# Patient Record
Sex: FEMALE | Race: BLACK OR AFRICAN AMERICAN | NOT HISPANIC OR LATINO | Employment: OTHER | URBAN - METROPOLITAN AREA
[De-identification: names, ages, dates, MRNs, and addresses within clinical notes are randomized per-mention and may not be internally consistent; named-entity substitution may affect disease eponyms.]

---

## 2019-12-25 PROBLEM — I10 HYPERTENSION: Status: ACTIVE | Noted: 2019-12-25

## 2019-12-25 PROBLEM — E78.2 MIXED HYPERLIPIDEMIA: Status: ACTIVE | Noted: 2019-12-25

## 2019-12-25 PROBLEM — E11.9 DIABETES MELLITUS: Status: ACTIVE | Noted: 2019-12-25

## 2019-12-25 PROBLEM — J81.0 ACUTE PULMONARY EDEMA: Status: ACTIVE | Noted: 2019-12-25

## 2019-12-26 PROBLEM — I16.1 HYPERTENSIVE EMERGENCY: Status: ACTIVE | Noted: 2019-12-25

## 2019-12-26 PROBLEM — I25.10 CORONARY ARTERY DISEASE INVOLVING NATIVE HEART: Status: ACTIVE | Noted: 2019-12-26

## 2019-12-26 PROBLEM — I50.41 ACUTE COMBINED SYSTOLIC AND DIASTOLIC CHF, NYHA CLASS 3: Status: ACTIVE | Noted: 2019-12-26

## 2019-12-26 PROBLEM — J96.01 ACUTE RESPIRATORY FAILURE WITH HYPOXIA: Status: ACTIVE | Noted: 2019-12-26

## 2019-12-27 PROBLEM — J81.0 ACUTE PULMONARY EDEMA: Status: RESOLVED | Noted: 2019-12-25 | Resolved: 2019-12-27

## 2019-12-27 PROBLEM — I42.9 CARDIOMYOPATHY: Status: ACTIVE | Noted: 2019-12-27

## 2019-12-27 PROBLEM — I42.0 DILATED CARDIOMYOPATHY: Status: ACTIVE | Noted: 2019-12-27

## 2019-12-28 PROBLEM — I50.41 ACUTE COMBINED SYSTOLIC AND DIASTOLIC CHF, NYHA CLASS 3: Status: RESOLVED | Noted: 2019-12-26 | Resolved: 2019-12-28

## 2019-12-28 PROBLEM — J96.01 ACUTE RESPIRATORY FAILURE WITH HYPOXIA: Status: RESOLVED | Noted: 2019-12-26 | Resolved: 2019-12-28

## 2019-12-30 ENCOUNTER — NURSE TRIAGE (OUTPATIENT)
Dept: ADMINISTRATIVE | Facility: CLINIC | Age: 59
End: 2019-12-30

## 2019-12-30 ENCOUNTER — PATIENT OUTREACH (OUTPATIENT)
Dept: ADMINISTRATIVE | Facility: CLINIC | Age: 59
End: 2019-12-30

## 2019-12-30 RX ORDER — FUROSEMIDE 40 MG/1
40 TABLET ORAL 2 TIMES DAILY
Status: ON HOLD | COMMUNITY
End: 2020-01-13 | Stop reason: SDUPTHER

## 2019-12-30 NOTE — PATIENT INSTRUCTIONS
After Catheter Ablation  After your catheter ablation procedure, youll be taken to a recovery room. You may need to lie flat for 2 to 6 hours while the insertion sites close up. During this time youll be monitored by a nurse. You may go home later that day. Or, you may stay in the hospital overnight.    Going Home  When its time to go home, have an adult family member or friend drive you. Most people can walk, climb stairs, and perform light activity soon after catheter ablation. You can most likely return to your full routine within a few days. But you may be told to avoid running, heavy lifting, and other strenuous activities for a short time.  Follow-Up  Youll have a follow-up visit to go over the results of your catheter ablation. Your healthcare provider will tell you if you can stop taking heart rhythm medications. In many cases, one ablation is enough to treat an arrhythmia. But sometimes the problem returns or another is found. If this happens, you may need a second catheter ablation. Tell your healthcare provider if you have any new or returning symptoms.  Common Symptoms After Catheter Ablation  In the first few weeks after catheter ablation, you may feel mild chest fullness or aching. You may also feel as if your heart is skipping beats. Or, your heartbeat may feel faster than normal. You may think that your heart rhythm problem is about to return. These sensations are normal and usually go away with time. Talk to your healthcare provider if youre concerned.  When to Call Your Health Care Provider   After your procedure, call your health care provider if you have:  · Increased bleeding, bruising, or pain at the insertion site  · Shortness of breath or chest pain  · Coldness, swelling, or numbness of the arm or leg near the insertion site  · A bruise or lump at the insertion site that is larger than a walnut  · A fever over 100°F (37.8°C)  · Symptoms of your arrhythmia   Date Last Reviewed:  2/20/2014  © 2557-2396 [employer]. [address] [place] [address], [place] [address]. All rights reserved. This information is not intended as a substitute for professional medical care. Always follow your healthcare professional's instructions.

## 2019-12-30 NOTE — TELEPHONE ENCOUNTER
"  Reason for Disposition   Palpitations    Protocols used: ST HEART RATE AND HEARTBEAT [id number]    Patient is recent discharge from [facility] [place] - Had LHC in groin / no blockage per patient. C/O soreness in groin area, but denies pain, drainage, swelling or bleeding. Also denies numbness or tingling in leg.  Pt stated she had 2 episodes of irregular "fast" HR with some SOB since being home. Denies persistent dizziness, fatigue, weakness, SOB or syncope.  Cautioned pt on taking in too much caffeine, not getting rest. She stated she hasn't slept well since discharge and is a coffee drinker. Instructed her to call back if symptoms worsened or go to the ER. Pt verbalized understanding.  "

## 2020-01-12 PROBLEM — I50.9 CONGESTIVE HEART FAILURE: Status: ACTIVE | Noted: 2020-01-12

## 2020-01-13 PROBLEM — I50.41 ACUTE COMBINED SYSTOLIC AND DIASTOLIC CHF, NYHA CLASS 3: Status: RESOLVED | Noted: 2019-12-26 | Resolved: 2020-01-13

## 2020-04-02 ENCOUNTER — HOSPITAL ENCOUNTER (EMERGENCY)
Facility: HOSPITAL | Age: 60
Discharge: HOME | End: 2020-04-02
Attending: EMERGENCY MEDICINE
Payer: COMMERCIAL

## 2020-04-02 VITALS
DIASTOLIC BLOOD PRESSURE: 75 MMHG | HEIGHT: 67 IN | TEMPERATURE: 98 F | SYSTOLIC BLOOD PRESSURE: 125 MMHG | HEART RATE: 103 BPM | RESPIRATION RATE: 17 BRPM | OXYGEN SATURATION: 98 % | WEIGHT: 185 LBS | BODY MASS INDEX: 29.03 KG/M2

## 2020-04-02 DIAGNOSIS — Z20.822 SUSPECTED COVID-19 VIRUS INFECTION: Primary | ICD-10-CM

## 2020-04-02 DIAGNOSIS — R05.9 COUGH: ICD-10-CM

## 2020-04-02 PROCEDURE — 99284 PR EMERGENCY DEPT VISIT,LEVEL IV: ICD-10-PCS | Mod: ,,, | Performed by: EMERGENCY MEDICINE

## 2020-04-02 PROCEDURE — 25000003 PHARM REV CODE 250: Performed by: EMERGENCY MEDICINE

## 2020-04-02 PROCEDURE — U0002 COVID-19 LAB TEST NON-CDC: HCPCS

## 2020-04-02 PROCEDURE — 99283 EMERGENCY DEPT VISIT LOW MDM: CPT | Mod: 25

## 2020-04-02 PROCEDURE — 99284 EMERGENCY DEPT VISIT MOD MDM: CPT | Mod: ,,, | Performed by: EMERGENCY MEDICINE

## 2020-04-02 RX ORDER — ACETAMINOPHEN 500 MG
1000 TABLET ORAL
Status: COMPLETED | OUTPATIENT
Start: 2020-04-02 | End: 2020-04-02

## 2020-04-02 RX ORDER — ACETAMINOPHEN 500 MG
1000 TABLET ORAL
Status: DISCONTINUED | OUTPATIENT
Start: 2020-04-02 | End: 2020-04-02 | Stop reason: SDUPTHER

## 2020-04-02 RX ADMIN — ACETAMINOPHEN 1000 MG: 500 TABLET ORAL at 04:04

## 2020-04-02 NOTE — ED PROVIDER NOTES
Encounter Date: 4/2/2020    SCRIBE #1 NOTE: I, Alondra Irby, am scribing for, and in the presence of,  Dr. Garcia. I have scribed the following portions of the note - Other sections scribed: HPI, ROS, PE.       History     Chief Complaint   Patient presents with    Cough     white sputum cough, SOB on exertion,headaches.  dx COVID +. No fever, chills, body aches. [phone]     Time patient was seen by the provider: 3:18 PM      The patient is a 59 y.o. female with medical history of hypertension, hyperlipidemia, anxiety, coronary angiogram, pulmonary edema, diabetes, CHF who presents to the ED with a complaint of cough. Patient reports SOB and cough. She notes she was on captopril which made her cough. She denies fever, diarrhea, nausea, vomiting. Contact with  who is positive for COVID-19.     The history is provided by the patient and medical records.     Review of patient's allergies indicates:   Allergen Reactions    Captopril Other (See Comments)     COUGH     Past Medical History:   Diagnosis Date    Anxiety     CHF (congestive heart failure)     Depression     Diabetes mellitus     Dyspnea     H/O coronary angiogram     H/O: hysterectomy     Hyperlipemia     Hypertension     Pulmonary edema     Schizophrenia      Past Surgical History:   Procedure Laterality Date    BLADDER SUSPENSION      CARPAL TUNNEL RELEASE Right     HEEL SPUR SURGERY Left     LEFT HEART CATHETERIZATION Bilateral 12/27/2019    Procedure: Left heart cath;  Surgeon: Steve Chambers MD;  Location: [facility] CATH LAB;  Service: Cardiology;  Laterality: Bilateral;    TUBAL LIGATION       Family History   Family history unknown: Yes     Social History     Tobacco Use    Smoking status: Current Every Day Smoker   Substance Use Topics    Alcohol use: No     Alcohol/week: 0.0 standard drinks    Drug use: No     Review of Systems   Constitutional: Negative for activity change, diaphoresis and fever.   HENT:  Negative for ear pain, rhinorrhea, sore throat and trouble swallowing.    Eyes: Negative for pain and visual disturbance.   Respiratory: Positive for cough and shortness of breath. Negative for stridor.    Cardiovascular: Negative for chest pain.   Gastrointestinal: Positive for constipation. Negative for abdominal pain, blood in stool, diarrhea, nausea and vomiting.   Genitourinary: Negative for dysuria, hematuria, vaginal bleeding and vaginal discharge.   Musculoskeletal: Negative for gait problem.   Skin: Negative for rash and wound.   Neurological: Negative for seizures and headaches.   Psychiatric/Behavioral: Negative for hallucinations and suicidal ideas.       Physical Exam     Initial Vitals   BP Pulse Resp Temp SpO2   04/02/20 1431 04/02/20 1412 04/02/20 1412 04/02/20 1412 04/02/20 1412   (!) 164/79 (!) 118 20 98.2 °F (36.8 °C) 100 %      MAP       --                Physical Exam    Nursing note and vitals reviewed.  Constitutional: She appears well-developed. She is not diaphoretic. No distress.   HENT:   Head: Normocephalic and atraumatic.   Nose: Nose normal.   Eyes: EOM are normal. No scleral icterus.   Neck: Normal range of motion. Neck supple.   Cardiovascular: Regular rhythm, normal heart sounds and intact distal pulses. Tachycardia present.  Exam reveals no gallop and no friction rub.    No murmur heard.  Pulmonary/Chest: Breath sounds normal. No stridor. No respiratory distress. She has no wheezes. She has no rhonchi. She has no rales.   Abdominal: Soft. Bowel sounds are normal. There is no tenderness.   Musculoskeletal: Normal range of motion.   No pitting edema    Neurological: She is alert and oriented to person, place, and time. No cranial nerve deficit.   Skin: Skin is warm and dry. Capillary refill takes less than 2 seconds. No rash noted.   Psychiatric: She has a normal mood and affect.         ED Course   Procedures  Labs Reviewed   SARS-COV-2 (COVID-19) QUALITATIVE PCR          Imaging  Results          X-Ray Chest AP Portable (Final result)  Result time 04/02/20 15:12:08    Final result by Kai Nieves III, MD (04/02/20 15:12:08)                 Impression:      No acute process seen.      Electronically signed by: Kai Nieves MD  Date:    04/02/2020  Time:    15:12             Narrative:    EXAMINATION:  XR CHEST AP PORTABLE    CLINICAL HISTORY:  Cough    FINDINGS:  One view: Heart size is normal.  Lungs are clear.  The bones show nothing unusual.                                 Medical Decision Making:   History:   Old Medical Records: I decided to obtain old medical records.  Clinical Tests:   Lab Tests: Ordered and Reviewed  Radiological Study: Ordered and Reviewed  ED Management:  Patient was seen in the Emergency Department with symptoms consistent with a viral respiratory illness. Pt does not appear fluid overloaded. Do not suspect CHF exacerbation or ACS. Chest xray neg. Based on the most recent recommendations from our hospital administration/infectious disease department at this time, the patient does meet criteria for COVID 19 which was done. This was explained to the patient. Vital signs do not indicate sepsis, hypoxia nor respiratory distress, and in my professional opinion the patient is well enough for discharge home. The patient was provided with discharge instructions on self-care and how to quarantine at home. I reinforced this advice and the dangers to family and public with failure to comply. We will proceed with symptomatic treatment. The patient was also given a return to work note, if applicable. Return precautions discussed with the patient. The patient expressed understanding to my instructions.               Scribe Attestation:   Scribe #1: I performed the above scribed service and the documentation accurately describes the services I performed. I attest to the accuracy of the note.            ED Course as of Apr 02 1646   Thu Apr 02, 2020   1526 Impression      No  acute process seen.      Electronically signed by: Kai Nieves MD  Date: 04/02/2020  Time: 15:12        [BD]      ED Course User Index  [BD] Brian Garcia MD                Clinical Impression:       ICD-10-CM ICD-9-CM   1. Suspected Covid-19 Virus Infection R68.89    2. Cough R05 786.2             ED Disposition Condition    Discharge Stable        ED Prescriptions     None        Follow-up Information     Follow up With Specialties Details Why Contact Info    Fernando Manzo MD Endocrinology Go in 1 day  [address] [address]  [phone]      [facility] Emergency Medicine Go to  As needed, If symptoms worsen [address] [facility] [address] [phone]                                     Brian Garcia MD  04/02/20 8004

## 2020-04-03 ENCOUNTER — PES CALL (OUTPATIENT)
Dept: ADMINISTRATIVE | Facility: CLINIC | Age: 60
End: 2020-04-03

## 2020-04-03 LAB — SARS-COV-2 RNA RESP QL NAA+PROBE: NOT DETECTED

## 2020-04-21 ENCOUNTER — TELEPHONE (OUTPATIENT)
Dept: ADMINISTRATIVE | Facility: CLINIC | Age: 60
End: 2020-04-21

## 2020-07-13 PROBLEM — F17.200 SMOKING: Status: ACTIVE | Noted: 2020-07-13

## 2020-07-13 PROBLEM — Z72.0 TOBACCO ABUSE: Status: ACTIVE | Noted: 2020-07-13

## 2020-07-13 PROBLEM — R06.02 SHORTNESS OF BREATH: Status: ACTIVE | Noted: 2020-07-13

## 2020-08-14 ENCOUNTER — HOSPITAL ENCOUNTER (INPATIENT)
Facility: HOSPITAL | Age: 60
LOS: 2 days | Discharge: HOME | DRG: 061 | End: 2020-08-16
Attending: EMERGENCY MEDICINE | Admitting: PSYCHIATRY & NEUROLOGY
Payer: COMMERCIAL

## 2020-08-14 DIAGNOSIS — I63.411 CEREBROVASCULAR ACCIDENT (CVA) DUE TO EMBOLISM OF RIGHT MIDDLE CEREBRAL ARTERY: ICD-10-CM

## 2020-08-14 DIAGNOSIS — I42.0 DILATED CARDIOMYOPATHY: ICD-10-CM

## 2020-08-14 DIAGNOSIS — I50.43 ACUTE ON CHRONIC COMBINED SYSTOLIC AND DIASTOLIC CONGESTIVE HEART FAILURE: ICD-10-CM

## 2020-08-14 DIAGNOSIS — I63.9 STROKE: ICD-10-CM

## 2020-08-14 DIAGNOSIS — I63.9 ISCHEMIC STROKE: ICD-10-CM

## 2020-08-14 DIAGNOSIS — I63.511 CEREBROVASCULAR ACCIDENT (CVA) DUE TO OCCLUSION OF RIGHT MIDDLE CEREBRAL ARTERY: Primary | ICD-10-CM

## 2020-08-14 DIAGNOSIS — I63.411 EMBOLIC STROKE INVOLVING RIGHT MIDDLE CEREBRAL ARTERY: ICD-10-CM

## 2020-08-14 PROBLEM — G93.6 CYTOTOXIC CEREBRAL EDEMA: Status: ACTIVE | Noted: 2020-08-14

## 2020-08-14 PROBLEM — Z72.0 TOBACCO ABUSE: Status: RESOLVED | Noted: 2020-07-13 | Resolved: 2020-08-14

## 2020-08-14 LAB
ABO + RH BLD: NORMAL
ALBUMIN SERPL BCP-MCNC: 3.3 G/DL (ref 3.5–5.2)
ALBUMIN SERPL BCP-MCNC: 3.5 G/DL (ref 3.5–5.2)
ALP SERPL-CCNC: 113 U/L (ref 55–135)
ALP SERPL-CCNC: 120 U/L (ref 55–135)
ALT SERPL W/O P-5'-P-CCNC: 28 U/L (ref 10–44)
ALT SERPL W/O P-5'-P-CCNC: 35 U/L (ref 10–44)
ANION GAP SERPL CALC-SCNC: 10 MMOL/L (ref 8–16)
ANION GAP SERPL CALC-SCNC: 10 MMOL/L (ref 8–16)
AST SERPL-CCNC: 21 U/L (ref 10–40)
AST SERPL-CCNC: 43 U/L (ref 10–40)
AV INDEX (PROSTH): 0.56
AV MEAN GRADIENT: 2 MMHG
AV PEAK GRADIENT: 3 MMHG
AV VALVE AREA: 2.13 CM2
AV VELOCITY RATIO: 0.67
BACTERIA #/AREA URNS AUTO: ABNORMAL /HPF
BASOPHILS # BLD AUTO: 0.06 K/UL (ref 0–0.2)
BASOPHILS NFR BLD: 0.8 % (ref 0–1.9)
BILIRUB SERPL-MCNC: 0.5 MG/DL (ref 0.1–1)
BILIRUB SERPL-MCNC: 0.6 MG/DL (ref 0.1–1)
BILIRUB UR QL STRIP: NEGATIVE
BLD GP AB SCN CELLS X3 SERPL QL: NORMAL
BNP SERPL-MCNC: 1028 PG/ML (ref 0–99)
BSA FOR ECHO PROCEDURE: 2 M2
BUN SERPL-MCNC: 13 MG/DL (ref 6–20)
BUN SERPL-MCNC: 16 MG/DL (ref 6–20)
CALCIUM SERPL-MCNC: 8.5 MG/DL (ref 8.7–10.5)
CALCIUM SERPL-MCNC: 8.7 MG/DL (ref 8.7–10.5)
CHLORIDE SERPL-SCNC: 107 MMOL/L (ref 95–110)
CHLORIDE SERPL-SCNC: 108 MMOL/L (ref 95–110)
CHOLEST SERPL-MCNC: 186 MG/DL (ref 120–199)
CHOLEST SERPL-MCNC: 194 MG/DL (ref 120–199)
CHOLEST/HDLC SERPL: 4.8 {RATIO} (ref 2–5)
CHOLEST/HDLC SERPL: 5.2 {RATIO} (ref 2–5)
CK MB SERPL-MCNC: 1.1 NG/ML (ref 0.1–6.5)
CK MB SERPL-RTO: 1.1 % (ref 0–5)
CK SERPL-CCNC: 97 U/L (ref 20–180)
CLARITY UR REFRACT.AUTO: ABNORMAL
CO2 SERPL-SCNC: 25 MMOL/L (ref 23–29)
CO2 SERPL-SCNC: 28 MMOL/L (ref 23–29)
COLOR UR AUTO: YELLOW
CREAT SERPL-MCNC: 0.7 MG/DL (ref 0.5–1.4)
CREAT SERPL-MCNC: 0.8 MG/DL (ref 0.5–1.4)
CREAT SERPL-MCNC: 0.8 MG/DL (ref 0.5–1.4)
CV ECHO LV RWT: 0.35 CM
DIFFERENTIAL METHOD: ABNORMAL
DOP CALC AO PEAK VEL: 0.9 M/S
DOP CALC AO VTI: 15.16 CM
DOP CALC LVOT AREA: 3.8 CM2
DOP CALC LVOT DIAMETER: 2.21 CM
DOP CALC LVOT PEAK VEL: 0.6 M/S
DOP CALC LVOT STROKE VOLUME: 32.28 CM3
DOP CALCLVOT PEAK VEL VTI: 8.42 CM
E WAVE DECELERATION TIME: 134.33 MSEC
E/A RATIO: 0.9
E/E' RATIO: 19.71 M/S
ECHO LV POSTERIOR WALL: 1.23 CM (ref 0.6–1.1)
EOSINOPHIL # BLD AUTO: 0.3 K/UL (ref 0–0.5)
EOSINOPHIL NFR BLD: 4.2 % (ref 0–8)
ERYTHROCYTE [DISTWIDTH] IN BLOOD BY AUTOMATED COUNT: 15.1 % (ref 11.5–14.5)
EST. GFR  (AFRICAN AMERICAN): >60 ML/MIN/1.73 M^2
EST. GFR  (AFRICAN AMERICAN): >60 ML/MIN/1.73 M^2
EST. GFR  (NON AFRICAN AMERICAN): >60 ML/MIN/1.73 M^2
EST. GFR  (NON AFRICAN AMERICAN): >60 ML/MIN/1.73 M^2
ESTIMATED AVG GLUCOSE: 151 MG/DL (ref 68–131)
FRACTIONAL SHORTENING: 6 % (ref 28–44)
GLUCOSE SERPL-MCNC: 137 MG/DL (ref 70–110)
GLUCOSE SERPL-MCNC: 154 MG/DL (ref 70–110)
GLUCOSE UR QL STRIP: NEGATIVE
HBA1C MFR BLD HPLC: 6.9 % (ref 4–5.6)
HCT VFR BLD AUTO: 39.5 % (ref 37–48.5)
HDLC SERPL-MCNC: 37 MG/DL (ref 40–75)
HDLC SERPL-MCNC: 39 MG/DL (ref 40–75)
HDLC SERPL: 19.1 % (ref 20–50)
HDLC SERPL: 21 % (ref 20–50)
HGB BLD-MCNC: 12.4 G/DL (ref 12–16)
HGB UR QL STRIP: ABNORMAL
HYALINE CASTS UR QL AUTO: 0 /LPF
IMM GRANULOCYTES # BLD AUTO: 0.01 K/UL (ref 0–0.04)
IMM GRANULOCYTES NFR BLD AUTO: 0.1 % (ref 0–0.5)
INR PPP: 0.9 (ref 0.8–1.2)
INTERVENTRICULAR SEPTUM: 0.8 CM (ref 0.6–1.1)
KETONES UR QL STRIP: NEGATIVE
LA MAJOR: 5.01 CM
LA MINOR: 4.96 CM
LA WIDTH: 4.67 CM
LDLC SERPL CALC-MCNC: 125 MG/DL (ref 63–159)
LDLC SERPL CALC-MCNC: 135.2 MG/DL (ref 63–159)
LEFT INTERNAL DIMENSION IN SYSTOLE: 6.6 CM (ref 2.1–4)
LEFT VENTRICLE DIASTOLIC VOLUME INDEX: 96.99 ML/M2
LEFT VENTRICLE DIASTOLIC VOLUME: 189.82 ML
LEFT VENTRICLE MASS INDEX: 168 G/M2
LEFT VENTRICLE SYSTOLIC VOLUME INDEX: 82.6 ML/M2
LEFT VENTRICLE SYSTOLIC VOLUME: 161.65 ML
LEFT VENTRICULAR INTERNAL DIMENSION IN DIASTOLE: 7 CM (ref 3.5–6)
LEFT VENTRICULAR MASS: 327.84 G
LEUKOCYTE ESTERASE UR QL STRIP: ABNORMAL
LV LATERAL E/E' RATIO: 17.25 M/S
LV SEPTAL E/E' RATIO: 23 M/S
LYMPHOCYTES # BLD AUTO: 3 K/UL (ref 1–4.8)
LYMPHOCYTES NFR BLD: 41.9 % (ref 18–48)
MAGNESIUM SERPL-MCNC: 2 MG/DL (ref 1.6–2.6)
MCH RBC QN AUTO: 28.1 PG (ref 27–31)
MCHC RBC AUTO-ENTMCNC: 31.4 G/DL (ref 32–36)
MCV RBC AUTO: 89 FL (ref 82–98)
MICROSCOPIC COMMENT: ABNORMAL
MONOCYTES # BLD AUTO: 0.4 K/UL (ref 0.3–1)
MONOCYTES NFR BLD: 6.2 % (ref 4–15)
MV PEAK A VEL: 0.77 M/S
MV PEAK E VEL: 0.69 M/S
MV STENOSIS PRESSURE HALF TIME: 38.96 MS
MV VALVE AREA P 1/2 METHOD: 5.65 CM2
NEUTROPHILS # BLD AUTO: 3.3 K/UL (ref 1.8–7.7)
NEUTROPHILS NFR BLD: 46.8 % (ref 38–73)
NITRITE UR QL STRIP: NEGATIVE
NONHDLC SERPL-MCNC: 147 MG/DL
NONHDLC SERPL-MCNC: 157 MG/DL
NRBC BLD-RTO: 0 /100 WBC
PH UR STRIP: 7 [PH] (ref 5–8)
PHOSPHATE SERPL-MCNC: 4.4 MG/DL (ref 2.7–4.5)
PLATELET # BLD AUTO: 344 K/UL (ref 150–350)
PMV BLD AUTO: 10.4 FL (ref 9.2–12.9)
POC PTINR: 1.1 (ref 0.9–1.2)
POC PTWBT: 12.8 SEC (ref 9.7–14.3)
POCT GLUCOSE: 171 MG/DL (ref 70–110)
POTASSIUM SERPL-SCNC: 3.3 MMOL/L (ref 3.5–5.1)
POTASSIUM SERPL-SCNC: 4.3 MMOL/L (ref 3.5–5.1)
PROT SERPL-MCNC: 6.3 G/DL (ref 6–8.4)
PROT SERPL-MCNC: 7.2 G/DL (ref 6–8.4)
PROT UR QL STRIP: ABNORMAL
PROTHROMBIN TIME: 10.3 SEC (ref 9–12.5)
RA MAJOR: 4.59 CM
RA PRESSURE: 3 MMHG
RA WIDTH: 4.31 CM
RBC # BLD AUTO: 4.42 M/UL (ref 4–5.4)
RBC #/AREA URNS AUTO: 3 /HPF (ref 0–4)
RIGHT VENTRICULAR END-DIASTOLIC DIMENSION: 5.01 CM
RV TISSUE DOPPLER FREE WALL SYSTOLIC VELOCITY 1 (APICAL 4 CHAMBER VIEW): 11.7 CM/S
SAMPLE: NORMAL
SAMPLE: NORMAL
SARS-COV-2 RDRP RESP QL NAA+PROBE: NEGATIVE
SINUS: 3.22 CM
SODIUM SERPL-SCNC: 143 MMOL/L (ref 136–145)
SODIUM SERPL-SCNC: 145 MMOL/L (ref 136–145)
SP GR UR STRIP: 1.01 (ref 1–1.03)
SQUAMOUS #/AREA URNS AUTO: 4 /HPF
STJ: 2.86 CM
TDI LATERAL: 0.04 M/S
TDI SEPTAL: 0.03 M/S
TDI: 0.04 M/S
TRICUSPID ANNULAR PLANE SYSTOLIC EXCURSION: 2.01 CM
TRIGL SERPL-MCNC: 109 MG/DL (ref 30–150)
TRIGL SERPL-MCNC: 110 MG/DL (ref 30–150)
TROPONIN I SERPL DL<=0.01 NG/ML-MCNC: 0.02 NG/ML (ref 0–0.03)
TROPONIN I SERPL DL<=0.01 NG/ML-MCNC: 0.03 NG/ML (ref 0–0.03)
TROPONIN I SERPL DL<=0.01 NG/ML-MCNC: 0.09 NG/ML (ref 0–0.03)
TSH SERPL DL<=0.005 MIU/L-ACNC: 1.93 UIU/ML (ref 0.4–4)
TSH SERPL DL<=0.005 MIU/L-ACNC: 3.12 UIU/ML (ref 0.4–4)
URN SPEC COLLECT METH UR: ABNORMAL
WBC # BLD AUTO: 7.08 K/UL (ref 3.9–12.7)
WBC #/AREA URNS AUTO: 7 /HPF (ref 0–5)

## 2020-08-14 PROCEDURE — 93010 ELECTROCARDIOGRAM REPORT: CPT | Mod: ,,, | Performed by: INTERNAL MEDICINE

## 2020-08-14 PROCEDURE — 93010 EKG 12-LEAD: ICD-10-PCS | Mod: ,,, | Performed by: INTERNAL MEDICINE

## 2020-08-14 PROCEDURE — 63600175 PHARM REV CODE 636 W HCPCS: Performed by: STUDENT IN AN ORGANIZED HEALTH CARE EDUCATION/TRAINING PROGRAM

## 2020-08-14 PROCEDURE — 82962 GLUCOSE BLOOD TEST: CPT

## 2020-08-14 PROCEDURE — 63600175 PHARM REV CODE 636 W HCPCS: Mod: JG | Performed by: STUDENT IN AN ORGANIZED HEALTH CARE EDUCATION/TRAINING PROGRAM

## 2020-08-14 PROCEDURE — 99291 PR CRITICAL CARE, E/M 30-74 MINUTES: ICD-10-PCS | Mod: ,,, | Performed by: PSYCHIATRY & NEUROLOGY

## 2020-08-14 PROCEDURE — 84443 ASSAY THYROID STIM HORMONE: CPT | Mod: 91

## 2020-08-14 PROCEDURE — 93005 ELECTROCARDIOGRAM TRACING: CPT

## 2020-08-14 PROCEDURE — 84484 ASSAY OF TROPONIN QUANT: CPT | Mod: 91

## 2020-08-14 PROCEDURE — 96365 THER/PROPH/DIAG IV INF INIT: CPT

## 2020-08-14 PROCEDURE — 82565 ASSAY OF CREATININE: CPT

## 2020-08-14 PROCEDURE — 25000003 PHARM REV CODE 250: Performed by: STUDENT IN AN ORGANIZED HEALTH CARE EDUCATION/TRAINING PROGRAM

## 2020-08-14 PROCEDURE — 81001 URINALYSIS AUTO W/SCOPE: CPT

## 2020-08-14 PROCEDURE — 99291 CRITICAL CARE FIRST HOUR: CPT | Mod: ,,, | Performed by: PSYCHIATRY & NEUROLOGY

## 2020-08-14 PROCEDURE — 99223 PR INITIAL HOSPITAL CARE,LEVL III: ICD-10-PCS | Mod: ,,, | Performed by: PSYCHIATRY & NEUROLOGY

## 2020-08-14 PROCEDURE — 83880 ASSAY OF NATRIURETIC PEPTIDE: CPT

## 2020-08-14 PROCEDURE — 83735 ASSAY OF MAGNESIUM: CPT

## 2020-08-14 PROCEDURE — 36415 COLL VENOUS BLD VENIPUNCTURE: CPT

## 2020-08-14 PROCEDURE — U0002 COVID-19 LAB TEST NON-CDC: HCPCS

## 2020-08-14 PROCEDURE — 97802 MEDICAL NUTRITION INDIV IN: CPT

## 2020-08-14 PROCEDURE — 80053 COMPREHEN METABOLIC PANEL: CPT | Mod: 91

## 2020-08-14 PROCEDURE — 85025 COMPLETE CBC W/AUTO DIFF WBC: CPT

## 2020-08-14 PROCEDURE — 92610 EVALUATE SWALLOWING FUNCTION: CPT

## 2020-08-14 PROCEDURE — 25500020 PHARM REV CODE 255: Performed by: PSYCHIATRY & NEUROLOGY

## 2020-08-14 PROCEDURE — 96375 TX/PRO/DX INJ NEW DRUG ADDON: CPT

## 2020-08-14 PROCEDURE — 82553 CREATINE MB FRACTION: CPT

## 2020-08-14 PROCEDURE — 99291 CRITICAL CARE FIRST HOUR: CPT | Mod: 25

## 2020-08-14 PROCEDURE — 82550 ASSAY OF CK (CPK): CPT

## 2020-08-14 PROCEDURE — 85610 PROTHROMBIN TIME: CPT

## 2020-08-14 PROCEDURE — 80053 COMPREHEN METABOLIC PANEL: CPT

## 2020-08-14 PROCEDURE — 20000000 HC ICU ROOM

## 2020-08-14 PROCEDURE — 84100 ASSAY OF PHOSPHORUS: CPT

## 2020-08-14 PROCEDURE — 99291 PR CRITICAL CARE, E/M 30-74 MINUTES: ICD-10-PCS | Mod: ,,, | Performed by: EMERGENCY MEDICINE

## 2020-08-14 PROCEDURE — 80061 LIPID PANEL: CPT | Mod: 91

## 2020-08-14 PROCEDURE — 86901 BLOOD TYPING SEROLOGIC RH(D): CPT

## 2020-08-14 PROCEDURE — 80061 LIPID PANEL: CPT

## 2020-08-14 PROCEDURE — 84443 ASSAY THYROID STIM HORMONE: CPT

## 2020-08-14 PROCEDURE — 92523 SPEECH SOUND LANG COMPREHEN: CPT

## 2020-08-14 PROCEDURE — 99900035 HC TECH TIME PER 15 MIN (STAT)

## 2020-08-14 PROCEDURE — 99291 CRITICAL CARE FIRST HOUR: CPT | Mod: ,,, | Performed by: EMERGENCY MEDICINE

## 2020-08-14 PROCEDURE — 84484 ASSAY OF TROPONIN QUANT: CPT

## 2020-08-14 PROCEDURE — 99223 1ST HOSP IP/OBS HIGH 75: CPT | Mod: ,,, | Performed by: PSYCHIATRY & NEUROLOGY

## 2020-08-14 PROCEDURE — 83036 HEMOGLOBIN GLYCOSYLATED A1C: CPT

## 2020-08-14 PROCEDURE — 63600175 PHARM REV CODE 636 W HCPCS: Performed by: NURSE PRACTITIONER

## 2020-08-14 RX ORDER — INSULIN ASPART 100 [IU]/ML
1-10 INJECTION, SOLUTION INTRAVENOUS; SUBCUTANEOUS EVERY 6 HOURS PRN
Status: DISCONTINUED | OUTPATIENT
Start: 2020-08-14 | End: 2020-08-16 | Stop reason: HOSPADM

## 2020-08-14 RX ORDER — MAGNESIUM SULFATE HEPTAHYDRATE 40 MG/ML
2 INJECTION, SOLUTION INTRAVENOUS
Status: DISCONTINUED | OUTPATIENT
Start: 2020-08-14 | End: 2020-08-15

## 2020-08-14 RX ORDER — GLUCAGON 1 MG
1 KIT INJECTION
Status: DISCONTINUED | OUTPATIENT
Start: 2020-08-14 | End: 2020-08-16 | Stop reason: HOSPADM

## 2020-08-14 RX ORDER — SODIUM CHLORIDE 9 MG/ML
50 INJECTION, SOLUTION INTRAVENOUS ONCE
Status: COMPLETED | OUTPATIENT
Start: 2020-08-14 | End: 2020-08-14

## 2020-08-14 RX ORDER — MAGNESIUM SULFATE HEPTAHYDRATE 40 MG/ML
4 INJECTION, SOLUTION INTRAVENOUS
Status: DISCONTINUED | OUTPATIENT
Start: 2020-08-14 | End: 2020-08-15

## 2020-08-14 RX ORDER — HYDROCHLOROTHIAZIDE 12.5 MG/1
12.5 TABLET ORAL DAILY
Status: ON HOLD | COMMUNITY
End: 2020-08-28 | Stop reason: HOSPADM

## 2020-08-14 RX ORDER — METOPROLOL SUCCINATE 25 MG/1
100 TABLET, EXTENDED RELEASE ORAL DAILY
Status: DISCONTINUED | OUTPATIENT
Start: 2020-08-14 | End: 2020-08-14

## 2020-08-14 RX ORDER — LABETALOL HCL 20 MG/4 ML
10 SYRINGE (ML) INTRAVENOUS
Status: DISCONTINUED | OUTPATIENT
Start: 2020-08-14 | End: 2020-08-15

## 2020-08-14 RX ORDER — POTASSIUM CHLORIDE 7.45 MG/ML
80 INJECTION INTRAVENOUS
Status: DISCONTINUED | OUTPATIENT
Start: 2020-08-14 | End: 2020-08-15

## 2020-08-14 RX ORDER — ATORVASTATIN CALCIUM 20 MG/1
80 TABLET, FILM COATED ORAL DAILY
Status: DISCONTINUED | OUTPATIENT
Start: 2020-08-15 | End: 2020-08-16 | Stop reason: HOSPADM

## 2020-08-14 RX ORDER — ASPIRIN 81 MG/1
81 TABLET ORAL DAILY
Status: DISCONTINUED | OUTPATIENT
Start: 2020-08-15 | End: 2020-08-14

## 2020-08-14 RX ORDER — POTASSIUM CHLORIDE 7.45 MG/ML
40 INJECTION INTRAVENOUS
Status: DISCONTINUED | OUTPATIENT
Start: 2020-08-14 | End: 2020-08-15

## 2020-08-14 RX ORDER — ONDANSETRON 2 MG/ML
4 INJECTION INTRAMUSCULAR; INTRAVENOUS ONCE
Status: COMPLETED | OUTPATIENT
Start: 2020-08-14 | End: 2020-08-14

## 2020-08-14 RX ORDER — SODIUM CHLORIDE 0.9 % (FLUSH) 0.9 %
10 SYRINGE (ML) INJECTION
Status: DISCONTINUED | OUTPATIENT
Start: 2020-08-14 | End: 2020-08-16 | Stop reason: HOSPADM

## 2020-08-14 RX ORDER — DULOXETIN HYDROCHLORIDE 30 MG/1
60 CAPSULE, DELAYED RELEASE ORAL DAILY
Status: DISCONTINUED | OUTPATIENT
Start: 2020-08-14 | End: 2020-08-16 | Stop reason: HOSPADM

## 2020-08-14 RX ORDER — ATORVASTATIN CALCIUM 20 MG/1
40 TABLET, FILM COATED ORAL DAILY
Status: DISCONTINUED | OUTPATIENT
Start: 2020-08-14 | End: 2020-08-14

## 2020-08-14 RX ORDER — POTASSIUM CHLORIDE 7.45 MG/ML
60 INJECTION INTRAVENOUS
Status: DISCONTINUED | OUTPATIENT
Start: 2020-08-14 | End: 2020-08-15

## 2020-08-14 RX ORDER — FUROSEMIDE 10 MG/ML
40 INJECTION INTRAMUSCULAR; INTRAVENOUS
Status: COMPLETED | OUTPATIENT
Start: 2020-08-14 | End: 2020-08-14

## 2020-08-14 RX ORDER — NICARDIPINE HYDROCHLORIDE 0.2 MG/ML
5 INJECTION INTRAVENOUS CONTINUOUS
Status: DISCONTINUED | OUTPATIENT
Start: 2020-08-14 | End: 2020-08-15

## 2020-08-14 RX ORDER — METOPROLOL TARTRATE 1 MG/ML
5 INJECTION, SOLUTION INTRAVENOUS ONCE AS NEEDED
Status: DISCONTINUED | OUTPATIENT
Start: 2020-08-14 | End: 2020-08-16 | Stop reason: HOSPADM

## 2020-08-14 RX ADMIN — ATORVASTATIN CALCIUM 40 MG: 10 TABLET, FILM COATED ORAL at 11:08

## 2020-08-14 RX ADMIN — FUROSEMIDE 40 MG: 10 INJECTION, SOLUTION INTRAMUSCULAR; INTRAVENOUS at 11:08

## 2020-08-14 RX ADMIN — SODIUM CHLORIDE 50 ML: 9 INJECTION, SOLUTION INTRAVENOUS at 09:08

## 2020-08-14 RX ADMIN — ONDANSETRON 4 MG: 2 INJECTION INTRAMUSCULAR; INTRAVENOUS at 12:08

## 2020-08-14 RX ADMIN — ALTEPLASE 8 MG: KIT at 08:08

## 2020-08-14 RX ADMIN — ALTEPLASE 77.4 MG: KIT at 08:08

## 2020-08-14 RX ADMIN — IOHEXOL 100 ML: 350 INJECTION, SOLUTION INTRAVENOUS at 01:08

## 2020-08-14 RX ADMIN — ALTEPLASE 70 MG: KIT at 08:08

## 2020-08-14 NOTE — PLAN OF CARE
Bedside swallow assessment completed. Recommend regular texture diet with thin liquids. ST to f/u to ensure tolerance and to target mild cognitive-linguistic deficits. Brittany Proctor CCC-SLP 8/14/2020 2:12 PM

## 2020-08-14 NOTE — ED NOTES
LOC: The patient is awake, alert and aware of environment with an appropriate affect, the patient is oriented x 3   APPEARANCE: Patient resting comfortably and in no acute distress, patient is clean and well groomed, patient's clothing is properly fastened.  SKIN: The skin is warm and dry, color consistent with ethnicity, patient has normal skin turgor and moist mucus membranes, skin intact, no breakdown or bruising noted.  MUSCULOSKELETAL: Patient moving all extremities spontaneously, no obvious swelling or deformities noted.  ABDOMEN: Soft and non tender to palpation, no distention noted  NEUROLOGIC:  See neuro flow sheet      Patient states she was walking outside of her house to get something from the car when she noticed her speech got slurred, patient also delevoped a left sided facial droop at that time, patient states this has happened a few times, no acute distress noted, patient states she is slightly sob, patient placed on cardiac monitoring, will continue to monitor

## 2020-08-14 NOTE — ASSESSMENT & PLAN NOTE
Ms. Mohr is a 59 yr old female with medical history of DM, HTN, HLD, CHF, Smoking presenting to the ER with acute onset left lower facial droop and dysarthria. NIHSS on exam: 2. MRI ischemic intervention protocol: right insular and inferior frontal infarct + right M2 superior division occlusion. t-PA: Administered post MRI; risks, benefits, inclusion and exclusion criteria reviewed prior to administration. IR intervention: None - imaging clinical match; no occlusion in divisions of motor cortex      Stroke etiology: ESUS  Plans for NCC admission and monitoring.     Antithrombotics for secondary stroke prevention: Antiplatelets: None: Hold all Antithrombotics x 24 hours after IV t-PA administration    Statins for secondary stroke prevention and hyperlipidemia, if present:   Statins: Atorvastatin- 40 mg daily    Aggressive risk factor modification: HTN, DM, HLD, CAD     Rehab efforts: The patient has been evaluated by a stroke team provider and the therapy needs have been fully considered based off the presenting complaints and exam findings. The following therapy evaluations are needed: PT evaluate and treat, OT evaluate and treat, SLP evaluate and treat, PM&R evaluate for appropriate placement    Diagnostics ordered/pending: CTA Neck/Arch to assess vasculature, HgbA1C to assess blood glucose levels, Lipid Profile to assess cholesterol levels, TTE to assess cardiac function/status , TSH to assess thyroid function    VTE prophylaxis: Mechanical prophylaxis: Place SCDs  None: Reason for No Pharmacological VTE Prophylaxis: Holding x 24 hours s/p treatment with alteplase (tPA)    BP parameters: Infarct: Post tPA, SBP <180

## 2020-08-14 NOTE — ED NOTES
Patient on ct table with nurse and patient complains of nausea and some vomiting after receiving iv contrast, stroke team called awaiting new orders.

## 2020-08-14 NOTE — PLAN OF CARE
Admit date:  8/14/2020  8:07 AM   Admit diagnosis:  Stroke [I63.9]  Acute on chronic combined systolic and diastolic congestive heart failure [I50.43]  Cerebrovascular accident (CVA) due to occlusion of right middle cerebral artery [I63.511]    CM met with patient in room for Dish[place] Planning Assessment.  Patient is able to answer questions.  Per patient, she lives with her  in a single story house with no step(s) to enter.   Per patient, she was independent with ADLS and used no dme for ambulation.  Patient will have assistance from her  upon discharge.   Discharge Planning Booklet given to patient/family and discussed.  All questions addressed.  CM will follow for needs.    Physical Address:  [facility] [place] [address]       08/14/20 5626   Discharge Assessment   Assessment Type Discharge Planning Assessment   Confirmed/corrected address and phone number on facesheet? Yes   Assessment information obtained from? Patient   Expected Length of Stay (days) 4   Communicated expected length of stay with patient/caregiver yes   Prior to hospitilization cognitive status: Alert/Oriented   Prior to hospitalization functional status: Independent   Current cognitive status: Alert/Oriented   Current Functional Status: Needs Assistance   Lives With spouse   Able to Return to Prior Arrangements yes   Is patient able to care for self after discharge? Unable to determine at this time (comments)   Who are your caregiver(s) and their phone number(s)? Maged Mohr () [phone]   Patient's perception of discharge disposition home or selfcare;rehab facility;home health   Readmission Within the Last 30 Days no previous admission in last 30 days   Patient currently being followed by outpatient case management? No   Patient currently receives any other outside agency services? No   Equipment Currently Used at Home glucometer;nebulizer   Do you have any problems affording any of your prescribed medications? No    Is the patient taking medications as prescribed? yes   Does the patient have transportation home? Yes   Transportation Anticipated family or friend will provide   Does the patient receive services at the [facility]mad[facility] Clinic? No   Discharge Plan A Home with family;Home Health   Discharge Plan B Rehab   DME Needed Upon Discharge  other (see comments)  (tbd)   Patient/Family in Agreement with Plan yes                PCP:  Fernando Manzo MD        Pharmacy:    [facility] [place] - [address]  [address]  [place] [address]  Phone: [phone] Fax: [phone]        Emergency Contacts:  Extended Emergency Contact Information  Primary Emergency Contact: Yael Mohr  Home Phone: [phone]  Relation: Daughter  Secondary Emergency Contact: Maged Mohr  Address: [address] [place] [address] [facility]  Home Phone: [phone]  Relation: Spouse      Insurance:    Payor: BLUE CROSS BLUE SHIELD / Plan: BCBS ALL OUT OF STATE / Product Type: PPO /     Ana Oleary RN, CCRN-K, [facility]  Neuro-Critical Care   X [phone]      08/14/2020  3:49 PM

## 2020-08-14 NOTE — HOSPITAL COURSE
08/14/2020 received tPA and admitted to [facility]. Due to rales on exam and hazy xcr, receive one dose lasix at ED.  08/15/2020 step down to VN.

## 2020-08-14 NOTE — NURSING
1506 Pt complained of numbness to R side of face. Numbness lasted approximately 1 minute. Otherwise, pt neuro exam remains unchanged. MD Sherrie with [facility] notified. Will continue to monitor.

## 2020-08-14 NOTE — ED NOTES
Pt's  at bedside reported + increased slurred speech, Stroke team made aware of increased slurred speech per charge nurse, Sampson PHELAN RN. Stroke team states they will be down to ER shortly to re-evaluate pt prior to admit to floor.

## 2020-08-14 NOTE — ASSESSMENT & PLAN NOTE
On hydralazine 25 mg BID at home   Will hold for now to avoid hypoperfusion post ischemic stroke  SBP goal<160, cardene gtt on

## 2020-08-14 NOTE — ASSESSMENT & PLAN NOTE
59 year-old [place] female with CHF, HTN, DM presented with RMCA s/p tPA.  CTA with major branch vessel occlusion of the proximal anterior superior M2 branch of right MCA.    Plan:  - admit to NCC  - neuro checks/ vital sign q1hr  - SBP<180  - TTE with shunt study  - hold AP for 24 hrs post-tPA , End time 09:56 am on 8/15  - atorvastatin 40 mg daily  - hold hsq for now  - vascular neurology following  - PT/OT/SLP

## 2020-08-14 NOTE — ED PROVIDER NOTES
Encounter Date: 8/14/2020       History     Chief Complaint   Patient presents with    Facial Droop     c/o slurred speech and left side facial droop onset 0730am     Pt is a 59 year old F with a PMHx of HTN, DM and CHF presenting to ED for left-sided facial droop. Pt reports she woke up at around 7, and went outside to get something from her car when she noticed her speech started slurring and her left face was drooping. Did not wake up with symptoms, onset of symptoms at 7:30 AM. Able to walk and denies any weakness in extremities. Per patient's , she was without her facial droop yesterday and does confirm that her speech is different from prior. States she has never had a stroke before, but has had slurring speech on 3 separate occasions.     Also endorsing shortness of breath, but no chest pain.         Review of patient's allergies indicates:   Allergen Reactions    Captopril Other (See Comments)     COUGH     Past Medical History:   Diagnosis Date    Anxiety     CHF (congestive heart failure)     Depression     Diabetes mellitus     Dyspnea     H/O coronary angiogram     H/O: hysterectomy     Hyperlipemia     Hypertension     Pulmonary edema     Schizophrenia      Past Surgical History:   Procedure Laterality Date    BLADDER SUSPENSION      CARPAL TUNNEL RELEASE Right     HEEL SPUR SURGERY Left     LEFT HEART CATHETERIZATION Bilateral 12/27/2019    Procedure: Left heart cath;  Surgeon: Steve Chambers MD;  Location: [facility] CATH LAB;  Service: Cardiology;  Laterality: Bilateral;    TUBAL LIGATION       Family History   Family history unknown: Yes     Social History     Tobacco Use    Smoking status: Current Every Day Smoker     Types: Cigarettes   Substance Use Topics    Alcohol use: No     Alcohol/week: 0.0 standard drinks    Drug use: No     Review of Systems   Constitutional: Negative for activity change, chills and fever.   HENT: Negative for congestion, ear pain and sore  throat.    Eyes: Negative for discharge and itching.   Respiratory: Positive for shortness of breath. Negative for stridor.    Cardiovascular: Negative for chest pain and palpitations.   Gastrointestinal: Negative for abdominal pain, nausea and vomiting.   Genitourinary: Negative for difficulty urinating and dysuria.   Musculoskeletal: Negative for arthralgias and back pain.   Skin: Negative for color change.   Neurological: Positive for facial asymmetry and speech difficulty. Negative for dizziness, syncope and headaches.       Physical Exam     Initial Vitals [08/14/20 0806]   BP Pulse Resp Temp SpO2   (!) 154/85 (!) 117 16 98.3 °F (36.8 °C) 100 %      MAP       --         Physical Exam    Nursing note and vitals reviewed.  Constitutional: Vital signs are normal. She appears well-developed and well-nourished. No distress.   HENT:   Head: Normocephalic and atraumatic.   Neck: Trachea normal. No thyroid mass present.   Cardiovascular: Normal rate, regular rhythm and normal heart sounds. Exam reveals no gallop and no friction rub.    No murmur heard.  Pulmonary/Chest: Tachypnea noted. No respiratory distress. She has rales in the right lower field and the left lower field.   Abdominal: Soft. Normal appearance and bowel sounds are normal. There is no abdominal tenderness.   Neurological: She is alert and oriented to person, place, and time. She has normal strength. A cranial nerve deficit is present. No sensory deficit.   Speech slurring. Left sided facial droop   Skin: Skin is warm and dry.         ED Course   Procedures  Labs Reviewed   CBC W/ AUTO DIFFERENTIAL - Abnormal; Notable for the following components:       Result Value    Mean Corpuscular Hemoglobin Conc 31.4 (*)     RDW 15.1 (*)     All other components within normal limits   COMPREHENSIVE METABOLIC PANEL - Abnormal; Notable for the following components:    Glucose 154 (*)     AST 43 (*)     All other components within normal limits   LIPID PANEL -  Abnormal; Notable for the following components:    HDL 37 (*)     Hdl/Cholesterol Ratio 19.1 (*)     Total Cholesterol/HDL Ratio 5.2 (*)     All other components within normal limits   TROPONIN I - Abnormal; Notable for the following components:    Troponin I 0.093 (*)     All other components within normal limits    Narrative:     ASDD ON TROPONIN KIM GRULLON MD  08/14/2020  09:08    POCT GLUCOSE - Abnormal; Notable for the following components:    POCT Glucose 171 (*)     All other components within normal limits   PROTIME-INR   TSH    Narrative:     ASDD ON TROPONIN KIM GRULLON MD  08/14/2020  09:08    SARS-COV-2 RNA AMPLIFICATION, QUAL   B-TYPE NATRIURETIC PEPTIDE   TROPONIN I   LIPID PANEL   HEMOGLOBIN A1C   TSH   URINALYSIS, REFLEX TO URINE CULTURE   POCT GLUCOSE, HAND-HELD DEVICE   TYPE & SCREEN   ISTAT PROCEDURE   ISTAT CREATININE   POCT GLUCOSE MONITORING CONTINUOUS   POCT GLUCOSE MONITORING CONTINUOUS     EKG Readings: (Independently Interpreted)   Rhythm: Sinus Tachycardia.   Left ventricular hypertrophy       Imaging Results          X-Ray Chest AP Portable (Final result)  Result time 08/14/20 09:16:47    Final result by Kai Nieves III, MD (08/14/20 09:16:47)                 Impression:      Possible mild early CHF.      Electronically signed by: Kai Nieves MD  Date:    08/14/2020  Time:    09:16             Narrative:    EXAMINATION:  XR CHEST AP PORTABLE    CLINICAL HISTORY:  shortness of breath;    FINDINGS:  One view: There is cardiomegaly mild edema.  The bones showed DJD.                                MRI Brain Ischemic Inter Pro Incl MRA W/O Con (Final result)  Result time 08/14/20 09:28:31    Final result by Petr Correa MD (08/14/20 09:28:31)                 Impression:      Moderate-sized right frontal lobe acute infarct centered within the insular region without associated hemorrhagic component.  Minimal edema and regional mass effect without significant midline  shift or herniation.    Remote microhemorrhage of the right cerebellar hemisphere.    MRA demonstrates major branch vessel occlusion of the proximal anterior superior M2 branch of right MCA    This report was flagged in Epic as abnormal.    COMMUNICATION  This critical result was discovered/received at 09:05.  The critical information above was relayed directly by Dr. Echeverria by telephone to Dr. Wall of stroke team on 08/14/2020 at 09:13.    Electronically signed by resident: Mahamed Echeverria  Date:    08/14/2020  Time:    09:04    Electronically signed by: Petr Correa MD  Date:    08/14/2020  Time:    09:28             Narrative:    EXAMINATION:  MRI BRAIN ISCHEMIC INTERVENTIONAL PROTOCOL INCL MRA W/O CONTRAST    CLINICAL HISTORY:  Stroke, follow up;    TECHNIQUE:  Multiplanar multisequence MR imaging of the brain was performed without contrast. Noncontrast 3D time-of-flight intracranial MR angiography was performed through the [place] [facility] [place] with MIP reformatting.    COMPARISON:  CT head 08/14/2020    FINDINGS:  Intracranial Compartment:    Ventricles and sulci are normal in size for age without evidence of hydrocephalus.    No extra-axial blood or fluid collections.    Moderate-sized right frontal lobe acute infarct centered within the insular region without evidence of associated hemorrhagic component.  Minimal edema and regional mass effect.  No significant midline shift or herniation.  No additional site of acute infarct.    Remote microhemorrhage of the right cerebellar hemisphere.  No acute parenchymal hemorrhage.    Intracranial Arteries: Major branch vessel occlusion of the proximally anterior superior M2 branch of the right MCA.    Skull/Extracranial Contents (limited evaluation): Bone marrow signal intensity is normal.                               CT Head Without Contrast (Final result)  Result time 08/14/20 08:41:05    Final result by Petr Correa MD (08/14/20 08:41:05)                  Impression:      No acute abnormality.      Electronically signed by: Petr Correa MD  Date:    08/14/2020  Time:    08:41             Narrative:    EXAMINATION:  CT HEAD WITHOUT CONTRAST    CLINICAL HISTORY:  Neuro deficit, acute, stroke suspected;    TECHNIQUE:  Low dose axial CT images obtained throughout the head without intravenous contrast. Sagittal and coronal reconstructions were performed.    COMPARISON:  None.    FINDINGS:  Intracranial compartment:    Ventricles and sulci are normal in size for age without evidence of hydrocephalus. No extra-axial blood or fluid collections.    The brain parenchyma appears normal. No parenchymal mass, hemorrhage, edema or major vascular distribution infarct.  Mild age-related degenerative changes are seen.    Skull/extracranial contents (limited evaluation): No fracture. Mastoid air cells and paranasal sinuses are essentially clear.                              X-Rays:   Independently Interpreted Readings:   Chest X-Ray: Cardiomegaly present.  Increased vascular markings consistent with CHF are present.     Medical Decision Making:   Initial Assessment:   Pt is a 59 year old F with a PMHx of HTN, DM and CHF presenting with left sided facial droop. No weakness. Onset of symptoms 7:30 AM.   Differential Diagnosis:   1. CVA vs TIA  2. Bell's palsy  3. CHF exacerbation  ED Management:  Upon arrival, pt was answering questions appropriately, but had a noticeable left sided facial droop with speech slurring. There was no appreciable weakness or other focal neuro deficits. On CT imaging, there was no obvious bleed. However on MRI, there was an appreciable occlusion of the right MCA M2 branch. Pt was within the window for tPA, which she received. On further exam, she had faint crackles at bilateral bases. Given Lasix 40 IV here in ED. She will be admitted to neuro ICU.                   ED Course as of Aug 14 1047   Fri Aug 14, 2020   1032 Pt reporting worsening of slurring of  speech. No new weakness or other focal neuro deficits.    [AS]   1033 Vascular neuro at bedside after being made aware of worsening of speech slurring.     [AS]      ED Course User Index  [AS] Jarrett Thapa MD                Clinical Impression:       ICD-10-CM ICD-9-CM   1. Cerebrovascular accident (CVA) due to occlusion of right middle cerebral artery  I63.511 434.91   2. Stroke  I63.9 434.91   3. Acute on chronic combined systolic and diastolic congestive heart failure  I50.43 428.43     428.0             ED Disposition Condition    Admit                           Jarrett Thapa MD  Resident  08/14/20 8185       Jarrett Thapa MD  Resident  08/14/20 1043

## 2020-08-14 NOTE — ASSESSMENT & PLAN NOTE
Atorvastatin 40 mg daily at home    Will increase to high intensity statin as atorvastatin 80 mg daily

## 2020-08-14 NOTE — CONSULTS
Inpatient consult to Physical Medicine Rehab  Consult performed by: Carolann Vo NP  Consult ordered by: Marisol Balderas MD  Reason for consult: Assess rehab needs      Reviewed patient history and current admission.  Rehab team following.  Full consult to follow.    MARYBETH Keating, FNP-C  Physical Medicine & Rehabilitation   08/14/2020  Spectralink: [id number]

## 2020-08-14 NOTE — ASSESSMENT & PLAN NOTE
On furosemide 80 mg am, 40 mg pm, Aldactone 25 mg daily, isosorbide (?dose), Toprol-xl 100 mg daily  S/p one dose IV lasix 40 mg at ER today, equals 80 mg po    Plan:  - Will re-assess fluid status and resume medications accordingly  - Toprol-xl 100 mg daily  - TTE  - strict intake and output

## 2020-08-14 NOTE — ED NOTES
"Pt's  states "I feel like her speech has gotten a little worse"---- Dr Mckeon with Stroke notified states he will come evaluate/ No new orders at this time  "

## 2020-08-14 NOTE — NURSING
1748 Pt had 10 beat run of VT. Pt had palpable pulse and was alert upon assessment. SABRA Oates MD with [facility] notified. 12 lead EKG obtained per order. CMP, Mag, Phos, Troponin and CK-MB labs obtained and sent per order. Will continue to monitor.

## 2020-08-14 NOTE — CONSULTS
"  [facility]  Adult Nutrition  Consult Note    SUMMARY     Recommendations    1.) Continue consistent CHO diet.   2.) If PO intakes <50% of meals, add Boost Glucose Control.   3.) Add MVI.  4.) Daily weight    Goals:   1.) Pt to consume/tolerate >75% EEN and EPN by follow up.   Nutrition Goal Status: new  Communication of RD Recs: other (comment)(POC)    Reason for Assessment    Reason For Assessment: consult  Diagnosis: stroke/CVA  Relevant Medical History: HTN, HLD, depression, anxiety, schizophrenia, DM2, CHF  Interdisciplinary Rounds: did not attend  General Information Comments: S/p tPA, pt sitting up in bed reports feeling ok. Diet advanced this afternoon per SLP recommendations. No GI distress. Wt hx: UBW 86.6kg (2020); admit wt 86.2kg reflecting wt maintanence. NFPE completed with no s/s of wasting. Pt does not meet malnutrition criteria.   Nutrition Discharge Planning: Adequate intake to meet nutritional needs.     Nutrition Risk Screen    Nutrition Risk Screen: dysphagia or difficulty swallowing    Nutrition/Diet History    Spiritual, Cultural Beliefs, Hinduism Practices, Values that Affect Care: no  Food Allergies: NKFA  Factors Affecting Nutritional Intake: None identified at this time    Anthropometrics    Temp: 98 °F (36.7 °C)  Height Method: Estimated  Height: 5' 6" (167.6 cm)  Height (inches): 66 in  Weight Method: Estimated  Weight: 86.2 kg (190 lb)  Weight (lb): 190 lb  Ideal Body Weight (IBW), Female: 130 lb  % Ideal Body Weight, Female (lb): 146.15 %  BMI (Calculated): 30.7  BMI Grade: 30 - 34.9- obesity - grade I  Usual Body Weight (UBW), k.6 kg(2020)  % Usual Body Weight: 99.73     Lab/Procedures/Meds    Pertinent Labs Reviewed: reviewed  Pertinent Labs Comments: HbA1C 6.9, Glu 154, AST 43  Pertinent Medications Reviewed: reviewed  Pertinent Medications Comments: aspirin, statin    Estimated/Assessed Needs    Weight Used For Calorie Calculations: 86.2 kg (190 lb " 0.6 oz)  Energy Calorie Requirements (kcal): 1459  Energy Need Method: [place]-St Jeor(no AF)  Protein Requirements: 69-86(g/day)  Weight Used For Protein Calculations: 86.2 kg (190 lb 0.6 oz)(0.8-1.0 g/kg)     Estimated Fluid Requirement Method: RDA Method(or per MD)  RDA Method (mL): 1459  CHO Requirement: 182gm/day    Nutrition Prescription Ordered    Current Diet Order: consistent CHO    Evaluation of Received Nutrient/Fluid Intake    I/O: -0.2L since admit  Comments: LBM 8/13  % Intake of Estimated Energy Needs: 0 - 25 %  % Meal Intake: 0 - 25 %-diet advanced this afternoon and has yet to consume meal    Nutrition Risk    Level of Risk/Frequency of Follow-up: moderate(x1/week)     Assessment and Plan  Nutrition Problem  Inadequate oral intake    Related to (etiology):   Decreased ability to consume sufficient energy    Signs and Symptoms (as evidenced by):   Diet advanced this afternoon, has yet to consume meal    Interventions(treatment strategy):  Collaboration of care with other providers  Referral of care  Modified diet-consistent CHO    Nutrition Diagnosis Status:   New    Monitor and Evaluation    Food and Nutrient Intake: energy intake, food and beverage intake  Food and Nutrient Adminstration: diet order  Knowledge/Beliefs/Attitudes: food and nutrition knowledge/skill  Physical Activity and Function: nutrition-related ADLs and IADLs  Anthropometric Measurements: weight, weight change, body mass index  Biochemical Data, Medical Tests and Procedures: electrolyte and renal panel, gastrointestinal profile, glucose/endocrine profile, inflammatory profile, lipid profile  Nutrition-Focused Physical Findings: overall appearance     Malnutrition Assessment     Orbital Region (Subcutaneous Fat Loss): well nourished  Upper Arm Region (Subcutaneous Fat Loss): well nourished   [place] Region (Muscle Loss): well nourished  Clavicle Bone Region (Muscle Loss): well nourished  Clavicle and Acromion Bone Region (Muscle  Loss): well nourished  Dorsal Hand (Muscle Loss): well nourished  Patellar Region (Muscle Loss): well nourished  Anterior Thigh Region (Muscle Loss): well nourished  Posterior Calf Region (Muscle Loss): well nourished   Edema (Fluid Accumulation): 0-->no edema present   Subcutaneous Fat Loss (Final Summary): well nourished  Muscle Loss Evaluation (Final Summary): well nourished         Nutrition Follow-Up    RD Follow-up?: Yes

## 2020-08-14 NOTE — SUBJECTIVE & OBJECTIVE
Past Medical History:   Diagnosis Date    Anxiety     CHF (congestive heart failure)     Depression     Diabetes mellitus     Dyspnea     H/O coronary angiogram     H/O: hysterectomy     Hyperlipemia     Hypertension     Pulmonary edema     Schizophrenia      Past Surgical History:   Procedure Laterality Date    BLADDER SUSPENSION      CARPAL TUNNEL RELEASE Right     HEEL SPUR SURGERY Left     LEFT HEART CATHETERIZATION Bilateral 12/27/2019    Procedure: Left heart cath;  Surgeon: Steve Chambers MD;  Location: [facility] CATH LAB;  Service: Cardiology;  Laterality: Bilateral;    TUBAL LIGATION        No current facility-administered medications on file prior to encounter.      Current Outpatient Medications on File Prior to Encounter   Medication Sig Dispense Refill    aspirin 81 MG Chew Take 1 tablet (81 mg total) by mouth once daily. 90 tablet 0    atorvastatin (LIPITOR) 40 MG tablet Take 1 tablet (40 mg total) by mouth once daily. 90 tablet 0    baclofen (LIORESAL) 20 MG tablet Take 20 mg by mouth 3 (three) times daily.      benzonatate (TESSALON) 100 MG capsule Take 1 capsule (100 mg total) by mouth 3 (three) times daily as needed for Cough. 20 capsule 0    diclofenac (CATAFLAM) 50 MG tablet Take 50 mg by mouth 2 (two) times daily.      duloxetine (CYMBALTA) 60 MG capsule Take 60 mg by mouth once daily.      furosemide (LASIX) 40 MG tablet Take 2 tablets (80 mg total) by mouth once daily AND 1 tablet (40 mg total) every evening. TAKE EXTRA 40 MG IF GAINING 2 OR MORE POUNDS IN 2 DAYS.. 90 tablet 2    gabapentin (NEURONTIN) 100 MG capsule Begin with 1 by mouth at bedtime daily for 7 days.  Then increase to one by mouth twice a day for 7 days and then one by mouth 3 times a day thereafter. 90 capsule 11    glimepiride (AMARYL) 1 MG tablet Take 1 tablet by mouth once daily.      hydrALAZINE (APRESOLINE) 25 MG tablet Take 1 tablet (25 mg total) by mouth every 12 (twelve) hours. 60 tablet 2     ISOSORBIDE ORAL Take by mouth.      metformin (GLUCOPHAGE) 500 MG tablet Take 1 tablet by mouth 2 (two) times daily.      metoprolol succinate (TOPROL-XL) 100 MG 24 hr tablet Take 1 tablet by mouth once daily.      naproxen (NAPROSYN) 500 MG tablet Take 1 tablet (500 mg total) by mouth 2 (two) times daily as needed (take as needed for pain). 20 tablet 0    spironolactone (ALDACTONE) 25 MG tablet Take 1 tablet (25 mg total) by mouth once daily. 30 tablet 2      Allergies: Captopril    Family History   Family history unknown: Yes     Social History     Tobacco Use    Smoking status: Current Every Day Smoker     Types: Cigarettes   Substance Use Topics    Alcohol use: No     Alcohol/week: 0.0 standard drinks    Drug use: No     Review of Systems   Constitutional: Negative for fever.   HENT: Negative for trouble swallowing and voice change.    Eyes: Negative for visual disturbance.   Respiratory: Positive for cough, chest tightness and shortness of breath.    Cardiovascular: Positive for palpitations.   Gastrointestinal: Negative for abdominal pain.   Musculoskeletal: Negative for neck pain and neck stiffness.   Neurological: Positive for facial asymmetry (left side droop), speech difficulty and headaches. Negative for seizures, weakness, light-headedness and numbness.   Psychiatric/Behavioral: Negative for agitation, confusion and hallucinations.     Objective:     Vitals:    Temp: 98 °F (36.7 °C)  Pulse: (!) 113  BP: (!) 144/83  MAP (mmHg): 106  Resp: (!) 27  SpO2: 99 %  O2 Device (Oxygen Therapy): room air    Temp  Min: 98 °F (36.7 °C)  Max: 98.3 °F (36.8 °C)  Pulse  Min: 91  Max: 117  BP  Min: 120/69  Max: 168/76  MAP (mmHg)  Min: 95  Max: 113  Resp  Min: 16  Max: 27  SpO2  Min: 99 %  Max: 100 %    No intake/output data recorded.           Physical Exam  Constitutional:       General: She is not in acute distress.     Appearance: She is not ill-appearing.   HENT:      Head: Normocephalic and atraumatic.       Mouth/Throat:      Mouth: Mucous membranes are dry.   Eyes:      General: No visual field deficit.     Extraocular Movements: Extraocular movements intact.      Pupils: Pupils are equal, round, and reactive to light.   Neck:      Musculoskeletal: Normal range of motion.   Cardiovascular:      Rate and Rhythm: Tachycardia present.   Pulmonary:      Effort: Pulmonary effort is normal. No respiratory distress.      Breath sounds: Rales present. No wheezing.   Abdominal:      General: Abdomen is flat.   Musculoskeletal: Normal range of motion.   Skin:     General: Skin is warm.      Capillary Refill: Capillary refill takes less than 2 seconds.   Neurological:      Mental Status: She is alert.      Cranial Nerves: Dysarthria and facial asymmetry (left side facial droop) present.      Sensory: Sensation is intact.      Motor: Motor function is intact.

## 2020-08-14 NOTE — PT/OT/SLP EVAL
Speech Language Pathology Evaluation  Cognitive/Bedside Swallow    Patient Name:  Diomedes Mohr   MRN:  [id number]  Admitting Diagnosis: Cerebrovascular accident (CVA) due to embolism of right middle cerebral artery    Recommendations:                  General Recommendations:  Dysphagia therapy and Cognitive-linguistic therapy  Diet recommendations:  Regular, Thin   Aspiration Precautions: 1 bite/sip at a time, Frequent oral care, HOB to 90 degrees, Remain upright 30 minutes post meal, Small bites/sips and Standard aspiration precautions   General Precautions: Standard, aspiration, fall  Communication strategies:  none    History:     Past Medical History:   Diagnosis Date    Anxiety     CHF (congestive heart failure)     Depression     Diabetes mellitus     Dyspnea     H/O coronary angiogram     H/O: hysterectomy     Hyperlipemia     Hypertension     Pulmonary edema     Schizophrenia        Past Surgical History:   Procedure Laterality Date    BLADDER SUSPENSION      CARPAL TUNNEL RELEASE Right     HEEL SPUR SURGERY Left     LEFT HEART CATHETERIZATION Bilateral 12/27/2019    Procedure: Left heart cath;  Surgeon: Steve Chambers MD;  [facility]  Service: Cardiology;  Laterality: Bilateral;    TUBAL LIGATION         Chest X-Rays: 8/14- Possible mild early CHF.    Prior diet: regular/thin    Subjective     Spoke with RN prior to entering pt's room . Pt seen bedside for session with daughter present. Alert and agreeable to ST.       Pain/Comfort:  · Pain Rating 1: 0/10  · Pain Rating Post-Intervention 1: 0/10    Objective:     Cognitive Status:    Arousal/Alertness Appropriate response to stimuli  Orientation Oriented x4  Memory Delayed recalled 1/3 words IND after 3 minutes, increasing to 2/3 given min cues and long term recall min deficits  Problem Solving Sequencing intact, Solutions intact and Compare/contrast intact  Safety awareness fair-good      Receptive Language:    Comprehension:      WFL    Pragmatics:    inconsistent eye contact noted    Expressive Language:  Verbal:    Naming Confrontation 100% , Divergent responsive named 8 concrete category members within one minute and Single word responsive naming 100%    Motor Speech:  Dysarthria mild; c/b imprecise articulation and decreased vocal intensity    Voice:   WFL    Visual-Spatial:  tba    Reading:   tba     Written Expression:   tba    Oral Musculature Evaluation  · Dentition: present and adequate  · Secretion Management: adequate  · Mucosal Quality: good  · Oral Labial Strength and Mobility: functional seal, other (see comments)(mild left sided weakness)  · Lingual Strength and Mobility: other (see comments)(mild deviation to left)  · Volitional Cough: elicited  · Volitional Swallow: elicited  · Voice Prior to PO Intake: clear    Bedside Swallow Eval:   Consistencies Assessed:  · Thin liquids via single and consecutive cup and straw sips  · Puree applesauce  · Solids cracker portions     Oral Phase:   · WFL    Pharyngeal Phase:   · no overt clinical signs/symptoms of aspiration  · no overt clinical signs/symptoms of pharyngeal dysphagia    Compensatory Strategies  · None    Treatment: Provided education to pt and daughter re: role of ST, POC, swallow precautions, recommendations for regular consistency diet with thin liquids, and plan to f/u to ensure tolerance and to address mild cognitive-linguistic deficits and dysarthria. They verbalized understanding. White board updated. RN and MD notified of results and recs.      Assessment:     Diomedes Mohr is a 59 y.o. female with an SLP diagnosis of Dysarthria and Cognitive-Linguistic Impairment.  She presents with mild deficits.    Goals:   Multidisciplinary Problems     SLP Goals        Problem: SLP Goal    Goal Priority Disciplines Outcome   SLP Goal     SLP                    Plan:     · Patient to be seen:  3 x/week   · Plan of Care expires:  09/12/20  · Plan of Care  reviewed with:  patient, daughter   · SLP Follow-Up:  Yes       Discharge recommendations:      Barriers to Discharge:  Level of Skilled Assistance Needed .    Time Tracking:     SLP Treatment Date:   08/14/20  Speech Start Time:  1316  Speech Stop Time:  1335     Speech Total Time (min):  19 min    Billable Minutes: Eval 10 minutes  and Eval Swallow and Oral Function 9 minutes    Brittany Proctor CCC-SLP  08/14/2020

## 2020-08-14 NOTE — ASSESSMENT & PLAN NOTE
On furosemide 80 mg am, 40 mg pm, Aldactone 25 mg daily, isosorbide (?dose), Toprol-xl 100 mg daily      Plan:  - furosemide 40 mg po BID  - Aldactone 25 mg daily  - strict intake and output  - metoprolol 5 mg IV prn for HR>120, if sustained will resume toprol-xl  - will hold isosorbide for now

## 2020-08-14 NOTE — PLAN OF CARE
08/14/20 1333   Post-Acute Status   Post-Acute Authorization Placement   Post-Acute Placement Status Awaiting Internal Medical Clearance     Anticipated therapy eval over weekend.    Claudine Moise LCSW  Neurocritical Care   [facility]  [id number]

## 2020-08-14 NOTE — CONSULTS
[facility]  Vascular Neurology  Comprehensive Stroke Center  Consult Note    Inpatient consult to Vascular (Stroke) Neurology  Consult performed by: Mike Wall MD  Consult ordered by: Jarrett Thapa MD        Assessment/Plan:     Patient is a 59 y.o. year old female with:    * Cerebrovascular accident (CVA) due to embolism of right middle cerebral artery  Ms. Mohr is a 59 yr old female with medical history of DM, HTN, HLD, CHF, Smoking presenting to the ER with acute onset left lower facial droop and dysarthria. Review of presentation: Though reported LK morning of arrival - 7:30 AM, on review of symptoms no objective evidence of noticing facial droop prior to that, including self notification. Hence likely wake up stroke presentation.     NIHSS on exam: 2. MRI ischemic intervention protocol: right insular and inferior frontal infarct, with FLAIR mismatch + right M2 superior division occlusion. t-PA: Administered post MRI; risks, benefits, inclusion and exclusion criteria reviewed prior to administration. IR intervention: None - imaging clinical match; no occlusion in divisions of motor cortex      Stroke etiology: ESUS  Plans for NCC admission and monitoring.     Antithrombotics for secondary stroke prevention: Antiplatelets: None: Hold all Antithrombotics x 24 hours after IV t-PA administration    Statins for secondary stroke prevention and hyperlipidemia, if present:   Statins: Atorvastatin- 40 mg daily    Aggressive risk factor modification: HTN, DM, HLD, CAD     Rehab efforts: The patient has been evaluated by a stroke team provider and the therapy needs have been fully considered based off the presenting complaints and exam findings. The following therapy evaluations are needed: PT evaluate and treat, OT evaluate and treat, SLP evaluate and treat, PM&R evaluate for appropriate placement    Diagnostics ordered/pending: CTA Neck/Arch to assess vasculature, HgbA1C to assess  blood glucose levels, Lipid Profile to assess cholesterol levels, TTE to assess cardiac function/status , TSH to assess thyroid function    VTE prophylaxis: Mechanical prophylaxis: Place SCDs  None: Reason for No Pharmacological VTE Prophylaxis: Holding x 24 hours s/p treatment with alteplase (tPA)    BP parameters: Infarct: Post tPA, SBP <180        Cytotoxic cerebral edema  - on imaging     Tobacco abuse  - counseled on smoking cessation     Dilated cardiomyopathy  - F/u echo    Coronary artery disease involving native heart  - F/u echo     Mixed hyperlipidemia  - f/U lipid levels   - atorva 40     Essential hypertension  - post t-PA < 180 SBP     Type 2 diabetes mellitus without complication, without long-term current use of insulin  - stroke risk factor  - euglycemia goals         STROKE DOCUMENTATION     Acute Stroke Times   Last Known Normal Date: 08/13/20  Last Known Normal Time: 2230  Symptom Onset Date: 08/14/20  Symptom Onset Time:   Stroke Team Called Date: 08/14/20  Stroke Team Called Time: 0808  Stroke Team Arrival Date: 08/14/20  Stroke Team Arrival Time: 0815  CT Interpretation Time: 0825  Decision to Treat Time for Alteplase: 0845(Decision post MRI, Wake up stroke)  Decision to Treat Time for IR: (Clinical imaging match; negative concerns for pneumbra)    NIH Scale:  Interval: initial 15 minute assessment from arrival  1a. Level of Consciousness: 0-->Alert, keenly responsive  1b. LOC Questions: 0-->Answers both questions correctly  1c. LOC Commands: 0-->Performs both tasks correctly  2. Best Gaze: 0-->Normal  3. Visual: 0-->No visual loss  4. Facial Palsy: 1-->Minor paralysis (flattened nasolabial fold, asymmetry on smiling)  5a. Motor Arm, Left: 0-->No drift, limb holds 90 (or 45) degrees for full 10 secs  5b. Motor Arm, Right: 0-->No drift, limb holds 90 (or 45) degrees for full 10 secs  6a. Motor Leg, Left: 0-->No drift, leg holds 30 degree position for full 5 secs  6b. Motor Leg, Right: 0-->No  drift, leg holds 30 degree position for full 5 secs  7. Limb Ataxia: 0-->Absent  8. Sensory: 0-->Normal, no sensory loss  9. Best Language: 0-->No aphasia, normal  10. Dysarthria: 1-->Mild-to-moderate dysarthria, patient slurs at least some words and, at worst, can be understood with some difficulty  11. Extinction and Inattention (formerly Neglect): 0-->No abnormality  Total (NIH Stroke Scale): 2    Modified Rusk Score: 0  Coleman Coma Scale:15   ABCD2 Score:    [id number] Score:   HAS -BLED Score:   ICH Score:   Hunt & Vora Classification:       Thrombolysis Candidate? Yes. The risks and benefits of tPA were discussed with the patient and/or family. The patient and/or family verbalized understanding of the risks arid benefits and has given verbal consent for tPA, If patient was not competent or no family was available, treatment will be administered as an emergency procedure and in what we believe to be the patients best interest    Delays to Thrombolysis?  Yes, Further dx evaluation to confirm stroke  Non-disabling symptoms on presentation, hence decision made post MRI intervention protocol '    Interventional Revascularization Candidate?   Is the patient eligible for mechanical endovascular reperfusion (RENE)?  No; No ischemic penumbra      Hemorrhagic change of an Ischemic Stroke: Does this patient have an ischemic stroke with hemorrhagic changes? No     Subjective:     History of Present Illness:  Ms. Mohr is a 59 yr old female with medical history of DM, HTN, HLD, CHF, Smoking presenting to the ER with acute onset left lower facial droop and dysarthria.      LKN: Reported morning of arrival - 7:30 AM, However on review of symptoms no objective evidence of noticing facial droop prior to that, including self notification. Hence likely wake up stroke presentation.   CT head: Negative for acute infarct   NIHSS on exam: 2   MRI ischemic intervention protocol: right insular and inferior frontal infarct + right  M2 superior division occlusion  t-PA: Administered post MRI; risks, benefits, inclusion and exclusion criteria reviewed prior to administration.   IR intervention: None - imaging clinical match; no occlusion in divisions of motor cortex      Stroke etiology: ESUS  Plans for [facility] admission and monitoring.         Past Medical History:   Diagnosis Date    Anxiety     CHF (congestive heart failure)     Depression     Diabetes mellitus     Dyspnea     H/O coronary angiogram     H/O: hysterectomy     Hyperlipemia     Hypertension     Pulmonary edema     Schizophrenia      Past Surgical History:   Procedure Laterality Date    BLADDER SUSPENSION      CARPAL TUNNEL RELEASE Right     HEEL SPUR SURGERY Left     LEFT HEART CATHETERIZATION Bilateral 12/27/2019    Procedure: Left heart cath;  Surgeon: Steve Chambers MD;  Location: [facility] CATH LAB;  Service: Cardiology;  Laterality: Bilateral;    TUBAL LIGATION       Family History   Family history unknown: Yes     Social History     Tobacco Use    Smoking status: Current Every Day Smoker     Types: Cigarettes   Substance Use Topics    Alcohol use: No     Alcohol/week: 0.0 standard drinks    Drug use: No     Review of patient's allergies indicates:   Allergen Reactions    Captopril Other (See Comments)     COUGH       Medications: I have reviewed the current medication administration record.    (Not in a hospital admission)      Review of Systems   Unable to perform ROS: Acuity of condition   Constitutional: Negative for fever.   Respiratory: Negative for shortness of breath.    Cardiovascular: Negative for chest pain.   Neurological: Positive for facial asymmetry and speech difficulty. Negative for dizziness, tremors, seizures, syncope, weakness, light-headedness, numbness and headaches.   Hematological: Negative for adenopathy.   Psychiatric/Behavioral: Negative for agitation and behavioral problems.     Objective:     Vital Signs (Most Recent):  Temp:  98 °F (36.7 °C) (08/14/20 1010)  Pulse: 95 (08/14/20 1040)  Resp: 16 (08/14/20 1040)  BP: 134/86 (08/14/20 1040)  SpO2: 100 % (08/14/20 1040)    Vital Signs Range (Last 24H):  Temp:  [98 °F (36.7 °C)-98.3 °F (36.8 °C)]   Pulse:  []   Resp:  [16-19]   BP: (129-168)/(76-96)   SpO2:  [99 %-100 %]     Physical Exam  HENT:      Head: Normocephalic and atraumatic.      Mouth/Throat:      Mouth: Mucous membranes are moist.   Eyes:      Extraocular Movements: Extraocular movements intact.      Pupils: Pupils are equal, round, and reactive to light.   Cardiovascular:      Rate and Rhythm: Normal rate.   Pulmonary:      Effort: No respiratory distress.   Abdominal:      Palpations: Abdomen is soft.         Neurological Exam:   LOC: alert  Attention Span: Good   Language: No aphasia  Articulation: Dysarthria  Orientation: Person, Place, Time   Visual Fields: Full  EOM (CN III, IV, VI): Full/intact  Pupils (CN II, III): PERRL  Facial Sensation (CN V): Normal  Facial Movement (CN VII): Lower facial weakness on the Left  Gag Reflex: present  Reflexes: 2+ throughout  Motor: Arm left  Normal 5/5  Leg left  Normal 5/5  Arm right  Normal 5/5  Leg right Normal 5/5  Cebellar: No evidence of appendicular or axial ataxia  Sensation: Intact to light touch, temperature and vibration  Tone: Normal tone throughout      Laboratory:  CMP:   Recent Labs   Lab 08/14/20 0818   CALCIUM 8.7   ALBUMIN 3.5   PROT 7.2      K 4.3   CO2 25      BUN 16   CREATININE 0.8   ALKPHOS 120   ALT 35   AST 43*   BILITOT 0.5     CBC:   Recent Labs   Lab 08/14/20 0818   WBC 7.08   RBC 4.42   HGB 12.4   HCT 39.5      MCV 89   MCH 28.1   MCHC 31.4*     Lipid Panel:   Recent Labs   Lab 08/14/20  0818   CHOL 194   LDLCALC 135.2   HDL 37*   TRIG 109     Coagulation:   Recent Labs   Lab 08/14/20 0818   INR 0.9     Hgb A1C: No results for input(s): HGBA1C in the last 168 hours.  TSH:   Recent Labs   Lab 08/14/20  0818   TSH 3.125        Diagnostic Results:      Brain imaging: / Vessel Imaging:  MRI brain  Moderate-sized right frontal lobe acute infarct centered within the insular region without associated hemorrhagic component.  Minimal edema and regional mass effect without significant midline shift or herniation.     Remote microhemorrhage of the right cerebellar hemisphere.     MRA demonstrates major branch vessel occlusion of the proximal anterior superior M2 branch of right MCA     Cardiac Evaluation:   Pending       Mike Wall MD  [facility] Stroke Center  Department of Vascular Neurology   [facility]

## 2020-08-14 NOTE — SUBJECTIVE & OBJECTIVE
Past Medical History:   Diagnosis Date    Anxiety     CHF (congestive heart failure)     Depression     Diabetes mellitus     Dyspnea     H/O coronary angiogram     H/O: hysterectomy     Hyperlipemia     Hypertension     Pulmonary edema     Schizophrenia      Past Surgical History:   Procedure Laterality Date    BLADDER SUSPENSION      CARPAL TUNNEL RELEASE Right     HEEL SPUR SURGERY Left     LEFT HEART CATHETERIZATION Bilateral 12/27/2019    Procedure: Left heart cath;  Surgeon: Steve Chambers MD;  Location: [facility] CATH LAB;  Service: Cardiology;  Laterality: Bilateral;    TUBAL LIGATION       Family History   Family history unknown: Yes     Social History     Tobacco Use    Smoking status: Current Every Day Smoker     Types: Cigarettes   Substance Use Topics    Alcohol use: No     Alcohol/week: 0.0 standard drinks    Drug use: No     Review of patient's allergies indicates:   Allergen Reactions    Captopril Other (See Comments)     COUGH       Medications: I have reviewed the current medication administration record.    (Not in a hospital admission)      Review of Systems   Unable to perform ROS: Acuity of condition   Constitutional: Negative for fever.   Respiratory: Negative for shortness of breath.    Cardiovascular: Negative for chest pain.   Neurological: Positive for facial asymmetry and speech difficulty. Negative for dizziness, tremors, seizures, syncope, weakness, light-headedness, numbness and headaches.   Hematological: Negative for adenopathy.   Psychiatric/Behavioral: Negative for agitation and behavioral problems.     Objective:     Vital Signs (Most Recent):  Temp: 98 °F (36.7 °C) (08/14/20 1010)  Pulse: 95 (08/14/20 1040)  Resp: 16 (08/14/20 1040)  BP: 134/86 (08/14/20 1040)  SpO2: 100 % (08/14/20 1040)    Vital Signs Range (Last 24H):  Temp:  [98 °F (36.7 °C)-98.3 °F (36.8 °C)]   Pulse:  []   Resp:  [16-19]   BP: (129-168)/(76-96)   SpO2:  [99 %-100 %]      Physical Exam  HENT:      Head: Normocephalic and atraumatic.      Mouth/Throat:      Mouth: Mucous membranes are moist.   Eyes:      Extraocular Movements: Extraocular movements intact.      Pupils: Pupils are equal, round, and reactive to light.   Cardiovascular:      Rate and Rhythm: Normal rate.   Pulmonary:      Effort: No respiratory distress.   Abdominal:      Palpations: Abdomen is soft.         Neurological Exam:   LOC: alert  Attention Span: Good   Language: No aphasia  Articulation: Dysarthria  Orientation: Person, Place, Time   Visual Fields: Full  EOM (CN III, IV, VI): Full/intact  Pupils (CN II, III): PERRL  Facial Sensation (CN V): Normal  Facial Movement (CN VII): Lower facial weakness on the Left  Gag Reflex: present  Reflexes: 2+ throughout  Motor: Arm left  Normal 5/5  Leg left  Normal 5/5  Arm right  Normal 5/5  Leg right Normal 5/5  Cebellar: No evidence of appendicular or axial ataxia  Sensation: Intact to light touch, temperature and vibration  Tone: Normal tone throughout      Laboratory:  CMP:   Recent Labs   Lab 08/14/20  0818   CALCIUM 8.7   ALBUMIN 3.5   PROT 7.2      K 4.3   CO2 25      BUN 16   CREATININE 0.8   ALKPHOS 120   ALT 35   AST 43*   BILITOT 0.5     CBC:   Recent Labs   Lab 08/14/20  0818   WBC 7.08   RBC 4.42   HGB 12.4   HCT 39.5      MCV 89   MCH 28.1   MCHC 31.4*     Lipid Panel:   Recent Labs   Lab 08/14/20  0818   CHOL 194   LDLCALC 135.2   HDL 37*   TRIG 109     Coagulation:   Recent Labs   Lab 08/14/20  0818   INR 0.9     Hgb A1C: No results for input(s): HGBA1C in the last 168 hours.  TSH:   Recent Labs   Lab 08/14/20  0818   TSH 3.125       Diagnostic Results:      Brain imaging: / Vessel Imaging:  MRI brain  Moderate-sized right frontal lobe acute infarct centered within the insular region without associated hemorrhagic component.  Minimal edema and regional mass effect without significant midline shift or herniation.     Remote  microhemorrhage of the right cerebellar hemisphere.     MRA demonstrates major branch vessel occlusion of the proximal anterior superior M2 branch of right MCA     Cardiac Evaluation:   Pending

## 2020-08-14 NOTE — HPI
Ms. Mohr is a 59 yr old female with medical history of DM, HTN, HLD, CHF, Smoking presenting to the ER with acute onset left lower facial droop and dysarthria.      LKN: Morning of arrival - 7:30 AM   CT head: Negative for acute infarct   NIHSS on exam: 2   MRI ischemic intervention protocol: right insular and inferior frontal infarct + right M2 superior division occlusion  t-PA: Administered post MRI; risks, benefits, inclusion and exclusion criteria reviewed prior to administration.   IR intervention: None - imaging clinical match; no occlusion in divisions of motor cortex      Stroke etiology: ESUS  Admitted to [facility] post tPA monitoring. No complications. DAPT x 30 days then monotherapy + statin. Follow up in stroke clinic in 4-6 weeks. Discharge with outpatient therapy.     Hypokalemic on admission. On home lasix daily. Replaced while inpatient. Educated patient to f/u with PCP/cardiologist.

## 2020-08-14 NOTE — PLAN OF CARE
Problem: Eating/Swallowing Impairment (Stroke, Ischemic/Transient Ischemic Attack)  Goal: Oral Intake without Aspiration  Outcome: Ongoing, Progressing  Intervention: Optimize Eating and Swallowing  Flowsheets (Taken 8/14/2020 1542)  Nutrition Support Management:   other (see comments)   weight trending reviewed     Problem: Oral Intake Inadequate  Goal: Improved Oral Intake  Outcome: Ongoing, Progressing  Intervention: Promote and Optimize Oral Intake  Flowsheets (Taken 8/14/2020 1542)  Oral Nutrition Promotion:   calorie dense foods provided   other (see comments)     Recommendations     1.) Continue consistent CHO diet.   2.) If PO intakes <50% of meals, add Boost Glucose Control.   3.) Add MVI.  4.) Daily weight     Goals:   1.) Pt to consume/tolerate >75% EEN and EPN by follow up.   Nutrition Goal Status: new  Communication of RD Recs: other (comment)(POC)

## 2020-08-14 NOTE — PLAN OF CARE
POC reviewed with pt and her  at 1400. Pt verbalized understanding. Questions and concerns addressed. Pt remains in TPA window. Pt failed RUDY and is NPO. Pt had 10 beat run VT. See previous note for details. Will continue to monitor. See flowsheets for full assessment and VS info.

## 2020-08-14 NOTE — HPI
Diomedes Mohr is a 60 yo AA female with PMHx of CHF, DM, and HTN presented to ED for acute onset dysarthria and left-sided facial droop started at 7:30 am on the day of arrival. NIHSS 2 ar arrival. Patient underwent CTH and MRI wich revealed right MCA territory ischemic stroke. She received tPA. No thrombectomy due to completion of stroke.   Patient admitted to [facility] for post-tPA monitoring.

## 2020-08-15 PROBLEM — I63.511 CEREBROVASCULAR ACCIDENT (CVA) DUE TO OCCLUSION OF RIGHT MIDDLE CEREBRAL ARTERY: Status: ACTIVE | Noted: 2020-08-15

## 2020-08-15 LAB
ALBUMIN SERPL BCP-MCNC: 3 G/DL (ref 3.5–5.2)
ALP SERPL-CCNC: 108 U/L (ref 55–135)
ALT SERPL W/O P-5'-P-CCNC: 23 U/L (ref 10–44)
ANION GAP SERPL CALC-SCNC: 8 MMOL/L (ref 8–16)
AST SERPL-CCNC: 18 U/L (ref 10–40)
BASOPHILS # BLD AUTO: 0.04 K/UL (ref 0–0.2)
BASOPHILS NFR BLD: 0.6 % (ref 0–1.9)
BILIRUB SERPL-MCNC: 0.7 MG/DL (ref 0.1–1)
BUN SERPL-MCNC: 12 MG/DL (ref 6–20)
CALCIUM SERPL-MCNC: 8.1 MG/DL (ref 8.7–10.5)
CHLORIDE SERPL-SCNC: 107 MMOL/L (ref 95–110)
CO2 SERPL-SCNC: 29 MMOL/L (ref 23–29)
CREAT SERPL-MCNC: 0.7 MG/DL (ref 0.5–1.4)
DIFFERENTIAL METHOD: ABNORMAL
EOSINOPHIL # BLD AUTO: 0.4 K/UL (ref 0–0.5)
EOSINOPHIL NFR BLD: 5.8 % (ref 0–8)
ERYTHROCYTE [DISTWIDTH] IN BLOOD BY AUTOMATED COUNT: 15.1 % (ref 11.5–14.5)
EST. GFR  (AFRICAN AMERICAN): >60 ML/MIN/1.73 M^2
EST. GFR  (NON AFRICAN AMERICAN): >60 ML/MIN/1.73 M^2
GLUCOSE SERPL-MCNC: 124 MG/DL (ref 70–110)
HCT VFR BLD AUTO: 33.8 % (ref 37–48.5)
HGB BLD-MCNC: 10.7 G/DL (ref 12–16)
IMM GRANULOCYTES # BLD AUTO: 0.01 K/UL (ref 0–0.04)
IMM GRANULOCYTES NFR BLD AUTO: 0.1 % (ref 0–0.5)
LYMPHOCYTES # BLD AUTO: 2.7 K/UL (ref 1–4.8)
LYMPHOCYTES NFR BLD: 39.4 % (ref 18–48)
MAGNESIUM SERPL-MCNC: 2.1 MG/DL (ref 1.6–2.6)
MCH RBC QN AUTO: 28.2 PG (ref 27–31)
MCHC RBC AUTO-ENTMCNC: 31.7 G/DL (ref 32–36)
MCV RBC AUTO: 89 FL (ref 82–98)
MONOCYTES # BLD AUTO: 0.4 K/UL (ref 0.3–1)
MONOCYTES NFR BLD: 6.4 % (ref 4–15)
NEUTROPHILS # BLD AUTO: 3.2 K/UL (ref 1.8–7.7)
NEUTROPHILS NFR BLD: 47.7 % (ref 38–73)
NRBC BLD-RTO: 0 /100 WBC
PHOSPHATE SERPL-MCNC: 4.3 MG/DL (ref 2.7–4.5)
PLATELET # BLD AUTO: 305 K/UL (ref 150–350)
PMV BLD AUTO: 10.1 FL (ref 9.2–12.9)
POCT GLUCOSE: 115 MG/DL (ref 70–110)
POCT GLUCOSE: 161 MG/DL (ref 70–110)
POCT GLUCOSE: 251 MG/DL (ref 70–110)
POTASSIUM SERPL-SCNC: 3 MMOL/L (ref 3.5–5.1)
PROT SERPL-MCNC: 5.8 G/DL (ref 6–8.4)
RBC # BLD AUTO: 3.8 M/UL (ref 4–5.4)
SODIUM SERPL-SCNC: 144 MMOL/L (ref 136–145)
WBC # BLD AUTO: 6.75 K/UL (ref 3.9–12.7)

## 2020-08-15 PROCEDURE — 11000001 HC ACUTE MED/SURG PRIVATE ROOM

## 2020-08-15 PROCEDURE — 63600175 PHARM REV CODE 636 W HCPCS: Performed by: STUDENT IN AN ORGANIZED HEALTH CARE EDUCATION/TRAINING PROGRAM

## 2020-08-15 PROCEDURE — 99233 SBSQ HOSP IP/OBS HIGH 50: CPT | Mod: ,,, | Performed by: PSYCHIATRY & NEUROLOGY

## 2020-08-15 PROCEDURE — 25000003 PHARM REV CODE 250: Performed by: STUDENT IN AN ORGANIZED HEALTH CARE EDUCATION/TRAINING PROGRAM

## 2020-08-15 PROCEDURE — 97116 GAIT TRAINING THERAPY: CPT

## 2020-08-15 PROCEDURE — 84100 ASSAY OF PHOSPHORUS: CPT

## 2020-08-15 PROCEDURE — 99232 PR SUBSEQUENT HOSPITAL CARE,LEVL II: ICD-10-PCS | Mod: ,,, | Performed by: PSYCHIATRY & NEUROLOGY

## 2020-08-15 PROCEDURE — 99232 SBSQ HOSP IP/OBS MODERATE 35: CPT | Mod: ,,, | Performed by: PSYCHIATRY & NEUROLOGY

## 2020-08-15 PROCEDURE — 99233 PR SUBSEQUENT HOSPITAL CARE,LEVL III: ICD-10-PCS | Mod: ,,, | Performed by: PSYCHIATRY & NEUROLOGY

## 2020-08-15 PROCEDURE — 97161 PT EVAL LOW COMPLEX 20 MIN: CPT

## 2020-08-15 PROCEDURE — 83735 ASSAY OF MAGNESIUM: CPT

## 2020-08-15 PROCEDURE — 85025 COMPLETE CBC W/AUTO DIFF WBC: CPT

## 2020-08-15 PROCEDURE — 97530 THERAPEUTIC ACTIVITIES: CPT

## 2020-08-15 PROCEDURE — 97165 OT EVAL LOW COMPLEX 30 MIN: CPT

## 2020-08-15 PROCEDURE — 80053 COMPREHEN METABOLIC PANEL: CPT

## 2020-08-15 RX ORDER — CLOPIDOGREL BISULFATE 75 MG/1
75 TABLET ORAL DAILY
Status: DISCONTINUED | OUTPATIENT
Start: 2020-08-16 | End: 2020-08-16 | Stop reason: HOSPADM

## 2020-08-15 RX ORDER — FUROSEMIDE 40 MG/1
40 TABLET ORAL 2 TIMES DAILY
Status: DISCONTINUED | OUTPATIENT
Start: 2020-08-15 | End: 2020-08-16 | Stop reason: HOSPADM

## 2020-08-15 RX ORDER — NAPROXEN SODIUM 220 MG/1
81 TABLET, FILM COATED ORAL DAILY
Status: DISCONTINUED | OUTPATIENT
Start: 2020-08-15 | End: 2020-08-16 | Stop reason: HOSPADM

## 2020-08-15 RX ORDER — HEPARIN SODIUM 5000 [USP'U]/ML
5000 INJECTION, SOLUTION INTRAVENOUS; SUBCUTANEOUS EVERY 8 HOURS
Status: DISCONTINUED | OUTPATIENT
Start: 2020-08-15 | End: 2020-08-16 | Stop reason: HOSPADM

## 2020-08-15 RX ORDER — SPIRONOLACTONE 25 MG/1
25 TABLET ORAL DAILY
Status: DISCONTINUED | OUTPATIENT
Start: 2020-08-15 | End: 2020-08-16 | Stop reason: HOSPADM

## 2020-08-15 RX ORDER — CLOPIDOGREL BISULFATE 75 MG/1
300 TABLET ORAL ONCE
Status: COMPLETED | OUTPATIENT
Start: 2020-08-15 | End: 2020-08-15

## 2020-08-15 RX ORDER — FUROSEMIDE 40 MG/1
40 TABLET ORAL 2 TIMES DAILY
Status: DISCONTINUED | OUTPATIENT
Start: 2020-08-15 | End: 2020-08-15

## 2020-08-15 RX ADMIN — ATORVASTATIN CALCIUM 80 MG: 20 TABLET, FILM COATED ORAL at 08:08

## 2020-08-15 RX ADMIN — INSULIN ASPART 6 UNITS: 100 INJECTION, SOLUTION INTRAVENOUS; SUBCUTANEOUS at 05:08

## 2020-08-15 RX ADMIN — DULOXETINE HYDROCHLORIDE 60 MG: 30 CAPSULE, DELAYED RELEASE ORAL at 08:08

## 2020-08-15 RX ADMIN — ASPIRIN 81 MG: 81 TABLET, CHEWABLE ORAL at 05:08

## 2020-08-15 RX ADMIN — SPIRONOLACTONE 25 MG: 25 TABLET ORAL at 10:08

## 2020-08-15 RX ADMIN — FUROSEMIDE 40 MG: 40 TABLET ORAL at 10:08

## 2020-08-15 RX ADMIN — HEPARIN SODIUM 5000 UNITS: 5000 INJECTION, SOLUTION INTRAVENOUS; SUBCUTANEOUS at 09:08

## 2020-08-15 RX ADMIN — FUROSEMIDE 40 MG: 40 TABLET ORAL at 05:08

## 2020-08-15 RX ADMIN — HEPARIN SODIUM 5000 UNITS: 5000 INJECTION, SOLUTION INTRAVENOUS; SUBCUTANEOUS at 05:08

## 2020-08-15 RX ADMIN — CLOPIDOGREL 300 MG: 75 TABLET, FILM COATED ORAL at 05:08

## 2020-08-15 RX ADMIN — POTASSIUM CHLORIDE 60 MEQ: 7.46 INJECTION, SOLUTION INTRAVENOUS at 04:08

## 2020-08-15 NOTE — NURSING TRANSFER
Nursing Transfer Note      8/15/2020     Transfer To room 954 from 9092    Transfer via Wheelchair    Transfer with Tele box. On room air    Transported by RN VZACH    Medicines sent:NA    Chart send with patient: YEs    Notified: Charge FABRICE Westfall     Patient reassessed at: at the bedside. Report given over the pone to Yamilet AVINA    Upon arrival to floor: Pt instructed to call nurse before getting up. Call light at the bedside. Nurse notified of pts arrival.

## 2020-08-15 NOTE — PLAN OF CARE
OT evaluation completed.  CHIKI Green  8/15/2020    Problem: Occupational Therapy Goal  Goal: Occupational Therapy Goal  Description: Goals set 8/15 to be addressed for 7 days with expiration date, 8/22:  Patient will increase functional independence with ADLs by performing:  Patient will demonstrate stand pivot transfers with modified independence.   Not met  Patient will demonstrate grooming while standing with modified independence.   Not met  Patient will demonstrate upper body dressing with modified independence while seated EOB.   Not met  Patient will demonstrate lower body dressing with modified independence while seated EOB.   Not met  Patient will demonstrate toileting with modified independence.   Not met  Patient will demonstrate bathing while seated EOB with modified independence.   Not met  Patient's family / caregiver will demonstrate independence and safety with assisting patient with self-care skills and functional mobility.     Not met  Patient and/or patient's family will verbalize understanding of stroke prevention guidelines, personal risk factors and stroke warning signs via teachback method.  Not met             Outcome: Ongoing, Progressing

## 2020-08-15 NOTE — PROGRESS NOTES
[facility]  Neurocritical Care  Progress Note    Admit Date: 8/14/2020  Service Date: 08/15/2020  Length of Stay: 1    Subjective:     Chief Complaint: Cerebrovascular accident (CVA) due to embolism of right middle cerebral artery    History of Present Illness: Diomedes Mohr is a 58 yo AA female with PMHx of CHF, DM, and HTN presented to ED for acute onset dysarthria and left-sided facial droop started at 7:30 am on the day of arrival. NIHSS 2 ar arrival. Patient underwent CTH and MRI wich revealed right MCA territory ischemic stroke. She received tPA. No thrombectomy due to completion of stroke.   Patient admitted to [facility] for post-tPA monitoring.      Hospital Course: 08/14/2020 received tPA and admitted to [facility]. Due to rales on exam and hazy xcr, receive one dose lasix at ED.  08/15/2020 step down to VN.    Past Medical History:   Diagnosis Date    Anxiety     CHF (congestive heart failure)     Depression     Diabetes mellitus     Dyspnea     H/O coronary angiogram     H/O: hysterectomy     Hyperlipemia     Hypertension     Pulmonary edema     Schizophrenia      Past Surgical History:   Procedure Laterality Date    BLADDER SUSPENSION      CARPAL TUNNEL RELEASE Right     HEEL SPUR SURGERY Left     LEFT HEART CATHETERIZATION Bilateral 12/27/2019    Procedure: Left heart cath;  Surgeon: Steve Chambers MD;  Location: [facility] CATH LAB;  Service: Cardiology;  Laterality: Bilateral;    TUBAL LIGATION        No current facility-administered medications on file prior to encounter.      Current Outpatient Medications on File Prior to Encounter   Medication Sig Dispense Refill    furosemide (LASIX) 40 MG tablet Take 2 tablets (80 mg total) by mouth once daily AND 1 tablet (40 mg total) every evening. TAKE EXTRA 40 MG IF GAINING 2 OR MORE POUNDS IN 2 DAYS.. 90 tablet 2    glimepiride (AMARYL) 1 MG tablet Take 1 tablet by mouth once daily.      hydroCHLOROthiazide (HYDRODIURIL) 12.5  MG Tab Take 12.5 mg by mouth once daily.      metformin (GLUCOPHAGE) 500 MG tablet Take 1 tablet by mouth 2 (two) times daily.      aspirin 81 MG Chew Take 1 tablet (81 mg total) by mouth once daily. 90 tablet 0    atorvastatin (LIPITOR) 40 MG tablet Take 1 tablet (40 mg total) by mouth once daily. 90 tablet 0    baclofen (LIORESAL) 20 MG tablet Take 20 mg by mouth 3 (three) times daily.      benzonatate (TESSALON) 100 MG capsule Take 1 capsule (100 mg total) by mouth 3 (three) times daily as needed for Cough. 20 capsule 0    diclofenac (CATAFLAM) 50 MG tablet Take 50 mg by mouth 2 (two) times daily.      duloxetine (CYMBALTA) 60 MG capsule Take 60 mg by mouth once daily.      gabapentin (NEURONTIN) 100 MG capsule Begin with 1 by mouth at bedtime daily for 7 days.  Then increase to one by mouth twice a day for 7 days and then one by mouth 3 times a day thereafter. 90 capsule 11    hydrALAZINE (APRESOLINE) 25 MG tablet Take 1 tablet (25 mg total) by mouth every 12 (twelve) hours. 60 tablet 2    ISOSORBIDE ORAL Take by mouth.      metoprolol succinate (TOPROL-XL) 100 MG 24 hr tablet Take 1 tablet by mouth once daily.      naproxen (NAPROSYN) 500 MG tablet Take 1 tablet (500 mg total) by mouth 2 (two) times daily as needed (take as needed for pain). 20 tablet 0    spironolactone (ALDACTONE) 25 MG tablet Take 1 tablet (25 mg total) by mouth once daily. 30 tablet 2      Allergies: Captopril    Family History   Family history unknown: Yes     Social History     Tobacco Use    Smoking status: Current Every Day Smoker     Types: Cigarettes   Substance Use Topics    Alcohol use: No     Alcohol/week: 0.0 standard drinks    Drug use: No     Review of Systems   Constitutional: Negative for fever.   HENT: Negative for trouble swallowing and voice change.    Eyes: Negative for visual disturbance.   Respiratory: Positive for cough, chest tightness and shortness of breath.    Cardiovascular: Positive for  palpitations.   Gastrointestinal: Negative for abdominal pain.   Musculoskeletal: Negative for neck pain and neck stiffness.   Neurological: Positive for facial asymmetry (left side droop), speech difficulty and headaches. Negative for seizures, weakness, light-headedness and numbness.   Psychiatric/Behavioral: Negative for agitation, confusion and hallucinations.     Objective:     Vitals:    Temp: 98.8 °F (37.1 °C)  Pulse: 86  Rhythm: normal sinus rhythm  BP: 122/76  MAP (mmHg): 93  Resp: 18  SpO2: (!) 94 %  O2 Device (Oxygen Therapy): room air    Temp  Min: 98.6 °F (37 °C)  Max: 99.4 °F (37.4 °C)  Pulse  Min: 76  Max: 109  BP  Min: 110/68  Max: 131/71  MAP (mmHg)  Min: 81  Max: 101  Resp  Min: 0  Max: 32  SpO2  Min: 94 %  Max: 100 %    08/14 0701 - 08/15 0700  In: 150 [P.O.:150]  Out: 350 [Urine:350]   Unmeasured Output  Urine Occurrence: 1  Stool Occurrence: 0       Physical Exam  Constitutional:       General: She is not in acute distress.     Appearance: She is not ill-appearing.   HENT:      Head: Normocephalic and atraumatic.      Mouth/Throat:      Mouth: Mucous membranes are dry.   Eyes:      General: No visual field deficit.     Extraocular Movements: Extraocular movements intact.      Pupils: Pupils are equal, round, and reactive to light.   Neck:      Musculoskeletal: Normal range of motion.   Cardiovascular:      Rate and Rhythm: Tachycardia present.   Pulmonary:      Effort: Pulmonary effort is normal. No respiratory distress.      Breath sounds: Rales present. No wheezing.   Abdominal:      General: Abdomen is flat.   Musculoskeletal: Normal range of motion.   Skin:     General: Skin is warm.      Capillary Refill: Capillary refill takes less than 2 seconds.   Neurological:      Mental Status: She is alert.      Cranial Nerves: Dysarthria and facial asymmetry (left side facial droop) present.      Sensory: Sensation is intact.      Motor: Motor function is intact.               Assessment/Plan:      Neuro  * Cerebrovascular accident (CVA) due to embolism of right middle cerebral artery  59 year-old AA female with CHF, HTN, DM presented with RMCA s/p tPA.  CTA with major branch vessel occlusion of the proximal anterior superior M2 branch of right MCA.    Plan:  - step down to VN  - neuro checks/ vital sign q4hrs  - SBP<180  - DAPT today  - atorvastatin 40 mg daily  - start hsq   - PT/OT/SLP        Cardiac/Vascular  Mixed hyperlipidemia  Atorvastatin 40 mg daily at home    Will increase to high intensity statin as atorvastatin 80 mg daily    Essential hypertension  On hydralazine 25 mg BID at home   Will hold for now to avoid hypoperfusion post ischemic stroke  SBP goal<160, cardene gtt on    Dilated cardiomyopathy  On furosemide 80 mg am, 40 mg pm, Aldactone 25 mg daily, isosorbide (?dose), Toprol-xl 100 mg daily      Plan:  - furosemide 40 mg po BID  - Aldactone 25 mg daily  - strict intake and output  - metoprolol 5 mg IV prn for HR>120, if sustained will resume toprol-xl  - will hold isosorbide for now    Orthopedic  Neck pain  Gabapentin and lyrica at home  Will hold off on gabapentin, will resume Lyrica and re-assess          The patient is being Prophylaxed for:  Venous Thromboembolism with: Mechanical or Chemical  Stress Ulcer with: None  Ventilator Pneumonia with: not applicable    Activity Orders          Diet Dysphagia Mechanical Soft (IDDSI Level 5): Dysphagia 2 (Mechanical Soft Ground) starting at 08/15 1119        Full Code    Marisol Balderas MD  Neurocritical Care  [facility]

## 2020-08-15 NOTE — PLAN OF CARE
POC reviewed with pt at 0500. Pt verbalized understanding. Questions and concerns addressed.  Replacing potassium this shift. No bowel movement this shift. Pt bladder scanned, 249ml of urine noted, pt remains NPO. Pt remains in 24 hour tPA window. Neuro unchanged.No acute events overnight. Pt progressing toward goals. Will continue to monitor. See flowsheets for full assessment and VS info

## 2020-08-15 NOTE — NURSING TRANSFER
Nursing Transfer Note      8/15/2020     Transfer from NeuroICU to room 954    Transfer via wheelchair    Transfer with telemetry  Transported by Nurse Okeefe    Medicines sent:NA    Chart send with patient yes    Notified:  at the bedside    Patient reassessed at: upon arrival. No changes from report received.    Upon arrival to floor: VS taken, telemetry continue, will continue to monitor.

## 2020-08-15 NOTE — ASSESSMENT & PLAN NOTE
Ms. Mohr is a 59 yr old female with medical history of DM, HTN, HLD, CHF, Smoking presenting to the ER with acute onset left lower facial droop and dysarthria. NIHSS on exam: 2. MRI ischemic intervention protocol: right insular and inferior frontal infarct + right M2 superior division occlusion. t-PA: Administered post MRI; risks, benefits, inclusion and exclusion criteria reviewed prior to administration. IR intervention: None - imaging clinical match; no occlusion in divisions of motor cortex      Stroke etiology: ESUS    Antithrombotics for secondary stroke prevention: Antiplatelets: Asa 81, Plavix 300 mg load, followed by 75 mg x 30 days post t-PA window     Statins for secondary stroke prevention and hyperlipidemia, if present:   Statins: Atorvastatin- 40 mg daily    Aggressive risk factor modification: HTN, DM, HLD, CAD     Rehab efforts: The patient has been evaluated by a stroke team provider and the therapy needs have been fully considered based off the presenting complaints and exam findings. The following therapy evaluations are needed: PT evaluate and treat, OT evaluate and treat, SLP evaluate and treat, PM&R evaluate for appropriate placement - Recs OP - OT     Diagnostics ordered/pendin day event monitor prior to D/c     VTE prophylaxis: Mechanical prophylaxis: Place SCDs. S.c heparin post t-PA window     BP parameters: Infarct: SBP <180

## 2020-08-15 NOTE — PROGRESS NOTES
[facility]  Vascular Neurology  Comprehensive Stroke Center  Progress Note    Assessment/Plan:     * Cerebrovascular accident (CVA) due to embolism of right middle cerebral artery  Ms. Mohr is a 59 yr old female with medical history of DM, HTN, HLD, CHF, Smoking presenting to the ER with acute onset left lower facial droop and dysarthria. NIHSS on exam: 2. MRI ischemic intervention protocol: right insular and inferior frontal infarct + right M2 superior division occlusion. t-PA: Administered post MRI; risks, benefits, inclusion and exclusion criteria reviewed prior to administration. IR intervention: None - imaging clinical match; no occlusion in divisions of motor cortex      Stroke etiology: ESUS    Antithrombotics for secondary stroke prevention: Antiplatelets: Asa 81, Plavix 300 mg load, followed by 75 mg x 30 days post t-PA window     Statins for secondary stroke prevention and hyperlipidemia, if present:   Statins: Atorvastatin- 40 mg daily    Aggressive risk factor modification: HTN, DM, HLD, CAD     Rehab efforts: The patient has been evaluated by a stroke team provider and the therapy needs have been fully considered based off the presenting complaints and exam findings. The following therapy evaluations are needed: PT evaluate and treat, OT evaluate and treat, SLP evaluate and treat, PM&R evaluate for appropriate placement - Recs OP - OT     Diagnostics ordered/pendin day event monitor prior to D/c     VTE prophylaxis: Mechanical prophylaxis: Place SCDs. S.c heparin post t-PA window     BP parameters: Infarct: SBP <180        Cytotoxic cerebral edema  - on imaging     Dilated cardiomyopathy  - F/u EF - 15%  - Restart home meds - lasix, aldactone     Coronary artery disease involving native heart  - Restart home meds   - EF 15%     Mixed hyperlipidemia  - atorva 40     Essential hypertension  - Long term <130/80     Type 2 diabetes mellitus without complication, without long-term  current use of insulin  - stroke risk factor  - euglycemia goals          No notes on file    STROKE DOCUMENTATION   Acute Stroke Times   Last Known Normal Date: 08/13/20  Last Known Normal Time: 2230  Symptom Onset Date: 08/14/20  Symptom Onset Time: (Wake up )  Stroke Team Called Date: 08/14/20  Stroke Team Called Time: 0808  Stroke Team Arrival Date: 08/14/20  Stroke Team Arrival Time: 0815  CT Interpretation Time: 0825  Decision to Treat Time for Alteplase: 0845(Post MRI, Wake up stroke )  Decision to Treat Time for IR: (Clinical imaging match )    NIH Scale:  1a. Level of Consciousness: 0-->Alert, keenly responsive  1b. LOC Questions: 0-->Answers both questions correctly  1c. LOC Commands: 0-->Performs both tasks correctly  2. Best Gaze: 0-->Normal  3. Visual: 0-->No visual loss  4. Facial Palsy: 1-->Minor paralysis (flattened nasolabial fold, asymmetry on smiling)  5a. Motor Arm, Left: 0-->No drift, limb holds 90 (or 45) degrees for full 10 secs  5b. Motor Arm, Right: 0-->No drift, limb holds 90 (or 45) degrees for full 10 secs  6a. Motor Leg, Left: 0-->No drift, leg holds 30 degree position for full 5 secs  6b. Motor Leg, Right: 0-->No drift, leg holds 30 degree position for full 5 secs  7. Limb Ataxia: 0-->Absent  8. Sensory: 0-->Normal, no sensory loss  9. Best Language: 0-->No aphasia, normal  10. Dysarthria: 1-->Mild-to-moderate dysarthria, patient slurs at least some words and, at worst, can be understood with some difficulty  11. Extinction and Inattention (formerly Neglect): 0-->No abnormality  Total (NIH Stroke Scale): 2       Modified Diamond Score: 0  [place] Coma Scale:15   ABCD2 Score:    [id number] Score:   HAS -BLED Score:   ICH Score:   Hunt & Vora Classification:      Hemorrhagic change of an Ischemic Stroke: Does this patient have an ischemic stroke with hemorrhagic changes? No     Neurologic Chief Complaint: Facial droop, dysarthria     Subjective:     Interval History: Patient is seen for  follow-up neurological assessment and treatment recommendations:     JELLY. No concerns for worsening NIHSS.     HPI, Past Medical, Family, and Social History remains the same as documented in the initial encounter.     Review of Systems   Unable to perform ROS: Acuity of condition   Constitutional: Negative for fever.   Respiratory: Positive for wheezing. Negative for shortness of breath.    Cardiovascular: Negative for chest pain.   Neurological: Positive for facial asymmetry and speech difficulty. Negative for dizziness, tremors, seizures, syncope, weakness, light-headedness, numbness and headaches.   Hematological: Negative for adenopathy.   Psychiatric/Behavioral: Negative for agitation and behavioral problems.     Scheduled Meds:   atorvastatin  80 mg Oral Daily    DULoxetine  60 mg Oral Daily    furosemide  40 mg Oral BID    spironolactone  25 mg Oral Daily     Continuous Infusions:   niCARdipine       PRN Meds:calcium gluconate IVPB, calcium gluconate IVPB, calcium gluconate IVPB, dextrose 50%, glucagon (human recombinant), insulin aspart U-100, labetalol, magnesium sulfate IVPB, magnesium sulfate IVPB, metoprolol, potassium chloride in water **AND** potassium chloride in water **AND** potassium chloride in water, sodium chloride 0.9%, sodium phosphate IVPB, sodium phosphate IVPB, sodium phosphate IVPB    Objective:     Vital Signs (Most Recent):  Temp: 99.4 °F (37.4 °C) (08/15/20 0705)  Pulse: 94 (08/15/20 0905)  Resp: (!) 26 (08/15/20 0905)  BP: 131/71 (08/15/20 0905)  SpO2: 100 % (08/15/20 0905)  BP Location: Right arm    Vital Signs Range (Last 24H):  Temp:  [98 °F (36.7 °C)-99.4 °F (37.4 °C)]   Pulse:  []   Resp:  [0-32]   BP: (110-146)/(65-96)   SpO2:  [96 %-100 %]   BP Location: Right arm    Physical Exam  HENT:      Head: Normocephalic and atraumatic.      Mouth/Throat:      Mouth: Mucous membranes are moist.   Eyes:      Extraocular Movements: Extraocular movements intact.      Pupils:  Pupils are equal, round, and reactive to light.   Cardiovascular:      Rate and Rhythm: Normal rate.   Pulmonary:      Effort: No respiratory distress.   Abdominal:      Palpations: Abdomen is soft.         Neurological Exam:   LOC: alert  Attention Span: Good   Language: No aphasia  Articulation: Dysarthria  Orientation: Person, Place, Time   Visual Fields: Full  EOM (CN III, IV, VI): Full/intact  Pupils (CN II, III): PERRL  Facial Sensation (CN V): Normal  Facial Movement (CN VII): Lower facial weakness on the Left  Gag Reflex: present  Reflexes: 2+ throughout  Motor: Arm left  Normal 5/5  Leg left  Normal 5/5  Arm right  Normal 5/5  Leg right Normal 5/5  Cebellar: No evidence of appendicular or axial ataxia  Sensation: Intact to light touch, temperature and vibration  Tone: Normal tone throughout    Laboratory:  CMP:   Recent Labs   Lab 08/15/20  0221   CALCIUM 8.1*   ALBUMIN 3.0*   PROT 5.8*      K 3.0*   CO2 29      BUN 12   CREATININE 0.7   ALKPHOS 108   ALT 23   AST 18   BILITOT 0.7     BMP:   Recent Labs   Lab 08/15/20  0221      K 3.0*      CO2 29   BUN 12   CREATININE 0.7   CALCIUM 8.1*     CBC:   Recent Labs   Lab 08/15/20  0221   WBC 6.75   RBC 3.80*   HGB 10.7*   HCT 33.8*      MCV 89   MCH 28.2   MCHC 31.7*     Lipid Panel:   Recent Labs   Lab 08/14/20  1035   CHOL 186   LDLCALC 125.0   HDL 39*   TRIG 110     Coagulation:   Recent Labs   Lab 08/14/20  0818   INR 0.9     Platelet Aggregation Study: No results for input(s): PLTAGG, PLTAGINTERP, PLTAGREGLACO, ADPPLTAGGREG in the last 168 hours.  Hgb A1C:   Recent Labs   Lab 08/14/20  1035   HGBA1C 6.9*     TSH:   Recent Labs   Lab 08/14/20  1035   TSH 1.934           Diagnostic Results     Brain Imaging   Moderate-sized right frontal lobe acute infarct centered within the insular region without associated hemorrhagic component.  Minimal edema and regional mass effect without significant midline shift or herniation.    Vessel  Imaging   CTA   Right frontal lobe moderate-sized acute infarct centered within the insular region, without associated hemorrhagic component, not significantly changed from imaging studies earlier same day.     CTA demonstrates major branch vessel occlusion of the proximal anterior superior M2 branch of right MCA.    Cardiac Imaging   · Severe left ventricular enlargement.  · Severely decreased left ventricular systolic function. The estimated ejection fraction is 15%.  · Moderate right ventricular enlargement.  · Mildly to moderately reduced right ventricular systolic function.  · Grade I (mild) left ventricular diastolic dysfunction consistent with impaired relaxation.  · Trivial pericardial effusion.  · Normal central venous pressure (3 mmHg).      Mike Wall MD  Comprehensive Stroke Center  Department of Vascular Neurology   [facility]

## 2020-08-15 NOTE — ASSESSMENT & PLAN NOTE
59 year-old AA female with CHF, HTN, DM presented with RMCA s/p tPA.  CTA with major branch vessel occlusion of the proximal anterior superior M2 branch of right MCA.    Plan:  - step down to VN  - neuro checks/ vital sign q4hrs  - SBP<180  - DAPT today  - atorvastatin 40 mg daily  - start hsq   - PT/OT/SLP

## 2020-08-15 NOTE — PLAN OF CARE
Problem: Adult Inpatient Plan of Care  Goal: Plan of Care Review  Outcome: Ongoing, Progressing     Problem: Pain (Stroke, Ischemic/Transient Ischemic Attack)  Goal: Acceptable Pain Control  Outcome: Ongoing, Progressing     Problem: Cerebral Tissue Perfusion Risk (Stroke, Ischemic/Transient Ischemic Attack)  Goal: Optimal Cerebral Tissue Perfusion  Outcome: Ongoing, Progressing     Problem: Fall Injury Risk  Goal: Absence of Fall and Fall-Related Injury  Outcome: Ongoing, Progressing   Patient remains free from injury or fall. No neuro changes noted or reported from initial assessment. Will continue to monitor.

## 2020-08-15 NOTE — PT/OT/SLP EVAL
"Occupational Therapy   Evaluation    Name: Diomedes Mohr  MRN: [id number]  Admitting Diagnosis:  Cerebrovascular accident (CVA) due to embolism of right middle cerebral artery      Recommendations:     Discharge Recommendations: outpatient OT  Discharge Equipment Recommendations:  shower chair  Barriers to discharge:  None    Assessment:     Diomedes Mohr is a 59 y.o. female with a medical diagnosis of Cerebrovascular accident (CVA) due to embolism of right middle cerebral artery.  She presents with performance deficits affecting function: weakness, impaired endurance, decreased coordination, impaired self care skills, impaired functional mobilty, gait instability, decreased lower extremity function.      Rehab Prognosis: Good; patient would benefit from acute skilled OT services to address these deficits and reach maximum level of function.       Plan:     Patient to be seen 3 x/week to address the above listed problems via self-care/home management, therapeutic exercises, therapeutic activities, neuromuscular re-education, cognitive retraining, sensory integration  · Plan of Care Expires: 09/12/20  · Plan of Care Reviewed with: patient    Subjective     Patient:  "I had a stroke.  I was getting a baby from the car and my speech slurred."  "I have been having right groin pain since my angiogram in December."    Occupational Profile:  Per patient:  Patient resides in [place] with  in one story home with no steps to enter.  PTA patient independent with ADLs including driving.  Currently owns no DME.  Patient is right handed.  Hobbies:  Bingo, TV.    Pain/Comfort:  · Pain Rating 1: 10/10  · Location - Orientation 1: (Right groin)  · Pain Addressed 1: Reposition  · Pain Rating Post-Intervention 1: 10/10    Patients cultural, spiritual, Anabaptism conflicts given the current situation: no    Objective:     Communicated with: Nurse prior to session.  Patient found supine with pulse ox (continuous), telemetry, SCD, " peripheral IV, blood pressure cuff, bed alarm, PureWick upon OT entry to room.  Family not present.  General Precautions: Standard, aspiration, fall   Orthopedic Precautions:N/A   Braces: N/A     Occupational Performance:    Bed Mobility:    · Patient completed Rolling/Turning to Left with  modified independence  · Patient completed Rolling/Turning to Right with modified independence  · Patient completed Scooting/Bridging with modified independence  · Patient completed Supine to Sit with modified independence  · Patient completed Sit to Supine with modified independence    Functional Mobility/Transfers:  · Patient completed Sit <> Stand Transfer with supervision  with  no assistive device   · Patient completed Bed <> Chair Transfer using Stand Pivot technique with supervision with no assistive device    Activities of Daily Living:  · Feeding:  NPO    · Grooming: supervision while standing  · Upper Body Dressing: supervision    · Lower Body Dressing: supervision    · Toileting: supervision      Cognitive/Visual Perceptual:  Cognitive/Psychosocial Skills:     -       Oriented to: Person, Place, Time and Situation   -       Follows Commands/attention:Follows one-step commands  -       Communication: dysarthria  -       Mood/Affect/Coping skills/emotional control: Appropriate to situation and Cooperative    Physical Exam:  Postural examination/scapula alignment:    -       Rounded shoulders  Skin integrity: Visible skin intact  Edema:  None noted  Sensation:    -       Intact  Upper Extremity Range of Motion:     -       Right Upper Extremity: WNL  -       Left Upper Extremity: WNL  Upper Extremity Strength:    -       Right Upper Extremity: WNL  -       Left Upper Extremity: 4/5    AMPAC 6 Click ADL:  AMPAC Total Score: 16    Treatment & Education:  Patient education provided on role of OT and need for outpt OT upon discharge.  Patient education provided on fall prevention during ADLs.  Continued education, patient/  family training recommended.  Patient alert and oriented x 3; able to follow 4/4 one step commands.  Patient attentive and interactive throughout the session.  Patient able to identify 5/5 body parts.  Able to name 5/5 objects.  Able to sequence 7/7 days of the week and 12/12 months of the year.    OT asked if there were any other questions; patient had no further questions.   Education:    Patient left supine with all lines intact, call button in reach and bed alarm on    GOALS:   Multidisciplinary Problems     Occupational Therapy Goals        Problem: Occupational Therapy Goal    Goal Priority Disciplines Outcome Interventions   Occupational Therapy Goal     OT, PT/OT Ongoing, Progressing    Description: Goals set 8/15 to be addressed for 7 days with expiration date, 8/22:  Patient will increase functional independence with ADLs by performing:  Patient will demonstrate stand pivot transfers with modified independence.   Not met  Patient will demonstrate grooming while standing with modified independence.   Not met  Patient will demonstrate upper body dressing with modified independence while seated EOB.   Not met  Patient will demonstrate lower body dressing with modified independence while seated EOB.   Not met  Patient will demonstrate toileting with modified independence.   Not met  Patient will demonstrate bathing while seated EOB with modified independence.   Not met  Patient's family / caregiver will demonstrate independence and safety with assisting patient with self-care skills and functional mobility.     Not met  Patient and/or patient's family will verbalize understanding of stroke prevention guidelines, personal risk factors and stroke warning signs via teachback method.  Not met                              History:     Past Medical History:   Diagnosis Date    Anxiety     CHF (congestive heart failure)     Depression     Diabetes mellitus     Dyspnea     H/O coronary angiogram     H/O:  hysterectomy     Hyperlipemia     Hypertension     Pulmonary edema     Schizophrenia        Past Surgical History:   Procedure Laterality Date    BLADDER SUSPENSION      CARPAL TUNNEL RELEASE Right     HEEL SPUR SURGERY Left     LEFT HEART CATHETERIZATION Bilateral 12/27/2019    Procedure: Left heart cath;  Surgeon: Steve Chambers MD;  Location: [facility] CATH LAB;  Service: Cardiology;  Laterality: Bilateral;    TUBAL LIGATION         Time Tracking:     OT Date of Treatment: 08/15/20  OT Start Time: 0428  OT Stop Time: 0450  OT Total Time (min): 22 min    Billable Minutes:Evaluation 12  Therapeutic Activity 10    CHIKI Green  8/15/2020

## 2020-08-15 NOTE — PT/OT/SLP EVAL
"Physical Therapy Evaluation and Discharge Note    Patient Name:  Diomedes Mohr   MRN:  [id number]    Recommendations:     Discharge Recommendations:  home health PT   Discharge Equipment Recommendations: rollator   Barriers to discharge: None    Assessment:     Diomedes Mohr is a 59 y.o. female admitted with a medical diagnosis of Cerebrovascular accident (CVA) due to embolism of right middle cerebral artery.  Pt presents  with PMHx of CHF, DM, and HTN presented to ED for acute onset dysarthria and left-sided facial droop started at 7:30 am on the day of arrival. NIHSS 2 ar arrival.     Upon evaluation, pt is modified independent with amb in the hallway.  Pt will require use of RW for support and to address balance deficits following CVA.  At this time, patient is functioning at their prior level of function and does not require further acute PT services.     Recent Surgery: * No surgery found *      Plan:     During this hospitalization, patient does not require further acute PT services.  Please re-consult if situation changes.      Subjective     Chief Complaint: Hunger  Patient/Family Comments/goals: "I want to walk more."  Pain/Comfort:  · Pain Rating 1: 0/10    Patients cultural, spiritual, Judaism conflicts given the current situation: no    Living Environment:  Pt lives with spouse, SS, 0 DONIS.   Prior to admission, patients level of function was I with all functional mobility and ADLs.  Equipment used at home: none.  DME owned (not currently used): none.  Upon discharge, patient will have assistance from spouse.    Objective:     Communicated with nursing prior to session.  Patient found supine with PureWick, peripheral IV, telemetry upon PT entry to room.    General Precautions: Standard, aspiration, fall(dysphagia mechanical soft diet)   Orthopedic Precautions:Full weight bearing   Braces: N/A     Exams:  · Cognitive Exam:  Patient is oriented to Person, Place, Time and Situation  · Fine Motor " Coordination:    · -       Intact  Left hand thumb/finger opposition skills and Right hand thumb/finger opposition skills  · Gross Motor Coordination:  WFL  · Postural Exam:  Patient presented with the following abnormalities:    · -       No postural abnormalities identified  · Sensation:    · -       Intact  · Skin Integrity/Edema:      · -       Edema: None noted B LEs  · RUE ROM: WFL  · RUE Strength: WFL  · LUE ROM: WFL   · LUE Strength: WFL  · RLE ROM: WFL  · RLE Strength: WFL  · LLE ROM: WFL  · LLE Strength: WFL    Functional Mobility:  · Bed Mobility:     · Scooting: independence  · Supine to Sit: independence  · Sit to Supine: independence  · Transfers:     · Sit to Stand:  independence with no AD  · Bed to Chair: modified independence with  rolling walker  using  Stand Pivot  · Gait: Pt amb 50', CGA, without AD, with 3 episodes of LOB sec to mod sway.  Pt amb >300', Mod I, without AD, no episodes of LOB, no RUBI noted.  Verbal cuing to increase step width, pt able to follow.  Cuing on keeping gaze forward to reduce LOB.   · Balance: Mod I: dynamic standing balance with AD    [facility] 6 CLICK MOBILITY  Total Score:23       Therapeutic Activities and Exercises:   Whiteboard updated    [facility] 6 CLICK MOBILITY  Total Score:23     Patient left supine with all lines intact, call button in reach, nursing notified and spouse present.    GOALS:   Multidisciplinary Problems     Physical Therapy Goals     Not on file                History:     Past Medical History:   Diagnosis Date    Anxiety     CHF (congestive heart failure)     Depression     Diabetes mellitus     Dyspnea     H/O coronary angiogram     H/O: hysterectomy     Hyperlipemia     Hypertension     Pulmonary edema     Schizophrenia        Past Surgical History:   Procedure Laterality Date    BLADDER SUSPENSION      CARPAL TUNNEL RELEASE Right     HEEL SPUR SURGERY Left     LEFT HEART CATHETERIZATION Bilateral 12/27/2019    Procedure: Left  heart cath;  Surgeon: Steve Chambers MD;  [facility]: [facility] C[facility];  Service: Cardiology;  Laterality: Bilateral;    TUBAL LIGATION         Time Tracking:     PT Received On: 08/15/20  PT Start Time: 1238     PT Stop Time: 1258  PT Total Time (min): 20 min     Billable Minutes: Evaluation 7 and Gait Training 13      Nohemi Gaines, PT  08/15/2020

## 2020-08-15 NOTE — SUBJECTIVE & OBJECTIVE
Past Medical History:   Diagnosis Date    Anxiety     CHF (congestive heart failure)     Depression     Diabetes mellitus     Dyspnea     H/O coronary angiogram     H/O: hysterectomy     Hyperlipemia     Hypertension     Pulmonary edema     Schizophrenia      Past Surgical History:   Procedure Laterality Date    BLADDER SUSPENSION      CARPAL TUNNEL RELEASE Right     HEEL SPUR SURGERY Left     LEFT HEART CATHETERIZATION Bilateral 12/27/2019    Procedure: Left heart cath;  Surgeon: Steve Chambers MD;  Location: [facility] CATH LAB;  Service: Cardiology;  Laterality: Bilateral;    TUBAL LIGATION        No current facility-administered medications on file prior to encounter.      Current Outpatient Medications on File Prior to Encounter   Medication Sig Dispense Refill    furosemide (LASIX) 40 MG tablet Take 2 tablets (80 mg total) by mouth once daily AND 1 tablet (40 mg total) every evening. TAKE EXTRA 40 MG IF GAINING 2 OR MORE POUNDS IN 2 DAYS.. 90 tablet 2    glimepiride (AMARYL) 1 MG tablet Take 1 tablet by mouth once daily.      hydroCHLOROthiazide (HYDRODIURIL) 12.5 MG Tab Take 12.5 mg by mouth once daily.      metformin (GLUCOPHAGE) 500 MG tablet Take 1 tablet by mouth 2 (two) times daily.      aspirin 81 MG Chew Take 1 tablet (81 mg total) by mouth once daily. 90 tablet 0    atorvastatin (LIPITOR) 40 MG tablet Take 1 tablet (40 mg total) by mouth once daily. 90 tablet 0    baclofen (LIORESAL) 20 MG tablet Take 20 mg by mouth 3 (three) times daily.      benzonatate (TESSALON) 100 MG capsule Take 1 capsule (100 mg total) by mouth 3 (three) times daily as needed for Cough. 20 capsule 0    diclofenac (CATAFLAM) 50 MG tablet Take 50 mg by mouth 2 (two) times daily.      duloxetine (CYMBALTA) 60 MG capsule Take 60 mg by mouth once daily.      gabapentin (NEURONTIN) 100 MG capsule Begin with 1 by mouth at bedtime daily for 7 days.  Then increase to one by mouth twice a day for 7 days and  then one by mouth 3 times a day thereafter. 90 capsule 11    hydrALAZINE (APRESOLINE) 25 MG tablet Take 1 tablet (25 mg total) by mouth every 12 (twelve) hours. 60 tablet 2    ISOSORBIDE ORAL Take by mouth.      metoprolol succinate (TOPROL-XL) 100 MG 24 hr tablet Take 1 tablet by mouth once daily.      naproxen (NAPROSYN) 500 MG tablet Take 1 tablet (500 mg total) by mouth 2 (two) times daily as needed (take as needed for pain). 20 tablet 0    spironolactone (ALDACTONE) 25 MG tablet Take 1 tablet (25 mg total) by mouth once daily. 30 tablet 2      Allergies: Captopril    Family History   Family history unknown: Yes     Social History     Tobacco Use    Smoking status: Current Every Day Smoker     Types: Cigarettes   Substance Use Topics    Alcohol use: No     Alcohol/week: 0.0 standard drinks    Drug use: No     Review of Systems   Constitutional: Negative for fever.   HENT: Negative for trouble swallowing and voice change.    Eyes: Negative for visual disturbance.   Respiratory: Positive for cough, chest tightness and shortness of breath.    Cardiovascular: Positive for palpitations.   Gastrointestinal: Negative for abdominal pain.   Musculoskeletal: Negative for neck pain and neck stiffness.   Neurological: Positive for facial asymmetry (left side droop), speech difficulty and headaches. Negative for seizures, weakness, light-headedness and numbness.   Psychiatric/Behavioral: Negative for agitation, confusion and hallucinations.     Objective:     Vitals:    Temp: 98.8 °F (37.1 °C)  Pulse: 86  Rhythm: normal sinus rhythm  BP: 122/76  MAP (mmHg): 93  Resp: 18  SpO2: (!) 94 %  O2 Device (Oxygen Therapy): room air    Temp  Min: 98.6 °F (37 °C)  Max: 99.4 °F (37.4 °C)  Pulse  Min: 76  Max: 109  BP  Min: 110/68  Max: 131/71  MAP (mmHg)  Min: 81  Max: 101  Resp  Min: 0  Max: 32  SpO2  Min: 94 %  Max: 100 %    08/14 0701 - 08/15 0700  In: 150 [P.O.:150]  Out: 350 [Urine:350]   Unmeasured Output  Urine  Occurrence: 1  Stool Occurrence: 0       Physical Exam  Constitutional:       General: She is not in acute distress.     Appearance: She is not ill-appearing.   HENT:      Head: Normocephalic and atraumatic.      Mouth/Throat:      Mouth: Mucous membranes are dry.   Eyes:      General: No visual field deficit.     Extraocular Movements: Extraocular movements intact.      Pupils: Pupils are equal, round, and reactive to light.   Neck:      Musculoskeletal: Normal range of motion.   Cardiovascular:      Rate and Rhythm: Tachycardia present.   Pulmonary:      Effort: Pulmonary effort is normal. No respiratory distress.      Breath sounds: Rales present. No wheezing.   Abdominal:      General: Abdomen is flat.   Musculoskeletal: Normal range of motion.   Skin:     General: Skin is warm.      Capillary Refill: Capillary refill takes less than 2 seconds.   Neurological:      Mental Status: She is alert.      Cranial Nerves: Dysarthria and facial asymmetry (left side facial droop) present.      Sensory: Sensation is intact.      Motor: Motor function is intact.

## 2020-08-16 VITALS
BODY MASS INDEX: 29.9 KG/M2 | HEART RATE: 99 BPM | WEIGHT: 186.06 LBS | OXYGEN SATURATION: 96 % | HEIGHT: 66 IN | DIASTOLIC BLOOD PRESSURE: 76 MMHG | SYSTOLIC BLOOD PRESSURE: 128 MMHG | RESPIRATION RATE: 18 BRPM | TEMPERATURE: 99 F

## 2020-08-16 PROBLEM — E87.6 HYPOKALEMIA: Status: ACTIVE | Noted: 2020-08-16

## 2020-08-16 LAB
ALBUMIN SERPL BCP-MCNC: 3.1 G/DL (ref 3.5–5.2)
ALP SERPL-CCNC: 109 U/L (ref 55–135)
ALT SERPL W/O P-5'-P-CCNC: 23 U/L (ref 10–44)
ANION GAP SERPL CALC-SCNC: 10 MMOL/L (ref 8–16)
AST SERPL-CCNC: 18 U/L (ref 10–40)
BASOPHILS # BLD AUTO: 0.05 K/UL (ref 0–0.2)
BASOPHILS NFR BLD: 0.7 % (ref 0–1.9)
BILIRUB SERPL-MCNC: 0.6 MG/DL (ref 0.1–1)
BUN SERPL-MCNC: 15 MG/DL (ref 6–20)
CALCIUM SERPL-MCNC: 8.2 MG/DL (ref 8.7–10.5)
CHLORIDE SERPL-SCNC: 106 MMOL/L (ref 95–110)
CO2 SERPL-SCNC: 26 MMOL/L (ref 23–29)
CREAT SERPL-MCNC: 0.7 MG/DL (ref 0.5–1.4)
DIFFERENTIAL METHOD: ABNORMAL
EOSINOPHIL # BLD AUTO: 0.3 K/UL (ref 0–0.5)
EOSINOPHIL NFR BLD: 4.6 % (ref 0–8)
ERYTHROCYTE [DISTWIDTH] IN BLOOD BY AUTOMATED COUNT: 15.1 % (ref 11.5–14.5)
EST. GFR  (AFRICAN AMERICAN): >60 ML/MIN/1.73 M^2
EST. GFR  (NON AFRICAN AMERICAN): >60 ML/MIN/1.73 M^2
GLUCOSE SERPL-MCNC: 130 MG/DL (ref 70–110)
HCT VFR BLD AUTO: 35.4 % (ref 37–48.5)
HGB BLD-MCNC: 10.9 G/DL (ref 12–16)
IMM GRANULOCYTES # BLD AUTO: 0.01 K/UL (ref 0–0.04)
IMM GRANULOCYTES NFR BLD AUTO: 0.1 % (ref 0–0.5)
LYMPHOCYTES # BLD AUTO: 3.4 K/UL (ref 1–4.8)
LYMPHOCYTES NFR BLD: 48.4 % (ref 18–48)
MAGNESIUM SERPL-MCNC: 2.1 MG/DL (ref 1.6–2.6)
MCH RBC QN AUTO: 27.7 PG (ref 27–31)
MCHC RBC AUTO-ENTMCNC: 30.8 G/DL (ref 32–36)
MCV RBC AUTO: 90 FL (ref 82–98)
MONOCYTES # BLD AUTO: 0.4 K/UL (ref 0.3–1)
MONOCYTES NFR BLD: 5.9 % (ref 4–15)
NEUTROPHILS # BLD AUTO: 2.8 K/UL (ref 1.8–7.7)
NEUTROPHILS NFR BLD: 40.3 % (ref 38–73)
NRBC BLD-RTO: 0 /100 WBC
PHOSPHATE SERPL-MCNC: 4.1 MG/DL (ref 2.7–4.5)
PLATELET # BLD AUTO: 321 K/UL (ref 150–350)
PMV BLD AUTO: 10.6 FL (ref 9.2–12.9)
POCT GLUCOSE: 144 MG/DL (ref 70–110)
POCT GLUCOSE: 150 MG/DL (ref 70–110)
POTASSIUM SERPL-SCNC: 3.1 MMOL/L (ref 3.5–5.1)
PROT SERPL-MCNC: 6.1 G/DL (ref 6–8.4)
RBC # BLD AUTO: 3.94 M/UL (ref 4–5.4)
SODIUM SERPL-SCNC: 142 MMOL/L (ref 136–145)
WBC # BLD AUTO: 7 K/UL (ref 3.9–12.7)

## 2020-08-16 PROCEDURE — 85025 COMPLETE CBC W/AUTO DIFF WBC: CPT

## 2020-08-16 PROCEDURE — 99233 PR SUBSEQUENT HOSPITAL CARE,LEVL III: ICD-10-PCS | Mod: ,,, | Performed by: PSYCHIATRY & NEUROLOGY

## 2020-08-16 PROCEDURE — 97535 SELF CARE MNGMENT TRAINING: CPT

## 2020-08-16 PROCEDURE — 99233 SBSQ HOSP IP/OBS HIGH 50: CPT | Mod: ,,, | Performed by: PSYCHIATRY & NEUROLOGY

## 2020-08-16 PROCEDURE — 80053 COMPREHEN METABOLIC PANEL: CPT

## 2020-08-16 PROCEDURE — 83735 ASSAY OF MAGNESIUM: CPT

## 2020-08-16 PROCEDURE — 25000003 PHARM REV CODE 250: Performed by: STUDENT IN AN ORGANIZED HEALTH CARE EDUCATION/TRAINING PROGRAM

## 2020-08-16 PROCEDURE — 84100 ASSAY OF PHOSPHORUS: CPT

## 2020-08-16 PROCEDURE — 36415 COLL VENOUS BLD VENIPUNCTURE: CPT

## 2020-08-16 PROCEDURE — 63600175 PHARM REV CODE 636 W HCPCS: Performed by: STUDENT IN AN ORGANIZED HEALTH CARE EDUCATION/TRAINING PROGRAM

## 2020-08-16 PROCEDURE — 94761 N-INVAS EAR/PLS OXIMETRY MLT: CPT

## 2020-08-16 RX ORDER — POTASSIUM CHLORIDE 20 MEQ/1
20 TABLET, EXTENDED RELEASE ORAL DAILY
Qty: 3 TABLET | Refills: 0 | Status: SHIPPED | OUTPATIENT
Start: 2020-08-16 | End: 2020-08-19

## 2020-08-16 RX ORDER — POTASSIUM CHLORIDE 20 MEQ/1
20 TABLET, EXTENDED RELEASE ORAL ONCE
Status: DISCONTINUED | OUTPATIENT
Start: 2020-08-16 | End: 2020-08-16 | Stop reason: HOSPADM

## 2020-08-16 RX ORDER — CLOPIDOGREL BISULFATE 75 MG/1
75 TABLET ORAL DAILY
Qty: 30 TABLET | Refills: 0 | Status: ON HOLD | OUTPATIENT
Start: 2020-08-17 | End: 2020-08-26 | Stop reason: SDUPTHER

## 2020-08-16 RX ORDER — ATORVASTATIN CALCIUM 80 MG/1
80 TABLET, FILM COATED ORAL DAILY
Qty: 90 TABLET | Refills: 3 | Status: SHIPPED | OUTPATIENT
Start: 2020-08-17 | End: 2020-10-27 | Stop reason: SDUPTHER

## 2020-08-16 RX ADMIN — ATORVASTATIN CALCIUM 80 MG: 20 TABLET, FILM COATED ORAL at 08:08

## 2020-08-16 RX ADMIN — FUROSEMIDE 40 MG: 40 TABLET ORAL at 08:08

## 2020-08-16 RX ADMIN — ASPIRIN 81 MG: 81 TABLET, CHEWABLE ORAL at 08:08

## 2020-08-16 RX ADMIN — SPIRONOLACTONE 25 MG: 25 TABLET ORAL at 08:08

## 2020-08-16 RX ADMIN — HEPARIN SODIUM 5000 UNITS: 5000 INJECTION, SOLUTION INTRAVENOUS; SUBCUTANEOUS at 05:08

## 2020-08-16 RX ADMIN — CLOPIDOGREL 75 MG: 75 TABLET, FILM COATED ORAL at 08:08

## 2020-08-16 RX ADMIN — DULOXETINE HYDROCHLORIDE 60 MG: 30 CAPSULE, DELAYED RELEASE ORAL at 08:08

## 2020-08-16 NOTE — DISCHARGE SUMMARY
[facility]  Vascular Neurology  Comprehensive Stroke Center  Discharge Summary     Summary:     Admit Date: 8/14/2020  8:07 AM    Discharge Date and Time: 8/16/2020    Attending Physician: Michael Frederick MD    Discharge Provider: Musa Chance NP    History of Present Illness: Ms. Mohr is a 59 yr old female with medical history of DM, HTN, HLD, CHF, Smoking presenting to the ER with acute onset left lower facial droop and dysarthria.      LKN: Morning of arrival - 7:30 AM   CT head: Negative for acute infarct   NIHSS on exam: 2   MRI ischemic intervention protocol: right insular and inferior frontal infarct + right M2 superior division occlusion  t-PA: Administered post MRI; risks, benefits, inclusion and exclusion criteria reviewed prior to administration.   IR intervention: None - imaging clinical match; no occlusion in divisions of motor cortex      Stroke etiology: ESUS  Admitted to [facility] post tPA monitoring. No complications. DAPT x 30 days then monotherapy + statin. Follow up in stroke clinic in 4-6 weeks. Discharge with outpatient therapy.     Hypokalemic on admission. On home lasix daily. Replaced while inpatient. Educated patient to f/u with PCP/cardiologist.     Hospital Course (synopsis of major diagnoses, care, treatment, and services provided during the course of the hospital stay): No notes on file    Stroke Etiology: Undetermined Cause. Cryptogenic Embolism / ESUS (Embolic Stroke of Undetermined Source)    STROKE DOCUMENTATION   Acute Stroke Times   Last Known Normal Date: 08/13/20  Last Known Normal Time: 2230  Symptom Onset Date: 08/14/20  Symptom Onset Time: (Wake up )  Stroke Team Called Date: 08/14/20  Stroke Team Called Time: 0808  Stroke Team Arrival Date: 08/14/20  Stroke Team Arrival Time: 0815  CT Interpretation Time: 0825  Decision to Treat Time for Alteplase: 0845(Post MRI, Wake up stroke )  Decision to Treat Time for IR: (Clinical imaging match )     NIH  Scale:  1a. Level of Consciousness: 0-->Alert, keenly responsive  1b. LOC Questions: 0-->Answers both questions correctly  1c. LOC Commands: 0-->Performs both tasks correctly  2. Best Gaze: 0-->Normal  3. Visual: 0-->No visual loss  4. Facial Palsy: 1-->Minor paralysis (flattened nasolabial fold, asymmetry on smiling)  5a. Motor Arm, Left: 0-->No drift, limb holds 90 (or 45) degrees for full 10 secs  5b. Motor Arm, Right: 0-->No drift, limb holds 90 (or 45) degrees for full 10 secs  6a. Motor Leg, Left: 0-->No drift, leg holds 30 degree position for full 5 secs  6b. Motor Leg, Right: 0-->No drift, leg holds 30 degree position for full 5 secs  7. Limb Ataxia: 0-->Absent  8. Sensory: 0-->Normal, no sensory loss  9. Best Language: 0-->No aphasia, normal  10. Dysarthria: 1-->Mild-to-moderate dysarthria, patient slurs at least some words and, at worst, can be understood with some difficulty  11. Extinction and Inattention (formerly Neglect): 0-->No abnormality  Total (NIH Stroke Scale): 2        Modified Becky Score: 1  Coleman Coma Scale:    ABCD2 Score:    [id number] Score:   HAS -BLED Score:   ICH Score:   Hunt & Vora Classification:       Assessment/Plan:     Diagnostic Results:    Brain Imaging   Moderate-sized right frontal lobe acute infarct centered within the insular region without associated hemorrhagic component.  Minimal edema and regional mass effect without significant midline shift or herniation.     Vessel Imaging   CTA   Right frontal lobe moderate-sized acute infarct centered within the insular region, without associated hemorrhagic component, not significantly changed from imaging studies earlier same day.     CTA demonstrates major branch vessel occlusion of the proximal anterior superior M2 branch of right MCA.     Cardiac Imaging   · Severe left ventricular enlargement.  · Severely decreased left ventricular systolic function. The estimated ejection fraction is 15%.  · Moderate right ventricular  enlargement.  · Mildly to moderately reduced right ventricular systolic function.  · Grade I (mild) left ventricular diastolic dysfunction consistent with impaired relaxation.  · Trivial pericardial effusion.  · Normal central venous pressure (3 mmHg).         Interventions: IV t-PA    Complications: None    Disposition: Home or Self Care    Final Active Diagnoses:    Diagnosis Date Noted POA    PRINCIPAL PROBLEM:  Cerebrovascular accident (CVA) due to embolism of right middle cerebral artery [I63.411] 08/14/2020 Yes    Hypokalemia [E87.6] 08/16/2020 Yes    Cerebrovascular accident (CVA) due to occlusion of right middle cerebral artery [I63.511] 08/15/2020 Yes    Cytotoxic cerebral edema [G93.6] 08/14/2020 Yes    Dilated cardiomyopathy [I42.0] 12/27/2019 Yes    Coronary artery disease involving native heart [I25.10] 12/26/2019 Yes    Type 2 diabetes mellitus without complication, without long-term current use of insulin [E11.9] 12/25/2019 Yes    Essential hypertension [I10] 12/25/2019 Yes    Mixed hyperlipidemia [E78.2] 12/25/2019 Yes    Neck pain [M54.2] 07/31/2015 Yes      Problems Resolved During this Admission:    Diagnosis Date Noted Date Resolved POA    Tobacco abuse [Z72.0] 07/13/2020 08/14/2020 Yes     * Cerebrovascular accident (CVA) due to embolism of right middle cerebral artery  Ms. Mohr is a 59 yr old female with medical history of DM, HTN, HLD, CHF, Smoking presenting to the ER with acute onset left lower facial droop and dysarthria. NIHSS on exam: 2. MRI ischemic intervention protocol: right insular and inferior frontal infarct + right M2 superior division occlusion. t-PA: Administered post MRI; risks, benefits, inclusion and exclusion criteria reviewed prior to administration. IR intervention: None - imaging clinical match; no occlusion in divisions of motor cortex      Stroke etiology: ESUS. 30 day cardiac event monitor ordered at discharge    Antithrombotics for secondary stroke  prevention: Antiplatelets: Asa 81, Plavix 300 mg load, followed by 75 mg x 30 days post t-PA window     Statins for secondary stroke prevention and hyperlipidemia, if present:   Statins: Atorvastatin- 40 mg daily    Aggressive risk factor modification: HTN, DM, HLD, CAD     Rehab efforts: The patient has been evaluated by a stroke team provider and the therapy needs have been fully considered based off the presenting complaints and exam findings. The following therapy evaluations are needed: PT evaluate and treat, OT evaluate and treat, SLP evaluate and treat, PM&R evaluate for appropriate placement - Recs OP - OT     Diagnostics ordered/pendin day event monitor prior to D/c     VTE prophylaxis: Mechanical prophylaxis: Place SCDs. S.c heparin post t-PA window     BP parameters: Infarct: SBP <180            Recommendations:     Post-discharge complication risks: Falls    Stroke Education given to: patient    Follow-up in Stroke Clinic in 4-6 weeks.     Discharge Plan:  Antithrombotics: Aspirin 81mg, Clopidogrel 75mg  Statin: Atorvastatin 40mg  Aggresive risk factor modification:  Hypertension  Diabetes  High Cholesterol  Diet  Exercise  Carotid Artery disease    Follow Up:  Follow-up Information     Fernando Manzo MD In 7 days.    Specialty: Endocrinology  Why: Schedule follow up with PCP in 7-10 days of hospital discharge.   Contact information:  [address] [id number] [phone]             [facility] VASCULAR NEUROLOGY In 4 weeks.    Specialty: Vascular Neurology  Why: Someone will call to schedule stroke follow up in 4-6 weeks. If you do not hear from someone in 1 week please call number listed.   Contact information:  [address] [facility] [id number] [phone]           Cardiology In 3 days.    Why: Follow up with established cardiologist regarding the following concerns: 1. low potassium due to taking lasix 2. recent echocardiogram/metoprolol rx            "      Patient Instructions:      WALKER FOR HOME USE     Order Specific Question Answer Comments   Type of Walker: Adult (5'4"-6'6")    With wheels? Yes    Height: 5' 6" (1.676 m)    Weight: 84.4 kg (186 lb 1.1 oz)    Length of need (1-99 months): 99    Does patient have medical equipment at home? none    Please check all that apply: Patient's condition impairs ambulation.      Ambulatory referral/consult to Vascular Neurology   Standing Status: Future   Referral Priority: Routine Referral Type: Consultation   Referral Reason: Specialty Services Required   Requested Specialty: Vascular Neurology   Number of Visits Requested: 1     Activity as tolerated       Medications:  Reconciled Home Medications:      Medication List      START taking these medications    clopidogreL 75 mg tablet  Commonly known as: PLAVIX  Take 1 tablet (75 mg total) by mouth once daily.  Start taking on: August 17, 2020     potassium chloride SA 20 MEQ tablet  Commonly known as: K-DUR,KLOR-CON  Take 1 tablet (20 mEq total) by mouth once daily. for 3 doses        CHANGE how you take these medications    atorvastatin 80 MG tablet  Commonly known as: LIPITOR  Take 1 tablet (80 mg total) by mouth once daily.  Start taking on: August 17, 2020  What changed:   · medication strength  · how much to take        CONTINUE taking these medications    aspirin 81 MG Chew  Take 1 tablet (81 mg total) by mouth once daily.     benzonatate 100 MG capsule  Commonly known as: TESSALON  Take 1 capsule (100 mg total) by mouth 3 (three) times daily as needed for Cough.     DULoxetine 60 MG capsule  Commonly known as: CYMBALTA  Take 60 mg by mouth once daily.     furosemide 40 MG tablet  Commonly known as: LASIX  Take 2 tablets (80 mg total) by mouth once daily AND 1 tablet (40 mg total) every evening. TAKE EXTRA 40 MG IF GAINING 2 OR MORE POUNDS IN 2 DAYS..     gabapentin 100 MG capsule  Commonly known as: NEURONTIN  Begin with 1 by mouth at bedtime daily for 7 " days.  Then increase to one by mouth twice a day for 7 days and then one by mouth 3 times a day thereafter.     glimepiride 1 MG tablet  Commonly known as: AMARYL  Take 1 tablet by mouth once daily.     hydrALAZINE 25 MG tablet  Commonly known as: APRESOLINE  Take 1 tablet (25 mg total) by mouth every 12 (twelve) hours.     hydroCHLOROthiazide 12.5 MG Tab  Commonly known as: HYDRODIURIL  Take 12.5 mg by mouth once daily.     ISOSORBIDE ORAL  Take by mouth.     metFORMIN 500 MG tablet  Commonly known as: GLUCOPHAGE  Take 1 tablet by mouth 2 (two) times daily.     metoprolol succinate 100 MG 24 hr tablet  Commonly known as: TOPROL-XL  Take 1 tablet by mouth once daily.     spironolactone 25 MG tablet  Commonly known as: ALDACTONE  Take 1 tablet (25 mg total) by mouth once daily.        STOP taking these medications    baclofen 20 MG tablet  Commonly known as: LIORESAL     diclofenac 50 MG tablet  Commonly known as: CATAFLAM     naproxen 500 MG tablet  Commonly known as: NAPROSYN            Musa Chance NP  [facility]  Department of Vascular Neurology   [facility]

## 2020-08-16 NOTE — ASSESSMENT & PLAN NOTE
Lasix 80 mg daily due to HF. No home potassium.   Replace while inpatient. Advise patient to follow up with PCP/cardiologist regarding hypokalemia.

## 2020-08-16 NOTE — ASSESSMENT & PLAN NOTE
Ms. Mohr is a 59 yr old female with medical history of DM, HTN, HLD, CHF, Smoking presenting to the ER with acute onset left lower facial droop and dysarthria. NIHSS on exam: 2. MRI ischemic intervention protocol: right insular and inferior frontal infarct + right M2 superior division occlusion. t-PA: Administered post MRI; risks, benefits, inclusion and exclusion criteria reviewed prior to administration. IR intervention: None - imaging clinical match; no occlusion in divisions of motor cortex      Stroke etiology: ESUS. 30 day cardiac event monitor ordered at discharge    Antithrombotics for secondary stroke prevention: Antiplatelets: Asa 81, Plavix 300 mg load, followed by 75 mg x 30 days post t-PA window     Statins for secondary stroke prevention and hyperlipidemia, if present:   Statins: Atorvastatin- 40 mg daily    Aggressive risk factor modification: HTN, DM, HLD, CAD     Rehab efforts: The patient has been evaluated by a stroke team provider and the therapy needs have been fully considered based off the presenting complaints and exam findings. The following therapy evaluations are needed: PT evaluate and treat, OT evaluate and treat, SLP evaluate and treat, PM&R evaluate for appropriate placement - Recs OP - OT     Diagnostics ordered/pendin day event monitor prior to D/c     VTE prophylaxis: Mechanical prophylaxis: Place SCDs. S.c heparin post t-PA window     BP parameters: Infarct: SBP <180

## 2020-08-16 NOTE — SUBJECTIVE & OBJECTIVE
Neurologic Chief Complaint: Facial droop, dysarthria     Subjective:     Interval History: Patient is seen for follow-up neurological assessment and treatment recommendations:     JELLY. Dysarthria improving. Discharge home w/ outpatient therapy.    HPI, Past Medical, Family, and Social History remains the same as documented in the initial encounter.     Review of Systems   Constitutional: Negative for fever.   Respiratory: Positive for shortness of breath (on exertion) and wheezing.    Cardiovascular: Negative for chest pain.   Gastrointestinal: Negative for diarrhea and vomiting.   Neurological: Positive for facial asymmetry and speech difficulty. Negative for dizziness, tremors, seizures, syncope, weakness, light-headedness, numbness and headaches.   Hematological: Negative for adenopathy.   Psychiatric/Behavioral: Negative for agitation and behavioral problems.     Scheduled Meds:   aspirin  81 mg Oral Daily    atorvastatin  80 mg Oral Daily    clopidogreL  75 mg Oral Daily    DULoxetine  60 mg Oral Daily    furosemide  40 mg Oral BID    heparin (porcine)  5,000 Units Subcutaneous Q8H    potassium chloride  20 mEq Oral Once    spironolactone  25 mg Oral Daily     Continuous Infusions:    PRN Meds:dextrose 50%, glucagon (human recombinant), insulin aspart U-100, metoprolol, sodium chloride 0.9%    Objective:     Vital Signs (Most Recent):  Temp: 98.6 °F (37 °C) (08/16/20 1131)  Pulse: 99 (08/16/20 1141)  Resp: 18 (08/16/20 1131)  BP: 128/76 (08/16/20 1131)  SpO2: 96 % (08/16/20 1131)  BP Location: Right arm    Vital Signs Range (Last 24H):  Temp:  [98.1 °F (36.7 °C)-99.2 °F (37.3 °C)]   Pulse:  []   Resp:  [16-18]   BP: (104-128)/(67-79)   SpO2:  [92 %-98 %]   BP Location: Right arm    Physical Exam  HENT:      Head: Normocephalic and atraumatic.      Mouth/Throat:      Mouth: Mucous membranes are moist.   Eyes:      Extraocular Movements: Extraocular movements intact.      Pupils: Pupils are equal,  round, and reactive to light.   Cardiovascular:      Rate and Rhythm: Normal rate.   Pulmonary:      Effort: No respiratory distress.   Abdominal:      Palpations: Abdomen is soft.   Skin:     General: Skin is warm and dry.   Neurological:      Mental Status: She is alert and oriented to person, place, and time.         Neurological Exam:   LOC: alert  Attention Span: Good   Language: No aphasia  Articulation: Dysarthria - improving  Orientation: Person, Place, Time   Visual Fields: Full  EOM (CN III, IV, VI): Full/intact  Pupils (CN II, III): PERRL  Facial Sensation (CN V): Normal  Facial Movement (CN VII): Lower facial weakness on the Left  Gag Reflex: present  Reflexes: 2+ throughout  Motor: Arm left  Normal 5/5  Leg left  Normal 5/5  Arm right  Normal 5/5  Leg right Normal 5/5  Cebellar: No evidence of appendicular or axial ataxia  Sensation: Intact to light touch, temperature and vibration  Tone: Normal tone throughout    Laboratory:  CMP:   Recent Labs   Lab 08/16/20  0436   CALCIUM 8.2*   ALBUMIN 3.1*   PROT 6.1      K 3.1*   CO2 26      BUN 15   CREATININE 0.7   ALKPHOS 109   ALT 23   AST 18   BILITOT 0.6     BMP:   Recent Labs   Lab 08/16/20  0436      K 3.1*      CO2 26   BUN 15   CREATININE 0.7   CALCIUM 8.2*     CBC:   Recent Labs   Lab 08/16/20  0436   WBC 7.00   RBC 3.94*   HGB 10.9*   HCT 35.4*      MCV 90   MCH 27.7   MCHC 30.8*     Lipid Panel:   Recent Labs   Lab 08/14/20  1035   CHOL 186   LDLCALC 125.0   HDL 39*   TRIG 110     Coagulation:   Recent Labs   Lab 08/14/20  0818   INR 0.9     Platelet Aggregation Study: No results for input(s): PLTAGG, PLTAGINTERP, PLTAGREGLACO, ADPPLTAGGREG in the last 168 hours.  Hgb A1C:   Recent Labs   Lab 08/14/20  1035   HGBA1C 6.9*     TSH:   Recent Labs   Lab 08/14/20  1035   TSH 1.934           Diagnostic Results     Brain Imaging   Moderate-sized right frontal lobe acute infarct centered within the insular region without  associated hemorrhagic component.  Minimal edema and regional mass effect without significant midline shift or herniation.    Vessel Imaging   CTA   Right frontal lobe moderate-sized acute infarct centered within the insular region, without associated hemorrhagic component, not significantly changed from imaging studies earlier same day.     CTA demonstrates major branch vessel occlusion of the proximal anterior superior M2 branch of right MCA.    Cardiac Imaging   · Severe left ventricular enlargement.  · Severely decreased left ventricular systolic function. The estimated ejection fraction is 15%.  · Moderate right ventricular enlargement.  · Mildly to moderately reduced right ventricular systolic function.  · Grade I (mild) left ventricular diastolic dysfunction consistent with impaired relaxation.  · Trivial pericardial effusion.  · Normal central venous pressure (3 mmHg).

## 2020-08-16 NOTE — PLAN OF CARE
Problem: Fall Injury Risk  Goal: Absence of Fall and Fall-Related Injury  Outcome: Ongoing, Progressing     Problem: Hemodynamic Instability (Stroke, Ischemic/Transient Ischemic Attack)  Goal: Vital Signs Remain in Desired Range  Outcome: Ongoing, Progressing     Problem: Pain (Stroke, Ischemic/Transient Ischemic Attack)  Goal: Acceptable Pain Control  Outcome: Ongoing, Progressing     Problem: Adult Inpatient Plan of Care  Goal: Plan of Care Review  Outcome: Ongoing, Progressing     Patient is AAO x4. POC reviewed with patient. Patient verbalized understanding. Patient's breathing is unlabored with equal chest expansion. Patient has left sided deficits with a facial droop. Patient denies any numbness and tingling. Patient ambulates to the bedside commode with assistance. Patient remained free from falls. Patient rested well through shift. Bed in lowest position,bed alarm on, side rails up x3, no complaints or signs of distress. WCTM.  See flowsheets for full assessment and VS info.

## 2020-08-16 NOTE — PLAN OF CARE
Pt will d/c to home. CM received call that pt needs a RW - CM obtained RW from [facility] and delivered to bedside.        08/16/20 1303   Final Note   Assessment Type Final Discharge Note   Anticipated Discharge Disposition Home   Right Care Referral Info   Post Acute Recommendation No Care   Post-Acute Status   Post-Acute Authorization HME   E Status Set-up Complete   Discharge Delays None known at this time

## 2020-08-16 NOTE — PROGRESS NOTES
[facility]  Vascular Neurology  Comprehensive Stroke Center  Progress Note    Assessment/Plan:     * Cerebrovascular accident (CVA) due to embolism of right middle cerebral artery  Ms. Mohr is a 59 yr old female with medical history of DM, HTN, HLD, CHF, Smoking presenting to the ER with acute onset left lower facial droop and dysarthria. NIHSS on exam: 2. MRI ischemic intervention protocol: right insular and inferior frontal infarct + right M2 superior division occlusion. t-PA: Administered post MRI; risks, benefits, inclusion and exclusion criteria reviewed prior to administration. IR intervention: None - imaging clinical match; no occlusion in divisions of motor cortex      Stroke etiology: ESUS. 30 day cardiac event monitor ordered at discharge    Antithrombotics for secondary stroke prevention: Antiplatelets: Asa 81, Plavix 300 mg load, followed by 75 mg x 30 days post t-PA window     Statins for secondary stroke prevention and hyperlipidemia, if present:   Statins: Atorvastatin- 40 mg daily    Aggressive risk factor modification: HTN, DM, HLD, CAD     Rehab efforts: The patient has been evaluated by a stroke team provider and the therapy needs have been fully considered based off the presenting complaints and exam findings. The following therapy evaluations are needed: PT evaluate and treat, OT evaluate and treat, SLP evaluate and treat, PM&R evaluate for appropriate placement - Recs OP - OT     Diagnostics ordered/pendin day event monitor prior to D/c     VTE prophylaxis: Mechanical prophylaxis: Place SCDs. S.c heparin post t-PA window     BP parameters: Infarct: SBP <180        Hypokalemia  Lasix 80 mg daily due to HF. No home potassium.   Replace while inpatient. Advise patient to follow up with PCP/cardiologist regarding hypokalemia.    Cytotoxic cerebral edema  Area of cytotoxic cerebral edema identified when reviewing brain imaging in the territory of the right middle cerebral  artery. There no mass effect associated with it. We will continue to monitor the patients clinical exam for any worsening of symptoms which may indicate expansion of the stroke or the area of the edema resulting in the clinical change. The pattern is suggestive of ESUS etiology.        Dilated cardiomyopathy  - F/u EF - 15%  - Restart home meds - lasix, aldactone     Coronary artery disease involving native heart  - Stroke risk factor   - Restart home meds   - EF 15%     Mixed hyperlipidemia  - Stroke risk factor   -   - atorva 40     Essential hypertension  - Stroke risk factor   - Long term <130/80     Type 2 diabetes mellitus without complication, without long-term current use of insulin  - stroke risk factor  - A1C 6.9  - euglycemia goals        No notes on file    STROKE DOCUMENTATION   Acute Stroke Times   Last Known Normal Date: 08/13/20  Last Known Normal Time: 2230  Symptom Onset Date: 08/14/20  Symptom Onset Time: (Wake up )  Stroke Team Called Date: 08/14/20  Stroke Team Called Time: 0808  Stroke Team Arrival Date: 08/14/20  Stroke Team Arrival Time: 0815  CT Interpretation Time: 0825  Decision to Treat Time for Alteplase: 0845(Post MRI, Wake up stroke )  Decision to Treat Time for IR: (Clinical imaging match )    NIH Scale:  1a. Level of Consciousness: 0-->Alert, keenly responsive  1b. LOC Questions: 0-->Answers both questions correctly  1c. LOC Commands: 0-->Performs both tasks correctly  2. Best Gaze: 0-->Normal  3. Visual: 0-->No visual loss  4. Facial Palsy: 1-->Minor paralysis (flattened nasolabial fold, asymmetry on smiling)  5a. Motor Arm, Left: 0-->No drift, limb holds 90 (or 45) degrees for full 10 secs  5b. Motor Arm, Right: 0-->No drift, limb holds 90 (or 45) degrees for full 10 secs  6a. Motor Leg, Left: 0-->No drift, leg holds 30 degree position for full 5 secs  6b. Motor Leg, Right: 0-->No drift, leg holds 30 degree position for full 5 secs  7. Limb Ataxia: 0-->Absent  8. Sensory:  0-->Normal, no sensory loss  9. Best Language: 0-->No aphasia, normal  10. Dysarthria: 1-->Mild-to-moderate dysarthria, patient slurs at least some words and, at worst, can be understood with some difficulty(mild; improved)  11. Extinction and Inattention (formerly Neglect): 0-->No abnormality  Total (NIH Stroke Scale): 2       Modified Yadkin Score: 0  [place] Coma Scale:    ABCD2 Score:    [id number] Score:   HAS -BLED Score:   ICH Score:   Hunt & Vora Classification:      Hemorrhagic change of an Ischemic Stroke: Does this patient have an ischemic stroke with hemorrhagic changes? No     Neurologic Chief Complaint: Facial droop, dysarthria     Subjective:     Interval History: Patient is seen for follow-up neurological assessment and treatment recommendations:     JELLY. Dysarthria improving. Discharge home w/ outpatient therapy.    HPI, Past Medical, Family, and Social History remains the same as documented in the initial encounter.     Review of Systems   Constitutional: Negative for fever.   Respiratory: Positive for shortness of breath (on exertion) and wheezing.    Cardiovascular: Negative for chest pain.   Gastrointestinal: Negative for diarrhea and vomiting.   Neurological: Positive for facial asymmetry and speech difficulty. Negative for dizziness, tremors, seizures, syncope, weakness, light-headedness, numbness and headaches.   Hematological: Negative for adenopathy.   Psychiatric/Behavioral: Negative for agitation and behavioral problems.     Scheduled Meds:   aspirin  81 mg Oral Daily    atorvastatin  80 mg Oral Daily    clopidogreL  75 mg Oral Daily    DULoxetine  60 mg Oral Daily    furosemide  40 mg Oral BID    heparin (porcine)  5,000 Units Subcutaneous Q8H    potassium chloride  20 mEq Oral Once    spironolactone  25 mg Oral Daily     Continuous Infusions:    PRN Meds:dextrose 50%, glucagon (human recombinant), insulin aspart U-100, metoprolol, sodium chloride 0.9%    Objective:     Vital  Signs (Most Recent):  Temp: 98.6 °F (37 °C) (08/16/20 1131)  Pulse: 99 (08/16/20 1141)  Resp: 18 (08/16/20 1131)  BP: 128/76 (08/16/20 1131)  SpO2: 96 % (08/16/20 1131)  BP Location: Right arm    Vital Signs Range (Last 24H):  Temp:  [98.1 °F (36.7 °C)-99.2 °F (37.3 °C)]   Pulse:  []   Resp:  [16-18]   BP: (104-128)/(67-79)   SpO2:  [92 %-98 %]   BP Location: Right arm    Physical Exam  HENT:      Head: Normocephalic and atraumatic.      Mouth/Throat:      Mouth: Mucous membranes are moist.   Eyes:      Extraocular Movements: Extraocular movements intact.      Pupils: Pupils are equal, round, and reactive to light.   Cardiovascular:      Rate and Rhythm: Normal rate.   Pulmonary:      Effort: No respiratory distress.   Abdominal:      Palpations: Abdomen is soft.   Skin:     General: Skin is warm and dry.   Neurological:      Mental Status: She is alert and oriented to person, place, and time.         Neurological Exam:   LOC: alert  Attention Span: Good   Language: No aphasia  Articulation: Dysarthria - improving  Orientation: Person, Place, Time   Visual Fields: Full  EOM (CN III, IV, VI): Full/intact  Pupils (CN II, III): PERRL  Facial Sensation (CN V): Normal  Facial Movement (CN VII): Lower facial weakness on the Left  Gag Reflex: present  Reflexes: 2+ throughout  Motor: Arm left  Normal 5/5  Leg left  Normal 5/5  Arm right  Normal 5/5  Leg right Normal 5/5  Cebellar: No evidence of appendicular or axial ataxia  Sensation: Intact to light touch, temperature and vibration  Tone: Normal tone throughout    Laboratory:  CMP:   Recent Labs   Lab 08/16/20  0436   CALCIUM 8.2*   ALBUMIN 3.1*   PROT 6.1      K 3.1*   CO2 26      BUN 15   CREATININE 0.7   ALKPHOS 109   ALT 23   AST 18   BILITOT 0.6     BMP:   Recent Labs   Lab 08/16/20  0436      K 3.1*      CO2 26   BUN 15   CREATININE 0.7   CALCIUM 8.2*     CBC:   Recent Labs   Lab 08/16/20  0436   WBC 7.00   RBC 3.94*   HGB 10.9*   HCT  35.4*      MCV 90   MCH 27.7   MCHC 30.8*     Lipid Panel:   Recent Labs   Lab 08/14/20  1035   CHOL 186   LDLCALC 125.0   HDL 39*   TRIG 110     Coagulation:   Recent Labs   Lab 08/14/20  0818   INR 0.9     Platelet Aggregation Study: No results for input(s): PLTAGG, PLTAGINTERP, PLTAGREGLACO, ADPPLTAGGREG in the last 168 hours.  Hgb A1C:   Recent Labs   Lab 08/14/20  1035   HGBA1C 6.9*     TSH:   Recent Labs   Lab 08/14/20  1035   TSH 1.934           Diagnostic Results     Brain Imaging   Moderate-sized right frontal lobe acute infarct centered within the insular region without associated hemorrhagic component.  Minimal edema and regional mass effect without significant midline shift or herniation.    Vessel Imaging   CTA   Right frontal lobe moderate-sized acute infarct centered within the insular region, without associated hemorrhagic component, not significantly changed from imaging studies earlier same day.     CTA demonstrates major branch vessel occlusion of the proximal anterior superior M2 branch of right MCA.    Cardiac Imaging   · Severe left ventricular enlargement.  · Severely decreased left ventricular systolic function. The estimated ejection fraction is 15%.  · Moderate right ventricular enlargement.  · Mildly to moderately reduced right ventricular systolic function.  · Grade I (mild) left ventricular diastolic dysfunction consistent with impaired relaxation.  · Trivial pericardial effusion.  · Normal central venous pressure (3 mmHg).      Musa Chance NP  [facility]  Department of Vascular Neurology   [facility]

## 2020-08-16 NOTE — PLAN OF CARE
Problem: Fall Injury Risk  Goal: Absence of Fall and Fall-Related Injury  Outcome: Met     Problem: Adjustment to Illness (Stroke, Ischemic/Transient Ischemic Attack)  Goal: Optimal Coping  Outcome: Met     Problem: Bowel Elimination Impaired (Stroke, Ischemic/Transient Ischemic Attack)  Goal: Effective Bowel Elimination  Outcome: Met     Problem: Cerebral Tissue Perfusion Risk (Stroke, Ischemic/Transient Ischemic Attack)  Goal: Optimal Cerebral Tissue Perfusion  Outcome: Met     Problem: Communication Impairment (Stroke, Ischemic/Transient Ischemic Attack)  Goal: Improved Communication Skills  Outcome: Met     Problem: Eating/Swallowing Impairment (Stroke, Ischemic/Transient Ischemic Attack)  Goal: Oral Intake without Aspiration  Outcome: Met     Problem: Functional Ability Impaired (Stroke, Ischemic/Transient Ischemic Attack)  Goal: Optimal Functional Ability  Outcome: Met     Problem: Hemodynamic Instability (Stroke, Ischemic/Transient Ischemic Attack)  Goal: Vital Signs Remain in Desired Range  Outcome: Met     Problem: Pain (Stroke, Ischemic/Transient Ischemic Attack)  Goal: Acceptable Pain Control  Outcome: Met     Problem: Sensorimotor Impairment (Stroke, Ischemic/Transient Ischemic Attack)  Goal: Improved Sensorimotor Function  Outcome: Met     Problem: Urinary Elimination Impaired (Stroke, Ischemic/Transient Ischemic Attack)  Goal: Effective Urinary Elimination  Outcome: Met     Problem: Adult Inpatient Plan of Care  Goal: Plan of Care Review  Outcome: Met  Goal: Patient-Specific Goal (Individualization)  Outcome: Met  Goal: Absence of Hospital-Acquired Illness or Injury  Outcome: Met  Goal: Optimal Comfort and Wellbeing  Outcome: Met  Goal: Readiness for Transition of Care  Outcome: Met  Goal: Rounds/Family Conference  Outcome: Met     Problem: Diabetes Comorbidity  Goal: Blood Glucose Level Within Desired Range  Outcome: Met     Problem: Skin Injury Risk Increased  Goal: Skin Health and  Integrity  Outcome: Met     Problem: Oral Intake Inadequate  Goal: Improved Oral Intake  Outcome: Met   Patient and  received DC home instructions and verbalized understanding. Walker was delivered to the room./ cardiac monitor and IV access removed. Pressure and dressing applied. No s/s of IV site complications noted. No neuro changes from initial assessment.Patient denied any discomfort and is waiting for transporter.

## 2020-08-16 NOTE — PT/OT/SLP PROGRESS
"Occupational Therapy   Treatment    Name: Diomedes Mohr  MRN: [id number]  Admitting Diagnosis:  Cerebrovascular accident (CVA) due to embolism of right middle cerebral artery       Recommendations:     Discharge Recommendations: outpatient OT  Discharge Equipment Recommendations:  shower chair  Barriers to discharge:  None    Assessment:     Diomedes Mohr is a 59 y.o. female with a medical diagnosis of Cerebrovascular accident (CVA) due to embolism of right middle cerebral artery.  She presents with performance deficits affecting function are weakness, impaired cognition, impaired sensation, decreased coordination, decreased upper extremity function, impaired self care skills, impaired functional mobilty, gait instability, impaired balance, decreased safety awareness.     Rehab Prognosis:  Good; patient would benefit from acute skilled OT services to address these deficits and reach maximum level of function.       Plan:     Patient to be seen 4 x/week to address the above listed problems via self-care/home management, therapeutic activities, therapeutic exercises, neuromuscular re-education, cognitive retraining, sensory integration  · Plan of Care Expires: 09/12/20  · Plan of Care Reviewed with: patient    Subjective   Patient:  "They said yesterday that I get to go home today."  "The stroke was on my right side."  Pain/Comfort:  · Pain Rating 1: 0/10  · Pain Rating Post-Intervention 1: 0/10    Objective:     Communicated with: Nurse prior to session.  Patient found supine with telemetry, peripheral IV upon OT entry to room.    General Precautions: Standard, aspiration, fall   Orthopedic Precautions:N/A   Braces: N/A     Occupational Performance:     Bed Mobility:    · Patient completed Rolling/Turning to Left with  modified independence  · Patient completed Rolling/Turning to Right with modified independence  · Patient completed Supine to Sit with modified independence  · Patient completed Sit to Supine with " modified independence     Functional Mobility/Transfers:  · Patient completed Sit <> Stand Transfer with stand by assistance  with  no assistive device   · Patient completed Bed <> Chair Transfer using Stand Pivot technique with stand by assistance with no assistive device    Activities of Daily Living:  · Grooming: stand by assistance    · Upper Body Dressing: stand by assistance    · Lower Body Dressing: stand by assistance    · Toileting: stand by assistance      AMPA 6 Click ADL: 18    Treatment & Education:  Patient alert and oriented x 3; able to follow 4/4 one step commands.  Patient attentive and interactive throughout the session.      Patient left supine with all lines intactEducation:      GOALS:   Multidisciplinary Problems     Occupational Therapy Goals        Problem: Occupational Therapy Goal    Goal Priority Disciplines Outcome Interventions   Occupational Therapy Goal     OT, PT/OT Ongoing, Progressing    Description: Goals set 8/15 to be addressed for 7 days with expiration date, 8/22:  Patient will increase functional independence with ADLs by performing:  Patient will demonstrate stand pivot transfers with modified independence.   Not met  Patient will demonstrate grooming while standing with modified independence.   Not met  Patient will demonstrate upper body dressing with modified independence while seated EOB.   Not met  Patient will demonstrate lower body dressing with modified independence while seated EOB.   Not met  Patient will demonstrate toileting with modified independence.   Not met  Patient will demonstrate bathing while seated EOB with modified independence.   Not met  Patient's family / caregiver will demonstrate independence and safety with assisting patient with self-care skills and functional mobility.     Not met  Patient and/or patient's family will verbalize understanding of stroke prevention guidelines, personal risk factors and stroke warning signs via teachback method.   Not met                              Time Tracking:     OT Date of Treatment: 08/16/20  OT Start Time: 1000  OT Stop Time: 1023  OT Total Time (min): 23 min    Billable Minutes:Self Care/Home Management 23    CHIKI Green  8/16/2020

## 2020-08-16 NOTE — PLAN OF CARE
08/16/2020      [address]omedes [address]  [place] [address]          Vascular Neurology Dept.  [facility] [address] [place] [address]  [phone] after hours  [phone] fax Diagnosis:  Cerebrovascular accident (CVA) due to embolism of right middle cerebral artery                         Evaluate and treat for the following therapy: physical therapy, occupational therapy, speech therapy          __________________________  Musa Chance NP  08/16/2020

## 2020-08-17 ENCOUNTER — PATIENT OUTREACH (OUTPATIENT)
Dept: ADMINISTRATIVE | Facility: CLINIC | Age: 60
End: 2020-08-17

## 2020-08-17 DIAGNOSIS — I63.411 CEREBROVASCULAR ACCIDENT (CVA) DUE TO EMBOLISM OF RIGHT MIDDLE CEREBRAL ARTERY: Primary | ICD-10-CM

## 2020-08-17 NOTE — PATIENT INSTRUCTIONS
Discharge Instructions for Stroke  You have been diagnosed with a stroke, or with a TIA (transient ischemic attack). Or you have been identified as having a high risk for stroke. During a stroke, blood stops flowing to part of your brain. This can damage areas in the brain that control other parts of the body. Symptoms after a stroke depend on which part of the brain has been affected.  Stroke risk factors  Once youve had a stroke, youre at greater risk for another one. Listed below are some other factors that can increase your risk for a stroke:  · High blood pressure  · High cholesterol  · Cigarette or cigar smoking  · Diabetes  · Carotid or other artery disease  · Atrial fibrillation, atrial flutter, or other heart disease  · Not being physically active  · Obesity  · Certain blood disorders (such as sickle cell anemia)  · Excessive alcohol use  · Abuse of street drugs  · Race  · Gender  · Family history of stroke  · Diet high in salty, fried, or greasy foods  Changes in daily living  Doing your regular tasks may be difficult after youve had a stroke, but you can learn new ways to manage your daily activities. In fact, doing daily activities may help you to regain muscle strength and bring back function to affected limbs. Be patient, give yourself time to adjust, and appreciate the progress you make.  Daily activities  You may be at risk of falling. Make changes to your home to help you walk more easily. A therapist will decide if you need an assistive device to walk safely.  You may need to see an occupational therapist or physical therapist to learn new ways of doing things. For example, you may need to make adjustments when bathing or dressing:  Tips for showering or bathing  · Test the water temperature with a hand or foot that was not affected by the stroke.  · Use grab bars, a shower seat, a hand-held showerhead, and a long-handled brush.  Tips for getting dressed  · Dress while sitting, starting with  the affected side or limb.  · Wear shirts that pull easily over your head. Wear pants or skirts with elastic waistbands.  · Use zippers with loops attached to the pull tabs.  Lifestyle changes  · Take your medicines exactly as directed. Dont skip doses.  · Begin an exercise program. Ask your provider how to get started. Also ask how much activity you should try to get on a daily or weekly basis. You can benefit from simple activities such as walking or gardening.  · Limit alcohol intake. Men should have no more than 2 alcoholic drinks a day. Women should limit themselves to 1 alcoholic drink per day.  · Know your cholesterol level. Follow your providers recommendations about how to keep cholesterol under control.  · If you are a smoker, quit now. Join a stop-smoking program to improve your chances of success. Ask your provider about medicines or other methods to help you quit.  · Learn stress management techniques to help you deal with stress in your home and work life.  Diet  Your healthcare provider will give you information on dietary changes that you may need to make, based on your situation. Your provider may recommend that you see a registered dietitian for help with diet changes. Changes may include:  · Reducing fat and cholesterol intake  · Reducing salt (sodium) intake, especially if you have high blood pressure  · Eating more fresh vegetables and fruits  · Eating more lean proteins, such as fish, poultry, and beans and peas (legumes)  · Eating less red meat and processed meats  · Using low-fat dairy products  · Limiting vegetable oils and nut oils  · Limiting sweets and processed foods such as chips, cookies, and baked goods  Follow-up care  · Keep your medical appointments. Close follow-up is important to stroke rehabilitation and recovery.  · Some medicines require blood tests to check for progress or problems. Keep follow-up appointments for any blood tests ordered by your providers.  When to call  911  Call 911 right away if you have any of the following symptoms of stroke:  · Weakness, tingling, or loss of feeling on one side of your face or body  · Sudden double vision or trouble seeing in one or both eyes  · Sudden trouble talking or slurred speech  · Trouble understanding others  · Sudden, severe headache  · Dizziness, loss of balance, or a sense of falling  · Blackouts or seizures      F.A.S.T. is an easy way to remember the signs of stroke. When you see these signs, you know that you need to call 911 fast.  F.A.S.T. stands for:  · F is for face drooping. One side of the face is drooping or numb. When the person smiles, the smile is uneven.  · A is for arm weakness. One arm is weak or numb. When the person lifts both arms at the same time, one arm may drift downward.  · S is for speech difficulty. You may notice slurred speech or trouble speaking. The person can't repeat a simple sentence correctly when asked.  · T is for time to call 911. If someone shows any of these symptoms, even if they go away, call 911 immediately. Make note of the time the symptoms first appeared.  Date Last Reviewed: 8/26/2015 © 2000-2017 [employer]. [address] [place] [address], [place] [address]. All rights reserved. This information is not intended as a substitute for professional medical care. Always follow your healthcare professional's instructions.

## 2020-08-20 ENCOUNTER — NURSE TRIAGE (OUTPATIENT)
Dept: ADMINISTRATIVE | Facility: CLINIC | Age: 60
End: 2020-08-20

## 2020-08-20 NOTE — TELEPHONE ENCOUNTER
Feeling constpiated, wants to know if she can take laxitive    LBM 8/13, hospital admission 8/14 for CVA, denies continued deficits. Denies abd pain or pain to rectum, pt does admit pressure to rectum with urge but pt does not want to sit on the toilet and strain. Denies n/v, pt states appetite has been lower but continues to eat lots of fruits/vegetables and drinking 8 gl of water per day. Normal urine output.     Has home visit tomorrow for hospital f/u with Dr. Reema Brown. Advised per protocol, informed pt we recommend pt being seen in office today for BM>4 days, VU. Informed pt I will send urgent message to PCP. Informed pt of home treatments for constipation, pt VU. Will call back if pain presents or has other s/s     Reason for Disposition   Last bowel movement (BM) > 4 days ago    Additional Information   Negative: Abdominal pain is the main symptom and adult male   Negative: Abdominal pain is the main symptom and adult female   Negative: Rectal bleeding or blood in stool is the main symptom   Negative: Patient sounds very sick or weak to the triager   Negative: Constant abdominal pain lasting > 2 hours   Negative: Vomiting bile (green color)   Negative: Vomiting and abdomen looks much more swollen than usual   Negative: SEVERE rectal pain not relieved by Sitz bath or glycerine suppository   Negative: Abdomen is more swollen than usual   Negative: Leaking stool    Protocols used: [facility] CONSTIPATION-A-[place]

## 2020-08-21 DIAGNOSIS — I63.411 CEREBROVASCULAR ACCIDENT (CVA) DUE TO EMBOLISM OF RIGHT MIDDLE CEREBRAL ARTERY: Primary | ICD-10-CM

## 2020-08-24 ENCOUNTER — CLINICAL SUPPORT (OUTPATIENT)
Dept: CARDIOLOGY | Facility: HOSPITAL | Age: 60
End: 2020-08-24
Attending: FAMILY MEDICINE
Payer: COMMERCIAL

## 2020-08-24 DIAGNOSIS — I63.411 CEREBROVASCULAR ACCIDENT (CVA) DUE TO EMBOLISM OF RIGHT MIDDLE CEREBRAL ARTERY: ICD-10-CM

## 2020-08-24 DIAGNOSIS — I63.511 CEREBROVASCULAR ACCIDENT (CVA) DUE TO OCCLUSION OF RIGHT MIDDLE CEREBRAL ARTERY: ICD-10-CM

## 2020-08-24 PROCEDURE — 93272 ECG/REVIEW INTERPRET ONLY: CPT | Mod: NTX,,, | Performed by: INTERNAL MEDICINE

## 2020-08-24 PROCEDURE — 93271 ECG/MONITORING AND ANALYSIS: CPT | Mod: TXP

## 2020-08-24 PROCEDURE — 93272 CARDIAC EVENT MONITOR (CUPID ONLY): ICD-10-PCS | Mod: NTX,,, | Performed by: INTERNAL MEDICINE

## 2020-08-25 ENCOUNTER — HOSPITAL ENCOUNTER (INPATIENT)
Facility: HOSPITAL | Age: 60
LOS: 1 days | Discharge: HOME HEALTH | DRG: 282 | End: 2020-08-28
Attending: EMERGENCY MEDICINE | Admitting: INTERNAL MEDICINE
Payer: COMMERCIAL

## 2020-08-25 ENCOUNTER — CARE AT HOME (OUTPATIENT)
Dept: HOME HEALTH SERVICES | Facility: CLINIC | Age: 60
End: 2020-08-25
Payer: COMMERCIAL

## 2020-08-25 DIAGNOSIS — R07.9 CHEST PAIN: ICD-10-CM

## 2020-08-25 DIAGNOSIS — I42.9 CARDIOMYOPATHY, UNSPECIFIED TYPE: ICD-10-CM

## 2020-08-25 DIAGNOSIS — I50.30 (HFPEF) HEART FAILURE WITH PRESERVED EJECTION FRACTION: ICD-10-CM

## 2020-08-25 DIAGNOSIS — Z72.0 NICOTINE ABUSE: ICD-10-CM

## 2020-08-25 DIAGNOSIS — I42.0 DILATED CARDIOMYOPATHY: ICD-10-CM

## 2020-08-25 DIAGNOSIS — I50.9 ACUTE DECOMPENSATED HEART FAILURE: ICD-10-CM

## 2020-08-25 DIAGNOSIS — I50.23 ACUTE ON CHRONIC SYSTOLIC CONGESTIVE HEART FAILURE: ICD-10-CM

## 2020-08-25 DIAGNOSIS — I63.411 CEREBROVASCULAR ACCIDENT (CVA) DUE TO EMBOLISM OF RIGHT MIDDLE CEREBRAL ARTERY: ICD-10-CM

## 2020-08-25 DIAGNOSIS — R79.89 ELEVATED TROPONIN: Primary | ICD-10-CM

## 2020-08-25 DIAGNOSIS — I69.391 DYSPHAGIA AS LATE EFFECT OF CEREBROVASCULAR ACCIDENT (CVA): ICD-10-CM

## 2020-08-25 DIAGNOSIS — I50.42 CHRONIC COMBINED SYSTOLIC AND DIASTOLIC CONGESTIVE HEART FAILURE: Primary | ICD-10-CM

## 2020-08-25 DIAGNOSIS — I42.9 CARDIOMYOPATHY: ICD-10-CM

## 2020-08-25 DIAGNOSIS — R06.02 SOB (SHORTNESS OF BREATH): ICD-10-CM

## 2020-08-25 LAB
ALBUMIN SERPL BCP-MCNC: 3.7 G/DL (ref 3.5–5.2)
ALP SERPL-CCNC: 143 U/L (ref 55–135)
ALT SERPL W/O P-5'-P-CCNC: 50 U/L (ref 10–44)
ANION GAP SERPL CALC-SCNC: 11 MMOL/L (ref 8–16)
AST SERPL-CCNC: 50 U/L (ref 10–40)
BASOPHILS # BLD AUTO: 0.05 K/UL (ref 0–0.2)
BASOPHILS NFR BLD: 0.6 % (ref 0–1.9)
BILIRUB SERPL-MCNC: 0.5 MG/DL (ref 0.1–1)
BNP SERPL-MCNC: 1206 PG/ML (ref 0–99)
BUN SERPL-MCNC: 16 MG/DL (ref 6–20)
CALCIUM SERPL-MCNC: 9.3 MG/DL (ref 8.7–10.5)
CHLORIDE SERPL-SCNC: 103 MMOL/L (ref 95–110)
CO2 SERPL-SCNC: 27 MMOL/L (ref 23–29)
CREAT SERPL-MCNC: 0.8 MG/DL (ref 0.5–1.4)
D DIMER PPP IA.FEU-MCNC: 2.78 MG/L FEU
DIFFERENTIAL METHOD: ABNORMAL
EOSINOPHIL # BLD AUTO: 0.3 K/UL (ref 0–0.5)
EOSINOPHIL NFR BLD: 3.2 % (ref 0–8)
ERYTHROCYTE [DISTWIDTH] IN BLOOD BY AUTOMATED COUNT: 15.1 % (ref 11.5–14.5)
EST. GFR  (AFRICAN AMERICAN): >60 ML/MIN/1.73 M^2
EST. GFR  (NON AFRICAN AMERICAN): >60 ML/MIN/1.73 M^2
GLUCOSE SERPL-MCNC: 121 MG/DL (ref 70–110)
HCT VFR BLD AUTO: 37.7 % (ref 37–48.5)
HGB BLD-MCNC: 12 G/DL (ref 12–16)
IMM GRANULOCYTES # BLD AUTO: 0.02 K/UL (ref 0–0.04)
IMM GRANULOCYTES NFR BLD AUTO: 0.2 % (ref 0–0.5)
LYMPHOCYTES # BLD AUTO: 3.9 K/UL (ref 1–4.8)
LYMPHOCYTES NFR BLD: 47.4 % (ref 18–48)
MCH RBC QN AUTO: 28 PG (ref 27–31)
MCHC RBC AUTO-ENTMCNC: 31.8 G/DL (ref 32–36)
MCV RBC AUTO: 88 FL (ref 82–98)
MONOCYTES # BLD AUTO: 0.4 K/UL (ref 0.3–1)
MONOCYTES NFR BLD: 5 % (ref 4–15)
NEUTROPHILS # BLD AUTO: 3.6 K/UL (ref 1.8–7.7)
NEUTROPHILS NFR BLD: 43.6 % (ref 38–73)
NRBC BLD-RTO: 0 /100 WBC
PLATELET # BLD AUTO: 404 K/UL (ref 150–350)
PMV BLD AUTO: 10.6 FL (ref 9.2–12.9)
POTASSIUM SERPL-SCNC: 3.2 MMOL/L (ref 3.5–5.1)
PROT SERPL-MCNC: 7.3 G/DL (ref 6–8.4)
RBC # BLD AUTO: 4.28 M/UL (ref 4–5.4)
SODIUM SERPL-SCNC: 141 MMOL/L (ref 136–145)
TROPONIN I SERPL DL<=0.01 NG/ML-MCNC: 8.06 NG/ML (ref 0–0.03)
WBC # BLD AUTO: 8.22 K/UL (ref 3.9–12.7)

## 2020-08-25 PROCEDURE — 25000003 PHARM REV CODE 250: Performed by: EMERGENCY MEDICINE

## 2020-08-25 PROCEDURE — U0002 COVID-19 LAB TEST NON-CDC: HCPCS

## 2020-08-25 PROCEDURE — 99284 PR EMERGENCY DEPT VISIT,LEVEL IV: ICD-10-PCS | Mod: ,,, | Performed by: EMERGENCY MEDICINE

## 2020-08-25 PROCEDURE — 80053 COMPREHEN METABOLIC PANEL: CPT

## 2020-08-25 PROCEDURE — 84484 ASSAY OF TROPONIN QUANT: CPT

## 2020-08-25 PROCEDURE — 63600175 PHARM REV CODE 636 W HCPCS: Performed by: EMERGENCY MEDICINE

## 2020-08-25 PROCEDURE — 99406 BEHAV CHNG SMOKING 3-10 MIN: CPT | Mod: S$GLB,,, | Performed by: NURSE PRACTITIONER

## 2020-08-25 PROCEDURE — 93005 ELECTROCARDIOGRAM TRACING: CPT

## 2020-08-25 PROCEDURE — 93010 EKG 12-LEAD: ICD-10-PCS | Mod: ,,, | Performed by: INTERNAL MEDICINE

## 2020-08-25 PROCEDURE — 99406 PR TOBACCO USE CESSATION INTERMEDIATE 3-10 MINUTES: ICD-10-PCS | Mod: S$GLB,,, | Performed by: NURSE PRACTITIONER

## 2020-08-25 PROCEDURE — 85025 COMPLETE CBC W/AUTO DIFF WBC: CPT

## 2020-08-25 PROCEDURE — 99284 EMERGENCY DEPT VISIT MOD MDM: CPT | Mod: ,,, | Performed by: EMERGENCY MEDICINE

## 2020-08-25 PROCEDURE — 99495 TCM SERVICES (MODERATE COMPLEXITY): ICD-10-PCS | Mod: 25,S$GLB,, | Performed by: NURSE PRACTITIONER

## 2020-08-25 PROCEDURE — 93010 ELECTROCARDIOGRAM REPORT: CPT | Mod: ,,, | Performed by: INTERNAL MEDICINE

## 2020-08-25 PROCEDURE — 99495 TRANSJ CARE MGMT MOD F2F 14D: CPT | Mod: 25,S$GLB,, | Performed by: NURSE PRACTITIONER

## 2020-08-25 PROCEDURE — 96375 TX/PRO/DX INJ NEW DRUG ADDON: CPT

## 2020-08-25 PROCEDURE — 99285 EMERGENCY DEPT VISIT HI MDM: CPT | Mod: 25

## 2020-08-25 PROCEDURE — 85379 FIBRIN DEGRADATION QUANT: CPT

## 2020-08-25 PROCEDURE — 83880 ASSAY OF NATRIURETIC PEPTIDE: CPT

## 2020-08-25 RX ORDER — FUROSEMIDE 10 MG/ML
40 INJECTION INTRAMUSCULAR; INTRAVENOUS
Status: COMPLETED | OUTPATIENT
Start: 2020-08-25 | End: 2020-08-25

## 2020-08-25 RX ORDER — ASPIRIN 325 MG
325 TABLET ORAL
Status: COMPLETED | OUTPATIENT
Start: 2020-08-25 | End: 2020-08-25

## 2020-08-25 RX ADMIN — ASPIRIN 325 MG ORAL TABLET 325 MG: 325 PILL ORAL at 11:08

## 2020-08-25 RX ADMIN — FUROSEMIDE 40 MG: 10 INJECTION, SOLUTION INTRAMUSCULAR; INTRAVENOUS at 11:08

## 2020-08-26 PROBLEM — R79.89 ELEVATED TROPONIN: Status: ACTIVE | Noted: 2020-08-26

## 2020-08-26 PROBLEM — I63.411 EMBOLIC STROKE INVOLVING RIGHT MIDDLE CEREBRAL ARTERY: Status: ACTIVE | Noted: 2020-08-26

## 2020-08-26 PROBLEM — I50.23 ACUTE ON CHRONIC SYSTOLIC HEART FAILURE: Status: ACTIVE | Noted: 2020-08-26

## 2020-08-26 LAB
ALBUMIN SERPL BCP-MCNC: 3.5 G/DL (ref 3.5–5.2)
ALP SERPL-CCNC: 135 U/L (ref 55–135)
ALT SERPL W/O P-5'-P-CCNC: 44 U/L (ref 10–44)
ANION GAP SERPL CALC-SCNC: 10 MMOL/L (ref 8–16)
ANION GAP SERPL CALC-SCNC: 13 MMOL/L (ref 8–16)
APTT BLDCRRT: 27.3 SEC (ref 21–32)
APTT BLDCRRT: 37.3 SEC (ref 21–32)
APTT BLDCRRT: 46.4 SEC (ref 21–32)
ASCENDING AORTA: 2.95 CM
AST SERPL-CCNC: 40 U/L (ref 10–40)
AV INDEX (PROSTH): 0.53
AV MEAN GRADIENT: 2 MMHG
AV PEAK GRADIENT: 4 MMHG
AV VALVE AREA: 2.04 CM2
AV VELOCITY RATIO: 0.59
BASOPHILS # BLD AUTO: 0.05 K/UL (ref 0–0.2)
BASOPHILS NFR BLD: 0.7 % (ref 0–1.9)
BILIRUB SERPL-MCNC: 0.7 MG/DL (ref 0.1–1)
BSA FOR ECHO PROCEDURE: 1.93 M2
BUN SERPL-MCNC: 13 MG/DL (ref 6–20)
BUN SERPL-MCNC: 16 MG/DL (ref 6–20)
CALCIUM SERPL-MCNC: 8.9 MG/DL (ref 8.7–10.5)
CALCIUM SERPL-MCNC: 9.3 MG/DL (ref 8.7–10.5)
CHLORIDE SERPL-SCNC: 100 MMOL/L (ref 95–110)
CHLORIDE SERPL-SCNC: 100 MMOL/L (ref 95–110)
CO2 SERPL-SCNC: 27 MMOL/L (ref 23–29)
CO2 SERPL-SCNC: 30 MMOL/L (ref 23–29)
CREAT SERPL-MCNC: 0.8 MG/DL (ref 0.5–1.4)
CREAT SERPL-MCNC: 1 MG/DL (ref 0.5–1.4)
CV ECHO LV RWT: 0.17 CM
DIFFERENTIAL METHOD: ABNORMAL
DOP CALC AO PEAK VEL: 1.01 M/S
DOP CALC AO VTI: 16.61 CM
DOP CALC LVOT AREA: 3.8 CM2
DOP CALC LVOT DIAMETER: 2.21 CM
DOP CALC LVOT PEAK VEL: 0.6 M/S
DOP CALC LVOT STROKE VOLUME: 33.82 CM3
DOP CALCLVOT PEAK VEL VTI: 8.82 CM
E WAVE DECELERATION TIME: 135.52 MSEC
E/A RATIO: 1.11
E/E' RATIO: 19.75 M/S
ECHO LV POSTERIOR WALL: 0.61 CM (ref 0.6–1.1)
EOSINOPHIL # BLD AUTO: 0.2 K/UL (ref 0–0.5)
EOSINOPHIL NFR BLD: 3.3 % (ref 0–8)
ERYTHROCYTE [DISTWIDTH] IN BLOOD BY AUTOMATED COUNT: 15 % (ref 11.5–14.5)
EST. GFR  (AFRICAN AMERICAN): >60 ML/MIN/1.73 M^2
EST. GFR  (AFRICAN AMERICAN): >60 ML/MIN/1.73 M^2
EST. GFR  (NON AFRICAN AMERICAN): >60 ML/MIN/1.73 M^2
EST. GFR  (NON AFRICAN AMERICAN): >60 ML/MIN/1.73 M^2
FRACTIONAL SHORTENING: 4 % (ref 28–44)
GLUCOSE SERPL-MCNC: 175 MG/DL (ref 70–110)
GLUCOSE SERPL-MCNC: 275 MG/DL (ref 70–110)
HCT VFR BLD AUTO: 36.2 % (ref 37–48.5)
HGB BLD-MCNC: 11.6 G/DL (ref 12–16)
IMM GRANULOCYTES # BLD AUTO: 0.01 K/UL (ref 0–0.04)
IMM GRANULOCYTES NFR BLD AUTO: 0.1 % (ref 0–0.5)
INR PPP: 1 (ref 0.8–1.2)
INTERVENTRICULAR SEPTUM: 0.6 CM (ref 0.6–1.1)
IVRT: 97.05 MSEC
LA MAJOR: 5.08 CM
LA MINOR: 5.11 CM
LA WIDTH: 3.92 CM
LEFT ATRIUM SIZE: 3.8 CM
LEFT ATRIUM VOLUME INDEX MOD: 38 ML/M2
LEFT ATRIUM VOLUME INDEX: 34.1 ML/M2
LEFT ATRIUM VOLUME MOD: 72 CM3
LEFT ATRIUM VOLUME: 64.51 CM3
LEFT INTERNAL DIMENSION IN SYSTOLE: 6.75 CM (ref 2.1–4)
LEFT VENTRICLE DIASTOLIC VOLUME INDEX: 134.79 ML/M2
LEFT VENTRICLE DIASTOLIC VOLUME: 255.34 ML
LEFT VENTRICLE MASS INDEX: 93 G/M2
LEFT VENTRICLE SYSTOLIC VOLUME INDEX: 124.2 ML/M2
LEFT VENTRICLE SYSTOLIC VOLUME: 235.24 ML
LEFT VENTRICULAR INTERNAL DIMENSION IN DIASTOLE: 7 CM (ref 3.5–6)
LEFT VENTRICULAR MASS: 175.64 G
LV LATERAL E/E' RATIO: 19.75 M/S
LV SEPTAL E/E' RATIO: 19.75 M/S
LYMPHOCYTES # BLD AUTO: 3 K/UL (ref 1–4.8)
LYMPHOCYTES NFR BLD: 42.2 % (ref 18–48)
MAGNESIUM SERPL-MCNC: 2.2 MG/DL (ref 1.6–2.6)
MCH RBC QN AUTO: 28 PG (ref 27–31)
MCHC RBC AUTO-ENTMCNC: 32 G/DL (ref 32–36)
MCV RBC AUTO: 87 FL (ref 82–98)
MONOCYTES # BLD AUTO: 0.4 K/UL (ref 0.3–1)
MONOCYTES NFR BLD: 5.1 % (ref 4–15)
MV PEAK A VEL: 0.71 M/S
MV PEAK E VEL: 0.79 M/S
MV STENOSIS PRESSURE HALF TIME: 39.3 MS
MV VALVE AREA P 1/2 METHOD: 5.6 CM2
NEUTROPHILS # BLD AUTO: 3.4 K/UL (ref 1.8–7.7)
NEUTROPHILS NFR BLD: 48.6 % (ref 38–73)
NRBC BLD-RTO: 0 /100 WBC
PISA TR MAX VEL: 3.19 M/S
PLATELET # BLD AUTO: 429 K/UL (ref 150–350)
PMV BLD AUTO: 11.1 FL (ref 9.2–12.9)
POCT GLUCOSE: 155 MG/DL (ref 70–110)
POTASSIUM SERPL-SCNC: 2.9 MMOL/L (ref 3.5–5.1)
POTASSIUM SERPL-SCNC: 4 MMOL/L (ref 3.5–5.1)
PROT SERPL-MCNC: 6.9 G/DL (ref 6–8.4)
PROTHROMBIN TIME: 10.9 SEC (ref 9–12.5)
PULM VEIN S/D RATIO: 0.71
PV PEAK D VEL: 0.34 M/S
PV PEAK S VEL: 0.24 M/S
RA MAJOR: 4.11 CM
RA PRESSURE: 3 MMHG
RA WIDTH: 4.02 CM
RBC # BLD AUTO: 4.15 M/UL (ref 4–5.4)
RIGHT VENTRICULAR END-DIASTOLIC DIMENSION: 4.15 CM
RV TISSUE DOPPLER FREE WALL SYSTOLIC VELOCITY 1 (APICAL 4 CHAMBER VIEW): 5.96 CM/S
SARS-COV-2 RDRP RESP QL NAA+PROBE: NEGATIVE
SINUS: 3.05 CM
SODIUM SERPL-SCNC: 140 MMOL/L (ref 136–145)
SODIUM SERPL-SCNC: 140 MMOL/L (ref 136–145)
STJ: 2.45 CM
TDI LATERAL: 0.04 M/S
TDI SEPTAL: 0.04 M/S
TDI: 0.04 M/S
TR MAX PG: 41 MMHG
TRICUSPID ANNULAR PLANE SYSTOLIC EXCURSION: 1.42 CM
TROPONIN I SERPL DL<=0.01 NG/ML-MCNC: 6.09 NG/ML (ref 0–0.03)
TROPONIN I SERPL DL<=0.01 NG/ML-MCNC: 6.83 NG/ML (ref 0–0.03)
TROPONIN I SERPL DL<=0.01 NG/ML-MCNC: 8.38 NG/ML (ref 0–0.03)
TV REST PULMONARY ARTERY PRESSURE: 44 MMHG
WBC # BLD AUTO: 7.02 K/UL (ref 3.9–12.7)

## 2020-08-26 PROCEDURE — 99220 PR INITIAL OBSERVATION CARE,LEVL III: ICD-10-PCS | Mod: ,,, | Performed by: HOSPITALIST

## 2020-08-26 PROCEDURE — 97530 THERAPEUTIC ACTIVITIES: CPT

## 2020-08-26 PROCEDURE — 80048 BASIC METABOLIC PNL TOTAL CA: CPT

## 2020-08-26 PROCEDURE — 25500020 PHARM REV CODE 255: Performed by: EMERGENCY MEDICINE

## 2020-08-26 PROCEDURE — 84484 ASSAY OF TROPONIN QUANT: CPT

## 2020-08-26 PROCEDURE — 97161 PT EVAL LOW COMPLEX 20 MIN: CPT

## 2020-08-26 PROCEDURE — 96366 THER/PROPH/DIAG IV INF ADDON: CPT

## 2020-08-26 PROCEDURE — 63600175 PHARM REV CODE 636 W HCPCS: Performed by: HOSPITALIST

## 2020-08-26 PROCEDURE — 25000003 PHARM REV CODE 250: Performed by: STUDENT IN AN ORGANIZED HEALTH CARE EDUCATION/TRAINING PROGRAM

## 2020-08-26 PROCEDURE — 36415 COLL VENOUS BLD VENIPUNCTURE: CPT

## 2020-08-26 PROCEDURE — 96365 THER/PROPH/DIAG IV INF INIT: CPT

## 2020-08-26 PROCEDURE — 97165 OT EVAL LOW COMPLEX 30 MIN: CPT

## 2020-08-26 PROCEDURE — 85730 THROMBOPLASTIN TIME PARTIAL: CPT

## 2020-08-26 PROCEDURE — 63600175 PHARM REV CODE 636 W HCPCS: Performed by: STUDENT IN AN ORGANIZED HEALTH CARE EDUCATION/TRAINING PROGRAM

## 2020-08-26 PROCEDURE — G0378 HOSPITAL OBSERVATION PER HR: HCPCS

## 2020-08-26 PROCEDURE — 97110 THERAPEUTIC EXERCISES: CPT

## 2020-08-26 PROCEDURE — 85610 PROTHROMBIN TIME: CPT

## 2020-08-26 PROCEDURE — 96376 TX/PRO/DX INJ SAME DRUG ADON: CPT

## 2020-08-26 PROCEDURE — 99220 PR INITIAL OBSERVATION CARE,LEVL III: ICD-10-PCS | Mod: ,,, | Performed by: PSYCHIATRY & NEUROLOGY

## 2020-08-26 PROCEDURE — 93010 EKG 12-LEAD: ICD-10-PCS | Mod: ,,, | Performed by: INTERNAL MEDICINE

## 2020-08-26 PROCEDURE — 93010 ELECTROCARDIOGRAM REPORT: CPT | Mod: ,,, | Performed by: INTERNAL MEDICINE

## 2020-08-26 PROCEDURE — 84484 ASSAY OF TROPONIN QUANT: CPT | Mod: 91

## 2020-08-26 PROCEDURE — 80053 COMPREHEN METABOLIC PANEL: CPT

## 2020-08-26 PROCEDURE — 99220 PR INITIAL OBSERVATION CARE,LEVL III: CPT | Mod: ,,, | Performed by: HOSPITALIST

## 2020-08-26 PROCEDURE — 63600175 PHARM REV CODE 636 W HCPCS: Performed by: EMERGENCY MEDICINE

## 2020-08-26 PROCEDURE — 93005 ELECTROCARDIOGRAM TRACING: CPT

## 2020-08-26 PROCEDURE — 83735 ASSAY OF MAGNESIUM: CPT

## 2020-08-26 PROCEDURE — 85025 COMPLETE CBC W/AUTO DIFF WBC: CPT

## 2020-08-26 PROCEDURE — 99220 PR INITIAL OBSERVATION CARE,LEVL III: CPT | Mod: ,,, | Performed by: PSYCHIATRY & NEUROLOGY

## 2020-08-26 PROCEDURE — 85730 THROMBOPLASTIN TIME PARTIAL: CPT | Mod: 91

## 2020-08-26 RX ORDER — FUROSEMIDE 10 MG/ML
40 INJECTION INTRAMUSCULAR; INTRAVENOUS
Status: COMPLETED | OUTPATIENT
Start: 2020-08-26 | End: 2020-08-26

## 2020-08-26 RX ORDER — IBUPROFEN 200 MG
16 TABLET ORAL
Status: DISCONTINUED | OUTPATIENT
Start: 2020-08-26 | End: 2020-08-28 | Stop reason: HOSPADM

## 2020-08-26 RX ORDER — ACETAMINOPHEN 325 MG/1
650 TABLET ORAL EVERY 8 HOURS PRN
Status: DISCONTINUED | OUTPATIENT
Start: 2020-08-26 | End: 2020-08-28 | Stop reason: HOSPADM

## 2020-08-26 RX ORDER — GLUCAGON 1 MG
1 KIT INJECTION
Status: DISCONTINUED | OUTPATIENT
Start: 2020-08-26 | End: 2020-08-28 | Stop reason: HOSPADM

## 2020-08-26 RX ORDER — TALC
6 POWDER (GRAM) TOPICAL NIGHTLY PRN
Status: DISCONTINUED | OUTPATIENT
Start: 2020-08-26 | End: 2020-08-28 | Stop reason: HOSPADM

## 2020-08-26 RX ORDER — POTASSIUM CHLORIDE 20 MEQ/1
40 TABLET, EXTENDED RELEASE ORAL ONCE
Status: COMPLETED | OUTPATIENT
Start: 2020-08-26 | End: 2020-08-26

## 2020-08-26 RX ORDER — NAPROXEN SODIUM 220 MG/1
81 TABLET, FILM COATED ORAL DAILY
Status: DISCONTINUED | OUTPATIENT
Start: 2020-08-26 | End: 2020-08-28 | Stop reason: HOSPADM

## 2020-08-26 RX ORDER — IBUPROFEN 200 MG
24 TABLET ORAL
Status: DISCONTINUED | OUTPATIENT
Start: 2020-08-26 | End: 2020-08-28 | Stop reason: HOSPADM

## 2020-08-26 RX ORDER — SODIUM CHLORIDE 0.9 % (FLUSH) 0.9 %
10 SYRINGE (ML) INJECTION
Status: DISCONTINUED | OUTPATIENT
Start: 2020-08-26 | End: 2020-08-26

## 2020-08-26 RX ORDER — HYDRALAZINE HYDROCHLORIDE 25 MG/1
25 TABLET, FILM COATED ORAL EVERY 12 HOURS
Status: DISCONTINUED | OUTPATIENT
Start: 2020-08-26 | End: 2020-08-26

## 2020-08-26 RX ORDER — SODIUM CHLORIDE 0.9 % (FLUSH) 0.9 %
10 SYRINGE (ML) INJECTION
Status: CANCELLED | OUTPATIENT
Start: 2020-08-26

## 2020-08-26 RX ORDER — CLOPIDOGREL BISULFATE 75 MG/1
75 TABLET ORAL DAILY
Qty: 30 TABLET | Refills: 0 | Status: SHIPPED | OUTPATIENT
Start: 2020-08-26 | End: 2020-10-16 | Stop reason: ALTCHOICE

## 2020-08-26 RX ORDER — POTASSIUM CHLORIDE 1.5 G/1.58G
40 POWDER, FOR SOLUTION ORAL ONCE
Status: COMPLETED | OUTPATIENT
Start: 2020-08-26 | End: 2020-08-26

## 2020-08-26 RX ORDER — CLOPIDOGREL BISULFATE 75 MG/1
75 TABLET ORAL DAILY
Status: DISCONTINUED | OUTPATIENT
Start: 2020-08-26 | End: 2020-08-28 | Stop reason: HOSPADM

## 2020-08-26 RX ORDER — FUROSEMIDE 10 MG/ML
80 INJECTION INTRAMUSCULAR; INTRAVENOUS
Status: DISCONTINUED | OUTPATIENT
Start: 2020-08-26 | End: 2020-08-27

## 2020-08-26 RX ORDER — HEPARIN SODIUM,PORCINE/D5W 25000/250
12 INTRAVENOUS SOLUTION INTRAVENOUS CONTINUOUS
Status: DISCONTINUED | OUTPATIENT
Start: 2020-08-26 | End: 2020-08-27

## 2020-08-26 RX ORDER — GLIMEPIRIDE 1 MG/1
1 TABLET ORAL DAILY
Status: DISCONTINUED | OUTPATIENT
Start: 2020-08-26 | End: 2020-08-26

## 2020-08-26 RX ORDER — LOSARTAN POTASSIUM 50 MG/1
50 TABLET ORAL DAILY
Status: DISCONTINUED | OUTPATIENT
Start: 2020-08-26 | End: 2020-08-28 | Stop reason: HOSPADM

## 2020-08-26 RX ORDER — PREGABALIN 100 MG/1
100 CAPSULE ORAL 2 TIMES DAILY PRN
COMMUNITY
End: 2022-01-10

## 2020-08-26 RX ORDER — POTASSIUM CHLORIDE 1.5 G/1.58G
40 POWDER, FOR SOLUTION ORAL ONCE
Status: DISCONTINUED | OUTPATIENT
Start: 2020-08-26 | End: 2020-08-26

## 2020-08-26 RX ORDER — METOPROLOL SUCCINATE 100 MG/1
100 TABLET, EXTENDED RELEASE ORAL DAILY
Status: DISCONTINUED | OUTPATIENT
Start: 2020-08-26 | End: 2020-08-28 | Stop reason: HOSPADM

## 2020-08-26 RX ORDER — ATORVASTATIN CALCIUM 20 MG/1
80 TABLET, FILM COATED ORAL DAILY
Status: DISCONTINUED | OUTPATIENT
Start: 2020-08-26 | End: 2020-08-28 | Stop reason: HOSPADM

## 2020-08-26 RX ORDER — HYDROCHLOROTHIAZIDE 12.5 MG/1
12.5 TABLET ORAL DAILY
Status: DISCONTINUED | OUTPATIENT
Start: 2020-08-26 | End: 2020-08-26

## 2020-08-26 RX ORDER — METFORMIN HYDROCHLORIDE 500 MG/1
500 TABLET ORAL 2 TIMES DAILY
Status: DISCONTINUED | OUTPATIENT
Start: 2020-08-26 | End: 2020-08-26

## 2020-08-26 RX ADMIN — HUMAN ALBUMIN MICROSPHERES AND PERFLUTREN 0.66 MG: 10; .22 INJECTION, SOLUTION INTRAVENOUS at 08:08

## 2020-08-26 RX ADMIN — ATORVASTATIN CALCIUM 80 MG: 20 TABLET, FILM COATED ORAL at 09:08

## 2020-08-26 RX ADMIN — POTASSIUM CHLORIDE 40 MEQ: 1500 TABLET, EXTENDED RELEASE ORAL at 02:08

## 2020-08-26 RX ADMIN — CLOPIDOGREL 75 MG: 75 TABLET, FILM COATED ORAL at 09:08

## 2020-08-26 RX ADMIN — IOHEXOL 75 ML: 350 INJECTION, SOLUTION INTRAVENOUS at 12:08

## 2020-08-26 RX ADMIN — FUROSEMIDE 40 MG: 10 INJECTION, SOLUTION INTRAMUSCULAR; INTRAVENOUS at 01:08

## 2020-08-26 RX ADMIN — HYDROCHLOROTHIAZIDE 12.5 MG: 12.5 TABLET ORAL at 09:08

## 2020-08-26 RX ADMIN — POTASSIUM CHLORIDE 40 MEQ: 1.5 POWDER, FOR SOLUTION ORAL at 12:08

## 2020-08-26 RX ADMIN — FUROSEMIDE 80 MG: 10 INJECTION, SOLUTION INTRAMUSCULAR; INTRAVENOUS at 05:08

## 2020-08-26 RX ADMIN — FUROSEMIDE 80 MG: 10 INJECTION, SOLUTION INTRAMUSCULAR; INTRAVENOUS at 06:08

## 2020-08-26 RX ADMIN — POTASSIUM CHLORIDE 40 MEQ: 1500 TABLET, EXTENDED RELEASE ORAL at 01:08

## 2020-08-26 RX ADMIN — HEPARIN SODIUM AND DEXTROSE 14 UNITS/KG/HR: 10000; 5 INJECTION INTRAVENOUS at 06:08

## 2020-08-26 RX ADMIN — HYDRALAZINE HYDROCHLORIDE 25 MG: 25 TABLET, FILM COATED ORAL at 09:08

## 2020-08-26 RX ADMIN — METOPROLOL SUCCINATE 100 MG: 100 TABLET, EXTENDED RELEASE ORAL at 09:08

## 2020-08-26 RX ADMIN — HEPARIN SODIUM AND DEXTROSE 12 UNITS/KG/HR: 10000; 5 INJECTION INTRAVENOUS at 05:08

## 2020-08-26 RX ADMIN — ASPIRIN 81 MG CHEWABLE TABLET 81 MG: 81 TABLET CHEWABLE at 09:08

## 2020-08-26 RX ADMIN — HEPARIN SODIUM AND DEXTROSE 14 UNITS/KG/HR: 10000; 5 INJECTION INTRAVENOUS at 10:08

## 2020-08-26 RX ADMIN — LOSARTAN POTASSIUM 50 MG: 50 TABLET, FILM COATED ORAL at 02:08

## 2020-08-26 NOTE — CONSULTS
[facility]  Vascular Neurology  Comprehensive Stroke Center  Consult Note    Inpatient consult to Vascular (Stroke) Neurology  Consult performed by: Faustino Vides MD  Consult ordered by: Marisol Balderas MD    Inpatient consult to Vascular (Stroke) Neurology  Consult performed by: Faustino Vides MD  Consult ordered by: Steve Hamilton MD        Assessment/Plan:     Patient is a 59 y.o. year old female with:    * SOB (shortness of breath)  Patient is a 59yoF with CHF (EF 15%), anxiety, HTN, HLD, schizophrenia and a recent R MCA stroke (s/p tPA on 8/14, w/o residual sx)  [place] [facility] [place] onset of SOB with transient chest pain and intermittent bifrontal 8/10 HA. Given her recent stroke s/p tPA and current DAPT regimen, vascular neurology was consulted for recommendations regarding possible AC    See Embolic stroke R MCA for full recommendations        Embolic stroke involving right middle cerebral artery  Patient is a 59yoF with CHF (EF 15%), anxiety, HTN, HLD, schizophrenia and a recent R MCA stroke (s/p tPA on 8/14, w/o residual sx) who [place] [facility] [place]B, transient (resolved) chest pain, and intermittent frontal HA. Upon examination by neurology, all sx had resolved. She was noted to have fluid overload with a troponemia (8) and elevated BNP in ED. Vascular neurology consulted for recommendations of possible heparin initiation with concurrent DAPT given patient's current clinical picture.    Per cardiology, patient would benefit from AC currently. From a vascular neurology perspective, the patient is 12 days removed from a R MCA embolic stroke and has since been on DAPT without any clinical changes or residual symptoms. She may warrant a CTH in the future to ensure no change in any intracranial process or should she develop any new mental status change or focal deficit.     Antithrombotics for secondary stroke prevention: Continue DAPT (home regimen); Appropriate to  initiate heparin drip per Cardiology recommendations as cardiologic benefits outweigh the risks. Patient is already 12 days removed from embolic R MCA infarct, on DAPT, and without any significant change in neuro exam to this point. Can consider CTH in future.    Statins for secondary stroke prevention and hyperlipidemia, if present:   Statins: Atorvastatin- 40 mg daily    Aggressive risk factor modification: HTN, DM, HLD, Diet, Exercise, CAD     Rehab efforts: The patient has been evaluated by a stroke team provider and the therapy needs have been fully considered based off the presenting complaints and exam findings. The following therapy evaluations are needed: None    Diagnostics ordered/pending: Other: Could consider CTH in future    VTE prophylaxis: None -- given possible initiation of heparin    BP parameters: per cardiology recs        Elevated troponin  See SOB    Dilated cardiomyopathy  See SOB    Coronary artery disease involving native heart  See SOB    Mixed hyperlipidemia  Stroke RF  Continue home statin  Management per primary team    Essential hypertension  Stroke RF  Normotensive goals  Management per primary team    Type 2 diabetes mellitus without complication, without long-term current use of insulin  Stroke RF  Management per primary team        STROKE DOCUMENTATION     Acute Stroke Times   Symptom Onset Date: 08/25/20  Symptom Onset Time: 1400  Stroke Team Called Date: 08/26/20  Stroke Team Called Time: 0200  Stroke Team Arrival Date: 08/26/20  Stroke Team Arrival Time: 0225    NIH Scale:  1a. Level of Consciousness: 0-->Alert, keenly responsive  1b. LOC Questions: 0-->Answers both questions correctly  1c. LOC Commands: 0-->Performs both tasks correctly  2. Best Gaze: 0-->Normal  3. Visual: 0-->No visual loss  4. Facial Palsy: 0-->Normal symmetrical movements  5a. Motor Arm, Left: 0-->No drift, limb holds 90 (or 45) degrees for full 10 secs  5b. Motor Arm, Right: 0-->No drift, limb holds 90 (or  45) degrees for full 10 secs  6a. Motor Leg, Left: 0-->No drift, leg holds 30 degree position for full 5 secs  6b. Motor Leg, Right: 0-->No drift, leg holds 30 degree position for full 5 secs  7. Limb Ataxia: 0-->Absent  8. Sensory: 0-->Normal, no sensory loss  9. Best Language: 0-->No aphasia, normal  10. Dysarthria: 0-->Normal  11. Extinction and Inattention (formerly Neglect): 0-->No abnormality  Total (NIH Stroke Scale): 0    Modified [place] Score: 0  [place] Coma Scale:    ABCD2 Score:    [id number] Score:   HAS -BLED Score:   ICH Score:   Hunt & Vora Classification:       Thrombolysis Candidate? No    Delays to Thrombolysis?  No    Interventional Revascularization Candidate?   Is the patient eligible for mechanical endovascular reperfusion (RENE)?  No; No large vessel occlusion      Hemorrhagic change of an Ischemic Stroke: Does this patient have an ischemic stroke with hemorrhagic changes? No     Subjective:     History of Present Illness:  Ms Mohr is a 59yoF with CHF (EF 15%), anxiety, HTN, HLD, schizophrenia and a recent R MCA stroke (s/p tPA on 8/14, w/o residual sx) who [place] to [facility] [place] on [place]26 following SOB with transient chest pain and intermittent 8/10 bifrontal HA starting at 2PM earlier that day. She does, however, note some progressive nature of her SOB over the past few days with orthopnea as well. She denies any fever, chills, cough, n/v, abdominal pain, palpitations, LE edema, visual changes, weakness, numbness, aphasia, dysarthria, AMS or other focal finding. She also reports compliance to her medications. Upon arrival to [facility], the patient revealed no imaging findings consistent with pulmonary embolus but was noted to have a troponemia (8) with an elevated BNP which prompted a cardiologic evaluation. Upon examination by vascular neurology, patient's HA, chest pain and SOB had resolved and she was at baseline but appeared fluid overloaded. Vascular Neurology consulted for recommendations  regarding possible heparin use for cardiologic purposes                 Past Medical History:   Diagnosis Date    Anxiety     CHF (congestive heart failure)     Depression     Diabetes mellitus     Dyspnea     H/O coronary angiogram     H/O: hysterectomy     Hyperlipemia     Hypertension     Pulmonary edema     Schizophrenia      Past Surgical History:   Procedure Laterality Date    BLADDER SUSPENSION      CARPAL TUNNEL RELEASE Right     HEEL SPUR SURGERY Left     LEFT HEART CATHETERIZATION Bilateral 12/27/2019    Procedure: Left heart cath;  Surgeon: Steve Chambers MD;  Location: [facility] CATH LAB;  Service: Cardiology;  Laterality: Bilateral;    TUBAL LIGATION       Family History   Family history unknown: Yes     Social History     Tobacco Use    Smoking status: Current Every Day Smoker     Types: Cigarettes    Smokeless tobacco: Never Used   Substance Use Topics    Alcohol use: No     Alcohol/week: 0.0 standard drinks    Drug use: No     Review of patient's allergies indicates:   Allergen Reactions    Captopril Other (See Comments)     COUGH       Medications: I have reviewed the current medication administration record.    (Not in a hospital admission)      Review of Systems   Constitutional: Negative for chills, diaphoresis and fever.   HENT: Negative for rhinorrhea, sneezing, sore throat and trouble swallowing.    Eyes: Negative for pain, redness and visual disturbance.   Respiratory: Positive for shortness of breath. Negative for choking.    Cardiovascular: Positive for chest pain and leg swelling. Negative for palpitations.   Gastrointestinal: Negative for abdominal distention, abdominal pain, nausea and vomiting.   Genitourinary: Negative for dysuria and flank pain.   Musculoskeletal: Negative for neck pain and neck stiffness.   Skin: Negative for pallor and rash.   Neurological: Positive for headaches (bifrontal, 8/10, intermittent). Negative for tremors, seizures, syncope, facial  asymmetry, speech difficulty, weakness, light-headedness and numbness.   Psychiatric/Behavioral: Negative for agitation, confusion and decreased concentration.     Objective:     Vital Signs (Most Recent):  Temp: 97.9 °F (36.6 °C) (08/25/20 2010)  Pulse: 86 (08/26/20 0250)  Resp: 20 (08/26/20 0002)  BP: 120/79 (08/26/20 0251)  SpO2: 100 % (08/26/20 0250)    Vital Signs Range (Last 24H):  Temp:  [97.9 °F (36.6 °C)]   Pulse:  []   Resp:  [16-20]   BP: (120-142)/(75-86)   SpO2:  [98 %-100 %]     Physical Exam  Constitutional:       General: She is not in acute distress.     Appearance: Normal appearance.   HENT:      Head: Normocephalic and atraumatic.      Mouth/Throat:      Mouth: Mucous membranes are moist.   Eyes:      General: No scleral icterus.     Extraocular Movements: Extraocular movements intact.      Conjunctiva/sclera: Conjunctivae normal.      Pupils: Pupils are equal, round, and reactive to light.   Neck:      Musculoskeletal: Normal range of motion. No neck rigidity.   Cardiovascular:      Rate and Rhythm: Normal rate.      Pulses: Normal pulses.      Heart sounds: No murmur. No gallop.    Pulmonary:      Effort: Pulmonary effort is normal. No respiratory distress.      Breath sounds: No wheezing or rales.   Abdominal:      General: Abdomen is flat. There is no distension.      Tenderness: There is no abdominal tenderness. There is no guarding.   Musculoskeletal: Normal range of motion.         General: No swelling or tenderness.   Skin:     General: Skin is warm.      Coloration: Skin is not jaundiced.      Findings: No rash.   Neurological:      General: No focal deficit present.      Mental Status: She is alert and oriented to person, place, and time. Mental status is at baseline.      Cranial Nerves: No cranial nerve deficit.      Sensory: No sensory deficit.      Motor: No weakness.      Coordination: Coordination normal.      Gait: Gait normal.      Deep Tendon Reflexes: Reflexes normal.    Psychiatric:         Mood and Affect: Mood normal.         Behavior: Behavior normal.         Thought Content: Thought content normal.         Judgment: Judgment normal.         Neurological Exam:   LOC: alert  Attention Span: Good   Language: No aphasia  Articulation: No dysarthria  Orientation: Person, Place, Time   Visual Fields: Full  EOM (CN III, IV, VI): Full/intact  Pupils (CN II, III): PERRL  Facial Sensation (CN V): Normal  Facial Movement (CN VII): Symmetric facial expression    Motor: Arm left  Normal 5/5  Leg left  Normal 5/5  Arm right  Normal 5/5  Leg right Normal 5/5  Cebellar: No evidence of appendicular or axial ataxia  Sensation: Intact to light touch, temperature and vibration      Laboratory:  CMP:   Recent Labs   Lab 08/25/20 2127   CALCIUM 9.3   ALBUMIN 3.7   PROT 7.3      K 3.2*   CO2 27      BUN 16   CREATININE 0.8   ALKPHOS 143*   ALT 50*   AST 50*   BILITOT 0.5     CBC:   Recent Labs   Lab 08/25/20 2127   WBC 8.22   RBC 4.28   HGB 12.0   HCT 37.7   *   MCV 88   MCH 28.0   MCHC 31.8*     Lipid Panel: No results for input(s): CHOL, LDLCALC, HDL, TRIG in the last 168 hours.  Hgb A1C: No results for input(s): HGBA1C in the last 168 hours.  TSH: No results for input(s): TSH in the last 168 hours.    Diagnostic Results:      Brain imaging:  MRI/MRA 8/14: Moderate-sized right frontal lobe acute infarct centered within the insular region without associated hemorrhagic component.  Minimal edema and regional mass effect without significant midline shift or herniation. Remote microhemorrhage of the right cerebellar hemisphere. MRA demonstrates major branch vessel occlusion of the proximal anterior superior M2 branch of right MCA            Cardiac Evaluation:   8/14 TTE:   · Severe left ventricular enlargement.  · Severely decreased left ventricular systolic function. The estimated ejection fraction is 15%.  · Moderate right ventricular enlargement.  · Mildly to moderately  reduced right ventricular systolic function.  · Grade I (mild) left ventricular diastolic dysfunction consistent with impaired relaxation.  · Trivial pericardial effusion.  · Normal central venous pressure (3 mmHg).      Faustino Vides MD  Comprehensive Stroke Center  Department of Vascular Neurology   [facility]

## 2020-08-26 NOTE — ED NOTES
"C/C: SOB on exertion x 2 weeks worsening since 12 hours ago, Headache "all day"   APPEARANCE: awake and alert in NAD.  SKIN: warm, dry and intact. No breakdown or bruising.  MUSCULOSKELETAL: Patient moving all extremities spontaneously, no obvious swelling or deformities noted. Ambulates independently.  RESPIRATORY: .Respirations unlabored 100% RA . Denies fevers  CARDIAC: Denies CP, 2+ distal pulses; no peripheral edema  ABDOMEN: S/ND/NT, Denies nausea  : voids spontaneously, denies difficulty  Neurologic: AAO x 4; follows commands equal strength in all extremities; denies numbness/tingling. Denies dizziness Denies weakness, denies blurred vision  Past Medical History:   Diagnosis Date    Anxiety     CHF (congestive heart failure)     Depression     Diabetes mellitus     Dyspnea     H/O coronary angiogram     H/O: hysterectomy     Hyperlipemia     Hypertension     Pulmonary edema     Schizophrenia        Past Surgical History:   Procedure Laterality Date    BLADDER SUSPENSION      CARPAL TUNNEL RELEASE Right     HEEL SPUR SURGERY Left     LEFT HEART CATHETERIZATION Bilateral 12/27/2019    Procedure: Left heart cath;  Surgeon: Steve Chambers MD;  Location: [facility] CATH LAB;  Service: Cardiology;  Laterality: Bilateral;    TUBAL LIGATION         Family History   Family history unknown: Yes       Social History     Socioeconomic History    Marital status:      Spouse name: Not on file    Number of children: Not on file    Years of education: Not on file    Highest education level: Not on file   Occupational History    Not on file   Social Needs    Financial resource strain: Not on file    Food insecurity     Worry: Not on file     Inability: Not on file    Transportation needs     Medical: Not on file     Non-medical: Not on file   Tobacco Use    Smoking status: Current Every Day Smoker     Types: Cigarettes    Smokeless tobacco: Never Used   Substance and Sexual Activity    " Alcohol use: No     Alcohol/week: 0.0 standard drinks    Drug use: No    Sexual activity: Not on file   Lifestyle    Physical activity     Days per week: Not on file     Minutes per session: Not on file    Stress: Not on file   Relationships    Social connections     Talks on phone: Not on file     Gets together: Not on file     Attends Restorationist service: Not on file     Active member of club or organization: Not on file     Attends meetings of clubs or organizations: Not on file     Relationship status: Not on file   Other Topics Concern    Not on file   Social History Narrative    Not on file       No current facility-administered medications for this encounter.      Current Outpatient Medications   Medication Sig Dispense Refill    atorvastatin (LIPITOR) 80 MG tablet Take 1 tablet (80 mg total) by mouth once daily. 90 tablet 3    furosemide (LASIX) 40 MG tablet Take 2 tablets (80 mg total) by mouth once daily AND 1 tablet (40 mg total) every evening. TAKE EXTRA 40 MG IF GAINING 2 OR MORE POUNDS IN 2 DAYS.. 90 tablet 2    aspirin 81 MG Chew Take 1 tablet (81 mg total) by mouth once daily. 90 tablet 0    benzonatate (TESSALON) 100 MG capsule Take 1 capsule (100 mg total) by mouth 3 (three) times daily as needed for Cough. (Patient not taking: Reported on 8/17/2020) 20 capsule 0    clopidogreL (PLAVIX) 75 mg tablet Take 1 tablet (75 mg total) by mouth once daily. 30 tablet 0    duloxetine (CYMBALTA) 60 MG capsule Take 60 mg by mouth once daily.      gabapentin (NEURONTIN) 100 MG capsule Begin with 1 by mouth at bedtime daily for 7 days.  Then increase to one by mouth twice a day for 7 days and then one by mouth 3 times a day thereafter. (Patient not taking: Reported on 8/17/2020) 90 capsule 11    glimepiride (AMARYL) 1 MG tablet Take 1 tablet by mouth once daily.      hydrALAZINE (APRESOLINE) 25 MG tablet Take 1 tablet (25 mg total) by mouth every 12 (twelve) hours. 60 tablet 2     hydroCHLOROthiazide (HYDRODIURIL) 12.5 MG Tab Take 12.5 mg by mouth once daily.      ISOSORBIDE ORAL Take by mouth.      metformin (GLUCOPHAGE) 500 MG tablet Take 1 tablet by mouth 2 (two) times daily.      metoprolol succinate (TOPROL-XL) 100 MG 24 hr tablet Take 1 tablet by mouth once daily.      spironolactone (ALDACTONE) 25 MG tablet Take 1 tablet (25 mg total) by mouth once daily. 30 tablet 2       Review of patient's allergies indicates:   Allergen Reactions    Captopril Other (See Comments)     COUGH

## 2020-08-26 NOTE — PLAN OF CARE
08/26/20 1046   Discharge Assessment   Assessment Type Discharge Planning Assessment   Confirmed/corrected address and phone number on facesheet? Yes   Assessment information obtained from? Patient   Expected Length of Stay (days) 3   Communicated expected length of stay with patient/caregiver yes   Prior to hospitilization cognitive status: Alert/Oriented   Prior to hospitalization functional status: Independent   Current cognitive status: Alert/Oriented   Current Functional Status: Independent   Facility Arrived From: home   Lives With spouse   Able to Return to Prior Arrangements yes   Is patient able to care for self after discharge? Yes   Who are your caregiver(s) and their phone number(s)? Maged Mohr [phone]   Patient's perception of discharge disposition home health   Patient currently receives any other outside agency services? Yes   Name and contact number of agency or person providing outside services [facility], just saw once   Is it the patient/care giver preference to resume care with the current outside agency? Yes   Equipment Currently Used at Home glucometer;nebulizer;walker, rolling   Is the patient taking medications as prescribed? yes   Does the patient have transportation home? Yes   Transportation Anticipated family or friend will provide   Discharge Plan A Home with family;Home Health   Discharge Plan B Home with family   DME Needed Upon Discharge  none   Patient/Family in Agreement with Plan yes     CM to bedside for d/c planning.  at bedside. Pt admitted for progressively worsening SOB with transient chest pain and headache.  Anticipate home with HH or no needs.       PCP is Fernando Manzo MD  [phone] (p)  [phone] (f)    Extended Emergency Contact Information  Primary Emergency Contact: Yael Mohr  [place] Phone: [phone]  Relation: Daughter  Secondary Emergency Contact: Maged Mohr  Address: [address] [place] [address] [id number] [place] [address]  [facility] Phone: [phone]  Relation: Spouse      [id number] - [place], [place] - [address]  [address]  [place] [address]  Phone: [phone] Fax: [phone]    Payor: [facility] BLUE [facility] / Plan: BCBS ALL OUT OF STATE / Product Type: PPO /     Julie Haase RN  Case Management [phone]

## 2020-08-26 NOTE — HPI
Ms Mohr is a 59yoF with CHF (EF 15%), anxiety, HTN, HLD, schizophrenia and a recent R MCA stroke (s/p tPA on 8/14, w/o residual sx) who [place] [facility] [place] following SOB with transient chest pain and intermittent 8/10 bifrontal HA starting at 2PM earlier that day. She does, however, note some progressive nature of her SOB over the past few days with orthopnea as well. She denies any fever, chills, cough, n/v, abdominal pain, palpitations, LE edema, visual changes, weakness, numbness, aphasia, dysarthria, AMS or other focal finding. She also reports compliance to her medications. Upon arrival [place] [facility], the patient revealed no imaging findings consistent with pulmonary embolus but was noted to have a troponemia (8) with an elevated BNP which prompted a cardiologic evaluation. Upon examination by vascular neurology, patient's HA, chest pain and SOB had resolved and she was at baseline but appeared fluid overloaded. Vascular Neurology consulted for recommendations regarding possible heparin use for cardiologic purposes

## 2020-08-26 NOTE — SUBJECTIVE & OBJECTIVE
Past Medical History:   Diagnosis Date    Anxiety     CHF (congestive heart failure)     Depression     Diabetes mellitus     Dyspnea     H/O coronary angiogram     H/O: hysterectomy     Hyperlipemia     Hypertension     Pulmonary edema     Schizophrenia      Past Surgical History:   Procedure Laterality Date    BLADDER SUSPENSION      CARPAL TUNNEL RELEASE Right     HEEL SPUR SURGERY Left     LEFT HEART CATHETERIZATION Bilateral 12/27/2019    Procedure: Left heart cath;  Surgeon: Steve Chambers MD;  Location: [facility] CATH LAB;  Service: Cardiology;  Laterality: Bilateral;    TUBAL LIGATION       Family History   Family history unknown: Yes     Social History     Tobacco Use    Smoking status: Current Every Day Smoker     Types: Cigarettes    Smokeless tobacco: Never Used   Substance Use Topics    Alcohol use: No     Alcohol/week: 0.0 standard drinks    Drug use: No     Review of patient's allergies indicates:   Allergen Reactions    Captopril Other (See Comments)     COUGH       Medications: I have reviewed the current medication administration record.    (Not in a hospital admission)      Review of Systems   Constitutional: Negative for chills, diaphoresis and fever.   HENT: Negative for rhinorrhea, sneezing, sore throat and trouble swallowing.    Eyes: Negative for pain, redness and visual disturbance.   Respiratory: Positive for shortness of breath. Negative for choking.    Cardiovascular: Positive for chest pain and leg swelling. Negative for palpitations.   Gastrointestinal: Negative for abdominal distention, abdominal pain, nausea and vomiting.   Genitourinary: Negative for dysuria and flank pain.   Musculoskeletal: Negative for neck pain and neck stiffness.   Skin: Negative for pallor and rash.   Neurological: Positive for headaches (bifrontal, 8/10, intermittent). Negative for tremors, seizures, syncope, facial asymmetry, speech difficulty, weakness, light-headedness and  numbness.   Psychiatric/Behavioral: Negative for agitation, confusion and decreased concentration.     Objective:     Vital Signs (Most Recent):  Temp: 97.9 °F (36.6 °C) (08/25/20 2010)  Pulse: 86 (08/26/20 0250)  Resp: 20 (08/26/20 0002)  BP: 120/79 (08/26/20 0251)  SpO2: 100 % (08/26/20 0250)    Vital Signs Range (Last 24H):  Temp:  [97.9 °F (36.6 °C)]   Pulse:  []   Resp:  [16-20]   BP: (120-142)/(75-86)   SpO2:  [98 %-100 %]     Physical Exam  Constitutional:       General: She is not in acute distress.     Appearance: Normal appearance.   HENT:      Head: Normocephalic and atraumatic.      Mouth/Throat:      Mouth: Mucous membranes are moist.   Eyes:      General: No scleral icterus.     Extraocular Movements: Extraocular movements intact.      Conjunctiva/sclera: Conjunctivae normal.      Pupils: Pupils are equal, round, and reactive to light.   Neck:      Musculoskeletal: Normal range of motion. No neck rigidity.   Cardiovascular:      Rate and Rhythm: Normal rate.      Pulses: Normal pulses.      Heart sounds: No murmur. No gallop.    Pulmonary:      Effort: Pulmonary effort is normal. No respiratory distress.      Breath sounds: No wheezing or rales.   Abdominal:      General: Abdomen is flat. There is no distension.      Tenderness: There is no abdominal tenderness. There is no guarding.   Musculoskeletal: Normal range of motion.         General: No swelling or tenderness.   Skin:     General: Skin is warm.      Coloration: Skin is not jaundiced.      Findings: No rash.   Neurological:      General: No focal deficit present.      Mental Status: She is alert and oriented to person, place, and time. Mental status is at baseline.      Cranial Nerves: No cranial nerve deficit.      Sensory: No sensory deficit.      Motor: No weakness.      Coordination: Coordination normal.      Gait: Gait normal.      Deep Tendon Reflexes: Reflexes normal.   Psychiatric:         Mood and Affect: Mood normal.          Behavior: Behavior normal.         Thought Content: Thought content normal.         Judgment: Judgment normal.         Neurological Exam:   LOC: alert  Attention Span: Good   Language: No aphasia  Articulation: No dysarthria  Orientation: Person, Place, Time   Visual Fields: Full  EOM (CN III, IV, VI): Full/intact  Pupils (CN II, III): PERRL  Facial Sensation (CN V): Normal  Facial Movement (CN VII): Symmetric facial expression    Motor: Arm left  Normal 5/5  Leg left  Normal 5/5  Arm right  Normal 5/5  Leg right Normal 5/5  Cebellar: No evidence of appendicular or axial ataxia  Sensation: Intact to light touch, temperature and vibration      Laboratory:  CMP:   Recent Labs   Lab 08/25/20 2127   CALCIUM 9.3   ALBUMIN 3.7   PROT 7.3      K 3.2*   CO2 27      BUN 16   CREATININE 0.8   ALKPHOS 143*   ALT 50*   AST 50*   BILITOT 0.5     CBC:   Recent Labs   Lab 08/25/20 2127   WBC 8.22   RBC 4.28   HGB 12.0   HCT 37.7   *   MCV 88   MCH 28.0   MCHC 31.8*     Lipid Panel: No results for input(s): CHOL, LDLCALC, HDL, TRIG in the last 168 hours.  Hgb A1C: No results for input(s): HGBA1C in the last 168 hours.  TSH: No results for input(s): TSH in the last 168 hours.    Diagnostic Results:      Brain imaging:  MRI/MRA 8/14: Moderate-sized right frontal lobe acute infarct centered within the insular region without associated hemorrhagic component.  Minimal edema and regional mass effect without significant midline shift or herniation. Remote microhemorrhage of the right cerebellar hemisphere. MRA demonstrates major branch vessel occlusion of the proximal anterior superior M2 branch of right MCA            Cardiac Evaluation:   8/14 TTE:   · Severe left ventricular enlargement.  · Severely decreased left ventricular systolic function. The estimated ejection fraction is 15%.  · Moderate right ventricular enlargement.  · Mildly to moderately reduced right ventricular systolic function.  · Grade I (mild)  left ventricular diastolic dysfunction consistent with impaired relaxation.  · Trivial pericardial effusion.  · Normal central venous pressure (3 mmHg).

## 2020-08-26 NOTE — PROGRESS NOTES
[facility]  Transition of Care Home Visit    Visit Date: 8/26/2020  Encounter Provider: Reema Koroma NP  PCP:  Fernando Manzo MD    PRESENTING HISTORY      Patient ID: Diomedes Mohr is a 59 y.o. female.    Consult Requested By:  Reema Koroma  Reason for Consult:  Hospital Follow Up    Diomedes is being seen at home due to physical limitations    Chief Complaint: Transitional Care, Diabetes, and Extremity Weakness      History of Present Illness: Ms. Diomedes Mohr is a 59 y.o. female who was recently admitted to the hospital.    Admit: 8/16/2002   Discharge: 8/18/2020  History of Present Illness: Ms. Mohr is a 59 yr old female with medical history of DM, HTN, HLD, CHF, Smoking presenting to the ER with acute onset left lower facial droop and dysarthria.      LKN: Morning of arrival - 7:30 AM   CT head: Negative for acute infarct   NIHSS on exam: 2   MRI ischemic intervention protocol: right insular and inferior frontal infarct + right M2 superior division occlusion  t-PA: Administered post MRI; risks, benefits, inclusion and exclusion criteria reviewed prior to administration.   IR intervention: None - imaging clinical match; no occlusion in divisions of motor cortex      Stroke etiology: ESUS  Admitted to [facility] post tPA monitoring. No complications. DAPT x 30 days then monotherapy + statin. Follow up in stroke clinic in 4-6 weeks. Discharge with outpatient therapy.      Hypokalemic on admission. On home lasix daily. Replaced while inpatient. Educated patient to f/u with PCP/cardiologist.      Hospital Course (synopsis of major diagnoses, care, treatment, and services provided during the course of the hospital stay): No notes on file     Stroke Etiology: Undetermined Cause. 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 Level of Consciousness: 0-->Alert, keenly responsive  1b. LOC Questions: 0-->Answers both questions correctly  1c. LOC Commands: 0-->Performs both tasks correctly  2. Best Gaze: 0-->Normal  3. Visual: 0-->No visual loss  4. Facial Palsy: 1-->Minor paralysis (flattened nasolabial fold, asymmetry on smiling)  5a. Motor Arm, Left: 0-->No drift, limb holds 90 (or 45) degrees for full 10 secs  5b. Motor Arm, Right: 0-->No drift, limb holds 90 (or 45) degrees for full 10 secs  6a. Motor Leg, Left: 0-->No drift, leg holds 30 degree position for full 5 secs  6b. Motor Leg, Right: 0-->No drift, leg holds 30 degree position for full 5 secs  7. Limb Ataxia: 0-->Absent  8. Sensory: 0-->Normal, no sensory loss  9. Best Language: 0-->No aphasia, normal  10. Dysarthria: 1-->Mild-to-moderate dysarthria, patient slurs at least some words and, at worst, can be understood with some difficulty  11. Extinction and Inattention (formerly Neglect): 0-->No abnormality  Total (NIH Stroke Scale): 2           Modified Becky Score: 1  Beaver Coma Scale:    ABCD2 Score:    [id number] Score:   HAS -BLED Score:   ICH Score:   Hunt & Vora Classification:         Assessment/Plan:      Diagnostic Results:     Brain Imaging   Moderate-sized right frontal lobe acute infarct centered within the insular region without associated hemorrhagic component.  Minimal edema and regional mass effect without significant midline shift or herniation.     Vessel Imaging   CTA   Right frontal lobe moderate-sized acute infarct centered within the insular region, without associated hemorrhagic component, not significantly changed from  imaging studies earlier same day.     CTA demonstrates major branch vessel occlusion of the proximal anterior superior M2 branch of right MCA.     Cardiac Imaging   · Severe left ventricular enlargement.  · Severely decreased left ventricular systolic function. The estimated ejection fraction is 15%.  · Moderate right ventricular enlargement.  · Mildly to moderately reduced right ventricular systolic function.  · Grade I (mild) left ventricular diastolic dysfunction consistent with impaired relaxation.  · Trivial pericardial effusion.  · Normal central venous pressure (3 mmHg).    ___________________________________________________________________    Today:    HPI:  Today, pt is found in her home with her  present for this hospital follow up. See hospital course.    Pt reports she is feeling today. Most of her symptoms related to her stroke have resolved. Speech and swallow improved, some weakness to left extremities remains, she has not begun therapy yet.    She is taking all her medications as prescribed, denies any pain, SOB, edema, falls    Review of Systems   Constitutional: Negative for activity change, fatigue and fever.   HENT: Negative.    Eyes: Negative.    Respiratory: Negative for chest tightness.    Cardiovascular: Negative.  Negative for leg swelling.   Gastrointestinal: Negative.    Endocrine: Negative.    Genitourinary: Negative.    Musculoskeletal: Negative.    Skin: Negative.    Allergic/Immunologic: Negative.    Neurological: Negative.    Hematological: Negative.    Psychiatric/Behavioral: Negative.  Negative for agitation.   All other systems reviewed and are negative.      Assessments:  · Environmental: clean, adequate temp  · Functional Status: independent  · Safety: fall risk  · Nutritional: adequate  · Home Health/DME/Supplies: none/walker    PAST HISTORY:     Past Medical History:   Diagnosis Date    Anxiety     CHF (congestive heart failure)     Depression     Diabetes mellitus      Dyspnea     H/O coronary angiogram     H/O: hysterectomy     Hyperlipemia     Hypertension     Pulmonary edema     Schizophrenia        Past Surgical History:   Procedure Laterality Date    BLADDER SUSPENSION      CARPAL TUNNEL RELEASE Right     HEEL SPUR SURGERY Left     LEFT HEART CATHETERIZATION Bilateral 12/27/2019    Procedure: Left heart cath;  Surgeon: Steve Chambers MD;  Location: [facility] CATH LAB;  Service: Cardiology;  Laterality: Bilateral;    TUBAL LIGATION         Family History   Family history unknown: Yes       Social History     Socioeconomic History    Marital status:      Spouse name: Not on file    Number of children: Not on file    Years of education: Not on file    Highest education level: Not on file   Occupational History    Not on file   Social Needs    Financial resource strain: Not on file    Food insecurity     Worry: Not on file     Inability: Not on file    Transportation needs     Medical: Not on file     Non-medical: Not on file   Tobacco Use    Smoking status: Current Every Day Smoker     Types: Cigarettes    Smokeless tobacco: Never Used   Substance and Sexual Activity    Alcohol use: No     Alcohol/week: 0.0 standard drinks    Drug use: No    Sexual activity: Not on file   Lifestyle    Physical activity     Days per week: Not on file     Minutes per session: Not on file    Stress: Not on file   Relationships    Social connections     Talks on phone: Not on file     Gets together: Not on file     Attends Rastafari service: Not on file     Active member of club or organization: Not on file     Attends meetings of clubs or organizations: Not on file     Relationship status: Not on file   Other Topics Concern    Not on file   Social History Narrative    Not on file       MEDICATIONS & ALLERGIES:     No current facility-administered medications on file prior to visit.      Current Outpatient Medications on File Prior to Visit   Medication Sig  Dispense Refill    aspirin 81 MG Chew Take 1 tablet (81 mg total) by mouth once daily. 90 tablet 0    atorvastatin (LIPITOR) 80 MG tablet Take 1 tablet (80 mg total) by mouth once daily. 90 tablet 3    furosemide (LASIX) 40 MG tablet Take 2 tablets (80 mg total) by mouth once daily AND 1 tablet (40 mg total) every evening. TAKE EXTRA 40 MG IF GAINING 2 OR MORE POUNDS IN 2 DAYS.. (Patient taking differently: Take 1 tablet (40 mg total) by mouth once daily AND 1 tablet (40 mg total) every evening. TAKE EXTRA 40 MG IF GAINING 2 OR MORE POUNDS IN 2 DAYS..) 90 tablet 2    glimepiride (AMARYL) 1 MG tablet Take 1 tablet by mouth once daily.      hydroCHLOROthiazide (HYDRODIURIL) 12.5 MG Tab Take 12.5 mg by mouth once daily.      metFORMIN (GLUCOPHAGE) 1000 MG tablet Take 1,000 mg by mouth 2 (two) times daily.       [DISCONTINUED] benzonatate (TESSALON) 100 MG capsule Take 1 capsule (100 mg total) by mouth 3 (three) times daily as needed for Cough. (Patient not taking: Reported on 8/17/2020) 20 capsule 0    [DISCONTINUED] clopidogreL (PLAVIX) 75 mg tablet Take 1 tablet (75 mg total) by mouth once daily. 30 tablet 0    [DISCONTINUED] duloxetine (CYMBALTA) 60 MG capsule Take 60 mg by mouth once daily.      [DISCONTINUED] gabapentin (NEURONTIN) 100 MG capsule Begin with 1 by mouth at bedtime daily for 7 days.  Then increase to one by mouth twice a day for 7 days and then one by mouth 3 times a day thereafter. (Patient not taking: Reported on 8/17/2020) 90 capsule 11    [DISCONTINUED] hydrALAZINE (APRESOLINE) 25 MG tablet Take 1 tablet (25 mg total) by mouth every 12 (twelve) hours. 60 tablet 2    [DISCONTINUED] ISOSORBIDE ORAL Take by mouth.      [DISCONTINUED] metoprolol succinate (TOPROL-XL) 100 MG 24 hr tablet Take 1 tablet by mouth once daily.      [DISCONTINUED] spironolactone (ALDACTONE) 25 MG tablet Take 1 tablet (25 mg total) by mouth once daily. 30 tablet 2        Review of patient's allergies indicates:    Allergen Reactions    Captopril Other (See Comments)     COUGH       OBJECTIVE:     Vital Signs:  There were no vitals filed for this visit.  There is no height or weight on file to calculate BMI.     Physical Exam:  Physical Exam  Vitals signs reviewed.   Constitutional:       General: She is not in acute distress.     Appearance: She is well-developed.   HENT:      Head: Normocephalic and atraumatic.   Eyes:      Pupils: Pupils are equal, round, and reactive to light.   Neck:      Musculoskeletal: Normal range of motion and neck supple.   Cardiovascular:      Rate and Rhythm: Normal rate and regular rhythm.      Heart sounds: Normal heart sounds.   Pulmonary:      Effort: Pulmonary effort is normal.      Breath sounds: Normal breath sounds.   Abdominal:      General: Bowel sounds are normal.      Palpations: Abdomen is soft.   Musculoskeletal: Normal range of motion.   Skin:     General: Skin is warm and dry.   Neurological:      Mental Status: She is alert and oriented to person, place, and time.      Cranial Nerves: No cranial nerve deficit.   Psychiatric:         Behavior: Behavior normal.         Thought Content: Thought content normal.         Judgment: Judgment normal.         Laboratory  Lab Results   Component Value Date    WBC 7.02 08/26/2020    HGB 11.6 (L) 08/26/2020    HCT 36.2 (L) 08/26/2020    MCV 87 08/26/2020     (H) 08/26/2020     Lab Results   Component Value Date    INR 1.0 08/26/2020    INR 0.9 08/14/2020    INR 1.1 07/13/2020     Lab Results   Component Value Date    HGBA1C 6.9 (H) 08/14/2020     No results for input(s): POCTGLUCOSE in the last 72 hours.    Diagnostic Results: CT/MRI  See above  Results for orders placed during the hospital encounter of 08/14/20   Echo Color Flow Doppler? Yes; Bubble Contrast? Yes    Narrative · Severe left ventricular enlargement.  · Severely decreased left ventricular systolic function. The estimated   ejection fraction is 15%.  · Moderate right  ventricular enlargement.  · Mildly to moderately reduced right ventricular systolic function.  · Grade I (mild) left ventricular diastolic dysfunction consistent with   impaired relaxation.  · Trivial pericardial effusion.  · Normal central venous pressure (3 mmHg).              TRANSITION OF CARE:     [facility] Contact Note: 8/17/2020    Family and/or Caretaker present at visit?  Yes.  Diagnostic tests reviewed/disposition: No diagnosic tests pending after this hospitalization.  Disease/illness education: CVA/DM  Home health/community services discussion/referrals: Patient does not have home health established from hospital visit.  They do need home health.  If needed, we will set up home health for the patient.   Establishment or re-establishment of referral orders for community resources: No other necessary community resources.   Discussion with other health care providers: No discussion with other health care providers necessary.     Transition of Care Visit:     I have reviewed and updated the history and problem list.  I have reconciled the medication list.  I have discussed the hospitalization and current medical issues, prognosis and plans with the patient/family.  I  spent more than 50% of time discussing the care with the patient/family.  Total Face-to-Face Encounter: 60 minutes.    Medications Reconciliation:   I have reconciled the patient's home medications and discharge medications with the patient/family. I have updated all changes.  Refer to After-Visit Medication List.    ASSESSMENT & PLAN:     HIGH RISK CONDITION(S):      Diomedes was seen today for transitional care, diabetes and extremity weakness.    Diagnoses and all orders for this visit:    Chronic combined systolic and diastolic congestive heart failure    Cerebrovascular accident (CVA) due to embolism of right middle cerebral artery  -     Ambulatory referral/consult to [facility]  -     Ambulatory referral/consult to  Home Health; Future  -     Ambulatory referral/consult to Neurology; Future    Dysphagia as late effect of cerebrovascular accident (CVA)    Nicotine abuse    Other orders  -     clopidogreL (PLAVIX) 75 mg tablet; Take 1 tablet (75 mg total) by mouth once daily.    Pt is improving. Weakness to left arm and leg compared to right  Speech is clear today  Denies any problems with her swallow today but does report she was to have ST at discharge  Diuretics in place for CHF, instructed to weigh daily  Reviewed importance of Plavix in CVA prevention, instructed to continue, refill sent to pharmacy of choice.  Pt is scheduling PCP and cardiology appt herself as providers are outside the [facility] network  Reinforced the importance of following up with those providers  [facility] neurology is to contact her for follow up.      Special Patient Instructions: The following was discussed with the patient/family. Instructions were given to patient/family.    Smoking Cessation  I discussed with the patient regarding the hazardous effects of smoking on increasing risk of heart attack and stroke, worsening lung functions, and increasing cancer risk.   Patient was urged to stop smoking now.  I also offered nicotine taper (such as nicotine patch and gum) to help ease the craving to smoke. Pt will consider cessation, smoking cessation trust with pt. Will revisit at next visit.   10 minutes spent in discussion of smoking cessation    Were controlled substances prescribed?  No    Instructions for the patient:  Take all medications as prescribed  ADA low salt, low fat diet  [facility] will contact for admit for SN, PT, OT, ST.  Stop smoking    Special Patient Instructions: The following was discussed with the patient/family. Instructions were given to patient/family.    CHF Instructions    Weigh daily and record.  Regular activity within patient's limitations.  Low salt, low fat and low choleterol diet and restrict fluid < 2L per day.  Contact for SOB,  chest pain, weight gain > 2-3 lbs per day and/or 5-6 lbs per week.   No smoking. Annual influenza vaccine required. Take all medications as prescribed.  Reinforced importance of medication and diet compliance.    Scheduled Follow-up :  No future appointments.    After Visit Medication List :     Medication List          Accurate as of August 25, 2020  9:01 PM. If you have any questions, ask your nurse or doctor.            CONTINUE taking these medications    aspirin 81 MG Chew  Take 1 tablet (81 mg total) by mouth once daily.     atorvastatin 80 MG tablet  Commonly known as: LIPITOR  Take 1 tablet (80 mg total) by mouth once daily.     clopidogreL 75 mg tablet  Commonly known as: PLAVIX  Take 1 tablet (75 mg total) by mouth once daily.     furosemide 40 MG tablet  Commonly known as: LASIX  Take 2 tablets (80 mg total) by mouth once daily AND 1 tablet (40 mg total) every evening. TAKE EXTRA 40 MG IF GAINING 2 OR MORE POUNDS IN 2 DAYS..     glimepiride 1 MG tablet  Commonly known as: AMARYL     hydroCHLOROthiazide 12.5 MG Tab  Commonly known as: HYDRODIURIL     metFORMIN 1000 MG tablet  Commonly known as: GLUCOPHAGE     pregabalin 100 MG capsule  Commonly known as: LYRICA     TRULICITY SUBQ           Where to Get Your Medications      These medications were sent to [facility] [id number] - [address]  [address], [facility] [address]    Phone: [phone]   · clopidogreL 75 mg tablet         Signature:  Reema Koroma NP  Attestation: Screening criteria to assess the level of the patient's risk for infection with COVID-19 as recommended by the CDC at the time of the above documented home visit concluded appropriateness to proceed. Universal precautions were maintained at all times, including provider use of >60% alcohol gel hand  immediately prior to entry and upon departing the patient's home as well as cleaning of equipment used in home visit with antibacterial/germicidal disposable  wipes.    Patient consent obtained prior to treatment

## 2020-08-26 NOTE — NURSING TRANSFER
Nursing Transfer Note      8/26/2020     Transfer From: ED to     Transfer via stretcher    Transfer with cardiac monitoring    Transported by Transport    Medicines sent: None    Chart send with patient: Yes    Notified: spouse    Patient reassessed at: 8/26/2020 ) 9381    Upon arrival to floor: cardiac monitor applied, patient oriented to room, call bell in reach and bed in lowest position

## 2020-08-26 NOTE — PLAN OF CARE
Problem: Physical Therapy Goal  Goal: Physical Therapy Goal  Outcome: Met     PT evaluation complete and no goals established. Pt is functioning at high level and does not required acute care physical therapy services. Education provided to ambulate in hallway 3x/day with supervision and RW in order to maintain strength and endurance. Pt verbalized understanding. Please re-consult if functional mobility changes. Anticipate d/c to home; no needs.     Alondra Galindo PT, DPT  8/26/2020  Pager: [phone]

## 2020-08-26 NOTE — ASSESSMENT & PLAN NOTE
Patient is a 59yoF with CHF (EF 15%), anxiety, HTN, HLD, schizophrenia and a recent R MCA stroke (s/p tPA on 8/14, w/o residual sx)  presented to [facility] ED following onset of SOB with transient chest pain and intermittent bifrontal 8/10 HA. Given her recent stroke s/p tPA and current DAPT regimen, vascular neurology was consulted for recommendations regarding possible AC    See Embolic stroke R MCA for full recommendations

## 2020-08-26 NOTE — HPI
Patient is a 59 year old lady with a history of schizophrenia, HTN, DM, CHF (EF 15%) and a recent R MCA stroke 2 weeks ago. She presented to the ED today with progressively worsening SOB with transient chest pain and headache. At presentation she reported she usually takes lasix 40 mg BID and she is compliant with it. She also confesed diet noncompliance and that a couple of days ago she went to a gathering were she ate things she doesnt normally eat and that yesterday she ate a bag of potatoe chips. she usually doesnt complain of SOB, she walks and gets groceries by herself but since yeserday she hasent been able to lay flat due to shortness of breath and would feel difficulty breathing even at rest. she denied palpitation, diaphoresis, weakness/numbness and other symptoms.   In the ED physical exam was remarkable for positive JVD and bilateral inspiratory rales on lung exam. Work up was remarkable for an elevated troponin of 8, D Dimer 2.78 and BNP 1206. EKG was normal, Chest CT showed no signs of PE and Chest Xray showed congestive changes. Cardiology and Neurology were consulted and recommended agressive diuresis and heparin drip for possible acute on chronic HF exacerbation/ ACS. last troponin was 8.3. Patient was admited to IM and orders per recommendations were placed.

## 2020-08-26 NOTE — ASSESSMENT & PLAN NOTE
59-year-old lady with past medical history of nonischemic cardiomyopathy, low ejection fraction, dilated ventricle and angiogram at the end of last year showed nonobstructive coronary artery disease and recent right MCA embolic stroke presented with progressively worsening shortness of breath.    Patient is fluid overloaded based on history and physical exam.  She has history of nonischemic cardiomyopathy with low ejection fraction and nonobstructive coronary artery disease.  Currently has remarkable elevation of troponin.  Currently is chest pain-free and hemodynamically stable.  Elevated troponin mostly represent demand ischemia, type 1 MI cannot be ruled out completely at this point.    Recommended admitting the patient to Medicine.  Trend troponin.  2D echo in a.m..  Continue home aspirin and Plavix, may initiate heparin drip for ACS if cleared by vascular neurology.  Recommend diuresing the patient while inpatient, recommend additional 40 of IV Lasix now in addition to the 40 IV Lasix that she received.  Recommend placing the patient on IV Lasix 80 b.i.d..  Cardiology will follow up on the patient regarding timing for ischemic evaluation.  Please call Cardiology for any question.

## 2020-08-26 NOTE — PLAN OF CARE
POC reviewed with patient. VSS. A/O x4. Up independently. Troponins elevated, but trending down. Next one due in am. Heparin gtt continues a 12/units/kg/hr. Adjust per nomogram protocol. K+ 2.9 replaced with 40meq x3 doses. Fall precautions in place. Will continue to monitor

## 2020-08-26 NOTE — ED TRIAGE NOTES
Patient presents to the ED with a headache states she is 2 weeks post stroke also complaints of SOB x 12 hours, states she feels like she cant catch her breath even when she is sitting down. She says it could be the mask but she says she feels like she just cant breathe.

## 2020-08-26 NOTE — SUBJECTIVE & OBJECTIVE
Past Medical History:   Diagnosis Date    Anxiety     CHF (congestive heart failure)     Depression     Diabetes mellitus     Dyspnea     H/O coronary angiogram     H/O: hysterectomy     Hyperlipemia     Hypertension     Pulmonary edema     Schizophrenia        Past Surgical History:   Procedure Laterality Date    BLADDER SUSPENSION      CARPAL TUNNEL RELEASE Right     HEEL SPUR SURGERY Left     LEFT HEART CATHETERIZATION Bilateral 12/27/2019    Procedure: Left heart cath;  Surgeon: Steve Chambers MD;  Location: [facility] C[facility] LAB;  Service: Cardiology;  Laterality: Bilateral;    TUBAL LIGATION         Review of patient's allergies indicates:   Allergen Reactions    Captopril Other (See Comments)     COUGH       No current facility-administered medications on file prior to encounter.      Current Outpatient Medications on File Prior to Encounter   Medication Sig    atorvastatin (LIPITOR) 80 MG tablet Take 1 tablet (80 mg total) by mouth once daily.    furosemide (LASIX) 40 MG tablet Take 2 tablets (80 mg total) by mouth once daily AND 1 tablet (40 mg total) every evening. TAKE EXTRA 40 MG IF GAINING 2 OR MORE POUNDS IN 2 DAYS..    aspirin 81 MG Chew Take 1 tablet (81 mg total) by mouth once daily.    benzonatate (TESSALON) 100 MG capsule Take 1 capsule (100 mg total) by mouth 3 (three) times daily as needed for Cough. (Patient not taking: Reported on 8/17/2020)    clopidogreL (PLAVIX) 75 mg tablet Take 1 tablet (75 mg total) by mouth once daily.    duloxetine (CYMBALTA) 60 MG capsule Take 60 mg by mouth once daily.    gabapentin (NEURONTIN) 100 MG capsule Begin with 1 by mouth at bedtime daily for 7 days.  Then increase to one by mouth twice a day for 7 days and then one by mouth 3 times a day thereafter. (Patient not taking: Reported on 8/17/2020)    glimepiride (AMARYL) 1 MG tablet Take 1 tablet by mouth once daily.    hydrALAZINE (APRESOLINE) 25 MG tablet Take 1 tablet (25 mg total) by  mouth every 12 (twelve) hours.    hydroCHLOROthiazide (HYDRODIURIL) 12.5 MG Tab Take 12.5 mg by mouth once daily.    ISOSORBIDE ORAL Take by mouth.    metformin (GLUCOPHAGE) 500 MG tablet Take 1 tablet by mouth 2 (two) times daily.    metoprolol succinate (TOPROL-XL) 100 MG 24 hr tablet Take 1 tablet by mouth once daily.    spironolactone (ALDACTONE) 25 MG tablet Take 1 tablet (25 mg total) by mouth once daily.     Family History     Family history is unknown by patient.        Tobacco Use    Smoking status: Current Every Day Smoker     Types: Cigarettes    Smokeless tobacco: Never Used   Substance and Sexual Activity    Alcohol use: No     Alcohol/week: 0.0 standard drinks    Drug use: No    Sexual activity: Not on file     Review of Systems   Constitutional: Negative for chills, diaphoresis and fever.   HENT: Negative for rhinorrhea, sneezing, sore throat and trouble swallowing.    Eyes: Negative for pain, redness and visual disturbance.   Respiratory: Positive for shortness of breath. Negative for choking.    Cardiovascular: Positive for chest pain and leg swelling. Negative for palpitations.   Gastrointestinal: Negative for abdominal distention, abdominal pain, nausea and vomiting.   Genitourinary: Negative for dysuria and flank pain.   Musculoskeletal: Negative for neck pain and neck stiffness.   Skin: Negative for pallor and rash.   Neurological: Positive for headaches (bifrontal, 8/10, intermittent). Negative for tremors, seizures, syncope, facial asymmetry, speech difficulty, weakness, light-headedness and numbness.   Psychiatric/Behavioral: Negative for agitation, confusion and decreased concentration.     Objective:     Vital Signs (Most Recent):  Temp: 98 °F (36.7 °C) (08/26/20 0341)  Pulse: 89 (08/26/20 0600)  Resp: 13 (08/26/20 0600)  BP: 121/77 (08/26/20 0341)  SpO2: 98 % (08/26/20 0600) Vital Signs (24h Range):  Temp:  [97.9 °F (36.6 °C)-98 °F (36.7 °C)] 98 °F (36.7 °C)  Pulse:  []  89  Resp:  [13-20] 13  SpO2:  [98 %-100 %] 98 %  BP: (120-142)/(75-86) 121/77     Weight: 80 kg (176 lb 5.9 oz)  Body mass index is 28.47 kg/m².    Physical Exam  Constitutional:       General: She is not in acute distress.     Appearance: Normal appearance.   HENT:      Head: Normocephalic and atraumatic.      Mouth/Throat:      Mouth: Mucous membranes are moist.   Eyes:      General: No scleral icterus.     Extraocular Movements: Extraocular movements intact.      Conjunctiva/sclera: Conjunctivae normal.      Pupils: Pupils are equal, round, and reactive to light.   Neck:      Musculoskeletal: Normal range of motion. No neck rigidity.   Cardiovascular:      Rate and Rhythm: Normal rate.      Pulses: Normal pulses.      Heart sounds: No murmur. No gallop.    Pulmonary:      Effort: Pulmonary effort is normal. No respiratory distress.      Breath sounds: No wheezing or rales.   Abdominal:      General: Abdomen is flat. There is no distension.      Tenderness: There is no abdominal tenderness. There is no guarding.   Musculoskeletal: Normal range of motion.         General: No swelling or tenderness.   Skin:     General: Skin is warm.      Coloration: Skin is not jaundiced.      Findings: No rash.   Neurological:      General: No focal deficit present.      Mental Status: She is alert and oriented to person, place, and time. Mental status is at baseline.      Cranial Nerves: No cranial nerve deficit.      Sensory: No sensory deficit.      Motor: No weakness.      Coordination: Coordination normal.      Gait: Gait normal.      Deep Tendon Reflexes: Reflexes normal.   Psychiatric:         Mood and Affect: Mood normal.         Behavior: Behavior normal.         Thought Content: Thought content normal.         Judgment: Judgment normal.           CRANIAL NERVES     CN III, IV, VI   Pupils are equal, round, and reactive to light.       Significant Labs: All pertinent labs within the past 24 hours have been  reviewed.    Significant Imaging: I have reviewed and interpreted all pertinent imaging results/findings within the past 24 hours.

## 2020-08-26 NOTE — ASSESSMENT & PLAN NOTE
NICM NYHA Class III HF with reduced EF - Stage C: Structural heart disease with prior or current symptoms of HF  - Most recent TTE demonstrates: LVef 15%,   - EKG : Sinus Tach with LVH and no ST elevations  - Troponin  8.063-->8.380-->6.25  Pro-BNP 1206      At this time suspect that NICM is secondary to long standing HTN. She has newly increased LVIDD of 7 cm    PLAN:  - Fluid restriction at 1500 cc with strict I/Os and daily STANDING weights  - Maintain on telemetry and daily EKGs   - Check Electrolytes, keep Mag >2 & K+ >4  - Ambulate as tolerated    - Optimal therapy at this time to include :    - Anti-platelet agent with DAPT     - Would start patient on Losartan 50 mg QD. Would hold on starting an AA such as spironolactone as ARB is just started and want to monitor BP and Potassium. Will consider adding down the line    - BB with Toprol  mg QD     - Statin with Atorvastatin 80 mg QD    - Diuretic dosing of IV lasix 80 mg BID - Likely can transition to P.O. tomorrow        - HTS Consulted to assist with optimizing HF in the setting of Dilated LVIDD as patient may be a candidate for mechanical support.    - Follow up transitional care visit for heart failure at discharge

## 2020-08-26 NOTE — PT/OT/SLP EVAL
Occupational Therapy   Evaluation and Discharge Note    Name: Diomedes Mohr  MRN: [id number]  Admitting Diagnosis:  SOB (shortness of breath)      Recommendations:     Discharge Recommendations: home health OT, home health PT  Discharge Equipment Recommendations:  none  Barriers to discharge:  None    Assessment:     Diomedes Mohr is a 59 y.o. female with a medical diagnosis of SOB (shortness of breath). Pt presented with WFL strength and ROM to complete occupations of choice. Pt tolerated evaluation well displaying no deficits affecting ADL performance. OT recommending home with home health post acute to further maximize pt's functional independence in the home setting. At this time, patient is functioning at their prior level of function and does not require further acute OT services.     Plan:     During this hospitalization, patient does not require further acute OT services.  Please re-consult if situation changes.    · Plan of Care Reviewed with: patient    Subjective     Chief Complaint: Pt expressed readiness to return home   Patient/Family Comments/goals: Pt agreeable to OT/PT evaluation and OT POC.     Occupational Profile:  Living Environment: Pt lives with spouse in a [facility] with 0 DONIS. Bathroom set up: Tub shower combo   Previous level of function: I in all ADLs; mod I with mobility due to occasional use of RW; no hx of falls  Driving: Yes   Roles and Routines: Homemaker, spouse, enjoys playing [employer]  Equipment Used at home:  glucometer, nebulizer, walker, rolling  Assistance upon Discharge: Pt will have assistance from spouse upon discharge.     Pain/Comfort:  · Pain Rating 1: 0/10  · Pain Rating Post-Intervention 1: 0/10    Patients cultural, spiritual, Islam conflicts given the current situation: no    Objective:     Communicated with: RN prior to session.  Patient found HOB elevated with telemetry, peripheral IV, pulse ox (continuous) upon OT entry to room.    General Precautions: Standard,      Orthopedic Precautions:N/A   Braces: N/A     Occupational Performance:    Bed Mobility:  HOB elevated   · Patient completed Rolling/Turning to Left with  modified independence  · Patient completed Scooting/Bridging with modified independence  · Patient completed Supine to Sit with modified independence    Functional Mobility/Transfers:  · Patient completed Sit <> Stand Transfer from bed with modified independence with rolling walker   · Functional Mobility: Pt ambulated household distance with mod I and RW to prepare for household mobility to complete occupations of choice.  · No LOB noted  · Standing breaks required due to pt's preference to observe surroundings   · VSS throughout the session       Activities of Daily Living:  · Feeding:  Pt stood with Mod I with use of walker to reach for food on tray and bring to mouth; no standing balance deviations noted     Cognitive/Visual Perceptual:  Cognitive/Psychosocial Skills:     -       Oriented to: Person, Place, Time and Situation   -       Follows Commands/attention:Follows multistep  commands  -       Communication: clear/fluent  -       Memory: No Deficits noted  -       Safety awareness/insight to disability: intact   -       Mood/Affect/Coping skills/emotional control: Appropriate to situation  Visual/Perceptual:      -Intact no defs noted    Physical Exam:  Balance:    -       Intact  Postural examination/scapula alignment:    -       No postural abnormalities identified  Skin integrity: Visible skin intact  Edema:  None noted  Sensation:    -       Intact  Upper Extremity Range of Motion:     -       Right Upper Extremity: WFL  -       Left Upper Extremity: WFL  Upper Extremity Strength: -       Right Upper Extremity: WFL  -       Left Upper Extremity: WFL   Strength:    -       Right Upper Extremity: WFL  -       Left Upper Extremity: WFL  Fine Motor Coordination:    -       Intact; observed functionally    AMPAC 6 Click ADL:  AMPAC Total Score:  24    Treatment & Education:  - Role of OT/ OT POC  - Self care safety/ independence  - Functional transfer/ mobility safety  - Bed mobility safety  - Pursed lip breathing  - Importance of sitting UIC throughout the day as tolerated  - Energy conservation techniques such as pacing and taking rest breaks as needed  - Therapeutic Activity: Education provided listed above. Pt completed functional mobility, monitoring endurance throughout to ensure safety with mobility to complete occupations of choice upon returning home.  - Discontinuation of OT services  Education:    Patient left sitting EOB with all lines intact, call button in reach and RN notified    GOALS:   Multidisciplinary Problems     Occupational Therapy Goals        Problem: Occupational Therapy Goal    Goal Priority Disciplines Outcome Interventions   Occupational Therapy Goal     OT, PT/OT Ongoing, Progressing                    History:     Past Medical History:   Diagnosis Date    Anxiety     CHF (congestive heart failure)     Depression     Diabetes mellitus     Dyspnea     H/O coronary angiogram     H/O: hysterectomy     Hyperlipemia     Hypertension     Pulmonary edema     Schizophrenia        Past Surgical History:   Procedure Laterality Date    BLADDER SUSPENSION      CARPAL TUNNEL RELEASE Right     HEEL SPUR SURGERY Left     LEFT HEART CATHETERIZATION Bilateral 12/27/2019    Procedure: Left heart cath;  Surgeon: Steve Chambers MD;  Location: [facility] CATH LAB;  Service: Cardiology;  Laterality: Bilateral;    TUBAL LIGATION         Time Tracking:     OT Date of Treatment: 08/26/20  OT Start Time: 1340  OT Stop Time: 1400  OT Total Time (min): 20 min co eval with PT     Billable Minutes:Evaluation 10  Therapeutic Activity 10    Thuy Cordova OT  8/26/2020

## 2020-08-26 NOTE — PLAN OF CARE
Problem: Occupational Therapy Goal  Goal: Occupational Therapy Goal  Outcome: Ongoing, Progressing     No goals established this date 2/2 pt with no functional impairments noted that would require skilled OT in this setting. OT recommending home with home health post acute to further maximize pt's functional independence in the home setting.       Thuy Cordova OTR/L  8/26/20

## 2020-08-26 NOTE — PLAN OF CARE
59F w/niCMP ([facility] 12/27/19 non-obstructive CAD) EF 10-15%, Grade II diastolic dysfunction, LVIDD 7 cm, HTN, CVA 8/14/20 s/p tPA currently on DAPT, non insulin dependent DM2 admitted with for SOB. Patient's primary Cardiologist is Dr. Steve Chambers. HTS asked to evaluate patient for advanced options. On evaluation, vitals stable, patient working with PT/OT. Will plan for outpatient follow up with heart transplant service to discuss advanced options. We will schedule appointment. Thank you for allowing us to take part in the care of Mrs. Mohr. Case discussed with Dr. Sarah Davidson MD

## 2020-08-26 NOTE — HPI
Mr. Mohr is a pleasant 59 year old lady with significant past medical history of nonischemic cardiomyopathy with dilated left ventricle and angiogram in December 2019 at an outside hospital showing nonobstructive coronary artery disease, recently admitted also to an outside hospital with a right MCA stroke status post tPA on August 14, 2020 presented to the emergency department with progressively worsening shortness of breath.  Cardiology consulted for further evaluation.    Patient reported history of diet noncompliance, she reported she supposed to be on Lasix 40 oral twice daily, she reported progressively worsening shortness of breath over the past few days with no chest pain, she also reported she is unable to lay flat due to shortness of breath.  She has past medical history of congestive heart failure, diabetes, hypertension, low ejection fraction with echo showing EF at 15% and dilated ventricle and recent right MCA stroke on August 14, 2020 status post tPA with resolution of symptoms.  Patient physical exam was remarkable for positive JVD and bilateral inspiratory rales on lung exam, her labs were remarkable for elevated troponin.  At time of my interview she was chest pain-free and hemodynamically stable.  Patient reported she take aspirin Plavix at home.

## 2020-08-26 NOTE — ED PROVIDER NOTES
Encounter Date: 8/25/2020       History     Chief Complaint   Patient presents with    Shortness of Breath     began around 1500, can't catch her breath, did not use breathing tx at home    Headache     frontal and back of her head, CVA on 8/14, no noted deficits, +Nausea    Back Pain     middle of her back     59-year-old woman with comorbidities of congestive heart failure, anxiety, hypertension, hyperlipidemia, schizophrenia, as well as recent MCA stroke presents to the emergency department for evaluation of shortness of breath noted earlier today.  The patient describes a sensation of being unable to catch her breath noted earlier today without history of trauma or heralding event.  She denies associated cough or hemoptysis as well as any chest discomfort associated with this issue.  She denies any previous history of embolic disease as well as any leg swelling.  She also endorses medication compliance.        Review of patient's allergies indicates:   Allergen Reactions    Captopril Other (See Comments)     COUGH     Past Medical History:   Diagnosis Date    Anxiety     CHF (congestive heart failure)     Depression     Diabetes mellitus     Dyspnea     H/O coronary angiogram     H/O: hysterectomy     Hyperlipemia     Hypertension     Pulmonary edema     Schizophrenia      Past Surgical History:   Procedure Laterality Date    BLADDER SUSPENSION      CARPAL TUNNEL RELEASE Right     HEEL SPUR SURGERY Left     LEFT HEART CATHETERIZATION Bilateral 12/27/2019    Procedure: Left heart cath;  Surgeon: Steve Chambers MD;  Location: [facility] CATH LAB;  Service: Cardiology;  Laterality: Bilateral;    TUBAL LIGATION       Family History   Family history unknown: Yes     Social History     Tobacco Use    Smoking status: Current Every Day Smoker     Types: Cigarettes    Smokeless tobacco: Never Used   Substance Use Topics    Alcohol use: No     Alcohol/week: 0.0 standard drinks    Drug use: No      Review of Systems   Constitutional: Negative for chills, fatigue and fever.   HENT: Negative for postnasal drip and trouble swallowing.    Respiratory: Positive for shortness of breath. Negative for cough, choking and chest tightness.    Cardiovascular: Negative for chest pain, palpitations and leg swelling.   Gastrointestinal: Negative for abdominal pain, nausea and vomiting.   Genitourinary: Negative for dysuria and hematuria.   Musculoskeletal: Negative for back pain and neck pain.   Neurological: Negative for seizures and numbness.   Hematological: Does not bruise/bleed easily.   Psychiatric/Behavioral: Negative for confusion and decreased concentration.       Physical Exam     Initial Vitals [08/25/20 2010]   BP Pulse Resp Temp SpO2   (!) 142/85 (!) 116 16 97.9 °F (36.6 °C) 98 %      MAP       --         Physical Exam    Vitals reviewed.  Constitutional:   59-year-old  woman, no acute distress noted   HENT:   Head: Normocephalic and atraumatic.   Mouth/Throat: Oropharynx is clear and moist.   Eyes: EOM are normal. Pupils are equal, round, and reactive to light.   Neck: No tracheal deviation present.   Cardiovascular: Intact distal pulses.   Mild tachycardia is noted   Pulmonary/Chest: Breath sounds normal. No stridor. No respiratory distress.   Abdominal: Soft. There is no abdominal tenderness. There is no rebound.   Musculoskeletal: Normal range of motion. No edema.   Neurological: She is alert and oriented to person, place, and time. GCS score is 15. GCS eye subscore is 4. GCS verbal subscore is 5. GCS motor subscore is 6.   Skin: Skin is warm and dry.   Psychiatric: Her behavior is normal. Thought content normal.         ED Course   Procedures  Labs Reviewed   CBC W/ AUTO DIFFERENTIAL - Abnormal; Notable for the following components:       Result Value    Mean Corpuscular Hemoglobin Conc 31.8 (*)     RDW 15.1 (*)     Platelets 404 (*)     All other components within normal limits    COMPREHENSIVE METABOLIC PANEL - Abnormal; Notable for the following components:    Potassium 3.2 (*)     Glucose 121 (*)     Alkaline Phosphatase 143 (*)     AST 50 (*)     ALT 50 (*)     All other components within normal limits   TROPONIN I - Abnormal; Notable for the following components:    Troponin I 8.063 (*)     All other components within normal limits   B-TYPE NATRIURETIC PEPTIDE - Abnormal; Notable for the following components:    BNP 1,206 (*)     All other components within normal limits   D DIMER, QUANTITATIVE - Abnormal; Notable for the following components:    D-Dimer 2.78 (*)     All other components within normal limits   SARS-COV-2 RNA AMPLIFICATION, QUAL   TROPONIN I     EKG Readings: (Independently Interpreted)   Initial Reading: No STEMI. Rhythm: Sinus Tachycardia. Heart Rate: 117. T Waves Flipped: V5, V6 and AVF.   Nonspecific conduction delay noted to lead I       Imaging Results           CTA Chest Non-Coronary (PE Study) (Final result)  Result time 08/26/20 01:03:08    Final result by Christopher Wagner MD (08/26/20 01:03:08)                 Impression:      1. No evidence of acute pulmonary embolism.  2. Left ventricular enlargement and interlobular septal thickening suggests pulmonary edema.  3. Two sub 8 mm nodules in the right lung appear stable dating back to 12/25/2019.  A follow-up chest CT to document 18-24 months of stability would be recommended.  This report was flagged in Epic as abnormal.      Electronically signed by: Christopher Wagner  Date:    08/26/2020  Time:    01:03             Narrative:    EXAMINATION:  CTA CHEST NON CORONARY    CLINICAL HISTORY:  PE suspected, intermediate prob, positive D-dimer;    TECHNIQUE:  Low dose axial images, sagittal and coronal reformations were obtained from the thoracic inlet to the lung bases following the IV administration of 75 mL of Omnipaque 350.  Contrast timing was optimized to evaluate the pulmonary arteries.  MIP images were  performed.    COMPARISON:  Chest radiograph, 08/25/2020.  Chest CT without contrast performed 01/31/2020.    FINDINGS:  Exam quality: Satisfactory.    Pulmonary embolism: No acute or chronic pulmonary embolism.    Central pulmonary arteries: Normal caliber.    Aorta: Normal caliber.    Heart: No evidence of right heart strain.  Left ventricular enlargement is noted.    Coronary arteries: Coronary artery calcifications are noted.    Pericardium: Small amount of pericardial fluid may be physiologic.    Support tubes and lines: None.    Base of neck/thyroid: Normal.    Lymph nodes: Enlarged AP window lymph node and right paratracheal lymph node are stable dating back to 12/25/2019 CT.  No definite new or enlarging intrathoracic lymph nodes appreciated.    Esophagus: Normal.    Pleura: No effusion, thickening, or calcification.    Upper abdomen: Simple appearing exophytic cyst from the posterior left kidney is noted.    Body wall: Unremarkable    Airways: Normal.    Lungs: Smooth interlobular septal thickening is again noted.  A 6-7 mm nodule in the right middle lobe (series 301, image 3 of 6) and 3-4 mm nodule in the right upper lobe (series 301, image 265) appear stable dating back to 12/25/2019.    Bones: No focal lesion.                               X-Ray Chest PA And Lateral (Final result)  Result time 08/25/20 21:56:48    Final result by Cosme Silverio MD (08/25/20 21:56:48)                 Impression:      Mild cardiomegaly with mild central congestive change and mild perihilar edema, improved from prior.      Electronically signed by: Cosme Silverio MD  Date:    08/25/2020  Time:    21:56             Narrative:    EXAMINATION:  XR CHEST PA AND LATERAL    CLINICAL HISTORY:  shortness of breath;    TECHNIQUE:  PA and lateral views of the chest were performed.    COMPARISON:  08/14/2020    FINDINGS:  There is no consolidation, effusion, or pneumothorax.    Mild cardiomegaly with mild central congestive  changes and perihilar edema.    Regional osseous structures are unremarkable.                                 Medical Decision Making:   History:   Old Medical Records: I decided to obtain old medical records.  Old Records Summarized: records from clinic visits and records from previous admission(s).  Differential Diagnosis:   Pulmonary edema, lethal arrhythmia, pulmonary embolus, anxiety  Clinical Tests:   Lab Tests: Ordered and Reviewed  Radiological Study: Ordered and Reviewed  Medical Tests: Ordered and Reviewed              Attending Attestation:             Attending ED Notes:   Emergency department evaluation does not reveal evidence of significant hematologic derangement.  Metabolic profile reveals mild hypokalemia and mild hyperglycemia with mild transaminitis without evidence of renal dysfunction.  A screening D-dimer was notably elevated prompting a CT angiogram of the chest which does not identify evidence of acute pulmonary embolus.  Additionally, the serum troponin is noted to be grossly elevated at 8.0 as well as an elevated BNP of approximately 1200.  The patient has been administered a total of Lasix 80 mg IV P with subsequent diuresis.  Chest x-ray does reveal evidence of mild perihilar edema without consolidation or pleural effusion.  Findings and concerns have been  discussed with cardiology on-call who has evaluated the patient in the ED and provided recommendations in the attached consult note.  Of note, the Cardiology consultant reports that a consultation/clearance by vascular Neurology must be completed prior to this patient instituting ACS therapy due to her recent stroke.  A formal consult for this request has been submitted by me but has not been executed at the time of disposition.  Findings and concerns have been discussed with internal medicine on call, and the patient will be placed in observation under the care in fair condition for further therapy and management.                         Clinical Impression:       ICD-10-CM ICD-9-CM   1. Elevated troponin  R79.89 790.6   2. SOB (shortness of breath)  R06.02 786.05   3. Acute on chronic systolic congestive heart failure  I50.23 428.23     428.0         Disposition:   Disposition: Placed in Observation  Condition: Fair                          Steve Hamilton MD  08/26/20 [id number]

## 2020-08-26 NOTE — SUBJECTIVE & OBJECTIVE
Past Medical History:   Diagnosis Date    Anxiety     CHF (congestive heart failure)     Depression     Diabetes mellitus     Dyspnea     H/O coronary angiogram     H/O: hysterectomy     Hyperlipemia     Hypertension     Pulmonary edema     Schizophrenia        Past Surgical History:   Procedure Laterality Date    BLADDER SUSPENSION      CARPAL TUNNEL RELEASE Right     HEEL SPUR SURGERY Left     LEFT HEART CATHETERIZATION Bilateral 12/27/2019    Procedure: Left heart cath;  Surgeon: Steve Chambers MD;  Location: [facility] CATH LAB;  Service: Cardiology;  Laterality: Bilateral;    TUBAL LIGATION         Review of patient's allergies indicates:   Allergen Reactions    Captopril Other (See Comments)     COUGH       (Not in a hospital admission)    Family History     Family history is unknown by patient.        Tobacco Use    Smoking status: Current Every Day Smoker     Types: Cigarettes    Smokeless tobacco: Never Used   Substance and Sexual Activity    Alcohol use: No     Alcohol/week: 0.0 standard drinks    Drug use: No    Sexual activity: Not on file     Review of Systems   Respiratory: Positive for shortness of breath.    All other systems reviewed and are negative.    Objective:     Vital Signs (Most Recent):  Temp: 97.9 °F (36.6 °C) (08/25/20 2010)  Pulse: 97 (08/26/20 0130)  Resp: 20 (08/26/20 0002)  BP: 128/83 (08/26/20 0130)  SpO2: 100 % (08/26/20 0130) Vital Signs (24h Range):  Temp:  [97.9 °F (36.6 °C)] 97.9 °F (36.6 °C)  Pulse:  [] 97  Resp:  [16-20] 20  SpO2:  [98 %-100 %] 100 %  BP: (128-142)/(76-85) 128/83     Weight: 84.4 kg (186 lb)  Body mass index is 30.02 kg/m².    SpO2: 100 %  O2 Device (Oxygen Therapy): room air    No intake or output data in the 24 hours ending 08/26/20 0135    Lines/Drains/Airways     Peripheral Intravenous Line                 Peripheral IV - Single Lumen 08/25/20 2128 20 G Right Antecubital less than 1 day                Physical Exam    Constitutional: She is oriented to person, place, and time. She appears well-developed.   Eyes: Pupils are equal, round, and reactive to light.   Neck: Normal range of motion. JVD present.   Pulmonary/Chest: She has rales.   Abdominal: Soft.   Neurological: She is alert and oriented to person, place, and time.   Skin: Skin is warm.       Significant Labs:   BMP:   Recent Labs   Lab 08/25/20 2127   *      K 3.2*      CO2 27   BUN 16   CREATININE 0.8   CALCIUM 9.3   , CMP   Recent Labs   Lab 08/25/20 2127      K 3.2*      CO2 27   *   BUN 16   CREATININE 0.8   CALCIUM 9.3   PROT 7.3   ALBUMIN 3.7   BILITOT 0.5   ALKPHOS 143*   AST 50*   ALT 50*   ANIONGAP 11   ESTGFRAFRICA >60.0   EGFRNONAA >60.0    and CBC   Recent Labs   Lab 08/25/20 2127   WBC 8.22   HGB 12.0   HCT 37.7   *

## 2020-08-26 NOTE — ASSESSMENT & PLAN NOTE
This is more likely an acute exacerbation due to diet non compliance but since 1st troponin high:  - Trend troponin (8 then 8.3)  - Trend EKG   - heparin drip started

## 2020-08-26 NOTE — H&P
[facility] Hospital Medicine  History & Physical    Patient Name: Diomedes Mohr  MRN: [id number]  Admission Date: 8/25/2020  Attending Physician: Lauren Mariano MD   Primary Care Provider: Fernando Manzo MD    [facility] Medicine Team: [facility] MED 3 Reynaldo Cross MD     Patient information was obtained from ER records.     Subjective:     Principal Problem:SOB (shortness of breath)    Chief Complaint:   Chief Complaint   Patient presents with    Shortness of Breath     began around 1500, can't catch her breath, did not use breathing tx at home    Headache     frontal and back of her head, CVA on 8/14, no noted deficits, +Nausea    Back Pain     middle of her back        HPI: Patient is a 59 year old lady with a history of schizophrenia, HTN, DM, CHF (EF 15%) and a recent R MCA stroke 2 weeks ago. She presented to the ED today with progressively worsening SOB with transient chest pain and headache. At presentation vitals were normal. she reported she usually takes lasix 40 mg BID and she is compliant with it. She also confesed diet noncompliance and that a couple of days ago she went to a gathering were she ate things she doesnt normally eat and that yesterday she ate a bag of potatoe chips. she usually doesnt complain of SOB, she walks and gets groceries by herself but since yeserday she hasent been able to lay flat due to shortness of breath and would feel difficulty breathing even at rest. she denied palpitation, diaphoresis, weakness/numbness and other symptoms. Physical exam was remarkable for positive JVD and bilateral inspiratory rales on lung exam. Work up was remarkable for an elevated troponin of 8, D Dimer 2.78 and BNP 1206. EKG was normal, Chest CT showed no signs of PE and Chest Xray showed congestive changes. Cardiology and Neurology were consulted and recommended agressive diuresis and heparin drip for possible acute on chronic HF exacerbation/ ACS. last troponin was 8.3.  Patient was admited to IM and orders per recommendations were placed.       Past Medical History:   Diagnosis Date    Anxiety     CHF (congestive heart failure)     Depression     Diabetes mellitus     Dyspnea     H/O coronary angiogram     H/O: hysterectomy     Hyperlipemia     Hypertension     Pulmonary edema     Schizophrenia        Past Surgical History:   Procedure Laterality Date    BLADDER SUSPENSION      CARPAL TUNNEL RELEASE Right     HEEL SPUR SURGERY Left     LEFT HEART CATHETERIZATION Bilateral 12/27/2019    Procedure: Left heart cath;  Surgeon: Steve Chambers MD;  Location: [facility] CATH LAB;  Service: Cardiology;  Laterality: Bilateral;    TUBAL LIGATION         Review of patient's allergies indicates:   Allergen Reactions    Captopril Other (See Comments)     COUGH       No current facility-administered medications on file prior to encounter.      Current Outpatient Medications on File Prior to Encounter   Medication Sig    atorvastatin (LIPITOR) 80 MG tablet Take 1 tablet (80 mg total) by mouth once daily.    furosemide (LASIX) 40 MG tablet Take 2 tablets (80 mg total) by mouth once daily AND 1 tablet (40 mg total) every evening. TAKE EXTRA 40 MG IF GAINING 2 OR MORE POUNDS IN 2 DAYS..    aspirin 81 MG Chew Take 1 tablet (81 mg total) by mouth once daily.    benzonatate (TESSALON) 100 MG capsule Take 1 capsule (100 mg total) by mouth 3 (three) times daily as needed for Cough. (Patient not taking: Reported on 8/17/2020)    clopidogreL (PLAVIX) 75 mg tablet Take 1 tablet (75 mg total) by mouth once daily.    duloxetine (CYMBALTA) 60 MG capsule Take 60 mg by mouth once daily.    gabapentin (NEURONTIN) 100 MG capsule Begin with 1 by mouth at bedtime daily for 7 days.  Then increase to one by mouth twice a day for 7 days and then one by mouth 3 times a day thereafter. (Patient not taking: Reported on 8/17/2020)    glimepiride (AMARYL) 1 MG tablet Take 1 tablet by mouth once  daily.    hydrALAZINE (APRESOLINE) 25 MG tablet Take 1 tablet (25 mg total) by mouth every 12 (twelve) hours.    hydroCHLOROthiazide (HYDRODIURIL) 12.5 MG Tab Take 12.5 mg by mouth once daily.    ISOSORBIDE ORAL Take by mouth.    metformin (GLUCOPHAGE) 500 MG tablet Take 1 tablet by mouth 2 (two) times daily.    metoprolol succinate (TOPROL-XL) 100 MG 24 hr tablet Take 1 tablet by mouth once daily.    spironolactone (ALDACTONE) 25 MG tablet Take 1 tablet (25 mg total) by mouth once daily.     Family History     Family history is unknown by patient.        Tobacco Use    Smoking status: Current Every Day Smoker     Types: Cigarettes    Smokeless tobacco: Never Used   Substance and Sexual Activity    Alcohol use: No     Alcohol/week: 0.0 standard drinks    Drug use: No    Sexual activity: Not on file     Review of Systems   Constitutional: Negative for chills, diaphoresis and fever.   HENT: Negative for rhinorrhea, sneezing, sore throat and trouble swallowing.    Eyes: Negative for pain, redness and visual disturbance.   Respiratory: Positive for shortness of breath. Negative for choking.    Cardiovascular: Positive for chest pain and leg swelling. Negative for palpitations.   Gastrointestinal: Negative for abdominal distention, abdominal pain, nausea and vomiting.   Genitourinary: Negative for dysuria and flank pain.   Musculoskeletal: Negative for neck pain and neck stiffness.   Skin: Negative for pallor and rash.   Neurological: Positive for headaches (bifrontal, 8/10, intermittent). Negative for tremors, seizures, syncope, facial asymmetry, speech difficulty, weakness, light-headedness and numbness.   Psychiatric/Behavioral: Negative for agitation, confusion and decreased concentration.     Objective:     Vital Signs (Most Recent):  Temp: 98 °F (36.7 °C) (08/26/20 0341)  Pulse: 89 (08/26/20 0600)  Resp: 13 (08/26/20 0600)  BP: 121/77 (08/26/20 0341)  SpO2: 98 % (08/26/20 0600) Vital Signs (24h  Range):  Temp:  [97.9 °F (36.6 °C)-98 °F (36.7 °C)] 98 °F (36.7 °C)  Pulse:  [] 89  Resp:  [13-20] 13  SpO2:  [98 %-100 %] 98 %  BP: (120-142)/(75-86) 121/77     Weight: 80 kg (176 lb 5.9 oz)  Body mass index is 28.47 kg/m².    Physical Exam  Constitutional:       General: She is not in acute distress.     Appearance: Normal appearance.   HENT:      Head: Normocephalic and atraumatic.      Mouth/Throat:      Mouth: Mucous membranes are moist.   Eyes:      General: No scleral icterus.     Extraocular Movements: Extraocular movements intact.      Conjunctiva/sclera: Conjunctivae normal.      Pupils: Pupils are equal, round, and reactive to light.   Neck:      Musculoskeletal: Normal range of motion. No neck rigidity.   Cardiovascular:      Rate and Rhythm: Normal rate.      Pulses: Normal pulses.      Heart sounds: No murmur. No gallop.    Pulmonary:      Effort: Pulmonary effort is normal. No respiratory distress.      Breath sounds: No wheezing or rales.   Abdominal:      General: Abdomen is flat. There is no distension.      Tenderness: There is no abdominal tenderness. There is no guarding.   Musculoskeletal: Normal range of motion.         General: No swelling or tenderness.   Skin:     General: Skin is warm.      Coloration: Skin is not jaundiced.      Findings: No rash.   Neurological:      General: No focal deficit present.      Mental Status: She is alert and oriented to person, place, and time. Mental status is at baseline.      Cranial Nerves: No cranial nerve deficit.      Sensory: No sensory deficit.      Motor: No weakness.      Coordination: Coordination normal.      Gait: Gait normal.      Deep Tendon Reflexes: Reflexes normal.   Psychiatric:         Mood and Affect: Mood normal.         Behavior: Behavior normal.         Thought Content: Thought content normal.         Judgment: Judgment normal.           CRANIAL NERVES     CN III, IV, VI   Pupils are equal, round, and reactive to light.        Significant Labs: All pertinent labs within the past 24 hours have been reviewed.    Significant Imaging: I have reviewed and interpreted all pertinent imaging results/findings within the past 24 hours.    Assessment/Plan:     * SOB (shortness of breath)  - lasix now 80 mg IV BID for acute on chronic HF for now  - eco cardiogram       Elevated troponin  This is more likely an acute exacerbation due to diet non compliance but since 1st troponin high:  - Trend troponin (8 then 8.3)  - Trend EKG   - heparin drip started      Coronary artery disease involving native heart  This is more likely an acute exacerbation due to diet non compliance but since 1st troponin high:  - Trend troponin (8 then 8.3)  - Trend EKG      Essential hypertension  - Continue home meds.  - lasix now 80 mg IV BID for acute on chronic HF for now      Type 2 diabetes mellitus without complication, without long-term current use of insulin  - Home dm meds on hold for now        VTE Risk Mitigation (From admission, onward)         Ordered     heparin 25,000 units in dextrose 5% 250 mL (100 units/mL) infusion LOW INTENSITY nomogram - [facility]  Continuous     Question:  Heparin Infusion Adjustment (DO NOT MODIFY ANSWER)  Answer:  \[email or web address]\epic\Images\Pharmacy\HeparinInfusions\heparin LOW INTENSITY nomogram for [facility] [id number]    08/26/20 0507     heparin 25,000 units in dextrose 5% (100 units/ml) IV bolus from bag - ADDITIONAL PRN BOLUS - 60 units/kg (max bolus 4000 units)  As needed (PRN)     Question:  Heparin Infusion Adjustment (DO NOT MODIFY ANSWER)  Answer:  \[email or web address]\epic\Images\Pharmacy\HeparinInfusions\heparin LOW INTENSITY nomogram for [facility] [id number].pdf    08/26/20 0507     heparin 25,000 units in dextrose 5% (100 units/ml) IV bolus from bag - ADDITIONAL PRN BOLUS - 30 units/kg (max bolus 4000 units)  As needed (PRN)     Question:  Heparin Infusion Adjustment (DO NOT MODIFY ANSWER)  Answer:   \\[facility]\epic\Images\Pharmacy\HeparinInfusions\heparin LOW INTENSITY nomogram for [facility] [id number].pdf    08/26/20 0507     Reason for no Mechanical VTE Prophylaxis  Once     Question:  Reasons:  Answer:  Physician Provided (leave comment)  Comment:  on hep drip    08/26/20 0500     Place sequential compression device  Until discontinued      08/26/20 0500                   Reynaldo Cross MD  Department of Hospital Medicine   [facility]                      08/26/2020                             STAFF PHYSICIAN NOTE                                   Attending Attestation for Rounds with Resident  I have reviewed and concur with the resident's history, physical, assessment, and plan.  I have personally interviewed and examined the patient at bedside and agree with the resident's findings.              59 year old lady with a history of schizophrenia, HTN, DM, CHF (EF 15%) and a recent R MCA stroke 2 weeks ago. She presented to the ED today with progressively worsening SOB with transient chest pain and headache.                            Lauren Mariano MD  Senior Hospitalist  [id number], [phone]

## 2020-08-26 NOTE — HOSPITAL COURSE
Mrs. Mohr 59 year old with nonischemic cardiomyopathy with dilated left ventricle and angiogram in December 2019 (mid LAD 40%, PROX RCA 50%) at an outside hospital showing nonobstructive coronary artery disease, recently admitted also to an outside hospital with a right MCA stroke status post tPA on August 14, 2020 presented to the emergency department with progressively worsening shortness of breath. Patient reported history of diet noncompliance, she was found to have an elevated Troponin >8 with no active CP and warm and wet on exam. Troponins have been slow to trend down in a patient without kidney disease. HTS evaluated the patient for advanced options and plans on following up outpatient. At this time, ACS is a possibility and would optimize her as best as possible. Patient is to undergo a PET stress this afternoon.

## 2020-08-26 NOTE — ASSESSMENT & PLAN NOTE
59-year-old lady with past medical history of nonischemic cardiomyopathy, low ejection fraction, dilated ventricle and angiogram at the end of last year showed nonobstructive coronary artery disease and recent right MCA embolic stroke presented with progressively worsening shortness of breath.     Patient is fluid overloaded based on history and physical exam.  She has history of nonischemic cardiomyopathy with low ejection fraction and nonobstructive coronary artery disease.  Currently is chest pain-free and hemodynamically stable.  Elevated troponin mostly represent demand ischemia vs recent tPA, however, type 1 MI cannot be ruled out completely at this point.     PLAN:  -- Continue home Aspirin and Plavix, okay to start Heparin gtt per Vascular Neurology.  -- Continue IV Lasix 80 BID for now due to still appears volume overloaded on exam. Likely can transition to P.O. tomorrow  -- At this time unsure if tropenemia is due to ruptured plaque vs recent tPA use. However patient has long standing EF of 15% and it is questionable at this point how efficacious acute revascularization would be. Will continue to monitor and consider possible PET stress inpatient vs OP. Would like hospitals to see patient first before decision is made  -- Please call Cardiology for any question.

## 2020-08-26 NOTE — PT/OT/SLP EVAL
"Physical Therapy Evaluation and Discharge Note    Patient Name:  Diomedes Mohr   MRN:  [id number]  Admitting Diagnosis:  SOB (shortness of breath)   Recent Surgery: * No surgery found *    Admit Date: 8/25/2020  Length of Stay: 0 days    Recommendations:     Discharge Recommendations:  home health PT, home health OT   Discharge Equipment Recommendations: none   Barriers to discharge: None    Assessment:     Diomedes Mohr is a 59 y.o. female admitted with a medical diagnosis of SOB (shortness of breath). Of note pt is s/p [facility]t [facility] 8/14/2020. Pt is currently functioning at a high level with ADLs and mobility. Pt denies any dizziness, SOB, or recent falls. PT provided education to pt regarding importance of maintaining frequent activity and ambulating while admitted to maintain current level of mobility. Pt does not require further acute skilled therapy intervention. At this time, patient is functioning at their prior level of function and does not require further acute PT services. Please re-consult if pt experiences a change in status.      Plan:     During this hospitalization, patient does not require further acute PT services.  Please re-consult if situation changes.      Subjective     RN notified prior to session. No family/friends present upon PT entrance into room.    Chief Complaint: "Do you get to decide to send me home?"  Patient/Family Comments/goals: go home  Pain/Comfort:  · Pain Rating 1: 0/10  · Pain Rating Post-Intervention 1: 0/10    Living Environment:  Patient lives with  in a single family home with 0 DONIS.   Prior Level of Function: Patient reports being mod I with mobility utilizing a RW (began after new stroke) &  [place]at[place] with ADLs. Patient uses DME as follows: glucometer, nebulizer, walker, rolling. DME owned (not currently used): none.  Roles/Repsonsibilities: Hand Dominance: right Work: no. Drive: yes. Managing Medicines/Managing Home: yes. Hobbies: plkaying bingo.    Patient reports " they will have assistance from spouse upon discharge.    Objective:     Additional staff present: Ot for evaluation    Patient found HOB elevated with telemetry, peripheral IV, pulse ox (continuous) upon PT entry to room.    General Precautions: Standard, fall   Orthopedic Precautions:N/A   Braces: N/A   Body mass index is 28.41 kg/m².  Respiratory Status: Room Air    Exam:  · Mental Status: Patient is oriented to AxOx4 and follows all single step commands. Pt is Alert, Cooperative and Tangential during session.  · Skin Integrity: Visible skin intact  · Edema: None noted   · Sensation: Intact  · Hearing: Intact  · Vision:  Intact  · Postural Assessment: no deviations noted  · Range of Motion:  · RUE: WFL  · LUE: WFL  · RLE: WFL  · LLE: WFL  · Strength Exam:  · Bilateral Upper Extremity Strength: grossly WFL  · Bilateral Lower Extremity Strength: grossly WFL    Outcome Measures:  AM-PAC 6 CLICK MOBILITY  Turning over in bed (including adjusting bedclothes, sheets and blankets)?: 4  Sitting down on and standing up from a chair with arms (e.g., wheelchair, bedside commode, etc.): 4  Moving from lying on back to sitting on the side of the bed?: 4  Moving to and from a bed to a chair (including a wheelchair)?: 3  Need to walk in hospital room?: 3  Climbing 3-5 steps with a railing?: 3  Basic Mobility Total Score: 21     Functional Mobility:  Bed Mobility:   · Rolling/Turning to Left: modified independence and with side rail  · Supine to Sit: modified independence and with HOB elevated; from Lt side of bed  · Scooting anteriorly to EOB to have both feet planted on floor: modified independence   ·   Sitting Balance at Edge of Bed:   Assistance Level Required: [place]    Transfers:   · Sit <> Stand Transfer: modified independence with rolling walker   · Stand <> Sit Transfer: modified independence with rolling walker   · [id number] from EOB    Standing Balance:   Assistance Level Required: Supervision   Patient used:  rolling walker    Comments: Pt performing trunk translations outside LUIS to reach for food on tray table as well as to observe surroundings around corner. Req'd safety cueing to decrease speed of mobements    Gait:   · Patient ambulated: 60 ft; 140 ft = 200 ft TOTAL (Standing rest break between)  · Patient required: modified independent  · Patient used:  rolling walker   · Gait Pattern observed: reciprocal gait  · Gait Deviation(s): decreased kinza  · all lines remained intact throughout ambulation trial  · Mask donned for out of room ambulation  · Comments: Pt was able to perform vertical/horizontal head turns, 180 degree turns while ambulating, and avoid hallway obstacles without LOB. SPO2 > 97% during ambulation.    Education:   Time provided for education, counseling and discussion of health disposition in regards to patient's current status   All questions answered within PT scope of practice and to patient's satisfaction   PT role in POC to address current functional deficits   Pt educated on proper body mechanics, safety techniques, and energy conservation with PT facilitation and cueing throughout session   Whiteboard updated with pt's current mobility status documented above   Safe to perform step transfer to/from chair/bedside commode mod I and RW prn w/ nursing/PCT present   Pt to ambulate 3x/day RW and mod I with nsg/family in order to maintain functional mobility   Importance of OOB tolerance 8-10 hrs/day to improve lung ventilation and expansion as well as strengthen postural musculature    RW ordered to room    Patient left seated EOB with all lines intact, call button in reach and RN present.    GOALS:   Multidisciplinary Problems     Physical Therapy Goals     Not on file          Multidisciplinary Problems (Resolved)        Problem: Physical Therapy Goal    Goal Priority Disciplines Outcome Goal Variances Interventions   Physical Therapy Goal   (Resolved)     PT, PT/OT Met                      History:     Past Medical History:   Diagnosis Date    Anxiety     CHF (congestive heart failure)     Depression     Diabetes mellitus     Dyspnea     H/O coronary angiogram     H/O: hysterectomy     Hyperlipemia     Hypertension     Pulmonary edema     Schizophrenia        Past Surgical History:   Procedure Laterality Date    BLADDER SUSPENSION      CARPAL TUNNEL RELEASE Right     HEEL SPUR SURGERY Left     LEFT HEART CATHETERIZATION Bilateral 12/27/2019    Procedure: Left heart cath;  Surgeon: Steve Chambers MD;  [facility];  Service: Cardiology;  Laterality: Bilateral;    TUBAL LIGATION         Time Tracking:     PT Received On: 08/26/20  PT Start Time: 1340     PT Stop Time: 1402  PT Total Time (min): 22 min     Billable Minutes: Evaluation 12 and Therapeutic Exercise 10    Alondra Galindo PT, DPT  8/26/2020  Pager: [phone]

## 2020-08-26 NOTE — ASSESSMENT & PLAN NOTE
S/P tPA 8/14 - on DAPT     PLAN:  -- Management per primary team  -- Okay per Vascular Surgery to continue on DAPT and to be on Heparin gtt

## 2020-08-26 NOTE — SUBJECTIVE & OBJECTIVE
Interval History: NAEON. Patient appears less hypervolemic today bus still has a distended abdomen and JVD on exam. Lungs have inspiratory Crackles B/L on auscultation. Accuate I/Os not assessed until this AM. Repeat TTE to be done this AM.    Review of Systems   Constitution: Positive for weight gain. Negative for chills and fever.   HENT: Negative for sore throat and tinnitus.    Eyes: Negative for double vision and pain.   Cardiovascular: Positive for dyspnea on exertion. Negative for chest pain, palpitations and paroxysmal nocturnal dyspnea.   Respiratory: Positive for shortness of breath. Negative for cough and wheezing.    Gastrointestinal: Positive for bloating. Negative for abdominal pain, nausea and vomiting.   Genitourinary: Negative for bladder incontinence, dysuria and frequency.   Neurological: Negative for focal weakness, headaches, light-headedness and weakness.   Psychiatric/Behavioral: Negative for altered mental status. The patient is not nervous/anxious.      Objective:     Vital Signs (Most Recent):  Temp: 98.7 °F (37.1 °C) (08/26/20 0749)  Pulse: 98 (08/26/20 0749)  Resp: 18 (08/26/20 0749)  BP: 118/71 (08/26/20 0749)  SpO2: 98 % (08/26/20 0749) Vital Signs (24h Range):  Temp:  [97.9 °F (36.6 °C)-98.7 °F (37.1 °C)] 98.7 °F (37.1 °C)  Pulse:  [] 98  Resp:  [13-20] 18  SpO2:  [98 %-100 %] 98 %  BP: (118-142)/(71-86) 118/71     Weight: 80 kg (176 lb 5.9 oz)  Body mass index is 28.47 kg/m².     SpO2: 98 %  O2 Device (Oxygen Therapy): room air      Intake/Output Summary (Last 24 hours) at 8/26/2020 0842  Last data filed at 8/26/2020 0750  Gross per 24 hour   Intake --   Output 600 ml   Net -600 ml       Lines/Drains/Airways     Peripheral Intravenous Line                 Peripheral IV - Single Lumen 08/25/20 2128 20 G Right Antecubital less than 1 day                Physical Exam   Constitutional: She is oriented to person, place, and time. She appears well-developed and well-nourished. No  distress.   HENT:   Head: Normocephalic and atraumatic.   Eyes: EOM are normal. No scleral icterus.   Neck: Normal range of motion. Neck supple. JVD present.   Cardiovascular: Normal rate, regular rhythm and intact distal pulses.   Pulmonary/Chest: Effort normal. She has rales.   Abdominal: Soft.   Musculoskeletal: Normal range of motion.         General: Edema (trace on B/L LE) present. No tenderness.   Neurological: She is alert and oriented to person, place, and time.   Skin: Skin is warm. She is not diaphoretic.   Psychiatric: She has a normal mood and affect. Her behavior is normal. Judgment and thought content normal.       Significant Labs:    CMP   Recent Labs   Lab 08/25/20 2127      K 3.2*      CO2 27   *   BUN 16   CREATININE 0.8   CALCIUM 9.3   PROT 7.3   ALBUMIN 3.7   BILITOT 0.5   ALKPHOS 143*   AST 50*   ALT 50*   ANIONGAP 11   ESTGFRAFRICA >60.0   EGFRNONAA >60.0   , CBC   Recent Labs   Lab 08/25/20 2127   WBC 8.22   HGB 12.0   HCT 37.7   *   , INR   Recent Labs   Lab 08/26/20  0512   INR 1.0    Troponin   Recent Labs   Lab 08/25/20 2127 08/26/20  0109 08/26/20  0629   TROPONINI 8.063* 8.380* 6.834*    and All pertinent lab results from the last 24 hours have been reviewed.    Significant Imaging: All pertinent imaging results from the last 24 hours have been reviewed.

## 2020-08-26 NOTE — ASSESSMENT & PLAN NOTE
This is more likely an acute exacerbation due to diet non compliance but since 1st troponin high:  - Trend troponin (8 then 8.3)  - Trend EKG

## 2020-08-26 NOTE — PROGRESS NOTES
[facility]  Cardiology  Progress Note    Patient Name: Diomedes Mohr  MRN: [id number]  Admission Date: 8/25/2020  Hospital Length of Stay: 0 days  Code Status: Prior   Attending Physician: Lauren Mariano MD   Primary Care Physician: Fernando Manzo MD  Expected Discharge Date:   Principal Problem:SOB (shortness of breath)    Subjective:     Hospital Course:   Mrs. Mohr 59 year old with nonischemic cardiomyopathy with dilated left ventricle and angiogram in December 2019 (mid LAD 40%, PROX RCA 50%) at an outside hospital showing nonobstructive coronary artery disease, recently admitted also to an outside hospital with a right MCA stroke status post tPA on August 14, 2020 presented to the emergency department with progressively worsening shortness of breath. Patient reported history of diet noncompliance, she was found to have an elevated Troponin >8 with no active CP and warm and wet on exam.    Interval History: NAEON. Patient appears less hypervolemic today bus still has a distended abdomen and JVD on exam. Lungs have inspiratory Crackles B/L on auscultation. Accuate I/Os not assessed until this AM. Repeat TTE to be done this AM.    Review of Systems   Constitution: Positive for weight gain. Negative for chills and fever.   HENT: Negative for sore throat and tinnitus.    Eyes: Negative for double vision and pain.   Cardiovascular: Positive for dyspnea on exertion. Negative for chest pain, palpitations and paroxysmal nocturnal dyspnea.   Respiratory: Positive for shortness of breath. Negative for cough and wheezing.    Gastrointestinal: Positive for bloating. Negative for abdominal pain, nausea and vomiting.   Genitourinary: Negative for bladder incontinence, dysuria and frequency.   Neurological: Negative for focal weakness, headaches, light-headedness and weakness.   Psychiatric/Behavioral: Negative for altered mental status. The patient is not nervous/anxious.      Objective:     Vital  Signs (Most Recent):  Temp: 98.7 °F (37.1 °C) (08/26/20 0749)  Pulse: 98 (08/26/20 0749)  Resp: 18 (08/26/20 0749)  BP: 118/71 (08/26/20 0749)  SpO2: 98 % (08/26/20 0749) Vital Signs (24h Range):  Temp:  [97.9 °F (36.6 °C)-98.7 °F (37.1 °C)] 98.7 °F (37.1 °C)  Pulse:  [] 98  Resp:  [13-20] 18  SpO2:  [98 %-100 %] 98 %  BP: (118-142)/(71-86) 118/71     Weight: 80 kg (176 lb 5.9 oz)  Body mass index is 28.47 kg/m².     SpO2: 98 %  O2 Device (Oxygen Therapy): room air      Intake/Output Summary (Last 24 hours) at 8/26/2020 0842  Last data filed at 8/26/2020 0750  Gross per 24 hour   Intake --   Output 600 ml   Net -600 ml       Lines/Drains/Airways     Peripheral Intravenous Line                 Peripheral IV - Single Lumen 08/25/20 2128 20 G Right Antecubital less than 1 day                Physical Exam   Constitutional: She is oriented to person, place, and time. She appears well-developed and well-nourished. No distress.   HENT:   Head: Normocephalic and atraumatic.   Eyes: EOM are normal. No scleral icterus.   Neck: Normal range of motion. Neck supple. JVD present.   Cardiovascular: Normal rate, regular rhythm and intact distal pulses.   Pulmonary/Chest: Effort normal. She has rales.   Abdominal: Soft.   Musculoskeletal: Normal range of motion.         General: Edema (trace on B/L LE) present. No tenderness.   Neurological: She is alert and oriented to person, place, and time.   Skin: Skin is warm. She is not diaphoretic.   Psychiatric: She has a normal mood and affect. Her behavior is normal. Judgment and thought content normal.       Significant Labs:    CMP   Recent Labs   Lab 08/25/20 2127      K 3.2*      CO2 27   *   BUN 16   CREATININE 0.8   CALCIUM 9.3   PROT 7.3   ALBUMIN 3.7   BILITOT 0.5   ALKPHOS 143*   AST 50*   ALT 50*   ANIONGAP 11   ESTGFRAFRICA >60.0   EGFRNONAA >60.0   , CBC   Recent Labs   Lab 08/25/20  2127   WBC 8.22   HGB 12.0   HCT 37.7   *   , INR   Recent  Labs   Lab 08/26/20  0512   INR 1.0    Troponin   Recent Labs   Lab 08/25/20  2127 08/26/20  0109 08/26/20  0629   TROPONINI 8.063* 8.380* 6.834*    and All pertinent lab results from the last 24 hours have been reviewed.    Significant Imaging: All pertinent imaging results from the last 24 hours have been reviewed.    Assessment and Plan:     Brief HPI:60 y/o F with NICM with an EF 15% and acutely decompensated (warm and wet) on exam with a new Tropenmia    Acute on chronic systolic heart failure  NICM NYHA Class III HF with reduced EF - Stage C: Structural heart disease with prior or current symptoms of HF  - Most recent TTE demonstrates: LVef 15%,   - EKG : Sinus Tach with LVH and no ST elevations  - Troponin  8.063-->8.380-->6.25  Pro-BNP 1206      At this time suspect that NICM is secondary to long standing HTN. She has newly increased LVIDD of 7 cm    PLAN:  - Fluid restriction at 1500 cc with strict I/Os and daily STANDING weights  - Maintain on telemetry and daily EKGs   - Check Electrolytes, keep Mag >2 & K+ >4  - Ambulate as tolerated    - Optimal therapy at this time to include :    - Anti-platelet agent with DAPT     - Would start patient on Losartan 50 mg QD. Would hold on starting an AA such as spironolactone as ARB is just started and want to monitor BP and Potassium. Will consider adding down the line    - BB with Toprol  mg QD     - Statin with Atorvastatin 80 mg QD    - Diuretic dosing of IV lasix 80 mg BID - Likely can transition to P.O. tomorrow        - HTS Consulted to assist with optimizing HF in the setting of Dilated LVIDD as patient may be a candidate for mechanical support.    - Follow up transitional care visit for heart failure at discharge    Embolic stroke involving right middle cerebral artery  S/P tPA 8/14 - on DAPT     PLAN:  -- Management per primary team  -- Okay per Vascular Surgery to continue on DAPT and to be on Heparin gtt    Elevated troponin  59-year-old lady with  past medical history of nonischemic cardiomyopathy, low ejection fraction, dilated ventricle and angiogram at the end of last year showed nonobstructive coronary artery disease and recent right MCA embolic stroke presented with progressively worsening shortness of breath.     Patient is fluid overloaded based on history and physical exam.  She has history of nonischemic cardiomyopathy with low ejection fraction and nonobstructive coronary artery disease.  Currently is chest pain-free and hemodynamically stable.  Elevated troponin mostly represent demand ischemia vs recent tPA, however, type 1 MI cannot be ruled out completely at this point.     PLAN:  -- Continue home Aspirin and Plavix, okay to start Heparin gtt per Vascular Neurology.  -- Continue IV Lasix 80 BID for now due to still appears volume overloaded on exam. Likely can transition to P.O. tomorrow  -- At this time unsure if tropenemia is due to ruptured plaque vs recent tPA use. However patient has long standing EF of 15% and it is questionable at this point how efficacious acute revascularization would be. Will continue to monitor and consider possible PET stress inpatient vs OP. Would like HTS to see patient first before decision is made  -- Please call Cardiology for any question.    Dilated cardiomyopathy  See Acute Decompensated HF      VTE Risk Mitigation (From admission, onward)         Ordered     heparin 25,000 units in dextrose 5% 250 mL (100 units/mL) infusion LOW INTENSITY nomogram - [facility]  Continuous     Question:  Heparin Infusion Adjustment (DO NOT MODIFY ANSWER)  Answer:  [email or web address]\Images\Pharmacy\HeparinInfusions\heparin LOW INTENSITY nomogram for [facility] [id number]    08/26/20 0507     heparin 25,000 units in dextrose 5% (100 units/ml) IV bolus from bag - ADDITIONAL PRN BOLUS - 60 units/kg (max bolus 4000 units)  As needed (PRN)     Question:  Heparin Infusion Adjustment (DO NOT MODIFY ANSWER)  Answer:   [email or web address]\Images\Pharmacy\HeparinInfusions\heparin LOW INTENSITY nomogram for [facility] [id number].pdf    08/26/20 0507     heparin 25,000 units in dextrose 5% (100 units/ml) IV bolus from bag - ADDITIONAL PRN BOLUS - 30 units/kg (max bolus 4000 units)  As needed (PRN)     Question:  Heparin Infusion Adjustment (DO NOT MODIFY ANSWER)  Answer:  [email or web address]\Images\Pharmacy\HeparinInfusions\heparin LOW INTENSITY nomogram for [facility] [id number].pdf    08/26/20 0507     Reason for no Mechanical VTE Prophylaxis  Once     Question:  Reasons:  Answer:  Physician Provided (leave comment)  Comment:  on hep drip    08/26/20 0500     Place sequential compression device  Until discontinued      08/26/20 0500                Michelle Young DO  Cardiology  [facility]

## 2020-08-26 NOTE — ASSESSMENT & PLAN NOTE
Patient is a 59yoF with CHF (EF 15%), anxiety, HTN, HLD, schizophrenia and a recent R MCA stroke (s/p tPA on 8/14, w/o residual sx) who [place] [facility] [place], transient (resolved) chest pain, and intermittent frontal HA. Upon examination by neurology, all sx had resolved. She was noted to have fluid overload with a troponemia (8) and elevated BNP in ED. Vascular neurology consulted for recommendations of possible heparin initiation with concurrent DAPT given patient's current clinical picture.    Per cardiology, patient would benefit from AC currently. From a vascular neurology perspective, the patient is 12 days removed from a R MCA embolic stroke and has since been on DAPT without any clinical changes or residual symptoms. She may warrant a CTH in the future to ensure no change in any intracranial process or should she develop any new mental status change or focal deficit.     Antithrombotics for secondary stroke prevention: Continue DAPT (home regimen); Appropriate to initiate heparin drip per Cardiology recommendations as cardiologic benefits outweigh the risks. Patient is already 12 days removed from embolic R MCA infarct, on DAPT, and without any significant change in neuro exam to this point. Can consider CTH in future.    Statins for secondary stroke prevention and hyperlipidemia, if present:   Statins: Atorvastatin- 40 mg daily    Aggressive risk factor modification: HTN, DM, HLD, Diet, Exercise, CAD     Rehab efforts: The patient has been evaluated by a stroke team provider and the therapy needs have been fully considered based off the presenting complaints and exam findings. The following therapy evaluations are needed: None    Diagnostics ordered/pending: Other: Could consider CTH in future    VTE prophylaxis: None -- given possible initiation of heparin    BP parameters: per cardiology recs

## 2020-08-27 ENCOUNTER — TELEPHONE (OUTPATIENT)
Dept: NEUROLOGY | Facility: CLINIC | Age: 60
End: 2020-08-27

## 2020-08-27 LAB
ANION GAP SERPL CALC-SCNC: 10 MMOL/L (ref 8–16)
ANION GAP SERPL CALC-SCNC: 8 MMOL/L (ref 8–16)
ANISOCYTOSIS BLD QL SMEAR: SLIGHT
APTT BLDCRRT: 50.6 SEC (ref 21–32)
APTT BLDCRRT: 58.2 SEC (ref 21–32)
BASOPHILS # BLD AUTO: 0.06 K/UL (ref 0–0.2)
BASOPHILS NFR BLD: 0.8 % (ref 0–1.9)
BUN SERPL-MCNC: 16 MG/DL (ref 6–20)
BUN SERPL-MCNC: 22 MG/DL (ref 6–20)
CALCIUM SERPL-MCNC: 8.9 MG/DL (ref 8.7–10.5)
CALCIUM SERPL-MCNC: 9.4 MG/DL (ref 8.7–10.5)
CFR FLOW - ANTERIOR: 1.74
CFR FLOW - INFERIOR: 1.64
CFR FLOW - LATERAL: 1.77
CFR FLOW - MAX: 2.51
CFR FLOW - MIN: 1.16
CFR FLOW - SEPTAL: 1.61
CFR FLOW - WHOLE HEART: 1.69
CHLORIDE SERPL-SCNC: 101 MMOL/L (ref 95–110)
CHLORIDE SERPL-SCNC: 97 MMOL/L (ref 95–110)
CO2 SERPL-SCNC: 30 MMOL/L (ref 23–29)
CO2 SERPL-SCNC: 31 MMOL/L (ref 23–29)
CREAT SERPL-MCNC: 0.8 MG/DL (ref 0.5–1.4)
CREAT SERPL-MCNC: 1 MG/DL (ref 0.5–1.4)
CV STRESS BASE HR: 78 BPM
DIASTOLIC BLOOD PRESSURE: 61 MMHG
DIFFERENTIAL METHOD: ABNORMAL
END DIASTOLIC INDEX-HIGH: 170 ML/M2
END SYSTOLIC INDEX-HIGH: 70 ML/M2
EOSINOPHIL # BLD AUTO: 0.3 K/UL (ref 0–0.5)
EOSINOPHIL NFR BLD: 3.8 % (ref 0–8)
ERYTHROCYTE [DISTWIDTH] IN BLOOD BY AUTOMATED COUNT: 14.8 % (ref 11.5–14.5)
EST. GFR  (AFRICAN AMERICAN): >60 ML/MIN/1.73 M^2
EST. GFR  (AFRICAN AMERICAN): >60 ML/MIN/1.73 M^2
EST. GFR  (NON AFRICAN AMERICAN): >60 ML/MIN/1.73 M^2
EST. GFR  (NON AFRICAN AMERICAN): >60 ML/MIN/1.73 M^2
GLUCOSE SERPL-MCNC: 152 MG/DL (ref 70–110)
GLUCOSE SERPL-MCNC: 153 MG/DL (ref 70–110)
HCT VFR BLD AUTO: 37.9 % (ref 37–48.5)
HGB BLD-MCNC: 12 G/DL (ref 12–16)
HYPOCHROMIA BLD QL SMEAR: ABNORMAL
IMM GRANULOCYTES # BLD AUTO: 0.01 K/UL (ref 0–0.04)
IMM GRANULOCYTES NFR BLD AUTO: 0.1 % (ref 0–0.5)
LYMPHOCYTES # BLD AUTO: 4.1 K/UL (ref 1–4.8)
LYMPHOCYTES NFR BLD: 54.1 % (ref 18–48)
MAGNESIUM SERPL-MCNC: 2.1 MG/DL (ref 1.6–2.6)
MCH RBC QN AUTO: 28.1 PG (ref 27–31)
MCHC RBC AUTO-ENTMCNC: 31.7 G/DL (ref 32–36)
MCV RBC AUTO: 89 FL (ref 82–98)
MONOCYTES # BLD AUTO: 0.5 K/UL (ref 0.3–1)
MONOCYTES NFR BLD: 6.2 % (ref 4–15)
NEUTROPHILS # BLD AUTO: 2.7 K/UL (ref 1.8–7.7)
NEUTROPHILS NFR BLD: 35 % (ref 38–73)
NRBC BLD-RTO: 0 /100 WBC
NUC REST DIASTOLIC VOLUME INDEX: 323
NUC REST EJECTION FRACTION: 15
NUC REST SYSTOLIC VOLUME INDEX: 273
NUC STRESS DIASTOLIC VOLUME INDEX: 293
NUC STRESS EJECTION FRACTION: 26 %
NUC STRESS SYSTOLIC VOLUME INDEX: 218
OHS CV CPX 85 PERCENT MAX PREDICTED HEART RATE MALE: 132
OHS CV CPX MAX PREDICTED HEART RATE: 155
OHS CV CPX PATIENT IS FEMALE: 1
OHS CV CPX PATIENT IS MALE: 0
OHS CV CPX PEAK DIASTOLIC BLOOD PRESSURE: 56 MMHG
OHS CV CPX PEAK HEAR RATE: 141 BPM
OHS CV CPX PEAK RATE PRESSURE PRODUCT: NORMAL
OHS CV CPX PEAK SYSTOLIC BLOOD PRESSURE: 115 MMHG
OHS CV CPX PERCENT MAX PREDICTED HEART RATE ACHIEVED: 91
OHS CV CPX RATE PRESSURE PRODUCT PRESENTING: 7254
OVALOCYTES BLD QL SMEAR: ABNORMAL
PLATELET # BLD AUTO: 385 K/UL (ref 150–350)
PLATELET BLD QL SMEAR: ABNORMAL
PMV BLD AUTO: 10.6 FL (ref 9.2–12.9)
POIKILOCYTOSIS BLD QL SMEAR: SLIGHT
POLYCHROMASIA BLD QL SMEAR: ABNORMAL
POTASSIUM SERPL-SCNC: 3.4 MMOL/L (ref 3.5–5.1)
POTASSIUM SERPL-SCNC: 3.4 MMOL/L (ref 3.5–5.1)
RBC # BLD AUTO: 4.27 M/UL (ref 4–5.4)
REST FLOW - ANTERIOR: 0.81 CC/MIN/G
REST FLOW - INFERIOR: 0.58 CC/MIN/G
REST FLOW - LATERAL: 0.85 CC/MIN/G
REST FLOW - MAX: 1.17 CC/MIN/G
REST FLOW - MIN: 0.41 CC/MIN/G
REST FLOW - SEPTAL: 0.58 CC/MIN/G
REST FLOW - WHOLE HEART: 0.71 CC/MIN/G
RETIRED EF AND QEF - SEE NOTES: 51 %
SODIUM SERPL-SCNC: 136 MMOL/L (ref 136–145)
SODIUM SERPL-SCNC: 141 MMOL/L (ref 136–145)
STRESS FLOW - ANTERIOR: 1.4 CC/MIN/G
STRESS FLOW - INFERIOR: 0.94 CC/MIN/G
STRESS FLOW - LATERAL: 1.5 CC/MIN/G
STRESS FLOW - MAX: 2 CC/MIN/G
STRESS FLOW - MIN: 0.74 CC/MIN/G
STRESS FLOW - SEPTAL: 0.93 CC/MIN/G
STRESS FLOW - WHOLE HEART: 1.19 CC/MIN/G
STRESS ST DEPRESSION: 0.5 MM
SYSTOLIC BLOOD PRESSURE: 93 MMHG
TROPONIN I SERPL DL<=0.01 NG/ML-MCNC: 6.14 NG/ML (ref 0–0.03)
WBC # BLD AUTO: 7.58 K/UL (ref 3.9–12.7)

## 2020-08-27 PROCEDURE — 85730 THROMBOPLASTIN TIME PARTIAL: CPT

## 2020-08-27 PROCEDURE — 99233 PR SUBSEQUENT HOSPITAL CARE,LEVL III: ICD-10-PCS | Mod: ,,, | Performed by: HOSPITALIST

## 2020-08-27 PROCEDURE — 99233 SBSQ HOSP IP/OBS HIGH 50: CPT | Mod: ,,, | Performed by: HOSPITALIST

## 2020-08-27 PROCEDURE — 84484 ASSAY OF TROPONIN QUANT: CPT

## 2020-08-27 PROCEDURE — 25000003 PHARM REV CODE 250: Performed by: STUDENT IN AN ORGANIZED HEALTH CARE EDUCATION/TRAINING PROGRAM

## 2020-08-27 PROCEDURE — 85025 COMPLETE CBC W/AUTO DIFF WBC: CPT

## 2020-08-27 PROCEDURE — 20600001 HC STEP DOWN PRIVATE ROOM

## 2020-08-27 PROCEDURE — 99233 SBSQ HOSP IP/OBS HIGH 50: CPT | Mod: ,,, | Performed by: PSYCHIATRY & NEUROLOGY

## 2020-08-27 PROCEDURE — 63600175 PHARM REV CODE 636 W HCPCS: Performed by: HOSPITALIST

## 2020-08-27 PROCEDURE — 80048 BASIC METABOLIC PNL TOTAL CA: CPT

## 2020-08-27 PROCEDURE — 83735 ASSAY OF MAGNESIUM: CPT

## 2020-08-27 PROCEDURE — 99233 PR SUBSEQUENT HOSPITAL CARE,LEVL III: ICD-10-PCS | Mod: ,,, | Performed by: PSYCHIATRY & NEUROLOGY

## 2020-08-27 PROCEDURE — 80048 BASIC METABOLIC PNL TOTAL CA: CPT | Mod: 91

## 2020-08-27 PROCEDURE — 36415 COLL VENOUS BLD VENIPUNCTURE: CPT

## 2020-08-27 PROCEDURE — 96376 TX/PRO/DX INJ SAME DRUG ADON: CPT

## 2020-08-27 PROCEDURE — 63600175 PHARM REV CODE 636 W HCPCS: Performed by: STUDENT IN AN ORGANIZED HEALTH CARE EDUCATION/TRAINING PROGRAM

## 2020-08-27 RX ORDER — POTASSIUM CHLORIDE 20 MEQ/1
40 TABLET, EXTENDED RELEASE ORAL ONCE
Status: COMPLETED | OUTPATIENT
Start: 2020-08-27 | End: 2020-08-27

## 2020-08-27 RX ORDER — FUROSEMIDE 40 MG/1
40 TABLET ORAL 2 TIMES DAILY
Status: DISCONTINUED | OUTPATIENT
Start: 2020-08-27 | End: 2020-08-28 | Stop reason: HOSPADM

## 2020-08-27 RX ORDER — POTASSIUM CHLORIDE 20 MEQ/1
40 TABLET, EXTENDED RELEASE ORAL
Status: COMPLETED | OUTPATIENT
Start: 2020-08-27 | End: 2020-08-28

## 2020-08-27 RX ORDER — POTASSIUM CHLORIDE 20 MEQ/1
40 TABLET, EXTENDED RELEASE ORAL
Status: DISCONTINUED | OUTPATIENT
Start: 2020-08-28 | End: 2020-08-27

## 2020-08-27 RX ORDER — ATROPINE SULFATE 1 MG/ML
0.5 INJECTION, SOLUTION INTRAMUSCULAR; INTRAVENOUS; SUBCUTANEOUS ONCE
Status: COMPLETED | OUTPATIENT
Start: 2020-08-27 | End: 2020-08-27

## 2020-08-27 RX ORDER — ENOXAPARIN SODIUM 100 MG/ML
40 INJECTION SUBCUTANEOUS EVERY 24 HOURS
Status: DISCONTINUED | OUTPATIENT
Start: 2020-08-27 | End: 2020-08-28 | Stop reason: HOSPADM

## 2020-08-27 RX ORDER — DOBUTAMINE HYDROCHLORIDE 200 MG/100ML
10 INJECTION INTRAVENOUS CONTINUOUS
Status: DISCONTINUED | OUTPATIENT
Start: 2020-08-27 | End: 2020-08-27

## 2020-08-27 RX ADMIN — LOSARTAN POTASSIUM 50 MG: 50 TABLET, FILM COATED ORAL at 08:08

## 2020-08-27 RX ADMIN — ENOXAPARIN SODIUM 40 MG: 100 INJECTION SUBCUTANEOUS at 05:08

## 2020-08-27 RX ADMIN — METOPROLOL SUCCINATE 100 MG: 100 TABLET, EXTENDED RELEASE ORAL at 08:08

## 2020-08-27 RX ADMIN — ASPIRIN 81 MG CHEWABLE TABLET 81 MG: 81 TABLET CHEWABLE at 08:08

## 2020-08-27 RX ADMIN — FUROSEMIDE 80 MG: 10 INJECTION, SOLUTION INTRAMUSCULAR; INTRAVENOUS at 05:08

## 2020-08-27 RX ADMIN — ATORVASTATIN CALCIUM 80 MG: 20 TABLET, FILM COATED ORAL at 09:08

## 2020-08-27 RX ADMIN — POTASSIUM CHLORIDE 40 MEQ: 1500 TABLET, EXTENDED RELEASE ORAL at 11:08

## 2020-08-27 RX ADMIN — DOBUTAMINE HYDROCHLORIDE 10 MCG/KG/MIN: 200 INJECTION INTRAVENOUS at 01:08

## 2020-08-27 RX ADMIN — POTASSIUM CHLORIDE 40 MEQ: 1500 TABLET, EXTENDED RELEASE ORAL at 08:08

## 2020-08-27 RX ADMIN — CLOPIDOGREL 75 MG: 75 TABLET, FILM COATED ORAL at 08:08

## 2020-08-27 RX ADMIN — FUROSEMIDE 40 MG: 40 TABLET ORAL at 05:08

## 2020-08-27 RX ADMIN — ATROPINE SULFATE 0.5 MG: 1 INJECTION, SOLUTION INTRAMUSCULAR; INTRAVENOUS; SUBCUTANEOUS at 01:08

## 2020-08-27 NOTE — HOSPITAL COURSE
8/26: Continue DAPT. Appropriate to initiate heparin drip per Cardiology recommendations as cardiologic benefits outweigh the risks.   8/27: No change in neuro exam., NIH 0.

## 2020-08-27 NOTE — SUBJECTIVE & OBJECTIVE
Neurologic Chief Complaint: none.     Subjective:     Interval History: Patient is seen for follow-up neurological assessment and treatment recommendations: No change in neuro exam., NIH 0.    HPI, Past Medical, Family, and Social History remains the same as documented in the initial encounter.     Review of Systems   Constitutional: Negative for chills and fever.   Respiratory: Negative for shortness of breath.    Cardiovascular: Negative for chest pain.   Gastrointestinal: Negative for diarrhea and vomiting.   Neurological: Negative for facial asymmetry, speech difficulty, weakness and numbness.     Scheduled Meds:   aspirin  81 mg Oral Daily    atorvastatin  80 mg Oral Daily    clopidogreL  75 mg Oral Daily    enoxaparin  40 mg Subcutaneous Q24H    furosemide  40 mg Oral BID    losartan  50 mg Oral Daily    metoprolol succinate  100 mg Oral Daily     Continuous Infusions:  PRN Meds:acetaminophen, dextrose 50%, dextrose 50%, glucagon (human recombinant), glucose, glucose, melatonin    Objective:     Vital Signs (Most Recent):  Temp: 98.2 °F (36.8 °C) (08/27/20 1109)  Pulse: 93 (08/27/20 1509)  Resp: 18 (08/27/20 1200)  BP: 95/67 (08/27/20 1200)  SpO2: 99 % (08/27/20 1200)  BP Location: Left arm    Vital Signs Range (Last 24H):  Temp:  [98.1 °F (36.7 °C)-98.8 °F (37.1 °C)]   Pulse:  []   Resp:  [13-24]   BP: ()/(56-91)   SpO2:  [98 %-99 %]   BP Location: Left arm    Physical Exam  HENT:      Head: Normocephalic.   Eyes:      Extraocular Movements: Extraocular movements intact.      Pupils: Pupils are equal, round, and reactive to light.   Cardiovascular:      Rate and Rhythm: Normal rate.   Pulmonary:      Effort: Pulmonary effort is normal.   Skin:     General: Skin is warm and dry.   Neurological:      Mental Status: She is alert and oriented to person, place, and time. Mental status is at baseline.   Psychiatric:         Speech: Speech normal.         Neurological Exam:   LOC: alert  Attention  Span: Good   Language: No aphasia  Articulation: No dysarthria  Orientation: Person, Place, Time   EOM (CN III, IV, VI): Full/intact  Facial Movement (CN VII): Symmetric facial expression    Motor: Arm left  Normal 5/5  Leg left  Normal 5/5  Arm right  Normal 5/5  Leg right Normal 5/5  Sensation: Intact to light touch, temperature and vibration  Tone: Normal tone throughout    Laboratory:  CMP:   Recent Labs   Lab 08/27/20  0440   CALCIUM 8.9      K 3.4*   CO2 31*   CL 97   BUN 16   CREATININE 0.8     CBC:   Recent Labs   Lab 08/27/20  0440   WBC 7.58   RBC 4.27   HGB 12.0   HCT 37.9   *   MCV 89   MCH 28.1   MCHC 31.7*     Lipid Panel: No results for input(s): CHOL, LDLCALC, HDL, TRIG in the last 168 hours.  Hgb A1C: No results for input(s): HGBA1C in the last 168 hours.  TSH: No results for input(s): TSH in the last 168 hours.    Diagnostic Results   Brain imaging:  MRI/MRA 8/14: Moderate-sized right frontal lobe acute infarct centered within the insular region without associated hemorrhagic component.  Minimal edema and regional mass effect without significant midline shift or herniation. Remote microhemorrhage of the right cerebellar hemisphere. MRA demonstrates major branch vessel occlusion of the proximal anterior superior M2 branch of right MCA               Cardiac Evaluation:   8/14 TTE:   · Severe left ventricular enlargement.  · Severely decreased left ventricular systolic function. The estimated ejection fraction is 15%.  · Moderate right ventricular enlargement.  · Mildly to moderately reduced right ventricular systolic function.  · Grade I (mild) left ventricular diastolic dysfunction consistent with impaired relaxation.  · Trivial pericardial effusion.  · Normal central venous pressure (3 mmHg).

## 2020-08-27 NOTE — PLAN OF CARE
POC reviewed with patient. VSS. Patient free of injuries and falls. Patient has no c/o pain or discomfort. Telemetry monitor showing NSR. Patient on heparing gtt; infusing per MAR and nomogram. Patient diuresing with 80 mg lasix IVP BID; diuresing well. Trending troponins. HTS consulted for possible further options. All questions were addressed. [id number].

## 2020-08-27 NOTE — PLAN OF CARE
POC reviewed with patient. VSS. A/Ox4. Up independently. Heparin gtt discontinued and started on lovenox. Po lasix started. Stress test completed. Patient denies pain or SOB. Will continue to monitor

## 2020-08-27 NOTE — ASSESSMENT & PLAN NOTE
Patient is a 59yoF with CHF (EF 15%), anxiety, HTN, HLD, schizophrenia and a recent R MCA stroke (s/p tPA on 8/14, w/o residual sx)  presented to [facility] ED following onset of SOB with transient chest pain and intermittent bifrontal 8/10 HA. Given her recent stroke s/p tPA and current DAPT regimen, vascular neurology was consulted for recommendations regarding possible AC    See Embolic stroke R MCA for full recommendations

## 2020-08-27 NOTE — PLAN OF CARE
08/27/20 1257   Discharge Reassessment   Assessment Type Discharge Planning Reassessment   Provided patient/caregiver education on the expected discharge date and the discharge plan No  (BABS)   Do you have any problems affording any of your prescribed medications? No   Discharge Plan A Home Health   Discharge Plan B Home with family   DME Needed Upon Discharge  none   Anticipated Discharge Disposition Home-Health     Julie Haase RN  Case Management [phone]

## 2020-08-27 NOTE — NURSING TRANSFER
Nursing Transfer Note      8/27/2020     Transfer To: [facility]    Transfer via wheelchair    Transfer with cardiac monitoring    Transported by transport    Medicines sent: na    Chart send with patient: No    Notified: scooter

## 2020-08-27 NOTE — TELEPHONE ENCOUNTER
Called to schedule appt for pt with stroke center. Spoke with . He state pt is currently in [facility]. Appt cancelled.

## 2020-08-27 NOTE — ASSESSMENT & PLAN NOTE
Patient is a 59yoF with CHF (EF 15%), anxiety, HTN, HLD, schizophrenia and a recent R MCA stroke (s/p tPA on 8/14, w/o residual sx) who presented to [facility] with SOB, transient (resolved) chest pain, and intermittent frontal HA. Upon examination by neurology, all sx had resolved. She was noted to have fluid overload with a troponemia (8) and elevated BNP in ED. Vascular neurology consulted for recommendations of possible heparin initiation with concurrent DAPT given patient's current clinical picture.    Per cardiology, patient would benefit from AC currently. From a vascular neurology perspective, the patient is 12 days removed from a R MCA embolic stroke and has since been on DAPT without any clinical changes or residual symptoms. CT head if she develops any new mental status change or focal deficit.     Antithrombotics for secondary stroke prevention: Continue DAPT (home regimen); Appropriate to initiate heparin drip per Cardiology recommendations as cardiologic benefits outweigh the risks. Patient is already 12 days removed from embolic R MCA infarct, on DAPT, and without any significant change in neuro exam to this point. CTH if neuro change.    Statins for secondary stroke prevention and hyperlipidemia, if present:   Statins: Atorvastatin- 40 mg daily    Aggressive risk factor modification: HTN, DM, HLD, Diet, Exercise, CAD     Rehab efforts: The patient has been evaluated by a stroke team provider and the therapy needs have been fully considered based off the presenting complaints and exam findings. The following therapy evaluations are needed: None    Diagnostics ordered/pending: None     VTE prophylaxis: None -- given possible initiation of heparin    BP parameters: per cardiology recs     Will sign off at this time. Please contact Vascular Neurology for any questions or concerns.

## 2020-08-27 NOTE — HOSPITAL COURSE
Patient admitted to hospital medicine for SOB. She had elevated troponins on admission with peak of 8.0. There was concern for ACS- NSTEMI, she was treated with Heparin ggt. Cardiology was consulted to evaluate, patient ended up getting a PET scan on 8/27 which was revealing of nonobstructive disease. She was also treated with IV diuretics which was transitioned to PO on discharge. Patient's 2D ECHO was consistent with previous ones with an EF of 10-15%, grade II diastolic dysfunction and severe left atrial enlargement. Patient was started on GDMT with Losartan & Metoprolol. She was evaluated by [facility] with plans for further work up outpatient.

## 2020-08-27 NOTE — ASSESSMENT & PLAN NOTE
NICM NYHA Class III HF with reduced EF - Stage C: Structural heart disease with prior or current symptoms of HF  - Most recent TTE demonstrates: LVef 15%,   - EKG : Sinus Tach with LVH and no ST elevations  - Troponin  8.063-->8.380-->6.25-->6.125  Pro-BNP 1206      At this time suspect that NICM is secondary to long standing HTN. She has newly increased LVIDD of 7 cm    PLAN:  - Fluid restriction at 1500 cc with strict I/Os and daily STANDING weights  - Maintain on telemetry and daily EKGs   - Check Electrolytes, keep Mag >2 & K+ >4  - Ambulate as tolerated    - Optimal therapy at this time to include :    - Anti-platelet agent with DAPT     - Continue Losartan 50 mg QD. Would hold on starting an AA such as spironolactone or changing to entresto till patient is evaluated by HTS in clinic    - BB with Toprol  mg QD     - Statin with Atorvastatin 80 mg QD    - Diuretic dosing of lasix - Change to P.O. - Can do 40 mg BID but can titrate up to 60 BID if UOP drops off or she Appears more hyerpvolemic        - HTS will see patient OP to optimize HF in the setting of Dilated LVIDD as patient may be a candidate for mechanical support.    - Follow up transitional care visit for heart failure at discharge    Cardiology will sign off at this time. Please reconsult for Further questions or concerns

## 2020-08-27 NOTE — ASSESSMENT & PLAN NOTE
59-year-old lady with past medical history of nonischemic cardiomyopathy, low ejection fraction, dilated ventricle and angiogram at the end of last year showed nonobstructive coronary artery disease and recent right MCA embolic stroke presented with progressively worsening shortness of breath.     Patient is fluid overloaded based on history and physical exam.  She has history of nonischemic cardiomyopathy with low ejection fraction and nonobstructive coronary artery disease.  Currently is chest pain-free and hemodynamically stable.  Elevated troponin mostly represent demand ischemia vs recent tPA, however, type 1 MI cannot be ruled out completely at this point.     PLAN:  -- Continue home Aspirin and Plavix, okay to start Heparin gtt per Vascular Neurology.  -- Change IV lasix to P.O. would recommend either 40 mg BID or 60 mg BID if patient appear to be more hypervolemic.  -- At this time unsure if tropenemia is due to ruptured plaque vs recent tPA use. However Troponins have been slow to clear in a person without CKD so will assess for ischemia with a PET Stress Test today at 12:30 PM - keep NPO till after the exam  -- Patient seen by [facility] for advanced options and will F/U with them OP  -- Please call Cardiology for any question.

## 2020-08-27 NOTE — PHARMACY MED REC
"Admission Medication Reconciliation - Pharmacy Consult Note    The home medication history was taken by Lotus Ferrari, pharmacy technician.  Based on information gathered and subsequent review by the clinical pharmacist, the items below may need attention.     You may go to "Admission" then "Reconcile Home Medications" tabs to review and/or act upon these items.     Potentially problematic discrepancies with current MAR  o Patient IS taking the following which was not ordered upon admit  o Lyrica 100 mg BID PRN   o Patient IS NOT taking the following which was ordered upon admit  o Metoprolol succinate 100 mg QD (pt reports not taking)    Please address this information as you see fit.  Feel free to contact us if you have any questions or require assistance.  Aurelia Lomas  EXT [phone]     .    .            "

## 2020-08-27 NOTE — SUBJECTIVE & OBJECTIVE
Interval History: NAEON. Troponins have been slow to trend down in a patient without kidney disease. [facility] evaluated the patient for advanced options and plans on following up outpatient. At this time, ACS is a possibility and would optimize her as best as possible. Patient is to undergo a PET stress this afternoon.      Review of Systems   Constitution: Positive for weight gain. Negative for chills and fever.   HENT: Negative for sore throat and tinnitus.    Eyes: Negative for double vision and pain.   Cardiovascular: Positive for dyspnea on exertion. Negative for chest pain, palpitations and paroxysmal nocturnal dyspnea.   Respiratory: Positive for shortness of breath. Negative for cough and wheezing.    Gastrointestinal: Positive for bloating. Negative for abdominal pain, nausea and vomiting.   Genitourinary: Negative for bladder incontinence, dysuria and frequency.   Neurological: Negative for focal weakness, headaches, light-headedness and weakness.   Psychiatric/Behavioral: Negative for altered mental status. The patient is not nervous/anxious.      Objective:     Vital Signs (Most Recent):  Temp: 98.1 °F (36.7 °C) (08/27/20 0727)  Pulse: 82 (08/27/20 0753)  Resp: 18 (08/27/20 0727)  BP: 108/64 (08/27/20 0727)  SpO2: 99 % (08/27/20 0727) Vital Signs (24h Range):  Temp:  [96.6 °F (35.9 °C)-98.8 °F (37.1 °C)] 98.1 °F (36.7 °C)  Pulse:  [] 82  Resp:  [13-24] 18  SpO2:  [98 %-99 %] 99 %  BP: ()/(64-91) 108/64     Weight: 79.8 kg (176 lb)  Body mass index is 28.41 kg/m².     SpO2: 99 %  O2 Device (Oxygen Therapy): room air      Intake/Output Summary (Last 24 hours) at 8/27/2020 1104  Last data filed at 8/27/2020 0900  Gross per 24 hour   Intake 1422 ml   Output 2500 ml   Net -1078 ml       Lines/Drains/Airways     Peripheral Intravenous Line                 Peripheral IV - Single Lumen 08/25/20 2128 20 G Right Antecubital 1 day                Physical Exam   Constitutional: She is oriented to person,  place, and time. She appears well-developed and well-nourished. No distress.   HENT:   Head: Normocephalic and atraumatic.   Eyes: EOM are normal. No scleral icterus.   Neck: Normal range of motion. Neck supple. No JVD present.   Cardiovascular: Normal rate, regular rhythm and intact distal pulses.   Pulmonary/Chest: Effort normal. She has rales (improving).   Abdominal: Soft.   Musculoskeletal: Normal range of motion.         General: No tenderness or edema.   Neurological: She is alert and oriented to person, place, and time.   Skin: Skin is warm. She is not diaphoretic.   Psychiatric: She has a normal mood and affect. Her behavior is normal. Judgment and thought content normal.       Significant Labs:   CMP   Recent Labs   Lab 08/25/20 2127 08/26/20 0629 08/26/20  1603 08/27/20  0440    140 140 136   K 3.2* 2.9* 4.0 3.4*    100 100 97   CO2 27 27 30* 31*   * 175* 275* 153*   BUN 16 13 16 16   CREATININE 0.8 0.8 1.0 0.8   CALCIUM 9.3 8.9 9.3 8.9   PROT 7.3 6.9  --   --    ALBUMIN 3.7 3.5  --   --    BILITOT 0.5 0.7  --   --    ALKPHOS 143* 135  --   --    AST 50* 40  --   --    ALT 50* 44  --   --    ANIONGAP 11 13 10 8   ESTGFRAFRICA >60.0 >60.0 >60.0 >60.0   EGFRNONAA >60.0 >60.0 >60.0 >60.0   , CBC   Recent Labs   Lab 08/25/20 2127 08/26/20  0629 08/27/20  0440   WBC 8.22 7.02 7.58   HGB 12.0 11.6* 12.0   HCT 37.7 36.2* 37.9   * 429* 385*   , INR   Recent Labs   Lab 08/26/20  0512   INR 1.0      Troponin   Recent Labs   Lab 08/26/20  0629 08/26/20  1033 08/27/20  0440   TROPONINI 6.834* 6.089* 6.139*    and All pertinent lab results from the last 24 hours have been reviewed.    Significant Imaging: All pertinent imaging results from the last 24 hours have been reviewed.

## 2020-08-27 NOTE — PROGRESS NOTES
[facility]  Cardiology  Progress Note    Patient Name: Diomedes Mohr  MRN: [id number]  Admission Date: 8/25/2020  Hospital Length of Stay: 0 days  Code Status: Prior   Attending Physician: Bessie Dugan MD   Primary Care Physician: Fernando Manzo MD  Expected Discharge Date: 8/28/2020  Principal Problem:SOB (shortness of breath)    Subjective:     Hospital Course:   Mrs. Mohr 59 year old with nonischemic cardiomyopathy with dilated left ventricle and angiogram in December 2019 (mid LAD 40%, PROX RCA 50%) at an outside hospital showing nonobstructive coronary artery disease, recently admitted also to an outside hospital with a right MCA stroke status post tPA on August 14, 2020 presented to the emergency department with progressively worsening shortness of breath. Patient reported history of diet noncompliance, she was found to have an elevated Troponin >8 with no active CP and warm and wet on exam. Troponins have been slow to trend down in a patient without kidney disease. hospitals evaluated the patient for advanced options and plans on following up outpatient. At this time, ACS is a possibility and would optimize her as best as possible. Patient is to undergo a PET stress this afternoon.    Interval History: NAEON. Troponins have been slow to trend down in a patient without kidney disease. hospitals evaluated the patient for advanced options and plans on following up outpatient. At this time, ACS is a possibility and would optimize her as best as possible. Patient is to undergo a PET stress this afternoon.      Review of Systems   Constitution: Positive for weight gain. Negative for chills and fever.   HENT: Negative for sore throat and tinnitus.    Eyes: Negative for double vision and pain.   Cardiovascular: Positive for dyspnea on exertion. Negative for chest pain, palpitations and paroxysmal nocturnal dyspnea.   Respiratory: Positive for shortness of breath. Negative for cough and wheezing.     Gastrointestinal: Positive for bloating. Negative for abdominal pain, nausea and vomiting.   Genitourinary: Negative for bladder incontinence, dysuria and frequency.   Neurological: Negative for focal weakness, headaches, light-headedness and weakness.   Psychiatric/Behavioral: Negative for altered mental status. The patient is not nervous/anxious.      Objective:     Vital Signs (Most Recent):  Temp: 98.1 °F (36.7 °C) (08/27/20 0727)  Pulse: 82 (08/27/20 0753)  Resp: 18 (08/27/20 0727)  BP: 108/64 (08/27/20 0727)  SpO2: 99 % (08/27/20 0727) Vital Signs (24h Range):  Temp:  [96.6 °F (35.9 °C)-98.8 °F (37.1 °C)] 98.1 °F (36.7 °C)  Pulse:  [] 82  Resp:  [13-24] 18  SpO2:  [98 %-99 %] 99 %  BP: ()/(64-91) 108/64     Weight: 79.8 kg (176 lb)  Body mass index is 28.41 kg/m².     SpO2: 99 %  O2 Device (Oxygen Therapy): room air      Intake/Output Summary (Last 24 hours) at 8/27/2020 1104  Last data filed at 8/27/2020 0900  Gross per 24 hour   Intake 1422 ml   Output 2500 ml   Net -1078 ml       Lines/Drains/Airways     Peripheral Intravenous Line                 Peripheral IV - Single Lumen 08/25/20 2128 20 G Right Antecubital 1 day                Physical Exam   Constitutional: She is oriented to person, place, and time. She appears well-developed and well-nourished. No distress.   HENT:   Head: Normocephalic and atraumatic.   Eyes: EOM are normal. No scleral icterus.   Neck: Normal range of motion. Neck supple. No JVD present.   Cardiovascular: Normal rate, regular rhythm and intact distal pulses.   Pulmonary/Chest: Effort normal. She has rales (improving).   Abdominal: Soft.   Musculoskeletal: Normal range of motion.         General: No tenderness or edema.   Neurological: She is alert and oriented to person, place, and time.   Skin: Skin is warm. She is not diaphoretic.   Psychiatric: She has a normal mood and affect. Her behavior is normal. Judgment and thought content normal.       Significant Labs:    CMP   Recent Labs   Lab 08/25/20 2127 08/26/20  0629 08/26/20  1603 08/27/20  0440    140 140 136   K 3.2* 2.9* 4.0 3.4*    100 100 97   CO2 27 27 30* 31*   * 175* 275* 153*   BUN 16 13 16 16   CREATININE 0.8 0.8 1.0 0.8   CALCIUM 9.3 8.9 9.3 8.9   PROT 7.3 6.9  --   --    ALBUMIN 3.7 3.5  --   --    BILITOT 0.5 0.7  --   --    ALKPHOS 143* 135  --   --    AST 50* 40  --   --    ALT 50* 44  --   --    ANIONGAP 11 13 10 8   ESTGFRAFRICA >60.0 >60.0 >60.0 >60.0   EGFRNONAA >60.0 >60.0 >60.0 >60.0   , CBC   Recent Labs   Lab 08/25/20 2127 08/26/20  0629 08/27/20  0440   WBC 8.22 7.02 7.58   HGB 12.0 11.6* 12.0   HCT 37.7 36.2* 37.9   * 429* 385*   , INR   Recent Labs   Lab 08/26/20  0512   INR 1.0      Troponin   Recent Labs   Lab 08/26/20  0629 08/26/20  1033 08/27/20  0440   TROPONINI 6.834* 6.089* 6.139*    and All pertinent lab results from the last 24 hours have been reviewed.    Significant Imaging: All pertinent imaging results from the last 24 hours have been reviewed.    Assessment and Plan:     Brief HPI: 58 y/o F with NICM with an EF 15% and acutely decompensated (warm and wet) on exam in the setting of new elevated troponin    Acute on chronic systolic heart failure  NICM NYHA Class III HF with reduced EF - Stage C: Structural heart disease with prior or current symptoms of HF  - Most recent TTE demonstrates: LVef 15%,   - EKG : Sinus Tach with LVH and no ST elevations  - Troponin  8.063-->8.380-->6.25-->6.125  Pro-BNP 1206      At this time suspect that NICM is secondary to long standing HTN. She has newly increased LVIDD of 7 cm    PLAN:  - Fluid restriction at 1500 cc with strict I/Os and daily STANDING weights  - Maintain on telemetry and daily EKGs   - Check Electrolytes, keep Mag >2 & K+ >4  - Ambulate as tolerated    - Optimal therapy at this time to include :    - Anti-platelet agent with DAPT     - Continue Losartan 50 mg QD. Would hold on starting an AA such as  spironolactone or changing to entresto till patient is evaluated by HTS in clinic    - BB with Toprol  mg QD     - Statin with Atorvastatin 80 mg QD    - Diuretic dosing of lasix - Change to P.O. - Can do 40 mg BID but can titrate up to 60 BID if UOP drops off or she Appears more hyerpvolemic        - HTS will see patient OP to optimize HF in the setting of Dilated LVIDD as patient may be a candidate for mechanical support.    - Follow up transitional care visit for heart failure at discharge    Cardiology will sign off at this time. Please reconsult for Further questions or concerns    Embolic stroke involving right middle cerebral artery  S/P tPA 8/14 - on DAPT     PLAN:  -- Management per primary team  -- Okay per Vascular Surgery to continue on DAPT and to be on Heparin gtt    Elevated troponin  59-year-old lady with past medical history of nonischemic cardiomyopathy, low ejection fraction, dilated ventricle and angiogram at the end of last year showed nonobstructive coronary artery disease and recent right MCA embolic stroke presented with progressively worsening shortness of breath.     Patient is fluid overloaded based on history and physical exam.  She has history of nonischemic cardiomyopathy with low ejection fraction and nonobstructive coronary artery disease.  Currently is chest pain-free and hemodynamically stable.  Elevated troponin mostly represent demand ischemia vs recent tPA, however, type 1 MI cannot be ruled out completely at this point.     PLAN:  -- Continue home Aspirin and Plavix, okay to start Heparin gtt per Vascular Neurology.  -- Change IV lasix to P.O. would recommend either 40 mg BID or 60 mg BID if patient appear to be more hypervolemic.  -- At this time unsure if tropenemia is due to ruptured plaque vs recent tPA use. However Troponins have been slow to clear in a person without CKD so will assess for ischemia with a PET Stress Test today at 12:30 PM - keep NPO till after the  exam  -- Patient seen by HTS for advanced options and will F/U with them OP  -- Please call Cardiology for any question.    Dilated cardiomyopathy  See Acute Decompensated HF        VTE Risk Mitigation (From admission, onward)         Ordered     enoxaparin injection 40 mg  Every 24 hours      08/27/20 0934     Reason for no Mechanical VTE Prophylaxis  Once     Question:  Reasons:  Answer:  Physician Provided (leave comment)  Comment:  on hep drip    08/26/20 0500     Place sequential compression device  Until discontinued      08/26/20 0500                Michelle Young DO  Cardiology  [facility]

## 2020-08-27 NOTE — H&P
STAFF PHYSICIAN NOTE                                   Attending Attestation for Rounds with Resident  I have reviewed and concur with the resident's history, physical, assessment, and plan.  I have personally interviewed and examined the patient at bedside.  See below for my evaluation and additional findings.    Ms. Mohr is a 59 year old female with PMH of schizophrenia, HTN, DM2, NICM, HFrEF 15%, recent admission for R MCA embolic CVA (left facial droop >> resolved) treated with tPA (8/14) is admitted with ADHF likely due to dietary noncompliance. She reports improved dyspnea with lasix IVP given in ED. Currently appears comfortable at rest on room air. Also found to have elevated troponin ~8 (repeat is flat) likely due to demand ischemia a/w ADHF vs recent tPA use. Less likely ACS given no chest pain and EKG w/o acute ischemic pattern. Remained hemodynamically stable. Cardiology consult noted and appreciated. Okay to start on heparin infusion by vascular neurology. Continue DAPT, heparin infusion x 48 hours and aggressive diuresis with lasix 80 mg IV bid. Continue GDMT for ADHF and recent CVA. Cardiology will follow patient during her stay.                                    ________________________________________

## 2020-08-27 NOTE — PROGRESS NOTES
[facility]  Vascular Neurology  Comprehensive Stroke Center  Progress Note    Assessment/Plan:     * SOB (shortness of breath)  Patient is a 59yoF with CHF (EF 15%), anxiety, HTN, HLD, schizophrenia and a recent R MCA stroke (s/p tPA on 8/14, w/o residual sx)  presented to [facility] [place] following onset of SOB with transient chest pain and intermittent bifrontal 8/10 HA. Given her recent stroke s/p tPA and current DAPT regimen, vascular neurology was consulted for recommendations regarding possible AC    See Embolic stroke R MCA for full recommendations        Embolic stroke involving right middle cerebral artery  Patient is a 59yoF with CHF (EF 15%), anxiety, HTN, HLD, schizophrenia and a recent R MCA stroke (s/p tPA on 8/14, w/o residual sx) who presented to [facility] with SOB, transient (resolved) chest pain, and intermittent frontal HA. Upon examination by neurology, all sx had resolved. She was noted to have fluid overload with a troponemia (8) and elevated BNP in ED. Vascular neurology consulted for recommendations of possible heparin initiation with concurrent DAPT given patient's current clinical picture.    Per cardiology, patient would benefit from AC currently. From a vascular neurology perspective, the patient is 12 days removed from a R MCA embolic stroke and has since been on DAPT without any clinical changes or residual symptoms. CT head if she develops any new mental status change or focal deficit.     Antithrombotics for secondary stroke prevention: Continue DAPT (home regimen); Appropriate to initiate heparin drip per Cardiology recommendations as cardiologic benefits outweigh the risks. Patient is already 12 days removed from embolic R MCA infarct, on DAPT, and without any significant change in neuro exam to this point. CTH if neuro change.    Statins for secondary stroke prevention and hyperlipidemia, if present:   Statins: Atorvastatin- 40 mg daily    Aggressive risk factor modification:  HTN, DM, HLD, Diet, Exercise, CAD     Rehab efforts: The patient has been evaluated by a stroke team provider and the therapy needs have been fully considered based off the presenting complaints and exam findings. The following therapy evaluations are needed: None    Diagnostics ordered/pending: None     VTE prophylaxis: None -- given possible initiation of heparin    BP parameters: per cardiology recs     Will sign off at this time. Please contact Vascular Neurology for any questions or concerns.         Elevated troponin  See SOB    Dilated cardiomyopathy  See SOB    Coronary artery disease involving native heart  See SOB    Mixed hyperlipidemia  Stroke RF    Continue home statin  Management per primary team    Essential hypertension  Stroke RF  Normotensive goals  Management per primary team    Type 2 diabetes mellitus without complication, without long-term current use of insulin  Stroke RF  A1C 6.9  Management per primary team         8/26: Continue DAPT. Appropriate to initiate heparin drip per Cardiology recommendations as cardiologic benefits outweigh the risks.   8/27: No change in neuro exam., NIH 0.    STROKE DOCUMENTATION   Acute Stroke Times   Symptom Onset Date: 08/25/20  Symptom Onset Time: 1400  Stroke Team Called Date: 08/26/20  Stroke Team Called Time: 0200  Stroke Team Arrival Date: 08/26/20  Stroke Team Arrival Time: 0225    NIH Scale:  1a. Level of Consciousness: 0-->Alert, keenly responsive  1b. LOC Questions: 0-->Answers both questions correctly  1c. LOC Commands: 0-->Performs both tasks correctly  2. Best Gaze: 0-->Normal  3. Visual: 0-->No visual loss  4. Facial Palsy: 0-->Normal symmetrical movements  5a. Motor Arm, Left: 0-->No drift, limb holds 90 (or 45) degrees for full 10 secs  5b. Motor Arm, Right: 0-->No drift, limb holds 90 (or 45) degrees for full 10 secs  6a. Motor Leg, Left: 0-->No drift, leg holds 30 degree position for full 5 secs  6b. Motor Leg, Right: 0-->No drift, leg  holds 30 degree position for full 5 secs  7. Limb Ataxia: 0-->Absent  8. Sensory: 0-->Normal, no sensory loss  9. Best Language: 0-->No aphasia, normal  10. Dysarthria: 0-->Normal  11. Extinction and Inattention (formerly Neglect): 0-->No abnormality  Total (NIH Stroke Scale): 0       Modified [place] Score: 0  Coleman Coma Scale:    ABCD2 Score:    [id number] Score:   HAS -BLED Score:   ICH Score:   Hunt & Vora Classification:      Hemorrhagic change of an Ischemic Stroke: Does this patient have an ischemic stroke with hemorrhagic changes? No     Neurologic Chief Complaint: none.     Subjective:     Interval History: Patient is seen for follow-up neurological assessment and treatment recommendations: No change in neuro exam., NIH 0.    HPI, Past Medical, Family, and Social History remains the same as documented in the initial encounter.     Review of Systems   Constitutional: Negative for chills and fever.   Respiratory: Negative for shortness of breath.    Cardiovascular: Negative for chest pain.   Gastrointestinal: Negative for diarrhea and vomiting.   Neurological: Negative for facial asymmetry, speech difficulty, weakness and numbness.     Scheduled Meds:   aspirin  81 mg Oral Daily    atorvastatin  80 mg Oral Daily    clopidogreL  75 mg Oral Daily    enoxaparin  40 mg Subcutaneous Q24H    furosemide  40 mg Oral BID    losartan  50 mg Oral Daily    metoprolol succinate  100 mg Oral Daily     Continuous Infusions:  PRN Meds:acetaminophen, dextrose 50%, dextrose 50%, glucagon (human recombinant), glucose, glucose, melatonin    Objective:     Vital Signs (Most Recent):  Temp: 98.2 °F (36.8 °C) (08/27/20 1109)  Pulse: 93 (08/27/20 1509)  Resp: 18 (08/27/20 1200)  BP: 95/67 (08/27/20 1200)  SpO2: 99 % (08/27/20 1200)  BP Location: Left arm    Vital Signs Range (Last 24H):  Temp:  [98.1 °F (36.7 °C)-98.8 °F (37.1 °C)]   Pulse:  []   Resp:  [13-24]   BP: ()/(56-91)   SpO2:  [98 %-99 %]   BP  Location: Left arm    Physical Exam  HENT:      Head: Normocephalic.   Eyes:      Extraocular Movements: Extraocular movements intact.      Pupils: Pupils are equal, round, and reactive to light.   Cardiovascular:      Rate and Rhythm: Normal rate.   Pulmonary:      Effort: Pulmonary effort is normal.   Skin:     General: Skin is warm and dry.   Neurological:      Mental Status: She is alert and oriented to person, place, and time. Mental status is at baseline.   Psychiatric:         Speech: Speech normal.         Neurological Exam:   LOC: alert  Attention Span: Good   Language: No aphasia  Articulation: No dysarthria  Orientation: Person, Place, Time   EOM (CN III, IV, VI): Full/intact  Facial Movement (CN VII): Symmetric facial expression    Motor: Arm left  Normal 5/5  Leg left  Normal 5/5  Arm right  Normal 5/5  Leg right Normal 5/5  Sensation: Intact to light touch, temperature and vibration  Tone: Normal tone throughout    Laboratory:  CMP:   Recent Labs   Lab 08/27/20  0440   CALCIUM 8.9      K 3.4*   CO2 31*   CL 97   BUN 16   CREATININE 0.8     CBC:   Recent Labs   Lab 08/27/20  0440   WBC 7.58   RBC 4.27   HGB 12.0   HCT 37.9   *   MCV 89   MCH 28.1   MCHC 31.7*     Lipid Panel: No results for input(s): CHOL, LDLCALC, HDL, TRIG in the last 168 hours.  Hgb A1C: No results for input(s): HGBA1C in the last 168 hours.  TSH: No results for input(s): TSH in the last 168 hours.    Diagnostic Results   Brain imaging:  MRI/MRA 8/14: Moderate-sized right frontal lobe acute infarct centered within the insular region without associated hemorrhagic component.  Minimal edema and regional mass effect without significant midline shift or herniation. Remote microhemorrhage of the right cerebellar hemisphere. MRA demonstrates major branch vessel occlusion of the proximal anterior superior M2 branch of right MCA               Cardiac Evaluation:   8/14 TTE:   · Severe left ventricular enlargement.  · Severely  decreased left ventricular systolic function. The estimated ejection fraction is 15%.  · Moderate right ventricular enlargement.  · Mildly to moderately reduced right ventricular systolic function.  · Grade I (mild) left ventricular diastolic dysfunction consistent with impaired relaxation.  · Trivial pericardial effusion.  · Normal central venous pressure (3 mmHg).         Musa Chance NP  [facility]  Department of Vascular Neurology   [facility]

## 2020-08-28 VITALS
DIASTOLIC BLOOD PRESSURE: 69 MMHG | RESPIRATION RATE: 20 BRPM | TEMPERATURE: 97 F | BODY MASS INDEX: 28.28 KG/M2 | WEIGHT: 176 LBS | HEIGHT: 66 IN | SYSTOLIC BLOOD PRESSURE: 99 MMHG | HEART RATE: 67 BPM | OXYGEN SATURATION: 96 %

## 2020-08-28 LAB
ANION GAP SERPL CALC-SCNC: 10 MMOL/L (ref 8–16)
ANISOCYTOSIS BLD QL SMEAR: SLIGHT
BASOPHILS # BLD AUTO: 0.06 K/UL (ref 0–0.2)
BASOPHILS NFR BLD: 0.7 % (ref 0–1.9)
BUN SERPL-MCNC: 23 MG/DL (ref 6–20)
CALCIUM SERPL-MCNC: 9 MG/DL (ref 8.7–10.5)
CHLORIDE SERPL-SCNC: 101 MMOL/L (ref 95–110)
CO2 SERPL-SCNC: 26 MMOL/L (ref 23–29)
CREAT SERPL-MCNC: 0.9 MG/DL (ref 0.5–1.4)
DIFFERENTIAL METHOD: ABNORMAL
EOSINOPHIL # BLD AUTO: 0.3 K/UL (ref 0–0.5)
EOSINOPHIL NFR BLD: 3.3 % (ref 0–8)
ERYTHROCYTE [DISTWIDTH] IN BLOOD BY AUTOMATED COUNT: 14.6 % (ref 11.5–14.5)
EST. GFR  (AFRICAN AMERICAN): >60 ML/MIN/1.73 M^2
EST. GFR  (NON AFRICAN AMERICAN): >60 ML/MIN/1.73 M^2
GLUCOSE SERPL-MCNC: 171 MG/DL (ref 70–110)
HCT VFR BLD AUTO: 38 % (ref 37–48.5)
HGB BLD-MCNC: 12 G/DL (ref 12–16)
IMM GRANULOCYTES # BLD AUTO: 0.02 K/UL (ref 0–0.04)
IMM GRANULOCYTES NFR BLD AUTO: 0.2 % (ref 0–0.5)
LYMPHOCYTES # BLD AUTO: 3.7 K/UL (ref 1–4.8)
LYMPHOCYTES NFR BLD: 45.9 % (ref 18–48)
MAGNESIUM SERPL-MCNC: 1.9 MG/DL (ref 1.6–2.6)
MCH RBC QN AUTO: 28 PG (ref 27–31)
MCHC RBC AUTO-ENTMCNC: 31.6 G/DL (ref 32–36)
MCV RBC AUTO: 89 FL (ref 82–98)
MONOCYTES # BLD AUTO: 0.4 K/UL (ref 0.3–1)
MONOCYTES NFR BLD: 4.9 % (ref 4–15)
NEUTROPHILS # BLD AUTO: 3.6 K/UL (ref 1.8–7.7)
NEUTROPHILS NFR BLD: 45 % (ref 38–73)
NRBC BLD-RTO: 0 /100 WBC
PLATELET # BLD AUTO: 397 K/UL (ref 150–350)
PLATELET BLD QL SMEAR: ABNORMAL
PMV BLD AUTO: 10.6 FL (ref 9.2–12.9)
POTASSIUM SERPL-SCNC: 3.6 MMOL/L (ref 3.5–5.1)
RBC # BLD AUTO: 4.28 M/UL (ref 4–5.4)
SODIUM SERPL-SCNC: 137 MMOL/L (ref 136–145)
WBC # BLD AUTO: 8.11 K/UL (ref 3.9–12.7)

## 2020-08-28 PROCEDURE — 83735 ASSAY OF MAGNESIUM: CPT

## 2020-08-28 PROCEDURE — 25000003 PHARM REV CODE 250: Performed by: STUDENT IN AN ORGANIZED HEALTH CARE EDUCATION/TRAINING PROGRAM

## 2020-08-28 PROCEDURE — 99238 HOSP IP/OBS DSCHRG MGMT 30/<: CPT | Mod: ,,, | Performed by: HOSPITALIST

## 2020-08-28 PROCEDURE — 80048 BASIC METABOLIC PNL TOTAL CA: CPT

## 2020-08-28 PROCEDURE — 99238 PR HOSPITAL DISCHARGE DAY,<30 MIN: ICD-10-PCS | Mod: ,,, | Performed by: HOSPITALIST

## 2020-08-28 PROCEDURE — 85025 COMPLETE CBC W/AUTO DIFF WBC: CPT

## 2020-08-28 PROCEDURE — 36415 COLL VENOUS BLD VENIPUNCTURE: CPT

## 2020-08-28 RX ORDER — METOPROLOL SUCCINATE 100 MG/1
100 TABLET, EXTENDED RELEASE ORAL DAILY
Qty: 30 TABLET | Refills: 6 | Status: SHIPPED | OUTPATIENT
Start: 2020-08-28 | End: 2020-10-27 | Stop reason: SDUPTHER

## 2020-08-28 RX ORDER — FUROSEMIDE 40 MG/1
TABLET ORAL
Qty: 90 TABLET | Refills: 2 | Status: ON HOLD | OUTPATIENT
Start: 2020-08-28 | End: 2020-09-08 | Stop reason: HOSPADM

## 2020-08-28 RX ORDER — LOSARTAN POTASSIUM 50 MG/1
50 TABLET ORAL DAILY
Qty: 90 TABLET | Refills: 3 | Status: ON HOLD | OUTPATIENT
Start: 2020-08-29 | End: 2020-09-08 | Stop reason: HOSPADM

## 2020-08-28 RX ADMIN — ASPIRIN 81 MG CHEWABLE TABLET 81 MG: 81 TABLET CHEWABLE at 08:08

## 2020-08-28 RX ADMIN — ATORVASTATIN CALCIUM 80 MG: 20 TABLET, FILM COATED ORAL at 08:08

## 2020-08-28 RX ADMIN — FUROSEMIDE 40 MG: 40 TABLET ORAL at 08:08

## 2020-08-28 RX ADMIN — CLOPIDOGREL 75 MG: 75 TABLET, FILM COATED ORAL at 08:08

## 2020-08-28 RX ADMIN — METOPROLOL SUCCINATE 100 MG: 100 TABLET, EXTENDED RELEASE ORAL at 08:08

## 2020-08-28 RX ADMIN — POTASSIUM CHLORIDE 40 MEQ: 1500 TABLET, EXTENDED RELEASE ORAL at 02:08

## 2020-08-28 RX ADMIN — LOSARTAN POTASSIUM 50 MG: 50 TABLET, FILM COATED ORAL at 08:08

## 2020-08-28 NOTE — PLAN OF CARE
08/28/20 1425   Final Note   Assessment Type Final Discharge Note   Anticipated Discharge Disposition Home-Health   Hospital Follow Up  Appt(s) scheduled? Yes   Discharge plans and expectations educations in teach back method with documentation complete? Yes   Pt d/c home with [facility].    Inbox message sent to [facility] to schedule hospital follow up with patient.    Julie Haase RN  Case Management [phone]

## 2020-08-28 NOTE — PROGRESS NOTES
[facility] Hospital Medicine  Progress Note    Patient Name: Diomedes Mohr  MRN: [id number]  Patient Class: IP- Inpatient   Admission Date: 8/25/2020  Length of Stay: 0 days  Attending Physician: Bessie Dugan MD  Primary Care Provider: Fernando Manzo MD    [facility] [place] Medicine Team: [facility]OSP MED 3 Adeline Flores MD    Subjective:     Principal Problem:SOB (shortness of breath)        HPI:  Patient is a 59 year old lady with a history of schizophrenia, HTN, DM, CHF (EF 15%) and a recent R MCA stroke 2 weeks ago. She presented to the ED today with progressively worsening SOB with transient chest pain and headache. At presentation she reported she usually takes lasix 40 mg BID and she is compliant with it. She also confesed diet noncompliance and that a couple of days ago she went to a gathering were she ate things she doesnt normally eat and that yesterday she ate a bag of potatoe chips. she usually doesnt complain of SOB, she walks and gets groceries by herself but since yeserday she hasent been able to lay flat due to shortness of breath and would feel difficulty breathing even at rest. she denied palpitation, diaphoresis, weakness/numbness and other symptoms.   In the ED physical exam was remarkable for positive JVD and bilateral inspiratory rales on lung exam. Work up was remarkable for an elevated troponin of 8, D Dimer 2.78 and BNP 1206. EKG was normal, Chest CT showed no signs of PE and Chest Xray showed congestive changes. Cardiology and Neurology were consulted and recommended agressive diuresis and heparin drip for possible acute on chronic HF exacerbation/ ACS. last troponin was 8.3. Patient was admited to IM and orders per recommendations were placed.       Overview/Hospital Course:  Patient continues to improve with diuresis. Transplant surgery evaluated patient and will follow up outpatient. PET stress test done 8/27.     Interval History: Patient was in resting bed this AM, no  acute events overnight. She was informed of the recent scan ordered by cardiology and agrees. She was able to tolerate her diet afterward and able to ambulate. Denies worsening weakness, fever, chills, worsening sob or edema.     Review of Systems   Constitutional: Negative for chills, diaphoresis and fever.   HENT: Negative for rhinorrhea, sneezing, sore throat and trouble swallowing.    Eyes: Negative for pain, redness and visual disturbance.   Respiratory: Positive for shortness of breath. Negative for choking.    Cardiovascular: Positive for leg swelling. Negative for chest pain and palpitations.   Gastrointestinal: Negative for abdominal distention, abdominal pain, nausea and vomiting.   Genitourinary: Negative for dysuria and flank pain.   Musculoskeletal: Negative for neck pain and neck stiffness.   Skin: Negative for pallor and rash.   Neurological: Positive for headaches (bifrontal, 8/10, intermittent). Negative for tremors, seizures, syncope, facial asymmetry, speech difficulty, weakness, light-headedness and numbness.   Psychiatric/Behavioral: Negative for agitation, confusion and decreased concentration.     Objective:     Vital Signs (Most Recent):  Temp: 97 °F (36.1 °C) (08/27/20 1617)  Pulse: 81 (08/27/20 1617)  Resp: 20 (08/27/20 1617)  BP: (!) 101/58 (08/27/20 1617)  SpO2: 98 % (08/27/20 1617) Vital Signs (24h Range):  Temp:  [97 °F (36.1 °C)-98.6 °F (37 °C)] 97 °F (36.1 °C)  Pulse:  [] 81  Resp:  [13-22] 20  SpO2:  [98 %-99 %] 98 %  BP: ()/(56-79) 101/58     Weight: 79.8 kg (176 lb)  Body mass index is 28.41 kg/m².    Intake/Output Summary (Last 24 hours) at 8/27/2020 1918  Last data filed at 8/27/2020 1700  Gross per 24 hour   Intake 1146 ml   Output 2500 ml   Net -1354 ml      Physical Exam  Constitutional:       General: She is not in acute distress.     Appearance: Normal appearance.   HENT:      Head: Normocephalic and atraumatic.      Mouth/Throat:      Mouth: Mucous membranes are  moist.   Eyes:      General: No scleral icterus.     Extraocular Movements: Extraocular movements intact.      Pupils: Pupils are equal, round, and reactive to light.   Neck:      Musculoskeletal: Normal range of motion. No neck rigidity.   Cardiovascular:      Rate and Rhythm: Normal rate.      Pulses: Normal pulses.      Heart sounds: Murmur present.   Pulmonary:      Effort: No respiratory distress.      Breath sounds: Rales present.   Abdominal:      General: Abdomen is flat. There is no distension.      Tenderness: There is no abdominal tenderness. There is no guarding.   Musculoskeletal: Normal range of motion.         General: No tenderness.   Skin:     General: Skin is warm.      Coloration: Skin is not jaundiced.      Findings: No rash.   Neurological:      Mental Status: She is alert and oriented to person, place, and time. Mental status is at baseline.      Cranial Nerves: No cranial nerve deficit.      Sensory: No sensory deficit.         Significant Labs:   Blood Culture: No results for input(s): LABBLOO in the last 48 hours.  CBC:   Recent Labs   Lab 08/25/20 2127 08/26/20  0629 08/27/20  0440   WBC 8.22 7.02 7.58   HGB 12.0 11.6* 12.0   HCT 37.7 36.2* 37.9   * 429* 385*     CMP:   Recent Labs   Lab 08/25/20 2127 08/26/20  0629 08/26/20  1603 08/27/20  0440    140 140 136   K 3.2* 2.9* 4.0 3.4*    100 100 97   CO2 27 27 30* 31*   * 175* 275* 153*   BUN 16 13 16 16   CREATININE 0.8 0.8 1.0 0.8   CALCIUM 9.3 8.9 9.3 8.9   PROT 7.3 6.9  --   --    ALBUMIN 3.7 3.5  --   --    BILITOT 0.5 0.7  --   --    ALKPHOS 143* 135  --   --    AST 50* 40  --   --    ALT 50* 44  --   --    ANIONGAP 11 13 10 8   EGFRNONAA >60.0 >60.0 >60.0 >60.0     Cardiac Markers:   Recent Labs   Lab 08/25/20 2127   BNP 1,206*     Coagulation:   Recent Labs   Lab 08/26/20  0512  08/27/20  0440   INR 1.0  --   --    APTT 27.3   < > 50.6*    < > = values in this interval not displayed.     Lipase: No results  for input(s): LIPASE in the last 48 hours.  Lipid Panel: No results for input(s): CHOL, HDL, LDLCALC, TRIG, CHOLHDL in the last 48 hours.  Magnesium:   Recent Labs   Lab 08/26/20  1603 08/27/20  0440   MG 2.2 2.1     TSH:   Recent Labs   Lab 08/14/20  1035   TSH 1.934     Urine Culture: No results for input(s): LABURIN in the last 48 hours.  Urine Studies: No results for input(s): COLORU, APPEARANCEUA, PHUR, SPECGRAV, PROTEINUA, GLUCUA, KETONESU, BILIRUBINUA, OCCULTUA, NITRITE, UROBILINOGEN, LEUKOCYTESUR, RBCUA, WBCUA, BACTERIA, SQUAMEPITHEL, HYALINECASTS in the last 48 hours.    Invalid input(s): WRIGHTSUR    Significant Imaging: I have reviewed all pertinent imaging results/findings within the past 24 hours.      Assessment/Plan:      * SOB (shortness of breath)  Please see Acute on chronic CHF       Acute on chronic systolic heart failure  59 years old F with PMHs of NICM, HFrEF, MCA, HTN, DM, schizophrenia, presenting with progressively worsening SOB. Found to have elevated trop in ED.   On presentation patient was fluid overloaded. She has history of reduced ejection fraction nonischemic cardiomyopathy.   NYHA III HFr EF, most recent TTE shows  EF of 15%, EKG in ED negative for ST elevations. Troponin were elevated and trended  8.06->8.3->6.2->6.1. BNP of 1206.  Cardiology suspected that NICM is due to her hypertension history. Most recent repeat echo showed an increased increased LVIDD of 7 cm.   Patient was started initially on IV lasix 80 Bid with noticeable improvement    - Cardiology consulted and appreciate recs;    - Continue Lasix 40 mg BID PO (recent transition form Iv)   - Fluid restriction at 1500 ml  - Strict I/Os   - Daily weights (standing)   - Continue telemetry and daily EKG  - PT/OT and ambulate as tolerated   - Continue losartan 50 mg, and Metoprolol 100 mh  - HTS evaluated patient and will follow up outpatient to optimize HF treatment for possible mechanical support.    Embolic stroke  involving right middle cerebral artery  Continue ASA and plavix       Elevated troponin   Patient trop were trended: 8.06->8.3->6.2->6.1. patient s/p diuresis with IV lasix 80 mg BID and 48 hours heparin drip. BNP of 1206  EKG negative for ST elevation.     - Cardiology consulted and apprecite recs; considering ruptured plaque vs recent tPA   - Transitioned to PO Lasix 40 mg BID   - Stress PET ordered due to patient lingering trop  - HTS will follow up outpatient       Hypokalemia  Replacing as needed       Coronary artery disease involving native heart  This is more likely an acute exacerbation due to diet non compliance. Trended troponin 8 --> 8.3 --> 6  S/p heparin drip for 48 hours     - Cardiology following and appreciate recs.  - Continue ASA and plavix     Mixed hyperlipidemia  Continue home atorvastatin 80mg     Essential hypertension  - Cardiology following and appreciate recs  - Continue Losartan 50 mg  -  Cont.  Metoprolol  mg        Type 2 diabetes mellitus without complication, without long-term current use of insulin  On home metformin and glimepiride     - Home meds on hold for now  - SSI   - inpatient goal -180      VTE Risk Mitigation (From admission, onward)         Ordered     enoxaparin injection 40 mg  Every 24 hours      08/27/20 0934     Reason for no Mechanical VTE Prophylaxis  Once     Question:  Reasons:  Answer:  Physician Provided (leave comment)  Comment:  on hep drip    08/26/20 0500     Place sequential compression device  Until discontinued      08/26/20 0500                Discharge Planning   KARLIE: 8/28/2020     Code Status: Prior   Is the patient medically ready for discharge?:     Reason for patient still in hospital (select all that apply): Patient trending condition  Discharge Plan A: Home Health                  Adeline Flores MD  Department of Hospital Medicine   [facility]

## 2020-08-28 NOTE — DISCHARGE SUMMARY
[facility] Hospital Medicine  Discharge Summary      Patient Name: Diomedes Mohr  MRN: [id number]  Admission Date: 8/25/2020  Hospital Length of Stay: 1 days  Discharge Date and Time:  08/28/2020 12:13 PM  Attending Physician: Bessie Dugan MD   Discharging Provider: Emilie Baldwin DO  Primary Care Provider: Fernando Manzo MD  [facility] Medicine Team: [facility] 3 Emilie Baldwin DO    HPI:   Patient is a 59 year old lady with a history of schizophrenia, HTN, DM, CHF (EF 15%) and a recent R MCA stroke 2 weeks ago. She presented to the ED today with progressively worsening SOB with transient chest pain and headache. At presentation she reported she usually takes lasix 40 mg BID and she is compliant with it. She also confesed diet noncompliance and that a couple of days ago she went to a gathering were she ate things she doesnt normally eat and that yesterday she ate a bag of potatoe chips. she usually doesnt complain of SOB, she walks and gets groceries by herself but since yeserday she hasent been able to lay flat due to shortness of breath and would feel difficulty breathing even at rest. she denied palpitation, diaphoresis, weakness/numbness and other symptoms.   In the ED physical exam was remarkable for positive JVD and bilateral inspiratory rales on lung exam. Work up was remarkable for an elevated troponin of 8, D Dimer 2.78 and BNP 1206. EKG was normal, Chest CT showed no signs of PE and Chest Xray showed congestive changes. Cardiology and Neurology were consulted and recommended agressive diuresis and heparin drip for possible acute on chronic HF exacerbation/ ACS. last troponin was 8.3. Patient was admited to IM and orders per recommendations were placed.       * No surgery found *      Hospital Course:   Patient admitted to hospital medicine for SOB. She had elevated troponins on admission with peak of 8.0. There was concern for ACS- NSTEMI, she was treated with Heparin ggt. Cardiology  was consulted to evaluate, patient ended up getting a PET scan on 8/27 which was revealing of nonobstructive disease. She was also treated with IV diuretics which was transitioned to PO on discharge. Patient's 2D ECHO was consistent with previous ones with an EF of 10-15%, grade II diastolic dysfunction and severe left atrial enlargement. Patient was started on GDMT with Losartan & Metoprolol. She was evaluated by [facility] with plans for further work up outpatient.      Vitals:    08/28/20 0810 08/28/20 1010 08/28/20 1113 08/28/20 1125   BP:   95/66    BP Location:       Patient Position:       Pulse: 75 78 76 78   Resp:   (!) 22    Temp:       TempSrc:       SpO2:   95%    Weight:       Height:         Physical Exam  Constitutional:       General: She is not in acute distress.     Appearance: Normal appearance.   HENT:      Head: Normocephalic and atraumatic.      Mouth/Throat:      Mouth: Mucous membranes are moist.   Eyes:      General: No scleral icterus.     Extraocular Movements: Extraocular movements intact.      Pupils: Pupils are equal, round, and reactive to light.   Neck:      Musculoskeletal: Normal range of motion. No neck rigidity.   Cardiovascular:      Rate and Rhythm: Normal rate.      Pulses: Normal pulses.      Heart sounds: Murmur present.   Pulmonary:      Effort: No respiratory distress.      Breath sounds: Rales present.   Abdominal:      General: Abdomen is flat. There is no distension.      Tenderness: There is no abdominal tenderness. There is no guarding.   Musculoskeletal: Normal range of motion.         General: No tenderness.   Skin:     General: Skin is warm.      Coloration: Skin is not jaundiced.      Findings: No rash.   Neurological:      Mental Status: She is alert and oriented to person, place, and time. Mental status is at baseline.      Cranial Nerves: No cranial nerve deficit.      Sensory: No sensory deficit.     Consults:   Consults (From admission, onward)        Status Ordering  Provider     Inpatient consult to Vascular (Stroke) Neurology  Once     Provider:  (Not yet assigned)    Completed JADIEL GIRALDO          No new Assessment & Plan notes have been filed under this hospital service since the last note was generated.  Service: Hospital Medicine    Final Active Diagnoses:    Diagnosis Date Noted POA    PRINCIPAL PROBLEM:  SOB (shortness of breath) [R06.02] 07/13/2020 Unknown    Elevated troponin [R79.89] 08/26/2020 Yes    Embolic stroke involving right middle cerebral artery [I63.411] 08/26/2020 Unknown    Acute on chronic systolic heart failure [I50.23] 08/26/2020 Yes    Hypokalemia [E87.6] 08/16/2020 Yes    Dilated cardiomyopathy [I42.0] 12/27/2019 Yes    Coronary artery disease involving native heart [I25.10] 12/26/2019 Yes    Type 2 diabetes mellitus without complication, without long-term current use of insulin [E11.9] 12/25/2019 Yes    Essential hypertension [I10] 12/25/2019 Yes    Mixed hyperlipidemia [E78.2] 12/25/2019 Yes      Problems Resolved During this Admission:       Discharged Condition: good    Disposition:     Follow Up:    Patient Instructions:      Ambulatory referral/consult to Transplant, Heart   Standing Status: Future   Referral Priority: Routine Referral Type: Transplants   Number of Visits Requested: 1       Significant Diagnostic Studies: Labs:   CMP   Recent Labs   Lab 08/27/20 0440 08/27/20 2132 08/28/20 0313    141 137   K 3.4* 3.4* 3.6   CL 97 101 101   CO2 31* 30* 26   * 152* 171*   BUN 16 22* 23*   CREATININE 0.8 1.0 0.9   CALCIUM 8.9 9.4 9.0   ANIONGAP 8 10 10   ESTGFRAFRICA >60.0 >60.0 >60.0   EGFRNONAA >60.0 >60.0 >60.0   , CBC   Recent Labs   Lab 08/27/20 0440 08/28/20 0313   WBC 7.58 8.11   HGB 12.0 12.0   HCT 37.9 38.0   * 397*    and Troponin   Recent Labs   Lab 08/27/20 0440   TROPONINI 6.139*     Cardiac Graphics: Echocardiogram:   Transthoracic echo (TTE) complete (Cupid Only):   Results for orders  placed or performed during the hospital encounter of 08/25/20   Echo Color Flow Doppler? Yes   Result Value Ref Range    Ascending aorta 2.95 cm    STJ 2.45 cm    AV mean gradient 2 mmHg    Ao peak juan jose 1.01 m/s    Ao VTI 16.61 cm    IVRT 97.05 msec    IVS 0.60 0.6 - 1.1 cm    LA size 3.80 cm    Left Atrium Major Axis 5.08 cm    Left Atrium Minor Axis 5.11 cm    LVIDD 7.00 (A) 3.5 - 6.0 cm    LVIDS 6.75 (A) 2.1 - 4.0 cm    LVOT diameter 2.21 cm    LVOT peak VTI 8.82 cm    PW 0.61 0.6 - 1.1 cm    MV Peak A Juan Jose 0.71 m/s    E wave decelartion time 135.52 msec    MV Peak E Juan Jose 0.79 m/s    PV Peak D Juan Jose 0.34 m/s    PV Peak S Juan Jose 0.24 m/s    RA Major Axis 4.11 cm    RA Width 4.02 cm    RVDD 4.15 cm    Sinus 3.05 cm    TAPSE 1.42 cm    TR Max Juan Jose 3.19 m/s    TDI LATERAL 0.04 m/s    TDI SEPTAL 0.04 m/s    LA WIDTH 3.92 cm    MV stenosis pressure 1/2 time 39.30 ms    LV Diastolic Volume 255.34 mL    LV Systolic Volume 235.24 mL    RV S' 5.96 cm/s    LVOT peak juan jose 0.60 m/s    LV LATERAL E/E' RATIO 19.75 m/s    LV SEPTAL E/E' RATIO 19.75 m/s    FS 4 %    LA volume 64.51 cm3    LV mass 175.64 g    Left Ventricle Relative Wall Thickness 0.17 cm    AV valve area 2.04 cm2    AV Velocity Ratio 0.59     AV index (prosthetic) 0.53     MV valve area p 1/2 method 5.60 cm2    E/A ratio 1.11     Mean e' 0.04 m/s    Pulm vein S/D ratio 0.71     LVOT area 3.8 cm2    LVOT stroke volume 33.82 cm3    AV peak gradient 4 mmHg    E/E' ratio 19.75 m/s    LV Systolic Volume Index 124.2 mL/m2    LV Diastolic Volume Index 134.79 mL/m2    LA Volume Index 34.1 mL/m2    LV Mass Index 93 g/m2    Triscuspid Valve Regurgitation Peak Gradient 41 mmHg    BSA 1.93 m2    Right Atrial Pressure (from IVC) 3 mmHg    TV rest pulmonary artery pressure 44 mmHg    LA Volume Index (Mod) 38.0 mL/m2    LA volume (mod) 72.00 cm3    Narrative    · Severely decreased left ventricular systolic function. The estimated   ejection fraction is 10-15%.  · Grade II (moderate)  left ventricular diastolic dysfunction consistent   with pseudonormalization.  · Severe left ventricular enlargement.  · Mildly reduced right ventricular systolic function.  · The estimated PA systolic pressure is 44 mmHg.  · Normal central venous pressure (3 mmHg).  · Pulmonary hypertension present.  · Mild left atrial enlargement.       and Stress Test:   There is a moderate (15%) amount of mild resting heterogeneity in the basal to apical inferior and septal wall(s) that improves with stress, indicative of non-obstructive disease.    Whole heart absolute myocardial perfusion (cc/min/g) averaged 0.71 cc/min/g at rest, which is normal, 1.19 cc/min/g at stress, which is mildly reduced, and 1.69  CFR, which is mildly reduced.    Stress flow is reduced diffusely throughout the heart consistent with the presence of CAD, but above ischemic thresholds.    Gated perfusion images showed an ejection fraction of 15% at rest and 26% during stress. Normal ejection fraction is greater than 51%.    There is severe global hypokinesis at rest and stress.    LV cavity size is severely enlarged at rest and stress.    The EKG portion of this study is abnormal but not diagnostic.    There were no arrhythmias during stress.    The patient reported no chest pain during the stress test.    There are no prior studies for comparison.       Pending Diagnostic Studies:     None         Medications:  Reconciled Home Medications:      Medication List      START taking these medications    losartan 50 MG tablet  Commonly known as: COZAAR  Take 1 tablet (50 mg total) by mouth once daily.  Start taking on: August 29, 2020        CONTINUE taking these medications    aspirin 81 MG Chew  Take 1 tablet (81 mg total) by mouth once daily.     atorvastatin 80 MG tablet  Commonly known as: LIPITOR  Take 1 tablet (80 mg total) by mouth once daily.     clopidogreL 75 mg tablet  Commonly known as: PLAVIX  Take 1 tablet (75 mg total) by mouth once  daily.     furosemide 40 MG tablet  Commonly known as: LASIX  Take 1 tablet (40 mg total) by mouth once daily AND 1 tablet (40 mg total) every evening. TAKE EXTRA 40 MG IF GAINING 2 OR MORE POUNDS IN 2 DAYS..     glimepiride 1 MG tablet  Commonly known as: AMARYL  Take 1 tablet by mouth once daily.     metFORMIN 1000 MG tablet  Commonly known as: GLUCOPHAGE  Take 1,000 mg by mouth 2 (two) times daily.     metoprolol succinate 100 MG 24 hr tablet  Commonly known as: TOPROL-XL  Take 1 tablet (100 mg total) by mouth once daily.     pregabalin 100 MG capsule  Commonly known as: LYRICA  Take 100 mg by mouth 2 (two) times daily as needed (pain).     TRULICITY 1.5 mg/0.5 mL pen injector  Generic drug: dulaglutide  Inject into the skin once a week.        STOP taking these medications    hydroCHLOROthiazide 12.5 MG Tab  Commonly known as: HYDRODIURIL            Indwelling Lines/Drains at time of discharge:   Lines/Drains/Airways     None                 Time spent on the discharge of patient: 45 minutes  Patient was seen and examined on the date of discharge and determined to be suitable for discharge.         Emilie Baldwin DO  Department of Hospital Medicine  [facility]

## 2020-08-28 NOTE — ASSESSMENT & PLAN NOTE
This is more likely an acute exacerbation due to diet non compliance. Trended troponin 8 --> 8.3 --> 6  S/p heparin drip for 48 hours     - Cardiology following and appreciate recs.  - Continue ASA and plavix

## 2020-08-28 NOTE — SUBJECTIVE & OBJECTIVE
Interval History: Patient was in resting bed this AM, no acute events overnight. She was informed of the recent scan ordered by cardiology and agrees. She was able to tolerate her diet afterward and able to ambulate. Denies worsening weakness, fever, chills, worsening sob or edema.     Review of Systems   Constitutional: Negative for chills, diaphoresis and fever.   HENT: Negative for rhinorrhea, sneezing, sore throat and trouble swallowing.    Eyes: Negative for pain, redness and visual disturbance.   Respiratory: Positive for shortness of breath. Negative for choking.    Cardiovascular: Positive for leg swelling. Negative for chest pain and palpitations.   Gastrointestinal: Negative for abdominal distention, abdominal pain, nausea and vomiting.   Genitourinary: Negative for dysuria and flank pain.   Musculoskeletal: Negative for neck pain and neck stiffness.   Skin: Negative for pallor and rash.   Neurological: Positive for headaches (bifrontal, 8/10, intermittent). Negative for tremors, seizures, syncope, facial asymmetry, speech difficulty, weakness, light-headedness and numbness.   Psychiatric/Behavioral: Negative for agitation, confusion and decreased concentration.     Objective:     Vital Signs (Most Recent):  Temp: 97 °F (36.1 °C) (08/27/20 1617)  Pulse: 81 (08/27/20 1617)  Resp: 20 (08/27/20 1617)  BP: (!) 101/58 (08/27/20 1617)  SpO2: 98 % (08/27/20 1617) Vital Signs (24h Range):  Temp:  [97 °F (36.1 °C)-98.6 °F (37 °C)] 97 °F (36.1 °C)  Pulse:  [] 81  Resp:  [13-22] 20  SpO2:  [98 %-99 %] 98 %  BP: ()/(56-79) 101/58     Weight: 79.8 kg (176 lb)  Body mass index is 28.41 kg/m².    Intake/Output Summary (Last 24 hours) at 8/27/2020 1918  Last data filed at 8/27/2020 1700  Gross per 24 hour   Intake 1146 ml   Output 2500 ml   Net -1354 ml      Physical Exam  Constitutional:       General: She is not in acute distress.     Appearance: Normal appearance.   HENT:      Head: Normocephalic and  atraumatic.      Mouth/Throat:      Mouth: Mucous membranes are moist.   Eyes:      General: No scleral icterus.     Extraocular Movements: Extraocular movements intact.      Pupils: Pupils are equal, round, and reactive to light.   Neck:      Musculoskeletal: Normal range of motion. No neck rigidity.   Cardiovascular:      Rate and Rhythm: Normal rate.      Pulses: Normal pulses.      Heart sounds: Murmur present.   Pulmonary:      Effort: No respiratory distress.      Breath sounds: Rales present.   Abdominal:      General: Abdomen is flat. There is no distension.      Tenderness: There is no abdominal tenderness. There is no guarding.   Musculoskeletal: Normal range of motion.         General: No tenderness.   Skin:     General: Skin is warm.      Coloration: Skin is not jaundiced.      Findings: No rash.   Neurological:      Mental Status: She is alert and oriented to person, place, and time. Mental status is at baseline.      Cranial Nerves: No cranial nerve deficit.      Sensory: No sensory deficit.         Significant Labs:   Blood Culture: No results for input(s): LABBLOO in the last 48 hours.  CBC:   Recent Labs   Lab 08/25/20 2127 08/26/20  0629 08/27/20  0440   WBC 8.22 7.02 7.58   HGB 12.0 11.6* 12.0   HCT 37.7 36.2* 37.9   * 429* 385*     CMP:   Recent Labs   Lab 08/25/20 2127 08/26/20  0629 08/26/20  1603 08/27/20  0440    140 140 136   K 3.2* 2.9* 4.0 3.4*    100 100 97   CO2 27 27 30* 31*   * 175* 275* 153*   BUN 16 13 16 16   CREATININE 0.8 0.8 1.0 0.8   CALCIUM 9.3 8.9 9.3 8.9   PROT 7.3 6.9  --   --    ALBUMIN 3.7 3.5  --   --    BILITOT 0.5 0.7  --   --    ALKPHOS 143* 135  --   --    AST 50* 40  --   --    ALT 50* 44  --   --    ANIONGAP 11 13 10 8   EGFRNONAA >60.0 >60.0 >60.0 >60.0     Cardiac Markers:   Recent Labs   Lab 08/25/20 2127   BNP 1,206*     Coagulation:   Recent Labs   Lab 08/26/20  0512  08/27/20  0440   INR 1.0  --   --    APTT 27.3   < > 50.6*    < >  = values in this interval not displayed.     Lipase: No results for input(s): LIPASE in the last 48 hours.  Lipid Panel: No results for input(s): CHOL, HDL, LDLCALC, TRIG, CHOLHDL in the last 48 hours.  Magnesium:   Recent Labs   Lab 08/26/20  1603 08/27/20  0440   MG 2.2 2.1     TSH:   Recent Labs   Lab 08/14/20  1035   TSH 1.934     Urine Culture: No results for input(s): LABURIN in the last 48 hours.  Urine Studies: No results for input(s): COLORU, APPEARANCEUA, PHUR, SPECGRAV, PROTEINUA, GLUCUA, KETONESU, BILIRUBINUA, OCCULTUA, NITRITE, UROBILINOGEN, LEUKOCYTESUR, RBCUA, WBCUA, BACTERIA, SQUAMEPITHEL, HYALINECASTS in the last 48 hours.    Invalid input(s): WRIGHTSUR    Significant Imaging: I have reviewed all pertinent imaging results/findings within the past 24 hours.

## 2020-08-28 NOTE — ASSESSMENT & PLAN NOTE
Patient trop were trended: 8.06->8.3->6.2->6.1. patient s/p diuresis with IV lasix 80 mg BID and 48 hours heparin drip. BNP of 1206  EKG negative for ST elevation.     - Cardiology consulted and apprecite recs; considering ruptured plaque vs recent tPA   - Transitioned to PO Lasix 40 mg BID   - Stress PET ordered due to patient lingering trop  - HTS will follow up outpatient

## 2020-08-28 NOTE — PLAN OF CARE
08/28/20 1246   Post-Acute Status   Post-Acute Authorization Home Health   Home Health Status Set-up Complete     Pt accepted by [facility].    Alma Suarez LMSW  [facility]  [id number]

## 2020-08-28 NOTE — PLAN OF CARE
[facility]    HOME HEALTH ORDERS  FACE TO FACE ENCOUNTER    Patient Name: Diomedes Mohr  YOB: 1960    PCP: Fernando Manzo MD   PCP Address: [address]  [place] / [place] [address]  PCP Phone Number: [phone]  PCP Fax: [phone]    Encounter Date: 08/28/2020    Admit to Home Health    Diagnoses:  Active Hospital Problems    Diagnosis  POA    *SOB (shortness of breath) [R06.02]  Unknown    Elevated troponin [R79.89]  Yes    Embolic stroke involving right middle cerebral artery [I63.411]  Unknown    Acute on chronic systolic heart failure [I50.23]  Yes    Hypokalemia [E87.6]  Yes    Dilated cardiomyopathy [I42.0]  Yes    Coronary artery disease involving native heart [I25.10]  Yes    Type 2 diabetes mellitus without complication, without long-term current use of insulin [E11.9]  Yes    Essential hypertension [I10]  Yes    Mixed hyperlipidemia [E78.2]  Yes      Resolved Hospital Problems   No resolved problems to display.       No future appointments.        I have seen and examined this patient face to face today. My clinical findings that support the need for the home health skilled services and home bound status are the following:  Weakness/numbness causing balance and gait disturbance due to Stroke and Heart Failure making it taxing to leave home.    Allergies:  Review of patient's allergies indicates:   Allergen Reactions    Captopril Other (See Comments)     COUGH       Diet: cardiac diet    Activities: activity as tolerated    Nursing:   SN to complete comprehensive assessment including routine vital signs. Instruct on disease process and s/s of complications to report to MD. Review/verify medication list sent home with the patient at time of discharge  and instruct patient/caregiver as needed. Frequency may be adjusted depending on start of care date.    Notify MD if SBP > 160 or < 90; DBP > 90 or < 50; HR > 120 or < 50; Temp > 101; Other:          CONSULTS:    Physical Therapy to evaluate and treat. Evaluate for home safety and equipment needs; Establish/upgrade home exercise program. Perform / instruct on therapeutic exercises, gait training, transfer training, and Range of Motion.  Occupational Therapy to evaluate and treat. Evaluate home environment for safety and equipment needs. Perform/Instruct on transfers, ADL training, ROM, and therapeutic exercises.    MISCELLANEOUS CARE:  N/A    WOUND CARE ORDERS  n/a      Medications: Review discharge medications with patient and family and provide education.      Current Discharge Medication List      START taking these medications    Details   losartan (COZAAR) 50 MG tablet Take 1 tablet (50 mg total) by mouth once daily.  Qty: 90 tablet, Refills: 3    Comments: .         CONTINUE these medications which have CHANGED    Details   furosemide (LASIX) 40 MG tablet Take 1 tablet (40 mg total) by mouth once daily AND 1 tablet (40 mg total) every evening. TAKE EXTRA 40 MG IF GAINING 2 OR MORE POUNDS IN 2 DAYS..  Qty: 90 tablet, Refills: 2      metoprolol succinate (TOPROL-XL) 100 MG 24 hr tablet Take 1 tablet (100 mg total) by mouth once daily.  Qty: 30 tablet, Refills: 6    Comments: .         CONTINUE these medications which have NOT CHANGED    Details   aspirin 81 MG Chew Take 1 tablet (81 mg total) by mouth once daily.  Qty: 90 tablet, Refills: 0      atorvastatin (LIPITOR) 80 MG tablet Take 1 tablet (80 mg total) by mouth once daily.  Qty: 90 tablet, Refills: 3      dulaglutide (TRULICITY) 1.5 mg/0.5 mL pen injector Inject into the skin once a week.       glimepiride (AMARYL) 1 MG tablet Take 1 tablet by mouth once daily.      metFORMIN (GLUCOPHAGE) 1000 MG tablet Take 1,000 mg by mouth 2 (two) times daily.       pregabalin (LYRICA) 100 MG capsule Take 100 mg by mouth 2 (two) times daily as needed (pain).      clopidogreL (PLAVIX) 75 mg tablet Take 1 tablet (75 mg total) by mouth once daily.  Qty: 30  tablet, Refills: 0         STOP taking these medications       hydroCHLOROthiazide (HYDRODIURIL) 12.5 MG Tab Comments:   Reason for Stopping:               I certify that this patient is confined to her home and needs physical therapy and occupational therapy.

## 2020-08-28 NOTE — ASSESSMENT & PLAN NOTE
- Cardiology following and appreciate recs  - Continue Losartan 50 mg  -  Cont.  Metoprolol  mg

## 2020-08-28 NOTE — PLAN OF CARE
POC reviewed with patient. VSS. Patient free of injuries and falls. Patient has no c/o pain or discomfort. Patient transitioned from heparin gtt today to lovenox. Patient diuresing with 40 mg lasix PO BID; diuresing well. Trending troponins. HTS consulted for possible further options. All questions were addressed. LONNY.

## 2020-08-28 NOTE — ASSESSMENT & PLAN NOTE
59 years old F with PMHs of NICM, HFrEF, MCA, HTN, DM, schizophrenia, presenting with progressively worsening SOB. Found to have elevated trop in ED.   On presentation patient was fluid overloaded. She has history of reduced ejection fraction nonischemic cardiomyopathy.   NYHA III HFr EF, most recent TTE shows  EF of 15%, EKG in ED negative for ST elevations. Troponin were elevated and trended  8.06->8.3->6.2->6.1. BNP of 1206.  Cardiology suspected that NICM is due to her hypertension history. Most recent repeat echo showed an increased increased LVIDD of 7 cm.   Patient was started initially on IV lasix 80 Bid with noticeable improvement    - Cardiology consulted and appreciate recs;    - Continue Lasix 40 mg BID PO (recent transition form Iv)   - Fluid restriction at 1500 ml  - Strict I/Os   - Daily weights (standing)   - Continue telemetry and daily EKG  - PT/OT and ambulate as tolerated   - Continue losartan 50 mg, and Metoprolol 100 mh  - HTS evaluated patient and will follow up outpatient to optimize HF treatment for possible mechanical support.

## 2020-08-29 PROCEDURE — G0180 MD CERTIFICATION HHA PATIENT: HCPCS | Mod: NTX,,, | Performed by: HOSPITALIST

## 2020-08-29 PROCEDURE — G0180 PR HOME HEALTH MD CERTIFICATION: ICD-10-PCS | Mod: NTX,,, | Performed by: HOSPITALIST

## 2020-08-31 ENCOUNTER — TELEPHONE (OUTPATIENT)
Dept: TRANSPLANT | Facility: CLINIC | Age: 60
End: 2020-08-31

## 2020-08-31 ENCOUNTER — PATIENT OUTREACH (OUTPATIENT)
Dept: ADMINISTRATIVE | Facility: CLINIC | Age: 60
End: 2020-08-31

## 2020-08-31 DIAGNOSIS — R06.02 SOB (SHORTNESS OF BREATH): Primary | ICD-10-CM

## 2020-08-31 DIAGNOSIS — I50.9 CONGESTIVE HEART FAILURE, UNSPECIFIED HF CHRONICITY, UNSPECIFIED HEART FAILURE TYPE: Primary | ICD-10-CM

## 2020-08-31 NOTE — PATIENT INSTRUCTIONS
"Dyspnea (Shortness Of Breath)  Shortness of Breath (also known as "Dyspnea") is the sense that you can't catch your breath or can't get enough air.  Dyspnea can be caused by many different conditions such as:   Acute asthma attack   Worsening of emphysema (also called "COPD") -- a lung disease that is caused by smoking   A mucus plug blocks a large air passage in the lung -- this can occur with emphysema or chronic bronchitis   Congestive Heart Failure ("CHF") -- when a weak heart muscle allows excess fluid to collect in the lungs   Panic attacks, anxiety -- fear can cause rapid breathing ("hyperventilation")   Pneumonia -- infection in the lung tissue   Exposure to toxic fumes or smoke   Pulmonary embolus (blood clot to the lung)  Based on your visit today, the exact cause of your shortness of breath is not certain. Your tests do not show any of the serious causes of dyspnea. Sometimes, further testing is needed to find out if a serious problem exists. Therefore, it is important for you to watch for any new symptoms or worsening of your condition and follow up with your doctor as directed.  Home Care:   When your symptoms are better, resume your usual activities.   If you smoke, you need to stop. Join a stop-smoking program or ask your doctor for help.  Follow Up  with your doctor or as advised by our staff.  Get Prompt Medical Attention  if any of the following occur:   Increasing shortness of breath or wheezing   Redness, pain or swelling in one leg   Swelling in both legs or ankles   Unexpected weight gain   Chest, arm, shoulder, neck or upper back pain   Dizziness, weakness or fainting   Palpitations (the sense that your heart is fluttering, beating fast or hard)   Fever of 100.4ºF (38ºC) or higher, or as directed by your healthcare provider   Cough with dark colored or bloody sputum (mucus)  © 6787-8588 [employer], [address], [place] [address]. All rights reserved. This " information is not intended as a substitute for professional medical care. Always follow your healthcare professional's instructions.

## 2020-09-04 ENCOUNTER — HOSPITAL ENCOUNTER (OUTPATIENT)
Facility: HOSPITAL | Age: 60
Discharge: HOME | End: 2020-09-08
Attending: EMERGENCY MEDICINE | Admitting: INTERNAL MEDICINE
Payer: COMMERCIAL

## 2020-09-04 ENCOUNTER — TELEPHONE (OUTPATIENT)
Dept: NEUROLOGY | Facility: CLINIC | Age: 60
End: 2020-09-04

## 2020-09-04 ENCOUNTER — CARE AT HOME (OUTPATIENT)
Dept: HOME HEALTH SERVICES | Facility: CLINIC | Age: 60
End: 2020-09-04
Payer: COMMERCIAL

## 2020-09-04 VITALS
WEIGHT: 174 LBS | RESPIRATION RATE: 18 BRPM | HEART RATE: 88 BPM | BODY MASS INDEX: 28.08 KG/M2 | SYSTOLIC BLOOD PRESSURE: 120 MMHG | DIASTOLIC BLOOD PRESSURE: 68 MMHG | OXYGEN SATURATION: 95 %

## 2020-09-04 DIAGNOSIS — I42.0 DILATED CARDIOMYOPATHY: ICD-10-CM

## 2020-09-04 DIAGNOSIS — I50.41 ACUTE COMBINED SYSTOLIC AND DIASTOLIC CHF, NYHA CLASS 3: Primary | ICD-10-CM

## 2020-09-04 DIAGNOSIS — I50.23 ACUTE ON CHRONIC SYSTOLIC HEART FAILURE: ICD-10-CM

## 2020-09-04 DIAGNOSIS — R06.02 SHORTNESS OF BREATH: ICD-10-CM

## 2020-09-04 DIAGNOSIS — I63.511 CEREBROVASCULAR ACCIDENT (CVA) DUE TO OCCLUSION OF RIGHT MIDDLE CEREBRAL ARTERY: ICD-10-CM

## 2020-09-04 DIAGNOSIS — I50.9 CONGESTIVE HEART FAILURE: ICD-10-CM

## 2020-09-04 DIAGNOSIS — E11.9 TYPE 2 DIABETES MELLITUS WITHOUT COMPLICATION, WITHOUT LONG-TERM CURRENT USE OF INSULIN: ICD-10-CM

## 2020-09-04 DIAGNOSIS — E78.2 MIXED HYPERLIPIDEMIA: ICD-10-CM

## 2020-09-04 DIAGNOSIS — I63.411 CEREBROVASCULAR ACCIDENT (CVA) DUE TO EMBOLISM OF RIGHT MIDDLE CEREBRAL ARTERY: ICD-10-CM

## 2020-09-04 DIAGNOSIS — I10 ESSENTIAL HYPERTENSION: ICD-10-CM

## 2020-09-04 DIAGNOSIS — I42.0 DILATED CARDIOMYOPATHY: Primary | ICD-10-CM

## 2020-09-04 DIAGNOSIS — I25.10 CORONARY ARTERY DISEASE INVOLVING NATIVE CORONARY ARTERY OF NATIVE HEART WITHOUT ANGINA PECTORIS: ICD-10-CM

## 2020-09-04 LAB
ALBUMIN SERPL BCP-MCNC: 3.6 G/DL (ref 3.5–5.2)
ALP SERPL-CCNC: 156 U/L (ref 55–135)
ALT SERPL W/O P-5'-P-CCNC: 62 U/L (ref 10–44)
ANION GAP SERPL CALC-SCNC: 9 MMOL/L (ref 8–16)
AST SERPL-CCNC: 31 U/L (ref 10–40)
BASOPHILS # BLD AUTO: 0.03 K/UL (ref 0–0.2)
BASOPHILS NFR BLD: 0.4 % (ref 0–1.9)
BILIRUB SERPL-MCNC: 0.7 MG/DL (ref 0.1–1)
BNP SERPL-MCNC: 1661 PG/ML (ref 0–99)
BUN SERPL-MCNC: 13 MG/DL (ref 6–20)
CALCIUM SERPL-MCNC: 8.8 MG/DL (ref 8.7–10.5)
CHLORIDE SERPL-SCNC: 107 MMOL/L (ref 95–110)
CO2 SERPL-SCNC: 26 MMOL/L (ref 23–29)
CREAT SERPL-MCNC: 0.8 MG/DL (ref 0.5–1.4)
DIFFERENTIAL METHOD: ABNORMAL
EOSINOPHIL # BLD AUTO: 0.2 K/UL (ref 0–0.5)
EOSINOPHIL NFR BLD: 2.7 % (ref 0–8)
ERYTHROCYTE [DISTWIDTH] IN BLOOD BY AUTOMATED COUNT: 15 % (ref 11.5–14.5)
EST. GFR  (AFRICAN AMERICAN): >60 ML/MIN/1.73 M^2
EST. GFR  (NON AFRICAN AMERICAN): >60 ML/MIN/1.73 M^2
GLUCOSE SERPL-MCNC: 140 MG/DL (ref 70–110)
HCT VFR BLD AUTO: 37.3 % (ref 37–48.5)
HGB BLD-MCNC: 11.8 G/DL (ref 12–16)
IMM GRANULOCYTES # BLD AUTO: 0.02 K/UL (ref 0–0.04)
IMM GRANULOCYTES NFR BLD AUTO: 0.3 % (ref 0–0.5)
INR PPP: 1 (ref 0.8–1.2)
LYMPHOCYTES # BLD AUTO: 3.4 K/UL (ref 1–4.8)
LYMPHOCYTES NFR BLD: 46 % (ref 18–48)
MCH RBC QN AUTO: 28.5 PG (ref 27–31)
MCHC RBC AUTO-ENTMCNC: 31.6 G/DL (ref 32–36)
MCV RBC AUTO: 90 FL (ref 82–98)
MONOCYTES # BLD AUTO: 0.4 K/UL (ref 0.3–1)
MONOCYTES NFR BLD: 5.2 % (ref 4–15)
NEUTROPHILS # BLD AUTO: 3.3 K/UL (ref 1.8–7.7)
NEUTROPHILS NFR BLD: 45.4 % (ref 38–73)
NRBC BLD-RTO: 0 /100 WBC
PLATELET # BLD AUTO: 319 K/UL (ref 150–350)
PMV BLD AUTO: 11.6 FL (ref 9.2–12.9)
POTASSIUM SERPL-SCNC: 3.4 MMOL/L (ref 3.5–5.1)
PROT SERPL-MCNC: 6.8 G/DL (ref 6–8.4)
PROTHROMBIN TIME: 10.6 SEC (ref 9–12.5)
RBC # BLD AUTO: 4.14 M/UL (ref 4–5.4)
SARS-COV-2 RDRP RESP QL NAA+PROBE: NEGATIVE
SODIUM SERPL-SCNC: 142 MMOL/L (ref 136–145)
TROPONIN I SERPL DL<=0.01 NG/ML-MCNC: 0.08 NG/ML (ref 0–0.03)
WBC # BLD AUTO: 7.31 K/UL (ref 3.9–12.7)

## 2020-09-04 PROCEDURE — 99285 EMERGENCY DEPT VISIT HI MDM: CPT | Mod: 25,NTX

## 2020-09-04 PROCEDURE — 99350 PR HOME VISIT,ESTAB PATIENT,LEVEL IV: ICD-10-PCS | Mod: NTX,S$GLB,, | Performed by: NURSE PRACTITIONER

## 2020-09-04 PROCEDURE — G0378 HOSPITAL OBSERVATION PER HR: HCPCS | Mod: NTX

## 2020-09-04 PROCEDURE — 99220 PR INITIAL OBSERVATION CARE,LEVL III: CPT | Mod: NTX,,, | Performed by: NURSE PRACTITIONER

## 2020-09-04 PROCEDURE — 99284 PR EMERGENCY DEPT VISIT,LEVEL IV: ICD-10-PCS | Mod: ,,, | Performed by: EMERGENCY MEDICINE

## 2020-09-04 PROCEDURE — 96374 THER/PROPH/DIAG INJ IV PUSH: CPT | Mod: NTX

## 2020-09-04 PROCEDURE — 85610 PROTHROMBIN TIME: CPT | Mod: NTX

## 2020-09-04 PROCEDURE — 84484 ASSAY OF TROPONIN QUANT: CPT | Mod: NTX

## 2020-09-04 PROCEDURE — 93010 EKG 12-LEAD: ICD-10-PCS | Mod: NTX,,, | Performed by: INTERNAL MEDICINE

## 2020-09-04 PROCEDURE — 63600175 PHARM REV CODE 636 W HCPCS: Mod: NTX | Performed by: EMERGENCY MEDICINE

## 2020-09-04 PROCEDURE — 99284 EMERGENCY DEPT VISIT MOD MDM: CPT | Mod: ,,, | Performed by: EMERGENCY MEDICINE

## 2020-09-04 PROCEDURE — U0002 COVID-19 LAB TEST NON-CDC: HCPCS | Mod: NTX

## 2020-09-04 PROCEDURE — 83880 ASSAY OF NATRIURETIC PEPTIDE: CPT | Mod: NTX

## 2020-09-04 PROCEDURE — 93005 ELECTROCARDIOGRAM TRACING: CPT | Mod: NTX

## 2020-09-04 PROCEDURE — 93010 ELECTROCARDIOGRAM REPORT: CPT | Mod: NTX,,, | Performed by: INTERNAL MEDICINE

## 2020-09-04 PROCEDURE — 80053 COMPREHEN METABOLIC PANEL: CPT | Mod: NTX

## 2020-09-04 PROCEDURE — 99350 HOME/RES VST EST HIGH MDM 60: CPT | Mod: NTX,S$GLB,, | Performed by: NURSE PRACTITIONER

## 2020-09-04 PROCEDURE — 99220 PR INITIAL OBSERVATION CARE,LEVL III: ICD-10-PCS | Mod: NTX,,, | Performed by: NURSE PRACTITIONER

## 2020-09-04 PROCEDURE — 25000003 PHARM REV CODE 250: Mod: NTX | Performed by: EMERGENCY MEDICINE

## 2020-09-04 PROCEDURE — 85025 COMPLETE CBC W/AUTO DIFF WBC: CPT | Mod: NTX

## 2020-09-04 RX ORDER — ATORVASTATIN CALCIUM 20 MG/1
80 TABLET, FILM COATED ORAL DAILY
Status: DISCONTINUED | OUTPATIENT
Start: 2020-09-05 | End: 2020-09-08 | Stop reason: HOSPADM

## 2020-09-04 RX ORDER — FUROSEMIDE 10 MG/ML
60 INJECTION INTRAMUSCULAR; INTRAVENOUS
Status: DISCONTINUED | OUTPATIENT
Start: 2020-09-05 | End: 2020-09-05

## 2020-09-04 RX ORDER — IBUPROFEN 200 MG
16 TABLET ORAL
Status: DISCONTINUED | OUTPATIENT
Start: 2020-09-05 | End: 2020-09-08 | Stop reason: HOSPADM

## 2020-09-04 RX ORDER — FUROSEMIDE 10 MG/ML
60 INJECTION INTRAMUSCULAR; INTRAVENOUS
Status: COMPLETED | OUTPATIENT
Start: 2020-09-04 | End: 2020-09-04

## 2020-09-04 RX ORDER — PREGABALIN 100 MG/1
100 CAPSULE ORAL 2 TIMES DAILY PRN
Status: DISCONTINUED | OUTPATIENT
Start: 2020-09-05 | End: 2020-09-08 | Stop reason: HOSPADM

## 2020-09-04 RX ORDER — ACETAMINOPHEN 325 MG/1
650 TABLET ORAL EVERY 4 HOURS PRN
Status: DISCONTINUED | OUTPATIENT
Start: 2020-09-05 | End: 2020-09-08 | Stop reason: HOSPADM

## 2020-09-04 RX ORDER — NAPROXEN SODIUM 220 MG/1
81 TABLET, FILM COATED ORAL DAILY
Status: DISCONTINUED | OUTPATIENT
Start: 2020-09-05 | End: 2020-09-08 | Stop reason: HOSPADM

## 2020-09-04 RX ORDER — METOPROLOL TARTRATE 50 MG/1
50 TABLET ORAL 2 TIMES DAILY
Status: DISCONTINUED | OUTPATIENT
Start: 2020-09-05 | End: 2020-09-08 | Stop reason: HOSPADM

## 2020-09-04 RX ORDER — HYDROCODONE BITARTRATE AND ACETAMINOPHEN 5; 325 MG/1; MG/1
1 TABLET ORAL EVERY 4 HOURS PRN
Status: DISCONTINUED | OUTPATIENT
Start: 2020-09-05 | End: 2020-09-08 | Stop reason: HOSPADM

## 2020-09-04 RX ORDER — GLUCAGON 1 MG
1 KIT INJECTION
Status: DISCONTINUED | OUTPATIENT
Start: 2020-09-05 | End: 2020-09-08 | Stop reason: HOSPADM

## 2020-09-04 RX ORDER — INSULIN ASPART 100 [IU]/ML
1-10 INJECTION, SOLUTION INTRAVENOUS; SUBCUTANEOUS
Status: DISCONTINUED | OUTPATIENT
Start: 2020-09-05 | End: 2020-09-08 | Stop reason: HOSPADM

## 2020-09-04 RX ORDER — POLYETHYLENE GLYCOL 3350 17 G/17G
17 POWDER, FOR SOLUTION ORAL 2 TIMES DAILY PRN
Status: DISCONTINUED | OUTPATIENT
Start: 2020-09-05 | End: 2020-09-08 | Stop reason: HOSPADM

## 2020-09-04 RX ORDER — ONDANSETRON 2 MG/ML
4 INJECTION INTRAMUSCULAR; INTRAVENOUS EVERY 8 HOURS PRN
Status: DISCONTINUED | OUTPATIENT
Start: 2020-09-05 | End: 2020-09-08 | Stop reason: HOSPADM

## 2020-09-04 RX ORDER — IBUPROFEN 200 MG
24 TABLET ORAL
Status: DISCONTINUED | OUTPATIENT
Start: 2020-09-05 | End: 2020-09-08 | Stop reason: HOSPADM

## 2020-09-04 RX ORDER — SODIUM CHLORIDE 0.9 % (FLUSH) 0.9 %
10 SYRINGE (ML) INJECTION
Status: DISCONTINUED | OUTPATIENT
Start: 2020-09-05 | End: 2020-09-08 | Stop reason: HOSPADM

## 2020-09-04 RX ORDER — ONDANSETRON 8 MG/1
8 TABLET, ORALLY DISINTEGRATING ORAL EVERY 8 HOURS PRN
Status: DISCONTINUED | OUTPATIENT
Start: 2020-09-05 | End: 2020-09-08 | Stop reason: HOSPADM

## 2020-09-04 RX ORDER — METOPROLOL SUCCINATE 50 MG/1
100 TABLET, EXTENDED RELEASE ORAL DAILY
Status: DISCONTINUED | OUTPATIENT
Start: 2020-09-05 | End: 2020-09-04

## 2020-09-04 RX ORDER — ENOXAPARIN SODIUM 100 MG/ML
40 INJECTION SUBCUTANEOUS EVERY 24 HOURS
Status: DISCONTINUED | OUTPATIENT
Start: 2020-09-05 | End: 2020-09-08 | Stop reason: HOSPADM

## 2020-09-04 RX ORDER — TALC
6 POWDER (GRAM) TOPICAL NIGHTLY PRN
Status: DISCONTINUED | OUTPATIENT
Start: 2020-09-05 | End: 2020-09-08 | Stop reason: HOSPADM

## 2020-09-04 RX ORDER — BISACODYL 10 MG
10 SUPPOSITORY, RECTAL RECTAL DAILY PRN
Status: DISCONTINUED | OUTPATIENT
Start: 2020-09-05 | End: 2020-09-08 | Stop reason: HOSPADM

## 2020-09-04 RX ORDER — CLOPIDOGREL BISULFATE 75 MG/1
75 TABLET ORAL DAILY
Status: DISCONTINUED | OUTPATIENT
Start: 2020-09-05 | End: 2020-09-08 | Stop reason: HOSPADM

## 2020-09-04 RX ORDER — AMOXICILLIN 250 MG
1 CAPSULE ORAL 2 TIMES DAILY
Status: DISCONTINUED | OUTPATIENT
Start: 2020-09-05 | End: 2020-09-08 | Stop reason: HOSPADM

## 2020-09-04 RX ORDER — LOSARTAN POTASSIUM 50 MG/1
50 TABLET ORAL DAILY
Status: DISCONTINUED | OUTPATIENT
Start: 2020-09-05 | End: 2020-09-05

## 2020-09-04 RX ADMIN — FUROSEMIDE 60 MG: 10 INJECTION, SOLUTION INTRAMUSCULAR; INTRAVENOUS at 09:09

## 2020-09-04 RX ADMIN — POTASSIUM BICARBONATE 50 MEQ: 978 TABLET, EFFERVESCENT ORAL at 10:09

## 2020-09-04 NOTE — ED NOTES
Patient identifiers verified and correct for Ms Mohr  C/C: SOB,  constant SEE NN  APPEARANCE: awake and alert in NAD.  SKIN: warm, dry and intact. No breakdown or bruising.  MUSCULOSKELETAL: Patient moving all extremities spontaneously, no obvious swelling or deformities noted. Ambulates independently.  RESPIRATORY: Positive  shortness of breath.Respirations unlabored. Positive cough, NPC, denies fevers  CARDIAC: Positive right CP, 2+ distal pulses; min peripheral edema  ABDOMEN: S/ND/NT, Denies nausea  : voids spontaneously, denies difficulty  Neurologic: AAO x 4; follows commands equal strength in all extremities; denies numbness/tingling. Denies dizziness Denies weakness

## 2020-09-04 NOTE — ED PROVIDER NOTES
Encounter Date: 9/4/2020    SCRIBE #1 NOTE: I, Petr Kamara, am scribing for, and in the presence of,  Sam Fernandez MD. I have scribed the following portions of the note - Other sections scribed: HPI ROS.       History     Chief Complaint   Patient presents with    Shortness of Breath     CHf patient with sob     HPI   Ms. Mohr is a 59 y.o. female with a past medical history of CHF, CAD, DMII, HLD, HTN, pulmonary edema, and CVA, who presents to the ED with shortness of breath that began yesterday but has worsened today. It is worse on exertion. She denies sore throat, rhinorrhea, abdominal pain, leg swelling and orthopnea. She states that she had a few seconds of right sided chest pain but does not have chest pain on exertion. She has a cough. She states that it is related to her CHF and that it had stopped but is now starting to come back. She was discharged last week from the hospital for CHF. She states that she has not missed a dose of her Lasix and denies any extra salt in her diet.     The history is provided by the patient and medical records.    Review of patient's allergies indicates:   Allergen Reactions    Captopril Other (See Comments)     COUGH     Past Medical History:   Diagnosis Date    Anxiety     CHF (congestive heart failure)     Depression     Diabetes mellitus     Dyspnea     H/O coronary angiogram     H/O: hysterectomy     Hyperlipemia     Hypertension     Pulmonary edema     Schizophrenia     Stroke      Past Surgical History:   Procedure Laterality Date    BLADDER SUSPENSION      CARPAL TUNNEL RELEASE Right     HEEL SPUR SURGERY Left     LEFT HEART CATHETERIZATION Bilateral 12/27/2019    Procedure: Left heart cath;  Surgeon: Steve Chambers MD;  Location: [facility] CATH LAB;  Service: Cardiology;  Laterality: Bilateral;    TUBAL LIGATION       Family History   Family history unknown: Yes     Social History     Tobacco Use    Smoking status: Current Every Day Smoker      Types: Cigarettes    Smokeless tobacco: Never Used    Tobacco comment: nonex 2 weeks   Substance Use Topics    Alcohol use: No     Alcohol/week: 0.0 standard drinks    Drug use: No     Review of Systems   Constitutional:  No Fever, No Chills,   Eyes: No Vision Changes  ENT/Mouth: No sore throat, No rhinorrhea  Cardiovascular:  No Chest Pain on exertion, No Palpitations, No leg swelling  Respiratory:  Positive for Cough (chronic), Positive for SOB. Positive for dyspnea on exertion. Negative for orthopnea.   Gastrointestinal:  No Nausea, No Vomiting, No Diarrhea, No abdo pain.  Genitourinary:  No  pain, No dysuria   Musculoskeletal:  No Arthralgias, No Back Pain, No Neck Pain, No recent trauma.  Skin:  No skin Lesions  Neuro:  No Weakness, No Numbness, No Paresthesias, No Dizziness, No Headache    Physical Exam     Initial Vitals [09/04/20 1800]   BP Pulse Resp Temp SpO2   136/78 110 18 98.3 °F (36.8 °C) 97 %      MAP       --         Physical Exam    Vitals reviewed.      Physical Exam:  CONSTITUTIONAL: Well developed, well nourished, in no acute distress.  HENT: Normocephalic, atraumatic    EYES: Sclerae anicteric   NECK: Supple, no thyroid enlargement  CARDIOVASCULAR: Regular rate and rhythm without any murmurs, gallops, rubs.  No significant lower extremity swelling or tenderness to palpation.  RESPIRATORY: Speaking in full sentences. Breathing comfortably. Auscultation of the lungs revealed normal breath sounds b/l, no wheezing, no rales, no rhonchi.  ABDOMEN: Soft and nontender, no masses, no rebound or guarding   NEUROLOGIC: Alert, interacting normally. No facial droop.   MSK: Moving all four extremities.  Skin: Warm and dry. No visible rash on exposed areas of skin.    Psych: Mood and affect normal.       ED Course   Procedures  Labs Reviewed   CBC W/ AUTO DIFFERENTIAL - Abnormal; Notable for the following components:       Result Value    Hemoglobin 11.8 (*)     Mean Corpuscular Hemoglobin Conc 31.6  (*)     RDW 15.0 (*)     All other components within normal limits   COMPREHENSIVE METABOLIC PANEL - Abnormal; Notable for the following components:    Potassium 3.4 (*)     Glucose 140 (*)     Alkaline Phosphatase 156 (*)     ALT 62 (*)     All other components within normal limits   TROPONIN I - Abnormal; Notable for the following components:    Troponin I 0.080 (*)     All other components within normal limits   B-TYPE NATRIURETIC PEPTIDE - Abnormal; Notable for the following components:    BNP 1,661 (*)     All other components within normal limits   PROTIME-INR   SARS-COV-2 RNA AMPLIFICATION, QUAL     EKG Readings: (Independently Interpreted)   Initial Reading: No STEMI.   Normal sinus rhythm at a rate of 97.  LVH and repolarization abnormality.    Internals are unremarkable with QRS being borderline.        Imaging Results          X-Ray Chest AP Portable (Final result)  Result time 09/04/20 20:38:17    Final result by Dimas Marina MD (09/04/20 20:38:17)                 Impression:      Cardiomegaly with new right-sided pleural effusion and pulmonary vascular congestion, suggestive of worsening fluid overload state secondary to CHF.      Electronically signed by: Dimas Marina MD  Date:    09/04/2020  Time:    20:38             Narrative:    EXAMINATION:  XR CHEST AP PORTABLE    CLINICAL HISTORY:  CHF;    TECHNIQUE:  Single frontal view of the chest was performed.    COMPARISON:  08/25/2020.    FINDINGS:  The trachea is unremarkable.  There is stable enlargement of the cardiomediastinal silhouette.  The hemidiaphragms are unremarkable.  There is no evidence of free air beneath the hemidiaphragms.  There is a new small right-sided pleural effusion along the right horizontal fissure.  There is no evidence of a pneumothorax.  There is no evidence of pneumomediastinum.  There is pulmonary vascular congestion.  The osseous structures are unremarkable.                                 Medical Decision Making:    History:   Old Medical Records: I decided to obtain old medical records.  Independently Interpreted Test(s):   I have ordered and independently interpreted EKG Reading(s) - see prior notes  Clinical Tests:   Lab Tests: Ordered and Reviewed  Radiological Study: Ordered and Reviewed  Medical Tests: Ordered and Reviewed    51-year-old female with past medical history as noted coming in with 2 days of shortness of breath worse with exertion.  Of note she was recently seen discharged on the 28th of August with CHF/NSTEMI.  Troponin peaked at 8.  On exam no acute distress, no lower extremity swelling.  Rest of exam is unremarkable.    Will repeat workup with full set of labs including troponins, BNP, EKG, chest x-ray to risk stratify for ACS, heart failure.  She has a chronic cough which is unchanged from baseline.  No fevers.  This not consistent with infectious etiology at this time.    Given patient's high risk and her current shortness of breath anticipate admission to the hospital for continued monitoring, possible diuresis, serial troponins.    21:45 CXR shows pleural effusion and vascular congestion. BNP is elevated (above previous recent values). Troponin is mildly elevated, no chest pain, EKG as noted, this is not consistent with ACS.  Likely demand from heart failure. Patient received 60 of IV Lasix. Will need admission for CHF exacerbation.   Plan explained to patient she agrees with plan.    MDM Complexity Points:   Problem Points:  1.New problem, with no additional ED work-up planned (maximum of 1) -    Data Points:  Review or order clinical lab tests, Review or order radiology test, Review or order medicine test (PFTs, EKG, cardiac echo or catheterization), Independent review of image, tracing, or specimen, Decision to obtain old records (in the EHR), Review and summarization of old records and Discuss test with performing physician/consulting physician - discussed with hospital medicine.    Risk:  High  Risk            Scribe Attestation:   Scribe #1: I performed the above scribed service and the documentation accurately describes the services I performed. I attest to the accuracy of the note.    Attending Attestation:           Physician Attestation for Scribe:  Physician Attestation Statement for Scribe #1: I, reviewed documentation, as scribed by Petr Kamara in my presence, and it is both accurate and complete.                               Clinical Impression:       ICD-10-CM ICD-9-CM   1. Shortness of breath  R06.02 786.05         Disposition:   Disposition: Placed in Observation     ED Disposition Condition    Observation                           Sam Fernandez MD  09/05/20 0104

## 2020-09-04 NOTE — PROGRESS NOTES
[facility]  Transition of Care Home Visit    Visit Date: 9/4/2020  Encounter Provider: Reema Koroma NP  PCP:  Fernando Manzo MD    PRESENTING HISTORY      Patient ID: Diomedes Mohr is a 59 y.o. female.    Consult Requested By:  Dr. Fernando Manzo  Reason for Consult:  Hospital Follow Up    Diomedes is being seen at home due to pt preference      Chief Complaint: Transitional Care and Shortness of Breath      History of Present Illness: Ms. Diomedes Mohr is a 59 y.o. female who was recently admitted to the hospital.    Admit: 8/25/2020 through 8/29/2020     Hospital Course:   Patient admitted to hospital medicine for SOB. She had elevated troponins on admission with peak of 8.0. There was concern for ACS- NSTEMI, she was treated with Heparin ggt. Cardiology was consulted to evaluate, patient ended up getting a PET scan on 8/27 which was revealing of nonobstructive disease. She was also treated with IV diuretics which was transitioned to PO on discharge. Patient's 2D ECHO was consistent with previous ones with an EF of 10-15%, grade II diastolic dysfunction and severe left atrial enlargement. Patient was started on GDMT with Losartan & Metoprolol. She was evaluated by [facility] with plans for further work up outpatient  ___________________________________________________________________    Today:    HPI:  Pt is being seen today for hospital follow up. See hospital course.  Pt has 2 recent admits.    Today, she reports she is SOB today. Sat is 98% today.  She reports she has gained 1 lb since yesterday    Review of Systems   Constitutional: Negative for activity change, fatigue and fever.   HENT: Negative.    Eyes: Negative.    Respiratory: Positive for shortness of breath. Negative for chest tightness.    Cardiovascular: Negative.  Negative for leg swelling.   Gastrointestinal: Negative.    Endocrine: Negative.    Genitourinary: Negative.    Musculoskeletal: Negative.    Skin: Negative.     Allergic/Immunologic: Negative.    Neurological: Negative.    Hematological: Negative.    Psychiatric/Behavioral: Negative.  Negative for agitation.   All other systems reviewed and are negative.      Assessments:  · Environmental: single family home, clean  · Functional Status: independent  · Safety:   · Nutritional: adequate food in home  · Home Health/DME/Supplies:     PAST HISTORY:     Past Medical History:   Diagnosis Date    Anxiety     CHF (congestive heart failure)     Depression     Diabetes mellitus     Dyspnea     H/O coronary angiogram     H/O: hysterectomy     Hyperlipemia     Hypertension     Pulmonary edema     Schizophrenia        Past Surgical History:   Procedure Laterality Date    BLADDER SUSPENSION      CARPAL TUNNEL RELEASE Right     HEEL SPUR SURGERY Left     LEFT HEART CATHETERIZATION Bilateral 12/27/2019    Procedure: Left heart cath;  Surgeon: Steve Chambers MD;  Location: [facility] CATH LAB;  Service: Cardiology;  Laterality: Bilateral;    TUBAL LIGATION         Family History   Family history unknown: Yes       Social History     Socioeconomic History    Marital status:      Spouse name: Not on file    Number of children: Not on file    Years of education: Not on file    Highest education level: Not on file   Occupational History    Not on file   Social Needs    Financial resource strain: Not on file    Food insecurity     Worry: Not on file     Inability: Not on file    Transportation needs     Medical: Not on file     Non-medical: Not on file   Tobacco Use    Smoking status: Current Every Day Smoker     Types: Cigarettes    Smokeless tobacco: Never Used   Substance and Sexual Activity    Alcohol use: No     Alcohol/week: 0.0 standard drinks    Drug use: No    Sexual activity: Not on file   Lifestyle    Physical activity     Days per week: Not on file     Minutes per session: Not on file    Stress: Not on file   Relationships    Social  connections     Talks on phone: Not on file     Gets together: Not on file     Attends [employer] service: Not on file     Active member of club or organization: Not on file     Attends meetings of clubs or organizations: Not on file     Relationship status: Not on file   Other Topics Concern    Not on file   Social History Narrative    Not on file       MEDICATIONS & ALLERGIES:     Current Outpatient Medications on File Prior to Visit   Medication Sig Dispense Refill    aspirin 81 MG Chew Take 1 tablet (81 mg total) by mouth once daily. 90 tablet 0    atorvastatin (LIPITOR) 80 MG tablet Take 1 tablet (80 mg total) by mouth once daily. 90 tablet 3    clopidogreL (PLAVIX) 75 mg tablet Take 1 tablet (75 mg total) by mouth once daily. 30 tablet 0    dulaglutide (TRULICITY) 1.5 mg/0.5 mL pen injector Inject into the skin once a week.       furosemide (LASIX) 40 MG tablet Take 1 tablet (40 mg total) by mouth once daily AND 1 tablet (40 mg total) every evening. TAKE EXTRA 40 MG IF GAINING 2 OR MORE POUNDS IN 2 DAYS.. 90 tablet 2    glimepiride (AMARYL) 1 MG tablet Take 1 tablet by mouth once daily.      losartan (COZAAR) 50 MG tablet Take 1 tablet (50 mg total) by mouth once daily. 90 tablet 3    metFORMIN (GLUCOPHAGE) 1000 MG tablet Take 1,000 mg by mouth 2 (two) times daily.       metoprolol succinate (TOPROL-XL) 100 MG 24 hr tablet Take 1 tablet (100 mg total) by mouth once daily. 30 tablet 6    pregabalin (LYRICA) 100 MG capsule Take 100 mg by mouth 2 (two) times daily as needed (pain).       No current facility-administered medications on file prior to visit.         Review of patient's allergies indicates:   Allergen Reactions    Captopril Other (See Comments)     COUGH       OBJECTIVE:     Vital Signs:  Vitals:    09/04/20 1011   BP: 120/68   Pulse: 88   Resp: 18     Body mass index is 28.08 kg/m².     Physical Exam:  Physical Exam  Vitals signs reviewed.   Constitutional:       General: She is not in  acute distress.     Appearance: She is well-developed.   HENT:      Head: Normocephalic and atraumatic.   Eyes:      Pupils: Pupils are equal, round, and reactive to light.   Neck:      Musculoskeletal: Normal range of motion and neck supple.   Cardiovascular:      Rate and Rhythm: Normal rate and regular rhythm.      Heart sounds: Normal heart sounds.   Pulmonary:      Effort: Pulmonary effort is normal.   Abdominal:      General: Bowel sounds are normal.      Palpations: Abdomen is soft.   Musculoskeletal: Normal range of motion.   Skin:     General: Skin is warm and dry.   Neurological:      Mental Status: She is alert and oriented to person, place, and time.      Cranial Nerves: No cranial nerve deficit.   Psychiatric:         Behavior: Behavior normal.         Thought Content: Thought content normal.         Judgment: Judgment normal.         Laboratory  Lab Results   Component Value Date    WBC 8.11 08/28/2020    HGB 12.0 08/28/2020    HCT 38.0 08/28/2020    MCV 89 08/28/2020     (H) 08/28/2020     Lab Results   Component Value Date    INR 1.0 08/26/2020    INR 0.9 08/14/2020    INR 1.1 07/13/2020     Lab Results   Component Value Date    HGBA1C 6.9 (H) 08/14/2020     No results for input(s): POCTGLUCOSE in the last 72 hours.    Diagnostic Results:  Results for orders placed during the hospital encounter of 08/25/20   Echo Color Flow Doppler? Yes    Narrative · Severely decreased left ventricular systolic function. The estimated   ejection fraction is 10-15%.  · Grade II (moderate) left ventricular diastolic dysfunction consistent   with pseudonormalization.  · Severe left ventricular enlargement.  · Mildly reduced right ventricular systolic function.  · The estimated PA systolic pressure is 44 mmHg.  · Normal central venous pressure (3 mmHg).  · Pulmonary hypertension present.  · Mild left atrial enlargement.            TRANSITION OF CARE:     [facility] Contact Note: 08/31/2020    Family and/or  Caretaker present at visit?  No.  Diagnostic tests reviewed/disposition: No diagnosic tests pending after this hospitalization.  Disease/illness education: CHF  Home health/community services discussion/referrals: Patient has home health established at [facility].   Establishment or re-establishment of referral orders for community resources: No other necessary community resources.   Discussion with other health care providers: Referral to neurology for follow up.     Transition of Care Visit:     I have reviewed and updated the history and problem list.  I have reconciled the medication list.  I have discussed the hospitalization and current medical issues, prognosis and plans with the patient/family.  I  spent more than 50% of time discussing the care with the patient/family.  Total Face-to-Face Encounter: 60 minutes.    Medications Reconciliation:   I have reconciled the patient's home medications and discharge medications with the patient/family. I have updated all changes.  Refer to After-Visit Medication List.    ASSESSMENT & PLAN:     HIGH RISK CONDITION(S):  CHF    Diomedes was seen today for transitional care and shortness of breath.    Diagnoses and all orders for this visit:    Dilated cardiomyopathy  Coronary artery disease involving native coronary artery of native heart without angina pectoris  EF poor at 10-15%, Weight increased by 1 lb since yesterday.  Pt has taken her Lasix this am.  Reviewed Lasix instructions to take extra dose if weight increases by 2 lbs  Pt will take tomorrow if weight increases again.  Extensive time spent with pt reviewing low sodium diet.  Explained to pt that her cardiac function is very poor and she must be very strict with her diet and medications.  Limit fluid intake  Pt states understanding.  Pt has follow up with heart transplant clinic, reinforced importance of keeping this appt    Special Patient Instructions: The following was discussed with the patient/family.  Instructions were given to patient/family.    CHF Instructions    Weigh daily and record.  Regular activity within patient's limitations.  Low salt, low fat and low choleterol diet and restrict fluid < 2L per day.  Contact for SOB, chest pain, weight gain > 2-3 lbs per day and/or 5-6 lbs per week.   No smoking. Annual influenza vaccine required. Take all medications as prescribed.  Reinforced importance of medication and diet compliance.    Cerebrovascular accident (CVA) due to embolism of right middle cerebral artery  -     Ambulatory referral/consult to Neurology; Future    Pt had a stroke last month, she was not able to follow up as of yet due to hospitalization for CHF  She reports weakness has improved  PT/OT in place via [facility]  Referral placed to assist with scheduling appt with neurology    Were controlled substances prescribed?  No    Instructions for the patient:    Scheduled Follow-up :  Future Appointments   Date Time Provider Department Center   9/11/2020  2:00 PM LAB, APPOINTMENT [facility] LAB [facility]NP [facility]   9/11/2020  3:00 PM SIX, MINUTE WALK [facility] PUL [facility]   9/11/2020  3:30 PM Justina Smith MD [facility] HEARTTX [facility]   9/11/2020  4:00 PM [facility]ART[facility]PL[facility]       After Visit Medication List :     Medication List          Accurate as of September 4, 2020  3:27 PM. If you have any questions, ask your nurse or doctor.            CONTINUE taking these medications    aspirin 81 MG Chew  Take 1 tablet (81 mg total) by mouth once daily.     atorvastatin 80 MG tablet  Commonly known as: LIPITOR  Take 1 tablet (80 mg total) by mouth once daily.     clopidogreL 75 mg tablet  Commonly known as: PLAVIX  Take 1 tablet (75 mg total) by mouth once daily.     furosemide 40 MG tablet  Commonly known as: LASIX  Take 1 tablet (40 mg total) by mouth once daily AND 1 tablet (40 mg total) every evening. TAKE EXTRA 40 MG IF GAINING 2 OR MORE POUNDS IN 2 DAYS..     glimepiride 1  MG tablet  Commonly known as: AMARYL     losartan 50 MG tablet  Commonly known as: COZAAR  Take 1 tablet (50 mg total) by mouth once daily.     metFORMIN 1000 MG tablet  Commonly known as: GLUCOPHAGE     metoprolol succinate 100 MG 24 hr tablet  Commonly known as: TOPROL-XL  Take 1 tablet (100 mg total) by mouth once daily.     pregabalin 100 MG capsule  Commonly known as: LYRICA     TRULICITY 1.5 mg/0.5 mL pen injector  Generic drug: dulaglutide          Attestation: Screening criteria to assess the level of the patient's risk for infection with COVID-19 as recommended by the CDC at the time of the above documented home visit concluded appropriateness to proceed. Universal precautions were maintained at all times, including provider use of >60% alcohol gel hand  immediately prior to entry and upon departing the patient's home as well as cleaning of equipment used in home visit with antibacterial/germicidal disposable wipes.    Signature:  Reema Koroma NP    Patient consent obtained prior to treatment

## 2020-09-04 NOTE — ED TRIAGE NOTES
Patient states SOB onset yesterday, worse when she walks around. Denies weight gain, Earlier today with right CP.

## 2020-09-05 LAB
ALBUMIN SERPL BCP-MCNC: 3.6 G/DL (ref 3.5–5.2)
ALP SERPL-CCNC: 156 U/L (ref 55–135)
ALT SERPL W/O P-5'-P-CCNC: 63 U/L (ref 10–44)
ANION GAP SERPL CALC-SCNC: 10 MMOL/L (ref 8–16)
ANION GAP SERPL CALC-SCNC: 8 MMOL/L (ref 8–16)
AST SERPL-CCNC: 32 U/L (ref 10–40)
BASOPHILS # BLD AUTO: 0.04 K/UL (ref 0–0.2)
BASOPHILS NFR BLD: 0.5 % (ref 0–1.9)
BILIRUB SERPL-MCNC: 0.7 MG/DL (ref 0.1–1)
BUN SERPL-MCNC: 10 MG/DL (ref 6–20)
BUN SERPL-MCNC: 12 MG/DL (ref 6–20)
CALCIUM SERPL-MCNC: 9.1 MG/DL (ref 8.7–10.5)
CALCIUM SERPL-MCNC: 9.5 MG/DL (ref 8.7–10.5)
CHLORIDE SERPL-SCNC: 102 MMOL/L (ref 95–110)
CHLORIDE SERPL-SCNC: 103 MMOL/L (ref 95–110)
CO2 SERPL-SCNC: 29 MMOL/L (ref 23–29)
CO2 SERPL-SCNC: 30 MMOL/L (ref 23–29)
CREAT SERPL-MCNC: 0.9 MG/DL (ref 0.5–1.4)
CREAT SERPL-MCNC: 0.9 MG/DL (ref 0.5–1.4)
DIFFERENTIAL METHOD: ABNORMAL
EOSINOPHIL # BLD AUTO: 0.2 K/UL (ref 0–0.5)
EOSINOPHIL NFR BLD: 2.8 % (ref 0–8)
ERYTHROCYTE [DISTWIDTH] IN BLOOD BY AUTOMATED COUNT: 15 % (ref 11.5–14.5)
EST. GFR  (AFRICAN AMERICAN): >60 ML/MIN/1.73 M^2
EST. GFR  (AFRICAN AMERICAN): >60 ML/MIN/1.73 M^2
EST. GFR  (NON AFRICAN AMERICAN): >60 ML/MIN/1.73 M^2
EST. GFR  (NON AFRICAN AMERICAN): >60 ML/MIN/1.73 M^2
GLUCOSE SERPL-MCNC: 185 MG/DL (ref 70–110)
GLUCOSE SERPL-MCNC: 199 MG/DL (ref 70–110)
HCT VFR BLD AUTO: 38.2 % (ref 37–48.5)
HGB BLD-MCNC: 11.9 G/DL (ref 12–16)
IMM GRANULOCYTES # BLD AUTO: 0.01 K/UL (ref 0–0.04)
IMM GRANULOCYTES NFR BLD AUTO: 0.1 % (ref 0–0.5)
LACTATE SERPL-SCNC: 1.1 MMOL/L (ref 0.5–2.2)
LYMPHOCYTES # BLD AUTO: 3.2 K/UL (ref 1–4.8)
LYMPHOCYTES NFR BLD: 39.6 % (ref 18–48)
MAGNESIUM SERPL-MCNC: 1.8 MG/DL (ref 1.6–2.6)
MCH RBC QN AUTO: 27.9 PG (ref 27–31)
MCHC RBC AUTO-ENTMCNC: 31.2 G/DL (ref 32–36)
MCV RBC AUTO: 90 FL (ref 82–98)
MONOCYTES # BLD AUTO: 0.5 K/UL (ref 0.3–1)
MONOCYTES NFR BLD: 6.8 % (ref 4–15)
NEUTROPHILS # BLD AUTO: 4 K/UL (ref 1.8–7.7)
NEUTROPHILS NFR BLD: 50.2 % (ref 38–73)
NRBC BLD-RTO: 0 /100 WBC
PLATELET # BLD AUTO: 302 K/UL (ref 150–350)
PMV BLD AUTO: 11.7 FL (ref 9.2–12.9)
POTASSIUM SERPL-SCNC: 3.3 MMOL/L (ref 3.5–5.1)
POTASSIUM SERPL-SCNC: 3.8 MMOL/L (ref 3.5–5.1)
PROT SERPL-MCNC: 6.7 G/DL (ref 6–8.4)
RBC # BLD AUTO: 4.26 M/UL (ref 4–5.4)
SODIUM SERPL-SCNC: 140 MMOL/L (ref 136–145)
SODIUM SERPL-SCNC: 142 MMOL/L (ref 136–145)
WBC # BLD AUTO: 7.96 K/UL (ref 3.9–12.7)

## 2020-09-05 PROCEDURE — 85025 COMPLETE CBC W/AUTO DIFF WBC: CPT | Mod: NTX

## 2020-09-05 PROCEDURE — 99226 PR SUBSEQUENT OBSERVATION CARE,LEVEL III: ICD-10-PCS | Mod: NTX,,, | Performed by: NURSE PRACTITIONER

## 2020-09-05 PROCEDURE — 25000003 PHARM REV CODE 250: Mod: NTX | Performed by: NURSE PRACTITIONER

## 2020-09-05 PROCEDURE — G0378 HOSPITAL OBSERVATION PER HR: HCPCS | Mod: NTX

## 2020-09-05 PROCEDURE — 80053 COMPREHEN METABOLIC PANEL: CPT | Mod: NTX

## 2020-09-05 PROCEDURE — 83735 ASSAY OF MAGNESIUM: CPT | Mod: NTX

## 2020-09-05 PROCEDURE — 80048 BASIC METABOLIC PNL TOTAL CA: CPT | Mod: NTX

## 2020-09-05 PROCEDURE — 96376 TX/PRO/DX INJ SAME DRUG ADON: CPT | Mod: NTX

## 2020-09-05 PROCEDURE — 63600175 PHARM REV CODE 636 W HCPCS: Mod: NTX | Performed by: NURSE PRACTITIONER

## 2020-09-05 PROCEDURE — 96372 THER/PROPH/DIAG INJ SC/IM: CPT | Mod: 59

## 2020-09-05 PROCEDURE — 36415 COLL VENOUS BLD VENIPUNCTURE: CPT | Mod: NTX

## 2020-09-05 PROCEDURE — 99226 PR SUBSEQUENT OBSERVATION CARE,LEVEL III: CPT | Mod: NTX,,, | Performed by: NURSE PRACTITIONER

## 2020-09-05 PROCEDURE — 83605 ASSAY OF LACTIC ACID: CPT | Mod: NTX

## 2020-09-05 PROCEDURE — 96375 TX/PRO/DX INJ NEW DRUG ADDON: CPT | Mod: NTX

## 2020-09-05 RX ORDER — FUROSEMIDE 10 MG/ML
80 INJECTION INTRAMUSCULAR; INTRAVENOUS 2 TIMES DAILY
Status: DISCONTINUED | OUTPATIENT
Start: 2020-09-05 | End: 2020-09-07

## 2020-09-05 RX ORDER — MAGNESIUM SULFATE HEPTAHYDRATE 40 MG/ML
2 INJECTION, SOLUTION INTRAVENOUS ONCE
Status: COMPLETED | OUTPATIENT
Start: 2020-09-05 | End: 2020-09-05

## 2020-09-05 RX ORDER — POTASSIUM CHLORIDE 750 MG/1
60 CAPSULE, EXTENDED RELEASE ORAL ONCE
Status: COMPLETED | OUTPATIENT
Start: 2020-09-05 | End: 2020-09-05

## 2020-09-05 RX ORDER — POTASSIUM CHLORIDE 750 MG/1
40 CAPSULE, EXTENDED RELEASE ORAL ONCE
Status: COMPLETED | OUTPATIENT
Start: 2020-09-05 | End: 2020-09-05

## 2020-09-05 RX ADMIN — MAGNESIUM SULFATE IN WATER 2 G: 40 INJECTION, SOLUTION INTRAVENOUS at 10:09

## 2020-09-05 RX ADMIN — SACUBITRIL AND VALSARTAN 1 TABLET: 24; 26 TABLET, FILM COATED ORAL at 09:09

## 2020-09-05 RX ADMIN — FUROSEMIDE 60 MG: 10 INJECTION, SOLUTION INTRAMUSCULAR; INTRAVENOUS at 03:09

## 2020-09-05 RX ADMIN — LOSARTAN POTASSIUM 50 MG: 50 TABLET ORAL at 08:09

## 2020-09-05 RX ADMIN — ASPIRIN 81 MG CHEWABLE TABLET 81 MG: 81 TABLET CHEWABLE at 08:09

## 2020-09-05 RX ADMIN — FUROSEMIDE 80 MG: 10 INJECTION, SOLUTION INTRAMUSCULAR; INTRAVENOUS at 06:09

## 2020-09-05 RX ADMIN — CLOPIDOGREL 75 MG: 75 TABLET, FILM COATED ORAL at 08:09

## 2020-09-05 RX ADMIN — ENOXAPARIN SODIUM 40 MG: 40 INJECTION SUBCUTANEOUS at 04:09

## 2020-09-05 RX ADMIN — POTASSIUM CHLORIDE 60 MEQ: 750 CAPSULE, EXTENDED RELEASE ORAL at 09:09

## 2020-09-05 RX ADMIN — DOCUSATE SODIUM 50MG AND SENNOSIDES 8.6MG 1 TABLET: 8.6; 5 TABLET, FILM COATED ORAL at 08:09

## 2020-09-05 RX ADMIN — POTASSIUM CHLORIDE 40 MEQ: 750 CAPSULE, EXTENDED RELEASE ORAL at 06:09

## 2020-09-05 RX ADMIN — METOPROLOL TARTRATE 50 MG: 50 TABLET, FILM COATED ORAL at 08:09

## 2020-09-05 RX ADMIN — ACETAMINOPHEN 650 MG: 325 TABLET ORAL at 09:09

## 2020-09-05 RX ADMIN — ATORVASTATIN CALCIUM 80 MG: 20 TABLET, FILM COATED ORAL at 08:09

## 2020-09-05 RX ADMIN — SACUBITRIL AND VALSARTAN 1 TABLET: 24; 26 TABLET, FILM COATED ORAL at 08:09

## 2020-09-05 NOTE — ASSESSMENT & PLAN NOTE
-nonobstructive CAD per PET stress  -troponin at admit 0.080 down from peak 8.4 last admission  -continue home ASA, plavix and statin  -continue tele monitoring  -EKG PRN chest pain

## 2020-09-05 NOTE — SUBJECTIVE & OBJECTIVE
Past Medical History:   Diagnosis Date    Anxiety     CHF (congestive heart failure)     Depression     Diabetes mellitus     Dyspnea     H/O coronary angiogram     H/O: hysterectomy     Hyperlipemia     Hypertension     Pulmonary edema     Schizophrenia     Stroke        Past Surgical History:   Procedure Laterality Date    BLADDER SUSPENSION      CARPAL TUNNEL RELEASE Right     HEEL SPUR SURGERY Left     LEFT HEART CATHETERIZATION Bilateral 12/27/2019    Procedure: Left heart cath;  Surgeon: Steve Chambers MD;  Location: [facility] CATH LAB;  Service: Cardiology;  Laterality: Bilateral;    TUBAL LIGATION         Review of patient's allergies indicates:   Allergen Reactions    Captopril Other (See Comments)     COUGH       No current facility-administered medications on file prior to encounter.      Current Outpatient Medications on File Prior to Encounter   Medication Sig    aspirin 81 MG Chew Take 1 tablet (81 mg total) by mouth once daily.    atorvastatin (LIPITOR) 80 MG tablet Take 1 tablet (80 mg total) by mouth once daily.    clopidogreL (PLAVIX) 75 mg tablet Take 1 tablet (75 mg total) by mouth once daily.    dulaglutide (TRULICITY) 1.5 mg/0.5 mL pen injector Inject into the skin once a week.     furosemide (LASIX) 40 MG tablet Take 1 tablet (40 mg total) by mouth once daily AND 1 tablet (40 mg total) every evening. TAKE EXTRA 40 MG IF GAINING 2 OR MORE POUNDS IN 2 DAYS..    glimepiride (AMARYL) 1 MG tablet Take 1 tablet by mouth once daily.    losartan (COZAAR) 50 MG tablet Take 1 tablet (50 mg total) by mouth once daily.    metFORMIN (GLUCOPHAGE) 1000 MG tablet Take 1,000 mg by mouth 2 (two) times daily.     metoprolol succinate (TOPROL-XL) 100 MG 24 hr tablet Take 1 tablet (100 mg total) by mouth once daily.    pregabalin (LYRICA) 100 MG capsule Take 100 mg by mouth 2 (two) times daily as needed (pain).     Family History     Family history is unknown by patient.         Tobacco Use    Smoking status: Current Every Day Smoker     Types: Cigarettes    Smokeless tobacco: Never Used    Tobacco comment: nonex 2 weeks   Substance and Sexual Activity    Alcohol use: No     Alcohol/week: 0.0 standard drinks    Drug use: No    Sexual activity: Not on file     Review of Systems   Constitutional: Positive for appetite change (early satiety). Negative for activity change, chills, diaphoresis, fatigue, fever and unexpected weight change.   HENT: Negative.    Eyes: Negative.    Respiratory: Positive for shortness of breath. Negative for cough, chest tightness and wheezing.         +RUBI   Cardiovascular: Negative for chest pain, palpitations and leg swelling.        +orthopnea    Gastrointestinal: Positive for abdominal distention. Negative for abdominal pain, constipation, diarrhea, nausea and vomiting.   Endocrine: Negative.    Genitourinary: Negative.    Musculoskeletal: Negative.    Skin: Negative.    Allergic/Immunologic: Negative.    Neurological: Negative.      Objective:     Vital Signs (Most Recent):  Temp: 97.9 °F (36.6 °C) (09/05/20 0126)  Pulse: 88 (09/05/20 0126)  Resp: 18 (09/05/20 0126)  BP: 117/78 (09/05/20 0126)  SpO2: 99 % (09/05/20 0126) Vital Signs (24h Range):  Temp:  [97.9 °F (36.6 °C)-98.3 °F (36.8 °C)] 97.9 °F (36.6 °C)  Pulse:  [] 88  Resp:  [18] 18  SpO2:  [95 %-100 %] 99 %  BP: (117-136)/(68-78) 117/78     Weight: 78.9 kg (174 lb)  Body mass index is 28.08 kg/m².    Physical Exam  Vitals signs and nursing note reviewed.   Constitutional:       General: She is not in acute distress.     Appearance: Normal appearance. She is well-developed. She is obese. She is not ill-appearing, toxic-appearing or diaphoretic.   HENT:      Head: Normocephalic and atraumatic.      Mouth/Throat:      Dentition: Normal dentition.   Eyes:      General: Lids are normal.      Extraocular Movements: Extraocular movements intact.      Conjunctiva/sclera: Conjunctivae normal.    Neck:      Musculoskeletal: Normal range of motion.   Cardiovascular:      Rate and Rhythm: Regular rhythm. Tachycardia present.      Heart sounds: Normal heart sounds. No murmur.   Pulmonary:      Effort: Pulmonary effort is normal.      Comments: Crackles bilateral bases  Chest:      Chest wall: No tenderness.   Abdominal:      General: Bowel sounds are normal. There is no distension.      Palpations: Abdomen is soft.      Tenderness: There is no abdominal tenderness.   Musculoskeletal: Normal range of motion.         General: No deformity.      Right lower leg: Edema (scant pretibial ) present.      Left lower leg: Edema (scant pretibial ) present.   Skin:     General: Skin is warm and dry.      Findings: No erythema or rash.   Neurological:      Mental Status: She is alert and oriented to person, place, and time.      Cranial Nerves: No cranial nerve deficit.   Psychiatric:         Thought Content: Thought content normal.         Judgment: Judgment normal.             Significant Labs:   CBC:   Recent Labs   Lab 09/04/20 1915   WBC 7.31   HGB 11.8*   HCT 37.3        CMP:   Recent Labs   Lab 09/04/20 1915      K 3.4*      CO2 26   *   BUN 13   CREATININE 0.8   CALCIUM 8.8   PROT 6.8   ALBUMIN 3.6   BILITOT 0.7   ALKPHOS 156*   AST 31   ALT 62*   ANIONGAP 9   EGFRNONAA >60.0     Cardiac Markers:   Recent Labs   Lab 09/04/20 1915   BNP 1,661*     All pertinent labs within the past 24 hours have been reviewed.    Significant Imaging: I have reviewed all pertinent imaging results/findings within the past 24 hours.

## 2020-09-05 NOTE — SUBJECTIVE & OBJECTIVE
Interval History: Patient educated for HF.  NSVT on monitor.  Cool to touch.     Review of Systems   Constitutional: Positive for activity change and appetite change (early satiety). Negative for chills, diaphoresis, fatigue, fever and unexpected weight change.   HENT: Negative.  Negative for congestion and trouble swallowing.    Eyes: Negative.  Negative for visual disturbance.   Respiratory: Positive for shortness of breath. Negative for cough, chest tightness and wheezing.         +RUBI, + PND    Cardiovascular: Negative for chest pain, palpitations and leg swelling.        +orthopnea    Gastrointestinal: Positive for abdominal distention. Negative for abdominal pain, constipation, diarrhea, nausea and vomiting.   Endocrine: Negative.    Genitourinary: Negative.  Negative for difficulty urinating.   Musculoskeletal: Negative.    Skin: Negative.         Feels cold     Allergic/Immunologic: Negative.    Neurological: Negative.  Negative for dizziness, weakness and light-headedness.   Psychiatric/Behavioral: Positive for sleep disturbance (hasn't slept in days). Negative for agitation and confusion. The patient is not nervous/anxious.      Objective:     Vital Signs (Most Recent):  Temp: 98 °F (36.7 °C) (09/05/20 1533)  Pulse: 70 (09/05/20 1608)  Resp: 18 (09/05/20 1533)  BP: 122/77 (09/05/20 1533)  SpO2: 99 % (09/05/20 1533) Vital Signs (24h Range):  Temp:  [96.7 °F (35.9 °C)-98.3 °F (36.8 °C)] 98 °F (36.7 °C)  Pulse:  [] 70  Resp:  [18] 18  SpO2:  [96 %-100 %] 99 %  BP: (100-136)/(58-78) 122/77     Weight: 79.2 kg (174 lb 9.7 oz)  Body mass index is 28.18 kg/m².    Intake/Output Summary (Last 24 hours) at 9/5/2020 1742  Last data filed at 9/5/2020 1627  Gross per 24 hour   Intake 480 ml   Output 1300 ml   Net -820 ml      Physical Exam  Vitals signs and nursing note reviewed.   Constitutional:       General: She is not in acute distress.     Appearance: Normal appearance. She is well-developed. She is obese.  She is not ill-appearing, toxic-appearing or diaphoretic.   HENT:      Head: Normocephalic and atraumatic.      Right Ear: External ear normal.      Left Ear: External ear normal.      Nose: Nose normal.      Mouth/Throat:      Mouth: Mucous membranes are moist.      Dentition: Normal dentition.      Pharynx: Oropharynx is clear.   Eyes:      General: Lids are normal.      Extraocular Movements: Extraocular movements intact.      Conjunctiva/sclera: Conjunctivae normal.   Neck:      Musculoskeletal: Normal range of motion.   Cardiovascular:      Rate and Rhythm: Regular rhythm. Tachycardia present.      Pulses: Normal pulses.      Heart sounds: Normal heart sounds. No murmur.   Pulmonary:      Effort: Pulmonary effort is normal.      Comments: Crackles bilateral bases  Chest:      Chest wall: No tenderness.   Abdominal:      General: Bowel sounds are normal. There is no distension.      Palpations: Abdomen is soft.      Tenderness: There is no abdominal tenderness.   Musculoskeletal: Normal range of motion.         General: No deformity.      Right lower leg: Edema (scant pretibial ) present.      Left lower leg: Edema (scant pretibial ) present.   Skin:     General: Skin is warm and dry.      Findings: No erythema or rash.   Neurological:      Mental Status: She is alert and oriented to person, place, and time.      Cranial Nerves: No cranial nerve deficit.   Psychiatric:         Mood and Affect: Mood normal.         Behavior: Behavior normal.         Thought Content: Thought content normal.         Judgment: Judgment normal.         Significant Labs:   CBC:   Recent Labs   Lab 09/04/20 1915 09/05/20  0414   WBC 7.31 7.96   HGB 11.8* 11.9*   HCT 37.3 38.2    302     CMP:   Recent Labs   Lab 09/04/20 1915 09/05/20  0414 09/05/20  1356    140 142   K 3.4* 3.3* 3.8    102 103   CO2 26 30* 29   * 199* 185*   BUN 13 12 10   CREATININE 0.8 0.9 0.9   CALCIUM 8.8 9.1 9.5   PROT 6.8 6.7  --     ALBUMIN 3.6 3.6  --    BILITOT 0.7 0.7  --    ALKPHOS 156* 156*  --    AST 31 32  --    ALT 62* 63*  --    ANIONGAP 9 8 10   EGFRNONAA >60.0 >60.0 >60.0     Troponin:   Recent Labs   Lab 09/04/20 1915   TROPONINI 0.080*       Significant Imaging: I have reviewed all pertinent imaging results/findings within the past 24 hours.

## 2020-09-05 NOTE — HOSPITAL COURSE
"Placed in observation for readmit < 30 days for Acute on chronic Systolic heart failure EF 10-15%, was previously denied a life vest per insurance, however is exhibiting NSVT on tele; ordered LifeVest.  Patient is noncompliant with salt/ salty foods (found to be eating [employer] in the hospital).  She doesn't have a scale to weigh and states she was never told to limit her fluid intake so she's been drinking "a lot" of fluid. Reviewed HF management and patient receptive to treatment and following regimens however continues to not follow even in a controlled setting.  She may or may not be a cardiomiems candidate d/t insurance but her frequent flyer status may be ideal for her.     She was not diuresing well with the lasix 60mg iv and felt cool touch upon exam under the blankets.  LA 1.1, increased lasix to 80 mg IV bid, no need for inotropes; no evidence of end organ decreased perfusion. Changed losartan to Entresto 24/26. Admit weight 176 down to 170 lbs, down 6 lbs since admit.  Transitioned to oral torsemide 20 mg d/t issues with gut edema.  Appears to be close to euvolemia, transitioning to oral and waiting on Lifevest approval.     Dispo: f/u with Dr. Smith 9/11/20, LIfe vest to be issued at home   "

## 2020-09-05 NOTE — H&P
[facility] Hospital Medicine  History & Physical    Patient Name: Diomedes Mohr  MRN: [id number]  Admission Date: 9/4/2020  Attending Physician: Owen Tristan MD   Primary Care Provider: Fernando Manzo MD    [facility] Hospital Medicine Team: [facility] MED F Christina Nova NP     Patient information was obtained from patient, past medical records and ER records.     Subjective:     Principal Problem:Acute combined systolic and diastolic CHF, NYHA class 3    Chief Complaint:   Chief Complaint   Patient presents with    Shortness of Breath     CHf patient with sob        HPI: Ms. Mohr is a 59 YOF with PMHx of combined systolic and diastolic HF (EF 10-15%), pulmHTN, dilated cardiomyopathy, nonobstructive CAD, HTN, DM on oral therapies, and recent embolic R MCA CVA (s/p tPA on 8/14, w/o residual sx). She presents to ED for worsening shortness of breath in setting of recent missed lasix dose and dietary noncomplaince. She endorses RUBI while working with in home physical therapy, early satiety, slight abdominal bloating, and orthopena. She denies PND, chest pain, palpitations, LE edema, N/V/D. All past medical, social, and family history reviewed.     Of note she has had multiple recent admission. Beginning with 07/13-14/2020 in [facility] for CHF exacerbation requiring IV diuresis, 08/14-16/2020 at [facility] for embolic R MCA CVA s/p TPA, and again 08/25-28/2020 at [facility] for NSTEMI with troponin peak of 8.4 in setting of CHF exacerbation that required IV diuresis at which time TTE was performed 08/26 that was similar to previous exams and underwent PET stress 08/27 which revealed non obstructive CAD.     In the ED her BP was stable, 's, and afebrile. CBC unremarkable, chem with K 3.4 (repleted) and alk phos 156, BNP 1661 (higher than previous results), troponin 0.080, COVID negative, CXR reveals R pleural effusion and pulmonary vascular congestion, and EKG with NSR and no acute changes. The  patient was placed in observation to the Hospital Medicine Service for further evaluation and treatment.     Past Medical History:   Diagnosis Date    Anxiety     CHF (congestive heart failure)     Depression     Diabetes mellitus     Dyspnea     H/O coronary angiogram     H/O: hysterectomy     Hyperlipemia     Hypertension     Pulmonary edema     Schizophrenia     Stroke        Past Surgical History:   Procedure Laterality Date    BLADDER SUSPENSION      CARPAL TUNNEL RELEASE Right     HEEL SPUR SURGERY Left     LEFT HEART CATHETERIZATION Bilateral 12/27/2019    Procedure: Left heart cath;  Surgeon: Steve Chambers MD;  Location: [facility] CATH LAB;  Service: Cardiology;  Laterality: Bilateral;    TUBAL LIGATION         Review of patient's allergies indicates:   Allergen Reactions    Captopril Other (See Comments)     COUGH       No current facility-administered medications on file prior to encounter.      Current Outpatient Medications on File Prior to Encounter   Medication Sig    aspirin 81 MG Chew Take 1 tablet (81 mg total) by mouth once daily.    atorvastatin (LIPITOR) 80 MG tablet Take 1 tablet (80 mg total) by mouth once daily.    clopidogreL (PLAVIX) 75 mg tablet Take 1 tablet (75 mg total) by mouth once daily.    dulaglutide (TRULICITY) 1.5 mg/0.5 mL pen injector Inject into the skin once a week.     furosemide (LASIX) 40 MG tablet Take 1 tablet (40 mg total) by mouth once daily AND 1 tablet (40 mg total) every evening. TAKE EXTRA 40 MG IF GAINING 2 OR MORE POUNDS IN 2 DAYS..    glimepiride (AMARYL) 1 MG tablet Take 1 tablet by mouth once daily.    losartan (COZAAR) 50 MG tablet Take 1 tablet (50 mg total) by mouth once daily.    metFORMIN (GLUCOPHAGE) 1000 MG tablet Take 1,000 mg by mouth 2 (two) times daily.     metoprolol succinate (TOPROL-XL) 100 MG 24 hr tablet Take 1 tablet (100 mg total) by mouth once daily.    pregabalin (LYRICA) 100 MG capsule Take 100 mg by mouth 2  (two) times daily as needed (pain).     Family History     Family history is unknown by patient.        Tobacco Use    Smoking status: Current Every Day Smoker     Types: Cigarettes    Smokeless tobacco: Never Used    Tobacco comment: nonex 2 weeks   Substance and Sexual Activity    Alcohol use: No     Alcohol/week: 0.0 standard drinks    Drug use: No    Sexual activity: Not on file     Review of Systems   Constitutional: Positive for appetite change (early satiety). Negative for activity change, chills, diaphoresis, fatigue, fever and unexpected weight change.   HENT: Negative.    Eyes: Negative.    Respiratory: Positive for shortness of breath. Negative for cough, chest tightness and wheezing.         +RUBI   Cardiovascular: Negative for chest pain, palpitations and leg swelling.        +orthopnea    Gastrointestinal: Positive for abdominal distention. Negative for abdominal pain, constipation, diarrhea, nausea and vomiting.   Endocrine: Negative.    Genitourinary: Negative.    Musculoskeletal: Negative.    Skin: Negative.    Allergic/Immunologic: Negative.    Neurological: Negative.      Objective:     Vital Signs (Most Recent):  Temp: 97.9 °F (36.6 °C) (09/05/20 0126)  Pulse: 88 (09/05/20 0126)  Resp: 18 (09/05/20 0126)  BP: 117/78 (09/05/20 0126)  SpO2: 99 % (09/05/20 0126) Vital Signs (24h Range):  Temp:  [97.9 °F (36.6 °C)-98.3 °F (36.8 °C)] 97.9 °F (36.6 °C)  Pulse:  [] 88  Resp:  [18] 18  SpO2:  [95 %-100 %] 99 %  BP: (117-136)/(68-78) 117/78     Weight: 78.9 kg (174 lb)  Body mass index is 28.08 kg/m².    Physical Exam  Vitals signs and nursing note reviewed.   Constitutional:       General: She is not in acute distress.     Appearance: Normal appearance. She is well-developed. She is obese. She is not ill-appearing, toxic-appearing or diaphoretic.   HENT:      Head: Normocephalic and atraumatic.      Mouth/Throat:      Dentition: Normal dentition.   Eyes:      General: Lids are normal.       Extraocular Movements: Extraocular movements intact.      Conjunctiva/sclera: Conjunctivae normal.   Neck:      Musculoskeletal: Normal range of motion.   Cardiovascular:      Rate and Rhythm: Regular rhythm. Tachycardia present.      Heart sounds: Normal heart sounds. No murmur.   Pulmonary:      Effort: Pulmonary effort is normal.      Comments: Crackles bilateral bases  Chest:      Chest wall: No tenderness.   Abdominal:      General: Bowel sounds are normal. There is no distension.      Palpations: Abdomen is soft.      Tenderness: There is no abdominal tenderness.   Musculoskeletal: Normal range of motion.         General: No deformity.      Right lower leg: Edema (scant pretibial ) present.      Left lower leg: Edema (scant pretibial ) present.   Skin:     General: Skin is warm and dry.      Findings: No erythema or rash.   Neurological:      Mental Status: She is alert and oriented to person, place, and time.      Cranial Nerves: No cranial nerve deficit.   Psychiatric:         Thought Content: Thought content normal.         Judgment: Judgment normal.             Significant Labs:   CBC:   Recent Labs   Lab 09/04/20 1915   WBC 7.31   HGB 11.8*   HCT 37.3        CMP:   Recent Labs   Lab 09/04/20 1915      K 3.4*      CO2 26   *   BUN 13   CREATININE 0.8   CALCIUM 8.8   PROT 6.8   ALBUMIN 3.6   BILITOT 0.7   ALKPHOS 156*   AST 31   ALT 62*   ANIONGAP 9   EGFRNONAA >60.0     Cardiac Markers:   Recent Labs   Lab 09/04/20 1915   BNP 1,661*     All pertinent labs within the past 24 hours have been reviewed.    Significant Imaging: I have reviewed all pertinent imaging results/findings within the past 24 hours.    Assessment/Plan:     * Acute combined systolic and diastolic CHF, NYHA class 3  -CHF exacerbation in setting of missed lasix dose and dietary noncompliance >>> may need increased home lasix dose at DC  -at admission BP was stable, 's, and afebrile, CBC unremarkable, chem  with K 3.4 (repleted) and alk phos 156, BNP 1661 (higher than previous results), troponin 0.080, COVID negative, CXR reveals R pleural effusion and pulmonary vascular congestion, and EKG with NSR and no acute changes  -continue IV lasix for diuresis  -continue GDMT with losartan and metoprolol  -continue tele monitoring  -cardiac diet with fluid restriction  -strict I&Os and daily weights  -outpt follow up with HTS at [place]     Dilated cardiomyopathy  -see above and HPI     Coronary artery disease involving native heart  -nonobstructive CAD per PET stress  -troponin at admit 0.080 down from peak 8.4 last admission  -continue home ASA, plavix and statin  -continue tele monitoring  -EKG PRN chest pain    Cerebrovascular accident (CVA) due to embolism of right middle cerebral artery  -see HPI  -continue ASA, statin, and plavix   -monitor     Essential hypertension  -chronic  -continue losartan and metoprolol  -dose/medication adjustment as appropriate   -monitor     Type 2 diabetes mellitus without complication, without long-term current use of insulin  -chronic  -hold home PO therapies  -SSI while in patient  -monitor accuchecks AC/HS and PRN hypoglycemic protocol   Results for LANDY STAFFORD (MRN [id number]) as of 9/5/2020 02:22   Ref. Range 8/14/2020 10:35   Hemoglobin A1C External Latest Ref Range: 4.0 - 5.6 % 6.9 (H)       Mixed hyperlipidemia  -chronic  -continue statin therapy  -further monitoring and management per PCP/Cardiologist     VTE Risk Mitigation (From admission, onward)         Ordered     enoxaparin injection 40 mg  Every 24 hours      09/04/20 2332     IP VTE HIGH RISK PATIENT  Once      09/04/20 2332     Place sequential compression device  Until discontinued      09/04/20 2332     Place sequential compression device  Until discontinued      09/04/20 2303                Christina Nova, DNP, AG-ACNP, BC  Department of Hospital Medicine  [facility]

## 2020-09-05 NOTE — ASSESSMENT & PLAN NOTE
-chronic  -continue losartan and metoprolol  -dose/medication adjustment as appropriate   -monitor

## 2020-09-05 NOTE — PROGRESS NOTES
[facility] Hospital Medicine  Progress Note    Patient Name: Diomedes Mohr  MRN: [id number]  Patient Class: OP- Observation   Admission Date: 9/4/2020  Length of Stay: 0 days  Attending Physician: Owen Tristan MD  Primary Care Provider: Fernando Manzo MD    Hospital Medicine Team: [facility] F Brigitte Melo NP    Subjective:     Principal Problem:Acute combined systolic and diastolic CHF, NYHA class 3        HPI:  Ms. Mohr is a 59 YOF with PMHx of combined systolic and diastolic HF (EF 10-15%), pulmHTN, dilated cardiomyopathy, nonobstructive CAD, HTN, DM on oral therapies, and recent embolic R MCA CVA (s/p tPA on 8/14, w/o residual sx). She presents to ED for worsening shortness of breath in setting of recent missed lasix dose and dietary noncomplaince. She endorses RUBI while working with in home physical therapy, early satiety, slight abdominal bloating, and orthopena. She denies PND, chest pain, palpitations, LE edema, N/V/D. All past medical, social, and family history reviewed.     Of note she has had multiple recent admission. Beginning with 07/13-14/2020 in [facility] for CHF exacerbation requiring IV diuresis, 08/14-16/2020 at [facility] [place] embolic R MCA CVA s/p TPA, and again 08/25-28/2020 at [facility] for NSTEMI with troponin peak of 8.4 in setting of CHF exacerbation that required IV diuresis at which time TTE was performed 08/26 that was similar to previous exams and underwent PET stress 08/27 which revealed non obstructive CAD.     In the ED her BP was stable, 's, and afebrile. CBC unremarkable, chem with K 3.4 (repleted) and alk phos 156, BNP 1661 (higher than previous results), troponin 0.080, COVID negative, CXR reveals R pleural effusion and pulmonary vascular congestion, and EKG with NSR and no acute changes. The patient was placed in observation to the Hospital Medicine Service for further evaluation and treatment.     Overview/Hospital Course:  Placed in  "observation for readmit < 30 days for Acute on chronic Systolic heart failure EF 10-15%, was previously denied a life vest per insurance, however is exhibiting NSVT on tele which hopefully qualify her for one.  She knew about the No added salt/ salty foods, doesn't have a scale to weigh and states she was never told to limit her fluid intake so she's been drinking "a lot" of fluid. Reviewed HF management and patient receptive to treatment and following regimens.  She may or may not be a cardiomiems candidate d/t insurance but her frequent flyer status may be ideal for her.  She is not diuresing well with the lasix 60mg iv and felt cool touch upon exam under the blankets.  Checking a LA and may need to start inotropes, in interim increased lasix to IV 80mg bid and changed losartan to Entresto 24/26.     Dispo: f/u with Dr. Smith 9/11/20    Interval History: Patient educated for HF.  NSVT on monitor.  Cool to touch.     Review of Systems   Constitutional: Positive for activity change and appetite change (early satiety). Negative for chills, diaphoresis, fatigue, fever and unexpected weight change.   HENT: Negative.  Negative for congestion and trouble swallowing.    Eyes: Negative.  Negative for visual disturbance.   Respiratory: Positive for shortness of breath. Negative for cough, chest tightness and wheezing.         +RUBI, + PND    Cardiovascular: Negative for chest pain, palpitations and leg swelling.        +orthopnea    Gastrointestinal: Positive for abdominal distention. Negative for abdominal pain, constipation, diarrhea, nausea and vomiting.   Endocrine: Negative.    Genitourinary: Negative.  Negative for difficulty urinating.   Musculoskeletal: Negative.    Skin: Negative.         Feels cold     Allergic/Immunologic: Negative.    Neurological: Negative.  Negative for dizziness, weakness and light-headedness.   Psychiatric/Behavioral: Positive for sleep disturbance (hasn't slept in days). Negative for " agitation and confusion. The patient is not nervous/anxious.      Objective:     Vital Signs (Most Recent):  Temp: 98 °F (36.7 °C) (09/05/20 1533)  Pulse: 70 (09/05/20 1608)  Resp: 18 (09/05/20 1533)  BP: 122/77 (09/05/20 1533)  SpO2: 99 % (09/05/20 1533) Vital Signs (24h Range):  Temp:  [96.7 °F (35.9 °C)-98.3 °F (36.8 °C)] 98 °F (36.7 °C)  Pulse:  [] 70  Resp:  [18] 18  SpO2:  [96 %-100 %] 99 %  BP: (100-136)/(58-78) 122/77     Weight: 79.2 kg (174 lb 9.7 oz)  Body mass index is 28.18 kg/m².    Intake/Output Summary (Last 24 hours) at 9/5/2020 1742  Last data filed at 9/5/2020 1627  Gross per 24 hour   Intake 480 ml   Output 1300 ml   Net -820 ml      Physical Exam  Vitals signs and nursing note reviewed.   Constitutional:       General: She is not in acute distress.     Appearance: Normal appearance. She is well-developed. She is obese. She is not ill-appearing, toxic-appearing or diaphoretic.   HENT:      Head: Normocephalic and atraumatic.      Right Ear: External ear normal.      Left Ear: External ear normal.      Nose: Nose normal.      Mouth/Throat:      Mouth: Mucous membranes are moist.      Dentition: Normal dentition.      Pharynx: Oropharynx is clear.   Eyes:      General: Lids are normal.      Extraocular Movements: Extraocular movements intact.      Conjunctiva/sclera: Conjunctivae normal.   Neck:      Musculoskeletal: Normal range of motion.   Cardiovascular:      Rate and Rhythm: Regular rhythm. Tachycardia present.      Pulses: Normal pulses.      Heart sounds: Normal heart sounds. No murmur.   Pulmonary:      Effort: Pulmonary effort is normal.      Comments: Crackles bilateral bases  Chest:      Chest wall: No tenderness.   Abdominal:      General: Bowel sounds are normal. There is no distension.      Palpations: Abdomen is soft.      Tenderness: There is no abdominal tenderness.   Musculoskeletal: Normal range of motion.         General: No deformity.      Right lower leg: Edema (scant  pretibial ) present.      Left lower leg: Edema (scant pretibial ) present.   Skin:     General: Skin is warm and dry.      Findings: No erythema or rash.   Neurological:      Mental Status: She is alert and oriented to person, place, and time.      Cranial Nerves: No cranial nerve deficit.   Psychiatric:         Mood and Affect: Mood normal.         Behavior: Behavior normal.         Thought Content: Thought content normal.         Judgment: Judgment normal.         Significant Labs:   CBC:   Recent Labs   Lab 09/04/20 1915 09/05/20 0414   WBC 7.31 7.96   HGB 11.8* 11.9*   HCT 37.3 38.2    302     CMP:   Recent Labs   Lab 09/04/20 1915 09/05/20 0414 09/05/20  1356    140 142   K 3.4* 3.3* 3.8    102 103   CO2 26 30* 29   * 199* 185*   BUN 13 12 10   CREATININE 0.8 0.9 0.9   CALCIUM 8.8 9.1 9.5   PROT 6.8 6.7  --    ALBUMIN 3.6 3.6  --    BILITOT 0.7 0.7  --    ALKPHOS 156* 156*  --    AST 31 32  --    ALT 62* 63*  --    ANIONGAP 9 8 10   EGFRNONAA >60.0 >60.0 >60.0     Troponin:   Recent Labs   Lab 09/04/20 1915   TROPONINI 0.080*       Significant Imaging: I have reviewed all pertinent imaging results/findings within the past 24 hours.      Assessment/Plan:      * Acute combined systolic and diastolic CHF, NYHA class 3  - CHF exacerbation in setting of missed lasix dose and dietary noncompliance >>> may need increased home lasix dose at DC  - at admission BP was stable, 's, and afebrile, CBC unremarkable, chem with K 3.4 (repleted) and alk phos 156, BNP 1661 (higher than previous results), troponin 0.080, COVID negative, CXR reveals R pleural effusion and pulmonary vascular congestion, and EKG with NSR and no acute changes  - continue IV lasix for diuresis, did not respond to lasix 60 increased to 80mg bid    - consider adding one dose of metolazone 5mg if above ineffective  - continue GDMT with losartan and metoprolol, transitioned to Entresto as she has had multiple  admissions for HF as of recent and she's less than 30 days from last admit  - continue tele monitoring  - cardiac diet with fluid restriction  - strict I&Os and daily weights  - outpt follow up with [facility] [place] 9/11/20 with Dr. Smith  - NSVT on monitor- ordered life vest  - +/- cardiomiems candidate, not sure if [employer] willing to cover will discuss in HTS clinic  -  needs to buy scale to weigh daily    Cerebrovascular accident (CVA) due to embolism of right middle cerebral artery  -see HPI  -continue ASA, statin, and plavix   -monitor     Dilated cardiomyopathy  -see above and HPI     Coronary artery disease involving native heart  - nonobstructive CAD per PET stress  - troponin at admit 0.080 down from peak 8.4 last admission  - continue home ASA, plavix and statin  - continue tele monitoring  - EKG PRN chest pain    Mixed hyperlipidemia  - chronic  - continue statin therapy  - further monitoring and management per PCP/Cardiologist     Essential hypertension  - chronic  -changed losartan to entresto  - continue metoprolol  -dose/medication adjustment as appropriate   -monitor     Type 2 diabetes mellitus without complication, without long-term current use of insulin  - chronic  - hold home PO therapies  - SSI while in patient  - monitor accuchecks AC/HS and PRN hypoglycemic protocol   Results for LANDY STAFFORD (MRN [id number]) as of 9/5/2020 02:22   Ref. Range 8/14/2020 10:35   Hemoglobin A1C External Latest Ref Range: 4.0 - 5.6 % 6.9 (H)         VTE Risk Mitigation (From admission, onward)         Ordered     enoxaparin injection 40 mg  Every 24 hours      09/04/20 2332     IP VTE HIGH RISK PATIENT  Once      09/04/20 2332     Place sequential compression device  Until discontinued      09/04/20 2303                Discharge Planning   KARLIE: 9/7/2020     Code Status: Full Code   Is the patient medically ready for discharge?: No    Reason for patient still in hospital (select all that apply): Treatment                      Brigitte Melo NP  Department of Hospital Medicine   [facility]

## 2020-09-05 NOTE — ASSESSMENT & PLAN NOTE
-chronic  -hold home PO therapies  -SSI while in patient  -monitor accuchecks AC/HS and PRN hypoglycemic protocol   Results for LANDY STAFFORD (MRN [id number]) as of 9/5/2020 02:22   Ref. Range 8/14/2020 10:35   Hemoglobin A1C External Latest Ref Range: 4.0 - 5.6 % 6.9 (H)

## 2020-09-05 NOTE — PLAN OF CARE
Lasix increased to 80mg IV push. K 3.3 and replaced with 100mEq PO throughout shift. Mag 1.8 and replaced with 2mg. Entresto initiated. Strict intake and output measured. 1500 FR enforced. NSR on monitor with HR 60-80s. VSS, AOX4, no complaints of pain or SOB. POC reviewed with pt and pt verbalizes understanding. Pt in bed at lowest position with wheels locked, 2x upper side rails raised, call light within reach. Will continue monitor.

## 2020-09-05 NOTE — ASSESSMENT & PLAN NOTE
- chronic  -changed losartan to entresto  - continue metoprolol  -dose/medication adjustment as appropriate   -monitor

## 2020-09-05 NOTE — HPI
Ms. Mohr is a 59 YOF with PMHx of combined systolic and diastolic HF (EF 10-15%), pulmHTN, dilated cardiomyopathy, nonobstructive CAD, HTN, DM on oral therapies, and recent embolic R MCA CVA (s/p tPA on 8/14, w/o residual sx). She presents to ED for worsening shortness of breath in setting of recent missed lasix dose and dietary noncomplaince. She endorses RUBI while working with in home physical therapy, early satiety, slight abdominal bloating, and orthopena. She denies PND, chest pain, palpitations, LE edema, N/V/D. All past medical, social, and family history reviewed.     Of note she has had multiple recent admission. Beginning with 07/13-14/2020 in [facility] for CHF exacerbation requiring IV diuresis, 08/14-16/2020 at [facility] for embolic R MCA CVA s/p TPA, and again 08/25-28/2020 at [facility] for NSTEMI with troponin peak of 8.4 in setting of CHF exacerbation that required IV diuresis at which time TTE was performed 08/26 that was similar to previous exams and underwent PET stress 08/27 which revealed non obstructive CAD.     In the ED her BP was stable, 's, and afebrile. CBC unremarkable, chem with K 3.4 (repleted) and alk phos 156, BNP 1661 (higher than previous results), troponin 0.080, COVID negative, CXR reveals R pleural effusion and pulmonary vascular congestion, and EKG with NSR and no acute changes. The patient was placed in observation to the Hospital Medicine Service for further evaluation and treatment.

## 2020-09-05 NOTE — PLAN OF CARE
Pt free of falls/trauma/injuries.  Denies c/o SOB, CP, or discomfort.  Generalized skin remains CDI. Pt being diuresed with lasix 60 mg IVP; diuresing well. NSR on the monitor. Pt tolerating plan of care.

## 2020-09-05 NOTE — ASSESSMENT & PLAN NOTE
-CHF exacerbation in setting of missed lasix dose and dietary noncompliance >>> may need increased home lasix dose at [place]  -at admission BP was stable, 's, and afebrile, CBC unremarkable, chem with K 3.4 (repleted) and alk phos 156, BNP 1661 (higher than previous results), troponin 0.080, COVID negative, CXR reveals R pleural effusion and pulmonary vascular congestion, and EKG with NSR and no acute changes  -continue IV lasix for diuresis  -continue GDMT with losartan and metoprolol  -continue tele monitoring  -cardiac diet with fluid restriction  -strict I&Os and daily weights  -outpt follow up with [facility] at [place]

## 2020-09-05 NOTE — NURSING TRANSFER
Nursing Transfer Note      9/5/2020     Transfer From: ED    Transfer via bed    Transfer with cardiac monitoring    Transported by Transport    Chart send with patient: Yes    Notified: spouse    Patient reassessed at: 9/5/2020, 0126     Upon arrival to floor: cardiac monitor applied, patient oriented to room, call bell in reach and bed in lowest position

## 2020-09-05 NOTE — ASSESSMENT & PLAN NOTE
- nonobstructive CAD per PET stress  - troponin at admit 0.080 down from peak 8.4 last admission  - continue home ASA, plavix and statin  - continue tele monitoring  - EKG PRN chest pain

## 2020-09-05 NOTE — ASSESSMENT & PLAN NOTE
- chronic  - hold home PO therapies  - SSI while in patient  - monitor accuchecks AC/HS and PRN hypoglycemic protocol   Results for LANDY STAFFORD (MRN [id number]) as of 9/5/2020 02:22   Ref. Range 8/14/2020 10:35   Hemoglobin A1C External Latest Ref Range: 4.0 - 5.6 % 6.9 (H)

## 2020-09-05 NOTE — ASSESSMENT & PLAN NOTE
- CHF exacerbation in setting of missed lasix dose and dietary noncompliance >>> may need increased home lasix dose at [place]  - at admission BP was stable, 's, and afebrile, CBC unremarkable, chem with K 3.4 (repleted) and alk phos 156, BNP 1661 (higher than previous results), troponin 0.080, COVID negative, CXR reveals R pleural effusion and pulmonary vascular congestion, and EKG with NSR and no acute changes  - continue IV lasix for diuresis, did not respond to lasix 60 increased to 80mg bid    - consider adding one dose of metolazone 5mg if above ineffective  - continue GDMT with losartan and metoprolol, transitioned to Entresto as she has had multiple admissions for HF as of recent and she's less than 30 days from last admit  - continue tele monitoring  - cardiac diet with fluid restriction  - strict I&Os and daily weights  - outpt follow up with [facility] at [place] 9/11/20 with Dr. Smith  - NSVT on monitor- ordered life vest  - +/- cardiomiems candidate, not sure if [employer] willing to cover will discuss in HTS clinic  -  needs to buy scale to weigh daily

## 2020-09-06 LAB
ALBUMIN SERPL BCP-MCNC: 3.3 G/DL (ref 3.5–5.2)
ALP SERPL-CCNC: 156 U/L (ref 55–135)
ALT SERPL W/O P-5'-P-CCNC: 51 U/L (ref 10–44)
ANION GAP SERPL CALC-SCNC: 9 MMOL/L (ref 8–16)
AST SERPL-CCNC: 24 U/L (ref 10–40)
BILIRUB SERPL-MCNC: 0.6 MG/DL (ref 0.1–1)
BUN SERPL-MCNC: 16 MG/DL (ref 6–20)
CALCIUM SERPL-MCNC: 9.1 MG/DL (ref 8.7–10.5)
CHLORIDE SERPL-SCNC: 106 MMOL/L (ref 95–110)
CO2 SERPL-SCNC: 26 MMOL/L (ref 23–29)
CREAT SERPL-MCNC: 0.9 MG/DL (ref 0.5–1.4)
EST. GFR  (AFRICAN AMERICAN): >60 ML/MIN/1.73 M^2
EST. GFR  (NON AFRICAN AMERICAN): >60 ML/MIN/1.73 M^2
GLUCOSE SERPL-MCNC: 178 MG/DL (ref 70–110)
MAGNESIUM SERPL-MCNC: 2.2 MG/DL (ref 1.6–2.6)
POCT GLUCOSE: 135 MG/DL (ref 70–110)
POTASSIUM SERPL-SCNC: 3.9 MMOL/L (ref 3.5–5.1)
PROT SERPL-MCNC: 6.2 G/DL (ref 6–8.4)
SODIUM SERPL-SCNC: 141 MMOL/L (ref 136–145)

## 2020-09-06 PROCEDURE — G0378 HOSPITAL OBSERVATION PER HR: HCPCS | Mod: NTX

## 2020-09-06 PROCEDURE — 99226 PR SUBSEQUENT OBSERVATION CARE,LEVEL III: ICD-10-PCS | Mod: NTX,,, | Performed by: NURSE PRACTITIONER

## 2020-09-06 PROCEDURE — 96372 THER/PROPH/DIAG INJ SC/IM: CPT | Mod: 59

## 2020-09-06 PROCEDURE — 96376 TX/PRO/DX INJ SAME DRUG ADON: CPT

## 2020-09-06 PROCEDURE — 83735 ASSAY OF MAGNESIUM: CPT | Mod: NTX

## 2020-09-06 PROCEDURE — 63600175 PHARM REV CODE 636 W HCPCS: Mod: NTX | Performed by: NURSE PRACTITIONER

## 2020-09-06 PROCEDURE — 25000003 PHARM REV CODE 250: Mod: NTX | Performed by: NURSE PRACTITIONER

## 2020-09-06 PROCEDURE — 80053 COMPREHEN METABOLIC PANEL: CPT | Mod: NTX

## 2020-09-06 PROCEDURE — 99226 PR SUBSEQUENT OBSERVATION CARE,LEVEL III: CPT | Mod: NTX,,, | Performed by: NURSE PRACTITIONER

## 2020-09-06 PROCEDURE — 94761 N-INVAS EAR/PLS OXIMETRY MLT: CPT | Mod: NTX

## 2020-09-06 PROCEDURE — 36415 COLL VENOUS BLD VENIPUNCTURE: CPT | Mod: NTX

## 2020-09-06 RX ORDER — METOLAZONE 2.5 MG/1
5 TABLET ORAL ONCE
Status: COMPLETED | OUTPATIENT
Start: 2020-09-06 | End: 2020-09-06

## 2020-09-06 RX ORDER — POTASSIUM CHLORIDE 750 MG/1
40 CAPSULE, EXTENDED RELEASE ORAL ONCE
Status: COMPLETED | OUTPATIENT
Start: 2020-09-06 | End: 2020-09-06

## 2020-09-06 RX ADMIN — METOPROLOL TARTRATE 50 MG: 50 TABLET, FILM COATED ORAL at 09:09

## 2020-09-06 RX ADMIN — ENOXAPARIN SODIUM 40 MG: 40 INJECTION SUBCUTANEOUS at 04:09

## 2020-09-06 RX ADMIN — ASPIRIN 81 MG CHEWABLE TABLET 81 MG: 81 TABLET CHEWABLE at 08:09

## 2020-09-06 RX ADMIN — SACUBITRIL AND VALSARTAN 1 TABLET: 24; 26 TABLET, FILM COATED ORAL at 09:09

## 2020-09-06 RX ADMIN — POTASSIUM CHLORIDE 40 MEQ: 750 CAPSULE, EXTENDED RELEASE ORAL at 09:09

## 2020-09-06 RX ADMIN — CLOPIDOGREL 75 MG: 75 TABLET, FILM COATED ORAL at 08:09

## 2020-09-06 RX ADMIN — INSULIN ASPART 4 UNITS: 100 INJECTION, SOLUTION INTRAVENOUS; SUBCUTANEOUS at 04:09

## 2020-09-06 RX ADMIN — METOPROLOL TARTRATE 50 MG: 50 TABLET, FILM COATED ORAL at 08:09

## 2020-09-06 RX ADMIN — INSULIN ASPART 2 UNITS: 100 INJECTION, SOLUTION INTRAVENOUS; SUBCUTANEOUS at 12:09

## 2020-09-06 RX ADMIN — FUROSEMIDE 80 MG: 10 INJECTION, SOLUTION INTRAMUSCULAR; INTRAVENOUS at 08:09

## 2020-09-06 RX ADMIN — ONDANSETRON 8 MG: 8 TABLET, ORALLY DISINTEGRATING ORAL at 11:09

## 2020-09-06 RX ADMIN — METOLAZONE 5 MG: 2.5 TABLET ORAL at 04:09

## 2020-09-06 RX ADMIN — ATORVASTATIN CALCIUM 80 MG: 20 TABLET, FILM COATED ORAL at 08:09

## 2020-09-06 RX ADMIN — SACUBITRIL AND VALSARTAN 1 TABLET: 24; 26 TABLET, FILM COATED ORAL at 08:09

## 2020-09-06 RX ADMIN — FUROSEMIDE 80 MG: 10 INJECTION, SOLUTION INTRAMUSCULAR; INTRAVENOUS at 06:09

## 2020-09-06 NOTE — ASSESSMENT & PLAN NOTE
- chronic  - hold home PO therapies  - SSI while in patient  - monitor accuchecks AC/HS and PRN hypoglycemic protocol   Results for LANDY STAFFORD (MRN [id number]) as of 9/5/2020 02:22   Ref. Range 8/14/2020 10:35   Hemoglobin A1C External Latest Ref Range: 4.0 - 5.6 % 6.9 (H)

## 2020-09-06 NOTE — NURSING
according to patient's freestyle rebecca device. 2 units of insulin administered. Patient stated her family member was bringing her metformin and Glipizide from home for her to take in hospital. Patient instructed that she is not authorized to do this. Patient verbalized understanding.

## 2020-09-06 NOTE — PLAN OF CARE
Pt free of falls/trauma/injuries.  Denies c/o SOB, CP, or discomfort.  Generalized skin remains CDI. Pt being diuresed with lasix 80 mg IVP; diuresing well. Pt tolerating plan of care.

## 2020-09-06 NOTE — SUBJECTIVE & OBJECTIVE
Interval History: Feels a bit better today.  Asking about being sent home on 40 mg of lasix, re-educated on HF and that we dont' know dose till she's euvolemic and we know what dose she will respond too. Continues to have NSVT on monitor.  Cool to touch. LA 1.1, CR 0.9    Review of Systems   Constitutional: Positive for activity change and appetite change (early satiety). Negative for chills, diaphoresis, fatigue, fever and unexpected weight change.   HENT: Negative.  Negative for congestion and trouble swallowing.    Eyes: Negative.  Negative for visual disturbance.   Respiratory: Positive for shortness of breath. Negative for cough, chest tightness and wheezing.         +RUBI, + PND    Cardiovascular: Negative for chest pain, palpitations and leg swelling.        +orthopnea    Gastrointestinal: Positive for abdominal distention. Negative for abdominal pain, constipation, diarrhea, nausea and vomiting.   Endocrine: Negative.    Genitourinary: Negative.  Negative for difficulty urinating.   Musculoskeletal: Negative.    Skin: Negative.         Feels cold     Allergic/Immunologic: Negative.    Neurological: Negative.  Negative for dizziness, weakness and light-headedness.   Psychiatric/Behavioral: Positive for sleep disturbance. Negative for agitation and confusion. The patient is not nervous/anxious.      Objective:     Vital Signs (Most Recent):  Temp: 96.9 °F (36.1 °C) (09/06/20 1213)  Pulse: 66 (09/06/20 1400)  Resp: 18 (09/06/20 1213)  BP: 99/61 (09/06/20 1213)  SpO2: 98 % (09/06/20 1213) Vital Signs (24h Range):  Temp:  [96.6 °F (35.9 °C)-98 °F (36.7 °C)] 96.9 °F (36.1 °C)  Pulse:  [56-85] 66  Resp:  [16-19] 18  SpO2:  [91 %-99 %] 98 %  BP: ()/(51-68) 99/61     Weight: 78.4 kg (172 lb 13.5 oz)  Body mass index is 27.9 kg/m².    Intake/Output Summary (Last 24 hours) at 9/6/2020 1621  Last data filed at 9/6/2020 1159  Gross per 24 hour   Intake 960 ml   Output 3500 ml   Net -2540 ml      Physical  Exam  Vitals signs and nursing note reviewed.   Constitutional:       General: She is not in acute distress.     Appearance: Normal appearance. She is well-developed. She is obese. She is not ill-appearing, toxic-appearing or diaphoretic.   HENT:      Head: Normocephalic and atraumatic.      Right Ear: External ear normal.      Left Ear: External ear normal.      Nose: Nose normal.      Mouth/Throat:      Mouth: Mucous membranes are moist.      Dentition: Normal dentition.      Pharynx: Oropharynx is clear.   Eyes:      General: Lids are normal.      Extraocular Movements: Extraocular movements intact.      Conjunctiva/sclera: Conjunctivae normal.   Neck:      Musculoskeletal: Normal range of motion.      Vascular: Hepatojugular reflux and JVD present.   Cardiovascular:      Rate and Rhythm: Normal rate and regular rhythm.      Pulses: Normal pulses.      Heart sounds: Normal heart sounds. No murmur.   Pulmonary:      Effort: Pulmonary effort is normal.      Breath sounds: Rales (faint bibasilar) present.   Chest:      Chest wall: No tenderness.   Abdominal:      General: Bowel sounds are normal. There is no distension.      Palpations: Abdomen is soft.      Tenderness: There is no abdominal tenderness.   Musculoskeletal: Normal range of motion.         General: No deformity.      Right lower leg: Edema (scant pretibial ) present.      Left lower leg: Edema (scant pretibial ) present.   Skin:     General: Skin is warm and dry.      Findings: No erythema or rash.   Neurological:      Mental Status: She is alert and oriented to person, place, and time.      Cranial Nerves: No cranial nerve deficit.   Psychiatric:         Mood and Affect: Mood normal.         Behavior: Behavior normal.         Thought Content: Thought content normal.         Judgment: Judgment normal.         Significant Labs:   CBC:   Recent Labs   Lab 09/04/20  1915 09/05/20  0414   WBC 7.31 7.96   HGB 11.8* 11.9*   HCT 37.3 38.2    302      CMP:   Recent Labs   Lab 09/04/20  1915 09/05/20  0414 09/05/20  1356 09/06/20  0500    140 142 141   K 3.4* 3.3* 3.8 3.9    102 103 106   CO2 26 30* 29 26   * 199* 185* 178*   BUN 13 12 10 16   CREATININE 0.8 0.9 0.9 0.9   CALCIUM 8.8 9.1 9.5 9.1   PROT 6.8 6.7  --  6.2   ALBUMIN 3.6 3.6  --  3.3*   BILITOT 0.7 0.7  --  0.6   ALKPHOS 156* 156*  --  156*   AST 31 32  --  24   ALT 62* 63*  --  51*   ANIONGAP 9 8 10 9   EGFRNONAA >60.0 >60.0 >60.0 >60.0     Troponin:   Recent Labs   Lab 09/04/20 1915   TROPONINI 0.080*       Significant Imaging: I have reviewed all pertinent imaging results/findings within the past 24 hours.

## 2020-09-06 NOTE — PLAN OF CARE
HERMELINDA provided information with Josseline Mchugh with the Life Vest team. Josseline Mchugh will process request for delivery tomorrow.     Susie Campos LMSW  [facility]

## 2020-09-06 NOTE — PROGRESS NOTES
[facility] Hospital Medicine  Progress Note    Patient Name: Diomedes Mohr  MRN: [id number]  Patient Class: OP- Observation   Admission Date: 9/4/2020  Length of Stay: 0 days  Attending Physician: Owen Tristan MD  Primary Care Provider: Fernando Manzo MD    Hospital Medicine Team: [facility] HOSP MED F Brigitte Melo NP    Subjective:     Principal Problem:Acute combined systolic and diastolic CHF, NYHA class 3        HPI:  Ms. Mohr is a 59 YOF with PMHx of combined systolic and diastolic HF (EF 10-15%), pulmHTN, dilated cardiomyopathy, nonobstructive CAD, HTN, DM on oral therapies, and recent embolic R MCA CVA (s/p tPA on 8/14, w/o residual sx). She presents to ED for worsening shortness of breath in setting of recent missed lasix dose and dietary noncomplaince. She endorses RUBI while working with in home physical therapy, early satiety, slight abdominal bloating, and orthopena. She denies PND, chest pain, palpitations, LE edema, N/V/D. All past medical, social, and family history reviewed.     Of note she has had multiple recent admission. Beginning with 07/13-14/2020 in [facility] for CHF exacerbation requiring IV diuresis, 08/14-16/2020 at [facility] for embolic R MCA CVA s/p TPA, and again 08/25-28/2020 at [facility] for NSTEMI with troponin peak of 8.4 in setting of CHF exacerbation that required IV diuresis at which time TTE was performed 08/26 that was similar to previous exams and underwent PET stress 08/27 which revealed non obstructive CAD.     In the ED her BP was stable, 's, and afebrile. CBC unremarkable, chem with K 3.4 (repleted) and alk phos 156, BNP 1661 (higher than previous results), troponin 0.080, COVID negative, CXR reveals R pleural effusion and pulmonary vascular congestion, and EKG with NSR and no acute changes. The patient was placed in observation to the Hospital Medicine Service for further evaluation and treatment.     Overview/Hospital Course:  Placed in  "observation for readmit < 30 days for Acute on chronic Systolic heart failure EF 10-15%, was previously denied a life vest per insurance, however is exhibiting NSVT on tele; ordered LifeVest.  She knew about the No added salt/ salty foods, doesn't have a scale to weigh and states she was never told to limit her fluid intake so she's been drinking "a lot" of fluid. Reviewed HF management and patient receptive to treatment and following regimens.  She may or may not be a cardiomiems candidate d/t insurance but her frequent flyer status may be ideal for her.     She was not diuresing well with the lasix 60mg iv and felt cool touch upon exam under the blankets.  LA 1.1, increased lasix to 80 mg IV bid, hold off on starting inotropes, no evidence of end organ decreased perfusion. Changed losartan to Entresto 24/26. Admit weight 176 down to 172 lbs, net negative 2.4 L, down 4 lbs since admit.  Having     Dispo: f/u with Dr. Smith 9/11/20    Interval History: Feels a bit better today.  Asking about being sent home on 40 mg of lasix, re-educated on HF and that we dont' know dose till she's euvolemic and we know what dose she will respond too. Continues to have NSVT on monitor.  Cool to touch. LA 1.1, CR 0.9    Review of Systems   Constitutional: Positive for activity change and appetite change (early satiety). Negative for chills, diaphoresis, fatigue, fever and unexpected weight change.   HENT: Negative.  Negative for congestion and trouble swallowing.    Eyes: Negative.  Negative for visual disturbance.   Respiratory: Positive for shortness of breath. Negative for cough, chest tightness and wheezing.         +RUBI, + PND    Cardiovascular: Negative for chest pain, palpitations and leg swelling.        +orthopnea    Gastrointestinal: Positive for abdominal distention. Negative for abdominal pain, constipation, diarrhea, nausea and vomiting.   Endocrine: Negative.    Genitourinary: Negative.  Negative for difficulty " urinating.   Musculoskeletal: Negative.    Skin: Negative.         Feels cold     Allergic/Immunologic: Negative.    Neurological: Negative.  Negative for dizziness, weakness and light-headedness.   Psychiatric/Behavioral: Positive for sleep disturbance. Negative for agitation and confusion. The patient is not nervous/anxious.      Objective:     Vital Signs (Most Recent):  Temp: 96.9 °F (36.1 °C) (09/06/20 1213)  Pulse: 66 (09/06/20 1400)  Resp: 18 (09/06/20 1213)  BP: 99/61 (09/06/20 1213)  SpO2: 98 % (09/06/20 1213) Vital Signs (24h Range):  Temp:  [96.6 °F (35.9 °C)-98 °F (36.7 °C)] 96.9 °F (36.1 °C)  Pulse:  [56-85] 66  Resp:  [16-19] 18  SpO2:  [91 %-99 %] 98 %  BP: ()/(51-68) 99/61     Weight: 78.4 kg (172 lb 13.5 oz)  Body mass index is 27.9 kg/m².    Intake/Output Summary (Last 24 hours) at 9/6/2020 1621  Last data filed at 9/6/2020 1159  Gross per 24 hour   Intake 960 ml   Output 3500 ml   Net -2540 ml      Physical Exam  Vitals signs and nursing note reviewed.   Constitutional:       General: She is not in acute distress.     Appearance: Normal appearance. She is well-developed. She is obese. She is not ill-appearing, toxic-appearing or diaphoretic.   HENT:      Head: Normocephalic and atraumatic.      Right Ear: External ear normal.      Left Ear: External ear normal.      Nose: Nose normal.      Mouth/Throat:      Mouth: Mucous membranes are moist.      Dentition: Normal dentition.      Pharynx: Oropharynx is clear.   Eyes:      General: Lids are normal.      Extraocular Movements: Extraocular movements intact.      Conjunctiva/sclera: Conjunctivae normal.   Neck:      Musculoskeletal: Normal range of motion.      Vascular: Hepatojugular reflux and JVD present.   Cardiovascular:      Rate and Rhythm: Normal rate and regular rhythm.      Pulses: Normal pulses.      Heart sounds: Normal heart sounds. No murmur.   Pulmonary:      Effort: Pulmonary effort is normal.      Breath sounds: Rales (faint  bibasilar) present.   Chest:      Chest wall: No tenderness.   Abdominal:      General: Bowel sounds are normal. There is no distension.      Palpations: Abdomen is soft.      Tenderness: There is no abdominal tenderness.   Musculoskeletal: Normal range of motion.         General: No deformity.      Right lower leg: Edema (scant pretibial ) present.      Left lower leg: Edema (scant pretibial ) present.   Skin:     General: Skin is warm and dry.      Findings: No erythema or rash.   Neurological:      Mental Status: She is alert and oriented to person, place, and time.      Cranial Nerves: No cranial nerve deficit.   Psychiatric:         Mood and Affect: Mood normal.         Behavior: Behavior normal.         Thought Content: Thought content normal.         Judgment: Judgment normal.         Significant Labs:   CBC:   Recent Labs   Lab 09/04/20 1915 09/05/20 0414   WBC 7.31 7.96   HGB 11.8* 11.9*   HCT 37.3 38.2    302     CMP:   Recent Labs   Lab 09/04/20 1915 09/05/20  0414 09/05/20  1356 09/06/20  0500    140 142 141   K 3.4* 3.3* 3.8 3.9    102 103 106   CO2 26 30* 29 26   * 199* 185* 178*   BUN 13 12 10 16   CREATININE 0.8 0.9 0.9 0.9   CALCIUM 8.8 9.1 9.5 9.1   PROT 6.8 6.7  --  6.2   ALBUMIN 3.6 3.6  --  3.3*   BILITOT 0.7 0.7  --  0.6   ALKPHOS 156* 156*  --  156*   AST 31 32  --  24   ALT 62* 63*  --  51*   ANIONGAP 9 8 10 9   EGFRNONAA >60.0 >60.0 >60.0 >60.0     Troponin:   Recent Labs   Lab 09/04/20 1915   TROPONINI 0.080*       Significant Imaging: I have reviewed all pertinent imaging results/findings within the past 24 hours.      Assessment/Plan:      * Acute combined systolic and diastolic CHF, NYHA class 3  - CHF exacerbation in setting of missed lasix dose and dietary noncompliance >>> may need increased home lasix dose at [place]  - at admission BP was stable, 's, and afebrile, CBC unremarkable, chem with K 3.4 (repleted) and alk phos 156, BNP 1661 (higher than  previous results), troponin 0.080, COVID negative, CXR reveals R pleural effusion and pulmonary vascular congestion, and EKG with NSR and no acute changes  - continue IV lasix for diuresis, did not respond to lasix 60 increased to 80mg bid    -  adding one dose of metolazone 5mg as above appears ineffective as well  - continue GDMT metoprolol, transitioned Losartan to Entresto as she has had multiple admissions for HF as of recent and she's less than 30 days from last admit  - continue tele monitoring  - cardiac diet with fluid restriction  - strict I&Os and daily weights  - outpt follow up with [facility] at [place] 9/11/20 with Dr. Smith  - NSVT on monitor- ordered life vest  - +/- cardiomiems candidate, not sure if [employer] willing to cover will discuss in HTS clinic  -  needs to buy scale to weigh daily    Cerebrovascular accident (CVA) due to embolism of right middle cerebral artery  -see HPI  -continue ASA, statin, and plavix   -monitor     Dilated cardiomyopathy  -see above and HPI     Coronary artery disease involving native heart  - nonobstructive CAD per PET stress  - troponin at admit 0.080 down from peak 8.4 last admission  - continue home ASA, plavix and statin  - continue tele monitoring  - EKG PRN chest pain    Mixed hyperlipidemia  - chronic  - continue statin therapy  - further monitoring and management per PCP/Cardiologist     Essential hypertension  - chronic  -changed losartan to entresto  - continue metoprolol  -dose/medication adjustment as appropriate   -monitor     Type 2 diabetes mellitus without complication, without long-term current use of insulin  - chronic  - hold home PO therapies  - SSI while in patient  - monitor accuchecks AC/HS and PRN hypoglycemic protocol   Results for LANDY STAFFORD (MRN [id number]) as of 9/5/2020 02:22   Ref. Range 8/14/2020 10:35   Hemoglobin A1C External Latest Ref Range: 4.0 - 5.6 % 6.9 (H)         VTE Risk Mitigation (From admission, onward)         Ordered     enoxaparin  injection 40 mg  Every 24 hours      09/04/20 2332     IP VTE HIGH RISK PATIENT  Once      09/04/20 2332     Place sequential compression device  Until discontinued      09/04/20 2303                Discharge Planning   KARLIE: 9/8/2020     Code Status: Full Code   Is the patient medically ready for discharge?: No    Reason for patient still in hospital (select all that apply): Patient trending condition and Treatment                     Brigitte Melo NP  Department of Hospital Medicine   [facility]

## 2020-09-06 NOTE — ASSESSMENT & PLAN NOTE
- CHF exacerbation in setting of missed lasix dose and dietary noncompliance >>> may need increased home lasix dose at [place]  - at admission BP was stable, 's, and afebrile, CBC unremarkable, chem with K 3.4 (repleted) and alk phos 156, BNP 1661 (higher than previous results), troponin 0.080, COVID negative, CXR reveals R pleural effusion and pulmonary vascular congestion, and EKG with NSR and no acute changes  - continue IV lasix for diuresis, did not respond to lasix 60 increased to 80mg bid    -  adding one dose of metolazone 5mg as above appears ineffective as well  - continue GDMT metoprolol, transitioned Losartan to Entresto as she has had multiple admissions for HF as of recent and she's less than 30 days from last admit  - continue tele monitoring  - cardiac diet with fluid restriction  - strict I&Os and daily weights  - outpt follow up with [facility] at [place] 9/11/20 with Dr. Smith  - NSVT on monitor- ordered life vest  - +/- cardiomiems candidate, not sure if [employer] willing to cover will discuss in HTS clinic  -  needs to buy scale to weigh daily

## 2020-09-07 LAB
ALBUMIN SERPL BCP-MCNC: 3.3 G/DL (ref 3.5–5.2)
ALP SERPL-CCNC: 152 U/L (ref 55–135)
ALT SERPL W/O P-5'-P-CCNC: 44 U/L (ref 10–44)
ANION GAP SERPL CALC-SCNC: 12 MMOL/L (ref 8–16)
AST SERPL-CCNC: 19 U/L (ref 10–40)
BILIRUB SERPL-MCNC: 0.4 MG/DL (ref 0.1–1)
BUN SERPL-MCNC: 18 MG/DL (ref 6–20)
CALCIUM SERPL-MCNC: 9 MG/DL (ref 8.7–10.5)
CHLORIDE SERPL-SCNC: 98 MMOL/L (ref 95–110)
CO2 SERPL-SCNC: 26 MMOL/L (ref 23–29)
CREAT SERPL-MCNC: 1.1 MG/DL (ref 0.5–1.4)
EST. GFR  (AFRICAN AMERICAN): >60 ML/MIN/1.73 M^2
EST. GFR  (NON AFRICAN AMERICAN): 55.5 ML/MIN/1.73 M^2
GLUCOSE SERPL-MCNC: 213 MG/DL (ref 70–110)
MAGNESIUM SERPL-MCNC: 1.9 MG/DL (ref 1.6–2.6)
POCT GLUCOSE: 124 MG/DL (ref 70–110)
POCT GLUCOSE: 185 MG/DL (ref 70–110)
POCT GLUCOSE: 189 MG/DL (ref 70–110)
POCT GLUCOSE: 205 MG/DL (ref 70–110)
POTASSIUM SERPL-SCNC: 3.3 MMOL/L (ref 3.5–5.1)
PROT SERPL-MCNC: 6.7 G/DL (ref 6–8.4)
SODIUM SERPL-SCNC: 136 MMOL/L (ref 136–145)

## 2020-09-07 PROCEDURE — 99226 PR SUBSEQUENT OBSERVATION CARE,LEVEL III: CPT | Mod: NTX,,, | Performed by: NURSE PRACTITIONER

## 2020-09-07 PROCEDURE — 63600175 PHARM REV CODE 636 W HCPCS: Mod: NTX | Performed by: NURSE PRACTITIONER

## 2020-09-07 PROCEDURE — 83735 ASSAY OF MAGNESIUM: CPT | Mod: NTX

## 2020-09-07 PROCEDURE — G0378 HOSPITAL OBSERVATION PER HR: HCPCS | Mod: NTX

## 2020-09-07 PROCEDURE — 96372 THER/PROPH/DIAG INJ SC/IM: CPT | Mod: 59,NTX

## 2020-09-07 PROCEDURE — 80053 COMPREHEN METABOLIC PANEL: CPT | Mod: NTX

## 2020-09-07 PROCEDURE — 94761 N-INVAS EAR/PLS OXIMETRY MLT: CPT | Mod: NTX

## 2020-09-07 PROCEDURE — 96376 TX/PRO/DX INJ SAME DRUG ADON: CPT | Mod: NTX

## 2020-09-07 PROCEDURE — 25000003 PHARM REV CODE 250: Mod: NTX | Performed by: NURSE PRACTITIONER

## 2020-09-07 PROCEDURE — 99226 PR SUBSEQUENT OBSERVATION CARE,LEVEL III: ICD-10-PCS | Mod: NTX,,, | Performed by: NURSE PRACTITIONER

## 2020-09-07 PROCEDURE — 36415 COLL VENOUS BLD VENIPUNCTURE: CPT | Mod: NTX

## 2020-09-07 RX ORDER — MAGNESIUM SULFATE HEPTAHYDRATE 40 MG/ML
2 INJECTION, SOLUTION INTRAVENOUS ONCE
Status: COMPLETED | OUTPATIENT
Start: 2020-09-07 | End: 2020-09-07

## 2020-09-07 RX ORDER — POTASSIUM CHLORIDE 750 MG/1
60 CAPSULE, EXTENDED RELEASE ORAL ONCE
Status: COMPLETED | OUTPATIENT
Start: 2020-09-07 | End: 2020-09-07

## 2020-09-07 RX ORDER — TORSEMIDE 20 MG/1
20 TABLET ORAL DAILY
Status: DISCONTINUED | OUTPATIENT
Start: 2020-09-07 | End: 2020-09-08 | Stop reason: HOSPADM

## 2020-09-07 RX ADMIN — SACUBITRIL AND VALSARTAN 1 TABLET: 24; 26 TABLET, FILM COATED ORAL at 10:09

## 2020-09-07 RX ADMIN — MAGNESIUM SULFATE IN WATER 2 G: 40 INJECTION, SOLUTION INTRAVENOUS at 09:09

## 2020-09-07 RX ADMIN — ENOXAPARIN SODIUM 40 MG: 40 INJECTION SUBCUTANEOUS at 04:09

## 2020-09-07 RX ADMIN — SACUBITRIL AND VALSARTAN 1 TABLET: 24; 26 TABLET, FILM COATED ORAL at 09:09

## 2020-09-07 RX ADMIN — POTASSIUM CHLORIDE 60 MEQ: 750 CAPSULE, EXTENDED RELEASE ORAL at 09:09

## 2020-09-07 RX ADMIN — ASPIRIN 81 MG CHEWABLE TABLET 81 MG: 81 TABLET CHEWABLE at 09:09

## 2020-09-07 RX ADMIN — INSULIN ASPART 2 UNITS: 100 INJECTION, SOLUTION INTRAVENOUS; SUBCUTANEOUS at 09:09

## 2020-09-07 RX ADMIN — TORSEMIDE 20 MG: 20 TABLET ORAL at 09:09

## 2020-09-07 RX ADMIN — METOPROLOL TARTRATE 50 MG: 50 TABLET, FILM COATED ORAL at 10:09

## 2020-09-07 RX ADMIN — INSULIN ASPART 4 UNITS: 100 INJECTION, SOLUTION INTRAVENOUS; SUBCUTANEOUS at 04:09

## 2020-09-07 RX ADMIN — CLOPIDOGREL 75 MG: 75 TABLET, FILM COATED ORAL at 09:09

## 2020-09-07 RX ADMIN — ATORVASTATIN CALCIUM 80 MG: 20 TABLET, FILM COATED ORAL at 09:09

## 2020-09-07 RX ADMIN — HYDROCODONE BITARTRATE AND ACETAMINOPHEN 1 TABLET: 5; 325 TABLET ORAL at 05:09

## 2020-09-07 RX ADMIN — INSULIN ASPART 2 UNITS: 100 INJECTION, SOLUTION INTRAVENOUS; SUBCUTANEOUS at 11:09

## 2020-09-07 NOTE — PLAN OF CARE
Pt remained free from falls/trauma/injuries. No complaints of CP/SOB/discomfort. VSS. SR on the monitor. Fall bundle in place. POC explained, no questions at this time. Pt tolerating care.

## 2020-09-07 NOTE — ASSESSMENT & PLAN NOTE
- CHF exacerbation in setting of missed lasix dose and dietary noncompliance >>> may need increased home lasix dose at [place]  - at admission BP was stable, 's, and afebrile, CBC unremarkable, chem with K 3.4 (repleted) and alk phos 156, BNP 1661 (higher than previous results), troponin 0.080, COVID negative, CXR reveals R pleural effusion and pulmonary vascular congestion, and EKG with NSR and no acute changes  - continue IV lasix for diuresis, did not respond to lasix 60 increased to 80mg bid    -  added one dose of metolazone 5mg as above appeared ineffective as well    - diuresed well but weight barely changed.   - dietary noncompliant in house, eating [employer] after extensive discussion about salt/ salty foods/ fast foods and eating out at restaurants.  - continue GDMT metoprolol, transitioned Losartan to Entresto as she has had multiple admissions for HF as of recent and she's less than 30 days from last admit  - continue tele monitoring  - cardiac diet with fluid restriction  - strict I&Os and daily weights  - outpt follow up with [facility] at [place] 9/11/20 with Dr. Smith  - NSVT on monitor- ordered life vest  - +/- cardiomiems candidate, not sure if [employer] willing to cover will discuss in [facility] clinic  -  needs to buy scale to weigh daily

## 2020-09-07 NOTE — PLAN OF CARE
Pt diuresing well on torsemide. BG monitoring cont; SSI given during shift. Electrolytes replaced. Pt remains free from injury/falls for shift. Educated Pt on meds no questions at this time. VSS.  No complaints of CP, SOB, pain/discomfort  Bed locked in lowest position, call bell within reach. All questions addressed.  Will continue to monitor.

## 2020-09-07 NOTE — SUBJECTIVE & OBJECTIVE
Interval History: NSVT on monitor, lifevest pending. Warm to touch today. Found to have [employer] bag in her overnight bag, family brought in a Fish [place] which she proceeded to eat in addition to her in house meals.     Review of Systems   Constitutional: Positive for activity change and appetite change (early satiety). Negative for chills, diaphoresis, fatigue, fever and unexpected weight change.   HENT: Negative.  Negative for congestion and trouble swallowing.    Eyes: Negative.  Negative for visual disturbance.   Respiratory: Negative for cough, chest tightness and shortness of breath (improved).         +RUBI, + PND    Cardiovascular: Negative for chest pain, palpitations and leg swelling.        +orthopnea    Gastrointestinal: Negative for abdominal distention (resolving), abdominal pain, constipation, diarrhea, nausea and vomiting.   Endocrine: Negative.    Genitourinary: Negative.  Negative for difficulty urinating.   Musculoskeletal: Negative.    Skin: Negative.         Feels cold     Allergic/Immunologic: Negative.    Neurological: Negative.  Negative for dizziness, weakness and light-headedness.   Psychiatric/Behavioral: Negative for agitation, confusion and sleep disturbance. The patient is not nervous/anxious.      Objective:     Vital Signs (Most Recent):  Temp: 98.7 °F (37.1 °C) (09/07/20 1718)  Pulse: 77 (09/07/20 1718)  Resp: 20 (09/07/20 1718)  BP: (!) 89/62 (09/07/20 1718)  SpO2: 98 % (09/07/20 1718) Vital Signs (24h Range):  Temp:  [96.9 °F (36.1 °C)-98.7 °F (37.1 °C)] 98.7 °F (37.1 °C)  Pulse:  [] 77  Resp:  [18-20] 20  SpO2:  [97 %-98 %] 98 %  BP: ()/(51-63) 89/62     Weight: 77.8 kg (171 lb 8.3 oz)  Body mass index is 27.68 kg/m².    Intake/Output Summary (Last 24 hours) at 9/7/2020 1730  Last data filed at 9/7/2020 1640  Gross per 24 hour   Intake 360 ml   Output 3100 ml   Net -2740 ml      Physical Exam  Vitals signs and nursing note reviewed.   Constitutional:       General:  She is not in acute distress.     Appearance: Normal appearance. She is well-developed. She is obese. She is not ill-appearing, toxic-appearing or diaphoretic.   HENT:      Head: Normocephalic and atraumatic.      Right Ear: External ear normal.      Left Ear: External ear normal.      Nose: Nose normal.      Mouth/Throat:      Mouth: Mucous membranes are moist.      Dentition: Normal dentition.      Pharynx: Oropharynx is clear.   Eyes:      General: Lids are normal.      Extraocular Movements: Extraocular movements intact.      Conjunctiva/sclera: Conjunctivae normal.   Neck:      Musculoskeletal: Normal range of motion.      Vascular: Hepatojugular reflux and JVD present.   Cardiovascular:      Rate and Rhythm: Normal rate and regular rhythm.      Pulses: Normal pulses.      Heart sounds: Normal heart sounds. No murmur.   Pulmonary:      Effort: Pulmonary effort is normal.      Breath sounds: No rales (faint bibasilar).   Chest:      Chest wall: No tenderness.   Abdominal:      General: Bowel sounds are normal. There is no distension.      Palpations: Abdomen is soft.      Tenderness: There is no abdominal tenderness.   Musculoskeletal: Normal range of motion.         General: No deformity.      Right lower leg: No edema (resolved).      Left lower leg: No edema (resolved).   Skin:     General: Skin is warm and dry.      Findings: No erythema or rash.   Neurological:      General: No focal deficit present.      Mental Status: She is alert and oriented to person, place, and time.      Cranial Nerves: No cranial nerve deficit.   Psychiatric:         Mood and Affect: Mood normal.         Behavior: Behavior normal.         Thought Content: Thought content normal.         Judgment: Judgment normal.         Significant Labs:   CMP:   Recent Labs   Lab 09/06/20  0500 09/07/20  0522    136   K 3.9 3.3*    98   CO2 26 26   * 213*   BUN 16 18   CREATININE 0.9 1.1   CALCIUM 9.1 9.0   PROT 6.2 6.7    ALBUMIN 3.3* 3.3*   BILITOT 0.6 0.4   ALKPHOS 156* 152*   AST 24 19   ALT 51* 44   ANIONGAP 9 12   EGFRNONAA >60.0 55.5*       Significant Imaging: I have reviewed all pertinent imaging results/findings within the past 24 hours.

## 2020-09-07 NOTE — PROGRESS NOTES
09/07/20 0517 09/07/20 0544   Vital Signs   BP (!) 85/51 (!) 83/53   MAP (mmHg) 63 63   BP Location Right arm  --    Patient Position Lying  --    Notified Dr. Marie of BP findings. No new orders. Will continue to monitor if BP continues to decrease.

## 2020-09-07 NOTE — PLAN OF CARE
Lasix increased to 80mg IV push. K 3.9 and replaced with 40mEq PO throughout shift. Metolazone initiated. Strict intake and output measured. 1500 FR enforced. NSR on monitor with HR 60-70s. VSS, AOX4, no complaints of pain or SOB. POC reviewed with pt and pt verbalizes understanding. Pt in bed at lowest position with wheels locked, 2x upper side rails raised, call light within reach. Will continue monitor.

## 2020-09-07 NOTE — PROGRESS NOTES
[facility] Hospital Medicine  Progress Note    Patient Name: Diomedes Mohr  MRN: [id number]  Patient Class: OP- Observation   Admission Date: 9/4/2020  Length of Stay: 0 days  Attending Physician: Owen Tristan MD  Primary Care Provider: Fernando Manzo MD    Hospital Medicine Team: [facility] MED F Brigitte Melo NP    Subjective:     Principal Problem:Acute combined systolic and diastolic CHF, NYHA class 3        HPI:  Ms. Mohr is a 59 YOF with PMHx of combined systolic and diastolic HF (EF 10-15%), pulmHTN, dilated cardiomyopathy, nonobstructive CAD, HTN, DM on oral therapies, and recent embolic R MCA CVA (s/p tPA on 8/14, w/o residual sx). She presents to ED for worsening shortness of breath in setting of recent missed lasix dose and dietary noncomplaince. She endorses RUBI while working with in home physical therapy, early satiety, slight abdominal bloating, and orthopena. She denies PND, chest pain, palpitations, LE edema, N/V/D. All past medical, social, and family history reviewed.     Of note she has had multiple recent admission. Beginning with 07/13-14/2020 in [facility] for CHF exacerbation requiring IV diuresis, 08/14-16/2020 at [facility] for embolic R MCA CVA s/p TPA, and again 08/25-28/2020 at [facility] for NSTEMI with troponin peak of 8.4 in setting of CHF exacerbation that required IV diuresis at which time TTE was performed 08/26 that was similar to previous exams and underwent PET stress 08/27 which revealed non obstructive CAD.     In the ED her BP was stable, 's, and afebrile. CBC unremarkable, chem with K 3.4 (repleted) and alk phos 156, BNP 1661 (higher than previous results), troponin 0.080, COVID negative, CXR reveals R pleural effusion and pulmonary vascular congestion, and EKG with NSR and no acute changes. The patient was placed in observation to the Hospital Medicine Service for further evaluation and treatment.     Overview/Hospital Course:  Placed in  "observation for readmit < 30 days for Acute on chronic Systolic heart failure EF 10-15%, was previously denied a life vest per insurance, however is exhibiting NSVT on tele; ordered LifeVest.  Patient is noncompliant with salt/ salty foods (found to be eating [employer] in the hospital).  She doesn't have a scale to weigh and states she was never told to limit her fluid intake so she's been drinking "a lot" of fluid. Reviewed HF management and patient receptive to treatment and following regimens however continues to not follow even in a controlled setting.  She may or may not be a cardiomiems candidate d/t insurance but her frequent flyer status may be ideal for her.     She was not diuresing well with the lasix 60mg iv and felt cool touch upon exam under the blankets.  LA 1.1, increased lasix to 80 mg IV bid, no need for inotropes; no evidence of end organ decreased perfusion. Changed losartan to Entresto 24/26. Admit weight 176 down to 171 lbs, net negative 4.8 L, down 5 lbs since admit.  Transitioned to oral torsemide 20 mg d/t issues with gut edema.  Appears to be close to euvolemia, transitioning to oral and waiting on Lifevest approval.     Dispo: f/u with Dr. Smith 9/11/20    Interval History: NSVT on monitor, lifevest pending. Warm to touch today. Found to have [employer] bag in her overnight bag, family brought in a Fish [employer] which she proceeded to eat in addition to her in house meals.     Review of Systems   Constitutional: Positive for activity change and appetite change (early satiety). Negative for chills, diaphoresis, fatigue, fever and unexpected weight change.   HENT: Negative.  Negative for congestion and trouble swallowing.    Eyes: Negative.  Negative for visual disturbance.   Respiratory: Negative for cough, chest tightness and shortness of breath (improved).         +RUBI, + PND    Cardiovascular: Negative for chest pain, palpitations and leg swelling.        +orthopnea    Gastrointestinal: " Negative for abdominal distention (resolving), abdominal pain, constipation, diarrhea, nausea and vomiting.   Endocrine: Negative.    Genitourinary: Negative.  Negative for difficulty urinating.   Musculoskeletal: Negative.    Skin: Negative.         Feels cold     Allergic/Immunologic: Negative.    Neurological: Negative.  Negative for dizziness, weakness and light-headedness.   Psychiatric/Behavioral: Negative for agitation, confusion and sleep disturbance. The patient is not nervous/anxious.      Objective:     Vital Signs (Most Recent):  Temp: 98.7 °F (37.1 °C) (09/07/20 1718)  Pulse: 77 (09/07/20 1718)  Resp: 20 (09/07/20 1718)  BP: (!) 89/62 (09/07/20 1718)  SpO2: 98 % (09/07/20 1718) Vital Signs (24h Range):  Temp:  [96.9 °F (36.1 °C)-98.7 °F (37.1 °C)] 98.7 °F (37.1 °C)  Pulse:  [] 77  Resp:  [18-20] 20  SpO2:  [97 %-98 %] 98 %  BP: ()/(51-63) 89/62     Weight: 77.8 kg (171 lb 8.3 oz)  Body mass index is 27.68 kg/m².    Intake/Output Summary (Last 24 hours) at 9/7/2020 1730  Last data filed at 9/7/2020 1640  Gross per 24 hour   Intake 360 ml   Output 3100 ml   Net -2740 ml      Physical Exam  Vitals signs and nursing note reviewed.   Constitutional:       General: She is not in acute distress.     Appearance: Normal appearance. She is well-developed. She is obese. She is not ill-appearing, toxic-appearing or diaphoretic.   HENT:      Head: Normocephalic and atraumatic.      Right Ear: External ear normal.      Left Ear: External ear normal.      Nose: Nose normal.      Mouth/Throat:      Mouth: Mucous membranes are moist.      Dentition: Normal dentition.      Pharynx: Oropharynx is clear.   Eyes:      General: Lids are normal.      Extraocular Movements: Extraocular movements intact.      Conjunctiva/sclera: Conjunctivae normal.   Neck:      Musculoskeletal: Normal range of motion.      Vascular: Hepatojugular reflux and JVD present.   Cardiovascular:      Rate and Rhythm: Normal rate and  regular rhythm.      Pulses: Normal pulses.      Heart sounds: Normal heart sounds. No murmur.   Pulmonary:      Effort: Pulmonary effort is normal.      Breath sounds: No rales (faint bibasilar).   Chest:      Chest wall: No tenderness.   Abdominal:      General: Bowel sounds are normal. There is no distension.      Palpations: Abdomen is soft.      Tenderness: There is no abdominal tenderness.   Musculoskeletal: Normal range of motion.         General: No deformity.      Right lower leg: No edema (resolved).      Left lower leg: No edema (resolved).   Skin:     General: Skin is warm and dry.      Findings: No erythema or rash.   Neurological:      General: No focal deficit present.      Mental Status: She is alert and oriented to person, place, and time.      Cranial Nerves: No cranial nerve deficit.   Psychiatric:         Mood and Affect: Mood normal.         Behavior: Behavior normal.         Thought Content: Thought content normal.         Judgment: Judgment normal.         Significant Labs:   CMP:   Recent Labs   Lab 09/06/20  0500 09/07/20  0522    136   K 3.9 3.3*    98   CO2 26 26   * 213*   BUN 16 18   CREATININE 0.9 1.1   CALCIUM 9.1 9.0   PROT 6.2 6.7   ALBUMIN 3.3* 3.3*   BILITOT 0.6 0.4   ALKPHOS 156* 152*   AST 24 19   ALT 51* 44   ANIONGAP 9 12   EGFRNONAA >60.0 55.5*       Significant Imaging: I have reviewed all pertinent imaging results/findings within the past 24 hours.      Assessment/Plan:      * Acute combined systolic and diastolic CHF, NYHA class 3  - CHF exacerbation in setting of missed lasix dose and dietary noncompliance >>> may need increased home lasix dose at DC  - at admission BP was stable, 's, and afebrile, CBC unremarkable, chem with K 3.4 (repleted) and alk phos 156, BNP 1661 (higher than previous results), troponin 0.080, COVID negative, CXR reveals R pleural effusion and pulmonary vascular congestion, and EKG with NSR and no acute changes  - continue IV  lasix for diuresis, did not respond to lasix 60 increased to 80mg bid    -  added one dose of metolazone 5mg as above appeared ineffective as well    - diuresed well but weight barely changed.   - dietary noncompliant in house, eating [employer] after extensive discussion about salt/ salty foods/ fast foods and eating out at restaurants.  - continue GDMT metoprolol, transitioned Losartan to Entresto as she has had multiple admissions for HF as of recent and she's less than 30 days from last admit  - continue tele monitoring  - cardiac diet with fluid restriction  - strict I&Os and daily weights  - outpt follow up with [facility] at [place] 9/11/20 with Dr. Smith  - NSVT on monitor- ordered life vest  - +/- cardiomiems candidate, not sure if [employer] willing to cover will discuss in [facility] clinic  -  needs to buy scale to weigh daily    Cerebrovascular accident (CVA) due to embolism of right middle cerebral artery  -see HPI  -continue ASA, statin, and plavix   -monitor     Dilated cardiomyopathy  -see above and HPI     Coronary artery disease involving native heart  - nonobstructive CAD per PET stress  - troponin at admit 0.080 down from peak 8.4 last admission  - continue home ASA, plavix and statin  - continue tele monitoring  - EKG PRN chest pain    Mixed hyperlipidemia  - chronic  - continue statin therapy  - further monitoring and management per PCP/Cardiologist     Essential hypertension  - chronic  -changed losartan to entresto  - continue metoprolol  -dose/medication adjustment as appropriate   -monitor     Type 2 diabetes mellitus without complication, without long-term current use of insulin  - chronic  - hold home PO therapies  - SSI while in patient  - monitor accuchecks AC/HS and PRN hypoglycemic protocol   Results for LANDY STAFFORD (MRN [id number]) as of 9/5/2020 02:22   Ref. Range 8/14/2020 10:35   Hemoglobin A1C External Latest Ref Range: 4.0 - 5.6 % 6.9 (H)         VTE Risk Mitigation (From admission, onward)          Ordered     enoxaparin injection 40 mg  Every 24 hours      09/04/20 2332     IP VTE HIGH RISK PATIENT  Once      09/04/20 2332     Place sequential compression device  Until discontinued      09/04/20 2303                Discharge Planning   KARLIE: 9/8/2020     Code Status: Full Code   Is the patient medically ready for discharge?: No    Reason for patient still in hospital (select all that apply): Patient trending condition and Treatment             Brigitte Melo NP  Department of Hospital Medicine   [facility]

## 2020-09-08 VITALS
WEIGHT: 170.19 LBS | RESPIRATION RATE: 14 BRPM | TEMPERATURE: 98 F | BODY MASS INDEX: 27.35 KG/M2 | OXYGEN SATURATION: 98 % | HEIGHT: 66 IN | DIASTOLIC BLOOD PRESSURE: 75 MMHG | SYSTOLIC BLOOD PRESSURE: 127 MMHG | HEART RATE: 80 BPM

## 2020-09-08 LAB
ALBUMIN SERPL BCP-MCNC: 3.6 G/DL (ref 3.5–5.2)
ALP SERPL-CCNC: 152 U/L (ref 55–135)
ALT SERPL W/O P-5'-P-CCNC: 37 U/L (ref 10–44)
ANION GAP SERPL CALC-SCNC: 11 MMOL/L (ref 8–16)
AST SERPL-CCNC: 19 U/L (ref 10–40)
BILIRUB SERPL-MCNC: 0.6 MG/DL (ref 0.1–1)
BUN SERPL-MCNC: 24 MG/DL (ref 6–20)
CALCIUM SERPL-MCNC: 9.3 MG/DL (ref 8.7–10.5)
CHLORIDE SERPL-SCNC: 94 MMOL/L (ref 95–110)
CO2 SERPL-SCNC: 32 MMOL/L (ref 23–29)
CREAT SERPL-MCNC: 1.3 MG/DL (ref 0.5–1.4)
EST. GFR  (AFRICAN AMERICAN): 52.3 ML/MIN/1.73 M^2
EST. GFR  (NON AFRICAN AMERICAN): 45.3 ML/MIN/1.73 M^2
GLUCOSE SERPL-MCNC: 239 MG/DL (ref 70–110)
MAGNESIUM SERPL-MCNC: 2.1 MG/DL (ref 1.6–2.6)
POCT GLUCOSE: 166 MG/DL (ref 70–110)
POTASSIUM SERPL-SCNC: 3.5 MMOL/L (ref 3.5–5.1)
PROT SERPL-MCNC: 6.9 G/DL (ref 6–8.4)
SODIUM SERPL-SCNC: 137 MMOL/L (ref 136–145)

## 2020-09-08 PROCEDURE — 36415 COLL VENOUS BLD VENIPUNCTURE: CPT | Mod: NTX

## 2020-09-08 PROCEDURE — 96372 THER/PROPH/DIAG INJ SC/IM: CPT | Mod: NTX

## 2020-09-08 PROCEDURE — 83735 ASSAY OF MAGNESIUM: CPT | Mod: NTX

## 2020-09-08 PROCEDURE — 99217 PR OBSERVATION CARE DISCHARGE: ICD-10-PCS | Mod: NTX,,, | Performed by: NURSE PRACTITIONER

## 2020-09-08 PROCEDURE — 25000003 PHARM REV CODE 250: Mod: NTX | Performed by: NURSE PRACTITIONER

## 2020-09-08 PROCEDURE — G0378 HOSPITAL OBSERVATION PER HR: HCPCS | Mod: NTX

## 2020-09-08 PROCEDURE — 99217 PR OBSERVATION CARE DISCHARGE: CPT | Mod: NTX,,, | Performed by: NURSE PRACTITIONER

## 2020-09-08 PROCEDURE — 80053 COMPREHEN METABOLIC PANEL: CPT | Mod: NTX

## 2020-09-08 RX ORDER — POTASSIUM CHLORIDE 750 MG/1
40 CAPSULE, EXTENDED RELEASE ORAL ONCE
Status: DISCONTINUED | OUTPATIENT
Start: 2020-09-08 | End: 2020-09-08

## 2020-09-08 RX ORDER — POTASSIUM CHLORIDE 750 MG/1
60 CAPSULE, EXTENDED RELEASE ORAL ONCE
Status: DISCONTINUED | OUTPATIENT
Start: 2020-09-08 | End: 2020-09-08 | Stop reason: HOSPADM

## 2020-09-08 RX ORDER — TORSEMIDE 20 MG/1
20 TABLET ORAL DAILY
Qty: 90 TABLET | Refills: 0 | Status: SHIPPED | OUTPATIENT
Start: 2020-09-09 | End: 2020-10-27 | Stop reason: SDUPTHER

## 2020-09-08 RX ORDER — POTASSIUM CHLORIDE 750 MG/1
20 CAPSULE, EXTENDED RELEASE ORAL DAILY
Qty: 180 CAPSULE | Refills: 0 | Status: SHIPPED | OUTPATIENT
Start: 2020-09-08 | End: 2020-10-27

## 2020-09-08 RX ADMIN — ATORVASTATIN CALCIUM 80 MG: 20 TABLET, FILM COATED ORAL at 08:09

## 2020-09-08 RX ADMIN — TORSEMIDE 20 MG: 20 TABLET ORAL at 08:09

## 2020-09-08 RX ADMIN — CLOPIDOGREL 75 MG: 75 TABLET, FILM COATED ORAL at 08:09

## 2020-09-08 RX ADMIN — INSULIN ASPART 2 UNITS: 100 INJECTION, SOLUTION INTRAVENOUS; SUBCUTANEOUS at 08:09

## 2020-09-08 RX ADMIN — HYDROCODONE BITARTRATE AND ACETAMINOPHEN 1 TABLET: 5; 325 TABLET ORAL at 12:09

## 2020-09-08 RX ADMIN — ASPIRIN 81 MG CHEWABLE TABLET 81 MG: 81 TABLET CHEWABLE at 08:09

## 2020-09-08 RX ADMIN — SACUBITRIL AND VALSARTAN 1 TABLET: 24; 26 TABLET, FILM COATED ORAL at 08:09

## 2020-09-08 RX ADMIN — HYDROCODONE BITARTRATE AND ACETAMINOPHEN 1 TABLET: 5; 325 TABLET ORAL at 08:09

## 2020-09-08 NOTE — PLAN OF CARE
09/08/20 1427   Final Note   Assessment Type Final Discharge Note   Anticipated Discharge Disposition Home   Hospital Follow Up  Appt(s) scheduled? Yes

## 2020-09-08 NOTE — PLAN OF CARE
09/08/20 0915   Discharge Assessment   Assessment Type Discharge Planning Assessment   Assessment information obtained from? Medical Record   Expected Length of Stay (days) 4   Prior to hospitilization cognitive status: Alert/Oriented   Prior to hospitalization functional status: Independent   Current cognitive status: Alert/Oriented   Current Functional Status: Independent   Lives With spouse   Able to Return to Prior Arrangements yes   Is patient able to care for self after discharge? Yes   Who are your caregiver(s) and their phone number(s)? Maged Mohr  [phone]   Patient's perception of discharge disposition home or selfcare;home health   Readmission Within the Last 30 Days no previous admission in last 30 days  (Pt in Obs)   Patient currently being followed by outpatient case management? No   Patient currently receives any other outside agency services? Yes   Name and contact number of agency or person providing outside services [facility]   Is it the patient/care giver preference to resume care with the current outside agency? Yes   Equipment Currently Used at Home glucometer;nebulizer;walker, rolling   Do you have any problems affording any of your prescribed medications? TBD   Is the patient taking medications as prescribed? yes   Does the patient have transportation home? Yes   Transportation Anticipated family or friend will provide   Does the patient receive services at the Coumadin Clinic? No   Discharge Plan A Home with family;Home Health   DME Needed Upon Discharge  none   Placed in Obs for CHF. Information obtained from EMR. Lives with spouse and is independent in her ADLs. Plan is to DC home. Current with Porfirio ANDRADE. Confirmed with Michelle Gilliland.

## 2020-09-08 NOTE — PLAN OF CARE
Patient free from falls and injuries throughout shift.  AAO and VSS.  Patient denies CP and SOB.  Blood glucose managed through meals and insulin; .   K+ 3.3; replaced and Mg 2.2.  BUN/Cre 16/0.9.  PRN medication given for pain relief.  No acute events overnight.  Patient resting well at this time.  Plan of care discussed with patient.  Patient verbalizes understanding.  Will continue to monitor.

## 2020-09-08 NOTE — PLAN OF CARE
Pt discharged; instructions reviewed with Pt and spouse, no questions at this time. IV and tele removed and transport called for Pt.

## 2020-09-11 ENCOUNTER — EDUCATION (OUTPATIENT)
Dept: TRANSPLANT | Facility: CLINIC | Age: 60
End: 2020-09-11

## 2020-09-11 ENCOUNTER — HOSPITAL ENCOUNTER (OUTPATIENT)
Dept: PULMONOLOGY | Facility: CLINIC | Age: 60
Discharge: HOME | End: 2020-09-11
Payer: COMMERCIAL

## 2020-09-11 ENCOUNTER — INITIAL CONSULT (OUTPATIENT)
Dept: TRANSPLANT | Facility: CLINIC | Age: 60
End: 2020-09-11
Payer: COMMERCIAL

## 2020-09-11 VITALS
BODY MASS INDEX: 27.16 KG/M2 | SYSTOLIC BLOOD PRESSURE: 80 MMHG | HEIGHT: 66 IN | HEART RATE: 89 BPM | DIASTOLIC BLOOD PRESSURE: 51 MMHG | BODY MASS INDEX: 27.39 KG/M2 | WEIGHT: 169 LBS | WEIGHT: 170.44 LBS | HEIGHT: 66 IN

## 2020-09-11 DIAGNOSIS — I25.10 CORONARY ARTERY DISEASE INVOLVING NATIVE CORONARY ARTERY OF NATIVE HEART WITHOUT ANGINA PECTORIS: ICD-10-CM

## 2020-09-11 DIAGNOSIS — Z11.59 SPECIAL SCREENING EXAMINATION FOR UNSPECIFIED VIRAL DISEASE: ICD-10-CM

## 2020-09-11 DIAGNOSIS — I50.9 CONGESTIVE HEART FAILURE, UNSPECIFIED HF CHRONICITY, UNSPECIFIED HEART FAILURE TYPE: ICD-10-CM

## 2020-09-11 DIAGNOSIS — I50.42 CHRONIC COMBINED SYSTOLIC AND DIASTOLIC CONGESTIVE HEART FAILURE: ICD-10-CM

## 2020-09-11 DIAGNOSIS — I10 ESSENTIAL HYPERTENSION: ICD-10-CM

## 2020-09-11 DIAGNOSIS — I42.0 DILATED CARDIOMYOPATHY: Primary | ICD-10-CM

## 2020-09-11 PROCEDURE — 3008F PR BODY MASS INDEX (BMI) DOCUMENTED: ICD-10-PCS | Mod: CPTII,S$GLB,TXP, | Performed by: INTERNAL MEDICINE

## 2020-09-11 PROCEDURE — 99999 PR PBB SHADOW E&M-EST. PATIENT-LVL IV: ICD-10-PCS | Mod: PBBFAC,TXP,, | Performed by: INTERNAL MEDICINE

## 2020-09-11 PROCEDURE — 99204 OFFICE O/P NEW MOD 45 MIN: CPT | Mod: S$GLB,TXP,, | Performed by: INTERNAL MEDICINE

## 2020-09-11 PROCEDURE — 3074F PR MOST RECENT SYSTOLIC BLOOD PRESSURE < 130 MM HG: ICD-10-PCS | Mod: CPTII,S$GLB,TXP, | Performed by: INTERNAL MEDICINE

## 2020-09-11 PROCEDURE — 3078F DIAST BP <80 MM HG: CPT | Mod: CPTII,S$GLB,TXP, | Performed by: INTERNAL MEDICINE

## 2020-09-11 PROCEDURE — 94618 PULMONARY STRESS TESTING: CPT | Mod: NTX,S$GLB,, | Performed by: INTERNAL MEDICINE

## 2020-09-11 PROCEDURE — 99204 PR OFFICE/OUTPT VISIT, NEW, LEVL IV, 45-59 MIN: ICD-10-PCS | Mod: S$GLB,TXP,, | Performed by: INTERNAL MEDICINE

## 2020-09-11 PROCEDURE — 99999 PR PBB SHADOW E&M-EST. PATIENT-LVL IV: CPT | Mod: PBBFAC,TXP,, | Performed by: INTERNAL MEDICINE

## 2020-09-11 PROCEDURE — 3008F BODY MASS INDEX DOCD: CPT | Mod: CPTII,S$GLB,TXP, | Performed by: INTERNAL MEDICINE

## 2020-09-11 PROCEDURE — 94618 PULMONARY STRESS TESTING: ICD-10-PCS | Mod: NTX,S$GLB,, | Performed by: INTERNAL MEDICINE

## 2020-09-11 PROCEDURE — 3074F SYST BP LT 130 MM HG: CPT | Mod: CPTII,S$GLB,TXP, | Performed by: INTERNAL MEDICINE

## 2020-09-11 PROCEDURE — 3078F PR MOST RECENT DIASTOLIC BLOOD PRESSURE < 80 MM HG: ICD-10-PCS | Mod: CPTII,S$GLB,TXP, | Performed by: INTERNAL MEDICINE

## 2020-09-11 RX ORDER — DIPHENHYDRAMINE HCL 25 MG
25 CAPSULE ORAL EVERY 6 HOURS PRN
COMMUNITY
End: 2020-10-27

## 2020-09-11 NOTE — PROGRESS NOTES
PRE-EDUCATION BOOKLET NOTE:    Met with Diomedes Mohr and . Had a brief discussion regarding the advanced heart failure evaluation process including options for heart transplantation and/or left ventricular assist device (LVAD) implantation.     Heart Transplant Educational Booklet given to patient, which included the following handouts:  · Treatment options for Advanced Heart Failure: A Referral Guide for patients  · Pre Heart Transplant Coordinator Team Contact Information   · Recipient Informed Consent   · Wellness Contract  · Multiple Listing Protocol and [facility] toll free numbers   · VAD Education Packet   · Advanced Directives  · 8 Step Plan for Heart Failure Patients     Significant History:  · Prior sternotomies: No  · ICD: No  · Blood transfusions : No  · Angiography: [facility] [place] [place] 2019  · Colonoscopy: Yes - don't recall when or where.  · Pap smear: [place] 2016 or 2017  · [facility]  · Tobacco history: Yes. 1/2 pk day x's 30 - 40 yrs. Quit 12/2019  · Stroke history:Yes. 8/ 2020  · Thromboembolism history: Yes. Head 8/2020    Question and answer session was conducted. Patient was advised to bring the educational packet to future appointments and/or admissions. Patient was encouraged to contact the coordinator team with any questions or concerns. Understanding verbalized.

## 2020-09-11 NOTE — LETTER
September 22, 2020        Michelle Young  [address] [facility] [address]  Phone: [phone]  Fax: [phone]             [id number]  [address]  [place] [address]  Phone: [phone]   Patient: Diomedes Mohr   MR Number: [id number]   YOB: 1960   Date of Visit: 9/11/2020       Dear Dr. Michelle Young    Thank you for referring Diomedes Mohr to me for evaluation. Attached you will find relevant portions of my assessment and plan of care.    If you have questions, please do not hesitate to call me. I look forward to following Diomedes Mohr along with you.    Sincerely,    Justina Smith MD    Enclosure    If you would like to receive this communication electronically, please contact [email or web address] or [phone] to request [employer] Link access.    [employer] is a tool which provides read-only access to select patient information with whom you have a relationship. Its easy to use and provides real time access to review your patients record including encounter summaries, notes, results, and demographic information.    If you feel you have received this communication in error or would no longer like to receive these types of communications, please e-mail [email or web address]

## 2020-09-11 NOTE — PROGRESS NOTES
Patient provided education on LVAD by Dr. Smith at her initial appointment. Not a canidate at this time due to PMH of stroke in the last month however will follow for potential VAD at a later time. Will set up a follow appointment when patient comes back.

## 2020-09-14 NOTE — PROCEDURES
Diomedes Mohr is a 59 y.o.  female patient, who presents for a 6 minute walk test ordered by MD Sarah.  The diagnosis is Cardiomyopathy; Congestive Heart Failure.  The patient's BMI is 27.3 kg/m2.  Predicted distance (lower limit of normal) is 369.51 meters.      Test Results:    The test was completed without stopping.  The total time walked was 360 seconds.  During walking, the patient reported:  Dyspnea; Leg/hip pain.  The patient used no assistive devices during testing.     09/11/2020---------Distance: 304.8 meters (1000 feet)     O2 Sat % Supplemental Oxygen Heart Rate Blood Pressure Yazmin Scale   Pre-exercise  (Resting) 99 % Room Air 81 bpm 94/57 mmHg 0.5   During Exercise 98 % Room Air 91 bpm 97/55 mmHg 1   Post-exercise  (Recovery) 100 % Room Air  84 bpm       Recovery Time: 148 seconds    Performing nurse/tech: Frank RIBERA      PREVIOUS STUDY:   The patient has not had a previous study.      CLINICAL INTERPRETATION:  Six minute walk distance is 304.8 meters (1000 feet) with very light dyspnea.  During exercise, there was no significant desaturation while breathing room air.  Both blood pressure and heart rate remained stable with walking.  The patient reported non-pulmonary symptoms during exercise.  No previous study performed.  Based upon age and body mass index, exercise capacity is less than predicted.

## 2020-09-15 NOTE — DISCHARGE SUMMARY
[facility] Hospital Medicine  Discharge Summary      Patient Name: Diomedes Mohr  MRN: [id number]  Admission Date: 9/4/2020  Hospital Length of Stay: 0 days  Discharge Date and Time: 9/8/2020 12:56 PM  Attending Physician: No att. providers found   Discharging Provider: Brigitte Melo NP  Primary Care Provider: Fernando Manzo MD  [facility] Medicine Team: [facility] HOSP MED F Brigitte Melo NP    HPI:   Ms. Mohr is a 59 YOF with PMHx of combined systolic and diastolic HF (EF 10-15%), pulmHTN, dilated cardiomyopathy, nonobstructive CAD, HTN, DM on oral therapies, and recent embolic R MCA CVA (s/p tPA on 8/14, w/o residual sx). She presents to ED for worsening shortness of breath in setting of recent missed lasix dose and dietary noncomplaince. She endorses RUBI while working with in home physical therapy, early satiety, slight abdominal bloating, and orthopena. She denies PND, chest pain, palpitations, LE edema, N/V/D. All past medical, social, and family history reviewed.     Of note she has had multiple recent admission. Beginning with 07/13-14/2020 in [facility] for CHF exacerbation requiring IV diuresis, 08/14-16/2020 at [facility] for embolic R MCA CVA s/p TPA, and again 08/25-28/2020 at [facility] for NSTEMI with troponin peak of 8.4 in setting of CHF exacerbation that required IV diuresis at which time TTE was performed 08/26 that was similar to previous exams and underwent PET stress 08/27 which revealed non obstructive CAD.     In the ED her BP was stable, 's, and afebrile. CBC unremarkable, chem with K 3.4 (repleted) and alk phos 156, BNP 1661 (higher than previous results), troponin 0.080, COVID negative, CXR reveals R pleural effusion and pulmonary vascular congestion, and EKG with NSR and no acute changes. The patient was placed in observation to the Hospital Medicine Service for further evaluation and treatment.     * No surgery found *      Hospital Course:   Placed in  "observation for readmit < 30 days for Acute on chronic Systolic heart failure EF 10-15%, was previously denied a life vest per insurance, however is exhibiting NSVT on tele; ordered LifeVest.  Patient is noncompliant with salt/ salty foods (found to be eating [employer] in the hospital).  She doesn't have a scale to weigh and states she was never told to limit her fluid intake so she's been drinking "a lot" of fluid. Reviewed HF management and patient receptive to treatment and following regimens however continues to not follow even in a controlled setting.  She may or may not be a cardiomiems candidate d/t insurance but her frequent flyer status may be ideal for her.     She was not diuresing well with the lasix 60mg iv and felt cool touch upon exam under the blankets.  LA 1.1, increased lasix to 80 mg IV bid, no need for inotropes; no evidence of end organ decreased perfusion. Changed losartan to Entresto 24/26. Admit weight 176 down to 170 lbs, down 6 lbs since admit.  Transitioned to oral torsemide 20 mg d/t issues with gut edema.  Appears to be close to euvolemia, transitioning to oral and waiting on Lifevest approval.     Dispo: f/u with Dr. Smith 9/11/20, LIfe vest to be issued at home      Consults:   Consults (From admission, onward)        Status Ordering Provider     Inpatient consult to Social Work/Case Management  Once     Provider:  (Not yet assigned)    DAVID Patel          * Acute combined systolic and diastolic CHF, NYHA class 3  - CHF exacerbation in setting of missed lasix dose and dietary noncompliance >>> may need increased home lasix dose at [place]  - at admission BP was stable, 's, and afebrile, CBC unremarkable, chem with K 3.4 (repleted) and alk phos 156, BNP 1661 (higher than previous results), troponin 0.080, COVID negative, CXR reveals R pleural effusion and pulmonary vascular congestion, and EKG with NSR and no acute changes  - continue IV lasix for diuresis, did not " respond to lasix 60 increased to 80mg bid    -  added one dose of metolazone 5mg as above appeared ineffective     - diuresed well but weight barely changed.   - dietary noncompliant in house, eating [employer] after extensive discussion about salt/ salty foods/ fast foods and eating out at restaurants.  - continue GDMT metoprolol, transitioned Losartan to Entresto as she has had multiple admissions for HF as of recent and she's less than 30 days from last admit  - continue tele monitoring  - cardiac diet with fluid restriction  - strict I&Os and daily weights  - outpt follow up with [facility] at [place] 9/11/20 with Dr. Smith  - NSVT on monitor- ordered life vest, will be delivered and initiated at home  - +/- cardiomiems candidate, not sure if [employer] willing to cover will discuss in [facility] clinic  -  needs to buy scale to weigh daily    Cerebrovascular accident (CVA) due to embolism of right middle cerebral artery  -see HPI  -continue ASA, statin, and plavix   -monitor     Dilated cardiomyopathy  -see above and HPI     Coronary artery disease involving native heart  - nonobstructive CAD per PET stress  - troponin at admit 0.080 down from peak 8.4 last admission  - continue home ASA, plavix and statin  - continue tele monitoring  - EKG PRN chest pain    Mixed hyperlipidemia  - chronic  - continue statin therapy  - further monitoring and management per PCP/Cardiologist     Essential hypertension  - chronic  -changed losartan to entresto  - continue metoprolol  -dose/medication adjustment as appropriate   -monitor     Type 2 diabetes mellitus without complication, without long-term current use of insulin  - chronic  - hold home PO therapies  - SSI while in patient  - monitor accuchecks AC/HS and PRN hypoglycemic protocol   Results for LANDY STAFFORD (MRN [id number]) as of 9/5/2020 02:22   Ref. Range 8/14/2020 10:35   Hemoglobin A1C External Latest Ref Range: 4.0 - 5.6 % 6.9 (H)         Final Active Diagnoses:    Diagnosis Date Noted  POA    PRINCIPAL PROBLEM:  Acute combined systolic and diastolic CHF, NYHA class 3 [I50.41] 12/26/2019 Yes    Cerebrovascular accident (CVA) due to embolism of right middle cerebral artery [I63.411] 08/14/2020 Yes    Dilated cardiomyopathy [I42.0] 12/27/2019 Yes    Coronary artery disease involving native heart [I25.10] 12/26/2019 Yes    Type 2 diabetes mellitus without complication, without long-term current use of insulin [E11.9] 12/25/2019 Yes    Essential hypertension [I10] 12/25/2019 Yes    Mixed hyperlipidemia [E78.2] 12/25/2019 Yes      Problems Resolved During this Admission:       Discharged Condition: good    Disposition: Home or Self Care    Follow Up:  Follow-up Information     Please follow up.    Contact information:  Josseline Biggs with [employer] will contact your regarding lifevest once approved by insurance.                Patient Instructions:      Diet diabetic     Diet Cardiac     Notify your health care provider if you experience any of the following:  temperature >100.4     Notify your health care provider if you experience any of the following:  persistent nausea and vomiting or diarrhea     Notify your health care provider if you experience any of the following:  redness, tenderness, or signs of infection (pain, swelling, redness, odor or green/yellow discharge around incision site)     Notify your health care provider if you experience any of the following:  severe uncontrolled pain     Notify your health care provider if you experience any of the following:  difficulty breathing or increased cough     Notify your health care provider if you experience any of the following:  severe persistent headache     Notify your health care provider if you experience any of the following:  worsening rash     Notify your health care provider if you experience any of the following:  persistent dizziness, light-headedness, or visual disturbances     Activity as tolerated       Significant Diagnostic  Studies: Labs: All labs within the past 24 hours have been reviewed    Pending Diagnostic Studies:     None         Medications:  Reconciled Home Medications:      Medication List      START taking these medications    potassium chloride 10 MEQ Cpsr  Commonly known as: MICRO-K  Take 2 capsules (20 mEq total) by mouth once daily. for 90 doses     sacubitriL-valsartan 24-26 mg per tablet  Commonly known as: ENTRESTO  Take 1 tablet by mouth 2 (two) times daily.     torsemide 20 MG Tab  Commonly known as: DEMADEX  Take 1 tablet (20 mg total) by mouth once daily.        CONTINUE taking these medications    aspirin 81 MG Chew  Take 1 tablet (81 mg total) by mouth once daily.     atorvastatin 80 MG tablet  Commonly known as: LIPITOR  Take 1 tablet (80 mg total) by mouth once daily.     clopidogreL 75 mg tablet  Commonly known as: PLAVIX  Take 1 tablet (75 mg total) by mouth once daily.     glimepiride 1 MG tablet  Commonly known as: AMARYL  Take 1 tablet by mouth once daily.     metFORMIN 1000 MG tablet  Commonly known as: GLUCOPHAGE  Take 1,000 mg by mouth 2 (two) times daily.     metoprolol succinate 100 MG 24 hr tablet  Commonly known as: TOPROL-XL  Take 1 tablet (100 mg total) by mouth once daily.     pregabalin 100 MG capsule  Commonly known as: LYRICA  Take 100 mg by mouth 2 (two) times daily as needed (pain).     TRULICITY 1.5 mg/0.5 mL pen injector  Generic drug: dulaglutide  Inject into the skin once a week.        STOP taking these medications    furosemide 40 MG tablet  Commonly known as: LASIX     losartan 50 MG tablet  Commonly known as: COZAAR            Indwelling Lines/Drains at time of discharge:   Lines/Drains/Airways     None                 Time spent on the discharge of patient: 34 minutes  Patient was seen and examined on the date of discharge and determined to be suitable for discharge.         Brigitte Melo NP  Department of Hospital Medicine  [facility]

## 2020-09-15 NOTE — ASSESSMENT & PLAN NOTE
- chronic  - hold home PO therapies  - SSI while in patient  - monitor accuchecks AC/HS and PRN hypoglycemic protocol   Results for LANDY STAFFORD (MRN [id number]) as of 9/5/2020 02:22   Ref. Range 8/14/2020 10:35   Hemoglobin A1C External Latest Ref Range: 4.0 - 5.6 % 6.9 (H)

## 2020-09-15 NOTE — ASSESSMENT & PLAN NOTE
- CHF exacerbation in setting of missed lasix dose and dietary noncompliance >>> may need increased home lasix dose at [place]  - at admission BP was stable, 's, and afebrile, CBC unremarkable, chem with K 3.4 (repleted) and alk phos 156, BNP 1661 (higher than previous results), troponin 0.080, COVID negative, CXR reveals R pleural effusion and pulmonary vascular congestion, and EKG with NSR and no acute changes  - continue IV lasix for diuresis, did not respond to lasix 60 increased to 80mg bid    -  added one dose of metolazone 5mg as above appeared ineffective     - diuresed well but weight barely changed.   - dietary noncompliant in house, eating [employer] after extensive discussion about salt/ salty foods/ fast foods and eating out at restaurants.  - continue GDMT metoprolol, transitioned Losartan to Entresto as she has had multiple admissions for HF as of recent and she's less than 30 days from last admit  - continue tele monitoring  - cardiac diet with fluid restriction  - strict I&Os and daily weights  - outpt follow up with [facility] at [place] 9/11/20 with Dr. Smith  - NSVT on monitor- ordered life vest, will be delivered and initiated at home  - +/- cardiomiems candidate, not sure if [employer] willing to cover will discuss in [facility] clinic  -  needs to buy scale to weigh daily

## 2020-09-23 ENCOUNTER — TELEPHONE (OUTPATIENT)
Dept: TRANSPLANT | Facility: CLINIC | Age: 60
End: 2020-09-23

## 2020-09-23 DIAGNOSIS — I42.0 DILATED CARDIOMYOPATHY: Primary | ICD-10-CM

## 2020-09-23 NOTE — TELEPHONE ENCOUNTER
Spoke to patient. Informed of lab results and NT-proBNP of 3110.    Informe patient as per Dr Latif -  take Torsemide 20 mg PO in the morning and 10 mg PO in the evening for 3 days starting today, then Saturday 9/26/20 go back to Torsemide 20 mg PO daily. Patient repeated instruction.   Call PRN.

## 2020-09-23 NOTE — TELEPHONE ENCOUNTER
Spoke to Francesca with [facility]. States patient with 6 lb weight gain since last Wednesday. From 171 lbs to 177 lbs. Cari reports no edema and no shortness of breath.    Called patient to check on. Spoke to patient's . States patient just left and asked that I leave a message.    Called wife on mobile. Stated taking all meds as directed and monitoring PO intake. States no fluid notice and confirmed 6 lb weight gain.     CMP and BNP to be drawn today.

## 2020-09-29 NOTE — ASSESSMENT & PLAN NOTE
- Restart home meds   - EF 15%    Please advise if GI referral can be sent.  I see abdominal pain was addressed at his physical on 8/26/2020.

## 2020-09-30 ENCOUNTER — EXTERNAL HOME HEALTH (OUTPATIENT)
Dept: HOME HEALTH SERVICES | Facility: HOSPITAL | Age: 60
End: 2020-09-30
Payer: COMMERCIAL

## 2020-10-12 NOTE — PROGRESS NOTES
Subjective:   Initial evaluation of heart transplant candidacy.    HPI:  Ms. Mohr is a 60 y.o. year old Black or  female who has presents to be considered for advanced surgical options (LVAD/OHT).    Ms. Mohr is a 59 YOF with PMHx of combined systolic and diastolic HF (EF 10-15%), pulmHTN, dilated cardiomyopathy, nonobstructive CAD, HTN, DM on oral therapies, and recent embolic R MCA CVA (s/p tPA on 8/14, w/o residual sx). She presents to ED for worsening shortness of breath in setting of recent missed lasix dose and dietary noncomplaince. Beginning with 07/13-14/2020 in [facility] for CHF exacerbation requiring IV diuresis, 08/14-16/2020 at [facility] for embolic R MCA CVA s/p TPA, and again 08/25-28/2020 at [facility] for NSTEMI with troponin peak of 8.4 in setting of CHF exacerbation that required IV diuresis at which time TTE was performed 08/26 that was similar to previous exams and underwent PET stress 08/27 which revealed non obstructive CAD.     We were consulted on her in the hospital, however when she told us Dr. Chambers is her primary cardiologist, gave him a call who was comfortable with her followuing up with us. Today her  is with her however her daughter is not. Patient states she feels she is recovering well, getting better from the stroke regularly, and is looking forward to starting outpatient rehab asap. Patient denies N/V/F/C, lightheadedness, dizziness, PND, orthopnea, LE edema, abdominal pain, abdominal pressure, chest pain, chest pressure, syncope, pre-syncope. Patient feels her breathing is stable, and fluid levels stable as well.     Past Medical History:   Diagnosis Date    Anxiety     CHF (congestive heart failure)     Depression     Diabetes mellitus     Dyspnea     H/O coronary angiogram     H/O: hysterectomy     Hyperlipemia     Hypertension     Pulmonary edema     Schizophrenia     Stroke      Past Surgical History:   Procedure Laterality Date    BLADDER  "SUSPENSION      CARPAL TUNNEL RELEASE Right     HEEL SPUR SURGERY Left     LEFT HEART CATHETERIZATION Bilateral 12/27/2019    Procedure: Left heart cath;  Surgeon: Steve Chambers MD;  Location: [facility] CATH LAB;  Service: Cardiology;  Laterality: Bilateral;    TUBAL LIGATION         Family History   Family history unknown: Yes       Review of Systems   Constitution: Negative for chills, decreased appetite, diaphoresis, fever, malaise/fatigue, weight gain and weight loss.   HENT: Negative for congestion.    Eyes: Negative for blurred vision and visual disturbance.   Cardiovascular: Positive for dyspnea on exertion. Negative for chest pain, irregular heartbeat, leg swelling, near-syncope, orthopnea (2 pillows), palpitations, paroxysmal nocturnal dyspnea and syncope.   Respiratory: Negative for cough, shortness of breath, sleep disturbances due to breathing, snoring and wheezing.    Hematologic/Lymphatic: Negative for bleeding problem. Does not bruise/bleed easily.   Skin: Negative for poor wound healing and rash.   Musculoskeletal: Negative for arthritis, joint pain and muscle weakness.        Walking without support, improved greatly from when we saw her in the hospital   Gastrointestinal: Negative for bloating, abdominal pain, anorexia, constipation, diarrhea, hematemesis, hematochezia, melena, nausea and vomiting.   Genitourinary: Negative for frequency and urgency.   Neurological: Negative for difficulty with concentration, excessive daytime sleepiness, dizziness, headaches, light-headedness and weakness.   Psychiatric/Behavioral: Negative for depression. The patient does not have insomnia and is not nervous/anxious.        Objective:   Blood pressure (!) 80/51, pulse 89, height 5' 6" (1.676 m), weight 77.3 kg (170 lb 6.7 oz).body mass index is 27.51 kg/m².    Physical Exam  Vitals signs and nursing note reviewed.   Constitutional:       General: She is not in acute distress.     Appearance: She is " well-developed. She is not diaphoretic.   HENT:      Head: Normocephalic and atraumatic.   Eyes:      General:         Right eye: No discharge.         Left eye: No discharge.      Conjunctiva/sclera: Conjunctivae normal.   Neck:      Musculoskeletal: Normal range of motion and neck supple.      Vascular: No JVD.   Cardiovascular:      Rate and Rhythm: Normal rate and regular rhythm.      Heart sounds: No murmur. No friction rub. No gallop.    Pulmonary:      Effort: Pulmonary effort is normal.      Breath sounds: Normal breath sounds. No wheezing or rales.   Chest:      Chest wall: No tenderness.   Abdominal:      General: Bowel sounds are normal. There is no distension.      Palpations: Abdomen is soft. There is no mass.      Tenderness: There is no abdominal tenderness. There is no guarding or rebound.   Musculoskeletal: Normal range of motion.         General: No tenderness.   Skin:     General: Skin is warm and dry.      Findings: No erythema or rash.   Neurological:      Mental Status: She is alert and oriented to person, place, and time.   Psychiatric:         Behavior: Behavior normal.         Thought Content: Thought content normal.         Judgment: Judgment normal.         Labs:      Chemistry        Component Value Date/Time     09/23/2020 1155    K 3.5 09/23/2020 1155     09/23/2020 1155    CO2 34 (H) 09/23/2020 1155    BUN 15 09/23/2020 1155    CREATININE 0.70 09/23/2020 1155     (H) 09/23/2020 1155        Component Value Date/Time    CALCIUM 9.1 09/23/2020 1155    ALKPHOS 128 (H) 09/23/2020 1155    AST 35 09/23/2020 1155    ALT 35 09/23/2020 1155    BILITOT 0.6 09/23/2020 1155    ESTGFRAFRICA >60.0 09/23/2020 1155    EGFRNONAA >60.0 09/23/2020 1155          Magnesium   Date Value Ref Range Status   09/08/2020 2.1 1.6 - 2.6 mg/dL Final     Lab Results   Component Value Date    WBC 7.61 09/11/2020    HGB 14.0 09/11/2020    HCT 43.4 09/11/2020    MCV 87 09/11/2020     09/11/2020      BNP   Date Value Ref Range Status   09/11/2020 804 (H) 0 - 99 pg/mL Final     Comment:     Values of less than 100 pg/ml are consistent with non-CHF populations.   09/04/2020 1,661 (H) 0 - 99 pg/mL Final     Comment:     Values of less than 100 pg/ml are consistent with non-CHF populations.   08/25/2020 1,206 (H) 0 - 99 pg/mL Final     Comment:     Values of less than 100 pg/ml are consistent with non-CHF populations.     No results found for this or any previous visit.    Labs were reviewed with the patient.    Assessment:      1. Dilated cardiomyopathy    2. Essential hypertension    3. Congestive heart failure    4. Coronary artery disease involving native coronary artery of native heart without angina pectoris        Plan:   Patient is doing well, improved from physical rehab standpoint, feels she can walk on a treadmill if needed even. Minimal residual effects from stroke except slightly slower speech.   Will keep meds as they are, seems euvolemic and adequate afterload reduction.  Patient with multiple admissions for ADHF however compliance was a question, she seems very compliant today, with blood pressure lists and weights documented. States she is interested in hearing more about advanced options.  Due to recent stroke is not currently a candidate for advanced options however 3 months poststroke can be reassessed. Would in the meantime like to understand her functional status therefore will get CPX.   Patient is now NYHA III ACC stage D  Recommend 2 gram sodium restriction and 1500cc fluid restriction.  Encourage physical activity with graded exercise program.  Requested patient to weigh themselves daily, and to notify us if their weight increases by more than 3 lbs in 1 day or 5 lbs in 1 week.     Transplant Candidacy: Patient is a 60 y.o. year old female with heart failure is being seen for possible OHT/LVAD. In my opinion, she is  a high-risk LVAD/OHT candidate. Patient did meet with MCS and/or  pre-transplant coordinator at the end of this visit for workup. she is scheduled for risk stratification testing with CPX/RHC. The patient will follow up with pre-transplant.    Discussed with the patient and family [facility] program outcomes as reported in INTERMACS (Interagency Registry for Mechanically Assisted Circulatory Support):    1 year survival = 92.7%  2 year survival = 86.7%  3 year survival = 86.7%    Patient and family acknowledged receipt of this information and all questions were answered.        UNOS Patient Status  Functional Status: 50% - Requires considerable assistance and frequent medical care  Physical Capacity: No Limitations  Working for Income: Unknown    Justina Smith MD

## 2020-10-15 ENCOUNTER — OFFICE VISIT (OUTPATIENT)
Dept: NEUROLOGY | Facility: CLINIC | Age: 60
End: 2020-10-15
Payer: COMMERCIAL

## 2020-10-15 VITALS
DIASTOLIC BLOOD PRESSURE: 62 MMHG | SYSTOLIC BLOOD PRESSURE: 102 MMHG | HEIGHT: 66 IN | WEIGHT: 168 LBS | BODY MASS INDEX: 27 KG/M2 | HEART RATE: 67 BPM

## 2020-10-15 DIAGNOSIS — R43.2 DYSGEUSIA: ICD-10-CM

## 2020-10-15 DIAGNOSIS — I63.411 CEREBROVASCULAR ACCIDENT (CVA) DUE TO EMBOLISM OF RIGHT MIDDLE CEREBRAL ARTERY: ICD-10-CM

## 2020-10-15 DIAGNOSIS — I63.411 EMBOLIC STROKE INVOLVING RIGHT MIDDLE CEREBRAL ARTERY: ICD-10-CM

## 2020-10-15 DIAGNOSIS — I50.41 ACUTE COMBINED SYSTOLIC AND DIASTOLIC CHF, NYHA CLASS 3: ICD-10-CM

## 2020-10-15 DIAGNOSIS — E78.2 MIXED HYPERLIPIDEMIA: ICD-10-CM

## 2020-10-15 DIAGNOSIS — I10 ESSENTIAL HYPERTENSION: ICD-10-CM

## 2020-10-15 DIAGNOSIS — E11.9 TYPE 2 DIABETES MELLITUS WITHOUT COMPLICATION, WITHOUT LONG-TERM CURRENT USE OF INSULIN: ICD-10-CM

## 2020-10-15 PROCEDURE — 3074F PR MOST RECENT SYSTOLIC BLOOD PRESSURE < 130 MM HG: ICD-10-PCS | Mod: CPTII,NTX,S$GLB, | Performed by: STUDENT IN AN ORGANIZED HEALTH CARE EDUCATION/TRAINING PROGRAM

## 2020-10-15 PROCEDURE — 3008F BODY MASS INDEX DOCD: CPT | Mod: CPTII,NTX,S$GLB, | Performed by: STUDENT IN AN ORGANIZED HEALTH CARE EDUCATION/TRAINING PROGRAM

## 2020-10-15 PROCEDURE — 99999 PR PBB SHADOW E&M-EST. PATIENT-LVL III: ICD-10-PCS | Mod: PBBFAC,TXP,, | Performed by: STUDENT IN AN ORGANIZED HEALTH CARE EDUCATION/TRAINING PROGRAM

## 2020-10-15 PROCEDURE — 3078F DIAST BP <80 MM HG: CPT | Mod: CPTII,NTX,S$GLB, | Performed by: STUDENT IN AN ORGANIZED HEALTH CARE EDUCATION/TRAINING PROGRAM

## 2020-10-15 PROCEDURE — 99214 PR OFFICE/OUTPT VISIT, EST, LEVL IV, 30-39 MIN: ICD-10-PCS | Mod: NTX,S$GLB,, | Performed by: STUDENT IN AN ORGANIZED HEALTH CARE EDUCATION/TRAINING PROGRAM

## 2020-10-15 PROCEDURE — 3074F SYST BP LT 130 MM HG: CPT | Mod: CPTII,NTX,S$GLB, | Performed by: STUDENT IN AN ORGANIZED HEALTH CARE EDUCATION/TRAINING PROGRAM

## 2020-10-15 PROCEDURE — 99214 OFFICE O/P EST MOD 30 MIN: CPT | Mod: NTX,S$GLB,, | Performed by: STUDENT IN AN ORGANIZED HEALTH CARE EDUCATION/TRAINING PROGRAM

## 2020-10-15 PROCEDURE — 99999 PR PBB SHADOW E&M-EST. PATIENT-LVL III: CPT | Mod: PBBFAC,TXP,, | Performed by: STUDENT IN AN ORGANIZED HEALTH CARE EDUCATION/TRAINING PROGRAM

## 2020-10-15 PROCEDURE — 3044F PR MOST RECENT HEMOGLOBIN A1C LEVEL <7.0%: ICD-10-PCS | Mod: CPTII,NTX,S$GLB, | Performed by: STUDENT IN AN ORGANIZED HEALTH CARE EDUCATION/TRAINING PROGRAM

## 2020-10-15 PROCEDURE — 3078F PR MOST RECENT DIASTOLIC BLOOD PRESSURE < 80 MM HG: ICD-10-PCS | Mod: CPTII,NTX,S$GLB, | Performed by: STUDENT IN AN ORGANIZED HEALTH CARE EDUCATION/TRAINING PROGRAM

## 2020-10-15 PROCEDURE — 3044F HG A1C LEVEL LT 7.0%: CPT | Mod: CPTII,NTX,S$GLB, | Performed by: STUDENT IN AN ORGANIZED HEALTH CARE EDUCATION/TRAINING PROGRAM

## 2020-10-15 PROCEDURE — 3008F PR BODY MASS INDEX (BMI) DOCUMENTED: ICD-10-PCS | Mod: CPTII,NTX,S$GLB, | Performed by: STUDENT IN AN ORGANIZED HEALTH CARE EDUCATION/TRAINING PROGRAM

## 2020-10-15 NOTE — ASSESSMENT & PLAN NOTE
Reports of altered taste sensation; funny taste that started 2-3 weeks post stroke.   Unclear on correlation to stroke; possible medication related plavix vs diuretics     D/c plavix

## 2020-10-15 NOTE — Clinical Note
Shy Smith,     For this patient on review of her Echo findings - I feel the likely etiology of her stroke is cardio embolic. Considering initiation of DOAC - apixiban for secondary stroke prevention, however no strong literature evidence for just low EF without Afib.    Let me know what your thoughts. If you are fine, we can go ahead with apixiban.     Thanks,  Mike Wall   Vascular Neurology Fellow

## 2020-10-15 NOTE — ASSESSMENT & PLAN NOTE
Stroke Date: 8/14/20   Presentation: Dysarthria; left lower facial droop   Interventions: t-PA - Wake Up protocol, IR - None, Distal M2 frontal branch - Clinical imaging match   MRI brain: Right inferior frontal, Insular  Echo: Mild LA, EF 10-15%, . Normal wall thickness observed. Grade II (moderate) left ventricular diastolic dysfunction. Global hypokinesis (Baseline; Chronicity); No LV thrombus notes   Labs: A1C - 6.9; TSH - 1.327;      Etiology:Cardio embolic    Plans  Review of Echo findings - likely etiology cardio embolic. Recommend initiation of DOAC - apixiban for secondary stroke prevention, however no strong evidence in this sub-set of population. Shall reach out to pts primary cardiology for agreement for the same.   Continue 30 day event monitor evaluation   D/c plavix  Continue ASA 81, Atorvastatin 80 mg  Stroke counseling provided; F/u PRN

## 2020-10-15 NOTE — PROGRESS NOTES
Patient Name: Diomedes Mohr  MRN: [id number]    CC: Recent right MCA stroke     HPI: Diomedes Mohr is a 60 y.o. female with medical history of HTN, DM, HLD, CHF and recent stroke - right MCA stroke presenting for a routine out-patient stroke follow-up.     Stroke Date: 8/14/20   Presentation: Dysarthria; left lower facial droop   Interventions: t-PA - Wake Up protocol, IR - None, Distal M2 frontal branch - Clinical imaging match   MRI brain: Right inferior frontal, Insular  Echo: Mild LA, EF 10-15%, . Normal wall thickness observed. Grade II (moderate) left ventricular diastolic dysfunction. Global hypokinesis (Baseline; Chronicity); No LV thrombus notes   Labs: A1C - 6.9; TSH - 1.327;    Medications: ASA 81, Plavix 75, Atorvastatin 80 mg    Etiology: Cardio embolic    Interval history post discharge  - No interval concerns for worsening NIHSS.   - Reports of altered taste sensation; funny taste that started 2-3 weeks post stroke. Unclear on correlation to stroke; possible medication related plavix vs diuretics     ROS:   Review of Systems   Constitutional: Negative for malaise/fatigue. Negative for weight loss.   Eyes: Negative for blurred vision and double vision.   Respiratory: Negative for shortness of breath and stridor.   Cardiovascular: Negative for chest pain and palpitations.   Gastrointestinal: Negative for nausea, vomiting and constipation.   Genitourinary: Negative for frequency. Negative for urgency.   Musculoskeletal: Negative for joint pain. Negative for myalgias and falls.   Neurological: Negative for dizziness and tremors. Negative for focal weakness and seizures.   Endo/Heme/Allergies: Does not bruise/bleed easily.   Psychiatric/Behavioral: Negative for depression and hallucinations. The patient is not nervous/anxious.    Past Medical History  Past Medical History:   Diagnosis Date    Anxiety     CHF (congestive heart failure)     Depression     Diabetes mellitus     Dyspnea     H/O  coronary angiogram     H/O: hysterectomy     Hyperlipemia     Hypertension     Pulmonary edema     Schizophrenia     Stroke        Medications    Current Outpatient Medications:     aspirin 81 MG Chew, Take 1 tablet (81 mg total) by mouth once daily., Disp: 90 tablet, Rfl: 0    atorvastatin (LIPITOR) 80 MG tablet, Take 1 tablet (80 mg total) by mouth once daily., Disp: 90 tablet, Rfl: 3    clopidogreL (PLAVIX) 75 mg tablet, Take 1 tablet (75 mg total) by mouth once daily., Disp: 30 tablet, Rfl: 0    diphenhydrAMINE (BENADRYL) 25 mg capsule, Take 25 mg by mouth every 6 (six) hours as needed for Itching., Disp: , Rfl:     dulaglutide (TRULICITY) 1.5 mg/0.5 mL pen injector, Inject into the skin once a week. , Disp: , Rfl:     glimepiride (AMARYL) 1 MG tablet, Take 1 tablet by mouth once daily., Disp: , Rfl:     metFORMIN (GLUCOPHAGE) 1000 MG tablet, Take 1,000 mg by mouth 2 (two) times daily. , Disp: , Rfl:     metoprolol succinate (TOPROL-XL) 100 MG 24 hr tablet, Take 1 tablet (100 mg total) by mouth once daily., Disp: 30 tablet, Rfl: 6    potassium chloride (MICRO-K) 10 MEQ CpSR, Take 2 capsules (20 mEq total) by mouth once daily. for 90 doses, Disp: 180 capsule, Rfl: 0    pregabalin (LYRICA) 100 MG capsule, Take 100 mg by mouth 2 (two) times daily as needed (pain)., Disp: , Rfl:     sacubitriL-valsartan (ENTRESTO) 24-26 mg per tablet, Take 1 tablet by mouth 2 (two) times daily., Disp: 180 tablet, Rfl: 0    torsemide (DEMADEX) 20 MG Tab, Take 1 tablet (20 mg total) by mouth once daily., Disp: 90 tablet, Rfl: 0    Allergies  Review of patient's allergies indicates:   Allergen Reactions    Captopril Other (See Comments)     COUGH       Social History  Social History     Socioeconomic History    Marital status:      Spouse name: Not on file    Number of children: Not on file    Years of education: Not on file    Highest education level: Not on file   Occupational History    Not on file  "  Social Needs    Financial resource strain: Not on file    Food insecurity     Worry: Not on file     Inability: Not on file    Transportation needs     Medical: Not on file     Non-medical: Not on file   Tobacco Use    Smoking status: Current Every Day Smoker     Types: Cigarettes    Smokeless tobacco: Never Used    Tobacco comment: nonex 2 weeks   Substance and Sexual Activity    Alcohol use: No     Alcohol/week: 0.0 standard drinks    Drug use: No    Sexual activity: Not on file   Lifestyle    Physical activity     Days per week: Not on file     Minutes per session: Not on file    Stress: Not on file   Relationships    Social connections     Talks on phone: Not on file     Gets together: Not on file     Attends Mandaeism service: Not on file     Active member of club or organization: Not on file     Attends meetings of clubs or organizations: Not on file     Relationship status: Not on file   Other Topics Concern    Not on file   Social History Narrative    Not on file       Family History  Family History   Family history unknown: Yes       Physical Exam  /62   Pulse 67   Ht 5' 6" (1.676 m)   Wt 76.2 kg (168 lb)   LMP  (LMP Unknown)   BMI 27.12 kg/m²     General appearance: Well-developed, well-groomed.     Neurologic Exam: The patient is awake, alert and oriented. Language is fluent. Recent and remote memory are normal. Attention span and concentration are normal. Fund of knowledge is appropriate.     Cranial nerves: pupils are round and reactive to light and accommodation. Visual fields are full to confrontation. Ocular motility is full in all cardinal positions of gaze. Facial sensation is normal to pinprick and light touch. Corneal reflexes are present bilaterally. Facial activation is symmetric. Hearing is normal bilaterally. Shoulder elevation is symmetric and full strength bilaterally. Tongue is midline and neck rotation strength is normal bilaterally. Neck range of motion is " normal.     Motor examination of all extremities demonstrates normal bulk and tone in all four limbs. There are no atrophy or fasciculations. Strength is 4+/5 in the upper and lower extremities bilaterally without pronator drift.     Sensory examination is normal to tocuh in the upper and lower extremities bilaterally. Romberg is negative.    Deep tendon reflexes Toes are mute bilaterally.     Gait: Normal casual gait.    Coordination: Finger to nose is normal in both upper extremities.     NIHSS ([employer] Stroke Scale)   1a  Level of consciousness: 0=alert; keenly responsive   1b. LOC questions:  0 = Answers both questions correctly   1c. LOC commands: 0=Answers both tasks correctly   2.  Best Gaze: 0=normal   3. Visual: 0=No visual loss   4. Facial Palsy: 0=Normal symmetric movement   5a. Motor left arm: 0=No drift, limb holds 90 (or 45) degrees for full 10 seconds   5b.  Motor right arm: 0=No drift, limb holds 90 (or 45) degrees for full 10 seconds   6a. motor left le=No drift, limb holds 90 (or 45) degrees for full 10 seconds   6b  Motor right le=No drift, limb holds 90 (or 45) degrees for full 10 seconds   7. Limb Ataxia: 0=Absent   8.  Sensory: 0=Normal; no sensory loss   9. Best Language:  0=No aphasia, normal   10. Dysarthria: 0=Normal   11. Extinction and Inattention: 0=No abnormality         Total:   0     Modified Hocking Scale: 1    Lab and Test Results    WBC   Date Value Ref Range Status   2020 7.61 3.90 - 12.70 K/uL Final   2020 7.96 3.90 - 12.70 K/uL Final   2020 7.31 3.90 - 12.70 K/uL Final     Hemoglobin   Date Value Ref Range Status   2020 14.0 12.0 - 16.0 g/dL Final   2020 11.9 (L) 12.0 - 16.0 g/dL Final   2020 11.8 (L) 12.0 - 16.0 g/dL Final     Hematocrit   Date Value Ref Range Status   2020 43.4 37.0 - 48.5 % Final   2020 38.2 37.0 - 48.5 % Final   2020 37.3 37.0 - 48.5 % Final     Platelets   Date Value Ref  Range Status   09/11/2020 341 150 - 350 K/uL Final   09/05/2020 302 150 - 350 K/uL Final   09/04/2020 319 150 - 350 K/uL Final     Glucose   Date Value Ref Range Status   09/23/2020 148 (H) 70 - 110 mg/dL Final   09/11/2020 117 (H) 70 - 110 mg/dL Final   09/08/2020 239 (H) 70 - 110 mg/dL Final     Sodium   Date Value Ref Range Status   09/23/2020 144 136 - 145 mmol/L Final   09/11/2020 138 136 - 145 mmol/L Final   09/08/2020 137 136 - 145 mmol/L Final     Potassium   Date Value Ref Range Status   09/23/2020 3.5 3.5 - 5.1 mmol/L Final   09/11/2020 3.1 (L) 3.5 - 5.1 mmol/L Final   09/08/2020 3.5 3.5 - 5.1 mmol/L Final     Chloride   Date Value Ref Range Status   09/23/2020 104 95 - 110 mmol/L Final   09/11/2020 91 (L) 95 - 110 mmol/L Final   09/08/2020 94 (L) 95 - 110 mmol/L Final     CO2   Date Value Ref Range Status   09/23/2020 34 (H) 23 - 29 mmol/L Final   09/11/2020 38 (H) 23 - 29 mmol/L Final   09/08/2020 32 (H) 23 - 29 mmol/L Final     BUN, Bld   Date Value Ref Range Status   09/23/2020 15 7 - 17 mg/dL Final   09/11/2020 16 6 - 20 mg/dL Final   09/08/2020 24 (H) 6 - 20 mg/dL Final     Creatinine   Date Value Ref Range Status   09/23/2020 0.70 0.50 - 1.40 mg/dL Final   09/11/2020 1.1 0.5 - 1.4 mg/dL Final   09/08/2020 1.3 0.5 - 1.4 mg/dL Final     Calcium   Date Value Ref Range Status   09/23/2020 9.1 8.7 - 10.5 mg/dL Final   09/11/2020 9.6 8.7 - 10.5 mg/dL Final   09/08/2020 9.3 8.7 - 10.5 mg/dL Final     Magnesium   Date Value Ref Range Status   09/08/2020 2.1 1.6 - 2.6 mg/dL Final   09/07/2020 1.9 1.6 - 2.6 mg/dL Final   09/06/2020 2.2 1.6 - 2.6 mg/dL Final     Phosphorus   Date Value Ref Range Status   08/16/2020 4.1 2.7 - 4.5 mg/dL Final   08/15/2020 4.3 2.7 - 4.5 mg/dL Final   08/14/2020 4.4 2.7 - 4.5 mg/dL Final     Alkaline Phosphatase   Date Value Ref Range Status   09/23/2020 128 (H) 38 - 126 U/L Final   09/11/2020 141 (H) 55 - 135 U/L Final   09/08/2020 152 (H) 55 - 135 U/L Final     ALT   Date Value  Ref Range Status   09/23/2020 35 10 - 44 U/L Final   09/11/2020 34 10 - 44 U/L Final   09/08/2020 37 10 - 44 U/L Final     AST   Date Value Ref Range Status   09/23/2020 35 15 - 46 U/L Final   09/11/2020 27 10 - 40 U/L Final   09/08/2020 19 10 - 40 U/L Final     Images: Independently reviewed   Other Tests: Independently reviewed     Assessment and Plan    60 y.o. female with medical history of HTN, DM, HLD, CHF and recent stroke - right MCA stroke presenting for a routine out-patient stroke follow-up.     History of embolic stroke involving right middle cerebral artery  Stroke Date: 8/14/20   Presentation: Dysarthria; left lower facial droop   Interventions: t-PA - Wake Up protocol, IR - None, Distal M2 frontal branch - Clinical imaging match   MRI brain: Right inferior frontal, Insular  Echo: Mild LA, EF 10-15%, . Normal wall thickness observed. Grade II (moderate) left ventricular diastolic dysfunction. Global hypokinesis (Baseline; Chronicity); No LV thrombus notes   Labs: A1C - 6.9; TSH - 1.327;      Etiology:Cardio embolic    Plans  Review of Echo findings - likely etiology cardio embolic. Recommend initiation of DOAC - apixiban for secondary stroke prevention, however no strong evidence in this sub-set of population. Shall reach out to pts primary cardiology for agreement for the same.   Continue 30 day event monitor evaluation   D/c plavix  Continue ASA 81, Atorvastatin 80 mg  Stroke counseling provided; F/u PRN    Mixed hyperlipidemia  - target LDL < 70   - Continue atorvastatin  - F/u PCP for risk factor modification monitoring  - Counseled on DASH diet; exercise and lifestyle modifications    Essential hypertension  - target < 130/80 mmHg  - Continue home medications  - F/u PCP for risk factor modification monitoring  - Counseled on DASH diet; exercise and lifestyle modifications    Type 2 diabetes mellitus without complication, without long-term current use of insulin  - euglycemic goals   - F/u  PCP for risk factor modification monitoring  - Counseled on diabetic diet; exercise and lifestyle modification    Acute combined systolic and diastolic CHF, NYHA class 3  Echo: Mild LA, EF 10-15%, . Normal wall thickness observed. Grade II (moderate) left ventricular diastolic dysfunction. Global hypokinesis (Baseline; Chronicity); No LV thrombus notes       F/u cards   Considerations for AC       Dysgeusia  Reports of altered taste sensation; funny taste that started 2-3 weeks post stroke.   Unclear on correlation to stroke; possible medication related plavix vs diuretics     D/c plavix           Mike Wall MD     Vascular Neurology Fellow   [facility]  [facility]

## 2020-10-15 NOTE — ASSESSMENT & PLAN NOTE
- euglycemic goals   - F/u PCP for risk factor modification monitoring  - Counseled on diabetic diet; exercise and lifestyle modifications

## 2020-10-15 NOTE — ASSESSMENT & PLAN NOTE
- target LDL < 70   - Continue atorvastatin  - F/u PCP for risk factor modification monitoring  - Counseled on DASH diet; exercise and lifestyle modifications

## 2020-10-15 NOTE — ASSESSMENT & PLAN NOTE
Echo: Mild LA, EF 10-15%, . Normal wall thickness observed. Grade II (moderate) left ventricular diastolic dysfunction. Global hypokinesis (Baseline; Chronicity); No LV thrombus notes       F/u cards   Considerations for AC

## 2020-10-15 NOTE — ASSESSMENT & PLAN NOTE
- target < 130/80 mmHg  - Continue home medications  - F/u PCP for risk factor modification monitoring  - Counseled on DASH diet; exercise and lifestyle modifications

## 2020-10-22 ENCOUNTER — HOSPITAL ENCOUNTER (OUTPATIENT)
Dept: CARDIOLOGY | Facility: HOSPITAL | Age: 60
Discharge: HOME | End: 2020-10-22
Attending: INTERNAL MEDICINE
Payer: COMMERCIAL

## 2020-10-22 ENCOUNTER — CLINICAL SUPPORT (OUTPATIENT)
Dept: TRANSPLANT | Facility: CLINIC | Age: 60
End: 2020-10-22
Payer: COMMERCIAL

## 2020-10-22 ENCOUNTER — TELEPHONE (OUTPATIENT)
Dept: TRANSPLANT | Facility: CLINIC | Age: 60
End: 2020-10-22

## 2020-10-22 ENCOUNTER — LAB VISIT (OUTPATIENT)
Dept: LAB | Facility: HOSPITAL | Age: 60
End: 2020-10-22
Attending: INTERNAL MEDICINE
Payer: COMMERCIAL

## 2020-10-22 VITALS
HEART RATE: 70 BPM | HEIGHT: 66 IN | WEIGHT: 173.06 LBS | BODY MASS INDEX: 27.81 KG/M2 | DIASTOLIC BLOOD PRESSURE: 72 MMHG | SYSTOLIC BLOOD PRESSURE: 96 MMHG

## 2020-10-22 DIAGNOSIS — Z13.9 SCREENING FOR UNSPECIFIED CONDITION: Primary | ICD-10-CM

## 2020-10-22 DIAGNOSIS — I42.0 DILATED CARDIOMYOPATHY: ICD-10-CM

## 2020-10-22 LAB
ALBUMIN SERPL BCP-MCNC: 3.7 G/DL (ref 3.5–5.2)
ALP SERPL-CCNC: 149 U/L (ref 55–135)
ALT SERPL W/O P-5'-P-CCNC: 16 U/L (ref 10–44)
ANION GAP SERPL CALC-SCNC: 11 MMOL/L (ref 8–16)
AST SERPL-CCNC: 19 U/L (ref 10–40)
BILIRUB SERPL-MCNC: 0.5 MG/DL (ref 0.1–1)
BNP SERPL-MCNC: 445 PG/ML (ref 0–99)
BUN SERPL-MCNC: 20 MG/DL (ref 6–20)
CALCIUM SERPL-MCNC: 9.6 MG/DL (ref 8.7–10.5)
CHLORIDE SERPL-SCNC: 92 MMOL/L (ref 95–110)
CO2 SERPL-SCNC: 36 MMOL/L (ref 23–29)
CREAT SERPL-MCNC: 1 MG/DL (ref 0.5–1.4)
CV STRESS BASE HR: 70 BPM
DIASTOLIC BLOOD PRESSURE: 72 MMHG
EST. GFR  (AFRICAN AMERICAN): >60 ML/MIN/1.73 M^2
EST. GFR  (NON AFRICAN AMERICAN): >60 ML/MIN/1.73 M^2
GLUCOSE SERPL-MCNC: 138 MG/DL (ref 70–110)
OHS CV CPX 1 MINUTE RECOVERY HEART RATE: 104 BPM
OHS CV CPX 85 PERCENT MAX PREDICTED HEART RATE MALE: 131
OHS CV CPX ANAEROBIC THRESHOLD DIASTOLIC BLOOD PRESSURE: 76 MMHG
OHS CV CPX ANAEROBIC THRESHOLD HEART RATE: 120
OHS CV CPX ANAEROBIC THRESHOLD RATE PRESSURE PRODUCT: NORMAL
OHS CV CPX ANAEROBIC THRESHOLD SYSTOLIC BLOOD PRESSURE: 119
OHS CV CPX DATA GRADE - AT: 2.3
OHS CV CPX DATA GRADE - PEAK: 4.5
OHS CV CPX DATA O2 SAT - PEAK: 98
OHS CV CPX DATA O2 SAT - REST: 96
OHS CV CPX DATA SPEED - AT: 2.3
OHS CV CPX DATA SPEED - PEAK: 2.9
OHS CV CPX DATA TIME - AT: 3.85
OHS CV CPX DATA TIME - PEAK: 6.97
OHS CV CPX DATA VE/VCO2 - AT: 28
OHS CV CPX DATA VE/VCO2 - PEAK: 25
OHS CV CPX DATA VE/VO2 - AT: 25
OHS CV CPX DATA VE/VO2 - PEAK: 25
OHS CV CPX DATA VO2 - AT: 9
OHS CV CPX DATA VO2 - PEAK: 10.9
OHS CV CPX DATA VO2 - REST: 2.6
OHS CV CPX FEV1/FVC: 1
OHS CV CPX FORCED EXPIRATORY VOLUME: 2.36
OHS CV CPX FORCED VITAL CAPACITY (FVC): 2.36
OHS CV CPX HIGHEST VO: 20.5
OHS CV CPX MAX PREDICTED HEART RATE: 154
OHS CV CPX MAXIMAL VOLUNTARY VENTILATION (MVV) PREDICTED: 94.4
OHS CV CPX MAXIMAL VOLUNTARY VENTILATION (MVV): 65
OHS CV CPX MAXIUMUM EXERCISE VENTILATION (VE MAX): 21.5
OHS CV CPX PATIENT AGE: 59
OHS CV CPX PATIENT HEIGHT IN: 66
OHS CV CPX PATIENT IS FEMALE AGE 11-19: 0
OHS CV CPX PATIENT IS FEMALE AGE GREATER THAN 19: 1
OHS CV CPX PATIENT IS FEMALE AGE LESS THAN 11: 0
OHS CV CPX PATIENT IS FEMALE: 1
OHS CV CPX PATIENT IS MALE AGE 11-25: 0
OHS CV CPX PATIENT IS MALE AGE GREATER THAN 25: 0
OHS CV CPX PATIENT IS MALE AGE LESS THAN 11: 0
OHS CV CPX PATIENT IS MALE GREATER THAN 18: 0
OHS CV CPX PATIENT IS MALE LESS THAN OR EQUAL TO 18: 0
OHS CV CPX PATIENT IS MALE: 0
OHS CV CPX PATIENT WEIGHT RETURNED IN OZ: 2768.98
OHS CV CPX PEAK DIASTOLIC BLOOD PRESSURE: 73 MMHG
OHS CV CPX PEAK HEAR RATE: 137 BPM
OHS CV CPX PEAK RATE PRESSURE PRODUCT: NORMAL
OHS CV CPX PEAK SYSTOLIC BLOOD PRESSURE: 130 MMHG
OHS CV CPX PERCENT BODY FAT: 19.1
OHS CV CPX PERCENT MAX PREDICTED HEART RATE ACHIEVED: 89
OHS CV CPX PREDICTED VO2: 20.5 ML/KG/MIN
OHS CV CPX RATE PRESSURE PRODUCT PRESENTING: 6720
OHS CV CPX REST PET CO2: 39
OHS CV CPX VE/VCO2 SLOPE: 25.3
POTASSIUM SERPL-SCNC: 3 MMOL/L (ref 3.5–5.1)
PROT SERPL-MCNC: 7.5 G/DL (ref 6–8.4)
SARS-COV-2 RDRP RESP QL NAA+PROBE: NEGATIVE
SODIUM SERPL-SCNC: 139 MMOL/L (ref 136–145)
STRESS ECHO POST EXERCISE DUR MIN: 6 MINUTES
STRESS ECHO POST EXERCISE DUR SEC: 58 SECONDS
SYSTOLIC BLOOD PRESSURE: 96 MMHG

## 2020-10-22 PROCEDURE — U0002 COVID-19 LAB TEST NON-CDC: HCPCS | Mod: TXP

## 2020-10-22 PROCEDURE — 80053 COMPREHEN METABOLIC PANEL: CPT | Mod: NTX

## 2020-10-22 PROCEDURE — 94621 CARDIOPULM EXERCISE TESTING: CPT | Mod: 26,NTX,, | Performed by: INTERNAL MEDICINE

## 2020-10-22 PROCEDURE — 94621 CARDIOPULMONARY EXERCISE TESTING (CUPID ONLY): ICD-10-PCS | Mod: 26,NTX,, | Performed by: INTERNAL MEDICINE

## 2020-10-22 PROCEDURE — 94621 CARDIOPULM EXERCISE TESTING: CPT | Mod: TXP

## 2020-10-22 PROCEDURE — 83880 ASSAY OF NATRIURETIC PEPTIDE: CPT | Mod: TXP

## 2020-10-22 PROCEDURE — 36415 COLL VENOUS BLD VENIPUNCTURE: CPT | Mod: NTX

## 2020-10-22 NOTE — PROGRESS NOTES
Labs reviewed.  K+ 3.0  As per Dr Smith patient to take a total Potassium Chloride 60 meq (today only) for low potassium. Tomorrow take potassium as previously ordered. Patient verbalized understanding. Call PRN.  Confirmed lab and appointment with Dr Smith on 10/27/1/20. Call PRN.

## 2020-10-22 NOTE — PROGRESS NOTES
Spoke with Dr. Smith in regards if needed to be seen for VAD education. Will hold on education at this point until after CPX and Dr. Smith sees in clinic on 10/27.

## 2020-10-22 NOTE — PROGRESS NOTES
Patient denies symptoms including cough, fevers, SOB, diarrhea, loss of taste or smell.   Informed patient of process for Covid test.  Patient denies prior nasal surgery. Pt verbalized permission to proceed with test.    Nasopharyngeal  specimen collected.  Patient tolerated procedure well.    Test results negative.  Pt notified.

## 2020-10-27 ENCOUNTER — OFFICE VISIT (OUTPATIENT)
Dept: TRANSPLANT | Facility: CLINIC | Age: 60
End: 2020-10-27
Payer: COMMERCIAL

## 2020-10-27 ENCOUNTER — CLINICAL SUPPORT (OUTPATIENT)
Dept: TRANSPLANT | Facility: CLINIC | Age: 60
End: 2020-10-27
Payer: COMMERCIAL

## 2020-10-27 ENCOUNTER — TELEPHONE (OUTPATIENT)
Dept: NEUROLOGY | Facility: HOSPITAL | Age: 60
End: 2020-10-27

## 2020-10-27 VITALS
DIASTOLIC BLOOD PRESSURE: 63 MMHG | WEIGHT: 170.44 LBS | SYSTOLIC BLOOD PRESSURE: 102 MMHG | HEIGHT: 66 IN | BODY MASS INDEX: 27.39 KG/M2 | HEART RATE: 68 BPM

## 2020-10-27 DIAGNOSIS — I10 ESSENTIAL HYPERTENSION: ICD-10-CM

## 2020-10-27 DIAGNOSIS — I42.0 DILATED CARDIOMYOPATHY: Primary | ICD-10-CM

## 2020-10-27 DIAGNOSIS — I50.42 CHRONIC COMBINED SYSTOLIC AND DIASTOLIC CONGESTIVE HEART FAILURE: ICD-10-CM

## 2020-10-27 DIAGNOSIS — I63.411 CEREBROVASCULAR ACCIDENT (CVA) DUE TO EMBOLISM OF RIGHT MIDDLE CEREBRAL ARTERY: ICD-10-CM

## 2020-10-27 DIAGNOSIS — I25.10 CORONARY ARTERY DISEASE INVOLVING NATIVE CORONARY ARTERY OF NATIVE HEART WITHOUT ANGINA PECTORIS: ICD-10-CM

## 2020-10-27 PROCEDURE — 3078F PR MOST RECENT DIASTOLIC BLOOD PRESSURE < 80 MM HG: ICD-10-PCS | Mod: CPTII,NTX,S$GLB, | Performed by: INTERNAL MEDICINE

## 2020-10-27 PROCEDURE — 99214 OFFICE O/P EST MOD 30 MIN: CPT | Mod: NTX,S$GLB,, | Performed by: INTERNAL MEDICINE

## 2020-10-27 PROCEDURE — 99999 PR PBB SHADOW E&M-EST. PATIENT-LVL III: ICD-10-PCS | Mod: PBBFAC,TXP,, | Performed by: INTERNAL MEDICINE

## 2020-10-27 PROCEDURE — 3008F PR BODY MASS INDEX (BMI) DOCUMENTED: ICD-10-PCS | Mod: CPTII,NTX,S$GLB, | Performed by: INTERNAL MEDICINE

## 2020-10-27 PROCEDURE — 3074F PR MOST RECENT SYSTOLIC BLOOD PRESSURE < 130 MM HG: ICD-10-PCS | Mod: CPTII,NTX,S$GLB, | Performed by: INTERNAL MEDICINE

## 2020-10-27 PROCEDURE — 99214 PR OFFICE/OUTPT VISIT, EST, LEVL IV, 30-39 MIN: ICD-10-PCS | Mod: NTX,S$GLB,, | Performed by: INTERNAL MEDICINE

## 2020-10-27 PROCEDURE — 3008F BODY MASS INDEX DOCD: CPT | Mod: CPTII,NTX,S$GLB, | Performed by: INTERNAL MEDICINE

## 2020-10-27 PROCEDURE — 3074F SYST BP LT 130 MM HG: CPT | Mod: CPTII,NTX,S$GLB, | Performed by: INTERNAL MEDICINE

## 2020-10-27 PROCEDURE — 99999 PR PBB SHADOW E&M-EST. PATIENT-LVL III: CPT | Mod: PBBFAC,TXP,, | Performed by: INTERNAL MEDICINE

## 2020-10-27 PROCEDURE — 3078F DIAST BP <80 MM HG: CPT | Mod: CPTII,NTX,S$GLB, | Performed by: INTERNAL MEDICINE

## 2020-10-27 RX ORDER — ATORVASTATIN CALCIUM 80 MG/1
80 TABLET, FILM COATED ORAL DAILY
Qty: 90 TABLET | Refills: 3 | Status: SHIPPED | OUTPATIENT
Start: 2020-10-27 | End: 2021-10-22 | Stop reason: SDUPTHER

## 2020-10-27 RX ORDER — POTASSIUM CHLORIDE 750 MG/1
20 CAPSULE, EXTENDED RELEASE ORAL DAILY
Qty: 180 CAPSULE | Refills: 0 | Status: SHIPPED | OUTPATIENT
Start: 2020-10-27 | End: 2020-11-04

## 2020-10-27 RX ORDER — TORSEMIDE 20 MG/1
20 TABLET ORAL DAILY
Qty: 90 TABLET | Refills: 0 | Status: SHIPPED | OUTPATIENT
Start: 2020-10-27 | End: 2020-12-02 | Stop reason: SDUPTHER

## 2020-10-27 RX ORDER — METOPROLOL SUCCINATE 100 MG/1
100 TABLET, EXTENDED RELEASE ORAL DAILY
Qty: 30 TABLET | Refills: 6 | Status: SHIPPED | OUTPATIENT
Start: 2020-10-27 | End: 2021-10-22 | Stop reason: SDUPTHER

## 2020-10-27 RX ORDER — PREGABALIN 100 MG/1
100 CAPSULE ORAL 2 TIMES DAILY PRN
Qty: 60 CAPSULE | Refills: 0 | Status: CANCELLED | OUTPATIENT
Start: 2020-10-27

## 2020-10-27 RX ORDER — SPIRONOLACTONE 25 MG/1
25 TABLET ORAL DAILY
Qty: 30 TABLET | Refills: 11 | Status: SHIPPED | OUTPATIENT
Start: 2020-10-27 | End: 2021-10-22 | Stop reason: SDUPTHER

## 2020-10-27 NOTE — PATIENT INSTRUCTIONS
Start aldactone 25mg one tablet daily.    Decrease potassium to 20meq daily.     Repeat labs in 1 week at [facility].

## 2020-10-27 NOTE — LETTER
November 9, 2020        Michelle Young  [address] [facility] [address]  Phone: [phone]  Fax: [phone]             [id number]  [address]  [place] [address]  Phone: [phone]   Patient: Diomedes Mohr   MR Number: [id number]   YOB: 1960   Date of Visit: 10/27/2020       Dear Dr. Michelle Young    Thank you for referring Diomedes Mohr to me for evaluation. Attached you will find relevant portions of my assessment and plan of care.    If you have questions, please do not hesitate to call me. I look forward to following Diomedes Mohr along with you.    Sincerely,    Justina Smith MD    Enclosure    If you would like to receive this communication electronically, please contact [email or web address] or [phone] to request [employer] access.    [employer] is a tool which provides read-only access to select patient information with whom you have a relationship. Its easy to use and provides real time access to review your patients record including encounter summaries, notes, results, and demographic information.    If you feel you have received this communication in error or would no longer like to receive these types of communications, please e-mail [email or web address]

## 2020-10-27 NOTE — PROGRESS NOTES
Call from Mery in lab with K+ result of 2.7  Spoke to patient. Patient have not yet taken K+ today. As per Dr Smith take K+ 60 meq by mouth now and K+ 40 meq by mouth this evening. Tomorrow resume to as regularly scheduled. Lab collect tomorrow at local lab. Patient and  verbalized instruction by repeating back to me. Call PRN.

## 2020-10-27 NOTE — TELEPHONE ENCOUNTER
Reviewed the anti-coagulation plans for secondary stroke prevention. Emphasized that benefits outweigh risk given her lower EF.  With patient's understanding  - Shall proceed with apixiban 5 mg BID. Reviewed the risks with medications    - Recommend cardiology opinion on the same and continuation of asa in addition to DOAC.

## 2020-10-27 NOTE — PROGRESS NOTES
Subjective:   Initial evaluation of heart transplant candidacy.    HPI:  Ms. Mohr is a 60 y.o. year old Black or  female who has presents to be considered for advanced surgical options (LVAD/OHT).    Ms. Mohr is a 59 YOF with PMHx of combined systolic and diastolic HF (EF 10-15%), pulmHTN, dilated cardiomyopathy, nonobstructive CAD, HTN, DM on oral therapies, and recent embolic R MCA CVA (s/p tPA on 8/14, w/o residual sx). She presents to ED for worsening shortness of breath in setting of recent missed lasix dose and dietary noncomplaince. Beginning with 07/13-14/2020 in [facility] for CHF exacerbation requiring IV diuresis, 08/14-16/2020 at [facility] for embolic R MCA CVA s/p TPA, and again 08/25-28/2020 at [facility] for NSTEMI with troponin peak of 8.4 in setting of CHF exacerbation that required IV diuresis at which time TTE was performed 08/26 that was similar to previous exams and underwent PET stress 08/27 which revealed non obstructive CAD.     Patient today seems much fiestier, had cpx recently with pkOV2 not being ideal however RER was not appropriate enough. She seems to be unhappy with her medications and really is not following instructions on potassium intake or dietary restrictions, our first visit with her was checking her BP regularly and being careful to log weights, BP etc, but not anymore for some reason. Her  seems resigned to her not following instructions today for some reason. Different feel to visit today than last time.     CPX 10/22/20:  Resting spirometry reveals an FVC = 2.36L which is 72.5% of predicted, an FEV1 of 2.36L, which is 90.94% of predicted and an FEV1/FVC ratio of 100%. The MVV = 94.4 L/min, which is 98.17% of predicted.   The respiratory exchange ratio (RER) was 1.02, suggesting poor effort.   The breathing reserve is calculated at 77.22%, which is normal. Oxygen saturation with exercise remained normal.   The peak VO2 was 10.9 ml/kg/min which is 53.17%  of predicted equating to a functional capacity of 3.11 METS indicating severe functional impairment.    by 0% from rest to AT.   The VE/VCO2 [place] was 25.3. The Resting PetCO2 was 39.0.   Severe functional impairment associated with a normal breathing reserve, normal oxygen stauration, poor effort, and a borderline reduced AT. These findings are indicative of functional impairment secondary to circulatory insufficiency, poor effort.    Past Medical History:   Diagnosis Date    Anxiety     CHF (congestive heart failure)     Depression     Diabetes mellitus     Dyspnea     H/O coronary angiogram     H/O: hysterectomy     Hyperlipemia     Hypertension     Pulmonary edema     Schizophrenia     Stroke      Past Surgical History:   Procedure Laterality Date    BLADDER SUSPENSION      CARPAL TUNNEL RELEASE Right     HEEL SPUR SURGERY Left     LEFT HEART CATHETERIZATION Bilateral 12/27/2019    Procedure: Left heart cath;  Surgeon: Steve Chambers MD;  Location: [facility] CATH LAB;  Service: Cardiology;  Laterality: Bilateral;    TUBAL LIGATION         Family History   Family history unknown: Yes       Review of Systems   Constitution: Negative for chills, decreased appetite, diaphoresis, fever, malaise/fatigue, weight gain and weight loss.   HENT: Negative for congestion.    Eyes: Negative for blurred vision and visual disturbance.   Cardiovascular: Positive for dyspnea on exertion. Negative for chest pain, irregular heartbeat, leg swelling, near-syncope, orthopnea (2 pillows), palpitations, paroxysmal nocturnal dyspnea and syncope.   Respiratory: Negative for cough, shortness of breath, sleep disturbances due to breathing, snoring and wheezing.    Hematologic/Lymphatic: Negative for bleeding problem. Does not bruise/bleed easily.   Skin: Negative for poor wound healing and rash.   Musculoskeletal: Negative for arthritis, joint pain and muscle weakness.        Walking without support, improved greatly from  "when we saw her in the hospital   Gastrointestinal: Negative for bloating, abdominal pain, anorexia, constipation, diarrhea, hematemesis, hematochezia, melena, nausea and vomiting.   Genitourinary: Negative for frequency and urgency.   Neurological: Negative for difficulty with concentration, excessive daytime sleepiness, dizziness, headaches, light-headedness and weakness.   Psychiatric/Behavioral: Negative for depression. The patient does not have insomnia and is not nervous/anxious.        Objective:   Blood pressure 102/63, pulse 68, height 5' 6" (1.676 m), weight 77.3 kg (170 lb 6.7 oz).body mass index is 27.51 kg/m².    Physical Exam  Vitals signs and nursing note reviewed.   Constitutional:       General: She is not in acute distress.     Appearance: She is well-developed. She is not diaphoretic.   HENT:      Head: Normocephalic and atraumatic.   Eyes:      General:         Right eye: No discharge.         Left eye: No discharge.      Conjunctiva/sclera: Conjunctivae normal.   Neck:      Musculoskeletal: Normal range of motion and neck supple.      Vascular: No JVD.   Cardiovascular:      Rate and Rhythm: Normal rate and regular rhythm.      Heart sounds: No murmur. No friction rub. No gallop.    Pulmonary:      Effort: Pulmonary effort is normal.      Breath sounds: Normal breath sounds. No wheezing or rales.   Chest:      Chest wall: No tenderness.   Abdominal:      General: Bowel sounds are normal. There is no distension.      Palpations: Abdomen is soft. There is no mass.      Tenderness: There is no abdominal tenderness. There is no guarding or rebound.   Musculoskeletal: Normal range of motion.         General: No tenderness.   Skin:     General: Skin is warm and dry.      Findings: No erythema or rash.   Neurological:      Mental Status: She is alert and oriented to person, place, and time.   Psychiatric:         Behavior: Behavior normal.         Thought Content: Thought content normal.         " Judgment: Judgment normal.         Labs:      Chemistry        Component Value Date/Time     10/22/2020 0722    K 3.0 (L) 10/22/2020 0722    CL 92 (L) 10/22/2020 0722    CO2 36 (H) 10/22/2020 0722    BUN 20 10/22/2020 0722    CREATININE 1.0 10/22/2020 0722     (H) 10/22/2020 0722        Component Value Date/Time    CALCIUM 9.6 10/22/2020 0722    ALKPHOS 149 (H) 10/22/2020 0722    AST 19 10/22/2020 0722    ALT 16 10/22/2020 0722    BILITOT 0.5 10/22/2020 0722    ESTGFRAFRICA >60.0 10/22/2020 0722    EGFRNONAA >60.0 10/22/2020 0722          Magnesium   Date Value Ref Range Status   09/08/2020 2.1 1.6 - 2.6 mg/dL Final     Lab Results   Component Value Date    WBC 7.61 09/11/2020    HGB 14.0 09/11/2020    HCT 43.4 09/11/2020    MCV 87 09/11/2020     09/11/2020     BNP   Date Value Ref Range Status   10/22/2020 445 (H) 0 - 99 pg/mL Final     Comment:     Values of less than 100 pg/ml are consistent with non-CHF populations.   09/11/2020 804 (H) 0 - 99 pg/mL Final     Comment:     Values of less than 100 pg/ml are consistent with non-CHF populations.   09/04/2020 1,661 (H) 0 - 99 pg/mL Final     Comment:     Values of less than 100 pg/ml are consistent with non-CHF populations.       Labs were reviewed with the patient.    Assessment:      1. Dilated cardiomyopathy    2. Coronary artery disease involving native coronary artery of native heart without angina pectoris    3. Chronic combined systolic and diastolic congestive heart failure    4. Essential hypertension    5. Cerebrovascular accident (CVA) due to embolism of right middle cerebral artery        Plan:    patient seems to despise taking potassium supplement.  Will start aldactone 25mg po daily, and decrease potassium. Will check again in a few days and replete as indicated. Repeat 1 week at [facility]. Will hold off on further evaluation for now, see how she can comply with instructions and continue to improve functionally.   Will consider  repeat cpx vs 6MWT again.   Patient is now NYHA III ACC stage D  Recommend 2 gram sodium restriction and 1500cc fluid restriction.  Encourage physical activity with graded exercise program.  Requested patient to weigh themselves daily, and to notify us if their weight increases by more than 3 lbs in 1 day or 5 lbs in 1 week.     Transplant Candidacy: Patient is a 60 y.o. year old female with heart failure is being seen for possible OHT/LVAD. In my opinion, she is  a high-risk LVAD/OHT candidate. Patient did meet with MCS and/or pre-transplant coordinator at the end of this visit for workup. she is scheduled for risk stratification testing with CPX/RHC. The patient will follow up with pre-transplant.    Discussed with the patient and family [facility] program outcomes as reported in INTERMACS (Interagency Registry for Mechanically Assisted Circulatory Support):    1 year survival = 92.7%  2 year survival = 86.7%  3 year survival = 86.7%    Patient and family acknowledged receipt of this information and all questions were answered.        UNOS Patient Status  Functional Status: 50% - Requires considerable assistance and frequent medical care  Physical Capacity: No Limitations  Working for Income: Unknown    Justina Smith MD

## 2020-10-28 ENCOUNTER — TELEPHONE (OUTPATIENT)
Dept: TRANSPLANT | Facility: CLINIC | Age: 60
End: 2020-10-28

## 2020-10-28 NOTE — TELEPHONE ENCOUNTER
K+ today 3.4  Called patient to check on, notify of lab and remind to take K+ as regularly prescribed at 20 meq by mouth daily. No answer. Left message on voicemail along with contact information. Call PRN.

## 2020-11-03 ENCOUNTER — TELEPHONE (OUTPATIENT)
Dept: TRANSPLANT | Facility: CLINIC | Age: 60
End: 2020-11-03

## 2020-11-03 DIAGNOSIS — I42.0 DILATED CARDIOMYOPATHY: Primary | ICD-10-CM

## 2020-11-03 NOTE — TELEPHONE ENCOUNTER
Spoke to patient. Informed of K+ today 2.8    Patient states have not taken K+ yet today, missed K+ on Sunday so took K+ 60 meq last evening. Patient reports have not started aldactone as didn't know it was at pharmacy. Plan to  and start today.    As per Dr Smith take K+ 60 meq now and 40 meq this evening (for a total of K+ 100 meq today). Tomorrow resume regularly scheduled dose of K+ 20 meq daily. Repeat labs tomorrow. Scheduled at [facility].    Blood pressure as reported by patient:    128/75 [place]  109/68 Monday  States have not taken b/p yet today. Continue to keep record of blood pressure. Patient verbalized understanding of above via repeat of instructions. Call PRN.

## 2020-11-03 NOTE — TELEPHONE ENCOUNTER
Called patient regarding K+ 2.8 and patient recorded blood pressure readings. No answer. Left message along with contact information to please call back

## 2020-11-04 ENCOUNTER — TELEPHONE (OUTPATIENT)
Dept: TRANSPLANT | Facility: CLINIC | Age: 60
End: 2020-11-04

## 2020-11-04 DIAGNOSIS — I50.42 CHRONIC COMBINED SYSTOLIC AND DIASTOLIC CONGESTIVE HEART FAILURE: Primary | ICD-10-CM

## 2020-11-04 NOTE — TELEPHONE ENCOUNTER
Spoke to patient. K+ today 3.1     As per Dr Smith, patient to take K+ 60 meq by mouth today then starting tomorrow, K+ 40 meq by mouth daily.     Lab tomorrow at [facility] location scheduled.    Patient verbalized understanding. Call PRN.

## 2020-11-05 RX ORDER — POTASSIUM CHLORIDE 750 MG/1
40 CAPSULE, EXTENDED RELEASE ORAL DAILY
Qty: 360 CAPSULE | Refills: 0 | Status: SHIPPED | OUTPATIENT
Start: 2020-11-05 | End: 2021-02-03

## 2020-11-05 NOTE — TELEPHONE ENCOUNTER
Spoke to patient. K+ today 3.5  Patient to take K+ 40 meq by mouth daily. Understanding verbalized via repeat back. Call PRN.

## 2020-11-06 ENCOUNTER — TELEPHONE (OUTPATIENT)
Dept: TRANSPLANT | Facility: CLINIC | Age: 60
End: 2020-11-06

## 2020-11-06 DIAGNOSIS — I50.42 CHRONIC COMBINED SYSTOLIC AND DIASTOLIC CONGESTIVE HEART FAILURE: Primary | ICD-10-CM

## 2020-11-06 NOTE — TELEPHONE ENCOUNTER
Called patient to schedule follow up appointment. Appointment scheduled and appointment letter placed in mail.  Patient states experiencing some shortness of breath. States no fluid build up at this time but believes it may be coming.   As per Dr Smith take Torsemide 20 mg by mouth twice daily through  weekend and on Monday 11/9/20 back to Torsemide 20 mg by mouth daily. Patient verbalized understanding.   If increase in shortness of breath or any respiratory distress please go to ED. Patient verbalized understanding. Call PRN.

## 2020-12-02 DIAGNOSIS — I42.0 DILATED CARDIOMYOPATHY: ICD-10-CM

## 2020-12-02 RX ORDER — TORSEMIDE 20 MG/1
20 TABLET ORAL DAILY
Qty: 90 TABLET | Refills: 0 | Status: SHIPPED | OUTPATIENT
Start: 2020-12-02 | End: 2021-01-05 | Stop reason: SDUPTHER

## 2020-12-02 NOTE — TELEPHONE ENCOUNTER
----- Message from Angelita Bills sent at 12/2/2020  3:16 PM CST -----  Regarding: refill  Requesting an RX refill or new RX.  Is this a refill or new RX: refill  RX name and strength:torsemide (DEMADEX) 20 MG Tab  Is this a 30 day or 90 day RX:   Pharmacy name and phone #   [id number] - [place], [place] - [address] [address] [phone] (Phone)  [phone] (Fax)      Comments: Pt can be reached at [phone].    Thank you

## 2020-12-04 ENCOUNTER — OFFICE VISIT (OUTPATIENT)
Dept: TRANSPLANT | Facility: CLINIC | Age: 60
End: 2020-12-04
Payer: COMMERCIAL

## 2020-12-04 ENCOUNTER — LAB VISIT (OUTPATIENT)
Dept: LAB | Facility: HOSPITAL | Age: 60
End: 2020-12-04
Attending: INTERNAL MEDICINE
Payer: COMMERCIAL

## 2020-12-04 VITALS
DIASTOLIC BLOOD PRESSURE: 72 MMHG | BODY MASS INDEX: 28.21 KG/M2 | HEART RATE: 67 BPM | HEIGHT: 66 IN | SYSTOLIC BLOOD PRESSURE: 120 MMHG | WEIGHT: 175.5 LBS

## 2020-12-04 DIAGNOSIS — I63.411 CEREBROVASCULAR ACCIDENT (CVA) DUE TO EMBOLISM OF RIGHT MIDDLE CEREBRAL ARTERY: ICD-10-CM

## 2020-12-04 DIAGNOSIS — E11.9 TYPE 2 DIABETES MELLITUS WITHOUT COMPLICATION, WITHOUT LONG-TERM CURRENT USE OF INSULIN: ICD-10-CM

## 2020-12-04 DIAGNOSIS — E78.2 MIXED HYPERLIPIDEMIA: ICD-10-CM

## 2020-12-04 DIAGNOSIS — I10 ESSENTIAL HYPERTENSION: ICD-10-CM

## 2020-12-04 DIAGNOSIS — I50.42 CHRONIC COMBINED SYSTOLIC AND DIASTOLIC CONGESTIVE HEART FAILURE: ICD-10-CM

## 2020-12-04 DIAGNOSIS — I50.42 CHRONIC COMBINED SYSTOLIC AND DIASTOLIC CONGESTIVE HEART FAILURE: Primary | ICD-10-CM

## 2020-12-04 DIAGNOSIS — I63.511 CEREBROVASCULAR ACCIDENT (CVA) DUE TO OCCLUSION OF RIGHT MIDDLE CEREBRAL ARTERY: ICD-10-CM

## 2020-12-04 DIAGNOSIS — I42.0 DILATED CARDIOMYOPATHY: ICD-10-CM

## 2020-12-04 DIAGNOSIS — I25.10 CORONARY ARTERY DISEASE INVOLVING NATIVE CORONARY ARTERY OF NATIVE HEART WITHOUT ANGINA PECTORIS: ICD-10-CM

## 2020-12-04 LAB
ALBUMIN SERPL BCP-MCNC: 3.4 G/DL (ref 3.5–5.2)
ALP SERPL-CCNC: 143 U/L (ref 55–135)
ALT SERPL W/O P-5'-P-CCNC: 31 U/L (ref 10–44)
ANION GAP SERPL CALC-SCNC: 5 MMOL/L (ref 8–16)
AST SERPL-CCNC: 22 U/L (ref 10–40)
BILIRUB SERPL-MCNC: 0.4 MG/DL (ref 0.1–1)
BUN SERPL-MCNC: 12 MG/DL (ref 6–20)
CALCIUM SERPL-MCNC: 9.1 MG/DL (ref 8.7–10.5)
CHLORIDE SERPL-SCNC: 104 MMOL/L (ref 95–110)
CO2 SERPL-SCNC: 32 MMOL/L (ref 23–29)
CREAT SERPL-MCNC: 0.7 MG/DL (ref 0.5–1.4)
EST. GFR  (AFRICAN AMERICAN): >60 ML/MIN/1.73 M^2
EST. GFR  (NON AFRICAN AMERICAN): >60 ML/MIN/1.73 M^2
GLUCOSE SERPL-MCNC: 136 MG/DL (ref 70–110)
POTASSIUM SERPL-SCNC: 4 MMOL/L (ref 3.5–5.1)
PROT SERPL-MCNC: 6.9 G/DL (ref 6–8.4)
SODIUM SERPL-SCNC: 141 MMOL/L (ref 136–145)

## 2020-12-04 PROCEDURE — 3008F BODY MASS INDEX DOCD: CPT | Mod: CPTII,NTX,S$GLB, | Performed by: INTERNAL MEDICINE

## 2020-12-04 PROCEDURE — 3074F PR MOST RECENT SYSTOLIC BLOOD PRESSURE < 130 MM HG: ICD-10-PCS | Mod: CPTII,NTX,S$GLB, | Performed by: INTERNAL MEDICINE

## 2020-12-04 PROCEDURE — 3078F DIAST BP <80 MM HG: CPT | Mod: CPTII,NTX,S$GLB, | Performed by: INTERNAL MEDICINE

## 2020-12-04 PROCEDURE — 3078F PR MOST RECENT DIASTOLIC BLOOD PRESSURE < 80 MM HG: ICD-10-PCS | Mod: CPTII,NTX,S$GLB, | Performed by: INTERNAL MEDICINE

## 2020-12-04 PROCEDURE — 99999 PR PBB SHADOW E&M-EST. PATIENT-LVL III: CPT | Mod: PBBFAC,TXP,, | Performed by: INTERNAL MEDICINE

## 2020-12-04 PROCEDURE — 1126F AMNT PAIN NOTED NONE PRSNT: CPT | Mod: NTX,S$GLB,, | Performed by: INTERNAL MEDICINE

## 2020-12-04 PROCEDURE — 3008F PR BODY MASS INDEX (BMI) DOCUMENTED: ICD-10-PCS | Mod: CPTII,NTX,S$GLB, | Performed by: INTERNAL MEDICINE

## 2020-12-04 PROCEDURE — 99999 PR PBB SHADOW E&M-EST. PATIENT-LVL III: ICD-10-PCS | Mod: PBBFAC,TXP,, | Performed by: INTERNAL MEDICINE

## 2020-12-04 PROCEDURE — 3044F PR MOST RECENT HEMOGLOBIN A1C LEVEL <7.0%: ICD-10-PCS | Mod: CPTII,NTX,S$GLB, | Performed by: INTERNAL MEDICINE

## 2020-12-04 PROCEDURE — 99214 PR OFFICE/OUTPT VISIT, EST, LEVL IV, 30-39 MIN: ICD-10-PCS | Mod: NTX,S$GLB,, | Performed by: INTERNAL MEDICINE

## 2020-12-04 PROCEDURE — 80053 COMPREHEN METABOLIC PANEL: CPT | Mod: TXP

## 2020-12-04 PROCEDURE — 99214 OFFICE O/P EST MOD 30 MIN: CPT | Mod: NTX,S$GLB,, | Performed by: INTERNAL MEDICINE

## 2020-12-04 PROCEDURE — 1126F PR PAIN SEVERITY QUANTIFIED, NO PAIN PRESENT: ICD-10-PCS | Mod: NTX,S$GLB,, | Performed by: INTERNAL MEDICINE

## 2020-12-04 PROCEDURE — 36415 COLL VENOUS BLD VENIPUNCTURE: CPT | Mod: TXP

## 2020-12-04 PROCEDURE — 3074F SYST BP LT 130 MM HG: CPT | Mod: CPTII,NTX,S$GLB, | Performed by: INTERNAL MEDICINE

## 2020-12-04 PROCEDURE — 3044F HG A1C LEVEL LT 7.0%: CPT | Mod: CPTII,NTX,S$GLB, | Performed by: INTERNAL MEDICINE

## 2020-12-04 RX ORDER — SACUBITRIL AND VALSARTAN 49; 51 MG/1; MG/1
1 TABLET, FILM COATED ORAL 2 TIMES DAILY
Qty: 60 TABLET | Refills: 3 | Status: SHIPPED | OUTPATIENT
Start: 2020-12-04 | End: 2021-01-05

## 2020-12-04 RX ORDER — CLOPIDOGREL BISULFATE 75 MG/1
75 TABLET ORAL DAILY
COMMUNITY
End: 2020-12-04

## 2020-12-04 NOTE — PROGRESS NOTES
Advanced Heart Failure and Transplantation Clinic Follow up.        Attending Physician: Petr Naranjo MD.  The patient's last visit in HF clinic was 11/09/2020   Reason for the visit: regular follow up.      HPI.  Ms. Mohr is a 60 y.o. year old Black or  female who has presents to be considered for advanced surgical options (LVAD/OHT).    Ms. Mohr is a 59 YOF with PMHx of combined systolic and diastolic HF (EF 10-15%), pulmHTN, dilated cardiomyopathy, nonobstructive CAD, HTN, DM on oral therapies, and recent embolic R MCA CVA (s/p tPA on 8/14, w/o residual sx). She presents to ED for worsening shortness of breath in setting of recent missed lasix dose and dietary noncomplaince. Beginning with 07/13-14/2020 in [facility] for CHF exacerbation requiring IV diuresis, 08/14-16/2020 at [facility] for embolic R MCA CVA s/p TPA, and again 08/25-28/2020 at [facility] for NSTEMI with troponin peak of 8.4 in setting of CHF exacerbation that required IV diuresis at which time TTE was performed 08/26 that was similar to previous exams and underwent PET stress 08/27 which revealed non obstructive CAD.     Today patient said she feels well in general. She denies weight gain, edema, orthopnea, PND. She does have dyspnea on exertion with walking longer distances. She feels comfortable at rest and with less intense activities (NYHA class II). She denies chest pain, syncope.  She denies residual weakness from her CVA. She does admits some memory problems and word finding difficulty.    She denies any side effect from her medications.  She does not have a defibrillator.       Review of Systems   Constitutional: Negative for appetite change, chills, diaphoresis, fatigue and fever.   HENT: Negative for nasal congestion, rhinorrhea and sore throat.    Eyes: Negative for visual disturbance.   Respiratory: Positive for shortness of  breath. Negative for cough, chest tightness and wheezing.    Cardiovascular: Negative for chest pain, palpitations and leg swelling.   Gastrointestinal: Negative for abdominal pain, diarrhea, nausea and vomiting.   Genitourinary: Negative for difficulty urinating, dysuria and hematuria.   Integumentary:  Negative for rash.   Neurological: Negative for seizures, syncope and light-headedness.   Psychiatric/Behavioral: Negative for agitation and hallucinations.        Past Medical History:   Diagnosis Date    Anxiety     CHF (congestive heart failure)     Depression     Diabetes mellitus     Dyspnea     H/O coronary angiogram     H/O: hysterectomy     Hyperlipemia     Hypertension     Pulmonary edema     Schizophrenia     Stroke         Past Surgical History:   Procedure Laterality Date    BLADDER SUSPENSION      CARPAL TUNNEL RELEASE Right     HEEL SPUR SURGERY Left     LEFT HEART CATHETERIZATION Bilateral 12/27/2019    Procedure: Left heart cath;  Surgeon: Steve Chambers MD;  [facility] C[facility] LAB;  Service: Cardiology;  Laterality: Bilateral;    TUBAL LIGATION          Family History   Family history unknown: Yes        Review of patient's allergies indicates:   Allergen Reactions    Captopril Other (See Comments)     COUGH        Current Outpatient Medications   Medication Instructions    apixaban (ELIQUIS) 5 mg, Oral, 2 times daily    aspirin 81 mg, Oral, Daily    atorvastatin (LIPITOR) 80 mg, Oral, Daily    dulaglutide (TRULICITY) 1.5 mg/0.5 mL pen injector Subcutaneous, Weekly    glimepiride (AMARYL) 1 MG tablet 1 tablet, Oral, Daily    metFORMIN (GLUCOPHAGE) 1,000 mg, Oral, 2 times daily    metoprolol succinate (TOPROL-XL) 100 mg, Oral, Daily    potassium chloride (MICRO-K) 10 MEQ CpSR 40 mEq, Oral, Daily    pregabalin (LYRICA) 100 mg, Oral, 2 times daily PRN    sacubitriL-valsartan (ENTRESTO) 24-26 mg per tablet 1 tablet, Oral, 2 times daily    spironolactone (ALDACTONE)  "25 mg, Oral, Daily    torsemide (DEMADEX) 20 mg, Oral, Daily        There were no vitals filed for this visit.     Wt Readings from Last 3 Encounters:   12/04/20 79.6 kg (175 lb 7.8 oz)   10/27/20 77.3 kg (170 lb 6.7 oz)   10/22/20 78.5 kg (173 lb 1 oz)     Temp Readings from Last 3 Encounters:   09/08/20 97.5 °F (36.4 °C) (Oral)   08/28/20 96.6 °F (35.9 °C) (Temporal)   08/16/20 98.6 °F (37 °C) (Oral)     BP Readings from Last 3 Encounters:   10/27/20 102/63   10/22/20 96/72   10/15/20 102/62     Pulse Readings from Last 3 Encounters:   10/27/20 68   10/22/20 70   10/15/20 67        Body mass index is 28.32 kg/m². Estimated body surface area is 1.93 meters squared as calculated from the following:    Height as of this encounter: 5' 6" (1.676 m).    Weight as of this encounter: 79.6 kg (175 lb 7.8 oz).     Physical Exam   Constitutional: She is oriented to person, place, and time. She appears well-developed and well-nourished.   HENT:   Head: Normocephalic and atraumatic.   Right Ear: External ear normal.   Left Ear: External ear normal.   Eyes: Pupils are equal, round, and reactive to light. Conjunctivae and EOM are normal.   Neck: Normal range of motion. Neck supple. No JVD present.   Cardiovascular: Normal rate, regular rhythm, S1 normal, S2 normal and intact distal pulses. Exam reveals no gallop and no friction rub.   No murmur heard.  Pulmonary/Chest: Effort normal and breath sounds normal.   Abdominal: Soft. Bowel sounds are normal. She exhibits no distension. There is no abdominal tenderness. There is no rebound and no guarding.   Musculoskeletal:         General: No edema.   Neurological: She is alert and oriented to person, place, and time. She has normal strength.        Lab Results   Component Value Date     (H) 10/22/2020     11/05/2020    K 3.5 11/05/2020    MG 2.1 09/08/2020    CL 99 11/05/2020    CO2 37 (H) 11/05/2020    BUN 16 11/05/2020    CREATININE 0.72 11/05/2020     (H) " 11/05/2020    HGBA1C 6.9 (H) 08/14/2020    AST 27 11/05/2020    ALT 16 11/05/2020    ALBUMIN 4.2 11/05/2020    PROT 7.3 11/05/2020    BILITOT 0.5 11/05/2020    WBC 7.61 09/11/2020    HGB 14.0 09/11/2020    HCT 43.4 09/11/2020     09/11/2020    INR 1.0 09/04/2020    TSH 1.327 09/11/2020    CHOL 186 08/14/2020    HDL 39 (L) 08/14/2020    LDLCALC 125.0 08/14/2020    TRIG 110 08/14/2020       Results for orders placed during the hospital encounter of 08/25/20   Echo Color Flow Doppler? Yes    Narrative · Severely decreased left ventricular systolic function. The estimated   ejection fraction is 10-15%.  · Grade II (moderate) left ventricular diastolic dysfunction consistent   with pseudonormalization.  · Severe left ventricular enlargement.  · Mildly reduced right ventricular systolic function.  · The estimated PA systolic pressure is 44 mmHg.  · Normal central venous pressure (3 mmHg).  · Pulmonary hypertension present.  · Mild left atrial enlargement.            Results for orders placed during the hospital encounter of 12/25/19   Cardiac catheterization    Narrative · Non-obstructive CAD.  · There is severe left ventricular systolic dysfunction.  · The ejection fraction is calculated to be 15%.  · Diastolic dysfunction.  · LVEDP (Pre): 28     I certify that I was present for catheter insertion, catheter   manipulation, angiography, and angiographic interpretation of this   patient.    Procedure Log documented by No documenter listed and verified by   Steve Chambers MD.    Date: 12/27/2019  Time: 6:17 PM            Assessment and Plan:     Patient Active Problem List   Diagnosis    Disturbance of skin sensation    Pain of left upper extremity    Neck pain    Type 2 diabetes mellitus without complication, without long-term current use of insulin    Essential hypertension    Mixed hyperlipidemia    Acute combined systolic and diastolic CHF, NYHA class 3    Coronary artery disease involving native  heart    Dilated cardiomyopathy    Congestive heart failure    SOB (shortness of breath)    Cerebrovascular accident (CVA) due to embolism of right middle cerebral artery    Cytotoxic cerebral edema    Cerebrovascular accident (CVA) due to occlusion of right middle cerebral artery    Hypokalemia    Elevated troponin    History of embolic stroke involving right middle cerebral artery    Acute on chronic systolic heart failure    Shortness of breath    Dysgeusia         1. Chronic systolic HF, NYHA class II.  NICM, diagnosed last December 2019.  Patient is warm and euvolemic.  Doing well on medications.  Tolerating B-Blockers, AA, entresto.  Last LVEF was 15%.    She does not have an ICD. We are in the process of completing uptitration of her meds before reassessing for improvement of LVEF.  Plan:  -continue metoprolol succinate 100 mg daily  -continue spironolactone 25 mg daily.  -increase entresto to 49-51 mg BID  -follow up BMP in 5 days  -follow up in 1 month with BMP  -she was encouraged to continue to adhere to low sodium diet, fluid restriction and to be active.    2. CVA and HFrEF.  On apixaban. No documented history of a. Fib but reasonable to continue anticoagulation for secondary prevention of stroke in this setting.  Holter monitor turned in some days ago, no officially reported but no mention of a. Fib on preliminary report.

## 2020-12-04 NOTE — LETTER
December 4, 2020        Michelle Young  [address] [address]  Phone: [phone]  Fax: [phone]             [id number]  [address]  [facility] [address]  Phone: [phone]   Patient: Diomedes Mohr   MR Number: [id number]   YOB: 1960   Date of Visit: 12/4/2020       Dear Dr. Michelle Young    Thank you for referring Diomedes Mohr to me for evaluation. Attached you will find relevant portions of my assessment and plan of care.    If you have questions, please do not hesitate to call me. I look forward to following Diomedes Mohr along with you.    Sincerely,    Petr Naranjo MD    Enclosure    If you would like to receive this communication electronically, please contact [email or web address] or [phone] to request [employer] Link access.    [employer] Link is a tool which provides read-only access to select patient information with whom you have a relationship. Its easy to use and provides real time access to review your patients record including encounter summaries, notes, results, and demographic information.    If you feel you have received this communication in error or would no longer like to receive these types of communications, please e-mail [email or web address]

## 2020-12-04 NOTE — PATIENT INSTRUCTIONS
1. Please increase your entresto to 2 tablets twice a day.  2. Once you run out of your current dosage form, pick new prescription in pharmacy and take it as indicated in the instructions.  3. Continue low sodium diet, < 2 grams /day  4. Continue fluid restriction, NMT 2 liters/day  5. Be active, do exercise with moderation.  6. Blood test next week (Wednesday)  7. Follow up in 1 month

## 2020-12-05 ENCOUNTER — CLINICAL SUPPORT (OUTPATIENT)
Dept: URGENT CARE | Facility: CLINIC | Age: 60
End: 2020-12-05
Payer: COMMERCIAL

## 2020-12-05 VITALS — TEMPERATURE: 99 F

## 2020-12-05 DIAGNOSIS — Z20.822 EXPOSURE TO COVID-19 VIRUS: Primary | ICD-10-CM

## 2020-12-05 LAB
CTP QC/QA: YES
SARS-COV-2 RDRP RESP QL NAA+PROBE: NEGATIVE

## 2020-12-05 PROCEDURE — U0002: ICD-10-PCS | Mod: QW,S$GLB,TXP, | Performed by: NURSE PRACTITIONER

## 2020-12-05 PROCEDURE — U0002 COVID-19 LAB TEST NON-CDC: HCPCS | Mod: QW,S$GLB,TXP, | Performed by: NURSE PRACTITIONER

## 2020-12-05 NOTE — PATIENT INSTRUCTIONS
"NEGATIVE COVID TEST:  You have tested negative for COVID-19 today.  If you did not have a close exposure (as defined below) you can return to your normal daily activities to include social distancing, wearing a mask and frequent handwashing.  A "close exposure" is defined as anyone who has had an exposure (masked or unmasked) to a known COVID -19 positive person within 6 ft for longer than 15 minutes. If your exposure meets this definition, you are required by CDC guidelines to quarantine for at least 7-10 days from time of exposure.  The CDC states that a test can be performed for an asymptomatic patient (someone who does not have any symptoms) after a close exposure, and that a test should be done if you develop symptoms after a close exposure as described above.  Specifically, you can test at day 5 or later if asymptomatic in order to get released from quarantine on day 7 or later.  If you develop symptoms sooner, you should test when your symptoms start.  If you developed symptoms since the exposure, and your test was negative today and less than 5 days from your exposure, you still have to quarantine for 7-10 days from the date of the exposure.  The 7-10 day quarantine begins from the day you were exposed, not the day of your test.  For example, if your exposure was on a Monday, and you waited until Friday of the same week to get tested and it was negative, your 7-10 day quarantine begins from that Monday, not the Friday you tested negative.  Please note, if you decide to test as an asymptomatic during your quarantine and you are positive, you will be restarting your quarantine and moving from a possible 10 day quarantine (if you do not test), to a 11 day or greater quarantine.    "

## 2020-12-05 NOTE — PROGRESS NOTES
CDC Testing and Quarantine Guidelines for patients with exposure to a known-positive COVID-19 person:  A close exposure is defined as anyone who has had an exposure (masked or unmasked) to a known COVID -19 positive person within 6 ft for longer than 15 minutes. If your exposure meets this definition you are required by CDC guidelines to quarantine for at least 7-10 days from time of exposure. The CDC states that a test can be performed for an asymptomatic patient (someone who does not have any symptoms) after a close exposure, and that a test should be done if you develop symptoms after a close exposure as described above.  Specifically, you can test at day 5 or later if asymptomatic, in order to get released from quarantine on day 7 or later.  If you develop symptoms sooner, you should test when your symptoms start.  If you meet the definition of a close exposure, it will not matter whether you are experiencing symptoms- a quarantine for at least 7-10 days after a close exposure is required by CDC guidelines.  Please note, if you decide to test as an asymptomatic during your quarantine and you are positive, you will be restarting your quarantine and moving from a possible 10 day quarantine (if you do not test), to a 11 day or greater quarantine.  The CDC also suggests people still monitor for symptoms for a full 14 days and remember that the shorter quarantine options do not replace initial CDC guidance.  The CDC continues to recommend quarantining for 14 days as the best way to reduce risk for spreading COVID-19 - however, this is only a recommendation.  If your exposure does not meet the above definition, you can return to your normal daily activities to include social distancing, wearing a mask and frequent handwashing.

## 2021-01-05 ENCOUNTER — LAB VISIT (OUTPATIENT)
Dept: LAB | Facility: HOSPITAL | Age: 61
End: 2021-01-05
Attending: INTERNAL MEDICINE
Payer: COMMERCIAL

## 2021-01-05 ENCOUNTER — DOCUMENTATION ONLY (OUTPATIENT)
Dept: TRANSPLANT | Facility: CLINIC | Age: 61
End: 2021-01-05

## 2021-01-05 ENCOUNTER — OFFICE VISIT (OUTPATIENT)
Dept: TRANSPLANT | Facility: CLINIC | Age: 61
End: 2021-01-05
Payer: COMMERCIAL

## 2021-01-05 VITALS
HEIGHT: 66 IN | SYSTOLIC BLOOD PRESSURE: 110 MMHG | BODY MASS INDEX: 27.63 KG/M2 | DIASTOLIC BLOOD PRESSURE: 58 MMHG | WEIGHT: 171.94 LBS | HEART RATE: 77 BPM

## 2021-01-05 DIAGNOSIS — R06.02 SHORTNESS OF BREATH: ICD-10-CM

## 2021-01-05 DIAGNOSIS — I42.0 DILATED CARDIOMYOPATHY: Primary | ICD-10-CM

## 2021-01-05 DIAGNOSIS — I10 ESSENTIAL HYPERTENSION: ICD-10-CM

## 2021-01-05 DIAGNOSIS — I42.0 DILATED CARDIOMYOPATHY: ICD-10-CM

## 2021-01-05 DIAGNOSIS — E11.9 TYPE 2 DIABETES MELLITUS WITHOUT COMPLICATION, WITHOUT LONG-TERM CURRENT USE OF INSULIN: ICD-10-CM

## 2021-01-05 PROBLEM — I50.42 CHRONIC COMBINED SYSTOLIC AND DIASTOLIC CHF, NYHA CLASS 3: Status: ACTIVE | Noted: 2019-12-26

## 2021-01-05 LAB
ANION GAP SERPL CALC-SCNC: 11 MMOL/L (ref 8–16)
BUN SERPL-MCNC: 19 MG/DL (ref 6–20)
CALCIUM SERPL-MCNC: 10 MG/DL (ref 8.7–10.5)
CHLORIDE SERPL-SCNC: 91 MMOL/L (ref 95–110)
CO2 SERPL-SCNC: 35 MMOL/L (ref 23–29)
CREAT SERPL-MCNC: 0.9 MG/DL (ref 0.5–1.4)
EST. GFR  (AFRICAN AMERICAN): >60 ML/MIN/1.73 M^2
EST. GFR  (NON AFRICAN AMERICAN): >60 ML/MIN/1.73 M^2
GLUCOSE SERPL-MCNC: 159 MG/DL (ref 70–110)
POTASSIUM SERPL-SCNC: 3.2 MMOL/L (ref 3.5–5.1)
SODIUM SERPL-SCNC: 137 MMOL/L (ref 136–145)

## 2021-01-05 PROCEDURE — 3074F PR MOST RECENT SYSTOLIC BLOOD PRESSURE < 130 MM HG: ICD-10-PCS | Mod: CPTII,NTX,S$GLB, | Performed by: INTERNAL MEDICINE

## 2021-01-05 PROCEDURE — 3008F PR BODY MASS INDEX (BMI) DOCUMENTED: ICD-10-PCS | Mod: CPTII,NTX,S$GLB, | Performed by: INTERNAL MEDICINE

## 2021-01-05 PROCEDURE — 3044F PR MOST RECENT HEMOGLOBIN A1C LEVEL <7.0%: ICD-10-PCS | Mod: CPTII,NTX,S$GLB, | Performed by: INTERNAL MEDICINE

## 2021-01-05 PROCEDURE — 36415 COLL VENOUS BLD VENIPUNCTURE: CPT | Mod: NTX

## 2021-01-05 PROCEDURE — 99214 PR OFFICE/OUTPT VISIT, EST, LEVL IV, 30-39 MIN: ICD-10-PCS | Mod: NTX,S$GLB,, | Performed by: INTERNAL MEDICINE

## 2021-01-05 PROCEDURE — 80048 BASIC METABOLIC PNL TOTAL CA: CPT | Mod: TXP

## 2021-01-05 PROCEDURE — 99999 PR PBB SHADOW E&M-EST. PATIENT-LVL III: ICD-10-PCS | Mod: PBBFAC,TXP,, | Performed by: INTERNAL MEDICINE

## 2021-01-05 PROCEDURE — 99214 OFFICE O/P EST MOD 30 MIN: CPT | Mod: NTX,S$GLB,, | Performed by: INTERNAL MEDICINE

## 2021-01-05 PROCEDURE — 3008F BODY MASS INDEX DOCD: CPT | Mod: CPTII,NTX,S$GLB, | Performed by: INTERNAL MEDICINE

## 2021-01-05 PROCEDURE — 3078F PR MOST RECENT DIASTOLIC BLOOD PRESSURE < 80 MM HG: ICD-10-PCS | Mod: CPTII,NTX,S$GLB, | Performed by: INTERNAL MEDICINE

## 2021-01-05 PROCEDURE — 3074F SYST BP LT 130 MM HG: CPT | Mod: CPTII,NTX,S$GLB, | Performed by: INTERNAL MEDICINE

## 2021-01-05 PROCEDURE — 3044F HG A1C LEVEL LT 7.0%: CPT | Mod: CPTII,NTX,S$GLB, | Performed by: INTERNAL MEDICINE

## 2021-01-05 PROCEDURE — 1126F AMNT PAIN NOTED NONE PRSNT: CPT | Mod: NTX,S$GLB,, | Performed by: INTERNAL MEDICINE

## 2021-01-05 PROCEDURE — 1126F PR PAIN SEVERITY QUANTIFIED, NO PAIN PRESENT: ICD-10-PCS | Mod: NTX,S$GLB,, | Performed by: INTERNAL MEDICINE

## 2021-01-05 PROCEDURE — 99999 PR PBB SHADOW E&M-EST. PATIENT-LVL III: CPT | Mod: PBBFAC,TXP,, | Performed by: INTERNAL MEDICINE

## 2021-01-05 PROCEDURE — 3078F DIAST BP <80 MM HG: CPT | Mod: CPTII,NTX,S$GLB, | Performed by: INTERNAL MEDICINE

## 2021-01-05 RX ORDER — TORSEMIDE 10 MG/1
10 TABLET ORAL DAILY
Qty: 90 TABLET | Refills: 0 | Status: SHIPPED | OUTPATIENT
Start: 2021-01-05 | End: 2021-03-01 | Stop reason: SDUPTHER

## 2021-01-08 ENCOUNTER — PATIENT MESSAGE (OUTPATIENT)
Dept: TRANSPLANT | Facility: CLINIC | Age: 61
End: 2021-01-08

## 2021-02-09 ENCOUNTER — OFFICE VISIT (OUTPATIENT)
Dept: URGENT CARE | Facility: CLINIC | Age: 61
End: 2021-02-09
Payer: COMMERCIAL

## 2021-02-09 VITALS
SYSTOLIC BLOOD PRESSURE: 122 MMHG | DIASTOLIC BLOOD PRESSURE: 70 MMHG | WEIGHT: 180 LBS | OXYGEN SATURATION: 100 % | RESPIRATION RATE: 16 BRPM | HEIGHT: 66 IN | TEMPERATURE: 99 F | BODY MASS INDEX: 28.93 KG/M2 | HEART RATE: 81 BPM

## 2021-02-09 DIAGNOSIS — Z71.6 TOBACCO ABUSE COUNSELING: ICD-10-CM

## 2021-02-09 DIAGNOSIS — H65.93 MIDDLE EAR EFFUSION, BILATERAL: ICD-10-CM

## 2021-02-09 DIAGNOSIS — Z11.52 ENCOUNTER FOR SCREENING FOR COVID-19: ICD-10-CM

## 2021-02-09 DIAGNOSIS — H81.13 BPPV (BENIGN PAROXYSMAL POSITIONAL VERTIGO), BILATERAL: Primary | ICD-10-CM

## 2021-02-09 LAB
CTP QC/QA: YES
SARS-COV-2 RDRP RESP QL NAA+PROBE: NEGATIVE

## 2021-02-09 PROCEDURE — U0002 COVID-19 LAB TEST NON-CDC: HCPCS | Mod: QW,S$GLB,TXP, | Performed by: PHYSICIAN ASSISTANT

## 2021-02-09 PROCEDURE — 3008F PR BODY MASS INDEX (BMI) DOCUMENTED: ICD-10-PCS | Mod: CPTII,S$GLB,TXP, | Performed by: PHYSICIAN ASSISTANT

## 2021-02-09 PROCEDURE — 99203 PR OFFICE/OUTPT VISIT, NEW, LEVL III, 30-44 MIN: ICD-10-PCS | Mod: S$GLB,TXP,, | Performed by: PHYSICIAN ASSISTANT

## 2021-02-09 PROCEDURE — 3008F BODY MASS INDEX DOCD: CPT | Mod: CPTII,S$GLB,TXP, | Performed by: PHYSICIAN ASSISTANT

## 2021-02-09 PROCEDURE — 99203 OFFICE O/P NEW LOW 30 MIN: CPT | Mod: S$GLB,TXP,, | Performed by: PHYSICIAN ASSISTANT

## 2021-02-09 PROCEDURE — U0002: ICD-10-PCS | Mod: QW,S$GLB,TXP, | Performed by: PHYSICIAN ASSISTANT

## 2021-02-09 RX ORDER — POTASSIUM CHLORIDE 750 MG/1
CAPSULE, EXTENDED RELEASE ORAL
Status: ON HOLD | COMMUNITY
Start: 2021-02-03 | End: 2022-10-16

## 2021-02-11 ENCOUNTER — TELEPHONE (OUTPATIENT)
Dept: OTOLARYNGOLOGY | Facility: CLINIC | Age: 61
End: 2021-02-11

## 2021-02-26 ENCOUNTER — TELEPHONE (OUTPATIENT)
Dept: OTOLARYNGOLOGY | Facility: CLINIC | Age: 61
End: 2021-02-26

## 2021-03-01 ENCOUNTER — LAB VISIT (OUTPATIENT)
Dept: LAB | Facility: HOSPITAL | Age: 61
End: 2021-03-01
Attending: INTERNAL MEDICINE
Payer: COMMERCIAL

## 2021-03-01 ENCOUNTER — OFFICE VISIT (OUTPATIENT)
Dept: TRANSPLANT | Facility: CLINIC | Age: 61
End: 2021-03-01
Payer: COMMERCIAL

## 2021-03-01 VITALS
HEIGHT: 66 IN | BODY MASS INDEX: 29.83 KG/M2 | SYSTOLIC BLOOD PRESSURE: 101 MMHG | DIASTOLIC BLOOD PRESSURE: 64 MMHG | HEART RATE: 81 BPM | WEIGHT: 185.63 LBS

## 2021-03-01 DIAGNOSIS — I42.0 DILATED CARDIOMYOPATHY: ICD-10-CM

## 2021-03-01 LAB
ALBUMIN SERPL BCP-MCNC: 3.5 G/DL (ref 3.5–5.2)
ALP SERPL-CCNC: 145 U/L (ref 55–135)
ALT SERPL W/O P-5'-P-CCNC: 24 U/L (ref 10–44)
ANION GAP SERPL CALC-SCNC: 8 MMOL/L (ref 8–16)
AST SERPL-CCNC: 21 U/L (ref 10–40)
BILIRUB SERPL-MCNC: 0.3 MG/DL (ref 0.1–1)
BUN SERPL-MCNC: 11 MG/DL (ref 6–20)
CALCIUM SERPL-MCNC: 8.9 MG/DL (ref 8.7–10.5)
CHLORIDE SERPL-SCNC: 104 MMOL/L (ref 95–110)
CO2 SERPL-SCNC: 29 MMOL/L (ref 23–29)
CREAT SERPL-MCNC: 0.9 MG/DL (ref 0.5–1.4)
EST. GFR  (AFRICAN AMERICAN): >60 ML/MIN/1.73 M^2
EST. GFR  (NON AFRICAN AMERICAN): >60 ML/MIN/1.73 M^2
GLUCOSE SERPL-MCNC: 190 MG/DL (ref 70–110)
POTASSIUM SERPL-SCNC: 3.9 MMOL/L (ref 3.5–5.1)
PROT SERPL-MCNC: 7.3 G/DL (ref 6–8.4)
SODIUM SERPL-SCNC: 141 MMOL/L (ref 136–145)

## 2021-03-01 PROCEDURE — 1126F PR PAIN SEVERITY QUANTIFIED, NO PAIN PRESENT: ICD-10-PCS | Mod: NTX,S$GLB,, | Performed by: INTERNAL MEDICINE

## 2021-03-01 PROCEDURE — 36415 COLL VENOUS BLD VENIPUNCTURE: CPT | Mod: NTX

## 2021-03-01 PROCEDURE — 99214 PR OFFICE/OUTPT VISIT, EST, LEVL IV, 30-39 MIN: ICD-10-PCS | Mod: NTX,S$GLB,, | Performed by: INTERNAL MEDICINE

## 2021-03-01 PROCEDURE — 99999 PR PBB SHADOW E&M-EST. PATIENT-LVL III: ICD-10-PCS | Mod: PBBFAC,TXP,, | Performed by: INTERNAL MEDICINE

## 2021-03-01 PROCEDURE — 99214 OFFICE O/P EST MOD 30 MIN: CPT | Mod: NTX,S$GLB,, | Performed by: INTERNAL MEDICINE

## 2021-03-01 PROCEDURE — 3074F SYST BP LT 130 MM HG: CPT | Mod: CPTII,NTX,S$GLB, | Performed by: INTERNAL MEDICINE

## 2021-03-01 PROCEDURE — 80053 COMPREHEN METABOLIC PANEL: CPT | Mod: TXP

## 2021-03-01 PROCEDURE — 1126F AMNT PAIN NOTED NONE PRSNT: CPT | Mod: NTX,S$GLB,, | Performed by: INTERNAL MEDICINE

## 2021-03-01 PROCEDURE — 3008F PR BODY MASS INDEX (BMI) DOCUMENTED: ICD-10-PCS | Mod: CPTII,NTX,S$GLB, | Performed by: INTERNAL MEDICINE

## 2021-03-01 PROCEDURE — 99999 PR PBB SHADOW E&M-EST. PATIENT-LVL III: CPT | Mod: PBBFAC,TXP,, | Performed by: INTERNAL MEDICINE

## 2021-03-01 PROCEDURE — 3074F PR MOST RECENT SYSTOLIC BLOOD PRESSURE < 130 MM HG: ICD-10-PCS | Mod: CPTII,NTX,S$GLB, | Performed by: INTERNAL MEDICINE

## 2021-03-01 PROCEDURE — 3078F PR MOST RECENT DIASTOLIC BLOOD PRESSURE < 80 MM HG: ICD-10-PCS | Mod: CPTII,NTX,S$GLB, | Performed by: INTERNAL MEDICINE

## 2021-03-01 PROCEDURE — 3078F DIAST BP <80 MM HG: CPT | Mod: CPTII,NTX,S$GLB, | Performed by: INTERNAL MEDICINE

## 2021-03-01 PROCEDURE — 3008F BODY MASS INDEX DOCD: CPT | Mod: CPTII,NTX,S$GLB, | Performed by: INTERNAL MEDICINE

## 2021-03-01 RX ORDER — TORSEMIDE 10 MG/1
20 TABLET ORAL DAILY
Qty: 60 TABLET | Refills: 11 | Status: SHIPPED | OUTPATIENT
Start: 2021-03-01 | End: 2021-10-22 | Stop reason: SDUPTHER

## 2021-03-01 RX ORDER — TORSEMIDE 10 MG/1
10 TABLET ORAL DAILY
Qty: 90 TABLET | Refills: 3 | Status: SHIPPED | OUTPATIENT
Start: 2021-03-01 | End: 2021-03-01 | Stop reason: SDUPTHER

## 2021-03-05 NOTE — PHYSICIAN QUERY
PT Name: Diomedes Mohr  MR #: [id number]     Diagnosis Clarification      CDS/: Rohit Kong Jr, RN              Contact information:[email or web address]    This form is a permanent document in the medical record.     Query Date: September 1, 2020    Dear Provider,  By submitting this query, we are merely seeking further clarification of documentation.  Please utilize your independent clinical judgment when addressing the question(s) below.     The medical record contains the following:    Supporting Clinical Information Location in Medical Record   Cardiovascular: Positive for chest pain and leg swelling    Elevated troponin  This is more likely an acute exacerbation due to diet non compliance but since 1st troponin high:    Troponin=8.063-->6.834-->6.139    At this time unsure if tropenemia is due to ruptured plaque vs recent tPA use. However Troponins have been slow to clear in a person without CKD so will assess for ischemia with a PET Stress Test today at 12:30 PM - keep NPO till after the exam     s/p tPA 2 weeks ago was admitted with ADHF and NSTEMI with   Trop 8.  Pt has completed Heparin gtt x 48h for medical management of NSTEMI; continue DAPT.       She had elevated troponins on admission with peak of 8.0. There was concern for ACS- NSTEMI, she was treated with Heparin ggt. Cardiology was consulted to evaluate, patient ended up getting a PET scan on 8/27 which was revealing of nonobstructive disease. 8/26 H&P    8/26 H&P        8/25-8/27 Labs    8/27 Cardiology Progress Note            8/27 Hospital Medicine Progress Note        8/28 Discharge Summary             Please clarify if the                      NSTEMI          diagnosis has been:    [ X ] Ruled In     [  ] Ruled Out     [  ] Other/Clarification of findings (please specify)_______________      [  ] Clinically undetermined           Please document in your progress notes daily for the duration of treatment, until resolved, and  include in your discharge summary.                                                                                 <--- Click to Launch ICDx for PreOp, PostOp and Procedure

## 2021-03-11 ENCOUNTER — TELEPHONE (OUTPATIENT)
Dept: TRANSPLANT | Facility: CLINIC | Age: 61
End: 2021-03-11

## 2021-03-12 DIAGNOSIS — I50.42 CHRONIC COMBINED SYSTOLIC AND DIASTOLIC CHF, NYHA CLASS 3: Primary | ICD-10-CM

## 2021-03-16 ENCOUNTER — TELEPHONE (OUTPATIENT)
Dept: OTOLARYNGOLOGY | Facility: CLINIC | Age: 61
End: 2021-03-16

## 2021-04-21 ENCOUNTER — OFFICE VISIT (OUTPATIENT)
Dept: OTOLARYNGOLOGY | Facility: CLINIC | Age: 61
End: 2021-04-21
Payer: COMMERCIAL

## 2021-04-21 ENCOUNTER — CLINICAL SUPPORT (OUTPATIENT)
Dept: OTOLARYNGOLOGY | Facility: CLINIC | Age: 61
End: 2021-04-21
Payer: COMMERCIAL

## 2021-04-21 VITALS
WEIGHT: 196.56 LBS | SYSTOLIC BLOOD PRESSURE: 122 MMHG | BODY MASS INDEX: 31.72 KG/M2 | HEART RATE: 85 BPM | DIASTOLIC BLOOD PRESSURE: 75 MMHG

## 2021-04-21 DIAGNOSIS — R42 VERTIGO: Primary | ICD-10-CM

## 2021-04-21 DIAGNOSIS — R42 DIZZINESS: Primary | ICD-10-CM

## 2021-04-21 PROCEDURE — 92567 PR TYMPA2METRY: ICD-10-PCS | Mod: S$GLB,TXP,, | Performed by: AUDIOLOGIST-HEARING AID FITTER

## 2021-04-21 PROCEDURE — 1126F PR PAIN SEVERITY QUANTIFIED, NO PAIN PRESENT: ICD-10-PCS | Mod: S$GLB,TXP,, | Performed by: OTOLARYNGOLOGY

## 2021-04-21 PROCEDURE — 1126F AMNT PAIN NOTED NONE PRSNT: CPT | Mod: S$GLB,TXP,, | Performed by: OTOLARYNGOLOGY

## 2021-04-21 PROCEDURE — 99999 PR PBB SHADOW E&M-EST. PATIENT-LVL III: ICD-10-PCS | Mod: PBBFAC,TXP,, | Performed by: OTOLARYNGOLOGY

## 2021-04-21 PROCEDURE — 99999 PR PBB SHADOW E&M-EST. PATIENT-LVL III: CPT | Mod: PBBFAC,TXP,, | Performed by: OTOLARYNGOLOGY

## 2021-04-21 PROCEDURE — 3008F PR BODY MASS INDEX (BMI) DOCUMENTED: ICD-10-PCS | Mod: CPTII,S$GLB,TXP, | Performed by: OTOLARYNGOLOGY

## 2021-04-21 PROCEDURE — 92557 COMPREHENSIVE HEARING TEST: CPT | Mod: S$GLB,TXP,, | Performed by: AUDIOLOGIST-HEARING AID FITTER

## 2021-04-21 PROCEDURE — 99204 PR OFFICE/OUTPT VISIT, NEW, LEVL IV, 45-59 MIN: ICD-10-PCS | Mod: S$GLB,TXP,, | Performed by: OTOLARYNGOLOGY

## 2021-04-21 PROCEDURE — 99204 OFFICE O/P NEW MOD 45 MIN: CPT | Mod: S$GLB,TXP,, | Performed by: OTOLARYNGOLOGY

## 2021-04-21 PROCEDURE — 92557 PR COMPREHENSIVE HEARING TEST: ICD-10-PCS | Mod: S$GLB,TXP,, | Performed by: AUDIOLOGIST-HEARING AID FITTER

## 2021-04-21 PROCEDURE — 92567 TYMPANOMETRY: CPT | Mod: S$GLB,TXP,, | Performed by: AUDIOLOGIST-HEARING AID FITTER

## 2021-04-21 PROCEDURE — 3008F BODY MASS INDEX DOCD: CPT | Mod: CPTII,S$GLB,TXP, | Performed by: OTOLARYNGOLOGY

## 2021-04-21 RX ORDER — MECLIZINE HYDROCHLORIDE 25 MG/1
25 TABLET ORAL 3 TIMES DAILY PRN
Qty: 20 TABLET | Refills: 0 | Status: ON HOLD | OUTPATIENT
Start: 2021-04-21 | End: 2022-10-16

## 2021-04-27 ENCOUNTER — CLINICAL SUPPORT (OUTPATIENT)
Dept: AUDIOLOGY | Facility: CLINIC | Age: 61
End: 2021-04-27
Payer: COMMERCIAL

## 2021-04-27 DIAGNOSIS — H81.4 CENTRAL VESTIBULAR VERTIGO: Primary | ICD-10-CM

## 2021-04-27 PROCEDURE — 92537 PR CALORIC VSTBLR TEST W/REC BITHERMAL: ICD-10-PCS | Mod: S$GLB,,, | Performed by: AUDIOLOGIST

## 2021-04-27 PROCEDURE — 92540 PR VESTIBULAR EVAL NYSTAG FOVL&PERPH STIM OSCIL TRACKING: ICD-10-PCS | Mod: S$GLB,,, | Performed by: AUDIOLOGIST

## 2021-04-27 PROCEDURE — 92540 BASIC VESTIBULAR EVALUATION: CPT | Mod: S$GLB,,, | Performed by: AUDIOLOGIST

## 2021-04-27 PROCEDURE — 92537 CALORIC VSTBLR TEST W/REC: CPT | Mod: S$GLB,,, | Performed by: AUDIOLOGIST

## 2021-05-03 LAB
ALBUMIN SERPL BCP-MCNC: 3.7 G/DL (ref 3.5–5.2)
ALP SERPL-CCNC: 159 U/L (ref 55–135)
ALT SERPL W/O P-5'-P-CCNC: 53 U/L (ref 10–44)
ANION GAP SERPL CALC-SCNC: 7 MMOL/L (ref 8–16)
AST SERPL-CCNC: 31 U/L (ref 10–40)
BILIRUB SERPL-MCNC: 0.4 MG/DL (ref 0.1–1)
BNP SERPL-MCNC: 524 PG/ML (ref 0–99)
BUN SERPL-MCNC: 13 MG/DL (ref 6–20)
CALCIUM SERPL-MCNC: 9.3 MG/DL (ref 8.7–10.5)
CHLORIDE SERPL-SCNC: 104 MMOL/L (ref 95–110)
CO2 SERPL-SCNC: 27 MMOL/L (ref 23–29)
CREAT SERPL-MCNC: 0.9 MG/DL (ref 0.5–1.4)
EST. GFR  (AFRICAN AMERICAN): >60 ML/MIN/1.73 M^2
EST. GFR  (NON AFRICAN AMERICAN): >60 ML/MIN/1.73 M^2
GLUCOSE SERPL-MCNC: 202 MG/DL (ref 70–110)
MAGNESIUM SERPL-MCNC: 2.1 MG/DL (ref 1.6–2.6)
POTASSIUM SERPL-SCNC: 3.5 MMOL/L (ref 3.5–5.1)
PROT SERPL-MCNC: 7.6 G/DL (ref 6–8.4)
SODIUM SERPL-SCNC: 138 MMOL/L (ref 136–145)

## 2021-05-03 PROCEDURE — 85025 COMPLETE CBC W/AUTO DIFF WBC: CPT | Mod: NTX | Performed by: EMERGENCY MEDICINE

## 2021-05-03 PROCEDURE — 86803 HEPATITIS C AB TEST: CPT | Mod: NTX | Performed by: EMERGENCY MEDICINE

## 2021-05-03 PROCEDURE — 83880 ASSAY OF NATRIURETIC PEPTIDE: CPT | Mod: NTX | Performed by: EMERGENCY MEDICINE

## 2021-05-03 PROCEDURE — 93005 ELECTROCARDIOGRAM TRACING: CPT | Mod: NTX

## 2021-05-03 PROCEDURE — 86703 HIV-1/HIV-2 1 RESULT ANTBDY: CPT | Mod: NTX | Performed by: EMERGENCY MEDICINE

## 2021-05-03 PROCEDURE — 83735 ASSAY OF MAGNESIUM: CPT | Mod: NTX | Performed by: EMERGENCY MEDICINE

## 2021-05-03 PROCEDURE — 93010 EKG 12-LEAD: ICD-10-PCS | Mod: NTX,,, | Performed by: INTERNAL MEDICINE

## 2021-05-03 PROCEDURE — 99285 PR EMERGENCY DEPT VISIT,LEVEL V: ICD-10-PCS | Mod: NTX,,, | Performed by: EMERGENCY MEDICINE

## 2021-05-03 PROCEDURE — 80053 COMPREHEN METABOLIC PANEL: CPT | Mod: NTX | Performed by: EMERGENCY MEDICINE

## 2021-05-03 PROCEDURE — 99285 EMERGENCY DEPT VISIT HI MDM: CPT | Mod: NTX,,, | Performed by: EMERGENCY MEDICINE

## 2021-05-03 PROCEDURE — 96374 THER/PROPH/DIAG INJ IV PUSH: CPT | Mod: NTX

## 2021-05-03 PROCEDURE — 99285 EMERGENCY DEPT VISIT HI MDM: CPT | Mod: 25,NTX

## 2021-05-03 PROCEDURE — 84439 ASSAY OF FREE THYROXINE: CPT | Mod: NTX | Performed by: EMERGENCY MEDICINE

## 2021-05-03 PROCEDURE — 93010 ELECTROCARDIOGRAM REPORT: CPT | Mod: NTX,,, | Performed by: INTERNAL MEDICINE

## 2021-05-03 PROCEDURE — 84443 ASSAY THYROID STIM HORMONE: CPT | Mod: NTX | Performed by: EMERGENCY MEDICINE

## 2021-05-03 PROCEDURE — 84484 ASSAY OF TROPONIN QUANT: CPT | Mod: NTX | Performed by: EMERGENCY MEDICINE

## 2021-05-04 ENCOUNTER — HOSPITAL ENCOUNTER (EMERGENCY)
Facility: HOSPITAL | Age: 61
Discharge: HOME | End: 2021-05-04
Attending: EMERGENCY MEDICINE
Payer: COMMERCIAL

## 2021-05-04 VITALS
TEMPERATURE: 98 F | DIASTOLIC BLOOD PRESSURE: 68 MMHG | WEIGHT: 195 LBS | SYSTOLIC BLOOD PRESSURE: 128 MMHG | BODY MASS INDEX: 31.34 KG/M2 | RESPIRATION RATE: 18 BRPM | OXYGEN SATURATION: 98 % | HEIGHT: 66 IN | HEART RATE: 86 BPM

## 2021-05-04 DIAGNOSIS — R51.9 ACUTE NONINTRACTABLE HEADACHE, UNSPECIFIED HEADACHE TYPE: ICD-10-CM

## 2021-05-04 DIAGNOSIS — R07.9 CHEST PAIN: ICD-10-CM

## 2021-05-04 DIAGNOSIS — R42 DIZZINESS: Primary | ICD-10-CM

## 2021-05-04 LAB
BACTERIA #/AREA URNS AUTO: ABNORMAL /HPF
BASOPHILS # BLD AUTO: 0.03 K/UL (ref 0–0.2)
BASOPHILS NFR BLD: 0.3 % (ref 0–1.9)
BILIRUB UR QL STRIP: NEGATIVE
CLARITY UR REFRACT.AUTO: ABNORMAL
COLOR UR AUTO: YELLOW
DIFFERENTIAL METHOD: ABNORMAL
EOSINOPHIL # BLD AUTO: 0.2 K/UL (ref 0–0.5)
EOSINOPHIL NFR BLD: 1.6 % (ref 0–8)
ERYTHROCYTE [DISTWIDTH] IN BLOOD BY AUTOMATED COUNT: 13.2 % (ref 11.5–14.5)
GLUCOSE UR QL STRIP: NEGATIVE
HCT VFR BLD AUTO: 37.7 % (ref 37–48.5)
HCV AB SERPL QL IA: NEGATIVE
HGB BLD-MCNC: 12 G/DL (ref 12–16)
HGB UR QL STRIP: ABNORMAL
HIV 1+2 AB+HIV1 P24 AG SERPL QL IA: NEGATIVE
IMM GRANULOCYTES # BLD AUTO: 0.03 K/UL (ref 0–0.04)
IMM GRANULOCYTES NFR BLD AUTO: 0.3 % (ref 0–0.5)
KETONES UR QL STRIP: NEGATIVE
LEUKOCYTE ESTERASE UR QL STRIP: ABNORMAL
LYMPHOCYTES # BLD AUTO: 4.3 K/UL (ref 1–4.8)
LYMPHOCYTES NFR BLD: 44.7 % (ref 18–48)
MCH RBC QN AUTO: 29.3 PG (ref 27–31)
MCHC RBC AUTO-ENTMCNC: 31.8 G/DL (ref 32–36)
MCV RBC AUTO: 92 FL (ref 82–98)
MICROSCOPIC COMMENT: ABNORMAL
MONOCYTES # BLD AUTO: 0.5 K/UL (ref 0.3–1)
MONOCYTES NFR BLD: 5.4 % (ref 4–15)
NEUTROPHILS # BLD AUTO: 4.5 K/UL (ref 1.8–7.7)
NEUTROPHILS NFR BLD: 47.7 % (ref 38–73)
NITRITE UR QL STRIP: NEGATIVE
NRBC BLD-RTO: 0 /100 WBC
PH UR STRIP: 5 [PH] (ref 5–8)
PLATELET # BLD AUTO: 343 K/UL (ref 150–450)
PMV BLD AUTO: 9.9 FL (ref 9.2–12.9)
PROT UR QL STRIP: NEGATIVE
RBC # BLD AUTO: 4.09 M/UL (ref 4–5.4)
RBC #/AREA URNS AUTO: 4 /HPF (ref 0–4)
SP GR UR STRIP: 1.01 (ref 1–1.03)
SQUAMOUS #/AREA URNS AUTO: 5 /HPF
T4 FREE SERPL-MCNC: 0.88 NG/DL (ref 0.71–1.51)
TROPONIN I SERPL DL<=0.01 NG/ML-MCNC: 0.01 NG/ML (ref 0–0.03)
TSH SERPL DL<=0.005 MIU/L-ACNC: 5.56 UIU/ML (ref 0.4–4)
URN SPEC COLLECT METH UR: ABNORMAL
WBC # BLD AUTO: 9.52 K/UL (ref 3.9–12.7)
WBC #/AREA URNS AUTO: 33 /HPF (ref 0–5)
WBC CLUMPS UR QL AUTO: ABNORMAL

## 2021-05-04 PROCEDURE — 25000003 PHARM REV CODE 250: Mod: NTX | Performed by: STUDENT IN AN ORGANIZED HEALTH CARE EDUCATION/TRAINING PROGRAM

## 2021-05-04 PROCEDURE — 63600175 PHARM REV CODE 636 W HCPCS: Mod: NTX | Performed by: STUDENT IN AN ORGANIZED HEALTH CARE EDUCATION/TRAINING PROGRAM

## 2021-05-04 PROCEDURE — 87086 URINE CULTURE/COLONY COUNT: CPT | Mod: NTX | Performed by: EMERGENCY MEDICINE

## 2021-05-04 PROCEDURE — 81001 URINALYSIS AUTO W/SCOPE: CPT | Mod: NTX | Performed by: EMERGENCY MEDICINE

## 2021-05-04 RX ORDER — ONDANSETRON 4 MG/1
4 TABLET, ORALLY DISINTEGRATING ORAL
Status: COMPLETED | OUTPATIENT
Start: 2021-05-04 | End: 2021-05-04

## 2021-05-04 RX ORDER — ACETAMINOPHEN 325 MG/1
650 TABLET ORAL
Status: COMPLETED | OUTPATIENT
Start: 2021-05-04 | End: 2021-05-04

## 2021-05-04 RX ORDER — KETOROLAC TROMETHAMINE 30 MG/ML
10 INJECTION, SOLUTION INTRAMUSCULAR; INTRAVENOUS
Status: COMPLETED | OUTPATIENT
Start: 2021-05-04 | End: 2021-05-04

## 2021-05-04 RX ADMIN — ONDANSETRON 4 MG: 4 TABLET, ORALLY DISINTEGRATING ORAL at 03:05

## 2021-05-04 RX ADMIN — KETOROLAC TROMETHAMINE 10 MG: 30 INJECTION, SOLUTION INTRAMUSCULAR at 02:05

## 2021-05-04 RX ADMIN — ACETAMINOPHEN 650 MG: 325 TABLET ORAL at 02:05

## 2021-05-05 ENCOUNTER — TELEPHONE (OUTPATIENT)
Dept: OTOLARYNGOLOGY | Facility: CLINIC | Age: 61
End: 2021-05-05

## 2021-05-05 DIAGNOSIS — R42 VERTIGO: Primary | ICD-10-CM

## 2021-05-05 LAB — BACTERIA UR CULT: NO GROWTH

## 2021-05-10 ENCOUNTER — TELEPHONE (OUTPATIENT)
Dept: OTOLARYNGOLOGY | Facility: CLINIC | Age: 61
End: 2021-05-10

## 2021-05-11 ENCOUNTER — TELEPHONE (OUTPATIENT)
Dept: NEUROLOGY | Facility: CLINIC | Age: 61
End: 2021-05-11

## 2021-05-15 ENCOUNTER — HOSPITAL ENCOUNTER (EMERGENCY)
Facility: HOSPITAL | Age: 61
Discharge: HOME | End: 2021-05-15
Attending: EMERGENCY MEDICINE
Payer: COMMERCIAL

## 2021-05-15 VITALS
WEIGHT: 190 LBS | BODY MASS INDEX: 30.53 KG/M2 | RESPIRATION RATE: 18 BRPM | DIASTOLIC BLOOD PRESSURE: 75 MMHG | TEMPERATURE: 99 F | OXYGEN SATURATION: 100 % | SYSTOLIC BLOOD PRESSURE: 121 MMHG | HEART RATE: 80 BPM | HEIGHT: 66 IN

## 2021-05-15 DIAGNOSIS — R42 DIZZINESS: ICD-10-CM

## 2021-05-15 DIAGNOSIS — R53.1 WEAKNESS: Primary | ICD-10-CM

## 2021-05-15 LAB
ALBUMIN SERPL BCP-MCNC: 3.6 G/DL (ref 3.5–5.2)
ALP SERPL-CCNC: 160 U/L (ref 55–135)
ALT SERPL W/O P-5'-P-CCNC: 30 U/L (ref 10–44)
ANION GAP SERPL CALC-SCNC: 9 MMOL/L (ref 8–16)
AST SERPL-CCNC: 24 U/L (ref 10–40)
BASOPHILS # BLD AUTO: 0.03 K/UL (ref 0–0.2)
BASOPHILS NFR BLD: 0.4 % (ref 0–1.9)
BILIRUB SERPL-MCNC: 0.4 MG/DL (ref 0.1–1)
BILIRUB UR QL STRIP: NEGATIVE
BNP SERPL-MCNC: 317 PG/ML (ref 0–99)
BUN SERPL-MCNC: 15 MG/DL (ref 6–20)
CALCIUM SERPL-MCNC: 9.8 MG/DL (ref 8.7–10.5)
CHLORIDE SERPL-SCNC: 105 MMOL/L (ref 95–110)
CLARITY UR REFRACT.AUTO: CLEAR
CO2 SERPL-SCNC: 24 MMOL/L (ref 23–29)
COLOR UR AUTO: YELLOW
CREAT SERPL-MCNC: 1 MG/DL (ref 0.5–1.4)
DIFFERENTIAL METHOD: ABNORMAL
EOSINOPHIL # BLD AUTO: 0.2 K/UL (ref 0–0.5)
EOSINOPHIL NFR BLD: 1.8 % (ref 0–8)
ERYTHROCYTE [DISTWIDTH] IN BLOOD BY AUTOMATED COUNT: 13.2 % (ref 11.5–14.5)
EST. GFR  (AFRICAN AMERICAN): >60 ML/MIN/1.73 M^2
EST. GFR  (NON AFRICAN AMERICAN): >60 ML/MIN/1.73 M^2
GLUCOSE SERPL-MCNC: 193 MG/DL (ref 70–110)
GLUCOSE UR QL STRIP: NEGATIVE
HCT VFR BLD AUTO: 36.6 % (ref 37–48.5)
HGB BLD-MCNC: 11.8 G/DL (ref 12–16)
HGB UR QL STRIP: ABNORMAL
IMM GRANULOCYTES # BLD AUTO: 0.02 K/UL (ref 0–0.04)
IMM GRANULOCYTES NFR BLD AUTO: 0.2 % (ref 0–0.5)
KETONES UR QL STRIP: NEGATIVE
LEUKOCYTE ESTERASE UR QL STRIP: ABNORMAL
LYMPHOCYTES # BLD AUTO: 3.7 K/UL (ref 1–4.8)
LYMPHOCYTES NFR BLD: 44.3 % (ref 18–48)
MAGNESIUM SERPL-MCNC: 2 MG/DL (ref 1.6–2.6)
MCH RBC QN AUTO: 29.2 PG (ref 27–31)
MCHC RBC AUTO-ENTMCNC: 32.2 G/DL (ref 32–36)
MCV RBC AUTO: 91 FL (ref 82–98)
MICROSCOPIC COMMENT: NORMAL
MONOCYTES # BLD AUTO: 0.4 K/UL (ref 0.3–1)
MONOCYTES NFR BLD: 5.1 % (ref 4–15)
NEUTROPHILS # BLD AUTO: 4.1 K/UL (ref 1.8–7.7)
NEUTROPHILS NFR BLD: 48.2 % (ref 38–73)
NITRITE UR QL STRIP: NEGATIVE
NRBC BLD-RTO: 0 /100 WBC
PH UR STRIP: 6 [PH] (ref 5–8)
PHOSPHATE SERPL-MCNC: 3.7 MG/DL (ref 2.7–4.5)
PLATELET # BLD AUTO: 345 K/UL (ref 150–450)
PMV BLD AUTO: 10.2 FL (ref 9.2–12.9)
POTASSIUM SERPL-SCNC: 4.3 MMOL/L (ref 3.5–5.1)
PROT SERPL-MCNC: 7.4 G/DL (ref 6–8.4)
PROT UR QL STRIP: NEGATIVE
RBC # BLD AUTO: 4.04 M/UL (ref 4–5.4)
RBC #/AREA URNS AUTO: 3 /HPF (ref 0–4)
SODIUM SERPL-SCNC: 138 MMOL/L (ref 136–145)
SP GR UR STRIP: 1.01 (ref 1–1.03)
SQUAMOUS #/AREA URNS AUTO: 1 /HPF
TROPONIN I SERPL DL<=0.01 NG/ML-MCNC: 0.01 NG/ML (ref 0–0.03)
URN SPEC COLLECT METH UR: ABNORMAL
WBC # BLD AUTO: 8.4 K/UL (ref 3.9–12.7)
WBC #/AREA URNS AUTO: 2 /HPF (ref 0–5)

## 2021-05-15 PROCEDURE — 93005 ELECTROCARDIOGRAM TRACING: CPT | Mod: NTX

## 2021-05-15 PROCEDURE — 99285 EMERGENCY DEPT VISIT HI MDM: CPT | Mod: 25,NTX

## 2021-05-15 PROCEDURE — 99284 PR EMERGENCY DEPT VISIT,LEVEL IV: ICD-10-PCS | Mod: NTX,,, | Performed by: EMERGENCY MEDICINE

## 2021-05-15 PROCEDURE — 85025 COMPLETE CBC W/AUTO DIFF WBC: CPT | Mod: NTX | Performed by: EMERGENCY MEDICINE

## 2021-05-15 PROCEDURE — 83880 ASSAY OF NATRIURETIC PEPTIDE: CPT | Mod: NTX | Performed by: EMERGENCY MEDICINE

## 2021-05-15 PROCEDURE — 99284 EMERGENCY DEPT VISIT MOD MDM: CPT | Mod: NTX,,, | Performed by: EMERGENCY MEDICINE

## 2021-05-15 PROCEDURE — 84484 ASSAY OF TROPONIN QUANT: CPT | Mod: NTX | Performed by: EMERGENCY MEDICINE

## 2021-05-15 PROCEDURE — 80053 COMPREHEN METABOLIC PANEL: CPT | Mod: NTX | Performed by: EMERGENCY MEDICINE

## 2021-05-15 PROCEDURE — 93010 ELECTROCARDIOGRAM REPORT: CPT | Mod: NTX,,, | Performed by: INTERNAL MEDICINE

## 2021-05-15 PROCEDURE — 84100 ASSAY OF PHOSPHORUS: CPT | Mod: NTX | Performed by: EMERGENCY MEDICINE

## 2021-05-15 PROCEDURE — 93010 EKG 12-LEAD: ICD-10-PCS | Mod: NTX,,, | Performed by: INTERNAL MEDICINE

## 2021-05-15 PROCEDURE — 83735 ASSAY OF MAGNESIUM: CPT | Mod: NTX | Performed by: EMERGENCY MEDICINE

## 2021-05-15 PROCEDURE — 81001 URINALYSIS AUTO W/SCOPE: CPT | Mod: NTX | Performed by: EMERGENCY MEDICINE

## 2021-05-26 ENCOUNTER — TELEPHONE (OUTPATIENT)
Dept: ELECTROPHYSIOLOGY | Facility: CLINIC | Age: 61
End: 2021-05-26

## 2021-05-26 DIAGNOSIS — I42.8 NON-ISCHEMIC CARDIOMYOPATHY: Primary | ICD-10-CM

## 2021-05-27 ENCOUNTER — OFFICE VISIT (OUTPATIENT)
Dept: NEUROLOGY | Facility: CLINIC | Age: 61
End: 2021-05-27
Payer: COMMERCIAL

## 2021-05-27 VITALS
WEIGHT: 205 LBS | HEIGHT: 66 IN | HEART RATE: 94 BPM | DIASTOLIC BLOOD PRESSURE: 78 MMHG | BODY MASS INDEX: 32.95 KG/M2 | SYSTOLIC BLOOD PRESSURE: 121 MMHG

## 2021-05-27 DIAGNOSIS — E78.2 MIXED HYPERLIPIDEMIA: ICD-10-CM

## 2021-05-27 DIAGNOSIS — I10 ESSENTIAL HYPERTENSION: ICD-10-CM

## 2021-05-27 DIAGNOSIS — I63.411 CEREBROVASCULAR ACCIDENT (CVA) DUE TO EMBOLISM OF RIGHT MIDDLE CEREBRAL ARTERY: Primary | ICD-10-CM

## 2021-05-27 DIAGNOSIS — I50.42 CHRONIC COMBINED SYSTOLIC AND DIASTOLIC CHF, NYHA CLASS 3: ICD-10-CM

## 2021-05-27 DIAGNOSIS — I63.411 EMBOLIC STROKE INVOLVING RIGHT MIDDLE CEREBRAL ARTERY: ICD-10-CM

## 2021-05-27 PROCEDURE — 99214 OFFICE O/P EST MOD 30 MIN: CPT | Mod: NTX,S$GLB,, | Performed by: PSYCHIATRY & NEUROLOGY

## 2021-05-27 PROCEDURE — 99214 PR OFFICE/OUTPT VISIT, EST, LEVL IV, 30-39 MIN: ICD-10-PCS | Mod: NTX,S$GLB,, | Performed by: PSYCHIATRY & NEUROLOGY

## 2021-05-27 PROCEDURE — 99999 PR PBB SHADOW E&M-EST. PATIENT-LVL III: CPT | Mod: PBBFAC,TXP,, | Performed by: STUDENT IN AN ORGANIZED HEALTH CARE EDUCATION/TRAINING PROGRAM

## 2021-05-27 PROCEDURE — 99999 PR PBB SHADOW E&M-EST. PATIENT-LVL III: ICD-10-PCS | Mod: PBBFAC,TXP,, | Performed by: STUDENT IN AN ORGANIZED HEALTH CARE EDUCATION/TRAINING PROGRAM

## 2021-06-09 ENCOUNTER — TELEPHONE (OUTPATIENT)
Dept: ELECTROPHYSIOLOGY | Facility: CLINIC | Age: 61
End: 2021-06-09

## 2021-06-29 ENCOUNTER — OFFICE VISIT (OUTPATIENT)
Dept: TRANSPLANT | Facility: CLINIC | Age: 61
End: 2021-06-29
Payer: COMMERCIAL

## 2021-06-29 ENCOUNTER — HOSPITAL ENCOUNTER (OUTPATIENT)
Dept: CARDIOLOGY | Facility: HOSPITAL | Age: 61
Discharge: HOME | End: 2021-06-29
Attending: INTERNAL MEDICINE
Payer: COMMERCIAL

## 2021-06-29 ENCOUNTER — HOSPITAL ENCOUNTER (OUTPATIENT)
Dept: PULMONOLOGY | Facility: CLINIC | Age: 61
Discharge: HOME | End: 2021-06-29
Payer: COMMERCIAL

## 2021-06-29 VITALS
SYSTOLIC BLOOD PRESSURE: 110 MMHG | BODY MASS INDEX: 32.95 KG/M2 | WEIGHT: 205 LBS | HEART RATE: 82 BPM | HEIGHT: 66 IN | DIASTOLIC BLOOD PRESSURE: 62 MMHG

## 2021-06-29 VITALS — BODY MASS INDEX: 32.14 KG/M2 | HEIGHT: 66 IN | WEIGHT: 200 LBS

## 2021-06-29 VITALS
WEIGHT: 203.69 LBS | BODY MASS INDEX: 32.73 KG/M2 | HEART RATE: 80 BPM | HEIGHT: 66 IN | DIASTOLIC BLOOD PRESSURE: 62 MMHG | SYSTOLIC BLOOD PRESSURE: 117 MMHG

## 2021-06-29 DIAGNOSIS — E11.9 TYPE 2 DIABETES MELLITUS WITHOUT COMPLICATION, WITHOUT LONG-TERM CURRENT USE OF INSULIN: ICD-10-CM

## 2021-06-29 DIAGNOSIS — I42.0 DILATED CARDIOMYOPATHY: ICD-10-CM

## 2021-06-29 DIAGNOSIS — Z01.89 NEEDS SLEEP APNEA ASSESSMENT: ICD-10-CM

## 2021-06-29 DIAGNOSIS — I63.411 EMBOLIC STROKE INVOLVING RIGHT MIDDLE CEREBRAL ARTERY: ICD-10-CM

## 2021-06-29 DIAGNOSIS — I50.42 CHRONIC COMBINED SYSTOLIC AND DIASTOLIC CHF, NYHA CLASS 3: Primary | ICD-10-CM

## 2021-06-29 DIAGNOSIS — I50.42 CHRONIC COMBINED SYSTOLIC AND DIASTOLIC CHF, NYHA CLASS 3: ICD-10-CM

## 2021-06-29 DIAGNOSIS — I10 ESSENTIAL HYPERTENSION: ICD-10-CM

## 2021-06-29 LAB
ASCENDING AORTA: 2.85 CM
AV INDEX (PROSTH): 0.46
AV MEAN GRADIENT: 2 MMHG
AV PEAK GRADIENT: 4 MMHG
AV VALVE AREA: 2 CM2
AV VELOCITY RATIO: 0.49
BSA FOR ECHO PROCEDURE: 2.08 M2
CV ECHO LV RWT: 0.26 CM
DOP CALC AO PEAK VEL: 0.94 M/S
DOP CALC AO VTI: 18.4 CM
DOP CALC LVOT AREA: 4.4 CM2
DOP CALC LVOT DIAMETER: 2.36 CM
DOP CALC LVOT PEAK VEL: 0.46 M/S
DOP CALC LVOT STROKE VOLUME: 36.81 CM3
DOP CALCLVOT PEAK VEL VTI: 8.42 CM
E WAVE DECELERATION TIME: 177.21 MSEC
E/A RATIO: 0.85
E/E' RATIO: 23.14 M/S
ECHO LV POSTERIOR WALL: 0.97 CM (ref 0.6–1.1)
EJECTION FRACTION: 20 %
FRACTIONAL SHORTENING: 9 % (ref 28–44)
INTERVENTRICULAR SEPTUM: 0.89 CM (ref 0.6–1.1)
LA MAJOR: 4.29 CM
LA MINOR: 3.96 CM
LA WIDTH: 3.77 CM
LEFT ATRIUM SIZE: 3.83 CM
LEFT ATRIUM VOLUME INDEX MOD: 40.6 ML/M2
LEFT ATRIUM VOLUME INDEX: 25 ML/M2
LEFT ATRIUM VOLUME MOD: 82 CM3
LEFT ATRIUM VOLUME: 50.55 CM3
LEFT INTERNAL DIMENSION IN SYSTOLE: 6.79 CM (ref 2.1–4)
LEFT VENTRICLE DIASTOLIC VOLUME INDEX: 126.62 ML/M2
LEFT VENTRICLE DIASTOLIC VOLUME: 255.78 ML
LEFT VENTRICLE MASS INDEX: 164 G/M2
LEFT VENTRICLE SYSTOLIC VOLUME INDEX: 118.2 ML/M2
LEFT VENTRICLE SYSTOLIC VOLUME: 238.7 ML
LEFT VENTRICULAR INTERNAL DIMENSION IN DIASTOLE: 7.5 CM (ref 3.5–6)
LEFT VENTRICULAR MASS: 331.86 G
LV LATERAL E/E' RATIO: 20.25 M/S
LV SEPTAL E/E' RATIO: 27 M/S
MV PEAK A VEL: 0.95 M/S
MV PEAK E VEL: 0.81 M/S
MV STENOSIS PRESSURE HALF TIME: 51.39 MS
MV VALVE AREA P 1/2 METHOD: 4.28 CM2
PISA TR MAX VEL: 3 M/S
RA MAJOR: 3.17 CM
RA PRESSURE: 3 MMHG
RA WIDTH: 4.23 CM
RIGHT VENTRICULAR END-DIASTOLIC DIMENSION: 3.69 CM
RV TISSUE DOPPLER FREE WALL SYSTOLIC VELOCITY 1 (APICAL 4 CHAMBER VIEW): 6.7 CM/S
SINUS: 2.97 CM
STJ: 2.66 CM
TDI LATERAL: 0.04 M/S
TDI SEPTAL: 0.03 M/S
TDI: 0.04 M/S
TR MAX PG: 36 MMHG
TRICUSPID ANNULAR PLANE SYSTOLIC EXCURSION: 1.4 CM
TV REST PULMONARY ARTERY PRESSURE: 39 MMHG

## 2021-06-29 PROCEDURE — 93306 TTE W/DOPPLER COMPLETE: CPT | Mod: 26,NTX,, | Performed by: INTERNAL MEDICINE

## 2021-06-29 PROCEDURE — 99999 PR PBB SHADOW E&M-EST. PATIENT-LVL IV: CPT | Mod: PBBFAC,TXP,, | Performed by: INTERNAL MEDICINE

## 2021-06-29 PROCEDURE — 3008F BODY MASS INDEX DOCD: CPT | Mod: CPTII,NTX,S$GLB, | Performed by: INTERNAL MEDICINE

## 2021-06-29 PROCEDURE — 3078F DIAST BP <80 MM HG: CPT | Mod: CPTII,NTX,S$GLB, | Performed by: INTERNAL MEDICINE

## 2021-06-29 PROCEDURE — 94618 PULMONARY STRESS TESTING: CPT | Mod: NTX,S$GLB,, | Performed by: INTERNAL MEDICINE

## 2021-06-29 PROCEDURE — 3078F PR MOST RECENT DIASTOLIC BLOOD PRESSURE < 80 MM HG: ICD-10-PCS | Mod: CPTII,NTX,S$GLB, | Performed by: INTERNAL MEDICINE

## 2021-06-29 PROCEDURE — 3074F SYST BP LT 130 MM HG: CPT | Mod: CPTII,NTX,S$GLB, | Performed by: INTERNAL MEDICINE

## 2021-06-29 PROCEDURE — C8929 TTE W OR WO FOL WCON,DOPPLER: HCPCS | Mod: NTX

## 2021-06-29 PROCEDURE — 99214 OFFICE O/P EST MOD 30 MIN: CPT | Mod: NTX,S$GLB,, | Performed by: INTERNAL MEDICINE

## 2021-06-29 PROCEDURE — 3008F PR BODY MASS INDEX (BMI) DOCUMENTED: ICD-10-PCS | Mod: CPTII,NTX,S$GLB, | Performed by: INTERNAL MEDICINE

## 2021-06-29 PROCEDURE — 99999 PR PBB SHADOW E&M-EST. PATIENT-LVL IV: ICD-10-PCS | Mod: PBBFAC,TXP,, | Performed by: INTERNAL MEDICINE

## 2021-06-29 PROCEDURE — 99214 PR OFFICE/OUTPT VISIT, EST, LEVL IV, 30-39 MIN: ICD-10-PCS | Mod: NTX,S$GLB,, | Performed by: INTERNAL MEDICINE

## 2021-06-29 PROCEDURE — 93306 ECHO (CUPID ONLY): ICD-10-PCS | Mod: 26,NTX,, | Performed by: INTERNAL MEDICINE

## 2021-06-29 PROCEDURE — 25500020 PHARM REV CODE 255: Mod: TXP | Performed by: INTERNAL MEDICINE

## 2021-06-29 PROCEDURE — 94618 PULMONARY STRESS TESTING: ICD-10-PCS | Mod: NTX,S$GLB,, | Performed by: INTERNAL MEDICINE

## 2021-06-29 PROCEDURE — 3074F PR MOST RECENT SYSTOLIC BLOOD PRESSURE < 130 MM HG: ICD-10-PCS | Mod: CPTII,NTX,S$GLB, | Performed by: INTERNAL MEDICINE

## 2021-06-29 PROCEDURE — 1126F PR PAIN SEVERITY QUANTIFIED, NO PAIN PRESENT: ICD-10-PCS | Mod: CPTII,NTX,S$GLB, | Performed by: INTERNAL MEDICINE

## 2021-06-29 PROCEDURE — 1126F AMNT PAIN NOTED NONE PRSNT: CPT | Mod: CPTII,NTX,S$GLB, | Performed by: INTERNAL MEDICINE

## 2021-06-29 RX ADMIN — HUMAN ALBUMIN MICROSPHERES AND PERFLUTREN 0.66 MG: 10; .22 INJECTION, SOLUTION INTRAVENOUS at 11:06

## 2021-07-14 NOTE — ASSESSMENT & PLAN NOTE
Problem: Pain - Standard  Goal: Alleviation of pain or a reduction in pain to the patient’s comfort goal  Outcome: Progressing     Problem: Communication  Goal: The ability to communicate needs accurately and effectively will improve  Outcome: Progressing     The patient is Stable - Low risk of patient condition declining or worsening    Shift Goals  Clinical Goals: (P) reduce pain in upper legs  Patient Goals: (P) discharge  Family Goals: (P) n/a    Progress made toward(s) clinical / shift goals: Progressin       - chronic  - hold home PO therapies  - SSI while in patient  - monitor accuchecks AC/HS and PRN hypoglycemic protocol   Results for LANDY STAFFORD (MRN [id number]) as of 9/5/2020 02:22   Ref. Range 8/14/2020 10:35   Hemoglobin A1C External Latest Ref Range: 4.0 - 5.6 % 6.9 (H)

## 2021-07-21 ENCOUNTER — TELEPHONE (OUTPATIENT)
Dept: TRANSPLANT | Facility: CLINIC | Age: 61
End: 2021-07-21

## 2021-07-26 ENCOUNTER — HOSPITAL ENCOUNTER (OUTPATIENT)
Facility: HOSPITAL | Age: 61
Discharge: HOME | End: 2021-07-26
Attending: INTERNAL MEDICINE | Admitting: INTERNAL MEDICINE
Payer: COMMERCIAL

## 2021-07-26 VITALS
TEMPERATURE: 98 F | HEART RATE: 73 BPM | DIASTOLIC BLOOD PRESSURE: 61 MMHG | SYSTOLIC BLOOD PRESSURE: 116 MMHG | OXYGEN SATURATION: 98 % | RESPIRATION RATE: 20 BRPM | HEIGHT: 66 IN | BODY MASS INDEX: 32.87 KG/M2 | WEIGHT: 204.5 LBS

## 2021-07-26 DIAGNOSIS — I50.42 CHRONIC COMBINED SYSTOLIC AND DIASTOLIC CHF, NYHA CLASS 3: ICD-10-CM

## 2021-07-26 PROCEDURE — C1894 INTRO/SHEATH, NON-LASER: HCPCS | Mod: NTX | Performed by: INTERNAL MEDICINE

## 2021-07-26 PROCEDURE — G0378 HOSPITAL OBSERVATION PER HR: HCPCS | Mod: NTX

## 2021-07-26 PROCEDURE — 93451 PR RIGHT HEART CATH O2 SATURATION & CARDIAC OUTPUT: ICD-10-PCS | Mod: 26,NTX,, | Performed by: INTERNAL MEDICINE

## 2021-07-26 PROCEDURE — 93451 RIGHT HEART CATH: CPT | Mod: NTX | Performed by: INTERNAL MEDICINE

## 2021-07-26 PROCEDURE — 25000003 PHARM REV CODE 250: Mod: NTX | Performed by: INTERNAL MEDICINE

## 2021-07-26 PROCEDURE — 93451 RIGHT HEART CATH: CPT | Mod: 26,NTX,, | Performed by: INTERNAL MEDICINE

## 2021-07-26 PROCEDURE — C1751 CATH, INF, PER/CENT/MIDLINE: HCPCS | Mod: NTX | Performed by: INTERNAL MEDICINE

## 2021-07-26 RX ORDER — SODIUM CHLORIDE 0.9 G/100ML
IRRIGANT IRRIGATION
Status: DISCONTINUED | OUTPATIENT
Start: 2021-07-26 | End: 2021-07-26 | Stop reason: HOSPADM

## 2021-07-26 RX ORDER — LIDOCAINE HYDROCHLORIDE 20 MG/ML
INJECTION, SOLUTION INFILTRATION; PERINEURAL
Status: DISCONTINUED | OUTPATIENT
Start: 2021-07-26 | End: 2021-07-26 | Stop reason: HOSPADM

## 2021-08-03 PROBLEM — I50.23 ACUTE ON CHRONIC SYSTOLIC HEART FAILURE: Status: RESOLVED | Noted: 2020-08-26 | Resolved: 2021-08-03

## 2021-09-09 ENCOUNTER — TELEPHONE (OUTPATIENT)
Dept: TRANSPLANT | Facility: CLINIC | Age: 61
End: 2021-09-09

## 2021-10-10 ENCOUNTER — HOSPITAL ENCOUNTER (INPATIENT)
Facility: HOSPITAL | Age: 61
LOS: 1 days | Discharge: HOME | DRG: 291 | End: 2021-10-13
Attending: EMERGENCY MEDICINE | Admitting: INTERNAL MEDICINE
Payer: COMMERCIAL

## 2021-10-10 DIAGNOSIS — R07.9 CHEST PAIN: ICD-10-CM

## 2021-10-10 DIAGNOSIS — I50.9 ACUTE ON CHRONIC CONGESTIVE HEART FAILURE, UNSPECIFIED HEART FAILURE TYPE: Primary | ICD-10-CM

## 2021-10-10 DIAGNOSIS — I50.43 ACUTE ON CHRONIC COMBINED SYSTOLIC AND DIASTOLIC CONGESTIVE HEART FAILURE: ICD-10-CM

## 2021-10-10 DIAGNOSIS — I42.0 DILATED CARDIOMYOPATHY: ICD-10-CM

## 2021-10-10 PROBLEM — E11.9 TYPE 2 DIABETES MELLITUS WITHOUT COMPLICATION, WITHOUT LONG-TERM CURRENT USE OF INSULIN: Chronic | Status: ACTIVE | Noted: 2019-12-25

## 2021-10-10 PROBLEM — E78.2 MIXED HYPERLIPIDEMIA: Chronic | Status: ACTIVE | Noted: 2019-12-25

## 2021-10-10 PROBLEM — I50.42 CHRONIC COMBINED SYSTOLIC AND DIASTOLIC CHF, NYHA CLASS 3: Chronic | Status: ACTIVE | Noted: 2019-12-26

## 2021-10-10 PROBLEM — I63.411 EMBOLIC STROKE INVOLVING RIGHT MIDDLE CEREBRAL ARTERY: Chronic | Status: ACTIVE | Noted: 2020-08-26

## 2021-10-10 PROBLEM — I25.10 CORONARY ARTERY DISEASE INVOLVING NATIVE HEART: Chronic | Status: ACTIVE | Noted: 2019-12-26

## 2021-10-10 PROBLEM — I10 ESSENTIAL HYPERTENSION: Chronic | Status: ACTIVE | Noted: 2019-12-25

## 2021-10-10 LAB
ALBUMIN SERPL BCP-MCNC: 3.5 G/DL (ref 3.5–5.2)
ALP SERPL-CCNC: 130 U/L (ref 55–135)
ALT SERPL W/O P-5'-P-CCNC: 12 U/L (ref 10–44)
ANION GAP SERPL CALC-SCNC: 13 MMOL/L (ref 8–16)
AST SERPL-CCNC: 13 U/L (ref 10–40)
BASOPHILS # BLD AUTO: 0.04 K/UL (ref 0–0.2)
BASOPHILS NFR BLD: 0.4 % (ref 0–1.9)
BILIRUB SERPL-MCNC: 0.2 MG/DL (ref 0.1–1)
BNP SERPL-MCNC: 1110 PG/ML (ref 0–99)
BUN SERPL-MCNC: 13 MG/DL (ref 6–20)
CALCIUM SERPL-MCNC: 9.7 MG/DL (ref 8.7–10.5)
CHLORIDE SERPL-SCNC: 106 MMOL/L (ref 95–110)
CO2 SERPL-SCNC: 22 MMOL/L (ref 23–29)
CREAT SERPL-MCNC: 0.8 MG/DL (ref 0.5–1.4)
CTP QC/QA: YES
DIFFERENTIAL METHOD: ABNORMAL
EOSINOPHIL # BLD AUTO: 0.3 K/UL (ref 0–0.5)
EOSINOPHIL NFR BLD: 3.2 % (ref 0–8)
ERYTHROCYTE [DISTWIDTH] IN BLOOD BY AUTOMATED COUNT: 13.9 % (ref 11.5–14.5)
EST. GFR  (AFRICAN AMERICAN): >60 ML/MIN/1.73 M^2
EST. GFR  (NON AFRICAN AMERICAN): >60 ML/MIN/1.73 M^2
GLUCOSE SERPL-MCNC: 140 MG/DL (ref 70–110)
HCT VFR BLD AUTO: 35.6 % (ref 37–48.5)
HGB BLD-MCNC: 11.9 G/DL (ref 12–16)
IMM GRANULOCYTES # BLD AUTO: 0.01 K/UL (ref 0–0.04)
IMM GRANULOCYTES NFR BLD AUTO: 0.1 % (ref 0–0.5)
LYMPHOCYTES # BLD AUTO: 4.2 K/UL (ref 1–4.8)
LYMPHOCYTES NFR BLD: 44.6 % (ref 18–48)
MCH RBC QN AUTO: 29.2 PG (ref 27–31)
MCHC RBC AUTO-ENTMCNC: 33.4 G/DL (ref 32–36)
MCV RBC AUTO: 88 FL (ref 82–98)
MONOCYTES # BLD AUTO: 0.5 K/UL (ref 0.3–1)
MONOCYTES NFR BLD: 5.8 % (ref 4–15)
NEUTROPHILS # BLD AUTO: 4.3 K/UL (ref 1.8–7.7)
NEUTROPHILS NFR BLD: 45.9 % (ref 38–73)
NRBC BLD-RTO: 0 /100 WBC
PLATELET # BLD AUTO: 374 K/UL (ref 150–450)
PMV BLD AUTO: 10.4 FL (ref 9.2–12.9)
POTASSIUM SERPL-SCNC: 4 MMOL/L (ref 3.5–5.1)
PROT SERPL-MCNC: 7.1 G/DL (ref 6–8.4)
RBC # BLD AUTO: 4.07 M/UL (ref 4–5.4)
SARS-COV-2 RDRP RESP QL NAA+PROBE: NEGATIVE
SODIUM SERPL-SCNC: 141 MMOL/L (ref 136–145)
TROPONIN I SERPL DL<=0.01 NG/ML-MCNC: 0.02 NG/ML (ref 0–0.03)
WBC # BLD AUTO: 9.35 K/UL (ref 3.9–12.7)

## 2021-10-10 PROCEDURE — 85025 COMPLETE CBC W/AUTO DIFF WBC: CPT | Mod: NTX | Performed by: EMERGENCY MEDICINE

## 2021-10-10 PROCEDURE — 99220 PR INITIAL OBSERVATION CARE,LEVL III: CPT | Mod: NTX,,, | Performed by: PHYSICIAN ASSISTANT

## 2021-10-10 PROCEDURE — 96372 THER/PROPH/DIAG INJ SC/IM: CPT | Mod: 59,NTX

## 2021-10-10 PROCEDURE — 99285 EMERGENCY DEPT VISIT HI MDM: CPT | Mod: 25,NTX

## 2021-10-10 PROCEDURE — 25000242 PHARM REV CODE 250 ALT 637 W/ HCPCS: Mod: NTX | Performed by: EMERGENCY MEDICINE

## 2021-10-10 PROCEDURE — 80053 COMPREHEN METABOLIC PANEL: CPT | Mod: NTX | Performed by: EMERGENCY MEDICINE

## 2021-10-10 PROCEDURE — U0002 COVID-19 LAB TEST NON-CDC: HCPCS | Mod: NTX | Performed by: EMERGENCY MEDICINE

## 2021-10-10 PROCEDURE — 93005 ELECTROCARDIOGRAM TRACING: CPT | Mod: NTX

## 2021-10-10 PROCEDURE — G0378 HOSPITAL OBSERVATION PER HR: HCPCS | Mod: NTX

## 2021-10-10 PROCEDURE — 82962 GLUCOSE BLOOD TEST: CPT | Mod: NTX

## 2021-10-10 PROCEDURE — 83880 ASSAY OF NATRIURETIC PEPTIDE: CPT | Mod: NTX | Performed by: EMERGENCY MEDICINE

## 2021-10-10 PROCEDURE — 99285 PR EMERGENCY DEPT VISIT,LEVEL V: ICD-10-PCS | Mod: NTX,CS,, | Performed by: EMERGENCY MEDICINE

## 2021-10-10 PROCEDURE — 99220 PR INITIAL OBSERVATION CARE,LEVL III: ICD-10-PCS | Mod: NTX,,, | Performed by: PHYSICIAN ASSISTANT

## 2021-10-10 PROCEDURE — 25000003 PHARM REV CODE 250: Mod: NTX | Performed by: EMERGENCY MEDICINE

## 2021-10-10 PROCEDURE — 84484 ASSAY OF TROPONIN QUANT: CPT | Mod: NTX | Performed by: EMERGENCY MEDICINE

## 2021-10-10 PROCEDURE — 99285 EMERGENCY DEPT VISIT HI MDM: CPT | Mod: NTX,CS,, | Performed by: EMERGENCY MEDICINE

## 2021-10-10 PROCEDURE — 93010 EKG 12-LEAD: ICD-10-PCS | Mod: NTX,,, | Performed by: INTERNAL MEDICINE

## 2021-10-10 PROCEDURE — 93010 ELECTROCARDIOGRAM REPORT: CPT | Mod: NTX,,, | Performed by: INTERNAL MEDICINE

## 2021-10-10 RX ORDER — ASPIRIN 325 MG
325 TABLET ORAL
Status: COMPLETED | OUTPATIENT
Start: 2021-10-10 | End: 2021-10-10

## 2021-10-10 RX ORDER — NITROGLYCERIN 0.4 MG/1
0.4 TABLET SUBLINGUAL
Status: COMPLETED | OUTPATIENT
Start: 2021-10-10 | End: 2021-10-10

## 2021-10-10 RX ADMIN — NITROGLYCERIN 0.4 MG: 0.4 TABLET, ORALLY DISINTEGRATING SUBLINGUAL at 10:10

## 2021-10-10 RX ADMIN — ASPIRIN 325 MG ORAL TABLET 325 MG: 325 PILL ORAL at 10:10

## 2021-10-11 PROBLEM — I25.10 CORONARY ARTERY DISEASE INVOLVING NATIVE HEART: Status: ACTIVE | Noted: 2021-10-11

## 2021-10-11 PROBLEM — E66.01 MORBID OBESITY: Chronic | Status: ACTIVE | Noted: 2021-10-11

## 2021-10-11 PROBLEM — I25.10 CORONARY ARTERY DISEASE INVOLVING NATIVE HEART: Chronic | Status: RESOLVED | Noted: 2019-12-26 | Resolved: 2021-10-11

## 2021-10-11 LAB
ANION GAP SERPL CALC-SCNC: 13 MMOL/L (ref 8–16)
ASCENDING AORTA: 2.93 CM
AV INDEX (PROSTH): 0.35
AV MEAN GRADIENT: 3 MMHG
AV PEAK GRADIENT: 5 MMHG
AV VALVE AREA: 1.48 CM2
AV VELOCITY RATIO: 0.41
BASOPHILS # BLD AUTO: 0.03 K/UL (ref 0–0.2)
BASOPHILS NFR BLD: 0.4 % (ref 0–1.9)
BSA FOR ECHO PROCEDURE: 2.09 M2
BUN SERPL-MCNC: 14 MG/DL (ref 6–20)
CALCIUM SERPL-MCNC: 9.3 MG/DL (ref 8.7–10.5)
CHLORIDE SERPL-SCNC: 105 MMOL/L (ref 95–110)
CHOLEST SERPL-MCNC: 153 MG/DL (ref 120–199)
CHOLEST/HDLC SERPL: 4 {RATIO} (ref 2–5)
CO2 SERPL-SCNC: 24 MMOL/L (ref 23–29)
CREAT SERPL-MCNC: 0.8 MG/DL (ref 0.5–1.4)
CV ECHO LV RWT: 0.28 CM
DIFFERENTIAL METHOD: ABNORMAL
DOP CALC AO PEAK VEL: 1.13 M/S
DOP CALC AO VTI: 22.2 CM
DOP CALC LVOT AREA: 4.2 CM2
DOP CALC LVOT DIAMETER: 2.32 CM
DOP CALC LVOT PEAK VEL: 0.46 M/S
DOP CALC LVOT STROKE VOLUME: 32.75 CM3
DOP CALCLVOT PEAK VEL VTI: 7.75 CM
E WAVE DECELERATION TIME: 111.3 MSEC
E/A RATIO: 1.57
E/E' RATIO: 23.78 M/S
ECHO LV POSTERIOR WALL: 1.05 CM (ref 0.6–1.1)
EJECTION FRACTION: 15 %
EOSINOPHIL # BLD AUTO: 0.2 K/UL (ref 0–0.5)
EOSINOPHIL NFR BLD: 3.1 % (ref 0–8)
ERYTHROCYTE [DISTWIDTH] IN BLOOD BY AUTOMATED COUNT: 14.1 % (ref 11.5–14.5)
EST. GFR  (AFRICAN AMERICAN): >60 ML/MIN/1.73 M^2
EST. GFR  (NON AFRICAN AMERICAN): >60 ML/MIN/1.73 M^2
ESTIMATED AVG GLUCOSE: 186 MG/DL (ref 68–131)
FRACTIONAL SHORTENING: 4 % (ref 28–44)
GLUCOSE SERPL-MCNC: 150 MG/DL (ref 70–110)
HBA1C MFR BLD: 8.1 % (ref 4–5.6)
HCT VFR BLD AUTO: 35.3 % (ref 37–48.5)
HDLC SERPL-MCNC: 38 MG/DL (ref 40–75)
HDLC SERPL: 24.8 % (ref 20–50)
HGB BLD-MCNC: 11.3 G/DL (ref 12–16)
IMM GRANULOCYTES # BLD AUTO: 0.01 K/UL (ref 0–0.04)
IMM GRANULOCYTES NFR BLD AUTO: 0.1 % (ref 0–0.5)
INTERVENTRICULAR SEPTUM: 0.94 CM (ref 0.6–1.1)
IVRT: 77.07 MSEC
LA MAJOR: 4.61 CM
LA MINOR: 4.61 CM
LA WIDTH: 5.24 CM
LDLC SERPL CALC-MCNC: 90.6 MG/DL (ref 63–159)
LEFT ATRIUM SIZE: 4.34 CM
LEFT ATRIUM VOLUME INDEX MOD: 28.4 ML/M2
LEFT ATRIUM VOLUME INDEX: 43.9 ML/M2
LEFT ATRIUM VOLUME MOD: 57.74 CM3
LEFT ATRIUM VOLUME: 89.11 CM3
LEFT INTERNAL DIMENSION IN SYSTOLE: 7.06 CM (ref 2.1–4)
LEFT VENTRICLE DIASTOLIC VOLUME INDEX: 141.44 ML/M2
LEFT VENTRICLE DIASTOLIC VOLUME: 287.13 ML
LEFT VENTRICLE MASS INDEX: 172 G/M2
LEFT VENTRICLE SYSTOLIC VOLUME INDEX: 128.2 ML/M2
LEFT VENTRICLE SYSTOLIC VOLUME: 260.17 ML
LEFT VENTRICULAR INTERNAL DIMENSION IN DIASTOLE: 7.37 CM (ref 3.5–6)
LEFT VENTRICULAR MASS: 349.8 G
LV LATERAL E/E' RATIO: 26.75 M/S
LV SEPTAL E/E' RATIO: 21.4 M/S
LYMPHOCYTES # BLD AUTO: 2.9 K/UL (ref 1–4.8)
LYMPHOCYTES NFR BLD: 40.4 % (ref 18–48)
MAGNESIUM SERPL-MCNC: 2.1 MG/DL (ref 1.6–2.6)
MCH RBC QN AUTO: 28.9 PG (ref 27–31)
MCHC RBC AUTO-ENTMCNC: 32 G/DL (ref 32–36)
MCV RBC AUTO: 90 FL (ref 82–98)
MONOCYTES # BLD AUTO: 0.5 K/UL (ref 0.3–1)
MONOCYTES NFR BLD: 7 % (ref 4–15)
MV A" WAVE DURATION": 11.99 MSEC
MV PEAK A VEL: 0.68 M/S
MV PEAK E VEL: 1.07 M/S
MV STENOSIS PRESSURE HALF TIME: 32.28 MS
MV VALVE AREA P 1/2 METHOD: 6.82 CM2
NEUTROPHILS # BLD AUTO: 3.5 K/UL (ref 1.8–7.7)
NEUTROPHILS NFR BLD: 49 % (ref 38–73)
NONHDLC SERPL-MCNC: 115 MG/DL
NRBC BLD-RTO: 0 /100 WBC
PHOSPHATE SERPL-MCNC: 4 MG/DL (ref 2.7–4.5)
PISA TR MAX VEL: 3.72 M/S
PLATELET # BLD AUTO: 339 K/UL (ref 150–450)
PMV BLD AUTO: 11 FL (ref 9.2–12.9)
POCT GLUCOSE: 116 MG/DL (ref 70–110)
POCT GLUCOSE: 126 MG/DL (ref 70–110)
POCT GLUCOSE: 132 MG/DL (ref 70–110)
POCT GLUCOSE: 158 MG/DL (ref 70–110)
POCT GLUCOSE: 182 MG/DL (ref 70–110)
POTASSIUM SERPL-SCNC: 3.9 MMOL/L (ref 3.5–5.1)
PULM VEIN S/D RATIO: 0.65
PV PEAK D VEL: 0.54 M/S
PV PEAK S VEL: 0.35 M/S
RA MAJOR: 3.62 CM
RA PRESSURE: 3 MMHG
RA WIDTH: 3.85 CM
RBC # BLD AUTO: 3.91 M/UL (ref 4–5.4)
RIGHT VENTRICULAR END-DIASTOLIC DIMENSION: 3.6 CM
RV TISSUE DOPPLER FREE WALL SYSTOLIC VELOCITY 1 (APICAL 4 CHAMBER VIEW): 7.27 CM/S
SINUS: 2.68 CM
SODIUM SERPL-SCNC: 142 MMOL/L (ref 136–145)
STJ: 2.81 CM
TDI LATERAL: 0.04 M/S
TDI SEPTAL: 0.05 M/S
TDI: 0.05 M/S
TR MAX PG: 55 MMHG
TRICUSPID ANNULAR PLANE SYSTOLIC EXCURSION: 1.89 CM
TRIGL SERPL-MCNC: 122 MG/DL (ref 30–150)
TROPONIN I SERPL DL<=0.01 NG/ML-MCNC: 0.01 NG/ML (ref 0–0.03)
TROPONIN I SERPL DL<=0.01 NG/ML-MCNC: 0.02 NG/ML (ref 0–0.03)
TSH SERPL DL<=0.005 MIU/L-ACNC: 2.9 UIU/ML (ref 0.4–4)
TV REST PULMONARY ARTERY PRESSURE: 58 MMHG
WBC # BLD AUTO: 7.17 K/UL (ref 3.9–12.7)

## 2021-10-11 PROCEDURE — 96375 TX/PRO/DX INJ NEW DRUG ADDON: CPT

## 2021-10-11 PROCEDURE — 63600175 PHARM REV CODE 636 W HCPCS: Mod: NTX | Performed by: PHYSICIAN ASSISTANT

## 2021-10-11 PROCEDURE — 25000003 PHARM REV CODE 250: Mod: NTX | Performed by: PHYSICIAN ASSISTANT

## 2021-10-11 PROCEDURE — 63600175 PHARM REV CODE 636 W HCPCS: Mod: NTX | Performed by: INTERNAL MEDICINE

## 2021-10-11 PROCEDURE — 36415 COLL VENOUS BLD VENIPUNCTURE: CPT | Mod: NTX | Performed by: PHYSICIAN ASSISTANT

## 2021-10-11 PROCEDURE — 99226 PR SUBSEQUENT OBSERVATION CARE,LEVEL III: CPT | Mod: NTX,,, | Performed by: PHYSICIAN ASSISTANT

## 2021-10-11 PROCEDURE — 99214 OFFICE O/P EST MOD 30 MIN: CPT | Mod: NTX,,, | Performed by: INTERNAL MEDICINE

## 2021-10-11 PROCEDURE — G0378 HOSPITAL OBSERVATION PER HR: HCPCS | Mod: NTX

## 2021-10-11 PROCEDURE — 85025 COMPLETE CBC W/AUTO DIFF WBC: CPT | Mod: NTX | Performed by: PHYSICIAN ASSISTANT

## 2021-10-11 PROCEDURE — 84484 ASSAY OF TROPONIN QUANT: CPT | Mod: NTX | Performed by: PHYSICIAN ASSISTANT

## 2021-10-11 PROCEDURE — 80061 LIPID PANEL: CPT | Mod: NTX | Performed by: PHYSICIAN ASSISTANT

## 2021-10-11 PROCEDURE — 96372 THER/PROPH/DIAG INJ SC/IM: CPT | Mod: 59,NTX

## 2021-10-11 PROCEDURE — 99226 PR SUBSEQUENT OBSERVATION CARE,LEVEL III: ICD-10-PCS | Mod: NTX,,, | Performed by: PHYSICIAN ASSISTANT

## 2021-10-11 PROCEDURE — 84100 ASSAY OF PHOSPHORUS: CPT | Mod: NTX | Performed by: PHYSICIAN ASSISTANT

## 2021-10-11 PROCEDURE — 82962 GLUCOSE BLOOD TEST: CPT | Mod: NTX

## 2021-10-11 PROCEDURE — 96372 THER/PROPH/DIAG INJ SC/IM: CPT | Mod: 59

## 2021-10-11 PROCEDURE — 25500020 PHARM REV CODE 255: Mod: NTX | Performed by: INTERNAL MEDICINE

## 2021-10-11 PROCEDURE — 96374 THER/PROPH/DIAG INJ IV PUSH: CPT | Mod: NTX

## 2021-10-11 PROCEDURE — 96376 TX/PRO/DX INJ SAME DRUG ADON: CPT

## 2021-10-11 PROCEDURE — C9399 UNCLASSIFIED DRUGS OR BIOLOG: HCPCS | Mod: NTX | Performed by: PHYSICIAN ASSISTANT

## 2021-10-11 PROCEDURE — 84443 ASSAY THYROID STIM HORMONE: CPT | Mod: NTX | Performed by: PHYSICIAN ASSISTANT

## 2021-10-11 PROCEDURE — 99214 PR OFFICE/OUTPT VISIT, EST, LEVL IV, 30-39 MIN: ICD-10-PCS | Mod: NTX,,, | Performed by: INTERNAL MEDICINE

## 2021-10-11 PROCEDURE — 83036 HEMOGLOBIN GLYCOSYLATED A1C: CPT | Mod: NTX | Performed by: PHYSICIAN ASSISTANT

## 2021-10-11 PROCEDURE — 83735 ASSAY OF MAGNESIUM: CPT | Mod: NTX | Performed by: PHYSICIAN ASSISTANT

## 2021-10-11 PROCEDURE — 25000003 PHARM REV CODE 250: Mod: NTX | Performed by: INTERNAL MEDICINE

## 2021-10-11 PROCEDURE — 80048 BASIC METABOLIC PNL TOTAL CA: CPT | Mod: NTX | Performed by: PHYSICIAN ASSISTANT

## 2021-10-11 RX ORDER — POLYETHYLENE GLYCOL 3350 17 G/17G
17 POWDER, FOR SOLUTION ORAL DAILY PRN
Status: DISCONTINUED | OUTPATIENT
Start: 2021-10-11 | End: 2021-10-13 | Stop reason: HOSPADM

## 2021-10-11 RX ORDER — ENOXAPARIN SODIUM 100 MG/ML
40 INJECTION SUBCUTANEOUS EVERY 24 HOURS
Status: DISCONTINUED | OUTPATIENT
Start: 2021-10-11 | End: 2021-10-13 | Stop reason: HOSPADM

## 2021-10-11 RX ORDER — NITROGLYCERIN 0.4 MG/1
0.4 TABLET SUBLINGUAL EVERY 5 MIN PRN
Status: DISCONTINUED | OUTPATIENT
Start: 2021-10-11 | End: 2021-10-13 | Stop reason: HOSPADM

## 2021-10-11 RX ORDER — INSULIN ASPART 100 [IU]/ML
4 INJECTION, SOLUTION INTRAVENOUS; SUBCUTANEOUS
Status: DISCONTINUED | OUTPATIENT
Start: 2021-10-11 | End: 2021-10-13 | Stop reason: HOSPADM

## 2021-10-11 RX ORDER — ATORVASTATIN CALCIUM 40 MG/1
80 TABLET, FILM COATED ORAL DAILY
Status: DISCONTINUED | OUTPATIENT
Start: 2021-10-11 | End: 2021-10-13 | Stop reason: HOSPADM

## 2021-10-11 RX ORDER — KETOROLAC TROMETHAMINE 30 MG/ML
15 INJECTION, SOLUTION INTRAMUSCULAR; INTRAVENOUS ONCE
Status: COMPLETED | OUTPATIENT
Start: 2021-10-11 | End: 2021-10-11

## 2021-10-11 RX ORDER — FUROSEMIDE 10 MG/ML
40 INJECTION INTRAMUSCULAR; INTRAVENOUS
Status: DISCONTINUED | OUTPATIENT
Start: 2021-10-11 | End: 2021-10-11

## 2021-10-11 RX ORDER — PROMETHAZINE HYDROCHLORIDE 25 MG/1
25 TABLET ORAL EVERY 6 HOURS PRN
Status: DISCONTINUED | OUTPATIENT
Start: 2021-10-11 | End: 2021-10-13 | Stop reason: HOSPADM

## 2021-10-11 RX ORDER — DIPHENHYDRAMINE HCL 25 MG
25 CAPSULE ORAL EVERY 6 HOURS PRN
Status: DISCONTINUED | OUTPATIENT
Start: 2021-10-11 | End: 2021-10-13 | Stop reason: HOSPADM

## 2021-10-11 RX ORDER — IBUPROFEN 200 MG
16 TABLET ORAL
Status: DISCONTINUED | OUTPATIENT
Start: 2021-10-11 | End: 2021-10-13 | Stop reason: HOSPADM

## 2021-10-11 RX ORDER — METOPROLOL SUCCINATE 50 MG/1
100 TABLET, EXTENDED RELEASE ORAL DAILY
Status: DISCONTINUED | OUTPATIENT
Start: 2021-10-11 | End: 2021-10-13 | Stop reason: HOSPADM

## 2021-10-11 RX ORDER — ONDANSETRON 8 MG/1
8 TABLET, ORALLY DISINTEGRATING ORAL EVERY 8 HOURS PRN
Status: DISCONTINUED | OUTPATIENT
Start: 2021-10-11 | End: 2021-10-13 | Stop reason: HOSPADM

## 2021-10-11 RX ORDER — TALC
6 POWDER (GRAM) TOPICAL NIGHTLY PRN
Status: DISCONTINUED | OUTPATIENT
Start: 2021-10-11 | End: 2021-10-13 | Stop reason: HOSPADM

## 2021-10-11 RX ORDER — POTASSIUM CHLORIDE 750 MG/1
10 CAPSULE, EXTENDED RELEASE ORAL DAILY
Status: DISCONTINUED | OUTPATIENT
Start: 2021-10-11 | End: 2021-10-12

## 2021-10-11 RX ORDER — IPRATROPIUM BROMIDE AND ALBUTEROL SULFATE 2.5; .5 MG/3ML; MG/3ML
3 SOLUTION RESPIRATORY (INHALATION) EVERY 4 HOURS PRN
Status: DISCONTINUED | OUTPATIENT
Start: 2021-10-11 | End: 2021-10-13 | Stop reason: HOSPADM

## 2021-10-11 RX ORDER — INSULIN ASPART 100 [IU]/ML
0-5 INJECTION, SOLUTION INTRAVENOUS; SUBCUTANEOUS
Status: DISCONTINUED | OUTPATIENT
Start: 2021-10-11 | End: 2021-10-13 | Stop reason: HOSPADM

## 2021-10-11 RX ORDER — HYDRALAZINE HYDROCHLORIDE 10 MG/1
10 TABLET, FILM COATED ORAL EVERY 8 HOURS
Status: DISCONTINUED | OUTPATIENT
Start: 2021-10-11 | End: 2021-10-12

## 2021-10-11 RX ORDER — NAPROXEN SODIUM 220 MG/1
81 TABLET, FILM COATED ORAL DAILY
Status: DISCONTINUED | OUTPATIENT
Start: 2021-10-11 | End: 2021-10-13 | Stop reason: HOSPADM

## 2021-10-11 RX ORDER — SPIRONOLACTONE 25 MG/1
25 TABLET ORAL DAILY
Status: DISCONTINUED | OUTPATIENT
Start: 2021-10-11 | End: 2021-10-13 | Stop reason: HOSPADM

## 2021-10-11 RX ORDER — FUROSEMIDE 10 MG/ML
40 INJECTION INTRAMUSCULAR; INTRAVENOUS ONCE
Status: COMPLETED | OUTPATIENT
Start: 2021-10-11 | End: 2021-10-11

## 2021-10-11 RX ORDER — GLUCAGON 1 MG
1 KIT INJECTION
Status: DISCONTINUED | OUTPATIENT
Start: 2021-10-11 | End: 2021-10-13 | Stop reason: HOSPADM

## 2021-10-11 RX ORDER — MECLIZINE HCL 12.5 MG 12.5 MG/1
25 TABLET ORAL 3 TIMES DAILY PRN
Status: DISCONTINUED | OUTPATIENT
Start: 2021-10-11 | End: 2021-10-13 | Stop reason: HOSPADM

## 2021-10-11 RX ORDER — IBUPROFEN 200 MG
24 TABLET ORAL
Status: DISCONTINUED | OUTPATIENT
Start: 2021-10-11 | End: 2021-10-13 | Stop reason: HOSPADM

## 2021-10-11 RX ORDER — ACETAMINOPHEN 325 MG/1
650 TABLET ORAL EVERY 4 HOURS PRN
Status: DISCONTINUED | OUTPATIENT
Start: 2021-10-11 | End: 2021-10-13 | Stop reason: HOSPADM

## 2021-10-11 RX ORDER — BISACODYL 10 MG
10 SUPPOSITORY, RECTAL RECTAL DAILY PRN
Status: DISCONTINUED | OUTPATIENT
Start: 2021-10-11 | End: 2021-10-13 | Stop reason: HOSPADM

## 2021-10-11 RX ORDER — ISOSORBIDE DINITRATE 10 MG/1
10 TABLET ORAL EVERY 8 HOURS
Status: DISCONTINUED | OUTPATIENT
Start: 2021-10-11 | End: 2021-10-12

## 2021-10-11 RX ORDER — FUROSEMIDE 10 MG/ML
80 INJECTION INTRAMUSCULAR; INTRAVENOUS 2 TIMES DAILY
Status: DISCONTINUED | OUTPATIENT
Start: 2021-10-11 | End: 2021-10-13 | Stop reason: HOSPADM

## 2021-10-11 RX ADMIN — HYDRALAZINE HYDROCHLORIDE 10 MG: 10 TABLET, FILM COATED ORAL at 03:10

## 2021-10-11 RX ADMIN — SACUBITRIL AND VALSARTAN 1 TABLET: 97; 103 TABLET, FILM COATED ORAL at 08:10

## 2021-10-11 RX ADMIN — FUROSEMIDE 40 MG: 10 INJECTION, SOLUTION INTRAMUSCULAR; INTRAVENOUS at 02:10

## 2021-10-11 RX ADMIN — ACETAMINOPHEN 650 MG: 325 TABLET ORAL at 01:10

## 2021-10-11 RX ADMIN — ATORVASTATIN CALCIUM 80 MG: 40 TABLET, FILM COATED ORAL at 09:10

## 2021-10-11 RX ADMIN — ENOXAPARIN SODIUM 40 MG: 40 INJECTION, SOLUTION INTRAVENOUS; SUBCUTANEOUS at 06:10

## 2021-10-11 RX ADMIN — ACETAMINOPHEN 650 MG: 325 TABLET ORAL at 06:10

## 2021-10-11 RX ADMIN — KETOROLAC TROMETHAMINE 15 MG: 30 INJECTION, SOLUTION INTRAMUSCULAR at 01:10

## 2021-10-11 RX ADMIN — ISOSORBIDE DINITRATE 10 MG: 10 TABLET ORAL at 03:10

## 2021-10-11 RX ADMIN — ASPIRIN 81 MG CHEWABLE TABLET 81 MG: 81 TABLET CHEWABLE at 10:10

## 2021-10-11 RX ADMIN — POTASSIUM CHLORIDE 10 MEQ: 10 CAPSULE, COATED, EXTENDED RELEASE ORAL at 02:10

## 2021-10-11 RX ADMIN — INSULIN ASPART 4 UNITS: 100 INJECTION, SOLUTION INTRAVENOUS; SUBCUTANEOUS at 10:10

## 2021-10-11 RX ADMIN — INSULIN DETEMIR 13 UNITS: 100 INJECTION, SOLUTION SUBCUTANEOUS at 01:10

## 2021-10-11 RX ADMIN — ISOSORBIDE DINITRATE 10 MG: 10 TABLET ORAL at 08:10

## 2021-10-11 RX ADMIN — INSULIN DETEMIR 13 UNITS: 100 INJECTION, SOLUTION SUBCUTANEOUS at 08:10

## 2021-10-11 RX ADMIN — INSULIN ASPART 4 UNITS: 100 INJECTION, SOLUTION INTRAVENOUS; SUBCUTANEOUS at 06:10

## 2021-10-11 RX ADMIN — FUROSEMIDE 80 MG: 10 INJECTION INTRAMUSCULAR; INTRAVENOUS at 08:10

## 2021-10-11 RX ADMIN — METOPROLOL SUCCINATE 100 MG: 50 TABLET, EXTENDED RELEASE ORAL at 10:10

## 2021-10-11 RX ADMIN — SACUBITRIL AND VALSARTAN 1 TABLET: 97; 103 TABLET, FILM COATED ORAL at 10:10

## 2021-10-11 RX ADMIN — FUROSEMIDE 80 MG: 10 INJECTION INTRAMUSCULAR; INTRAVENOUS at 10:10

## 2021-10-11 RX ADMIN — HUMAN ALBUMIN MICROSPHERES AND PERFLUTREN 0.66 MG: 10; .22 INJECTION, SOLUTION INTRAVENOUS at 09:10

## 2021-10-11 RX ADMIN — SPIRONOLACTONE 25 MG: 25 TABLET, FILM COATED ORAL at 10:10

## 2021-10-11 RX ADMIN — HYDRALAZINE HYDROCHLORIDE 10 MG: 10 TABLET, FILM COATED ORAL at 08:10

## 2021-10-12 LAB
ANION GAP SERPL CALC-SCNC: 12 MMOL/L (ref 8–16)
BASOPHILS # BLD AUTO: 0.03 K/UL (ref 0–0.2)
BASOPHILS NFR BLD: 0.4 % (ref 0–1.9)
BUN SERPL-MCNC: 16 MG/DL (ref 6–20)
CALCIUM SERPL-MCNC: 9.5 MG/DL (ref 8.7–10.5)
CHLORIDE SERPL-SCNC: 103 MMOL/L (ref 95–110)
CO2 SERPL-SCNC: 26 MMOL/L (ref 23–29)
CREAT SERPL-MCNC: 0.8 MG/DL (ref 0.5–1.4)
DIFFERENTIAL METHOD: ABNORMAL
EOSINOPHIL # BLD AUTO: 0.3 K/UL (ref 0–0.5)
EOSINOPHIL NFR BLD: 4 % (ref 0–8)
ERYTHROCYTE [DISTWIDTH] IN BLOOD BY AUTOMATED COUNT: 14.3 % (ref 11.5–14.5)
EST. GFR  (AFRICAN AMERICAN): >60 ML/MIN/1.73 M^2
EST. GFR  (NON AFRICAN AMERICAN): >60 ML/MIN/1.73 M^2
GLUCOSE SERPL-MCNC: 85 MG/DL (ref 70–110)
HCT VFR BLD AUTO: 36.6 % (ref 37–48.5)
HGB BLD-MCNC: 11.8 G/DL (ref 12–16)
IMM GRANULOCYTES # BLD AUTO: 0.02 K/UL (ref 0–0.04)
IMM GRANULOCYTES NFR BLD AUTO: 0.3 % (ref 0–0.5)
LYMPHOCYTES # BLD AUTO: 4 K/UL (ref 1–4.8)
LYMPHOCYTES NFR BLD: 53.5 % (ref 18–48)
MAGNESIUM SERPL-MCNC: 2.2 MG/DL (ref 1.6–2.6)
MCH RBC QN AUTO: 29.1 PG (ref 27–31)
MCHC RBC AUTO-ENTMCNC: 32.2 G/DL (ref 32–36)
MCV RBC AUTO: 90 FL (ref 82–98)
MONOCYTES # BLD AUTO: 0.4 K/UL (ref 0.3–1)
MONOCYTES NFR BLD: 5.7 % (ref 4–15)
NEUTROPHILS # BLD AUTO: 2.7 K/UL (ref 1.8–7.7)
NEUTROPHILS NFR BLD: 36.1 % (ref 38–73)
NRBC BLD-RTO: 0 /100 WBC
PHOSPHATE SERPL-MCNC: 4.8 MG/DL (ref 2.7–4.5)
PLATELET # BLD AUTO: 324 K/UL (ref 150–450)
PMV BLD AUTO: 10.8 FL (ref 9.2–12.9)
POCT GLUCOSE: 115 MG/DL (ref 70–110)
POCT GLUCOSE: 133 MG/DL (ref 70–110)
POCT GLUCOSE: 144 MG/DL (ref 70–110)
POCT GLUCOSE: 197 MG/DL (ref 70–110)
POTASSIUM SERPL-SCNC: 3.8 MMOL/L (ref 3.5–5.1)
RBC # BLD AUTO: 4.06 M/UL (ref 4–5.4)
SODIUM SERPL-SCNC: 141 MMOL/L (ref 136–145)
WBC # BLD AUTO: 7.5 K/UL (ref 3.9–12.7)

## 2021-10-12 PROCEDURE — 12000002 HC ACUTE/MED SURGE SEMI-PRIVATE ROOM: Mod: NTX

## 2021-10-12 PROCEDURE — 99233 SBSQ HOSP IP/OBS HIGH 50: CPT | Mod: NTX,,, | Performed by: PHYSICIAN ASSISTANT

## 2021-10-12 PROCEDURE — 63600175 PHARM REV CODE 636 W HCPCS: Mod: NTX | Performed by: PHYSICIAN ASSISTANT

## 2021-10-12 PROCEDURE — 25000003 PHARM REV CODE 250: Mod: NTX | Performed by: PHYSICIAN ASSISTANT

## 2021-10-12 PROCEDURE — 84100 ASSAY OF PHOSPHORUS: CPT | Mod: NTX | Performed by: PHYSICIAN ASSISTANT

## 2021-10-12 PROCEDURE — G0378 HOSPITAL OBSERVATION PER HR: HCPCS | Mod: NTX

## 2021-10-12 PROCEDURE — 36415 COLL VENOUS BLD VENIPUNCTURE: CPT | Mod: NTX | Performed by: PHYSICIAN ASSISTANT

## 2021-10-12 PROCEDURE — 85025 COMPLETE CBC W/AUTO DIFF WBC: CPT | Mod: NTX | Performed by: PHYSICIAN ASSISTANT

## 2021-10-12 PROCEDURE — 25000003 PHARM REV CODE 250: Mod: NTX | Performed by: INTERNAL MEDICINE

## 2021-10-12 PROCEDURE — 80048 BASIC METABOLIC PNL TOTAL CA: CPT | Mod: NTX | Performed by: PHYSICIAN ASSISTANT

## 2021-10-12 PROCEDURE — 63600175 PHARM REV CODE 636 W HCPCS: Mod: NTX | Performed by: INTERNAL MEDICINE

## 2021-10-12 PROCEDURE — 99233 PR SUBSEQUENT HOSPITAL CARE,LEVL III: ICD-10-PCS | Mod: NTX,,, | Performed by: PHYSICIAN ASSISTANT

## 2021-10-12 PROCEDURE — 83735 ASSAY OF MAGNESIUM: CPT | Mod: NTX | Performed by: PHYSICIAN ASSISTANT

## 2021-10-12 RX ORDER — POTASSIUM CHLORIDE 20 MEQ/1
40 TABLET, EXTENDED RELEASE ORAL EVERY 4 HOURS
Status: DISPENSED | OUTPATIENT
Start: 2021-10-12 | End: 2021-10-12

## 2021-10-12 RX ORDER — KETOROLAC TROMETHAMINE 15 MG/ML
30 INJECTION, SOLUTION INTRAMUSCULAR; INTRAVENOUS ONCE
Status: COMPLETED | OUTPATIENT
Start: 2021-10-12 | End: 2021-10-12

## 2021-10-12 RX ORDER — MAGNESIUM SULFATE HEPTAHYDRATE 40 MG/ML
2 INJECTION, SOLUTION INTRAVENOUS ONCE
Status: COMPLETED | OUTPATIENT
Start: 2021-10-12 | End: 2021-10-12

## 2021-10-12 RX ORDER — IBUPROFEN 400 MG/1
400 TABLET ORAL ONCE
Status: COMPLETED | OUTPATIENT
Start: 2021-10-12 | End: 2021-10-12

## 2021-10-12 RX ADMIN — ATORVASTATIN CALCIUM 80 MG: 40 TABLET, FILM COATED ORAL at 08:10

## 2021-10-12 RX ADMIN — ASPIRIN 81 MG CHEWABLE TABLET 81 MG: 81 TABLET CHEWABLE at 08:10

## 2021-10-12 RX ADMIN — POTASSIUM CHLORIDE 10 MEQ: 10 CAPSULE, COATED, EXTENDED RELEASE ORAL at 08:10

## 2021-10-12 RX ADMIN — INSULIN ASPART 4 UNITS: 100 INJECTION, SOLUTION INTRAVENOUS; SUBCUTANEOUS at 05:10

## 2021-10-12 RX ADMIN — INSULIN DETEMIR 13 UNITS: 100 INJECTION, SOLUTION SUBCUTANEOUS at 09:10

## 2021-10-12 RX ADMIN — IBUPROFEN 400 MG: 400 TABLET, FILM COATED ORAL at 08:10

## 2021-10-12 RX ADMIN — INSULIN ASPART 4 UNITS: 100 INJECTION, SOLUTION INTRAVENOUS; SUBCUTANEOUS at 01:10

## 2021-10-12 RX ADMIN — DIPHENHYDRAMINE HYDROCHLORIDE 25 MG: 25 CAPSULE ORAL at 05:10

## 2021-10-12 RX ADMIN — MAGNESIUM SULFATE 2 G: 2 INJECTION INTRAVENOUS at 02:10

## 2021-10-12 RX ADMIN — ACETAMINOPHEN 650 MG: 325 TABLET ORAL at 10:10

## 2021-10-12 RX ADMIN — ENOXAPARIN SODIUM 40 MG: 40 INJECTION, SOLUTION INTRAVENOUS; SUBCUTANEOUS at 05:10

## 2021-10-12 RX ADMIN — FUROSEMIDE 80 MG: 10 INJECTION INTRAMUSCULAR; INTRAVENOUS at 09:10

## 2021-10-12 RX ADMIN — SACUBITRIL AND VALSARTAN 1 TABLET: 97; 103 TABLET, FILM COATED ORAL at 09:10

## 2021-10-12 RX ADMIN — ISOSORBIDE DINITRATE 10 MG: 10 TABLET ORAL at 05:10

## 2021-10-12 RX ADMIN — ACETAMINOPHEN 650 MG: 325 TABLET ORAL at 06:10

## 2021-10-12 RX ADMIN — HYDRALAZINE HYDROCHLORIDE 10 MG: 10 TABLET, FILM COATED ORAL at 05:10

## 2021-10-12 RX ADMIN — KETOROLAC TROMETHAMINE 30 MG: 15 INJECTION, SOLUTION INTRAMUSCULAR; INTRAVENOUS at 02:10

## 2021-10-13 VITALS
HEART RATE: 80 BPM | SYSTOLIC BLOOD PRESSURE: 105 MMHG | HEIGHT: 66 IN | BODY MASS INDEX: 32.13 KG/M2 | DIASTOLIC BLOOD PRESSURE: 74 MMHG | RESPIRATION RATE: 28 BRPM | OXYGEN SATURATION: 100 % | WEIGHT: 199.94 LBS | TEMPERATURE: 97 F

## 2021-10-13 LAB
ANION GAP SERPL CALC-SCNC: 15 MMOL/L (ref 8–16)
BASOPHILS # BLD AUTO: 0.04 K/UL (ref 0–0.2)
BASOPHILS NFR BLD: 0.5 % (ref 0–1.9)
BUN SERPL-MCNC: 20 MG/DL (ref 6–20)
CALCIUM SERPL-MCNC: 9.5 MG/DL (ref 8.7–10.5)
CHLORIDE SERPL-SCNC: 102 MMOL/L (ref 95–110)
CO2 SERPL-SCNC: 23 MMOL/L (ref 23–29)
CREAT SERPL-MCNC: 0.9 MG/DL (ref 0.5–1.4)
DIFFERENTIAL METHOD: ABNORMAL
EOSINOPHIL # BLD AUTO: 0.3 K/UL (ref 0–0.5)
EOSINOPHIL NFR BLD: 3.1 % (ref 0–8)
ERYTHROCYTE [DISTWIDTH] IN BLOOD BY AUTOMATED COUNT: 13.8 % (ref 11.5–14.5)
EST. GFR  (AFRICAN AMERICAN): >60 ML/MIN/1.73 M^2
EST. GFR  (NON AFRICAN AMERICAN): >60 ML/MIN/1.73 M^2
GLUCOSE SERPL-MCNC: 87 MG/DL (ref 70–110)
HCT VFR BLD AUTO: 37.6 % (ref 37–48.5)
HGB BLD-MCNC: 12 G/DL (ref 12–16)
IMM GRANULOCYTES # BLD AUTO: 0.02 K/UL (ref 0–0.04)
IMM GRANULOCYTES NFR BLD AUTO: 0.2 % (ref 0–0.5)
LYMPHOCYTES # BLD AUTO: 4.4 K/UL (ref 1–4.8)
LYMPHOCYTES NFR BLD: 51.2 % (ref 18–48)
MAGNESIUM SERPL-MCNC: 2.6 MG/DL (ref 1.6–2.6)
MCH RBC QN AUTO: 28.8 PG (ref 27–31)
MCHC RBC AUTO-ENTMCNC: 31.9 G/DL (ref 32–36)
MCV RBC AUTO: 90 FL (ref 82–98)
MONOCYTES # BLD AUTO: 0.5 K/UL (ref 0.3–1)
MONOCYTES NFR BLD: 6.2 % (ref 4–15)
NEUTROPHILS # BLD AUTO: 3.3 K/UL (ref 1.8–7.7)
NEUTROPHILS NFR BLD: 38.8 % (ref 38–73)
NRBC BLD-RTO: 0 /100 WBC
PHOSPHATE SERPL-MCNC: 4.4 MG/DL (ref 2.7–4.5)
PLATELET # BLD AUTO: 381 K/UL (ref 150–450)
PMV BLD AUTO: 11.2 FL (ref 9.2–12.9)
POCT GLUCOSE: 121 MG/DL (ref 70–110)
POCT GLUCOSE: 146 MG/DL (ref 70–110)
POTASSIUM SERPL-SCNC: 3.7 MMOL/L (ref 3.5–5.1)
RBC # BLD AUTO: 4.16 M/UL (ref 4–5.4)
SODIUM SERPL-SCNC: 140 MMOL/L (ref 136–145)
WBC # BLD AUTO: 8.6 K/UL (ref 3.9–12.7)

## 2021-10-13 PROCEDURE — 36415 COLL VENOUS BLD VENIPUNCTURE: CPT | Mod: NTX | Performed by: PHYSICIAN ASSISTANT

## 2021-10-13 PROCEDURE — 25000003 PHARM REV CODE 250: Mod: NTX | Performed by: PHYSICIAN ASSISTANT

## 2021-10-13 PROCEDURE — 63600175 PHARM REV CODE 636 W HCPCS: Mod: NTX | Performed by: INTERNAL MEDICINE

## 2021-10-13 PROCEDURE — 83735 ASSAY OF MAGNESIUM: CPT | Mod: NTX | Performed by: PHYSICIAN ASSISTANT

## 2021-10-13 PROCEDURE — 99239 PR HOSPITAL DISCHARGE DAY,>30 MIN: ICD-10-PCS | Mod: NTX,,, | Performed by: PHYSICIAN ASSISTANT

## 2021-10-13 PROCEDURE — 85025 COMPLETE CBC W/AUTO DIFF WBC: CPT | Mod: NTX | Performed by: PHYSICIAN ASSISTANT

## 2021-10-13 PROCEDURE — 84100 ASSAY OF PHOSPHORUS: CPT | Mod: NTX | Performed by: PHYSICIAN ASSISTANT

## 2021-10-13 PROCEDURE — 25000003 PHARM REV CODE 250: Mod: NTX | Performed by: INTERNAL MEDICINE

## 2021-10-13 PROCEDURE — 80048 BASIC METABOLIC PNL TOTAL CA: CPT | Mod: NTX | Performed by: PHYSICIAN ASSISTANT

## 2021-10-13 PROCEDURE — 99239 HOSP IP/OBS DSCHRG MGMT >30: CPT | Mod: NTX,,, | Performed by: PHYSICIAN ASSISTANT

## 2021-10-13 RX ADMIN — FUROSEMIDE 80 MG: 10 INJECTION INTRAMUSCULAR; INTRAVENOUS at 08:10

## 2021-10-13 RX ADMIN — METOPROLOL SUCCINATE 100 MG: 50 TABLET, EXTENDED RELEASE ORAL at 08:10

## 2021-10-13 RX ADMIN — ASPIRIN 81 MG CHEWABLE TABLET 81 MG: 81 TABLET CHEWABLE at 09:10

## 2021-10-13 RX ADMIN — INSULIN ASPART 4 UNITS: 100 INJECTION, SOLUTION INTRAVENOUS; SUBCUTANEOUS at 08:10

## 2021-10-13 RX ADMIN — ATORVASTATIN CALCIUM 80 MG: 40 TABLET, FILM COATED ORAL at 08:10

## 2021-10-13 RX ADMIN — SACUBITRIL AND VALSARTAN 1 TABLET: 97; 103 TABLET, FILM COATED ORAL at 09:10

## 2021-10-13 RX ADMIN — INSULIN ASPART 4 UNITS: 100 INJECTION, SOLUTION INTRAVENOUS; SUBCUTANEOUS at 01:10

## 2021-10-13 RX ADMIN — SPIRONOLACTONE 25 MG: 25 TABLET, FILM COATED ORAL at 08:10

## 2021-10-15 NOTE — ASSESSMENT & PLAN NOTE
Area of cytotoxic cerebral edema identified when reviewing brain imaging in the territory of the right middle cerebral artery. There no mass effect associated with it. We will continue to monitor the patients clinical exam for any worsening of symptoms which may indicate expansion of the stroke or the area of the edema resulting in the clinical change. The pattern is suggestive of ESUS etiology.       The patient's granddaughter, Cecelia 

## 2021-10-22 ENCOUNTER — LAB VISIT (OUTPATIENT)
Dept: LAB | Facility: HOSPITAL | Age: 61
End: 2021-10-22
Attending: INTERNAL MEDICINE
Payer: COMMERCIAL

## 2021-10-22 ENCOUNTER — OFFICE VISIT (OUTPATIENT)
Dept: TRANSPLANT | Facility: CLINIC | Age: 61
End: 2021-10-22
Payer: COMMERCIAL

## 2021-10-22 ENCOUNTER — TELEPHONE (OUTPATIENT)
Dept: PHARMACY | Facility: CLINIC | Age: 61
End: 2021-10-22

## 2021-10-22 VITALS
SYSTOLIC BLOOD PRESSURE: 117 MMHG | BODY MASS INDEX: 32.64 KG/M2 | HEART RATE: 75 BPM | DIASTOLIC BLOOD PRESSURE: 72 MMHG | WEIGHT: 203.06 LBS | HEIGHT: 66 IN

## 2021-10-22 DIAGNOSIS — I25.10 CORONARY ARTERY DISEASE INVOLVING NATIVE CORONARY ARTERY OF NATIVE HEART, UNSPECIFIED WHETHER ANGINA PRESENT: ICD-10-CM

## 2021-10-22 DIAGNOSIS — I42.0 DILATED CARDIOMYOPATHY: ICD-10-CM

## 2021-10-22 DIAGNOSIS — I63.511 CEREBROVASCULAR ACCIDENT (CVA) DUE TO OCCLUSION OF RIGHT MIDDLE CEREBRAL ARTERY: ICD-10-CM

## 2021-10-22 DIAGNOSIS — I50.42 CHRONIC COMBINED SYSTOLIC AND DIASTOLIC CHF, NYHA CLASS 3: Chronic | ICD-10-CM

## 2021-10-22 DIAGNOSIS — I10 ESSENTIAL HYPERTENSION: Chronic | ICD-10-CM

## 2021-10-22 DIAGNOSIS — I42.0 DILATED CARDIOMYOPATHY: Primary | ICD-10-CM

## 2021-10-22 DIAGNOSIS — I63.411 EMBOLIC STROKE INVOLVING RIGHT MIDDLE CEREBRAL ARTERY: Chronic | ICD-10-CM

## 2021-10-22 DIAGNOSIS — I63.411 CEREBROVASCULAR ACCIDENT (CVA) DUE TO EMBOLISM OF RIGHT MIDDLE CEREBRAL ARTERY: ICD-10-CM

## 2021-10-22 LAB
ALBUMIN SERPL BCP-MCNC: 3.6 G/DL (ref 3.5–5.2)
ALP SERPL-CCNC: 132 U/L (ref 55–135)
ALT SERPL W/O P-5'-P-CCNC: 18 U/L (ref 10–44)
ANION GAP SERPL CALC-SCNC: 7 MMOL/L (ref 8–16)
AST SERPL-CCNC: 15 U/L (ref 10–40)
BILIRUB SERPL-MCNC: 0.5 MG/DL (ref 0.1–1)
BUN SERPL-MCNC: 12 MG/DL (ref 6–20)
CALCIUM SERPL-MCNC: 10 MG/DL (ref 8.7–10.5)
CHLORIDE SERPL-SCNC: 102 MMOL/L (ref 95–110)
CO2 SERPL-SCNC: 30 MMOL/L (ref 23–29)
CREAT SERPL-MCNC: 0.9 MG/DL (ref 0.5–1.4)
EST. GFR  (AFRICAN AMERICAN): >60 ML/MIN/1.73 M^2
EST. GFR  (NON AFRICAN AMERICAN): >60 ML/MIN/1.73 M^2
GLUCOSE SERPL-MCNC: 179 MG/DL (ref 70–110)
MAGNESIUM SERPL-MCNC: 2.1 MG/DL (ref 1.6–2.6)
POTASSIUM SERPL-SCNC: 4.2 MMOL/L (ref 3.5–5.1)
PROT SERPL-MCNC: 7.3 G/DL (ref 6–8.4)
SODIUM SERPL-SCNC: 139 MMOL/L (ref 136–145)

## 2021-10-22 PROCEDURE — 3074F PR MOST RECENT SYSTOLIC BLOOD PRESSURE < 130 MM HG: ICD-10-PCS | Mod: CPTII,NTX,S$GLB, | Performed by: INTERNAL MEDICINE

## 2021-10-22 PROCEDURE — 3052F PR MOST RECENT HEMOGLOBIN A1C LEVEL 8.0 - < 9.0%: ICD-10-PCS | Mod: CPTII,NTX,S$GLB, | Performed by: INTERNAL MEDICINE

## 2021-10-22 PROCEDURE — 3052F HG A1C>EQUAL 8.0%<EQUAL 9.0%: CPT | Mod: CPTII,NTX,S$GLB, | Performed by: INTERNAL MEDICINE

## 2021-10-22 PROCEDURE — 1111F DSCHRG MED/CURRENT MED MERGE: CPT | Mod: CPTII,NTX,S$GLB, | Performed by: INTERNAL MEDICINE

## 2021-10-22 PROCEDURE — 4010F PR ACE/ARB THEARPY RXD/TAKEN: ICD-10-PCS | Mod: CPTII,NTX,S$GLB, | Performed by: INTERNAL MEDICINE

## 2021-10-22 PROCEDURE — 3078F PR MOST RECENT DIASTOLIC BLOOD PRESSURE < 80 MM HG: ICD-10-PCS | Mod: CPTII,NTX,S$GLB, | Performed by: INTERNAL MEDICINE

## 2021-10-22 PROCEDURE — 99999 PR PBB SHADOW E&M-EST. PATIENT-LVL III: ICD-10-PCS | Mod: PBBFAC,TXP,, | Performed by: INTERNAL MEDICINE

## 2021-10-22 PROCEDURE — 36415 COLL VENOUS BLD VENIPUNCTURE: CPT | Mod: TXP | Performed by: INTERNAL MEDICINE

## 2021-10-22 PROCEDURE — 3008F BODY MASS INDEX DOCD: CPT | Mod: CPTII,NTX,S$GLB, | Performed by: INTERNAL MEDICINE

## 2021-10-22 PROCEDURE — 99999 PR PBB SHADOW E&M-EST. PATIENT-LVL III: CPT | Mod: PBBFAC,TXP,, | Performed by: INTERNAL MEDICINE

## 2021-10-22 PROCEDURE — 4010F ACE/ARB THERAPY RXD/TAKEN: CPT | Mod: CPTII,NTX,S$GLB, | Performed by: INTERNAL MEDICINE

## 2021-10-22 PROCEDURE — 83735 ASSAY OF MAGNESIUM: CPT | Mod: TXP | Performed by: INTERNAL MEDICINE

## 2021-10-22 PROCEDURE — 1111F PR DISCHARGE MEDS RECONCILED W/ CURRENT OUTPATIENT MED LIST: ICD-10-PCS | Mod: CPTII,NTX,S$GLB, | Performed by: INTERNAL MEDICINE

## 2021-10-22 PROCEDURE — 99214 OFFICE O/P EST MOD 30 MIN: CPT | Mod: NTX,S$GLB,, | Performed by: INTERNAL MEDICINE

## 2021-10-22 PROCEDURE — 3074F SYST BP LT 130 MM HG: CPT | Mod: CPTII,NTX,S$GLB, | Performed by: INTERNAL MEDICINE

## 2021-10-22 PROCEDURE — 3078F DIAST BP <80 MM HG: CPT | Mod: CPTII,NTX,S$GLB, | Performed by: INTERNAL MEDICINE

## 2021-10-22 PROCEDURE — 3008F PR BODY MASS INDEX (BMI) DOCUMENTED: ICD-10-PCS | Mod: CPTII,NTX,S$GLB, | Performed by: INTERNAL MEDICINE

## 2021-10-22 PROCEDURE — 80053 COMPREHEN METABOLIC PANEL: CPT | Mod: NTX | Performed by: INTERNAL MEDICINE

## 2021-10-22 PROCEDURE — 99214 PR OFFICE/OUTPT VISIT, EST, LEVL IV, 30-39 MIN: ICD-10-PCS | Mod: NTX,S$GLB,, | Performed by: INTERNAL MEDICINE

## 2021-10-22 RX ORDER — SPIRONOLACTONE 25 MG/1
25 TABLET ORAL DAILY
Qty: 30 TABLET | Refills: 11 | Status: SHIPPED | OUTPATIENT
Start: 2021-10-22 | End: 2022-06-29 | Stop reason: SDUPTHER

## 2021-10-22 RX ORDER — METOPROLOL SUCCINATE 100 MG/1
100 TABLET, EXTENDED RELEASE ORAL DAILY
Qty: 30 TABLET | Refills: 6 | Status: SHIPPED | OUTPATIENT
Start: 2021-10-22 | End: 2022-08-18

## 2021-10-22 RX ORDER — TORSEMIDE 10 MG/1
20 TABLET ORAL DAILY
Qty: 60 TABLET | Refills: 11 | Status: SHIPPED | OUTPATIENT
Start: 2021-10-22 | End: 2022-03-16

## 2021-10-22 RX ORDER — DAPAGLIFLOZIN 10 MG/1
10 TABLET, FILM COATED ORAL DAILY
Qty: 30 TABLET | Refills: 3 | Status: SHIPPED | OUTPATIENT
Start: 2021-10-22 | End: 2022-03-31 | Stop reason: SDUPTHER

## 2021-10-22 RX ORDER — ATORVASTATIN CALCIUM 80 MG/1
80 TABLET, FILM COATED ORAL DAILY
Qty: 90 TABLET | Refills: 3 | Status: ON HOLD | OUTPATIENT
Start: 2021-10-22 | End: 2022-10-16 | Stop reason: SDUPTHER

## 2021-10-25 ENCOUNTER — TELEPHONE (OUTPATIENT)
Dept: TRANSPLANT | Facility: CLINIC | Age: 61
End: 2021-10-25
Payer: COMMERCIAL

## 2021-10-26 ENCOUNTER — TELEPHONE (OUTPATIENT)
Dept: ELECTROPHYSIOLOGY | Facility: CLINIC | Age: 61
End: 2021-10-26
Payer: COMMERCIAL

## 2021-10-27 ENCOUNTER — TELEPHONE (OUTPATIENT)
Dept: TRANSPLANT | Facility: CLINIC | Age: 61
End: 2021-10-27
Payer: COMMERCIAL

## 2021-10-27 ENCOUNTER — TELEPHONE (OUTPATIENT)
Dept: ELECTROPHYSIOLOGY | Facility: CLINIC | Age: 61
End: 2021-10-27
Payer: COMMERCIAL

## 2021-10-27 DIAGNOSIS — I42.8 NON-ISCHEMIC CARDIOMYOPATHY: Primary | ICD-10-CM

## 2021-11-01 ENCOUNTER — OFFICE VISIT (OUTPATIENT)
Dept: ELECTROPHYSIOLOGY | Facility: CLINIC | Age: 61
End: 2021-11-01
Payer: COMMERCIAL

## 2021-11-01 VITALS
DIASTOLIC BLOOD PRESSURE: 78 MMHG | HEART RATE: 82 BPM | BODY MASS INDEX: 32.53 KG/M2 | SYSTOLIC BLOOD PRESSURE: 120 MMHG | HEIGHT: 66 IN | WEIGHT: 202.38 LBS

## 2021-11-01 DIAGNOSIS — I42.0 DILATED CARDIOMYOPATHY: ICD-10-CM

## 2021-11-01 DIAGNOSIS — I63.411 EMBOLIC STROKE INVOLVING RIGHT MIDDLE CEREBRAL ARTERY: ICD-10-CM

## 2021-11-01 DIAGNOSIS — E11.9 TYPE 2 DIABETES MELLITUS WITHOUT COMPLICATION, WITHOUT LONG-TERM CURRENT USE OF INSULIN: ICD-10-CM

## 2021-11-01 DIAGNOSIS — F32.A DEPRESSION, UNSPECIFIED DEPRESSION TYPE: ICD-10-CM

## 2021-11-01 DIAGNOSIS — I27.20 PULMONARY HYPERTENSION: ICD-10-CM

## 2021-11-01 DIAGNOSIS — I10 PRIMARY HYPERTENSION: ICD-10-CM

## 2021-11-01 DIAGNOSIS — I50.20 HFREF (HEART FAILURE WITH REDUCED EJECTION FRACTION): ICD-10-CM

## 2021-11-01 DIAGNOSIS — I42.8 NON-ISCHEMIC CARDIOMYOPATHY: Primary | ICD-10-CM

## 2021-11-01 DIAGNOSIS — F41.9 ANXIETY: ICD-10-CM

## 2021-11-01 DIAGNOSIS — E66.9 CLASS 1 OBESITY WITH BODY MASS INDEX (BMI) OF 32.0 TO 32.9 IN ADULT, UNSPECIFIED OBESITY TYPE, UNSPECIFIED WHETHER SERIOUS COMORBIDITY PRESENT: ICD-10-CM

## 2021-11-01 DIAGNOSIS — E78.2 MIXED HYPERLIPIDEMIA: ICD-10-CM

## 2021-11-01 DIAGNOSIS — F20.9 SCHIZOPHRENIA, UNSPECIFIED TYPE: ICD-10-CM

## 2021-11-01 DIAGNOSIS — J44.9 CHRONIC OBSTRUCTIVE PULMONARY DISEASE, UNSPECIFIED COPD TYPE: ICD-10-CM

## 2021-11-01 PROCEDURE — 99205 PR OFFICE/OUTPT VISIT, NEW, LEVL V, 60-74 MIN: ICD-10-PCS | Mod: S$GLB,,, | Performed by: STUDENT IN AN ORGANIZED HEALTH CARE EDUCATION/TRAINING PROGRAM

## 2021-11-01 PROCEDURE — 1159F MED LIST DOCD IN RCRD: CPT | Mod: CPTII,S$GLB,, | Performed by: STUDENT IN AN ORGANIZED HEALTH CARE EDUCATION/TRAINING PROGRAM

## 2021-11-01 PROCEDURE — 1111F PR DISCHARGE MEDS RECONCILED W/ CURRENT OUTPATIENT MED LIST: ICD-10-PCS | Mod: CPTII,S$GLB,, | Performed by: STUDENT IN AN ORGANIZED HEALTH CARE EDUCATION/TRAINING PROGRAM

## 2021-11-01 PROCEDURE — 3008F PR BODY MASS INDEX (BMI) DOCUMENTED: ICD-10-PCS | Mod: CPTII,S$GLB,, | Performed by: STUDENT IN AN ORGANIZED HEALTH CARE EDUCATION/TRAINING PROGRAM

## 2021-11-01 PROCEDURE — 4010F PR ACE/ARB THEARPY RXD/TAKEN: ICD-10-PCS | Mod: CPTII,S$GLB,, | Performed by: STUDENT IN AN ORGANIZED HEALTH CARE EDUCATION/TRAINING PROGRAM

## 2021-11-01 PROCEDURE — 93005 EKG 12-LEAD: ICD-10-PCS | Mod: S$GLB,,, | Performed by: STUDENT IN AN ORGANIZED HEALTH CARE EDUCATION/TRAINING PROGRAM

## 2021-11-01 PROCEDURE — 3078F PR MOST RECENT DIASTOLIC BLOOD PRESSURE < 80 MM HG: ICD-10-PCS | Mod: CPTII,S$GLB,, | Performed by: STUDENT IN AN ORGANIZED HEALTH CARE EDUCATION/TRAINING PROGRAM

## 2021-11-01 PROCEDURE — 3074F SYST BP LT 130 MM HG: CPT | Mod: CPTII,S$GLB,, | Performed by: STUDENT IN AN ORGANIZED HEALTH CARE EDUCATION/TRAINING PROGRAM

## 2021-11-01 PROCEDURE — 93010 ELECTROCARDIOGRAM REPORT: CPT | Mod: S$GLB,,, | Performed by: INTERNAL MEDICINE

## 2021-11-01 PROCEDURE — 99999 PR PBB SHADOW E&M-EST. PATIENT-LVL IV: CPT | Mod: PBBFAC,,, | Performed by: STUDENT IN AN ORGANIZED HEALTH CARE EDUCATION/TRAINING PROGRAM

## 2021-11-01 PROCEDURE — 93010 EKG 12-LEAD: ICD-10-PCS | Mod: S$GLB,,, | Performed by: INTERNAL MEDICINE

## 2021-11-01 PROCEDURE — 3074F PR MOST RECENT SYSTOLIC BLOOD PRESSURE < 130 MM HG: ICD-10-PCS | Mod: CPTII,S$GLB,, | Performed by: STUDENT IN AN ORGANIZED HEALTH CARE EDUCATION/TRAINING PROGRAM

## 2021-11-01 PROCEDURE — 1159F PR MEDICATION LIST DOCUMENTED IN MEDICAL RECORD: ICD-10-PCS | Mod: CPTII,S$GLB,, | Performed by: STUDENT IN AN ORGANIZED HEALTH CARE EDUCATION/TRAINING PROGRAM

## 2021-11-01 PROCEDURE — 3052F PR MOST RECENT HEMOGLOBIN A1C LEVEL 8.0 - < 9.0%: ICD-10-PCS | Mod: CPTII,S$GLB,, | Performed by: STUDENT IN AN ORGANIZED HEALTH CARE EDUCATION/TRAINING PROGRAM

## 2021-11-01 PROCEDURE — 3008F BODY MASS INDEX DOCD: CPT | Mod: CPTII,S$GLB,, | Performed by: STUDENT IN AN ORGANIZED HEALTH CARE EDUCATION/TRAINING PROGRAM

## 2021-11-01 PROCEDURE — 93005 ELECTROCARDIOGRAM TRACING: CPT | Mod: S$GLB,,, | Performed by: STUDENT IN AN ORGANIZED HEALTH CARE EDUCATION/TRAINING PROGRAM

## 2021-11-01 PROCEDURE — 4010F ACE/ARB THERAPY RXD/TAKEN: CPT | Mod: CPTII,S$GLB,, | Performed by: STUDENT IN AN ORGANIZED HEALTH CARE EDUCATION/TRAINING PROGRAM

## 2021-11-01 PROCEDURE — 1111F DSCHRG MED/CURRENT MED MERGE: CPT | Mod: CPTII,S$GLB,, | Performed by: STUDENT IN AN ORGANIZED HEALTH CARE EDUCATION/TRAINING PROGRAM

## 2021-11-01 PROCEDURE — 99999 PR PBB SHADOW E&M-EST. PATIENT-LVL IV: ICD-10-PCS | Mod: PBBFAC,,, | Performed by: STUDENT IN AN ORGANIZED HEALTH CARE EDUCATION/TRAINING PROGRAM

## 2021-11-01 PROCEDURE — 3052F HG A1C>EQUAL 8.0%<EQUAL 9.0%: CPT | Mod: CPTII,S$GLB,, | Performed by: STUDENT IN AN ORGANIZED HEALTH CARE EDUCATION/TRAINING PROGRAM

## 2021-11-01 PROCEDURE — 99205 OFFICE O/P NEW HI 60 MIN: CPT | Mod: S$GLB,,, | Performed by: STUDENT IN AN ORGANIZED HEALTH CARE EDUCATION/TRAINING PROGRAM

## 2021-11-01 PROCEDURE — 3078F DIAST BP <80 MM HG: CPT | Mod: CPTII,S$GLB,, | Performed by: STUDENT IN AN ORGANIZED HEALTH CARE EDUCATION/TRAINING PROGRAM

## 2021-11-01 RX ORDER — GLIMEPIRIDE 4 MG/1
4 TABLET ORAL 2 TIMES DAILY
Status: ON HOLD | COMMUNITY
Start: 2021-10-21 | End: 2023-06-01 | Stop reason: HOSPADM

## 2021-11-05 ENCOUNTER — TELEPHONE (OUTPATIENT)
Dept: ELECTROPHYSIOLOGY | Facility: CLINIC | Age: 61
End: 2021-11-05
Payer: COMMERCIAL

## 2021-11-05 DIAGNOSIS — I42.0 DILATED CARDIOMYOPATHY: Primary | ICD-10-CM

## 2021-11-10 ENCOUNTER — PATIENT MESSAGE (OUTPATIENT)
Dept: ELECTROPHYSIOLOGY | Facility: CLINIC | Age: 61
End: 2021-11-10
Payer: COMMERCIAL

## 2021-11-15 ENCOUNTER — TELEPHONE (OUTPATIENT)
Dept: ELECTROPHYSIOLOGY | Facility: CLINIC | Age: 61
End: 2021-11-15
Payer: COMMERCIAL

## 2021-11-26 ENCOUNTER — TELEPHONE (OUTPATIENT)
Dept: ELECTROPHYSIOLOGY | Facility: CLINIC | Age: 61
End: 2021-11-26
Payer: COMMERCIAL

## 2021-11-29 ENCOUNTER — TELEPHONE (OUTPATIENT)
Dept: ELECTROPHYSIOLOGY | Facility: CLINIC | Age: 61
End: 2021-11-29
Payer: COMMERCIAL

## 2021-11-29 NOTE — HPI
Diomedes Mohr presents to short stay cardiac unit on 11/30/2021 for planned dual chamber ICD with Dr. Torres.     Ms. Mohr is a 61 y.o. 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 She presented to Dr. Torres earlier this month for evaluation of primary prevention ICD in the setting of NICM with persistent reduction of systolic function despite guideline-directed medical therapy.     At initial visit, Ms. Mohr reported feeling well. She has periods of transient dizziness and lightheadedness, especially prominent with positional changes. Reported baseline shortness of breath and dyspnea on exertion which she attributes to physical deconditioning and her underlying COPD. Ms. Mohr can climb approximately 2 flights of stairs prior to needing to take a break. She reported sleeping on 3-4 pillows for comfort, although she reports that if she lies completely supine for prolonged periods of time (more than an hour) she endorses orthopnea. Has occasional chest tightness with rest and exertion.     Given persistently reduced systolic function with EF 15% despite GDMT > 3 months and NYHA class III symptoms, Ms. Mohr meets class IA indication for primary prevention device implant. Dr. Torres discussed   ICD implantation. The [place] shared decision making tool was utilized to arrive at a shared decision. Ms. Mohr agreed to proceed with implantation of a primary prevention ICD.     Today, Ms. Mohr reports feeling well. She denies any new or acute symptoms. She remains on GDMT. Review of recent labs show normal renal function with Cr 0.9. Coags WNL.     EKG:  My independent visualization of most recent EKG is NSR 68  bpm

## 2021-11-29 NOTE — ASSESSMENT & PLAN NOTE
LVEF 15% despite GDMT > 3 months and NYHA class III symptoms; meets class IA indication for primary prevention device implant    - Prior to procedure, we discussed the alternatives, benefits and risks of the procedure including pain, infection, bleeding, injury to lung causing pneumothorax requiring tube placement, injury to heart valves, puncture of the heart leading to pericardial effusion or tamponade requiring tube drainage, heart attack, stroke and death.  Ms. Mohr voices understanding and all questions have been answered.   - Proceed with dual chamber ICD (SJM) insertion   - Anesthesia for sedation

## 2021-11-30 ENCOUNTER — HOSPITAL ENCOUNTER (OUTPATIENT)
Facility: HOSPITAL | Age: 61
Discharge: HOME | End: 2021-11-30
Attending: STUDENT IN AN ORGANIZED HEALTH CARE EDUCATION/TRAINING PROGRAM | Admitting: STUDENT IN AN ORGANIZED HEALTH CARE EDUCATION/TRAINING PROGRAM
Payer: COMMERCIAL

## 2021-11-30 ENCOUNTER — ANESTHESIA EVENT (OUTPATIENT)
Dept: MEDSURG UNIT | Facility: HOSPITAL | Age: 61
End: 2021-11-30
Payer: COMMERCIAL

## 2021-11-30 ENCOUNTER — ANESTHESIA (OUTPATIENT)
Dept: MEDSURG UNIT | Facility: HOSPITAL | Age: 61
End: 2021-11-30
Payer: COMMERCIAL

## 2021-11-30 VITALS
OXYGEN SATURATION: 96 % | SYSTOLIC BLOOD PRESSURE: 113 MMHG | WEIGHT: 197 LBS | HEART RATE: 61 BPM | RESPIRATION RATE: 18 BRPM | TEMPERATURE: 98 F | HEIGHT: 66 IN | BODY MASS INDEX: 31.66 KG/M2 | DIASTOLIC BLOOD PRESSURE: 53 MMHG

## 2021-11-30 DIAGNOSIS — I42.8 NON-ISCHEMIC CARDIOMYOPATHY: ICD-10-CM

## 2021-11-30 DIAGNOSIS — Z95.9 CARDIAC DEVICE IN SITU: ICD-10-CM

## 2021-11-30 LAB
APTT BLDCRRT: 28.2 SEC (ref 21–32)
INR PPP: 0.9 (ref 0.8–1.2)
POCT GLUCOSE: 127 MG/DL (ref 70–110)
POCT GLUCOSE: 136 MG/DL (ref 70–110)
PROTHROMBIN TIME: 10.3 SEC (ref 9–12.5)

## 2021-11-30 PROCEDURE — 63600175 PHARM REV CODE 636 W HCPCS: Performed by: STUDENT IN AN ORGANIZED HEALTH CARE EDUCATION/TRAINING PROGRAM

## 2021-11-30 PROCEDURE — 93010 EKG 12-LEAD: ICD-10-PCS | Mod: ,,, | Performed by: INTERNAL MEDICINE

## 2021-11-30 PROCEDURE — C1894 INTRO/SHEATH, NON-LASER: HCPCS | Performed by: STUDENT IN AN ORGANIZED HEALTH CARE EDUCATION/TRAINING PROGRAM

## 2021-11-30 PROCEDURE — 25000003 PHARM REV CODE 250: Performed by: NURSE ANESTHETIST, CERTIFIED REGISTERED

## 2021-11-30 PROCEDURE — 93005 ELECTROCARDIOGRAM TRACING: CPT | Mod: 59

## 2021-11-30 PROCEDURE — D9220A PRA ANESTHESIA: Mod: ANES,,, | Performed by: ANESTHESIOLOGY

## 2021-11-30 PROCEDURE — 82962 GLUCOSE BLOOD TEST: CPT | Performed by: STUDENT IN AN ORGANIZED HEALTH CARE EDUCATION/TRAINING PROGRAM

## 2021-11-30 PROCEDURE — D9220A PRA ANESTHESIA: Mod: CRNA,,, | Performed by: NURSE ANESTHETIST, CERTIFIED REGISTERED

## 2021-11-30 PROCEDURE — 33249 INSJ/RPLCMT DEFIB W/LEAD(S): CPT | Performed by: STUDENT IN AN ORGANIZED HEALTH CARE EDUCATION/TRAINING PROGRAM

## 2021-11-30 PROCEDURE — 37000009 HC ANESTHESIA EA ADD 15 MINS: Performed by: STUDENT IN AN ORGANIZED HEALTH CARE EDUCATION/TRAINING PROGRAM

## 2021-11-30 PROCEDURE — C1898 LEAD, PMKR, OTHER THAN TRANS: HCPCS | Performed by: STUDENT IN AN ORGANIZED HEALTH CARE EDUCATION/TRAINING PROGRAM

## 2021-11-30 PROCEDURE — 63600175 PHARM REV CODE 636 W HCPCS: Performed by: ANESTHESIOLOGY

## 2021-11-30 PROCEDURE — D9220A PRA ANESTHESIA: ICD-10-PCS | Mod: ANES,,, | Performed by: ANESTHESIOLOGY

## 2021-11-30 PROCEDURE — C1721 AICD, DUAL CHAMBER: HCPCS | Performed by: STUDENT IN AN ORGANIZED HEALTH CARE EDUCATION/TRAINING PROGRAM

## 2021-11-30 PROCEDURE — C1777 LEAD, AICD, ENDO SINGLE COIL: HCPCS | Performed by: STUDENT IN AN ORGANIZED HEALTH CARE EDUCATION/TRAINING PROGRAM

## 2021-11-30 PROCEDURE — 85610 PROTHROMBIN TIME: CPT | Performed by: NURSE PRACTITIONER

## 2021-11-30 PROCEDURE — 25500020 PHARM REV CODE 255: Performed by: NURSE ANESTHETIST, CERTIFIED REGISTERED

## 2021-11-30 PROCEDURE — A4216 STERILE WATER/SALINE, 10 ML: HCPCS | Performed by: NURSE ANESTHETIST, CERTIFIED REGISTERED

## 2021-11-30 PROCEDURE — 33249 INSJ/RPLCMT DEFIB W/LEAD(S): CPT | Mod: ,,, | Performed by: STUDENT IN AN ORGANIZED HEALTH CARE EDUCATION/TRAINING PROGRAM

## 2021-11-30 PROCEDURE — 85730 THROMBOPLASTIN TIME PARTIAL: CPT | Performed by: NURSE PRACTITIONER

## 2021-11-30 PROCEDURE — 93010 ELECTROCARDIOGRAM REPORT: CPT | Mod: ,,, | Performed by: INTERNAL MEDICINE

## 2021-11-30 PROCEDURE — 25000003 PHARM REV CODE 250: Performed by: NURSE PRACTITIONER

## 2021-11-30 PROCEDURE — 33249 PR ICD INSERT SNGL/DUAL W/LEADS: ICD-10-PCS | Mod: ,,, | Performed by: STUDENT IN AN ORGANIZED HEALTH CARE EDUCATION/TRAINING PROGRAM

## 2021-11-30 PROCEDURE — D9220A PRA ANESTHESIA: ICD-10-PCS | Mod: CRNA,,, | Performed by: NURSE ANESTHETIST, CERTIFIED REGISTERED

## 2021-11-30 PROCEDURE — 37000008 HC ANESTHESIA 1ST 15 MINUTES: Performed by: STUDENT IN AN ORGANIZED HEALTH CARE EDUCATION/TRAINING PROGRAM

## 2021-11-30 PROCEDURE — 63600175 PHARM REV CODE 636 W HCPCS: Performed by: NURSE PRACTITIONER

## 2021-11-30 PROCEDURE — 25000003 PHARM REV CODE 250: Performed by: STUDENT IN AN ORGANIZED HEALTH CARE EDUCATION/TRAINING PROGRAM

## 2021-11-30 PROCEDURE — 63600175 PHARM REV CODE 636 W HCPCS: Performed by: NURSE ANESTHETIST, CERTIFIED REGISTERED

## 2021-11-30 DEVICE — DEFIBRILLATION LEAD
Type: IMPLANTABLE DEVICE | Site: HEART | Status: FUNCTIONAL
Brand: DURATA™

## 2021-11-30 DEVICE — TIERED-THERAPY CARDIOVERTER/DEFIBRILLATOR VVEV VVIR
Type: IMPLANTABLE DEVICE | Site: CHEST | Status: FUNCTIONAL
Brand: FORTIFY ASSURA™

## 2021-11-30 DEVICE — PACING LEAD
Type: IMPLANTABLE DEVICE | Site: HEART | Status: FUNCTIONAL
Brand: TENDRIL™

## 2021-11-30 RX ORDER — ONDANSETRON 2 MG/ML
INJECTION INTRAMUSCULAR; INTRAVENOUS
Status: DISCONTINUED | OUTPATIENT
Start: 2021-11-30 | End: 2021-11-30

## 2021-11-30 RX ORDER — IODIXANOL 320 MG/ML
INJECTION, SOLUTION INTRAVASCULAR
Status: DISCONTINUED | OUTPATIENT
Start: 2021-11-30 | End: 2021-11-30

## 2021-11-30 RX ORDER — OXYCODONE AND ACETAMINOPHEN 7.5; 325 MG/1; MG/1
1 TABLET ORAL ONCE
Status: COMPLETED | OUTPATIENT
Start: 2021-11-30 | End: 2021-11-30

## 2021-11-30 RX ORDER — ETOMIDATE 2 MG/ML
INJECTION INTRAVENOUS
Status: DISCONTINUED | OUTPATIENT
Start: 2021-11-30 | End: 2021-11-30

## 2021-11-30 RX ORDER — LIDOCAINE HYDROCHLORIDE 20 MG/ML
INJECTION INTRAVENOUS
Status: DISCONTINUED | OUTPATIENT
Start: 2021-11-30 | End: 2021-11-30

## 2021-11-30 RX ORDER — CEFAZOLIN SODIUM 1 G/3ML
2 INJECTION, POWDER, FOR SOLUTION INTRAMUSCULAR; INTRAVENOUS
Status: COMPLETED | OUTPATIENT
Start: 2021-11-30 | End: 2021-11-30

## 2021-11-30 RX ORDER — SODIUM CHLORIDE 0.9 % (FLUSH) 0.9 %
3 SYRINGE (ML) INJECTION
Status: DISCONTINUED | OUTPATIENT
Start: 2021-11-30 | End: 2021-11-30

## 2021-11-30 RX ORDER — DOXYCYCLINE 100 MG/1
100 CAPSULE ORAL 2 TIMES DAILY
Qty: 10 CAPSULE | Refills: 0 | Status: SHIPPED | OUTPATIENT
Start: 2021-11-30 | End: 2021-12-05

## 2021-11-30 RX ORDER — BUPIVACAINE HYDROCHLORIDE 2.5 MG/ML
INJECTION, SOLUTION EPIDURAL; INFILTRATION; INTRACAUDAL
Status: DISCONTINUED | OUTPATIENT
Start: 2021-11-30 | End: 2021-11-30 | Stop reason: HOSPADM

## 2021-11-30 RX ORDER — SODIUM CHLORIDE 0.9 G/100ML
IRRIGANT IRRIGATION
Status: DISCONTINUED | OUTPATIENT
Start: 2021-11-30 | End: 2021-11-30 | Stop reason: HOSPADM

## 2021-11-30 RX ORDER — LIDOCAINE HYDROCHLORIDE 20 MG/ML
INJECTION, SOLUTION INFILTRATION; PERINEURAL
Status: DISCONTINUED | OUTPATIENT
Start: 2021-11-30 | End: 2021-11-30 | Stop reason: HOSPADM

## 2021-11-30 RX ORDER — HYDROMORPHONE HYDROCHLORIDE 1 MG/ML
0.2 INJECTION, SOLUTION INTRAMUSCULAR; INTRAVENOUS; SUBCUTANEOUS EVERY 5 MIN PRN
Status: DISCONTINUED | OUTPATIENT
Start: 2021-11-30 | End: 2021-11-30 | Stop reason: HOSPADM

## 2021-11-30 RX ORDER — DEXMEDETOMIDINE HYDROCHLORIDE 100 UG/ML
INJECTION, SOLUTION INTRAVENOUS
Status: DISCONTINUED | OUTPATIENT
Start: 2021-11-30 | End: 2021-11-30

## 2021-11-30 RX ORDER — ACETAMINOPHEN 325 MG/1
650 TABLET ORAL EVERY 4 HOURS PRN
Status: DISCONTINUED | OUTPATIENT
Start: 2021-11-30 | End: 2021-11-30 | Stop reason: HOSPADM

## 2021-11-30 RX ORDER — MIDAZOLAM HYDROCHLORIDE 1 MG/ML
INJECTION, SOLUTION INTRAMUSCULAR; INTRAVENOUS
Status: DISCONTINUED | OUTPATIENT
Start: 2021-11-30 | End: 2021-11-30

## 2021-11-30 RX ORDER — VANCOMYCIN HYDROCHLORIDE 1 G/20ML
INJECTION, POWDER, LYOPHILIZED, FOR SOLUTION INTRAVENOUS
Status: DISCONTINUED | OUTPATIENT
Start: 2021-11-30 | End: 2021-11-30 | Stop reason: HOSPADM

## 2021-11-30 RX ORDER — FENTANYL CITRATE 50 UG/ML
INJECTION, SOLUTION INTRAMUSCULAR; INTRAVENOUS
Status: DISCONTINUED | OUTPATIENT
Start: 2021-11-30 | End: 2021-11-30

## 2021-11-30 RX ADMIN — ETOMIDATE 2 MG: 2 INJECTION, SOLUTION INTRAVENOUS at 11:11

## 2021-11-30 RX ADMIN — LIDOCAINE HYDROCHLORIDE 40 MG: 20 INJECTION, SOLUTION INTRAVENOUS at 10:11

## 2021-11-30 RX ADMIN — SODIUM CHLORIDE: 0.9 INJECTION, SOLUTION INTRAVENOUS at 10:11

## 2021-11-30 RX ADMIN — MIDAZOLAM HYDROCHLORIDE 2 MG: 1 INJECTION, SOLUTION INTRAMUSCULAR; INTRAVENOUS at 10:11

## 2021-11-30 RX ADMIN — HYDROMORPHONE HYDROCHLORIDE 0.2 MG: 1 INJECTION, SOLUTION INTRAMUSCULAR; INTRAVENOUS; SUBCUTANEOUS at 02:11

## 2021-11-30 RX ADMIN — DEXMEDETOMIDINE HYDROCHLORIDE 45 MCG: 100 INJECTION, SOLUTION, CONCENTRATE INTRAVENOUS at 10:11

## 2021-11-30 RX ADMIN — ACETAMINOPHEN 650 MG: 325 TABLET ORAL at 01:11

## 2021-11-30 RX ADMIN — OXYCODONE AND ACETAMINOPHEN 1 TABLET: 7.5; 325 TABLET ORAL at 04:11

## 2021-11-30 RX ADMIN — FENTANYL CITRATE 25 MCG: 50 INJECTION, SOLUTION INTRAMUSCULAR; INTRAVENOUS at 11:11

## 2021-11-30 RX ADMIN — IODIXANOL 10 ML: 320 INJECTION, SOLUTION INTRAVASCULAR at 11:11

## 2021-11-30 RX ADMIN — CEFAZOLIN 2 G: 330 INJECTION, POWDER, FOR SOLUTION INTRAMUSCULAR; INTRAVENOUS at 10:11

## 2021-11-30 RX ADMIN — FENTANYL CITRATE 25 MCG: 50 INJECTION, SOLUTION INTRAMUSCULAR; INTRAVENOUS at 10:11

## 2021-11-30 RX ADMIN — GLYCOPYRROLATE 0.1 MG: 0.2 INJECTION, SOLUTION INTRAMUSCULAR; INTRAVITREAL at 10:11

## 2021-11-30 RX ADMIN — ONDANSETRON 4 MG: 2 INJECTION INTRAMUSCULAR; INTRAVENOUS at 10:11

## 2021-11-30 RX ADMIN — DEXMEDETOMIDINE HYDROCHLORIDE 1 MCG/KG/HR: 100 INJECTION, SOLUTION, CONCENTRATE INTRAVENOUS at 10:11

## 2021-11-30 NOTE — ASSESSMENT & PLAN NOTE
s/p dcICD insertion     Plan:  - Doxycycline 100 mg BID x 5 days for surgical prophylaxis  - Resume PTA medications   - Follow up in device clinic in 1 week  - Follow up with Dr. Fischer in 4 months   - Discharge plans/instructions discussed with patient who verbalized understanding and agreement of plans of care.

## 2021-11-30 NOTE — HOSPITAL COURSE
Ms. Mohr underwent dual chamber ICD implantation .  She tolerated the procedure well and there were no acute complications. Left pectoral site CDI.  No bleeding or hematoma noted. Aquacel and pressure dressings in place.  Left arm sling in place. Post-procedure CXR stable without evidence of pneumothorax.  ECG and tele reviewed without evidence of arrhythmias. Completed intra op abx, plan to discharge home on doxycycline 100 mg BID x 5 days for surgical prophylaxis.  Ms. Mohr was assessed at bedside prior to discharge. She reported feeling well and denied chest discomfort, shortness of breath, palpitations, lightheadedness, or any other acute symptoms. Discharge instructions were discussed and all questions were answered. Ms. Mohr was discharged home in stable condition.

## 2021-11-30 NOTE — Clinical Note
The lead was inserted under fluorscopic guidance. The lead was inserted in the right ventricle.

## 2021-11-30 NOTE — PROGRESS NOTES
"Dr silva at bedside. Aware that awaiting chest xray.  12 lead ekg done per md order. Pt awake and complaining of "upper leg pain from old angiogram"  Pt turned to side with warm blanket.  Warm blankets placed over adrien thighs/groin area.  Prn po and iv pain meds given per md order.   "

## 2021-11-30 NOTE — SUBJECTIVE & OBJECTIVE
Past Medical History:   Diagnosis Date    Anxiety     Arthritis     Asthma     CHF (congestive heart failure)     COPD (chronic obstructive pulmonary disease)     Depression     Diabetes mellitus     Dyspnea     H/O coronary angiogram     H/O: hysterectomy     Hyperlipemia     Hypertension     Pulmonary edema     Schizophrenia     Stroke        Past Surgical History:   Procedure Laterality Date    BLADDER SUSPENSION      CARPAL TUNNEL RELEASE Right     HEEL SPUR SURGERY Left     HYSTERECTOMY      LEFT HEART CATHETERIZATION Bilateral 12/27/2019    Procedure: Left heart cath;  Surgeon: Steve Chambers MD;  Location: [facility] CATH LAB;  Service: Cardiology;  Laterality: Bilateral;    RIGHT HEART CATHETERIZATION Right 7/26/2021    Procedure: INSERTION, CATHETER, RIGHT HEART;  Surgeon: Petr Naranjo MD;  Location[place] [facility];  Service: Cardiology;  Laterality: Right;    TUBAL LIGATION         Review of patient's allergies indicates:   Allergen Reactions    Captopril Other (See Comments)     COUGH       No current facility-administered medications on file prior to encounter.     Current Outpatient Medications on File Prior to Encounter   Medication Sig    aspirin 81 MG Chew Take 1 tablet (81 mg total) by mouth once daily.    atorvastatin (LIPITOR) 80 MG tablet Take 1 tablet (80 mg total) by mouth once daily.    dapagliflozin (FARXIGA) 10 mg tablet Take 1 tablet (10 mg total) by mouth once daily.    glimepiride (AMARYL) 1 MG tablet Take 4 mg by mouth 2 (two) times a day.     metFORMIN (GLUCOPHAGE) 1000 MG tablet Take 1,000 mg by mouth 2 (two) times daily.     metoprolol succinate (TOPROL-XL) 100 MG 24 hr tablet Take 1 tablet (100 mg total) by mouth once daily.    potassium chloride (MICRO-K) 10 MEQ CpSR     pregabalin (LYRICA) 100 MG capsule Take 100 mg by mouth 2 (two) times daily as needed (pain).    sacubitriL-valsartan (ENTRESTO)  mg per tablet Take 1 tablet by mouth  2 (two) times daily.    spironolactone (ALDACTONE) 25 MG tablet Take 1 tablet (25 mg total) by mouth once daily.    torsemide (DEMADEX) 10 MG Tab Take 2 tablets (20 mg total) by mouth once daily.    dulaglutide (TRULICITY) 1.5 mg/0.5 mL pen injector Inject into the skin once a week.     glimepiride (AMARYL) 4 MG tablet Take 4 mg by mouth once daily.    meclizine (ANTIVERT) 25 mg tablet Take 1 tablet (25 mg total) by mouth 3 (three) times daily as needed for Dizziness. (Patient not taking: Reported on 2021)     Family History     Problem Relation (Age of Onset)    No Known Problems Mother, Father        Tobacco Use    Smoking status: Former Smoker     Types: Cigarettes     Quit date: 2016     Years since quittin.2    Smokeless tobacco: Never Used    Tobacco comment: nonex 2 weeks   Substance and Sexual Activity    Alcohol use: No     Alcohol/week: 0.0 standard drinks    Drug use: No    Sexual activity: Not on file     Review of Systems   Constitutional: Negative for malaise/fatigue.   Cardiovascular: Positive for dyspnea on exertion and orthopnea. Negative for chest pain, irregular heartbeat, leg swelling, palpitations and syncope.   Respiratory: Positive for shortness of breath.    Hematologic/Lymphatic: Negative for bleeding problem.   Neurological: Positive for light-headedness.   Psychiatric/Behavioral: Negative for altered mental status. The patient is not nervous/anxious.      Objective:     Vital Signs (Most Recent):  Temp: 97.5 °F (36.4 °C) (21)  Pulse: 69 (21)  Resp: 16 (21)  BP: 108/64 (21)  SpO2: 98 % (21) Vital Signs (24h Range):  Temp:  [97.5 °F (36.4 °C)] 97.5 °F (36.4 °C)  Pulse:  [69-77] 69  Resp:  [16] 16  SpO2:  [98 %] 98 %  BP: (106-108)/(64-66) 108/64       Weight: 89.4 kg (197 lb)  Body mass index is 31.8 kg/m².    SpO2: 98 %  O2 Device (Oxygen Therapy): room air    Physical Exam  Vitals reviewed.   Constitutional:        General: Vital signs are normal.      Appearance: She is well-developed and well-nourished.   Cardiovascular:      Rate and Rhythm: Normal rate and regular rhythm.      Pulses: Intact distal pulses.      Heart sounds: Normal heart sounds, S1 normal and S2 normal.   Pulmonary:      Effort: Pulmonary effort is normal.      Breath sounds: Normal breath sounds.   Musculoskeletal:         General: No edema.   Skin:     General: Skin is warm, dry and intact.   Neurological:      Mental Status: She is alert and oriented to person, place, and time.      Deep Tendon Reflexes: Strength normal.

## 2021-11-30 NOTE — PROGRESS NOTES
Received report from Jannette SANDY RN. Patient s/p ICD insertion, AAOx4. VSS, resp even and unlabored. Pressure dressing to L chest is CDI. No active bleeding. No hematoma noted. Post procedure protocol reviewed with patient and patient's family. Understanding verbalized. Family members at bedside. Nurse call bell within reach. Will continue to monitor per post procedure protocol.

## 2021-11-30 NOTE — PLAN OF CARE
"Vss. sats 94% on room air. Pt aaox4 follows commands. Pt arrived from ep lab with left chest incision with dermabond, aquacell ag, guaze/pressure drsg and sling already in place. Pt complaints of "pain to upper thigh/groin area from an old angiogram procedure."  Prn po and iv pain meds given per md order. Bedrest x3hrs done at 1600. Pt's  updated by ep pacu rn. Verbalizes understanding. Dual chamber icd st jenna ddd low limit 60. sr on cm. Box/booklet/card with pt on stretcher. Booklet in chart. 12 lead ekg and chest xray done per md order. See flowsheet for full assessment. bg 138. Pt tolerating sips of water in ep pacu 4.   "

## 2021-11-30 NOTE — Clinical Note
The patient's elbows and knees were padded, heels floated, warming blanket given, and safety strap applied.

## 2021-11-30 NOTE — DISCHARGE SUMMARY
[facility] Short Stay Cardiac Unit  Cardiac Electrophysiology  Discharge Summary      Patient Name: Diomedes Mohr  MRN: [id number]  Admission Date: 11/30/2021  Hospital Length of Stay: 0 days  Discharge Date and Time:  11/30/2021 5:45 PM  Attending Physician: No att. providers found    Discharging Provider: Alondra Deleon NP  Primary Care Physician: Fernando Manzo MD    HPI:   Diomedes Mohr presents to short stay cardiac unit on 11/30/2021 for planned dual chamber ICD with Dr. Torres.     Ms. Mohr is a 61 y.o. 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 She presented to Dr. Torres earlier this month for evaluation of primary prevention ICD in the setting of NICM with persistent reduction of systolic function despite guideline-directed medical therapy.     At initial visit, Ms. Mohr reported feeling well. She has periods of transient dizziness and lightheadedness, especially prominent with positional changes. Reported baseline shortness of breath and dyspnea on exertion which she attributes to physical deconditioning and her underlying COPD. Ms. Mohr can climb approximately 2 flights of stairs prior to needing to take a break. She reported sleeping on 3-4 pillows for comfort, although she reports that if she lies completely supine for prolonged periods of time (more than an hour) she endorses orthopnea. Has occasional chest tightness with rest and exertion.     Given persistently reduced systolic function with EF 15% despite GDMT > 3 months and NYHA class III symptoms, Ms. Mohr meets class IA indication for primary prevention device implant. Dr. Torres discussed   ICD implantation. The [place]  shared decision making tool was utilized to arrive at a shared decision. Ms. Mohr agreed to proceed with implantation of a primary prevention ICD.     Today, Ms. Mohr reports feeling well. She denies any new or acute symptoms. She remains on GDMT. Review of recent labs show normal renal function with Cr 0.9. Coags WNL.     EKG:  My independent visualization of most recent EKG is NSR 68 bpm         Procedure(s) (LRB):  Insertion, ICD, Dual Chamber (Left)     Indwelling Lines/Drains at time of discharge:  Lines/Drains/Airways     None             Hospital Course:  Ms. Mohr underwent dual chamber ICD implantation .  She tolerated the procedure well and there were no acute complications. Left pectoral site CDI.  No bleeding or hematoma noted. Aquacel and pressure dressings in place.  Left arm sling in place. Post-procedure CXR stable without evidence of pneumothorax.  ECG and tele reviewed without evidence of arrhythmias. Completed intra op abx, plan to discharge home on doxycycline 100 mg BID x 5 days for surgical prophylaxis.  Ms. Mohr was assessed at bedside prior to discharge. She reported feeling well and denied chest discomfort, shortness of breath, palpitations, lightheadedness, or any other acute symptoms. Discharge instructions were discussed and all questions were answered. Ms. Mohr was discharged home in stable condition.     Pending Diagnostic Studies:     None          Final Active Diagnoses:    Diagnosis Date Noted POA    Dilated cardiomyopathy [I42.0] 12/27/2019 Yes     Chronic    Chronic combined systolic and diastolic CHF, NYHA class 3 [I50.42] 12/26/2019 Yes     Chronic      Problems Resolved During this Admission:     Dilated cardiomyopathy  s/p dcICD insertion     Plan:  - Doxycycline 100 mg BID x 5 days for surgical prophylaxis  - Resume PTA medications   - Follow up in device clinic in 1 week  - Follow up with Dr. Fischer in 4 months   - Discharge plans/instructions discussed with  "patient who verbalized understanding and agreement of plans of care.         Discharged Condition: good    Disposition: Home or Self Care    Follow Up:   Follow-up Information     A Viola Torres MD In 4 months.    Specialty: Electrophysiology  Why: s/p dcICD insertion   Contact information:  [address]  [facility] [address]  [phone]                       Patient Instructions:      Other restrictions (specify):   Order Comments: Do not get your incision wet for 48 hours following the procedure. You make take a sponge bath during this period.  AFTER 48 hours, you may shower but avoid direct water contact with the incision (okay to shower with AQUACEL dressing-water resistent).  Try to keep your affected area as dry as possible.   You may take regular showers after 2 weeks.     Do not submerge the incision in a tub, pool, or body of water for 6 weeks.    You will be discharged in a sling.  You should wear this continuously for 48 hours.   THEN, the sling may remain off during the day, but worn at night for 2-4 weeks (depending on how active a sleeper you are).     If you were driving prior to the procedure, you may resume driving after your first follow-up appointment (1-2 weeks).    No heavy activity with the affected arm for 6 weeks.    Avoid "rough contact" at the device site for 6 weeks.    You may return to work within 3-5 days, unless told otherwise.    Keep your pacemaker or defibrillator ID card with you at ALL TIMES.     Notify your health care provider if you experience any of the following:  temperature >100.4     Notify your health care provider if you experience any of the following:  severe uncontrolled pain     Notify your health care provider if you experience any of the following:  redness, tenderness, or signs of infection (pain, swelling, redness, odor or green/yellow discharge around incision site)     Notify your health care provider if you experience any of the following:  " difficulty breathing or increased cough     Notify your health care provider if you experience any of the following:  persistent dizziness, light-headedness, or visual disturbances     Activity as tolerated     Medications:  Reconciled Home Medications:      Medication List      START taking these medications    doxycycline 100 MG Cap  Commonly known as: VIBRAMYCIN  Take 1 capsule (100 mg total) by mouth 2 (two) times daily. for 5 days        CONTINUE taking these medications    aspirin 81 MG Chew  Take 1 tablet (81 mg total) by mouth once daily.     atorvastatin 80 MG tablet  Commonly known as: LIPITOR  Take 1 tablet (80 mg total) by mouth once daily.     dulaglutide 1.5 mg/0.5 mL pen injector  Commonly known as: TRULICITY  Inject into the skin once a week.     FARXIGA 10 mg tablet  Generic drug: dapagliflozin  Take 1 tablet (10 mg total) by mouth once daily.     * glimepiride 1 MG tablet  Commonly known as: AMARYL  Take 4 mg by mouth 2 (two) times a day.     * glimepiride 4 MG tablet  Commonly known as: AMARYL  Take 4 mg by mouth once daily.     meclizine 25 mg tablet  Commonly known as: ANTIVERT  Take 1 tablet (25 mg total) by mouth 3 (three) times daily as needed for Dizziness.     metFORMIN 1000 MG tablet  Commonly known as: GLUCOPHAGE  Take 1,000 mg by mouth 2 (two) times daily.     metoprolol succinate 100 MG 24 hr tablet  Commonly known as: TOPROL-XL  Take 1 tablet (100 mg total) by mouth once daily.     potassium chloride 10 MEQ Cpsr  Commonly known as: MICRO-K     pregabalin 100 MG capsule  Commonly known as: LYRICA  Take 100 mg by mouth 2 (two) times daily as needed (pain).     sacubitriL-valsartan  mg per tablet  Commonly known as: ENTRESTO  Take 1 tablet by mouth 2 (two) times daily.     spironolactone 25 MG tablet  Commonly known as: ALDACTONE  Take 1 tablet (25 mg total) by mouth once daily.     torsemide 10 MG Tab  Commonly known as: DEMADEX  Take 2 tablets (20 mg total) by mouth once daily.          * This list has 2 medication(s) that are the same as other medications prescribed for you. Read the directions carefully, and ask your doctor or other care provider to review them with you.                Time spent on the discharge of patient: 20 minutes    Alondra Deleon NP  Cardiac Electrophysiology  [facility] Cardiac [facility]

## 2021-11-30 NOTE — PROGRESS NOTES
Patient discharged per MD orders. Instructions given on medications, wound care, activity, signs of infection, when to call MD, and follow up appointments. Pt & spouse verbalized understanding.  Patient AAOx4, VSS, ambulating per baseline & tolerating food and fluids. Telemetry and PIV removed. Patient left unit via wheelchair with PCT x1 and family.

## 2021-11-30 NOTE — Clinical Note
A venogram was performed in the left subclavian vein. The vessel was injected via hand injection  with 10 mL of contrast. CRNA injected contrast

## 2021-11-30 NOTE — H&P
[facility] Short Stay Cardiac Unit  Cardiac Electrophysiology  History and Physical     Admission Date: 11/30/2021  Code Status: Prior   Attending Provider: DAVID Torres MD   Principal Problem:<principal problem not specified>    Subjective:     Chief Complaint:  NICM     HPI:  Diomedes Mohr presents to short stay cardiac unit on 11/30/2021 for planned dual chamber ICD with Dr. Torres.     Ms. Mohr is a 61 y.o. 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 She presented to Dr. Torres earlier this month for evaluation of primary prevention ICD in the setting of NICM with persistent reduction of systolic function despite guideline-directed medical therapy.     At initial visit, Ms. Mohr reported feeling well. She has periods of transient dizziness and lightheadedness, especially prominent with positional changes. Reported baseline shortness of breath and dyspnea on exertion which she attributes to physical deconditioning and her underlying COPD. Ms. Mohr can climb approximately 2 flights of stairs prior to needing to take a break. She reported sleeping on 3-4 pillows for comfort, although she reports that if she lies completely supine for prolonged periods of time (more than an hour) she endorses orthopnea. Has occasional chest tightness with rest and exertion.     Given persistently reduced systolic function with EF 15% despite GDMT > 3 months and NYHA class III symptoms, Ms. Mohr meets class IA indication for primary prevention device implant. Dr. Torres discussed   ICD implantation. The [place] shared decision making tool was utilized to arrive at a shared decision. Ms. Mohr agreed to  proceed with implantation of a primary prevention ICD.     Today, Ms. Mohr reports feeling well. She denies any new or acute symptoms. She remains on GDMT. Review of recent labs show normal renal function with Cr 0.9. Coags WNL.     EKG:  My independent visualization of most recent EKG is NSR 68 bpm       Past Medical History:   Diagnosis Date    Anxiety     Arthritis     Asthma     CHF (congestive heart failure)     COPD (chronic obstructive pulmonary disease)     Depression     Diabetes mellitus     Dyspnea     H/O coronary angiogram     H/O: hysterectomy     Hyperlipemia     Hypertension     Pulmonary edema     Schizophrenia     Stroke        Past Surgical History:   Procedure Laterality Date    BLADDER SUSPENSION      CARPAL TUNNEL RELEASE Right     HEEL SPUR SURGERY Left     HYSTERECTOMY      LEFT HEART CATHETERIZATION Bilateral 12/27/2019    Procedure: Left heart cath;  Surgeon: Steve Chambers MD;  Location: [facility] CATH LAB;  Service: Cardiology;  Laterality: Bilateral;    RIGHT HEART CATHETERIZATION Right 7/26/2021    Procedure: INSERTION, CATHETER, RIGHT HEART;  Surgeon: Petr Naranjo MD;  Location: [facility] CATH LAB;  Service: Cardiology;  Laterality: Right;    TUBAL LIGATION         Review of patient's allergies indicates:   Allergen Reactions    Captopril Other (See Comments)     COUGH       No current facility-administered medications on file prior to encounter.     Current Outpatient Medications on File Prior to Encounter   Medication Sig    aspirin 81 MG Chew Take 1 tablet (81 mg total) by mouth once daily.    atorvastatin (LIPITOR) 80 MG tablet Take 1 tablet (80 mg total) by mouth once daily.    dapagliflozin (FARXIGA) 10 mg tablet Take 1 tablet (10 mg total) by mouth once daily.    glimepiride (AMARYL) 1 MG tablet Take 4 mg by mouth 2 (two) times a day.     metFORMIN (GLUCOPHAGE) 1000 MG tablet Take 1,000 mg by mouth 2 (two) times daily.     metoprolol  succinate (TOPROL-XL) 100 MG 24 hr tablet Take 1 tablet (100 mg total) by mouth once daily.    potassium chloride (MICRO-K) 10 MEQ CpSR     pregabalin (LYRICA) 100 MG capsule Take 100 mg by mouth 2 (two) times daily as needed (pain).    sacubitriL-valsartan (ENTRESTO)  mg per tablet Take 1 tablet by mouth 2 (two) times daily.    spironolactone (ALDACTONE) 25 MG tablet Take 1 tablet (25 mg total) by mouth once daily.    torsemide (DEMADEX) 10 MG Tab Take 2 tablets (20 mg total) by mouth once daily.    dulaglutide (TRULICITY) 1.5 mg/0.5 mL pen injector Inject into the skin once a week.     glimepiride (AMARYL) 4 MG tablet Take 4 mg by mouth once daily.    meclizine (ANTIVERT) 25 mg tablet Take 1 tablet (25 mg total) by mouth 3 (three) times daily as needed for Dizziness. (Patient not taking: Reported on 2021)     Family History     Problem Relation (Age of Onset)    No Known Problems Mother, Father        Tobacco Use    Smoking status: Former Smoker     Types: Cigarettes     Quit date: 2016     Years since quittin.2    Smokeless tobacco: Never Used    Tobacco comment: nonex 2 weeks   Substance and Sexual Activity    Alcohol use: No     Alcohol/week: 0.0 standard drinks    Drug use: No    Sexual activity: Not on file     Review of Systems   Constitutional: Negative for malaise/fatigue.   Cardiovascular: Positive for dyspnea on exertion and orthopnea. Negative for chest pain, irregular heartbeat, leg swelling, palpitations and syncope.   Respiratory: Positive for shortness of breath.    Hematologic/Lymphatic: Negative for bleeding problem.   Neurological: Positive for light-headedness.   Psychiatric/Behavioral: Negative for altered mental status. The patient is not nervous/anxious.      Objective:     Vital Signs (Most Recent):  Temp: 97.5 °F (36.4 °C) (21)  Pulse: 69 (21)  Resp: 16 (21)  BP: 108/64 (21)  SpO2: 98 % (21) Vital Signs  (24h Range):  Temp:  [97.5 °F (36.4 °C)] 97.5 °F (36.4 °C)  Pulse:  [69-77] 69  Resp:  [16] 16  SpO2:  [98 %] 98 %  BP: (106-108)/(64-66) 108/64       Weight: 89.4 kg (197 lb)  Body mass index is 31.8 kg/m².    SpO2: 98 %  O2 Device (Oxygen Therapy): room air    Physical Exam  Vitals reviewed.   Constitutional:       General: Vital signs are normal.      Appearance: She is well-developed and well-nourished.   Cardiovascular:      Rate and Rhythm: Normal rate and regular rhythm.      Pulses: Intact distal pulses.      Heart sounds: Normal heart sounds, S1 normal and S2 normal.   Pulmonary:      Effort: Pulmonary effort is normal.      Breath sounds: Normal breath sounds.   Musculoskeletal:         General: No edema.   Skin:     General: Skin is warm, dry and intact.   Neurological:      Mental Status: She is alert and oriented to person, place, and time.      Deep Tendon Reflexes: Strength normal.         Assessment and Plan:     Dilated cardiomyopathy  LVEF 15% despite GDMT > 3 months and NYHA class III symptoms; meets class IA indication for primary prevention device implant    - Prior to procedure, we discussed the alternatives, benefits and risks of the procedure including pain, infection, bleeding, injury to lung causing pneumothorax requiring tube placement, injury to heart valves, puncture of the heart leading to pericardial effusion or tamponade requiring tube drainage, heart attack, stroke and death.  Ms. Mohr voices understanding and all questions have been answered.   - Proceed with dual chamber ICD (SJM) insertion   - Anesthesia for sedation             Alondra Deleon NP  Cardiac Electrophysiology  [facility] Cardiac [facility]

## 2021-11-30 NOTE — PLAN OF CARE
Received pt to floor from home accompanied by spouse.  AAO x 4. Denies pain or discomfort. Respirations even and unlabored. No distress noted. Pt stable.  Admit assessment complete. IV x 2 placed.  Pt oriented to room and call bell placed within reach.  Will continue to monitor.

## 2021-12-02 ENCOUNTER — PATIENT MESSAGE (OUTPATIENT)
Dept: CARDIOLOGY | Facility: HOSPITAL | Age: 61
End: 2021-12-02
Payer: COMMERCIAL

## 2021-12-02 ENCOUNTER — TELEPHONE (OUTPATIENT)
Dept: CARDIOLOGY | Facility: HOSPITAL | Age: 61
End: 2021-12-02
Payer: COMMERCIAL

## 2021-12-02 ENCOUNTER — TELEPHONE (OUTPATIENT)
Dept: ELECTROPHYSIOLOGY | Facility: CLINIC | Age: 61
End: 2021-12-02
Payer: COMMERCIAL

## 2021-12-07 ENCOUNTER — CLINICAL SUPPORT (OUTPATIENT)
Dept: CARDIOLOGY | Facility: HOSPITAL | Age: 61
End: 2021-12-07
Attending: STUDENT IN AN ORGANIZED HEALTH CARE EDUCATION/TRAINING PROGRAM
Payer: COMMERCIAL

## 2021-12-07 DIAGNOSIS — I42.8 NON-ISCHEMIC CARDIOMYOPATHY: ICD-10-CM

## 2021-12-07 PROCEDURE — 93283 CARDIAC DEVICE CHECK - IN CLINIC & HOSPITAL: ICD-10-PCS | Mod: 26,,, | Performed by: STUDENT IN AN ORGANIZED HEALTH CARE EDUCATION/TRAINING PROGRAM

## 2021-12-07 PROCEDURE — 93283 PRGRMG EVAL IMPLANTABLE DFB: CPT

## 2021-12-07 PROCEDURE — 93283 PRGRMG EVAL IMPLANTABLE DFB: CPT | Mod: 26,,, | Performed by: STUDENT IN AN ORGANIZED HEALTH CARE EDUCATION/TRAINING PROGRAM

## 2021-12-27 ENCOUNTER — HOSPITAL ENCOUNTER (EMERGENCY)
Facility: HOSPITAL | Age: 61
Discharge: HOME | End: 2021-12-28
Attending: EMERGENCY MEDICINE
Payer: COMMERCIAL

## 2021-12-27 DIAGNOSIS — R05.9 COUGH: ICD-10-CM

## 2021-12-27 DIAGNOSIS — I26.99 PULMONARY EMBOLISM, UNSPECIFIED CHRONICITY, UNSPECIFIED PULMONARY EMBOLISM TYPE, UNSPECIFIED WHETHER ACUTE COR PULMONALE PRESENT: Primary | ICD-10-CM

## 2021-12-27 DIAGNOSIS — I50.9 CONGESTIVE HEART FAILURE, UNSPECIFIED HF CHRONICITY, UNSPECIFIED HEART FAILURE TYPE: ICD-10-CM

## 2021-12-27 DIAGNOSIS — B34.9 VIRAL SYNDROME: ICD-10-CM

## 2021-12-27 DIAGNOSIS — R06.02 SOB (SHORTNESS OF BREATH): ICD-10-CM

## 2021-12-27 LAB
ALBUMIN SERPL BCP-MCNC: 3.5 G/DL (ref 3.5–5.2)
ALP SERPL-CCNC: 153 U/L (ref 55–135)
ALT SERPL W/O P-5'-P-CCNC: 13 U/L (ref 10–44)
ANION GAP SERPL CALC-SCNC: 7 MMOL/L (ref 8–16)
ANISOCYTOSIS BLD QL SMEAR: SLIGHT
AST SERPL-CCNC: 15 U/L (ref 10–40)
BASOPHILS # BLD AUTO: 0.04 K/UL (ref 0–0.2)
BASOPHILS NFR BLD: 0.6 % (ref 0–1.9)
BILIRUB SERPL-MCNC: 0.4 MG/DL (ref 0.1–1)
BNP SERPL-MCNC: 796 PG/ML (ref 0–99)
BUN SERPL-MCNC: 14 MG/DL (ref 6–20)
BURR CELLS BLD QL SMEAR: ABNORMAL
CALCIUM SERPL-MCNC: 9.3 MG/DL (ref 8.7–10.5)
CHLORIDE SERPL-SCNC: 105 MMOL/L (ref 95–110)
CO2 SERPL-SCNC: 27 MMOL/L (ref 23–29)
CREAT SERPL-MCNC: 0.8 MG/DL (ref 0.5–1.4)
CTP QC/QA: YES
D DIMER PPP IA.FEU-MCNC: 1.5 MG/L FEU
DIFFERENTIAL METHOD: ABNORMAL
EOSINOPHIL # BLD AUTO: 0.3 K/UL (ref 0–0.5)
EOSINOPHIL NFR BLD: 4 % (ref 0–8)
ERYTHROCYTE [DISTWIDTH] IN BLOOD BY AUTOMATED COUNT: 14.6 % (ref 11.5–14.5)
EST. GFR  (AFRICAN AMERICAN): >60 ML/MIN/1.73 M^2
EST. GFR  (NON AFRICAN AMERICAN): >60 ML/MIN/1.73 M^2
GLUCOSE SERPL-MCNC: 132 MG/DL (ref 70–110)
HCT VFR BLD AUTO: 40.2 % (ref 37–48.5)
HGB BLD-MCNC: 12.7 G/DL (ref 12–16)
HYPOCHROMIA BLD QL SMEAR: ABNORMAL
IMM GRANULOCYTES # BLD AUTO: 0.02 K/UL (ref 0–0.04)
IMM GRANULOCYTES NFR BLD AUTO: 0.3 % (ref 0–0.5)
LYMPHOCYTES # BLD AUTO: 2.3 K/UL (ref 1–4.8)
LYMPHOCYTES NFR BLD: 37.1 % (ref 18–48)
MCH RBC QN AUTO: 28.2 PG (ref 27–31)
MCHC RBC AUTO-ENTMCNC: 31.6 G/DL (ref 32–36)
MCV RBC AUTO: 89 FL (ref 82–98)
MONOCYTES # BLD AUTO: 0.5 K/UL (ref 0.3–1)
MONOCYTES NFR BLD: 8.7 % (ref 4–15)
NEUTROPHILS # BLD AUTO: 3.1 K/UL (ref 1.8–7.7)
NEUTROPHILS NFR BLD: 49.3 % (ref 38–73)
NRBC BLD-RTO: 0 /100 WBC
OVALOCYTES BLD QL SMEAR: ABNORMAL
PLATELET # BLD AUTO: 308 K/UL (ref 150–450)
PLATELET BLD QL SMEAR: ABNORMAL
PMV BLD AUTO: 10.2 FL (ref 9.2–12.9)
POIKILOCYTOSIS BLD QL SMEAR: SLIGHT
POLYCHROMASIA BLD QL SMEAR: ABNORMAL
POTASSIUM SERPL-SCNC: 3.8 MMOL/L (ref 3.5–5.1)
PROT SERPL-MCNC: 7.3 G/DL (ref 6–8.4)
RBC # BLD AUTO: 4.51 M/UL (ref 4–5.4)
SARS-COV-2 RDRP RESP QL NAA+PROBE: NEGATIVE
SMUDGE CELLS BLD QL SMEAR: PRESENT
SODIUM SERPL-SCNC: 139 MMOL/L (ref 136–145)
TROPONIN I SERPL DL<=0.01 NG/ML-MCNC: 0.01 NG/ML (ref 0–0.03)
WBC # BLD AUTO: 6.22 K/UL (ref 3.9–12.7)

## 2021-12-27 PROCEDURE — 93005 ELECTROCARDIOGRAM TRACING: CPT

## 2021-12-27 PROCEDURE — 85379 FIBRIN DEGRADATION QUANT: CPT | Performed by: EMERGENCY MEDICINE

## 2021-12-27 PROCEDURE — 93010 EKG 12-LEAD: ICD-10-PCS | Mod: ,,, | Performed by: INTERNAL MEDICINE

## 2021-12-27 PROCEDURE — 84484 ASSAY OF TROPONIN QUANT: CPT | Performed by: PHYSICIAN ASSISTANT

## 2021-12-27 PROCEDURE — 25000003 PHARM REV CODE 250: Performed by: PHYSICIAN ASSISTANT

## 2021-12-27 PROCEDURE — 93010 ELECTROCARDIOGRAM REPORT: CPT | Mod: ,,, | Performed by: INTERNAL MEDICINE

## 2021-12-27 PROCEDURE — 80053 COMPREHEN METABOLIC PANEL: CPT | Performed by: PHYSICIAN ASSISTANT

## 2021-12-27 PROCEDURE — U0002 COVID-19 LAB TEST NON-CDC: HCPCS | Performed by: EMERGENCY MEDICINE

## 2021-12-27 PROCEDURE — 99284 EMERGENCY DEPT VISIT MOD MDM: CPT | Mod: CS,,, | Performed by: PHYSICIAN ASSISTANT

## 2021-12-27 PROCEDURE — 83880 ASSAY OF NATRIURETIC PEPTIDE: CPT | Performed by: PHYSICIAN ASSISTANT

## 2021-12-27 PROCEDURE — 85025 COMPLETE CBC W/AUTO DIFF WBC: CPT | Performed by: PHYSICIAN ASSISTANT

## 2021-12-27 PROCEDURE — 99284 PR EMERGENCY DEPT VISIT,LEVEL IV: ICD-10-PCS | Mod: CS,,, | Performed by: PHYSICIAN ASSISTANT

## 2021-12-27 PROCEDURE — 99285 EMERGENCY DEPT VISIT HI MDM: CPT | Mod: 25

## 2021-12-27 RX ORDER — BENZONATATE 100 MG/1
100 CAPSULE ORAL
Status: COMPLETED | OUTPATIENT
Start: 2021-12-27 | End: 2021-12-28

## 2021-12-27 RX ORDER — ACETAMINOPHEN 500 MG
1000 TABLET ORAL
Status: COMPLETED | OUTPATIENT
Start: 2021-12-27 | End: 2021-12-27

## 2021-12-27 RX ADMIN — ACETAMINOPHEN 1000 MG: 500 TABLET ORAL at 09:12

## 2021-12-27 RX ADMIN — IOHEXOL 100 ML: 350 INJECTION, SOLUTION INTRAVENOUS at 11:12

## 2021-12-28 VITALS
OXYGEN SATURATION: 97 % | TEMPERATURE: 99 F | BODY MASS INDEX: 32.28 KG/M2 | DIASTOLIC BLOOD PRESSURE: 65 MMHG | WEIGHT: 200 LBS | RESPIRATION RATE: 16 BRPM | HEART RATE: 72 BPM | SYSTOLIC BLOOD PRESSURE: 126 MMHG

## 2021-12-28 PROCEDURE — 25500020 PHARM REV CODE 255: Performed by: EMERGENCY MEDICINE

## 2021-12-28 PROCEDURE — 25000003 PHARM REV CODE 250: Performed by: EMERGENCY MEDICINE

## 2021-12-28 RX ORDER — BENZONATATE 100 MG/1
100 CAPSULE ORAL 3 TIMES DAILY PRN
Qty: 20 CAPSULE | Refills: 0 | Status: SHIPPED | OUTPATIENT
Start: 2021-12-28 | End: 2022-01-07

## 2021-12-28 RX ADMIN — BENZONATATE 100 MG: 100 CAPSULE ORAL at 12:12

## 2021-12-28 RX ADMIN — APIXABAN 10 MG: 5 TABLET, FILM COATED ORAL at 01:12

## 2021-12-28 NOTE — DISCHARGE INSTRUCTIONS
You have a small blood clot in your lungs.  Take Eliquis as prescribed.  Eliquis as a blood thinner so if you develop any head injury, return to the emergency department immediately.    Return to the ER for any worsening shortness of breath or chest pain.    Take Tessalon as needed for cough.    -take tylenol for symptoms, follow up as scheduled with cardiology and primary care providers

## 2021-12-28 NOTE — ED PROVIDER NOTES
Encounter Date: 12/27/2021       History     Chief Complaint   Patient presents with    Headache     HA and cough starting today. Anxious because just recently had a pacemaker put in.     Patient is a 61yoF who presents for viral syndrome; pertinent PMHx NICM CHFrEF (10-15%), POD 28 pacemaker insertion, schizophrenia, asthma, anxiety, depression, OA, HTN, HLD, h/o hysterectomy.  Patient reports 2 days of initial dry cough, followed by scratchy throat, headache and fatigue. Does feel short of breath with ambulation. Denies associated CP, abdominal pain, dysuria.  Patient is vaccinated.  No known COVID exposure. No Measured fever at home.   Takes all meds as prescribed, no missed bumex dosing. Appropriate UOP.   The patients available PMH, PSH, Social History, medications, allergies, and triage vital signs were reviewed just prior to their medical evaluation.  A ten point review of systems was completed and is negative except as documented above.  Patient denies any other acute medical complaint.    Please be advised this text was dictated with [employer] App software and may contain errors due to translation.           Review of patient's allergies indicates:   Allergen Reactions    Adhesive Blisters    Captopril Other (See Comments)     COUGH     Past Medical History:   Diagnosis Date    Anxiety     Arthritis     Asthma     CHF (congestive heart failure)     COPD (chronic obstructive pulmonary disease)     Depression     Diabetes mellitus     Dyspnea     H/O coronary angiogram     H/O: hysterectomy     Hyperlipemia     Hypertension     Pulmonary edema     Schizophrenia     Stroke      Past Surgical History:   Procedure Laterality Date    BLADDER SUSPENSION      CARPAL TUNNEL RELEASE Right     HEEL SPUR SURGERY Left     HYSTERECTOMY      LEFT HEART CATHETERIZATION Bilateral 12/27/2019    Procedure: Left heart cath;  Surgeon: Steve Chambers MD;  Location: [facility] CATH LAB;  Service: Cardiology;   Laterality: Bilateral;    RIGHT HEART CATHETERIZATION Right 2021    Procedure: INSERTION, CATHETER, RIGHT HEART;  Surgeon: Petr Naranjo MD;  Location: [facility] CATH LAB;  Service: Cardiology;  Laterality: Right;    TUBAL LIGATION       Family History   Problem Relation Age of Onset    No Known Problems Mother     No Known Problems Father      Social History     Tobacco Use    Smoking status: Former Smoker     Types: Cigarettes     Quit date: 2016     Years since quittin.3    Smokeless tobacco: Never Used    Tobacco comment: nonex 2 weeks   Substance Use Topics    Alcohol use: No     Alcohol/week: 0.0 standard drinks    Drug use: No     Review of Systems   Constitutional: Negative for chills and fever.   HENT: Positive for sore throat. Negative for congestion and trouble swallowing.    Respiratory: Positive for cough and shortness of breath (RUBI).    Cardiovascular: Negative for chest pain, palpitations and leg swelling.   Gastrointestinal: Negative for abdominal pain, constipation, diarrhea, nausea and vomiting.   Genitourinary: Negative for dysuria, flank pain, frequency and hematuria.   Musculoskeletal: Negative for back pain and myalgias.   Skin: Negative for rash.   Neurological: Positive for headaches. Negative for dizziness and weakness.   Hematological: Does not bruise/bleed easily.   Psychiatric/Behavioral: Negative for confusion.       Physical Exam     Initial Vitals [21]   BP Pulse Resp Temp SpO2   131/76 106 (!) 22 99 °F (37.2 °C) 99 %      MAP       --         Physical Exam    Nursing note and vitals reviewed.  Constitutional: She appears well-developed and well-nourished. She is not diaphoretic. No distress (well appearing).   HENT:   Head: Normocephalic and atraumatic.   Right Ear: External ear normal.   Left Ear: External ear normal.   Mouth/Throat: Oropharynx is clear and moist.   Eyes: Conjunctivae and EOM are normal. Pupils are equal, round, and reactive to  light. No scleral icterus.   Neck: No JVD present.   Cardiovascular: Regular rhythm and intact distal pulses.   Mild tachycardia rate 106bpm   Pulmonary/Chest: Breath sounds normal. No stridor. No respiratory distress. She has no wheezes. She has no rhonchi. She has no rales.   Speaks in full sentences without issue   Abdominal: Abdomen is soft. Bowel sounds are normal. There is no abdominal tenderness. There is no rebound and no guarding.   Musculoskeletal:         General: Edema (trace ble, patient reports stable) present. Normal range of motion.     Lymphadenopathy:     She has no cervical adenopathy.   Neurological: She is alert and oriented to person, place, and time. She has normal strength. No cranial nerve deficit or sensory deficit.   Skin: Skin is warm and dry. Capillary refill takes less than 2 seconds. No rash noted. No erythema. No pallor.   Psychiatric: She has a normal mood and affect. Her behavior is normal. Judgment and thought content normal.         ED Course   Procedures  Labs Reviewed   TROPONIN I   B-TYPE NATRIURETIC PEPTIDE   CBC W/ AUTO DIFFERENTIAL   COMPREHENSIVE METABOLIC PANEL   D DIMER, QUANTITATIVE   SARS-COV-2 RDRP GENE    Narrative:     This test utilizes isothermal nucleic acid amplification   technology to detect the SARS-CoV-2 RdRp nucleic acid segment.   The analytical sensitivity (limit of detection) is 125 genome   equivalents/mL.   A POSITIVE result implies infection with the SARS-CoV-2 virus;   the patient is presumed to be contagious.     A NEGATIVE result means that SARS-CoV-2 nucleic acids are not   present above the limit of detection. A NEGATIVE result should be   treated as presumptive. It does not rule out the possibility of   COVID-19 and should not be the sole basis for treatment decisions.   If COVID-19 is strongly suspected based on clinical and exposure   history, re-testing using an alternate molecular assay should be   considered.   This test is only for use  under the Food and Drug   Administration s Emergency Use Authorization (EUA).   Commercial kits are provided by [employer].   Performance characteristics of the EUA have been independently   verified by [facility] Department of   Pathology and Laboratory Medicine.   _________________________________________________________________   The authorized Fact Sheet for Healthcare Providers and the authorized Fact   Sheet for Patients of the ID NOW COVID-19 are available on the FDA   website:     https://www.fda.gov/media/042242/download  https://www.fda.gov/media/606529/download              Imaging Results          X-Ray Chest AP Portable (Final result)  Result time 12/27/21 21:34:09    Final result by Maged Bliss MD (12/27/21 21:34:09)                 Impression:      Improved aeration of the lung parenchyma with decreased pulmonary edema as compared to the previous examination.      Electronically signed by: Maged Bliss  Date:    12/27/2021  Time:    21:34             Narrative:    EXAMINATION:  XR CHEST AP PORTABLE    CLINICAL HISTORY:  Shortness of breath    TECHNIQUE:  Single frontal view of the chest was performed.    COMPARISON:  November 30, 2021    FINDINGS:  Single frontal view of the chest reveals the lungs are well aerated.  The level of cardiomegaly seen previously has decreased as compared to the previous examination.  No indication of a consolidative process or pleural effusion.  There is a bipolar AICD device in place.                                 Medications   acetaminophen tablet 1,000 mg (1,000 mg Oral Given 12/27/21 2130)     Medical Decision Making:   History:   Old Medical Records: I decided to obtain old medical records.  Old Records Summarized: records from clinic visits and records from previous admission(s).  Initial Assessment:   Patient presents for several days of viral URI symptoms, significant HF history compliant with meds- euvolemic, LTAB, mild tachycardia,  BP stable, afebrile    Differential Diagnosis:   Ddx includes Covid, Flu, viral syndrome, PE, less likely pulm edema. Physical exam and history taking lower clinical suspicion for PNA, sepsis, bacterial pharyngitis, bacterial sinusitis, ACS.     Independently Interpreted Test(s):   I have ordered and independently interpreted X-rays - see prior notes.  I have ordered and independently interpreted EKG Reading(s) - see prior notes  Clinical Tests:   Lab Tests: Ordered  Radiological Study: Ordered and Reviewed  Medical Tests: Ordered and Reviewed             ED Course as of 12/27/21 2149   Mon Dec 27, 2021   2030 Resp(!): 22  No tachypnea on assessment  [MF]   2037 SARS-CoV-2 RNA, Amplification, Qual: Negative [MF]   2037 SpO2: 98 %  ambulating [MF]   2140 X-Ray Chest AP Portable  Improvement in airspace opacities bilaterally- no acute findings [MF]   2140 Given tylenol for headache. Remains mildly tachycardic- will obtain lab work at this time.  Signed out at shift change pending labs and ultimate disposition. Patient agreed to plan of care and voiced understanding.   [MF]      ED Course User Index  [MF] AJITH Rock PA-C  12/27/2021    I discussed the following case, diagnosis and plan of care with attending physician.    Clinical Impression:   Final diagnoses:  [R06.02] SOB (shortness of breath)  [I50.9] Congestive heart failure, unspecified HF chronicity, unspecified heart failure type  [R05.9] Cough (Primary)  [B34.9] Viral syndrome                 Mabel Angel PA-C  12/27/21 2149

## 2021-12-28 NOTE — ED NOTES
Pt received to REU 14. Pt reporting shortness of breath. Pt reports shortness of breath with exertion. Pt denies shortness of breath at rest. Pt reports cough. Pt reports clear sputum with cough. Pt reports fever and denies chills. Pt denies n/v. Pt able to speak in full sentences. Lung sounds diminished. Awaiting MD orders/disposition. Pt verbalizes understanding of plan of care.

## 2021-12-30 DIAGNOSIS — Z01.818 PRE-OP TESTING: ICD-10-CM

## 2022-01-08 ENCOUNTER — LAB VISIT (OUTPATIENT)
Dept: PRIMARY CARE CLINIC | Facility: CLINIC | Age: 62
End: 2022-01-08
Payer: COMMERCIAL

## 2022-01-08 DIAGNOSIS — Z01.818 PRE-OP TESTING: ICD-10-CM

## 2022-01-08 PROCEDURE — U0005 INFEC AGEN DETEC AMPLI PROBE: HCPCS | Performed by: INTERNAL MEDICINE

## 2022-01-08 PROCEDURE — U0003 INFECTIOUS AGENT DETECTION BY NUCLEIC ACID (DNA OR RNA); SEVERE ACUTE RESPIRATORY SYNDROME CORONAVIRUS 2 (SARS-COV-2) (CORONAVIRUS DISEASE [COVID-19]), AMPLIFIED PROBE TECHNIQUE, MAKING USE OF HIGH THROUGHPUT TECHNOLOGIES AS DESCRIBED BY CMS-2020-01-R: HCPCS | Performed by: INTERNAL MEDICINE

## 2022-01-09 LAB
SARS-COV-2 RNA RESP QL NAA+PROBE: NOT DETECTED
SARS-COV-2- CYCLE NUMBER: NORMAL

## 2022-01-10 ENCOUNTER — LAB VISIT (OUTPATIENT)
Dept: LAB | Facility: HOSPITAL | Age: 62
End: 2022-01-10
Attending: INTERNAL MEDICINE
Payer: COMMERCIAL

## 2022-01-10 ENCOUNTER — HOSPITAL ENCOUNTER (OUTPATIENT)
Dept: CARDIOLOGY | Facility: HOSPITAL | Age: 62
Discharge: HOME | End: 2022-01-10
Attending: INTERNAL MEDICINE
Payer: COMMERCIAL

## 2022-01-10 ENCOUNTER — OFFICE VISIT (OUTPATIENT)
Dept: TRANSPLANT | Facility: CLINIC | Age: 62
End: 2022-01-10
Payer: COMMERCIAL

## 2022-01-10 VITALS
SYSTOLIC BLOOD PRESSURE: 116 MMHG | WEIGHT: 195.13 LBS | HEIGHT: 66 IN | BODY MASS INDEX: 31.36 KG/M2 | DIASTOLIC BLOOD PRESSURE: 61 MMHG | HEART RATE: 72 BPM

## 2022-01-10 VITALS
DIASTOLIC BLOOD PRESSURE: 68 MMHG | HEART RATE: 81 BPM | SYSTOLIC BLOOD PRESSURE: 108 MMHG | BODY MASS INDEX: 31.34 KG/M2 | WEIGHT: 195 LBS | HEIGHT: 66 IN

## 2022-01-10 DIAGNOSIS — I42.0 DILATED CARDIOMYOPATHY: Primary | Chronic | ICD-10-CM

## 2022-01-10 DIAGNOSIS — E11.9 TYPE 2 DIABETES MELLITUS WITHOUT COMPLICATION, WITHOUT LONG-TERM CURRENT USE OF INSULIN: Chronic | ICD-10-CM

## 2022-01-10 DIAGNOSIS — I42.0 DILATED CARDIOMYOPATHY: ICD-10-CM

## 2022-01-10 DIAGNOSIS — I26.99 ACUTE PULMONARY EMBOLISM WITHOUT ACUTE COR PULMONALE, UNSPECIFIED PULMONARY EMBOLISM TYPE: ICD-10-CM

## 2022-01-10 DIAGNOSIS — I50.42 CHRONIC COMBINED SYSTOLIC AND DIASTOLIC CHF, NYHA CLASS 3: Chronic | ICD-10-CM

## 2022-01-10 DIAGNOSIS — J44.9 CHRONIC OBSTRUCTIVE PULMONARY DISEASE, UNSPECIFIED COPD TYPE: ICD-10-CM

## 2022-01-10 DIAGNOSIS — E66.01 MORBID OBESITY: Chronic | ICD-10-CM

## 2022-01-10 LAB
ALBUMIN SERPL BCP-MCNC: 3.6 G/DL (ref 3.5–5.2)
ALP SERPL-CCNC: 178 U/L (ref 55–135)
ALT SERPL W/O P-5'-P-CCNC: 20 U/L (ref 10–44)
ANION GAP SERPL CALC-SCNC: 10 MMOL/L (ref 8–16)
AST SERPL-CCNC: 17 U/L (ref 10–40)
BASOPHILS # BLD AUTO: 0.03 K/UL (ref 0–0.2)
BASOPHILS NFR BLD: 0.3 % (ref 0–1.9)
BILIRUB SERPL-MCNC: 0.7 MG/DL (ref 0.1–1)
BUN SERPL-MCNC: 13 MG/DL (ref 6–20)
CALCIUM SERPL-MCNC: 9.8 MG/DL (ref 8.7–10.5)
CHLORIDE SERPL-SCNC: 101 MMOL/L (ref 95–110)
CO2 SERPL-SCNC: 27 MMOL/L (ref 23–29)
CREAT SERPL-MCNC: 1 MG/DL (ref 0.5–1.4)
CV STRESS BASE HR: 81 BPM
DIASTOLIC BLOOD PRESSURE: 68 MMHG
DIFFERENTIAL METHOD: ABNORMAL
EOSINOPHIL # BLD AUTO: 0.3 K/UL (ref 0–0.5)
EOSINOPHIL NFR BLD: 3.2 % (ref 0–8)
ERYTHROCYTE [DISTWIDTH] IN BLOOD BY AUTOMATED COUNT: 14.5 % (ref 11.5–14.5)
EST. GFR  (AFRICAN AMERICAN): >60 ML/MIN/1.73 M^2
EST. GFR  (NON AFRICAN AMERICAN): >60 ML/MIN/1.73 M^2
ESTIMATED AVG GLUCOSE: 148 MG/DL (ref 68–131)
FERRITIN SERPL-MCNC: 33 NG/ML (ref 20–300)
GLUCOSE SERPL-MCNC: 238 MG/DL (ref 70–110)
HBA1C MFR BLD: 6.8 % (ref 4–5.6)
HCT VFR BLD AUTO: 41.7 % (ref 37–48.5)
HGB BLD-MCNC: 13 G/DL (ref 12–16)
IMM GRANULOCYTES # BLD AUTO: 0.03 K/UL (ref 0–0.04)
IMM GRANULOCYTES NFR BLD AUTO: 0.3 % (ref 0–0.5)
IRON SERPL-MCNC: 61 UG/DL (ref 30–160)
LYMPHOCYTES # BLD AUTO: 3 K/UL (ref 1–4.8)
LYMPHOCYTES NFR BLD: 34.1 % (ref 18–48)
MCH RBC QN AUTO: 28 PG (ref 27–31)
MCHC RBC AUTO-ENTMCNC: 31.2 G/DL (ref 32–36)
MCV RBC AUTO: 90 FL (ref 82–98)
MONOCYTES # BLD AUTO: 0.5 K/UL (ref 0.3–1)
MONOCYTES NFR BLD: 6.2 % (ref 4–15)
NEUTROPHILS # BLD AUTO: 4.9 K/UL (ref 1.8–7.7)
NEUTROPHILS NFR BLD: 55.9 % (ref 38–73)
NRBC BLD-RTO: 0 /100 WBC
OHS CV CPX 1 MINUTE RECOVERY HEART RATE: 137 BPM
OHS CV CPX 85 PERCENT MAX PREDICTED HEART RATE MALE: 130
OHS CV CPX ANAEROBIC THRESHOLD DIASTOLIC BLOOD PRESSURE: 70 MMHG
OHS CV CPX ANAEROBIC THRESHOLD HEART RATE: 139
OHS CV CPX ANAEROBIC THRESHOLD RATE PRESSURE PRODUCT: NORMAL
OHS CV CPX ANAEROBIC THRESHOLD SYSTOLIC BLOOD PRESSURE: 125
OHS CV CPX DATA GRADE - AT: 3.9
OHS CV CPX DATA GRADE - PEAK: 4.9
OHS CV CPX DATA O2 SAT - PEAK: 96
OHS CV CPX DATA O2 SAT - REST: 96
OHS CV CPX DATA SPEED - AT: 2.7
OHS CV CPX DATA SPEED - PEAK: 3
OHS CV CPX DATA TIME - AT: 6.04
OHS CV CPX DATA TIME - PEAK: 7.52
OHS CV CPX DATA VE/VCO2 - AT: 34
OHS CV CPX DATA VE/VCO2 - PEAK: 35
OHS CV CPX DATA VE/VO2 - AT: 36
OHS CV CPX DATA VE/VO2 - PEAK: 37
OHS CV CPX DATA VO2 - AT: 8.8
OHS CV CPX DATA VO2 - PEAK: 11.5
OHS CV CPX DATA VO2 - REST: 3.4
OHS CV CPX FEV1/FVC: 0.83
OHS CV CPX FORCED EXPIRATORY VOLUME: 1.86
OHS CV CPX FORCED VITAL CAPACITY (FVC): 2.25
OHS CV CPX HIGHEST VO: 27.5
OHS CV CPX MAX PREDICTED HEART RATE: 153
OHS CV CPX MAXIMAL VOLUNTARY VENTILATION (MVV) PREDICTED: 74.4
OHS CV CPX MAXIMAL VOLUNTARY VENTILATION (MVV): 63
OHS CV CPX MAXIUMUM EXERCISE VENTILATION (VE MAX): 41.3
OHS CV CPX PATIENT AGE: 60
OHS CV CPX PATIENT HEIGHT IN: 66
OHS CV CPX PATIENT IS FEMALE AGE 11-19: 0
OHS CV CPX PATIENT IS FEMALE AGE GREATER THAN 19: 1
OHS CV CPX PATIENT IS FEMALE AGE LESS THAN 11: 0
OHS CV CPX PATIENT IS FEMALE: 1
OHS CV CPX PATIENT IS MALE AGE 11-25: 0
OHS CV CPX PATIENT IS MALE AGE GREATER THAN 25: 0
OHS CV CPX PATIENT IS MALE AGE LESS THAN 11: 0
OHS CV CPX PATIENT IS MALE GREATER THAN 18: 0
OHS CV CPX PATIENT IS MALE LESS THAN OR EQUAL TO 18: 0
OHS CV CPX PATIENT IS MALE: 0
OHS CV CPX PATIENT WEIGHT RETURNED IN OZ: 3120
OHS CV CPX PEAK DIASTOLIC BLOOD PRESSURE: 73 MMHG
OHS CV CPX PEAK HEAR RATE: 151 BPM
OHS CV CPX PEAK RATE PRESSURE PRODUCT: NORMAL
OHS CV CPX PEAK SYSTOLIC BLOOD PRESSURE: 124 MMHG
OHS CV CPX PERCENT BODY FAT: 31.1
OHS CV CPX PERCENT MAX PREDICTED HEART RATE ACHIEVED: 99
OHS CV CPX PREDICTED VO2: 27.5 ML/KG/MIN
OHS CV CPX RATE PRESSURE PRODUCT PRESENTING: 8748
OHS CV CPX REST PET CO2: 31
OHS CV CPX VE/VCO2 SLOPE: 30.9
PLATELET # BLD AUTO: 381 K/UL (ref 150–450)
PMV BLD AUTO: 9.9 FL (ref 9.2–12.9)
POTASSIUM SERPL-SCNC: 3.7 MMOL/L (ref 3.5–5.1)
PROT SERPL-MCNC: 7.5 G/DL (ref 6–8.4)
RBC # BLD AUTO: 4.64 M/UL (ref 4–5.4)
SATURATED IRON: 15 % (ref 20–50)
SODIUM SERPL-SCNC: 138 MMOL/L (ref 136–145)
STRESS ECHO POST EXERCISE DUR MIN: 7 MINUTES
STRESS ECHO POST EXERCISE DUR SEC: 31 SECONDS
SYSTOLIC BLOOD PRESSURE: 108 MMHG
TOTAL IRON BINDING CAPACITY: 413 UG/DL (ref 250–450)
TRANSFERRIN SERPL-MCNC: 279 MG/DL (ref 200–375)
WBC # BLD AUTO: 8.74 K/UL (ref 3.9–12.7)

## 2022-01-10 PROCEDURE — 3074F PR MOST RECENT SYSTOLIC BLOOD PRESSURE < 130 MM HG: ICD-10-PCS | Mod: CPTII,S$GLB,, | Performed by: INTERNAL MEDICINE

## 2022-01-10 PROCEDURE — 3074F SYST BP LT 130 MM HG: CPT | Mod: CPTII,S$GLB,, | Performed by: INTERNAL MEDICINE

## 2022-01-10 PROCEDURE — 94621 CARDIOPULM EXERCISE TESTING: CPT | Mod: 26,,, | Performed by: INTERNAL MEDICINE

## 2022-01-10 PROCEDURE — 99999 PR PBB SHADOW E&M-EST. PATIENT-LVL III: ICD-10-PCS | Mod: PBBFAC,,, | Performed by: INTERNAL MEDICINE

## 2022-01-10 PROCEDURE — 3008F PR BODY MASS INDEX (BMI) DOCUMENTED: ICD-10-PCS | Mod: CPTII,S$GLB,, | Performed by: INTERNAL MEDICINE

## 2022-01-10 PROCEDURE — 1159F PR MEDICATION LIST DOCUMENTED IN MEDICAL RECORD: ICD-10-PCS | Mod: CPTII,S$GLB,, | Performed by: INTERNAL MEDICINE

## 2022-01-10 PROCEDURE — 99999 PR PBB SHADOW E&M-EST. PATIENT-LVL III: CPT | Mod: PBBFAC,,, | Performed by: INTERNAL MEDICINE

## 2022-01-10 PROCEDURE — 99214 PR OFFICE/OUTPT VISIT, EST, LEVL IV, 30-39 MIN: ICD-10-PCS | Mod: S$GLB,,, | Performed by: INTERNAL MEDICINE

## 2022-01-10 PROCEDURE — 36415 COLL VENOUS BLD VENIPUNCTURE: CPT | Performed by: INTERNAL MEDICINE

## 2022-01-10 PROCEDURE — 82728 ASSAY OF FERRITIN: CPT | Performed by: INTERNAL MEDICINE

## 2022-01-10 PROCEDURE — 83036 HEMOGLOBIN GLYCOSYLATED A1C: CPT | Performed by: INTERNAL MEDICINE

## 2022-01-10 PROCEDURE — 3078F PR MOST RECENT DIASTOLIC BLOOD PRESSURE < 80 MM HG: ICD-10-PCS | Mod: CPTII,S$GLB,, | Performed by: INTERNAL MEDICINE

## 2022-01-10 PROCEDURE — 83880 ASSAY OF NATRIURETIC PEPTIDE: CPT | Performed by: INTERNAL MEDICINE

## 2022-01-10 PROCEDURE — 1159F MED LIST DOCD IN RCRD: CPT | Mod: CPTII,S$GLB,, | Performed by: INTERNAL MEDICINE

## 2022-01-10 PROCEDURE — 1160F PR REVIEW ALL MEDS BY PRESCRIBER/CLIN PHARMACIST DOCUMENTED: ICD-10-PCS | Mod: CPTII,S$GLB,, | Performed by: INTERNAL MEDICINE

## 2022-01-10 PROCEDURE — 80053 COMPREHEN METABOLIC PANEL: CPT | Performed by: INTERNAL MEDICINE

## 2022-01-10 PROCEDURE — 99214 OFFICE O/P EST MOD 30 MIN: CPT | Mod: S$GLB,,, | Performed by: INTERNAL MEDICINE

## 2022-01-10 PROCEDURE — 3044F HG A1C LEVEL LT 7.0%: CPT | Mod: CPTII,S$GLB,, | Performed by: INTERNAL MEDICINE

## 2022-01-10 PROCEDURE — 3008F BODY MASS INDEX DOCD: CPT | Mod: CPTII,S$GLB,, | Performed by: INTERNAL MEDICINE

## 2022-01-10 PROCEDURE — 1160F RVW MEDS BY RX/DR IN RCRD: CPT | Mod: CPTII,S$GLB,, | Performed by: INTERNAL MEDICINE

## 2022-01-10 PROCEDURE — 3078F DIAST BP <80 MM HG: CPT | Mod: CPTII,S$GLB,, | Performed by: INTERNAL MEDICINE

## 2022-01-10 PROCEDURE — 94621 CARDIOPULM EXERCISE TESTING: CPT

## 2022-01-10 PROCEDURE — 3044F PR MOST RECENT HEMOGLOBIN A1C LEVEL <7.0%: ICD-10-PCS | Mod: CPTII,S$GLB,, | Performed by: INTERNAL MEDICINE

## 2022-01-10 PROCEDURE — 85025 COMPLETE CBC W/AUTO DIFF WBC: CPT | Performed by: INTERNAL MEDICINE

## 2022-01-10 PROCEDURE — 84466 ASSAY OF TRANSFERRIN: CPT | Performed by: INTERNAL MEDICINE

## 2022-01-10 PROCEDURE — 94621 CARDIOPULMONARY EXERCISE TESTING (CUPID ONLY): ICD-10-PCS | Mod: 26,,, | Performed by: INTERNAL MEDICINE

## 2022-01-10 RX ORDER — ALBUTEROL SULFATE 90 UG/1
2 AEROSOL, METERED RESPIRATORY (INHALATION) EVERY 6 HOURS PRN
Status: DISCONTINUED | OUTPATIENT
Start: 2022-01-10 | End: 2022-01-12

## 2022-01-10 NOTE — PROGRESS NOTES
"Subjective:   Followup of evaluation of heart transplant candidacy.    HPI:  Ms. Mohr is a 61 y.o. year old Black or  female who has presents to be considered for advanced surgical options (LVAD/OHT).    Ms. Mohr is a 60 YOF with PMHx of combined systolic and diastolic HF (EF 10-15%), pulmHTN, dilated cardiomyopathy, nonobstructive CAD, HTN, DM on oral therapies, and recent embolic R MCA CVA (s/p tPA on 8/14, w/o residual sx). She presents to ED for worsening shortness of breath in setting of recent missed lasix dose and dietary noncomplaince. Beginning with 07/13-14/2020 in [facility] for CHF exacerbation requiring IV diuresis, 08/14-16/2020 at [facility] for embolic R MCA CVA s/p TPA, and again 08/25-28/2020 at [facility] for NSTEMI with troponin peak of 8.4 in setting of CHF exacerbation that required IV diuresis at which time TTE was performed 08/26 that was similar to previous exams and underwent PET stress 08/27 which revealed non obstructive CAD.     Since her last visit, she has been doing okay. Still getting short of breath, had covid vaccine but is overdue for booster. Has been having cough, not had evaluation by pulmonology in years, had been seeing someone regularly. Had COPD per patient, including 2 inhalers. Not used either to see if her cough and breathing improve. No evidence of volume on board, states she feels her abdomen is big but knows it is "fat." NYHA FC II. Additionally went to ED for cough/shortness of breath, started on eliquis due to very small PE seen on CTA. CXR clear.     CPX 01/10/22:  s Resting spirometry reveals an FVC = 2.25L which is 69.48% of predicted, an FEV1 of 1.86L, which is 72.23% of predicted and an FEV1/FVC ratio of 82.67%. The MVV = 74.4 L/min, which is 77.84% of predicted.     The respiratory exchange ratio (RER) was 1.07, suggesting suboptimal effort.     The breathing reserve is calculated at 44.49%, which is normal. Oxygen saturation with exercise " remained normal.     The peak VO2 was 11.5 ml/kg/min which is 41.82% of predicted equating to a functional capacity of 3.29 METS indicating severe functional impairment.     The anaerobic threshold (AT), which occurred at a heart rate of 139bpm, was 8.8 ml/kg/min, which is 32% of the predicted VO2 and is reduced.     The peak VO2 Lean was 16.69 ml/kg of lean body weight/min indicating a poor prognosis in heart failure.     The VE/VCO2 [place] was 30.9. The Resting PetCO2 was 31.0.     Severe functional impairment associated with a normal breathing reserve, normal oxygen stauration, suboptimal effort, and a reduced AT. These findings are indicative of functional impairment secondary to circulatory insufficiency.       CPX 10/22/20:  Resting spirometry reveals an FVC = 2.36L which is 72.5% of predicted, an FEV1 of 2.36L, which is 90.94% of predicted and an FEV1/FVC ratio of 100%. The MVV = 94.4 L/min, which is 98.17% of predicted.   The respiratory exchange ratio (RER) was 1.02, suggesting poor effort.   The breathing reserve is calculated at 77.22%, which is normal. Oxygen saturation with exercise remained normal.   The peak VO2 was 10.9 ml/kg/min which is 53.17% of predicted equating to a functional capacity of 3.11 METS indicating severe functional impairment.    by 0% from rest to AT.   The VE/VCO2 [place] was 25.3. The Resting PetCO2 was 39.0.   Severe functional impairment associated with a normal breathing reserve, normal oxygen stauration, poor effort, and a borderline reduced AT. These findings are indicative of functional impairment secondary to circulatory insufficiency, poor effort.    Past Medical History:   Diagnosis Date    Anxiety     Arthritis     Asthma     CHF (congestive heart failure)     COPD (chronic obstructive pulmonary disease)     Depression     Diabetes mellitus     Dyspnea     H/O coronary angiogram     H/O: hysterectomy     Hyperlipemia     Hypertension     Pulmonary edema      Schizophrenia     Stroke      Past Surgical History:   Procedure Laterality Date    BLADDER SUSPENSION      CARPAL TUNNEL RELEASE Right     HEEL SPUR SURGERY Left     HYSTERECTOMY      LEFT HEART CATHETERIZATION Bilateral 12/27/2019    Procedure: Left heart cath;  Surgeon: Steve Chambers MD;  Location: [facility] CATH LAB;  Service: Cardiology;  Laterality: Bilateral;    RIGHT HEART CATHETERIZATION Right 7/26/2021    Procedure: INSERTION, CATHETER, RIGHT HEART;  Surgeon: Petr Naranjo MD;  Location: [facility] CATH LAB;  Service: Cardiology;  Laterality: Right;    TUBAL LIGATION         Family History   Problem Relation Age of Onset    No Known Problems Mother     No Known Problems Father        Review of Systems   Constitutional: Positive for malaise/fatigue. Negative for chills, decreased appetite, diaphoresis, fever, weight gain and weight loss.   HENT: Negative for congestion.    Eyes: Negative for blurred vision and visual disturbance.   Cardiovascular: Positive for dyspnea on exertion. Negative for chest pain, irregular heartbeat, leg swelling, near-syncope, orthopnea (2 pillows), palpitations, paroxysmal nocturnal dyspnea and syncope.   Respiratory: Positive for cough. Negative for shortness of breath, sleep disturbances due to breathing, snoring and wheezing.    Hematologic/Lymphatic: Negative for bleeding problem. Does not bruise/bleed easily.   Skin: Negative for poor wound healing and rash.   Musculoskeletal: Negative for arthritis, joint pain and muscle weakness.        Walking without support, improved greatly from when we saw her in the hospital   Gastrointestinal: Negative for bloating, abdominal pain, anorexia, constipation, diarrhea, hematemesis, hematochezia, melena, nausea and vomiting.   Genitourinary: Negative for frequency and urgency.   Neurological: Negative for difficulty with concentration, excessive daytime sleepiness, dizziness, headaches, light-headedness and weakness.  "  Psychiatric/Behavioral: Negative for depression. The patient does not have insomnia and is not nervous/anxious.        Objective:   Blood pressure 116/61, pulse 72, height 5' 6" (1.676 m), weight 88.5 kg (195 lb 1.7 oz).body mass index is 31.49 kg/m².    Physical Exam  Vitals and nursing note reviewed.   Constitutional:       General: She is not in acute distress.     Appearance: She is well-developed. She is not diaphoretic.   HENT:      Head: Normocephalic and atraumatic.   Eyes:      General:         Right eye: No discharge.         Left eye: No discharge.      Conjunctiva/sclera: Conjunctivae normal.   Neck:      Vascular: No JVD.      Comments: JVP at 8cm today.   Cardiovascular:      Rate and Rhythm: Normal rate and regular rhythm.      Heart sounds: No murmur heard.  No friction rub. No gallop.    Pulmonary:      Effort: Pulmonary effort is normal.      Breath sounds: Normal breath sounds. No wheezing or rales.   Chest:      Chest wall: No tenderness.   Abdominal:      General: Bowel sounds are normal. There is no distension.      Palpations: Abdomen is soft. There is no mass.      Tenderness: There is no abdominal tenderness. There is no guarding or rebound.   Musculoskeletal:         General: No tenderness. Normal range of motion.      Cervical back: Normal range of motion and neck supple.      Comments: No edema.    Skin:     General: Skin is warm and dry.      Findings: No erythema or rash.   Neurological:      Mental Status: She is alert and oriented to person, place, and time.   Psychiatric:         Behavior: Behavior normal.         Thought Content: Thought content normal.         Judgment: Judgment normal.       Labs:  Lab Results   Component Value Date     (H) 12/27/2021     01/10/2022    K 3.7 01/10/2022    MG 2.1 10/22/2021     01/10/2022    CO2 27 01/10/2022    BUN 13 01/10/2022    CREATININE 1.0 01/10/2022     (H) 01/10/2022    HGBA1C 6.8 (H) 01/10/2022    AST 17 " 01/10/2022    ALT 20 01/10/2022    ALBUMIN 3.6 01/10/2022    PROT 7.5 01/10/2022    BILITOT 0.7 01/10/2022    CHOL 153 10/11/2021    HDL 38 (L) 10/11/2021    LDLCALC 90.6 10/11/2021    TRIG 122 10/11/2021       Magnesium   Date Value Ref Range Status   10/22/2021 2.1 1.6 - 2.6 mg/dL Final       Lab Results   Component Value Date    WBC 8.74 01/10/2022    HGB 13.0 01/10/2022    HCT 41.7 01/10/2022    MCV 90 01/10/2022     01/10/2022       Lab Results   Component Value Date    INR 0.9 11/30/2021    INR 1.0 11/22/2021    INR 0.9 07/26/2021       BNP   Date Value Ref Range Status   12/27/2021 796 (H) 0 - 99 pg/mL Final     Comment:     Values of less than 100 pg/ml are consistent with non-CHF populations.   10/10/2021 1,110 (H) 0 - 99 pg/mL Final     Comment:     Values of less than 100 pg/ml are consistent with non-CHF populations.   07/26/2021 389 (H) 0 - 99 pg/mL Final     Comment:     Values of less than 100 pg/ml are consistent with non-CHF populations.       Assessment:      1. Dilated cardiomyopathy    2. Chronic combined systolic and diastolic CHF, NYHA class 3    3. Morbid obesity    4. Type 2 diabetes mellitus without complication, without long-term current use of insulin    5. Acute pulmonary embolism without acute cor pulmonale, unspecified pulmonary embolism type    6. Chronic obstructive pulmonary disease, unspecified COPD type      Plan:   Repeat CPX today not changed very much.  Still feels like she has debility, continue to encourage her to ambulate/exercise further.  DM2 better controlled, A1C from 01/10 6.8.   Iron saturation reduced, will see if we can set her up for IV iron infusion as well.   Would like to refer her to pulmonary and additionally see if she can see heme/onc to evaluate for hypercoaguable state, given embolic stroke last year and PE recently.   Patient is now NYHA III ACC stage D  Recommend 2 gram sodium restriction and 1500cc fluid restriction.  Encourage physical activity  with graded exercise program.  Requested patient to weigh themselves daily, and to notify us if their weight increases by more than 3 lbs in 1 day or 5 lbs in 1 week.    would need eval by pulm and heme/onc before we would consider proceeding with evaluation. Additionally discussed this with patient and she is not ready to proceed with advanced options, not sure she wants to.     Transplant Candidacy: Patient is a 61 y.o. year old female with heart failure is being seen for possible OHT/LVAD. In my opinion, she is  a high-risk LVAD/OHT candidate. Patient did meet with MCS and/or pre-transplant coordinator at the end of this visit for workup. she is scheduled for risk stratification testing with CPX/RHC. The patient will follow up with pre-transplant.     Discussed with the patient and family [facility] Circulatory Support program outcomes as reported in INTERMACS (Interagency Registry for Mechanically Assisted Circulatory Support):    1 year survival = 92.7%  2 year survival = 86.7%  3 year survival = 86.7%    Patient and family acknowledged receipt of this information and all questions were answered.        UNOS Patient Status  Functional Status: 70% - Cares for self: unable to carry on normal activity or active work  Physical Capacity: No Limitations  Working for Income: Unknown    Justina Smith MD

## 2022-01-10 NOTE — LETTER
January 11, 2022        Michelle Young  [address] [address]  Phone: [phone]  Fax: [phone]             [facility]  [address]  [place] [address]  Phone: [phone]   Patient: Diomedes Mohr   MR Number: [id number]   YOB: 1960   Date of Visit: 1/10/2022       Dear Dr. Michelle Young    Thank you for referring Diomedes Mohr to me for evaluation. Attached you will find relevant portions of my assessment and plan of care.    If you have questions, please do not hesitate to call me. I look forward to following Diomedes Mohr along with you.    Sincerely,    Justina Smith MD    Enclosure    If you would like to receive this communication electronically, please contact [email or web address] or [phone] to request [employer] access.    [employer] is a tool which provides read-only access to select patient information with whom you have a relationship. Its easy to use and provides real time access to review your patients record including encounter summaries, notes, results, and demographic information.    If you feel you have received this communication in error or would no longer like to receive these types of communications, please e-mail [email or web address]

## 2022-01-11 DIAGNOSIS — J44.9 CHRONIC OBSTRUCTIVE PULMONARY DISEASE, UNSPECIFIED COPD TYPE: Primary | ICD-10-CM

## 2022-01-11 LAB — NT-PROBNP SERPL-MCNC: 1374 PG/ML

## 2022-01-11 NOTE — PROGRESS NOTES
Subjective:       Patient ID: Diomedes Mohr is a 61 y.o. female.    Chief Complaint: COPD    HPI   Diomedes Mohr 61 y.o. female    has a past medical history of Anxiety, Arthritis, Asthma, CHF (congestive heart failure), COPD (chronic obstructive pulmonary disease), Depression, Diabetes mellitus, Dyspnea, H/O coronary angiogram, H/O: hysterectomy, Hyperlipemia, Hypertension, Pulmonary edema, Schizophrenia, and Stroke.    has a past surgical history that includes Carpal tunnel release (Right); Heel spur surgery (Left); Tubal ligation; Bladder suspension; Left heart catheterization (Bilateral, 12/27/2019); Right heart catheterization (Right, 7/26/2021); and Hysterectomy.   reports that she quit smoking about 5 years ago. Her smoking use included cigarettes. She has never used smokeless tobacco. She reports that she does not drink alcohol and does not use drugs.  Referred by: Dr. Justina Smith  Who had concerns including COPD.  The patient's last visit with me was on Visit date not found.  LAST VISIT    Interval History  Here for evaluation of possible lung disease contributing to sob  History of asthma, unsure  Quit smoking 3 years ago- 20py tobacco history  +dM, hlp, cholesterol  Takes inhaler prn, - albuterol only  Taking demadex 2 pills daily 20mg- does not cause extra uop  Only 1-2 x per year exacerbations    No fever chills, ns, wt changes, nausea, vomiting, diarrhea, constipation, chest pain, tightness, pressure. Remainder of review of systems is negative.  Otherwise asymptomatic from pulmonary standpoint.    Review of Systems    Objective:      Physical Exam   Constitutional: She is oriented to person, place, and time. She appears well-developed and well-nourished. She appears not cachectic. No distress. She is obese.   HENT:   Head: Normocephalic.   Nose: Nose normal. No mucosal edema.   Mouth/Throat: Normal dentition. No oropharyngeal exudate.   Neck: No tracheal deviation present.   Cardiovascular: Normal  rate, regular rhythm, normal heart sounds and intact distal pulses. Exam reveals no gallop and no friction rub.   No murmur heard.  Pulmonary/Chest: Normal expansion, symmetric chest wall expansion, effort normal and breath sounds normal. No stridor.   Abdominal: Soft. Bowel sounds are normal.   Musculoskeletal:         General: No tenderness, deformity or edema. Normal range of motion.      Cervical back: Normal range of motion and neck supple.   Lymphadenopathy: No supraclavicular adenopathy is present.     She has no cervical adenopathy.   Neurological: She is alert and oriented to person, place, and time. No cranial nerve deficit. Gait normal.   Skin: Skin is warm and dry. No rash noted. She is not diaphoretic. No cyanosis or erythema. No pallor. Nails show no clubbing.   Psychiatric: She has a normal mood and affect. Her behavior is normal. Judgment and thought content normal.     Personal Diagnostic Review  Ct chest 2021 was wnl- no evidence of lung disease  Pfts without obstruction, minimal restriction likely due to obesity, and decreased dlco, consistent with HFREF  CPX with normal breathing reserve and nl oxygen saturation  Last 6mwd without desaturation, 243m    No flowsheet data found.      Assessment:       1. Acute on chronic combined systolic and diastolic congestive heart failure    2. Chronic obstructive pulmonary disease, unspecified COPD type        Outpatient Encounter Medications as of 1/12/2022   Medication Sig Dispense Refill    apixaban (ELIQUIS) 5 mg Tab Take 2 tablets (10 mg total) by mouth 2 (two) times daily for 7 days, THEN 1 tablet (5 mg total) 2 (two) times daily for 21 days. 70 tablet 0    atorvastatin (LIPITOR) 80 MG tablet Take 1 tablet (80 mg total) by mouth once daily. 90 tablet 3    dapagliflozin (FARXIGA) 10 mg tablet Take 1 tablet (10 mg total) by mouth once daily. 30 tablet 3    dulaglutide (TRULICITY) 1.5 mg/0.5 mL pen injector Inject into the skin once a week.        glimepiride (AMARYL) 4 MG tablet Take 4 mg by mouth once daily.      meclizine (ANTIVERT) 25 mg tablet Take 1 tablet (25 mg total) by mouth 3 (three) times daily as needed for Dizziness. 20 tablet 0    metFORMIN (GLUCOPHAGE) 1000 MG tablet Take 1,000 mg by mouth 2 (two) times daily.       metoprolol succinate (TOPROL-XL) 100 MG 24 hr tablet Take 1 tablet (100 mg total) by mouth once daily. 30 tablet 6    potassium chloride (MICRO-K) 10 MEQ CpSR       sacubitriL-valsartan (ENTRESTO)  mg per tablet Take 1 tablet by mouth 2 (two) times daily. 60 tablet 6    spironolactone (ALDACTONE) 25 MG tablet Take 1 tablet (25 mg total) by mouth once daily. 30 tablet 11    torsemide (DEMADEX) 10 MG Tab Take 2 tablets (20 mg total) by mouth once daily. 60 tablet 11    albuterol (PROVENTIL/VENTOLIN HFA) 90 mcg/actuation inhaler Inhale 2 puffs into the lungs every 6 (six) hours as needed for Wheezing or Shortness of Breath. 18 g 2    aspirin 81 MG Chew Take 1 tablet (81 mg total) by mouth once daily. 90 tablet 0     Facility-Administered Encounter Medications as of 1/12/2022   Medication Dose Route Frequency Provider Last Rate Last Admin    [DISCONTINUED] albuterol inhaler 2 puff  2 puff Inhalation Q6H PRN Justina Smith MD         No orders of the defined types were placed in this encounter.      Plan:               Assessment:  Diomedes was seen today for copd.    Diagnoses and all orders for this visit:    Acute on chronic combined systolic and diastolic congestive heart failure    Chronic obstructive pulmonary disease, unspecified COPD type  -     Ambulatory referral/consult to Pulmonology    Other orders  -     albuterol (PROVENTIL/VENTOLIN HFA) 90 mcg/actuation inhaler; Inhale 2 puffs into the lungs every 6 (six) hours as needed for Wheezing or Shortness of Breath.        Plan:  Problem List Items Addressed This Visit     Acute on chronic combined systolic and diastolic congestive heart failure - Primary     Chronic obstructive pulmonary disease          Patient with no evidence of copd on pfts, takes prn albuterol  No indication for further therapy  dlco impairment from hfref  Continue supportive care    Follow up if symptoms worsen or fail to improve.    There are no Patient Instructions on file for this visit.    Immunization History   Administered Date(s) Administered    COVID-19, MRNA, LN-S, PF (MODERNA FULL 0.5 ML DOSE) 02/17/2021, 03/17/2021    Influenza - Quadrivalent - PF *Preferred* (6 months and older) 01/13/2020

## 2022-01-12 ENCOUNTER — OFFICE VISIT (OUTPATIENT)
Dept: PULMONOLOGY | Facility: CLINIC | Age: 62
End: 2022-01-12
Payer: COMMERCIAL

## 2022-01-12 ENCOUNTER — HOSPITAL ENCOUNTER (OUTPATIENT)
Dept: PULMONOLOGY | Facility: CLINIC | Age: 62
Discharge: HOME | End: 2022-01-12
Payer: COMMERCIAL

## 2022-01-12 VITALS
WEIGHT: 195 LBS | SYSTOLIC BLOOD PRESSURE: 120 MMHG | HEART RATE: 89 BPM | OXYGEN SATURATION: 98 % | BODY MASS INDEX: 31.34 KG/M2 | HEIGHT: 66 IN | DIASTOLIC BLOOD PRESSURE: 60 MMHG

## 2022-01-12 DIAGNOSIS — J44.9 CHRONIC OBSTRUCTIVE PULMONARY DISEASE, UNSPECIFIED COPD TYPE: ICD-10-CM

## 2022-01-12 DIAGNOSIS — Z13.9 SCREENING PROCEDURE: Primary | ICD-10-CM

## 2022-01-12 DIAGNOSIS — I50.43 ACUTE ON CHRONIC COMBINED SYSTOLIC AND DIASTOLIC CONGESTIVE HEART FAILURE: Primary | ICD-10-CM

## 2022-01-12 LAB
CTP QC/QA: YES
SARS-COV-2 AG RESP QL IA.RAPID: NEGATIVE

## 2022-01-12 PROCEDURE — 94010 BREATHING CAPACITY TEST: CPT | Mod: S$GLB,,, | Performed by: INTERNAL MEDICINE

## 2022-01-12 PROCEDURE — 99999 PR PBB SHADOW E&M-EST. PATIENT-LVL IV: CPT | Mod: PBBFAC,,, | Performed by: INTERNAL MEDICINE

## 2022-01-12 PROCEDURE — 3044F HG A1C LEVEL LT 7.0%: CPT | Mod: CPTII,S$GLB,, | Performed by: INTERNAL MEDICINE

## 2022-01-12 PROCEDURE — 99203 PR OFFICE/OUTPT VISIT, NEW, LEVL III, 30-44 MIN: ICD-10-PCS | Mod: 25,S$GLB,, | Performed by: INTERNAL MEDICINE

## 2022-01-12 PROCEDURE — 87811 SARS-COV-2 COVID19 W/OPTIC: CPT | Mod: S$GLB,,, | Performed by: INTERNAL MEDICINE

## 2022-01-12 PROCEDURE — 1160F PR REVIEW ALL MEDS BY PRESCRIBER/CLIN PHARMACIST DOCUMENTED: ICD-10-PCS | Mod: CPTII,S$GLB,, | Performed by: INTERNAL MEDICINE

## 2022-01-12 PROCEDURE — 99999 PR PBB SHADOW E&M-EST. PATIENT-LVL IV: ICD-10-PCS | Mod: PBBFAC,,, | Performed by: INTERNAL MEDICINE

## 2022-01-12 PROCEDURE — 1160F RVW MEDS BY RX/DR IN RCRD: CPT | Mod: CPTII,S$GLB,, | Performed by: INTERNAL MEDICINE

## 2022-01-12 PROCEDURE — 1159F PR MEDICATION LIST DOCUMENTED IN MEDICAL RECORD: ICD-10-PCS | Mod: CPTII,S$GLB,, | Performed by: INTERNAL MEDICINE

## 2022-01-12 PROCEDURE — 94729 DIFFUSING CAPACITY: CPT | Mod: S$GLB,,, | Performed by: INTERNAL MEDICINE

## 2022-01-12 PROCEDURE — 3074F SYST BP LT 130 MM HG: CPT | Mod: CPTII,S$GLB,, | Performed by: INTERNAL MEDICINE

## 2022-01-12 PROCEDURE — 94727 PR PULM FUNCTION TEST BY GAS: ICD-10-PCS | Mod: S$GLB,,, | Performed by: INTERNAL MEDICINE

## 2022-01-12 PROCEDURE — 3008F PR BODY MASS INDEX (BMI) DOCUMENTED: ICD-10-PCS | Mod: CPTII,S$GLB,, | Performed by: INTERNAL MEDICINE

## 2022-01-12 PROCEDURE — 3078F PR MOST RECENT DIASTOLIC BLOOD PRESSURE < 80 MM HG: ICD-10-PCS | Mod: CPTII,S$GLB,, | Performed by: INTERNAL MEDICINE

## 2022-01-12 PROCEDURE — 87811 SARS CORONAVIRUS 2 ANTIGEN POCT, MANUAL READ: ICD-10-PCS | Mod: S$GLB,,, | Performed by: INTERNAL MEDICINE

## 2022-01-12 PROCEDURE — 99203 OFFICE O/P NEW LOW 30 MIN: CPT | Mod: 25,S$GLB,, | Performed by: INTERNAL MEDICINE

## 2022-01-12 PROCEDURE — 3008F BODY MASS INDEX DOCD: CPT | Mod: CPTII,S$GLB,, | Performed by: INTERNAL MEDICINE

## 2022-01-12 PROCEDURE — 3044F PR MOST RECENT HEMOGLOBIN A1C LEVEL <7.0%: ICD-10-PCS | Mod: CPTII,S$GLB,, | Performed by: INTERNAL MEDICINE

## 2022-01-12 PROCEDURE — 94729 PR C02/MEMBANE DIFFUSE CAPACITY: ICD-10-PCS | Mod: S$GLB,,, | Performed by: INTERNAL MEDICINE

## 2022-01-12 PROCEDURE — 1159F MED LIST DOCD IN RCRD: CPT | Mod: CPTII,S$GLB,, | Performed by: INTERNAL MEDICINE

## 2022-01-12 PROCEDURE — 3074F PR MOST RECENT SYSTOLIC BLOOD PRESSURE < 130 MM HG: ICD-10-PCS | Mod: CPTII,S$GLB,, | Performed by: INTERNAL MEDICINE

## 2022-01-12 PROCEDURE — 94727 GAS DIL/WSHOT DETER LNG VOL: CPT | Mod: S$GLB,,, | Performed by: INTERNAL MEDICINE

## 2022-01-12 PROCEDURE — 94010 BREATHING CAPACITY TEST: ICD-10-PCS | Mod: S$GLB,,, | Performed by: INTERNAL MEDICINE

## 2022-01-12 PROCEDURE — 3078F DIAST BP <80 MM HG: CPT | Mod: CPTII,S$GLB,, | Performed by: INTERNAL MEDICINE

## 2022-01-12 RX ORDER — ALBUTEROL SULFATE 90 UG/1
2 AEROSOL, METERED RESPIRATORY (INHALATION) EVERY 6 HOURS PRN
Qty: 18 G | Refills: 2 | Status: SHIPPED | OUTPATIENT
Start: 2022-01-12 | End: 2022-07-29 | Stop reason: SDUPTHER

## 2022-02-04 NOTE — PROGRESS NOTES
CC: PE, hematology consultation    HPI: , 61, is here for hematology consultation for pulmonary embolism.  She has COPD, CHF, DM, h/o CVA, HLD, HTn. She has cardiomyopathy with EF of 10-15%. She had defibrillator placed on 21. Patient states she was mostly immobile at home after that, and spent most of her time in her chair or bed. She had worsening dyspnea around  of  and was evaluated at the ER on 21. She had CTA chest which identified right sided sub-segmental PE.  She denies using hormonal supplement, estrogen supplement or smoking at the time. No prior personal or family h/o blood clots/ bad obstetric outcomes.Denies using recreational drugs. No recent weight loss. She is not uptodate with routine cancer screenings.   She is now on apixaban.        Past Medical History:   Diagnosis Date    Anxiety     Arthritis     Asthma     CHF (congestive heart failure)     COPD (chronic obstructive pulmonary disease)     Depression     Diabetes mellitus     Dyspnea     H/O: hysterectomy     Hyperlipemia     Hypertension     Schizophrenia     Stroke          Past Surgical History:   Procedure Laterality Date    BLADDER SUSPENSION      CARPAL TUNNEL RELEASE Right     HEEL SPUR SURGERY Left     HYSTERECTOMY      LEFT HEART CATHETERIZATION Bilateral 2019    Procedure: Left heart cath;  Surgeon: Steve Chambers MD;  Location: [facility] CATH LAB;  Service: Cardiology;  Laterality: Bilateral;    RIGHT HEART CATHETERIZATION Right 2021    Procedure: INSERTION, CATHETER, RIGHT HEART;  Surgeon: Petr Naranjo MD;  Location: [facility] CATH LAB;  Service: Cardiology;  Laterality: Right;    TUBAL LIGATION         Social History     Socioeconomic History    Marital status:    Tobacco Use    Smoking status: Former Smoker     Types: Cigarettes     Quit date: 2016     Years since quittin.4    Smokeless tobacco: Never Used    Tobacco comment: nonex 2  weeks   Substance and Sexual Activity    Alcohol use: No     Alcohol/week: 0.0 standard drinks    Drug use: No       Review of patient's allergies indicates:   Allergen Reactions    Adhesive Blisters    Captopril Other (See Comments)     COUGH       Current Outpatient Medications   Medication Sig    albuterol (PROVENTIL/VENTOLIN HFA) 90 mcg/actuation inhaler Inhale 2 puffs into the lungs every 6 (six) hours as needed for Wheezing or Shortness of Breath.    aspirin 81 MG Chew Take 1 tablet (81 mg total) by mouth once daily.    atorvastatin (LIPITOR) 80 MG tablet Take 1 tablet (80 mg total) by mouth once daily.    dapagliflozin (FARXIGA) 10 mg tablet Take 1 tablet (10 mg total) by mouth once daily.    dulaglutide (TRULICITY) 1.5 mg/0.5 mL pen injector Inject into the skin once a week.     glimepiride (AMARYL) 4 MG tablet Take 4 mg by mouth once daily.    meclizine (ANTIVERT) 25 mg tablet Take 1 tablet (25 mg total) by mouth 3 (three) times daily as needed for Dizziness.    metFORMIN (GLUCOPHAGE) 1000 MG tablet Take 1,000 mg by mouth 2 (two) times daily.     metoprolol succinate (TOPROL-XL) 100 MG 24 hr tablet Take 1 tablet (100 mg total) by mouth once daily.    potassium chloride (MICRO-K) 10 MEQ CpSR     sacubitriL-valsartan (ENTRESTO)  mg per tablet Take 1 tablet by mouth 2 (two) times daily.    spironolactone (ALDACTONE) 25 MG tablet Take 1 tablet (25 mg total) by mouth once daily.    torsemide (DEMADEX) 10 MG Tab Take 2 tablets (20 mg total) by mouth once daily.     No current facility-administered medications for this visit.       Review of Systems   Constitutional: Positive for malaise/fatigue. Negative for chills, fever and weight loss.   HENT: Negative for ear discharge, ear pain, hearing loss, nosebleeds and tinnitus.    Eyes: Negative for blurred vision, double vision and photophobia.   Respiratory: Negative for cough and sputum production.    Cardiovascular: Negative for chest pain  and claudication.   Gastrointestinal: Negative for abdominal pain, diarrhea, heartburn and vomiting.   Genitourinary: Negative for dysuria, frequency and urgency.   Musculoskeletal: Negative for back pain, myalgias and neck pain.   Neurological: Negative for dizziness, tingling, tremors and sensory change.   Endo/Heme/Allergies: Negative for environmental allergies. Does not bruise/bleed easily.   Psychiatric/Behavioral: Negative for substance abuse and suicidal ideas.       Vitals:    02/07/22 0736   BP: (!) 122/59   Pulse: 73   Resp: 20   Temp: 98.3 °F (36.8 °C)         Physical Exam  HENT:      Head: Normocephalic.   Eyes:      General: No scleral icterus.  Cardiovascular:      Rate and Rhythm: Normal rate and regular rhythm.      Pulses: Normal pulses.      Heart sounds: No murmur heard.      Pulmonary:      Effort: Pulmonary effort is normal. No respiratory distress.      Breath sounds: Normal breath sounds.   Abdominal:      General: There is no distension.      Palpations: There is no mass.      Tenderness: There is no abdominal tenderness.   Musculoskeletal:         General: No swelling.   Skin:     Coloration: Skin is not jaundiced or pale.      Findings: No bruising.   Neurological:      General: No focal deficit present.      Mental Status: She is alert.         12/27/21 CTA chest    FINDINGS:  Pulmonary vasculature: There is satisfactory opacification.  There is embolic material appreciated in the secondary or tertiary branches of the right pulmonary artery supplying the inferior aspect of the right lung.  This is appreciated on image number 167 of series 601 and image number 285 of series 3.     Aorta: Left-sided aortic arch.  No aneurysm and no significant atherosclerosis     Base of Neck: No significant abnormality.     Thoracic soft tissues: Normal.     Heart: Normal size. No effusion.     Zaida/Mediastinum: No pathologic rito enlargement.     Airways: Patent.     Lungs/Pleura: Clear lungs. No  pleural effusion or thickening.     Esophagus: Normal.     Upper Abdomen: No abnormality of the partially imaged upper abdomen.     Bones: No acute fracture. No suspicious lytic or sclerotic lesions.     Impression:     Findings positive for small pulmonary emboli or embolus seen in a single secondary ortertiary branch of the right pulmonary artery.     This report was flagged in Epic as abnormal.        Component      Latest Ref Rng & Units 1/12/2022 1/10/2022   WBC      3.90 - 12.70 K/uL  8.74   RBC      4.00 - 5.40 M/uL  4.64   Hemoglobin      12.0 - 16.0 g/dL  13.0   Hematocrit      37.0 - 48.5 %  41.7   MCV      82 - 98 fL  90   MCH      27.0 - 31.0 pg  28.0   MCHC      32.0 - 36.0 g/dL  31.2 (L)   RDW      11.5 - 14.5 %  14.5   Platelets      150 - 450 K/uL  381   MPV      9.2 - 12.9 fL  9.9   Immature Granulocytes      0.0 - 0.5 %  0.3   Gran # (ANC)      1.8 - 7.7 K/uL  4.9   Immature Grans (Abs)      0.00 - 0.04 K/uL  0.03   Lymph #      1.0 - 4.8 K/uL  3.0   Mono #      0.3 - 1.0 K/uL  0.5   Eos #      0.0 - 0.5 K/uL  0.3   Baso #      0.00 - 0.20 K/uL  0.03   nRBC      0 /100 WBC  0   Gran %      38.0 - 73.0 %  55.9   Lymph %      18.0 - 48.0 %  34.1   Mono %      4.0 - 15.0 %  6.2   Eosinophil %      0.0 - 8.0 %  3.2   Basophil %      0.0 - 1.9 %  0.3   Differential Method        Automated   Sodium      136 - 145 mmol/L  138   Potassium      3.5 - 5.1 mmol/L  3.7   Chloride      95 - 110 mmol/L  101   CO2      23 - 29 mmol/L  27   Glucose      70 - 110 mg/dL  238 (H)   BUN      6 - 20 mg/dL  13   Creatinine      0.5 - 1.4 mg/dL  1.0   Calcium      8.7 - 10.5 mg/dL  9.8   PROTEIN TOTAL      6.0 - 8.4 g/dL  7.5   Albumin      3.5 - 5.2 g/dL  3.6   BILIRUBIN TOTAL      0.1 - 1.0 mg/dL  0.7   Alkaline Phosphatase      55 - 135 U/L  178 (H)   AST      10 - 40 U/L  17   ALT      10 - 44 U/L  20   Anion Gap      8 - 16 mmol/L  10   eGFR if African American      >60 mL/min/1.73 m:2  >60.0   eGFR if non African  American      >60 mL/min/1.73 m:2  >60.0   Iron      30 - 160 ug/dL  61   Transferrin      200 - 375 mg/dL  279   TIBC      250 - 450 ug/dL  413   Saturated Iron      20 - 50 %  15 (L)   SARS Coronavirus 2 Antigen      Negative Negative     Acceptable       Yes    NT-proBNP      <=173 pg/mL  1374 (H)   Ferritin      20.0 - 300.0 ng/mL  33       Assessment    1. Pulmonary embolism, acute  2. CHF  3. Type 2 DM not on long term insulin without complications  4. H/o CVA      Plan:    1. This was the first known thrombotic episode. She was immobile preceding the PE diagnosis, so likely this is a provoked event. COVID 19 was negative. Optimal anti-coagulation for first provoked PE is 3-6 months. However, she has severely decreased LVEF, with last 2D ECHO documenting LVEF of ~ 15%. She ill likely benefit from long term anti-coagulation. No h/o bleeding or falls. She is on ASA, and DOAC+ASA certainly increase bleeding risk significantly compared to either agent alone.   She will have anti-phospholipid antibodies checked today.  Recommend age appropriate anti-cancer screenings, including PAP smear, mammogram, colonoscopy.     2. She is followed at the transplant/ CHF clinic here    3. She follows with her PCP.    4. She was previously on an ti-coagulation. She is on ASA.

## 2022-02-07 ENCOUNTER — OFFICE VISIT (OUTPATIENT)
Dept: HEMATOLOGY/ONCOLOGY | Facility: CLINIC | Age: 62
End: 2022-02-07
Payer: COMMERCIAL

## 2022-02-07 ENCOUNTER — LAB VISIT (OUTPATIENT)
Dept: LAB | Facility: HOSPITAL | Age: 62
End: 2022-02-07
Payer: COMMERCIAL

## 2022-02-07 VITALS
RESPIRATION RATE: 20 BRPM | SYSTOLIC BLOOD PRESSURE: 122 MMHG | OXYGEN SATURATION: 98 % | TEMPERATURE: 98 F | HEART RATE: 73 BPM | HEIGHT: 67 IN | WEIGHT: 200.63 LBS | DIASTOLIC BLOOD PRESSURE: 59 MMHG | BODY MASS INDEX: 31.49 KG/M2

## 2022-02-07 DIAGNOSIS — I63.511 CEREBROVASCULAR ACCIDENT (CVA) DUE TO OCCLUSION OF RIGHT MIDDLE CEREBRAL ARTERY: ICD-10-CM

## 2022-02-07 DIAGNOSIS — I26.99 ACUTE PULMONARY EMBOLISM WITHOUT ACUTE COR PULMONALE, UNSPECIFIED PULMONARY EMBOLISM TYPE: ICD-10-CM

## 2022-02-07 DIAGNOSIS — I42.0 DILATED CARDIOMYOPATHY: Chronic | ICD-10-CM

## 2022-02-07 DIAGNOSIS — E78.2 MIXED HYPERLIPIDEMIA: Chronic | ICD-10-CM

## 2022-02-07 DIAGNOSIS — E11.9 TYPE 2 DIABETES MELLITUS WITHOUT COMPLICATION, WITHOUT LONG-TERM CURRENT USE OF INSULIN: Chronic | ICD-10-CM

## 2022-02-07 DIAGNOSIS — I10 ESSENTIAL HYPERTENSION: Primary | Chronic | ICD-10-CM

## 2022-02-07 PROCEDURE — 3008F BODY MASS INDEX DOCD: CPT | Mod: CPTII,S$GLB,, | Performed by: INTERNAL MEDICINE

## 2022-02-07 PROCEDURE — 3074F PR MOST RECENT SYSTOLIC BLOOD PRESSURE < 130 MM HG: ICD-10-PCS | Mod: CPTII,S$GLB,, | Performed by: INTERNAL MEDICINE

## 2022-02-07 PROCEDURE — 1159F PR MEDICATION LIST DOCUMENTED IN MEDICAL RECORD: ICD-10-PCS | Mod: CPTII,S$GLB,, | Performed by: INTERNAL MEDICINE

## 2022-02-07 PROCEDURE — 3008F PR BODY MASS INDEX (BMI) DOCUMENTED: ICD-10-PCS | Mod: CPTII,S$GLB,, | Performed by: INTERNAL MEDICINE

## 2022-02-07 PROCEDURE — 36415 COLL VENOUS BLD VENIPUNCTURE: CPT | Performed by: INTERNAL MEDICINE

## 2022-02-07 PROCEDURE — 3044F PR MOST RECENT HEMOGLOBIN A1C LEVEL <7.0%: ICD-10-PCS | Mod: CPTII,S$GLB,, | Performed by: INTERNAL MEDICINE

## 2022-02-07 PROCEDURE — 1159F MED LIST DOCD IN RCRD: CPT | Mod: CPTII,S$GLB,, | Performed by: INTERNAL MEDICINE

## 2022-02-07 PROCEDURE — 86146 BETA-2 GLYCOPROTEIN ANTIBODY: CPT | Mod: 59 | Performed by: INTERNAL MEDICINE

## 2022-02-07 PROCEDURE — 99205 PR OFFICE/OUTPT VISIT, NEW, LEVL V, 60-74 MIN: ICD-10-PCS | Mod: S$GLB,,, | Performed by: INTERNAL MEDICINE

## 2022-02-07 PROCEDURE — 99999 PR PBB SHADOW E&M-EST. PATIENT-LVL V: CPT | Mod: PBBFAC,,, | Performed by: INTERNAL MEDICINE

## 2022-02-07 PROCEDURE — 3078F DIAST BP <80 MM HG: CPT | Mod: CPTII,S$GLB,, | Performed by: INTERNAL MEDICINE

## 2022-02-07 PROCEDURE — 86147 CARDIOLIPIN ANTIBODY EA IG: CPT | Performed by: INTERNAL MEDICINE

## 2022-02-07 PROCEDURE — 3044F HG A1C LEVEL LT 7.0%: CPT | Mod: CPTII,S$GLB,, | Performed by: INTERNAL MEDICINE

## 2022-02-07 PROCEDURE — 3078F PR MOST RECENT DIASTOLIC BLOOD PRESSURE < 80 MM HG: ICD-10-PCS | Mod: CPTII,S$GLB,, | Performed by: INTERNAL MEDICINE

## 2022-02-07 PROCEDURE — 99205 OFFICE O/P NEW HI 60 MIN: CPT | Mod: S$GLB,,, | Performed by: INTERNAL MEDICINE

## 2022-02-07 PROCEDURE — 99999 PR PBB SHADOW E&M-EST. PATIENT-LVL V: ICD-10-PCS | Mod: PBBFAC,,, | Performed by: INTERNAL MEDICINE

## 2022-02-07 PROCEDURE — 3074F SYST BP LT 130 MM HG: CPT | Mod: CPTII,S$GLB,, | Performed by: INTERNAL MEDICINE

## 2022-02-08 DIAGNOSIS — I26.99 ACUTE PULMONARY EMBOLISM WITHOUT ACUTE COR PULMONALE, UNSPECIFIED PULMONARY EMBOLISM TYPE: Primary | ICD-10-CM

## 2022-02-09 LAB
B2 GLYCOPROT1 IGA SER QL: <9 SAU
B2 GLYCOPROT1 IGG SER QL: <9 SGU
B2 GLYCOPROT1 IGM SER QL: <9 SMU
CARDIOLIPIN IGG SER IA-ACNC: <9.4 GPL (ref 0–14.99)
CARDIOLIPIN IGM SER IA-ACNC: <9.4 MPL (ref 0–12.49)

## 2022-02-11 ENCOUNTER — HOSPITAL ENCOUNTER (OUTPATIENT)
Facility: HOSPITAL | Age: 62
Discharge: HOME | End: 2022-02-12
Attending: EMERGENCY MEDICINE | Admitting: STUDENT IN AN ORGANIZED HEALTH CARE EDUCATION/TRAINING PROGRAM
Payer: COMMERCIAL

## 2022-02-11 DIAGNOSIS — R07.9 CHEST PAIN: Primary | ICD-10-CM

## 2022-02-11 LAB — BNP SERPL-MCNC: 459 PG/ML (ref 0–99)

## 2022-02-11 PROCEDURE — U0002 COVID-19 LAB TEST NON-CDC: HCPCS | Performed by: PHYSICIAN ASSISTANT

## 2022-02-11 PROCEDURE — 85025 COMPLETE CBC W/AUTO DIFF WBC: CPT | Performed by: PHYSICIAN ASSISTANT

## 2022-02-11 PROCEDURE — 99285 PR EMERGENCY DEPT VISIT,LEVEL V: ICD-10-PCS | Mod: CS,,, | Performed by: EMERGENCY MEDICINE

## 2022-02-11 PROCEDURE — 83880 ASSAY OF NATRIURETIC PEPTIDE: CPT | Performed by: PHYSICIAN ASSISTANT

## 2022-02-11 PROCEDURE — 99285 EMERGENCY DEPT VISIT HI MDM: CPT | Mod: 25

## 2022-02-11 PROCEDURE — 84484 ASSAY OF TROPONIN QUANT: CPT | Performed by: PHYSICIAN ASSISTANT

## 2022-02-11 PROCEDURE — 93010 EKG 12-LEAD: ICD-10-PCS | Mod: ,,, | Performed by: INTERNAL MEDICINE

## 2022-02-11 PROCEDURE — 93005 ELECTROCARDIOGRAM TRACING: CPT

## 2022-02-11 PROCEDURE — 80053 COMPREHEN METABOLIC PANEL: CPT | Performed by: PHYSICIAN ASSISTANT

## 2022-02-11 PROCEDURE — 93010 ELECTROCARDIOGRAM REPORT: CPT | Mod: ,,, | Performed by: INTERNAL MEDICINE

## 2022-02-11 PROCEDURE — 99285 EMERGENCY DEPT VISIT HI MDM: CPT | Mod: CS,,, | Performed by: EMERGENCY MEDICINE

## 2022-02-11 PROCEDURE — 25000003 PHARM REV CODE 250: Performed by: PHYSICIAN ASSISTANT

## 2022-02-11 RX ORDER — ASPIRIN 325 MG
325 TABLET ORAL
Status: COMPLETED | OUTPATIENT
Start: 2022-02-11 | End: 2022-02-11

## 2022-02-11 RX ADMIN — ASPIRIN 325 MG ORAL TABLET 325 MG: 325 PILL ORAL at 10:02

## 2022-02-12 VITALS
RESPIRATION RATE: 17 BRPM | DIASTOLIC BLOOD PRESSURE: 64 MMHG | TEMPERATURE: 98 F | BODY MASS INDEX: 31.1 KG/M2 | SYSTOLIC BLOOD PRESSURE: 121 MMHG | HEART RATE: 85 BPM | OXYGEN SATURATION: 97 % | WEIGHT: 200 LBS

## 2022-02-12 LAB
ALBUMIN SERPL BCP-MCNC: 3.1 G/DL (ref 3.5–5.2)
ALBUMIN SERPL BCP-MCNC: 3.5 G/DL (ref 3.5–5.2)
ALP SERPL-CCNC: 157 U/L (ref 55–135)
ALP SERPL-CCNC: 165 U/L (ref 55–135)
ALT SERPL W/O P-5'-P-CCNC: 10 U/L (ref 10–44)
ALT SERPL W/O P-5'-P-CCNC: 12 U/L (ref 10–44)
ANION GAP SERPL CALC-SCNC: 10 MMOL/L (ref 8–16)
ANION GAP SERPL CALC-SCNC: 15 MMOL/L (ref 8–16)
ANISOCYTOSIS BLD QL SMEAR: SLIGHT
AST SERPL-CCNC: 13 U/L (ref 10–40)
AST SERPL-CCNC: 21 U/L (ref 10–40)
BASOPHILS # BLD AUTO: 0.03 K/UL (ref 0–0.2)
BASOPHILS NFR BLD: 0.4 % (ref 0–1.9)
BASOPHILS NFR BLD: 1 % (ref 0–1.9)
BILIRUB SERPL-MCNC: 0.4 MG/DL (ref 0.1–1)
BILIRUB SERPL-MCNC: 0.4 MG/DL (ref 0.1–1)
BUN SERPL-MCNC: 12 MG/DL (ref 6–20)
BUN SERPL-MCNC: 12 MG/DL (ref 6–20)
CALCIUM SERPL-MCNC: 9.4 MG/DL (ref 8.7–10.5)
CALCIUM SERPL-MCNC: 9.7 MG/DL (ref 8.7–10.5)
CHLORIDE SERPL-SCNC: 101 MMOL/L (ref 95–110)
CHLORIDE SERPL-SCNC: 101 MMOL/L (ref 95–110)
CO2 SERPL-SCNC: 24 MMOL/L (ref 23–29)
CO2 SERPL-SCNC: 29 MMOL/L (ref 23–29)
CREAT SERPL-MCNC: 0.8 MG/DL (ref 0.5–1.4)
CREAT SERPL-MCNC: 0.9 MG/DL (ref 0.5–1.4)
CTP QC/QA: YES
DIFFERENTIAL METHOD: ABNORMAL
DIFFERENTIAL METHOD: ABNORMAL
EOSINOPHIL # BLD AUTO: 0.2 K/UL (ref 0–0.5)
EOSINOPHIL NFR BLD: 2.6 % (ref 0–8)
EOSINOPHIL NFR BLD: 4 % (ref 0–8)
ERYTHROCYTE [DISTWIDTH] IN BLOOD BY AUTOMATED COUNT: 14.4 % (ref 11.5–14.5)
ERYTHROCYTE [DISTWIDTH] IN BLOOD BY AUTOMATED COUNT: 14.6 % (ref 11.5–14.5)
EST. GFR  (AFRICAN AMERICAN): >60 ML/MIN/1.73 M^2
EST. GFR  (AFRICAN AMERICAN): >60 ML/MIN/1.73 M^2
EST. GFR  (NON AFRICAN AMERICAN): >60 ML/MIN/1.73 M^2
EST. GFR  (NON AFRICAN AMERICAN): >60 ML/MIN/1.73 M^2
GLUCOSE SERPL-MCNC: 129 MG/DL (ref 70–110)
GLUCOSE SERPL-MCNC: 153 MG/DL (ref 70–110)
HCT VFR BLD AUTO: 36.1 % (ref 37–48.5)
HCT VFR BLD AUTO: 39 % (ref 37–48.5)
HGB BLD-MCNC: 11.8 G/DL (ref 12–16)
HGB BLD-MCNC: 12.9 G/DL (ref 12–16)
HYPOCHROMIA BLD QL SMEAR: ABNORMAL
IMM GRANULOCYTES # BLD AUTO: 0.02 K/UL (ref 0–0.04)
IMM GRANULOCYTES # BLD AUTO: ABNORMAL 10*3/UL
IMM GRANULOCYTES NFR BLD AUTO: 0.3 % (ref 0–0.5)
IMM GRANULOCYTES NFR BLD AUTO: ABNORMAL %
LYMPHOCYTES # BLD AUTO: 2.7 K/UL (ref 1–4.8)
LYMPHOCYTES NFR BLD: 33.8 % (ref 18–48)
LYMPHOCYTES NFR BLD: 35 % (ref 18–48)
MAGNESIUM SERPL-MCNC: 2 MG/DL (ref 1.6–2.6)
MCH RBC QN AUTO: 28.4 PG (ref 27–31)
MCH RBC QN AUTO: 29.4 PG (ref 27–31)
MCHC RBC AUTO-ENTMCNC: 32.7 G/DL (ref 32–36)
MCHC RBC AUTO-ENTMCNC: 33.1 G/DL (ref 32–36)
MCV RBC AUTO: 87 FL (ref 82–98)
MCV RBC AUTO: 89 FL (ref 82–98)
MONOCYTES # BLD AUTO: 0.5 K/UL (ref 0.3–1)
MONOCYTES NFR BLD: 6.8 % (ref 4–15)
MONOCYTES NFR BLD: 9 % (ref 4–15)
NEUTROPHILS # BLD AUTO: 4.5 K/UL (ref 1.8–7.7)
NEUTROPHILS NFR BLD: 51 % (ref 38–73)
NEUTROPHILS NFR BLD: 56.1 % (ref 38–73)
NRBC BLD-RTO: 0 /100 WBC
NRBC BLD-RTO: 0 /100 WBC
PHOSPHATE SERPL-MCNC: 4.5 MG/DL (ref 2.7–4.5)
PLATELET # BLD AUTO: 328 K/UL (ref 150–450)
PLATELET # BLD AUTO: 337 K/UL (ref 150–450)
PLATELET BLD QL SMEAR: ABNORMAL
PMV BLD AUTO: 10.5 FL (ref 9.2–12.9)
PMV BLD AUTO: 11 FL (ref 9.2–12.9)
POCT GLUCOSE: 141 MG/DL (ref 70–110)
POCT GLUCOSE: 230 MG/DL (ref 70–110)
POTASSIUM SERPL-SCNC: 3.1 MMOL/L (ref 3.5–5.1)
POTASSIUM SERPL-SCNC: 4.3 MMOL/L (ref 3.5–5.1)
PROT SERPL-MCNC: 6.8 G/DL (ref 6–8.4)
PROT SERPL-MCNC: 7.9 G/DL (ref 6–8.4)
RBC # BLD AUTO: 4.15 M/UL (ref 4–5.4)
RBC # BLD AUTO: 4.39 M/UL (ref 4–5.4)
SARS-COV-2 RDRP RESP QL NAA+PROBE: NEGATIVE
SODIUM SERPL-SCNC: 140 MMOL/L (ref 136–145)
SODIUM SERPL-SCNC: 140 MMOL/L (ref 136–145)
TROPONIN I SERPL DL<=0.01 NG/ML-MCNC: 0.01 NG/ML (ref 0–0.03)
TROPONIN I SERPL DL<=0.01 NG/ML-MCNC: 0.02 NG/ML (ref 0–0.03)
WBC # BLD AUTO: 7.94 K/UL (ref 3.9–12.7)
WBC # BLD AUTO: 9.81 K/UL (ref 3.9–12.7)

## 2022-02-12 PROCEDURE — 25000003 PHARM REV CODE 250: Performed by: INTERNAL MEDICINE

## 2022-02-12 PROCEDURE — 83735 ASSAY OF MAGNESIUM: CPT | Performed by: INTERNAL MEDICINE

## 2022-02-12 PROCEDURE — 82962 GLUCOSE BLOOD TEST: CPT

## 2022-02-12 PROCEDURE — 84484 ASSAY OF TROPONIN QUANT: CPT | Performed by: INTERNAL MEDICINE

## 2022-02-12 PROCEDURE — G0378 HOSPITAL OBSERVATION PER HR: HCPCS

## 2022-02-12 PROCEDURE — 99217 PR OBSERVATION CARE DISCHARGE: CPT | Mod: ,,, | Performed by: STUDENT IN AN ORGANIZED HEALTH CARE EDUCATION/TRAINING PROGRAM

## 2022-02-12 PROCEDURE — 85025 COMPLETE CBC W/AUTO DIFF WBC: CPT | Performed by: INTERNAL MEDICINE

## 2022-02-12 PROCEDURE — 99220 PR INITIAL OBSERVATION CARE,LEVL III: CPT | Mod: ,,, | Performed by: INTERNAL MEDICINE

## 2022-02-12 PROCEDURE — 99220 PR INITIAL OBSERVATION CARE,LEVL III: ICD-10-PCS | Mod: ,,, | Performed by: INTERNAL MEDICINE

## 2022-02-12 PROCEDURE — 99217 PR OBSERVATION CARE DISCHARGE: ICD-10-PCS | Mod: ,,, | Performed by: STUDENT IN AN ORGANIZED HEALTH CARE EDUCATION/TRAINING PROGRAM

## 2022-02-12 PROCEDURE — 25000003 PHARM REV CODE 250: Performed by: EMERGENCY MEDICINE

## 2022-02-12 PROCEDURE — 84100 ASSAY OF PHOSPHORUS: CPT | Performed by: INTERNAL MEDICINE

## 2022-02-12 PROCEDURE — 80053 COMPREHEN METABOLIC PANEL: CPT | Performed by: INTERNAL MEDICINE

## 2022-02-12 RX ORDER — POLYETHYLENE GLYCOL 3350 17 G/17G
17 POWDER, FOR SOLUTION ORAL 2 TIMES DAILY PRN
Status: DISCONTINUED | OUTPATIENT
Start: 2022-02-12 | End: 2022-02-12 | Stop reason: HOSPADM

## 2022-02-12 RX ORDER — ATORVASTATIN CALCIUM 20 MG/1
80 TABLET, FILM COATED ORAL DAILY
Status: DISCONTINUED | OUTPATIENT
Start: 2022-02-12 | End: 2022-02-12 | Stop reason: HOSPADM

## 2022-02-12 RX ORDER — SPIRONOLACTONE 25 MG/1
25 TABLET ORAL DAILY
Status: DISCONTINUED | OUTPATIENT
Start: 2022-02-12 | End: 2022-02-12 | Stop reason: HOSPADM

## 2022-02-12 RX ORDER — NAPROXEN SODIUM 220 MG/1
81 TABLET, FILM COATED ORAL DAILY
Status: DISCONTINUED | OUTPATIENT
Start: 2022-02-12 | End: 2022-02-12 | Stop reason: HOSPADM

## 2022-02-12 RX ORDER — INSULIN ASPART 100 [IU]/ML
0-5 INJECTION, SOLUTION INTRAVENOUS; SUBCUTANEOUS
Status: DISCONTINUED | OUTPATIENT
Start: 2022-02-12 | End: 2022-02-12 | Stop reason: HOSPADM

## 2022-02-12 RX ORDER — IBUPROFEN 200 MG
24 TABLET ORAL
Status: DISCONTINUED | OUTPATIENT
Start: 2022-02-12 | End: 2022-02-12 | Stop reason: HOSPADM

## 2022-02-12 RX ORDER — NITROGLYCERIN 0.4 MG/1
0.4 TABLET SUBLINGUAL
Status: DISCONTINUED | OUTPATIENT
Start: 2022-02-12 | End: 2022-02-12 | Stop reason: HOSPADM

## 2022-02-12 RX ORDER — TORSEMIDE 20 MG/1
20 TABLET ORAL DAILY
Status: DISCONTINUED | OUTPATIENT
Start: 2022-02-12 | End: 2022-02-12 | Stop reason: HOSPADM

## 2022-02-12 RX ORDER — ONDANSETRON 4 MG/1
4 TABLET, ORALLY DISINTEGRATING ORAL EVERY 8 HOURS PRN
Status: DISCONTINUED | OUTPATIENT
Start: 2022-02-12 | End: 2022-02-12 | Stop reason: HOSPADM

## 2022-02-12 RX ORDER — TALC
6 POWDER (GRAM) TOPICAL NIGHTLY PRN
Status: DISCONTINUED | OUTPATIENT
Start: 2022-02-12 | End: 2022-02-12 | Stop reason: HOSPADM

## 2022-02-12 RX ORDER — IPRATROPIUM BROMIDE AND ALBUTEROL SULFATE 2.5; .5 MG/3ML; MG/3ML
3 SOLUTION RESPIRATORY (INHALATION) EVERY 6 HOURS PRN
Status: DISCONTINUED | OUTPATIENT
Start: 2022-02-12 | End: 2022-02-12 | Stop reason: HOSPADM

## 2022-02-12 RX ORDER — ACETAMINOPHEN 325 MG/1
650 TABLET ORAL EVERY 8 HOURS PRN
Qty: 30 TABLET | Refills: 0 | Status: SHIPPED | OUTPATIENT
Start: 2022-02-12

## 2022-02-12 RX ORDER — GLUCAGON 1 MG
1 KIT INJECTION
Status: DISCONTINUED | OUTPATIENT
Start: 2022-02-12 | End: 2022-02-12 | Stop reason: HOSPADM

## 2022-02-12 RX ORDER — SODIUM CHLORIDE 0.9 % (FLUSH) 0.9 %
10 SYRINGE (ML) INJECTION
Status: DISCONTINUED | OUTPATIENT
Start: 2022-02-12 | End: 2022-02-12 | Stop reason: HOSPADM

## 2022-02-12 RX ORDER — NALOXONE HCL 0.4 MG/ML
0.02 VIAL (ML) INJECTION
Status: DISCONTINUED | OUTPATIENT
Start: 2022-02-12 | End: 2022-02-12 | Stop reason: HOSPADM

## 2022-02-12 RX ORDER — SIMETHICONE 80 MG
1 TABLET,CHEWABLE ORAL 4 TIMES DAILY PRN
Status: DISCONTINUED | OUTPATIENT
Start: 2022-02-12 | End: 2022-02-12 | Stop reason: HOSPADM

## 2022-02-12 RX ORDER — ACETAMINOPHEN 500 MG
1000 TABLET ORAL
Status: COMPLETED | OUTPATIENT
Start: 2022-02-12 | End: 2022-02-12

## 2022-02-12 RX ORDER — PROCHLORPERAZINE EDISYLATE 5 MG/ML
5 INJECTION INTRAMUSCULAR; INTRAVENOUS EVERY 6 HOURS PRN
Status: DISCONTINUED | OUTPATIENT
Start: 2022-02-12 | End: 2022-02-12 | Stop reason: HOSPADM

## 2022-02-12 RX ORDER — METOPROLOL SUCCINATE 50 MG/1
100 TABLET, EXTENDED RELEASE ORAL DAILY
Status: DISCONTINUED | OUTPATIENT
Start: 2022-02-12 | End: 2022-02-12 | Stop reason: HOSPADM

## 2022-02-12 RX ORDER — ACETAMINOPHEN 325 MG/1
650 TABLET ORAL EVERY 8 HOURS PRN
Status: DISCONTINUED | OUTPATIENT
Start: 2022-02-12 | End: 2022-02-12 | Stop reason: HOSPADM

## 2022-02-12 RX ORDER — IBUPROFEN 200 MG
16 TABLET ORAL
Status: DISCONTINUED | OUTPATIENT
Start: 2022-02-12 | End: 2022-02-12 | Stop reason: HOSPADM

## 2022-02-12 RX ADMIN — SPIRONOLACTONE 25 MG: 25 TABLET ORAL at 08:02

## 2022-02-12 RX ADMIN — ACETAMINOPHEN 1000 MG: 500 TABLET ORAL at 01:02

## 2022-02-12 RX ADMIN — ASPIRIN 81 MG CHEWABLE TABLET 81 MG: 81 TABLET CHEWABLE at 08:02

## 2022-02-12 RX ADMIN — METOPROLOL SUCCINATE 100 MG: 50 TABLET, EXTENDED RELEASE ORAL at 08:02

## 2022-02-12 RX ADMIN — ATORVASTATIN CALCIUM 80 MG: 20 TABLET, FILM COATED ORAL at 08:02

## 2022-02-12 RX ADMIN — SACUBITRIL AND VALSARTAN 1 TABLET: 97; 103 TABLET, FILM COATED ORAL at 09:02

## 2022-02-12 RX ADMIN — TORSEMIDE 20 MG: 20 TABLET ORAL at 08:02

## 2022-02-12 NOTE — H&P
[facility] - Emergency Dept  [facility] Medicine  History & Physical    Patient Name: Diomedes Mohr  MRN: [id number]  Patient Class: OP- Observation  Admission Date: 2/11/2022  Attending Physician: Ravi Joya MD   Primary Care Provider: Fernando Manzo MD      Patient information was obtained from patient, past medical records and ER records.     Subjective:     Principal Problem:Chest pain    Chief Complaint:   Chief Complaint   Patient presents with    Chest Pain     Chest pain and SOB since yesterday. HX of pacemaker w/ defibrillator.         HPI: 62 yo female with cardiomyopathy, last ECHO 10-15% EF s/p pacemaker/ICD, HTN, COPD, CAD, DM, PE on anticoags presenting for gas-like chest pain. Chest pain felt like it was gas and tried to drink a coke which did not help. The pain does not radiate. She denies any nausea vomiting or diaphoresis. She has had about a month of shortness of breath; however she was diagnosed with a PE about 1 month ago and was switched to xarelto a couple of days ago.     In ED, tropoin normal and BNP below baseline. Medicine called for admission.       Past Medical History:   Diagnosis Date    Anxiety     Arthritis     Asthma     CHF (congestive heart failure)     COPD (chronic obstructive pulmonary disease)     Depression     Diabetes mellitus     Dyspnea     H/O coronary angiogram     H/O: hysterectomy     Hyperlipemia     Hypertension     Pulmonary edema     Schizophrenia     Stroke        Past Surgical History:   Procedure Laterality Date    BLADDER SUSPENSION      CARPAL TUNNEL RELEASE Right     HEEL SPUR SURGERY Left     HYSTERECTOMY      LEFT HEART CATHETERIZATION Bilateral 12/27/2019    Procedure: Left heart cath;  Surgeon: Steve Chambers MD;  Location: [facility] CATH LAB;  Service: Cardiology;  Laterality: Bilateral;    RIGHT HEART CATHETERIZATION Right 7/26/2021    Procedure: INSERTION, CATHETER, RIGHT HEART;  Surgeon: Petr Naranjo MD;   Location: [facility] CATH LAB;  Service: Cardiology;  Laterality: Right;    TUBAL LIGATION         Review of patient's allergies indicates:   Allergen Reactions    Adhesive Blisters    Captopril Other (See Comments)     COUGH       No current facility-administered medications on file prior to encounter.     Current Outpatient Medications on File Prior to Encounter   Medication Sig    albuterol (PROVENTIL/VENTOLIN HFA) 90 mcg/actuation inhaler Inhale 2 puffs into the lungs every 6 (six) hours as needed for Wheezing or Shortness of Breath. (Patient not taking: Reported on 2/7/2022)    aspirin 81 MG Chew Take 1 tablet (81 mg total) by mouth once daily.    atorvastatin (LIPITOR) 80 MG tablet Take 1 tablet (80 mg total) by mouth once daily.    dapagliflozin (FARXIGA) 10 mg tablet Take 1 tablet (10 mg total) by mouth once daily. (Patient not taking: Reported on 2/7/2022)    dulaglutide (TRULICITY) 1.5 mg/0.5 mL pen injector Inject into the skin once a week.     glimepiride (AMARYL) 4 MG tablet Take 4 mg by mouth once daily.    meclizine (ANTIVERT) 25 mg tablet Take 1 tablet (25 mg total) by mouth 3 (three) times daily as needed for Dizziness.    metFORMIN (GLUCOPHAGE) 1000 MG tablet Take 1,000 mg by mouth 2 (two) times daily.     metoprolol succinate (TOPROL-XL) 100 MG 24 hr tablet Take 1 tablet (100 mg total) by mouth once daily.    potassium chloride (MICRO-K) 10 MEQ CpSR     rivaroxaban (XARELTO) 20 mg Tab Take 1 tablet (20 mg total) by mouth daily with dinner or evening meal.    sacubitriL-valsartan (ENTRESTO)  mg per tablet Take 1 tablet by mouth 2 (two) times daily.    spironolactone (ALDACTONE) 25 MG tablet Take 1 tablet (25 mg total) by mouth once daily.    torsemide (DEMADEX) 10 MG Tab Take 2 tablets (20 mg total) by mouth once daily.     Family History     Problem Relation (Age of Onset)    No Known Problems Mother, Father        Tobacco Use    Smoking status: Former Smoker     Types:  Cigarettes     Quit date: 2016     Years since quittin.4    Smokeless tobacco: Never Used    Tobacco comment: nonex 2 weeks   Substance and Sexual Activity    Alcohol use: No     Alcohol/week: 0.0 standard drinks    Drug use: No    Sexual activity: Not on file     Review of Systems   Constitutional: Negative for activity change, appetite change, chills and fever.   HENT: Negative for congestion, hearing loss and rhinorrhea.    Eyes: Negative for discharge, itching and visual disturbance.   Respiratory: Positive for chest tightness and shortness of breath. Negative for apnea and cough.    Cardiovascular: Negative for chest pain, palpitations and leg swelling.   Gastrointestinal: Negative for abdominal distention, abdominal pain, constipation, diarrhea, nausea and vomiting.   Endocrine: Negative for cold intolerance and heat intolerance.   Genitourinary: Negative for dysuria and hematuria.   Musculoskeletal: Negative for back pain, neck pain and neck stiffness.   Skin: Negative for rash and wound.   Neurological: Negative for dizziness, seizures, light-headedness and headaches.   Psychiatric/Behavioral: Negative for agitation, confusion and suicidal ideas.     Objective:     Vital Signs (Most Recent):  Temp: 97.9 °F (36.6 °C) (22)  Pulse: 104 (22)  Resp: 18 (22)  BP: 128/85 (22)  SpO2: 99 % (22) Vital Signs (24h Range):  Temp:  [97.9 °F (36.6 °C)-98.8 °F (37.1 °C)] 97.9 °F (36.6 °C)  Pulse:  [] 104  Resp:  [18] 18  SpO2:  [97 %-99 %] 99 %  BP: (117-131)/(65-85) 128/85     Weight: 90.7 kg (200 lb)  Body mass index is 31.1 kg/m².    Physical Exam  Vitals reviewed.   Constitutional:       General: She is not in acute distress.     Appearance: She is well-developed.   HENT:      Head: Normocephalic and atraumatic.      Nose: Nose normal. No rhinorrhea.      Mouth/Throat:      Mouth: Mucous membranes are moist.   Eyes:      General: No scleral  icterus.        Right eye: No discharge.         Left eye: No discharge.      Pupils: Pupils are equal, round, and reactive to light.   Neck:      Vascular: No JVD.   Cardiovascular:      Rate and Rhythm: Normal rate and regular rhythm.      Heart sounds: Normal heart sounds. No murmur heard.  No friction rub.   Pulmonary:      Effort: Pulmonary effort is normal. No respiratory distress.      Breath sounds: Normal breath sounds. No wheezing.   Abdominal:      General: Bowel sounds are normal. There is no distension.      Palpations: Abdomen is soft.      Tenderness: There is no abdominal tenderness.   Musculoskeletal:         General: No deformity. Normal range of motion.      Cervical back: Normal range of motion and neck supple.   Skin:     General: Skin is warm and dry.   Neurological:      General: No focal deficit present.      Mental Status: She is alert and oriented to person, place, and time.   Psychiatric:         Mood and Affect: Mood normal.         Behavior: Behavior normal.           CRANIAL NERVES     CN III, IV, VI   Pupils are equal, round, and reactive to light.       Significant Labs:   All pertinent labs within the past 24 hours have been reviewed.  CBC:   Recent Labs   Lab 02/11/22 2205   WBC 9.81   HGB 12.9   HCT 39.0        CMP:   Recent Labs   Lab 02/11/22 2205      K 4.3      CO2 24   *   BUN 12   CREATININE 0.9   CALCIUM 9.7   PROT 7.9   ALBUMIN 3.5   BILITOT 0.4   ALKPHOS 165*   AST 21   ALT 12   ANIONGAP 15   EGFRNONAA >60.0     Cardiac Markers:   Recent Labs   Lab 02/11/22 2205   *     Magnesium: No results for input(s): MG in the last 48 hours.  Troponin:   Recent Labs   Lab 02/11/22 2205   TROPONINI 0.007     TSH:   Recent Labs   Lab 10/11/21  0600   TSH 2.899       Significant Imaging: I have reviewed all pertinent imaging results/findings within the past 24 hours.    Assessment/Plan:     * Chest pain  Chest Pain  - EKG without STEMI  - Risk  Factors: DM2, HTN, stroke  - Initial troponin normal, will trend until peak  - Continue Aspirin 81mg PO daily  - Continue Lipitor 40mg PO daily  - Continue tele  - Nitro/EKG prn for chest pain  - NPO for possible stress/cath  - ordered ECHO, would consult cards if changes occur or troponin elevates        Morbid obesity  Body mass index is 31.1 kg/m². Morbid obesity complicates all aspects of disease management from diagnostic modalities to treatment. Weight loss encouraged and health benefits explained to patient.         Coronary artery disease involving native heart  Chronic, currently evaluating for worsening of CAD, plan as above    History of embolic stroke involving right middle cerebral artery  Chronic, controlled, continue to monitor      Chronic combined systolic and diastolic CHF, NYHA class 3  Chronic, controlled, BNP below baseline  Will monitor appears euvolemic at this time    Mixed hyperlipidemia  Chronic, controlled, continue statin    Essential hypertension  Continue home BP medications  Chronic, controlled      Type 2 diabetes mellitus without complication, without long-term current use of insulin  NPO currently  SSI and accuchecks for now        VTE Risk Mitigation (From admission, onward)         Ordered     rivaroxaban tablet 20 mg  With dinner         02/12/22 0317     IP VTE HIGH RISK PATIENT  Once         02/12/22 0317     Place sequential compression device  Until discontinued         02/12/22 0317     Reason for No Pharmacological VTE Prophylaxis  Once        Question:  Reasons:  Answer:  Already adequately anticoagulated on oral Anticoagulants    02/12/22 0317                 Chivo Rios MD  Department of Hospital Medicine   [facility] - Emergency Dept

## 2022-02-12 NOTE — ED NOTES
Pt discharge instructions reviewed. Pt teaching performed and instructed to follow up w/ cardiology. Pt also instructed to monitor BG and follow up w/ Dr. Manzo w/ Endocrinology. Pt verbalized understanding.

## 2022-02-12 NOTE — SUBJECTIVE & OBJECTIVE
Past Medical History:   Diagnosis Date    Anxiety     Arthritis     Asthma     CHF (congestive heart failure)     COPD (chronic obstructive pulmonary disease)     Depression     Diabetes mellitus     Dyspnea     H/O coronary angiogram     H/O: hysterectomy     Hyperlipemia     Hypertension     Pulmonary edema     Schizophrenia     Stroke        Past Surgical History:   Procedure Laterality Date    BLADDER SUSPENSION      CARPAL TUNNEL RELEASE Right     HEEL SPUR SURGERY Left     HYSTERECTOMY      LEFT HEART CATHETERIZATION Bilateral 12/27/2019    Procedure: Left heart cath;  Surgeon: Steve Chambers MD;  Location: [facility] CATH LAB;  Service: Cardiology;  Laterality: Bilateral;    RIGHT HEART CATHETERIZATION Right 7/26/2021    Procedure: INSERTION, CATHETER, RIGHT HEART;  Surgeon: Petr Naranjo MD;  Location[facility] CATH LAB;  Service: Cardiology;  Laterality: Right;    TUBAL LIGATION         Review of patient's allergies indicates:   Allergen Reactions    Adhesive Blisters    Captopril Other (See Comments)     COUGH       No current facility-administered medications on file prior to encounter.     Current Outpatient Medications on File Prior to Encounter   Medication Sig    albuterol (PROVENTIL/VENTOLIN HFA) 90 mcg/actuation inhaler Inhale 2 puffs into the lungs every 6 (six) hours as needed for Wheezing or Shortness of Breath. (Patient not taking: Reported on 2/7/2022)    aspirin 81 MG Chew Take 1 tablet (81 mg total) by mouth once daily.    atorvastatin (LIPITOR) 80 MG tablet Take 1 tablet (80 mg total) by mouth once daily.    dapagliflozin (FARXIGA) 10 mg tablet Take 1 tablet (10 mg total) by mouth once daily. (Patient not taking: Reported on 2/7/2022)    dulaglutide (TRULICITY) 1.5 mg/0.5 mL pen injector Inject into the skin once a week.     glimepiride (AMARYL) 4 MG tablet Take 4 mg by mouth once daily.    meclizine (ANTIVERT) 25 mg tablet Take 1 tablet (25 mg total) by  mouth 3 (three) times daily as needed for Dizziness.    metFORMIN (GLUCOPHAGE) 1000 MG tablet Take 1,000 mg by mouth 2 (two) times daily.     metoprolol succinate (TOPROL-XL) 100 MG 24 hr tablet Take 1 tablet (100 mg total) by mouth once daily.    potassium chloride (MICRO-K) 10 MEQ CpSR     rivaroxaban (XARELTO) 20 mg Tab Take 1 tablet (20 mg total) by mouth daily with dinner or evening meal.    sacubitriL-valsartan (ENTRESTO)  mg per tablet Take 1 tablet by mouth 2 (two) times daily.    spironolactone (ALDACTONE) 25 MG tablet Take 1 tablet (25 mg total) by mouth once daily.    torsemide (DEMADEX) 10 MG Tab Take 2 tablets (20 mg total) by mouth once daily.     Family History     Problem Relation (Age of Onset)    No Known Problems Mother, Father        Tobacco Use    Smoking status: Former Smoker     Types: Cigarettes     Quit date: 2016     Years since quittin.4    Smokeless tobacco: Never Used    Tobacco comment: nonex 2 weeks   Substance and Sexual Activity    Alcohol use: No     Alcohol/week: 0.0 standard drinks    Drug use: No    Sexual activity: Not on file     Review of Systems   Constitutional: Negative for activity change, appetite change, chills and fever.   HENT: Negative for congestion, hearing loss and rhinorrhea.    Eyes: Negative for discharge, itching and visual disturbance.   Respiratory: Positive for chest tightness and shortness of breath. Negative for apnea and cough.    Cardiovascular: Negative for chest pain, palpitations and leg swelling.   Gastrointestinal: Negative for abdominal distention, abdominal pain, constipation, diarrhea, nausea and vomiting.   Endocrine: Negative for cold intolerance and heat intolerance.   Genitourinary: Negative for dysuria and hematuria.   Musculoskeletal: Negative for back pain, neck pain and neck stiffness.   Skin: Negative for rash and wound.   Neurological: Negative for dizziness, seizures, light-headedness and headaches.    Psychiatric/Behavioral: Negative for agitation, confusion and suicidal ideas.     Objective:     Vital Signs (Most Recent):  Temp: 97.9 °F (36.6 °C) (02/11/22 2205)  Pulse: 104 (02/12/22 0121)  Resp: 18 (02/11/22 2051)  BP: 128/85 (02/12/22 0121)  SpO2: 99 % (02/12/22 0121) Vital Signs (24h Range):  Temp:  [97.9 °F (36.6 °C)-98.8 °F (37.1 °C)] 97.9 °F (36.6 °C)  Pulse:  [] 104  Resp:  [18] 18  SpO2:  [97 %-99 %] 99 %  BP: (117-131)/(65-85) 128/85     Weight: 90.7 kg (200 lb)  Body mass index is 31.1 kg/m².    Physical Exam  Vitals reviewed.   Constitutional:       General: She is not in acute distress.     Appearance: She is well-developed.   HENT:      Head: Normocephalic and atraumatic.      Nose: Nose normal. No rhinorrhea.      Mouth/Throat:      Mouth: Mucous membranes are moist.   Eyes:      General: No scleral icterus.        Right eye: No discharge.         Left eye: No discharge.      Pupils: Pupils are equal, round, and reactive to light.   Neck:      Vascular: No JVD.   Cardiovascular:      Rate and Rhythm: Normal rate and regular rhythm.      Heart sounds: Normal heart sounds. No murmur heard.  No friction rub.   Pulmonary:      Effort: Pulmonary effort is normal. No respiratory distress.      Breath sounds: Normal breath sounds. No wheezing.   Abdominal:      General: Bowel sounds are normal. There is no distension.      Palpations: Abdomen is soft.      Tenderness: There is no abdominal tenderness.   Musculoskeletal:         General: No deformity. Normal range of motion.      Cervical back: Normal range of motion and neck supple.   Skin:     General: Skin is warm and dry.   Neurological:      General: No focal deficit present.      Mental Status: She is alert and oriented to person, place, and time.   Psychiatric:         Mood and Affect: Mood normal.         Behavior: Behavior normal.           CRANIAL NERVES     CN III, IV, VI   Pupils are equal, round, and reactive to light.        Significant Labs:   All pertinent labs within the past 24 hours have been reviewed.  CBC:   Recent Labs   Lab 02/11/22 2205   WBC 9.81   HGB 12.9   HCT 39.0        CMP:   Recent Labs   Lab 02/11/22 2205      K 4.3      CO2 24   *   BUN 12   CREATININE 0.9   CALCIUM 9.7   PROT 7.9   ALBUMIN 3.5   BILITOT 0.4   ALKPHOS 165*   AST 21   ALT 12   ANIONGAP 15   EGFRNONAA >60.0     Cardiac Markers:   Recent Labs   Lab 02/11/22 2205   *     Magnesium: No results for input(s): MG in the last 48 hours.  Troponin:   Recent Labs   Lab 02/11/22 2205   TROPONINI 0.007     TSH:   Recent Labs   Lab 10/11/21  0600   TSH 2.899       Significant Imaging: I have reviewed all pertinent imaging results/findings within the past 24 hours.

## 2022-02-12 NOTE — HOSPITAL COURSE
Pt admitted to hospital medicine. She continued to remain stable overnight and into 2/12/22. No new changes on EKG, troponins without elevations, and no events seen on telemetry. Pt reported that her symptoms had resolved; on physical exam, mild pain reproducible on palpation. Given negative findings and probability of MSK origins of chest pain, pt deemed appropriate for discharge home. Plan discussed with pt, who was agreeable and amenable; medications were discussed and reviewed, outpatient follow-up scheduled, ER precautions were given, all questions were answered to the pt's satisfaction, and Mrs. Mohr was subsequently discharged.

## 2022-02-12 NOTE — ED PROVIDER NOTES
"Encounter Date: 2/11/2022       History     Chief Complaint   Patient presents with    Chest Pain     Chest pain and SOB since yesterday. HX of pacemaker w/ defibrillator.      61-year-old female with history of dilated cardiomyopathy with EF 10-15% status post pacemaker and ICD placement in November, hypertension, COPD, CAD, diabetes, recent diagnosis of PE with on anticoagulation presenting to the ED with one day of midsternal chest pain described as "like gas." It is nonradiating, not associated with nausea, vomiting, diaphoresis.  Patient has not taken anything for pain.  She thought it was gas in tried to drink some Coke with which did not rule out who her symptoms.  However, patient does endorse one month of shortness of breath, nonproductive cough.  She was seen in the emergency department that time and was diagnosed with a PE and was discharged on Eliquis.  She was recently changed to Xarelto four days ago.  Reports compliance with her medicines at home.  She reports that her chest pain she came in one month ago felt like pressure.    The history is provided by the patient.     Review of patient's allergies indicates:   Allergen Reactions    Adhesive Blisters    Captopril Other (See Comments)     COUGH     Past Medical History:   Diagnosis Date    Anxiety     Arthritis     Asthma     CHF (congestive heart failure)     COPD (chronic obstructive pulmonary disease)     Depression     Diabetes mellitus     Dyspnea     H/O coronary angiogram     H/O: hysterectomy     Hyperlipemia     Hypertension     Pulmonary edema     Schizophrenia     Stroke      Past Surgical History:   Procedure Laterality Date    BLADDER SUSPENSION      CARPAL TUNNEL RELEASE Right     HEEL SPUR SURGERY Left     HYSTERECTOMY      LEFT HEART CATHETERIZATION Bilateral 12/27/2019    Procedure: Left heart cath;  Surgeon: Steve Chambers MD;  Location: [facility] CATH LAB;  Service: Cardiology;  Laterality: Bilateral;    " RIGHT HEART CATHETERIZATION Right 2021    Procedure: INSERTION, CATHETER, RIGHT HEART;  Surgeon: Petr Naranjo MD;  Location: [facility] CATH LAB;  Service: Cardiology;  Laterality: Right;    TUBAL LIGATION       Family History   Problem Relation Age of Onset    No Known Problems Mother     No Known Problems Father      Social History     Tobacco Use    Smoking status: Former Smoker     Types: Cigarettes     Quit date: 2016     Years since quittin.4    Smokeless tobacco: Never Used    Tobacco comment: nonex 2 weeks   Substance Use Topics    Alcohol use: No     Alcohol/week: 0.0 standard drinks    Drug use: No     Review of Systems   Constitutional: Negative for fever.   HENT: Negative for sore throat.    Respiratory: Positive for cough and shortness of breath.    Cardiovascular: Positive for chest pain.   Gastrointestinal: Negative for abdominal pain, nausea and vomiting.   Genitourinary: Negative for dysuria.   Musculoskeletal: Negative for back pain.   Skin: Negative for rash.   Neurological: Positive for headaches. Negative for weakness.   Hematological: Does not bruise/bleed easily.       Physical Exam     Initial Vitals [22]   BP Pulse Resp Temp SpO2   119/65 96 18 98.8 °F (37.1 °C) 98 %      MAP       --         Physical Exam     Gen: No acute distress.  Nontoxic.  Well appearing.  Mental Status:  Alert and oriented .  Appropriate, conversant.  Skin: Warm, dry. No rashes seen.  Eyes: No conjunctival injection.  Pulm: CTAB. No increased work of breathing.  No significant tachypnea.  No audible stridor or wheezing.  No conversational dyspnea.    CV: Regular rate. Regular rhythm.   Abd: Soft.  Not distended.  Nontender.   MSK: Good range of motion all joints.  No deformities.    Neuro: Awake. Speech normal. No focal neuro deficit observed.      ED Course   Procedures  Labs Reviewed   CBC W/ AUTO DIFFERENTIAL - Abnormal; Notable for the following components:       Result Value     RDW 14.6 (*)     All other components within normal limits   COMPREHENSIVE METABOLIC PANEL - Abnormal; Notable for the following components:    Glucose 153 (*)     Alkaline Phosphatase 165 (*)     All other components within normal limits   B-TYPE NATRIURETIC PEPTIDE - Abnormal; Notable for the following components:     (*)     All other components within normal limits   TROPONIN I   COMPREHENSIVE METABOLIC PANEL   MAGNESIUM   PHOSPHORUS   CBC W/ AUTO DIFFERENTIAL   TROPONIN I   SARS-COV-2 RDRP GENE   POCT GLUCOSE, HAND-HELD DEVICE     EKG Readings: (Independently Interpreted)   Normal sinus rhythm, rate of 93. No ST elevations or depressions.  T wave inversions noted in inferior and lateral leads; these were present on previous EKGs.  No STEMI per my interpretation.       Imaging Results          X-Ray Chest AP Portable (Final result)  Result time 02/11/22 22:54:52    Final result by Cosme Silverio MD (02/11/22 22:54:52)                 Impression:      Mild cardiomegaly with AICD present.  No acute findings.    No significant change from prior study.      Electronically signed by: Cosme Silverio MD  Date:    02/11/2022  Time:    22:54             Narrative:    EXAMINATION:  XR CHEST AP PORTABLE    CLINICAL HISTORY:  Chest Pain;    TECHNIQUE:  Single frontal view of the chest was performed.    COMPARISON:  12/27/2021.    FINDINGS:  AICD leads overlie the heart.    Mild cardiomegaly, unchanged.  Mild elevation left hemidiaphragm, unchanged.    Heart and lungs  appear unchanged when allowing for differences in technique and positioning.                              X-Rays:   Independently Interpreted Readings:   Other Readings:  No pneumonia, pneumothorax, or pleural effusion noted on CXR      Medications   nitroGLYCERIN SL tablet 0.4 mg (0 mg Sublingual Hold 2/12/22 0130)   aspirin chewable tablet 81 mg (has no administration in time range)   atorvastatin tablet 80 mg (has no administration in time  range)   metoprolol succinate (TOPROL-XL) 24 hr tablet 100 mg (has no administration in time range)   sacubitriL-valsartan  mg per tablet 1 tablet (has no administration in time range)   spironolactone tablet 25 mg (has no administration in time range)   torsemide tablet 20 mg (has no administration in time range)   rivaroxaban tablet 20 mg (has no administration in time range)   sodium chloride 0.9% flush 10 mL (has no administration in time range)   polyethylene glycol packet 17 g (has no administration in time range)   ondansetron disintegrating tablet 4 mg (has no administration in time range)   prochlorperazine injection Soln 5 mg (has no administration in time range)   glucose chewable tablet 16 g (has no administration in time range)   glucose chewable tablet 24 g (has no administration in time range)   glucagon (human recombinant) injection 1 mg (has no administration in time range)   albuterol-ipratropium 2.5 mg-0.5 mg/3 mL nebulizer solution 3 mL (has no administration in time range)   acetaminophen tablet 650 mg (has no administration in time range)   melatonin tablet 6 mg (has no administration in time range)   simethicone chewable tablet 80 mg (has no administration in time range)   naloxone 0.4 mg/mL injection 0.02 mg (has no administration in time range)   insulin aspart U-100 pen 0-5 Units (has no administration in time range)   dextrose 10% bolus 125 mL (has no administration in time range)   dextrose 10% bolus 250 mL (has no administration in time range)   aspirin tablet 325 mg (325 mg Oral Given 2/11/22 2205)   acetaminophen tablet 1,000 mg (1,000 mg Oral Given 2/12/22 0156)     Medical Decision Making:   History:   Old Medical Records: I decided to obtain old medical records.  Initial Assessment:   61-year-old female with history of CAD, nonischemic cardiomyopathy with EF 10-15% status post pacemaker in November presenting to the ED with one day of chest pain that she describes is similar  to gas.  Patient has had no change in shortness of breath and cough since diagnosis of PE approximately a month ago.    Differential includes was not limited to ACS, costochondritis, gastritis, gastroenteritis, PE less likely as patient is already anticoagulated.    Patient given full-dose aspirin.  CBC showed no leukocytosis or anemia. CMP showed no electrolyte abnormalities concerning for hospitalization. EKG showed no concern for ischemia; troponin negative, doubt ACS.  BNP within patient's normal limits.  Due to high risk, elevated heart score, patient admitted to Hospital Medicine for observation.    Independently Interpreted Test(s):   I have ordered and independently interpreted X-rays - see prior notes.  I have ordered and independently interpreted EKG Reading(s) - see prior notes  Clinical Tests:   Lab Tests: Ordered and Reviewed  Radiological Study: Ordered and Reviewed  Medical Tests: Ordered and Reviewed  Other:   I have discussed this case with another health care provider.       <> Summary of the Discussion: Discussed with Hospital Medicine    Additional MDM:   Heart Score:    History:          Moderately suspicious.  ECG:             Normal  Age:               45-65 years  Risk factors: >= 3 risk factors or history of atherosclerotic disease  Troponin:       Less than or equal to normal limit  Final Score: 4             Attending Attestation:   Physician Attestation Statement for Resident:  As the supervising MD   Physician Attestation Statement: I have personally seen and examined this patient.   I agree with the above history. -:   As the supervising MD I agree with the above PE.    As the supervising MD I agree with the above treatment, course, plan, and disposition.  I have reviewed and agree with the residents interpretation of the following: lab data, x-rays and EKG.  I have reviewed the following: old records at this facility.                         Clinical Impression:   Final  diagnoses:  [R07.9] Chest pain (Primary)          ED Disposition Condition    Observation               Jake Simental MD  Resident  02/12/22 0536       Demetria Vargas MD  02/12/22 0550       Demetria Vargas MD  02/17/22 050

## 2022-02-12 NOTE — FIRST PROVIDER EVALUATION
Emergency Department TeleTriage Encounter Note      CHIEF COMPLAINT    Chief Complaint   Patient presents with    Chest Pain     Chest pain and SOB since yesterday. HX of pacemaker w/ defibrillator.        VITAL SIGNS   Initial Vitals [02/11/22 2051]   BP Pulse Resp Temp SpO2   119/65 96 18 98.8 °F (37.1 °C) 98 %      MAP       --            ALLERGIES    Review of patient's allergies indicates:   Allergen Reactions    Adhesive Blisters    Captopril Other (See Comments)     COUGH       PROVIDER TRIAGE NOTE    61-year-old female presents the ER for evaluation of chest discomfort since yesterday.  Associated shortness of breath.  Patient reports slight runny nose.  No fevers.    Initial orders will be placed and care will be transferred to an alternate provider when patient is roomed for a full evaluation. Any additional orders and the final disposition will be determined by that provider.      ORDERS  Labs Reviewed   CBC W/ AUTO DIFFERENTIAL   COMPREHENSIVE METABOLIC PANEL   TROPONIN I   B-TYPE NATRIURETIC PEPTIDE   SARS-COV-2 RDRP GENE       ED Orders (720h ago, onward)    Start Ordered     Status Ordering Provider    02/11/22 2200 02/11/22 2151  aspirin tablet 325 mg  ED 1 Time         Ordered TAY AGUSTIN    02/11/22 2152 02/11/22 2151  POCT COVID-19 Rapid Screening  Once         Ordered TAY, AGUSTIN    02/11/22 2150 02/11/22 2151  Vital signs  Every 15 min         Ordered TERESATY, AGUSTIN    02/11/22 2150 02/11/22 2151  Cardiac Monitoring - Adult  Continuous        Comments: Notify Physician If:    Ordered TERESATY, AGUSTIN    02/11/22 2150 02/11/22 2151  Pulse Oximetry Continuous  Continuous         Ordered TAY AGUSTIN    02/11/22 2150 02/11/22 2151  Diet NPO  Diet effective now         Ordered DEONUTTY, AGUSTIN    02/11/22 2150 02/11/22 2151  Saline lock IV  Once         Ordered BROWNYKUTTY, AGUSTIN    02/11/22 2150 02/11/22 2151  CBC auto differential  STAT         Ordered JAZZY CROSSILA     02/11/22 2150 02/11/22 2151  Comprehensive metabolic panel  STAT         Ordered TAY, AGUSTIN    02/11/22 2150 02/11/22 2151  Troponin I #1  STAT         Ordered TAY, AGUSTIN    02/11/22 2150 02/11/22 2151  B-Type natriuretic peptide (BNP)  STAT         Ordered TAY, AGUSTIN    02/11/22 2150 02/11/22 2151  X-Ray Chest AP Portable  1 time imaging         Ordered AGUSTIN CROSS    02/11/22 2054 02/11/22 2054  EKG 12-lead  Once         Ordered PATRICIA ESCAMILLA            Virtual Visit Note: The provider triage portion of this emergency department evaluation and documentation was performed via [employer], a HIPAA-compliant telemedicine application, in concert with a tele-presenter in the room. A face to face patient evaluation with one of my colleagues will occur once the patient is placed in an emergency department room.      DISCLAIMER: This note was prepared with [employer] voice recognition transcription software. Garbled syntax, mangled pronouns, and other bizarre constructions may be attributed to that software system.

## 2022-02-12 NOTE — ASSESSMENT & PLAN NOTE
Body mass index is 31.1 kg/m². Morbid obesity complicates all aspects of disease management from diagnostic modalities to treatment. Weight loss encouraged and health benefits explained to patient.

## 2022-02-12 NOTE — HPI
60 yo female with cardiomyopathy, last ECHO 10-15% EF s/p pacemaker/ICD, HTN, COPD, CAD, DM, PE on anticoags presenting for gas-like chest pain. Chest pain felt like it was gas and tried to drink a coke which did not help. The pain does not radiate. She denies any nausea vomiting or diaphoresis. She has had about a month of shortness of breath; however she was diagnosed with a PE about 1 month ago and was switched to xarelto a couple of days ago.     In ED, tropoin normal and BNP below baseline. Medicine called for admission.

## 2022-02-12 NOTE — ASSESSMENT & PLAN NOTE
Chest Pain  - EKG without STEMI  - Risk Factors: DM2, HTN, stroke  - Initial troponin normal, will trend until peak  - Continue Aspirin 81mg PO daily  - Continue Lipitor 40mg PO daily  - Continue tele  - Nitro/EKG prn for chest pain  - NPO for possible stress/cath  - ordered ECHO, would consult cards if changes occur or troponin elevates

## 2022-02-12 NOTE — ED NOTES
Nurse at bedside. Pt watching tv, resting comfortably in bed, in NAD. VSS - RR even, unlabored, equal bilaterally on inspiration and expiration. Pt denies any c/o chest pain at this time. Pt updated on POC, call light w/n pt reach. Will continue to monitor.

## 2022-02-12 NOTE — DISCHARGE SUMMARY
[facility] - Emergency Dept  [facility] Medicine  Discharge Summary      Patient Name: Diomedes Mohr  MRN: [id number]  Patient Class: OP- Observation  Admission Date: 2/11/2022  Hospital Length of Stay: 0 days  Discharge Date and Time:  02/12/2022 9:40 AM  Attending Physician: Ravi Joya MD   Discharging Provider: Ravi Joya MD  Primary Care Provider: Fernando Manzo MD  [facility] Medicine Team: [facility] HOSP MED G Ravi Joya MD    HPI:   62 yo female with cardiomyopathy, last ECHO 10-15% EF s/p pacemaker/ICD, HTN, COPD, CAD, DM, PE on anticoags presenting for gas-like chest pain. Chest pain felt like it was gas and tried to drink a coke which did not help. The pain does not radiate. She denies any nausea vomiting or diaphoresis. She has had about a month of shortness of breath; however she was diagnosed with a PE about 1 month ago and was switched to xarelto a couple of days ago.     In ED, tropoin normal and BNP below baseline. Medicine called for admission.       * No surgery found *      Hospital Course:   Pt admitted to hospital medicine. She continued to remain stable overnight and into 2/12/22. No new changes on EKG, troponins without elevations, and no events seen on telemetry. Pt reported that her symptoms had resolved; on physical exam, mild pain reproducible on palpation. Given negative findings and probability of MSK origins of chest pain, pt deemed appropriate for discharge home. Plan discussed with pt, who was agreeable and amenable; medications were discussed and reviewed, outpatient follow-up scheduled, ER precautions were given, all questions were answered to the pt's satisfaction, and Mrs. Mohr was subsequently discharged.         Goals of Care Treatment Preferences:  Code Status: Full Code      Consults:   NA    Final Active Diagnoses:    Diagnosis Date Noted POA    PRINCIPAL PROBLEM:  Chest pain [R07.9] 10/10/2021 Yes    Morbid obesity [E66.01] 10/11/2021 Yes     Chronic    Coronary  artery disease involving native heart [I25.10] 10/11/2021 Yes    History of embolic stroke involving right middle cerebral artery [I63.411] 08/26/2020 Yes     Chronic    Chronic combined systolic and diastolic CHF, NYHA class 3 [I50.42] 12/26/2019 Yes     Chronic    Type 2 diabetes mellitus without complication, without long-term current use of insulin [E11.9] 12/25/2019 Yes     Chronic    Essential hypertension [I10] 12/25/2019 Yes     Chronic    Mixed hyperlipidemia [E78.2] 12/25/2019 Yes     Chronic      Problems Resolved During this Admission:       Discharged Condition: stable    Disposition: Home or Self Care    Follow Up:   Follow-up Information     Schedule an appointment as soon as possible for a visit with Fernando Manzo MD.    Specialty: Endocrinology  Contact information:  [address] [place] [address]  [place] [id number] [phone]                       Patient Instructions:      Ambulatory referral/consult to Cardiology   Standing Status: Future   Referral Priority: Routine Referral Type: Consultation   Referral Reason: Specialty Services Required   Requested Specialty: Cardiology   Number of Visits Requested: 1     Diet Cardiac     Diet diabetic     Notify your health care provider if you experience any of the following:  increased confusion or weakness     Notify your health care provider if you experience any of the following:  persistent dizziness, light-headedness, or visual disturbances     Notify your health care provider if you experience any of the following:  worsening rash     Notify your health care provider if you experience any of the following:  severe persistent headache     Notify your health care provider if you experience any of the following:  difficulty breathing or increased cough     Notify your health care provider if you experience any of the following:  severe uncontrolled pain     Notify your health care provider if you experience any of the following:  persistent nausea  and vomiting or diarrhea     Notify your health care provider if you experience any of the following:  temperature >100.4     Activity as tolerated       Significant Diagnostic Studies: Labs: All labs within the past 24 hours have been reviewed    Pending Diagnostic Studies:     Procedure Component Value Units Date/Time    Echo [[id number]]     Order Status: Sent Lab Status: No result          Medications:  Reconciled Home Medications:      Medication List      START taking these medications    acetaminophen 325 MG tablet  Commonly known as: TYLENOL  Take 2 tablets (650 mg total) by mouth every 8 (eight) hours as needed.        CONTINUE taking these medications    albuterol 90 mcg/actuation inhaler  Commonly known as: PROVENTIL/VENTOLIN HFA  Inhale 2 puffs into the lungs every 6 (six) hours as needed for Wheezing or Shortness of Breath.     aspirin 81 MG Chew  Take 1 tablet (81 mg total) by mouth once daily.     atorvastatin 80 MG tablet  Commonly known as: LIPITOR  Take 1 tablet (80 mg total) by mouth once daily.     dulaglutide 1.5 mg/0.5 mL pen injector  Commonly known as: TRULICITY  Inject into the skin once a week.     FARXIGA 10 mg tablet  Generic drug: dapagliflozin  Take 1 tablet (10 mg total) by mouth once daily.     glimepiride 4 MG tablet  Commonly known as: AMARYL  Take 4 mg by mouth once daily.     meclizine 25 mg tablet  Commonly known as: ANTIVERT  Take 1 tablet (25 mg total) by mouth 3 (three) times daily as needed for Dizziness.     metFORMIN 1000 MG tablet  Commonly known as: GLUCOPHAGE  Take 1,000 mg by mouth 2 (two) times daily.     metoprolol succinate 100 MG 24 hr tablet  Commonly known as: TOPROL-XL  Take 1 tablet (100 mg total) by mouth once daily.     potassium chloride 10 MEQ Cpsr  Commonly known as: MICRO-K     sacubitriL-valsartan  mg per tablet  Commonly known as: ENTRESTO  Take 1 tablet by mouth 2 (two) times daily.     spironolactone 25 MG tablet  Commonly known as:  ALDACTONE  Take 1 tablet (25 mg total) by mouth once daily.     torsemide 10 MG Tab  Commonly known as: DEMADEX  Take 2 tablets (20 mg total) by mouth once daily.     XARELTO 20 mg Tab  Generic drug: rivaroxaban  Take 1 tablet (20 mg total) by mouth daily with dinner or evening meal.            Indwelling Lines/Drains at time of discharge:   Lines/Drains/Airways     None                 Time spent on the discharge of patient: 35 minutes       Ravi Joya MD  Attending Physician  Department of Hospital Medicine  Epic secure chat preferred, or [employer]Link ext. [phone]  2/12/2022

## 2022-02-28 ENCOUNTER — CLINICAL SUPPORT (OUTPATIENT)
Dept: CARDIOLOGY | Facility: HOSPITAL | Age: 62
End: 2022-02-28
Payer: COMMERCIAL

## 2022-02-28 DIAGNOSIS — I42.9 CARDIOMYOPATHY, UNSPECIFIED: ICD-10-CM

## 2022-02-28 DIAGNOSIS — I50.9 HEART FAILURE, UNSPECIFIED: ICD-10-CM

## 2022-02-28 DIAGNOSIS — Z95.810 PRESENCE OF AUTOMATIC (IMPLANTABLE) CARDIAC DEFIBRILLATOR: ICD-10-CM

## 2022-02-28 PROCEDURE — 93295 DEV INTERROG REMOTE 1/2/MLT: CPT | Mod: ,,, | Performed by: STUDENT IN AN ORGANIZED HEALTH CARE EDUCATION/TRAINING PROGRAM

## 2022-02-28 PROCEDURE — 93295 CARDIAC DEVICE CHECK - REMOTE: ICD-10-PCS | Mod: ,,, | Performed by: STUDENT IN AN ORGANIZED HEALTH CARE EDUCATION/TRAINING PROGRAM

## 2022-02-28 PROCEDURE — 93296 REM INTERROG EVL PM/IDS: CPT | Performed by: STUDENT IN AN ORGANIZED HEALTH CARE EDUCATION/TRAINING PROGRAM

## 2022-03-14 ENCOUNTER — OFFICE VISIT (OUTPATIENT)
Dept: TRANSPLANT | Facility: CLINIC | Age: 62
End: 2022-03-14
Payer: COMMERCIAL

## 2022-03-14 VITALS
BODY MASS INDEX: 31.89 KG/M2 | WEIGHT: 198.44 LBS | HEART RATE: 82 BPM | HEIGHT: 66 IN | SYSTOLIC BLOOD PRESSURE: 105 MMHG | DIASTOLIC BLOOD PRESSURE: 55 MMHG

## 2022-03-14 DIAGNOSIS — I42.0 DILATED CARDIOMYOPATHY: Primary | ICD-10-CM

## 2022-03-14 DIAGNOSIS — R07.9 CHEST PAIN, UNSPECIFIED TYPE: ICD-10-CM

## 2022-03-14 PROCEDURE — 1159F MED LIST DOCD IN RCRD: CPT | Mod: CPTII,S$GLB,, | Performed by: INTERNAL MEDICINE

## 2022-03-14 PROCEDURE — 3008F PR BODY MASS INDEX (BMI) DOCUMENTED: ICD-10-PCS | Mod: CPTII,S$GLB,, | Performed by: INTERNAL MEDICINE

## 2022-03-14 PROCEDURE — 1159F PR MEDICATION LIST DOCUMENTED IN MEDICAL RECORD: ICD-10-PCS | Mod: CPTII,S$GLB,, | Performed by: INTERNAL MEDICINE

## 2022-03-14 PROCEDURE — 3078F DIAST BP <80 MM HG: CPT | Mod: CPTII,S$GLB,, | Performed by: INTERNAL MEDICINE

## 2022-03-14 PROCEDURE — 3008F BODY MASS INDEX DOCD: CPT | Mod: CPTII,S$GLB,, | Performed by: INTERNAL MEDICINE

## 2022-03-14 PROCEDURE — 4010F PR ACE/ARB THEARPY RXD/TAKEN: ICD-10-PCS | Mod: CPTII,S$GLB,, | Performed by: INTERNAL MEDICINE

## 2022-03-14 PROCEDURE — 99999 PR PBB SHADOW E&M-EST. PATIENT-LVL IV: CPT | Mod: PBBFAC,,, | Performed by: INTERNAL MEDICINE

## 2022-03-14 PROCEDURE — 3078F PR MOST RECENT DIASTOLIC BLOOD PRESSURE < 80 MM HG: ICD-10-PCS | Mod: CPTII,S$GLB,, | Performed by: INTERNAL MEDICINE

## 2022-03-14 PROCEDURE — 99999 PR PBB SHADOW E&M-EST. PATIENT-LVL IV: ICD-10-PCS | Mod: PBBFAC,,, | Performed by: INTERNAL MEDICINE

## 2022-03-14 PROCEDURE — 1160F RVW MEDS BY RX/DR IN RCRD: CPT | Mod: CPTII,S$GLB,, | Performed by: INTERNAL MEDICINE

## 2022-03-14 PROCEDURE — 3074F SYST BP LT 130 MM HG: CPT | Mod: CPTII,S$GLB,, | Performed by: INTERNAL MEDICINE

## 2022-03-14 PROCEDURE — 3074F PR MOST RECENT SYSTOLIC BLOOD PRESSURE < 130 MM HG: ICD-10-PCS | Mod: CPTII,S$GLB,, | Performed by: INTERNAL MEDICINE

## 2022-03-14 PROCEDURE — 1160F PR REVIEW ALL MEDS BY PRESCRIBER/CLIN PHARMACIST DOCUMENTED: ICD-10-PCS | Mod: CPTII,S$GLB,, | Performed by: INTERNAL MEDICINE

## 2022-03-14 PROCEDURE — 99214 PR OFFICE/OUTPT VISIT, EST, LEVL IV, 30-39 MIN: ICD-10-PCS | Mod: S$GLB,,, | Performed by: INTERNAL MEDICINE

## 2022-03-14 PROCEDURE — 3044F HG A1C LEVEL LT 7.0%: CPT | Mod: CPTII,S$GLB,, | Performed by: INTERNAL MEDICINE

## 2022-03-14 PROCEDURE — 3044F PR MOST RECENT HEMOGLOBIN A1C LEVEL <7.0%: ICD-10-PCS | Mod: CPTII,S$GLB,, | Performed by: INTERNAL MEDICINE

## 2022-03-14 PROCEDURE — 99214 OFFICE O/P EST MOD 30 MIN: CPT | Mod: S$GLB,,, | Performed by: INTERNAL MEDICINE

## 2022-03-14 PROCEDURE — 4010F ACE/ARB THERAPY RXD/TAKEN: CPT | Mod: CPTII,S$GLB,, | Performed by: INTERNAL MEDICINE

## 2022-03-14 NOTE — PROGRESS NOTES
Subjective:   Followup of evaluation of heart transplant candidacy.    HPI:  Ms. Mohr is a 61 y.o. year old Black or  female who has presents to be considered for advanced surgical options (LVAD/OHT).    Ms. Mohr is a 60 YOF with PMHx of combined systolic and diastolic HF (EF 10-15%), pulmHTN, dilated cardiomyopathy, nonobstructive CAD, HTN, DM on oral therapies, and recent embolic R MCA CVA (s/p tPA on 8/14, w/o residual sx). She presents to ED for worsening shortness of breath in setting of recent missed lasix dose and dietary noncomplaince. Beginning with 07/13-14/2020 in [facility] for CHF exacerbation requiring IV diuresis, 08/14-16/2020 at [facility] for embolic R MCA CVA s/p TPA, and again 08/25-28/2020 at [facility] for NSTEMI with troponin peak of 8.4 in setting of CHF exacerbation that required IV diuresis at which time TTE was performed 08/26 that was similar to previous exams and underwent PET stress 08/27 which revealed non obstructive CAD.     Since her last visit, had ED visits, one obs, for chest pain. States feels like indigestion that just sits there, not going anywhere. Denies cardiopulmonary complaints associated with it, but not responsive to GI meds. Not getting it always with exertion, but not the greatest at relaying story. Does get short of breath with exertion. Last visit asked her to meet with pre team, and think about advanced options, she states she does not want to look into those right now again. NYHA FC III.     CPX 01/10/22:  s Resting spirometry reveals an FVC = 2.25L which is 69.48% of predicted, an FEV1 of 1.86L, which is 72.23% of predicted and an FEV1/FVC ratio of 82.67%. The MVV = 74.4 L/min, which is 77.84% of predicted.     The respiratory exchange ratio (RER) was 1.07, suggesting suboptimal effort.     The breathing reserve is calculated at 44.49%, which is normal. Oxygen saturation with exercise remained normal.     The peak VO2 was 11.5 ml/kg/min which is  41.82% of predicted equating to a functional capacity of 3.29 METS indicating severe functional impairment.     The anaerobic threshold (AT), which occurred at a heart rate of 139bpm, was 8.8 ml/kg/min, which is 32% of the predicted VO2 and is reduced.     The peak VO2 Lean was 16.69 ml/kg of lean body weight/min indicating a poor prognosis in heart failure.     The VE/VCO2 Cowley was 30.9. The Resting PetCO2 was 31.0.     Severe functional impairment associated with a normal breathing reserve, normal oxygen stauration, suboptimal effort, and a reduced AT. These findings are indicative of functional impairment secondary to circulatory insufficiency.       CPX 10/22/20:  Resting spirometry reveals an FVC = 2.36L which is 72.5% of predicted, an FEV1 of 2.36L, which is 90.94% of predicted and an FEV1/FVC ratio of 100%. The MVV = 94.4 L/min, which is 98.17% of predicted.   The respiratory exchange ratio (RER) was 1.02, suggesting poor effort.   The breathing reserve is calculated at 77.22%, which is normal. Oxygen saturation with exercise remained normal.   The peak VO2 was 10.9 ml/kg/min which is 53.17% of predicted equating to a functional capacity of 3.11 METS indicating severe functional impairment.    by 0% from rest to AT.   The VE/VCO2 Cowley was 25.3. The Resting PetCO2 was 39.0.   Severe functional impairment associated with a normal breathing reserve, normal oxygen stauration, poor effort, and a borderline reduced AT. These findings are indicative of functional impairment secondary to circulatory insufficiency, poor effort.    Past Medical History:   Diagnosis Date    Anxiety     Arthritis     Asthma     CHF (congestive heart failure)     COPD (chronic obstructive pulmonary disease)     Depression     Diabetes mellitus     Dyspnea     H/O coronary angiogram     H/O: hysterectomy     Hyperlipemia     Hypertension     Pulmonary edema     Schizophrenia     Stroke      Past Surgical History:    Procedure Laterality Date    BLADDER SUSPENSION      CARPAL TUNNEL RELEASE Right     HEEL SPUR SURGERY Left     HYSTERECTOMY      LEFT HEART CATHETERIZATION Bilateral 12/27/2019    Procedure: Left heart cath;  Surgeon: Steve Chambers MD;  Location: [facility] CATH LAB;  Service: Cardiology;  Laterality: Bilateral;    RIGHT HEART CATHETERIZATION Right 7/26/2021    Procedure: INSERTION, CATHETER, RIGHT HEART;  Surgeon: Petr Naranjo MD;  Location: [facility] CATH LAB;  Service: Cardiology;  Laterality: Right;    TUBAL LIGATION         Family History   Problem Relation Age of Onset    No Known Problems Mother     No Known Problems Father        Review of Systems   Constitutional: Positive for malaise/fatigue. Negative for chills, decreased appetite, diaphoresis, fever, weight gain and weight loss.   HENT: Negative for congestion.    Eyes: Negative for blurred vision and visual disturbance.   Cardiovascular: Positive for dyspnea on exertion. Negative for chest pain, irregular heartbeat, leg swelling, near-syncope, orthopnea (2 pillows), palpitations, paroxysmal nocturnal dyspnea and syncope.   Respiratory: Positive for cough. Negative for shortness of breath, sleep disturbances due to breathing, snoring and wheezing.    Hematologic/Lymphatic: Negative for bleeding problem. Does not bruise/bleed easily.   Skin: Negative for poor wound healing and rash.   Musculoskeletal: Negative for arthritis, joint pain and muscle weakness.        Walking without support, improved greatly from when we saw her in the hospital   Gastrointestinal: Negative for bloating, abdominal pain, anorexia, constipation, diarrhea, hematemesis, hematochezia, melena, nausea and vomiting.   Genitourinary: Negative for frequency and urgency.   Neurological: Negative for difficulty with concentration, excessive daytime sleepiness, dizziness, headaches, light-headedness and weakness.   Psychiatric/Behavioral: Negative for depression. The  "patient does not have insomnia and is not nervous/anxious.        Objective:   Blood pressure (!) 105/55, pulse 82, height 5' 6" (1.676 m), weight 90 kg (198 lb 6.6 oz).body mass index is 32.02 kg/m².    Physical Exam  Vitals and nursing note reviewed.   Constitutional:       General: She is not in acute distress.     Appearance: She is well-developed. She is not diaphoretic.   HENT:      Head: Normocephalic and atraumatic.   Eyes:      General:         Right eye: No discharge.         Left eye: No discharge.      Conjunctiva/sclera: Conjunctivae normal.   Neck:      Vascular: No JVD.      Comments: JVP at 8cm today.   Cardiovascular:      Rate and Rhythm: Normal rate and regular rhythm.      Heart sounds: No murmur heard.    No friction rub. No gallop.   Pulmonary:      Effort: Pulmonary effort is normal.      Breath sounds: Normal breath sounds. No wheezing or rales.   Chest:      Chest wall: No tenderness.   Abdominal:      General: Bowel sounds are normal. There is no distension.      Palpations: Abdomen is soft. There is no mass.      Tenderness: There is no abdominal tenderness. There is no guarding or rebound.   Musculoskeletal:         General: No tenderness. Normal range of motion.      Cervical back: Normal range of motion and neck supple.      Comments: No edema.    Skin:     General: Skin is warm and dry.      Findings: No erythema or rash.   Neurological:      Mental Status: She is alert and oriented to person, place, and time.   Psychiatric:         Behavior: Behavior normal.         Thought Content: Thought content normal.         Judgment: Judgment normal.       Labs:  Lab Results   Component Value Date     (H) 02/11/2022     02/12/2022    K 3.1 (L) 02/12/2022    MG 2.0 02/12/2022     02/12/2022    CO2 29 02/12/2022    BUN 12 02/12/2022    CREATININE 0.8 02/12/2022     (H) 02/12/2022    HGBA1C 6.8 (H) 01/10/2022    AST 13 02/12/2022    ALT 10 02/12/2022    ALBUMIN 3.1 (L) " 02/12/2022    PROT 6.8 02/12/2022    BILITOT 0.4 02/12/2022    CHOL 153 10/11/2021    HDL 38 (L) 10/11/2021    LDLCALC 90.6 10/11/2021    TRIG 122 10/11/2021       Magnesium   Date Value Ref Range Status   02/12/2022 2.0 1.6 - 2.6 mg/dL Final       Lab Results   Component Value Date    WBC 7.94 02/12/2022    HGB 11.8 (L) 02/12/2022    HCT 36.1 (L) 02/12/2022    MCV 87 02/12/2022     02/12/2022       Lab Results   Component Value Date    INR 0.9 11/30/2021    INR 1.0 11/22/2021    INR 0.9 07/26/2021       BNP   Date Value Ref Range Status   02/11/2022 459 (H) 0 - 99 pg/mL Final     Comment:     Values of less than 100 pg/ml are consistent with non-CHF populations.   12/27/2021 796 (H) 0 - 99 pg/mL Final     Comment:     Values of less than 100 pg/ml are consistent with non-CHF populations.   10/10/2021 1,110 (H) 0 - 99 pg/mL Final     Comment:     Values of less than 100 pg/ml are consistent with non-CHF populations.       Assessment:      1. Dilated cardiomyopathy    2. Chest pain, unspecified type      Plan:   Patient with chest discomfort that although not typical, has been present more often. Will go ahead with pet stress again to evaluate further, as this is a change in how she is doing and with DM can have non typical symptoms of angina.   Would like to get RHC +/-LHC based on findings of stress test.  Seen by belkis for PE, eval pending.  Not sure I saw pulmonary followup.   Asked patient again about evaluation for advanced options, she states she is not interested in this right now,  did state it is her decision. Asked her to continue to think about what she heard and what happens if she doesn't feel better.     Patient is now NYHA III ACC stage D  Recommend 2 gram sodium restriction and 1500cc fluid restriction.  Encourage physical activity with graded exercise program.  Requested patient to weigh themselves daily, and to notify us if their weight increases by more than 3 lbs in 1 day or 5 lbs in  1 week.    would need eval by pulm and heme/onc before we would consider proceeding with evaluation. Additionally discussed this with patient and she is not ready to proceed with advanced options, not sure she wants to.     Transplant Candidacy: Patient is a 61 y.o. year old female with heart failure is being seen for possible OHT/LVAD. In my opinion, she is  a high-risk LVAD/OHT candidate. Patient did meet with MCS and/or pre-transplant coordinator at the end of this visit for workup. she is scheduled for risk stratification testing with CPX/RHC. The patient will follow up with pre-transplant.     Discussed with the patient and family [facility] program outcomes as reported in INTERMACS (Interagency Registry for Mechanically Assisted Circulatory Support):    1 year survival = 92.7%  2 year survival = 86.7%  3 year survival = 86.7%    Patient and family acknowledged receipt of this information and all questions were answered.        UNOS Patient Status  Functional Status: 70% - Cares for self: unable to carry on normal activity or active work  Physical Capacity: No Limitations  Working for Income: Unknown    Justina Smith MD

## 2022-03-14 NOTE — LETTER
March 14, 2022        Michelle Young  [address] [address]  Phone: [phone]  Fax: [phone]             [id number]  [address]  [place] [address]  Phone: [phone]   Patient: Diomedes Mohr   MR Number: [id number]   YOB: 1960   Date of Visit: 3/14/2022       Dear Dr. Michelle Young    Thank you for referring Diomedes Mohr to me for evaluation. Attached you will find relevant portions of my assessment and plan of care.    If you have questions, please do not hesitate to call me. I look forward to following Diomedes Mohr along with you.    Sincerely,    Justina Smith MD    Enclosure    If you would like to receive this communication electronically, please contact [email or web address] or [phone] to request [employer] access.    [employer] is a tool which provides read-only access to select patient information with whom you have a relationship. Its easy to use and provides real time access to review your patients record including encounter summaries, notes, results, and demographic information.    If you feel you have received this communication in error or would no longer like to receive these types of communications, please e-mail [email or web address]

## 2022-03-30 ENCOUNTER — TELEPHONE (OUTPATIENT)
Dept: ELECTROPHYSIOLOGY | Facility: CLINIC | Age: 62
End: 2022-03-30
Payer: COMMERCIAL

## 2022-03-31 ENCOUNTER — CLINICAL SUPPORT (OUTPATIENT)
Dept: CARDIOLOGY | Facility: HOSPITAL | Age: 62
End: 2022-03-31
Attending: STUDENT IN AN ORGANIZED HEALTH CARE EDUCATION/TRAINING PROGRAM
Payer: COMMERCIAL

## 2022-03-31 ENCOUNTER — HOSPITAL ENCOUNTER (OUTPATIENT)
Dept: CARDIOLOGY | Facility: CLINIC | Age: 62
Discharge: HOME | End: 2022-03-31
Payer: COMMERCIAL

## 2022-03-31 ENCOUNTER — OFFICE VISIT (OUTPATIENT)
Dept: ELECTROPHYSIOLOGY | Facility: CLINIC | Age: 62
End: 2022-03-31
Payer: COMMERCIAL

## 2022-03-31 VITALS
WEIGHT: 195.31 LBS | BODY MASS INDEX: 31.39 KG/M2 | SYSTOLIC BLOOD PRESSURE: 109 MMHG | DIASTOLIC BLOOD PRESSURE: 61 MMHG | HEIGHT: 66 IN | HEART RATE: 77 BPM

## 2022-03-31 DIAGNOSIS — I42.0 DILATED CARDIOMYOPATHY: ICD-10-CM

## 2022-03-31 DIAGNOSIS — I42.8 NON-ISCHEMIC CARDIOMYOPATHY: ICD-10-CM

## 2022-03-31 DIAGNOSIS — F32.A DEPRESSION, UNSPECIFIED DEPRESSION TYPE: ICD-10-CM

## 2022-03-31 DIAGNOSIS — I50.42 CHRONIC COMBINED SYSTOLIC AND DIASTOLIC HEART FAILURE: ICD-10-CM

## 2022-03-31 DIAGNOSIS — Z95.810 ICD (IMPLANTABLE CARDIOVERTER-DEFIBRILLATOR) IN PLACE: Primary | ICD-10-CM

## 2022-03-31 DIAGNOSIS — F20.9 SCHIZOPHRENIA, UNSPECIFIED TYPE: ICD-10-CM

## 2022-03-31 DIAGNOSIS — E66.9 CLASS 1 OBESITY WITH BODY MASS INDEX (BMI) OF 31.0 TO 31.9 IN ADULT, UNSPECIFIED OBESITY TYPE, UNSPECIFIED WHETHER SERIOUS COMORBIDITY PRESENT: ICD-10-CM

## 2022-03-31 DIAGNOSIS — I10 PRIMARY HYPERTENSION: ICD-10-CM

## 2022-03-31 DIAGNOSIS — I63.411 EMBOLIC STROKE INVOLVING RIGHT MIDDLE CEREBRAL ARTERY: ICD-10-CM

## 2022-03-31 DIAGNOSIS — E78.2 MIXED HYPERLIPIDEMIA: ICD-10-CM

## 2022-03-31 DIAGNOSIS — J44.9 CHRONIC OBSTRUCTIVE PULMONARY DISEASE, UNSPECIFIED COPD TYPE: ICD-10-CM

## 2022-03-31 DIAGNOSIS — E11.9 TYPE 2 DIABETES MELLITUS WITHOUT COMPLICATION, WITHOUT LONG-TERM CURRENT USE OF INSULIN: ICD-10-CM

## 2022-03-31 DIAGNOSIS — I42.8 NONISCHEMIC CARDIOMYOPATHY: ICD-10-CM

## 2022-03-31 DIAGNOSIS — I50.20 HFREF (HEART FAILURE WITH REDUCED EJECTION FRACTION): ICD-10-CM

## 2022-03-31 DIAGNOSIS — F41.9 ANXIETY: ICD-10-CM

## 2022-03-31 DIAGNOSIS — I27.20 PULMONARY HYPERTENSION: ICD-10-CM

## 2022-03-31 PROCEDURE — 3008F BODY MASS INDEX DOCD: CPT | Mod: CPTII,S$GLB,, | Performed by: STUDENT IN AN ORGANIZED HEALTH CARE EDUCATION/TRAINING PROGRAM

## 2022-03-31 PROCEDURE — 3074F PR MOST RECENT SYSTOLIC BLOOD PRESSURE < 130 MM HG: ICD-10-PCS | Mod: CPTII,S$GLB,, | Performed by: STUDENT IN AN ORGANIZED HEALTH CARE EDUCATION/TRAINING PROGRAM

## 2022-03-31 PROCEDURE — 93283 PRGRMG EVAL IMPLANTABLE DFB: CPT | Mod: 26,,, | Performed by: STUDENT IN AN ORGANIZED HEALTH CARE EDUCATION/TRAINING PROGRAM

## 2022-03-31 PROCEDURE — 3074F SYST BP LT 130 MM HG: CPT | Mod: CPTII,S$GLB,, | Performed by: STUDENT IN AN ORGANIZED HEALTH CARE EDUCATION/TRAINING PROGRAM

## 2022-03-31 PROCEDURE — 3078F DIAST BP <80 MM HG: CPT | Mod: CPTII,S$GLB,, | Performed by: STUDENT IN AN ORGANIZED HEALTH CARE EDUCATION/TRAINING PROGRAM

## 2022-03-31 PROCEDURE — 99999 PR PBB SHADOW E&M-EST. PATIENT-LVL III: CPT | Mod: PBBFAC,,, | Performed by: STUDENT IN AN ORGANIZED HEALTH CARE EDUCATION/TRAINING PROGRAM

## 2022-03-31 PROCEDURE — 3044F PR MOST RECENT HEMOGLOBIN A1C LEVEL <7.0%: ICD-10-PCS | Mod: CPTII,S$GLB,, | Performed by: STUDENT IN AN ORGANIZED HEALTH CARE EDUCATION/TRAINING PROGRAM

## 2022-03-31 PROCEDURE — 93010 ELECTROCARDIOGRAM REPORT: CPT | Mod: S$GLB,,, | Performed by: INTERNAL MEDICINE

## 2022-03-31 PROCEDURE — 4010F ACE/ARB THERAPY RXD/TAKEN: CPT | Mod: CPTII,S$GLB,, | Performed by: STUDENT IN AN ORGANIZED HEALTH CARE EDUCATION/TRAINING PROGRAM

## 2022-03-31 PROCEDURE — 93010 RHYTHM STRIP: ICD-10-PCS | Mod: S$GLB,,, | Performed by: INTERNAL MEDICINE

## 2022-03-31 PROCEDURE — 93283 CARDIAC DEVICE CHECK - IN CLINIC & HOSPITAL: ICD-10-PCS | Mod: 26,,, | Performed by: STUDENT IN AN ORGANIZED HEALTH CARE EDUCATION/TRAINING PROGRAM

## 2022-03-31 PROCEDURE — 99999 PR PBB SHADOW E&M-EST. PATIENT-LVL III: ICD-10-PCS | Mod: PBBFAC,,, | Performed by: STUDENT IN AN ORGANIZED HEALTH CARE EDUCATION/TRAINING PROGRAM

## 2022-03-31 PROCEDURE — 93283 PRGRMG EVAL IMPLANTABLE DFB: CPT

## 2022-03-31 PROCEDURE — 93005 RHYTHM STRIP: ICD-10-PCS | Mod: S$GLB,,, | Performed by: INTERNAL MEDICINE

## 2022-03-31 PROCEDURE — 3008F PR BODY MASS INDEX (BMI) DOCUMENTED: ICD-10-PCS | Mod: CPTII,S$GLB,, | Performed by: STUDENT IN AN ORGANIZED HEALTH CARE EDUCATION/TRAINING PROGRAM

## 2022-03-31 PROCEDURE — 4010F PR ACE/ARB THEARPY RXD/TAKEN: ICD-10-PCS | Mod: CPTII,S$GLB,, | Performed by: STUDENT IN AN ORGANIZED HEALTH CARE EDUCATION/TRAINING PROGRAM

## 2022-03-31 PROCEDURE — 3078F PR MOST RECENT DIASTOLIC BLOOD PRESSURE < 80 MM HG: ICD-10-PCS | Mod: CPTII,S$GLB,, | Performed by: STUDENT IN AN ORGANIZED HEALTH CARE EDUCATION/TRAINING PROGRAM

## 2022-03-31 PROCEDURE — 3044F HG A1C LEVEL LT 7.0%: CPT | Mod: CPTII,S$GLB,, | Performed by: STUDENT IN AN ORGANIZED HEALTH CARE EDUCATION/TRAINING PROGRAM

## 2022-03-31 PROCEDURE — 93005 ELECTROCARDIOGRAM TRACING: CPT | Mod: S$GLB,,, | Performed by: INTERNAL MEDICINE

## 2022-03-31 PROCEDURE — 99214 OFFICE O/P EST MOD 30 MIN: CPT | Mod: S$GLB,,, | Performed by: STUDENT IN AN ORGANIZED HEALTH CARE EDUCATION/TRAINING PROGRAM

## 2022-03-31 PROCEDURE — 1159F PR MEDICATION LIST DOCUMENTED IN MEDICAL RECORD: ICD-10-PCS | Mod: CPTII,S$GLB,, | Performed by: STUDENT IN AN ORGANIZED HEALTH CARE EDUCATION/TRAINING PROGRAM

## 2022-03-31 PROCEDURE — 99214 PR OFFICE/OUTPT VISIT, EST, LEVL IV, 30-39 MIN: ICD-10-PCS | Mod: S$GLB,,, | Performed by: STUDENT IN AN ORGANIZED HEALTH CARE EDUCATION/TRAINING PROGRAM

## 2022-03-31 PROCEDURE — 1159F MED LIST DOCD IN RCRD: CPT | Mod: CPTII,S$GLB,, | Performed by: STUDENT IN AN ORGANIZED HEALTH CARE EDUCATION/TRAINING PROGRAM

## 2022-03-31 RX ORDER — DAPAGLIFLOZIN 10 MG/1
10 TABLET, FILM COATED ORAL DAILY
Qty: 30 TABLET | Refills: 3 | Status: ON HOLD | OUTPATIENT
Start: 2022-03-31 | End: 2022-10-16

## 2022-03-31 RX ORDER — TRAMADOL HYDROCHLORIDE 50 MG/1
100 TABLET ORAL EVERY 12 HOURS PRN
Status: ON HOLD | COMMUNITY
Start: 2022-03-22 | End: 2023-05-12 | Stop reason: HOSPADM

## 2022-03-31 NOTE — PROGRESS NOTES
Electrophysiology Clinic Note    Reason for follow-up patient visit: Ongoing evaluation and routine surveillance of a primary prevention dual-chamber ICD implanted in the setting of nonischemic cardiomyopathy with persistent reduction of systolic function despite guideline-directed medical therapy.      PRESENTING HISTORY:     History of Present Illness:  Ms. Diomedes Mohr is a anita 61-year-old woman who returns to clinic today for ongoing evaluation and routine surveillance of a primary prevention dual-chamber ICD implanted in the setting of nonischemic cardiomyopathy with persistent reduction of systolic function despite guideline-directed medical therapy. She has a past medical history significant for nonischemic cardiomyopathy with LVEF 10-15%, prior admissions with volume overload and decompensated HFrEF, pulmonary hypertension, hypertension, hyperlipidemia, history of embolic right MCA CVA s/p tPA on 8/14/2020 with no residual deficits, type II diabetes mellitus, COPD, schizophrenia, depression and anxiety, and obesity. She underwent dual-chamber ICD implantation with me on 11/30/2021.       In brief review of her prior cardiac history, she was initially diagnosed with heart failure in 2018 with nonobstructive coronary artery disease at that time. She underwent coronary angiogram on 12/27/2019 revealing 40% mid LAD and 50% proximal RCA stenosis, with a PET scan in the setting of an NSTEMI on 8/27/2020 that showed ischemia globally, but no focal ischemia. She has since been followed in Advanced Heart Failure with Dr. White and Dr. Smith, with guideline-directed medical therapy. She has been admitted in the past in the setting of medication and dietary noncompliance, and was evaluated by the transplant service with Dr. Smith who feels that she would be a high-risk LVAD/transplant candidate. She has recently been complaining of periodic chest pain, and is awaiting a repeat stress test with consideration  "for repeat RHC/LHC.      Ms. Mohr presents to clinic today with her . In discussion with both her  and Ms. Mohr today, they report that she is feeling overall quite well. She reports periods of transient dizziness and lightheadedness, especially prominent with positional changes. She reports periods of chest tightness "like a cramp" noted both at rest and with exertion. Additionally, she has started reporting symptoms of chest pain that "feels like I need to burp but can't", and is awaiting repeat stress assessment. She denies any palpitations, nausea or vomiting, and denies changes in her baseline lower extremity edema. She reports baseline shortness of breath and dyspnea on exertion, and has been attributing this to physical deconditioning and her underlying COPD. She feels that these symptoms are largely stable for her. She can climb one flight of stairs prior to needing to take a break, and reported shortness of breath coming into clinic form the parking garage. She denies any physical activity beyond her household chores, and has to take breaks between mopping and sweeping. She is sleeping on 2 pillows currently for comfort, although she reports that if she lies completely supine for prolonged periods of time (more than an hour) she occasionally endorses orthopnea. She has been trying to improve her diet, but admits to continuing to eat a significant amount of salt and canned goods. She reports continued chronic cough with her COPD that has remained stable.      Review of Systems:  Review of Systems   Constitutional: Negative for activity change.   HENT: Negative for nasal congestion, nosebleeds, postnasal drip, rhinorrhea, sinus pressure/congestion, sneezing and sore throat.    Respiratory: Positive for cough, chest tightness and shortness of breath. Negative for apnea and wheezing.    Cardiovascular: Positive for chest pain and leg swelling. Negative for palpitations and claudication. "   Gastrointestinal: Negative for abdominal distention, abdominal pain, blood in stool, change in bowel habit, constipation, diarrhea, nausea, vomiting and change in bowel habit.   Genitourinary: Negative for dysuria and hematuria.   Musculoskeletal: Positive for arthralgias. Negative for gait problem.   Neurological: Positive for dizziness and light-headedness. Negative for seizures, syncope, weakness, headaches, disturbances in coordination and coordination difficulties.        PAST HISTORY:     Past Medical History:   Diagnosis Date    Anxiety     Arthritis     Asthma     CHF (congestive heart failure)     COPD (chronic obstructive pulmonary disease)     Depression     Diabetes mellitus     Dyspnea     H/O coronary angiogram     H/O: hysterectomy     Hyperlipemia     Hypertension     Pulmonary edema     Schizophrenia     Stroke        Past Surgical History:   Procedure Laterality Date    BLADDER SUSPENSION      CARPAL TUNNEL RELEASE Right     HEEL SPUR SURGERY Left     HYSTERECTOMY      LEFT HEART CATHETERIZATION Bilateral 12/27/2019    Procedure: Left heart cath;  Surgeon: Steve Chambers MD;  Location[facility] CATH LAB;  Service: Cardiology;  Laterality: Bilateral;    RIGHT HEART CATHETERIZATION Right 7/26/2021    Procedure: INSERTION, CATHETER, RIGHT HEART;  Surgeon: Petr Naranjo MD;  Location: [facility] CATH LAB;  Service: Cardiology;  Laterality: Right;    TUBAL LIGATION         Family History:  Family History   Problem Relation Age of Onset    No Known Problems Mother     No Known Problems Father        Social History:  She  reports that she quit smoking about 5 years ago. Her smoking use included cigarettes. She has never used smokeless tobacco. She reports that she does not drink alcohol and does not use drugs. She stopped smoking in 2018, and denies alcohol or any recreational drugs. She lives in [facility]. She is  and has two adult children. 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    MEDICATIONS & ALLERGIES:     Review of patient's allergies indicates:   Allergen Reactions    Adhesive Blisters    Captopril Other (See Comments)     COUGH       Current Outpatient Medications on File Prior to Visit   Medication Sig Dispense Refill    acetaminophen (TYLENOL) 325 MG tablet Take 2 tablets (650 mg total) by mouth every 8 (eight) hours as needed. 30 tablet 0    albuterol (PROVENTIL/VENTOLIN HFA) 90 mcg/actuation inhaler Inhale 2 puffs into the lungs every 6 (six) hours as needed for Wheezing or Shortness of Breath. 18 g 2    aspirin 81 MG Chew Take 1 tablet (81 mg total) by mouth once daily. 90 tablet 0    atorvastatin (LIPITOR) 80 MG tablet Take 1 tablet (80 mg total) by mouth once daily. 90 tablet 3    dapagliflozin (FARXIGA) 10 mg tablet Take 1 tablet (10 mg total) by mouth once daily. 30 tablet 3    dulaglutide (TRULICITY) 1.5 mg/0.5 mL pen injector Inject into the skin once a week.       glimepiride (AMARYL) 4 MG tablet Take 4 mg by mouth once daily.      meclizine (ANTIVERT) 25 mg tablet Take 1 tablet (25 mg total) by mouth 3 (three) times daily as needed for Dizziness. 20 tablet 0    metFORMIN (GLUCOPHAGE) 1000 MG tablet Take 1,000 mg by mouth 2 (two) times daily.       metoprolol succinate (TOPROL-XL) 100 MG 24 hr tablet Take 1 tablet (100 mg total) by mouth once daily. 30 tablet 6    potassium chloride (MICRO-K) 10 MEQ CpSR       rivaroxaban (XARELTO) 20 mg Tab Take 1 tablet (20 mg total) by mouth daily with dinner or evening meal. 30 tablet 1    sacubitriL-valsartan (ENTRESTO)  mg per tablet Take 1 tablet by mouth 2 (two) times daily. 60 tablet 6    spironolactone (ALDACTONE) 25 MG tablet Take 1 tablet (25 mg total) by mouth once daily. 30 tablet 11    torsemide (DEMADEX) 10 MG Tab Take 1 tablet by mouth once daily 30 tablet 0     No current facility-administered medications on file prior to  "visit.        OBJECTIVE:     Vital Signs:  /61   Pulse 77   Ht 5' 6" (1.676 m)   Wt 88.6 kg (195 lb 5.2 oz)   LMP  (LMP Unknown)   BMI 31.53 kg/m²     Physical Exam:  Physical Exam  Constitutional:       General: She is not in acute distress.     Appearance: Normal appearance. She is obese. She is not ill-appearing or diaphoretic.      Comments: Well-appearing woman in NAD.   HENT:      Head: Normocephalic and atraumatic.      Comments: Mask worn in clinic in the setting of COVID precautions.   Eyes:      Pupils: Pupils are equal, round, and reactive to light.   Neck:      Comments: JVD difficult to assess secondary to body habitus.   Cardiovascular:      Rate and Rhythm: Normal rate and regular rhythm.      Pulses: Normal pulses.      Heart sounds: Normal heart sounds. No murmur heard.    No friction rub. No gallop.      Comments: Left anterior chest wall incision site is well-healed with device freely mobile within the pocket with no evidence of device adhesion or erosion.  Pulmonary:      Effort: Pulmonary effort is normal. No respiratory distress.      Breath sounds: Normal breath sounds. No wheezing, rhonchi or rales.   Chest:      Chest wall: No tenderness.   Abdominal:      General: There is no distension.      Palpations: Abdomen is soft.      Tenderness: There is no abdominal tenderness.   Musculoskeletal:         General: No swelling or tenderness.      Cervical back: Normal range of motion.      Right lower leg: Edema present.      Left lower leg: Edema present.      Comments: Trace bilateral pedal edema.    Skin:     General: Skin is warm and dry.      Findings: No erythema, lesion or rash.   Neurological:      General: No focal deficit present.      Mental Status: She is alert and oriented to person, place, and time. Mental status is at baseline.      Motor: No weakness.      Gait: Gait normal.   Psychiatric:         Mood and Affect: Mood normal.         Behavior: Behavior normal.      "   Laboratory Data:  Lab Results   Component Value Date    WBC 7.94 02/12/2022    HGB 11.8 (L) 02/12/2022    HCT 36.1 (L) 02/12/2022    MCV 87 02/12/2022     02/12/2022     Lab Results   Component Value Date     (H) 02/12/2022     02/12/2022    K 3.1 (L) 02/12/2022     02/12/2022    CO2 29 02/12/2022    BUN 12 02/12/2022    CREATININE 0.8 02/12/2022    CALCIUM 9.4 02/12/2022    MG 2.0 02/12/2022     Lab Results   Component Value Date    INR 0.9 11/30/2021    INR 1.0 11/22/2021    INR 0.9 07/26/2021       Pertinent Cardiac Data:  ECG: Normal sinus rhythm with rate of 77 bpm,  ms,  ms, QT/QTc 418/473 ms, LVH with TWI lateral leads.     30-Day Ambulatory Event Monitor - 12/1/2020:  At baseline, in the absence of symptoms, the rhythm was sinus rhythm with VPCs and VPC couplets, with heart rates in the 80s.  There were complaints of racing/fluttering, at which time sinus rhythm with VPCs of 2 different morphologies was seen.  Heart rates ranged in the 60s to 70s.  The VPCs were bigeminal.  On 11/01 the complaint was shortness of breath at which time sinus rhythm was present with rates between 99 and 106 beats per minute.  There were 2 single PVCs as well (monomorphic).  Shortness of breath during exercise was the complaint on 11/03.  Sinus tachycardia with rates in the 130s to 140s was seen.  There was no ectopy.   Impression:  Sinus rhythm with VPCs of at least 2 different morphologies.    Resting 2D Transthoracic Echocardiogram - 6/29/2021:  · The estimated ejection fraction is 20%.  · The left ventricle is severely enlarged with eccentric hypertrophy and severely decreased systolic function.  · There is severe left ventricular global hypokinesis.  · Grade II left ventricular diastolic dysfunction.  · Normal right ventricular size with normal right ventricular systolic function.  · Mild left atrial enlargement.  · Mild mitral regurgitation.  · The estimated PA systolic pressure is  39 mmHg.  · Normal central venous pressure (3 mmHg).    Resting 2D Transthoracic Echocardiogram - 10/11/2021:  · The left ventricle is severely enlarged with eccentric hypertrophy and severely decreased systolic function. The estimated ejection fraction is 15%.  · Normal right ventricular size with mildly reduced right ventricular systolic function.  · Grade II left ventricular diastolic dysfunction.  · Moderate left atrial enlargement.  · Mild mitral regurgitation.  · The estimated PA systolic pressure is 58 mmHg.  · Normal central venous pressure (3 mmHg).    Cardiopulmonary Exercise Stress Test - 1/10/2022:  · Severe functional impairment associated with a normal breathing reserve, normal oxygen stauration, suboptimal effort, and a reduced AT. These findings are indicative of functional impairment secondary to circulatory insufficiency.  · There were no arrhythmias during stress.  · There was no ST segment deviation noted during stress.  · The test was stopped because the patient experienced lightheadedness.  · The ECG portion of this study is negative for myocardial ischemia.    Device Interrogation: Personal review of her device interrogation report ([employer], implanted 11/30/2021) reveals adequate battery longevity with an estimated 6.2-8.4 years remaining. Her underlying rhythm is sinus. She is RA paced 3.4% and RV paced <1%. Her leads were interrogated and demonstrate acceptable and stable lead parameters. She has had no evidence of atrial fibrillation, nor VT/VF. There are no concerning AHR or VHR episodes noted.        ASSESSMENT & PLAN:   Ms. Diomedes Mohr is a anita 61-year-old woman who returns to clinic today for ongoing evaluation and routine surveillance of a primary prevention dual-chamber ICD implanted in the setting of nonischemic cardiomyopathy with persistent reduction of systolic function despite guideline-directed medical therapy. She has a past medical history  significant for nonischemic cardiomyopathy with LVEF 10-15%, prior admissions with volume overload and decompensated HFrEF, pulmonary hypertension, hypertension, hyperlipidemia, history of embolic right MCA CVA s/p tPA on 8/14/2020 with no residual deficits, type II diabetes mellitus, COPD, schizophrenia, depression and anxiety, and obesity. She underwent dual-chamber ICD implantation with me on 11/30/2021.       - She has a normally functioning dual-chamber ICD on interrogation today, with 3.4% pacing in the right atrium and <1% pacing from the right ventricle. Her intrinsic QRSd is 114ms. Since undergoing device implantation, she reports slightly improved functional status. She has not required any device tachytherapies, and has no evidence of atrial fibrillation, VT, or VF. She remains on rivaroxaban with no adverse bleeding events.   - We discussed routine surveillance with ongoing remote transmissions in addition to scheduled in-office device interrogations.  - She continues to report episodes of chest pain occurring both at rest and with exertion, and has an upcoming stress test with consideration for repeat RHC/LHC as per Dr. Smith.   - She will continue to follow with her PCP and her Advanced Heart Failure team for ongoing management of her underlying comorbid conditions.     This patient will return to clinic with me in six months with continued PCP and her Advanced Heart Failure team appointments in the interim. All questions and concerns were addressed at this encounter.     Signing Physician:       MIKO Torres MD  Electrophysiology Attending

## 2022-04-11 ENCOUNTER — TELEPHONE (OUTPATIENT)
Dept: CARDIOLOGY | Facility: HOSPITAL | Age: 62
End: 2022-04-11
Payer: COMMERCIAL

## 2022-04-13 ENCOUNTER — HOSPITAL ENCOUNTER (OUTPATIENT)
Dept: CARDIOLOGY | Facility: HOSPITAL | Age: 62
Discharge: HOME | End: 2022-04-13
Attending: INTERNAL MEDICINE
Payer: COMMERCIAL

## 2022-04-13 VITALS
HEIGHT: 65 IN | WEIGHT: 195 LBS | WEIGHT: 195 LBS | HEART RATE: 71 BPM | DIASTOLIC BLOOD PRESSURE: 70 MMHG | BODY MASS INDEX: 31.34 KG/M2 | BODY MASS INDEX: 32.49 KG/M2 | SYSTOLIC BLOOD PRESSURE: 117 MMHG | SYSTOLIC BLOOD PRESSURE: 120 MMHG | HEIGHT: 66 IN | HEART RATE: 60 BPM | DIASTOLIC BLOOD PRESSURE: 72 MMHG

## 2022-04-13 DIAGNOSIS — I42.0 DILATED CARDIOMYOPATHY: ICD-10-CM

## 2022-04-13 DIAGNOSIS — R07.9 CHEST PAIN, UNSPECIFIED TYPE: ICD-10-CM

## 2022-04-13 LAB
ASCENDING AORTA: 2.89 CM
AV INDEX (PROSTH): 0.4
AV MEAN GRADIENT: 3 MMHG
AV PEAK GRADIENT: 5 MMHG
AV VALVE AREA: 1.9 CM2
AV VELOCITY RATIO: 0.44
BSA FOR ECHO PROCEDURE: 2.03 M2
CFR FLOW - ANTERIOR: 1.75
CFR FLOW - INFERIOR: 1.72
CFR FLOW - LATERAL: 1.88
CFR FLOW - MAX: 2.19
CFR FLOW - MIN: 1.17
CFR FLOW - SEPTAL: 1.61
CFR FLOW - WHOLE HEART: 1.74
CV ECHO LV RWT: 0.23 CM
CV PHARM DOSE: 0.4 MG
CV STRESS BASE HR: 71 BPM
DIASTOLIC BLOOD PRESSURE: 70 MMHG
DOP CALC AO PEAK VEL: 1.11 M/S
DOP CALC AO VTI: 25.24 CM
DOP CALC LVOT AREA: 4.7 CM2
DOP CALC LVOT DIAMETER: 2.45 CM
DOP CALC LVOT PEAK VEL: 0.49 M/S
DOP CALC LVOT STROKE VOLUME: 47.97 CM3
DOP CALCLVOT PEAK VEL VTI: 10.18 CM
E WAVE DECELERATION TIME: 186.32 MSEC
E/A RATIO: 1.02
E/E' RATIO: 19.33 M/S
ECHO LV POSTERIOR WALL: 0.95 CM (ref 0.6–1.1)
EJECTION FRACTION- HIGH: 65 %
EJECTION FRACTION: 20 %
END DIASTOLIC INDEX-HIGH: 153 ML/M2
END DIASTOLIC INDEX-LOW: 93 ML/M2
END SYSTOLIC INDEX-HIGH: 71 ML/M2
END SYSTOLIC INDEX-LOW: 31 ML/M2
FRACTIONAL SHORTENING: 1 % (ref 28–44)
INTERVENTRICULAR SEPTUM: 0.91 CM (ref 0.6–1.1)
LA MAJOR: 4.78 CM
LA MINOR: 4.85 CM
LA WIDTH: 4.62 CM
LEFT ATRIUM SIZE: 4.23 CM
LEFT ATRIUM VOLUME INDEX MOD: 37.5 ML/M2
LEFT ATRIUM VOLUME INDEX: 40.4 ML/M2
LEFT ATRIUM VOLUME MOD: 74.32 CM3
LEFT ATRIUM VOLUME: 79.98 CM3
LEFT INTERNAL DIMENSION IN SYSTOLE: 8.09 CM (ref 2.1–4)
LEFT VENTRICLE DIASTOLIC VOLUME INDEX: 181.43 ML/M2
LEFT VENTRICLE DIASTOLIC VOLUME: 359.24 ML
LEFT VENTRICLE MASS INDEX: 194 G/M2
LEFT VENTRICLE SYSTOLIC VOLUME INDEX: 178.5 ML/M2
LEFT VENTRICLE SYSTOLIC VOLUME: 353.43 ML
LEFT VENTRICULAR INTERNAL DIMENSION IN DIASTOLE: 8.15 CM (ref 3.5–6)
LEFT VENTRICULAR MASS: 384.7 G
LV LATERAL E/E' RATIO: 17.4 M/S
LV SEPTAL E/E' RATIO: 21.75 M/S
MV PEAK A VEL: 0.85 M/S
MV PEAK E VEL: 0.87 M/S
MV STENOSIS PRESSURE HALF TIME: 54.03 MS
MV VALVE AREA P 1/2 METHOD: 4.07 CM2
NUC REST DIASTOLIC VOLUME INDEX: 404
NUC REST EJECTION FRACTION: 10
NUC REST SYSTOLIC VOLUME INDEX: 363
NUC STRESS DIASTOLIC VOLUME INDEX: 427
NUC STRESS EJECTION FRACTION: 13 %
NUC STRESS SYSTOLIC VOLUME INDEX: 372
OHS CV CPX 85 PERCENT MAX PREDICTED HEART RATE MALE: 130
OHS CV CPX MAX PREDICTED HEART RATE: 153
OHS CV CPX PATIENT IS FEMALE: 1
OHS CV CPX PATIENT IS MALE: 0
OHS CV CPX PEAK DIASTOLIC BLOOD PRESSURE: 61 MMHG
OHS CV CPX PEAK HEAR RATE: 88 BPM
OHS CV CPX PEAK RATE PRESSURE PRODUCT: 9504
OHS CV CPX PEAK SYSTOLIC BLOOD PRESSURE: 108 MMHG
OHS CV CPX PERCENT MAX PREDICTED HEART RATE ACHIEVED: 57
OHS CV CPX RATE PRESSURE PRODUCT PRESENTING: 8307
PERFUSION DEFECT 1 SIZE IN %: 3 %
PISA TR MAX VEL: 3.11 M/S
RA MAJOR: 3.86 CM
RA PRESSURE: 3 MMHG
RA WIDTH: 4.37 CM
REST FLOW - ANTERIOR: 0.66 CC/MIN/G
REST FLOW - INFERIOR: 0.52 CC/MIN/G
REST FLOW - LATERAL: 0.57 CC/MIN/G
REST FLOW - MAX: 0.8 CC/MIN/G
REST FLOW - MIN: 0.31 CC/MIN/G
REST FLOW - SEPTAL: 0.55 CC/MIN/G
REST FLOW - WHOLE HEART: 0.58 CC/MIN/G
RETIRED EF AND QEF - SEE NOTES: 53 %
RIGHT VENTRICULAR END-DIASTOLIC DIMENSION: 4.07 CM
RV TISSUE DOPPLER FREE WALL SYSTOLIC VELOCITY 1 (APICAL 4 CHAMBER VIEW): 8.4 CM/S
SINUS: 3.06 CM
STJ: 2.67 CM
STRESS FLOW - ANTERIOR: 1.15 CC/MIN/G
STRESS FLOW - INFERIOR: 0.9 CC/MIN/G
STRESS FLOW - LATERAL: 1.07 CC/MIN/G
STRESS FLOW - MAX: 1.47 CC/MIN/G
STRESS FLOW - MIN: 0.47 CC/MIN/G
STRESS FLOW - SEPTAL: 0.89 CC/MIN/G
STRESS FLOW - WHOLE HEART: 1 CC/MIN/G
SYSTOLIC BLOOD PRESSURE: 117 MMHG
TDI LATERAL: 0.05 M/S
TDI SEPTAL: 0.04 M/S
TDI: 0.05 M/S
TR MAX PG: 39 MMHG
TRICUSPID ANNULAR PLANE SYSTOLIC EXCURSION: 1.48 CM
TV REST PULMONARY ARTERY PRESSURE: 42 MMHG

## 2022-04-13 PROCEDURE — 78434 AQMBF PET REST & RX STRESS: CPT

## 2022-04-13 PROCEDURE — C8929 TTE W OR WO FOL WCON,DOPPLER: HCPCS

## 2022-04-13 PROCEDURE — 78431 CARDIAC PET SCAN STRESS (CUPID ONLY): ICD-10-PCS | Mod: 26,,, | Performed by: INTERNAL MEDICINE

## 2022-04-13 PROCEDURE — 78434 AQMBF PET REST & RX STRESS: CPT | Mod: 26,,, | Performed by: INTERNAL MEDICINE

## 2022-04-13 PROCEDURE — 78431 MYOCRD IMG PET RST&STRS CT: CPT | Mod: 26,,, | Performed by: INTERNAL MEDICINE

## 2022-04-13 PROCEDURE — 93306 ECHO (CUPID ONLY): ICD-10-PCS | Mod: 26,,, | Performed by: INTERNAL MEDICINE

## 2022-04-13 PROCEDURE — 93306 TTE W/DOPPLER COMPLETE: CPT | Mod: 26,,, | Performed by: INTERNAL MEDICINE

## 2022-04-13 PROCEDURE — 93016 CV STRESS TEST SUPVJ ONLY: CPT | Mod: ,,, | Performed by: INTERNAL MEDICINE

## 2022-04-13 PROCEDURE — 93018 CV STRESS TEST I&R ONLY: CPT | Mod: ,,, | Performed by: INTERNAL MEDICINE

## 2022-04-13 PROCEDURE — 63600175 PHARM REV CODE 636 W HCPCS: Performed by: INTERNAL MEDICINE

## 2022-04-13 PROCEDURE — 93018 CARDIAC PET SCAN STRESS (CUPID ONLY): ICD-10-PCS | Mod: ,,, | Performed by: INTERNAL MEDICINE

## 2022-04-13 PROCEDURE — 78434 CARDIAC PET SCAN STRESS (CUPID ONLY): ICD-10-PCS | Mod: 26,,, | Performed by: INTERNAL MEDICINE

## 2022-04-13 PROCEDURE — 93016 CARDIAC PET SCAN STRESS (CUPID ONLY): ICD-10-PCS | Mod: ,,, | Performed by: INTERNAL MEDICINE

## 2022-04-13 PROCEDURE — 25500020 PHARM REV CODE 255: Performed by: INTERNAL MEDICINE

## 2022-04-13 RX ORDER — AMINOPHYLLINE 25 MG/ML
75 INJECTION, SOLUTION INTRAVENOUS ONCE
Status: COMPLETED | OUTPATIENT
Start: 2022-04-13 | End: 2022-04-13

## 2022-04-13 RX ORDER — REGADENOSON 0.08 MG/ML
0.4 INJECTION, SOLUTION INTRAVENOUS ONCE
Status: COMPLETED | OUTPATIENT
Start: 2022-04-13 | End: 2022-04-13

## 2022-04-13 RX ADMIN — AMINOPHYLLINE 75 MG: 25 INJECTION, SOLUTION INTRAVENOUS at 09:04

## 2022-04-13 RX ADMIN — HUMAN ALBUMIN MICROSPHERES AND PERFLUTREN 0.66 MG: 10; .22 INJECTION, SOLUTION INTRAVENOUS at 08:04

## 2022-04-13 RX ADMIN — REGADENOSON 0.4 MG: 0.08 INJECTION, SOLUTION INTRAVENOUS at 09:04

## 2022-04-13 NOTE — PROGRESS NOTES
Procedure explained. 20 g sl started in left forearm for optison use. optison given ivp via sl for imaging by timbo desai rdcs. Tolerated well. Sl flushed after and secured for cardiac PET next appt. Report given to willian gracia rn.

## 2022-04-15 ENCOUNTER — PATIENT MESSAGE (OUTPATIENT)
Dept: TRANSPLANT | Facility: CLINIC | Age: 62
End: 2022-04-15
Payer: COMMERCIAL

## 2022-04-25 ENCOUNTER — PATIENT MESSAGE (OUTPATIENT)
Dept: TRANSPLANT | Facility: CLINIC | Age: 62
End: 2022-04-25
Payer: COMMERCIAL

## 2022-04-25 DIAGNOSIS — I50.42 CHRONIC COMBINED SYSTOLIC AND DIASTOLIC CHF, NYHA CLASS 3: Primary | ICD-10-CM

## 2022-04-25 NOTE — PROGRESS NOTES
Would like to proceed with RHC to evaluate filling pressures, cardiac flows, and PA pressures again. PET negative therefore RHC alone.

## 2022-04-27 ENCOUNTER — PATIENT MESSAGE (OUTPATIENT)
Dept: TRANSPLANT | Facility: CLINIC | Age: 62
End: 2022-04-27
Payer: COMMERCIAL

## 2022-04-27 ENCOUNTER — TELEPHONE (OUTPATIENT)
Dept: TRANSPLANT | Facility: CLINIC | Age: 62
End: 2022-04-27

## 2022-04-27 NOTE — TELEPHONE ENCOUNTER
1045 am:  TORB: Dr. Granda/Elsa RN:  Pt to stop taking Xarelto 2 days prior to RHC and resume it right afterwards.    3:40 pm  Per appt coordinator, pt wanted instruction regarding Xarelto sent to her through [employer] in a message.   Therefore , I just sent these instructions to pt via [employer].

## 2022-04-28 DIAGNOSIS — I50.42 CHRONIC COMBINED SYSTOLIC AND DIASTOLIC CHF, NYHA CLASS 3: Primary | ICD-10-CM

## 2022-04-28 DIAGNOSIS — I42.0 DILATED CARDIOMYOPATHY: Primary | ICD-10-CM

## 2022-04-28 RX ORDER — POTASSIUM CHLORIDE 750 MG/1
CAPSULE, EXTENDED RELEASE ORAL
Status: CANCELLED | OUTPATIENT
Start: 2022-04-28

## 2022-04-28 NOTE — TELEPHONE ENCOUNTER
0800 am:  Noted pt has read [employer] message sent yesterday afternoon ( per [employer] she read this yesterday @ 5:01 pm)    1005 am: Called pt to confirm this  Pt said she did receive the  Message and is aware not to take the Xarelto for 2 days prior tot he RHC, and to resume it that morning after the procedurel  Pt also asked about other meds  Asked pt to not take Torsemide or Spironolactone prior to the procedure, but to take afterwards  Ok to take Metoprolol and Entresto  To not take diabetic oral meds until after procedure when plans to eat ( note procedure, per pt, is scheduled for 8:00 am)    Asked pt to refrain from the other meds until after the procedure, but to take them that morning  Pt voiced understanding and agreement    Also asked pt to call for any further questions or concerns and said she would do so.       Additionally, pt asked for Potassium refill.  Noted incomplete directions in epic.  Asked pt how has she been taking Potassium and she told me 4 pills every day   Asked her the dose to confirm and she told me she doesn't have her bottle, so she couldn't tell me   Pt also told me she ran out about a week ago.  Noted pts last labs 2/2022, therefore asked pt is she would have blood work done today so Dr. Smith can review her current potassium level since she has not taken any in over a week to help her confirm dose she wants to send prescription in for  Pt in agreement   Said she can get to the [facility] on [facility] in 30 minutes  I entered bmp order , schedule the lab appt and added to nurse review , also informing HF MA, so she can also follow this lab    1140 am: Pt has not yet shown to lab appt.   2:10 pm: I called and spoke with rep at [facility] pharmacy  I was told last time potassium filled for this pt was 2/2021 K 10 meq-take 4 once daily   2:20 pm:  Labs resulted and are in epic   +:  3.8  Cr:  0.83  Message sent to dr. mSith explaining all in regard to Potassium as in note  above including these lab results and asked what dose of potassium would she like to resume for pt   3:00 pm:  TORB: Dr. Granda/Elsa, RN:  Start Potassium @ 20 meq once daily  Repeat BMP in 2 weeks  3:45 pm:  Called and spoke with pt  Instructed pt the new prescription will be Potassium 10 meq and take 2 tablets once daily   Repeat blood work in 2 weeks ( pt already scheduled for labs prior to RHC 5/12/22 -2 weeks)     Pt then told me she has been having an yeast infection for 2 weeks since starting Farxiga  Has tried otc vaginal monostat to no relief  Pt is considering stopping farxiga an will let us know   Advised pt to consult pcp for treatment today in that 2 weeks is a while for this  Told pt if symptoms not relieved soon and/or she just decides this is too bothersome and stops it to let us know  Pt agreed.

## 2022-05-01 RX ORDER — POTASSIUM CHLORIDE 750 MG/1
20 CAPSULE, EXTENDED RELEASE ORAL DAILY
Qty: 60 CAPSULE | Refills: 11 | Status: ON HOLD | OUTPATIENT
Start: 2022-05-01 | End: 2022-10-16

## 2022-05-12 ENCOUNTER — HOSPITAL ENCOUNTER (OUTPATIENT)
Facility: HOSPITAL | Age: 62
Discharge: HOME | End: 2022-05-12
Attending: INTERNAL MEDICINE | Admitting: INTERNAL MEDICINE
Payer: COMMERCIAL

## 2022-05-12 VITALS
BODY MASS INDEX: 31.66 KG/M2 | WEIGHT: 197 LBS | RESPIRATION RATE: 18 BRPM | TEMPERATURE: 97 F | SYSTOLIC BLOOD PRESSURE: 123 MMHG | HEIGHT: 66 IN | DIASTOLIC BLOOD PRESSURE: 70 MMHG | HEART RATE: 75 BPM | OXYGEN SATURATION: 100 %

## 2022-05-12 DIAGNOSIS — I50.42 CHRONIC COMBINED SYSTOLIC AND DIASTOLIC CHF, NYHA CLASS 3: Primary | ICD-10-CM

## 2022-05-12 DIAGNOSIS — I50.9 CHF (CONGESTIVE HEART FAILURE): ICD-10-CM

## 2022-05-12 PROCEDURE — C1894 INTRO/SHEATH, NON-LASER: HCPCS | Performed by: INTERNAL MEDICINE

## 2022-05-12 PROCEDURE — 93451 PR RIGHT HEART CATH O2 SATURATION & CARDIAC OUTPUT: ICD-10-PCS | Mod: 26,,, | Performed by: INTERNAL MEDICINE

## 2022-05-12 PROCEDURE — C1751 CATH, INF, PER/CENT/MIDLINE: HCPCS | Performed by: INTERNAL MEDICINE

## 2022-05-12 PROCEDURE — 93451 RIGHT HEART CATH: CPT | Mod: 26,,, | Performed by: INTERNAL MEDICINE

## 2022-05-12 PROCEDURE — 25000003 PHARM REV CODE 250: Performed by: INTERNAL MEDICINE

## 2022-05-12 PROCEDURE — 93451 RIGHT HEART CATH: CPT | Performed by: INTERNAL MEDICINE

## 2022-05-12 RX ORDER — HYDRALAZINE HYDROCHLORIDE 25 MG/1
25 TABLET, FILM COATED ORAL 3 TIMES DAILY
Qty: 90 TABLET | Refills: 5 | Status: ON HOLD | OUTPATIENT
Start: 2022-05-12 | End: 2022-10-16

## 2022-05-12 RX ORDER — SODIUM CHLORIDE 0.9 G/100ML
IRRIGANT IRRIGATION
Status: DISCONTINUED | OUTPATIENT
Start: 2022-05-12 | End: 2022-05-12 | Stop reason: HOSPADM

## 2022-05-12 RX ORDER — LIDOCAINE HYDROCHLORIDE AND EPINEPHRINE 10; 10 MG/ML; UG/ML
INJECTION, SOLUTION INFILTRATION; PERINEURAL
Status: DISCONTINUED | OUTPATIENT
Start: 2022-05-12 | End: 2022-05-12 | Stop reason: HOSPADM

## 2022-05-12 RX ORDER — ISOSORBIDE DINITRATE 10 MG/1
10 TABLET ORAL 3 TIMES DAILY
Qty: 90 TABLET | Refills: 5 | Status: ON HOLD | OUTPATIENT
Start: 2022-05-12 | End: 2022-10-16 | Stop reason: SDUPTHER

## 2022-05-12 NOTE — PATIENT INSTRUCTIONS
Two new medications added and each are taken 3 times a day  These are in addition to your other medications    AFTER THE PROCEDURE:   -DO NOT DRINK OR EAT ANYTHING UNTIL NO LONGER HOARSE   -You may remove the bandage in 24 hours and wash with soap and water.   -You may shower, but do not soak in a tub for three days.   PRECAUTIONS FOR THE NEXT 24 HOURS:   -If you need to cough, sneeze, have a bowel movement, or bear down, hold pressure over your bandage.   -Do not  anything heavier than a gallon of milk(about 5 pounds)   -Avoid excessive bending over.   SYMPTOMS TO WATCH FOR AND REPORT TO YOUR DOCTOR:   -BLEEDING: hold pressure over the site until bleeding stops. Proceed to Emergency Room by ambulance (do not drive yourself) if unable to stop bleeding. Notify your doctor.   -HEMATOMA(hard bruise under the skin): Jose around the bruise if one develops. Call your doctor if it increases in size or if you have difficulty talking, swallowing, breathing or anything unusual.   SIGNS OF INFECTION:Fever (temperature over 100.5 F), pus or redness   -RASH   -CHEST PAIN OR SHORTNESS OF BREATH   You may call you coordinator in the Heart Failure/Heart Transplant/Pulmonary Hypertension Clinic at [phone] during normal business hours(Monday through Friday from 8 A.M. to 5 P.M.) After hours, call the Heart Transplant Service doctor on call at [phone].

## 2022-05-12 NOTE — INTERVAL H&P NOTE
The patient has been examined and the H&P has been reviewed:    60 yo BF with combined systolic and diastolic HF (EF 10-15%), pulmHTN, dilated cardiomyopathy, nonobstructive CAD, HTN, DM on oral therapies, and recent embolic R MCA CVA (s/p tPA on 8/14, w/o residual sx). She presents to ED for worsening shortness of breath in setting of recent missed lasix dose and dietary noncomplaince. Beginning with 07/13-14/2020 in [facility] for CHF exacerbation requiring IV diuresis, [place] [facility] [place] R MCA CVA s/p TPA, and again [place]25-28/[place] [facility] [place]STEMI with troponin peak of 8.4 in setting of CHF exacerbation that required IV diuresis at which time TTE was performed 08/26 that was similar to previous exams and underwent PET stress 08/27 which revealed non obstructive CAD.  She was referred for RHC by Dr. Smith.     Patient reports no intervening problems    Past Medical History:   Diagnosis Date    Anticoagulant long-term use     Anxiety     Arthritis     Asthma     CHF (congestive heart failure)     COPD (chronic obstructive pulmonary disease)     Depression     Diabetes mellitus     Dyspnea     H/O coronary angiogram     H/O: hysterectomy     Hyperlipemia     Hypertension     Pulmonary edema     Schizophrenia     Stroke        Past Surgical History:   Procedure Laterality Date    BLADDER SUSPENSION      CARPAL TUNNEL RELEASE Right     HEEL SPUR SURGERY Left     HYSTERECTOMY      LEFT HEART CATHETERIZATION Bilateral 12/27/2019    Procedure: Left heart cath;  Surgeon: Steve Chambers MD;  Location: [facility] CATH LAB;  Service: Cardiology;  Laterality: Bilateral;    RIGHT HEART CATHETERIZATION Right 7/26/2021    Procedure: INSERTION, CATHETER, RIGHT HEART;  Surgeon: Petr Naranjo MD;  Location: [facility] CATH LAB;  Service: Cardiology;  Laterality: Right;    TUBAL LIGATION         Family History   Problem Relation Age of Onset    No Known Problems Mother     No Known Problems Father   "      Social History     Socioeconomic History    Marital status:    Tobacco Use    Smoking status: Former Smoker     Types: Cigarettes     Quit date: 2016     Years since quittin.7    Smokeless tobacco: Never Used    Tobacco comment: nonex 2 weeks   Substance and Sexual Activity    Alcohol use: No     Alcohol/week: 0.0 standard drinks    Drug use: No       No current facility-administered medications for this encounter.       Review of patient's allergies indicates:   Allergen Reactions    Adhesive Blisters    Captopril Other (See Comments)     COUGH     EXAM:  /70 (BP Location: Right arm, Patient Position: Sitting)   Pulse 79   Temp 97.2 °F (36.2 °C) (Temporal)   Resp 20   Ht 5' 6" (1.676 m)   Wt 89.4 kg (196 lb 15.7 oz)   LMP  (LMP Unknown)   SpO2 99%   BMI 31.79 kg/m²     JVP not visible sitting  No edema    Labs acceptable    This procedure has been fully reviewed with the patient and written informed consent has been obtained.    Will proceed with RHC  "

## 2022-05-12 NOTE — OP NOTE
[facility] Lab Post Procedure Note    Patient tolerated the procedure well.   There were no complications.   Please see full report in EPIC for details.

## 2022-05-12 NOTE — PROGRESS NOTES
Report received from FABRICE Tabor. Patient s/p RHC. Dressing to R IJ cdi. No bleeding or hematoma noted. Vss. No complaints from patient. Family at bedside.

## 2022-05-12 NOTE — Clinical Note
The PA catheter was repositioned to the main pulmonary artery. Hemodynamics were performed. Cardiac output was obtained at 4 L/min. O2 saturation was measured at 61%.  CO = 4.30   CI = 2.16

## 2022-05-12 NOTE — DISCHARGE SUMMARY
[facility] - Short Stay Cardiac Unit  Discharge Note  Short Stay    Procedure(s) (LRB):  INSERTION, CATHETER, RIGHT HEART (Right)    OUTCOME: Patient tolerated treatment/procedure well without complication and is now ready for discharge.    DISPOSITION: Home or Self Care    FINAL DIAGNOSIS:  Chronic combined systolic and diastolic congestive heart failure    FOLLOWUP: In clinic    DISCHARGE INSTRUCTIONS:  Start hydral and isoso 25 and 10 both TID    TIME SPENT ON DISCHARGE: 10 minutes

## 2022-05-12 NOTE — PROGRESS NOTES
Patient discharged per MD orders. Instructions given on medications, wound care, activity, signs of infection, when to call MD, and follow up appointments. Pt verbalized understanding Patient and family declined transport, ambulated off unit.

## 2022-05-28 ENCOUNTER — HOSPITAL ENCOUNTER (EMERGENCY)
Facility: HOSPITAL | Age: 62
Discharge: HOME | End: 2022-05-28
Attending: EMERGENCY MEDICINE
Payer: COMMERCIAL

## 2022-05-28 VITALS
TEMPERATURE: 98 F | DIASTOLIC BLOOD PRESSURE: 68 MMHG | OXYGEN SATURATION: 98 % | RESPIRATION RATE: 18 BRPM | HEART RATE: 98 BPM | SYSTOLIC BLOOD PRESSURE: 130 MMHG

## 2022-05-28 DIAGNOSIS — U07.1 COVID-19 VIRUS DETECTED: Primary | ICD-10-CM

## 2022-05-28 DIAGNOSIS — R07.9 CHEST PAIN: ICD-10-CM

## 2022-05-28 LAB
BASOPHILS # BLD AUTO: 0.05 K/UL (ref 0–0.2)
BASOPHILS NFR BLD: 0.9 % (ref 0–1.9)
BNP SERPL-MCNC: 308 PG/ML (ref 0–99)
BUN SERPL-MCNC: 10 MG/DL (ref 6–30)
CHLORIDE SERPL-SCNC: 103 MMOL/L (ref 95–110)
CREAT SERPL-MCNC: 0.9 MG/DL (ref 0.5–1.4)
CTP QC/QA: YES
DIFFERENTIAL METHOD: ABNORMAL
EOSINOPHIL # BLD AUTO: 0.1 K/UL (ref 0–0.5)
EOSINOPHIL NFR BLD: 0.9 % (ref 0–8)
ERYTHROCYTE [DISTWIDTH] IN BLOOD BY AUTOMATED COUNT: 14.5 % (ref 11.5–14.5)
GLUCOSE SERPL-MCNC: 152 MG/DL (ref 70–110)
HCT VFR BLD AUTO: 34.6 % (ref 37–48.5)
HCT VFR BLD CALC: 34 %PCV (ref 36–54)
HGB BLD-MCNC: 11.4 G/DL (ref 12–16)
IMM GRANULOCYTES # BLD AUTO: 0.01 K/UL (ref 0–0.04)
IMM GRANULOCYTES NFR BLD AUTO: 0.2 % (ref 0–0.5)
LYMPHOCYTES # BLD AUTO: 2.6 K/UL (ref 1–4.8)
LYMPHOCYTES NFR BLD: 44.9 % (ref 18–48)
MCH RBC QN AUTO: 28.9 PG (ref 27–31)
MCHC RBC AUTO-ENTMCNC: 32.9 G/DL (ref 32–36)
MCV RBC AUTO: 88 FL (ref 82–98)
MONOCYTES # BLD AUTO: 0.5 K/UL (ref 0.3–1)
MONOCYTES NFR BLD: 8.9 % (ref 4–15)
NEUTROPHILS # BLD AUTO: 2.6 K/UL (ref 1.8–7.7)
NEUTROPHILS NFR BLD: 44.2 % (ref 38–73)
NRBC BLD-RTO: 0 /100 WBC
PLATELET # BLD AUTO: 278 K/UL (ref 150–450)
PMV BLD AUTO: 10.2 FL (ref 9.2–12.9)
POC IONIZED CALCIUM: 1.16 MMOL/L (ref 1.06–1.42)
POC TCO2 (MEASURED): 26 MMOL/L (ref 23–29)
POTASSIUM BLD-SCNC: 3.5 MMOL/L (ref 3.5–5.1)
RBC # BLD AUTO: 3.94 M/UL (ref 4–5.4)
SAMPLE: ABNORMAL
SARS-COV-2 RDRP RESP QL NAA+PROBE: POSITIVE
SODIUM BLD-SCNC: 140 MMOL/L (ref 136–145)
TROPONIN I SERPL DL<=0.01 NG/ML-MCNC: 0.01 NG/ML (ref 0–0.03)
WBC # BLD AUTO: 5.86 K/UL (ref 3.9–12.7)

## 2022-05-28 PROCEDURE — 99284 PR EMERGENCY DEPT VISIT,LEVEL IV: ICD-10-PCS | Mod: CR,CS,, | Performed by: EMERGENCY MEDICINE

## 2022-05-28 PROCEDURE — 85025 COMPLETE CBC W/AUTO DIFF WBC: CPT | Performed by: EMERGENCY MEDICINE

## 2022-05-28 PROCEDURE — 80047 BASIC METABLC PNL IONIZED CA: CPT

## 2022-05-28 PROCEDURE — 83880 ASSAY OF NATRIURETIC PEPTIDE: CPT | Performed by: EMERGENCY MEDICINE

## 2022-05-28 PROCEDURE — U0002 COVID-19 LAB TEST NON-CDC: HCPCS | Performed by: EMERGENCY MEDICINE

## 2022-05-28 PROCEDURE — 93005 ELECTROCARDIOGRAM TRACING: CPT

## 2022-05-28 PROCEDURE — 93010 EKG 12-LEAD: ICD-10-PCS | Mod: ,,, | Performed by: INTERNAL MEDICINE

## 2022-05-28 PROCEDURE — 93010 ELECTROCARDIOGRAM REPORT: CPT | Mod: ,,, | Performed by: INTERNAL MEDICINE

## 2022-05-28 PROCEDURE — 84484 ASSAY OF TROPONIN QUANT: CPT | Performed by: EMERGENCY MEDICINE

## 2022-05-28 PROCEDURE — 36000 PLACE NEEDLE IN VEIN: CPT

## 2022-05-28 PROCEDURE — 99284 EMERGENCY DEPT VISIT MOD MDM: CPT | Mod: CR,CS,, | Performed by: EMERGENCY MEDICINE

## 2022-05-28 PROCEDURE — 99285 EMERGENCY DEPT VISIT HI MDM: CPT | Mod: 25

## 2022-05-28 NOTE — ED TRIAGE NOTES
Pt states covid concerns. Negative covid test x2 days ago. +fatigue/ body aches. States started z pack yesterday without relief. Denies taking  Any other medication, cp, sob, n/v at this time.

## 2022-05-28 NOTE — Clinical Note
"Diomedes"Loretta Mohr was seen and treated in our emergency department on 5/28/2022.     COVID-19 is present in our communities across the state. There is limited testing for COVID at this time, so not all patients can be tested. In this situation, your employee meets the following criteria:    Diomedes Mohr has met the criteria for COVID-19 testing and has a POSITIVE result. She can return to work once they are asymptomatic for 24 hours without the use of fever reducing medications AND at least five days from the first positive result. A mask is recommended for 5 days post quarantine.     If you have any questions or concerns, or if I can be of further assistance, please do not hesitate to contact me.    Sincerely,             Genoveva Núñez MD"

## 2022-05-28 NOTE — ED PROVIDER NOTES
Encounter Date: 5/28/2022       History     Chief Complaint   Patient presents with    Cough     Cough, congestion x 2 days. States it is painful when she coughs. Denies fevers, n/v/diarrhea. Seen at doctor yesterday, prescribed z pack and cough medicine.      HPI   60 y/o F with medical history of HTN, HLD, DM, CAD, HF (EF 20% with AICD), COPD on xarelto, schizophremmia and prior CVA presents with nasal congestion and dry cough for the past 2 days. Notes some chest pain but only when she coughs. Shortness of breath when she has coughing/sneezing episode. Was seen by MD yesterday and given rx for z-pack.  Was also given cough medicine but this has not helped. No n/v/d.   No le swelling    Review of patient's allergies indicates:   Allergen Reactions    Adhesive Blisters    Captopril Other (See Comments)     COUGH     Past Medical History:   Diagnosis Date    Anticoagulant long-term use     Anxiety     Arthritis     Asthma     CHF (congestive heart failure)     COPD (chronic obstructive pulmonary disease)     Depression     Diabetes mellitus     Dyspnea     H/O coronary angiogram     H/O: hysterectomy     History of automatic internal cardiac defibrillator (AICD)     Hyperlipemia     Hypertension     Pulmonary edema     Schizophrenia     Stroke      Past Surgical History:   Procedure Laterality Date    BLADDER SUSPENSION      CARPAL TUNNEL RELEASE Right     HEEL SPUR SURGERY Left     HYSTERECTOMY      LEFT HEART CATHETERIZATION Bilateral 12/27/2019    Procedure: Left heart cath;  Surgeon: Steve Chambers MD;  Location: [facility] CATH LAB;  Service: Cardiology;  Laterality: Bilateral;    RIGHT HEART CATHETERIZATION Right 7/26/2021    Procedure: INSERTION, CATHETER, RIGHT HEART;  Surgeon: Petr Naranjo MD;  Location: [facility] CATH LAB;  Service: Cardiology;  Laterality: Right;    RIGHT HEART CATHETERIZATION Right 5/12/2022    Procedure: INSERTION, CATHETER, RIGHT HEART;  Surgeon:  Chandana Ivory Jr., MD;  Location: [facility] CATH LAB;  Service: Cardiology;  Laterality: Right;    TUBAL LIGATION       Family History   Problem Relation Age of Onset    No Known Problems Mother     No Known Problems Father      Social History     Tobacco Use    Smoking status: Former Smoker     Types: Cigarettes     Quit date: 2016     Years since quittin.7    Smokeless tobacco: Never Used    Tobacco comment: nonex 2 weeks   Substance Use Topics    Alcohol use: No     Alcohol/week: 0.0 standard drinks    Drug use: No     Review of Systems   Constitutional: Positive for fatigue. Negative for fever.   HENT: Positive for congestion and sore throat. Negative for trouble swallowing.    Eyes: Negative for visual disturbance.   Respiratory: Positive for cough and shortness of breath.    Cardiovascular: Negative for chest pain and leg swelling.   Gastrointestinal: Negative for abdominal pain, diarrhea, nausea and vomiting.   Genitourinary: Negative for dysuria.   Musculoskeletal: Negative for back pain.   Skin: Negative for rash.   Neurological: Negative for dizziness, weakness and headaches.       Physical Exam     Initial Vitals [22 1410]   BP Pulse Resp Temp SpO2   (!) 128/59 108 18 98.7 °F (37.1 °C) 99 %      MAP       --         Physical Exam    Nursing note and vitals reviewed.  Constitutional: She appears well-developed and well-nourished. She is not diaphoretic. No distress.   HENT:   Head: Normocephalic and atraumatic.   Mouth/Throat: Oropharynx is clear and moist. No oropharyngeal exudate.   Neck: Neck supple. No JVD present.   Cardiovascular: Normal rate, regular rhythm and intact distal pulses.   Pulmonary/Chest: Breath sounds normal. No respiratory distress. She has no wheezes. She has no rhonchi. She has no rales.   Abdominal: Abdomen is soft. Bowel sounds are normal. She exhibits no distension. There is no abdominal tenderness. There is no rebound.   Musculoskeletal:         General:  No tenderness.      Cervical back: Neck supple.     Neurological: She is alert and oriented to person, place, and time.   Skin: Skin is warm and dry.         ED Course   Procedures  Labs Reviewed   CBC W/ AUTO DIFFERENTIAL - Abnormal; Notable for the following components:       Result Value    RBC 3.94 (*)     Hemoglobin 11.4 (*)     Hematocrit 34.6 (*)     All other components within normal limits   B-TYPE NATRIURETIC PEPTIDE - Abnormal; Notable for the following components:     (*)     All other components within normal limits   SARS-COV-2 RDRP GENE - Abnormal; Notable for the following components:    POC Rapid COVID Positive (*)     All other components within normal limits    Narrative:     This test utilizes isothermal nucleic acid amplification   technology to detect the SARS-CoV-2 RdRp nucleic acid segment.   The analytical sensitivity (limit of detection) is 125 genome   equivalents/mL.   A POSITIVE result implies infection with the SARS-CoV-2 virus;   the patient is presumed to be contagious.     A NEGATIVE result means that SARS-CoV-2 nucleic acids are not   present above the limit of detection. A NEGATIVE result should be   treated as presumptive. It does not rule out the possibility of   COVID-19 and should not be the sole basis for treatment decisions.   If COVID-19 is strongly suspected based on clinical and exposure   history, re-testing using an alternate molecular assay should be   considered.   This test is only for use under the Food and Drug   Administration s Emergency Use Authorization (EUA).   Commercial kits are provided by [employer].   Performance characteristics of the EUA have been independently   verified by [facility] Department of   Pathology and Laboratory Medicine.   _________________________________________________________________   The authorized Fact Sheet for Healthcare Providers and the authorized Fact   Sheet for Patients of the ID NOW COVID-19 are  available on the FDA   website:     https://www.fda.gov/media/143829/download  https://www.fda.gov/media/427407/download          ISTAT PROCEDURE - Abnormal; Notable for the following components:    POC Glucose 152 (*)     POC Hematocrit 34 (*)     All other components within normal limits   TROPONIN I     EKG Readings: (Independently Interpreted)   Sinus tachycardia, rate 103, no DONIS     ECG Results          EKG 12-lead (Final result)  Result time 05/29/22 10:00:51    Final result by Interface, Lab In [facility] (05/29/22 10:00:51)                 Narrative:    Test Reason : R07.9,    Vent. Rate : 103 BPM     Atrial Rate : 103 BPM     P-R Int : 134 ms          QRS Dur : 102 ms      QT Int : 360 ms       P-R-T Axes : 043 029 210 degrees     QTc Int : 471 ms    Sinus tachycardia  LVH with repolarization abnormality ( Sokolow-Cline )  Abnormal ECG  When compared with ECG of 13-APR-2022 09:24,  Rate has increased  Confirmed by Sujit RIVAS MD ([id number]) on 5/29/2022 10:00:36 AM    Referred By: HEDIIERR   SELF           Confirmed By:Sujit RIVAS MD                            Imaging Results          X-Ray Chest PA And Lateral (Final result)  Result time 05/28/22 15:30:00    Final result by Dimas Marina MD (05/28/22 15:30:00)                 Impression:      Stable borderline cardiomegaly.  No acute process.      Electronically signed by: Dimas Marina MD  Date:    05/28/2022  Time:    15:30             Narrative:    EXAMINATION:  XR CHEST PA AND LATERAL    CLINICAL HISTORY:  Chest Pain;    TECHNIQUE:  PA and lateral views of the chest were performed.    COMPARISON:  02/11/2022.    FINDINGS:  There is stable appearance of left-sided AICD.  The trachea is unremarkable.  There is stable borderline cardiomegaly.  The hemidiaphragms unremarkable.  There are no pleural effusions.  There is no evidence of a pneumothorax.  There is no evidence of pneumomediastinum.  No airspace opacity is present.  The osseous structures demonstrate  degenerative changes.                              X-Rays:   Independently Interpreted Readings:   Chest X-Ray: No infiltrates.  No acute abnormalities. Cardiomegaly present. AICD present     Medications - No data to display     Medical Decision Making:   History:   Old Medical Records: I decided to obtain old medical records.  Initial Assessment:   62 y/o F with multiplemedical problems with viral syndrome like complaints  Afebrile here in ED  Ddx: covid, CHF exacerbation, pneumonia, pulmonary edema  No improvement despite 3 tabs zithromax  poc covid test  Routine blood work, CXR  Independently Interpreted Test(s):   I have ordered and independently interpreted X-rays - see prior notes.  I have ordered and independently interpreted EKG Reading(s) - see prior notes  Clinical Tests:   Lab Tests: Ordered and Reviewed  Radiological Study: Ordered and Reviewed  Medical Tests: Ordered and Reviewed  ED Management:  Covid positive. Symptoms for the past 2 days. Ambulatory O2 sat 98%   blood work other wise unremarkable. Pt is not volume overloaded. Given covid risk score of 7 she was referred for infusion and outpt covid monitiring program. Pt is comfortable with discharge Routine return precautions. Follow up PCP and cardiology                      Clinical Impression:   Final diagnoses:  [R07.9] Chest pain  [U07.1] COVID-19 virus detected (Primary)          ED Disposition Condition    Discharge Stable        ED Prescriptions     None        Follow-up Information     Follow up With Specialties Details Why Contact Info    Fernando Manzo MD Endocrinology Schedule an appointment as soon as possible for a visit   [address] [address]  [phone]             Genoveva Núñez MD  05/29/22 8053

## 2022-05-28 NOTE — ED NOTES
Pt states having (flu-like symptoms) x 2 days with coughing but non-productive. With lost of taste, denies sob, diarrhea or vomiting

## 2022-05-29 ENCOUNTER — CLINICAL SUPPORT (OUTPATIENT)
Dept: CARDIOLOGY | Facility: HOSPITAL | Age: 62
End: 2022-05-29
Payer: COMMERCIAL

## 2022-05-29 DIAGNOSIS — I42.9 CARDIOMYOPATHY, UNSPECIFIED: ICD-10-CM

## 2022-05-29 DIAGNOSIS — Z95.810 PRESENCE OF AUTOMATIC (IMPLANTABLE) CARDIAC DEFIBRILLATOR: ICD-10-CM

## 2022-05-29 DIAGNOSIS — I50.9 HEART FAILURE, UNSPECIFIED: ICD-10-CM

## 2022-05-29 PROCEDURE — 93296 REM INTERROG EVL PM/IDS: CPT | Performed by: STUDENT IN AN ORGANIZED HEALTH CARE EDUCATION/TRAINING PROGRAM

## 2022-05-30 ENCOUNTER — INFUSION (OUTPATIENT)
Dept: INFECTIOUS DISEASES | Facility: HOSPITAL | Age: 62
End: 2022-05-30
Attending: EMERGENCY MEDICINE
Payer: COMMERCIAL

## 2022-05-30 VITALS
WEIGHT: 197 LBS | TEMPERATURE: 98 F | OXYGEN SATURATION: 97 % | HEART RATE: 81 BPM | SYSTOLIC BLOOD PRESSURE: 99 MMHG | DIASTOLIC BLOOD PRESSURE: 54 MMHG | BODY MASS INDEX: 30.92 KG/M2 | RESPIRATION RATE: 16 BRPM | HEIGHT: 67 IN

## 2022-05-30 DIAGNOSIS — U07.1 COVID-19 VIRUS DETECTED: ICD-10-CM

## 2022-05-30 DIAGNOSIS — U07.1 COVID-19: Primary | ICD-10-CM

## 2022-05-30 LAB
BUN SERPL-MCNC: 13 MG/DL (ref 6–30)
CHLORIDE SERPL-SCNC: 102 MMOL/L (ref 95–110)
CREAT SERPL-MCNC: 1 MG/DL (ref 0.5–1.4)
GLUCOSE SERPL-MCNC: 190 MG/DL (ref 70–110)
HCT VFR BLD CALC: 35 %PCV (ref 36–54)
POC IONIZED CALCIUM: 1.08 MMOL/L (ref 1.06–1.42)
POC TCO2 (MEASURED): 29 MMOL/L (ref 23–29)
POTASSIUM BLD-SCNC: 5.9 MMOL/L (ref 3.5–5.1)
SAMPLE: ABNORMAL
SODIUM BLD-SCNC: 138 MMOL/L (ref 136–145)

## 2022-05-30 PROCEDURE — 63600175 PHARM REV CODE 636 W HCPCS: Performed by: INTERNAL MEDICINE

## 2022-05-30 PROCEDURE — M0222 HC IV INJECTION, BEBTELOVIMAB, INCL POST ADMIN MONIT: HCPCS | Performed by: INTERNAL MEDICINE

## 2022-05-30 RX ORDER — ALBUTEROL SULFATE 90 UG/1
2 AEROSOL, METERED RESPIRATORY (INHALATION)
Status: ACTIVE | OUTPATIENT
Start: 2022-05-30 | End: 2022-06-02

## 2022-05-30 RX ORDER — BEBTELOVIMAB 87.5 MG/ML
175 INJECTION, SOLUTION INTRAVENOUS
Status: COMPLETED | OUTPATIENT
Start: 2022-05-30 | End: 2022-05-30

## 2022-05-30 RX ORDER — ACETAMINOPHEN 325 MG/1
650 TABLET ORAL
Status: ACTIVE | OUTPATIENT
Start: 2022-05-30 | End: 2022-05-31

## 2022-05-30 RX ORDER — DIPHENHYDRAMINE HYDROCHLORIDE 50 MG/ML
25 INJECTION INTRAMUSCULAR; INTRAVENOUS
Status: ACTIVE | OUTPATIENT
Start: 2022-05-30 | End: 2022-05-31

## 2022-05-30 RX ORDER — ONDANSETRON 4 MG/1
4 TABLET, ORALLY DISINTEGRATING ORAL
Status: ACTIVE | OUTPATIENT
Start: 2022-05-30 | End: 2022-05-31

## 2022-05-30 RX ORDER — EPINEPHRINE 0.3 MG/.3ML
0.3 INJECTION SUBCUTANEOUS
Status: ACTIVE | OUTPATIENT
Start: 2022-05-30 | End: 2022-06-02

## 2022-05-30 RX ADMIN — BEBTELOVIMAB 175 MG: 87.5 INJECTION, SOLUTION INTRAVENOUS at 10:05

## 2022-05-30 NOTE — PROGRESS NOTES
,Patient arrives for Bebtelovimab IV Injection. Ambulatory. Pt AAox3. No distress noted. RR even and unlabored.     Symptoms and onset date: 5/26 sinus infection, runny nose, cough    Tested COVID + on 5/26

## 2022-05-30 NOTE — PROGRESS NOTES
Patient remains with no signs of complications noted. Patient received Bebtelovimab IV Injection according to FDA recommendations and [facility] SOP without complications noted and left with mask in place. Drug fact sheet provided. Pt discharged home. Ambulatory. Remains AAox3. No distress noted. RR even and unlabored.

## 2022-06-29 DIAGNOSIS — I42.0 DILATED CARDIOMYOPATHY: ICD-10-CM

## 2022-06-30 RX ORDER — TORSEMIDE 10 MG/1
10 TABLET ORAL DAILY
Qty: 30 TABLET | Refills: 0 | Status: SHIPPED | OUTPATIENT
Start: 2022-06-30 | End: 2022-07-01 | Stop reason: SDUPTHER

## 2022-06-30 RX ORDER — SPIRONOLACTONE 25 MG/1
25 TABLET ORAL DAILY
Qty: 30 TABLET | Refills: 11 | Status: SHIPPED | OUTPATIENT
Start: 2022-06-30 | End: 2022-07-01 | Stop reason: SDUPTHER

## 2022-07-01 DIAGNOSIS — I42.0 DILATED CARDIOMYOPATHY: ICD-10-CM

## 2022-07-01 RX ORDER — SPIRONOLACTONE 25 MG/1
25 TABLET ORAL DAILY
Qty: 30 TABLET | Refills: 11 | Status: SHIPPED | OUTPATIENT
Start: 2022-07-01 | End: 2022-08-18

## 2022-07-01 RX ORDER — TORSEMIDE 10 MG/1
10 TABLET ORAL DAILY
Qty: 30 TABLET | Refills: 11 | Status: ON HOLD | OUTPATIENT
Start: 2022-07-01 | End: 2022-10-16 | Stop reason: SDUPTHER

## 2022-07-01 NOTE — TELEPHONE ENCOUNTER
Re entered Aldactone and torsemide prescriptions    Received notification 6/30/22 electronic Rx transmissions failed.

## 2022-07-11 ENCOUNTER — TELEPHONE (OUTPATIENT)
Dept: CARDIOLOGY | Facility: HOSPITAL | Age: 62
End: 2022-07-11
Payer: COMMERCIAL

## 2022-07-11 NOTE — TELEPHONE ENCOUNTER
"Merlin alert received for NSE for 8 secs on 7/10/22 @ 0321, egm c/w PMVT.  This appears to be her first episode of nsVT on device.  Will message Dr. Torres per protocol.    Pt states she was awake and briefly  felt palpitations, "like a jolt or jittering of the heart".   Denies CP, lightheadedness or dizziness.    Bp 99/54 on 5/30/22  Metoprolol Succinate 50 mg daily  RTC September 2022        "

## 2022-07-28 NOTE — PROGRESS NOTES
CC: History of pulmonary embolism, hematology follow up    HPI: , 61, is here for hematology follow up for pulmonary embolism.  She has COPD, CHF, DM, h/o CVA, HLD, HTn. She has cardiomyopathy with EF of 10-15%. She had defibrillator placed on 11/30/21. Patient states she was mostly immobile at home after that, and spent most of her time in her chair or bed. She had worsening dyspnea around Christmas of 2021 and was evaluated at the ER on 12/27/21. She had CTA chest which identified right sided sub-segmental PE.  She denies using hormonal supplement, estrogen supplement or smoking at the time. No prior personal or family h/o blood clots/ bad obstetric outcomes.Denies using recreational drugs. No recent weight loss. She is not uptodate with routine cancer screenings. She is now on xarelto.      Interval History: She is here for a follow up visit. No bleeding issues. No falls.     Past Medical History:   Diagnosis Date    Anxiety     Arthritis     Asthma     CHF (congestive heart failure)     COPD (chronic obstructive pulmonary disease)     Depression     Diabetes mellitus     Dyspnea     H/O: hysterectomy     Hyperlipemia     Hypertension     Schizophrenia     Stroke          Past Surgical History:   Procedure Laterality Date    BLADDER SUSPENSION      CARPAL TUNNEL RELEASE Right     HEEL SPUR SURGERY Left     HYSTERECTOMY      LEFT HEART CATHETERIZATION Bilateral 12/27/2019    Procedure: Left heart cath;  Surgeon: Steve Chambers MD;  Location: [facility] CATH LAB;  Service: Cardiology;  Laterality: Bilateral;    RIGHT HEART CATHETERIZATION Right 7/26/2021    Procedure: INSERTION, CATHETER, RIGHT HEART;  Surgeon: Petr Naranjo MD;  Location: [facility] CATH LAB;  Service: Cardiology;  Laterality: Right;    RIGHT HEART CATHETERIZATION Right 5/12/2022    Procedure: INSERTION, CATHETER, RIGHT HEART;  Surgeon: Chandana Ivory Jr., MD;  Location: [facility] CATH LAB;  Service:  Cardiology;  Laterality: Right;    TUBAL LIGATION         Social History     Socioeconomic History    Marital status:    Tobacco Use    Smoking status: Former Smoker     Types: Cigarettes     Quit date: 2016     Years since quittin.9    Smokeless tobacco: Never Used    Tobacco comment: nonex 2 weeks   Substance and Sexual Activity    Alcohol use: No     Alcohol/week: 0.0 standard drinks    Drug use: No       Review of patient's allergies indicates:   Allergen Reactions    Adhesive Blisters    Captopril Other (See Comments)     COUGH       Current Outpatient Medications   Medication Sig    acetaminophen (TYLENOL) 325 MG tablet Take 2 tablets (650 mg total) by mouth every 8 (eight) hours as needed.    albuterol (PROVENTIL/VENTOLIN HFA) 90 mcg/actuation inhaler Inhale 2 puffs into the lungs every 6 (six) hours as needed for Wheezing or Shortness of Breath.    atorvastatin (LIPITOR) 80 MG tablet Take 1 tablet (80 mg total) by mouth once daily.    dapagliflozin (FARXIGA) 10 mg tablet Take 1 tablet (10 mg total) by mouth once daily.    dulaglutide (TRULICITY) 1.5 mg/0.5 mL pen injector Inject into the skin once a week.     glimepiride (AMARYL) 4 MG tablet Take 4 mg by mouth once daily.    hydrALAZINE (APRESOLINE) 25 MG tablet Take 1 tablet (25 mg total) by mouth 3 (three) times daily.    isosorbide dinitrate (ISORDIL) 10 MG tablet Take 1 tablet (10 mg total) by mouth 3 (three) times daily.    meclizine (ANTIVERT) 25 mg tablet Take 1 tablet (25 mg total) by mouth 3 (three) times daily as needed for Dizziness.    metFORMIN (GLUCOPHAGE) 1000 MG tablet Take 1,000 mg by mouth 2 (two) times daily.     metoprolol succinate (TOPROL-XL) 100 MG 24 hr tablet Take 1 tablet (100 mg total) by mouth once daily.    potassium chloride (MICRO-K) 10 MEQ CpSR     potassium chloride (MICRO-K) 10 MEQ CpSR Take 2 capsules (20 mEq total) by mouth once daily.    rivaroxaban (XARELTO) 20 mg Tab Take 1 tablet  (20 mg total) by mouth daily with dinner or evening meal.    sacubitriL-valsartan (ENTRESTO)  mg per tablet Take 1 tablet by mouth 2 (two) times daily.    spironolactone (ALDACTONE) 25 MG tablet Take 1 tablet (25 mg total) by mouth once daily.    torsemide (DEMADEX) 10 MG Tab Take 1 tablet (10 mg total) by mouth once daily.    traMADoL (ULTRAM) 50 mg tablet      No current facility-administered medications for this visit.       Review of Systems   Constitutional: Positive for malaise/fatigue. Negative for chills, fever and weight loss.   HENT: Negative for ear discharge, ear pain, hearing loss, nosebleeds and tinnitus.    Eyes: Negative for blurred vision, double vision and photophobia.   Respiratory: Negative for cough and sputum production.    Cardiovascular: Negative for chest pain and claudication.   Gastrointestinal: Negative for abdominal pain, diarrhea, heartburn and vomiting.   Genitourinary: Negative for dysuria, frequency and urgency.   Musculoskeletal: Negative for back pain, myalgias and neck pain.   Neurological: Negative for dizziness, tingling, tremors and sensory change.   Endo/Heme/Allergies: Negative for environmental allergies. Does not bruise/bleed easily.   Psychiatric/Behavioral: Negative for substance abuse and suicidal ideas.       Vitals:    07/29/22 0933   BP: 115/80   Pulse: 86   Resp: 16   Temp: 98.5 °F (36.9 °C)         Physical Exam  HENT:      Head: Normocephalic.   Eyes:      General: No scleral icterus.  Cardiovascular:      Rate and Rhythm: Normal rate and regular rhythm.      Pulses: Normal pulses.      Heart sounds: No murmur heard.  Pulmonary:      Effort: Pulmonary effort is normal. No respiratory distress.      Breath sounds: Normal breath sounds.   Abdominal:      General: There is no distension.      Palpations: There is no mass.      Tenderness: There is no abdominal tenderness.   Musculoskeletal:         General: No swelling.   Skin:     Coloration: Skin is not  jaundiced or pale.      Findings: No bruising.   Neurological:      General: No focal deficit present.      Mental Status: She is alert.         12/27/21 CTA chest    FINDINGS:  Pulmonary vasculature: There is satisfactory opacification.  There is embolic material appreciated in the secondary or tertiary branches of the right pulmonary artery supplying the inferior aspect of the right lung.  This is appreciated on image number 167 of series 601 and image number 285 of series 3.     Aorta: Left-sided aortic arch.  No aneurysm and no significant atherosclerosis     Base of Neck: No significant abnormality.     Thoracic soft tissues: Normal.     Heart: Normal size. No effusion.     Zaida/Mediastinum: No pathologic rito enlargement.     Airways: Patent.     Lungs/Pleura: Clear lungs. No pleural effusion or thickening.     Esophagus: Normal.     Upper Abdomen: No abnormality of the partially imaged upper abdomen.     Bones: No acute fracture. No suspicious lytic or sclerotic lesions.     Impression:     Findings positive for small pulmonary emboli or embolus seen in a single secondary ortertiary branch of the right pulmonary artery.     This report was flagged in Epic as abnormal.        Component      Latest Ref Rng & Units 2/7/2022   Beta-2 Glyco 1 IgG      <=20 SGU <9   Beta-2 Glyco 1 IgM      <=20 SMU <9   Beta-2 Glyco 1 IgA      <=20 GUY <9   APA Isotype IgG      0.00 - 14.99 GPL <9.40   APA Isotype IgM      0.00 - 12.49 MPL <9.40     Assessment    1. Sub-segmental pulmonary embolism  2. CHF  3. Type 2 DM not on long term insulin without complications  4. H/o CVA      Plan:    1. Diagnosed in Dec 2021. This was the first known thrombotic episode. She was immobile preceding the PE diagnosis, so likely this is a provoked event. COVID 19 was negative. Optimal anti-coagulation for first provoked PE is 3-6 months. However, she has severely decreased LVEF, with last 2D ECHO documenting LVEF of ~ 15%. She will likely  benefit from long term anti-coagulation. No h/o bleeding or falls. She is on ASA, and DOAC+ASA increase bleeding risk significantly compared to either agent alone.Anti-cardiolipin and anti-beta2GP-I antibodies negative in Feb 2022. Recommend age appropriate anti-cancer screenings, including PAP smear, mammogram, colonoscopy.   Currently on xarelto. She is tolerating it well.     2. She is followed at the transplant/ CHF clinic here    3. She follows with her PCP.    4. She was previously on an ti-coagulation. She is on ASA.

## 2022-07-29 ENCOUNTER — OFFICE VISIT (OUTPATIENT)
Dept: HEMATOLOGY/ONCOLOGY | Facility: CLINIC | Age: 62
End: 2022-07-29
Payer: COMMERCIAL

## 2022-07-29 VITALS
BODY MASS INDEX: 30.63 KG/M2 | TEMPERATURE: 99 F | OXYGEN SATURATION: 98 % | HEART RATE: 86 BPM | RESPIRATION RATE: 16 BRPM | DIASTOLIC BLOOD PRESSURE: 80 MMHG | HEIGHT: 67 IN | WEIGHT: 195.13 LBS | SYSTOLIC BLOOD PRESSURE: 115 MMHG

## 2022-07-29 DIAGNOSIS — I50.42 CHRONIC COMBINED SYSTOLIC AND DIASTOLIC CHF, NYHA CLASS 3: Chronic | ICD-10-CM

## 2022-07-29 DIAGNOSIS — E11.9 TYPE 2 DIABETES MELLITUS WITHOUT COMPLICATION, WITHOUT LONG-TERM CURRENT USE OF INSULIN: Chronic | ICD-10-CM

## 2022-07-29 DIAGNOSIS — Z86.711 HISTORY OF PULMONARY EMBOLISM: ICD-10-CM

## 2022-07-29 DIAGNOSIS — J44.9 CHRONIC OBSTRUCTIVE PULMONARY DISEASE, UNSPECIFIED COPD TYPE: Primary | ICD-10-CM

## 2022-07-29 PROBLEM — I26.99 ACUTE PULMONARY EMBOLISM WITHOUT ACUTE COR PULMONALE: Status: RESOLVED | Noted: 2022-02-07 | Resolved: 2022-07-29

## 2022-07-29 PROCEDURE — 3044F HG A1C LEVEL LT 7.0%: CPT | Mod: CPTII,S$GLB,, | Performed by: INTERNAL MEDICINE

## 2022-07-29 PROCEDURE — 4010F PR ACE/ARB THEARPY RXD/TAKEN: ICD-10-PCS | Mod: CPTII,S$GLB,, | Performed by: INTERNAL MEDICINE

## 2022-07-29 PROCEDURE — 99215 OFFICE O/P EST HI 40 MIN: CPT | Mod: S$GLB,,, | Performed by: INTERNAL MEDICINE

## 2022-07-29 PROCEDURE — 3079F DIAST BP 80-89 MM HG: CPT | Mod: CPTII,S$GLB,, | Performed by: INTERNAL MEDICINE

## 2022-07-29 PROCEDURE — 4010F ACE/ARB THERAPY RXD/TAKEN: CPT | Mod: CPTII,S$GLB,, | Performed by: INTERNAL MEDICINE

## 2022-07-29 PROCEDURE — 3074F PR MOST RECENT SYSTOLIC BLOOD PRESSURE < 130 MM HG: ICD-10-PCS | Mod: CPTII,S$GLB,, | Performed by: INTERNAL MEDICINE

## 2022-07-29 PROCEDURE — 99215 PR OFFICE/OUTPT VISIT, EST, LEVL V, 40-54 MIN: ICD-10-PCS | Mod: S$GLB,,, | Performed by: INTERNAL MEDICINE

## 2022-07-29 PROCEDURE — 3008F PR BODY MASS INDEX (BMI) DOCUMENTED: ICD-10-PCS | Mod: CPTII,S$GLB,, | Performed by: INTERNAL MEDICINE

## 2022-07-29 PROCEDURE — 1159F MED LIST DOCD IN RCRD: CPT | Mod: CPTII,S$GLB,, | Performed by: INTERNAL MEDICINE

## 2022-07-29 PROCEDURE — 99999 PR PBB SHADOW E&M-EST. PATIENT-LVL IV: ICD-10-PCS | Mod: PBBFAC,,, | Performed by: INTERNAL MEDICINE

## 2022-07-29 PROCEDURE — 1159F PR MEDICATION LIST DOCUMENTED IN MEDICAL RECORD: ICD-10-PCS | Mod: CPTII,S$GLB,, | Performed by: INTERNAL MEDICINE

## 2022-07-29 PROCEDURE — 3079F PR MOST RECENT DIASTOLIC BLOOD PRESSURE 80-89 MM HG: ICD-10-PCS | Mod: CPTII,S$GLB,, | Performed by: INTERNAL MEDICINE

## 2022-07-29 PROCEDURE — 3044F PR MOST RECENT HEMOGLOBIN A1C LEVEL <7.0%: ICD-10-PCS | Mod: CPTII,S$GLB,, | Performed by: INTERNAL MEDICINE

## 2022-07-29 PROCEDURE — 3074F SYST BP LT 130 MM HG: CPT | Mod: CPTII,S$GLB,, | Performed by: INTERNAL MEDICINE

## 2022-07-29 PROCEDURE — 3008F BODY MASS INDEX DOCD: CPT | Mod: CPTII,S$GLB,, | Performed by: INTERNAL MEDICINE

## 2022-07-29 PROCEDURE — 99999 PR PBB SHADOW E&M-EST. PATIENT-LVL IV: CPT | Mod: PBBFAC,,, | Performed by: INTERNAL MEDICINE

## 2022-07-29 RX ORDER — ALBUTEROL SULFATE 90 UG/1
2 AEROSOL, METERED RESPIRATORY (INHALATION) EVERY 6 HOURS PRN
Qty: 18 G | Refills: 2 | Status: SHIPPED | OUTPATIENT
Start: 2022-07-29 | End: 2023-07-20

## 2022-07-31 ENCOUNTER — HOSPITAL ENCOUNTER (EMERGENCY)
Facility: HOSPITAL | Age: 62
Discharge: HOME | End: 2022-07-31
Attending: EMERGENCY MEDICINE
Payer: COMMERCIAL

## 2022-07-31 VITALS
BODY MASS INDEX: 30.86 KG/M2 | TEMPERATURE: 99 F | OXYGEN SATURATION: 100 % | RESPIRATION RATE: 18 BRPM | WEIGHT: 192 LBS | HEIGHT: 66 IN | SYSTOLIC BLOOD PRESSURE: 114 MMHG | DIASTOLIC BLOOD PRESSURE: 64 MMHG | HEART RATE: 67 BPM

## 2022-07-31 DIAGNOSIS — R05.9 COUGH: Primary | ICD-10-CM

## 2022-07-31 DIAGNOSIS — R06.02 SHORTNESS OF BREATH: ICD-10-CM

## 2022-07-31 DIAGNOSIS — R06.02 SOB (SHORTNESS OF BREATH): ICD-10-CM

## 2022-07-31 LAB
ALBUMIN SERPL BCP-MCNC: 3.5 G/DL (ref 3.5–5.2)
ALP SERPL-CCNC: 140 U/L (ref 55–135)
ALT SERPL W/O P-5'-P-CCNC: 11 U/L (ref 10–44)
ANION GAP SERPL CALC-SCNC: 9 MMOL/L (ref 8–16)
AST SERPL-CCNC: 13 U/L (ref 10–40)
BASOPHILS # BLD AUTO: 0.02 K/UL (ref 0–0.2)
BASOPHILS NFR BLD: 0.4 % (ref 0–1.9)
BILIRUB SERPL-MCNC: 0.5 MG/DL (ref 0.1–1)
BNP SERPL-MCNC: 449 PG/ML (ref 0–99)
BUN SERPL-MCNC: 14 MG/DL (ref 6–20)
CALCIUM SERPL-MCNC: 9.1 MG/DL (ref 8.7–10.5)
CHLORIDE SERPL-SCNC: 104 MMOL/L (ref 95–110)
CO2 SERPL-SCNC: 28 MMOL/L (ref 23–29)
CREAT SERPL-MCNC: 0.9 MG/DL (ref 0.5–1.4)
DIFFERENTIAL METHOD: ABNORMAL
EOSINOPHIL # BLD AUTO: 0.3 K/UL (ref 0–0.5)
EOSINOPHIL NFR BLD: 5 % (ref 0–8)
ERYTHROCYTE [DISTWIDTH] IN BLOOD BY AUTOMATED COUNT: 14.3 % (ref 11.5–14.5)
EST. GFR  (AFRICAN AMERICAN): >60 ML/MIN/1.73 M^2
EST. GFR  (NON AFRICAN AMERICAN): >60 ML/MIN/1.73 M^2
GLUCOSE SERPL-MCNC: 219 MG/DL (ref 70–110)
HCT VFR BLD AUTO: 34.1 % (ref 37–48.5)
HGB BLD-MCNC: 11.2 G/DL (ref 12–16)
IMM GRANULOCYTES # BLD AUTO: 0.01 K/UL (ref 0–0.04)
IMM GRANULOCYTES NFR BLD AUTO: 0.2 % (ref 0–0.5)
LYMPHOCYTES # BLD AUTO: 2.2 K/UL (ref 1–4.8)
LYMPHOCYTES NFR BLD: 41.2 % (ref 18–48)
MCH RBC QN AUTO: 28.9 PG (ref 27–31)
MCHC RBC AUTO-ENTMCNC: 32.8 G/DL (ref 32–36)
MCV RBC AUTO: 88 FL (ref 82–98)
MONOCYTES # BLD AUTO: 0.5 K/UL (ref 0.3–1)
MONOCYTES NFR BLD: 10.1 % (ref 4–15)
NEUTROPHILS # BLD AUTO: 2.3 K/UL (ref 1.8–7.7)
NEUTROPHILS NFR BLD: 43.1 % (ref 38–73)
NRBC BLD-RTO: 0 /100 WBC
PLATELET # BLD AUTO: 295 K/UL (ref 150–450)
PMV BLD AUTO: 10.2 FL (ref 9.2–12.9)
POTASSIUM SERPL-SCNC: 3.6 MMOL/L (ref 3.5–5.1)
PROT SERPL-MCNC: 7.1 G/DL (ref 6–8.4)
RBC # BLD AUTO: 3.87 M/UL (ref 4–5.4)
SARS-COV-2 RDRP RESP QL NAA+PROBE: NEGATIVE
SODIUM SERPL-SCNC: 141 MMOL/L (ref 136–145)
TROPONIN I SERPL DL<=0.01 NG/ML-MCNC: <0.006 NG/ML (ref 0–0.03)
WBC # BLD AUTO: 5.37 K/UL (ref 3.9–12.7)

## 2022-07-31 PROCEDURE — 93010 ELECTROCARDIOGRAM REPORT: CPT | Mod: ,,, | Performed by: INTERNAL MEDICINE

## 2022-07-31 PROCEDURE — U0002 COVID-19 LAB TEST NON-CDC: HCPCS | Performed by: PHYSICIAN ASSISTANT

## 2022-07-31 PROCEDURE — 86803 HEPATITIS C AB TEST: CPT | Performed by: PHYSICIAN ASSISTANT

## 2022-07-31 PROCEDURE — 93010 EKG 12-LEAD: ICD-10-PCS | Mod: ,,, | Performed by: INTERNAL MEDICINE

## 2022-07-31 PROCEDURE — 93005 ELECTROCARDIOGRAM TRACING: CPT

## 2022-07-31 PROCEDURE — 99285 PR EMERGENCY DEPT VISIT,LEVEL V: ICD-10-PCS | Mod: CS,,, | Performed by: EMERGENCY MEDICINE

## 2022-07-31 PROCEDURE — 84484 ASSAY OF TROPONIN QUANT: CPT | Performed by: PHYSICIAN ASSISTANT

## 2022-07-31 PROCEDURE — 99285 EMERGENCY DEPT VISIT HI MDM: CPT | Mod: 25

## 2022-07-31 PROCEDURE — 83880 ASSAY OF NATRIURETIC PEPTIDE: CPT | Performed by: PHYSICIAN ASSISTANT

## 2022-07-31 PROCEDURE — 99285 EMERGENCY DEPT VISIT HI MDM: CPT | Mod: CS,,, | Performed by: EMERGENCY MEDICINE

## 2022-07-31 PROCEDURE — 25000242 PHARM REV CODE 250 ALT 637 W/ HCPCS: Performed by: PHYSICIAN ASSISTANT

## 2022-07-31 PROCEDURE — 87389 HIV-1 AG W/HIV-1&-2 AB AG IA: CPT | Performed by: PHYSICIAN ASSISTANT

## 2022-07-31 PROCEDURE — 80053 COMPREHEN METABOLIC PANEL: CPT | Performed by: PHYSICIAN ASSISTANT

## 2022-07-31 PROCEDURE — 85025 COMPLETE CBC W/AUTO DIFF WBC: CPT | Performed by: PHYSICIAN ASSISTANT

## 2022-07-31 PROCEDURE — 94640 AIRWAY INHALATION TREATMENT: CPT

## 2022-07-31 PROCEDURE — 94761 N-INVAS EAR/PLS OXIMETRY MLT: CPT

## 2022-07-31 PROCEDURE — 25000003 PHARM REV CODE 250: Performed by: PHYSICIAN ASSISTANT

## 2022-07-31 PROCEDURE — 25000242 PHARM REV CODE 250 ALT 637 W/ HCPCS

## 2022-07-31 RX ORDER — ACETAMINOPHEN 325 MG/1
650 TABLET ORAL
Status: COMPLETED | OUTPATIENT
Start: 2022-07-31 | End: 2022-07-31

## 2022-07-31 RX ORDER — BENZONATATE 100 MG/1
100 CAPSULE ORAL 3 TIMES DAILY PRN
Qty: 20 CAPSULE | Refills: 0 | Status: SHIPPED | OUTPATIENT
Start: 2022-07-31 | End: 2022-08-07

## 2022-07-31 RX ORDER — IPRATROPIUM BROMIDE AND ALBUTEROL SULFATE 2.5; .5 MG/3ML; MG/3ML
SOLUTION RESPIRATORY (INHALATION)
Status: COMPLETED
Start: 2022-07-31 | End: 2022-07-31

## 2022-07-31 RX ORDER — IPRATROPIUM BROMIDE AND ALBUTEROL SULFATE 2.5; .5 MG/3ML; MG/3ML
3 SOLUTION RESPIRATORY (INHALATION)
Status: ACTIVE | OUTPATIENT
Start: 2022-07-31 | End: 2022-07-31

## 2022-07-31 RX ORDER — BENZONATATE 100 MG/1
100 CAPSULE ORAL
Status: COMPLETED | OUTPATIENT
Start: 2022-07-31 | End: 2022-07-31

## 2022-07-31 RX ORDER — IPRATROPIUM BROMIDE AND ALBUTEROL SULFATE 2.5; .5 MG/3ML; MG/3ML
SOLUTION RESPIRATORY (INHALATION)
Status: DISCONTINUED
Start: 2022-07-31 | End: 2022-07-31 | Stop reason: HOSPADM

## 2022-07-31 RX ORDER — ALBUTEROL SULFATE 0.83 MG/ML
2.5 SOLUTION RESPIRATORY (INHALATION) EVERY 6 HOURS PRN
Qty: 3 ML | Refills: 0 | Status: SHIPPED | OUTPATIENT
Start: 2022-07-31 | End: 2023-07-20

## 2022-07-31 RX ADMIN — IPRATROPIUM BROMIDE AND ALBUTEROL SULFATE 3 ML: 2.5; .5 SOLUTION RESPIRATORY (INHALATION) at 04:07

## 2022-07-31 RX ADMIN — ACETAMINOPHEN 650 MG: 325 TABLET ORAL at 12:07

## 2022-07-31 RX ADMIN — BENZONATATE 100 MG: 100 CAPSULE ORAL at 04:07

## 2022-07-31 NOTE — CARE UPDATE
"HPI  61/F with PMHx of combined systolic and diastolic HF (EF 10-15%), pulmHTN, dilated cardiomyopathy, nonobstructive CAD, HTN, DM on oral therapies, and recent embolic R MCA CVA (s/p tPA on 8/14, w/o residual sx). She presents to ED for cough and mild dyspnea. She thinks it may be fluid since she has had similar symptoms in the past in the setting of missing diuretic dose and dietary noncomplaince. This time she has taken extra doses of Torsemide with no improvement.     In the ER she is in no acute distress without orthopnea. She was coughing without mucous production. Exam without JVD or rales. CXR without significant findings. Bedside echo with LVEF 10-15%. CVP is 3 mmHg. Exam without evidence of volume overload. Labs suggest elevated BNP to 449 but she is on Entresto which will false elevate this biomarker.     Tested negative for COVID in the ER. She claims to have no sick contacts. No white count on labs.     /64 (BP Location: Right arm, Patient Position: Lying)   Pulse 67   Temp 98.6 °F (37 °C) (Oral)   Resp 18   Ht 5' 6" (1.676 m)   Wt 87.1 kg (192 lb)   LMP  (LMP Unknown)   SpO2 100%   BMI 30.99 kg/m²     General Appearance:    Alert, cooperative, no distress, appears stated age   Neck:   Supple, symmetrical, trachea midline, no adenopathy;     thyroid:  no enlargement/tenderness/nodules; no carotid    bruit or JVD   Back:     Symmetric, no curvature, ROM normal, no CVA tenderness   Lungs:     Clear to auscultation bilaterally, respirations unlabored   Chest Wall:    No tenderness or deformity    Heart:    Regular rate and rhythm, S1 and S2 normal, no murmur, rub    or gallop   Abdomen:     Soft, non-tender, bowel sounds active all four quadrants,     no masses, no organomegaly   Extremities:   Extremities normal, atraumatic, no cyanosis or edema   Pulses:   2+ and symmetric all extremities   Skin:   Skin color, texture, turgor normal, no rashes or lesions   Neurologic:   CNII-XII intact, " normal strength, sensation and reflexes     throughout       Assessment  1-Cough  Negative for COVID. She did have COVID on May 2022. She is a former smoker and has COPD. Perhaps need nebulizer treatment. Would treat conservatively. May need a short course of steroids and Abx.     2- Chronic CHF  Not clinically or by laboratories in acute decompensated heart failure. She was asked to weigh herself daily and to follow instructions to take an extra dose of Lasix if her wt is up by 3 lbs in a day or 5 lbs in a week.     She can follow up with HTS as OP to continue looking into advanced options for heart failure. She is on guideline directed medical therapy and should continue. Her vitals are stable during my exam and as above.     No further workup or management from cardiac perspective is needed as IP.      Hector Daugherty  Cardiology fellow

## 2022-07-31 NOTE — PROVIDER PROGRESS NOTES - EMERGENCY DEPT.
Encounter Date: 7/31/2022    ED Physician Progress Notes         EKG - STEMI Decision  Initial Reading: No STEMI present.

## 2022-07-31 NOTE — DISCHARGE INSTRUCTIONS
Continue Demadex as prescribed, use albuterol inhaler and DuoNeb nebulizer treatments every 4-6 hours as needed.  Return to the ER for any change or worsening of symptoms, follow-up with a primary care physician within 1 week.

## 2022-07-31 NOTE — ED PROVIDER NOTES
"Encounter Date: 7/31/2022       History     Chief Complaint   Patient presents with    Shortness of Breath     Since Thursday. Hx of CHF. Has pacemaker. C/o cough. Feels like "there is a lot of fluid"     60 y/o female with history of HTN, HLD, DM, CAD, HF (EF 20% with AICD), COPD on schizophrenia and prior CVA 2 year ago taken off of xarelto 1 week ago, presents to the ER with chief complaint of cough and shortness of breath x 4 days.  Patient says she feels like she "over did on the fluids".  She took 4 tablets of Demadex yesterday and 2 tablets today.  She denies chest pain, nausea, vomiting, abdominal pain.  She had diarrhea 2 days ago, that is now resolved.  She denies syncope, dizziness, visual changes, weakness or numbness of the extremities.  She reports headache intermittent for the last few days.  Frontal headaches with moderate pain.  She took aleve with temporary improvement.  She reports soreness to bilateral sides of anterior neck for the last month, no neck swelling.  No difficulty swallowing. She denies additional complaints at this time.          Review of patient's allergies indicates:   Allergen Reactions    Adhesive Blisters    Captopril Other (See Comments)     COUGH     Past Medical History:   Diagnosis Date    Anticoagulant long-term use     Anxiety     Arthritis     Asthma     CHF (congestive heart failure)     COPD (chronic obstructive pulmonary disease)     Depression     Diabetes mellitus     Dyspnea     H/O coronary angiogram     H/O: hysterectomy     History of automatic internal cardiac defibrillator (AICD)     Hyperlipemia     Hypertension     Pulmonary edema     Schizophrenia     Stroke      Past Surgical History:   Procedure Laterality Date    BLADDER SUSPENSION      CARPAL TUNNEL RELEASE Right     HEEL SPUR SURGERY Left     HYSTERECTOMY      LEFT HEART CATHETERIZATION Bilateral 12/27/2019    Procedure: Left heart cath;  Surgeon: Steve Chabmers MD;  " Location: [facility] C[facility] LAB;  Service: Cardiology;  Laterality: Bilateral;    RIGHT HEART CATHETERIZATION Right 2021    Procedure: INSERTION, CATHETER, RIGHT HEART;  Surgeon: Petr Naranjo MD;  Location: [facility] CATH LAB;  Service: Cardiology;  Laterality: Right;    RIGHT HEART CATHETERIZATION Right 2022    Procedure: INSERTION, CATHETER, RIGHT HEART;  Surgeon: Chandana Ivory Jr., MD;  Location: [facility] CATH LAB;  Service: Cardiology;  Laterality: Right;    TUBAL LIGATION       Family History   Problem Relation Age of Onset    No Known Problems Mother     No Known Problems Father      Social History     Tobacco Use    Smoking status: Former Smoker     Types: Cigarettes     Quit date: 2016     Years since quittin.9    Smokeless tobacco: Never Used    Tobacco comment: nonex 2 weeks   Substance Use Topics    Alcohol use: No     Alcohol/week: 0.0 standard drinks    Drug use: No     Review of Systems   Constitutional: Negative for chills and fever.   HENT: Negative for sore throat.    Respiratory: Positive for cough and shortness of breath. Negative for chest tightness and wheezing.    Cardiovascular: Negative for chest pain.   Gastrointestinal: Positive for diarrhea (now resolved). Negative for abdominal pain, blood in stool, nausea and vomiting.   Genitourinary: Negative for dysuria.   Musculoskeletal: Positive for neck pain. Negative for back pain.   Skin: Negative for rash.   Neurological: Positive for headaches. Negative for dizziness, weakness and light-headedness.   Hematological: Does not bruise/bleed easily.       Physical Exam     Initial Vitals [22 1124]   BP Pulse Resp Temp SpO2   133/69 106 20 98.4 °F (36.9 °C) 100 %      MAP       --         Physical Exam    Nursing note and vitals reviewed.  Constitutional: She appears well-developed and well-nourished.   HENT:   Head: Atraumatic.   Eyes: Conjunctivae and EOM are normal. Pupils are equal, round, and reactive to light.    Neck: Neck supple.   Normal range of motion.   Full passive range of motion without pain.     Cardiovascular: Normal rate and regular rhythm.   Pulmonary/Chest: Breath sounds normal. No respiratory distress. She has no wheezes. She has no rhonchi. She has no rales.   Abdominal: Abdomen is soft. Bowel sounds are normal. She exhibits no distension. There is no abdominal tenderness. There is no rebound.   Musculoskeletal:      Cervical back: Full passive range of motion without pain, normal range of motion and neck supple.     Neurological: She is alert and oriented to person, place, and time. She has normal strength. GCS score is 15. GCS eye subscore is 4. GCS verbal subscore is 5. GCS motor subscore is 6.   Skin: No rash noted.   Psychiatric: She has a normal mood and affect.         ED Course   Procedures  Labs Reviewed   CBC W/ AUTO DIFFERENTIAL - Abnormal; Notable for the following components:       Result Value    RBC 3.87 (*)     Hemoglobin 11.2 (*)     Hematocrit 34.1 (*)     All other components within normal limits    Narrative:     Release to patient->Immediate   COMPREHENSIVE METABOLIC PANEL - Abnormal; Notable for the following components:    Glucose 219 (*)     Alkaline Phosphatase 140 (*)     All other components within normal limits    Narrative:     Release to patient->Immediate   B-TYPE NATRIURETIC PEPTIDE - Abnormal; Notable for the following components:     (*)     All other components within normal limits    Narrative:     Release to patient->Immediate   HIV 1 / 2 ANTIBODY    Narrative:     Release to patient->Immediate   HEPATITIS C ANTIBODY    Narrative:     Release to patient->Immediate   TROPONIN I    Narrative:     Release to patient->Immediate   SARS-COV-2 RNA AMPLIFICATION, QUAL          Imaging Results          X-Ray Chest AP Portable (Final result)  Result time 07/31/22 12:39:30    Final result by Dc Ovalle IV, MD (07/31/22 12:39:30)                 Impression:      No  "new intrathoracic abnormality.      Electronically signed by: Dc Ovalle  Date:    07/31/2022  Time:    12:39             Narrative:    EXAMINATION:  XR CHEST AP PORTABLE    CLINICAL HISTORY:  CHF;    TECHNIQUE:  Single frontal view of the chest was performed.    COMPARISON:  Chest radiograph from 05/28/2022.    FINDINGS:  Stable positioning of left-sided dual lead cardiac device.    Mediastinal structures are midline.  Normal mediastinal and hilar contours.  Upper limit normal sized cardiac silhouette.    Lungs are symmetrically expanded and well aerated.  No new focal consolidation.  No pneumothorax.  No significant pleural fluid.    Osseous structures are intact.                                 Medications   albuterol-ipratropium 2.5 mg-0.5 mg/3 mL nebulizer solution 3 mL ( Nebulization Canceled Entry 7/31/22 1735)   acetaminophen tablet 650 mg (650 mg Oral Given 7/31/22 1206)   benzonatate capsule 100 mg (100 mg Oral Given 7/31/22 1628)   albuterol-ipratropium (DUO-NEB) 2.5 mg-0.5 mg/3 mL nebulizer solution (3 mLs  Given 7/31/22 1645)           APC / Resident Notes:   Patient with history of CHF and COPD presents to the ER with complaint of cough and dyspnea on exertion for 3 days.  She denies recent chest pain.  Patient is nontoxic appearing, she is afebrile and oxygen is 100% with no evidence of respiratory distress.  Patient's lungs are clear on exam, no lower extremity edema.  Patient's COVID is negative.  Will obtain blood work and chest x-ray with concern for CHF exacerbation.  Most recent Echo shows "The left ventricle is severely enlarged with moderate eccentric hypertrophy and severely decreased systolic function. EF is 20%.Grade II left ventricular diastolic dysfunction"    Patient's labs reveal glucose 219, troponin is within normal limits, BNP is elevated to 449.  Chest x-ray without acute or infectious process,  no pulmonary edema or pleural effusion.    Patient has been evaluated by heart " "transplant previously.  I have consulted cardiology fellow for evaluation of the patient.  He has performed bedside echo and does not see evidence of decompensated HF .  He does find slight expiratory wheezing on exam and has advised DuoNeb treatments and reassessment, but does not see an indication for cardiology intervention at this time. Per cardiology consult note "Not clinically or by laboratories in acute decompensated heart failure. She was asked to weigh herself daily and to follow instructions to take an extra dose of Lasix if her wt is up by 3 lbs in a day or 5 lbs in a week.      She can follow up with HTS as OP to continue looking into advanced options for heart failure. She is on guideline directed medical therapy and should continue. Her vitals are stable during my exam and as above. "     Patient is given 2 DuoNeb treatments and reports feeling significantly improved with this and Tessalon Perles.  Patient is ambulating without shortness of breath, lungs are clear on repeat exam.  I have considered viral URI, bronchitis, COPD exacerbation.  I do not suspect severe COPD exacerbation and do not see an indication for antibiotics or steroids at this time.  Will refill albuterol nebulizer, advised continued home medications including Demadex as prescribed, and close follow-up with PCP and Cardiology as outpatient.  Patient is comfortable with this plan.  She is given ER return precautions.  I discussed the care this patient my supervising physician, the patient was also seen by her.        ED Course as of 08/01/22 1431   Sun Jul 31, 2022   1330 Patient with history of CHF, followed by heart transplant team presenting with RUBI.  Troponin WNL, .  Chest xray clear.Discussed with cardiology fellow, they will evaluate the patient in the ED and give recommendations. [LH]   1625 Bedside echo performed by cardiology fellow, he notes patients CVP is small and collapsible, patient has no orthopnea.  He does " not see evidence of fluid overload/CHF exacerbation.  He did note some mild end expiratory wheezing advised to consider treatment with DuoNebs. [LH]      ED Course User Index  [LH] CHARLIE Lucia             Clinical Impression:   Final diagnoses:  [R06.02] Shortness of breath  [R05.9] Cough (Primary)  [R06.02] SOB (shortness of breath)          ED Disposition Condition    Discharge Stable        ED Prescriptions     Medication Sig Dispense Start Date End Date Auth. Provider    benzonatate (TESSALON) 100 MG capsule Take 1 capsule (100 mg total) by mouth 3 (three) times daily as needed for Cough. 20 capsule 7/31/2022 8/7/2022 CHARLIE Lucia    albuterol (PROVENTIL) 2.5 mg /3 mL (0.083 %) nebulizer solution Take 3 mLs (2.5 mg total) by nebulization every 6 (six) hours as needed for Wheezing or Shortness of Breath. Rescue 3 mL 7/31/2022 7/31/2023 CHARLIE Lucia        Follow-up Information     Follow up With Specialties Details Why Contact Info    Fernando Manzo MD Endocrinology Call in 1 day To discuss ER visit and schedule follow up appointment within 1 week [address] [address]  [phone]             CHARLIE Lucia  08/01/22 1431       CHARLIE Lucia  08/01/22 1431

## 2022-07-31 NOTE — ED NOTES
"Diomedes Mohr, an 61 y.o. female presents to the ED complaining of cough, SOB, sore throat, headache since Thursday.       Chief Complaint   Patient presents with    Shortness of Breath     Since Thursday. Hx of CHF. Has pacemaker. C/o cough. Feels like "there is a lot of fluid"     Review of patient's allergies indicates:   Allergen Reactions    Adhesive Blisters    Captopril Other (See Comments)     COUGH     Past Medical History:   Diagnosis Date    Anticoagulant long-term use     Anxiety     Arthritis     Asthma     CHF (congestive heart failure)     COPD (chronic obstructive pulmonary disease)     Depression     Diabetes mellitus     Dyspnea     H/O coronary angiogram     H/O: hysterectomy     History of automatic internal cardiac defibrillator (AICD)     Hyperlipemia     Hypertension     Pulmonary edema     Schizophrenia     Stroke        "

## 2022-08-01 LAB
HCV AB SERPL QL IA: NEGATIVE
HIV 1+2 AB+HIV1 P24 AG SERPL QL IA: NEGATIVE

## 2022-08-27 ENCOUNTER — CLINICAL SUPPORT (OUTPATIENT)
Dept: CARDIOLOGY | Facility: HOSPITAL | Age: 62
End: 2022-08-27
Payer: COMMERCIAL

## 2022-08-27 DIAGNOSIS — I50.9 HEART FAILURE, UNSPECIFIED: ICD-10-CM

## 2022-08-27 DIAGNOSIS — I42.9 CARDIOMYOPATHY, UNSPECIFIED: ICD-10-CM

## 2022-08-27 DIAGNOSIS — Z95.810 PRESENCE OF AUTOMATIC (IMPLANTABLE) CARDIAC DEFIBRILLATOR: ICD-10-CM

## 2022-08-27 DIAGNOSIS — I47.20 VENTRICULAR TACHYCARDIA: ICD-10-CM

## 2022-08-27 PROCEDURE — 93295 CARDIAC DEVICE CHECK - REMOTE: ICD-10-PCS | Mod: ,,, | Performed by: STUDENT IN AN ORGANIZED HEALTH CARE EDUCATION/TRAINING PROGRAM

## 2022-08-27 PROCEDURE — 93296 REM INTERROG EVL PM/IDS: CPT | Performed by: STUDENT IN AN ORGANIZED HEALTH CARE EDUCATION/TRAINING PROGRAM

## 2022-08-27 PROCEDURE — 93295 DEV INTERROG REMOTE 1/2/MLT: CPT | Mod: ,,, | Performed by: STUDENT IN AN ORGANIZED HEALTH CARE EDUCATION/TRAINING PROGRAM

## 2022-09-20 NOTE — ED NOTES
cbg 171   Topical Sulfur Applications Pregnancy And Lactation Text: This medication is Pregnancy Category C and has an unknown safety profile during pregnancy. It is unknown if this topical medication is excreted in breast milk.

## 2022-10-07 ENCOUNTER — TELEPHONE (OUTPATIENT)
Dept: ELECTROPHYSIOLOGY | Facility: CLINIC | Age: 62
End: 2022-10-07
Payer: MEDICARE

## 2022-10-07 NOTE — TELEPHONE ENCOUNTER
Called patient to arrange later arrival time to coordinate device visit prior to MD visit. Patient agrees to arrive for 9 AM.

## 2022-10-10 ENCOUNTER — OFFICE VISIT (OUTPATIENT)
Dept: ELECTROPHYSIOLOGY | Facility: CLINIC | Age: 62
End: 2022-10-10
Payer: COMMERCIAL

## 2022-10-10 ENCOUNTER — CLINICAL SUPPORT (OUTPATIENT)
Dept: CARDIOLOGY | Facility: HOSPITAL | Age: 62
End: 2022-10-10
Attending: STUDENT IN AN ORGANIZED HEALTH CARE EDUCATION/TRAINING PROGRAM
Payer: COMMERCIAL

## 2022-10-10 ENCOUNTER — HOSPITAL ENCOUNTER (OUTPATIENT)
Dept: CARDIOLOGY | Facility: CLINIC | Age: 62
Discharge: HOME | End: 2022-10-10
Payer: COMMERCIAL

## 2022-10-10 VITALS
DIASTOLIC BLOOD PRESSURE: 57 MMHG | SYSTOLIC BLOOD PRESSURE: 101 MMHG | HEART RATE: 83 BPM | WEIGHT: 192 LBS | HEIGHT: 66 IN | BODY MASS INDEX: 30.86 KG/M2

## 2022-10-10 DIAGNOSIS — E78.2 MIXED HYPERLIPIDEMIA: ICD-10-CM

## 2022-10-10 DIAGNOSIS — E66.9 CLASS 1 OBESITY WITH BODY MASS INDEX (BMI) OF 30.0 TO 30.9 IN ADULT, UNSPECIFIED OBESITY TYPE, UNSPECIFIED WHETHER SERIOUS COMORBIDITY PRESENT: ICD-10-CM

## 2022-10-10 DIAGNOSIS — F41.9 ANXIETY: ICD-10-CM

## 2022-10-10 DIAGNOSIS — I50.42 CHRONIC COMBINED SYSTOLIC AND DIASTOLIC CHF, NYHA CLASS 3: Chronic | ICD-10-CM

## 2022-10-10 DIAGNOSIS — I50.42 CHRONIC COMBINED SYSTOLIC AND DIASTOLIC HEART FAILURE: ICD-10-CM

## 2022-10-10 DIAGNOSIS — E11.9 TYPE 2 DIABETES MELLITUS WITHOUT COMPLICATION, WITHOUT LONG-TERM CURRENT USE OF INSULIN: ICD-10-CM

## 2022-10-10 DIAGNOSIS — Z86.711 HISTORY OF PULMONARY EMBOLISM: ICD-10-CM

## 2022-10-10 DIAGNOSIS — J44.9 CHRONIC OBSTRUCTIVE PULMONARY DISEASE, UNSPECIFIED COPD TYPE: ICD-10-CM

## 2022-10-10 DIAGNOSIS — I42.8 NON-ISCHEMIC CARDIOMYOPATHY: ICD-10-CM

## 2022-10-10 DIAGNOSIS — I63.411 CEREBROVASCULAR ACCIDENT (CVA) DUE TO EMBOLISM OF RIGHT MIDDLE CEREBRAL ARTERY: ICD-10-CM

## 2022-10-10 DIAGNOSIS — I27.20 PULMONARY HYPERTENSION: ICD-10-CM

## 2022-10-10 DIAGNOSIS — I42.8 NONISCHEMIC CARDIOMYOPATHY: ICD-10-CM

## 2022-10-10 DIAGNOSIS — I50.20 HFREF (HEART FAILURE WITH REDUCED EJECTION FRACTION): ICD-10-CM

## 2022-10-10 DIAGNOSIS — I63.411 EMBOLIC STROKE INVOLVING RIGHT MIDDLE CEREBRAL ARTERY: ICD-10-CM

## 2022-10-10 DIAGNOSIS — I10 ESSENTIAL HYPERTENSION: Chronic | ICD-10-CM

## 2022-10-10 DIAGNOSIS — F32.A DEPRESSION, UNSPECIFIED DEPRESSION TYPE: ICD-10-CM

## 2022-10-10 DIAGNOSIS — I42.0 DILATED CARDIOMYOPATHY: ICD-10-CM

## 2022-10-10 DIAGNOSIS — F20.9 SCHIZOPHRENIA, UNSPECIFIED TYPE: ICD-10-CM

## 2022-10-10 DIAGNOSIS — Z95.810 ICD (IMPLANTABLE CARDIOVERTER-DEFIBRILLATOR) IN PLACE: Primary | ICD-10-CM

## 2022-10-10 DIAGNOSIS — I10 PRIMARY HYPERTENSION: ICD-10-CM

## 2022-10-10 PROCEDURE — 3008F BODY MASS INDEX DOCD: CPT | Mod: CPTII,S$GLB,, | Performed by: STUDENT IN AN ORGANIZED HEALTH CARE EDUCATION/TRAINING PROGRAM

## 2022-10-10 PROCEDURE — 99214 OFFICE O/P EST MOD 30 MIN: CPT | Mod: S$GLB,,, | Performed by: STUDENT IN AN ORGANIZED HEALTH CARE EDUCATION/TRAINING PROGRAM

## 2022-10-10 PROCEDURE — 3078F DIAST BP <80 MM HG: CPT | Mod: CPTII,S$GLB,, | Performed by: STUDENT IN AN ORGANIZED HEALTH CARE EDUCATION/TRAINING PROGRAM

## 2022-10-10 PROCEDURE — 93283 PRGRMG EVAL IMPLANTABLE DFB: CPT

## 2022-10-10 PROCEDURE — 3074F SYST BP LT 130 MM HG: CPT | Mod: CPTII,S$GLB,, | Performed by: STUDENT IN AN ORGANIZED HEALTH CARE EDUCATION/TRAINING PROGRAM

## 2022-10-10 PROCEDURE — 99214 PR OFFICE/OUTPT VISIT, EST, LEVL IV, 30-39 MIN: ICD-10-PCS | Mod: S$GLB,,, | Performed by: STUDENT IN AN ORGANIZED HEALTH CARE EDUCATION/TRAINING PROGRAM

## 2022-10-10 PROCEDURE — 93283 CARDIAC DEVICE CHECK - IN CLINIC & HOSPITAL: ICD-10-PCS | Mod: 26,,, | Performed by: STUDENT IN AN ORGANIZED HEALTH CARE EDUCATION/TRAINING PROGRAM

## 2022-10-10 PROCEDURE — 4010F PR ACE/ARB THEARPY RXD/TAKEN: ICD-10-PCS | Mod: CPTII,S$GLB,, | Performed by: STUDENT IN AN ORGANIZED HEALTH CARE EDUCATION/TRAINING PROGRAM

## 2022-10-10 PROCEDURE — 3078F PR MOST RECENT DIASTOLIC BLOOD PRESSURE < 80 MM HG: ICD-10-PCS | Mod: CPTII,S$GLB,, | Performed by: STUDENT IN AN ORGANIZED HEALTH CARE EDUCATION/TRAINING PROGRAM

## 2022-10-10 PROCEDURE — 99999 PR PBB SHADOW E&M-EST. PATIENT-LVL V: CPT | Mod: PBBFAC,,, | Performed by: STUDENT IN AN ORGANIZED HEALTH CARE EDUCATION/TRAINING PROGRAM

## 2022-10-10 PROCEDURE — 1159F MED LIST DOCD IN RCRD: CPT | Mod: CPTII,S$GLB,, | Performed by: STUDENT IN AN ORGANIZED HEALTH CARE EDUCATION/TRAINING PROGRAM

## 2022-10-10 PROCEDURE — 3074F PR MOST RECENT SYSTOLIC BLOOD PRESSURE < 130 MM HG: ICD-10-PCS | Mod: CPTII,S$GLB,, | Performed by: STUDENT IN AN ORGANIZED HEALTH CARE EDUCATION/TRAINING PROGRAM

## 2022-10-10 PROCEDURE — 3044F PR MOST RECENT HEMOGLOBIN A1C LEVEL <7.0%: ICD-10-PCS | Mod: CPTII,S$GLB,, | Performed by: STUDENT IN AN ORGANIZED HEALTH CARE EDUCATION/TRAINING PROGRAM

## 2022-10-10 PROCEDURE — 3044F HG A1C LEVEL LT 7.0%: CPT | Mod: CPTII,S$GLB,, | Performed by: STUDENT IN AN ORGANIZED HEALTH CARE EDUCATION/TRAINING PROGRAM

## 2022-10-10 PROCEDURE — 93010 EKG 12-LEAD: ICD-10-PCS | Mod: S$GLB,,, | Performed by: INTERNAL MEDICINE

## 2022-10-10 PROCEDURE — 1159F PR MEDICATION LIST DOCUMENTED IN MEDICAL RECORD: ICD-10-PCS | Mod: CPTII,S$GLB,, | Performed by: STUDENT IN AN ORGANIZED HEALTH CARE EDUCATION/TRAINING PROGRAM

## 2022-10-10 PROCEDURE — 3008F PR BODY MASS INDEX (BMI) DOCUMENTED: ICD-10-PCS | Mod: CPTII,S$GLB,, | Performed by: STUDENT IN AN ORGANIZED HEALTH CARE EDUCATION/TRAINING PROGRAM

## 2022-10-10 PROCEDURE — 93005 EKG 12-LEAD: ICD-10-PCS | Mod: S$GLB,,, | Performed by: STUDENT IN AN ORGANIZED HEALTH CARE EDUCATION/TRAINING PROGRAM

## 2022-10-10 PROCEDURE — 93010 ELECTROCARDIOGRAM REPORT: CPT | Mod: S$GLB,,, | Performed by: INTERNAL MEDICINE

## 2022-10-10 PROCEDURE — 4010F ACE/ARB THERAPY RXD/TAKEN: CPT | Mod: CPTII,S$GLB,, | Performed by: STUDENT IN AN ORGANIZED HEALTH CARE EDUCATION/TRAINING PROGRAM

## 2022-10-10 PROCEDURE — 93283 PRGRMG EVAL IMPLANTABLE DFB: CPT | Mod: 26,,, | Performed by: STUDENT IN AN ORGANIZED HEALTH CARE EDUCATION/TRAINING PROGRAM

## 2022-10-10 PROCEDURE — 99999 PR PBB SHADOW E&M-EST. PATIENT-LVL V: ICD-10-PCS | Mod: PBBFAC,,, | Performed by: STUDENT IN AN ORGANIZED HEALTH CARE EDUCATION/TRAINING PROGRAM

## 2022-10-10 PROCEDURE — 93005 ELECTROCARDIOGRAM TRACING: CPT | Mod: S$GLB,,, | Performed by: STUDENT IN AN ORGANIZED HEALTH CARE EDUCATION/TRAINING PROGRAM

## 2022-10-10 NOTE — PROGRESS NOTES
Electrophysiology Clinic Note    Reason for follow-up patient visit: Ongoing evaluation and routine surveillance of a primary prevention dual-chamber ICD implanted in the setting of nonischemic cardiomyopathy with persistent reduction of systolic function despite guideline-directed medical therapy.      PRESENTING HISTORY:     History of Present Illness:  Ms. Diomedes Mohr is a anita 62-year-old woman who returns to clinic today for ongoing evaluation and routine surveillance of a primary prevention dual-chamber ICD implanted in the setting of nonischemic cardiomyopathy with persistent reduction of systolic function despite guideline-directed medical therapy. She has a past medical history significant for nonischemic cardiomyopathy with LVEF 20%, prior admissions with volume overload and decompensated HFrEF, pulmonary hypertension, hypertension, hyperlipidemia, history of embolic right MCA CVA s/p tPA on 8/14/2020 with no residual deficits, type II diabetes mellitus, COPD, right-sided subsegmental PE - maintained on rivaroxaban, schizophrenia, depression and anxiety, and obesity. She underwent dual-chamber ICD implantation on 11/30/2021.       In brief review of her prior cardiac history, she was initially diagnosed with heart failure in 2018 with nonobstructive coronary artery disease at that time. She underwent coronary angiogram on 12/27/2019 revealing 40% mid LAD and 50% proximal RCA stenosis, with a PET scan in the setting of an NSTEMI on 8/27/2020 that showed ischemia globally, but no focal ischemia. She has since been followed in Advanced Heart Failure with Dr. White and Dr. Smith with guideline-directed medical therapy. She has been admitted in the past in the setting of medication and dietary noncompliance, and was evaluated by the transplant service with Dr. Smith who feels that she would be a high-risk LVAD/transplant candidate. She has recently been complaining of periodic chest pain, and  "underwent a repeat pharmacologic nuclear cardiac stress test 4/13/2022 with a very small (< 5%) sized, mild to moderate intensity lateral apical resting perfusion abnormality involving 3% of LV myocardium, with no significant changes as compared to her previous study on 8/27/2020.    Ms. Mohr presents to clinic today with her . In discussion with both her  and Ms. Mohr today, they report that she is feeling overall quite well. She reports periods of transient dizziness and lightheadedness, especially prominent with positional changes. She reports periods of chest tightness "like a cramp" noted both at rest and with exertion. Additionally, she has started reporting symptoms of chest pain that "feels like I need to burp but can't". She denies any palpitations, nausea or vomiting, and denies changes in her baseline lower extremity edema. She reports baseline shortness of breath and dyspnea on exertion, and has been attributing this to physical deconditioning and her underlying COPD. She feels that these symptoms are largely stable for her. She can climb one flight of stairs prior to needing to take a break, but is limited by hip and knee osteoarthritis. She reports shortness of breath coming into clinic form the parking garage. She denies any physical activity beyond her household chores, and has to take breaks between mopping and sweeping. She is sleeping on 2 pillows currently for comfort, although she reports that if she lies completely supine for prolonged periods of time (more than an hour) she occasionally endorses orthopnea. She has been trying to improve her diet, but admits to continuing to eat a significant amount of salt and canned goods. She reports continued chronic cough with her COPD that has remained stable.      Review of Systems:  Review of Systems   Constitutional:  Negative for activity change.   HENT:  Negative for nasal congestion, nosebleeds, postnasal drip, rhinorrhea, sinus " pressure/congestion, sneezing and sore throat.    Respiratory:  Positive for cough, chest tightness and shortness of breath. Negative for apnea and wheezing.    Cardiovascular:  Positive for chest pain and leg swelling. Negative for palpitations and claudication.   Gastrointestinal:  Negative for abdominal distention, abdominal pain, blood in stool, change in bowel habit, constipation, diarrhea, nausea, vomiting and change in bowel habit.   Genitourinary:  Negative for dysuria and hematuria.   Musculoskeletal:  Positive for arthralgias. Negative for gait problem.   Neurological:  Positive for dizziness and light-headedness. Negative for seizures, syncope, weakness, headaches, coordination difficulties and coordination difficulties.      PAST HISTORY:     Past Medical History:   Diagnosis Date    Anticoagulant long-term use     Anxiety     Arthritis     Asthma     CHF (congestive heart failure)     COPD (chronic obstructive pulmonary disease)     Depression     Diabetes mellitus     Dyspnea     H/O coronary angiogram     H/O: hysterectomy     History of automatic internal cardiac defibrillator (AICD)     Hyperlipemia     Hypertension     Pulmonary edema     Schizophrenia     Stroke        Past Surgical History:   Procedure Laterality Date    BLADDER SUSPENSION      CARPAL TUNNEL RELEASE Right     HEEL SPUR SURGERY Left     HYSTERECTOMY      LEFT HEART CATHETERIZATION Bilateral 12/27/2019    Procedure: Left heart cath;  Surgeon: Steve Chambers MD;  Location: [facility] CATH LAB;  Service: Cardiology;  Laterality: Bilateral;    RIGHT HEART CATHETERIZATION Right 7/26/2021    Procedure: INSERTION, CATHETER, RIGHT HEART;  Surgeon: Petr Naranjo MD;  Location: [facility] CATH LAB;  Service: Cardiology;  Laterality: Right;    RIGHT HEART CATHETERIZATION Right 5/12/2022    Procedure: INSERTION, CATHETER, RIGHT HEART;  Surgeon: Chandana Ivory Jr., MD;  Location: [facility] CATH LAB;  Service: Cardiology;  Laterality:  Right;    TUBAL LIGATION         Family History:  Family History   Problem Relation Age of Onset    No Known Problems Mother     No Known Problems Father        Social History:  She  reports that she quit smoking about 6 years ago. Her smoking use included cigarettes. She has never used smokeless tobacco. She reports that she does not drink alcohol and does not use drugs. She stopped smoking in 2018, and denies alcohol or any recreational drugs. She lives in [facility]. She is  and has two adult children. 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    MEDICATIONS & ALLERGIES:     Review of patient's allergies indicates:   Allergen Reactions    Adhesive Blisters    Captopril Other (See Comments)     COUGH       Current Outpatient Medications on File Prior to Visit   Medication Sig Dispense Refill    acetaminophen (TYLENOL) 325 MG tablet Take 2 tablets (650 mg total) by mouth every 8 (eight) hours as needed. (Patient not taking: Reported on 7/29/2022) 30 tablet 0    albuterol (PROVENTIL) 2.5 mg /3 mL (0.083 %) nebulizer solution Take 3 mLs (2.5 mg total) by nebulization every 6 (six) hours as needed for Wheezing or Shortness of Breath. Rescue 3 mL 0    albuterol (PROVENTIL/VENTOLIN HFA) 90 mcg/actuation inhaler Inhale 2 puffs into the lungs every 6 (six) hours as needed for Wheezing or Shortness of Breath. 18 g 2    apixaban (ELIQUIS ORAL) Take by mouth.      atorvastatin (LIPITOR) 80 MG tablet Take 1 tablet (80 mg total) by mouth once daily. 90 tablet 3    dapagliflozin (FARXIGA) 10 mg tablet Take 1 tablet (10 mg total) by mouth once daily. 30 tablet 3    dulaglutide (TRULICITY) 1.5 mg/0.5 mL pen injector Inject into the skin once a week.       glimepiride (AMARYL) 4 MG tablet Take 4 mg by mouth once daily.      hydrALAZINE (APRESOLINE) 25 MG tablet Take 1 tablet (25 mg total) by mouth 3 (three) times daily. 90 tablet 5    isosorbide dinitrate (ISORDIL) 10 MG tablet  "Take 1 tablet (10 mg total) by mouth 3 (three) times daily. 90 tablet 5    meclizine (ANTIVERT) 25 mg tablet Take 1 tablet (25 mg total) by mouth 3 (three) times daily as needed for Dizziness. (Patient not taking: Reported on 7/29/2022) 20 tablet 0    metFORMIN (GLUCOPHAGE) 1000 MG tablet Take 1,000 mg by mouth 2 (two) times daily.       metoprolol succinate (TOPROL-XL) 100 MG 24 hr tablet Take 1 tablet (100 mg total) by mouth once daily. 30 tablet 6    potassium chloride (MICRO-K) 10 MEQ CpSR       potassium chloride (MICRO-K) 10 MEQ CpSR Take 2 capsules (20 mEq total) by mouth once daily. 60 capsule 11    rivaroxaban (XARELTO) 20 mg Tab Take 1 tablet (20 mg total) by mouth daily with dinner or evening meal. 30 tablet 1    sacubitriL-valsartan (ENTRESTO)  mg per tablet Take 1 tablet by mouth 2 (two) times daily. 60 tablet 6    spironolactone (ALDACTONE) 25 MG tablet Take 1 tablet (25 mg total) by mouth once daily. 30 tablet 6    torsemide (DEMADEX) 10 MG Tab Take 1 tablet (10 mg total) by mouth once daily. 30 tablet 11    traMADoL (ULTRAM) 50 mg tablet        No current facility-administered medications on file prior to visit.        OBJECTIVE:     Vital Signs:  BP (!) 101/57   Pulse 83   Ht 5' 6" (1.676 m)   Wt 87.1 kg (192 lb 0.3 oz)   LMP  (LMP Unknown)   BMI 30.99 kg/m²     Physical Exam:  Physical Exam  Constitutional:       General: She is not in acute distress.     Appearance: Normal appearance. She is obese. She is not ill-appearing or diaphoretic.      Comments: Well-appearing woman in NAD.   HENT:      Head: Normocephalic and atraumatic.      Comments: Mask worn in clinic in the setting of COVID precautions.   Eyes:      Pupils: Pupils are equal, round, and reactive to light.   Neck:      Comments: JVD difficult to assess secondary to body habitus.   Cardiovascular:      Rate and Rhythm: Normal rate and regular rhythm.      Pulses: Normal pulses.      Heart sounds: Normal heart sounds. No " murmur heard.    No friction rub. No gallop.      Comments: Left anterior chest wall incision site is well-healed with device freely mobile within the pocket with no evidence of device adhesion or erosion.  Pulmonary:      Effort: Pulmonary effort is normal. No respiratory distress.      Breath sounds: Normal breath sounds. No wheezing, rhonchi or rales.   Chest:      Chest wall: No tenderness.   Abdominal:      General: There is no distension.      Palpations: Abdomen is soft.      Tenderness: There is no abdominal tenderness.   Musculoskeletal:         General: No swelling or tenderness.      Cervical back: Normal range of motion.      Right lower leg: Edema present.      Left lower leg: Edema present.      Comments: Trace bilateral pedal edema.    Skin:     General: Skin is warm and dry.      Findings: No erythema, lesion or rash.   Neurological:      General: No focal deficit present.      Mental Status: She is alert and oriented to person, place, and time. Mental status is at baseline.      Motor: No weakness.      Gait: Gait normal.   Psychiatric:         Mood and Affect: Mood normal.         Behavior: Behavior normal.      Laboratory Data:  Lab Results   Component Value Date    WBC 5.37 07/31/2022    HGB 11.2 (L) 07/31/2022    HCT 34.1 (L) 07/31/2022    MCV 88 07/31/2022     07/31/2022     Lab Results   Component Value Date     (H) 07/31/2022     07/31/2022    K 3.6 07/31/2022     07/31/2022    CO2 28 07/31/2022    BUN 14 07/31/2022    CREATININE 0.9 07/31/2022    CALCIUM 9.1 07/31/2022    MG 2.0 05/12/2022     Lab Results   Component Value Date    INR 1.0 05/12/2022    INR 0.9 11/30/2021    INR 1.0 11/22/2021       Pertinent Cardiac Data:  ECG: Normal sinus rhythm with rate of 86 bpm, isolated PVC,  ms,  ms, QT/QTc 424/507 ms, possible left atrial enlargement, LVH with TWI lateral leads.     30-Day Ambulatory Event Monitor - 12/1/2020:  At baseline, in the absence of  symptoms, the rhythm was sinus rhythm with VPCs and VPC couplets, with heart rates in the 80s.  There were complaints of racing/fluttering, at which time sinus rhythm with VPCs of 2 different morphologies was seen.  Heart rates ranged in the 60s to 70s.  The VPCs were bigeminal.  On 11/01 the complaint was shortness of breath at which time sinus rhythm was present with rates between 99 and 106 beats per minute.  There were 2 single PVCs as well (monomorphic).  Shortness of breath during exercise was the complaint on 11/03.  Sinus tachycardia with rates in the 130s to 140s was seen.  There was no ectopy.   Impression:  Sinus rhythm with VPCs of at least 2 different morphologies.    Resting 2D Transthoracic Echocardiogram - 6/29/2021:  The estimated ejection fraction is 20%.  The left ventricle is severely enlarged with eccentric hypertrophy and severely decreased systolic function.  There is severe left ventricular global hypokinesis.  Grade II left ventricular diastolic dysfunction.  Normal right ventricular size with normal right ventricular systolic function.  Mild left atrial enlargement.  Mild mitral regurgitation.  The estimated PA systolic pressure is 39 mmHg.  Normal central venous pressure (3 mmHg).    Resting 2D Transthoracic Echocardiogram - 10/11/2021:  The left ventricle is severely enlarged with eccentric hypertrophy and severely decreased systolic function. The estimated ejection fraction is 15%.  Normal right ventricular size with mildly reduced right ventricular systolic function.  Grade II left ventricular diastolic dysfunction.  Moderate left atrial enlargement.  Mild mitral regurgitation.  The estimated PA systolic pressure is 58 mmHg.  Normal central venous pressure (3 mmHg).    Cardiopulmonary Exercise Stress Test - 1/10/2022:  Severe functional impairment associated with a normal breathing reserve, normal oxygen stauration, suboptimal effort, and a reduced AT. These findings are indicative of  functional impairment secondary to circulatory insufficiency.  There were no arrhythmias during stress.  There was no ST segment deviation noted during stress.  The test was stopped because the patient experienced lightheadedness.  The ECG portion of this study is negative for myocardial ischemia.    Resting 2D Transthoracic Echocardiogram - 4/13/2022:  Mild left atrial enlargement.  The left ventricle is severely enlarged with moderate eccentric hypertrophy and severely decreased systolic function.  The estimated ejection fraction is 20%.  Grade II left ventricular diastolic dysfunction.  Normal right ventricular size with normal right ventricular systolic function.  Mild-to-moderate mitral regurgitation.  Mild tricuspid regurgitation.  There is pulmonary hypertension. The estimated PA systolic pressure is 42 mmHg.  Normal central venous pressure (3 mmHg).    Pharmacologic Nuclear Cardiac Stress Test - PET - 4/13/2022:    There is a very small (< 5%) sized, mild to moderate intensity lateral apical resting perfusion abnormality involving 3% of LV myocardium.    The whole heart absolute myocardial perfusion values averaged 0.58 cc/min/g at rest, which is reduced; 1.00 cc/min/g at stress, which is moderately reduced; and CFR is 1.74 , which is mildly reduced.    CT attenuation images demonstrate mild diffuse coronary calcifications in the LAD and LCX territory and mild diffuse aortic calcifications in the descending aorta.    The gated perfusion images showed an ejection fraction of 10% at rest and 13% during stress. A normal ejection fraction is greater than 53%.    There is severe global hypokinesis at rest and during stress.    The LV cavity size is severely enlarged at rest and stress.    The EKG portion of this study is negative for ischemia.    During stress, rare PVCs are noted.    The patient reported no chest pain during the stress test.    When compared to the previous study from 8/27/2020, there are no  significant changes.    Device Interrogation: Personal review of her device interrogation report ([facility]/[employer], implanted 11/30/2021) reveals adequate battery longevity with an estimated 7.4-8 years of battery life remaining. Her underlying rhythm is sinus, AS-VS. She is RA paced 2.5% and RV paced <1%. Her leads were interrogated and demonstrate acceptable and stable lead parameters. She has had no evidence of atrial fibrillation, nor VT/VF. There are no concerning AHR or VHR episodes noted.        ASSESSMENT & PLAN:   Ms. Diomedes Mohr is a anita 62-year-old woman who returns to clinic today for ongoing evaluation and routine surveillance of a primary prevention dual-chamber ICD implanted in the setting of nonischemic cardiomyopathy with persistent reduction of systolic function despite guideline-directed medical therapy. She has a past medical history significant for nonischemic cardiomyopathy with LVEF 20%, prior admissions with volume overload and decompensated HFrEF, pulmonary hypertension, hypertension, hyperlipidemia, history of embolic right MCA CVA s/p tPA on 8/14/2020 with no residual deficits, type II diabetes mellitus, COPD, right-sided subsegmental PE - maintained on rivaroxaban, schizophrenia, depression and anxiety, and obesity. She underwent dual-chamber ICD implantation on 11/30/2021.        - She has a normally functioning dual-chamber ICD on interrogation today, with 2.5% pacing in the right atrium and <1% pacing from the right ventricle. Her intrinsic QRSd is 100ms. Since undergoing device implantation, she reports slightly improved functional status. She has not required any device tachytherapies, and has no evidence of atrial fibrillation, VT, or VF. She remains on rivaroxaban with no adverse bleeding events.   - We discussed routine surveillance with ongoing remote transmissions in addition to scheduled in-office device interrogations.  - She continues to report  episodes of chest pain occurring both at rest and with exertion, with a recent pharmacologic nucelar cardiac stress test that was unchanged from her prior assessments. She will continue to closely follow with Dr. Smith.   - She will continue to follow with her PCP and her Advanced Heart Failure team for ongoing management of her underlying comorbid conditions.     This patient will return to clinic with me in six months with continued PCP and her Advanced Heart Failure team appointments in the interim. All questions and concerns were addressed at this encounter.     Signing Physician:       MIKO Torres MD  Electrophysiology Attending

## 2022-10-11 ENCOUNTER — HOSPITAL ENCOUNTER (EMERGENCY)
Facility: HOSPITAL | Age: 62
Discharge: HOME | End: 2022-10-11
Attending: EMERGENCY MEDICINE
Payer: COMMERCIAL

## 2022-10-11 VITALS
BODY MASS INDEX: 30.86 KG/M2 | HEART RATE: 100 BPM | DIASTOLIC BLOOD PRESSURE: 61 MMHG | WEIGHT: 192 LBS | RESPIRATION RATE: 20 BRPM | SYSTOLIC BLOOD PRESSURE: 124 MMHG | TEMPERATURE: 99 F | OXYGEN SATURATION: 99 % | HEIGHT: 66 IN

## 2022-10-11 DIAGNOSIS — R07.9 CHEST PAIN: Primary | ICD-10-CM

## 2022-10-11 LAB
ALBUMIN SERPL BCP-MCNC: 3.4 G/DL (ref 3.5–5.2)
ALP SERPL-CCNC: 152 U/L (ref 55–135)
ALT SERPL W/O P-5'-P-CCNC: 17 U/L (ref 10–44)
ANION GAP SERPL CALC-SCNC: 6 MMOL/L (ref 8–16)
AST SERPL-CCNC: 15 U/L (ref 10–40)
BASOPHILS # BLD AUTO: 0.04 K/UL (ref 0–0.2)
BASOPHILS NFR BLD: 0.6 % (ref 0–1.9)
BILIRUB SERPL-MCNC: 0.4 MG/DL (ref 0.1–1)
BNP SERPL-MCNC: 469 PG/ML (ref 0–99)
BUN SERPL-MCNC: 9 MG/DL (ref 8–23)
CALCIUM SERPL-MCNC: 9.4 MG/DL (ref 8.7–10.5)
CHLORIDE SERPL-SCNC: 102 MMOL/L (ref 95–110)
CO2 SERPL-SCNC: 29 MMOL/L (ref 23–29)
CREAT SERPL-MCNC: 0.8 MG/DL (ref 0.5–1.4)
D DIMER PPP IA.FEU-MCNC: 0.4 MG/L FEU
DIFFERENTIAL METHOD: ABNORMAL
EOSINOPHIL # BLD AUTO: 0.2 K/UL (ref 0–0.5)
EOSINOPHIL NFR BLD: 2.8 % (ref 0–8)
ERYTHROCYTE [DISTWIDTH] IN BLOOD BY AUTOMATED COUNT: 13.6 % (ref 11.5–14.5)
EST. GFR  (NO RACE VARIABLE): >60 ML/MIN/1.73 M^2
GLUCOSE SERPL-MCNC: 183 MG/DL (ref 70–110)
HCT VFR BLD AUTO: 36.1 % (ref 37–48.5)
HGB BLD-MCNC: 11.6 G/DL (ref 12–16)
IMM GRANULOCYTES # BLD AUTO: 0.01 K/UL (ref 0–0.04)
IMM GRANULOCYTES NFR BLD AUTO: 0.1 % (ref 0–0.5)
LYMPHOCYTES # BLD AUTO: 2.7 K/UL (ref 1–4.8)
LYMPHOCYTES NFR BLD: 40.3 % (ref 18–48)
MCH RBC QN AUTO: 29.3 PG (ref 27–31)
MCHC RBC AUTO-ENTMCNC: 32.1 G/DL (ref 32–36)
MCV RBC AUTO: 91 FL (ref 82–98)
MONOCYTES # BLD AUTO: 0.4 K/UL (ref 0.3–1)
MONOCYTES NFR BLD: 6.4 % (ref 4–15)
NEUTROPHILS # BLD AUTO: 3.3 K/UL (ref 1.8–7.7)
NEUTROPHILS NFR BLD: 49.8 % (ref 38–73)
NRBC BLD-RTO: 0 /100 WBC
PLATELET # BLD AUTO: 293 K/UL (ref 150–450)
PMV BLD AUTO: 10.2 FL (ref 9.2–12.9)
POTASSIUM SERPL-SCNC: 4.1 MMOL/L (ref 3.5–5.1)
PROT SERPL-MCNC: 6.7 G/DL (ref 6–8.4)
RBC # BLD AUTO: 3.96 M/UL (ref 4–5.4)
SODIUM SERPL-SCNC: 137 MMOL/L (ref 136–145)
TROPONIN I SERPL DL<=0.01 NG/ML-MCNC: 0.01 NG/ML (ref 0–0.03)
TROPONIN I SERPL DL<=0.01 NG/ML-MCNC: 0.01 NG/ML (ref 0–0.03)
WBC # BLD AUTO: 6.7 K/UL (ref 3.9–12.7)

## 2022-10-11 PROCEDURE — 99285 EMERGENCY DEPT VISIT HI MDM: CPT | Mod: 25

## 2022-10-11 PROCEDURE — 25000003 PHARM REV CODE 250: Performed by: PHYSICIAN ASSISTANT

## 2022-10-11 PROCEDURE — 80053 COMPREHEN METABOLIC PANEL: CPT | Performed by: PHYSICIAN ASSISTANT

## 2022-10-11 PROCEDURE — 63600175 PHARM REV CODE 636 W HCPCS: Performed by: PHYSICIAN ASSISTANT

## 2022-10-11 PROCEDURE — 85379 FIBRIN DEGRADATION QUANT: CPT | Performed by: PHYSICIAN ASSISTANT

## 2022-10-11 PROCEDURE — 99285 EMERGENCY DEPT VISIT HI MDM: CPT | Mod: ,,, | Performed by: PHYSICIAN ASSISTANT

## 2022-10-11 PROCEDURE — 83880 ASSAY OF NATRIURETIC PEPTIDE: CPT | Performed by: PHYSICIAN ASSISTANT

## 2022-10-11 PROCEDURE — 25000242 PHARM REV CODE 250 ALT 637 W/ HCPCS: Performed by: PHYSICIAN ASSISTANT

## 2022-10-11 PROCEDURE — 99285 PR EMERGENCY DEPT VISIT,LEVEL V: ICD-10-PCS | Mod: ,,, | Performed by: PHYSICIAN ASSISTANT

## 2022-10-11 PROCEDURE — 96376 TX/PRO/DX INJ SAME DRUG ADON: CPT

## 2022-10-11 PROCEDURE — 93010 ELECTROCARDIOGRAM REPORT: CPT | Mod: 76,,, | Performed by: INTERNAL MEDICINE

## 2022-10-11 PROCEDURE — 93005 ELECTROCARDIOGRAM TRACING: CPT

## 2022-10-11 PROCEDURE — 93010 EKG 12-LEAD: ICD-10-PCS | Mod: 76,,, | Performed by: INTERNAL MEDICINE

## 2022-10-11 PROCEDURE — 84484 ASSAY OF TROPONIN QUANT: CPT | Performed by: PHYSICIAN ASSISTANT

## 2022-10-11 PROCEDURE — 96374 THER/PROPH/DIAG INJ IV PUSH: CPT

## 2022-10-11 PROCEDURE — 96375 TX/PRO/DX INJ NEW DRUG ADDON: CPT

## 2022-10-11 PROCEDURE — 85025 COMPLETE CBC W/AUTO DIFF WBC: CPT | Performed by: PHYSICIAN ASSISTANT

## 2022-10-11 RX ORDER — NITROGLYCERIN 0.4 MG/1
0.4 TABLET SUBLINGUAL
Status: COMPLETED | OUTPATIENT
Start: 2022-10-11 | End: 2022-10-11

## 2022-10-11 RX ORDER — ASPIRIN 325 MG
325 TABLET ORAL
Status: COMPLETED | OUTPATIENT
Start: 2022-10-11 | End: 2022-10-11

## 2022-10-11 RX ORDER — ONDANSETRON 2 MG/ML
4 INJECTION INTRAMUSCULAR; INTRAVENOUS
Status: COMPLETED | OUTPATIENT
Start: 2022-10-11 | End: 2022-10-11

## 2022-10-11 RX ORDER — MORPHINE SULFATE 4 MG/ML
4 INJECTION, SOLUTION INTRAMUSCULAR; INTRAVENOUS
Status: COMPLETED | OUTPATIENT
Start: 2022-10-11 | End: 2022-10-11

## 2022-10-11 RX ORDER — NITROGLYCERIN 0.4 MG/1
0.4 TABLET SUBLINGUAL EVERY 5 MIN PRN
Status: DISCONTINUED | OUTPATIENT
Start: 2022-10-11 | End: 2022-10-11 | Stop reason: HOSPADM

## 2022-10-11 RX ADMIN — ONDANSETRON 4 MG: 2 INJECTION INTRAMUSCULAR; INTRAVENOUS at 02:10

## 2022-10-11 RX ADMIN — NITROGLYCERIN 0.4 MG: 0.4 TABLET, ORALLY DISINTEGRATING SUBLINGUAL at 12:10

## 2022-10-11 RX ADMIN — NITROGLYCERIN 0.4 MG: 0.4 TABLET, ORALLY DISINTEGRATING SUBLINGUAL at 01:10

## 2022-10-11 RX ADMIN — MORPHINE SULFATE 4 MG: 4 INJECTION INTRAVENOUS at 03:10

## 2022-10-11 RX ADMIN — ASPIRIN 325 MG ORAL TABLET 325 MG: 325 PILL ORAL at 12:10

## 2022-10-11 RX ADMIN — MORPHINE SULFATE 4 MG: 4 INJECTION INTRAVENOUS at 02:10

## 2022-10-11 NOTE — ED PROVIDER NOTES
"Encounter Date: 10/11/2022       History     Chief Complaint   Patient presents with    Chest Pain     Patient reports chest pain that started x hour ago while talking to her daughter on the phone. Patient reports pain feels like "pressure".      62-year-old female with history CHF with EF of 20% with AICD, hypertension, hyperlipidemia, COPD, DM 2, history of DVT events for chest pain which started about 2 hours prior to arrival.  Pain is on her left side of her chest and radiates to her back, worse with taking deep breath.  She can not identify any palliating factors, did not take any medication prior to arrival.  She reports associated nausea, shortness of breath and left arm heaviness.  She denies abdominal pain, vomiting, numbness, weakness, leg pain or swelling prior episodes.    Review of patient's allergies indicates:   Allergen Reactions    Adhesive Blisters    Captopril Other (See Comments)     COUGH     Past Medical History:   Diagnosis Date    Anticoagulant long-term use     Anxiety     Arthritis     Asthma     CHF (congestive heart failure)     COPD (chronic obstructive pulmonary disease)     Depression     Diabetes mellitus     Dyspnea     H/O coronary angiogram     H/O: hysterectomy     History of automatic internal cardiac defibrillator (AICD)     Hyperlipemia     Hypertension     Pulmonary edema     Schizophrenia     Stroke      Past Surgical History:   Procedure Laterality Date    BLADDER SUSPENSION      CARPAL TUNNEL RELEASE Right     HEEL SPUR SURGERY Left     HYSTERECTOMY      LEFT HEART CATHETERIZATION Bilateral 12/27/2019    Procedure: Left heart cath;  Surgeon: Steve Chambers MD;  Location: [facility] CATH LAB;  Service: Cardiology;  Laterality: Bilateral;    RIGHT HEART CATHETERIZATION Right 7/26/2021    Procedure: INSERTION, CATHETER, RIGHT HEART;  Surgeon: Petr Naranjo MD;  Location: [facility] CATH LAB;  Service: Cardiology;  Laterality: Right;    RIGHT HEART CATHETERIZATION Right " 2022    Procedure: INSERTION, CATHETER, RIGHT HEART;  Surgeon: Chandana Ivory Jr., MD;  Location: [facility] CATH LAB;  Service: Cardiology;  Laterality: Right;    TUBAL LIGATION       Family History   Problem Relation Age of Onset    No Known Problems Mother     No Known Problems Father      Social History     Tobacco Use    Smoking status: Former     Types: Cigarettes     Quit date: 2016     Years since quittin.1    Smokeless tobacco: Never    Tobacco comments:     nonex 2 weeks   Substance Use Topics    Alcohol use: No     Alcohol/week: 0.0 standard drinks    Drug use: No     Review of Systems   Constitutional:  Positive for fatigue. Negative for fever.   HENT:  Negative for sore throat.    Respiratory:  Positive for shortness of breath.    Cardiovascular:  Positive for chest pain.   Gastrointestinal:  Positive for nausea. Negative for abdominal pain and vomiting.   Genitourinary:  Negative for dysuria.   Musculoskeletal:  Negative for back pain.   Skin:  Negative for rash.   Neurological:  Negative for weakness and numbness.   Hematological:  Does not bruise/bleed easily.     Physical Exam     Initial Vitals [10/11/22 1053]   BP Pulse Resp Temp SpO2   130/66 102 20 98.3 °F (36.8 °C) 100 %      MAP       --         Physical Exam    Nursing note and vitals reviewed.  Constitutional: She appears well-developed and well-nourished. She is not diaphoretic. No distress.   HENT:   Head: Normocephalic and atraumatic.   Eyes: EOM are normal. Pupils are equal, round, and reactive to light.   Neck:   Normal range of motion.  Cardiovascular:  Normal rate, regular rhythm, normal heart sounds and intact distal pulses.     Exam reveals no gallop and no friction rub.       No murmur heard.  No lower extremity edema, erythema, system or.  Intact bilateral 2+ radial and pedal pulses   Pulmonary/Chest: Breath sounds normal. No respiratory distress. She has no wheezes. She has no rhonchi. She has no rales. She exhibits  tenderness.   Abdominal: Abdomen is soft. Bowel sounds are normal. She exhibits no distension and no mass. There is no abdominal tenderness. There is no rebound and no guarding.   Musculoskeletal:         General: Normal range of motion.      Cervical back: Normal range of motion.     Neurological: She is alert and oriented to person, place, and time.   Skin: Skin is warm and dry.   Psychiatric: She has a normal mood and affect.       ED Course   Procedures  Labs Reviewed   CBC W/ AUTO DIFFERENTIAL - Abnormal; Notable for the following components:       Result Value    RBC 3.96 (*)     Hemoglobin 11.6 (*)     Hematocrit 36.1 (*)     All other components within normal limits   COMPREHENSIVE METABOLIC PANEL - Abnormal; Notable for the following components:    Glucose 183 (*)     Albumin 3.4 (*)     Alkaline Phosphatase 152 (*)     Anion Gap 6 (*)     All other components within normal limits   B-TYPE NATRIURETIC PEPTIDE - Abnormal; Notable for the following components:     (*)     All other components within normal limits   TROPONIN I   TROPONIN I   D DIMER, QUANTITATIVE     EKG Readings: (Independently Interpreted)   Initial Reading: No STEMI. Previous EKG: Compared with most recent EKG Previous EKG Date: 10/10/2022. Rhythm: Normal Sinus Rhythm. Heart Rate: 92. Ectopy: No Ectopy. ST Segments: Normal ST Segments. Clinical Impression: Normal Sinus Rhythm and QRS Widening Other Impression: LVH, repolarization abnormality similar compared to prior   ECG Results              Repeat EKG 12-lead (Final result)  Result time 10/11/22 16:29:07      Final result by Interface, Lab In [facility] (10/11/22 16:29:07)                   Narrative:    Test Reason : R07.9,    Vent. Rate : 072 BPM     Atrial Rate : 072 BPM     P-R Int : 140 ms          QRS Dur : 116 ms      QT Int : 434 ms       P-R-T Axes : 040 026 207 degrees     QTc Int : 475 ms    Sinus rhythm with occasional Premature ventricular complexes  LVH with QRS  widening  T wave abnormality, consider inferolateral ischemia  Prolonged QT  Abnormal ECG  When compared with ECG of 11-OCT-2022 10:57,  Premature ventricular complexes are now Present  Confirmed by JONO GILES MD ([id number]) on 10/11/2022 4:28:57 PM    Referred By: ONEIL   SELF           Confirmed By:JONO GILES MD                                     EKG 12-lead (Final result)  Result time 10/11/22 14:00:21      Final result by Interface, Lab In [facility] (10/11/22 14:00:21)                   Narrative:    Test Reason : R07.9,    Vent. Rate : 092 BPM     Atrial Rate : 092 BPM     P-R Int : 142 ms          QRS Dur : 110 ms      QT Int : 402 ms       P-R-T Axes : 044 010 176 degrees     QTc Int : 497 ms    Normal sinus rhythm  LVH with repolarization abnormality ( Sokolow-Cline , Coy product )  Prolonged QT  Abnormal ECG  When compared with ECG of 10-OCT-2022 09:38,  Premature ventricular complexes are no longer Present  Confirmed by JONO GILES MD ([id number]) on 10/11/2022 2:00:09 PM    Referred By: ONEIL   SELF           Confirmed By:JONO GILES MD                                  Imaging Results              X-Ray Chest PA And Lateral (Final result)  Result time 10/11/22 13:20:08      Final result by Cruz Castro MD (10/11/22 13:20:08)                   Impression:      See above      Electronically signed by: Cruz Castro MD  Date:    10/11/2022  Time:    13:20               Narrative:    EXAMINATION:  XR CHEST PA AND LATERAL    CLINICAL HISTORY:  Chest Pain;    TECHNIQUE:  PA and lateral views of the chest were performed.    COMPARISON:  None 07/31/2022    FINDINGS:  Pacemaker identified.  Mild cardiomegaly.  The lungs are clear.  No pleural effusion                                       Medications   aspirin tablet 325 mg (325 mg Oral Given 10/11/22 1232)   nitroGLYCERIN SL tablet 0.4 mg (0.4 mg Sublingual Given 10/11/22 1232)   morphine injection 4 mg (4 mg Intravenous Given 10/11/22 1409)    ondansetron injection 4 mg (4 mg Intravenous Given 10/11/22 1409)   morphine injection 4 mg (4 mg Intravenous Given 10/11/22 1503)     Medical Decision Making:   History:   Old Medical Records: I decided to obtain old medical records.  Old Records Summarized: records from clinic visits and records from previous admission(s).       <> Summary of Records: Patient seen in EP clinic yesterday.  She had a stress test in March without signs of ischemia  Initial Assessment:   62-year-old female presenting +pain for several hours duration.  Her vitals are normal, nontoxic appearing.  Differential Diagnosis:   Costochondritis  ACS   Lower suspicion for PE   Considered aortic dissection but I think this is unlikely.  She has intact peripheral pulses, normal strength and sensation and is well-appearing  Independently Interpreted Test(s):   I have ordered and independently interpreted X-rays - see summary below.       <> Summary of X-Ray Reading(s): AICD in place, no consolidation  I have ordered and independently interpreted EKG Reading(s) - see prior notes  Clinical Tests:   Lab Tests: Ordered and Reviewed  Radiological Study: Ordered and Reviewed  Medical Tests: Ordered and Reviewed  ED Management:  Will check labs, placed on cardiac monitor, EKG, give aspirin, nitro and reassess.    No significant improvement of chest pain with nitro.  Pain improved with morphine.  Her workup is reassuring with negative troponins x2.  I discussed admission with hospitalist Dr. Cruz but given her recent negative stress test, reassuring workup and low clinical suspicion for ACS, think that patient follow-up is reasonable.  Will discharge with instructions to follow up with her cardiologist and return to the ED for worsening symptoms. Stressed the importance of follow-up, strict ED return precautions given.  Patient voiced understanding and is comfortable with discharge. I discussed this patient with my supervising physician.                ED Course as of 10/11/22 2153   Tue Oct 11, 2022   1309 D-Dimer: 0.40 [CC]   1321 No significant improvement of chest pain with nitro.  Will give morphine. [CC]   1326 Troponin I: 0.006 [CC]   1336 BNP(!): 469  Elevated, similar compared to prior [CC]      ED Course User Index  [CC] Medina Payan PA-C                 Clinical Impression:   Final diagnoses:  [R07.9] Chest pain (Primary)        ED Disposition Condition    Discharge Stable          ED Prescriptions    None       Follow-up Information       Follow up With Specialties Details Why Contact Info Additional Information    [facility] - Cardiology - 3rd Fl Cardiology Schedule an appointment as soon as possible for a visit in 1 week  [id number]  [facility] [id number] [phone] Cardiology Services Clinics - 3rd floor    [facility] - Emergency Dept Emergency Medicine Go to  If symptoms worsen [address]  [facility] [id number] [phone]              Medina Payan PA-C  10/11/22 2153

## 2022-10-11 NOTE — DISCHARGE INSTRUCTIONS
Diagnosis: Chest pain    I am not sure what is causing your pain.  Your lab tests, EKG chest x-ray did not show any concerning findings.    Please call to schedule a follow-up appoint with your cardiologist and primary care doctor.  If you start to have any worsening symptoms, please return to the emergency department.      
cradle

## 2022-10-15 ENCOUNTER — HOSPITAL ENCOUNTER (OUTPATIENT)
Facility: HOSPITAL | Age: 62
Discharge: HOME | End: 2022-10-16
Attending: EMERGENCY MEDICINE | Admitting: HOSPITALIST
Payer: COMMERCIAL

## 2022-10-15 DIAGNOSIS — N28.89 RENAL MASS: ICD-10-CM

## 2022-10-15 DIAGNOSIS — E11.9 TYPE 2 DIABETES MELLITUS WITHOUT COMPLICATION, WITHOUT LONG-TERM CURRENT USE OF INSULIN: Chronic | ICD-10-CM

## 2022-10-15 DIAGNOSIS — I10 ESSENTIAL HYPERTENSION: Chronic | ICD-10-CM

## 2022-10-15 DIAGNOSIS — I50.20 HFREF (HEART FAILURE WITH REDUCED EJECTION FRACTION): ICD-10-CM

## 2022-10-15 DIAGNOSIS — R07.9 CHEST PAIN: Primary | ICD-10-CM

## 2022-10-15 DIAGNOSIS — I50.43 ACUTE ON CHRONIC COMBINED SYSTOLIC AND DIASTOLIC HEART FAILURE: ICD-10-CM

## 2022-10-15 DIAGNOSIS — I42.0 DILATED CARDIOMYOPATHY: ICD-10-CM

## 2022-10-15 LAB
ALBUMIN SERPL BCP-MCNC: 3.4 G/DL (ref 3.5–5.2)
ALP SERPL-CCNC: 156 U/L (ref 55–135)
ALT SERPL W/O P-5'-P-CCNC: 13 U/L (ref 10–44)
ANION GAP SERPL CALC-SCNC: 6 MMOL/L (ref 8–16)
AST SERPL-CCNC: 12 U/L (ref 10–40)
BASOPHILS # BLD AUTO: 0.03 K/UL (ref 0–0.2)
BASOPHILS NFR BLD: 0.4 % (ref 0–1.9)
BILIRUB SERPL-MCNC: 0.4 MG/DL (ref 0.1–1)
BUN SERPL-MCNC: 12 MG/DL (ref 8–23)
CALCIUM SERPL-MCNC: 9.2 MG/DL (ref 8.7–10.5)
CHLORIDE SERPL-SCNC: 106 MMOL/L (ref 95–110)
CO2 SERPL-SCNC: 27 MMOL/L (ref 23–29)
CREAT SERPL-MCNC: 0.9 MG/DL (ref 0.5–1.4)
DIFFERENTIAL METHOD: ABNORMAL
EOSINOPHIL # BLD AUTO: 0.2 K/UL (ref 0–0.5)
EOSINOPHIL NFR BLD: 2.8 % (ref 0–8)
ERYTHROCYTE [DISTWIDTH] IN BLOOD BY AUTOMATED COUNT: 13.8 % (ref 11.5–14.5)
EST. GFR  (NO RACE VARIABLE): >60 ML/MIN/1.73 M^2
GLUCOSE SERPL-MCNC: 175 MG/DL (ref 70–110)
HCT VFR BLD AUTO: 34.3 % (ref 37–48.5)
HGB BLD-MCNC: 11.2 G/DL (ref 12–16)
IMM GRANULOCYTES # BLD AUTO: 0.02 K/UL (ref 0–0.04)
IMM GRANULOCYTES NFR BLD AUTO: 0.2 % (ref 0–0.5)
LYMPHOCYTES # BLD AUTO: 3.4 K/UL (ref 1–4.8)
LYMPHOCYTES NFR BLD: 39.8 % (ref 18–48)
MCH RBC QN AUTO: 29.6 PG (ref 27–31)
MCHC RBC AUTO-ENTMCNC: 32.7 G/DL (ref 32–36)
MCV RBC AUTO: 91 FL (ref 82–98)
MONOCYTES # BLD AUTO: 0.5 K/UL (ref 0.3–1)
MONOCYTES NFR BLD: 5.7 % (ref 4–15)
NEUTROPHILS # BLD AUTO: 4.3 K/UL (ref 1.8–7.7)
NEUTROPHILS NFR BLD: 51.1 % (ref 38–73)
NRBC BLD-RTO: 0 /100 WBC
PLATELET # BLD AUTO: 307 K/UL (ref 150–450)
PMV BLD AUTO: 11.1 FL (ref 9.2–12.9)
POTASSIUM SERPL-SCNC: 3.5 MMOL/L (ref 3.5–5.1)
PROT SERPL-MCNC: 7.1 G/DL (ref 6–8.4)
RBC # BLD AUTO: 3.79 M/UL (ref 4–5.4)
SODIUM SERPL-SCNC: 139 MMOL/L (ref 136–145)
TROPONIN I SERPL DL<=0.01 NG/ML-MCNC: 0.02 NG/ML (ref 0–0.03)
WBC # BLD AUTO: 8.45 K/UL (ref 3.9–12.7)

## 2022-10-15 PROCEDURE — 96374 THER/PROPH/DIAG INJ IV PUSH: CPT

## 2022-10-15 PROCEDURE — 99285 PR EMERGENCY DEPT VISIT,LEVEL V: ICD-10-PCS | Mod: ,,, | Performed by: EMERGENCY MEDICINE

## 2022-10-15 PROCEDURE — 83880 ASSAY OF NATRIURETIC PEPTIDE: CPT

## 2022-10-15 PROCEDURE — 99900035 HC TECH TIME PER 15 MIN (STAT)

## 2022-10-15 PROCEDURE — 84484 ASSAY OF TROPONIN QUANT: CPT

## 2022-10-15 PROCEDURE — 99285 EMERGENCY DEPT VISIT HI MDM: CPT | Mod: ,,, | Performed by: EMERGENCY MEDICINE

## 2022-10-15 PROCEDURE — 93005 ELECTROCARDIOGRAM TRACING: CPT

## 2022-10-15 PROCEDURE — 85025 COMPLETE CBC W/AUTO DIFF WBC: CPT

## 2022-10-15 PROCEDURE — 93010 EKG 12-LEAD: ICD-10-PCS | Mod: ,,, | Performed by: INTERNAL MEDICINE

## 2022-10-15 PROCEDURE — 93010 ELECTROCARDIOGRAM REPORT: CPT | Mod: ,,, | Performed by: INTERNAL MEDICINE

## 2022-10-15 PROCEDURE — 99285 EMERGENCY DEPT VISIT HI MDM: CPT | Mod: 25

## 2022-10-15 PROCEDURE — 80053 COMPREHEN METABOLIC PANEL: CPT

## 2022-10-15 PROCEDURE — 94761 N-INVAS EAR/PLS OXIMETRY MLT: CPT

## 2022-10-15 RX ORDER — HYDROCODONE BITARTRATE AND ACETAMINOPHEN 5; 325 MG/1; MG/1
1 TABLET ORAL
Status: COMPLETED | OUTPATIENT
Start: 2022-10-16 | End: 2022-10-16

## 2022-10-16 VITALS
WEIGHT: 199 LBS | TEMPERATURE: 98 F | SYSTOLIC BLOOD PRESSURE: 108 MMHG | HEIGHT: 65 IN | RESPIRATION RATE: 18 BRPM | OXYGEN SATURATION: 96 % | BODY MASS INDEX: 33.15 KG/M2 | DIASTOLIC BLOOD PRESSURE: 67 MMHG | HEART RATE: 77 BPM

## 2022-10-16 PROBLEM — I50.43 ACUTE ON CHRONIC COMBINED SYSTOLIC AND DIASTOLIC HEART FAILURE: Status: ACTIVE | Noted: 2019-12-26

## 2022-10-16 LAB
ANION GAP SERPL CALC-SCNC: 7 MMOL/L (ref 8–16)
ASCENDING AORTA: 3.06 CM
AV INDEX (PROSTH): 0.76
AV MEAN GRADIENT: 2 MMHG
AV PEAK GRADIENT: 4 MMHG
AV VALVE AREA: 2.35 CM2
AV VELOCITY RATIO: 0.82
BACTERIA #/AREA URNS AUTO: ABNORMAL /HPF
BASOPHILS # BLD AUTO: 0.03 K/UL (ref 0–0.2)
BASOPHILS # BLD AUTO: 0.03 K/UL (ref 0–0.2)
BASOPHILS NFR BLD: 0.4 % (ref 0–1.9)
BASOPHILS NFR BLD: 0.4 % (ref 0–1.9)
BILIRUB UR QL STRIP: NEGATIVE
BNP SERPL-MCNC: 1038 PG/ML (ref 0–99)
BSA FOR ECHO PROCEDURE: 2.03 M2
BUN SERPL-MCNC: 10 MG/DL (ref 8–23)
CALCIUM SERPL-MCNC: 9 MG/DL (ref 8.7–10.5)
CHLORIDE SERPL-SCNC: 102 MMOL/L (ref 95–110)
CLARITY UR REFRACT.AUTO: CLEAR
CO2 SERPL-SCNC: 29 MMOL/L (ref 23–29)
COLOR UR AUTO: ABNORMAL
CREAT SERPL-MCNC: 0.9 MG/DL (ref 0.5–1.4)
CV ECHO LV RWT: 0.28 CM
DIFFERENTIAL METHOD: ABNORMAL
DIFFERENTIAL METHOD: ABNORMAL
DOP CALC AO PEAK VEL: 1.06 M/S
DOP CALC AO VTI: 23.82 CM
DOP CALC LVOT AREA: 3.1 CM2
DOP CALC LVOT DIAMETER: 1.99 CM
DOP CALC LVOT PEAK VEL: 0.87 M/S
DOP CALC LVOT STROKE VOLUME: 56.08 CM3
DOP CALCLVOT PEAK VEL VTI: 18.04 CM
E WAVE DECELERATION TIME: 106.32 MSEC
E/A RATIO: 1.65
E/E' RATIO: 29.67 M/S
ECHO LV POSTERIOR WALL: 0.9 CM (ref 0.6–1.1)
EJECTION FRACTION: 15 %
EOSINOPHIL # BLD AUTO: 0.2 K/UL (ref 0–0.5)
EOSINOPHIL # BLD AUTO: 0.2 K/UL (ref 0–0.5)
EOSINOPHIL NFR BLD: 2.8 % (ref 0–8)
EOSINOPHIL NFR BLD: 3.2 % (ref 0–8)
ERYTHROCYTE [DISTWIDTH] IN BLOOD BY AUTOMATED COUNT: 13.8 % (ref 11.5–14.5)
ERYTHROCYTE [DISTWIDTH] IN BLOOD BY AUTOMATED COUNT: 13.9 % (ref 11.5–14.5)
EST. GFR  (NO RACE VARIABLE): >60 ML/MIN/1.73 M^2
ESTIMATED AVG GLUCOSE: 180 MG/DL (ref 68–131)
FRACTIONAL SHORTENING: 3 % (ref 28–44)
GLUCOSE SERPL-MCNC: 181 MG/DL (ref 70–110)
GLUCOSE UR QL STRIP: NEGATIVE
HBA1C MFR BLD: 7.9 % (ref 4–5.6)
HCT VFR BLD AUTO: 34.5 % (ref 37–48.5)
HCT VFR BLD AUTO: 35 % (ref 37–48.5)
HGB BLD-MCNC: 11.2 G/DL (ref 12–16)
HGB BLD-MCNC: 11.3 G/DL (ref 12–16)
HGB UR QL STRIP: ABNORMAL
HYALINE CASTS UR QL AUTO: 1 /LPF
IMM GRANULOCYTES # BLD AUTO: 0.02 K/UL (ref 0–0.04)
IMM GRANULOCYTES # BLD AUTO: 0.02 K/UL (ref 0–0.04)
IMM GRANULOCYTES NFR BLD AUTO: 0.3 % (ref 0–0.5)
IMM GRANULOCYTES NFR BLD AUTO: 0.3 % (ref 0–0.5)
INR PPP: 1 (ref 0.8–1.2)
INTERVENTRICULAR SEPTUM: 0.89 CM (ref 0.6–1.1)
IVRT: 108.47 MSEC
KETONES UR QL STRIP: NEGATIVE
LA MAJOR: 5.2 CM
LA MINOR: 5.1 CM
LA WIDTH: 3.99 CM
LEFT ATRIUM SIZE: 4.25 CM
LEFT ATRIUM VOLUME INDEX MOD: 31.3 ML/M2
LEFT ATRIUM VOLUME INDEX: 37.7 ML/M2
LEFT ATRIUM VOLUME MOD: 61.7 CM3
LEFT ATRIUM VOLUME: 74.22 CM3
LEFT INTERNAL DIMENSION IN SYSTOLE: 6.23 CM (ref 2.1–4)
LEFT VENTRICLE DIASTOLIC VOLUME INDEX: 106.37 ML/M2
LEFT VENTRICLE DIASTOLIC VOLUME: 209.55 ML
LEFT VENTRICLE MASS INDEX: 122 G/M2
LEFT VENTRICLE SYSTOLIC VOLUME INDEX: 99.7 ML/M2
LEFT VENTRICLE SYSTOLIC VOLUME: 196.33 ML
LEFT VENTRICULAR INTERNAL DIMENSION IN DIASTOLE: 6.41 CM (ref 3.5–6)
LEFT VENTRICULAR MASS: 240.21 G
LEUKOCYTE ESTERASE UR QL STRIP: ABNORMAL
LV LATERAL E/E' RATIO: 29.67 M/S
LV SEPTAL E/E' RATIO: 29.67 M/S
LYMPHOCYTES # BLD AUTO: 3 K/UL (ref 1–4.8)
LYMPHOCYTES # BLD AUTO: 3 K/UL (ref 1–4.8)
LYMPHOCYTES NFR BLD: 41.4 % (ref 18–48)
LYMPHOCYTES NFR BLD: 42.3 % (ref 18–48)
MAGNESIUM SERPL-MCNC: 1.9 MG/DL (ref 1.6–2.6)
MAGNESIUM SERPL-MCNC: 1.9 MG/DL (ref 1.6–2.6)
MCH RBC QN AUTO: 29.2 PG (ref 27–31)
MCH RBC QN AUTO: 29.3 PG (ref 27–31)
MCHC RBC AUTO-ENTMCNC: 32.3 G/DL (ref 32–36)
MCHC RBC AUTO-ENTMCNC: 32.5 G/DL (ref 32–36)
MCV RBC AUTO: 90 FL (ref 82–98)
MCV RBC AUTO: 91 FL (ref 82–98)
MICROSCOPIC COMMENT: ABNORMAL
MONOCYTES # BLD AUTO: 0.4 K/UL (ref 0.3–1)
MONOCYTES # BLD AUTO: 0.5 K/UL (ref 0.3–1)
MONOCYTES NFR BLD: 6.3 % (ref 4–15)
MONOCYTES NFR BLD: 7.2 % (ref 4–15)
MV PEAK A VEL: 0.54 M/S
MV PEAK E VEL: 0.89 M/S
MV STENOSIS PRESSURE HALF TIME: 30.83 MS
MV VALVE AREA P 1/2 METHOD: 7.14 CM2
NEUTROPHILS # BLD AUTO: 3.4 K/UL (ref 1.8–7.7)
NEUTROPHILS # BLD AUTO: 3.4 K/UL (ref 1.8–7.7)
NEUTROPHILS NFR BLD: 47.5 % (ref 38–73)
NEUTROPHILS NFR BLD: 47.9 % (ref 38–73)
NITRITE UR QL STRIP: NEGATIVE
NRBC BLD-RTO: 0 /100 WBC
NRBC BLD-RTO: 0 /100 WBC
PH UR STRIP: 8 [PH] (ref 5–8)
PHOSPHATE SERPL-MCNC: 3.7 MG/DL (ref 2.7–4.5)
PHOSPHATE SERPL-MCNC: 3.7 MG/DL (ref 2.7–4.5)
PISA TR MAX VEL: 3.62 M/S
PLATELET # BLD AUTO: 271 K/UL (ref 150–450)
PLATELET # BLD AUTO: 281 K/UL (ref 150–450)
PMV BLD AUTO: 10.8 FL (ref 9.2–12.9)
PMV BLD AUTO: 11.1 FL (ref 9.2–12.9)
POCT GLUCOSE: 156 MG/DL (ref 70–110)
POCT GLUCOSE: 157 MG/DL (ref 70–110)
POCT GLUCOSE: 167 MG/DL (ref 70–110)
POTASSIUM SERPL-SCNC: 3.4 MMOL/L (ref 3.5–5.1)
PROT UR QL STRIP: NEGATIVE
PROTHROMBIN TIME: 10.5 SEC (ref 9–12.5)
RA MAJOR: 4.01 CM
RA PRESSURE: 3 MMHG
RA WIDTH: 4.11 CM
RBC # BLD AUTO: 3.83 M/UL (ref 4–5.4)
RBC # BLD AUTO: 3.86 M/UL (ref 4–5.4)
RBC #/AREA URNS AUTO: 17 /HPF (ref 0–4)
RIGHT VENTRICULAR END-DIASTOLIC DIMENSION: 3.56 CM
RV TISSUE DOPPLER FREE WALL SYSTOLIC VELOCITY 1 (APICAL 4 CHAMBER VIEW): 8.96 CM/S
SINUS: 2.71 CM
SODIUM SERPL-SCNC: 138 MMOL/L (ref 136–145)
SP GR UR STRIP: 1 (ref 1–1.03)
SQUAMOUS #/AREA URNS AUTO: 9 /HPF
STJ: 2.23 CM
TDI LATERAL: 0.03 M/S
TDI SEPTAL: 0.03 M/S
TDI: 0.03 M/S
TR MAX PG: 52 MMHG
TRICUSPID ANNULAR PLANE SYSTOLIC EXCURSION: 1.98 CM
TROPONIN I SERPL DL<=0.01 NG/ML-MCNC: 0.01 NG/ML (ref 0–0.03)
TROPONIN I SERPL DL<=0.01 NG/ML-MCNC: 0.03 NG/ML (ref 0–0.03)
TV REST PULMONARY ARTERY PRESSURE: 55 MMHG
URN SPEC COLLECT METH UR: ABNORMAL
WBC # BLD AUTO: 7.02 K/UL (ref 3.9–12.7)
WBC # BLD AUTO: 7.12 K/UL (ref 3.9–12.7)
WBC #/AREA URNS AUTO: 36 /HPF (ref 0–5)
YEAST UR QL AUTO: ABNORMAL

## 2022-10-16 PROCEDURE — 83036 HEMOGLOBIN GLYCOSYLATED A1C: CPT | Performed by: HOSPITALIST

## 2022-10-16 PROCEDURE — 93010 EKG 12-LEAD: ICD-10-PCS | Mod: ,,, | Performed by: INTERNAL MEDICINE

## 2022-10-16 PROCEDURE — 83735 ASSAY OF MAGNESIUM: CPT | Mod: 91

## 2022-10-16 PROCEDURE — 87086 URINE CULTURE/COLONY COUNT: CPT

## 2022-10-16 PROCEDURE — 99900035 HC TECH TIME PER 15 MIN (STAT)

## 2022-10-16 PROCEDURE — 84484 ASSAY OF TROPONIN QUANT: CPT

## 2022-10-16 PROCEDURE — 25000003 PHARM REV CODE 250

## 2022-10-16 PROCEDURE — 94640 AIRWAY INHALATION TREATMENT: CPT

## 2022-10-16 PROCEDURE — 99220 PR INITIAL OBSERVATION CARE,LEVL III: CPT | Mod: ,,, | Performed by: HOSPITALIST

## 2022-10-16 PROCEDURE — 25500020 PHARM REV CODE 255: Performed by: HOSPITALIST

## 2022-10-16 PROCEDURE — 63600175 PHARM REV CODE 636 W HCPCS

## 2022-10-16 PROCEDURE — 85025 COMPLETE CBC W/AUTO DIFF WBC: CPT

## 2022-10-16 PROCEDURE — 84100 ASSAY OF PHOSPHORUS: CPT

## 2022-10-16 PROCEDURE — 25500020 PHARM REV CODE 255: Performed by: EMERGENCY MEDICINE

## 2022-10-16 PROCEDURE — 93010 ELECTROCARDIOGRAM REPORT: CPT | Mod: ,,, | Performed by: INTERNAL MEDICINE

## 2022-10-16 PROCEDURE — G0378 HOSPITAL OBSERVATION PER HR: HCPCS

## 2022-10-16 PROCEDURE — 84484 ASSAY OF TROPONIN QUANT: CPT | Mod: 91

## 2022-10-16 PROCEDURE — 63600175 PHARM REV CODE 636 W HCPCS: Performed by: EMERGENCY MEDICINE

## 2022-10-16 PROCEDURE — 99220 PR INITIAL OBSERVATION CARE,LEVL III: ICD-10-PCS | Mod: ,,, | Performed by: HOSPITALIST

## 2022-10-16 PROCEDURE — 25000242 PHARM REV CODE 250 ALT 637 W/ HCPCS

## 2022-10-16 PROCEDURE — 81001 URINALYSIS AUTO W/SCOPE: CPT

## 2022-10-16 PROCEDURE — 96376 TX/PRO/DX INJ SAME DRUG ADON: CPT | Mod: 59

## 2022-10-16 PROCEDURE — 85610 PROTHROMBIN TIME: CPT

## 2022-10-16 PROCEDURE — 80048 BASIC METABOLIC PNL TOTAL CA: CPT

## 2022-10-16 PROCEDURE — 93005 ELECTROCARDIOGRAM TRACING: CPT

## 2022-10-16 RX ORDER — SODIUM CHLORIDE 0.9 % (FLUSH) 0.9 %
10 SYRINGE (ML) INJECTION
Status: DISCONTINUED | OUTPATIENT
Start: 2022-10-16 | End: 2022-10-16 | Stop reason: HOSPADM

## 2022-10-16 RX ORDER — IPRATROPIUM BROMIDE AND ALBUTEROL SULFATE 2.5; .5 MG/3ML; MG/3ML
3 SOLUTION RESPIRATORY (INHALATION) EVERY 4 HOURS PRN
Status: DISCONTINUED | OUTPATIENT
Start: 2022-10-16 | End: 2022-10-16 | Stop reason: HOSPADM

## 2022-10-16 RX ORDER — FUROSEMIDE 10 MG/ML
40 INJECTION INTRAMUSCULAR; INTRAVENOUS
Status: COMPLETED | OUTPATIENT
Start: 2022-10-16 | End: 2022-10-16

## 2022-10-16 RX ORDER — ACETAMINOPHEN 325 MG/1
650 TABLET ORAL EVERY 4 HOURS PRN
Status: DISCONTINUED | OUTPATIENT
Start: 2022-10-16 | End: 2022-10-16 | Stop reason: HOSPADM

## 2022-10-16 RX ORDER — ENOXAPARIN SODIUM 100 MG/ML
40 INJECTION SUBCUTANEOUS EVERY 24 HOURS
Status: DISCONTINUED | OUTPATIENT
Start: 2022-10-16 | End: 2022-10-16 | Stop reason: HOSPADM

## 2022-10-16 RX ORDER — ATORVASTATIN CALCIUM 40 MG/1
80 TABLET, FILM COATED ORAL DAILY
Status: DISCONTINUED | OUTPATIENT
Start: 2022-10-16 | End: 2022-10-16 | Stop reason: HOSPADM

## 2022-10-16 RX ORDER — SPIRONOLACTONE 25 MG/1
25 TABLET ORAL DAILY
Status: DISCONTINUED | OUTPATIENT
Start: 2022-10-16 | End: 2022-10-16 | Stop reason: HOSPADM

## 2022-10-16 RX ORDER — POTASSIUM CHLORIDE 20 MEQ/1
40 TABLET, EXTENDED RELEASE ORAL ONCE
Status: COMPLETED | OUTPATIENT
Start: 2022-10-16 | End: 2022-10-16

## 2022-10-16 RX ORDER — ALBUTEROL SULFATE 2.5 MG/.5ML
2.5 SOLUTION RESPIRATORY (INHALATION) EVERY 4 HOURS
Status: DISCONTINUED | OUTPATIENT
Start: 2022-10-16 | End: 2022-10-16

## 2022-10-16 RX ORDER — ISOSORBIDE DINITRATE 10 MG/1
10 TABLET ORAL 3 TIMES DAILY
Qty: 90 TABLET | Refills: 5 | Status: SHIPPED | OUTPATIENT
Start: 2022-10-16 | End: 2022-10-25

## 2022-10-16 RX ORDER — METOPROLOL SUCCINATE 100 MG/1
100 TABLET, EXTENDED RELEASE ORAL DAILY
Status: DISCONTINUED | OUTPATIENT
Start: 2022-10-16 | End: 2022-10-16 | Stop reason: HOSPADM

## 2022-10-16 RX ORDER — IBUPROFEN 200 MG
24 TABLET ORAL
Status: DISCONTINUED | OUTPATIENT
Start: 2022-10-16 | End: 2022-10-16 | Stop reason: HOSPADM

## 2022-10-16 RX ORDER — SACUBITRIL AND VALSARTAN 24; 26 MG/1; MG/1
1 TABLET, FILM COATED ORAL 2 TIMES DAILY
Qty: 60 TABLET | Refills: 6 | Status: SHIPPED | OUTPATIENT
Start: 2022-10-16 | End: 2022-10-25

## 2022-10-16 RX ORDER — TALC
6 POWDER (GRAM) TOPICAL NIGHTLY PRN
Status: DISCONTINUED | OUTPATIENT
Start: 2022-10-16 | End: 2022-10-16 | Stop reason: HOSPADM

## 2022-10-16 RX ORDER — GLUCAGON 1 MG
1 KIT INJECTION
Status: DISCONTINUED | OUTPATIENT
Start: 2022-10-16 | End: 2022-10-16 | Stop reason: HOSPADM

## 2022-10-16 RX ORDER — NITROGLYCERIN 0.4 MG/1
0.4 TABLET SUBLINGUAL EVERY 5 MIN PRN
Status: DISCONTINUED | OUTPATIENT
Start: 2022-10-16 | End: 2022-10-16 | Stop reason: HOSPADM

## 2022-10-16 RX ORDER — TRAMADOL HYDROCHLORIDE 50 MG/1
50 TABLET ORAL EVERY 6 HOURS PRN
Status: DISCONTINUED | OUTPATIENT
Start: 2022-10-16 | End: 2022-10-16 | Stop reason: HOSPADM

## 2022-10-16 RX ORDER — DEXTROSE MONOHYDRATE 100 MG/ML
12.5 INJECTION, SOLUTION INTRAVENOUS
Status: DISCONTINUED | OUTPATIENT
Start: 2022-10-16 | End: 2022-10-16 | Stop reason: HOSPADM

## 2022-10-16 RX ORDER — FUROSEMIDE 10 MG/ML
80 INJECTION INTRAMUSCULAR; INTRAVENOUS
Status: DISCONTINUED | OUTPATIENT
Start: 2022-10-16 | End: 2022-10-16 | Stop reason: HOSPADM

## 2022-10-16 RX ORDER — DEXTROSE MONOHYDRATE 100 MG/ML
25 INJECTION, SOLUTION INTRAVENOUS
Status: DISCONTINUED | OUTPATIENT
Start: 2022-10-16 | End: 2022-10-16 | Stop reason: HOSPADM

## 2022-10-16 RX ORDER — ISOSORBIDE DINITRATE 10 MG/1
10 TABLET ORAL 3 TIMES DAILY
Status: DISCONTINUED | OUTPATIENT
Start: 2022-10-16 | End: 2022-10-16 | Stop reason: HOSPADM

## 2022-10-16 RX ORDER — SODIUM CHLORIDE 0.9 % (FLUSH) 0.9 %
10 SYRINGE (ML) INJECTION EVERY 12 HOURS PRN
Status: DISCONTINUED | OUTPATIENT
Start: 2022-10-16 | End: 2022-10-16 | Stop reason: HOSPADM

## 2022-10-16 RX ORDER — TORSEMIDE 10 MG/1
20 TABLET ORAL DAILY
Qty: 60 TABLET | Refills: 6 | Status: SHIPPED | OUTPATIENT
Start: 2022-10-16 | End: 2022-10-25

## 2022-10-16 RX ORDER — METOPROLOL SUCCINATE 100 MG/1
100 TABLET, EXTENDED RELEASE ORAL DAILY
Qty: 30 TABLET | Refills: 6 | Status: SHIPPED | OUTPATIENT
Start: 2022-10-16 | End: 2023-01-26 | Stop reason: SDUPTHER

## 2022-10-16 RX ORDER — ONDANSETRON 2 MG/ML
4 INJECTION INTRAMUSCULAR; INTRAVENOUS EVERY 8 HOURS PRN
Status: DISCONTINUED | OUTPATIENT
Start: 2022-10-16 | End: 2022-10-16 | Stop reason: HOSPADM

## 2022-10-16 RX ORDER — ALBUTEROL SULFATE 2.5 MG/.5ML
2.5 SOLUTION RESPIRATORY (INHALATION) EVERY 4 HOURS PRN
Status: DISCONTINUED | OUTPATIENT
Start: 2022-10-16 | End: 2022-10-16

## 2022-10-16 RX ORDER — SACUBITRIL AND VALSARTAN 24; 26 MG/1; MG/1
1 TABLET, FILM COATED ORAL 2 TIMES DAILY
Status: ON HOLD | COMMUNITY
End: 2022-10-16 | Stop reason: SDUPTHER

## 2022-10-16 RX ORDER — NALOXONE HCL 0.4 MG/ML
0.02 VIAL (ML) INJECTION
Status: DISCONTINUED | OUTPATIENT
Start: 2022-10-16 | End: 2022-10-16 | Stop reason: HOSPADM

## 2022-10-16 RX ORDER — IBUPROFEN 200 MG
16 TABLET ORAL
Status: DISCONTINUED | OUTPATIENT
Start: 2022-10-16 | End: 2022-10-16 | Stop reason: HOSPADM

## 2022-10-16 RX ORDER — FUROSEMIDE 10 MG/ML
40 INJECTION INTRAMUSCULAR; INTRAVENOUS
Status: DISCONTINUED | OUTPATIENT
Start: 2022-10-16 | End: 2022-10-16

## 2022-10-16 RX ORDER — FUROSEMIDE 10 MG/ML
40 INJECTION INTRAMUSCULAR; INTRAVENOUS ONCE
Status: COMPLETED | OUTPATIENT
Start: 2022-10-16 | End: 2022-10-16

## 2022-10-16 RX ORDER — SPIRONOLACTONE 25 MG/1
25 TABLET ORAL DAILY
Qty: 30 TABLET | Refills: 6 | Status: ON HOLD | OUTPATIENT
Start: 2022-10-16 | End: 2023-06-01 | Stop reason: HOSPADM

## 2022-10-16 RX ORDER — ATORVASTATIN CALCIUM 80 MG/1
80 TABLET, FILM COATED ORAL DAILY
Qty: 90 TABLET | Refills: 3 | Status: SHIPPED | OUTPATIENT
Start: 2022-10-16 | End: 2023-07-20 | Stop reason: SDUPTHER

## 2022-10-16 RX ORDER — ALBUTEROL SULFATE 90 UG/1
2 AEROSOL, METERED RESPIRATORY (INHALATION) EVERY 6 HOURS PRN
Status: DISCONTINUED | OUTPATIENT
Start: 2022-10-16 | End: 2022-10-16 | Stop reason: HOSPADM

## 2022-10-16 RX ORDER — INSULIN ASPART 100 [IU]/ML
0-5 INJECTION, SOLUTION INTRAVENOUS; SUBCUTANEOUS
Status: DISCONTINUED | OUTPATIENT
Start: 2022-10-16 | End: 2022-10-16 | Stop reason: HOSPADM

## 2022-10-16 RX ADMIN — ISOSORBIDE DINITRATE 10 MG: 10 TABLET ORAL at 09:10

## 2022-10-16 RX ADMIN — SPIRONOLACTONE 25 MG: 25 TABLET, FILM COATED ORAL at 09:10

## 2022-10-16 RX ADMIN — IOHEXOL 100 ML: 350 INJECTION, SOLUTION INTRAVENOUS at 02:10

## 2022-10-16 RX ADMIN — POTASSIUM CHLORIDE 40 MEQ: 20 TABLET, EXTENDED RELEASE ORAL at 09:10

## 2022-10-16 RX ADMIN — FUROSEMIDE 40 MG: 10 INJECTION, SOLUTION INTRAMUSCULAR; INTRAVENOUS at 12:10

## 2022-10-16 RX ADMIN — HYDROCODONE BITARTRATE AND ACETAMINOPHEN 1 TABLET: 5; 325 TABLET ORAL at 12:10

## 2022-10-16 RX ADMIN — ATORVASTATIN CALCIUM 80 MG: 40 TABLET, FILM COATED ORAL at 09:10

## 2022-10-16 RX ADMIN — FUROSEMIDE 40 MG: 10 INJECTION, SOLUTION INTRAMUSCULAR; INTRAVENOUS at 10:10

## 2022-10-16 RX ADMIN — METOPROLOL SUCCINATE 100 MG: 100 TABLET, EXTENDED RELEASE ORAL at 09:10

## 2022-10-16 RX ADMIN — ALBUTEROL SULFATE 2.5 MG: 2.5 SOLUTION RESPIRATORY (INHALATION) at 04:10

## 2022-10-16 RX ADMIN — HUMAN ALBUMIN MICROSPHERES AND PERFLUTREN 0.11 MG: 10; .22 INJECTION, SOLUTION INTRAVENOUS at 08:10

## 2022-10-16 RX ADMIN — SACUBITRIL AND VALSARTAN 1 TABLET: 97; 103 TABLET, FILM COATED ORAL at 09:10

## 2022-10-16 RX ADMIN — FUROSEMIDE 40 MG: 10 INJECTION, SOLUTION INTRAMUSCULAR; INTRAVENOUS at 05:10

## 2022-10-16 NOTE — CONSULTS
Nutrition-Related Cardiac Education      Time Spent: 10 min    Learners: Pt    Nutrition Education with handouts: Educated pt on fluid and salt restriction diet for people with HF. Pt verbalized understanding. Emphasized the importance of diet adherence. Pt with no additional questions. No other needs identified. Left all education material with pt at bedside.    Comments: Wt stable. Denies any significant wt changes. No indicators of malnutrition.    Barriers to Learning: No    Follow up: Yes    Please consult as needed.  Thank you!    Trent MILTON-JORDANAN

## 2022-10-16 NOTE — ASSESSMENT & PLAN NOTE
Patient's FSGs are controlled on current medication regimen.  Last A1c reviewed-   Lab Results   Component Value Date    HGBA1C 6.8 (H) 01/10/2022     Most recent fingerstick glucose reviewed- No results for input(s): POCTGLUCOSE in the last 24 hours.  Current correctional scale  Medium    Antihyperglycemics (From admission, onward)    Start     Stop Route Frequency Ordered    10/16/22 2100  insulin detemir U-100 pen 10 Units         -- SubQ Nightly 10/16/22 0442    10/16/22 0434  insulin aspart U-100 pen 0-5 Units         -- SubQ Before meals & nightly PRN 10/16/22 0337        Hold Oral hypoglycemics while patient is in the hospital.

## 2022-10-16 NOTE — NURSING
Telemetry notified nurse that patient threw a couplet with 7-beats of V-tach, then another couplet, and four beats of V-tach.  Stat EKG ordered, and on-call for med team 4 notified.  Patient is asymptomatic, with no reported chest pain, SOB, or dizziness.  Report handed off to FABRICE Bartlett.

## 2022-10-16 NOTE — ASSESSMENT & PLAN NOTE
Recent stress test 4/2022: EF 10% at rest and 13% during stress. Echo at that time pertinent for Grade 2 diastolic dysfunction    Plan:  - IV Lasix 40mg BID  - Continue home GDMT; patient states she is not currently Farxiga   - Strict I's/O's  - Daily weight  - Keep Mg > 2, K > 4  - Cardiac diet

## 2022-10-16 NOTE — NURSING
Patient aaox4 sitting up in bed.  present at the bedside. Resp even and unlabored. No acute resp distress noted. Patient denies pain and other complaints at this time. Discharge instructions reviewed with patient and her . They both verbalized understanding. Patient's tele box removed and cleaned. Tele box # [id number] returned to storage space. Patient denies questions/concerns at this time.

## 2022-10-16 NOTE — ED NOTES
"Diomedes Mohr, a 62 y.o. female presents to the ED w/ complaint of left sided chest pain- "below left breast"    Triage note:  Chief Complaint   Patient presents with    Chest Pain     8/10 burning L anterior CP with radiation to back since Wednesday of this week. Denies N/V, SOB, fever.     Review of patient's allergies indicates:   Allergen Reactions    Adhesive Blisters    Captopril Other (See Comments)     COUGH     Past Medical History:   Diagnosis Date    Anticoagulant long-term use     Anxiety     Arthritis     Asthma     CHF (congestive heart failure)     COPD (chronic obstructive pulmonary disease)     Depression     Diabetes mellitus     Dyspnea     H/O coronary angiogram     H/O: hysterectomy     History of automatic internal cardiac defibrillator (AICD)     Hyperlipemia     Hypertension     Pulmonary edema     Schizophrenia     Stroke       "
Bed: 12  Expected date: 10/15/22  Expected time: 10:14 PM  Means of arrival:   Comments:  Onur  
MD AT [facility]  
Report given to FABRICE Wall. Per RN, no telemetry box available for patient at this time. Will continue to monitor pt in ED until tele monitor is received and placed on pt.   
Telemetry monitor called into tele for pt admit status. Telebox placed on pt. Transport at beside to bring pt to floor now.   
Is This A New Presentation, Or A Follow-Up?: Cyst

## 2022-10-16 NOTE — HOSPITAL COURSE
Patient was treated for heart failure exacerbation. Echo with EF 15%, enlarged ventricle with eccentric hypertrophy, decreased systolic and diastolic  function, mild LAE, and CVP 3mm Hg. Patient's breathing and chest pain improved after Lasix administration. EKG without evidence of ST elevations and troponin within normal limits.     Dispo: Discussed with patient to continue taking GDMT medications and xarelto for stroke prevention. In addition, emphasized importance of following up with cardiologist as soon as possible. F/u with cardiology and pcp outpatient.

## 2022-10-16 NOTE — ASSESSMENT & PLAN NOTE
- Continue home isosorbide dinitrate, troprol, entresto, aldactone  - holding home hydralazine in setting of normotension

## 2022-10-16 NOTE — ASSESSMENT & PLAN NOTE
[facility] 12/2019 pertinent for non-obstructive CAD    - Continue home statin

## 2022-10-16 NOTE — ASSESSMENT & PLAN NOTE
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 Contributing factors include smoking (now quit), htn, DM. No residual deficits

## 2022-10-16 NOTE — ED PROVIDER NOTES
Encounter Date: 10/15/2022       History     Chief Complaint   Patient presents with    Chest Pain     8/10 burning L anterior CP with radiation to back since Wednesday of this week. Denies N/V, SOB, fever.     Diomedes Mohr is a 62 y.o. female with a PMH of CVA, on xarelto, AICD implant, CHF, COPD, DM, HLD, HTN, and schizophrenia presenting to [facility] Emergency Department with a chief complaint of left parasternal chest pain x 3 days that has been constant, fluctuating in intensity but worsening since 7 pm today, radiates to her back, and has no provoking or alleviating factors. She denies worsening with inspiration, movement, or exertion. She endorses dyspnea and 1 episode of nonbloody diarrhea today. Patient denies fever, chills, cough, hemoptysis, nausea, vomiting, abdominal pain, dysuria, hematuria, blood in stool, lower extremity edema or pain, weakness, sensory changes, headache, or syncope.     Review of patient's allergies indicates:   Allergen Reactions    Adhesive Blisters    Captopril Other (See Comments)     COUGH     Past Medical History:   Diagnosis Date    Anticoagulant long-term use     Anxiety     Arthritis     Asthma     CHF (congestive heart failure)     COPD (chronic obstructive pulmonary disease)     Depression     Diabetes mellitus     Dyspnea     H/O coronary angiogram     H/O: hysterectomy     History of automatic internal cardiac defibrillator (AICD)     Hyperlipemia     Hypertension     Pulmonary edema     Schizophrenia     Stroke      Past Surgical History:   Procedure Laterality Date    BLADDER SUSPENSION      CARPAL TUNNEL RELEASE Right     HEEL SPUR SURGERY Left     HYSTERECTOMY      LEFT HEART CATHETERIZATION Bilateral 12/27/2019    Procedure: Left heart cath;  Surgeon: Steve Chambers MD;  Location: [facility] CATH LAB;  Service: Cardiology;  Laterality: Bilateral;    RIGHT HEART CATHETERIZATION Right 7/26/2021    Procedure: INSERTION, CATHETER, RIGHT HEART;  Surgeon: Petr Veloz  MD Jose A;  Location: [facility] CATH LAB;  Service: Cardiology;  Laterality: Right;    RIGHT HEART CATHETERIZATION Right 2022    Procedure: INSERTION, CATHETER, RIGHT HEART;  Surgeon: Chandana Ivory Jr., MD;  Location: [facility] CATH LAB;  Service: Cardiology;  Laterality: Right;    TUBAL LIGATION       Family History   Problem Relation Age of Onset    No Known Problems Mother     No Known Problems Father      Social History     Tobacco Use    Smoking status: Former     Types: Cigarettes     Quit date: 2016     Years since quittin.1    Smokeless tobacco: Never    Tobacco comments:     nonex 2 weeks   Substance Use Topics    Alcohol use: No     Alcohol/week: 0.0 standard drinks    Drug use: No     Review of Systems   Constitutional:  Negative for chills and fever.   HENT:  Negative for congestion and sore throat.    Eyes:  Negative for pain and redness.   Respiratory:  Positive for shortness of breath. Negative for chest tightness.    Cardiovascular:  Positive for chest pain. Negative for leg swelling.   Gastrointestinal:  Positive for diarrhea. Negative for abdominal distention, abdominal pain, nausea and vomiting.   Genitourinary:  Negative for dysuria, flank pain and hematuria.   Musculoskeletal:  Negative for back pain and neck pain.   Skin:  Negative for color change and rash.   Neurological:  Negative for dizziness, speech difficulty, light-headedness and headaches.     Physical Exam     Initial Vitals [10/15/22 2200]   BP Pulse Resp Temp SpO2   (!) 144/86 93 16 98.5 °F (36.9 °C) 100 %      MAP       --         Physical Exam    Nursing note and vitals reviewed.  Constitutional: She appears well-developed and well-nourished. She is not diaphoretic. No distress.   HENT:   Head: Normocephalic.   Right Ear: External ear normal.   Left Ear: External ear normal.   Mouth/Throat: Oropharynx is clear and moist.   Eyes: Conjunctivae are normal. Pupils are equal, round, and reactive to light. No scleral icterus.    Neck: Neck supple.   Cardiovascular:  Normal rate, regular rhythm and normal heart sounds.           Distal pulses symmetric and intact    Pulmonary/Chest: Breath sounds normal. No stridor. No respiratory distress. She has no wheezes. She has no rhonchi. She has no rales. She exhibits no tenderness.   Abdominal: Abdomen is soft. There is no abdominal tenderness. There is no rebound and no guarding.   Musculoskeletal:         General: No edema.      Cervical back: Neck supple.     Neurological: She is alert and oriented to person, place, and time. She has normal strength. No cranial nerve deficit or sensory deficit. GCS score is 15. GCS eye subscore is 4. GCS verbal subscore is 5. GCS motor subscore is 6.   Skin: Skin is warm and dry. Capillary refill takes less than 2 seconds. No rash noted.       ED Course   Procedures  Labs Reviewed   CBC W/ AUTO DIFFERENTIAL - Abnormal; Notable for the following components:       Result Value    RBC 3.79 (*)     Hemoglobin 11.2 (*)     Hematocrit 34.3 (*)     All other components within normal limits   COMPREHENSIVE METABOLIC PANEL - Abnormal; Notable for the following components:    Glucose 175 (*)     Albumin 3.4 (*)     Alkaline Phosphatase 156 (*)     Anion Gap 6 (*)     All other components within normal limits   B-TYPE NATRIURETIC PEPTIDE - Abnormal; Notable for the following components:    BNP 1,038 (*)     All other components within normal limits   URINALYSIS, REFLEX TO URINE CULTURE - Abnormal; Notable for the following components:    Occult Blood UA 2+ (*)     Leukocytes, UA 3+ (*)     All other components within normal limits    Narrative:     Specimen Source->Urine   URINALYSIS MICROSCOPIC - Abnormal; Notable for the following components:    RBC, UA 17 (*)     WBC, UA 36 (*)     Bacteria Moderate (*)     Yeast, UA Occasional (*)     All other components within normal limits    Narrative:     Specimen Source->Urine   CULTURE, URINE   TROPONIN I   TROPONIN I   BASIC  METABOLIC PANEL   MAGNESIUM   PHOSPHORUS   TROPONIN I   CBC W/ AUTO DIFFERENTIAL   PROTIME-INR   MAGNESIUM   PHOSPHORUS   CBC W/ AUTO DIFFERENTIAL   POCT GLUCOSE MONITORING CONTINUOUS     EKG Readings: (Independently Interpreted)   Initial Reading: No STEMI. Previous EKG: Compared with most recent EKG Rhythm: Normal Sinus Rhythm. Heart Rate: 85. T Waves Flipped: II, III, AVF, AVL, V5 and V6. Axis: Right Axis Deviation. Clinical Impression: Normal Sinus Rhythm     Imaging Results               CTA Chest Non-Coronary (PE Studies) (Final result)  Result time 10/16/22 03:05:05      Final result by Brett Gallagher MD (10/16/22 03:05:05)                   Impression:      There is no large central saddle type pulmonary embolus, sensitivity of the examination most notably with respect to the midsize and smaller vessels and the greatest extent the smaller distal vessels is limited due to artifact, however when accounting for limitations of the exam, an abnormal pattern of pulmonary arterial filling defects specific for pulmonary emboli is not seen.    Left ventricular enlargement.    Pulmonary nodules are again noted appearing stable when compared to the examination of August 2020.    The lungs demonstrate appearance of interstitial thickening that may relate to edema/infiltrate, minimal ground-glass infiltrate and minimal peribronchial thickening as well as mild atelectatic change.  There is also minimal fluid tracking along the major fissure and minor fissure on the right.    Indeterminate left renal lesion, ultrasound follow-up is recommended.    This report was flagged in Epic as abnormal.      Electronically signed by: Brett Gallagher  Date:    10/16/2022  Time:    03:05               Narrative:    EXAMINATION:  CTA CHEST NON CORONARY (PE STUDIES)    CLINICAL HISTORY:  Pulmonary embolism (PE) suspected, unknown D-dimer;    TECHNIQUE:  Low dose axial images, sagittal and coronal reformations were obtained from the  thoracic inlet to the lung bases following the IV administration of 100 mL of Omnipaque 350.  Contrast timing was optimized to evaluate the pulmonary arteries.  MIP images were performed.    COMPARISON:  Chest radiograph October 15, 2022, CT examination of the chest December 27, 2021, CT examination of the chest August 26, 2020    FINDINGS:  There is no large central saddle type pulmonary embolus.  There is artifact including motion artifact which produces heterogeneity of the pulmonary arterial vasculature, diminishing sensitivity of the midsize and smaller vessels, most notably the smaller distal vessels, however when accounting for limitations of the exam an abnormal pattern of pulmonary arterial filling defects specific for pulmonary emboli is not seen.    Cardiac pacemaker is noted.  The thoracic aorta is not well opacified due to timing of imaging optimized for pulmonary arterial vasculature.  There is no evidence for thoracic aortic aneurysmal dilatation.  Aortic atherosclerotic change noted.  The left ventricle appears enlarged, this appears similar to the prior examinations.  There is no evidence for pericardial effusion.  Evaluation for adenopathy is somewhat limited however there is no obvious enlarged mediastinal or hilar adenopathy.    Pulmonary nodules are noted, the most prominent is seen at the right middle lobe image 306 series 3, the pulmonary nodules appears stable when compared to the examination of August 26, 2020.    The lungs demonstrate motion artifact, and there is atelectatic change.  In addition there is appearance of interstitial thickening that may relate to edema/infiltrate.  Minimal ground-glass infiltrates noted, and there is minimal peribronchial thickening noted.  Mild thickening along the major fissure on the right as well as the minor fissure may relate to a small amount of fluid tracking, particularly along the minor fissure.  There is no large pleural effusion seen.  There is no  evidence for pneumothorax.    Limited imaging of the upper abdomen demonstrates a hypodense finding at the upper pole of the left kidney, this measures approximately 10 Hounsfield units however is incompletely imaged and therefore cannot be evaluated on this exam.  In addition along the upper pole posterolaterally there is a 2 cm partially exophytic lesion measuring approximately 26 Hounsfield units and therefore cannot be characterized as a cyst on this exam.  Ultrasound follow-up is recommended.  There is no additional evidence for acute upper abdominal process.  The osseous structures demonstrate chronic change.                                       X-Ray Chest AP Portable (Final result)  Result time 10/15/22 23:31:10      Final result by Brett Gallagher MD (10/15/22 23:31:10)                   Impression:      Mild pulmonary vascular prominence with mild interstitial and patchy alveolar infiltrate.      Electronically signed by: Brett Gallagher  Date:    10/15/2022  Time:    23:31               Narrative:    EXAMINATION:  XR CHEST AP PORTABLE    CLINICAL HISTORY:  Chest Pain;    TECHNIQUE:  Single frontal view of the chest was performed.    COMPARISON:  Chest radiograph October 11, 2022    FINDINGS:  Single portable chest view is submitted.  Cardiac pacemaker is noted.  When accounting for position, technique and depth of inspiration, the appearance of the cardiomediastinal silhouette is stable.    There is mild prominence of the pulmonary vascular noted and there is bilateral pattern of interstitial and mild patchy alveolar infiltrate.  There is also mild elevation of the left hemidiaphragm with mild atelectasis at the left lung base.    There is no evidence for pleural effusion and there is no evidence for pneumothorax.  The osseous structures appear intact.                                       Medications   sodium chloride 0.9% flush 10 mL (has no administration in time range)   naloxone 0.4 mg/mL  injection 0.02 mg (has no administration in time range)   glucose chewable tablet 16 g (has no administration in time range)   glucose chewable tablet 24 g (has no administration in time range)   dextrose 10 % infusion (has no administration in time range)   dextrose 10 % infusion (has no administration in time range)   glucagon (human recombinant) injection 1 mg (has no administration in time range)   acetaminophen tablet 650 mg (has no administration in time range)   ondansetron injection 4 mg (has no administration in time range)   insulin aspart U-100 pen 0-5 Units (has no administration in time range)   melatonin tablet 6 mg (has no administration in time range)   albuterol sulfate nebulizer solution 2.5 mg (2.5 mg Nebulization Given 10/16/22 9126)   albuterol inhaler 2 puff (has no administration in time range)   atorvastatin tablet 80 mg (has no administration in time range)   isosorbide dinitrate tablet 10 mg (has no administration in time range)   metoprolol succinate (TOPROL-XL) 24 hr tablet 100 mg (has no administration in time range)   sacubitriL-valsartan  mg per tablet 1 tablet (has no administration in time range)   spironolactone tablet 25 mg (has no administration in time range)   traMADoL tablet 50 mg (has no administration in time range)   sodium chloride 0.9% flush 10 mL (has no administration in time range)   furosemide injection 40 mg (40 mg Intravenous Given 10/16/22 0523)   insulin detemir U-100 pen 10 Units (has no administration in time range)   HYDROcodone-acetaminophen 5-325 mg per tablet 1 tablet (1 tablet Oral Given 10/16/22 0011)   furosemide injection 40 mg (40 mg Intravenous Given 10/16/22 0048)   iohexoL (OMNIPAQUE 350) injection 100 mL (100 mLs Intravenous Given 10/16/22 0233)     Medical Decision Making:   History:   Old Medical Records: I decided to obtain old medical records.  Old Records Summarized: records from previous admission(s) and records from clinic  visits.  Differential Diagnosis:   Differential diagnoses considered include: ACS, pulmonary embolism, aortic dissection, aortic aneurysm, heart failure exacerbation, pulmonary edema, pneumonia, tension pneumothorax, cardiac tamponade, pleural effusion, Boerhaave's syndrome, esophagitis, GERD, gastritis, pericarditis, endocarditis, myocarditis, cardiomyopathy, pneumonitis, pleuritis, malignancy, costochondritis    Independently Interpreted Test(s):   I have ordered and independently interpreted EKG Reading(s) - see prior notes  Clinical Tests:   Lab Tests: Ordered and Reviewed  Radiological Study: Ordered and Reviewed  Medical Tests: Ordered and Reviewed  ED Management:  Ms. Mohr presented to the ED for chest pain and dyspnea x 3 days that was worsening. This was her second visit for the same complaint.     Patient was hemodynamically stable and afebrile on arrival and throughout their ED course.  She has many cardiac risk factors.     HEART Score for Major Adverse Cardiac Events   Predicts 6-week risk of major adverse cardiac event.  History  : 1  EKG      : 1  Age       : 2  Rfs        : 2  Trop      : 0      Score    : 5    Moderate Score (4-6 points) : Risk of MACE 12-16.6%      CBC unremarkable without leukocytosis, significant anemia, or decreased platelets  CMP unremarkable without significant electrolyte derangement, impaired renal function, or elevated LFTs  BNP significantly higher than prior on 10/11  UA with bacteria, yeast, RBC, WBC   CXR and chest CTA concerning for possibly infection and/or heart failure exacerbation. No PE seen on CTA.   Troponin negative. EKG with nonspecific changes, no STEMI, no significant changes from prior.     Diagnosis is most likely heart failure exacerbation or COPD exacerbation, possibly with underlying infection.     Patient admitted for further workup and management.     Other:   I have discussed this case with another health care provider.          Attending  Attestation:   Physician Attestation Statement for Resident:  As the supervising MD   Physician Attestation Statement: I have personally seen and examined this patient.   I agree with the above history.  -:   As the supervising MD I agree with the above PE.     As the supervising MD I agree with the above treatment, course, plan, and disposition.    I have reviewed and agree with the residents interpretation of the following: x-rays, lab data, EKG and CT scans.  I have reviewed the following: old records at this facility.                           Clinical Impression:   Final diagnoses:  [R07.9] Chest pain (Primary)  [N28.89] Renal mass        ED Disposition Condition    Observation Stable                Rebecca Verdin MD  Resident  10/16/22 2091       Demetria Vargas MD  10/16/22 2266

## 2022-10-16 NOTE — DISCHARGE INSTRUCTIONS
Thank you Miladis for letting us take care of you at [facility]! Remember to take your medications as prescribed, INCLUDING THE XARELTO. It will be important to follow up with a cardiologist and your primary care provider as soon as possible. Please reach out with any further questions or concerns.     MD [email or web address]

## 2022-10-16 NOTE — H&P
"[facility] Medicine  History & Physical    Patient Name: Diomedes Mohr  MRN: [id number]  Patient Class: OP- Observation  Admission Date: 10/15/2022  Attending Physician: Kallie Tristan MD   Primary Care Provider: Fernando Manzo MD         Patient information was obtained from patient, past medical records and ER records.     Subjective:     Principal Problem:Chest pain    Chief Complaint:   Chief Complaint   Patient presents with    Chest Pain     8/10 burning L anterior CP with radiation to back since Wednesday of this week. Denies N/V, SOB, fever.        HPI: 63 yo F PMH NICM ([facility] 2019 pertinent for non-obstructive CAD), HFrEF EF 10-15%, s/p ICD 11/2021, T2DM, htn, hld, h/o R embolic CVA 8/2020 and PE 12/2021 (previously on Xarelto, discontinued about 3-4 months ago) presenting for left sided chest pain x 5 days. Notes that she was talking on the phone with her daughter last week, and suddenly began to have "nagging", sharp pain under her left breast. Pain did not radiate, was not exertional, not associated with deep breathing, no identifiable exacerbations. She took nitroglycerin which did not help. She then went to the ED that same day (10/11). Work up for ACS negative at that time, she was given morphine and discharged to home with instructions to follow up with cardiology. The pain did not improve, and she returned to the ED today. She notes baseline shortness and cough due to her COPD, but both of these have been worse over the past several days. She has had to use her albuterol inhaler every day. She had associated diaphoresis beginning today. She otherwise denies fevers, rash, abdominal pain, diarrhea, orthopnea, PND, leg swelling.     Of note, she had a PET Stress 4/2022 which was pertinent for small (<5%) mild to moderate intensity lateral apical perfusion abnormality of 3% LV myocardium. ED 10% and rest, 13% during stress.     ED workup pertinent for: HDS, HR 80's, /77, 97% " RA. CBC and BMP unremarkable. EKG with no acute abnormalities. Troponin 0.018, repeat 0.011. BNP elevated 1,038. CXR with mild interstitial and patchy alveolar infiltrate. CTA negative for PE. She received a dose of IV Lasix and hydrocodone. Pain is now completely resolved. She will be admitted to hospital medicine for additional diuresis in setting of presumed CHF exacerbation.       Past Medical History:   Diagnosis Date    Anticoagulant long-term use     Anxiety     Arthritis     Asthma     CHF (congestive heart failure)     COPD (chronic obstructive pulmonary disease)     Depression     Diabetes mellitus     Dyspnea     H/O coronary angiogram     H/O: hysterectomy     History of automatic internal cardiac defibrillator (AICD)     Hyperlipemia     Hypertension     Pulmonary edema     Schizophrenia     Stroke        Past Surgical History:   Procedure Laterality Date    BLADDER SUSPENSION      CARPAL TUNNEL RELEASE Right     HEEL SPUR SURGERY Left     HYSTERECTOMY      LEFT HEART CATHETERIZATION Bilateral 12/27/2019    Procedure: Left heart cath;  Surgeon: Steve Chambers MD;  Location: [facility] CATH LAB;  Service: Cardiology;  Laterality: Bilateral;    RIGHT HEART CATHETERIZATION Right 7/26/2021    Procedure: INSERTION, CATHETER, RIGHT HEART;  Surgeon: Petr Naranjo MD;  Location: [facility] CATH LAB;  Service: Cardiology;  Laterality: Right;    RIGHT HEART CATHETERIZATION Right 5/12/2022    Procedure: INSERTION, CATHETER, RIGHT HEART;  Surgeon: Chandana Ivory Jr., MD;  Location: [facility] CATH LAB;  Service: Cardiology;  Laterality: Right;    TUBAL LIGATION         Review of patient's allergies indicates:   Allergen Reactions    Adhesive Blisters    Captopril Other (See Comments)     COUGH       No current facility-administered medications on file prior to encounter.     Current Outpatient Medications on File Prior to Encounter   Medication Sig    acetaminophen (TYLENOL) 325 MG tablet Take 2 tablets (650 mg  total) by mouth every 8 (eight) hours as needed.    albuterol (PROVENTIL) 2.5 mg /3 mL (0.083 %) nebulizer solution Take 3 mLs (2.5 mg total) by nebulization every 6 (six) hours as needed for Wheezing or Shortness of Breath. Rescue    albuterol (PROVENTIL/VENTOLIN HFA) 90 mcg/actuation inhaler Inhale 2 puffs into the lungs every 6 (six) hours as needed for Wheezing or Shortness of Breath.    atorvastatin (LIPITOR) 80 MG tablet Take 1 tablet (80 mg total) by mouth once daily.    dulaglutide (TRULICITY) 1.5 mg/0.5 mL pen injector Inject into the skin once a week.     glimepiride (AMARYL) 4 MG tablet Take 4 mg by mouth once daily.    hydrALAZINE (APRESOLINE) 25 MG tablet Take 1 tablet (25 mg total) by mouth 3 (three) times daily.    isosorbide dinitrate (ISORDIL) 10 MG tablet Take 1 tablet (10 mg total) by mouth 3 (three) times daily.    metFORMIN (GLUCOPHAGE) 1000 MG tablet Take 1,000 mg by mouth 2 (two) times daily.     metoprolol succinate (TOPROL-XL) 100 MG 24 hr tablet Take 1 tablet (100 mg total) by mouth once daily.    sacubitriL-valsartan (ENTRESTO)  mg per tablet Take 1 tablet by mouth 2 (two) times daily.    spironolactone (ALDACTONE) 25 MG tablet Take 1 tablet (25 mg total) by mouth once daily.    torsemide (DEMADEX) 10 MG Tab Take 1 tablet (10 mg total) by mouth once daily.    traMADoL (ULTRAM) 50 mg tablet     dapagliflozin (FARXIGA) 10 mg tablet Take 1 tablet (10 mg total) by mouth once daily.    meclizine (ANTIVERT) 25 mg tablet Take 1 tablet (25 mg total) by mouth 3 (three) times daily as needed for Dizziness. (Patient not taking: No sig reported)    potassium chloride (MICRO-K) 10 MEQ CpSR     potassium chloride (MICRO-K) 10 MEQ CpSR Take 2 capsules (20 mEq total) by mouth once daily.    rivaroxaban (XARELTO) 20 mg Tab Take 1 tablet (20 mg total) by mouth daily with dinner or evening meal.     Family History       Problem Relation (Age of Onset)    No Known Problems Mother, Father           Tobacco Use    Smoking status: Former     Types: Cigarettes     Quit date: 2016     Years since quittin.1    Smokeless tobacco: Never    Tobacco comments:     nonex 2 weeks   Substance and Sexual Activity    Alcohol use: No     Alcohol/week: 0.0 standard drinks    Drug use: No    Sexual activity: Not on file     Review of Systems   Constitutional:  Positive for diaphoresis (1 day) and fatigue. Negative for fever.   Eyes:  Negative for visual disturbance.   Respiratory:  Positive for cough and shortness of breath. Negative for chest tightness and wheezing.    Cardiovascular:  Positive for chest pain. Negative for palpitations and leg swelling.   Gastrointestinal:  Negative for abdominal pain, constipation, diarrhea, nausea and vomiting.   Genitourinary:  Negative for dysuria and urgency.   Musculoskeletal:  Positive for back pain.   Skin:  Negative for rash and wound.   Neurological:  Negative for dizziness, numbness and headaches.   Psychiatric/Behavioral:  The patient is not nervous/anxious.    Objective:     Vital Signs (Most Recent):  Temp: 98.1 °F (36.7 °C) (10/16/22 0514)  Pulse: 75 (10/16/22 05)  Resp: 16 (10/16/22 0514)  BP: 126/72 (10/16/22 0514)  SpO2: 96 % (10/16/22 0514) Vital Signs (24h Range):  Temp:  [98.1 °F (36.7 °C)-98.5 °F (36.9 °C)] 98.1 °F (36.7 °C)  Pulse:  [75-93] 75  Resp:  [16-28] 16  SpO2:  [96 %-100 %] 96 %  BP: (120-144)/(69-86) 126/72     Weight: 90.6 kg (199 lb 11.8 oz)  Body mass index is 32.25 kg/m².    Physical Exam  Vitals and nursing note reviewed.   Constitutional:       General: She is not in acute distress.     Appearance: Normal appearance.      Comments: Appears comfortable, no labored breathing  Clinically appears euvolemic - no JVD or edema   HENT:      Head: Normocephalic.      Mouth/Throat:      Mouth: Mucous membranes are moist.   Eyes:      General: No scleral icterus.  Cardiovascular:      Rate and Rhythm: Normal rate and regular rhythm.      Heart sounds:  Normal heart sounds. No murmur heard.    No friction rub. No gallop.   Pulmonary:      Effort: Pulmonary effort is normal. No respiratory distress.      Breath sounds: Normal breath sounds.      Comments: Auscultation limited as patient would cough with deep inspiration  Chest:      Chest wall: No tenderness.   Abdominal:      General: Abdomen is flat. There is no distension.      Palpations: Abdomen is soft.      Tenderness: There is no abdominal tenderness.   Musculoskeletal:         General: Normal range of motion.      Cervical back: Normal range of motion.      Right lower leg: No edema.      Left lower leg: No edema.   Skin:     General: Skin is warm and dry.   Neurological:      General: No focal deficit present.      Mental Status: She is alert. Mental status is at baseline.   Psychiatric:         Mood and Affect: Mood normal.         Behavior: Behavior normal.           Significant Labs: CBC:   Recent Labs   Lab 10/15/22  2300   WBC 8.45   HGB 11.2*   HCT 34.3*        CMP:   Recent Labs   Lab 10/15/22  2300      K 3.5      CO2 27   *   BUN 12   CREATININE 0.9   CALCIUM 9.2   PROT 7.1   ALBUMIN 3.4*   BILITOT 0.4   ALKPHOS 156*   AST 12   ALT 13   ANIONGAP 6*     Cardiac Markers:   Recent Labs   Lab 10/15/22  2300   BNP 1,038*     Troponin:   Recent Labs   Lab 10/15/22  2300 10/16/22  0217   TROPONINI 0.018 0.011       Significant Imaging: I have reviewed all pertinent imaging results/findings within the past 24 hours.    Assessment/Plan:     * Chest pain  63 yo F PMH NICM, CAD, HFrEF EF 10-15%, s/p ICD 11/2021, T2DM, htn, hld, h/o R embolic CVA 8/2020 and PE 12/2021 presenting for left sided chest pain x 5 days.   - Chest pain resolved with hydrocodone.   - Troponin negative x 2. EKG negative. BNP elevated 1038. CTA negative for PE.   - Ddx: Most likely CHF exacerbation given pleuritic pain (though it does not increase with inspiration) and elevated BNP vs demand ischemia. Consider  MSK pain as it resolved with hydrocodone, though not tender to palpation and not positional. ACS unlikely - troponin negative, EKG WNL, and pain did not respond to nitroglycerin. PNA unlikely - no fever or productive cough; CXR not concerning    Plan:  - Treat for CHF exacerbation: IV Lasix 40mg BID  - nitro prn chest pain  - EKG prn chest pain   - Echo  - continue home GDMT          Chronic combined systolic and diastolic CHF, NYHA class 3  Recent stress test 4/2022: EF 10% at rest and 13% during stress. Echo at that time pertinent for Grade 2 diastolic dysfunction    Plan:  - IV Lasix 40mg BID  - Continue home GDMT; patient states she is not currently Farxiga   - Strict I's/O's  - Daily weight  - Keep Mg > 2, K > 4  - Cardiac diet    Chronic obstructive pulmonary disease  Patient notes that her baseline shortness of breath and cough have worsened over the past several days    - Nebs prn      Type 2 diabetes mellitus without complication, without long-term current use of insulin  Patient's FSGs are controlled on current medication regimen.  Last A1c reviewed-   Lab Results   Component Value Date    HGBA1C 6.8 (H) 01/10/2022     Most recent fingerstick glucose reviewed- No results for input(s): POCTGLUCOSE in the last 24 hours.  Current correctional scale  Medium    Antihyperglycemics (From admission, onward)      Start     Stop Route Frequency Ordered    10/16/22 2100  insulin detemir U-100 pen 10 Units         -- SubQ Nightly 10/16/22 0442    10/16/22 0434  insulin aspart U-100 pen 0-5 Units         -- SubQ Before meals & nightly PRN 10/16/22 0337          Hold Oral hypoglycemics while patient is in the hospital.    History of pulmonary embolism  History of PE in 12/2021 after pacemaker insertion 11/2021. Treated with ~6 months Xarelto, discontinued 3-4 months ago.  -CTA negative    - Lovenox       ICD (implantable cardioverter-defibrillator) in place  Placed 11/2021      Coronary artery disease involving native  heart  [facility] 12/2019 pertinent for non-obstructive CAD    - Continue home statin      History of embolic stroke involving right middle cerebral artery  H/o R MCA embolic CVA s/p tPA 8/2020. Contributing factors include smoking (now quit), htn, DM. No residual deficits      Essential hypertension  - Continue home isosorbide dinitrate, troprol, entresto, aldactone  - holding home hydralazine in setting of normotension      VTE Risk Mitigation (From admission, onward)           Ordered     enoxaparin injection 40 mg  Daily         10/16/22 0629     IP VTE HIGH RISK PATIENT  Once         10/16/22 0625     Place sequential compression device  Until discontinued         10/16/22 0441     Place sequential compression device  Until discontinued         10/16/22 0337                       Lillian Alfonso DO  Department of Hospital Medicine   [facility] - Observation

## 2022-10-16 NOTE — DISCHARGE SUMMARY
"[facility] - Observation  [facility] Medicine  Discharge Summary      Patient Name: Diomedes Mohr  MRN: [id number]  Patient Class: OP- Observation  Admission Date: 10/15/2022  Hospital Length of Stay: 0 days  Discharge Date and Time:  10/16/2022 1:32 PM  Attending Physician: Kallie Tristan MD   Discharging Provider: Zac Cain MD  Primary Care Provider: Fernando Manzo MD  Hospital Medicine Team: [facility] HOSP MED 4 Zac Cain MD    HPI:   63 yo F with NICM ([facility] 2019 pertinent for non-obstructive CAD), HFrEF EF 10-15%, s/p ICD 11/2021, T2DM, htn, hld, h/o R embolic CVA 8/2020 and PE 12/2021 (previously on Xarelto, discontinued about 3-4 months ago) presenting for left sided chest pain x 5 days. Notes that she was talking on the phone with her daughter last week, and suddenly began to have "nagging", sharp pain under her left breast. Pain did not radiate, was not exertional, not associated with deep breathing, no identifiable exacerbations. She took nitroglycerin which did not help. She then went to the ED that same day (10/11). Work up for ACS negative at that time, she was given morphine and discharged to home with instructions to follow up with cardiology. The pain did not improve, and she returned to the ED today. She notes baseline shortness and cough due to her COPD, but both of these have been worse over the past several days. She has had to use her albuterol inhaler every day. She had associated diaphoresis beginning today. She otherwise denies fevers, rash, abdominal pain, diarrhea, orthopnea, PND, leg swelling.     Of note, she had a PET Stress 4/2022 which was pertinent for small (<5%) mild to moderate intensity lateral apical perfusion abnormality of 3% LV myocardium. EF 10% and rest, 13% during stress.     ED workup pertinent for: HDS, HR 80's, /77, 97% RA. CBC and BMP unremarkable. EKG with no acute abnormalities. Troponin 0.018, repeat 0.011. BNP elevated 1,038. CXR with mild interstitial and " patchy alveolar infiltrate. CTA negative for PE. She received a dose of IV Lasix and hydrocodone. Pain is now completely resolved. She will be admitted to hospital medicine for additional diuresis in setting of presumed CHF exacerbation.       * No surgery found *      Hospital Course:   Patient was treated for heart failure exacerbation. Echo with EF 15%, enlarged ventricle with eccentric hypertrophy, decreased systolic and diastolic  function, mild LAE, and CVP 3mm Hg. Patient's breathing and chest pain improved after Lasix administration. EKG without evidence of ST elevations and troponin within normal limits.     Dispo: Discussed with patient to continue taking GDMT medications and xarelto for stroke prevention. In addition, emphasized importance of following up with cardiologist as soon as possible. F/u with cardiology and pcp outpatient.            Goals of Care Treatment Preferences:  Code Status: Full Code      Consults:   Consults (From admission, onward)        Status Ordering Provider     Inpatient consult to Social Work/Case Management  Once        Provider:  (Not yet assigned)    Acknowledged BERTHA CRUZ     Inpatient consult to Registered Dietitian/Nutritionist  Once        Provider:  (Not yet assigned)    Acknowledged BERTHA CRUZ          No new Assessment & Plan notes have been filed under this hospital service since the last note was generated.  Service: Hospital Medicine    Final Active Diagnoses:    Diagnosis Date Noted POA    PRINCIPAL PROBLEM:  Acute on chronic combined systolic and diastolic heart failure [I50.43] 12/26/2019 Yes    History of pulmonary embolism [Z86.711] 07/29/2022 Yes    ICD (implantable cardioverter-defibrillator) in place [Z95.810] 03/31/2022 Yes    Chronic obstructive pulmonary disease [J44.9]  Yes    Coronary artery disease involving native heart [I25.10] 10/11/2021 Yes    Chest pain [R07.9] 10/10/2021 Yes    History of embolic stroke involving right middle  cerebral artery [I63.411] 08/26/2020 Yes     Chronic    Essential hypertension [I10] 12/25/2019 Yes     Chronic    Type 2 diabetes mellitus without complication, without long-term current use of insulin [E11.9] 12/25/2019 Yes     Chronic    Mixed hyperlipidemia [E78.2] 12/25/2019 Yes     Chronic      Problems Resolved During this Admission:       Discharged Condition: fair    Disposition:     Follow Up:    Patient Instructions:      Ambulatory referral/consult to Cardiology   Standing Status: Future   Referral Priority: Urgent Referral Type: Consultation   Referral Reason: Specialty Services Required   Requested Specialty: Cardiology   Number of Visits Requested: 1     Ambulatory referral/consult to Internal Medicine   Standing Status: Future   Referral Priority: Routine Referral Type: Consultation   Referral Reason: Specialty Services Required   Requested Specialty: Internal Medicine   Number of Visits Requested: 1       Significant Diagnostic Studies: Labs:   CMP   Recent Labs   Lab 10/15/22  2300 10/16/22  0720    138   K 3.5 3.4*    102   CO2 27 29   * 181*   BUN 12 10   CREATININE 0.9 0.9   CALCIUM 9.2 9.0   PROT 7.1  --    ALBUMIN 3.4*  --    BILITOT 0.4  --    ALKPHOS 156*  --    AST 12  --    ALT 13  --    ANIONGAP 6* 7*    and CBC   Recent Labs   Lab 10/15/22  2300 10/16/22  0720 10/16/22  0721   WBC 8.45 7.02 7.12   HGB 11.2* 11.2* 11.3*   HCT 34.3* 34.5* 35.0*    281 271     Radiology: X-Ray: CXR: X-Ray Chest 1 View (CXR): Mild pulmonary vascular prominence with mild interstitial and patchy alveolar infiltrate.     Cardiac Graphics: ECG: NSR     ECHO:    The left ventricle is severely enlarged with eccentric hypertrophy and severely decreased systolic function. The estimated ejection fraction is 15%.   Normal right ventricular size with normal right ventricular systolic function.   Grade II left ventricular diastolic dysfunction.   Mild left atrial enlargement.   The  estimated PA systolic pressure is 55 mmHg.   Normal central venous pressure (3 mmHg).    CTA chest:     There is no large central saddle type pulmonary embolus, sensitivity of the examination most notably with respect to the midsize and smaller vessels and the greatest extent the smaller distal vessels is limited due to artifact, however when accounting for limitations of the exam, an abnormal pattern of pulmonary arterial filling defects specific for pulmonary emboli is not seen.     Left ventricular enlargement.     Pulmonary nodules are again noted appearing stable when compared to the examination of August 2020.     The lungs demonstrate appearance of interstitial thickening that may relate to edema/infiltrate, minimal ground-glass infiltrate and minimal peribronchial thickening as well as mild atelectatic change.  There is also minimal fluid tracking along the major fissure and minor fissure on the right.     Indeterminate left renal lesion, ultrasound follow-up is recommended.   Pending Diagnostic Studies:     None         Medications:  Reconciled Home Medications:      Medication List      CHANGE how you take these medications    metoprolol succinate 100 MG 24 hr tablet  Commonly known as: TOPROL-XL  Take 1 tablet (100 mg total) by mouth once daily.  What changed: how much to take     rivaroxaban 10 mg Tab  Commonly known as: XARELTO  Take 2 tablets (20 mg total) by mouth daily with dinner or evening meal.  What changed: medication strength        CONTINUE taking these medications    acetaminophen 325 MG tablet  Commonly known as: TYLENOL  Take 2 tablets (650 mg total) by mouth every 8 (eight) hours as needed.     * albuterol 90 mcg/actuation inhaler  Commonly known as: PROVENTIL/VENTOLIN HFA  Inhale 2 puffs into the lungs every 6 (six) hours as needed for Wheezing or Shortness of Breath.     * albuterol 2.5 mg /3 mL (0.083 %) nebulizer solution  Commonly known as: PROVENTIL  Take 3 mLs (2.5 mg total) by  nebulization every 6 (six) hours as needed for Wheezing or Shortness of Breath. Rescue     atorvastatin 80 MG tablet  Commonly known as: LIPITOR  Take 1 tablet (80 mg total) by mouth once daily.     dulaglutide 1.5 mg/0.5 mL pen injector  Commonly known as: TRULICITY  Inject 1.5 mg into the skin once a week.     ENTRESTO 24-26 mg per tablet  Generic drug: sacubitriL-valsartan  Take 1 tablet by mouth 2 (two) times daily.     glimepiride 4 MG tablet  Commonly known as: AMARYL  Take 4 mg by mouth 2 (two) times a day.     isosorbide dinitrate 10 MG tablet  Commonly known as: ISORDIL  Take 1 tablet (10 mg total) by mouth 3 (three) times daily.     metFORMIN 1000 MG tablet  Commonly known as: GLUCOPHAGE  Take 1,000 mg by mouth 2 (two) times daily.     spironolactone 25 MG tablet  Commonly known as: ALDACTONE  Take 1 tablet (25 mg total) by mouth once daily.     torsemide 10 MG Tab  Commonly known as: DEMADEX  Take 2 tablets (20 mg total) by mouth once daily.     traMADoL 50 mg tablet  Commonly known as: ULTRAM  Take 100 mg by mouth every 12 (twelve) hours as needed for Pain.         * This list has 2 medication(s) that are the same as other medications prescribed for you. Read the directions carefully, and ask your doctor or other care provider to review them with you.                Indwelling Lines/Drains at time of discharge:   Lines/Drains/Airways     None                 Time spent on the discharge of patient: 45 minutes         Zac Cain MD  Department of Hospital Medicine  [facility] - Observation

## 2022-10-16 NOTE — SUBJECTIVE & OBJECTIVE
Past Medical History:   Diagnosis Date    Anticoagulant long-term use     Anxiety     Arthritis     Asthma     CHF (congestive heart failure)     COPD (chronic obstructive pulmonary disease)     Depression     Diabetes mellitus     Dyspnea     H/O coronary angiogram     H/O: hysterectomy     History of automatic internal cardiac defibrillator (AICD)     Hyperlipemia     Hypertension     Pulmonary edema     Schizophrenia     Stroke        Past Surgical History:   Procedure Laterality Date    BLADDER SUSPENSION      CARPAL TUNNEL RELEASE Right     HEEL SPUR SURGERY Left     HYSTERECTOMY      LEFT HEART CATHETERIZATION Bilateral 12/27/2019    Procedure: Left heart cath;  Surgeon: Steve Chambers MD;  Location: [facility] CATH LAB;  Service: Cardiology;  Laterality: Bilateral;    RIGHT HEART CATHETERIZATION Right 7/26/2021    Procedure: INSERTION, CATHETER, RIGHT HEART;  Surgeon: Petr Naranjo MD;  Location: [facility] CATH LAB;  Service: Cardiology;  Laterality: Right;    RIGHT HEART CATHETERIZATION Right 5/12/2022    Procedure: INSERTION, CATHETER, RIGHT HEART;  Surgeon: Chandana Ivory Jr., MD;  Location: [facility] CATH LAB;  Service: Cardiology;  Laterality: Right;    TUBAL LIGATION         Review of patient's allergies indicates:   Allergen Reactions    Adhesive Blisters    Captopril Other (See Comments)     COUGH       No current facility-administered medications on file prior to encounter.     Current Outpatient Medications on File Prior to Encounter   Medication Sig    acetaminophen (TYLENOL) 325 MG tablet Take 2 tablets (650 mg total) by mouth every 8 (eight) hours as needed.    albuterol (PROVENTIL) 2.5 mg /3 mL (0.083 %) nebulizer solution Take 3 mLs (2.5 mg total) by nebulization every 6 (six) hours as needed for Wheezing or Shortness of Breath. Rescue    albuterol (PROVENTIL/VENTOLIN HFA) 90 mcg/actuation inhaler Inhale 2 puffs into the lungs every 6 (six) hours as needed for Wheezing or Shortness of  Breath.    atorvastatin (LIPITOR) 80 MG tablet Take 1 tablet (80 mg total) by mouth once daily.    dulaglutide (TRULICITY) 1.5 mg/0.5 mL pen injector Inject into the skin once a week.     glimepiride (AMARYL) 4 MG tablet Take 4 mg by mouth once daily.    hydrALAZINE (APRESOLINE) 25 MG tablet Take 1 tablet (25 mg total) by mouth 3 (three) times daily.    isosorbide dinitrate (ISORDIL) 10 MG tablet Take 1 tablet (10 mg total) by mouth 3 (three) times daily.    metFORMIN (GLUCOPHAGE) 1000 MG tablet Take 1,000 mg by mouth 2 (two) times daily.     metoprolol succinate (TOPROL-XL) 100 MG 24 hr tablet Take 1 tablet (100 mg total) by mouth once daily.    sacubitriL-valsartan (ENTRESTO)  mg per tablet Take 1 tablet by mouth 2 (two) times daily.    spironolactone (ALDACTONE) 25 MG tablet Take 1 tablet (25 mg total) by mouth once daily.    torsemide (DEMADEX) 10 MG Tab Take 1 tablet (10 mg total) by mouth once daily.    traMADoL (ULTRAM) 50 mg tablet     dapagliflozin (FARXIGA) 10 mg tablet Take 1 tablet (10 mg total) by mouth once daily.    meclizine (ANTIVERT) 25 mg tablet Take 1 tablet (25 mg total) by mouth 3 (three) times daily as needed for Dizziness. (Patient not taking: No sig reported)    potassium chloride (MICRO-K) 10 MEQ CpSR     potassium chloride (MICRO-K) 10 MEQ CpSR Take 2 capsules (20 mEq total) by mouth once daily.    rivaroxaban (XARELTO) 20 mg Tab Take 1 tablet (20 mg total) by mouth daily with dinner or evening meal.     Family History       Problem Relation (Age of Onset)    No Known Problems Mother, Father          Tobacco Use    Smoking status: Former     Types: Cigarettes     Quit date: 2016     Years since quittin.1    Smokeless tobacco: Never    Tobacco comments:     nonex 2 weeks   Substance and Sexual Activity    Alcohol use: No     Alcohol/week: 0.0 standard drinks    Drug use: No    Sexual activity: Not on file     Review of Systems   Constitutional:  Positive for diaphoresis (1  day) and fatigue. Negative for fever.   Eyes:  Negative for visual disturbance.   Respiratory:  Positive for cough and shortness of breath. Negative for chest tightness and wheezing.    Cardiovascular:  Positive for chest pain. Negative for palpitations and leg swelling.   Gastrointestinal:  Negative for abdominal pain, constipation, diarrhea, nausea and vomiting.   Genitourinary:  Negative for dysuria and urgency.   Musculoskeletal:  Positive for back pain.   Skin:  Negative for rash and wound.   Neurological:  Negative for dizziness, numbness and headaches.   Psychiatric/Behavioral:  The patient is not nervous/anxious.    Objective:     Vital Signs (Most Recent):  Temp: 98.1 °F (36.7 °C) (10/16/22 0514)  Pulse: 75 (10/16/22 0552)  Resp: 16 (10/16/22 0514)  BP: 126/72 (10/16/22 0514)  SpO2: 96 % (10/16/22 0514) Vital Signs (24h Range):  Temp:  [98.1 °F (36.7 °C)-98.5 °F (36.9 °C)] 98.1 °F (36.7 °C)  Pulse:  [75-93] 75  Resp:  [16-28] 16  SpO2:  [96 %-100 %] 96 %  BP: (120-144)/(69-86) 126/72     Weight: 90.6 kg (199 lb 11.8 oz)  Body mass index is 32.25 kg/m².    Physical Exam  Vitals and nursing note reviewed.   Constitutional:       General: She is not in acute distress.     Appearance: Normal appearance.      Comments: Appears comfortable, no labored breathing  Clinically appears euvolemic - no JVD or edema   HENT:      Head: Normocephalic.      Mouth/Throat:      Mouth: Mucous membranes are moist.   Eyes:      General: No scleral icterus.  Cardiovascular:      Rate and Rhythm: Normal rate and regular rhythm.      Heart sounds: Normal heart sounds. No murmur heard.    No friction rub. No gallop.   Pulmonary:      Effort: Pulmonary effort is normal. No respiratory distress.      Breath sounds: Normal breath sounds.      Comments: Auscultation limited as patient would cough with deep inspiration  Chest:      Chest wall: No tenderness.   Abdominal:      General: Abdomen is flat. There is no distension.       Palpations: Abdomen is soft.      Tenderness: There is no abdominal tenderness.   Musculoskeletal:         General: Normal range of motion.      Cervical back: Normal range of motion.      Right lower leg: No edema.      Left lower leg: No edema.   Skin:     General: Skin is warm and dry.   Neurological:      General: No focal deficit present.      Mental Status: She is alert. Mental status is at baseline.   Psychiatric:         Mood and Affect: Mood normal.         Behavior: Behavior normal.           Significant Labs: CBC:   Recent Labs   Lab 10/15/22  2300   WBC 8.45   HGB 11.2*   HCT 34.3*        CMP:   Recent Labs   Lab 10/15/22  2300      K 3.5      CO2 27   *   BUN 12   CREATININE 0.9   CALCIUM 9.2   PROT 7.1   ALBUMIN 3.4*   BILITOT 0.4   ALKPHOS 156*   AST 12   ALT 13   ANIONGAP 6*     Cardiac Markers:   Recent Labs   Lab 10/15/22  2300   BNP 1,038*     Troponin:   Recent Labs   Lab 10/15/22  2300 10/16/22  0217   TROPONINI 0.018 0.011       Significant Imaging: I have reviewed all pertinent imaging results/findings within the past 24 hours.

## 2022-10-16 NOTE — ASSESSMENT & PLAN NOTE
History of PE in 12/2021 after pacemaker insertion 11/2021. Treated with ~6 months Xarelto, discontinued 3-4 months ago.  -CTA negative    - Lovenox

## 2022-10-16 NOTE — ASSESSMENT & PLAN NOTE
Patient notes that her baseline shortness of breath and cough have worsened over the past several days    - Nebs prn

## 2022-10-16 NOTE — HPI
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 Notes that she was talking on the phone with her daughter last week, and suddenly began to have "nagging", sharp pain under her left breast. Pain did not radiate, was not exertional, not associated with deep breathing, no identifiable exacerbations. She took nitroglycerin which did not help. She then went to the ED that same day (10/11). Work up for ACS negative at that time, she was given morphine and discharged to home with instructions to follow up with cardiology. The pain did not improve, and she returned to the ED today. She notes baseline shortness and cough due to her COPD, but both of these have been worse over the past several days. She has had to use her albuterol inhaler every day. She had associated diaphoresis beginning today. She otherwise denies fevers, rash, abdominal pain, diarrhea, orthopnea, PND, leg swelling.     Of note, she had a PET Stress 4/2022 which was pertinent for small (<5%) mild to moderate intensity lateral apical perfusion abnormality of 3% LV myocardium. EF 10% and rest, 13% during stress.     ED workup pertinent for: HDS, HR 80's, /77, 97% RA. CBC and BMP unremarkable. EKG with no acute abnormalities. Troponin 0.018, repeat 0.011. BNP elevated 1,038. CXR with mild interstitial and patchy alveolar infiltrate. CTA negative for PE. She received a dose of IV Lasix and hydrocodone. Pain is now completely resolved. She will be admitted to hospital medicine for additional diuresis in setting of presumed CHF exacerbation.   "

## 2022-10-16 NOTE — PHARMACY MED REC
"Admission Medication History     The home medication history was taken by Malia Ziegler.    You may go to "Admission" then "Reconcile Home Medications" tabs to review and/or act upon these items.     The home medication list has been updated by the Pharmacy department.   Please read ALL comments highlighted in yellow.   Please address this information as you see fit.    Feel free to contact us if you have any questions or require assistance.      The medications listed below were removed from the home medication list. Please reorder if appropriate:  Patient reports no longer taking the following medication(s):  DAPAGLIFLOZIN 10 MG  HYDRALAZINE 25 MG  MECLIZINE 25 MG  POTASSIUM CHLORIDE 10 MEQ    Medications listed below were obtained from: Patient/family  PTA Medications   Medication Sig    acetaminophen (TYLENOL) 325 MG tablet   Take 2 tablets (650 mg total) by mouth every 8 (eight) hours as needed.    albuterol (PROVENTIL) 2.5 mg /3 mL (0.083 %) nebulizer solution   Take 3 mLs (2.5 mg total) by nebulization every 6 (six) hours as needed for Wheezing or Shortness of Breath. Rescue    albuterol (PROVENTIL/VENTOLIN HFA) 90 mcg/actuation inhaler   Inhale 2 puffs into the lungs every 6 (six) hours as needed for Wheezing or Shortness of Breath.    atorvastatin (LIPITOR) 80 MG tablet   Take 1 tablet (80 mg total) by mouth once daily.    dulaglutide (TRULICITY) 1.5 mg/0.5 mL pen injector   Inject 1.5 mg into the skin once a week.    glimepiride (AMARYL) 4 MG tablet   Take 4 mg by mouth 2 (two) times a day.    isosorbide dinitrate (ISORDIL) 10 MG tablet   Take 1 tablet (10 mg total) by mouth 3 (three) times daily.    metFORMIN (GLUCOPHAGE) 1000 MG tablet   Take 1,000 mg by mouth 2 (two) times daily.     metoprolol succinate (TOPROL-XL) 100 MG 24 hr tablet   Take 1 tablet (100 mg total) by mouth once daily. (Patient taking differently: Take 50 mg by mouth once daily.)    sacubitriL-valsartan (ENTRESTO) 24-26 mg per " tablet   Take 1 tablet by mouth 2 (two) times daily.    spironolactone (ALDACTONE) 25 MG tablet   Take 1 tablet (25 mg total) by mouth once daily.    torsemide (DEMADEX) 10 MG Tab   Take 20 mg by mouth once daily.    traMADoL (ULTRAM) 50 mg tablet   Take 100 mg by mouth every 12 (twelve) hours as needed for Pain.    rivaroxaban (XARELTO) 20 mg Tab Take 1 tablet (20 mg total) by mouth daily with dinner or evening meal. (Patient not taking: Reported on 10/16/2022)       Potential issues to be addressed PRIOR TO DISCHARGE  Patient reported not taking the following medications: (RIVAROXABAN 20 MG). These medications remain on the home medication list. Please address accordingly.   Please discuss with the patient barriers to adherence with medication treatment plans  Patient requires education regarding drug therapies     Malia Ziegler  EXT [phone]                  .

## 2022-10-16 NOTE — ASSESSMENT & PLAN NOTE
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   - Chest pain resolved with hydrocodone.   - Troponin negative x 2. EKG negative. BNP elevated 1038. CTA negative for PE.   - Ddx: Most likely CHF exacerbation given pleuritic pain (though it does not increase with inspiration) and elevated BNP vs demand ischemia. Consider MSK pain as it resolved with hydrocodone, though not tender to palpation and not positional. ACS unlikely - troponin negative, EKG WNL, and pain did not respond to nitroglycerin. PNA unlikely - no fever or productive cough; CXR not concerning    Plan:  - Treat for CHF exacerbation: IV Lasix 40mg BID  - nitro prn chest pain  - EKG prn chest pain   - Echo  - continue home GDMT

## 2022-10-17 LAB
BACTERIA UR CULT: NORMAL
BACTERIA UR CULT: NORMAL

## 2022-10-17 NOTE — PROGRESS NOTES
[facility] ([facility] Team notified of pt referral via [facility] (automated inbasket notification) .    Patient screened today by provider and RN for enrollment to program.      Pt was deemed not a candidate for enrollment at this time related to patient is followed by [facility].Pt followed by [facility]. Message sent to schedulers for follow up.       Pt will require additional follow up planning per primary team.     If pt status, diagnosis, or treatment plan changes , please place AMB referral to [facility] ([id number]) for [facility] enrollment re-evalution.

## 2022-10-24 NOTE — PROGRESS NOTES
ADVANCED HEART FAILURE AND TRANSPLANTATION CLINIC VISIT    CHIEF COMPLAINT:  Follow-up heart failure    HISTORY OF PRESENT ILLNESS:  Diomedes Mohr is a 62 y.o. 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 she had a PET Stress 4/2022 which was pertinent for small (<5%) mild to moderate intensity lateral apical perfusion abnormality of 3% LV myocardium. EF 10% and rest, 13% during stress. ED workup pertinent for: HDS, HR 80's, /77, 97% RA. CBC and BMP unremarkable. EKG with no acute abnormalities. Troponin 0.018, repeat 0.011. BNP elevated 1,038. CXR with mild interstitial and patchy alveolar infiltrate. CTA negative for PE. She was discharged 10/16 with outpatient follow-up.     She has had several admissions in 2020 for decompensated HF and now most recently this. Last seen in [facility] clinic 4/25/2022 at which time PET stress was completed as well as RHC noted below with consideration for pulmonary follow-up after history of PE (evaluated by hematology) and history of COPD. This month she has already presented twice to hospital. Since discharge, she feels well from chest discomfort standpoint but notes persistent issues with numbness of left arm. Notes SOB constantly even while seated and is more difficult wearing the mask. Has history of tobacco use prior to stroke and was diagnosed with COPD and is not on any maintenance inhaler only PRN albuterol. States checks weights daily and notes 1 lb gain since discharge. Notes sleeping on side or using 3 pillows constantly. Does not recall  having sleep study previously. States appetite is good and no constipation. Denies PND, bendopnea, abdominal distension, early satiety, nausea, lower extremity edema, chest discomfort, presyncope, palpitations, or syncope. Denies adverse bleeding, no hematochezia/melena/hematuria/hematemesis after Xarelto restarted on discharge.     Current diuretic regimen: torsemide 20 mg daily with adequate urinary response.     GDMT: Toprol 100 mg daily, Entresto 24-26 mg BID, Aldactone 25 mg daily  Also on isosorbide 10 mg TID    Cardiac Data:  Transthoracic Echo: Results for orders placed during the hospital encounter of 10/15/22  · The left ventricle is severely enlarged LVEDD 64 mm with eccentric hypertrophy and severely decreased systolic function. The estimated ejection fraction is 15%.  · Normal right ventricular size with normal right ventricular systolic function.  · Grade II left ventricular diastolic dysfunction.  · Mild left atrial enlargement.  · The estimated PA systolic pressure is 55 mmHg.  · Normal central venous pressure (3 mmHg).    ECG 10/16/2022: SR 64 bpm, LVH with ST T changes hypertrophy  ms    Left Heart Catheterization:   Right Heart Catheterization: Results for orders placed during the hospital encounter of 05/12/22  Right atrial pressure is mildly elevated.  Pulmonary capillary wedge pressure is moderately elevated.  Pulmonary artery pressure is mildly elevated.  Jv cardiac output and index is low normal.  Pulmonary vascular resistance is normal.  Systemic vascular resistance is 1657.  BP above target of < 130/80 during procedure.    Devices: ICD    PAST MEDICAL HISTORY:  Past Medical History:   Diagnosis Date    Anticoagulant long-term use     Anxiety     Arthritis     Asthma     CHF (congestive heart failure)     COPD (chronic obstructive pulmonary disease)     Depression     Diabetes mellitus     Dyspnea     H/O coronary angiogram     H/O: hysterectomy     History of automatic internal  cardiac defibrillator (AICD)     Hyperlipemia     Hypertension     Pulmonary edema     Schizophrenia     Stroke        PAST SURGICAL HISTORY:  Past Surgical History:   Procedure Laterality Date    BLADDER SUSPENSION      CARPAL TUNNEL RELEASE Right     HEEL SPUR SURGERY Left     HYSTERECTOMY      LEFT HEART CATHETERIZATION Bilateral 2019    Procedure: Left heart cath;  Surgeon: Steve Chambers MD;  Location: [facility] CATH LAB;  Service: Cardiology;  Laterality: Bilateral;    RIGHT HEART CATHETERIZATION Right 2021    Procedure: INSERTION, CATHETER, RIGHT HEART;  Surgeon: Petr Naranjo MD;  Location: [facility] CATH LAB;  Service: Cardiology;  Laterality: Right;    RIGHT HEART CATHETERIZATION Right 2022    Procedure: INSERTION, CATHETER, RIGHT HEART;  Surgeon: Chandana Ivory Jr., MD;  Location: [facility] CATH LAB;  Service: Cardiology;  Laterality: Right;    TUBAL LIGATION         SOCIAL HISTORY  Social History     Socioeconomic History    Marital status:    Tobacco Use    Smoking status: Former     Types: Cigarettes     Quit date: 2016     Years since quittin.1    Smokeless tobacco: Never    Tobacco comments:     nonex 2 weeks   Substance and Sexual Activity    Alcohol use: No     Alcohol/week: 0.0 standard drinks    Drug use: No       FAMILY HISTORY  Family History   Problem Relation Age of Onset    No Known Problems Mother     No Known Problems Father        ALLERGIES:  Review of patient's allergies indicates:   Allergen Reactions    Adhesive Blisters    Captopril Other (See Comments)     COUGH       MEDICATIONS  Current Outpatient Medications on File Prior to Visit   Medication Sig Dispense Refill    acetaminophen (TYLENOL) 325 MG tablet Take 2 tablets (650 mg total) by mouth every 8 (eight) hours as needed. 30 tablet 0    albuterol (PROVENTIL) 2.5 mg /3 mL (0.083 %) nebulizer solution Take 3 mLs (2.5 mg total) by nebulization every 6 (six) hours as needed for Wheezing or  "Shortness of Breath. Rescue 3 mL 0    albuterol (PROVENTIL/VENTOLIN HFA) 90 mcg/actuation inhaler Inhale 2 puffs into the lungs every 6 (six) hours as needed for Wheezing or Shortness of Breath. 18 g 2    atorvastatin (LIPITOR) 80 MG tablet Take 1 tablet (80 mg total) by mouth once daily. 90 tablet 3    dulaglutide (TRULICITY) 1.5 mg/0.5 mL pen injector Inject 1.5 mg into the skin once a week.      glimepiride (AMARYL) 4 MG tablet Take 4 mg by mouth 2 (two) times a day.      isosorbide dinitrate (ISORDIL) 10 MG tablet Take 1 tablet (10 mg total) by mouth 3 (three) times daily. 90 tablet 5    metFORMIN (GLUCOPHAGE) 1000 MG tablet Take 1,000 mg by mouth 2 (two) times daily.       metoprolol succinate (TOPROL-XL) 100 MG 24 hr tablet Take 1 tablet (100 mg total) by mouth once daily. 30 tablet 6    rivaroxaban (XARELTO) 20 mg Tab Take 1 tablet (20 mg total) by mouth daily with dinner or evening meal. 30 tablet 6    sacubitriL-valsartan (ENTRESTO) 24-26 mg per tablet Take 1 tablet by mouth 2 (two) times daily. 60 tablet 6    spironolactone (ALDACTONE) 25 MG tablet Take 1 tablet (25 mg total) by mouth once daily. 30 tablet 6    torsemide (DEMADEX) 10 MG Tab Take 2 tablets (20 mg total) by mouth once daily. 60 tablet 6    traMADoL (ULTRAM) 50 mg tablet Take 100 mg by mouth every 12 (twelve) hours as needed for Pain.       No current facility-administered medications on file prior to visit.       REVIEW OF SYSTEMS  Review of Systems   All other systems reviewed and are negative.    PHYSICAL EXAM:    Vitals:    10/25/22 0813 10/25/22 0817   BP: 110/64 115/73   BP Location: Right arm Right arm   Patient Position: Sitting Standing   BP Method: Medium (Automatic)    Pulse: 86 88   Weight: 90.4 kg (199 lb 4.7 oz)    Height: 5' 5" (1.651 m)     Body mass index is 33.16 kg/m².    GENERAL: Alert, NAD   HEENT: Anicteric sclerae  NECK: JVP not visible above level of clavicle while sitting upright, estimated at 6 cmH20  CARDIOVASCULAR: " Regular rate and rhythm. Normal S1, S2 no murmurs, rubs or gallops.  PULMONARY: Lungs clear to auscultation bilaterally  ABDOMEN: Soft, nontender, nondistended. No guarding, no rebound, no hepatomegaly  EXTREMITIES: Warm. No clubbing, cyanosis or edema. No pre-sacral edema  VASCULAR: 2+ bilateral radial pulses  NEUROLOGIC: No focal deficits    LABS:    BMP  Lab Results   Component Value Date     10/16/2022    K 3.4 (L) 10/16/2022     10/16/2022    CO2 29 10/16/2022    BUN 10 10/16/2022    CREATININE 0.9 10/16/2022    CALCIUM 9.0 10/16/2022    ANIONGAP 7 (L) 10/16/2022    ESTGFRAFRICA >60.0 07/31/2022    EGFRNONAA >60.0 07/31/2022       Magnesium   Date Value Ref Range Status   10/16/2022 1.9 1.6 - 2.6 mg/dL Final       Lab Results   Component Value Date    WBC 7.12 10/16/2022    HGB 11.3 (L) 10/16/2022    HCT 35.0 (L) 10/16/2022    MCV 91 10/16/2022     10/16/2022       Lab Results   Component Value Date    INR 1.0 10/16/2022    INR 1.0 05/12/2022    INR 0.9 11/30/2021       BNP   Date Value Ref Range Status   10/15/2022 1,038 (H) 0 - 99 pg/mL Final     Comment:     Values of less than 100 pg/ml are consistent with non-CHF populations.   10/11/2022 469 (H) 0 - 99 pg/mL Final     Comment:     Values of less than 100 pg/ml are consistent with non-CHF populations.   07/31/2022 449 (H) 0 - 99 pg/mL Final     Comment:     Values of less than 100 pg/ml are consistent with non-CHF populations.       No results found for: LDH    IMPRESSION:    NYHA Class II   ACC/AHA Stage C  Zuñiga profile A    1. Chronic combined systolic and diastolic congestive heart failure    2. Dilated cardiomyopathy    3. Coronary artery disease involving native coronary artery of native heart without angina pectoris    4. ICD (implantable cardioverter-defibrillator) in place    5. Cerebrovascular accident (CVA) due to embolism of right middle cerebral artery    6. Type 2 diabetes mellitus without complication, without  long-term current use of insulin    7. Hypertension, unspecified type    8. Hyperlipidemia, unspecified hyperlipidemia type    9. Obesity (BMI 30.0-34.9)    10. History of pulmonary embolism    11. Chronic obstructive pulmonary disease, unspecified COPD type    12. Anemia in other chronic diseases classified elsewhere        PLAN:    Euvolemic by exam with constant SOB. Would evaluate for other causes of SOB. Will place referral to pulmonology given diagnosis of COPD with prior tobacco use and minimal air entry by lung exam.     Optimize HF GDMT => discontinue isordil and increase Entresto to 49-51 mg BID, start Jardiance 10 mg daily, reduce torsemide to 10 mg daily and follow daily weights. Repeat CMP and BNP next week locally    Recommend 2 gram sodium restriction and 1500 mL fluid restriction.  Encourage physical activity with graded exercise program.  Requested patient to weigh themselves daily, and to notify us if their weight increases by more than 3 lbs in 1 day or 5 lbs in 1 week.     Pt to call us with more shortness of breath, swelling or unexpected weight changes, fever, chills, bloody or black bowel movements, or other concerns.    F/U 3 months       Electronically signed by:   Cande Carrillo   10/24/2022 12:43 PM

## 2022-10-25 ENCOUNTER — OFFICE VISIT (OUTPATIENT)
Dept: TRANSPLANT | Facility: CLINIC | Age: 62
End: 2022-10-25
Payer: COMMERCIAL

## 2022-10-25 ENCOUNTER — LAB VISIT (OUTPATIENT)
Dept: LAB | Facility: HOSPITAL | Age: 62
End: 2022-10-25
Attending: INTERNAL MEDICINE
Payer: COMMERCIAL

## 2022-10-25 VITALS
DIASTOLIC BLOOD PRESSURE: 73 MMHG | SYSTOLIC BLOOD PRESSURE: 115 MMHG | BODY MASS INDEX: 33.21 KG/M2 | WEIGHT: 199.31 LBS | HEART RATE: 88 BPM | HEIGHT: 65 IN

## 2022-10-25 DIAGNOSIS — I63.411 CEREBROVASCULAR ACCIDENT (CVA) DUE TO EMBOLISM OF RIGHT MIDDLE CEREBRAL ARTERY: ICD-10-CM

## 2022-10-25 DIAGNOSIS — I42.0 DILATED CARDIOMYOPATHY: Chronic | ICD-10-CM

## 2022-10-25 DIAGNOSIS — Z95.810 ICD (IMPLANTABLE CARDIOVERTER-DEFIBRILLATOR) IN PLACE: ICD-10-CM

## 2022-10-25 DIAGNOSIS — E66.9 OBESITY (BMI 30.0-34.9): ICD-10-CM

## 2022-10-25 DIAGNOSIS — Z86.711 HISTORY OF PULMONARY EMBOLISM: ICD-10-CM

## 2022-10-25 DIAGNOSIS — E11.9 TYPE 2 DIABETES MELLITUS WITHOUT COMPLICATION, WITHOUT LONG-TERM CURRENT USE OF INSULIN: Chronic | ICD-10-CM

## 2022-10-25 DIAGNOSIS — D63.8 ANEMIA IN OTHER CHRONIC DISEASES CLASSIFIED ELSEWHERE: ICD-10-CM

## 2022-10-25 DIAGNOSIS — J44.9 CHRONIC OBSTRUCTIVE PULMONARY DISEASE, UNSPECIFIED COPD TYPE: ICD-10-CM

## 2022-10-25 DIAGNOSIS — I50.42 CHRONIC COMBINED SYSTOLIC AND DIASTOLIC CONGESTIVE HEART FAILURE: ICD-10-CM

## 2022-10-25 DIAGNOSIS — I25.10 CORONARY ARTERY DISEASE INVOLVING NATIVE CORONARY ARTERY OF NATIVE HEART WITHOUT ANGINA PECTORIS: ICD-10-CM

## 2022-10-25 DIAGNOSIS — I10 HYPERTENSION, UNSPECIFIED TYPE: ICD-10-CM

## 2022-10-25 DIAGNOSIS — I50.42 CHRONIC COMBINED SYSTOLIC AND DIASTOLIC CONGESTIVE HEART FAILURE: Primary | ICD-10-CM

## 2022-10-25 DIAGNOSIS — E78.5 HYPERLIPIDEMIA, UNSPECIFIED HYPERLIPIDEMIA TYPE: ICD-10-CM

## 2022-10-25 PROBLEM — I50.43 ACUTE ON CHRONIC COMBINED SYSTOLIC AND DIASTOLIC HEART FAILURE: Status: RESOLVED | Noted: 2019-12-26 | Resolved: 2022-10-25

## 2022-10-25 PROBLEM — E66.811 OBESITY (BMI 30.0-34.9): Status: ACTIVE | Noted: 2021-10-11

## 2022-10-25 LAB
ALBUMIN SERPL BCP-MCNC: 3.6 G/DL (ref 3.5–5.2)
ALP SERPL-CCNC: 161 U/L (ref 55–135)
ALT SERPL W/O P-5'-P-CCNC: 13 U/L (ref 10–44)
ANION GAP SERPL CALC-SCNC: 10 MMOL/L (ref 8–16)
AST SERPL-CCNC: 14 U/L (ref 10–40)
BASOPHILS # BLD AUTO: 0.03 K/UL (ref 0–0.2)
BASOPHILS # BLD AUTO: 0.03 K/UL (ref 0–0.2)
BASOPHILS NFR BLD: 0.5 % (ref 0–1.9)
BASOPHILS NFR BLD: 0.5 % (ref 0–1.9)
BILIRUB SERPL-MCNC: 0.4 MG/DL (ref 0.1–1)
BNP SERPL-MCNC: 501 PG/ML (ref 0–99)
BUN SERPL-MCNC: 14 MG/DL (ref 8–23)
CALCIUM SERPL-MCNC: 9.2 MG/DL (ref 8.7–10.5)
CHLORIDE SERPL-SCNC: 106 MMOL/L (ref 95–110)
CO2 SERPL-SCNC: 24 MMOL/L (ref 23–29)
CREAT SERPL-MCNC: 1 MG/DL (ref 0.5–1.4)
DIFFERENTIAL METHOD: ABNORMAL
DIFFERENTIAL METHOD: ABNORMAL
EOSINOPHIL # BLD AUTO: 0.2 K/UL (ref 0–0.5)
EOSINOPHIL # BLD AUTO: 0.2 K/UL (ref 0–0.5)
EOSINOPHIL NFR BLD: 3.3 % (ref 0–8)
EOSINOPHIL NFR BLD: 3.3 % (ref 0–8)
ERYTHROCYTE [DISTWIDTH] IN BLOOD BY AUTOMATED COUNT: 13.8 % (ref 11.5–14.5)
ERYTHROCYTE [DISTWIDTH] IN BLOOD BY AUTOMATED COUNT: 13.8 % (ref 11.5–14.5)
EST. GFR  (NO RACE VARIABLE): >60 ML/MIN/1.73 M^2
FERRITIN SERPL-MCNC: 32 NG/ML (ref 20–300)
GLUCOSE SERPL-MCNC: 181 MG/DL (ref 70–110)
HCT VFR BLD AUTO: 38 % (ref 37–48.5)
HCT VFR BLD AUTO: 38 % (ref 37–48.5)
HGB BLD-MCNC: 11.9 G/DL (ref 12–16)
HGB BLD-MCNC: 11.9 G/DL (ref 12–16)
IMM GRANULOCYTES # BLD AUTO: 0.02 K/UL (ref 0–0.04)
IMM GRANULOCYTES # BLD AUTO: 0.02 K/UL (ref 0–0.04)
IMM GRANULOCYTES NFR BLD AUTO: 0.3 % (ref 0–0.5)
IMM GRANULOCYTES NFR BLD AUTO: 0.3 % (ref 0–0.5)
IRON SERPL-MCNC: 47 UG/DL (ref 30–160)
LYMPHOCYTES # BLD AUTO: 2.6 K/UL (ref 1–4.8)
LYMPHOCYTES # BLD AUTO: 2.6 K/UL (ref 1–4.8)
LYMPHOCYTES NFR BLD: 38.9 % (ref 18–48)
LYMPHOCYTES NFR BLD: 38.9 % (ref 18–48)
MAGNESIUM SERPL-MCNC: 2.1 MG/DL (ref 1.6–2.6)
MAGNESIUM SERPL-MCNC: 2.1 MG/DL (ref 1.6–2.6)
MCH RBC QN AUTO: 28.6 PG (ref 27–31)
MCH RBC QN AUTO: 28.6 PG (ref 27–31)
MCHC RBC AUTO-ENTMCNC: 31.3 G/DL (ref 32–36)
MCHC RBC AUTO-ENTMCNC: 31.3 G/DL (ref 32–36)
MCV RBC AUTO: 91 FL (ref 82–98)
MCV RBC AUTO: 91 FL (ref 82–98)
MONOCYTES # BLD AUTO: 0.4 K/UL (ref 0.3–1)
MONOCYTES # BLD AUTO: 0.4 K/UL (ref 0.3–1)
MONOCYTES NFR BLD: 6.5 % (ref 4–15)
MONOCYTES NFR BLD: 6.5 % (ref 4–15)
NEUTROPHILS # BLD AUTO: 3.4 K/UL (ref 1.8–7.7)
NEUTROPHILS # BLD AUTO: 3.4 K/UL (ref 1.8–7.7)
NEUTROPHILS NFR BLD: 50.5 % (ref 38–73)
NEUTROPHILS NFR BLD: 50.5 % (ref 38–73)
NRBC BLD-RTO: 0 /100 WBC
NRBC BLD-RTO: 0 /100 WBC
PLATELET # BLD AUTO: 326 K/UL (ref 150–450)
PLATELET # BLD AUTO: 326 K/UL (ref 150–450)
PMV BLD AUTO: 11.5 FL (ref 9.2–12.9)
PMV BLD AUTO: 11.5 FL (ref 9.2–12.9)
POTASSIUM SERPL-SCNC: 4.2 MMOL/L (ref 3.5–5.1)
PROT SERPL-MCNC: 7.1 G/DL (ref 6–8.4)
RBC # BLD AUTO: 4.16 M/UL (ref 4–5.4)
RBC # BLD AUTO: 4.16 M/UL (ref 4–5.4)
SATURATED IRON: 12 % (ref 20–50)
SODIUM SERPL-SCNC: 140 MMOL/L (ref 136–145)
TOTAL IRON BINDING CAPACITY: 408 UG/DL (ref 250–450)
TRANSFERRIN SERPL-MCNC: 276 MG/DL (ref 200–375)
WBC # BLD AUTO: 6.65 K/UL (ref 3.9–12.7)
WBC # BLD AUTO: 6.65 K/UL (ref 3.9–12.7)

## 2022-10-25 PROCEDURE — 1159F PR MEDICATION LIST DOCUMENTED IN MEDICAL RECORD: ICD-10-PCS | Mod: CPTII,S$GLB,, | Performed by: INTERNAL MEDICINE

## 2022-10-25 PROCEDURE — 3074F SYST BP LT 130 MM HG: CPT | Mod: CPTII,S$GLB,, | Performed by: INTERNAL MEDICINE

## 2022-10-25 PROCEDURE — 83735 ASSAY OF MAGNESIUM: CPT | Performed by: INTERNAL MEDICINE

## 2022-10-25 PROCEDURE — 99999 PR PBB SHADOW E&M-EST. PATIENT-LVL IV: CPT | Mod: PBBFAC,,, | Performed by: INTERNAL MEDICINE

## 2022-10-25 PROCEDURE — 82728 ASSAY OF FERRITIN: CPT | Performed by: INTERNAL MEDICINE

## 2022-10-25 PROCEDURE — 36415 COLL VENOUS BLD VENIPUNCTURE: CPT | Performed by: INTERNAL MEDICINE

## 2022-10-25 PROCEDURE — 3078F PR MOST RECENT DIASTOLIC BLOOD PRESSURE < 80 MM HG: ICD-10-PCS | Mod: CPTII,S$GLB,, | Performed by: INTERNAL MEDICINE

## 2022-10-25 PROCEDURE — 1159F MED LIST DOCD IN RCRD: CPT | Mod: CPTII,S$GLB,, | Performed by: INTERNAL MEDICINE

## 2022-10-25 PROCEDURE — 3051F HG A1C>EQUAL 7.0%<8.0%: CPT | Mod: CPTII,S$GLB,, | Performed by: INTERNAL MEDICINE

## 2022-10-25 PROCEDURE — 3074F PR MOST RECENT SYSTOLIC BLOOD PRESSURE < 130 MM HG: ICD-10-PCS | Mod: CPTII,S$GLB,, | Performed by: INTERNAL MEDICINE

## 2022-10-25 PROCEDURE — 3051F PR MOST RECENT HEMOGLOBIN A1C LEVEL 7.0 - < 8.0%: ICD-10-PCS | Mod: CPTII,S$GLB,, | Performed by: INTERNAL MEDICINE

## 2022-10-25 PROCEDURE — 4010F PR ACE/ARB THEARPY RXD/TAKEN: ICD-10-PCS | Mod: CPTII,S$GLB,, | Performed by: INTERNAL MEDICINE

## 2022-10-25 PROCEDURE — 83880 ASSAY OF NATRIURETIC PEPTIDE: CPT | Performed by: INTERNAL MEDICINE

## 2022-10-25 PROCEDURE — 3078F DIAST BP <80 MM HG: CPT | Mod: CPTII,S$GLB,, | Performed by: INTERNAL MEDICINE

## 2022-10-25 PROCEDURE — 1160F PR REVIEW ALL MEDS BY PRESCRIBER/CLIN PHARMACIST DOCUMENTED: ICD-10-PCS | Mod: CPTII,S$GLB,, | Performed by: INTERNAL MEDICINE

## 2022-10-25 PROCEDURE — 99214 OFFICE O/P EST MOD 30 MIN: CPT | Mod: S$GLB,,, | Performed by: INTERNAL MEDICINE

## 2022-10-25 PROCEDURE — 80053 COMPREHEN METABOLIC PANEL: CPT | Performed by: INTERNAL MEDICINE

## 2022-10-25 PROCEDURE — 4010F ACE/ARB THERAPY RXD/TAKEN: CPT | Mod: CPTII,S$GLB,, | Performed by: INTERNAL MEDICINE

## 2022-10-25 PROCEDURE — 99999 PR PBB SHADOW E&M-EST. PATIENT-LVL IV: ICD-10-PCS | Mod: PBBFAC,,, | Performed by: INTERNAL MEDICINE

## 2022-10-25 PROCEDURE — 1160F RVW MEDS BY RX/DR IN RCRD: CPT | Mod: CPTII,S$GLB,, | Performed by: INTERNAL MEDICINE

## 2022-10-25 PROCEDURE — 85025 COMPLETE CBC W/AUTO DIFF WBC: CPT | Performed by: INTERNAL MEDICINE

## 2022-10-25 PROCEDURE — 99214 PR OFFICE/OUTPT VISIT, EST, LEVL IV, 30-39 MIN: ICD-10-PCS | Mod: S$GLB,,, | Performed by: INTERNAL MEDICINE

## 2022-10-25 PROCEDURE — 84466 ASSAY OF TRANSFERRIN: CPT | Performed by: INTERNAL MEDICINE

## 2022-10-25 RX ORDER — SACUBITRIL AND VALSARTAN 49; 51 MG/1; MG/1
1 TABLET, FILM COATED ORAL 2 TIMES DAILY
Qty: 90 TABLET | Refills: 3 | Status: SHIPPED | OUTPATIENT
Start: 2022-10-25 | End: 2023-01-26

## 2022-10-25 RX ORDER — TORSEMIDE 10 MG/1
10 TABLET ORAL DAILY
Qty: 60 TABLET | Refills: 6 | Status: ON HOLD | OUTPATIENT
Start: 2022-10-25 | End: 2023-06-01 | Stop reason: HOSPADM

## 2022-10-25 NOTE — LETTER
October 25, 2022        Michelle Young  [address] [address]  Phone: [phone]  Fax: [phone]             [facility]  [address]  [place] [facility] [place] [facility] [address]  Phone: [phone]   Patient: Diomedes Mohr   MR Number: [id number]   YOB: 1960   Date of Visit: 10/25/2022       Dear Dr. Michelle Young    Thank you for referring Diomedes Mohr to me for evaluation. Attached you will find relevant portions of my assessment and plan of care.    If you have questions, please do not hesitate to call me. I look forward to following Diomedes Mohr along with you.    Sincerely,    Cande Carrillo, DO    Enclosure    If you would like to receive this communication electronically, please contact [email or web address] or [phone] to request [facility] [employer] [facility] access.    [facility] [employer] [facility] is a tool which provides read-only access to select patient information with whom you have a relationship. Its easy to use and provides real time access to review your patients record including encounter summaries, notes, results, and demographic information.    If you feel you have received this communication in error or would no longer like to receive these types of communications, please e-mail [email or web address]

## 2022-11-01 DIAGNOSIS — I50.42 CHRONIC COMBINED SYSTOLIC AND DIASTOLIC CONGESTIVE HEART FAILURE: Primary | ICD-10-CM

## 2022-11-02 ENCOUNTER — TELEPHONE (OUTPATIENT)
Dept: TRANSPLANT | Facility: CLINIC | Age: 62
End: 2022-11-02

## 2022-11-02 NOTE — TELEPHONE ENCOUNTER
11/1/22  4:25 pm:  F/U on nurse lab reminder  Results in epic  K 3.2  Cr. .84    See Dr. Carrillo's 10/28 clinic note w/ reason for this lab -increase Entresto to 49/51 bid, start Jardiance 10 once avila and decrease Torsemide to 10 mg once daily   pt to do dly wts.     Called and spoke with pt   Informed her of lab results and K a little low    Pt confirmed      but did not decrease Torsemide to 10 mg a day-stating she misunderstood and increased it to (2) 20 mg tablets a day=40 daily where she was taking one 20 mg tablet daily prior to this.    So since 10/29 pt has been taking 40 mg of Torsemide once daily ( 4 days )     Asked and pt confirmed she is taking Spironolactone 25 mg once daily    Does not take Potassium, but offered she dose have it in the home.      Instructed pt that Dr. Carrillo wanted her to decrease Torsemide , not increase it and asked pt starting tomorrow 11/2 to take 1/2 of the 20 mg tablet to equal 10 mg a day    pt voiced understanding and agreement    Pt has had an inc. In u.o. and feeling fine.    Not able to immediately reach MD covering for Dr. Carrillo ( she is out of the office today )  , asked pt and she has Potassium 20 meq tablets-asked pt to take 2 now=40 meq and I would let her know of any further changes    11/1/22 4:40 pm: Messaged Dr. Latif with all information as in this note   He called and we spoke by phone 5:00 pm:  TORB: Dr. Dale/Elsa, RN:  Pt to not take any more Potassium after this dose she has taken already, Repeat BMP and ntproBNP this Thursday pm     11/1/22  5:25 pm  Called pt back and she has already taken the potassium   told pt not to take anymore potassium  Pt in agreement to afternoon labs this Thursday 11/3 2:00 pm same location as today and I explained why want in the afternoon    Message sent to  MA asking she schedule this.

## 2022-11-04 ENCOUNTER — TELEPHONE (OUTPATIENT)
Dept: TRANSPLANT | Facility: CLINIC | Age: 62
End: 2022-11-04
Payer: MEDICARE

## 2022-11-04 NOTE — TELEPHONE ENCOUNTER
"11/3/22  4:45 pm : F/U on todays nurse lab phone reminder   Results are in epic and show improved Potassium level from 3.2 on 11/1/22 to 3.7 today  See my nN    11/4/22  0950 am:  Called pt and informed of improved potassium level, so as already instructed, no need to take potassium -was a one time dose  Asked and pt confirmed she is not taking Torsemide 20 mg (1/2 tablet once daily)   no signs of fluid, does weight every am  Asked pt to continue to weigh ever am, log and report to this office wt gain >3 pounds overnight and/or > 5 pounds in a week, swelling to legs/ankles or feet, abdominal distention, or heavy breathing  Pt reports no  wt gain: also pts pront bnp yesterday was 1660 and 11/1 was 1980  ( she had been taking Torsemid 40 mg for several days by mistake as in my previous note)  Pt agreed to call    Pt then stated she has a cough -however no other s/s of fluid   she will watch this    Pt asked about "getting an ok from her heart doctor to have surgery on her hip"   orthopedist is @ [facility]  I explained process and that that doctor office needs to contact us with what they are asking: clearance?  Holding meds?  We need to know this as well as surgery, length of surgery, anesthesia    She will have them contact this office.   "

## 2022-11-25 ENCOUNTER — CLINICAL SUPPORT (OUTPATIENT)
Dept: CARDIOLOGY | Facility: HOSPITAL | Age: 62
End: 2022-11-25
Payer: MEDICARE

## 2022-11-25 DIAGNOSIS — Z95.810 PRESENCE OF AUTOMATIC (IMPLANTABLE) CARDIAC DEFIBRILLATOR: ICD-10-CM

## 2022-11-25 PROCEDURE — 93296 REM INTERROG EVL PM/IDS: CPT | Performed by: STUDENT IN AN ORGANIZED HEALTH CARE EDUCATION/TRAINING PROGRAM

## 2022-12-01 NOTE — ED NOTES
"Patient identifiers for Diomedes Mohr 62 y.o. female checked and correct.  Chief Complaint   Patient presents with    Chest Pain     Patient reports chest pain that started x hour ago while talking to her daughter on the phone. Patient reports pain feels like "pressure".      Past Medical History:   Diagnosis Date    Anticoagulant long-term use     Anxiety     Arthritis     Asthma     CHF (congestive heart failure)     COPD (chronic obstructive pulmonary disease)     Depression     Diabetes mellitus     Dyspnea     H/O coronary angiogram     H/O: hysterectomy     History of automatic internal cardiac defibrillator (AICD)     Hyperlipemia     Hypertension     Pulmonary edema     Schizophrenia     Stroke      Allergies reported:   Review of patient's allergies indicates:   Allergen Reactions    Adhesive Blisters    Captopril Other (See Comments)     COUGH         LOC: Patient is awake, alert, and aware of environment with an appropriate affect. Patient is oriented x 4 and speaking appropriately.  APPEARANCE: Patient resting comfortably and in no acute distress. Patient is clean and well groomed, patient's clothing is properly fastened.  SKIN: The skin is warm and dry. Patient has normal skin turgor and moist mucus membranes.   MUSKULOSKELETAL: Patient is moving all extremities well, no obvious deformities noted. Pulses intact.   RESPIRATORY: Airway is open and patent. Respirations are spontaneous and non-labored with normal effort and rate.  CARDIAC: Patient has a normal rate and rhythm.  No peripheral edema noted. C/o L sided chest pain that is "squeezing" in nature at 9/10. Onset 0930, was on the phone when pain started. Has nitro at home, did not take  ABDOMEN: No distention noted. Soft and non-tender upon palpation.  NEUROLOGICAL: pupils 3mm, PERRL. Facial expression is symmetrical. Hand grasps are equal bilaterally. Normal sensation in all extremities when touched with finger.         "
Admit info / assessment done.   
Bed: CHALL3  Expected date:   Expected time:   Means of arrival:   Comments:  
Pt reports CP unchanged, 2nd round nitro administered at this time  
Rox, Charge RN made aware of patient status.   
Patient/Caregiver provided printed discharge information.

## 2022-12-08 ENCOUNTER — DOCUMENTATION ONLY (OUTPATIENT)
Dept: ELECTROPHYSIOLOGY | Facility: CLINIC | Age: 62
End: 2022-12-08
Payer: MEDICARE

## 2022-12-08 ENCOUNTER — HOSPITAL ENCOUNTER (EMERGENCY)
Facility: HOSPITAL | Age: 62
Discharge: HOME | End: 2022-12-08
Attending: EMERGENCY MEDICINE
Payer: COMMERCIAL

## 2022-12-08 VITALS
HEART RATE: 66 BPM | BODY MASS INDEX: 33.12 KG/M2 | OXYGEN SATURATION: 95 % | DIASTOLIC BLOOD PRESSURE: 56 MMHG | RESPIRATION RATE: 18 BRPM | SYSTOLIC BLOOD PRESSURE: 107 MMHG | TEMPERATURE: 98 F | WEIGHT: 199 LBS

## 2022-12-08 DIAGNOSIS — R20.0 NUMBNESS: ICD-10-CM

## 2022-12-08 DIAGNOSIS — R06.02 SOB (SHORTNESS OF BREATH): ICD-10-CM

## 2022-12-08 LAB
ALBUMIN SERPL BCP-MCNC: 3.2 G/DL (ref 3.5–5.2)
ALLENS TEST: ABNORMAL
ALP SERPL-CCNC: 151 U/L (ref 55–135)
ALT SERPL W/O P-5'-P-CCNC: 11 U/L (ref 10–44)
ANION GAP SERPL CALC-SCNC: 9 MMOL/L (ref 8–16)
AST SERPL-CCNC: 12 U/L (ref 10–40)
BACTERIA #/AREA URNS AUTO: ABNORMAL /HPF
BASOPHILS # BLD AUTO: 0.02 K/UL (ref 0–0.2)
BASOPHILS NFR BLD: 0.2 % (ref 0–1.9)
BILIRUB SERPL-MCNC: 0.5 MG/DL (ref 0.1–1)
BILIRUB UR QL STRIP: NEGATIVE
BNP SERPL-MCNC: 422 PG/ML (ref 0–99)
BUN SERPL-MCNC: 11 MG/DL (ref 8–23)
CALCIUM SERPL-MCNC: 9 MG/DL (ref 8.7–10.5)
CHLORIDE SERPL-SCNC: 101 MMOL/L (ref 95–110)
CHOLEST SERPL-MCNC: 152 MG/DL (ref 120–199)
CHOLEST/HDLC SERPL: 4.3 {RATIO} (ref 2–5)
CLARITY UR REFRACT.AUTO: CLEAR
CO2 SERPL-SCNC: 26 MMOL/L (ref 23–29)
COLOR UR AUTO: YELLOW
CREAT SERPL-MCNC: 0.8 MG/DL (ref 0.5–1.4)
DELSYS: ABNORMAL
DIFFERENTIAL METHOD: ABNORMAL
EOSINOPHIL # BLD AUTO: 0.1 K/UL (ref 0–0.5)
EOSINOPHIL NFR BLD: 0.9 % (ref 0–8)
ERYTHROCYTE [DISTWIDTH] IN BLOOD BY AUTOMATED COUNT: 14.3 % (ref 11.5–14.5)
EST. GFR  (NO RACE VARIABLE): >60 ML/MIN/1.73 M^2
FIO2: 21
GLUCOSE SERPL-MCNC: 203 MG/DL (ref 70–110)
GLUCOSE UR QL STRIP: ABNORMAL
HCO3 UR-SCNC: 30.1 MMOL/L (ref 24–28)
HCT VFR BLD AUTO: 37 % (ref 37–48.5)
HDLC SERPL-MCNC: 35 MG/DL (ref 40–75)
HDLC SERPL: 23 % (ref 20–50)
HGB BLD-MCNC: 11.9 G/DL (ref 12–16)
HGB UR QL STRIP: ABNORMAL
IMM GRANULOCYTES # BLD AUTO: 0.09 K/UL (ref 0–0.04)
IMM GRANULOCYTES NFR BLD AUTO: 0.9 % (ref 0–0.5)
INFLUENZA A, MOLECULAR: NOT DETECTED
INFLUENZA B, MOLECULAR: NOT DETECTED
INR PPP: 1 (ref 0.8–1.2)
KETONES UR QL STRIP: NEGATIVE
LDLC SERPL CALC-MCNC: 95.8 MG/DL (ref 63–159)
LEUKOCYTE ESTERASE UR QL STRIP: ABNORMAL
LYMPHOCYTES # BLD AUTO: 2 K/UL (ref 1–4.8)
LYMPHOCYTES NFR BLD: 19.4 % (ref 18–48)
MCH RBC QN AUTO: 29.2 PG (ref 27–31)
MCHC RBC AUTO-ENTMCNC: 32.2 G/DL (ref 32–36)
MCV RBC AUTO: 91 FL (ref 82–98)
MICROSCOPIC COMMENT: ABNORMAL
MODE: ABNORMAL
MONOCYTES # BLD AUTO: 0.5 K/UL (ref 0.3–1)
MONOCYTES NFR BLD: 4.9 % (ref 4–15)
NEUTROPHILS # BLD AUTO: 7.6 K/UL (ref 1.8–7.7)
NEUTROPHILS NFR BLD: 73.7 % (ref 38–73)
NITRITE UR QL STRIP: NEGATIVE
NONHDLC SERPL-MCNC: 117 MG/DL
NRBC BLD-RTO: 0 /100 WBC
PCO2 BLDA: 50.3 MMHG (ref 35–45)
PH SMN: 7.38 [PH] (ref 7.35–7.45)
PH UR STRIP: 7 [PH] (ref 5–8)
PLATELET # BLD AUTO: 280 K/UL (ref 150–450)
PMV BLD AUTO: 11.1 FL (ref 9.2–12.9)
PO2 BLDA: 39 MMHG (ref 40–60)
POC BE: 5 MMOL/L
POC SATURATED O2: 72 % (ref 95–100)
POC TCO2: 32 MMOL/L (ref 24–29)
POCT GLUCOSE: 192 MG/DL (ref 70–110)
POTASSIUM SERPL-SCNC: 3.7 MMOL/L (ref 3.5–5.1)
PROT SERPL-MCNC: 6.4 G/DL (ref 6–8.4)
PROT UR QL STRIP: ABNORMAL
PROTHROMBIN TIME: 10.8 SEC (ref 9–12.5)
RBC # BLD AUTO: 4.08 M/UL (ref 4–5.4)
RBC #/AREA URNS AUTO: 10 /HPF (ref 0–4)
RSV AG BY MOLECULAR METHOD: NOT DETECTED
SAMPLE: ABNORMAL
SARS-COV-2 RNA RESP QL NAA+PROBE: NOT DETECTED
SITE: ABNORMAL
SODIUM SERPL-SCNC: 136 MMOL/L (ref 136–145)
SP GR UR STRIP: >1.03 (ref 1–1.03)
SP02: 98
SQUAMOUS #/AREA URNS AUTO: 7 /HPF
TRIGL SERPL-MCNC: 106 MG/DL (ref 30–150)
TROPONIN I SERPL DL<=0.01 NG/ML-MCNC: <0.006 NG/ML (ref 0–0.03)
TSH SERPL DL<=0.005 MIU/L-ACNC: 1.35 UIU/ML (ref 0.4–4)
URN SPEC COLLECT METH UR: ABNORMAL
WBC # BLD AUTO: 10.29 K/UL (ref 3.9–12.7)
WBC #/AREA URNS AUTO: 55 /HPF (ref 0–5)
YEAST UR QL AUTO: ABNORMAL

## 2022-12-08 PROCEDURE — 84443 ASSAY THYROID STIM HORMONE: CPT

## 2022-12-08 PROCEDURE — 85025 COMPLETE CBC W/AUTO DIFF WBC: CPT

## 2022-12-08 PROCEDURE — 25500020 PHARM REV CODE 255

## 2022-12-08 PROCEDURE — 87086 URINE CULTURE/COLONY COUNT: CPT | Performed by: EMERGENCY MEDICINE

## 2022-12-08 PROCEDURE — 82962 GLUCOSE BLOOD TEST: CPT

## 2022-12-08 PROCEDURE — 81001 URINALYSIS AUTO W/SCOPE: CPT | Performed by: EMERGENCY MEDICINE

## 2022-12-08 PROCEDURE — 82803 BLOOD GASES ANY COMBINATION: CPT

## 2022-12-08 PROCEDURE — 99285 EMERGENCY DEPT VISIT HI MDM: CPT | Mod: 25

## 2022-12-08 PROCEDURE — 93010 ELECTROCARDIOGRAM REPORT: CPT | Mod: ,,, | Performed by: INTERNAL MEDICINE

## 2022-12-08 PROCEDURE — 80053 COMPREHEN METABOLIC PANEL: CPT

## 2022-12-08 PROCEDURE — 0241U SARS-COV2 (COVID) WITH FLU/RSV BY PCR: CPT

## 2022-12-08 PROCEDURE — 87147 CULTURE TYPE IMMUNOLOGIC: CPT | Performed by: EMERGENCY MEDICINE

## 2022-12-08 PROCEDURE — 99900035 HC TECH TIME PER 15 MIN (STAT)

## 2022-12-08 PROCEDURE — 83880 ASSAY OF NATRIURETIC PEPTIDE: CPT

## 2022-12-08 PROCEDURE — 99291 PR CRITICAL CARE, E/M 30-74 MINUTES: ICD-10-PCS | Mod: CS,,, | Performed by: EMERGENCY MEDICINE

## 2022-12-08 PROCEDURE — 87088 URINE BACTERIA CULTURE: CPT | Performed by: EMERGENCY MEDICINE

## 2022-12-08 PROCEDURE — 93005 ELECTROCARDIOGRAM TRACING: CPT

## 2022-12-08 PROCEDURE — 93010 EKG 12-LEAD: ICD-10-PCS | Mod: ,,, | Performed by: INTERNAL MEDICINE

## 2022-12-08 PROCEDURE — 99214 PR OFFICE/OUTPT VISIT, EST, LEVL IV, 30-39 MIN: ICD-10-PCS | Mod: ,,, | Performed by: PSYCHIATRY & NEUROLOGY

## 2022-12-08 PROCEDURE — 85610 PROTHROMBIN TIME: CPT

## 2022-12-08 PROCEDURE — 99214 OFFICE O/P EST MOD 30 MIN: CPT | Mod: ,,, | Performed by: PSYCHIATRY & NEUROLOGY

## 2022-12-08 PROCEDURE — 80061 LIPID PANEL: CPT

## 2022-12-08 PROCEDURE — 84484 ASSAY OF TROPONIN QUANT: CPT

## 2022-12-08 PROCEDURE — 99291 CRITICAL CARE FIRST HOUR: CPT | Mod: CS,,, | Performed by: EMERGENCY MEDICINE

## 2022-12-08 RX ADMIN — IOHEXOL 100 ML: 350 INJECTION, SOLUTION INTRAVENOUS at 02:12

## 2022-12-08 NOTE — PROGRESS NOTES
Add on Inpatient MRI    Received a call from Radha in the MRI Department in relation to this patient needing to be scheduled for a MRI and has a [employer]  ICD.  Please see information below as it relates to patient's Device being MRI compatible/conditional.  To meet protocol the Pacemaker/ICD must have been implanted no less than 6 weeks prior to scheduled date of Scan.  ICD/PPM and leads must be from same .  MRI informed ordering MD must input a Cardiac Device Check in clinic and hospital order, patient must have an xray within 6 months or less prior to MRI reprogramming.  Chest xray to be reviewed by Radiologist.    MRI to be done today.  [employer] Rep POLLY Whitten notified and can be available now. Radha in MRI informed Rep available when pt arrives in the MRI suite.

## 2022-12-08 NOTE — HPI
63 yo F with NICM ([facility] 2019 pertinent for non-obstructive CAD), HFrEF EF 10-15%, s/p ICD 11/2021, T2DM, htn, hld, h/o R embolic CVA 8/2020 and PE 12/2021 (previously on Xarelto, discontinued about 3-4 months ago)

## 2022-12-08 NOTE — HPI
61 yo F with NICM, HFrEF (EF 10-15%), s/p ICD 11/2021, T2DM, htn, hld, h/o R embolic CVA (8/2020, without residual deficits) and PE 12/2021. Patient [place] [facility] [place] with complaints of LSW and numbness which started appx 30 minutes prior to arrival. The patient states that this has happened on occasion in the past, where she has developed some LUE weakness which self resolves. CTA on arrival demonstrated no acute findings or significant vessel stenosis/occlusion. Patient stated that her symptoms seemed to have improved from onset but noted that she was not yet at baseline. Endorsed some L sided numbness of forehead, cheek, LUE and LLE. NIH 1.

## 2022-12-08 NOTE — ED PROVIDER NOTES
Source of History:   Patient and Medical Record, without language barrier or      Chief complaint:  Extremity Weakness (Reports left arm weakness and facial numbness 30 min PTA with dizziness. No slurred speech or facial droop noted.)    HPI:  Diomedes Mohr is a 62 y.o. female with history of *** presenting with chief complaint of ***    This is the extent to the patients complaints today here in the emergency department.    ROS: As per HPI and below:  ROS    Review of patient's allergies indicates:   Allergen Reactions    Adhesive Blisters    Captopril Other (See Comments)     COUGH     PMH:  As per HPI and below:  Past Medical History:   Diagnosis Date    Acute on chronic combined systolic and diastolic heart failure 2019    Anticoagulant long-term use     Anxiety     Arthritis     Asthma     Depression     H/O: hysterectomy     Hyperlipemia     Hypertension     Pulmonary edema     Schizophrenia      Past Surgical History:   Procedure Laterality Date    BLADDER SUSPENSION      CARPAL TUNNEL RELEASE Right     HEEL SPUR SURGERY Left     HYSTERECTOMY      LEFT HEART CATHETERIZATION Bilateral 2019    Procedure: Left heart cath;  Surgeon: Steve Chambers MD;  Location[facility] CATH LAB;  Service: Cardiology;  Laterality: Bilateral;    RIGHT HEART CATHETERIZATION Right 2021    Procedure: INSERTION, CATHETER, RIGHT HEART;  Surgeon: Petr Naranjo MD;  Location: [facility] CATH LAB;  Service: Cardiology;  Laterality: Right;    RIGHT HEART CATHETERIZATION Right 2022    Procedure: INSERTION, CATHETER, RIGHT HEART;  Surgeon: Chandana Ivory Jr., MD;  Location: [facility] CATH LAB;  Service: Cardiology;  Laterality: Right;    TUBAL LIGATION       Social History     Tobacco Use    Smoking status: Former     Types: Cigarettes     Quit date: 2016     Years since quittin.2    Smokeless tobacco: Never    Tobacco comments:     nonex 2 weeks   Substance Use Topics     Alcohol use: No     Alcohol/week: 0.0 standard drinks    Drug use: No     Vitals:    /79   Pulse 77   Temp 99.2 °F (37.3 °C) (Oral)   Resp (!) 22   Wt 90.3 kg (199 lb)   LMP  (LMP Unknown)   SpO2 98%   BMI 33.12 kg/m²     Physical Exam  Procedures    Laboratory Studies:  Labs that have been ordered have been independently reviewed and interpreted by myself.  Labs Reviewed   POCT GLUCOSE - Abnormal; Notable for the following components:       Result Value    POCT Glucose 192 (*)     All other components within normal limits   ISTAT PROCEDURE - Abnormal; Notable for the following components:    POC PCO2 50.3 (*)     POC PO2 39 (*)     POC HCO3 30.1 (*)     POC SATURATED O2 72 (*)     POC TCO2 32 (*)     All other components within normal limits   CBC W/ AUTO DIFFERENTIAL   COMPREHENSIVE METABOLIC PANEL   PROTIME-INR   TSH   LIPID PANEL   SARS-COV2 (COVID) WITH FLU/RSV BY PCR   POCT GLUCOSE, HAND-HELD DEVICE     Imaging Results              CTA STROKE MULTI-PHASE (Final result)  Result time 12/08/22 14:39:04      Final result by Marlo Yanez MD (12/08/22 14:39:04)                   Impression:      No evidence of acute hemorrhage or major vascular distribution infarcts.    Remote right cerebral infarction, stable from prior MRI.    Stable CT arteriogram noting only minimal atherosclerotic change.  No evidence of new high-grade stenosis or large vessel occlusion.      Electronically signed by: Marlo Yanez MD  Date:    12/08/2022  Time:    14:39               Narrative:    EXAMINATION:  CTA STROKE MULTI-PHASE    CLINICAL HISTORY:  Neuro deficit, acute, stroke suspected;    TECHNIQUE:  Axial CT images obtained throughout the region of the head before and after the administration of intravenous contrast.    CT angiogram was performed through the cervical and intracranial vasculature during the IV bolus administration of 100 mL of Omnipaque 350.  Two additional phases through the intracranial  vasculature via multiphase technique.  Multiplanar MPR and MIP reformats were performed.    CT source data was analyzed using artificial intelligence software for detection of a large vessel occlusions (LVO) in order to enable computer assisted triage notification and aid clinical stroke decision making.    COMPARISON:  MRI 05/15/2022, CTA 08/14/2020    FINDINGS:  Small remote infarcts right insula/frontal operculum as well as in posteromedial right temporal lobe.  No acute major vascular distribution infarct.  No acute parenchymal hemorrhage.  No mass effect or midline shift.    The ventricles are stable in size without evidence of hydrocephalus.      No extra-axial blood or fluid collections.    The cranium appears intact.    Mastoid air cells and paranasal sinuses are essentially clear.    Moderate cervical spondylosis.      CTA:    Aorta appears stable.  No significant plaque or stenosis at the major branch vessel origins.    The common carotid arteries are normal in course and caliber. No significant plaque or stenosis in either carotid bifurcation.  Medialized course of internal carotid arteries at the level of oropharynx.  Calcification of the cavernous ICAs with minimal luminal narrowing.    The vertebral origins are patent.  Vertebral arteries appears stable from prior without significant stenosis.  Vertebrobasilar system is within normal limits without focal abnormality.    The proximal anterior, middle, and posterior cerebral arteries are within normal limits, without evidence of significant stenosis, focal occlusion, or intracranial aneurysm formation.                                    EKG (independently interpreted by me):  ***    X-rays (independently interpreted by me): ***    I decided to obtain the patient's medical records.  Summary of Medical Records:    Medications   iohexoL (OMNIPAQUE 350) injection 100 mL (100 mLs Intravenous Given 12/8/22 1421)     MDM:    62 y.o. female with  "***    Differential Dx:  Differential includes but is not limited to ***    ED Management:  ***    Discussed with: ***  Sepsis Attestation: ***     ED Course as of 12/08/22 1938   Thu Dec 08, 2022   1910 Troponin I: <0.006 [JR]   1920 BNP(!): 422  Improved from prior, denying SOB [JR]   1922 "Stable areas of prior infarction in the right insula/frontal operculum as well as posteromedial right temporal lobe.  No diffusion restriction to indicate an acute infarction on today's examination." [JR]      ED Course User Index  [JR] Nydia Castro MD     Diagnostic Impression:    Final diagnoses:  [I63.9] Stroke             Attending Attestation:   Physician Attestation Statement for Resident:  As the supervising MD       -: Showab [phone]               "

## 2022-12-08 NOTE — ED PROVIDER NOTES
Source of History:   Patient and Medical Record, without language barrier or      Chief complaint:  Extremity Weakness (Reports left arm weakness and facial numbness 30 min PTA with dizziness. No slurred speech or facial droop noted.)    HPI:  Diomedes Mohr is a 62 y.o. female with history of CVA presenting with chief complaint of ***    This is the extent to the patients complaints today here in the emergency department.    ROS: As per HPI and below:  ROS    Review of patient's allergies indicates:   Allergen Reactions    Adhesive Blisters    Captopril Other (See Comments)     COUGH     PMH:  As per HPI and below:  Past Medical History:   Diagnosis Date    Acute on chronic combined systolic and diastolic heart failure 2019    Anticoagulant long-term use     Anxiety     Arthritis     Asthma     Depression     H/O: hysterectomy     Hyperlipemia     Hypertension     Pulmonary edema     Schizophrenia      Past Surgical History:   Procedure Laterality Date    BLADDER SUSPENSION      CARPAL TUNNEL RELEASE Right     HEEL SPUR SURGERY Left     HYSTERECTOMY      LEFT HEART CATHETERIZATION Bilateral 2019    Procedure: Left heart cath;  Surgeon: Steve Chambers MD;  Location: [facility] CATH LAB;  Service: Cardiology;  Laterality: Bilateral;    RIGHT HEART CATHETERIZATION Right 2021    Procedure: INSERTION, CATHETER, RIGHT HEART;  Surgeon: Petr Naranjo MD;  Location: [facility] CATH LAB;  Service: Cardiology;  Laterality: Right;    RIGHT HEART CATHETERIZATION Right 2022    Procedure: INSERTION, CATHETER, RIGHT HEART;  Surgeon: Chandana Ivory Jr., MD;  Location: [facility] CATH LAB;  Service: Cardiology;  Laterality: Right;    TUBAL LIGATION       Social History     Tobacco Use    Smoking status: Former     Types: Cigarettes     Quit date: 2016     Years since quittin.2    Smokeless tobacco: Never    Tobacco comments:     nonex 2 weeks   Substance Use Topics     Alcohol use: No     Alcohol/week: 0.0 standard drinks    Drug use: No     Vitals:    /79   Pulse 71   Temp 99.2 °F (37.3 °C) (Oral)   Resp 20   Wt 90.3 kg (199 lb)   LMP  (LMP Unknown)   SpO2 100%   BMI 33.12 kg/m²     Physical Exam  Procedures    Laboratory Studies:  Labs that have been ordered have been independently reviewed and interpreted by myself.  Labs Reviewed   POCT GLUCOSE - Abnormal; Notable for the following components:       Result Value    POCT Glucose 192 (*)     All other components within normal limits   CBC W/ AUTO DIFFERENTIAL   COMPREHENSIVE METABOLIC PANEL   PROTIME-INR   TSH   LIPID PANEL   POCT GLUCOSE, HAND-HELD DEVICE     Imaging Results              CTA STROKE MULTI-PHASE (In process)  Result time 12/08/22 14:27:25                  EKG (independently interpreted by me):  ***    X-rays (independently interpreted by me): ***    I decided to obtain the patient's medical records.  Summary of Medical Records:    Medications   iohexoL (OMNIPAQUE 350) injection 100 mL (100 mLs Intravenous Given 12/8/22 1421)     MDM:    62 y.o. female with ***    Differential Dx:  Differential includes but is not limited to ***    ED Management:  ***    Discussed with: ***  Sepsis Attestation: ***       Diagnostic Impression:    Final diagnoses:  [I63.9] Stroke

## 2022-12-08 NOTE — ED NOTES
Patient identifiers verified and correct for Diomedes Mohr  LOC: The patient is awake, alert and aware of environment with an appropriate affect, the patient is oriented x 3 and speaking appropriately.   APPEARANCE: Patient appears comfortable and in no acute distress, patient is clean and well groomed.  SKIN: The skin is warm and dry, color consistent with ethnicity, patient has normal skin turgor and moist mucus membranes, skin intact, no breakdown or bruising noted.   MUSCULOSKELETAL: Patient moving all extremities spontaneously, no swelling noted.  RESPIRATORY: Airway is open and patent, respirations are spontaneous, patient has a normal effort and rate, no accessory muscle use noted, pt placed on continuous pulse ox with O2 sats noted at 99% on room air.  CARDIAC: Pt placed on cardiac monitor. Patient has a normal rate 96 and regular rhythm, no edema noted, capillary refill < 3 seconds.   GASTRO: Soft and non tender to palpation, no distention noted, normoactive bowel sounds present in all four quadrants. Pt states bowel movements have been regular.  : Pt denies any pain or frequency with urination.  NEURO: Pt opens eyes spontaneously, behavior appropriate to situation, follows commands, facial expression symmetrical, bilateral hand grasp equal and even, purposeful motor response noted, normal sensation in all extremities when touched with a finger slightly less on left side.

## 2022-12-08 NOTE — CONSULTS
[facility] - Emergency Dept  Vascular Neurology  [facility]  Consult Note    Inpatient consult to Vascular (Stroke) Neurology  Consult performed by: Faustino Vides MD  Consult ordered by: Deondre Elizondo MD        Assessment/Plan:     Patient is a 62 y.o. 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 Patient presented to [facility] ED on 12/8 with complaints of LSW and numbness which started appx 30 minutes prior to arrival. The patient states that this has happened on occasion in the past, where she has developed some LUE weakness which self resolves. CTA on arrival demonstrated no acute findings or significant vessel stenosis/occlusion. Patient stated that her symptoms seemed to have improved from onset but noted that she was not yet at baseline. Endorsed some L sided numbness of forehead, cheek, LUE and LLE. NIH 1.     Given significant improvement of patients symptoms (but not to baseline) and remaining subjective numbness, recommend further imaging with MRI brain.     Recommendations:  - MRI brain without acute findings concerning for stroke   - Continue Xarelto  - Continue Atorva 80  - Management of DM per PCP/ED  - Reviewed recent TTE    Will sign off at this time  Please contact with any questions or concerns    History of embolic stroke involving right middle cerebral artery  See principle problem      Type 2 diabetes mellitus without complication, without long-term current use of insulin  Stroke RF  See principle problem        STROKE DOCUMENTATION     Acute Stroke Times   Last Known Normal Date: 12/08/22  Last Known Normal Time: 1345  Symptom Onset Date: 12/08/22  Symptom Onset Time: 1345  Stroke Team Called Date: 12/08/22  Stroke Team Called Time: 1415  Stroke Team Arrival Date: 12/08/22  Stroke Team Arrival Time: 1419  CT Interpretation Time:  1423  Thrombolytic Therapy Recommended: No  CTA Interpretation Time: 1423  Thrombectomy Recommended: No    NIH Scale:  1a. Level of Consciousness: 0-->Alert, keenly responsive  1b. LOC Questions: 0-->Answers both questions correctly  1c. LOC Commands: 0-->Performs both tasks correctly  2. Best Gaze: 0-->Normal  3. Visual: 0-->No visual loss  4. Facial Palsy: 0-->Normal symmetrical movements  5a. Motor Arm, Left: 0-->No drift, limb holds 90 (or 45) degrees for full 10 secs  5b. Motor Arm, Right: 0-->No drift, limb holds 90 (or 45) degrees for full 10 secs  6a. Motor Leg, Left: 0-->No drift, leg holds 30 degree position for full 5 secs  6b. Motor Leg, Right: 0-->No drift, leg holds 30 degree position for full 5 secs  7. Limb Ataxia: 0-->Absent  8. Sensory: 1-->Mild-to-moderate sensory loss, patient feels pinprick is less sharp or is dull on the affected side, or there is a loss of superficial pain with pinprick, but patient is aware of being touched  9. Best Language: 0-->No aphasia, normal  10. Dysarthria: 0-->Normal  11. Extinction and Inattention (formerly Neglect): 0-->No abnormality  Total (NIH Stroke Scale): 1    Modified Berkeley Score: 0  Henderson Coma Scale:    ABCD2 Score:    [id number] Score:   HAS -BLED Score:   ICH Score:   Hunt & Vora Classification:       Thrombolysis Candidate? No, Current use of direct thrombin inhibitors (dabigatran) or direct factor Xa inhibitors within 48 hours, Strong suspicion for stroke mimic or alternative diagnosis     Delays to Thrombolysis?  No    Interventional Revascularization Candidate?   Is the patient eligible for mechanical endovascular reperfusion (RENE)?  No; No large vessel occlusion identified on imaging     Delays to Thrombectomy? No    Hemorrhagic change of an Ischemic Stroke: Does this patient have an ischemic stroke with hemorrhagic changes? No     Subjective:     History of Present Illness:  63 yo F with NICM, HFrEF (EF 10-15%), s/p ICD 11/2021, T2DM, htn, hld,  h/o R embolic CVA (2020, without residual deficits) and PE 2021. Patient presented to [facility] ED on  with complaints of LSW and numbness which started appx 30 minutes prior to arrival. The patient states that this has happened on occasion in the past, where she has developed some LUE weakness which self resolves. CTA on arrival demonstrated no acute findings or significant vessel stenosis/occlusion. Patient stated that her symptoms seemed to have improved from onset but noted that she was not yet at baseline. Endorsed some L sided numbness of forehead, cheek, LUE and LLE. NIH 1.           Past Medical History:   Diagnosis Date    Acute on chronic combined systolic and diastolic heart failure 2019    Anticoagulant long-term use     Anxiety     Arthritis     Asthma     Depression     H/O: hysterectomy     Hyperlipemia     Hypertension     Pulmonary edema     Schizophrenia      Past Surgical History:   Procedure Laterality Date    BLADDER SUSPENSION      CARPAL TUNNEL RELEASE Right     HEEL SPUR SURGERY Left     HYSTERECTOMY      LEFT HEART CATHETERIZATION Bilateral 2019    Procedure: Left heart cath;  Surgeon: Steve Chambers MD;  Location: [facility] CATH LAB;  Service: Cardiology;  Laterality: Bilateral;    RIGHT HEART CATHETERIZATION Right 2021    Procedure: INSERTION, CATHETER, RIGHT HEART;  Surgeon: Petr Naranjo MD;  Location: [facility] CATH LAB;  Service: Cardiology;  Laterality: Right;    RIGHT HEART CATHETERIZATION Right 2022    Procedure: INSERTION, CATHETER, RIGHT HEART;  Surgeon: Chandana Ivory Jr., MD;  Location: [facility] CATH LAB;  Service: Cardiology;  Laterality: Right;    TUBAL LIGATION       Family History   Problem Relation Age of Onset    No Known Problems Mother     No Known Problems Father      Social History     Tobacco Use    Smoking status: Former     Types: Cigarettes     Quit date: 2016     Years since quittin.2    Smokeless  tobacco: Never    Tobacco comments:     nonex 2 weeks   Substance Use Topics    Alcohol use: No     Alcohol/week: 0.0 standard drinks    Drug use: No     Review of patient's allergies indicates:   Allergen Reactions    Adhesive Blisters    Captopril Other (See Comments)     COUGH       Medications: I have reviewed the current medication administration record.    (Not in a hospital admission)      Review of Systems   Constitutional:  Negative for chills and fever.   HENT:  Negative for rhinorrhea and sneezing.    Eyes:  Negative for discharge and redness.   Respiratory:  Negative for cough and wheezing.    Cardiovascular:  Negative for chest pain and palpitations.   Gastrointestinal:  Negative for nausea and vomiting.   Genitourinary:  Negative for flank pain and hematuria.   Skin:  Negative for pallor and rash.   Neurological:  Positive for numbness. Negative for facial asymmetry and speech difficulty.   Psychiatric/Behavioral:  Negative for decreased concentration.    Objective:     Vital Signs (Most Recent):  Temp: 99.2 °F (37.3 °C) (12/08/22 1403)  Pulse: 77 (12/08/22 1441)  Resp: (!) 22 (12/08/22 1441)  BP: 115/79 (12/08/22 1403)  SpO2: 98 % (12/08/22 1441)    Vital Signs Range (Last 24H):  Temp:  [99.2 °F (37.3 °C)]   Pulse:  [71-77]   Resp:  [20-22]   BP: (115)/(79)   SpO2:  [98 %-100 %]     Physical Exam  Constitutional:       Appearance: Normal appearance.   HENT:      Head: Normocephalic and atraumatic.      Nose: Nose normal.      Mouth/Throat:      Mouth: Mucous membranes are moist.      Pharynx: Oropharynx is clear.   Eyes:      General: No scleral icterus.     Extraocular Movements: Extraocular movements intact.      Conjunctiva/sclera: Conjunctivae normal.      Pupils: Pupils are equal, round, and reactive to light.   Cardiovascular:      Rate and Rhythm: Normal rate.      Pulses: Normal pulses.   Pulmonary:      Effort: Pulmonary effort is normal. No respiratory distress.   Abdominal:       General: Abdomen is flat. There is no distension.      Tenderness: There is no abdominal tenderness.   Musculoskeletal:         General: No tenderness or deformity.      Cervical back: Normal range of motion. No rigidity.   Skin:     General: Skin is warm and dry.      Coloration: Skin is not jaundiced.   Neurological:      Mental Status: She is alert and oriented to person, place, and time.      Cranial Nerves: No cranial nerve deficit.      Sensory: Sensory deficit present.      Motor: No weakness.      Coordination: Coordination normal.       Neurological Exam:   LOC: alert  Attention Span: Good   Language: No aphasia  Articulation: No dysarthria  Orientation: Person, Place, Time   Visual Fields: Full  EOM (CN III, IV, VI): Full/intact  Pupils (CN II, III): PERRL  Facial Sensation (CN V): Facial sensory loss  Facial Movement (CN VII): Symmetric facial expression    Motor: Arm left  Normal 5/5  Leg left  Normal 5/5  Arm right  Normal 5/5  Leg right Normal 5/5  Cerebellum: No evidence of appendicular or axial ataxia  Sensation: Trell-hypoesthesia left      Laboratory:  CMP: No results for input(s): GLUCOSE, CALCIUM, ALBUMIN, PROT, NA, K, CO2, CL, BUN, CREATININE, ALKPHOS, ALT, AST, BILITOT in the last 24 hours.  CBC: No results for input(s): WBC, RBC, HGB, HCT, PLT, MCV, MCH, MCHC in the last 168 hours.    Diagnostic Results:      Brain imaging:  CTA: No evidence of acute hemorrhage or major vascular distribution infarcts.     Remote right cerebral infarction, stable from prior MRI.     Stable CT arteriogram noting only minimal atherosclerotic change.  No evidence of new high-grade stenosis or large vessel occlusion.    Vessel Imaging:  See above    Cardiac Evaluation:           Faustino Vides MD  [facility]  Department of Vascular Neurology   [facility] - Emergency Dept

## 2022-12-08 NOTE — ASSESSMENT & PLAN NOTE
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 Patient presented to [facility] ED on 12/8 with complaints of LSW and numbness which started appx 30 minutes prior to arrival. The patient states that this has happened on occasion in the past, where she has developed some LUE weakness which self resolves. CTA on arrival demonstrated no acute findings or significant vessel stenosis/occlusion. Patient stated that her symptoms seemed to have improved from onset but noted that she was not yet at baseline. Endorsed some L sided numbness of forehead, cheek, LUE and LLE. NIH 1.     Given significant improvement of patients symptoms (but not to baseline) and remaining subjective numbness, recommend further imaging with MRI brain.     Recommendations:  - MRI brain without acute findings concerning for stroke   - Continue Xarelto  - Continue Atorva 80  - Management of DM per PCP/ED  - Reviewed recent TTE    Will sign off at this time  Please contact with any questions or concerns

## 2022-12-08 NOTE — SUBJECTIVE & OBJECTIVE
Past Medical History:   Diagnosis Date    Acute on chronic combined systolic and diastolic heart failure 2019    Anticoagulant long-term use     Anxiety     Arthritis     Asthma     Depression     H/O: hysterectomy     Hyperlipemia     Hypertension     Pulmonary edema     Schizophrenia      Past Surgical History:   Procedure Laterality Date    BLADDER SUSPENSION      CARPAL TUNNEL RELEASE Right     HEEL SPUR SURGERY Left     HYSTERECTOMY      LEFT HEART CATHETERIZATION Bilateral 2019    Procedure: Left heart cath;  Surgeon: Steve Chambers MD;  Location: [facility] CATH LAB;  Service: Cardiology;  Laterality: Bilateral;    RIGHT HEART CATHETERIZATION Right 2021    Procedure: INSERTION, CATHETER, RIGHT HEART;  Surgeon: Petr Naranjo MD;  Location: [facility] CATH LAB;  Service: Cardiology;  Laterality: Right;    RIGHT HEART CATHETERIZATION Right 2022    Procedure: INSERTION, CATHETER, RIGHT HEART;  Surgeon: Chandana Ivory Jr., MD;  Location: [facility] CATH LAB;  Service: Cardiology;  Laterality: Right;    TUBAL LIGATION       Family History   Problem Relation Age of Onset    No Known Problems Mother     No Known Problems Father      Social History     Tobacco Use    Smoking status: Former     Types: Cigarettes     Quit date: 2016     Years since quittin.2    Smokeless tobacco: Never    Tobacco comments:     nonex 2 weeks   Substance Use Topics    Alcohol use: No     Alcohol/week: 0.0 standard drinks    Drug use: No     Review of patient's allergies indicates:   Allergen Reactions    Adhesive Blisters    Captopril Other (See Comments)     COUGH       Medications: I have reviewed the current medication administration record.    (Not in a hospital admission)      Review of Systems   Constitutional:  Negative for chills and fever.   HENT:  Negative for rhinorrhea and sneezing.    Eyes:  Negative for discharge and redness.   Respiratory:  Negative for cough and wheezing.     Cardiovascular:  Negative for chest pain and palpitations.   Gastrointestinal:  Negative for nausea and vomiting.   Genitourinary:  Negative for flank pain and hematuria.   Skin:  Negative for pallor and rash.   Neurological:  Positive for numbness. Negative for facial asymmetry and speech difficulty.   Psychiatric/Behavioral:  Negative for decreased concentration.    Objective:     Vital Signs (Most Recent):  Temp: 99.2 °F (37.3 °C) (12/08/22 1403)  Pulse: 77 (12/08/22 1441)  Resp: (!) 22 (12/08/22 1441)  BP: 115/79 (12/08/22 1403)  SpO2: 98 % (12/08/22 1441)    Vital Signs Range (Last 24H):  Temp:  [99.2 °F (37.3 °C)]   Pulse:  [71-77]   Resp:  [20-22]   BP: (115)/(79)   SpO2:  [98 %-100 %]     Physical Exam  Constitutional:       Appearance: Normal appearance.   HENT:      Head: Normocephalic and atraumatic.      Nose: Nose normal.      Mouth/Throat:      Mouth: Mucous membranes are moist.      Pharynx: Oropharynx is clear.   Eyes:      General: No scleral icterus.     Extraocular Movements: Extraocular movements intact.      Conjunctiva/sclera: Conjunctivae normal.      Pupils: Pupils are equal, round, and reactive to light.   Cardiovascular:      Rate and Rhythm: Normal rate.      Pulses: Normal pulses.   Pulmonary:      Effort: Pulmonary effort is normal. No respiratory distress.   Abdominal:      General: Abdomen is flat. There is no distension.      Tenderness: There is no abdominal tenderness.   Musculoskeletal:         General: No tenderness or deformity.      Cervical back: Normal range of motion. No rigidity.   Skin:     General: Skin is warm and dry.      Coloration: Skin is not jaundiced.   Neurological:      Mental Status: She is alert and oriented to person, place, and time.      Cranial Nerves: No cranial nerve deficit.      Sensory: Sensory deficit present.      Motor: No weakness.      Coordination: Coordination normal.       Neurological Exam:   LOC: alert  Attention Span: Good   Language: No  aphasia  Articulation: No dysarthria  Orientation: Person, Place, Time   Visual Fields: Full  EOM (CN III, IV, VI): Full/intact  Pupils (CN II, III): PERRL  Facial Sensation (CN V): Facial sensory loss  Facial Movement (CN VII): Symmetric facial expression    Motor: Arm left  Normal 5/5  Leg left  Normal 5/5  Arm right  Normal 5/5  Leg right Normal 5/5  Cerebellum: No evidence of appendicular or axial ataxia  Sensation: Trell-hypoesthesia left      Laboratory:  CMP: No results for input(s): GLUCOSE, CALCIUM, ALBUMIN, PROT, NA, K, CO2, CL, BUN, CREATININE, ALKPHOS, ALT, AST, BILITOT in the last 24 hours.  CBC: No results for input(s): WBC, RBC, HGB, HCT, PLT, MCV, MCH, MCHC in the last 168 hours.    Diagnostic Results:      Brain imaging:  CTA: No evidence of acute hemorrhage or major vascular distribution infarcts.     Remote right cerebral infarction, stable from prior MRI.     Stable CT arteriogram noting only minimal atherosclerotic change.  No evidence of new high-grade stenosis or large vessel occlusion.    Vessel Imaging:  See above    Cardiac Evaluation:

## 2022-12-08 NOTE — PROGRESS NOTES
[employer] MRI EP Checklist    An MRI has been ordered on this patient with a [employer] implanted cardiac device.    Pt has a ICD  Reprogramming/testing to be done by a Rep from [employer]    The device system, including pulse generator and leads, has been confirmed as MR conditional.    There are no known lead extenders, lead adaptors, or abandoned leads in place.    Lead impedance measurements are within the programmed lead impedance limits.    The pulse generator is implanted in the pectoral region.    The pulse generator has sufficient battery and is not deemed to be at end of life.    The pacing capture thresholds, when tested by the industry representative just prior to the MRI, should be < 2.5V at a pulse width of 0.50ms for RA and RV leads with devices programmed to an asynchronous pacing mode.        Parameters should be programmed to MRI appropriate settings and pacing mode should programmed as below:         OOO: (Pacing off) to be used ONLY for a patient demonstrating normal sinus rhythm with intact conduction, atrial fibrillation with ventricular rates of >/= 75 bpm.    After the scan, the industry representative must perform reprogramming to original settings and verify that programmed pacing amplitudes provide adequate safety margins.

## 2022-12-09 NOTE — CARE UPDATE
Patient was signed out to me from initial consulting provider. Patient was discussed at length with VN Fellow and Staff.    A brief synopsis of this patient and their case is as follows:     Patient arrived to [facility] ED with concerns for LS numbness and weakness. Stroke code was activated. By the time stroke team evaluated patient's weakness had resolved and patient was reporting to have LUE numbness, which was improved from prior, but still present (50% in in intensity in patient's opinion). She was ultimately given NIHSS 1 by stroke provider (Dr. Vides). Patient was not a TNK candidate given fact that patient takes Xarelto and reports 100% compliance. CTA was done, vessels were reviewed by stroke team, and CTA was without LVO, and no significant plaque or stenosis in either carotid bifurcation with only minimal atherosclerotic change. Since patient was still symptomatic following the CTA, STAT MRI Brain was ordered by stroke team, but given presence of ICD, [employer] Rep contacted first. MRI was then expedited. MRI was negative without any evidence of acute ischemic changes, only evidence of remote right insula/frontal operculum as well as posteromedial right temporal lobe, which was previously known to our service, as patient was admitted to VN service in June 2021 for a stroke with etiology as cardioembolic.     Of note, patient was recently admitted to hospital in Oct 2022, during this stay, echo was done. Echo with EF 15%, eccentric hypertrophy of LV, and mild LAE. Most recent  LDL: 95.8 (12/8/2022) and A1c 7.9 (10/16/2022).     Stroke team recommends continuing Xarelto and Atorvastatin 80 MG for secondary stroke prevention.     ED completed thorough metabolic workup which did not show anything outstanding to support cause for recrudesence of prior stroke symptoms. On reassessment by ED provider, patient reported to be completely back to baseline. At this time, no indication to be admitted from a stroke  standpoint, given a complete stroke work up completed and patient already on secondary stroke prevention. However, recommend patient to follow up with general neurology to rule out peripheral etiology of numbness, as she reports intermittent numbness/weakness in LUE that occurs several times per week.     Stroke fellow (Mabel Morton MD) and ED attending (Nydia Castro MD) spoke on phone and are in understanding of VN recommendations and proposed plan.     For full consult note and recommendations from stroke team, please see consult note published by the consulting provider, Faustino Vides MD on 12/8/2022.         Ifrah Estrada PA-C  Vascular Neurology   [phone]

## 2022-12-09 NOTE — DISCHARGE INSTRUCTIONS
It was a pleasure taking care of you today!     Diagnosis: Numbness  -follow-up with primary care physician  -return to the emergency department if you have any more numbness, weakness, shortness of breath, passing out    Follow-Up Plan:  - Follow-up with primary care doctor within 3 - 5 days to discuss your recent ER visit and any additional concerns that you may have.  - Additional testing and/or evaluation as directed by your primary doctor    Return to the Emergency Department for symptoms including but not limited to: persistence or worsening of symptoms, shortness of breath or chest pain, inability to drink without vomiting, passing out/fainting/ loss of consciousness, or if you have other concerns.

## 2022-12-09 NOTE — ED PROVIDER NOTES
Source of History:   Patient and Medical Record, without language barrier or      Chief complaint:  Extremity Weakness (Reports left arm weakness and facial numbness 30 min PTA with dizziness. No slurred speech or facial droop noted.)    HPI:  Diomedes Mohr is a 62 y.o. female with history of previous stroke, CHF, COPD presenting with chief complaint of left arm weakness and facial numbness.  Patient was shopping when she acutely developed left arm weakness and left-sided body numbness.  Before arrival to the ED patient had resolution of her arm weakness.  She still has some mild facial and left-sided partial anesthesia but states that it is getting better with time.  Patient had a stroke back in 2020 without deficits.  Patient does not endorse any weakness, facial droop, slurred speech, dysarthria, visual complaints    This is the extent to the patients complaints today here in the emergency department.    ROS: As per HPI and below:  Review of Systems   Constitutional:  Negative for chills and fever.   HENT:  Negative for rhinorrhea.    Eyes:  Negative for visual disturbance.   Respiratory:  Negative for shortness of breath.    Cardiovascular:  Negative for chest pain.   Gastrointestinal:  Negative for abdominal pain, nausea and vomiting.   Genitourinary:  Negative for dysuria and hematuria.   Musculoskeletal:  Negative for arthralgias, back pain and neck pain.   Skin:  Negative for color change and rash.   Neurological:  Positive for weakness and numbness. Negative for dizziness, facial asymmetry, speech difficulty, light-headedness and headaches.        Sensory change   Psychiatric/Behavioral:  Negative for agitation and confusion.      Review of patient's allergies indicates:   Allergen Reactions    Adhesive Blisters    Captopril Other (See Comments)     COUGH     PMH:  As per HPI and below:  Past Medical History:   Diagnosis Date    Acute on chronic combined systolic and diastolic heart failure  2019    Anticoagulant long-term use     Anxiety     Arthritis     Asthma     Depression     H/O: hysterectomy     Hyperlipemia     Hypertension     Pulmonary edema     Schizophrenia      Past Surgical History:   Procedure Laterality Date    BLADDER SUSPENSION      CARPAL TUNNEL RELEASE Right     HEEL SPUR SURGERY Left     HYSTERECTOMY      LEFT HEART CATHETERIZATION Bilateral 2019    Procedure: Left heart cath;  Surgeon: Steve Chambers MD;  Location: [facility] CATH LAB;  Service: Cardiology;  Laterality: Bilateral;    RIGHT HEART CATHETERIZATION Right 2021    Procedure: INSERTION, CATHETER, RIGHT HEART;  Surgeon: Petr Naranjo MD;  Location: [facility] CATH LAB;  Service: Cardiology;  Laterality: Right;    RIGHT HEART CATHETERIZATION Right 2022    Procedure: INSERTION, CATHETER, RIGHT HEART;  Surgeon: Chandana Ivory Jr., MD;  Location: [facility] CATH LAB;  Service: Cardiology;  Laterality: Right;    TUBAL LIGATION       Social History     Tobacco Use    Smoking status: Former     Types: Cigarettes     Quit date: 2016     Years since quittin.2    Smokeless tobacco: Never    Tobacco comments:     nonex 2 weeks   Substance Use Topics    Alcohol use: No     Alcohol/week: 0.0 standard drinks    Drug use: No     Vitals:    BP (!) 107/56 (BP Location: Right arm, Patient Position: Sitting)   Pulse 66   Temp 97.6 °F (36.4 °C) (Oral)   Resp 18   Wt 90.3 kg (199 lb)   LMP  (LMP Unknown)   SpO2 95%   BMI 33.12 kg/m²     Physical Exam  Constitutional:       General: She is not in acute distress.     Appearance: Normal appearance. She is not ill-appearing, toxic-appearing or diaphoretic.   HENT:      Head: Normocephalic and atraumatic.      Nose: Nose normal.      Mouth/Throat:      Mouth: Mucous membranes are moist.   Eyes:      General: No scleral icterus.     Extraocular Movements: Extraocular movements intact.      Pupils: Pupils are equal, round, and reactive to light.    Cardiovascular:      Rate and Rhythm: Normal rate and regular rhythm.      Pulses: Normal pulses.   Pulmonary:      Effort: No respiratory distress.      Breath sounds: Normal breath sounds. No stridor. No wheezing, rhonchi or rales.   Abdominal:      General: There is no distension.      Palpations: Abdomen is soft.      Tenderness: There is no abdominal tenderness. There is no guarding.   Musculoskeletal:         General: No tenderness. Normal range of motion.      Cervical back: Normal range of motion and neck supple. No rigidity or tenderness.   Skin:     General: Skin is warm and dry.      Capillary Refill: Capillary refill takes less than 2 seconds.   Neurological:      General: No focal deficit present.      Mental Status: She is alert and oriented to person, place, and time.      Cranial Nerves: No cranial nerve deficit.      Sensory: No sensory deficit.      Motor: No weakness.      Coordination: Coordination normal.      Gait: Gait normal.   Psychiatric:         Mood and Affect: Mood normal.         Behavior: Behavior normal.         Thought Content: Thought content normal.         Judgment: Judgment normal.     Procedures    Laboratory Studies:  Labs that have been ordered have been independently reviewed and interpreted by myself.  Labs Reviewed   CBC W/ AUTO DIFFERENTIAL - Abnormal; Notable for the following components:       Result Value    Hemoglobin 11.9 (*)     Immature Granulocytes 0.9 (*)     Immature Grans (Abs) 0.09 (*)     Gran % 73.7 (*)     All other components within normal limits   COMPREHENSIVE METABOLIC PANEL - Abnormal; Notable for the following components:    Glucose 203 (*)     Albumin 3.2 (*)     Alkaline Phosphatase 151 (*)     All other components within normal limits   LIPID PANEL - Abnormal; Notable for the following components:    HDL 35 (*)     All other components within normal limits   B-TYPE NATRIURETIC PEPTIDE - Abnormal; Notable for the following components:      (*)     All other components within normal limits   URINALYSIS, REFLEX TO URINE CULTURE - Abnormal; Notable for the following components:    Specific Gravity, UA >1.030 (*)     Protein, UA Trace (*)     Glucose, UA 4+ (*)     Occult Blood UA 2+ (*)     Leukocytes, UA 1+ (*)     All other components within normal limits    Narrative:     Specimen Source->Urine   URINALYSIS MICROSCOPIC - Abnormal; Notable for the following components:    RBC, UA 10 (*)     WBC, UA 55 (*)     All other components within normal limits    Narrative:     Specimen Source->Urine   POCT GLUCOSE - Abnormal; Notable for the following components:    POCT Glucose 192 (*)     All other components within normal limits   ISTAT PROCEDURE - Abnormal; Notable for the following components:    POC PCO2 50.3 (*)     POC PO2 39 (*)     POC HCO3 30.1 (*)     POC SATURATED O2 72 (*)     POC TCO2 32 (*)     All other components within normal limits   CULTURE, URINE   PROTIME-INR   TSH   SARS-COV2 (COVID) WITH FLU/RSV BY PCR   TROPONIN I     Imaging Results              X-Ray Chest PA And Lateral (Final result)  Result time 12/08/22 18:04:26      Final result by Horacio Peres MD (12/08/22 18:04:26)                   Impression:      1. No acute cardiopulmonary process.      Electronically signed by: Horacio Peres MD  Date:    12/08/2022  Time:    18:04               Narrative:    EXAMINATION:  XR CHEST PA AND LATERAL    CLINICAL HISTORY:  Shortness of breath    TECHNIQUE:  PA and lateral views of the chest were performed.    COMPARISON:  10/15/2022    FINDINGS:  The cardiomediastinal silhouette is not enlarged.  Left chest wall pacer noted..  There is no pleural effusion.  The trachea is midline.  The lungs are symmetrically expanded bilaterally without evidence of acute parenchymal process. No large focal consolidation seen.  There is no pneumothorax.  The osseous structures are remarkable for degenerative change..                                        MRI Brain Without Contrast (Final result)  Result time 12/08/22 16:18:19      Final result by Marlo Yanez MD (12/08/22 16:18:19)                   Impression:      Brain appears stable from prior exam as discussed above.  No evidence of acute hemorrhage or infarction.      Electronically signed by: Marlo Yanez MD  Date:    12/08/2022  Time:    16:18               Narrative:    EXAMINATION:  MRI BRAIN WITHOUT CONTRAST    CLINICAL HISTORY:  Stroke, follow up;    TECHNIQUE:  Multiplanar multisequence MR imaging of the brain was performed without intravenous contrast.    COMPARISON:  CT 12/08/2022, MRI 05/15/2021    FINDINGS:  Intracranial Compartment:    Ventricles are stable in size.  No evidence of hydrocephalus.    Stable areas of prior infarction in the right insula/frontal operculum as well as posteromedial right temporal lobe.  No diffusion restriction to indicate an acute infarction on today's examination.    Small remote right cerebellar hemorrhage.  No acute hemorrhage.    No new mass effect or midline shift.    No extra-axial blood or fluid collections.    Normal vascular flow voids are preserved.    Skull/Extracranial Contents (limited evaluation):    Bone marrow signal intensity is normal.                                       CTA STROKE MULTI-PHASE (Final result)  Result time 12/08/22 14:39:04      Final result by Marlo Yanez MD (12/08/22 14:39:04)                   Impression:      No evidence of acute hemorrhage or major vascular distribution infarcts.    Remote right cerebral infarction, stable from prior MRI.    Stable CT arteriogram noting only minimal atherosclerotic change.  No evidence of new high-grade stenosis or large vessel occlusion.      Electronically signed by: Marlo Yanez MD  Date:    12/08/2022  Time:    14:39               Narrative:    EXAMINATION:  CTA STROKE MULTI-PHASE    CLINICAL HISTORY:  Neuro deficit, acute, stroke suspected;    TECHNIQUE:  Axial CT images  obtained throughout the region of the head before and after the administration of intravenous contrast.    CT angiogram was performed through the cervical and intracranial vasculature during the IV bolus administration of 100 mL of Omnipaque 350.  Two additional phases through the intracranial vasculature via multiphase technique.  Multiplanar MPR and MIP reformats were performed.    CT source data was analyzed using artificial intelligence software for detection of a large vessel occlusions (LVO) in order to enable computer assisted triage notification and aid clinical stroke decision making.    COMPARISON:  MRI 05/15/2022, CTA 08/14/2020    FINDINGS:  Small remote infarcts right insula/frontal operculum as well as in posteromedial right temporal lobe.  No acute major vascular distribution infarct.  No acute parenchymal hemorrhage.  No mass effect or midline shift.    The ventricles are stable in size without evidence of hydrocephalus.      No extra-axial blood or fluid collections.    The cranium appears intact.    Mastoid air cells and paranasal sinuses are essentially clear.    Moderate cervical spondylosis.      CTA:    Aorta appears stable.  No significant plaque or stenosis at the major branch vessel origins.    The common carotid arteries are normal in course and caliber. No significant plaque or stenosis in either carotid bifurcation.  Medialized course of internal carotid arteries at the level of oropharynx.  Calcification of the cavernous ICAs with minimal luminal narrowing.    The vertebral origins are patent.  Vertebral arteries appears stable from prior without significant stenosis.  Vertebrobasilar system is within normal limits without focal abnormality.    The proximal anterior, middle, and posterior cerebral arteries are within normal limits, without evidence of significant stenosis, focal occlusion, or intracranial aneurysm formation.                                    EKG (independently  "interpreted by me):  Rate of 88, normal sinus rhythm, mild ST depressions in lateral leads, LVH    X-rays (independently interpreted by me):  No edema, effusions, pneumothorax    I decided to obtain the patient's medical records.  Summary of Medical Records:    Medications   iohexoL (OMNIPAQUE 350) injection 100 mL (100 mLs Intravenous Given 12/8/22 1421)     MDM:    62 y.o. female with facial numbness and arm weakness    Differential Dx:  Differential includes but is not limited to ICH, CVA, TIA, metabolic derangement    ED Management:  Code stroke called upon arrival.  CTA showing old strokes but no acute LDL or area of infarction.  Will follow up with MRI.  Patient's cardiac device interrogated without any signs of arrhythmia.  MRI showing stable areas of infarction with no acute for subacute infarction.  Vascular neurology believes this patient's symptoms are not due to a stroke or TIA.  BNP mildly elevated but within normal range for patient.  She denies shortness of breath, chest pain.  Viral studies negative.  Patient on re-examination still does not have chest pain or shortness of breath.  Chest x-ray within normal limits.       ED Course as of 12/08/22 2323   Thu Dec 08, 2022   1910 Troponin I: <0.006 [JR]   1920 BNP(!): 422  Improved from prior, denying SOB [JR]   1922 "Stable areas of prior infarction in the right insula/frontal operculum as well as posteromedial right temporal lobe.  No diffusion restriction to indicate an acute infarction on today's examination." [JR]      ED Course User Index  [JR] Nydia Castro MD     Diagnostic Impression:    Final diagnoses:  [R20.0] Numbness  [R06.02] SOB (shortness of breath)     ED Disposition Condition    Discharge Stable          ED Prescriptions    None       Follow-up Information       Follow up With Specialties Details Why Contact Info    Fernando Manzo MD Endocrinology   [address]  [place] [address]  [phone]      [facility] - " Emergency Dept Emergency Medicine Go to  As needed, If symptoms worsen [address]  [facility] [address] [phone]              Attending Attestation:   Physician Attestation Statement for Resident:  As the supervising MD   Physician Attestation Statement: I have personally seen and examined this patient.   I agree with the above history.  -: I have independently reviewed prior records:  Echo with EF 15% in Oct 2022. Prior cardioembolic Right insula/frontal temporal CVA in June 2021.      Today pt reports left facial numbness and LUE weakness, denies HA, confusion, dizziness, speech changes.    As the supervising MD I agree with the above PE.   -: NAD, NCAT, A&Ox3, PERRL and EOMI, neck supple/normal ROM, CTAB, RRR no mrg, normal extremity ROM/m/s, clear speech, finger nose and WHIT intact, abd s/nt/nd, no LE edema, skin dry and warm     As the supervising MD I agree with the above treatment, course, plan, and disposition.   -: Code Stroke activated, CTA neg, MRI ordered by VN.     MRI neg, pt reports subjective LUE numbness has resolved. No FNDs on repeat exam. Stable for d/c, I discussed outpatient follow up and return precautions with pt and answered all questions.     I have reviewed and agree with the residents interpretation of the following: lab data and CT scans.  I have reviewed the following: old records at this facility.        Attending Critical Care:   Critical Care Times:   Direct Patient Care (initial evaluation, reassessments, and time considering the case)................................................................10 minutes.   Additional History from reviewing old medical records or taking additional history from the family, EMS, PCP, etc.......................10 minutes.   Ordering, Reviewing, and Interpreting Diagnostic Studies...............................................................................................................10 minutes.    Documentation..................................................................................................................................................................................10 minutes.   Consultation with other Physicians. .................................................................................................................................................10 minutes.   ==============================================================  Total Critical Care Time - exclusive of procedural time: 50 minutes.  ==============================================================  Critical care was necessary to treat or prevent imminent or life-threatening deterioration of the following conditions: CNS failure.   Critical care was time spent personally by me on the following activities: obtaining history from patient or relative, examination of patient, review of old charts, ordering lab, x-rays, and/or EKG, development of treatment plan with patient or relative, ordering and performing treatments and interventions, evaluation of patient's response to treatment, discussion with consultants, interpretation of cardiac measurements and re-evaluation of patient's conition.   Critical Care Condition: potentially life-threatening          Deondre Elizondo MD  Resident  12/08/22 1590       Nydia Castro MD  02/05/23 7009

## 2022-12-10 LAB — BACTERIA UR CULT: ABNORMAL

## 2022-12-21 DIAGNOSIS — I42.8 NONISCHEMIC CARDIOMYOPATHY: Primary | ICD-10-CM

## 2023-01-23 NOTE — PROGRESS NOTES
ADVANCED HEART FAILURE AND TRANSPLANTATION CLINIC VISIT    CHIEF COMPLAINT:  Follow-up heart failure    HISTORY OF PRESENT ILLNESS:  Diomedes Mohr is a 62 y.o.  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 she had a PET Stress 4/2022 which was pertinent for small (<5%) mild to moderate intensity lateral apical perfusion abnormality of 3% LV myocardium. EF 10% and rest, 13% during stress. ED workup pertinent for: HDS, HR 80's, /77, 97% RA. CBC and BMP unremarkable. EKG with no acute abnormalities. Troponin 0.018, repeat 0.011. BNP elevated 1,038. CXR with mild interstitial and patchy alveolar infiltrate. CTA negative for PE. She was discharged 10/16/2022    She has had several admissions in 2020 for decompensated HF and now most recently this. In [facility] clinic 4/25/2022 PET stress was completed as well as RHC noted below with consideration for pulmonary follow-up after history of PE (evaluated by hematology) and history of COPD. This month she has already presented twice to hospital. Notes SOB constantly even while seated and is more difficult wearing the mask. Has history of tobacco use prior to stroke and was diagnosed with COPD and is not on any maintenance inhaler only PRN albuterol. States checks weights daily and notes 1 lb gain since discharge. Notes sleeping on side or using 3 pillows constantly. Does not recall having sleep study previously. States appetite is good and no constipation.     Last visit, euvolemic by exam with constant SOB. Would evaluate for other causes of SOB. Referred to pulmonology given diagnosis of COPD with prior  tobacco use and minimal air entry by lung exam. We tried to optimize HF GDMT and discontinued isordil and increased Entresto to 49-51 mg BID, started Jardiance 10 mg daily, and reduced torsemide to 10 mg daily with daily weights and repeat labs    Since her last visit, she feels ok with the same symptoms as prior. She notes she can't lay on right side as it is hard to breathe. She has not heard from pulmonology regarding SOB. Notes bendopnea, bloating, early satiety. Denies PND, nausea, lower extremity edema, chest discomfort, presyncope, palpitations, or syncope. Denies adverse bleeding, no hematochezia/melena/hematuria/hematemesis after Xarelto restarted on discharge.     Current diuretic regimen: torsemide 10 mg daily with adequate urinary response.     GDMT: Toprol 100 mg daily, Entresto 49-51 mg BID, Aldactone 25 mg daily, Jardiance 10 mg daily    Cardiac Data:  Transthoracic Echo: Results for orders placed during the hospital encounter of 10/15/22  · The left ventricle is severely enlarged LVEDD 64 mm with eccentric hypertrophy and severely decreased systolic function. The estimated ejection fraction is 15%.  · Normal right ventricular size with normal right ventricular systolic function.  · Grade II left ventricular diastolic dysfunction.  · Mild left atrial enlargement.  · The estimated PA systolic pressure is 55 mmHg.  · Normal central venous pressure (3 mmHg).    ECG 10/16/2022: SR 64 bpm, LVH with ST T changes hypertrophy  ms    Left Heart Catheterization:   Right Heart Catheterization: Results for orders placed during the hospital encounter of 05/12/22  Right atrial pressure is mildly elevated.  Pulmonary capillary wedge pressure is moderately elevated.  Pulmonary artery pressure is mildly elevated.  Jv cardiac output and index is low normal.  Pulmonary vascular resistance is normal.  Systemic vascular resistance is 1657.  BP above target of < 130/80 during procedure.    Devices: ICD    PAST  MEDICAL HISTORY:  Past Medical History:   Diagnosis Date    Acute on chronic combined systolic and diastolic heart failure 2019    Anticoagulant long-term use     Anxiety     Arthritis     Asthma     Depression     H/O: hysterectomy     Hyperlipemia     Hypertension     Pulmonary edema     Schizophrenia        PAST SURGICAL HISTORY:  Past Surgical History:   Procedure Laterality Date    BLADDER SUSPENSION      CARPAL TUNNEL RELEASE Right     HEEL SPUR SURGERY Left     HYSTERECTOMY      LEFT HEART CATHETERIZATION Bilateral 2019    Procedure: Left heart cath;  Surgeon: Steve Chambers MD;  [facility];  Service: Cardiology;  Laterality: Bilateral;    RIGHT HEART CATHETERIZATION Right 2021    Procedure: INSERTION, CATHETER, RIGHT HEART;  Surgeon: Petr Naranjo MD;  Location: [facility] CATH LAB;  Service: Cardiology;  Laterality: Right;    RIGHT HEART CATHETERIZATION Right 2022    Procedure: INSERTION, CATHETER, RIGHT HEART;  Surgeon: Chandana Ivory Jr., MD;  Location: [facility] CATH LAB;  Service: Cardiology;  Laterality: Right;    TUBAL LIGATION         SOCIAL HISTORY  Social History     Socioeconomic History    Marital status:    Tobacco Use    Smoking status: Former     Types: Cigarettes     Quit date: 2016     Years since quittin.4    Smokeless tobacco: Never    Tobacco comments:     nonex 2 weeks   Substance and Sexual Activity    Alcohol use: No     Alcohol/week: 0.0 standard drinks    Drug use: No       FAMILY HISTORY  Family History   Problem Relation Age of Onset    No Known Problems Mother     No Known Problems Father        ALLERGIES:  Review of patient's allergies indicates:   Allergen Reactions    Adhesive Blisters    Captopril Other (See Comments)     COUGH       MEDICATIONS  Current Outpatient Medications on File Prior to Visit   Medication Sig Dispense Refill    acetaminophen (TYLENOL) 325 MG tablet Take 2 tablets (650 mg total) by mouth every  "8 (eight) hours as needed. 30 tablet 0    albuterol (PROVENTIL) 2.5 mg /3 mL (0.083 %) nebulizer solution Take 3 mLs (2.5 mg total) by nebulization every 6 (six) hours as needed for Wheezing or Shortness of Breath. Rescue 3 mL 0    albuterol (PROVENTIL/VENTOLIN HFA) 90 mcg/actuation inhaler Inhale 2 puffs into the lungs every 6 (six) hours as needed for Wheezing or Shortness of Breath. 18 g 2    atorvastatin (LIPITOR) 80 MG tablet Take 1 tablet (80 mg total) by mouth once daily. 90 tablet 3    dulaglutide (TRULICITY) 1.5 mg/0.5 mL pen injector Inject 1.5 mg into the skin once a week.      empagliflozin (JARDIANCE) 10 mg tablet Take 1 tablet (10 mg total) by mouth once daily. 90 tablet 6    glimepiride (AMARYL) 4 MG tablet Take 4 mg by mouth 2 (two) times a day.      metFORMIN (GLUCOPHAGE) 1000 MG tablet Take 1,000 mg by mouth 2 (two) times daily.       metoprolol succinate (TOPROL-XL) 100 MG 24 hr tablet Take 1 tablet (100 mg total) by mouth once daily. 30 tablet 6    rivaroxaban (XARELTO) 20 mg Tab Take 1 tablet (20 mg total) by mouth daily with dinner or evening meal. 30 tablet 6    sacubitriL-valsartan (ENTRESTO) 49-51 mg per tablet Take 1 tablet by mouth 2 (two) times daily. 90 tablet 3    spironolactone (ALDACTONE) 25 MG tablet Take 1 tablet (25 mg total) by mouth once daily. 30 tablet 6    torsemide (DEMADEX) 10 MG Tab Take 1 tablet (10 mg total) by mouth once daily. 60 tablet 6    traMADoL (ULTRAM) 50 mg tablet Take 100 mg by mouth every 12 (twelve) hours as needed for Pain.       No current facility-administered medications on file prior to visit.       REVIEW OF SYSTEMS  Review of Systems   All other systems reviewed and are negative.    PHYSICAL EXAM:    Vitals:    01/26/23 1121 01/26/23 1125   BP: (!) 91/56 99/62   BP Location: Left arm Right arm   Patient Position: Sitting Sitting   BP Method: Medium (Automatic) Medium (Automatic)   Pulse: (!) 56 106   Weight: 88 kg (194 lb 0.1 oz)    Height: 5' 6" " (1.676 m)       Body mass index is 31.31 kg/m².    GENERAL: Alert, NAD   HEENT: Anicteric sclerae  NECK: JVP not visible above level of clavicle while sitting upright, estimated at 8 cmH20  CARDIOVASCULAR: Regular rate and rhythm. Normal S1, S2 no murmurs, rubs or gallops.  PULMONARY: Lungs clear to auscultation bilaterally  ABDOMEN: Soft, nontender, nondistended. No guarding, no rebound, no hepatomegaly  EXTREMITIES: Warm. No clubbing, cyanosis or edema. No pre-sacral edema  VASCULAR: 2+ bilateral radial pulses  NEUROLOGIC: No focal deficits    LABS:    Lab Results   Component Value Date     01/26/2023    K 4.2 01/26/2023     01/26/2023    CO2 26 01/26/2023    BUN 9 01/26/2023    CREATININE 1.1 01/26/2023    CALCIUM 9.5 01/26/2023    ANIONGAP 10 01/26/2023    ESTGFRAFRICA >60.0 07/31/2022    EGFRNONAA >60.0 07/31/2022       Magnesium   Date Value Ref Range Status   10/25/2022 2.1 1.6 - 2.6 mg/dL Final   10/25/2022 2.1 1.6 - 2.6 mg/dL Final       Lab Results   Component Value Date    WBC 10.29 12/08/2022    HGB 11.9 (L) 12/08/2022    HCT 37.0 12/08/2022    MCV 91 12/08/2022     12/08/2022       Lab Results   Component Value Date    INR 1.0 12/08/2022    INR 1.0 10/16/2022    INR 1.0 05/12/2022       BNP   Date Value Ref Range Status   01/26/2023 489 (H) 0 - 99 pg/mL Final     Comment:     Values of less than 100 pg/ml are consistent with non-CHF populations.   12/08/2022 422 (H) 0 - 99 pg/mL Final     Comment:     Values of less than 100 pg/ml are consistent with non-CHF populations.   10/25/2022 501 (H) 0 - 99 pg/mL Final     Comment:     Values of less than 100 pg/ml are consistent with non-CHF populations.       No results found for: LDH    IMPRESSION:    NYHA Class III   ACC/AHA Stage C  Zuñiga profile A    1. Chronic combined systolic and diastolic congestive heart failure    2. Dilated cardiomyopathy    3. Coronary artery disease involving native coronary artery of native heart without  angina pectoris    4. Shortness of breath    5. Anxiety    6. Schizophrenia, unspecified type    7. History of embolic stroke involving right middle cerebral artery    8. ICD (implantable cardioverter-defibrillator) in place    9. History of pulmonary embolism    10. Type 2 diabetes mellitus without complication, without long-term current use of insulin    11. Obesity (BMI 30.0-34.9)        PLAN:    Euvolemic by exam with similar symptoms despite change in GDMT SOB. Would still evaluate for other causes of SOB. Referred to pulmonology given diagnosis of COPD with prior tobacco use and minimal air entry by lung exam. In addition would get CPX within the next few weeks to help determine etiology of SOB and help risk stratification with comparison to last CPX    Increase Toprol to 100 mg BID and Entresto to  mg BID pending labs today. With plan for repeat labs locally next week.     Recommend 2 gram sodium restriction and 1500 mL fluid restriction.  Encourage physical activity with graded exercise program.  Requested patient to weigh themselves daily, and to notify us if their weight increases by more than 3 lbs in 1 day or 5 lbs in 1 week.     Pt to call us with more shortness of breath, swelling or unexpected weight changes, fever, chills, bloody or black bowel movements, or other concerns.    F/U 3 months if unremarkable CPX. If concerning findings on CPX/unable to up-titrate GDMT, would plan for repeat RHC for further risk stratification with 1 month follow-up instead.    Electronically signed by:   Cande Carrillo   01/23/2023 12:43 PM

## 2023-01-26 ENCOUNTER — TELEPHONE (OUTPATIENT)
Dept: TRANSPLANT | Facility: CLINIC | Age: 63
End: 2023-01-26

## 2023-01-26 ENCOUNTER — OFFICE VISIT (OUTPATIENT)
Dept: TRANSPLANT | Facility: CLINIC | Age: 63
End: 2023-01-26
Payer: MEDICARE

## 2023-01-26 ENCOUNTER — LAB VISIT (OUTPATIENT)
Dept: LAB | Facility: HOSPITAL | Age: 63
End: 2023-01-26
Attending: INTERNAL MEDICINE
Payer: MEDICARE

## 2023-01-26 VITALS
SYSTOLIC BLOOD PRESSURE: 99 MMHG | DIASTOLIC BLOOD PRESSURE: 62 MMHG | WEIGHT: 194 LBS | HEIGHT: 66 IN | HEART RATE: 106 BPM | BODY MASS INDEX: 31.18 KG/M2

## 2023-01-26 DIAGNOSIS — I50.42 CHRONIC COMBINED SYSTOLIC AND DIASTOLIC CONGESTIVE HEART FAILURE: Primary | ICD-10-CM

## 2023-01-26 DIAGNOSIS — R06.02 SHORTNESS OF BREATH: ICD-10-CM

## 2023-01-26 DIAGNOSIS — I50.42 CHRONIC COMBINED SYSTOLIC AND DIASTOLIC CONGESTIVE HEART FAILURE: ICD-10-CM

## 2023-01-26 DIAGNOSIS — I42.0 DILATED CARDIOMYOPATHY: Chronic | ICD-10-CM

## 2023-01-26 DIAGNOSIS — F41.9 ANXIETY: ICD-10-CM

## 2023-01-26 DIAGNOSIS — Z95.810 ICD (IMPLANTABLE CARDIOVERTER-DEFIBRILLATOR) IN PLACE: ICD-10-CM

## 2023-01-26 DIAGNOSIS — I42.0 DILATED CARDIOMYOPATHY: ICD-10-CM

## 2023-01-26 DIAGNOSIS — E66.9 OBESITY (BMI 30.0-34.9): ICD-10-CM

## 2023-01-26 DIAGNOSIS — I25.10 CORONARY ARTERY DISEASE INVOLVING NATIVE CORONARY ARTERY OF NATIVE HEART WITHOUT ANGINA PECTORIS: ICD-10-CM

## 2023-01-26 DIAGNOSIS — Z86.711 HISTORY OF PULMONARY EMBOLISM: ICD-10-CM

## 2023-01-26 DIAGNOSIS — E11.9 TYPE 2 DIABETES MELLITUS WITHOUT COMPLICATION, WITHOUT LONG-TERM CURRENT USE OF INSULIN: Chronic | ICD-10-CM

## 2023-01-26 DIAGNOSIS — I63.411 EMBOLIC STROKE INVOLVING RIGHT MIDDLE CEREBRAL ARTERY: Chronic | ICD-10-CM

## 2023-01-26 DIAGNOSIS — F20.9 SCHIZOPHRENIA, UNSPECIFIED TYPE: ICD-10-CM

## 2023-01-26 LAB
ALBUMIN SERPL BCP-MCNC: 3.6 G/DL (ref 3.5–5.2)
ALP SERPL-CCNC: 140 U/L (ref 55–135)
ALT SERPL W/O P-5'-P-CCNC: 14 U/L (ref 10–44)
ANION GAP SERPL CALC-SCNC: 10 MMOL/L (ref 8–16)
AST SERPL-CCNC: 16 U/L (ref 10–40)
BILIRUB SERPL-MCNC: 0.5 MG/DL (ref 0.1–1)
BNP SERPL-MCNC: 489 PG/ML (ref 0–99)
BUN SERPL-MCNC: 9 MG/DL (ref 8–23)
CALCIUM SERPL-MCNC: 9.5 MG/DL (ref 8.7–10.5)
CHLORIDE SERPL-SCNC: 105 MMOL/L (ref 95–110)
CO2 SERPL-SCNC: 26 MMOL/L (ref 23–29)
CREAT SERPL-MCNC: 1.1 MG/DL (ref 0.5–1.4)
EST. GFR  (NO RACE VARIABLE): 57.2 ML/MIN/1.73 M^2
GLUCOSE SERPL-MCNC: 197 MG/DL (ref 70–110)
POTASSIUM SERPL-SCNC: 4.2 MMOL/L (ref 3.5–5.1)
PROT SERPL-MCNC: 7.3 G/DL (ref 6–8.4)
SODIUM SERPL-SCNC: 141 MMOL/L (ref 136–145)

## 2023-01-26 PROCEDURE — 99214 PR OFFICE/OUTPT VISIT, EST, LEVL IV, 30-39 MIN: ICD-10-PCS | Mod: S$GLB,,, | Performed by: INTERNAL MEDICINE

## 2023-01-26 PROCEDURE — 3078F DIAST BP <80 MM HG: CPT | Mod: CPTII,S$GLB,, | Performed by: INTERNAL MEDICINE

## 2023-01-26 PROCEDURE — 1159F MED LIST DOCD IN RCRD: CPT | Mod: CPTII,S$GLB,, | Performed by: INTERNAL MEDICINE

## 2023-01-26 PROCEDURE — 99999 PR PBB SHADOW E&M-EST. PATIENT-LVL IV: ICD-10-PCS | Mod: PBBFAC,,, | Performed by: INTERNAL MEDICINE

## 2023-01-26 PROCEDURE — 3074F SYST BP LT 130 MM HG: CPT | Mod: CPTII,S$GLB,, | Performed by: INTERNAL MEDICINE

## 2023-01-26 PROCEDURE — 3074F PR MOST RECENT SYSTOLIC BLOOD PRESSURE < 130 MM HG: ICD-10-PCS | Mod: CPTII,S$GLB,, | Performed by: INTERNAL MEDICINE

## 2023-01-26 PROCEDURE — 1159F PR MEDICATION LIST DOCUMENTED IN MEDICAL RECORD: ICD-10-PCS | Mod: CPTII,S$GLB,, | Performed by: INTERNAL MEDICINE

## 2023-01-26 PROCEDURE — 36415 COLL VENOUS BLD VENIPUNCTURE: CPT | Performed by: INTERNAL MEDICINE

## 2023-01-26 PROCEDURE — 1160F PR REVIEW ALL MEDS BY PRESCRIBER/CLIN PHARMACIST DOCUMENTED: ICD-10-PCS | Mod: CPTII,S$GLB,, | Performed by: INTERNAL MEDICINE

## 2023-01-26 PROCEDURE — 3008F PR BODY MASS INDEX (BMI) DOCUMENTED: ICD-10-PCS | Mod: CPTII,S$GLB,, | Performed by: INTERNAL MEDICINE

## 2023-01-26 PROCEDURE — 99999 PR PBB SHADOW E&M-EST. PATIENT-LVL IV: CPT | Mod: PBBFAC,,, | Performed by: INTERNAL MEDICINE

## 2023-01-26 PROCEDURE — 83880 ASSAY OF NATRIURETIC PEPTIDE: CPT | Performed by: INTERNAL MEDICINE

## 2023-01-26 PROCEDURE — 3078F PR MOST RECENT DIASTOLIC BLOOD PRESSURE < 80 MM HG: ICD-10-PCS | Mod: CPTII,S$GLB,, | Performed by: INTERNAL MEDICINE

## 2023-01-26 PROCEDURE — 80053 COMPREHEN METABOLIC PANEL: CPT | Performed by: INTERNAL MEDICINE

## 2023-01-26 PROCEDURE — 1160F RVW MEDS BY RX/DR IN RCRD: CPT | Mod: CPTII,S$GLB,, | Performed by: INTERNAL MEDICINE

## 2023-01-26 PROCEDURE — 3008F BODY MASS INDEX DOCD: CPT | Mod: CPTII,S$GLB,, | Performed by: INTERNAL MEDICINE

## 2023-01-26 PROCEDURE — 99214 OFFICE O/P EST MOD 30 MIN: CPT | Mod: S$GLB,,, | Performed by: INTERNAL MEDICINE

## 2023-01-26 RX ORDER — SACUBITRIL AND VALSARTAN 97; 103 MG/1; MG/1
1 TABLET, FILM COATED ORAL 2 TIMES DAILY
Qty: 90 TABLET | Refills: 3 | Status: ON HOLD | OUTPATIENT
Start: 2023-01-26 | End: 2023-09-02 | Stop reason: HOSPADM

## 2023-01-26 RX ORDER — METOPROLOL SUCCINATE 100 MG/1
100 TABLET, EXTENDED RELEASE ORAL 2 TIMES DAILY
Qty: 90 TABLET | Refills: 6 | Status: ON HOLD | OUTPATIENT
Start: 2023-01-26 | End: 2023-05-05 | Stop reason: HOSPADM

## 2023-01-26 NOTE — TELEPHONE ENCOUNTER
1/26/23 - Received below written orders from WILLIE Carrillo DO:  They were also sent to the schedulers to schedule all testing.    Good afternoon,     Ms. Mohr's labs just came back. We increased Toprol to 100 mg BID but based on labs will also increase Entresto to  mg BID with repeat labs next week locally. All orders placed if we can follow-up. Her creatinine was a little higher so will follow trend but think with her still being SOB pending pulmonology follow-up we should repeat CPX (ordered) if we can try to get this by end of February.     Thanks,   Cande       Called/Spoke with patient and instructed her of above orders to increase Toprol to 100 mg BID and Entresto to 97/103 mg BID.  Both prescriptions were routed to patient's pharmacy, [facility].  Patient wrote all instructions down and repeated all instructions back correctly.  Instructed patient once she receives the Entresto, contact our office so we can schedule labs a week after she starts.  Patient verbalized understanding.

## 2023-01-26 NOTE — LETTER
January 26, 2023        Michelle Young  [address] [facility] [address]  Phone: [phone]  Fax: [phone]             [id number]  [address]  [facility] [address]  Phone: [phone]   Patient: Diomedes Mohr   MR Number: [id number]   YOB: 1960   Date of Visit: 1/26/2023       Dear Dr. Michelle Young    Thank you for referring Diomedes Mohr to me for evaluation. Attached you will find relevant portions of my assessment and plan of care.    If you have questions, please do not hesitate to call me. I look forward to following Diomedes Mohr along with you.    Sincerely,    Cande Carrillo, DO    Enclosure    If you would like to receive this communication electronically, please contact [email or web address] or [phone] to request [facility] access.    [facility] is a tool which provides read-only access to select patient information with whom you have a relationship. Its easy to use and provides real time access to review your patients record including encounter summaries, notes, results, and demographic information.    If you feel you have received this communication in error or would no longer like to receive these types of communications, please e-mail [email or web address]

## 2023-02-14 ENCOUNTER — TELEPHONE (OUTPATIENT)
Dept: PULMONOLOGY | Facility: CLINIC | Age: 63
End: 2023-02-14
Payer: MEDICARE

## 2023-02-14 DIAGNOSIS — J44.9 CHRONIC OBSTRUCTIVE PULMONARY DISEASE, UNSPECIFIED COPD TYPE: Primary | ICD-10-CM

## 2023-02-15 ENCOUNTER — TELEPHONE (OUTPATIENT)
Dept: PULMONOLOGY | Facility: CLINIC | Age: 63
End: 2023-02-15
Payer: MEDICARE

## 2023-02-15 NOTE — TELEPHONE ENCOUNTER
Left message on patient voicemail, informing her that I'm contacting her in regards to scheduling her Pulmonary Function Test prior to her appointment with Dr Mars. I also advised patient that if she has any questions or concerns, she may contact the office. Office number has been provided.

## 2023-02-23 ENCOUNTER — CLINICAL SUPPORT (OUTPATIENT)
Dept: CARDIOLOGY | Facility: HOSPITAL | Age: 63
End: 2023-02-23
Payer: MEDICARE

## 2023-02-23 ENCOUNTER — TELEPHONE (OUTPATIENT)
Dept: PULMONOLOGY | Facility: CLINIC | Age: 63
End: 2023-02-23
Payer: MEDICARE

## 2023-02-23 DIAGNOSIS — I50.9 HEART FAILURE, UNSPECIFIED: ICD-10-CM

## 2023-02-23 DIAGNOSIS — I42.0 DILATED CARDIOMYOPATHY: ICD-10-CM

## 2023-02-23 DIAGNOSIS — I47.29 OTHER VENTRICULAR TACHYCARDIA: ICD-10-CM

## 2023-02-23 DIAGNOSIS — Z95.810 PRESENCE OF AUTOMATIC (IMPLANTABLE) CARDIAC DEFIBRILLATOR: ICD-10-CM

## 2023-02-23 PROCEDURE — 93295 CARDIAC DEVICE CHECK - REMOTE: ICD-10-PCS | Mod: ,,, | Performed by: STUDENT IN AN ORGANIZED HEALTH CARE EDUCATION/TRAINING PROGRAM

## 2023-02-23 PROCEDURE — 93295 DEV INTERROG REMOTE 1/2/MLT: CPT | Mod: ,,, | Performed by: STUDENT IN AN ORGANIZED HEALTH CARE EDUCATION/TRAINING PROGRAM

## 2023-02-23 PROCEDURE — 93296 REM INTERROG EVL PM/IDS: CPT | Performed by: STUDENT IN AN ORGANIZED HEALTH CARE EDUCATION/TRAINING PROGRAM

## 2023-02-23 NOTE — TELEPHONE ENCOUNTER
Left message on patient voicemail, informing her that I'm contacting her in regards to scheduling her Pulmonary Function Test prior to her appointment with Dr Mars. I also advised patient that if she wants to schedule Pulmonary Function Test or if she has any questions or concerns, she may contact the office. Office number has been provided.

## 2023-02-24 ENCOUNTER — OFFICE VISIT (OUTPATIENT)
Dept: PULMONOLOGY | Facility: CLINIC | Age: 63
End: 2023-02-24
Payer: MEDICARE

## 2023-02-24 VITALS
OXYGEN SATURATION: 98 % | WEIGHT: 197.06 LBS | HEART RATE: 93 BPM | HEIGHT: 66 IN | DIASTOLIC BLOOD PRESSURE: 62 MMHG | SYSTOLIC BLOOD PRESSURE: 109 MMHG | BODY MASS INDEX: 31.67 KG/M2

## 2023-02-24 DIAGNOSIS — I50.42 CHRONIC COMBINED SYSTOLIC AND DIASTOLIC CONGESTIVE HEART FAILURE: ICD-10-CM

## 2023-02-24 DIAGNOSIS — E66.9 OBESITY (BMI 30.0-34.9): ICD-10-CM

## 2023-02-24 DIAGNOSIS — I27.22 PULMONARY HYPERTENSION DUE TO LEFT HEART DISEASE: ICD-10-CM

## 2023-02-24 DIAGNOSIS — R06.02 SHORTNESS OF BREATH: Primary | ICD-10-CM

## 2023-02-24 DIAGNOSIS — Z86.711 HISTORY OF PULMONARY EMBOLISM: ICD-10-CM

## 2023-02-24 PROCEDURE — 3074F PR MOST RECENT SYSTOLIC BLOOD PRESSURE < 130 MM HG: ICD-10-PCS | Mod: CPTII,S$GLB,, | Performed by: INTERNAL MEDICINE

## 2023-02-24 PROCEDURE — 4010F PR ACE/ARB THEARPY RXD/TAKEN: ICD-10-PCS | Mod: CPTII,S$GLB,, | Performed by: INTERNAL MEDICINE

## 2023-02-24 PROCEDURE — 99215 OFFICE O/P EST HI 40 MIN: CPT | Mod: S$GLB,,, | Performed by: INTERNAL MEDICINE

## 2023-02-24 PROCEDURE — 1159F PR MEDICATION LIST DOCUMENTED IN MEDICAL RECORD: ICD-10-PCS | Mod: CPTII,S$GLB,, | Performed by: INTERNAL MEDICINE

## 2023-02-24 PROCEDURE — 3078F PR MOST RECENT DIASTOLIC BLOOD PRESSURE < 80 MM HG: ICD-10-PCS | Mod: CPTII,S$GLB,, | Performed by: INTERNAL MEDICINE

## 2023-02-24 PROCEDURE — 3008F BODY MASS INDEX DOCD: CPT | Mod: CPTII,S$GLB,, | Performed by: INTERNAL MEDICINE

## 2023-02-24 PROCEDURE — 1159F MED LIST DOCD IN RCRD: CPT | Mod: CPTII,S$GLB,, | Performed by: INTERNAL MEDICINE

## 2023-02-24 PROCEDURE — 3078F DIAST BP <80 MM HG: CPT | Mod: CPTII,S$GLB,, | Performed by: INTERNAL MEDICINE

## 2023-02-24 PROCEDURE — 3008F PR BODY MASS INDEX (BMI) DOCUMENTED: ICD-10-PCS | Mod: CPTII,S$GLB,, | Performed by: INTERNAL MEDICINE

## 2023-02-24 PROCEDURE — 99999 PR PBB SHADOW E&M-EST. PATIENT-LVL III: ICD-10-PCS | Mod: PBBFAC,,, | Performed by: INTERNAL MEDICINE

## 2023-02-24 PROCEDURE — 99215 PR OFFICE/OUTPT VISIT, EST, LEVL V, 40-54 MIN: ICD-10-PCS | Mod: S$GLB,,, | Performed by: INTERNAL MEDICINE

## 2023-02-24 PROCEDURE — 99999 PR PBB SHADOW E&M-EST. PATIENT-LVL III: CPT | Mod: PBBFAC,,, | Performed by: INTERNAL MEDICINE

## 2023-02-24 PROCEDURE — 3074F SYST BP LT 130 MM HG: CPT | Mod: CPTII,S$GLB,, | Performed by: INTERNAL MEDICINE

## 2023-02-24 PROCEDURE — 4010F ACE/ARB THERAPY RXD/TAKEN: CPT | Mod: CPTII,S$GLB,, | Performed by: INTERNAL MEDICINE

## 2023-02-24 NOTE — ASSESSMENT & PLAN NOTE
PASP of 55 on echo and elevated wedge pressure on RHC. On torsemide, spironolactone and BP well controlled.

## 2023-02-24 NOTE — ASSESSMENT & PLAN NOTE
Pt on xarelto, but she has yet to refill and recently ran out. Discussed importance of continuation.

## 2023-02-24 NOTE — PROGRESS NOTES
Subjective:       Patient ID: Diomedes Mohr is a 62 y.o. female.    Chief Complaint: shortness of breath    Pt is a 61 yo AAF pmh CVA, combined systolic and diastolic CHF (EF: 15%), WHO group II PH, DMII, PE 12/21 presenting for follow up of shortness of breath. Previously seen by Dr. Whittington: PFTs showed no obstruction and CPX 9/11/20 showed normal oxygen saturations and respiratory reserve indicating circulatory dysfunction vs poor effort. Pt has had recurrent exacerbations of her combined CHF. Pt states that she has felt like her shortness of breath has progressed over the last year, particularly in the last month. She has had recurrent admissions for heart failure. Pt feels she gets a little relief from PRN inhaler.    Smoking hx: 20 py, quit 2019.   Work hx: ,   PRN inhaler: 2ce a day  Exercise tolerance: ~0.5 block  Exposure hx: none  Hx of lung dz: no  Family hx of lung dz: not that she is aware of    Review of Systems   Constitutional:  Positive for activity change and fatigue. Negative for weight loss and weight gain.   HENT:  Negative for postnasal drip and congestion.    Respiratory:  Positive for chest tightness, shortness of breath, dyspnea on extertion and use of rescue inhaler. Negative for cough, sputum production and wheezing.    Cardiovascular:  Positive for chest pain. Negative for palpitations and leg swelling.   Gastrointestinal:  Negative for nausea and vomiting.   Psychiatric/Behavioral:  Negative for confusion and sleep disturbance. The patient is not nervous/anxious.      Objective:      Physical Exam   Constitutional: She is oriented to person, place, and time. She appears well-developed and well-nourished. She is obese.   HENT:   Head: Normocephalic.   Cardiovascular: Normal rate. Exam reveals no gallop and no friction rub.   No murmur heard.  Pulmonary/Chest: Normal expansion, symmetric chest wall expansion and effort normal. No stridor. She has no decreased breath sounds. She has  no wheezes. She has no rhonchi. She has no rales.   Musculoskeletal:         General: No edema.   Neurological: She is alert and oriented to person, place, and time. Gait abnormal.   Skin: No cyanosis. Nails show no clubbing.   Psychiatric: She has a normal mood and affect. Her behavior is normal. Judgment and thought content normal.   Vitals reviewed.  Personal Diagnostic Review    CT of chest performed on 10/16/22 PE protocol revealed bilateral interlobular septal thickening and associated mild GGO concerning for pulmonary edema. No evidence of fibrotic disease.    Echocardiogram: 10/16/22  The left ventricle is severely enlarged with eccentric hypertrophy and severely decreased systolic function. The estimated ejection fraction is 15%.  Normal right ventricular size with normal right ventricular systolic function.  Grade II left ventricular diastolic dysfunction.  Mild left atrial enlargement.  The estimated PA systolic pressure is 55 mmHg.  Normal central venous pressure (3 mmHg).    Pulmonary function tests:     1/13/2022  FEV1: 1.92L  (83.4 % predicted),   FVC:  2.45L (84 % predicted),   FEV1/FVC:  78,   TLC: 3.58L (67.6 % predicted),   DLCO: 15.88 (66 % predicted)    Right Heart Catheterization: Results for orders placed during the hospital encounter of 05/12/22  Right atrial pressure is mildly elevated.  Pulmonary capillary wedge pressure is moderately elevated.  Pulmonary artery pressure is mildly elevated.  Jv cardiac output and index is low normal.  Pulmonary vascular resistance is normal.  Systemic vascular resistance is 1657    No flowsheet data found.      Assessment:       1. Shortness of breath    2. Obesity (BMI 30.0-34.9)    3. History of pulmonary embolism    4. Chronic combined systolic and diastolic congestive heart failure        Outpatient Encounter Medications as of 2/24/2023   Medication Sig Dispense Refill    acetaminophen (TYLENOL) 325 MG tablet Take 2 tablets (650 mg total) by mouth  "every 8 (eight) hours as needed. 30 tablet 0    albuterol (PROVENTIL) 2.5 mg /3 mL (0.083 %) nebulizer solution Take 3 mLs (2.5 mg total) by nebulization every 6 (six) hours as needed for Wheezing or Shortness of Breath. Rescue 3 mL 0    albuterol (PROVENTIL/VENTOLIN HFA) 90 mcg/actuation inhaler Inhale 2 puffs into the lungs every 6 (six) hours as needed for Wheezing or Shortness of Breath. 18 g 2    atorvastatin (LIPITOR) 80 MG tablet Take 1 tablet (80 mg total) by mouth once daily. 90 tablet 3    dulaglutide (TRULICITY) 1.5 mg/0.5 mL pen injector Inject 1.5 mg into the skin once a week.      empagliflozin (JARDIANCE) 10 mg tablet Take 1 tablet (10 mg total) by mouth once daily. 90 tablet 6    glimepiride (AMARYL) 4 MG tablet Take 4 mg by mouth 2 (two) times a day.      metFORMIN (GLUCOPHAGE) 1000 MG tablet Take 1,000 mg by mouth 2 (two) times daily.       metoprolol succinate (TOPROL-XL) 100 MG 24 hr tablet Take 1 tablet (100 mg total) by mouth 2 (two) times daily. 90 tablet 6    rivaroxaban (XARELTO) 20 mg Tab Take 1 tablet (20 mg total) by mouth daily with dinner or evening meal. 30 tablet 6    sacubitriL-valsartan (ENTRESTO)  mg per tablet Take 1 tablet by mouth 2 (two) times daily. 90 tablet 3    spironolactone (ALDACTONE) 25 MG tablet Take 1 tablet (25 mg total) by mouth once daily. 30 tablet 6    torsemide (DEMADEX) 10 MG Tab Take 1 tablet (10 mg total) by mouth once daily. 60 tablet 6    traMADoL (ULTRAM) 50 mg tablet Take 100 mg by mouth every 12 (twelve) hours as needed for Pain.       No facility-administered encounter medications on file as of 2/24/2023.     Orders Placed This Encounter   Procedures    NEBULIZER KIT (SUPPLIES) FOR HOME USE     Order Specific Question:   Height:     Answer:   5' 6" (1.676 m)     Order Specific Question:   Weight:     Answer:   89.4 kg (197 lb 1.5 oz)     Order Specific Question:   Length of need (1-99 months):     Answer:   99     Order Specific Question:   Mask " or Mouthpiece?     Answer:   Mouthpiece       Plan:       Shortness of breath  Secondary to severe systolic as well as diastolic heart failure in addition to WHO group II pulmonary hypertension. Pt has had recurrent admissions for CHF in the past year. Pt has no evidence of COPD on PFTs and no emphysema noted on CT chest as far back as 2020. Pt quit smoking in 2019. Minimal improvement with PRN inhalers. Repeat PFTs in setting of progression of symptoms.     Chronic combined systolic and diastolic congestive heart failure  EF 10-15% with diastolic dysfunction. No evidence of acute exacerbation. Followed by cardiology and heart transplant. On GDMT.    Echo    Interpretation Summary  · The left ventricle is severely enlarged with eccentric hypertrophy and severely decreased systolic function. The estimated ejection fraction is 15%.  · Normal right ventricular size with normal right ventricular systolic function.  · Grade II left ventricular diastolic dysfunction.  · Mild left atrial enlargement.  · The estimated PA systolic pressure is 55 mmHg.  · Normal central venous pressure (3 mmHg).      Pulmonary hypertension due to left heart disease  PASP of 55 on echo and elevated wedge pressure on RHC. On torsemide, spironolactone and BP well controlled.     Obesity (BMI 30.0-34.9)  BMI of 31.81. encouraged weight loss with diet and increased exercise. Discussed oxygen level not dropping with exercise. Exercise limited by SOB and hip/knee arthritic pain.    History of pulmonary embolism  Pt on xarelto, but she has yet to refill and recently ran out. Discussed importance of continuation.        Follow up as needed.     Mani Mars MD  [facility]

## 2023-02-24 NOTE — ASSESSMENT & PLAN NOTE
BMI of 31.81. encouraged weight loss with diet and increased exercise. Discussed oxygen level not dropping with exercise. Exercise limited by SOB and hip/knee arthritic pain.

## 2023-02-24 NOTE — ASSESSMENT & PLAN NOTE
Secondary to severe systolic as well as diastolic heart failure in addition to WHO group II pulmonary hypertension. Pt has had recurrent admissions for CHF in the past year. Pt has no evidence of COPD on PFTs and no emphysema noted on CT chest as far back as 2020. Pt quit smoking in 2019. Minimal improvement with PRN inhalers. Repeat PFTs in setting of progression of symptoms.

## 2023-02-24 NOTE — ASSESSMENT & PLAN NOTE
EF 10-15% with diastolic dysfunction. No evidence of acute exacerbation. Followed by cardiology and heart transplant. On GDMT.    Echo    Interpretation Summary  · The left ventricle is severely enlarged with eccentric hypertrophy and severely decreased systolic function. The estimated ejection fraction is 15%.  · Normal right ventricular size with normal right ventricular systolic function.  · Grade II left ventricular diastolic dysfunction.  · Mild left atrial enlargement.  · The estimated PA systolic pressure is 55 mmHg.  · Normal central venous pressure (3 mmHg).

## 2023-03-01 ENCOUNTER — HOSPITAL ENCOUNTER (OUTPATIENT)
Dept: CARDIOLOGY | Facility: HOSPITAL | Age: 63
Discharge: HOME | End: 2023-03-01
Attending: INTERNAL MEDICINE
Payer: MEDICARE

## 2023-03-01 ENCOUNTER — HOSPITAL ENCOUNTER (OUTPATIENT)
Dept: PULMONOLOGY | Facility: CLINIC | Age: 63
Discharge: HOME | End: 2023-03-01
Payer: MEDICARE

## 2023-03-01 VITALS
SYSTOLIC BLOOD PRESSURE: 102 MMHG | HEART RATE: 94 BPM | WEIGHT: 195 LBS | HEIGHT: 66 IN | BODY MASS INDEX: 31.34 KG/M2 | DIASTOLIC BLOOD PRESSURE: 68 MMHG

## 2023-03-01 DIAGNOSIS — J44.9 CHRONIC OBSTRUCTIVE PULMONARY DISEASE, UNSPECIFIED COPD TYPE: ICD-10-CM

## 2023-03-01 DIAGNOSIS — I42.0 DILATED CARDIOMYOPATHY: ICD-10-CM

## 2023-03-01 LAB
CV STRESS BASE HR: 94 BPM
DIASTOLIC BLOOD PRESSURE: 68 MMHG
OHS CV CPX 1 MINUTE RECOVERY HEART RATE: 142 BPM
OHS CV CPX 85 PERCENT MAX PREDICTED HEART RATE MALE: 129
OHS CV CPX ANAEROBIC THRESHOLD DIASTOLIC BLOOD PRESSURE: 79 MMHG
OHS CV CPX ANAEROBIC THRESHOLD HEART RATE: 125
OHS CV CPX ANAEROBIC THRESHOLD RATE PRESSURE PRODUCT: NORMAL
OHS CV CPX ANAEROBIC THRESHOLD SYSTOLIC BLOOD PRESSURE: 128
OHS CV CPX DATA GRADE - AT: 1.5
OHS CV CPX DATA GRADE - PEAK: 4.3
OHS CV CPX DATA O2 SAT - PEAK: 98
OHS CV CPX DATA O2 SAT - REST: 98
OHS CV CPX DATA SPEED - AT: 1.9
OHS CV CPX DATA SPEED - PEAK: 2.8
OHS CV CPX DATA TIME - AT: 2.7
OHS CV CPX DATA TIME - PEAK: 6.63
OHS CV CPX DATA VE/VCO2 - AT: 40
OHS CV CPX DATA VE/VCO2 - PEAK: 43
OHS CV CPX DATA VE/VO2 - AT: 32
OHS CV CPX DATA VE/VO2 - PEAK: 43
OHS CV CPX DATA VO2 - AT: 8.1
OHS CV CPX DATA VO2 - PEAK: 10.9
OHS CV CPX DATA VO2 - REST: 3.2
OHS CV CPX FEV1/FVC: 0.83
OHS CV CPX FORCED EXPIRATORY VOLUME: 1.59
OHS CV CPX FORCED VITAL CAPACITY (FVC): 1.91
OHS CV CPX HIGHEST VO: 27.5
OHS CV CPX MAX PREDICTED HEART RATE: 152
OHS CV CPX MAXIMAL VOLUNTARY VENTILATION (MVV) PREDICTED: 63.6
OHS CV CPX MAXIMAL VOLUNTARY VENTILATION (MVV): 64
OHS CV CPX MAXIUMUM EXERCISE VENTILATION (VE MAX): 35.7
OHS CV CPX PATIENT AGE: 61
OHS CV CPX PATIENT HEIGHT IN: 66
OHS CV CPX PATIENT IS FEMALE AGE 11-19: 0
OHS CV CPX PATIENT IS FEMALE AGE GREATER THAN 19: 1
OHS CV CPX PATIENT IS FEMALE AGE LESS THAN 11: 0
OHS CV CPX PATIENT IS FEMALE: 1
OHS CV CPX PATIENT IS MALE AGE 11-25: 0
OHS CV CPX PATIENT IS MALE AGE GREATER THAN 25: 0
OHS CV CPX PATIENT IS MALE AGE LESS THAN 11: 0
OHS CV CPX PATIENT IS MALE GREATER THAN 18: 0
OHS CV CPX PATIENT IS MALE LESS THAN OR EQUAL TO 18: 0
OHS CV CPX PATIENT IS MALE: 0
OHS CV CPX PATIENT WEIGHT RETURNED IN OZ: 3120
OHS CV CPX PEAK DIASTOLIC BLOOD PRESSURE: 78 MMHG
OHS CV CPX PEAK HEAR RATE: 148 BPM
OHS CV CPX PEAK RATE PRESSURE PRODUCT: NORMAL
OHS CV CPX PEAK SYSTOLIC BLOOD PRESSURE: 132 MMHG
OHS CV CPX PERCENT BODY FAT: 27.7
OHS CV CPX PERCENT MAX PREDICTED HEART RATE ACHIEVED: 97
OHS CV CPX PREDICTED VO2: 27.5 ML/KG/MIN
OHS CV CPX RATE PRESSURE PRODUCT PRESENTING: 9588
OHS CV CPX REST PET CO2: 27
OHS CV CPX VE/VCO2 SLOPE: 35.2
STRESS ECHO POST EXERCISE DUR MIN: 6 MINUTES
STRESS ECHO POST EXERCISE DUR SEC: 38 SECONDS
SYSTOLIC BLOOD PRESSURE: 102 MMHG

## 2023-03-01 PROCEDURE — 94621 CARDIOPULM EXERCISE TESTING: CPT | Mod: 26,,, | Performed by: INTERNAL MEDICINE

## 2023-03-01 PROCEDURE — 94727 GAS DIL/WSHOT DETER LNG VOL: CPT | Mod: S$GLB,,, | Performed by: INTERNAL MEDICINE

## 2023-03-01 PROCEDURE — 94727 PR PULM FUNCTION TEST BY GAS: ICD-10-PCS | Mod: S$GLB,,, | Performed by: INTERNAL MEDICINE

## 2023-03-01 PROCEDURE — 94621 CARDIOPULM EXERCISE TESTING: CPT

## 2023-03-01 PROCEDURE — 94621 CARDIOPULMONARY EXERCISE TESTING (CUPID ONLY): ICD-10-PCS | Mod: 26,,, | Performed by: INTERNAL MEDICINE

## 2023-03-01 PROCEDURE — 94729 PR C02/MEMBANE DIFFUSE CAPACITY: ICD-10-PCS | Mod: S$GLB,,, | Performed by: INTERNAL MEDICINE

## 2023-03-01 PROCEDURE — 94729 DIFFUSING CAPACITY: CPT | Mod: S$GLB,,, | Performed by: INTERNAL MEDICINE

## 2023-03-01 PROCEDURE — 94060 PR EVAL OF BRONCHOSPASM: ICD-10-PCS | Mod: S$GLB,,, | Performed by: INTERNAL MEDICINE

## 2023-03-01 PROCEDURE — 94060 EVALUATION OF WHEEZING: CPT | Mod: S$GLB,,, | Performed by: INTERNAL MEDICINE

## 2023-03-13 PROBLEM — I63.411 EMBOLIC STROKE INVOLVING RIGHT MIDDLE CEREBRAL ARTERY: Chronic | Status: RESOLVED | Noted: 2020-08-26 | Resolved: 2023-03-13

## 2023-03-30 ENCOUNTER — TELEPHONE (OUTPATIENT)
Dept: ELECTROPHYSIOLOGY | Facility: CLINIC | Age: 63
End: 2023-03-30
Payer: MEDICARE

## 2023-03-31 ENCOUNTER — TELEPHONE (OUTPATIENT)
Dept: ELECTROPHYSIOLOGY | Facility: CLINIC | Age: 63
End: 2023-03-31
Payer: MEDICARE

## 2023-04-10 ENCOUNTER — TELEPHONE (OUTPATIENT)
Dept: ELECTROPHYSIOLOGY | Facility: CLINIC | Age: 63
End: 2023-04-10
Payer: MEDICARE

## 2023-04-10 ENCOUNTER — PATIENT MESSAGE (OUTPATIENT)
Dept: ELECTROPHYSIOLOGY | Facility: CLINIC | Age: 63
End: 2023-04-10
Payer: MEDICARE

## 2023-04-12 DIAGNOSIS — I42.8 NONISCHEMIC CARDIOMYOPATHY: Primary | ICD-10-CM

## 2023-04-27 ENCOUNTER — HOSPITAL ENCOUNTER (INPATIENT)
Facility: HOSPITAL | Age: 63
LOS: 15 days | Discharge: NURSING, REHAB OR LONG-TERM CARE | DRG: 252 | End: 2023-05-12
Attending: EMERGENCY MEDICINE | Admitting: HOSPITALIST
Payer: MEDICARE

## 2023-04-27 DIAGNOSIS — I63.511 CEREBROVASCULAR ACCIDENT (CVA) DUE TO OCCLUSION OF RIGHT MIDDLE CEREBRAL ARTERY: ICD-10-CM

## 2023-04-27 DIAGNOSIS — Z95.810 ICD (IMPLANTABLE CARDIOVERTER-DEFIBRILLATOR) IN PLACE: Chronic | ICD-10-CM

## 2023-04-27 DIAGNOSIS — I73.9 PAD (PERIPHERAL ARTERY DISEASE): ICD-10-CM

## 2023-04-27 DIAGNOSIS — R07.9 CHEST PAIN: ICD-10-CM

## 2023-04-27 DIAGNOSIS — I63.412 CEREBROVASCULAR ACCIDENT (CVA) DUE TO EMBOLISM OF LEFT MIDDLE CEREBRAL ARTERY: ICD-10-CM

## 2023-04-27 DIAGNOSIS — I50.42 CHRONIC COMBINED SYSTOLIC AND DIASTOLIC CONGESTIVE HEART FAILURE: Chronic | ICD-10-CM

## 2023-04-27 DIAGNOSIS — Z79.01 LONG TERM CURRENT USE OF ANTICOAGULANT: ICD-10-CM

## 2023-04-27 DIAGNOSIS — I74.3: Primary | ICD-10-CM

## 2023-04-27 DIAGNOSIS — R41.82 ALTERED MENTAL STATE: ICD-10-CM

## 2023-04-27 DIAGNOSIS — I99.8 ACUTE LOWER LIMB ISCHEMIA: ICD-10-CM

## 2023-04-27 DIAGNOSIS — Z91.89 AT RISK FOR LONG QT SYNDROME: ICD-10-CM

## 2023-04-27 DIAGNOSIS — I63.9 CEREBROVASCULAR ACCIDENT (CVA), UNSPECIFIED MECHANISM: ICD-10-CM

## 2023-04-27 DIAGNOSIS — D68.59 HYPERCOAGULOPATHY: ICD-10-CM

## 2023-04-27 DIAGNOSIS — I70.229 CRITICAL LOWER LIMB ISCHEMIA: ICD-10-CM

## 2023-04-27 DIAGNOSIS — R47.01 GLOBAL APHASIA: ICD-10-CM

## 2023-04-27 LAB
ALBUMIN SERPL BCP-MCNC: 3.4 G/DL (ref 3.5–5.2)
ALP SERPL-CCNC: 142 U/L (ref 55–135)
ALT SERPL W/O P-5'-P-CCNC: 13 U/L (ref 10–44)
ANION GAP SERPL CALC-SCNC: 8 MMOL/L (ref 8–16)
AST SERPL-CCNC: 15 U/L (ref 10–40)
BASOPHILS # BLD AUTO: 0.04 K/UL (ref 0–0.2)
BASOPHILS NFR BLD: 0.5 % (ref 0–1.9)
BILIRUB SERPL-MCNC: 0.4 MG/DL (ref 0.1–1)
BUN SERPL-MCNC: 15 MG/DL (ref 6–30)
BUN SERPL-MCNC: 15 MG/DL (ref 8–23)
CALCIUM SERPL-MCNC: 9.3 MG/DL (ref 8.7–10.5)
CHLORIDE SERPL-SCNC: 104 MMOL/L (ref 95–110)
CHLORIDE SERPL-SCNC: 106 MMOL/L (ref 95–110)
CO2 SERPL-SCNC: 25 MMOL/L (ref 23–29)
CREAT SERPL-MCNC: 0.8 MG/DL (ref 0.5–1.4)
CREAT SERPL-MCNC: 0.9 MG/DL (ref 0.5–1.4)
DIFFERENTIAL METHOD: ABNORMAL
EOSINOPHIL # BLD AUTO: 0.2 K/UL (ref 0–0.5)
EOSINOPHIL NFR BLD: 2.9 % (ref 0–8)
ERYTHROCYTE [DISTWIDTH] IN BLOOD BY AUTOMATED COUNT: 14.6 % (ref 11.5–14.5)
EST. GFR  (NO RACE VARIABLE): >60 ML/MIN/1.73 M^2
GLUCOSE SERPL-MCNC: 182 MG/DL (ref 70–110)
GLUCOSE SERPL-MCNC: 184 MG/DL (ref 70–110)
HCT VFR BLD AUTO: 43.2 % (ref 37–48.5)
HCT VFR BLD CALC: 43 %PCV (ref 36–54)
HCV AB SERPL QL IA: NORMAL
HGB BLD-MCNC: 13.6 G/DL (ref 12–16)
HIV 1+2 AB+HIV1 P24 AG SERPL QL IA: NORMAL
IMM GRANULOCYTES # BLD AUTO: 0.01 K/UL (ref 0–0.04)
IMM GRANULOCYTES NFR BLD AUTO: 0.1 % (ref 0–0.5)
LYMPHOCYTES # BLD AUTO: 2.9 K/UL (ref 1–4.8)
LYMPHOCYTES NFR BLD: 36.7 % (ref 18–48)
MCH RBC QN AUTO: 29.1 PG (ref 27–31)
MCHC RBC AUTO-ENTMCNC: 31.5 G/DL (ref 32–36)
MCV RBC AUTO: 92 FL (ref 82–98)
MONOCYTES # BLD AUTO: 0.5 K/UL (ref 0.3–1)
MONOCYTES NFR BLD: 5.8 % (ref 4–15)
NEUTROPHILS # BLD AUTO: 4.2 K/UL (ref 1.8–7.7)
NEUTROPHILS NFR BLD: 54 % (ref 38–73)
NRBC BLD-RTO: 0 /100 WBC
PLATELET # BLD AUTO: 265 K/UL (ref 150–450)
PMV BLD AUTO: 11.2 FL (ref 9.2–12.9)
POC IONIZED CALCIUM: 1.16 MMOL/L (ref 1.06–1.42)
POC TCO2 (MEASURED): 25 MMOL/L (ref 23–29)
POTASSIUM BLD-SCNC: 3.8 MMOL/L (ref 3.5–5.1)
POTASSIUM SERPL-SCNC: 3.8 MMOL/L (ref 3.5–5.1)
PROT SERPL-MCNC: 7.1 G/DL (ref 6–8.4)
RBC # BLD AUTO: 4.68 M/UL (ref 4–5.4)
SAMPLE: ABNORMAL
SODIUM BLD-SCNC: 141 MMOL/L (ref 136–145)
SODIUM SERPL-SCNC: 139 MMOL/L (ref 136–145)
WBC # BLD AUTO: 7.81 K/UL (ref 3.9–12.7)

## 2023-04-27 PROCEDURE — 99223 1ST HOSP IP/OBS HIGH 75: CPT | Mod: ,,, | Performed by: SURGERY

## 2023-04-27 PROCEDURE — 85730 THROMBOPLASTIN TIME PARTIAL: CPT | Performed by: EMERGENCY MEDICINE

## 2023-04-27 PROCEDURE — 99291 CRITICAL CARE FIRST HOUR: CPT | Mod: ,,, | Performed by: EMERGENCY MEDICINE

## 2023-04-27 PROCEDURE — 99291 PR CRITICAL CARE, E/M 30-74 MINUTES: ICD-10-PCS | Mod: ,,, | Performed by: EMERGENCY MEDICINE

## 2023-04-27 PROCEDURE — 85610 PROTHROMBIN TIME: CPT | Performed by: EMERGENCY MEDICINE

## 2023-04-27 PROCEDURE — 99285 EMERGENCY DEPT VISIT HI MDM: CPT | Mod: 25

## 2023-04-27 PROCEDURE — 80053 COMPREHEN METABOLIC PANEL: CPT | Performed by: EMERGENCY MEDICINE

## 2023-04-27 PROCEDURE — 25500020 PHARM REV CODE 255: Performed by: EMERGENCY MEDICINE

## 2023-04-27 PROCEDURE — 87389 HIV-1 AG W/HIV-1&-2 AB AG IA: CPT | Performed by: PHYSICIAN ASSISTANT

## 2023-04-27 PROCEDURE — 99223 1ST HOSP IP/OBS HIGH 75: CPT | Mod: ,,, | Performed by: HOSPITALIST

## 2023-04-27 PROCEDURE — 80047 BASIC METABLC PNL IONIZED CA: CPT

## 2023-04-27 PROCEDURE — 85025 COMPLETE CBC W/AUTO DIFF WBC: CPT | Performed by: EMERGENCY MEDICINE

## 2023-04-27 PROCEDURE — 86803 HEPATITIS C AB TEST: CPT | Performed by: PHYSICIAN ASSISTANT

## 2023-04-27 PROCEDURE — 12000002 HC ACUTE/MED SURGE SEMI-PRIVATE ROOM

## 2023-04-27 PROCEDURE — 99223 PR INITIAL HOSPITAL CARE,LEVL III: ICD-10-PCS | Mod: ,,, | Performed by: SURGERY

## 2023-04-27 PROCEDURE — 99223 PR INITIAL HOSPITAL CARE,LEVL III: ICD-10-PCS | Mod: ,,, | Performed by: HOSPITALIST

## 2023-04-27 RX ORDER — HEPARIN SODIUM,PORCINE/D5W 25000/250
0-40 INTRAVENOUS SOLUTION INTRAVENOUS CONTINUOUS
Status: DISCONTINUED | OUTPATIENT
Start: 2023-04-27 | End: 2023-04-29

## 2023-04-27 RX ADMIN — IOHEXOL 125 ML: 350 INJECTION, SOLUTION INTRAVENOUS at 10:04

## 2023-04-28 PROBLEM — M16.0 BILATERAL PRIMARY OSTEOARTHRITIS OF HIP: Status: ACTIVE | Noted: 2023-04-28

## 2023-04-28 PROBLEM — I70.229 CRITICAL LOWER LIMB ISCHEMIA: Status: ACTIVE | Noted: 2023-04-28

## 2023-04-28 PROBLEM — D68.59 HYPERCOAGULOPATHY: Status: ACTIVE | Noted: 2023-04-28

## 2023-04-28 LAB
ANION GAP SERPL CALC-SCNC: 9 MMOL/L (ref 8–16)
APTT PPP: 27.6 SEC (ref 21–32)
APTT PPP: 55 SEC (ref 21–32)
APTT PPP: 62.4 SEC (ref 21–32)
APTT PPP: 90.5 SEC (ref 21–32)
ASCENDING AORTA: 3.24 CM
AV INDEX (PROSTH): 0.49
AV MEAN GRADIENT: 3 MMHG
AV PEAK GRADIENT: 6 MMHG
AV VALVE AREA: 1.58 CM2
AV VELOCITY RATIO: 0.55
BASOPHILS # BLD AUTO: 0.04 K/UL (ref 0–0.2)
BASOPHILS NFR BLD: 0.6 % (ref 0–1.9)
BSA FOR ECHO PROCEDURE: 2.01 M2
BUN SERPL-MCNC: 12 MG/DL (ref 8–23)
CALCIUM SERPL-MCNC: 9.3 MG/DL (ref 8.7–10.5)
CHLORIDE SERPL-SCNC: 108 MMOL/L (ref 95–110)
CO2 SERPL-SCNC: 25 MMOL/L (ref 23–29)
CREAT SERPL-MCNC: 0.8 MG/DL (ref 0.5–1.4)
CV ECHO LV RWT: 0.2 CM
DIFFERENTIAL METHOD: ABNORMAL
DOP CALC AO PEAK VEL: 1.2 M/S
DOP CALC AO VTI: 23.08 CM
DOP CALC LVOT AREA: 3.2 CM2
DOP CALC LVOT DIAMETER: 2.02 CM
DOP CALC LVOT PEAK VEL: 0.66 M/S
DOP CALC LVOT STROKE VOLUME: 36.52 CM3
DOP CALCLVOT PEAK VEL VTI: 11.4 CM
E WAVE DECELERATION TIME: 143.59 MSEC
E/A RATIO: 1.09
E/E' RATIO: 29.2 M/S
ECHO LV POSTERIOR WALL: 0.86 CM (ref 0.6–1.1)
EJECTION FRACTION: 10 %
EOSINOPHIL # BLD AUTO: 0.2 K/UL (ref 0–0.5)
EOSINOPHIL NFR BLD: 3.4 % (ref 0–8)
ERYTHROCYTE [DISTWIDTH] IN BLOOD BY AUTOMATED COUNT: 14.6 % (ref 11.5–14.5)
EST. GFR  (NO RACE VARIABLE): >60 ML/MIN/1.73 M^2
FRACTIONAL SHORTENING: 5 % (ref 28–44)
GLUCOSE SERPL-MCNC: 125 MG/DL (ref 70–110)
HCT VFR BLD AUTO: 39.1 % (ref 37–48.5)
HGB BLD-MCNC: 12.3 G/DL (ref 12–16)
IMM GRANULOCYTES # BLD AUTO: 0.01 K/UL (ref 0–0.04)
IMM GRANULOCYTES NFR BLD AUTO: 0.1 % (ref 0–0.5)
INR PPP: 1 (ref 0.8–1.2)
INTERVENTRICULAR SEPTUM: 0.98 CM (ref 0.6–1.1)
LA MAJOR: 5.93 CM
LA MINOR: 5.34 CM
LA WIDTH: 5.2 CM
LEFT ATRIUM SIZE: 4.19 CM
LEFT ATRIUM VOLUME INDEX MOD: 50.8 ML/M2
LEFT ATRIUM VOLUME INDEX: 52.8 ML/M2
LEFT ATRIUM VOLUME MOD: 100 CM3
LEFT ATRIUM VOLUME: 104.07 CM3
LEFT INTERNAL DIMENSION IN SYSTOLE: 8.22 CM (ref 2.1–4)
LEFT VENTRICLE DIASTOLIC VOLUME INDEX: 206.15 ML/M2
LEFT VENTRICLE DIASTOLIC VOLUME: 406.11 ML
LEFT VENTRICLE MASS INDEX: 213 G/M2
LEFT VENTRICLE SYSTOLIC VOLUME INDEX: 185.9 ML/M2
LEFT VENTRICLE SYSTOLIC VOLUME: 366.25 ML
LEFT VENTRICULAR INTERNAL DIMENSION IN DIASTOLE: 8.61 CM (ref 3.5–6)
LEFT VENTRICULAR MASS: 419 G
LV LATERAL E/E' RATIO: 24.33 M/S
LV SEPTAL E/E' RATIO: 36.5 M/S
LYMPHOCYTES # BLD AUTO: 2.7 K/UL (ref 1–4.8)
LYMPHOCYTES NFR BLD: 38.7 % (ref 18–48)
MCH RBC QN AUTO: 29 PG (ref 27–31)
MCHC RBC AUTO-ENTMCNC: 31.5 G/DL (ref 32–36)
MCV RBC AUTO: 92 FL (ref 82–98)
MONOCYTES # BLD AUTO: 0.5 K/UL (ref 0.3–1)
MONOCYTES NFR BLD: 6.5 % (ref 4–15)
MV A" WAVE DURATION": 10.66 MSEC
MV PEAK A VEL: 0.67 M/S
MV PEAK E VEL: 0.73 M/S
MV STENOSIS PRESSURE HALF TIME: 41.64 MS
MV VALVE AREA P 1/2 METHOD: 5.28 CM2
NEUTROPHILS # BLD AUTO: 3.5 K/UL (ref 1.8–7.7)
NEUTROPHILS NFR BLD: 50.7 % (ref 38–73)
NRBC BLD-RTO: 0 /100 WBC
PISA TR MAX VEL: 2.83 M/S
PLATELET # BLD AUTO: 247 K/UL (ref 150–450)
PMV BLD AUTO: 11.5 FL (ref 9.2–12.9)
POCT GLUCOSE: 146 MG/DL (ref 70–110)
POTASSIUM SERPL-SCNC: 3.7 MMOL/L (ref 3.5–5.1)
PROTHROMBIN TIME: 10.3 SEC (ref 9–12.5)
PULM VEIN S/D RATIO: 1.13
PV PEAK D VEL: 0.31 M/S
PV PEAK S VEL: 0.35 M/S
RA MAJOR: 4.52 CM
RA PRESSURE: 3 MMHG
RA WIDTH: 3.99 CM
RBC # BLD AUTO: 4.24 M/UL (ref 4–5.4)
RIGHT VENTRICULAR END-DIASTOLIC DIMENSION: 3.02 CM
SINUS: 2.97 CM
SODIUM SERPL-SCNC: 142 MMOL/L (ref 136–145)
STJ: 3.13 CM
TDI LATERAL: 0.03 M/S
TDI SEPTAL: 0.02 M/S
TDI: 0.03 M/S
TR MAX PG: 32 MMHG
TRICUSPID ANNULAR PLANE SYSTOLIC EXCURSION: 1.77 CM
TV REST PULMONARY ARTERY PRESSURE: 35 MMHG
WBC # BLD AUTO: 6.97 K/UL (ref 3.9–12.7)

## 2023-04-28 PROCEDURE — 25500020 PHARM REV CODE 255: Performed by: HOSPITALIST

## 2023-04-28 PROCEDURE — 11000001 HC ACUTE MED/SURG PRIVATE ROOM

## 2023-04-28 PROCEDURE — 99233 SBSQ HOSP IP/OBS HIGH 50: CPT | Mod: ,,, | Performed by: HOSPITALIST

## 2023-04-28 PROCEDURE — 25000003 PHARM REV CODE 250: Performed by: HOSPITALIST

## 2023-04-28 PROCEDURE — 99233 PR SUBSEQUENT HOSPITAL CARE,LEVL III: ICD-10-PCS | Mod: ,,, | Performed by: HOSPITALIST

## 2023-04-28 PROCEDURE — 93010 ELECTROCARDIOGRAM REPORT: CPT | Mod: ,,, | Performed by: INTERNAL MEDICINE

## 2023-04-28 PROCEDURE — 80048 BASIC METABOLIC PNL TOTAL CA: CPT | Performed by: HOSPITALIST

## 2023-04-28 PROCEDURE — 36415 COLL VENOUS BLD VENIPUNCTURE: CPT | Performed by: HOSPITALIST

## 2023-04-28 PROCEDURE — 63600175 PHARM REV CODE 636 W HCPCS: Performed by: HOSPITALIST

## 2023-04-28 PROCEDURE — 99232 PR SUBSEQUENT HOSPITAL CARE,LEVL II: ICD-10-PCS | Mod: ,,, | Performed by: SURGERY

## 2023-04-28 PROCEDURE — 20600001 HC STEP DOWN PRIVATE ROOM

## 2023-04-28 PROCEDURE — 25000003 PHARM REV CODE 250: Performed by: NURSE PRACTITIONER

## 2023-04-28 PROCEDURE — 85025 COMPLETE CBC W/AUTO DIFF WBC: CPT | Performed by: HOSPITALIST

## 2023-04-28 PROCEDURE — 99232 SBSQ HOSP IP/OBS MODERATE 35: CPT | Mod: ,,, | Performed by: SURGERY

## 2023-04-28 PROCEDURE — 99232 PR SUBSEQUENT HOSPITAL CARE,LEVL II: ICD-10-PCS | Mod: GC,,, | Performed by: INTERNAL MEDICINE

## 2023-04-28 PROCEDURE — 99232 SBSQ HOSP IP/OBS MODERATE 35: CPT | Mod: GC,,, | Performed by: INTERNAL MEDICINE

## 2023-04-28 PROCEDURE — 63600175 PHARM REV CODE 636 W HCPCS: Performed by: EMERGENCY MEDICINE

## 2023-04-28 PROCEDURE — 93005 ELECTROCARDIOGRAM TRACING: CPT

## 2023-04-28 PROCEDURE — 85730 THROMBOPLASTIN TIME PARTIAL: CPT | Mod: 91 | Performed by: HOSPITALIST

## 2023-04-28 PROCEDURE — 93010 EKG 12-LEAD: ICD-10-PCS | Mod: ,,, | Performed by: INTERNAL MEDICINE

## 2023-04-28 RX ORDER — INSULIN ASPART 100 [IU]/ML
0-5 INJECTION, SOLUTION INTRAVENOUS; SUBCUTANEOUS
Status: DISCONTINUED | OUTPATIENT
Start: 2023-04-28 | End: 2023-04-29

## 2023-04-28 RX ORDER — TORSEMIDE 10 MG/1
10 TABLET ORAL DAILY
Status: DISCONTINUED | OUTPATIENT
Start: 2023-04-28 | End: 2023-04-29

## 2023-04-28 RX ORDER — IPRATROPIUM BROMIDE AND ALBUTEROL SULFATE 2.5; .5 MG/3ML; MG/3ML
3 SOLUTION RESPIRATORY (INHALATION) EVERY 4 HOURS PRN
Status: DISCONTINUED | OUTPATIENT
Start: 2023-04-28 | End: 2023-05-12 | Stop reason: HOSPADM

## 2023-04-28 RX ORDER — POLYETHYLENE GLYCOL 3350 17 G/17G
17 POWDER, FOR SOLUTION ORAL DAILY
Status: DISCONTINUED | OUTPATIENT
Start: 2023-04-28 | End: 2023-04-29

## 2023-04-28 RX ORDER — METOPROLOL SUCCINATE 25 MG/1
100 TABLET, EXTENDED RELEASE ORAL 2 TIMES DAILY
Status: DISCONTINUED | OUTPATIENT
Start: 2023-04-28 | End: 2023-04-29

## 2023-04-28 RX ORDER — SODIUM CHLORIDE 0.9 % (FLUSH) 0.9 %
10 SYRINGE (ML) INJECTION EVERY 12 HOURS PRN
Status: DISCONTINUED | OUTPATIENT
Start: 2023-04-28 | End: 2023-05-12 | Stop reason: HOSPADM

## 2023-04-28 RX ORDER — DEXTROSE 40 %
30 GEL (GRAM) ORAL
Status: DISCONTINUED | OUTPATIENT
Start: 2023-04-28 | End: 2023-05-01

## 2023-04-28 RX ORDER — GLUCAGON 1 MG
1 KIT INJECTION
Status: DISCONTINUED | OUTPATIENT
Start: 2023-04-28 | End: 2023-04-29

## 2023-04-28 RX ORDER — TALC
6 POWDER (GRAM) TOPICAL NIGHTLY PRN
Status: DISCONTINUED | OUTPATIENT
Start: 2023-04-28 | End: 2023-04-29

## 2023-04-28 RX ORDER — SPIRONOLACTONE 25 MG/1
25 TABLET ORAL DAILY
Status: DISCONTINUED | OUTPATIENT
Start: 2023-04-28 | End: 2023-04-29

## 2023-04-28 RX ORDER — SIMETHICONE 80 MG
1 TABLET,CHEWABLE ORAL 4 TIMES DAILY PRN
Status: DISCONTINUED | OUTPATIENT
Start: 2023-04-28 | End: 2023-04-29

## 2023-04-28 RX ORDER — ASPIRIN 81 MG/1
81 TABLET ORAL DAILY
Status: DISCONTINUED | OUTPATIENT
Start: 2023-04-28 | End: 2023-05-12 | Stop reason: HOSPADM

## 2023-04-28 RX ORDER — NALOXONE HCL 0.4 MG/ML
0.02 VIAL (ML) INJECTION
Status: DISCONTINUED | OUTPATIENT
Start: 2023-04-28 | End: 2023-04-29

## 2023-04-28 RX ORDER — ACETAMINOPHEN 325 MG/1
650 TABLET ORAL EVERY 4 HOURS PRN
Status: DISCONTINUED | OUTPATIENT
Start: 2023-04-28 | End: 2023-05-12 | Stop reason: HOSPADM

## 2023-04-28 RX ORDER — DEXTROSE 40 %
15 GEL (GRAM) ORAL
Status: DISCONTINUED | OUTPATIENT
Start: 2023-04-28 | End: 2023-05-01

## 2023-04-28 RX ORDER — SODIUM CHLORIDE 9 MG/ML
INJECTION, SOLUTION INTRAVENOUS CONTINUOUS
Status: DISCONTINUED | OUTPATIENT
Start: 2023-04-28 | End: 2023-04-28

## 2023-04-28 RX ORDER — TRAMADOL HYDROCHLORIDE 50 MG/1
100 TABLET ORAL EVERY 8 HOURS PRN
Status: DISCONTINUED | OUTPATIENT
Start: 2023-04-28 | End: 2023-04-29

## 2023-04-28 RX ORDER — AMOXICILLIN 250 MG
2 CAPSULE ORAL 2 TIMES DAILY PRN
Status: DISCONTINUED | OUTPATIENT
Start: 2023-04-28 | End: 2023-05-12 | Stop reason: HOSPADM

## 2023-04-28 RX ORDER — GUAIFENESIN 100 MG/5ML
200 SOLUTION ORAL ONCE
Status: COMPLETED | OUTPATIENT
Start: 2023-04-28 | End: 2023-04-28

## 2023-04-28 RX ORDER — ATORVASTATIN CALCIUM 40 MG/1
80 TABLET, FILM COATED ORAL DAILY
Status: DISCONTINUED | OUTPATIENT
Start: 2023-04-28 | End: 2023-05-12 | Stop reason: HOSPADM

## 2023-04-28 RX ORDER — BENZONATATE 100 MG/1
100 CAPSULE ORAL 3 TIMES DAILY PRN
Status: DISCONTINUED | OUTPATIENT
Start: 2023-04-28 | End: 2023-04-29

## 2023-04-28 RX ORDER — ONDANSETRON 2 MG/ML
4 INJECTION INTRAMUSCULAR; INTRAVENOUS EVERY 8 HOURS PRN
Status: DISCONTINUED | OUTPATIENT
Start: 2023-04-28 | End: 2023-05-12

## 2023-04-28 RX ADMIN — ATORVASTATIN CALCIUM 80 MG: 40 TABLET, FILM COATED ORAL at 08:04

## 2023-04-28 RX ADMIN — TORSEMIDE 10 MG: 10 TABLET ORAL at 08:04

## 2023-04-28 RX ADMIN — SODIUM CHLORIDE: 9 INJECTION, SOLUTION INTRAVENOUS at 02:04

## 2023-04-28 RX ADMIN — METOPROLOL SUCCINATE 100 MG: 100 TABLET, EXTENDED RELEASE ORAL at 08:04

## 2023-04-28 RX ADMIN — BENZONATATE 100 MG: 100 CAPSULE ORAL at 09:04

## 2023-04-28 RX ADMIN — ASPIRIN 81 MG: 81 TABLET, COATED ORAL at 03:04

## 2023-04-28 RX ADMIN — SACUBITRIL AND VALSARTAN 1 TABLET: 97; 103 TABLET, FILM COATED ORAL at 08:04

## 2023-04-28 RX ADMIN — HUMAN ALBUMIN MICROSPHERES AND PERFLUTREN 0.11 MG: 10; .22 INJECTION, SOLUTION INTRAVENOUS at 11:04

## 2023-04-28 RX ADMIN — HEPARIN SODIUM AND DEXTROSE 15 UNITS/KG/HR: 10000; 5 INJECTION INTRAVENOUS at 05:04

## 2023-04-28 RX ADMIN — HEPARIN SODIUM AND DEXTROSE 18 UNITS/KG/HR: 10000; 5 INJECTION INTRAVENOUS at 01:04

## 2023-04-28 RX ADMIN — SPIRONOLACTONE 25 MG: 25 TABLET, FILM COATED ORAL at 08:04

## 2023-04-28 RX ADMIN — GUAIFENESIN 200 MG: 200 SOLUTION ORAL at 09:04

## 2023-04-28 RX ADMIN — SACUBITRIL AND VALSARTAN 1 TABLET: 97; 103 TABLET, FILM COATED ORAL at 09:04

## 2023-04-28 RX ADMIN — METOPROLOL SUCCINATE 100 MG: 100 TABLET, EXTENDED RELEASE ORAL at 09:04

## 2023-04-28 NOTE — SUBJECTIVE & OBJECTIVE
Past Medical History:   Diagnosis Date    Acute on chronic combined systolic and diastolic heart failure 12/26/2019    Anticoagulant long-term use     Anxiety     Arthritis     Asthma     Depression     H/O: hysterectomy     Hyperlipemia     Hypertension     Pulmonary edema     Schizophrenia        Past Surgical History:   Procedure Laterality Date    BLADDER SUSPENSION      CARPAL TUNNEL RELEASE Right     HEEL SPUR SURGERY Left     HYSTERECTOMY      LEFT HEART CATHETERIZATION Bilateral 12/27/2019    Procedure: Left heart cath;  Surgeon: Steve Chambers MD;  Location: [facility] CATH LAB;  Service: Cardiology;  Laterality: Bilateral;    RIGHT HEART CATHETERIZATION Right 7/26/2021    Procedure: INSERTION, CATHETER, RIGHT HEART;  Surgeon: Petr Naranjo MD;  Location: [facility] CATH LAB;  Service: Cardiology;  Laterality: Right;    RIGHT HEART CATHETERIZATION Right 5/12/2022    Procedure: INSERTION, CATHETER, RIGHT HEART;  Surgeon: Chandana Ivory Jr., MD;  Location: [facility] CATH LAB;  Service: Cardiology;  Laterality: Right;    TUBAL LIGATION         Review of patient's allergies indicates:   Allergen Reactions    Adhesive Blisters    Captopril Other (See Comments)     COUGH       No current facility-administered medications on file prior to encounter.     Current Outpatient Medications on File Prior to Encounter   Medication Sig    acetaminophen (TYLENOL) 325 MG tablet Take 2 tablets (650 mg total) by mouth every 8 (eight) hours as needed.    albuterol (PROVENTIL) 2.5 mg /3 mL (0.083 %) nebulizer solution Take 3 mLs (2.5 mg total) by nebulization every 6 (six) hours as needed for Wheezing or Shortness of Breath. Rescue    albuterol (PROVENTIL/VENTOLIN HFA) 90 mcg/actuation inhaler Inhale 2 puffs into the lungs every 6 (six) hours as needed for Wheezing or Shortness of Breath.    atorvastatin (LIPITOR) 80 MG tablet Take 1 tablet (80 mg total) by mouth once daily.    dulaglutide  (TRULICITY) 1.5 mg/0.5 mL pen injector Inject 1.5 mg into the skin once a week.    empagliflozin (JARDIANCE) 10 mg tablet Take 1 tablet (10 mg total) by mouth once daily.    glimepiride (AMARYL) 4 MG tablet Take 4 mg by mouth 2 (two) times a day.    ipratropium-albuteroL (COMBIVENT)  mcg/actuation inhaler Inhale 1 puff into the lungs 4 (four) times daily. Rescue    metFORMIN (GLUCOPHAGE) 1000 MG tablet Take 1,000 mg by mouth 2 (two) times daily.     metoprolol succinate (TOPROL-XL) 100 MG 24 hr tablet Take 1 tablet (100 mg total) by mouth 2 (two) times daily.    rivaroxaban (XARELTO) 20 mg Tab Take 1 tablet (20 mg total) by mouth daily with dinner or evening meal.    sacubitriL-valsartan (ENTRESTO)  mg per tablet Take 1 tablet by mouth 2 (two) times daily.    spironolactone (ALDACTONE) 25 MG tablet Take 1 tablet (25 mg total) by mouth once daily.    torsemide (DEMADEX) 10 MG Tab Take 1 tablet (10 mg total) by mouth once daily.    traMADoL (ULTRAM) 50 mg tablet Take 100 mg by mouth every 12 (twelve) hours as needed for Pain.     Family History       Problem Relation (Age of Onset)    No Known Problems Mother, Father          Tobacco Use    Smoking status: Former     Types: Cigarettes     Quit date: 2016     Years since quittin.6    Smokeless tobacco: Never    Tobacco comments:     nonex 2 weeks   Substance and Sexual Activity    Alcohol use: No     Alcohol/week: 0.0 standard drinks    Drug use: No    Sexual activity: Not on file     Review of Systems   Constitutional:  Negative for activity change, appetite change, chills, diaphoresis, fatigue and fever.   HENT:  Negative for congestion, dental problem, drooling, ear discharge, ear pain, facial swelling, hearing loss, mouth sores, nosebleeds, postnasal drip, rhinorrhea, sinus pressure, sneezing, sore throat, tinnitus, trouble swallowing and voice change.    Eyes:  Negative for photophobia, pain, discharge, redness, itching and  visual disturbance.   Respiratory:  Negative for apnea, cough, choking, chest tightness, shortness of breath, wheezing and stridor.    Cardiovascular:  Negative for chest pain, palpitations and leg swelling.   Gastrointestinal:  Negative for abdominal distention, abdominal pain, anal bleeding, blood in stool, constipation, diarrhea, nausea, rectal pain and vomiting.   Endocrine: Negative for cold intolerance, heat intolerance, polydipsia, polyphagia and polyuria.   Genitourinary:  Negative for decreased urine volume, difficulty urinating, dyspareunia, dysuria, enuresis, flank pain, frequency, genital sores, hematuria, menstrual problem, pelvic pain, urgency, vaginal bleeding, vaginal discharge and vaginal pain.   Musculoskeletal:  Negative for arthralgias, back pain, gait problem, joint swelling, myalgias, neck pain and neck stiffness.        Chronic hip pain bilateral due to OA (L>R)   Skin:  Negative for color change, pallor, rash and wound.   Allergic/Immunologic: Negative for environmental allergies, food allergies and immunocompromised state.   Neurological:  Positive for weakness and numbness. Negative for dizziness, tremors, seizures, syncope, facial asymmetry, speech difficulty, light-headedness and headaches.        Sudden onset pain, numbness, weakness, tingling and cold sensation of R leg    Hematological:  Negative for adenopathy. Does not bruise/bleed easily.   Psychiatric/Behavioral:  Negative for agitation, behavioral problems, confusion, decreased concentration, dysphoric mood, hallucinations, self-injury, sleep disturbance and suicidal ideas. The patient is not nervous/anxious and is not hyperactive.    Objective:     Vital Signs (Most Recent):  Temp: 98.8 °F (37.1 °C) (04/27/23 1842)  Pulse: 87 (04/27/23 1842)  Resp: 16 (04/27/23 1842)  BP: (!) 131/57 (04/27/23 1842)  SpO2: 100 % (04/27/23 1842)   Vital Signs (24h Range):  Temp:  [98.8 °F (37.1 °C)] 98.8 °F (37.1 °C)  Pulse:  [87] 87  Resp:  [16]  16  SpO2:  [100 %] 100 %  BP: (131)/(57) 131/57     Weight: 86.2 kg (190 lb)  Body mass index is 29.76 kg/m².    Physical Exam  Constitutional:       General: She is not in acute distress.     Appearance: She is well-developed. She is not diaphoretic.   HENT:      Head: Normocephalic and atraumatic.      Right Ear: External ear normal.      Left Ear: External ear normal.      Nose: Nose normal.      Mouth/Throat:      Pharynx: No oropharyngeal exudate.   Eyes:      General: No scleral icterus.     Conjunctiva/sclera: Conjunctivae normal.      Pupils: Pupils are equal, round, and reactive to light.   Neck:      Thyroid: No thyromegaly.      Vascular: No JVD.      Trachea: No tracheal deviation.   Cardiovascular:      Rate and Rhythm: Normal rate and regular rhythm.      Pulses:           Dorsalis pedis pulses are 2+ on the right side and 2+ on the left side.        Posterior tibial pulses are 0 on the right side and 2+ on the left side.      Heart sounds: Normal heart sounds. No murmur heard.    No gallop.   Pulmonary:      Effort: Pulmonary effort is normal. No respiratory distress.      Breath sounds: Normal breath sounds. No wheezing or rales.   Chest:      Chest wall: No tenderness.   Abdominal:      General: Bowel sounds are normal. There is no distension.      Palpations: Abdomen is soft. There is no mass.      Tenderness: There is no abdominal tenderness. There is no guarding or rebound.   Genitourinary:     Vagina: No vaginal discharge.   Musculoskeletal:         General: No tenderness.      Cervical back: Neck supple.   Lymphadenopathy:      Cervical: No cervical adenopathy.   Skin:     General: Skin is warm and dry.      Coloration: Skin is not pale.      Findings: No erythema or rash.      Comments: R leg and foot cool to touch compared to left    Neurological:      Mental Status: She is alert and oriented to person, place, and time.      Cranial Nerves: No cranial nerve deficit.      Motor: No abnormal  muscle tone.      Coordination: Coordination normal.      Deep Tendon Reflexes: Reflexes are normal and symmetric. Reflexes normal.      Comments: Mild tremors of upper extremities    Psychiatric:         Behavior: Behavior normal.         Thought Content: Thought content normal.         Judgment: Judgment normal.      Comments: Flat affect          CRANIAL NERVES     CN III, IV, VI   Pupils are equal, round, and reactive to light.     Significant Labs: All pertinent labs within the past 24 hours have been reviewed.  Recent Lab Results         04/27/23 2037 04/27/23 2029        Albumin   3.4       Alkaline Phosphatase   142       ALT   13       Anion Gap   8       AST   15       Baso #   0.04       Basophil %   0.5       BILIRUBIN TOTAL   0.4  Comment: For infants and newborns, interpretation of results should be based  on gestational age, weight and in agreement with clinical  observations.    Premature Infant recommended reference ranges:  Up to 24 hours.............<8.0 mg/dL  Up to 48 hours............<12.0 mg/dL  3-5 days..................<15.0 mg/dL  6-29 days.................<15.0 mg/dL         BUN   15       Calcium   9.3       Chloride   106       CO2   25       Creatinine   0.9       Differential Method   Automated       eGFR   >60.0       Eos #   0.2       Eosinophil %   2.9       Glucose   184       Gran # (ANC)   4.2       Gran %   54.0       Hematocrit   43.2       Hemoglobin   13.6       Hepatitis C Ab   Non-reactive       HIV 1/2 Ag/Ab   Non-reactive       Immature Grans (Abs)   0.01  Comment: Mild elevation in immature granulocytes is non specific and   can be seen in a variety of conditions including stress response,   acute inflammation, trauma and pregnancy. Correlation with other   laboratory and clinical findings is essential.         Immature Granulocytes   0.1       Lymph #   2.9       Lymph %   36.7       MCH   29.1       MCHC   31.5       MCV   92       Mono #   0.5       Mono %   5.8        MPV   11.2       nRBC   0       Platelets   265       POC BUN 15         POC Chloride 104         POC Creatinine 0.8         POC Glucose 182         POC Hematocrit 43         POC Ionized Calcium 1.16         POC Potassium 3.8         POC Sodium 141         POC TCO2 (MEASURED) 25         Potassium   3.8       PROTEIN TOTAL   7.1       RBC   4.68       RDW   14.6       Sample MARISSA         Sodium   139       WBC   7.81               Significant Imaging: I have reviewed all pertinent imaging results/findings within the past 24 hours.

## 2023-04-28 NOTE — ASSESSMENT & PLAN NOTE
-patient with multiple CV risk factors presented to ED with sudden onset R leg (below knee ) pain, cold sensation, weakness, numbness and tingling   -CTA abdomen pelvis with bilateral lower extremity runoff showed focal occlusion of the right popliteal artery   -symptoms (pain, numbness, weakness, tingling) except cold sensation mostly resolved after 2 hours of onset   -vascular surgery consult noted with concern for embolic etiology and failure of xarelto   -continue heparin infusion per vascular surgery recommendation   -neurovascular check q 4 hours  -check EKG and continue telemetry monitoring   -check echo with doppler  -gentle IVF hydration (NS @ 75 cc/hr x 10 hours) with caution given h/o HFrEF  -consult hematology for further evaluation as patient failed xarelto   -vascular surgery will follow along

## 2023-04-28 NOTE — ASSESSMENT & PLAN NOTE
Pt has history of multiple clot formation with a stroke in 2020, and thought to have been provoked PE in 2021. After pt's PE pt was placed on xarelto 20mg by Dr. Le. Pt last saw him in July of 2022. Pt states 100% compliance with xarelto, but based on some notes and the pharmacy refill compliance this is uncertain. Pt does state she went to multiple pharmacies for refills. Pt workup with Dr. Le included cardiolipin and beta 2 glycoprotein both of which were negative back in 2022. Due to this being an arterial clot this has not been shown association with genetic clotting disorders and do not feel this needs to be worked up at this time.    Plan  Due to concern of breakthrough clot even on DOAC, recommend continuing heparin inpatient and transition to dabigatron 150mg BID after 5 days of heparin therapy to bridge. Start dabigatron 150mg BID while on heparin to get to steady state prior to finishing heparin.   Due to arterial nature of this clot would consider restarting patient on aspirin for antiplatelet therapy  We will arrange follow-up with Dr. Le   Recommend getting up to date colonoscopy and mammogram since she is overdue for both and cancer can create a hypercoagulable state.

## 2023-04-28 NOTE — ASSESSMENT & PLAN NOTE
Pt has history of multiple clot formation with a stroke in 2020, and thought to have been provoked PE in 2021. After pt's PE pt was placed on xarelto 20mg by Dr. Le. Pt last saw him in July of 2022. Pt states 100% compliance with xarelto, but based on some notes and the pharmacy refill compliance this is uncertain. Pt does state she went to multiple pharmacies for refills. Pt workup with Dr. eL included cardiolipin and beta 2 glycoprotein both of which were negative back in 2022. Due to this being an arterial clot this has not been shown association with genetic clotting disorders and do not feel this needs to be worked up at this time.    Plan  Due to concern of breakthrough clot even on DOAC, recommend continuing heparin inpatient and transitioning to dabigatron 150mg BID after 5 days of heparin therapy to bridge. Overlap of medications is not required.   Due to arterial nature of this clot would consider restarting patient on aspirin for antiplatelet therapy  We will arrange follow-up with Dr. Le   Recommend getting up to date colonoscopy and mammogram since she is overdue for both and cancer can create a hypercoagulable state.

## 2023-04-28 NOTE — CONSULTS
[facility] - Cardiology Stepdown  Hematology/Oncology  Consult Note    Patient Name: Diomedes Mohr  MRN: [id number]  Admission Date: 4/27/2023  Hospital Length of Stay: 1 days  Code Status: Full Code   Attending Provider: Kallie Tristan MD  Consulting Provider: Petr Vazquez DO  Primary Care Physician: Fernando Manzo MD  Principal Problem:Critical lower limb ischemia    Inpatient consult to Hematology/Oncology  Consult performed by: Petr Vazquez DO  Consult ordered by: Ginger Mendez DO  Reason for consult: Arterial clot while on AC with xarelto      Subjective:     HPI:  Pt is a 62 yoF with PMH of HTN, DM2, HLD, tobacco use (15 pack year, quit 2020), stroke (2020, R MCA, no deficits), PE (December 2021)  CAD, DCM, HFpEF (15%) s/p AICD, Pulmonary hypertension, chronic pain that presented to ED for evaluation of RLE pain from knee down, coldness, numbness and weakness. Patient states day of admission she suddenly felt sharp pain and freezing cold in her RLE while standing outside her house. No trauma or event noted. She had difficulty weight bearing on right leg due to pain and weakness. EMS was called and brought patient to hospital. Patient states she never had similar symptoms in the past and was in normal state of health prior to incident.. She denies chest pain, palpitation, dizziness, fever, chills, N/V/D/abdominal pain, dark stool or bleeding from other sites. She is unable to walk long distance due to chronic hip pain, but able to complete her ADLs. She reports compliance with her Xarelto stating she has not missed a dose in months.     ED course: afebrile and vitals stable. CTA abdomen pelvis with bilateral lower extemitiy runoff showed focal occlusion of the right popliteal artery with distal reconstitution and diminished runoff to the right foot. Patient started on heparin infusion per vascular surgery recommendation. Pain improved while in the ED    Pt reports pain has not returned since the episode, but  she is still having some mild coolness in extremities. Pt states she has never missed a dose of xarelto, medication compliance on [facility] show low compliance of 26%, but patient states she has been filling at multiple pharmacies. Pt has followed with Dr. Le, last saw him in July of 2022, he wanted to keep her on xarelto. No injuries reported to the leg. Pt has not been up to date with her cancer screening. Does not know the last time she had a colonoscopy but has been over 10 years, mammogram was in 2016/2017      No new subjective & objective note has been filed under this hospital service since the last note was generated.    Assessment/Plan:     Hypercoagulopathy  Pt has history of multiple clot formation with a stroke in 2020, and thought to have been provoked PE in 2021. After pt's PE pt was placed on xarelto 20mg by Dr. Le. Pt last saw him in July of 2022. Pt states 100% compliance with xarelto, but based on some notes and the pharmacy refill compliance this is uncertain. Pt does state she went to multiple pharmacies for refills. Pt workup with Dr. Le included cardiolipin and beta 2 glycoprotein both of which were negative back in 2022. Due to this being an arterial clot this has not been shown association with genetic clotting disorders and do not feel this needs to be worked up at this time.    Plan  Due to concern of breakthrough clot even on DOAC, recommend continuing heparin inpatient and transition to dabigatron 150mg BID after 5 days of heparin therapy to bridge. Start dabigatron 150mg BID while on heparin to get to steady state prior to finishing heparin.   Due to arterial nature of this clot would consider restarting patient on aspirin for antiplatelet therapy  We will arrange follow-up with Dr. Le   Recommend getting up to date colonoscopy and mammogram since she is overdue for both and cancer can create a hypercoagulable state.        Thank you for your consult. I will sign off.  Please contact us if you have any additional questions.    Petr Vazquez, DO  Hematology/Oncology  [facility] - Cardiology Step[facility]

## 2023-04-28 NOTE — HPI
Diomedes Mohr is a 62 y.o. woman with history of HTN, DMII, stroke (2020, R MCA, no deficits), CHF, dilated cardiomyopathy, arrhythmia, LVEF 15% as of 10/2022, HLD, PE on Xarelto who presents with right leg pain from knee down, numbness and weakness. The symptoms started at 5 pm, lasted approximately 2 hours and at time of interview consisted only of cold sensation compared to the contralateral leg. This has not happened to her before, she is able to ambulate however states she has crampy pain in her right thigh after 50 feet that improves with rest. CTA with runoff concerning for right popliteal short segment occlusion with reconstitution of PT and AT on delayed images.     MI: no  Stroke: Yes    Tobacco: quit 2020, 30 years 1/2 ppd.

## 2023-04-28 NOTE — PROGRESS NOTES
[facility] Hospital Medicine  Progress Note    Primary Team: [facility] HOSP MED C  Admit Date: 4/27/2023    Subjective:      HPI:  Diomedes Mohr is a 62 y.o. female with PMH of HTN, DM2, hyperlipidemia, tobacco use (quit in 2020), stroke (2020, R MCA, no deficits), PE (December 2021, on xarelto),  CAD, DCM, HFpEF (15%) s/p AICD, Pulmonary hypertension, osteoarthritis of bilateral hips (L>R) with chronic pain who presented to ED for evaluation of right leg pain from knee down, numbness and weakness. Patient states around 5 pm earlier today she suddenly felt sharp pain and freezing cold in her right leg while standing outside her house and talking to her daughter on the phone. She has difficulty weight bearing on right leg due to pain and weakness. Patient got concerned about having a stroke and daughter called EMS. She also reports associated numbness and tingling on her right foot. Patient states she never had similar symptoms in the past and she felt fine when she saw her diabetes doctor in clinic earlier this morning. She denies chest pain, palpitation, dizziness, sob, fever, chills, N/V/D/abdominal pain, dark stool or bleeding from other sites. She is unable to walk long distance due to chronic hip pain. She reports compliant with her medications including Xarelto. She is a former smoker (1/2 PPD for 30 years and quit in 2020).      ED course: afebrile and vitals stable. CTA abdomen pelvis with bilateral lower extemitiy runoff showed focal occlusion of the right popliteal artery with distal reconstitution and diminished runoff to the right foot. Patient started on heparin infusion per vascular surgery recommendation. During my interview patient is awake, conversant and not in distress. She reports feeling better as pain, numbness and weakness on her R leg mostly resolved.      Echo (10/16/22):  The left ventricle is severely enlarged with eccentric hypertrophy and severely decreased systolic  function. The estimated ejection fraction is 15%.  Normal right ventricular size with normal right ventricular systolic function.  Grade II left ventricular diastolic dysfunction.  Mild left atrial enlargement.  The estimated PA systolic pressure is 55 mmHg.  Normal central venous pressure (3 mmHg).    Interval History:  RLE pain has overall improved since presentation. No intervention per Vascular Sx. Heme recommends heparin gtt x 5 d, then dabigatran.     ROS:  As per HPI  General: no fever, no chills, no weight loss, no fatigue  Cardiovascular: no chest pain, no orthopnea, no dyspnea  Respiratory: no cough, no wheezes, no SOB  All other systems reviewed & are negative.     Past Medical History:   Diagnosis Date    Acute on chronic combined systolic and diastolic heart failure 2019    Anticoagulant long-term use     Anxiety     Arthritis     Asthma     Depression     H/O: hysterectomy     Hyperlipemia     Hypertension     Pulmonary edema     Schizophrenia      Past Surgical History:   Procedure Laterality Date    BLADDER SUSPENSION      CARPAL TUNNEL RELEASE Right     HEEL SPUR SURGERY Left     HYSTERECTOMY      LEFT HEART CATHETERIZATION Bilateral 2019    Procedure: Left heart cath;  Surgeon: Steve Chambers MD;  Location: [facility] CATH LAB;  Service: Cardiology;  Laterality: Bilateral;    RIGHT HEART CATHETERIZATION Right 2021    Procedure: INSERTION, CATHETER, RIGHT HEART;  Surgeon: Petr Naranjo MD;  Location: [facility] CATH LAB;  Service: Cardiology;  Laterality: Right;    RIGHT HEART CATHETERIZATION Right 2022    Procedure: INSERTION, CATHETER, RIGHT HEART;  Surgeon: Chandana Ivory Jr., MD;  Location: [facility] CATH LAB;  Service: Cardiology;  Laterality: Right;    TUBAL LIGATION           Objective:   Last 24 Hour Vital Signs:  BP  Min: 106/60  Max: 136/94  Temp  Av.3 °F (36.8 °C)  Min: 97.9 °F (36.6 °C)  Max: 98.8 °F (37.1 °C)  Pulse  Av.8  Min: 87  Max: 103  Resp   "Av.8  Min: 16  Max: 20  SpO2  Av %  Min: 97 %  Max: 100 %  Height  Av' 7" (170.2 cm)  Min: 5' 7" (170.2 cm)  Max: 5' 7" (170.2 cm)  Weight  Av kg (189 lb 9.7 oz)  Min: 85.7 kg (189 lb)  Max: 86.2 kg (190 lb)  No intake/output data recorded.    Physical Examination:  GEN: AAOx3, NAD  HEENT: NCAT, MMM, PERRL, EOMI, oropharynx clear  CV: RRR, no m/r, no S3/S4  RESP: CTAB, no wheezes/crackles, no increased WOB  ABD: soft, NTND, normoactive BS, no organomegaly  EXTR: no c/c/e, absent PT & DP pulses RLE  NEURO: PERRL, EOMI, moving all four extremities, intact sensation to light touch, no focal deficits  SKIN: no rashes, lesions, or color changes  PSYCH: normal affect    Laboratory:  I have reviewed all pertinent lab results/findings within the past 24 hours.    Radiology:  I have reviewed all pertinent imaging results/findings within the past 24 hours.    Current Medications:     Infusions:   heparin (porcine) in D5W 15 Units/kg/hr (23 1055)        Scheduled:   aspirin  81 mg Oral Daily    atorvastatin  80 mg Oral Daily    metoprolol succinate  100 mg Oral BID    polyethylene glycol  17 g Oral Daily    sacubitriL-valsartan  1 tablet Oral BID    spironolactone  25 mg Oral Daily    torsemide  10 mg Oral Daily        PRN:  acetaminophen, albuterol-ipratropium, dextrose 10%, dextrose 10%, dextrose, dextrose, glucagon (human recombinant), heparin (PORCINE), heparin (PORCINE), insulin aspart U-100, melatonin, naloxone, ondansetron, senna-docusate 8.6-50 mg, simethicone, sodium chloride 0.9%, traMADoL    Prior records reviewed.       Assessment/Plan:     Diomedes Mohr is a 62 y.o.female with    Critical lower limb ischemia  -patient with multiple CV risk factors presented to ED with sudden onset R leg (below knee ) pain, cold sensation, weakness, numbness and tingling   -CTA abdomen pelvis with bilateral lower extremity runoff showed focal occlusion of the right popliteal artery   -symptoms (pain, " numbness, weakness, tingling) except cold sensation mostly resolved after 2 hours of onset   -vascular surgery consult noted with concern for embolic etiology and failure of xarelto - no intervention planned  -neurovascular check q 4 hours  -no thrombus on TTE  -consulted hematology for further evaluation as patient failed xarelto:   Due to concern of breakthrough clot even on DOAC, recommend continuing heparin inpatient and transitioning to dabigatron 150mg BID after 5 days of heparin therapy to bridge. Overlap of medications is not required.   Due to arterial nature of this clot would consider restarting patient on aspirin for antiplatelet therapy  We will arrange follow-up with Dr. Le   Recommend getting up to date colonoscopy and mammogram since she is overdue for both and cancer can create a hypercoagulable state.          Chronic combined systolic and diastolic congestive heart failure  Pulmonary hypertension due to left heart disease  Echo     Interpretation Summary  · The left ventricle is severely enlarged with eccentric hypertrophy and severely decreased systolic function. The estimated ejection fraction is 15%.  · Normal right ventricular size with normal right ventricular systolic function.  · Grade II left ventricular diastolic dysfunction.  · Mild left atrial enlargement.  · The estimated PA systolic pressure is 55 mmHg.  · Normal central venous pressure (3 mmHg).    No acute issues. Continue GDMT & torsemide.        Coronary artery disease involving native heart  -[facility] in 2019 showed non obstructive CAD  -denies chest pain or SOB    -continue home beta blocker and statin. Start ASA     Cerebrovascular accident (CVA) due to occlusion of right middle cerebral artery  -h/o CVA in 2020 due to R MCA occlusion   -no residual deficit   -on long term xarelto   -continue statin. Start ASA  -anticoagulant changes as above     History of pulmonary embolism  -CTA chest on 12/27/21 showed small R pulmonary  artery embolism   -on chronic anticoagulation with xarelto  -anticoagulant changes as above    Hypertension  -chronic and controlled   -continue home metoprolol and entresto      Type 2 diabetes mellitus without complication, without long-term current use of insulin          Lab Results   Component Value Date     HGBA1C 7.9 (H) 10/16/2022      Most recent fingerstick glucose reviewed- No results for input(s): POCTGLUCOSE in the last 24 hours.  Current correctional scale  Low  Maintain anti-hyperglycemic dose as follows-             Antihyperglycemics (From admission, onward)     Start     Stop Route Frequency Ordered     04/28/23 0202   insulin aspart U-100 pen 0-5 Units         -- SubQ Before meals & nightly PRN 04/28/23 0103          Hold home meds.       ICD (implantable cardioverter-defibrillator) in place  -s/p AICD in 2021 for HFrEF       Bilateral primary osteoarthritis of hip  -bilateral chronic hip pain (L>R)  -limited walking capacity   -denies recent fall or new injury   -continue home tylenol and tramadol prn pain           Kallie Tristan MD  Hospital Medicine Staff  [facility]

## 2023-04-28 NOTE — ED PROVIDER NOTES
"SCRIBE #1 NOTE: I, Mandi Mosley, am scribing for, and in the presence of,  Zac Zhou MD. I have scribed the following portions of the note - Other sections scribed: HPI, PE.     Chief Complaint   Leg Pain (Pain from r hip to r foot and tingling, denies bowel/bladder incontinence )      History Of Present Illness   Diomedes Mohr is a 62 y.o. female presenting with right knee pain radiating down to right foot onset x 1 hour ago. Pt reports having a constant tingling sensation starting from the right knee radiating down to the right foot. Associated symptoms include slight numbness to the right foot and feeling cold when compared to the left foot. Pt states she usually gets pain starting to the right hip and radiates down the foot. She reports it feels "funny" at exam. Denies changes in pain when sitting or standing. Patient is able to ambulate with a slight limp. In addition, pt is on eliquis and is diabetic. Denies symptoms to the left leg/foot. Also, patient stopped smoking weeks ago. Of note, patient saw her endocrinologist earlier today where they stated her foot was "fine".     History obtained from: Patient     Review of patient's allergies indicates:   Allergen Reactions    Adhesive Blisters    Captopril Other (See Comments)     COUGH       No current facility-administered medications on file prior to encounter.     Current Outpatient Medications on File Prior to Encounter   Medication Sig Dispense Refill    acetaminophen (TYLENOL) 325 MG tablet Take 2 tablets (650 mg total) by mouth every 8 (eight) hours as needed. 30 tablet 0    albuterol (PROVENTIL) 2.5 mg /3 mL (0.083 %) nebulizer solution Take 3 mLs (2.5 mg total) by nebulization every 6 (six) hours as needed for Wheezing or Shortness of Breath. Rescue 3 mL 0    albuterol (PROVENTIL/VENTOLIN HFA) 90 mcg/actuation inhaler Inhale 2 puffs into the lungs every 6 (six) hours as needed for Wheezing or Shortness of Breath. 18 g 2    atorvastatin (LIPITOR) " 80 MG tablet Take 1 tablet (80 mg total) by mouth once daily. 90 tablet 3    dulaglutide (TRULICITY) 1.5 mg/0.5 mL pen injector Inject 1.5 mg into the skin once a week.      empagliflozin (JARDIANCE) 10 mg tablet Take 1 tablet (10 mg total) by mouth once daily. 90 tablet 6    glimepiride (AMARYL) 4 MG tablet Take 4 mg by mouth 2 (two) times a day.      ipratropium-albuteroL (COMBIVENT)  mcg/actuation inhaler Inhale 1 puff into the lungs 4 (four) times daily. Rescue 4 g 3    metFORMIN (GLUCOPHAGE) 1000 MG tablet Take 1,000 mg by mouth 2 (two) times daily.       metoprolol succinate (TOPROL-XL) 100 MG 24 hr tablet Take 1 tablet (100 mg total) by mouth 2 (two) times daily. 90 tablet 6    rivaroxaban (XARELTO) 20 mg Tab Take 1 tablet (20 mg total) by mouth daily with dinner or evening meal. 30 tablet 6    sacubitriL-valsartan (ENTRESTO)  mg per tablet Take 1 tablet by mouth 2 (two) times daily. 90 tablet 3    spironolactone (ALDACTONE) 25 MG tablet Take 1 tablet (25 mg total) by mouth once daily. 30 tablet 6    torsemide (DEMADEX) 10 MG Tab Take 1 tablet (10 mg total) by mouth once daily. 60 tablet 6    traMADoL (ULTRAM) 50 mg tablet Take 100 mg by mouth every 12 (twelve) hours as needed for Pain.         Past History   As per HPI and below:  Past Medical History:   Diagnosis Date    Acute on chronic combined systolic and diastolic heart failure 12/26/2019    Anticoagulant long-term use     Anxiety     Arthritis     Asthma     Depression     H/O: hysterectomy     Hyperlipemia     Hypertension     Pulmonary edema     Schizophrenia      Past Surgical History:   Procedure Laterality Date    BLADDER SUSPENSION      CARPAL TUNNEL RELEASE Right     HEEL SPUR SURGERY Left     HYSTERECTOMY      LEFT HEART CATHETERIZATION Bilateral 12/27/2019    Procedure: Left heart cath;  Surgeon: Steve Chambers MD;  Location: [facility] CATH LAB;  Service: Cardiology;  Laterality: Bilateral;    RIGHT HEART CATHETERIZATION Right  "2021    Procedure: INSERTION, CATHETER, RIGHT HEART;  Surgeon: Petr Naranjo MD;  Location: [facility] CATH LAB;  Service: Cardiology;  Laterality: Right;    RIGHT HEART CATHETERIZATION Right 2022    Procedure: INSERTION, CATHETER, RIGHT HEART;  Surgeon: Chandana Ivory Jr., MD;  Location: [facility] CATH LAB;  Service: Cardiology;  Laterality: Right;    TUBAL LIGATION         Social History     Socioeconomic History    Marital status:    Tobacco Use    Smoking status: Former     Types: Cigarettes     Quit date: 2016     Years since quittin.6    Smokeless tobacco: Never    Tobacco comments:     nonex 2 weeks   Substance and Sexual Activity    Alcohol use: No     Alcohol/week: 0.0 standard drinks    Drug use: No       Family History   Problem Relation Age of Onset    No Known Problems Mother     No Known Problems Father        Physical Exam     Vitals:    23 1842   BP: (!) 131/57   Pulse: 87   Resp: 16   Temp: 98.8 °F (37.1 °C)   TempSrc: Oral   SpO2: 100%   Weight: 86.2 kg (190 lb)   Height: 5' 7" (1.702 m)     Appearance: No acute distress.  Skin: No rashes seen.  Good turgor.  No abrasions.  No ecchymoses.  Eyes: No conjunctival injection.  ENT: Oropharynx clear.    Chest: Clear to auscultation bilaterally.  Good air movement.  No wheezes.  No rhonchi.  Cardiovascular: Regular rate and rhythm.  No murmurs. No gallops. No rubs.  Abdomen: Soft.  Not distended.  Nontender.  No guarding.  No rebound.  Musculoskeletal: Good range of motion all joints.  No deformities.  Neck supple.  No meningismus.  Neurologic: Motor intact.  Sensation intact.  Cerebellar intact.  Cranial nerves intact.  Mental Status:  Alert and oriented x 3.  Appropriate, conversant.      Initial MDM   Pain in right calf and foot that started abruptly 2 hours ago.  No palpable pulse, weak Doppler signal.  Strong pulse in the left foot.  Patient thinks both of her feet were fine when she saw the diabetic foot Clinic " today.  I am concerned for acute arterial occlusion.  I have discussed the case with vascular surgery, and will order a CTA to evaluate.  The cap refill is delayed, the foot is noticeably cooler but it is not cold.    Medications Given     Medications   heparin 25,000 units in dextrose 5% (100 units/ml) IV bolus from bag INITIAL BOLUS (has no administration in time range)   heparin 25,000 units in dextrose 5% 250 mL (100 units/mL) infusion HIGH INTENSITY nomogram - [facility] (has no administration in time range)   heparin 25,000 units in dextrose 5% (100 units/ml) IV bolus from bag - ADDITIONAL PRN BOLUS - 60 units/kg (has no administration in time range)   heparin 25,000 units in dextrose 5% (100 units/ml) IV bolus from bag - ADDITIONAL PRN BOLUS - 30 units/kg (has no administration in time range)   iohexoL (OMNIPAQUE 350) injection 125 mL (125 mLs Intravenous Given 4/27/23 2242)       Results and Course     Labs Reviewed   CBC W/ AUTO DIFFERENTIAL - Abnormal; Notable for the following components:       Result Value    MCHC 31.5 (*)     RDW 14.6 (*)     All other components within normal limits   COMPREHENSIVE METABOLIC PANEL - Abnormal; Notable for the following components:    Glucose 184 (*)     Albumin 3.4 (*)     Alkaline Phosphatase 142 (*)     All other components within normal limits   ISTAT PROCEDURE - Abnormal; Notable for the following components:    POC Glucose 182 (*)     All other components within normal limits   HIV 1 / 2 ANTIBODY    Narrative:     Release to patient->Immediate   HEPATITIS C ANTIBODY    Narrative:     Release to patient->Immediate   APTT   PROTIME-INR   ISTAT CHEM8       Imaging Results               CTA Runoff ABD PEL Bilat Lower Ext (Final result)  Result time 04/27/23 23:17:52      Final result by Dagoberto Briones MD (04/27/23 23:17:52)                   Impression:      Focal occlusion of the right popliteal artery with distal reconstitution and diminished runoff to the right foot.   Intermittent stenosis and occlusion of the right peroneal artery.    Focal occlusion or severe stenosis of the right profunda femoris artery at the level of the proximal femoral shaft.    Aortoiliac atherosclerosis.    Cardiomegaly and dilated left ventricle.    Bilateral renal cyst.    Small fat containing umbilical hernia.    Additional findings discussed in the body of the report.    This report was flagged in Epic as abnormal.    COMMUNICATION  This critical result was discovered/received at 23:03.  The critical information above was relayed directly by Dagoberto Briones MD by [facility] secure chat (with acknowledgement) to Zac Zhou MD on 4/27/2023 at 23:05.      Electronically signed by: Dagoberto Briones MD  Date:    04/27/2023  Time:    23:17               Narrative:    EXAMINATION:  CTA RUNOFF ABD PEL BILAT LOWER EXT    CLINICAL HISTORY:  Arterial embolism, lower extremity;    TECHNIQUE:  CT angiogram of the abdomen/pelvis with lower extremity runoff using 125 mL of  Omnipaque 350 IV contrast. Precontrast images also obtained.  Delayed phase images also obtained below the knee.  Multiplanar reconstructions performed, including MIP reformats.    COMPARISON:  CTA runoff, 02/24/2020.    FINDINGS:  Abdominopelvic Vasculature:    Moderate irregular atherosclerotic plaque involving the abdominal aorta and major branches.  There is moderate calcified and noncalcified atherosclerosis of the bilateral common iliac arteries.  There is moderate to advanced calcified and noncalcified atherosclerosis of the external iliac arteries, internal iliac arteries, and common femoral arteries.  Celiac, superior mesenteric, bilateral renal, and inferior mesenteric arteries are grossly patent.    Right lower extremity:    - Common femoral: Significant atherosclerosis with mild-to-moderate narrowing of the right superficial femoral artery.    - SFA: Moderate atherosclerosis, though patent.    - Profunda: Possible occlusion or  significant stenosis at the level of the proximal femoral shaft (series 3, image 815).    - Popliteal: Focal occlusion of the popliteal artery (series 3, image 1390).  Distal reconstitution at the bifurcation.    - Runoff: Diminished three-vessel runoff to the right lower extremity with grossly patent anterior and posterior tibial arteries on delayed phase images.  Multifocal intermittent stenosis and/or occlusion of the peroneal artery which is not well delineated beyond the level of the lower right calf (series 3, image 2853).    - Bones: No acute abnormality.    - Soft Tissues: No acute abnormality.    Left lower extremity:    - Common femoral: Mild-to-moderate atherosclerosis, grossly patent.    - SFA: Mild-to-moderate atherosclerosis, grossly patent.    - Profunda: Grossly patent.    - Popliteal: Patent without significant stenosis.    - Runoff: Patent three-vessel runoff.    - Bones: No acute abnormality.    - Soft Tissues: No acute abnormality.    Lower Chest:    Lung bases are essentially clear.  Heart is mildly enlarged.  There is significant dilation of the left ventricle.  AICD is present.    Abdomen:    Liver is normal in size and contour.  Subcentimeter hypodensity in the liver, too small to definitively characterize, but similar to the prior study in 2020.  Gallbladder is unremarkable. No intrahepatic biliary ductal dilatation.    Spleen, adrenals, and pancreas are unremarkable.    The kidneys are symmetric. No hydronephrosis. Bilateral simple renal cysts.    No small bowel obstruction. No inflammatory changes identified involving the gastrointestinal tract.    No pneumoperitoneum or organized fluid collection.    No bulky lymphadenopathy.    Pelvis:    Urinary bladder and rectum are unremarkable.  Uterus appears surgically absent.  No significant pelvic free fluid.  No pelvic lymphadenopathy.    Bones and soft tissues:    No aggressive osseous lesions. Probable hemangioma at L4.  Mild degenerative  changes in the spine.  Degenerative changes in the hips, right side greater than left.  Extraperitoneal soft tissues are negative for acute finding.  Small fat containing umbilical hernia.                                      ED Course as of 04/27/23 2330   Thu Apr 27, 2023 2055 POC Creatinine: 0.8 [DC]   2055 WBC: 7.81 [DC]   2055 Hemoglobin: 13.6 [DC]   2055 Platelets: 265 [DC]   2115 Creatinine: 0.9 [DC]   2150 Hepatitis C Ab: Non-reactive [DC]   2150 HIV 1/2 Ag/Ab: Non-reactive [DC]      ED Course User Index  [DC] Zac Zhou MD     Critical Care   Date: 04/27/2023  Performed by: Zac Zhou MD   Authorized by: Zac Zhou MD    Total critical care time (exclusive of procedural time) : 50 minutes  Critical care was necessary to treat or prevent imminent or life-threatening deterioration of the following conditions:  acute arterial occlusion, right leg        MDM, Impression and Plan   62 y.o. female with acute popliteal artery occlusion.  Heparin infusion started at high-intensity per vascular surgery recommendations.  Will admit for further care.  Discussed with hospital medicine.         Final diagnoses:  [I74.3] Acute occlusion of popliteal artery due to thrombosis (Primary)        ED Disposition Condition    Admit Stable             IMandi, scribed for, and in the presence of, Zac Zhou MD  . I performed the scribed service and the documentation accurately describes the services I performed. I attest to the accuracy of the note.        Zac Zhou MD  04/27/23 6282

## 2023-04-28 NOTE — SUBJECTIVE & OBJECTIVE
Oncology Treatment Plan:   [No matching plan found]    Medications:  Continuous Infusions:   heparin (porcine) in D5W 15 Units/kg/hr (04/28/23 1055)     Scheduled Meds:   atorvastatin  80 mg Oral Daily    metoprolol succinate  100 mg Oral BID    polyethylene glycol  17 g Oral Daily    sacubitriL-valsartan  1 tablet Oral BID    spironolactone  25 mg Oral Daily    torsemide  10 mg Oral Daily     PRN Meds:acetaminophen, albuterol-ipratropium, dextrose 10%, dextrose 10%, dextrose, dextrose, glucagon (human recombinant), heparin (PORCINE), heparin (PORCINE), insulin aspart U-100, melatonin, naloxone, ondansetron, senna-docusate 8.6-50 mg, simethicone, sodium chloride 0.9%, traMADoL     Review of patient's allergies indicates:   Allergen Reactions    Adhesive Blisters    Captopril Other (See Comments)     COUGH        Past Medical History:   Diagnosis Date    Acute on chronic combined systolic and diastolic heart failure 12/26/2019    Anticoagulant long-term use     Anxiety     Arthritis     Asthma     Depression     H/O: hysterectomy     Hyperlipemia     Hypertension     Pulmonary edema     Schizophrenia      Past Surgical History:   Procedure Laterality Date    BLADDER SUSPENSION      CARPAL TUNNEL RELEASE Right     HEEL SPUR SURGERY Left     HYSTERECTOMY      LEFT HEART CATHETERIZATION Bilateral 12/27/2019    Procedure: Left heart cath;  Surgeon: Steve Chambers MD;  Location: [facility] CATH LAB;  Service: Cardiology;  Laterality: Bilateral;    RIGHT HEART CATHETERIZATION Right 7/26/2021    Procedure: INSERTION, CATHETER, RIGHT HEART;  Surgeon: Petr Naranjo MD;  Location: [facility] CATH LAB;  Service: Cardiology;  Laterality: Right;    RIGHT HEART CATHETERIZATION Right 5/12/2022    Procedure: INSERTION, CATHETER, RIGHT HEART;  Surgeon: Chandana Ivory Jr., MD;  Location: [facility] CATH LAB;  Service: Cardiology;  Laterality: Right;    TUBAL LIGATION       Family History       Problem Relation (Age of Onset)    No  Known Problems Mother, Father          Tobacco Use    Smoking status: Former     Types: Cigarettes     Quit date: 2016     Years since quittin.6    Smokeless tobacco: Never    Tobacco comments:     nonex 2 weeks   Substance and Sexual Activity    Alcohol use: No     Alcohol/week: 0.0 standard drinks    Drug use: No    Sexual activity: Not on file       Review of Systems   Constitutional:  Negative for activity change, chills, fever and unexpected weight change.   HENT:  Negative for congestion and sinus pressure.    Eyes:  Negative for photophobia and visual disturbance.   Respiratory:  Positive for shortness of breath (intermitent). Negative for cough.    Cardiovascular:  Negative for chest pain and leg swelling.   Gastrointestinal:  Negative for abdominal distention, abdominal pain, constipation, diarrhea, nausea and vomiting.   Genitourinary:  Negative for difficulty urinating, dyspareunia and dysuria.   Musculoskeletal:  Positive for arthralgias. Negative for gait problem.   Skin:  Negative for rash and wound.   Neurological:  Positive for weakness and numbness. Negative for dizziness, light-headedness and headaches.   Hematological:  Negative for adenopathy.   Psychiatric/Behavioral:  Negative for confusion and decreased concentration.    Objective:     Vital Signs (Most Recent):  Temp: 98.1 °F (36.7 °C) (23 0737)  Pulse: 94 (23 1103)  Resp: 20 (23 0737)  BP: 106/60 (23 1103)  SpO2: 97 % (23 0737) Vital Signs (24h Range):  Temp:  [97.9 °F (36.6 °C)-98.8 °F (37.1 °C)] 98.1 °F (36.7 °C)  Pulse:  [] 94  Resp:  [16-20] 20  SpO2:  [97 %-100 %] 97 %  BP: (106-136)/(57-94) 106/60     Weight: 85.7 kg (189 lb)  Body mass index is 29.6 kg/m².  Body surface area is 2.01 meters squared.      Intake/Output Summary (Last 24 hours) at 2023 1342  Last data filed at 2023 1058  Gross per 24 hour   Intake --   Output 650 ml   Net -650 ml       Physical Exam  Constitutional:        General: She is not in acute distress.     Appearance: Normal appearance. She is not ill-appearing.   HENT:      Head: Normocephalic and atraumatic.      Nose: No congestion or rhinorrhea.      Mouth/Throat:      Mouth: Mucous membranes are moist.      Pharynx: Oropharynx is clear.   Eyes:      General: Lids are normal. No scleral icterus.  Cardiovascular:      Rate and Rhythm: Normal rate and regular rhythm.      Pulses:           Dorsalis pedis pulses are 1+ on the right side.        Posterior tibial pulses are 1+ on the right side.      Heart sounds: No murmur heard.  Pulmonary:      Effort: Pulmonary effort is normal. No respiratory distress.   Abdominal:      General: Abdomen is flat. There is no distension.      Palpations: Abdomen is soft.      Tenderness: There is no abdominal tenderness.   Musculoskeletal:      Right lower leg: No edema.      Left lower leg: No edema.   Skin:     Comments: Pt's RLE is cool compared to LLE, but mild pulses felt on RLE. Pt states warmer than arrival   Neurological:      General: No focal deficit present.      Mental Status: She is alert and oriented to person, place, and time.   Psychiatric:         Mood and Affect: Mood normal.         Behavior: Behavior normal.       Significant Labs:   CBC:   Recent Labs   Lab 04/27/23 2029 04/27/23 2037 04/28/23  0759   WBC 7.81  --  6.97   HGB 13.6  --  12.3   HCT 43.2 43 39.1     --  247   , Coagulation:   Recent Labs   Lab 04/27/23 2349 04/28/23  0759   INR 1.0  --    APTT 27.6 90.5*   , and LFTs:   Recent Labs   Lab 04/27/23 2029   ALT 13   AST 15   ALKPHOS 142*   BILITOT 0.4   PROT 7.1   ALBUMIN 3.4*       Diagnostic Results:  I have reviewed all pertinent imaging results/findings within the past 24 hours.

## 2023-04-28 NOTE — ASSESSMENT & PLAN NOTE
-last echo showed EF 15%  -LHC in 2019 showed non obstructive CAD   -continue GDMT (beta blocker, entresto, spironolactone)

## 2023-04-28 NOTE — NURSING
Received patient from ED. NAD noted. Only complaint patient had upon arrival to the floor was being hungry. Diet was changed from NPO to diabetic. Snacks given. VSS. Heparin gtt started at 18u/kg/hr. Patient independently ambulatory. Able to make needs known. Fall precautions in place. Bed locked in lowest position. Call bell and personal items within reach. Will continue POC

## 2023-04-28 NOTE — ASSESSMENT & PLAN NOTE
-h/o CVA in 2020 due to R MCA occlusion   -no residual deficit   -on long term xarelto   -continue statin

## 2023-04-28 NOTE — HPI
Pt is a 62 yoF with PMH of HTN, DM2, HLD, tobacco use (15 pack year, quit 2020), stroke (2020, R MCA, no deficits), PE (December 2021)  CAD, DCM, HFpEF (15%) s/p AICD, Pulmonary hypertension, chronic pain that presented to ED for evaluation of RLE pain from knee down, coldness, numbness and weakness. Patient states day of admission she suddenly felt sharp pain and freezing cold in her RLE while standing outside her house. No trauma or event noted. She had difficulty weight bearing on right leg due to pain and weakness. EMS was called and brought patient to hospital. Patient states she never had similar symptoms in the past and was in normal state of health prior to incident.. She denies chest pain, palpitation, dizziness, fever, chills, N/V/D/abdominal pain, dark stool or bleeding from other sites. She is unable to walk long distance due to chronic hip pain, but able to complete her ADLs. She reports compliance with her Xarelto stating she has not missed a dose in months.     ED course: afebrile and vitals stable. CTA abdomen pelvis with bilateral lower extemitiy runoff showed focal occlusion of the right popliteal artery with distal reconstitution and diminished runoff to the right foot. Patient started on heparin infusion per vascular surgery recommendation. Pain improved while in the ED    Pt reports pain has not returned since the episode, but she is still having some mild coolness in extremities. Pt states she has never missed a dose of xarelto, medication compliance on ochsner show low compliance of 26%, but patient states she has been filling at multiple pharmacies. Pt has followed with Dr. Le, last saw him in July of 2022, he wanted to keep her on xarelto. No injuries reported to the leg. Pt has not been up to date with her cancer screening. Does not know the last time she had a colonoscopy but has been over 10 years, mammogram was in 2016/2017

## 2023-04-28 NOTE — ASSESSMENT & PLAN NOTE
Diomedes Mohr is a 62 y.o. woman with history of HTN, DMII, stroke (2020, R MCA, no deficits), CHF, dilated cardiomyopathy, arrhythmia, LVEF 15% as of 10/2022, HLD, PE on Xarelto who presents with right leg pain from knee down, numbness and weakness.    Symptoms have resolved and a strong monophasic signal is present in the DP on the right. Patient has failed Xarelto given likely embolic nature of this new popliteal occlusion.    Recommendations:  Fully anticoagulate with heparin  Medicine evaluation for admission  q4 neurovascular exams  Hydrate as able given low EF  Will need to reevaluate anticoagulation Rx, hematology oncology recs appreciated    Vascular surgery will follow along, please notify our team if there is a change in doppler exam or reemergence of presenting symptoms

## 2023-04-28 NOTE — ASSESSMENT & PLAN NOTE
-bilateral chronic hip pain (L>R)  -limited walking capacity   -denies recent fall or new injury   -continue home tylenol and tramadol prn pain

## 2023-04-28 NOTE — HPI
Pt is a 62 yoF with PMH of HTN, DM2, hyperlipidemia, tobacco use (quit in 2020), stroke (2020, R MCA, no deficits), PE (12/2021, placed on xarelto after PE),  CAD, DCM, HFpEF (15%) s/p AICD, Pulmonary hypertension, chronic pain who presented to ED for evaluation of RLE pain, coolness, numbness and weakness. Pt states day of admission, she suddenly felt sharp pain and freezing cold in her right leg while standing outside her house. She has difficulty weight bearing on right leg from pain and weakness EMS called and brought to the hospital. Patient states she never had similar symptoms in the past. She denies chest pain, palpitation, dizziness, fever, chills, N/V/D/abdominal pain, dark stool or bleeding from other sites. She is able perform her ADLs but does have chronic pain preventing long ambulation. She reports compliant with her Xarelto and has stated never missed a dose in the last 2-3 months. She is a former smoker (1/2 PPD for 30 years).     ED course: afebrile and vitals stable. CTA abdomen pelvis with bilateral lower extemitiy runoff showed focal occlusion of the right popliteal artery with distal reconstitution and diminished runoff to the right foot. Patient placed on heparin drip consulted vascular and hematology.    Hematology consulted due to acute limb ischemia while on chronic anticoagulation with xarelto. Pt's pain, numbness, weakness has improved, but still slightly colder than L foot. Pt has seen Dr. Le in clinic 2 times and was placed on apixaban back in February of 2022, do not think patient started this and later switched to xarelto before July 2022. Pt reports compliance of with xarelto but multiple notes state she was discontinued from this for 3-4 months, not feeling her prescription in February 2023  for a few days, and based on our report compliance is estimated at 26%, but patient states she has filled it at multiple different pharmacies in the past. Relatively poor historian of  events    Family history: Dad MI, Mother stroke, no history of blood clots, sister had ovarian cancer.   Social: pt states has been able to get around and ambulate more in recent months, has not smoked tobacco since 2020, no marijuana ever, has not drank alcohol in recent history.   Cancer screening: colonoscopy not in past 10 years, mammogram [id number]

## 2023-04-28 NOTE — ASSESSMENT & PLAN NOTE
Patient's FSGs are uncontrolled due to hyperglycemia on current medication regimen.  Last A1c reviewed-   Lab Results   Component Value Date    HGBA1C 7.9 (H) 10/16/2022     Most recent fingerstick glucose reviewed- No results for input(s): POCTGLUCOSE in the last 24 hours.  Current correctional scale  Low  Maintain anti-hyperglycemic dose as follows-   Antihyperglycemics (From admission, onward)    Start     Stop Route Frequency Ordered    04/28/23 0202  insulin aspart U-100 pen 0-5 Units         -- SubQ Before meals & nightly PRN 04/28/23 0103        -patient takes trulicity (every Wednesday), metformin, glimepiride,  Jardiance at home   Hold Oral hypoglycemics while patient is in the hospital.

## 2023-04-28 NOTE — ASSESSMENT & PLAN NOTE
-CTA chest on 12/27/21 showed small R pulmonary artery embolism   -on chronic anticoagulation with xarelto

## 2023-04-28 NOTE — SUBJECTIVE & OBJECTIVE
(Not in a hospital admission)      Review of patient's allergies indicates:   Allergen Reactions    Adhesive Blisters    Captopril Other (See Comments)     COUGH       Past Medical History:   Diagnosis Date    Acute on chronic combined systolic and diastolic heart failure 2019    Anticoagulant long-term use     Anxiety     Arthritis     Asthma     Depression     H/O: hysterectomy     Hyperlipemia     Hypertension     Pulmonary edema     Schizophrenia      Past Surgical History:   Procedure Laterality Date    BLADDER SUSPENSION      CARPAL TUNNEL RELEASE Right     HEEL SPUR SURGERY Left     HYSTERECTOMY      LEFT HEART CATHETERIZATION Bilateral 2019    Procedure: Left heart cath;  Surgeon: Steve Chambers MD;  [facility];  Service: Cardiology;  Laterality: Bilateral;    RIGHT HEART CATHETERIZATION Right 2021    Procedure: INSERTION, CATHETER, RIGHT HEART;  Surgeon: Petr Naranjo MD;  Location: [facility] CATH LAB;  Service: Cardiology;  Laterality: Right;    RIGHT HEART CATHETERIZATION Right 2022    Procedure: INSERTION, CATHETER, RIGHT HEART;  Surgeon: Chandana Ivory Jr., MD;  Location: [facility] CATH LAB;  Service: Cardiology;  Laterality: Right;    TUBAL LIGATION       Family History       Problem Relation (Age of Onset)    No Known Problems Mother, Father          Tobacco Use    Smoking status: Former     Types: Cigarettes     Quit date: 2016     Years since quittin.6    Smokeless tobacco: Never    Tobacco comments:     nonex 2 weeks   Substance and Sexual Activity    Alcohol use: No     Alcohol/week: 0.0 standard drinks    Drug use: No    Sexual activity: Not on file     Review of Systems   Constitutional:  Negative for chills and fever.   Respiratory:  Negative for chest tightness and shortness of breath.    Cardiovascular:  Positive for palpitations. Negative for chest pain.   Musculoskeletal:  Positive for myalgias.   Skin:         Cool right lower leg    Neurological:  Positive for numbness.   Objective:     Vital Signs (Most Recent):  Temp: 97.9 °F (36.6 °C) (04/27/23 2340)  Pulse: 103 (04/27/23 2340)  Resp: 17 (04/27/23 2340)  BP: 129/88 (04/27/23 2340)  SpO2: 99 % (04/27/23 2340) Vital Signs (24h Range):  Temp:  [97.9 °F (36.6 °C)-98.8 °F (37.1 °C)] 97.9 °F (36.6 °C)  Pulse:  [] 103  Resp:  [16-17] 17  SpO2:  [99 %-100 %] 99 %  BP: (129-131)/(57-88) 129/88     Weight: 86.2 kg (190 lb)  Body mass index is 29.76 kg/m².    Physical Exam  Constitutional:       Appearance: Normal appearance.   HENT:      Head: Normocephalic and atraumatic.      Mouth/Throat:      Mouth: Mucous membranes are moist.   Cardiovascular:      Rate and Rhythm: Normal rate. Rhythm irregular.      Comments: Radial: left 2+, right nonpalpable  Femoral: 2+ bilateral  PT: left biphasic, right absent  DP: left 2+ palpable, right monophasic  Pulmonary:      Effort: Pulmonary effort is normal.      Breath sounds: Normal breath sounds.   Abdominal:      General: Abdomen is flat.      Palpations: Abdomen is soft.   Skin:     Comments: Right lower leg cool compared to left   Neurological:      General: No focal deficit present.      Mental Status: She is alert.      Sensory: No sensory deficit.      Motor: No weakness.       Significant Labs:  Recent Lab Results         04/27/23 2349 04/27/23 2037 04/27/23 2029        Albumin     3.4       Alkaline Phosphatase     142       ALT     13       Anion Gap     8       aPTT 27.6  Comment: aPTT therapeutic range = 39-69 seconds           AST     15       Baso #     0.04       Basophil %     0.5       BILIRUBIN TOTAL     0.4  Comment: For infants and newborns, interpretation of results should be based  on gestational age, weight and in agreement with clinical  observations.    Premature Infant recommended reference ranges:  Up to 24 hours.............<8.0 mg/dL  Up to 48 hours............<12.0 mg/dL  3-5 days..................<15.0 mg/dL  6-29  days.................<15.0 mg/dL         BUN     15       Calcium     9.3       Chloride     106       CO2     25       Creatinine     0.9       Differential Method     Automated       eGFR     >60.0       Eos #     0.2       Eosinophil %     2.9       Glucose     184       Gran # (ANC)     4.2       Gran %     54.0       Hematocrit     43.2       Hemoglobin     13.6       Hepatitis C Ab     Non-reactive       HIV 1/2 Ag/Ab     Non-reactive       Immature Grans (Abs)     0.01  Comment: Mild elevation in immature granulocytes is non specific and   can be seen in a variety of conditions including stress response,   acute inflammation, trauma and pregnancy. Correlation with other   laboratory and clinical findings is essential.         Immature Granulocytes     0.1       INR 1.0  Comment: Coumadin Therapy:  2.0 - 3.0 for INR for all indicators except mechanical heart valves  and antiphospholipid syndromes which should use 2.5 - 3.5.             Lymph #     2.9       Lymph %     36.7       MCH     29.1       MCHC     31.5       MCV     92       Mono #     0.5       Mono %     5.8       MPV     11.2       nRBC     0       Platelets     265       POC BUN   15         POC Chloride   104         POC Creatinine   0.8         POC Glucose   182         POC Hematocrit   43         POC Ionized Calcium   1.16         POC Potassium   3.8         POC Sodium   141         POC TCO2 (MEASURED)   25         Potassium     3.8       PROTEIN TOTAL     7.1       Protime 10.3           RBC     4.68       RDW     14.6       Sample   MARISSA         Sodium     139       WBC     7.81               Significant Diagnostics:  CTA: right short segment popliteal occlusion, reconstitution in AT and PT on delayed films, peroneal occluded. Left patent throughout, AT stenosis

## 2023-04-28 NOTE — ASSESSMENT & PLAN NOTE
Patient is identified as having Combined Systolic and Diastolic heart failure that is Chronic. CHF is currently controlled. Latest ECHO performed and demonstrates- Results for orders placed during the hospital encounter of 10/15/22    Echo    Interpretation Summary  · The left ventricle is severely enlarged with eccentric hypertrophy and severely decreased systolic function. The estimated ejection fraction is 15%.  · Normal right ventricular size with normal right ventricular systolic function.  · Grade II left ventricular diastolic dysfunction.  · Mild left atrial enlargement.  · The estimated PA systolic pressure is 55 mmHg.  · Normal central venous pressure (3 mmHg).  . Continue Beta Blocker, Aldactone and ARNI and monitor clinical status closely. Monitor on telemetry. Patient is on CHF pathway.  Monitor strict Is&Os and daily weights.  Place on fluid restriction of 1.5 L. Continue to stress to patient importance of self efficacy and  on diet for CHF. Last BNP reviewed- and noted below No results for input(s): BNP, BNPTRIAGEBLO in the last 168 hours..

## 2023-04-28 NOTE — SUBJECTIVE & OBJECTIVE
Oncology Treatment Plan:   [No matching plan found]    Medications:  Continuous Infusions:   heparin (porcine) in D5W Stopped (04/28/23 0949)     Scheduled Meds:   atorvastatin  80 mg Oral Daily    metoprolol succinate  100 mg Oral BID    polyethylene glycol  17 g Oral Daily    sacubitriL-valsartan  1 tablet Oral BID    spironolactone  25 mg Oral Daily    torsemide  10 mg Oral Daily     PRN Meds:acetaminophen, albuterol-ipratropium, dextrose 10%, dextrose 10%, dextrose, dextrose, glucagon (human recombinant), heparin (PORCINE), heparin (PORCINE), insulin aspart U-100, melatonin, naloxone, ondansetron, senna-docusate 8.6-50 mg, simethicone, sodium chloride 0.9%, traMADoL     Review of patient's allergies indicates:   Allergen Reactions    Adhesive Blisters    Captopril Other (See Comments)     COUGH        Past Medical History:   Diagnosis Date    Acute on chronic combined systolic and diastolic heart failure 12/26/2019    Anticoagulant long-term use     Anxiety     Arthritis     Asthma     Depression     H/O: hysterectomy     Hyperlipemia     Hypertension     Pulmonary edema     Schizophrenia      Past Surgical History:   Procedure Laterality Date    BLADDER SUSPENSION      CARPAL TUNNEL RELEASE Right     HEEL SPUR SURGERY Left     HYSTERECTOMY      LEFT HEART CATHETERIZATION Bilateral 12/27/2019    Procedure: Left heart cath;  Surgeon: Steve Chambers MD;  Location: [facility] CATH LAB;  Service: Cardiology;  Laterality: Bilateral;    RIGHT HEART CATHETERIZATION Right 7/26/2021    Procedure: INSERTION, CATHETER, RIGHT HEART;  Surgeon: Petr Naranjo MD;  Location: [facility] CATH LAB;  Service: Cardiology;  Laterality: Right;    RIGHT HEART CATHETERIZATION Right 5/12/2022    Procedure: INSERTION, CATHETER, RIGHT HEART;  Surgeon: Chandana Ivory Jr., MD;  Location: [facility] CATH LAB;  Service: Cardiology;  Laterality: Right;    TUBAL LIGATION       Family History       Problem Relation (Age of Onset)    No Known  Problems Mother, Father          Tobacco Use    Smoking status: Former     Types: Cigarettes     Quit date: 2016     Years since quittin.6    Smokeless tobacco: Never    Tobacco comments:     nonex 2 weeks   Substance and Sexual Activity    Alcohol use: No     Alcohol/week: 0.0 standard drinks    Drug use: No    Sexual activity: Not on file       Review of Systems   Constitutional:  Negative for activity change, chills, fever and unexpected weight change.   HENT:  Negative for congestion and sore throat.    Eyes:  Negative for photophobia and visual disturbance.   Respiratory:  Positive for shortness of breath. Negative for cough.    Cardiovascular:  Negative for chest pain and leg swelling.   Gastrointestinal:  Negative for abdominal pain, diarrhea, nausea and vomiting.   Genitourinary:  Negative for difficulty urinating and dyspareunia.   Musculoskeletal:  Positive for back pain and gait problem.   Skin:  Negative for rash and wound.   Neurological:  Negative for dizziness, weakness, light-headedness and headaches.   Psychiatric/Behavioral:  Negative for confusion and decreased concentration.    Objective:     Vital Signs (Most Recent):  Temp: 98.1 °F (36.7 °C) (23 0737)  Pulse: 94 (23 1103)  Resp: 20 (23 0737)  BP: 106/60 (23 1103)  SpO2: 97 % (23 0737) Vital Signs (24h Range):  Temp:  [97.9 °F (36.6 °C)-98.8 °F (37.1 °C)] 98.1 °F (36.7 °C)  Pulse:  [] 94  Resp:  [16-20] 20  SpO2:  [97 %-100 %] 97 %  BP: (106-136)/(57-94) 106/60     Weight: 85.7 kg (189 lb)  Body mass index is 29.6 kg/m².  Body surface area is 2.01 meters squared.      Intake/Output Summary (Last 24 hours) at 2023 1131  Last data filed at 2023 1058  Gross per 24 hour   Intake --   Output 650 ml   Net -650 ml       Physical Exam  Constitutional:       Appearance: Normal appearance. She is not ill-appearing.   HENT:      Head: Normocephalic and atraumatic.      Nose: No congestion or  rhinorrhea.      Mouth/Throat:      Mouth: Mucous membranes are moist.      Pharynx: Oropharynx is clear.   Eyes:      General: No scleral icterus.  Cardiovascular:      Rate and Rhythm: Normal rate and regular rhythm.      Pulses:           Dorsalis pedis pulses are 1+ on the right side.        Posterior tibial pulses are 1+ on the right side.      Comments: Hard to detect on PT pulse  Pulmonary:      Effort: Pulmonary effort is normal. No respiratory distress.   Abdominal:      General: Abdomen is flat. There is no distension.      Palpations: Abdomen is soft.      Tenderness: There is no abdominal tenderness.   Musculoskeletal:      Right lower leg: No edema.      Left lower leg: No edema.   Skin:     Findings: No lesion or rash.      Comments: RLE foot is cool compared to LLE   Neurological:      General: No focal deficit present.      Mental Status: She is alert and oriented to person, place, and time.   Psychiatric:         Mood and Affect: Mood normal.         Behavior: Behavior normal.       Significant Labs:   CBC:   Recent Labs   Lab 04/27/23 2029 04/27/23 2037 04/28/23  0759   WBC 7.81  --  6.97   HGB 13.6  --  12.3   HCT 43.2 43 39.1     --  247    and Coagulation:   Recent Labs   Lab 04/27/23  2349 04/28/23  0759   INR 1.0  --    APTT 27.6 90.5*       Diagnostic Results:  I have reviewed all pertinent imaging results/findings within the past 24 hours.

## 2023-04-28 NOTE — ASSESSMENT & PLAN NOTE
-LHC in 2019 showed non obstructive CAD  -denies chest pain or SOB    -continue home beta blocker and statin

## 2023-04-28 NOTE — CONSULTS
[facility] - Emergency Dept  Vascular Surgery  Consult Note    Inpatient consult to Vascular Surgery  Consult performed by: Omar Thompson MD  Consult ordered by: Zac Zhou MD        Subjective:     Chief Complaint/Reason for Admission: Critical limb ischemia    History of Present Illness: Diomedes Mohr is a 62 y.o. woman with history of HTN, DMII, stroke (2020, R MCA, no deficits), CHF, dilated cardiomyopathy, arrhythmia, LVEF 15% as of 10/2022, HLD, PE on Xarelto who presents with right leg pain from knee down, numbness and weakness. The symptoms started at 5 pm, lasted approximately 2 hours and at time of interview consisted only of cold sensation compared to the contralateral leg. This has not happened to her before, she is able to ambulate however states she has crampy pain in her right thigh after 50 feet that improves with rest. CTA with runoff concerning for right popliteal short segment occlusion with reconstitution of PT and AT on delayed images.     MI: no  Stroke: Yes    Tobacco: quit 2020, 30 years 1/2 ppd.         (Not in a hospital admission)      Review of patient's allergies indicates:   Allergen Reactions    Adhesive Blisters    Captopril Other (See Comments)     COUGH       Past Medical History:   Diagnosis Date    Acute on chronic combined systolic and diastolic heart failure 12/26/2019    Anticoagulant long-term use     Anxiety     Arthritis     Asthma     Depression     H/O: hysterectomy     Hyperlipemia     Hypertension     Pulmonary edema     Schizophrenia      Past Surgical History:   Procedure Laterality Date    BLADDER SUSPENSION      CARPAL TUNNEL RELEASE Right     HEEL SPUR SURGERY Left     HYSTERECTOMY      LEFT HEART CATHETERIZATION Bilateral 12/27/2019    Procedure: Left heart cath;  Surgeon: Steve Chambers MD;  Location: [facility] CATH LAB;  Service: Cardiology;  Laterality: Bilateral;    RIGHT HEART CATHETERIZATION Right 7/26/2021    Procedure: INSERTION, CATHETER,  RIGHT HEART;  Surgeon: Petr Naranjo MD;  Location: [facility] CATH LAB;  Service: Cardiology;  Laterality: Right;    RIGHT HEART CATHETERIZATION Right 2022    Procedure: INSERTION, CATHETER, RIGHT HEART;  Surgeon: Chandana Ivory Jr., MD;  Location: [facility] CATH LAB;  Service: Cardiology;  Laterality: Right;    TUBAL LIGATION       Family History       Problem Relation (Age of Onset)    No Known Problems Mother, Father          Tobacco Use    Smoking status: Former     Types: Cigarettes     Quit date: 2016     Years since quittin.6    Smokeless tobacco: Never    Tobacco comments:     nonex 2 weeks   Substance and Sexual Activity    Alcohol use: No     Alcohol/week: 0.0 standard drinks    Drug use: No    Sexual activity: Not on file     Review of Systems   Constitutional:  Negative for chills and fever.   Respiratory:  Negative for chest tightness and shortness of breath.    Cardiovascular:  Positive for palpitations. Negative for chest pain.   Musculoskeletal:  Positive for myalgias.   Skin:         Cool right lower leg   Neurological:  Positive for numbness.   Objective:     Vital Signs (Most Recent):  Temp: 97.9 °F (36.6 °C) (23 2340)  Pulse: 103 (23 2340)  Resp: 17 (23 234)  BP: 129/88 (23 2340)  SpO2: 99 % (23) Vital Signs (24h Range):  Temp:  [97.9 °F (36.6 °C)-98.8 °F (37.1 °C)] 97.9 °F (36.6 °C)  Pulse:  [] 103  Resp:  [16-17] 17  SpO2:  [99 %-100 %] 99 %  BP: (129-131)/(57-88) 129/88     Weight: 86.2 kg (190 lb)  Body mass index is 29.76 kg/m².    Physical Exam  Constitutional:       Appearance: Normal appearance.   HENT:      Head: Normocephalic and atraumatic.      Mouth/Throat:      Mouth: Mucous membranes are moist.   Cardiovascular:      Rate and Rhythm: Normal rate. Rhythm irregular.      Comments: Radial: left 2+, right nonpalpable  Femoral: 2+ bilateral  PT: left biphasic, right absent  DP: left 2+ palpable, right monophasic  Pulmonary:       Effort: Pulmonary effort is normal.      Breath sounds: Normal breath sounds.   Abdominal:      General: Abdomen is flat.      Palpations: Abdomen is soft.   Skin:     Comments: Right lower leg cool compared to left   Neurological:      General: No focal deficit present.      Mental Status: She is alert.      Sensory: No sensory deficit.      Motor: No weakness.       Significant Labs:  Recent Lab Results         04/27/23  2349   04/27/23 2037   04/27/23 2029        Albumin     3.4       Alkaline Phosphatase     142       ALT     13       Anion Gap     8       aPTT 27.6  Comment: aPTT therapeutic range = 39-69 seconds           AST     15       Baso #     0.04       Basophil %     0.5       BILIRUBIN TOTAL     0.4  Comment: For infants and newborns, interpretation of results should be based  on gestational age, weight and in agreement with clinical  observations.    Premature Infant recommended reference ranges:  Up to 24 hours.............<8.0 mg/dL  Up to 48 hours............<12.0 mg/dL  3-5 days..................<15.0 mg/dL  6-29 days.................<15.0 mg/dL         BUN     15       Calcium     9.3       Chloride     106       CO2     25       Creatinine     0.9       Differential Method     Automated       eGFR     >60.0       Eos #     0.2       Eosinophil %     2.9       Glucose     184       Gran # (ANC)     4.2       Gran %     54.0       Hematocrit     43.2       Hemoglobin     13.6       Hepatitis C Ab     Non-reactive       HIV 1/2 Ag/Ab     Non-reactive       Immature Grans (Abs)     0.01  Comment: Mild elevation in immature granulocytes is non specific and   can be seen in a variety of conditions including stress response,   acute inflammation, trauma and pregnancy. Correlation with other   laboratory and clinical findings is essential.         Immature Granulocytes     0.1       INR 1.0  Comment: Coumadin Therapy:  2.0 - 3.0 for INR for all indicators except mechanical heart valves  and  antiphospholipid syndromes which should use 2.5 - 3.5.             Lymph #     2.9       Lymph %     36.7       MCH     29.1       MCHC     31.5       MCV     92       Mono #     0.5       Mono %     5.8       MPV     11.2       nRBC     0       Platelets     265       POC BUN   15         POC Chloride   104         POC Creatinine   0.8         POC Glucose   182         POC Hematocrit   43         POC Ionized Calcium   1.16         POC Potassium   3.8         POC Sodium   141         POC TCO2 (MEASURED)   25         Potassium     3.8       PROTEIN TOTAL     7.1       Protime 10.3           RBC     4.68       RDW     14.6       Sample   MARISSA         Sodium     139       WBC     7.81               Significant Diagnostics:  CTA: right short segment popliteal occlusion, reconstitution in AT and PT on delayed films, peroneal occluded. Left patent throughout, AT stenosis    Assessment/Plan:     Critical lower limb ischemia  Diomedes Mohr is a 62 y.o. woman with history of HTN, DMII, stroke (2020, R MCA, no deficits), CHF, dilated cardiomyopathy, arrhythmia, LVEF 15% as of 10/2022, HLD, PE on Xarelto who presents with right leg pain from knee down, numbness and weakness.    Symptoms have resolved and a strong monophasic signal is present in the DP on the right. Patient has failed Xarelto given likely embolic nature of this new popliteal occlusion.    Recommendations:  Fully anticoagulate with heparin  Medicine evaluation for admission  q4 neurovascular exams  Hydrate as able given low EF  Will need to reevaluate anticoagulation Rx, hematology oncology recs appreciated    Vascular surgery will follow along, please notify our team if there is a change in doppler exam or reemergence of presenting symptoms        Thank you for your consult. I will follow-up with patient. Please contact us if you have any additional questions.    JUAN FRANCISCO WRIGHT MD  Vascular Surgery  [facility] - Emergency Dept    Vascular Attending  Agree  with above  63 yo woman with EF 10-15% with very limited functional capacity -- she has evidence of an acute/sub-acute R bk-popliteal embolus. Likely cardiogenic; on exam, has an irregular pulse  She has no rest pain in the R foot; she has full motor and sensory intact; prior R foot pain resolved in ER and now in the hospital  CTA runoff shows in delayed images 3v R tibial runoff  She has a biphasic R DP doppler signal  Check 12 EKG, echo  This embolus happened on xarelto  Cont iv heparin  Consider another po anticoagulation  Given her minimal symptoms and debilitated cardiac disease with severely depressed EF 10-15%, treat without embolectomy and anticoagulation    Nilay Alvares MD DFSVS FACS   Vascular/Endovascular Surgery    I have seen the patient and reviewed the resident's/fellow's note. I have also personally interviewed and examined the patient at bedside and agree with the findings.  Time spent personally reviewing the patient's chart, interpreting images and test, and conferring with physicians was 70 mins.

## 2023-04-28 NOTE — ED NOTES
Diomedes Mohr, an 62 y.o. female presents to the ED c/o pain from R hip that radiates down to R foot. Pt ambulates without difficulty. Deniesbowel/bladder incontinence. Denies falls/      Review of patient's allergies indicates:   Allergen Reactions    Adhesive Blisters    Captopril Other (See Comments)     COUGH     Chief Complaint   Patient presents with    Leg Pain     Pain from r hip to r foot and tingling, denies bowel/bladder incontinence      Past Medical History:   Diagnosis Date    Acute on chronic combined systolic and diastolic heart failure 12/26/2019    Anticoagulant long-term use     Anxiety     Arthritis     Asthma     Depression     H/O: hysterectomy     Hyperlipemia     Hypertension     Pulmonary edema     Schizophrenia

## 2023-04-28 NOTE — H&P
Dmitriy Triana - Emergency Dept  [facility] Medicine  History & Physical    Patient Name: Diomedes Mohr  MRN: [id number]  Patient Class: IP- Inpatient  Admission Date: 4/27/2023  Attending Physician: Kallie Tristan MD   Primary Care Provider: Fernando Manzo MD         Patient information was obtained from patient and ER records.     Subjective:     Principal Problem:Critical lower limb ischemia    Chief Complaint:   Chief Complaint   Patient presents with    Leg Pain     Pain from r hip to r foot and tingling, denies bowel/bladder incontinence         HPI:  Diomedes Mohr is a 62 y.o. female with PMH of HTN, DM2, hyperlipidemia, tobacco use (quit in 2020), stroke (2020, R MCA, no deficits), PE (December 2021, on xarelto),  CAD, DCM, HFpEF (15%) s/p AICD, Pulmonary hypertension, osteoarthritis of bilateral hips (L>R) with chronic pain who presented to ED for evaluation of right leg pain from knee down, numbness and weakness. Patient states around 5 pm earlier today she suddenly felt sharp pain and freezing cold in her right leg while standing outside her house and talking to her daughter on the phone. She has difficulty weight bearing on right leg due to pain and weakness. Patient got concerned about having a stroke and daughter called EMS. She also reports associated numbness and tingling on her right foot. Patient states she never had similar symptoms in the past and she felt fine when she saw her diabetes doctor in clinic earlier this morning. She denies chest pain, palpitation, dizziness, sob, fever, chills, N/V/D/abdominal pain, dark stool or bleeding from other sites. She is unable to walk long distance due to chronic hip pain. She reports compliant with her medications including Xarelto. She is a former smoker (1/2 PPD for 30 years and quit in 2020).     ED course: afebrile and vitals stable. CTA abdomen pelvis with bilateral lower extemitiy runoff showed focal occlusion of the right popliteal artery with distal  reconstitution and diminished runoff to the right foot. Patient started on heparin infusion per vascular surgery recommendation. During my interview patient is awake, conversant and not in distress. She reports feeling better as pain, numbness and weakness on her R leg mostly resolved.     Echo (10/16/22):   The left ventricle is severely enlarged with eccentric hypertrophy and severely decreased systolic function. The estimated ejection fraction is 15%.   Normal right ventricular size with normal right ventricular systolic function.   Grade II left ventricular diastolic dysfunction.   Mild left atrial enlargement.   The estimated PA systolic pressure is 55 mmHg.   Normal central venous pressure (3 mmHg).         Past Medical History:   Diagnosis Date    Acute on chronic combined systolic and diastolic heart failure 12/26/2019    Anticoagulant long-term use     Anxiety     Arthritis     Asthma     Depression     H/O: hysterectomy     Hyperlipemia     Hypertension     Pulmonary edema     Schizophrenia        Past Surgical History:   Procedure Laterality Date    BLADDER SUSPENSION      CARPAL TUNNEL RELEASE Right     HEEL SPUR SURGERY Left     HYSTERECTOMY      LEFT HEART CATHETERIZATION Bilateral 12/27/2019    Procedure: Left heart cath;  Surgeon: Steve Chambers MD;  Location: [facility] CATH LAB;  Service: Cardiology;  Laterality: Bilateral;    RIGHT HEART CATHETERIZATION Right 7/26/2021    Procedure: INSERTION, CATHETER, RIGHT HEART;  Surgeon: Petr Naranjo MD;  Location: [facility] CATH LAB;  Service: Cardiology;  Laterality: Right;    RIGHT HEART CATHETERIZATION Right 5/12/2022    Procedure: INSERTION, CATHETER, RIGHT HEART;  Surgeon: Chandana Ivory Jr., MD;  Location: [facility] CATH LAB;  Service: Cardiology;  Laterality: Right;    TUBAL LIGATION         Review of patient's allergies indicates:   Allergen Reactions    Adhesive Blisters    Captopril Other (See Comments)     COUGH        No current facility-administered medications on file prior to encounter.     Current Outpatient Medications on File Prior to Encounter   Medication Sig    acetaminophen (TYLENOL) 325 MG tablet Take 2 tablets (650 mg total) by mouth every 8 (eight) hours as needed.    albuterol (PROVENTIL) 2.5 mg /3 mL (0.083 %) nebulizer solution Take 3 mLs (2.5 mg total) by nebulization every 6 (six) hours as needed for Wheezing or Shortness of Breath. Rescue    albuterol (PROVENTIL/VENTOLIN HFA) 90 mcg/actuation inhaler Inhale 2 puffs into the lungs every 6 (six) hours as needed for Wheezing or Shortness of Breath.    atorvastatin (LIPITOR) 80 MG tablet Take 1 tablet (80 mg total) by mouth once daily.    dulaglutide (TRULICITY) 1.5 mg/0.5 mL pen injector Inject 1.5 mg into the skin once a week.    empagliflozin (JARDIANCE) 10 mg tablet Take 1 tablet (10 mg total) by mouth once daily.    glimepiride (AMARYL) 4 MG tablet Take 4 mg by mouth 2 (two) times a day.    ipratropium-albuteroL (COMBIVENT)  mcg/actuation inhaler Inhale 1 puff into the lungs 4 (four) times daily. Rescue    metFORMIN (GLUCOPHAGE) 1000 MG tablet Take 1,000 mg by mouth 2 (two) times daily.     metoprolol succinate (TOPROL-XL) 100 MG 24 hr tablet Take 1 tablet (100 mg total) by mouth 2 (two) times daily.    rivaroxaban (XARELTO) 20 mg Tab Take 1 tablet (20 mg total) by mouth daily with dinner or evening meal.    sacubitriL-valsartan (ENTRESTO)  mg per tablet Take 1 tablet by mouth 2 (two) times daily.    spironolactone (ALDACTONE) 25 MG tablet Take 1 tablet (25 mg total) by mouth once daily.    torsemide (DEMADEX) 10 MG Tab Take 1 tablet (10 mg total) by mouth once daily.    traMADoL (ULTRAM) 50 mg tablet Take 100 mg by mouth every 12 (twelve) hours as needed for Pain.     Family History       Problem Relation (Age of Onset)    No Known Problems Mother, Father          Tobacco Use    Smoking status: Former     Types: Cigarettes      Quit date: 2016     Years since quittin.6    Smokeless tobacco: Never    Tobacco comments:     nonex 2 weeks   Substance and Sexual Activity    Alcohol use: No     Alcohol/week: 0.0 standard drinks    Drug use: No    Sexual activity: Not on file     Review of Systems   Constitutional:  Negative for activity change, appetite change, chills, diaphoresis, fatigue and fever.   HENT:  Negative for congestion, dental problem, drooling, ear discharge, ear pain, facial swelling, hearing loss, mouth sores, nosebleeds, postnasal drip, rhinorrhea, sinus pressure, sneezing, sore throat, tinnitus, trouble swallowing and voice change.    Eyes:  Negative for photophobia, pain, discharge, redness, itching and visual disturbance.   Respiratory:  Negative for apnea, cough, choking, chest tightness, shortness of breath, wheezing and stridor.    Cardiovascular:  Negative for chest pain, palpitations and leg swelling.   Gastrointestinal:  Negative for abdominal distention, abdominal pain, anal bleeding, blood in stool, constipation, diarrhea, nausea, rectal pain and vomiting.   Endocrine: Negative for cold intolerance, heat intolerance, polydipsia, polyphagia and polyuria.   Genitourinary:  Negative for decreased urine volume, difficulty urinating, dyspareunia, dysuria, enuresis, flank pain, frequency, genital sores, hematuria, menstrual problem, pelvic pain, urgency, vaginal bleeding, vaginal discharge and vaginal pain.   Musculoskeletal:  Negative for arthralgias, back pain, gait problem, joint swelling, myalgias, neck pain and neck stiffness.        Chronic hip pain bilateral due to OA (L>R)   Skin:  Negative for color change, pallor, rash and wound.   Allergic/Immunologic: Negative for environmental allergies, food allergies and immunocompromised state.   Neurological:  Positive for weakness and numbness. Negative for dizziness, tremors, seizures, syncope, facial asymmetry, speech difficulty, light-headedness and  headaches.        Sudden onset pain, numbness, weakness, tingling and cold sensation of R leg    Hematological:  Negative for adenopathy. Does not bruise/bleed easily.   Psychiatric/Behavioral:  Negative for agitation, behavioral problems, confusion, decreased concentration, dysphoric mood, hallucinations, self-injury, sleep disturbance and suicidal ideas. The patient is not nervous/anxious and is not hyperactive.    Objective:     Vital Signs (Most Recent):  Temp: 98.8 °F (37.1 °C) (04/27/23 1842)  Pulse: 87 (04/27/23 1842)  Resp: 16 (04/27/23 1842)  BP: (!) 131/57 (04/27/23 1842)  SpO2: 100 % (04/27/23 1842)   Vital Signs (24h Range):  Temp:  [98.8 °F (37.1 °C)] 98.8 °F (37.1 °C)  Pulse:  [87] 87  Resp:  [16] 16  SpO2:  [100 %] 100 %  BP: (131)/(57) 131/57     Weight: 86.2 kg (190 lb)  Body mass index is 29.76 kg/m².    Physical Exam  Constitutional:       General: She is not in acute distress.     Appearance: She is well-developed. She is not diaphoretic.   HENT:      Head: Normocephalic and atraumatic.      Right Ear: External ear normal.      Left Ear: External ear normal.      Nose: Nose normal.      Mouth/Throat:      Pharynx: No oropharyngeal exudate.   Eyes:      General: No scleral icterus.     Conjunctiva/sclera: Conjunctivae normal.      Pupils: Pupils are equal, round, and reactive to light.   Neck:      Thyroid: No thyromegaly.      Vascular: No JVD.      Trachea: No tracheal deviation.   Cardiovascular:      Rate and Rhythm: Normal rate and regular rhythm.      Pulses:           Dorsalis pedis pulses are 2+ on the right side and 2+ on the left side.        Posterior tibial pulses are 0 on the right side and 2+ on the left side.      Heart sounds: Normal heart sounds. No murmur heard.    No gallop.   Pulmonary:      Effort: Pulmonary effort is normal. No respiratory distress.      Breath sounds: Normal breath sounds. No wheezing or rales.   Chest:      Chest wall: No tenderness.   Abdominal:       General: Bowel sounds are normal. There is no distension.      Palpations: Abdomen is soft. There is no mass.      Tenderness: There is no abdominal tenderness. There is no guarding or rebound.   Genitourinary:     Vagina: No vaginal discharge.   Musculoskeletal:         General: No tenderness.      Cervical back: Neck supple.   Lymphadenopathy:      Cervical: No cervical adenopathy.   Skin:     General: Skin is warm and dry.      Coloration: Skin is not pale.      Findings: No erythema or rash.      Comments: R leg and foot cool to touch compared to left    Neurological:      Mental Status: She is alert and oriented to person, place, and time.      Cranial Nerves: No cranial nerve deficit.      Motor: No abnormal muscle tone.      Coordination: Coordination normal.      Deep Tendon Reflexes: Reflexes are normal and symmetric. Reflexes normal.      Comments: Mild tremors of upper extremities    Psychiatric:         Behavior: Behavior normal.         Thought Content: Thought content normal.         Judgment: Judgment normal.      Comments: Flat affect          CRANIAL NERVES     CN III, IV, VI   Pupils are equal, round, and reactive to light.     Significant Labs: All pertinent labs within the past 24 hours have been reviewed.  Recent Lab Results         04/27/23 2037 04/27/23 2029        Albumin   3.4       Alkaline Phosphatase   142       ALT   13       Anion Gap   8       AST   15       Baso #   0.04       Basophil %   0.5       BILIRUBIN TOTAL   0.4  Comment: For infants and newborns, interpretation of results should be based  on gestational age, weight and in agreement with clinical  observations.    Premature Infant recommended reference ranges:  Up to 24 hours.............<8.0 mg/dL  Up to 48 hours............<12.0 mg/dL  3-5 days..................<15.0 mg/dL  6-29 days.................<15.0 mg/dL         BUN   15       Calcium   9.3       Chloride   106       CO2   25       Creatinine   0.9        Differential Method   Automated       eGFR   >60.0       Eos #   0.2       Eosinophil %   2.9       Glucose   184       Gran # (ANC)   4.2       Gran %   54.0       Hematocrit   43.2       Hemoglobin   13.6       Hepatitis C Ab   Non-reactive       HIV 1/2 Ag/Ab   Non-reactive       Immature Grans (Abs)   0.01  Comment: Mild elevation in immature granulocytes is non specific and   can be seen in a variety of conditions including stress response,   acute inflammation, trauma and pregnancy. Correlation with other   laboratory and clinical findings is essential.         Immature Granulocytes   0.1       Lymph #   2.9       Lymph %   36.7       MCH   29.1       MCHC   31.5       MCV   92       Mono #   0.5       Mono %   5.8       MPV   11.2       nRBC   0       Platelets   265       POC BUN 15         POC Chloride 104         POC Creatinine 0.8         POC Glucose 182         POC Hematocrit 43         POC Ionized Calcium 1.16         POC Potassium 3.8         POC Sodium 141         POC TCO2 (MEASURED) 25         Potassium   3.8       PROTEIN TOTAL   7.1       RBC   4.68       RDW   14.6       Sample MARISSA         Sodium   139       WBC   7.81               Significant Imaging: I have reviewed all pertinent imaging results/findings within the past 24 hours.    Assessment/Plan:     * Critical lower limb ischemia  -patient with multiple CV risk factors presented to ED with sudden onset R leg (below knee ) pain, cold sensation, weakness, numbness and tingling   -CTA abdomen pelvis with bilateral lower extremity runoff showed focal occlusion of the right popliteal artery   -symptoms (pain, numbness, weakness, tingling) except cold sensation mostly resolved after 2 hours of onset   -vascular surgery consult noted with concern for embolic etiology and failure of xarelto   -continue heparin infusion per vascular surgery recommendation   -neurovascular check q 4 hours  -check EKG and continue telemetry monitoring   -check echo  with doppler  -gentle IVF hydration (NS @ 75 cc/hr x 10 hours) with caution given h/o HFrEF  -consult hematology for further evaluation as patient failed xarelto   -vascular surgery will follow along       Chronic combined systolic and diastolic congestive heart failure  Patient is identified as having Combined Systolic and Diastolic heart failure that is Chronic. CHF is currently controlled. Latest ECHO performed and demonstrates- Results for orders placed during the hospital encounter of 10/15/22    Echo    Interpretation Summary  · The left ventricle is severely enlarged with eccentric hypertrophy and severely decreased systolic function. The estimated ejection fraction is 15%.  · Normal right ventricular size with normal right ventricular systolic function.  · Grade II left ventricular diastolic dysfunction.  · Mild left atrial enlargement.  · The estimated PA systolic pressure is 55 mmHg.  · Normal central venous pressure (3 mmHg).  . Continue Beta Blocker, Aldactone and ARNI and monitor clinical status closely. Monitor on telemetry. Patient is on CHF pathway.  Monitor strict Is&Os and daily weights.  Place on fluid restriction of 1.5 L. Continue to stress to patient importance of self efficacy and  on diet for CHF. Last BNP reviewed- and noted below No results for input(s): BNP, BNPTRIAGEBLO in the last 168 hours..      Dilated cardiomyopathy  -last echo showed EF 15%  -[facility] in 2019 showed non obstructive CAD   -continue GDMT (beta blocker, entresto, spironolactone)    Coronary artery disease involving native heart  -[facility] in 2019 showed non obstructive CAD  -denies chest pain or SOB    -continue home beta blocker and statin       Cerebrovascular accident (CVA) due to occlusion of right middle cerebral artery  -h/o CVA in 2020 due to R MCA occlusion   -no residual deficit   -on long term xarelto   -continue statin       History of pulmonary embolism  -CTA chest on 12/27/21 showed small R pulmonary artery  embolism   -on chronic anticoagulation with xarelto        Pulmonary hypertension due to left heart disease  -currently well compensated   -continue home diuretic torsemide   -strict I/Os, daily weight       Hypertension  -chronic and controlled   -continue home metoprolol and entresto       Type 2 diabetes mellitus without complication, without long-term current use of insulin  Patient's FSGs are uncontrolled due to hyperglycemia on current medication regimen.  Last A1c reviewed-   Lab Results   Component Value Date    HGBA1C 7.9 (H) 10/16/2022     Most recent fingerstick glucose reviewed- No results for input(s): POCTGLUCOSE in the last 24 hours.  Current correctional scale  Low  Maintain anti-hyperglycemic dose as follows-   Antihyperglycemics (From admission, onward)    Start     Stop Route Frequency Ordered    04/28/23 0202  insulin aspart U-100 pen 0-5 Units         -- SubQ Before meals & nightly PRN 04/28/23 0103        -patient takes trulicity (every Wednesday), metformin, glimepiride,  Jardiance at home   Hold Oral hypoglycemics while patient is in the hospital.    Hyperlipemia  -continue home statin       ICD (implantable cardioverter-defibrillator) in place  -s/p AICD in 2021 for HFrEF       Bilateral primary osteoarthritis of hip  -bilateral chronic hip pain (L>R)  -limited walking capacity   -denies recent fall or new injury   -continue home tylenol and tramadol prn pain       VTE Risk Mitigation (From admission, onward)         Ordered     IP VTE HIGH RISK PATIENT  Once         04/28/23 0103     heparin 25,000 units in dextrose 5% (100 units/ml) IV bolus from bag - ADDITIONAL PRN BOLUS - 60 units/kg  As needed (PRN)        Question:  Heparin Infusion Adjustment (DO NOT MODIFY ANSWER)  Answer:  [email or web address]\epic\Images\Pharmacy\HeparinInfusions\heparin HIGH INTENSITY nomogram for [facility] [id number]    04/27/23 1406     heparin 25,000 units in dextrose 5% (100 units/ml) IV bolus from bag - ADDITIONAL PRN  BOLUS - 30 units/kg  As needed (PRN)        Question:  Heparin Infusion Adjustment (DO NOT MODIFY ANSWER)  Answer:  [email or web address]\epic\Images\Pharmacy\HeparinInfusions\heparin HIGH INTENSITY nomogram for [facility] [id number]    04/27/23 2324     heparin 25,000 units in dextrose 5% 250 mL (100 units/mL) infusion HIGH INTENSITY nomogram - [facility]  Continuous        Question Answer Comment   Heparin Infusion Adjustment (DO NOT MODIFY ANSWER) [email or web address]\epic\Images\Pharmacy\HeparinInfusions\heparin HIGH INTENSITY nomogram for [facility] [id number]    Begin at (in units/kg/hr) 18        04/27/23 2324                   Ginger Mendez DO  Department of Hospital Medicine  [facility] - Emergency Dept

## 2023-04-29 ENCOUNTER — ANESTHESIA (OUTPATIENT)
Dept: INTERVENTIONAL RADIOLOGY/VASCULAR | Facility: HOSPITAL | Age: 63
DRG: 252 | End: 2023-04-29
Payer: MEDICARE

## 2023-04-29 PROBLEM — Z79.01 LONG TERM CURRENT USE OF ANTICOAGULANT: Status: ACTIVE | Noted: 2023-04-29

## 2023-04-29 PROBLEM — I63.412 CEREBROVASCULAR ACCIDENT (CVA) DUE TO EMBOLISM OF LEFT MIDDLE CEREBRAL ARTERY: Status: ACTIVE | Noted: 2023-04-29

## 2023-04-29 PROBLEM — R47.01 GLOBAL APHASIA: Status: ACTIVE | Noted: 2023-04-29

## 2023-04-29 PROBLEM — I82.409 ACUTE DEEP VEIN THROMBOSIS (DVT): Status: ACTIVE | Noted: 2023-04-29

## 2023-04-29 PROBLEM — I63.511 CEREBROVASCULAR ACCIDENT (CVA) DUE TO OCCLUSION OF RIGHT MIDDLE CEREBRAL ARTERY: Status: RESOLVED | Noted: 2020-08-15 | Resolved: 2023-04-29

## 2023-04-29 PROBLEM — R53.1 RIGHT SIDED WEAKNESS: Status: ACTIVE | Noted: 2023-04-29

## 2023-04-29 LAB
ALBUMIN SERPL BCP-MCNC: 3.1 G/DL (ref 3.5–5.2)
ALP SERPL-CCNC: 137 U/L (ref 55–135)
ALT SERPL W/O P-5'-P-CCNC: 13 U/L (ref 10–44)
ANION GAP SERPL CALC-SCNC: 11 MMOL/L (ref 8–16)
ANION GAP SERPL CALC-SCNC: 9 MMOL/L (ref 8–16)
APTT PPP: 50.8 SEC (ref 21–32)
APTT PPP: 62.4 SEC (ref 21–32)
AST SERPL-CCNC: 15 U/L (ref 10–40)
BASOPHILS # BLD AUTO: 0.03 K/UL (ref 0–0.2)
BASOPHILS # BLD AUTO: 0.04 K/UL (ref 0–0.2)
BASOPHILS NFR BLD: 0.4 % (ref 0–1.9)
BASOPHILS NFR BLD: 0.6 % (ref 0–1.9)
BILIRUB SERPL-MCNC: 0.4 MG/DL (ref 0.1–1)
BNP SERPL-MCNC: 650 PG/ML (ref 0–99)
BUN SERPL-MCNC: 15 MG/DL (ref 8–23)
BUN SERPL-MCNC: 9 MG/DL (ref 8–23)
CALCIUM SERPL-MCNC: 8.9 MG/DL (ref 8.7–10.5)
CALCIUM SERPL-MCNC: 9.6 MG/DL (ref 8.7–10.5)
CHLORIDE SERPL-SCNC: 101 MMOL/L (ref 95–110)
CHLORIDE SERPL-SCNC: 104 MMOL/L (ref 95–110)
CO2 SERPL-SCNC: 25 MMOL/L (ref 23–29)
CO2 SERPL-SCNC: 27 MMOL/L (ref 23–29)
CREAT SERPL-MCNC: 0.7 MG/DL (ref 0.5–1.4)
CREAT SERPL-MCNC: 0.9 MG/DL (ref 0.5–1.4)
DIFFERENTIAL METHOD: ABNORMAL
DIFFERENTIAL METHOD: NORMAL
EOSINOPHIL # BLD AUTO: 0.3 K/UL (ref 0–0.5)
EOSINOPHIL # BLD AUTO: 0.3 K/UL (ref 0–0.5)
EOSINOPHIL NFR BLD: 3.6 % (ref 0–8)
EOSINOPHIL NFR BLD: 4 % (ref 0–8)
ERYTHROCYTE [DISTWIDTH] IN BLOOD BY AUTOMATED COUNT: 14.5 % (ref 11.5–14.5)
ERYTHROCYTE [DISTWIDTH] IN BLOOD BY AUTOMATED COUNT: 14.6 % (ref 11.5–14.5)
EST. GFR  (NO RACE VARIABLE): >60 ML/MIN/1.73 M^2
EST. GFR  (NO RACE VARIABLE): >60 ML/MIN/1.73 M^2
GLUCOSE SERPL-MCNC: 115 MG/DL (ref 70–110)
GLUCOSE SERPL-MCNC: 147 MG/DL (ref 70–110)
HCT VFR BLD AUTO: 38.8 % (ref 37–48.5)
HCT VFR BLD AUTO: 42 % (ref 37–48.5)
HGB BLD-MCNC: 13 G/DL (ref 12–16)
HGB BLD-MCNC: 13.4 G/DL (ref 12–16)
IMM GRANULOCYTES # BLD AUTO: 0.01 K/UL (ref 0–0.04)
IMM GRANULOCYTES # BLD AUTO: 0.02 K/UL (ref 0–0.04)
IMM GRANULOCYTES NFR BLD AUTO: 0.1 % (ref 0–0.5)
IMM GRANULOCYTES NFR BLD AUTO: 0.3 % (ref 0–0.5)
INR PPP: 1 (ref 0.8–1.2)
LYMPHOCYTES # BLD AUTO: 2.8 K/UL (ref 1–4.8)
LYMPHOCYTES # BLD AUTO: 3.4 K/UL (ref 1–4.8)
LYMPHOCYTES NFR BLD: 43.2 % (ref 18–48)
LYMPHOCYTES NFR BLD: 43.5 % (ref 18–48)
MAGNESIUM SERPL-MCNC: 1.9 MG/DL (ref 1.6–2.6)
MCH RBC QN AUTO: 28.9 PG (ref 27–31)
MCH RBC QN AUTO: 29.6 PG (ref 27–31)
MCHC RBC AUTO-ENTMCNC: 31.9 G/DL (ref 32–36)
MCHC RBC AUTO-ENTMCNC: 33.5 G/DL (ref 32–36)
MCV RBC AUTO: 88 FL (ref 82–98)
MCV RBC AUTO: 91 FL (ref 82–98)
MONOCYTES # BLD AUTO: 0.5 K/UL (ref 0.3–1)
MONOCYTES # BLD AUTO: 0.6 K/UL (ref 0.3–1)
MONOCYTES NFR BLD: 7.5 % (ref 4–15)
MONOCYTES NFR BLD: 7.5 % (ref 4–15)
NEUTROPHILS # BLD AUTO: 2.9 K/UL (ref 1.8–7.7)
NEUTROPHILS # BLD AUTO: 3.5 K/UL (ref 1.8–7.7)
NEUTROPHILS NFR BLD: 44.4 % (ref 38–73)
NEUTROPHILS NFR BLD: 44.9 % (ref 38–73)
NRBC BLD-RTO: 0 /100 WBC
NRBC BLD-RTO: 0 /100 WBC
PHOSPHATE SERPL-MCNC: 3.7 MG/DL (ref 2.7–4.5)
PLATELET # BLD AUTO: 204 K/UL (ref 150–450)
PLATELET # BLD AUTO: 252 K/UL (ref 150–450)
PMV BLD AUTO: 10.8 FL (ref 9.2–12.9)
PMV BLD AUTO: 12 FL (ref 9.2–12.9)
POCT GLUCOSE: 115 MG/DL (ref 70–110)
POCT GLUCOSE: 127 MG/DL (ref 70–110)
POCT GLUCOSE: 134 MG/DL (ref 70–110)
POTASSIUM SERPL-SCNC: 3.6 MMOL/L (ref 3.5–5.1)
POTASSIUM SERPL-SCNC: 3.6 MMOL/L (ref 3.5–5.1)
PROT SERPL-MCNC: 6.6 G/DL (ref 6–8.4)
PROTHROMBIN TIME: 10.9 SEC (ref 9–12.5)
RBC # BLD AUTO: 4.39 M/UL (ref 4–5.4)
RBC # BLD AUTO: 4.63 M/UL (ref 4–5.4)
SODIUM SERPL-SCNC: 137 MMOL/L (ref 136–145)
SODIUM SERPL-SCNC: 140 MMOL/L (ref 136–145)
WBC # BLD AUTO: 6.43 K/UL (ref 3.9–12.7)
WBC # BLD AUTO: 7.75 K/UL (ref 3.9–12.7)

## 2023-04-29 PROCEDURE — 85025 COMPLETE CBC W/AUTO DIFF WBC: CPT | Performed by: HOSPITALIST

## 2023-04-29 PROCEDURE — 99233 PR SUBSEQUENT HOSPITAL CARE,LEVL III: ICD-10-PCS | Mod: ,,, | Performed by: HOSPITALIST

## 2023-04-29 PROCEDURE — 25000003 PHARM REV CODE 250: Performed by: HOSPITALIST

## 2023-04-29 PROCEDURE — 99900035 HC TECH TIME PER 15 MIN (STAT)

## 2023-04-29 PROCEDURE — 85730 THROMBOPLASTIN TIME PARTIAL: CPT | Performed by: EMERGENCY MEDICINE

## 2023-04-29 PROCEDURE — 20000000 HC ICU ROOM

## 2023-04-29 PROCEDURE — 25500020 PHARM REV CODE 255: Performed by: HOSPITALIST

## 2023-04-29 PROCEDURE — 99223 PR INITIAL HOSPITAL CARE,LEVL III: ICD-10-PCS | Mod: GC,,, | Performed by: PSYCHIATRY & NEUROLOGY

## 2023-04-29 PROCEDURE — 25000003 PHARM REV CODE 250: Performed by: STUDENT IN AN ORGANIZED HEALTH CARE EDUCATION/TRAINING PROGRAM

## 2023-04-29 PROCEDURE — 25000003 PHARM REV CODE 250

## 2023-04-29 PROCEDURE — 63600175 PHARM REV CODE 636 W HCPCS: Performed by: HOSPITALIST

## 2023-04-29 PROCEDURE — 83880 ASSAY OF NATRIURETIC PEPTIDE: CPT | Performed by: HOSPITALIST

## 2023-04-29 PROCEDURE — 84100 ASSAY OF PHOSPHORUS: CPT | Performed by: HOSPITALIST

## 2023-04-29 PROCEDURE — 83735 ASSAY OF MAGNESIUM: CPT | Performed by: HOSPITALIST

## 2023-04-29 PROCEDURE — 80053 COMPREHEN METABOLIC PANEL: CPT | Performed by: NURSE PRACTITIONER

## 2023-04-29 PROCEDURE — 85025 COMPLETE CBC W/AUTO DIFF WBC: CPT | Mod: 91 | Performed by: NURSE PRACTITIONER

## 2023-04-29 PROCEDURE — 99291 PR CRITICAL CARE, E/M 30-74 MINUTES: ICD-10-PCS | Mod: ,,, | Performed by: NURSE PRACTITIONER

## 2023-04-29 PROCEDURE — 63600175 PHARM REV CODE 636 W HCPCS: Performed by: STUDENT IN AN ORGANIZED HEALTH CARE EDUCATION/TRAINING PROGRAM

## 2023-04-29 PROCEDURE — 94761 N-INVAS EAR/PLS OXIMETRY MLT: CPT

## 2023-04-29 PROCEDURE — 99291 CRITICAL CARE FIRST HOUR: CPT | Mod: ,,, | Performed by: PHYSICIAN ASSISTANT

## 2023-04-29 PROCEDURE — 25000003 PHARM REV CODE 250: Performed by: RADIOLOGY

## 2023-04-29 PROCEDURE — 80048 BASIC METABOLIC PNL TOTAL CA: CPT | Performed by: HOSPITALIST

## 2023-04-29 PROCEDURE — 85730 THROMBOPLASTIN TIME PARTIAL: CPT | Mod: 91 | Performed by: NURSE PRACTITIONER

## 2023-04-29 PROCEDURE — 99223 1ST HOSP IP/OBS HIGH 75: CPT | Mod: GC,,, | Performed by: PSYCHIATRY & NEUROLOGY

## 2023-04-29 PROCEDURE — 99291 CRITICAL CARE FIRST HOUR: CPT | Mod: ,,, | Performed by: NURSE PRACTITIONER

## 2023-04-29 PROCEDURE — 99233 SBSQ HOSP IP/OBS HIGH 50: CPT | Mod: ,,, | Performed by: HOSPITALIST

## 2023-04-29 PROCEDURE — 99291 PR CRITICAL CARE, E/M 30-74 MINUTES: ICD-10-PCS | Mod: ,,, | Performed by: PHYSICIAN ASSISTANT

## 2023-04-29 PROCEDURE — 85610 PROTHROMBIN TIME: CPT | Performed by: HOSPITALIST

## 2023-04-29 PROCEDURE — 27000221 HC OXYGEN, UP TO 24 HOURS

## 2023-04-29 RX ORDER — LABETALOL HYDROCHLORIDE 5 MG/ML
INJECTION, SOLUTION INTRAVENOUS
Status: DISCONTINUED | OUTPATIENT
Start: 2023-04-29 | End: 2023-04-29

## 2023-04-29 RX ORDER — ETOMIDATE 2 MG/ML
INJECTION INTRAVENOUS
Status: DISCONTINUED | OUTPATIENT
Start: 2023-04-29 | End: 2023-04-29

## 2023-04-29 RX ORDER — MIDAZOLAM HYDROCHLORIDE 1 MG/ML
INJECTION INTRAMUSCULAR; INTRAVENOUS
Status: DISCONTINUED | OUTPATIENT
Start: 2023-04-29 | End: 2023-04-29

## 2023-04-29 RX ORDER — FENTANYL CITRATE 50 UG/ML
INJECTION, SOLUTION INTRAMUSCULAR; INTRAVENOUS
Status: DISCONTINUED | OUTPATIENT
Start: 2023-04-29 | End: 2023-04-29

## 2023-04-29 RX ORDER — FUROSEMIDE 10 MG/ML
40 INJECTION INTRAMUSCULAR; INTRAVENOUS ONCE
Status: COMPLETED | OUTPATIENT
Start: 2023-04-29 | End: 2023-04-29

## 2023-04-29 RX ORDER — NICARDIPINE HYDROCHLORIDE 0.2 MG/ML
0-15 INJECTION INTRAVENOUS CONTINUOUS
Status: CANCELLED | OUTPATIENT
Start: 2023-04-30

## 2023-04-29 RX ORDER — ROCURONIUM BROMIDE 10 MG/ML
INJECTION, SOLUTION INTRAVENOUS
Status: DISCONTINUED | OUTPATIENT
Start: 2023-04-29 | End: 2023-04-29

## 2023-04-29 RX ORDER — AMOXICILLIN 250 MG
1 CAPSULE ORAL 2 TIMES DAILY
Status: DISCONTINUED | OUTPATIENT
Start: 2023-04-29 | End: 2023-05-01

## 2023-04-29 RX ORDER — SODIUM CHLORIDE 0.9 % (FLUSH) 0.9 %
10 SYRINGE (ML) INJECTION
Status: DISCONTINUED | OUTPATIENT
Start: 2023-04-29 | End: 2023-05-12 | Stop reason: HOSPADM

## 2023-04-29 RX ORDER — LABETALOL HCL 20 MG/4 ML
10 SYRINGE (ML) INTRAVENOUS EVERY 6 HOURS PRN
Status: DISCONTINUED | OUTPATIENT
Start: 2023-04-29 | End: 2023-05-12 | Stop reason: HOSPADM

## 2023-04-29 RX ORDER — METOPROLOL TARTRATE 50 MG/1
50 TABLET ORAL 2 TIMES DAILY
Status: DISCONTINUED | OUTPATIENT
Start: 2023-04-30 | End: 2023-05-01

## 2023-04-29 RX ORDER — GUAIFENESIN 600 MG/1
600 TABLET, EXTENDED RELEASE ORAL 2 TIMES DAILY
Status: DISCONTINUED | OUTPATIENT
Start: 2023-04-29 | End: 2023-04-29

## 2023-04-29 RX ORDER — GLUCAGON 1 MG
1 KIT INJECTION
Status: DISCONTINUED | OUTPATIENT
Start: 2023-04-30 | End: 2023-05-01

## 2023-04-29 RX ORDER — FUROSEMIDE 10 MG/ML
40 INJECTION INTRAMUSCULAR; INTRAVENOUS ONCE
Status: DISCONTINUED | OUTPATIENT
Start: 2023-04-29 | End: 2023-04-29

## 2023-04-29 RX ORDER — LABETALOL HCL 20 MG/4 ML
10 SYRINGE (ML) INTRAVENOUS EVERY 6 HOURS PRN
Status: DISCONTINUED | OUTPATIENT
Start: 2023-04-29 | End: 2023-04-29

## 2023-04-29 RX ORDER — VERAPAMIL HYDROCHLORIDE 2.5 MG/ML
INJECTION, SOLUTION INTRAVENOUS
Status: COMPLETED | OUTPATIENT
Start: 2023-04-29 | End: 2023-04-29

## 2023-04-29 RX ORDER — INSULIN ASPART 100 [IU]/ML
1-10 INJECTION, SOLUTION INTRAVENOUS; SUBCUTANEOUS EVERY 6 HOURS PRN
Status: DISCONTINUED | OUTPATIENT
Start: 2023-04-30 | End: 2023-05-01

## 2023-04-29 RX ORDER — LIDOCAINE HYDROCHLORIDE 20 MG/ML
INJECTION INTRAVENOUS
Status: DISCONTINUED | OUTPATIENT
Start: 2023-04-29 | End: 2023-04-29

## 2023-04-29 RX ORDER — PHENYLEPHRINE HYDROCHLORIDE 10 MG/ML
INJECTION INTRAVENOUS
Status: DISCONTINUED | OUTPATIENT
Start: 2023-04-29 | End: 2023-04-29

## 2023-04-29 RX ADMIN — IOHEXOL 75 ML: 350 INJECTION, SOLUTION INTRAVENOUS at 08:04

## 2023-04-29 RX ADMIN — ASPIRIN 81 MG: 81 TABLET, COATED ORAL at 09:04

## 2023-04-29 RX ADMIN — SPIRONOLACTONE 25 MG: 25 TABLET, FILM COATED ORAL at 09:04

## 2023-04-29 RX ADMIN — FUROSEMIDE 40 MG: 10 INJECTION, SOLUTION INTRAMUSCULAR; INTRAVENOUS at 02:04

## 2023-04-29 RX ADMIN — LIDOCAINE HYDROCHLORIDE 100 MG: 20 INJECTION INTRAVENOUS at 10:04

## 2023-04-29 RX ADMIN — ROCURONIUM BROMIDE 80 MG: 10 INJECTION, SOLUTION INTRAVENOUS at 10:04

## 2023-04-29 RX ADMIN — TORSEMIDE 10 MG: 10 TABLET ORAL at 09:04

## 2023-04-29 RX ADMIN — SUGAMMADEX 350 MG: 100 INJECTION, SOLUTION INTRAVENOUS at 11:04

## 2023-04-29 RX ADMIN — FENTANYL CITRATE 50 MCG: 50 INJECTION, SOLUTION INTRAMUSCULAR; INTRAVENOUS at 10:04

## 2023-04-29 RX ADMIN — SACUBITRIL AND VALSARTAN 1 TABLET: 97; 103 TABLET, FILM COATED ORAL at 09:04

## 2023-04-29 RX ADMIN — ESMOLOL HYDROCHLORIDE 10 MG: 100 INJECTION, SOLUTION INTRAVENOUS at 11:04

## 2023-04-29 RX ADMIN — LABETALOL HYDROCHLORIDE 5 MG: 5 INJECTION, SOLUTION INTRAVENOUS at 11:04

## 2023-04-29 RX ADMIN — ACETAMINOPHEN 650 MG: 325 TABLET ORAL at 11:04

## 2023-04-29 RX ADMIN — ESMOLOL HYDROCHLORIDE 20 MG: 100 INJECTION, SOLUTION INTRAVENOUS at 10:04

## 2023-04-29 RX ADMIN — FENTANYL CITRATE 25 MCG: 50 INJECTION, SOLUTION INTRAMUSCULAR; INTRAVENOUS at 11:04

## 2023-04-29 RX ADMIN — PHENYLEPHRINE HYDROCHLORIDE 100 MCG: 10 INJECTION INTRAVENOUS at 10:04

## 2023-04-29 RX ADMIN — EPINEPHRINE 0.04 MCG/KG/MIN: 1 INJECTION INTRAMUSCULAR; INTRAVENOUS; SUBCUTANEOUS at 10:04

## 2023-04-29 RX ADMIN — GUAIFENESIN 600 MG: 600 TABLET, EXTENDED RELEASE ORAL at 05:04

## 2023-04-29 RX ADMIN — MIDAZOLAM HYDROCHLORIDE 2 MG: 1 INJECTION INTRAMUSCULAR; INTRAVENOUS at 10:04

## 2023-04-29 RX ADMIN — ETOMIDATE 8 MG: 2 INJECTION, SOLUTION INTRAVENOUS at 10:04

## 2023-04-29 RX ADMIN — METOPROLOL SUCCINATE 100 MG: 100 TABLET, EXTENDED RELEASE ORAL at 09:04

## 2023-04-29 RX ADMIN — ATORVASTATIN CALCIUM 80 MG: 40 TABLET, FILM COATED ORAL at 09:04

## 2023-04-29 RX ADMIN — VERAPAMIL HYDROCHLORIDE 5 MG: 2.5 INJECTION, SOLUTION INTRAVENOUS at 10:04

## 2023-04-29 NOTE — ASSESSMENT & PLAN NOTE
Diomedes Mohr is a 62 y.o. woman with history of HTN, DMII, stroke (2020, R MCA, no deficits), CHF, dilated cardiomyopathy, arrhythmia, LVEF 15% as of 10/2022, HLD, PE on Xarelto who presents with right leg pain from knee down, numbness and weakness.    Symptoms have resolved and a strong monophasic signal is present in the DP on the right. Patient has failed Xarelto given likely embolic nature of this new popliteal occlusion.    Recommendations:  Fully anticoagulate with heparin for 5 days. She will need to be transitioned to PO anticoagulation per hematology recs  q4 neurovascular exams    Vascular surgery will sign off. Please reach out with any questions or concerns.

## 2023-04-29 NOTE — SUBJECTIVE & OBJECTIVE
Medications:  Continuous Infusions:   heparin (porcine) in D5W 15 Units/kg/hr (04/28/23 1702)     Scheduled Meds:   aspirin  81 mg Oral Daily    atorvastatin  80 mg Oral Daily    guaiFENesin  600 mg Oral BID    metoprolol succinate  100 mg Oral BID    polyethylene glycol  17 g Oral Daily    sacubitriL-valsartan  1 tablet Oral BID    spironolactone  25 mg Oral Daily    torsemide  10 mg Oral Daily     PRN Meds:acetaminophen, albuterol-ipratropium, benzonatate, dextrose 10%, dextrose 10%, dextrose, dextrose, glucagon (human recombinant), heparin (PORCINE), heparin (PORCINE), insulin aspart U-100, melatonin, naloxone, ondansetron, senna-docusate 8.6-50 mg, simethicone, sodium chloride 0.9%, traMADoL     Objective:     Vital Signs (Most Recent):  Temp: 97.8 °F (36.6 °C) (04/29/23 0720)  Pulse: 80 (04/29/23 0720)  Resp: 18 (04/29/23 0720)  BP: (!) 99/56 (04/29/23 0720)  SpO2: 99 % (04/29/23 0720)   Vital Signs (24h Range):  Temp:  [97.8 °F (36.6 °C)-98.7 °F (37.1 °C)] 97.8 °F (36.6 °C)  Pulse:  [69-89] 80  Resp:  [18-20] 18  SpO2:  [94 %-99 %] 99 %  BP: ()/(55-67) 99/56         Physical Exam  Vitals reviewed.   Constitutional:       General: She is not in acute distress.     Appearance: She is not ill-appearing.   Cardiovascular:      Rate and Rhythm: Normal rate.      Comments: R biphasic DP  Pulmonary:      Effort: Pulmonary effort is normal.   Skin:     General: Skin is warm and dry.      Comments: Warmer to touch, no sensory deficits    Neurological:      General: No focal deficit present.      Mental Status: She is alert.       Significant Labs:  CBC:   Recent Labs   Lab 04/29/23  0533   WBC 6.43   RBC 4.63   HGB 13.4   HCT 42.0      MCV 91   MCH 28.9   MCHC 31.9*     CMP:   Recent Labs   Lab 04/27/23 2029 04/28/23  0759 04/29/23  0533   *   < > 115*   CALCIUM 9.3   < > 9.6   ALBUMIN 3.4*  --   --    PROT 7.1  --   --       < > 140   K 3.8   < > 3.6   CO2 25   < > 25      < > 104    BUN 15   < > 9   CREATININE 0.9   < > 0.7   ALKPHOS 142*  --   --    ALT 13  --   --    AST 15  --   --    BILITOT 0.4  --   --     < > = values in this interval not displayed.       Significant Diagnostics:  I have reviewed all pertinent imaging results/findings within the past 24 hours.

## 2023-04-29 NOTE — PROGRESS NOTES
[facility] - Cardiology Stepdown  Vascular Surgery  Progress Note    Patient Name: Diomedes Mohr  MRN: [id number]  Admission Date: 4/27/2023  Primary Care Provider: Fernando Manzo MD    Subjective:     Interval History: NAEO. Does not have resting right foot pain. Has some pain starting at knee and radiating down. No sensory changes.     Post-Op Info:  * No surgery found *           Medications:  Continuous Infusions:   heparin (porcine) in D5W 15 Units/kg/hr (04/28/23 1702)     Scheduled Meds:   aspirin  81 mg Oral Daily    atorvastatin  80 mg Oral Daily    guaiFENesin  600 mg Oral BID    metoprolol succinate  100 mg Oral BID    polyethylene glycol  17 g Oral Daily    sacubitriL-valsartan  1 tablet Oral BID    spironolactone  25 mg Oral Daily    torsemide  10 mg Oral Daily     PRN Meds:acetaminophen, albuterol-ipratropium, benzonatate, dextrose 10%, dextrose 10%, dextrose, dextrose, glucagon (human recombinant), heparin (PORCINE), heparin (PORCINE), insulin aspart U-100, melatonin, naloxone, ondansetron, senna-docusate 8.6-50 mg, simethicone, sodium chloride 0.9%, traMADoL     Objective:     Vital Signs (Most Recent):  Temp: 97.8 °F (36.6 °C) (04/29/23 0720)  Pulse: 80 (04/29/23 0720)  Resp: 18 (04/29/23 0720)  BP: (!) 99/56 (04/29/23 0720)  SpO2: 99 % (04/29/23 0720)   Vital Signs (24h Range):  Temp:  [97.8 °F (36.6 °C)-98.7 °F (37.1 °C)] 97.8 °F (36.6 °C)  Pulse:  [69-89] 80  Resp:  [18-20] 18  SpO2:  [94 %-99 %] 99 %  BP: ()/(55-67) 99/56         Physical Exam  Vitals reviewed.   Constitutional:       General: She is not in acute distress.     Appearance: She is not ill-appearing.   Cardiovascular:      Rate and Rhythm: Normal rate.      Comments: R biphasic DP  Pulmonary:      Effort: Pulmonary effort is normal.   Skin:     General: Skin is warm and dry.      Comments: Warmer to touch, no sensory deficits    Neurological:      General: No focal deficit present.      Mental Status: She is alert.        Significant Labs:  CBC:   Recent Labs   Lab 04/29/23  0533   WBC 6.43   RBC 4.63   HGB 13.4   HCT 42.0      MCV 91   MCH 28.9   MCHC 31.9*     CMP:   Recent Labs   Lab 04/27/23 2029 04/28/23  0759 04/29/23  0533   *   < > 115*   CALCIUM 9.3   < > 9.6   ALBUMIN 3.4*  --   --    PROT 7.1  --   --       < > 140   K 3.8   < > 3.6   CO2 25   < > 25      < > 104   BUN 15   < > 9   CREATININE 0.9   < > 0.7   ALKPHOS 142*  --   --    ALT 13  --   --    AST 15  --   --    BILITOT 0.4  --   --     < > = values in this interval not displayed.       Significant Diagnostics:  I have reviewed all pertinent imaging results/findings within the past 24 hours.    Assessment/Plan:     * Critical lower limb ischemia  Diomedes Mohr is a 62 y.o. woman with history of HTN, DMII, stroke (2020, R MCA, no deficits), CHF, dilated cardiomyopathy, arrhythmia, LVEF 15% as of 10/2022, HLD, PE on Xarelto who presents with right leg pain from knee down, numbness and weakness.    Symptoms have resolved and a strong monophasic signal is present in the DP on the right. Patient has failed Xarelto given likely embolic nature of this new popliteal occlusion.    Recommendations:  Fully anticoagulate with heparin for 5 days. She will need to be transitioned to PO anticoagulation per hematology recs  q4 neurovascular exams    Vascular surgery will sign off. Please reach out with any questions or concerns.        Alondra Hutchinson MD  Vascular Surgery  [facility] - Cardiology Stepdown

## 2023-04-29 NOTE — PROGRESS NOTES
[facility] Hospital Medicine  Progress Note    Primary Team: [facility] HOSP MED C  Admit Date: 4/27/2023    Subjective:      HPI:  Diomedes Mohr is a 62 y.o. female with PMH of HTN, DM2, hyperlipidemia, tobacco use (quit in 2020), stroke (2020, R MCA, no deficits), PE (December 2021, on xarelto),  CAD, DCM, HFpEF (15%) s/p AICD, Pulmonary hypertension, osteoarthritis of bilateral hips (L>R) with chronic pain who presented to ED for evaluation of right leg pain from knee down, numbness and weakness. Patient states around 5 pm earlier today she suddenly felt sharp pain and freezing cold in her right leg while standing outside her house and talking to her daughter on the phone. She has difficulty weight bearing on right leg due to pain and weakness. Patient got concerned about having a stroke and daughter called EMS. She also reports associated numbness and tingling on her right foot. Patient states she never had similar symptoms in the past and she felt fine when she saw her diabetes doctor in clinic earlier this morning. She denies chest pain, palpitation, dizziness, sob, fever, chills, N/V/D/abdominal pain, dark stool or bleeding from other sites. She is unable to walk long distance due to chronic hip pain. She reports compliant with her medications including Xarelto. She is a former smoker (1/2 PPD for 30 years and quit in 2020).      ED course: afebrile and vitals stable. CTA abdomen pelvis with bilateral lower extemitiy runoff showed focal occlusion of the right popliteal artery with distal reconstitution and diminished runoff to the right foot. Patient started on heparin infusion per vascular surgery recommendation. During my interview patient is awake, conversant and not in distress. She reports feeling better as pain, numbness and weakness on her R leg mostly resolved.      Echo (10/16/22):  The left ventricle is severely enlarged with eccentric hypertrophy and severely decreased systolic  function. The estimated ejection fraction is 15%.  Normal right ventricular size with normal right ventricular systolic function.  Grade II left ventricular diastolic dysfunction.  Mild left atrial enlargement.  The estimated PA systolic pressure is 55 mmHg.  Normal central venous pressure (3 mmHg).    Interval History:  Denies RLE pain. Reports cough  a/w SOB and feels she has extra fluid. CXR with possible mild edema, BNP elevated, will order Lasix 40 IV x 1.     ROS:  As per HPI  General: no fever, no chills, no weight loss, no fatigue  Cardiovascular: no chest pain, no orthopnea  Respiratory: no wheezes  All other systems reviewed & are negative.     Past Medical History:   Diagnosis Date    Acute on chronic combined systolic and diastolic heart failure 2019    Anticoagulant long-term use     Anxiety     Arthritis     Asthma     Depression     H/O: hysterectomy     Hyperlipemia     Hypertension     Pulmonary edema     Schizophrenia      Past Surgical History:   Procedure Laterality Date    BLADDER SUSPENSION      CARPAL TUNNEL RELEASE Right     HEEL SPUR SURGERY Left     HYSTERECTOMY      LEFT HEART CATHETERIZATION Bilateral 2019    Procedure: Left heart cath;  Surgeon: Steve Chambers MD;  Location: [facility] CATH LAB;  Service: Cardiology;  Laterality: Bilateral;    RIGHT HEART CATHETERIZATION Right 2021    Procedure: INSERTION, CATHETER, RIGHT HEART;  Surgeon: Petr Naranjo MD;  Location: [facility] CATH LAB;  Service: Cardiology;  Laterality: Right;    RIGHT HEART CATHETERIZATION Right 2022    Procedure: INSERTION, CATHETER, RIGHT HEART;  Surgeon: Chandana Ivory Jr., MD;  Location: [facility] CATH LAB;  Service: Cardiology;  Laterality: Right;    TUBAL LIGATION           Objective:   Last 24 Hour Vital Signs:  BP  Min: 99/56  Max: 120/61  Temp  Av.2 °F (36.8 °C)  Min: 97.8 °F (36.6 °C)  Max: 98.7 °F (37.1 °C)  Pulse  Av.9  Min: 69  Max: 89  Resp  Av.4  Min: 16  Max:  20  SpO2  Av %  Min: 94 %  Max: 99 %  I/O last 3 completed shifts:  In: 260 [P.O.:260]  Out: 650 [Urine:650]    Physical Examination:  GEN: AAOx3, NAD  HEENT: NCAT, MMM, PERRL, EOMI, oropharynx clear  CV: RRR, no m/r, no S3/S4  RESP: CTAB, no wheezes/crackles, no increased WOB  ABD: soft, NTND, normoactive BS, no organomegaly  EXTR: no c/c/e, absent PT & DP pulses RLE  NEURO: PERRL, EOMI, moving all four extremities, intact sensation to light touch, no focal deficits  SKIN: no rashes, lesions, or color changes  PSYCH: normal affect    Laboratory:  I have reviewed all pertinent lab results/findings within the past 24 hours.    Radiology:  I have reviewed all pertinent imaging results/findings within the past 24 hours.    Current Medications:     Infusions:   heparin (porcine) in D5W 15 Units/kg/hr (23 1702)        Scheduled:   aspirin  81 mg Oral Daily    atorvastatin  80 mg Oral Daily    furosemide (LASIX) injection  40 mg Intravenous Once    guaiFENesin  600 mg Oral BID    metoprolol succinate  100 mg Oral BID    polyethylene glycol  17 g Oral Daily    sacubitriL-valsartan  1 tablet Oral BID    spironolactone  25 mg Oral Daily        PRN:  acetaminophen, albuterol-ipratropium, benzonatate, dextrose 10%, dextrose 10%, dextrose, dextrose, glucagon (human recombinant), heparin (PORCINE), heparin (PORCINE), insulin aspart U-100, melatonin, naloxone, ondansetron, senna-docusate 8.6-50 mg, simethicone, sodium chloride 0.9%, traMADoL    Prior records reviewed.       Assessment/Plan:     Diomedes Mohr is a 62 y.o.female with    Critical lower limb ischemia  -patient with multiple CV risk factors presented to ED with sudden onset R leg (below knee ) pain, cold sensation, weakness, numbness and tingling   -CTA abdomen pelvis with bilateral lower extremity runoff showed focal occlusion of the right popliteal artery   -symptoms (pain, numbness, weakness, tingling) except cold sensation mostly resolved after 2 hours  of onset   -vascular surgery consult noted with concern for embolic etiology and failure of xarelto - no intervention planned  -neurovascular check q 4 hours  -no thrombus on TTE  -consulted hematology for further evaluation as patient failed xarelto:   Due to concern of breakthrough clot even on DOAC, recommend continuing heparin inpatient and transitioning to dabigatron 150mg BID after 5 days of heparin therapy to bridge. Overlap of medications is not required.   Due to arterial nature of this clot would consider restarting patient on aspirin for antiplatelet therapy  We will arrange follow-up with Dr. Le   Recommend getting up to date colonoscopy and mammogram since she is overdue for both and cancer can create a hypercoagulable state.          Acute on chronic combined systolic and diastolic congestive heart failure  Pulmonary hypertension due to left heart disease  Echo     Interpretation Summary  · The left ventricle is severely enlarged with eccentric hypertrophy and severely decreased systolic function. The estimated ejection fraction is 15%.  · Normal right ventricular size with normal right ventricular systolic function.  · Grade II left ventricular diastolic dysfunction.  · Mild left atrial enlargement.  · The estimated PA systolic pressure is 55 mmHg.  · Normal central venous pressure (3 mmHg).    No acute issues. Continue GDMT  CXR with edema. BNP elevated.   IV Lasix 40 mg x 1        Coronary artery disease involving native heart  -LHC in 2019 showed non obstructive CAD  -denies chest pain or SOB    -continue home beta blocker and statin. Start ASA     Cerebrovascular accident (CVA) due to occlusion of right middle cerebral artery  -h/o CVA in 2020 due to R MCA occlusion   -no residual deficit   -on long term xarelto   -continue statin. Start ASA  -anticoagulant changes as above     History of pulmonary embolism  -CTA chest on 12/27/21 showed small R pulmonary artery embolism   -on chronic  anticoagulation with xarelto  -anticoagulant changes as above    Hypertension  -chronic and controlled   -continue home metoprolol and entresto      Type 2 diabetes mellitus without complication, without long-term current use of insulin          Lab Results   Component Value Date     HGBA1C 7.9 (H) 10/16/2022      Most recent fingerstick glucose reviewed- No results for input(s): POCTGLUCOSE in the last 24 hours.  Current correctional scale  Low  Maintain anti-hyperglycemic dose as follows-             Antihyperglycemics (From admission, onward)     Start     Stop Route Frequency Ordered     04/28/23 0202   insulin aspart U-100 pen 0-5 Units         -- SubQ Before meals & nightly PRN 04/28/23 0103          Hold home meds.       ICD (implantable cardioverter-defibrillator) in place  -s/p AICD in 2021 for HFrEF       Bilateral primary osteoarthritis of hip  -bilateral chronic hip pain (L>R)  -limited walking capacity   -denies recent fall or new injury   -continue home tylenol and tramadol prn pain           Kallie Tristan MD  Hospital Medicine Staff  [facility]

## 2023-04-29 NOTE — PLAN OF CARE
Pt's blood glucose will remain within the targeted range, resulting in pt remaining free of insulin requirements. Pt will be able to verbalize the importance of home blood glucose monitoring

## 2023-04-30 PROBLEM — I82.409 ACUTE DEEP VEIN THROMBOSIS (DVT): Status: RESOLVED | Noted: 2023-04-29 | Resolved: 2023-04-30

## 2023-04-30 LAB
ABO + RH BLD: NORMAL
ALBUMIN SERPL BCP-MCNC: 3.1 G/DL (ref 3.5–5.2)
ALBUMIN SERPL BCP-MCNC: 3.2 G/DL (ref 3.5–5.2)
ALP SERPL-CCNC: 141 U/L (ref 55–135)
ALP SERPL-CCNC: 146 U/L (ref 55–135)
ALT SERPL W/O P-5'-P-CCNC: 12 U/L (ref 10–44)
ALT SERPL W/O P-5'-P-CCNC: 12 U/L (ref 10–44)
ANION GAP SERPL CALC-SCNC: 10 MMOL/L (ref 8–16)
ANION GAP SERPL CALC-SCNC: 8 MMOL/L (ref 8–16)
APTT PPP: 25.7 SEC (ref 21–32)
AST SERPL-CCNC: 14 U/L (ref 10–40)
AST SERPL-CCNC: 17 U/L (ref 10–40)
BASOPHILS # BLD AUTO: 0.02 K/UL (ref 0–0.2)
BASOPHILS # BLD AUTO: 0.02 K/UL (ref 0–0.2)
BASOPHILS NFR BLD: 0.2 % (ref 0–1.9)
BASOPHILS NFR BLD: 0.3 % (ref 0–1.9)
BILIRUB SERPL-MCNC: 0.5 MG/DL (ref 0.1–1)
BILIRUB SERPL-MCNC: 0.6 MG/DL (ref 0.1–1)
BLD GP AB SCN CELLS X3 SERPL QL: NORMAL
BUN SERPL-MCNC: 12 MG/DL (ref 8–23)
BUN SERPL-MCNC: 12 MG/DL (ref 8–23)
CALCIUM SERPL-MCNC: 9.2 MG/DL (ref 8.7–10.5)
CALCIUM SERPL-MCNC: 9.3 MG/DL (ref 8.7–10.5)
CHLORIDE SERPL-SCNC: 104 MMOL/L (ref 95–110)
CHLORIDE SERPL-SCNC: 105 MMOL/L (ref 95–110)
CHOLEST SERPL-MCNC: 142 MG/DL (ref 120–199)
CHOLEST/HDLC SERPL: 3.7 {RATIO} (ref 2–5)
CO2 SERPL-SCNC: 26 MMOL/L (ref 23–29)
CO2 SERPL-SCNC: 27 MMOL/L (ref 23–29)
CREAT SERPL-MCNC: 0.7 MG/DL (ref 0.5–1.4)
CREAT SERPL-MCNC: 0.8 MG/DL (ref 0.5–1.4)
DIFFERENTIAL METHOD: ABNORMAL
DIFFERENTIAL METHOD: NORMAL
EOSINOPHIL # BLD AUTO: 0 K/UL (ref 0–0.5)
EOSINOPHIL # BLD AUTO: 0.1 K/UL (ref 0–0.5)
EOSINOPHIL NFR BLD: 0.5 % (ref 0–8)
EOSINOPHIL NFR BLD: 0.5 % (ref 0–8)
ERYTHROCYTE [DISTWIDTH] IN BLOOD BY AUTOMATED COUNT: 14.3 % (ref 11.5–14.5)
ERYTHROCYTE [DISTWIDTH] IN BLOOD BY AUTOMATED COUNT: 14.5 % (ref 11.5–14.5)
EST. GFR  (NO RACE VARIABLE): >60 ML/MIN/1.73 M^2
EST. GFR  (NO RACE VARIABLE): >60 ML/MIN/1.73 M^2
ESTIMATED AVG GLUCOSE: 169 MG/DL (ref 68–131)
GLUCOSE SERPL-MCNC: 155 MG/DL (ref 70–110)
GLUCOSE SERPL-MCNC: 170 MG/DL (ref 70–110)
HBA1C MFR BLD: 7.5 % (ref 4–5.6)
HCT VFR BLD AUTO: 37.6 % (ref 37–48.5)
HCT VFR BLD AUTO: 39.2 % (ref 37–48.5)
HDLC SERPL-MCNC: 38 MG/DL (ref 40–75)
HDLC SERPL: 26.8 % (ref 20–50)
HGB BLD-MCNC: 12.2 G/DL (ref 12–16)
HGB BLD-MCNC: 13 G/DL (ref 12–16)
IMM GRANULOCYTES # BLD AUTO: 0.03 K/UL (ref 0–0.04)
IMM GRANULOCYTES # BLD AUTO: 0.03 K/UL (ref 0–0.04)
IMM GRANULOCYTES NFR BLD AUTO: 0.3 % (ref 0–0.5)
IMM GRANULOCYTES NFR BLD AUTO: 0.4 % (ref 0–0.5)
INR PPP: 1.1 (ref 0.8–1.2)
LDLC SERPL CALC-MCNC: 88.6 MG/DL (ref 63–159)
LYMPHOCYTES # BLD AUTO: 1.6 K/UL (ref 1–4.8)
LYMPHOCYTES # BLD AUTO: 1.8 K/UL (ref 1–4.8)
LYMPHOCYTES NFR BLD: 15.1 % (ref 18–48)
LYMPHOCYTES NFR BLD: 24.4 % (ref 18–48)
MAGNESIUM SERPL-MCNC: 1.8 MG/DL (ref 1.6–2.6)
MCH RBC QN AUTO: 29 PG (ref 27–31)
MCH RBC QN AUTO: 29.5 PG (ref 27–31)
MCHC RBC AUTO-ENTMCNC: 32.4 G/DL (ref 32–36)
MCHC RBC AUTO-ENTMCNC: 33.2 G/DL (ref 32–36)
MCV RBC AUTO: 89 FL (ref 82–98)
MCV RBC AUTO: 90 FL (ref 82–98)
MONOCYTES # BLD AUTO: 0.6 K/UL (ref 0.3–1)
MONOCYTES # BLD AUTO: 0.6 K/UL (ref 0.3–1)
MONOCYTES NFR BLD: 6.2 % (ref 4–15)
MONOCYTES NFR BLD: 7.7 % (ref 4–15)
NEUTROPHILS # BLD AUTO: 5 K/UL (ref 1.8–7.7)
NEUTROPHILS # BLD AUTO: 8.1 K/UL (ref 1.8–7.7)
NEUTROPHILS NFR BLD: 66.7 % (ref 38–73)
NEUTROPHILS NFR BLD: 77.7 % (ref 38–73)
NONHDLC SERPL-MCNC: 104 MG/DL
NRBC BLD-RTO: 0 /100 WBC
NRBC BLD-RTO: 0 /100 WBC
PHOSPHATE SERPL-MCNC: 3.9 MG/DL (ref 2.7–4.5)
PLATELET # BLD AUTO: 247 K/UL (ref 150–450)
PLATELET # BLD AUTO: 260 K/UL (ref 150–450)
PMV BLD AUTO: 10.9 FL (ref 9.2–12.9)
PMV BLD AUTO: 10.9 FL (ref 9.2–12.9)
POCT GLUCOSE: 137 MG/DL (ref 70–110)
POCT GLUCOSE: 150 MG/DL (ref 70–110)
POCT GLUCOSE: 165 MG/DL (ref 70–110)
POCT GLUCOSE: 167 MG/DL (ref 70–110)
POTASSIUM SERPL-SCNC: 3.4 MMOL/L (ref 3.5–5.1)
POTASSIUM SERPL-SCNC: 3.8 MMOL/L (ref 3.5–5.1)
PROT SERPL-MCNC: 6.5 G/DL (ref 6–8.4)
PROT SERPL-MCNC: 6.7 G/DL (ref 6–8.4)
PROTHROMBIN TIME: 11.3 SEC (ref 9–12.5)
RBC # BLD AUTO: 4.2 M/UL (ref 4–5.4)
RBC # BLD AUTO: 4.41 M/UL (ref 4–5.4)
SODIUM SERPL-SCNC: 140 MMOL/L (ref 136–145)
SODIUM SERPL-SCNC: 140 MMOL/L (ref 136–145)
SPECIMEN OUTDATE: NORMAL
TRIGL SERPL-MCNC: 77 MG/DL (ref 30–150)
TSH SERPL DL<=0.005 MIU/L-ACNC: 2.09 UIU/ML (ref 0.4–4)
WBC # BLD AUTO: 10.38 K/UL (ref 3.9–12.7)
WBC # BLD AUTO: 7.45 K/UL (ref 3.9–12.7)

## 2023-04-30 PROCEDURE — 80061 LIPID PANEL: CPT | Performed by: PHYSICIAN ASSISTANT

## 2023-04-30 PROCEDURE — 92610 EVALUATE SWALLOWING FUNCTION: CPT

## 2023-04-30 PROCEDURE — 83735 ASSAY OF MAGNESIUM: CPT | Performed by: HOSPITALIST

## 2023-04-30 PROCEDURE — 25000003 PHARM REV CODE 250: Performed by: HOSPITALIST

## 2023-04-30 PROCEDURE — 51700 IRRIGATION OF BLADDER: CPT

## 2023-04-30 PROCEDURE — 63600175 PHARM REV CODE 636 W HCPCS: Performed by: PHYSICIAN ASSISTANT

## 2023-04-30 PROCEDURE — 99291 CRITICAL CARE FIRST HOUR: CPT | Mod: GC,,, | Performed by: PSYCHIATRY & NEUROLOGY

## 2023-04-30 PROCEDURE — 84100 ASSAY OF PHOSPHORUS: CPT | Performed by: HOSPITALIST

## 2023-04-30 PROCEDURE — 85730 THROMBOPLASTIN TIME PARTIAL: CPT

## 2023-04-30 PROCEDURE — 99233 SBSQ HOSP IP/OBS HIGH 50: CPT | Mod: GC,,, | Performed by: PSYCHIATRY & NEUROLOGY

## 2023-04-30 PROCEDURE — 97165 OT EVAL LOW COMPLEX 30 MIN: CPT

## 2023-04-30 PROCEDURE — 25500020 PHARM REV CODE 255: Performed by: PSYCHIATRY & NEUROLOGY

## 2023-04-30 PROCEDURE — 20000000 HC ICU ROOM

## 2023-04-30 PROCEDURE — 97112 NEUROMUSCULAR REEDUCATION: CPT

## 2023-04-30 PROCEDURE — 63600175 PHARM REV CODE 636 W HCPCS: Performed by: HOSPITALIST

## 2023-04-30 PROCEDURE — 97535 SELF CARE MNGMENT TRAINING: CPT

## 2023-04-30 PROCEDURE — 86900 BLOOD TYPING SEROLOGIC ABO: CPT | Performed by: PHYSICIAN ASSISTANT

## 2023-04-30 PROCEDURE — 85610 PROTHROMBIN TIME: CPT

## 2023-04-30 PROCEDURE — 85025 COMPLETE CBC W/AUTO DIFF WBC: CPT | Performed by: HOSPITALIST

## 2023-04-30 PROCEDURE — 25000003 PHARM REV CODE 250: Performed by: PSYCHIATRY & NEUROLOGY

## 2023-04-30 PROCEDURE — A9585 GADOBUTROL INJECTION: HCPCS | Performed by: PSYCHIATRY & NEUROLOGY

## 2023-04-30 PROCEDURE — 63600175 PHARM REV CODE 636 W HCPCS

## 2023-04-30 PROCEDURE — 25000003 PHARM REV CODE 250: Performed by: PHYSICIAN ASSISTANT

## 2023-04-30 PROCEDURE — 99291 PR CRITICAL CARE, E/M 30-74 MINUTES: ICD-10-PCS | Mod: GC,,, | Performed by: PSYCHIATRY & NEUROLOGY

## 2023-04-30 PROCEDURE — 99233 PR SUBSEQUENT HOSPITAL CARE,LEVL III: ICD-10-PCS | Mod: GC,,, | Performed by: PSYCHIATRY & NEUROLOGY

## 2023-04-30 PROCEDURE — 80053 COMPREHEN METABOLIC PANEL: CPT | Performed by: PHYSICIAN ASSISTANT

## 2023-04-30 PROCEDURE — 83036 HEMOGLOBIN GLYCOSYLATED A1C: CPT | Performed by: PHYSICIAN ASSISTANT

## 2023-04-30 PROCEDURE — 84443 ASSAY THYROID STIM HORMONE: CPT | Performed by: PHYSICIAN ASSISTANT

## 2023-04-30 PROCEDURE — 85025 COMPLETE CBC W/AUTO DIFF WBC: CPT | Mod: 91

## 2023-04-30 PROCEDURE — 51798 US URINE CAPACITY MEASURE: CPT

## 2023-04-30 RX ORDER — POTASSIUM CHLORIDE 7.45 MG/ML
80 INJECTION INTRAVENOUS
Status: DISCONTINUED | OUTPATIENT
Start: 2023-04-30 | End: 2023-05-08

## 2023-04-30 RX ORDER — ASPIRIN 300 MG/1
300 SUPPOSITORY RECTAL ONCE
Status: DISCONTINUED | OUTPATIENT
Start: 2023-04-30 | End: 2023-05-05

## 2023-04-30 RX ORDER — MUPIROCIN 20 MG/G
OINTMENT TOPICAL 2 TIMES DAILY
Status: COMPLETED | OUTPATIENT
Start: 2023-04-30 | End: 2023-05-05

## 2023-04-30 RX ORDER — CALCIUM GLUCONATE 20 MG/ML
3 INJECTION, SOLUTION INTRAVENOUS
Status: DISCONTINUED | OUTPATIENT
Start: 2023-04-30 | End: 2023-05-08

## 2023-04-30 RX ORDER — CALCIUM GLUCONATE 20 MG/ML
1 INJECTION, SOLUTION INTRAVENOUS
Status: DISCONTINUED | OUTPATIENT
Start: 2023-04-30 | End: 2023-05-08

## 2023-04-30 RX ORDER — POTASSIUM CHLORIDE 7.45 MG/ML
60 INJECTION INTRAVENOUS
Status: DISCONTINUED | OUTPATIENT
Start: 2023-04-30 | End: 2023-05-08

## 2023-04-30 RX ORDER — CALCIUM GLUCONATE 20 MG/ML
2 INJECTION, SOLUTION INTRAVENOUS
Status: DISCONTINUED | OUTPATIENT
Start: 2023-04-30 | End: 2023-05-08

## 2023-04-30 RX ORDER — MAGNESIUM SULFATE HEPTAHYDRATE 40 MG/ML
2 INJECTION, SOLUTION INTRAVENOUS
Status: DISCONTINUED | OUTPATIENT
Start: 2023-04-30 | End: 2023-05-08

## 2023-04-30 RX ORDER — HEPARIN SODIUM,PORCINE/D5W 25000/250
0-40 INTRAVENOUS SOLUTION INTRAVENOUS CONTINUOUS
Status: DISCONTINUED | OUTPATIENT
Start: 2023-04-30 | End: 2023-05-02

## 2023-04-30 RX ORDER — GADOBUTROL 604.72 MG/ML
9 INJECTION INTRAVENOUS
Status: COMPLETED | OUTPATIENT
Start: 2023-04-30 | End: 2023-04-30

## 2023-04-30 RX ORDER — MAGNESIUM SULFATE HEPTAHYDRATE 40 MG/ML
4 INJECTION, SOLUTION INTRAVENOUS
Status: DISCONTINUED | OUTPATIENT
Start: 2023-04-30 | End: 2023-05-08

## 2023-04-30 RX ORDER — POTASSIUM CHLORIDE 7.45 MG/ML
40 INJECTION INTRAVENOUS
Status: DISCONTINUED | OUTPATIENT
Start: 2023-04-30 | End: 2023-05-08

## 2023-04-30 RX ADMIN — ONDANSETRON 4 MG: 2 INJECTION INTRAMUSCULAR; INTRAVENOUS at 01:04

## 2023-04-30 RX ADMIN — INSULIN ASPART 1 UNITS: 100 INJECTION, SOLUTION INTRAVENOUS; SUBCUTANEOUS at 05:04

## 2023-04-30 RX ADMIN — ASPIRIN 81 MG: 81 TABLET, COATED ORAL at 11:04

## 2023-04-30 RX ADMIN — METOPROLOL TARTRATE 50 MG: 50 TABLET, FILM COATED ORAL at 09:04

## 2023-04-30 RX ADMIN — POTASSIUM CHLORIDE 60 MEQ: 7.46 INJECTION, SOLUTION INTRAVENOUS at 06:04

## 2023-04-30 RX ADMIN — METOPROLOL TARTRATE 50 MG: 50 TABLET, FILM COATED ORAL at 11:04

## 2023-04-30 RX ADMIN — GADOBUTROL 9 ML: 604.72 INJECTION INTRAVENOUS at 05:04

## 2023-04-30 RX ADMIN — MUPIROCIN: 20 OINTMENT TOPICAL at 09:04

## 2023-04-30 RX ADMIN — ATORVASTATIN CALCIUM 80 MG: 40 TABLET, FILM COATED ORAL at 11:04

## 2023-04-30 RX ADMIN — SENNOSIDES AND DOCUSATE SODIUM 1 TABLET: 50; 8.6 TABLET ORAL at 09:04

## 2023-04-30 RX ADMIN — HEPARIN SODIUM AND DEXTROSE 12 UNITS/KG/HR: 10000; 5 INJECTION INTRAVENOUS at 06:04

## 2023-04-30 NOTE — ANESTHESIA PROCEDURE NOTES
Arterial    Diagnosis: stroke  Doctor requesting consult: DALE VILLANUEVA    Patient location during procedure: done in OR  Procedure start time: 4/29/2023 10:02 PM  Timeout: 4/29/2023 10:01 PM  Procedure end time: 4/29/2023 10:03 PM    Staffing  Authorizing Provider: Salvador Deleon MD  Performing Provider: Salvador Deleon MD    Anesthesiologist was present at the time of the procedure.    Preanesthetic Checklist  Completed: patient identified, IV checked, risks and benefits discussed, surgical consent, monitors and equipment checked, pre-op evaluation, timeout performed and anesthesia consent givenArterial  Skin Prep: chlorhexidine gluconate  Local Infiltration: lidocaine  Orientation: left  Location: radial    Catheter Size: 20 G  Catheter placement by Ultrasound guidance. Heme positive aspiration all ports.   Vessel Caliber: medium, patent, compressibility normal  Vascular Doppler:  not done  Needle advanced into vessel with real time Ultrasound guidance.  Guidewire confirmed in vessel.Insertion Attempts: 1  Assessment  Dressing: secured with tape and tegaderm  Patient: Tolerated well

## 2023-04-30 NOTE — SUBJECTIVE & OBJECTIVE
Objective:     Vitals:  Temp: 98.2 °F (36.8 °C)  Pulse: 64  Rhythm: normal sinus rhythm  BP: (!) 108/56  MAP (mmHg): 76  SpO2: (!) 94 %    Temp  Min: 97.3 °F (36.3 °C)  Max: 98.2 °F (36.8 °C)  Pulse  Min: 60  Max: 93  BP  Min: 86/50  Max: 156/97  MAP (mmHg)  Min: 65  Max: 115  Resp  Min: 16  Max: 22  SpO2  Min: 93 %  Max: 100 %    04/29 0701 - 04/30 0700  In: 1200 [P.O.:800; I.V.:400]  Out: 1101 [Urine:1100]   Unmeasured Output  Urine Occurrence: 1  Stool Occurrence: 0       Physical Exam  Vitals and nursing note reviewed.   Constitutional:       General: She is not in acute distress.  HENT:      Head: Normocephalic and atraumatic.      Mouth/Throat:      Mouth: Mucous membranes are moist.   Eyes:      Extraocular Movements: Extraocular movements intact.      Conjunctiva/sclera: Conjunctivae normal.      Pupils: Pupils are equal, round, and reactive to light.   Cardiovascular:      Rate and Rhythm: Normal rate and regular rhythm.      Pulses:           Dorsalis pedis pulses are detected w/ Doppler on the right side and 2+ on the left side.   Pulmonary:      Effort: Pulmonary effort is normal. No respiratory distress.   Abdominal:      General: Abdomen is flat. There is no distension.      Palpations: Abdomen is soft.      Tenderness: There is no abdominal tenderness. There is no guarding.   Musculoskeletal:         General: No swelling or deformity.   Skin:     General: Skin is warm.   Neurological:   Alert, tracks examiner  --GCS:  E4 V1 M4  --Mental Status: Alert, mute, expressive aphasia - attempts to follow some simple verbal commands with word substitution  --CN II-XII: PERRLA, R facial droop, L gaze preference, R hemianopia   --Motor: Rt side withdrawal to pain, briefly antigravity with drift downwards to bed, L side no drift and volitional   --sensory: diminished to R side           Medications:  Continuous Scheduledaspirin, 81 mg, Daily  aspirin, 300 mg, Once  atorvastatin, 80 mg, Daily  metoprolol  tartrate, 50 mg, BID  senna-docusate 8.6-50 mg, 1 tablet, BID    PRNacetaminophen, 650 mg, Q4H PRN  albuterol-ipratropium, 3 mL, Q4H PRN  calcium gluconate IVPB, 1 g, PRN  calcium gluconate IVPB, 2 g, PRN  calcium gluconate IVPB, 3 g, PRN  dextrose 10%, 12.5 g, PRN  dextrose 10%, 25 g, PRN  dextrose, 15 g, PRN  dextrose, 30 g, PRN  glucagon (human recombinant), 1 mg, PRN  insulin aspart U-100, 1-10 Units, Q6H PRN  labetalol, 10 mg, Q6H PRN  magnesium sulfate IVPB, 2 g, PRN  magnesium sulfate IVPB, 4 g, PRN  ondansetron, 4 mg, Q8H PRN  potassium chloride, 40 mEq, PRN   And  potassium chloride, 60 mEq, PRN   And  potassium chloride, 80 mEq, PRN  senna-docusate 8.6-50 mg, 2 tablet, BID PRN  sodium chloride 0.9%, 10 mL, Q12H PRN  sodium chloride 0.9%, 10 mL, PRN  sodium phosphate IVPB, 15 mmol, PRN  sodium phosphate IVPB, 20.01 mmol, PRN  sodium phosphate IVPB, 30 mmol, PRN      Today I personally reviewed pertinent medications, lines/drains/airways, imaging, cardiology results, laboratory results, microbiology results,     Diet  Diet NPO Except for: Medication, Sips with Medication  Diet NPO Except for: Medication, Sips with Medication

## 2023-04-30 NOTE — ANESTHESIA PROCEDURE NOTES
Intubation    Date/Time: 4/29/2023 10:06 PM  Performed by: Anuradha Cherry CRNA  Authorized by: Anuradha Cherry CRNA     Intubation:     Induction:  Rapid sequence induction    Intubated:  Postinduction    Mask Ventilation:  Not attempted    Attempts:  1    Attempted By:  CRNA    Method of Intubation:  Video laryngoscopy    Blade:  Martino 3    Laryngeal View Grade: Grade I - full view of cords      Difficult Airway Encountered?: No      Complications:  None    Airway Device:  Oral endotracheal tube    Airway Device Size:  7.0    Style/Cuff Inflation:  Cuffed (inflated to minimal occlusive pressure)    Tube secured:  22    Secured at:  The lips    Placement Verified By:  Capnometry    Complicating Factors:  None    Findings Post-Intubation:  BS equal bilateral and atraumatic/condition of teeth unchanged

## 2023-04-30 NOTE — PLAN OF CARE
[facility] Care Plan    POC reviewed with Diomedes Mohr and family at 0300. Pt unable to verbalize understanding.  Pt progressing toward goals. Will continue to monitor. See below and flowsheets for full assessment and VS info.     - CTH complete  - replacing potassium  - emesis episode x1  - VSS  - neuro exam stable         Is this a stroke patient? yes- Stroke booklet reviewed with patient and family, risk factors identified for patient and stroke booklet remains at bedside for ongoing education.     Neuro:  Coleman Coma Scale  Best Eye Response: 4-->(E4) spontaneous  Best Motor Response: 6-->(M6) obeys commands  Best Verbal Response: 1-->(V1) none (ariana; expressive aphasia)  [place] Coma Scale Score: 11        24hr Temp:  [97.3 °F (36.3 °C)-98 °F (36.7 °C)]     CV:   Rhythm: normal sinus rhythm  BP goals:   SBP < 160  MAP > 65    Resp:           Plan: N/A    GI/:     Diet/Nutrition Received: NPO  Last Bowel Movement: 04/27/23       Intake/Output Summary (Last 24 hours) at 4/30/2023 0556  Last data filed at 4/29/2023 2330  Gross per 24 hour   Intake 1200 ml   Output 1101 ml   Net 99 ml     Unmeasured Output  Urine Occurrence: 1    Labs/Accuchecks:  Recent Labs   Lab 04/30/23  0345   WBC 10.38   RBC 4.41   HGB 13.0   HCT 39.2         Recent Labs   Lab 04/30/23  0345      K 3.4*   CO2 26      BUN 12   CREATININE 0.8   ALKPHOS 146*   ALT 12   AST 17   BILITOT 0.6      Recent Labs   Lab 04/29/23 2059   INR 1.0   APTT 50.8*    No results for input(s): CPK, CPKMB, TROPONINI, MB in the last 168 hours.    Electrolytes: Electrolytes replaced  Accuchecks: Q6H    Gtts:      LDA/Wounds:  Lines/Drains/Airways       Arterial Line  Duration             Arterial Line 04/29/23 2202 Left Radial <1 day              Peripheral Intravenous Line  Duration                  Peripheral IV - Single Lumen 04/27/23 2029 20 G Left Antecubital 2 days         Peripheral IV - Single Lumen 04/29/23 18 G Posterior;Right Hand 1 day                   Wounds: No  Wound care consulted: No       Problem: Adult Inpatient Plan of Care  Goal: Patient-Specific Goal (Individualized)  Outcome: Ongoing, Progressing     Problem: Fall Injury Risk  Goal: Absence of Fall and Fall-Related Injury  Outcome: Ongoing, Progressing     Problem: Communication Impairment (Stroke, Ischemic/Transient Ischemic Attack)  Goal: Improved Communication Skills  Outcome: Ongoing, Progressing

## 2023-04-30 NOTE — ASSESSMENT & PLAN NOTE
SBP goal <160 post successful thrombectomy   -hold PO meds overnight  -restart home heart failure meds in am   -PRN labetalol and hydralazine

## 2023-04-30 NOTE — HPI
Pt is a 62 y.o. female with PMHx of HTN, DM2, HLD, prior stroke, PE (on xarelto), CAD HFrEF, AICD who presents to [facility] as an acute stroke code from the hospital floor. Patient was admitted to hospital medicine on 4/27 for an acute arterial occlusion of her R popliteal artery and started on a high intensity heparin gtt. Vascular surgery and hematology saw the patient and recommended the heparin gtt with bridge to pradaxa after 5 days. This evening patient was noted to not be able to talk or move her right side. LKN 20:15. A stroke code was called and CTA revealed acute L M2 occlusion. She was transferred to [facility] and then taken for emergent thrombectomy TICI 2c. She is admitted to [facility] for higher level care and neurologic monitoring.

## 2023-04-30 NOTE — ASSESSMENT & PLAN NOTE
Admission to the hospital for RLE popliteal artery occlusion   -heme/onc and vascular surgery both saw patient   -patient failed management with xarelto at home, transitioned to heparin gtt with plan for pradaxa  -now with acute stroke, hold heparin gtt overnight until post procedure imaging complete   -continue daily ASA and statin   -neurovascular checks     4/30: Heparin gtt held pending cranial imaging, plan to resume ASAP continue neuro vascular checks

## 2023-04-30 NOTE — HOSPITAL COURSE
Diomedes Mohr is a 62 y.o. female with L. MCA stroke NIHSS 24 on 4/29 after originally admitted for R. Popliteal A occlusion on heparin gtt. She was transferred  after developing aphasia and R. Hemiplegia. She was not a candidate for TNK bc she was on anticoagulation. Transferred to [facility] s/p MT with TICI 2c reperfusion. Repeat CTH after IR with no acute infarct. MRI completed showing acute L. MCA infarct. Pacemaker reinitiated post MRI to normal settings. Started Pradaxa per Heme/Onc. Neuro exam steadily improved after thrombectomy and therapy (5/12 NIHSS 6). CTH overnight stable. Heparin gtt transitioned to Pradaxa today for AC of reduced EF and VTE history. Speech cleared for minced/moist diet. Patient to be discharged today 5/12/23 to SNF. Continue ASA, pradaxa, atorvastatin 80mg, BP meds, and aggressive diet and exercise. Education given about mediteranean diet. Follow up with VN and PCP in 4-6 weeks. She will also need cardiology follow up given HFrEF 10-15%.

## 2023-04-30 NOTE — ASSESSMENT & PLAN NOTE
Stroke risk factor  Admitted for dvt was on heparin gtt  Restart heparin after ruling out hemorraghic conversion post IR procedure

## 2023-04-30 NOTE — SIGNIFICANT EVENT
[facility] Medicine Rapid Response Note      Admit Date: 4/27/2023  LOS: 2  Code Status: Full Code   CC: Cerebrovascular accident (CVA) due to occlusion of right middle cerebral artery  Date of Consult: 04/29/2023  RRT Indication(s): acute neuro change  Attending Physician: Kallie Tristan MD  Primary Service: [facility]    SUBJECTIVE:     Interval history: Notified by nursing that patient had an acute mental status change with R sided weakness. LKN was 3-5 minutes prior to the change. Nursing report VSS and blood glucose normal. Ordered stat head CT and asked nursing to call a stroke code. Upon arrival to room patient in CT. In CT, bedside nurse and vascular neurology present. Patient alert with global aphasia.     OBJECTIVE:     Vital Signs (Most Recent):  Temp: 97.3 °F (36.3 °C) (04/29/23 1609)  Pulse: 82 (04/29/23 2023)  Resp: 16 (04/29/23 1609)  BP: (!) 156/97 (04/29/23 2023)  SpO2: 98 % (04/29/23 2023)   Vital Signs (24h Range):  Temp:  [97.3 °F (36.3 °C)-98.3 °F (36.8 °C)] 97.3 °F (36.3 °C)  Pulse:  [69-86] 82  Resp:  [16-20] 16  SpO2:  [96 %-99 %] 98 %  BP: ()/(55-97) 156/97     I & O (24h):    Intake/Output Summary (Last 24 hours) at 4/29/2023 2119  Last data filed at 4/29/2023 1612  Gross per 24 hour   Intake 1060 ml   Output 1100 ml   Net -40 ml        Physical Exam: Physical Exam   Constitutional: No distress.   HENT:   Head: Normocephalic and atraumatic.   Mouth/Throat: Mucous membranes are moist.   Eyes: A visual field deficit is present.   Cardiovascular: Normal rate and regular rhythm. Pulmonary:      Effort: Pulmonary effort is normal. No respiratory distress.      Breath sounds: No wheezing, rhonchi or rales.     Neurological: She is alert. She displays weakness (R sided) and facial asymmetry (Right sided facial droop).   Patient alert with global aphasia. Intermittently following commands. Moving LUE and LLE spontaneously. RUE and RLE with no spontaneous movement but withdraws to pain.     Psychiatric: She is noncommunicative.   Nursing note and vitals reviewed.    Lines/Drains/Airway:           Nutrition:  Current Diet Order   Procedures    Diet diabetic [facility]; 2000 Calorie; Cardiac (Low Na/Chol); Fluid - 1500mL     Order Specific Question:   Indicate patient location for additional diet options:     Answer:   [facility]     Order Specific Question:   Total calories:     Answer:   2000 Calorie     Order Specific Question:   Additional Diet Options:     Answer:   Cardiac (Low Na/Chol)     Order Specific Question:   Fluid restriction:     Answer:   Fluid - 1500mL         Labs/Imaging- All pertinent labs within the past 24 hours have been reviewed.  CBC:   Recent Labs   Lab 04/28/23  0759 04/29/23  0533 04/29/23 2059   WBC 6.97 6.43 7.75   HGB 12.3 13.4 13.0   HCT 39.1 42.0 38.8    204 252     CMP:   Recent Labs   Lab 04/28/23  0759 04/29/23  0533    140   K 3.7 3.6    104   CO2 25 25   * 115*   BUN 12 9   CREATININE 0.8 0.7   CALCIUM 9.3 9.6   ANIONGAP 9 11     Coagulation:   Recent Labs   Lab 04/27/23  2349 04/28/23  0759 04/29/23 2059   INR 1.0  --   --    APTT 27.6   < > 50.8*    < > = values in this interval not displayed.       Diagnostics/Interventions during Rapid response     - Stat CTA stroke multiphase   - Stat CMP, CBC, PT/INR, and PTT ordered    ASSESSMENT/PLAN:     Active Hospital Problems    Diagnosis    *Cerebrovascular accident (CVA) due to occlusion of right middle cerebral artery    Cerebrovascular accident (CVA) due to embolism of left middle cerebral artery    Critical lower limb ischemia    Bilateral primary osteoarthritis of hip    Hypercoagulopathy    Pulmonary hypertension due to left heart disease    History of pulmonary embolism    ICD (implantable cardioverter-defibrillator) in place    Coronary artery disease involving native heart    Chronic combined systolic and diastolic congestive heart failure    Dilated cardiomyopathy     Hypertension    Hyperlipemia    Type 2 diabetes mellitus without complication, without long-term current use of insulin          The patient was evaluated by vascular neurology and underwent CTA stroke multiphase which showed L anterior circulation LVO. IR and NCC were consulted. Patient accepted to neuro ICU for higher level of care and emergent neuroendovascular intervention. Discussed plan of care with rapid response team, bedside nurse, and patient's family.    Uninterrupted Critical Care time (not including procedures): 30

## 2023-04-30 NOTE — HOSPITAL COURSE
4/30/23: To mechanical thrombectomy overnight with TICI 2c clot evacuation, this morning remains expressively aphasic with some weakness on Rt, but RLE/RUE antigravity at times spontaneously.  Heparin remains held since procedure, pending ICD evaluation to assess ability to obtain urgent MRI for characterization of stroke burden prior to resumption of anticoagulation.  Aspirin ordered given lack of hemorrhage on post-thrombectomy CT.   05/01/2023: Heparin gtt therapeutic, CTH  05/02/2023: CTH overnight stable, diet advanced to mechanical soft, begin dabigatran, TTF under stroke service.   5/3/2023 CTH stable, Transfer to stroke service, Electrolyte replacement

## 2023-04-30 NOTE — ASSESSMENT & PLAN NOTE
Admission to the hospital for RLE popliteal artery occlusion   -heme/onc and vascular surgery both saw patient   -patient failed management with xarelto at home, transitioned to heparin gtt with plan for pradaxa  -now with acute stroke, hold heparin gtt overnight until post procedure imaging complete   -continue daily ASA and statin   -neurovascular checks

## 2023-04-30 NOTE — CONSULTS
Inpatient consult to Physical Medicine Rehab  Consult performed by: Nohemi Morse NP  Consult ordered by: Sandra Srivastava PA-C    Consult received.  Reviewed patient history and current admission.  PM&R following. Will follow up with pt once medically stable and able to participate with therapies.       Nohemi Morse NP  Physical Medicine & Rehabilitation   04/30/2023

## 2023-04-30 NOTE — TRANSFER OF CARE
"Anesthesia Transfer of Care Note    Patient: Diomedes Mohr    Procedure(s) Performed: * No procedures listed *    Patient location: ICU    Anesthesia Type: general    Transport from OR: Transported from OR on 6-10 L/min O2 by face mask with adequate spontaneous ventilation. Continuous ECG monitoring in transport. Continuous SpO2 monitoring in transport. Continuos invasive BP monitoring in transport    Post pain: adequate analgesia    Post assessment: no apparent anesthetic complications and tolerated procedure well    Post vital signs: stable    Level of consciousness: awake and alert    Nausea/Vomiting: no nausea/vomiting    Complications: none    Transfer of care protocol was followed      Last vitals:   Visit Vitals  /72   Pulse 74   Temp 36.3 °C (97.3 °F) (Oral)   Resp 18   Ht 5' 7" (1.702 m)   Wt 85.7 kg (189 lb)   LMP  (LMP Unknown)   SpO2 97%   BMI 29.60 kg/m²     "

## 2023-04-30 NOTE — BRIEF OP NOTE
Radiology Post-Procedure Note     Pre Op Diagnosis: AI secondary to L MCA M1 bifurcation LVO    Post Op Diagnosis: AIS secondary to L distal M2-M3 stem vessel MeVO     Procedure: Cerebral angiogram and aspiration thrombectomy x3     Procedure performed by: Rudi Lake MD     Written Informed Consent Obtained: Yes     Specimen Removed: NO     Estimated Blood Loss: Minimal     Procedure report:      A 8F sheath was placed into the left common femoral artery and a 5F Mauricio was advanced into the aortic arch.  The left ICA were subselected and angiography of the brain was performed. There is partial recanalziation of prior seen M1 LVO on pre-operative CTA. There is persistent occlusion of a distal L M2 stem vessel off the inferior trunk. A 4-max was then advanced through an 8F balloon guide over a velocity microcatheter and 0.016 Fathom wire and advanced to the clot face. Aspiration thrombectomy was then performed with mTICI 2B recanalization. Aspiration thrombectomy was repeated once more with interval partial recanalization for mTICI 2C perfusion. There is persistent occlusion of a small distal M3 branch feeding the temporoparietal region that was not safely amenable to intervention.      Preliminary interpretation:      L M2 MeVO of inferior trunk stem vessel.  Endovascular recanalziation. mTICI 2C.     Please see Imaging report for full details.     A left common femoral artery angiogram was performed, the sheath removed and hemostasis achieved using a perclose closure device.  No hematoma was present at the time of hemostasis.     The patient tolerated the procedure well.

## 2023-04-30 NOTE — SUBJECTIVE & OBJECTIVE
Past Medical History:   Diagnosis Date    Acute on chronic combined systolic and diastolic heart failure 2019    Anticoagulant long-term use     Anxiety     Arthritis     Asthma     Depression     H/O: hysterectomy     Hyperlipemia     Hypertension     Pulmonary edema     Schizophrenia      Past Surgical History:   Procedure Laterality Date    BLADDER SUSPENSION      CARPAL TUNNEL RELEASE Right     HEEL SPUR SURGERY Left     HYSTERECTOMY      LEFT HEART CATHETERIZATION Bilateral 2019    Procedure: Left heart cath;  Surgeon: Steve Chambers MD;  Location: [facility] CATH LAB;  Service: Cardiology;  Laterality: Bilateral;    RIGHT HEART CATHETERIZATION Right 2021    Procedure: INSERTION, CATHETER, RIGHT HEART;  Surgeon: Petr Naranjo MD;  Location: [facility] CATH LAB;  Service: Cardiology;  Laterality: Right;    RIGHT HEART CATHETERIZATION Right 2022    Procedure: INSERTION, CATHETER, RIGHT HEART;  Surgeon: Chandana Ivory Jr., MD;  Location: [facility] CATH LAB;  Service: Cardiology;  Laterality: Right;    TUBAL LIGATION       Family History   Problem Relation Age of Onset    No Known Problems Mother     No Known Problems Father      Social History     Tobacco Use    Smoking status: Former     Types: Cigarettes     Quit date: 2016     Years since quittin.6    Smokeless tobacco: Never    Tobacco comments:     nonex 2 weeks   Substance Use Topics    Alcohol use: No     Alcohol/week: 0.0 standard drinks    Drug use: No     Review of patient's allergies indicates:   Allergen Reactions    Adhesive Blisters    Captopril Other (See Comments)     COUGH       Medications: I have reviewed the current medication administration record.    Medications Prior to Admission   Medication Sig Dispense Refill Last Dose    acetaminophen (TYLENOL) 325 MG tablet Take 2 tablets (650 mg total) by mouth every 8 (eight) hours as needed. 30 tablet 0     albuterol (PROVENTIL) 2.5 mg /3 mL (0.083 %) nebulizer  solution Take 3 mLs (2.5 mg total) by nebulization every 6 (six) hours as needed for Wheezing or Shortness of Breath. Rescue 3 mL 0     albuterol (PROVENTIL/VENTOLIN HFA) 90 mcg/actuation inhaler Inhale 2 puffs into the lungs every 6 (six) hours as needed for Wheezing or Shortness of Breath. 18 g 2     atorvastatin (LIPITOR) 80 MG tablet Take 1 tablet (80 mg total) by mouth once daily. 90 tablet 3     dulaglutide (TRULICITY) 1.5 mg/0.5 mL pen injector Inject 1.5 mg into the skin once a week.       empagliflozin (JARDIANCE) 10 mg tablet Take 1 tablet (10 mg total) by mouth once daily. 90 tablet 6     glimepiride (AMARYL) 4 MG tablet Take 4 mg by mouth 2 (two) times a day.       ipratropium-albuteroL (COMBIVENT)  mcg/actuation inhaler Inhale 1 puff into the lungs 4 (four) times daily. Rescue 4 g 3     metFORMIN (GLUCOPHAGE) 1000 MG tablet Take 1,000 mg by mouth 2 (two) times daily.        metoprolol succinate (TOPROL-XL) 100 MG 24 hr tablet Take 1 tablet (100 mg total) by mouth 2 (two) times daily. 90 tablet 6     rivaroxaban (XARELTO) 20 mg Tab Take 1 tablet (20 mg total) by mouth daily with dinner or evening meal. 30 tablet 6     sacubitriL-valsartan (ENTRESTO)  mg per tablet Take 1 tablet by mouth 2 (two) times daily. 90 tablet 3     spironolactone (ALDACTONE) 25 MG tablet Take 1 tablet (25 mg total) by mouth once daily. 30 tablet 6     torsemide (DEMADEX) 10 MG Tab Take 1 tablet (10 mg total) by mouth once daily. 60 tablet 6     traMADoL (ULTRAM) 50 mg tablet Take 100 mg by mouth every 12 (twelve) hours as needed for Pain.          Review of Systems   Unable to perform ROS: Acuity of condition   Neurological:  Positive for speech difficulty and weakness.   Objective:     Vital Signs (Most Recent):  Temp: 97.3 °F (36.3 °C) (04/29/23 1609)  Pulse: 82 (04/29/23 2023)  Resp: 16 (04/29/23 1609)  BP: (!) 156/97 (04/29/23 2023)  SpO2: 98 % (04/29/23 2023)    Vital Signs Range (Last 24H):  Temp:  [97.3 °F  (36.3 °C)-98.3 °F (36.8 °C)]   Pulse:  [69-86]   Resp:  [16-20]   BP: ()/(55-97)   SpO2:  [96 %-99 %]     Physical Exam  Constitutional:       General: She is not in acute distress.  HENT:      Head: Normocephalic.   Eyes:      Extraocular Movements:      Right eye: Abnormal extraocular motion present.      Left eye: Abnormal extraocular motion present.   Cardiovascular:      Rate and Rhythm: Normal rate.   Pulmonary:      Effort: Pulmonary effort is normal.   Abdominal:      General: Abdomen is flat.   Musculoskeletal:         General: Normal range of motion.   Skin:     General: Skin is warm and dry.   Neurological:      Mental Status: She is alert.      Cranial Nerves: Dysarthria and facial asymmetry present.      Motor: Weakness present.   Psychiatric:         Mood and Affect: Mood normal.       Neurological Exam:   LOC: alert  Attention Span: Good   Language: Global aphasia  Articulation: Mute/Anarthric  Orientation: Untestable due to severe aphasia   Visual Fields: Hemianopsia right  EOM (CN III, IV, VI): Gaze preference  left  Facial Movement (CN VII): Upper facial weakness on the Left  Motor: Arm left  Normal 5/5  Leg left  Normal 5/5  Arm right  Plegia 0/5  Leg right Plegia 0/5  Cerebellum: No evidence of appendicular or axial ataxia      Laboratory:  CMP:   Recent Labs   Lab 04/29/23  0533   CALCIUM 9.6      K 3.6   CO2 25      BUN 9   CREATININE 0.7     CBC:   Recent Labs   Lab 04/29/23 2059   WBC 7.75   RBC 4.39   HGB 13.0   HCT 38.8      MCV 88   MCH 29.6   MCHC 33.5     Lipid Panel: No results for input(s): CHOL, LDLCALC, HDL, TRIG in the last 168 hours.  Coagulation:   Recent Labs   Lab 04/29/23 2059   INR 1.0   APTT 50.8*     Hgb A1C: No results for input(s): HGBA1C in the last 168 hours.  TSH: No results for input(s): TSH in the last 168 hours.    Diagnostic Results:      Brain imaging:  CTA- L M2 occlusion

## 2023-04-30 NOTE — PLAN OF CARE
Problem: SLP  Goal: SLP Goal  Description: Speech Language Pathology Goals  Goals expected to be met by 5/7  1. Pt will tolerate puree & nectar thick liquids without s/s aspiration & adequate oral phase of swallow  2. Ongoing swallow assessment to determine least restrictive PO consistencies  3. SLP Speech/Language/Cognitive Evaluation    Outcome: Ongoing, Progressing    SLP Clinical Swallow Evaluation completed. See note for details.

## 2023-04-30 NOTE — PLAN OF CARE
[facility] Care Plan    POC reviewed with Diomedes Mohr and family at 1400. Patient's  verbalized understanding; [patient nods appropriately to questions. Questions and concerns addressed. No acute events today. Pt progressing toward goals. Will continue to monitor. See below and flowsheets for full assessment and VS info.     -Potassium replaced today  -Neuro exam improving today with little verbalization and RUE movement  -MRI not completed today due to pacemaker interrogation order placed  -Advanced to puree/nectar thin liquid (NO STRAWS) Meds whole with sips of water  -Stroke education provided today        Is this a stroke patient? yes- Stroke booklet reviewed with patient and family, risk factors identified for patient and stroke booklet remains at bedside for ongoing education.     Neuro:  [place] Coma Scale  Best Eye Response: 4-->(E4) spontaneous  Best Motor Response: 6-->(M6) obeys commands  Best Verbal Response: 1-->(V1) none  [place] Coma Scale Score: 11        24 hr Temp:  [97.3 °F (36.3 °C)-98.2 °F (36.8 °C)]     CV:   Rhythm: normal sinus rhythm  BP goals:   SBP < 160  MAP > 65    Resp:           Plan: N/A    GI/:     Diet/Nutrition Received: NPO  Last Bowel Movement: 04/27/23  Voiding Characteristics: due to void    Intake/Output Summary (Last 24 hours) at 4/30/2023 1512  Last data filed at 4/30/2023 1300  Gross per 24 hour   Intake 1919 ml   Output 401 ml   Net 1518 ml     Unmeasured Output  Urine Occurrence: 1  Stool Occurrence: 0    Labs/Accuchecks:  Recent Labs   Lab 04/30/23  0345   WBC 10.38   RBC 4.41   HGB 13.0   HCT 39.2         Recent Labs   Lab 04/30/23  0845      K 3.8   CO2 27      BUN 12   CREATININE 0.7   ALKPHOS 141*   ALT 12   AST 14   BILITOT 0.5      Recent Labs   Lab 04/29/23  2059   INR 1.0   APTT 50.8*    No results for input(s): CPK, CPKMB, TROPONINI, MB in the last 168 hours.    Electrolytes: Electrolytes replaced  Accuchecks:  Q6H    Gtts:      LDA/Wounds:  Lines/Drains/Airways       Arterial Line  Duration             Arterial Line 04/29/23 2202 Left Radial <1 day              Peripheral Intravenous Line  Duration                  Peripheral IV - Single Lumen 04/27/23 2029 20 G Left Antecubital 2 days         Peripheral IV - Single Lumen 04/29/23 18 G Posterior;Right Hand 1 day                  Wounds: No  Wound care consulted: No

## 2023-04-30 NOTE — PLAN OF CARE
Pt arrived to IR room 4 via bed on monitor w/ RN x2 & MD. Pt non-verbal. Name//allergies/procedure verified w/armband/chart/consent.Pt will be monitored by RN & CRNA @ bedside throughout procedure/sedation. Sedation/airway/vs per anesthesia team.

## 2023-04-30 NOTE — SIGNIFICANT EVENT
Contacted to make device compatible for MRI  Device Type: Abbot/ST jenna  Pt not pacemaker dependent  Will turn off pacing and tachy-therapies.    Please call [phone] after MRI for reprogramming the device.    Staci Edwards MD  Cardiovascular medicine; PGY4  [facility]  [address] [place] [address]

## 2023-04-30 NOTE — ASSESSMENT & PLAN NOTE
Stroke risk factor  Admitted for r popliteal artery occlusion, was on heparin gtt  Restart heparin after ruling out hemorraghic conversion post IR procedure

## 2023-04-30 NOTE — PROGRESS NOTES
MRI called to obtain time for ordered scan. Per tech, awaiting clearance from radiologist at this time due to patient being aphasic and no family at bedside.

## 2023-04-30 NOTE — PLAN OF CARE
OT evaluation completed, POC established as appropriate. OT recommending Rehab once medically stable for discharge.    Problem: Occupational Therapy  Goal: Occupational Therapy Goal  Description: Goals set on 4/30, with expiration date 5/30:  Patient will increase functional independence with ADLs by performing:    Bed mobility with Min A  Grooming while standing at sink with Min A  UB Dressing with Min A.  LB Dressing with Min A.  Toileting from toilet with Min A for hygiene and clothing management.   Functional mobility of household and community distance with Mod A and AD as needed  Pt will demonstrate understanding of education provided regarding energy conservation and task modification through teach-back method.  Pt will demonstrate [place] in HEP for BUE strengthening GM/FM exercises to improve functional performance.     Outcome: Ongoing, Progressing

## 2023-04-30 NOTE — NURSING
CTH completed.  Pt travelled with nurse x2.  Pt was connected to tele and tolerated the scan well.  On the way back up from CT the pt had an emesis episode x1. Zofran was given when we arrived back on [facility]. VS remain stable and neuro exam remains stable.  CHARLIE Miranda aware of situation.

## 2023-04-30 NOTE — PT/OT/SLP EVAL
Speech-Language Pathology Evaluation  Bedside Swallow    Patient Name:  Diomedes Mohr   MRN:  [id number]  Admitting Diagnosis: Cerebrovascular accident (CVA) due to embolism of left middle cerebral artery    Recommendations:                 General Recommendations:  Dysphagia therapy and Cognitive-linguistic evaluation  Diet recommendations:  Puree, Nectar Thick WITH CLOSE MONITORING. PLEASE RETURN TO NPO IF ANY CONCERN OR S/S ASPIRATION  Aspiration Precautions: 1 bite/sip at a time, Assistance with meals and Assistance with thickening liquids, Double swallow with each bite/sip, Eliminate distractions, Feed only when awake/alert, Frequent oral care, HOB to 90 degrees, Meds whole 1 at a time, Monitor for s/s of aspiration, No straws, Remain upright 30 minutes post meal, Small bites/sips, and Strict aspiration precautions   General Precautions: Standard, aspiration, fall, pureed diet, nectar thick    History:     Past Medical History:   Diagnosis Date    Acute on chronic combined systolic and diastolic heart failure 12/26/2019    Anticoagulant long-term use     Anxiety     Arthritis     Asthma     Depression     H/O: hysterectomy     Hyperlipemia     Hypertension     Pulmonary edema     Schizophrenia        Past Surgical History:   Procedure Laterality Date    BLADDER SUSPENSION      CARPAL TUNNEL RELEASE Right     HEEL SPUR SURGERY Left     HYSTERECTOMY      LEFT HEART CATHETERIZATION Bilateral 12/27/2019    Procedure: Left heart cath;  Surgeon: Steve Chambers MD;  Location: [facility] CATH LAB;  Service: Cardiology;  Laterality: Bilateral;    RIGHT HEART CATHETERIZATION Right 7/26/2021    Procedure: INSERTION, CATHETER, RIGHT HEART;  Surgeon: Petr Naranjo MD;  Location: [facility] CATH LAB;  Service: Cardiology;  Laterality: Right;    RIGHT HEART CATHETERIZATION Right 5/12/2022    Procedure: INSERTION, CATHETER, RIGHT HEART;  Surgeon: Chandana Ivory Jr., MD;  Location: [facility] CATH LAB;  Service: Cardiology;   Laterality: Right;    TUBAL LIGATION         Chest X-Rays: 4/29 . Mildly coarse central hilar interstitial attenuation, accentuated by habitus.  Early edema is a consideration although correlation is needed.    Prior diet: regular    Subjective   Awake.  at bedside. NSG reports pt tolerated thin with meds.     Pain/Comfort:  Pain Rating 1: 0/10 (none indicated at this time)  Pain Rating Post-Intervention 2:  (same)    Respiratory Status: Room air    Objective:     Oral Musculature Evaluation  Oral Musculature: right weakness  Oral Labial Strength and Mobility: functional retraction, impaired pursing (mild)  Lingual Strength and Mobility: impaired protrusion, impaired anterior elevation (mild)  Volitional Cough: strong spontaneous cough  Volitional Swallow: not elicited  Voice Prior to PO Intake: clear    Bedside Swallow Eval:   Consistencies Assessed:  Thin liquids ice chip x1, small cup sips x2, large sip x1  Nectar thick liquids cup sips x5  Puree 1 tsp bites x4      Oral Phase:   Slow oral transit time    Pharyngeal Phase:   Delayed coughing following large sip of thin   Mildly delayed swallow initiation  No change in vocal quality pre/post trials of nectar & puree     SLP educated pt, family on all recs, precautions, risks & s/s aspiration, role of SLP, POC, etc.  verbalized understanding, pt may need reinforcement to follow. SLP communicated all with NSG who also d/w pt & family.     Assessment:     Diomedes Mohr is a 62 y.o. female with an SLP diagnosis of Dysphagia and Cognitive-Linguistic Impairment.      Goals:   Multidisciplinary Problems       SLP Goals          Problem: SLP    Goal Priority Disciplines Outcome   SLP Goal     SLP Ongoing, Progressing   Description: Speech Language Pathology Goals  Goals expected to be met by 5/7  1. Pt will tolerate puree & nectar thick liquids without s/s aspiration & adequate oral phase of swallow  2. Ongoing swallow assessment to determine least  restrictive PO consistencies  3. SLP Speech/Language/Cognitive Evaluation                         Plan:     Patient to be seen:  4 x/week   Plan of Care expires:  05/29/23  Plan of Care reviewed with:  patient, spouse   SLP Follow-Up:  Yes       Discharge recommendations:   (tbd)     Time Tracking:     SLP Treatment Date:   04/30/23  Speech Start Time:  1203  Speech Stop Time:  1223     Speech Total Time (min):  20 min    Billable Minutes: Eval Swallow and Oral Function 12 and Self Care/Home Management Training 8    04/30/2023

## 2023-04-30 NOTE — ASSESSMENT & PLAN NOTE
Acute LMCA stroke with LM2 LVO, no TNK given already anticoagulated   -Stroke code on floor, LKN 20:15  -anticoagulated on heparin gtt, will hold overnight until post procedure imaging   -Q 1 hour neuro checks   -Q 1 hour vitals     Antithrombotics for secondary stroke prevention: Antiplatelets: Aspirin: 81 mg daily    Statins for secondary stroke prevention and hyperlipidemia, if present:   Statins: Atorvastatin- 80 mg daily    Aggressive risk factor modification: HTN, DM, Diet, Exercise, Obesity, CAD     Rehab efforts: The patient has been evaluated by a stroke team provider and the therapy needs have been fully considered based off the presenting complaints and exam findings. The following therapy evaluations are needed: PT evaluate and treat, OT evaluate and treat, SLP evaluate and treat, PM&R evaluate for appropriate placement    Diagnostics ordered/pending: CT scan of head without contrast to asses brain parenchyma, CTA Head to assess vasculature , HgbA1C to assess blood glucose levels, Lipid Profile to assess cholesterol levels, MRI head without contrast to assess brain parenchyma, TTE to assess cardiac function/status , TSH to assess thyroid function    VTE prophylaxis: None: Reason for No Pharmacological VTE Prophylaxis: Currently on anticoagulation    BP parameters: Infarct: Post sucessful thrombectomy, SBP <160

## 2023-04-30 NOTE — ASSESSMENT & PLAN NOTE
Acute LMCA stroke with LM2 LVO, no TNK given already anticoagulated   -Stroke code on floor, LKN 20:15  -anticoagulated on heparin gtt, will hold overnight until post procedure imaging   -Q 1 hour neuro checks   -Q 1 hour vitals     Antithrombotics for secondary stroke prevention: Antiplatelets: Aspirin: 81 mg daily    Statins for secondary stroke prevention and hyperlipidemia, if present:   Statins: Atorvastatin- 80 mg daily    Aggressive risk factor modification: HTN, DM, Diet, Exercise, Obesity, CAD     Rehab efforts: The patient has been evaluated by a stroke team provider and the therapy needs have been fully considered based off the presenting complaints and exam findings. The following therapy evaluations are needed: PT evaluate and treat, OT evaluate and treat, SLP evaluate and treat, PM&R evaluate for appropriate placement    Diagnostics ordered/pending: CT scan of head without contrast to asses brain parenchyma, CTA Head to assess vasculature , HgbA1C to assess blood glucose levels, Lipid Profile to assess cholesterol levels, MRI head without contrast to assess brain parenchyma, TTE to assess cardiac function/status , TSH to assess thyroid function    VTE prophylaxis: None: Reason for No Pharmacological VTE Prophylaxis: Currently on anticoagulation    BP parameters: Infarct: Post sucessful thrombectomy, SBP <160    4/30: S/p TICI2c thrombectomy, pending follow up imaging in order to safely resume anticoagulation, given rectal aspirin pending this

## 2023-04-30 NOTE — ASSESSMENT & PLAN NOTE
CHF with AICD in place since 2021  -continuous cardiac monitoring     4/30: Rep to investigate/interrogate to determine ability to obtain rapid MRI

## 2023-04-30 NOTE — ASSESSMENT & PLAN NOTE
Patient is identified as having Combined Systolic and Diastolic heart failure that is Chronic. CHF is currently controlled. Latest ECHO performed and demonstrates- Results for orders placed during the hospital encounter of 04/27/23    Echo    Interpretation Summary  · The left ventricle is severely enlarged with eccentric hypertrophy and severely decreased systolic function.  · The estimated ejection fraction is 10-15%.  · There is left ventricular global hypokinesis.  · Grade II left ventricular diastolic dysfunction.  · Normal right ventricular size with normal right ventricular systolic function.  · Mild tricuspid regurgitation.  · The estimated PA systolic pressure is 35 mmHg.  · Normal central venous pressure (3 mmHg).  · Severe left atrial enlargement.  . Continue Beta Blocker, ACE/ARB and Aldactone (begin in AM when po access obtained) and monitor clinical status closely. Monitor on telemetry. Patient is on CHF pathway.  Monitor strict Is&Os and daily weights.  Continue to stress to patient importance of self efficacy and  on diet for CHF. Last BNP reviewed- and noted below   Recent Labs   Lab 04/29/23  0533   *   .

## 2023-04-30 NOTE — SUBJECTIVE & OBJECTIVE
Neurologic Chief Complaint: aphasia    Subjective:     Interval History: Patient is seen for follow-up neurological assessment and treatment recommendations: S/p TICI 2c. Repeat CTH after IR with no acute infarct. Will need MRI but has a pacemaker that will need be interrogated. Improving r arm weakness and  aphasia.    HPI, Past Medical, Family, and Social History remains the same as documented in the initial encounter.     Review of Systems   Unable to perform ROS: Patient nonverbal   Neurological:  Positive for speech difficulty and weakness.   Scheduled Meds:   aspirin  81 mg Oral Daily    aspirin  300 mg Rectal Once    atorvastatin  80 mg Oral Daily    metoprolol tartrate  50 mg Per NG tube BID    senna-docusate 8.6-50 mg  1 tablet Per NG tube BID     Continuous Infusions:  PRN Meds:acetaminophen, albuterol-ipratropium, calcium gluconate IVPB, calcium gluconate IVPB, calcium gluconate IVPB, dextrose 10%, dextrose 10%, dextrose, dextrose, glucagon (human recombinant), insulin aspart U-100, labetalol, magnesium sulfate IVPB, magnesium sulfate IVPB, ondansetron, potassium chloride **AND** potassium chloride **AND** potassium chloride, senna-docusate 8.6-50 mg, sodium chloride 0.9%, sodium chloride 0.9%, sodium phosphate IVPB, sodium phosphate IVPB, sodium phosphate IVPB    Objective:     Vital Signs (Most Recent):  Temp: 98.2 °F (36.8 °C) (04/30/23 1105)  Pulse: 64 (04/30/23 1305)  Resp: 18 (04/29/23 2130)  BP: (!) 108/56 (04/30/23 1305)  SpO2: (!) 94 % (04/30/23 1305)  BP Location: Right arm    Vital Signs Range (Last 24H):  Temp:  [97.3 °F (36.3 °C)-98.2 °F (36.8 °C)]   Pulse:  [60-93]   Resp:  [16-22]   BP: ()/(49-97)   SpO2:  [93 %-100 %]   Arterial Line BP: ()/(44-63)   BP Location: Right arm    Physical Exam  Constitutional:       General: She is not in acute distress.  HENT:      Head: Normocephalic.   Eyes:      Extraocular Movements:      Right eye: Abnormal extraocular motion present.       Left eye: Abnormal extraocular motion present.   Cardiovascular:      Rate and Rhythm: Normal rate.   Pulmonary:      Effort: Pulmonary effort is normal.   Abdominal:      General: Abdomen is flat.   Musculoskeletal:      Comments: RSW   Skin:     General: Skin is warm and dry.   Neurological:      Mental Status: She is alert.      Cranial Nerves: Dysarthria and facial asymmetry present.      Motor: Weakness present.   Psychiatric:         Mood and Affect: Mood normal.       Neurological Exam:   LOC: alert  Attention Span: Good   Language: expressive aphasia  Articulation: aphasia  Orientation: Untestable due to severe aphasia   Visual Fields: Hemianopsia right  EOM (CN III, IV, VI): Gaze preference  left  Facial Movement (CN VII): Upper facial weakness on the Left  Motor: Arm left  Normal 5/5  Leg left  Normal 5/5  Arm right  Plegia 2/5  Leg right Plegia 0/5  Cerebellum: No evidence of appendicular or axial ataxia    Laboratory:  CMP:   Recent Labs   Lab 04/30/23  0845   CALCIUM 9.2   ALBUMIN 3.1*   PROT 6.5      K 3.8   CO2 27      BUN 12   CREATININE 0.7   ALKPHOS 141*   ALT 12   AST 14   BILITOT 0.5     BMP:   Recent Labs   Lab 04/30/23  0845      K 3.8      CO2 27   BUN 12   CREATININE 0.7   CALCIUM 9.2     CBC:   Recent Labs   Lab 04/30/23  0345   WBC 10.38   RBC 4.41   HGB 13.0   HCT 39.2      MCV 89   MCH 29.5   MCHC 33.2     Lipid Panel:   Recent Labs   Lab 04/30/23  0845   CHOL 142   LDLCALC 88.6   HDL 38*   TRIG 77     Coagulation:   Recent Labs   Lab 04/29/23 2059   INR 1.0   APTT 50.8*     Platelet Aggregation Study: No results for input(s): PLTAGG, PLTAGINTERP, PLTAGREGLACO, ADPPLTAGGREG in the last 168 hours.  Hgb A1C:   Recent Labs   Lab 04/30/23  0845   HGBA1C 7.5*     TSH:   Recent Labs   Lab 04/30/23  0845   TSH 2.092       Diagnostic Results     Brain Imaging   [facility] 4/30/2023  No acute territorial infarct or intracranial hemorrhage identified.    Vessel Imaging    CTA Stroke MP 4/29/2023  CT head: No evidence for acute intracranial hemorrhage.     Stable area encephalomalacia from remote right MCA territory infarct.     CTA head: Occlusion of M2 segment of left MCA.     Asymmetric decreased opacification of the left transverse sinus and jugular vein.  Similar finding was present on the prior CTA in 2022 and therefore could be related to sluggish flow.  Suggest attention on follow-up conventional angiogram.     CTA neck: No high-grade stenosis or aneurysm.    Cardiac Imaging   Echo 4/28/2023  The left ventricle is severely enlarged with eccentric hypertrophy and severely decreased systolic function.  The estimated ejection fraction is 10-15%.  There is left ventricular global hypokinesis.  Grade II left ventricular diastolic dysfunction.  Normal right ventricular size with normal right ventricular systolic function.  Mild tricuspid regurgitation.  The estimated PA systolic pressure is 35 mmHg.  Normal central venous pressure (3 mmHg).  Severe left atrial enlargement.

## 2023-04-30 NOTE — PROGRESS NOTES
[facility] - Neuro Critical Care  Vascular Neurology  Comprehensive Stroke Center  Progress Note    Assessment/Plan:     * Cerebrovascular accident (CVA) due to embolism of left middle cerebral artery    62 y.o. female with PMH of HTN, DM2, hyperlipidemia, tobacco use (quit in 2020), stroke (2020, R MCA, no deficits), PE (December 2021, on xarelto),  CAD, DCM, HFpEF (15%) s/p AICD, Pulmonary hypertension, who is admited to  for the treatment of a right leg dvt on heparin gtt. Today around 8:15 patient was noted not to be able to talk or move her right side and a stroke code was called. Patient noted to be van+ , NIHSS 2,. L MCA syndrome. CTA showed a L M2 occlusion. Not a candidate for TNK because she was on a heaprin gtt and with therapeutic APTT. Patient will be taken to IR for thrombectomy.     S/p TICI 2c. Repeat CTH after IR with no acute infarct. Will need MRI but has a pacemaker that will need be interrogated. Improving r arm weakness and  aphasia.    Antithrombotics for secondary stroke prevention: Anticoagulants: Heparin protocol without bolus after imaging completed showing no evidence of hemorragic conversion.    Statins for secondary stroke prevention and hyperlipidemia, if present:   Statins: Atorvastatin- 40 mg daily    Aggressive risk factor modification: HTN, DM, HLD, HF, DVT     Rehab efforts: The patient has been evaluated by a stroke team provider and the therapy needs have been fully considered based off the presenting complaints and exam findings. The following therapy evaluations are needed: PT evaluate and treat, OT evaluate and treat, SLP evaluate and treat, PM&R evaluate for appropriate placement    Diagnostics ordered/pending: MRI head without contrast to assess brain parenchyma, TTE to assess cardiac function/status     VTE prophylaxis: None: Reason for No Pharmacological VTE Prophylaxis: Currently on anticoagulation    BP parameters: Infarct: Post sucessful thrombectomy, SBP  <140      Acute deep vein thrombosis (DVT)  Stroke risk factor  Admitted for dvt was on heparin gtt  Restart heparin after ruling out hemorraghic conversion post IR procedure     Global aphasia  Therapy to evaluate and treat     Right sided weakness  Therapy to evaluate and treat     Critical lower limb ischemia  Stroke risk factor  Admitted for r popliteal artery occlusion, was on heparin gtt  Restart heparin after ruling out hemorraghic conversion post IR procedure     Chronic combined systolic and diastolic congestive heart failure  Stroke risk factor  Echo ef 10%, lv hypokinesis, severe lae  GDMT    Hyperlipemia  Stroke risk factor  High intensity statin     Hypertension  Stroke Risk factor  <140 if successful thrombectomy     Type 2 diabetes mellitus without complication, without long-term current use of insulin  Stroke risk factor  Inpatient goal [id number]         4/30-S/p TICI 2c. Repeat CTH after IR with no acute infarct. Will need MRI but has a pacemaker that will need be interrogated. Improving r arm weakness and  aphasia.      STROKE DOCUMENTATION   Acute Stroke Times   Last Known Normal Date: 04/29/23  Last Known Normal Time: 2015  Symptom Onset Date: 04/29/23  Symptom Onset Time: 2015  Stroke Team Called Date: 04/29/23  Stroke Team Called Time: 2022  Stroke Team Arrival Date: 04/29/23  Stroke Team Arrival Time: 2025  CT Interpretation Time: 2038  Thrombolytic Therapy Recommended: No  CTA Interpretation Time: 2045  Thrombectomy Recommended: Yes  Decision to Treat Time for IR: 2045    NIH Scale:  1a. Level of Consciousness: 0-->Alert, keenly responsive  1b. LOC Questions: 2-->Answers neither question correctly  1c. LOC Commands: 2-->Performs neither task correctly  2. Best Gaze: 1-->Partial gaze palsy, gaze is abnormal in one or both eyes, but forced deviation or total gaze paresis is not present  3. Visual: 2-->Complete hemianopia  4. Facial Palsy: 2-->Partial paralysis (total or near-total paralysis of  lower face)  5a. Motor Arm, Left: 0-->No drift, limb holds 90 (or 45) degrees for full 10 secs  5b. Motor Arm, Right: 2-->Some effort against gravity, limb cannot get to or maintain (if cued) 90 (or 45) degrees, drifts down to bed, but has some effort against gravity  6a. Motor Leg, Left: 0-->No drift, leg holds 30 degree position for full 5 secs  6b. Motor Leg, Right: 4-->No movement  7. Limb Ataxia: 0-->Absent  8. Sensory: 0-->Normal, no sensory loss  9. Best Language: 2-->Severe aphasia, all communication is through fragmentary expression, great need for inference, questioning, and guessing by the listener. Range of information that can be exchanged is limited, listener carries burden of. . . (see row details)  10. Dysarthria: 2-->Severe dysarthria, patients speech is so slurred as to be unintelligible in the absence of or out of proportion to any dysphasia, or is mute/anarthric  11. Extinction and Inattention (formerly Neglect): 1-->Visual, tactile, auditory, spatial, or personal inattention or extinction to bilateral simultaneous stimulation in one of the sensory modalities  Total (NIH Stroke Scale): 20       Modified Becky Score: 0  [place] Coma Scale:    ABCD2 Score:    [id number] Score:   HAS -BLED Score:   ICH Score:   Hunt & Vora Classification:      Hemorrhagic change of an Ischemic Stroke: Does this patient have an ischemic stroke with hemorrhagic changes? No     Neurologic Chief Complaint: aphasia    Subjective:     Interval History: Patient is seen for follow-up neurological assessment and treatment recommendations: S/p TICI 2c. Repeat CTH after IR with no acute infarct. Will need MRI but has a pacemaker that will need be interrogated. Improving r arm weakness and  aphasia.    HPI, Past Medical, Family, and Social History remains the same as documented in the initial encounter.     Review of Systems   Unable to perform ROS: Patient nonverbal   Neurological:  Positive for speech difficulty and  weakness.   Scheduled Meds:   aspirin  81 mg Oral Daily    aspirin  300 mg Rectal Once    atorvastatin  80 mg Oral Daily    metoprolol tartrate  50 mg Per NG tube BID    senna-docusate 8.6-50 mg  1 tablet Per NG tube BID     Continuous Infusions:  PRN Meds:acetaminophen, albuterol-ipratropium, calcium gluconate IVPB, calcium gluconate IVPB, calcium gluconate IVPB, dextrose 10%, dextrose 10%, dextrose, dextrose, glucagon (human recombinant), insulin aspart U-100, labetalol, magnesium sulfate IVPB, magnesium sulfate IVPB, ondansetron, potassium chloride **AND** potassium chloride **AND** potassium chloride, senna-docusate 8.6-50 mg, sodium chloride 0.9%, sodium chloride 0.9%, sodium phosphate IVPB, sodium phosphate IVPB, sodium phosphate IVPB    Objective:     Vital Signs (Most Recent):  Temp: 98.2 °F (36.8 °C) (04/30/23 1105)  Pulse: 64 (04/30/23 1305)  Resp: 18 (04/29/23 2130)  BP: (!) 108/56 (04/30/23 1305)  SpO2: (!) 94 % (04/30/23 1305)  BP Location: Right arm    Vital Signs Range (Last 24H):  Temp:  [97.3 °F (36.3 °C)-98.2 °F (36.8 °C)]   Pulse:  [60-93]   Resp:  [16-22]   BP: ()/(49-97)   SpO2:  [93 %-100 %]   Arterial Line BP: ()/(44-63)   BP Location: Right arm    Physical Exam  Constitutional:       General: She is not in acute distress.  HENT:      Head: Normocephalic.   Eyes:      Extraocular Movements:      Right eye: Abnormal extraocular motion present.      Left eye: Abnormal extraocular motion present.   Cardiovascular:      Rate and Rhythm: Normal rate.   Pulmonary:      Effort: Pulmonary effort is normal.   Abdominal:      General: Abdomen is flat.   Musculoskeletal:      Comments: RSW   Skin:     General: Skin is warm and dry.   Neurological:      Mental Status: She is alert.      Cranial Nerves: Dysarthria and facial asymmetry present.      Motor: Weakness present.   Psychiatric:         Mood and Affect: Mood normal.       Neurological Exam:   LOC: alert  Attention Span: Good    Language: expressive aphasia  Articulation: aphasia  Orientation: Untestable due to severe aphasia   Visual Fields: Hemianopsia right  EOM (CN III, IV, VI): Gaze preference  left  Facial Movement (CN VII): Upper facial weakness on the Left  Motor: Arm left  Normal 5/5  Leg left  Normal 5/5  Arm right  Plegia 2/5  Leg right Plegia 0/5  Cerebellum: No evidence of appendicular or axial ataxia    Laboratory:  CMP:   Recent Labs   Lab 04/30/23  0845   CALCIUM 9.2   ALBUMIN 3.1*   PROT 6.5      K 3.8   CO2 27      BUN 12   CREATININE 0.7   ALKPHOS 141*   ALT 12   AST 14   BILITOT 0.5     BMP:   Recent Labs   Lab 04/30/23  0845      K 3.8      CO2 27   BUN 12   CREATININE 0.7   CALCIUM 9.2     CBC:   Recent Labs   Lab 04/30/23  0345   WBC 10.38   RBC 4.41   HGB 13.0   HCT 39.2      MCV 89   MCH 29.5   MCHC 33.2     Lipid Panel:   Recent Labs   Lab 04/30/23  0845   CHOL 142   LDLCALC 88.6   HDL 38*   TRIG 77     Coagulation:   Recent Labs   Lab 04/29/23 2059   INR 1.0   APTT 50.8*     Platelet Aggregation Study: No results for input(s): PLTAGG, PLTAGINTERP, PLTAGREGLACO, ADPPLTAGGREG in the last 168 hours.  Hgb A1C:   Recent Labs   Lab 04/30/23  0845   HGBA1C 7.5*     TSH:   Recent Labs   Lab 04/30/23  0845   TSH 2.092       Diagnostic Results     Brain Imaging   [facility] 4/30/2023  No acute territorial infarct or intracranial hemorrhage identified.    Vessel Imaging   CTA Stroke  4/29/2023  CT head: No evidence for acute intracranial hemorrhage.     Stable area encephalomalacia from remote right MCA territory infarct.     CTA head: Occlusion of M2 segment of left MCA.     Asymmetric decreased opacification of the left transverse sinus and jugular vein.  Similar finding was present on the prior CTA in 2022 and therefore could be related to sluggish flow.  Suggest attention on follow-up conventional angiogram.     CTA neck: No high-grade stenosis or aneurysm.    Cardiac Imaging   Echo  4/28/2023   The left ventricle is severely enlarged with eccentric hypertrophy and severely decreased systolic function.   The estimated ejection fraction is 10-15%.   There is left ventricular global hypokinesis.   Grade II left ventricular diastolic dysfunction.   Normal right ventricular size with normal right ventricular systolic function.   Mild tricuspid regurgitation.   The estimated PA systolic pressure is 35 mmHg.   Normal central venous pressure (3 mmHg).   Severe left atrial enlargement.      Renetta Medina NP  [facility]  Department of Vascular Neurology   [facility] - Neuro Critical Care

## 2023-04-30 NOTE — ASSESSMENT & PLAN NOTE
62 y.o. female with PMH of HTN, DM2, hyperlipidemia, tobacco use (quit in 2020), stroke (2020, R MCA, no deficits), PE (December 2021, on xarelto),  CAD, DCM, HFpEF (15%) s/p AICD, Pulmonary hypertension, who is admited to  for the treatment of a right leg dvt on heparin gtt. Today around 8:15 patient was noted not to be able to talk or move her right side and a stroke code was called. Patient noted to be van+ , NIHSS 2,. L MCA syndrome. CTA showed a L M2 occlusion. Not a candidate for TNK because she was on a heaprin gtt and with therapeutic APTT. Patient will be taken to IR for thrombectomy.     S/p TICI 2c. Repeat CTH after IR with no acute infarct. Will need MRI but has a pacemaker that will need be interrogated. Improving r arm weakness and  aphasia.    Antithrombotics for secondary stroke prevention: Anticoagulants: Heparin protocol without bolus after imaging completed showing no evidence of hemorragic conversion.    Statins for secondary stroke prevention and hyperlipidemia, if present:   Statins: Atorvastatin- 40 mg daily    Aggressive risk factor modification: HTN, DM, HLD, HF, DVT     Rehab efforts: The patient has been evaluated by a stroke team provider and the therapy needs have been fully considered based off the presenting complaints and exam findings. The following therapy evaluations are needed: PT evaluate and treat, OT evaluate and treat, SLP evaluate and treat, PM&R evaluate for appropriate placement    Diagnostics ordered/pending: MRI head without contrast to assess brain parenchyma, TTE to assess cardiac function/status     VTE prophylaxis: None: Reason for No Pharmacological VTE Prophylaxis: Currently on anticoagulation    BP parameters: Infarct: Post sucessful thrombectomy, SBP <140

## 2023-04-30 NOTE — ASSESSMENT & PLAN NOTE
History of severe decreased systolic function EF 10-15% followed with cardiology. Not currently in acute failure. Likely contributor to arterial occlusive disease

## 2023-04-30 NOTE — PROGRESS NOTES
Received call from MRI. Patient's previous CT chest shows pacemaker. Per tech, EP lab must come out and investigate pacemaker at this time. MRI questioning priority of scan at this time due to EP lab. NCC called and notified. Rene to follow up.

## 2023-04-30 NOTE — NURSING
Patient arrived to [facility]<<<  CSU via Rapid Response Nurses for possible IR intervention     Report received from: CSU nurse     Type of stroke/diagnosis: n/a     Current symptoms: aphasic, right sided neglect, withdraws right side, left side moving spontaneously     Skin assessment done:Yes  Wounds noted: none     *If wounds noted, was Wound Care consulted? No     Gonsales Completed? No     Patient Belongings on Admit: two gold rings, gold bracelet, eye glasses, floral top and pants, grey underwear     NCC notified: CHARLIE Saunders

## 2023-04-30 NOTE — PROGRESS NOTES
Patient traveled to MRI via bed connected to portable monitor with ambubag at bedside. Traveled with RN x1 and spouse to answer MRI questionnaire. [employer] device rep awaiting in MRI at this time. Vitals stable during transport. Will continue to monitor during imaging.

## 2023-04-30 NOTE — HPI
62 y.o. female with PMH of HTN, DM2, hyperlipidemia, tobacco use (quit in 2020), stroke (2020, R MCA, no deficits), PE (December 2021, on xarelto),  CAD, DCM, HFpEF (15%) s/p AICD, Pulmonary hypertension, who is admited to  for the treatment of a right leg dvt on heparin gtt. Today around 8:15 patient was noted not to be able to talk or move her right side and a stroke code was called. Patient noted to be van+ , NIHSS 2,. L MCA syndrome. CTA showed a L M2 occlusion. Not a candidate for TNK because she was on a heaprin gtt and with therapeutic APTT. Patient will be taken to IR for thrombectomy.

## 2023-04-30 NOTE — ANESTHESIA PREPROCEDURE EVALUATION
[facility]  Anesthesia Pre-Operative Evaluation         Patient Name: Diomedes Mohr  YOB: 1960  MRN: [id number]    SUBJECTIVE:     Pre-operative evaluation for Procedure(s) (LRB):  ANGIOGRAM-CEREBRAL (N/A)  ANGIOGRAM (N/A)     04/29/2023    Diomedes Mohr is a 62 y.o. female w/ a significant PMHx of  HTN, DM2, hyperlipidemia, tobacco use (quit in 2020), stroke (2020, R MCA, no deficits), PE (December 2021, on xarelto),  CAD, DCM, HFpEF (10-5%) s/p AICD, Pulmonary hypertension who was admitted due to critical limb ischemia of R leg on Heparin gtt & ASA. On the floor, stroke like symptoms occurred and patient found to have L MCA occlusion.    Patient now presents for the above procedure(s).    NPO: unknown,  believes she ate around 1pm  Access: 20g IV RUE, 20g PIV L AC    TTE: 4/28/2023   The left ventricle is severely enlarged with eccentric hypertrophy and severely decreased systolic function.   The estimated ejection fraction is 10-15%.   There is left ventricular global hypokinesis.   Grade II left ventricular diastolic dysfunction.   Normal right ventricular size with normal right ventricular systolic function.   Mild tricuspid regurgitation.   The estimated PA systolic pressure is 35 mmHg.   Normal central venous pressure (3 mmHg).   Severe left atrial enlargement.    LDA:        Peripheral IV - Single Lumen 04/27/23 2029 20 G Left Antecubital (Active)   Site Assessment Dry;Intact 04/29/23 0720   Extremity Assessment Distal to IV No redness;No swelling 04/28/23 1936   Line Status Infusing 04/29/23 0720   Dressing Status Dry;Intact 04/29/23 0720   Dressing Intervention Integrity maintained 04/29/23 0720   Dressing Change Due 05/01/23 04/28/23 0100   Site Change Due 05/01/23 04/28/23 0100   Number of days: 2       Prev airway: None documented.    Drips:       Patient Active Problem List   Diagnosis    Disturbance of skin sensation    Pain of left upper extremity     Neck pain    Type 2 diabetes mellitus without complication, without long-term current use of insulin    Hypertension    Hyperlipemia    Dilated cardiomyopathy    Chronic combined systolic and diastolic congestive heart failure    SOB (shortness of breath)    Cerebrovascular accident (CVA) due to embolism of right middle cerebral artery    Cytotoxic brain edema    Hypokalemia    Elevated troponin    Shortness of breath    Dysgeusia    Chest pain    Coronary artery disease involving native heart    Obesity (BMI 30.0-34.9)    ICD (implantable cardioverter-defibrillator) in place    COVID    History of pulmonary embolism    Anemia in other chronic diseases classified elsewhere    Left sided numbness    Anxiety    Schizophrenia    Pulmonary hypertension due to left heart disease    Critical lower limb ischemia    Bilateral primary osteoarthritis of hip    Hypercoagulopathy    Cerebrovascular accident (CVA) due to embolism of left middle cerebral artery    Right sided weakness    Global aphasia    Acute deep vein thrombosis (DVT)    Long term current use of anticoagulant       Review of patient's allergies indicates:   Allergen Reactions    Adhesive Blisters    Captopril Other (See Comments)     COUGH       Current Inpatient Medications:      Current Facility-Administered Medications on File Prior to Visit   Medication Dose Route Frequency Provider Last Rate Last Admin    acetaminophen tablet 650 mg  650 mg Oral Q4H PRN Ginger Mendez, DO   650 mg at 04/29/23 1112    albuterol-ipratropium 2.5 mg-0.5 mg/3 mL nebulizer solution 3 mL  3 mL Nebulization Q4H PRN Ginger Mendez, DO        aspirin EC tablet 81 mg  81 mg Oral Daily Kallie Tristan MD   81 mg at 04/29/23 0941    atorvastatin tablet 80 mg  80 mg Oral Daily Arceasar Mendez DO   80 mg at 04/29/23 0940    [START ON 4/30/2023] dextrose 10% bolus 125 mL 125 mL  12.5 g Intravenous PRN Sandra Srivastava PA-C        [START ON  4/30/2023] dextrose 10% bolus 250 mL 250 mL  25 g Intravenous PRN RUBIO DonaldC        dextrose 40 % gel 15,000 mg  15 g Oral PRN Arup K. Vanessa, DO        dextrose 40 % gel 30,000 mg  30 g Oral PRN Arup K. Vanessa, DO        [START ON 4/30/2023] glucagon (human recombinant) injection 1 mg  1 mg Intramuscular PRN Sandra Srivastava PA-C        [START ON 4/30/2023] insulin aspart U-100 pen 1-10 Units  1-10 Units Subcutaneous Q6H PRN Sandra Srivastava PA-C        labetalol 20 mg/4 mL (5 mg/mL) IV syring  10 mg Intravenous Q6H PRN Sandra Srivastava PA-C        [START ON 4/30/2023] metoprolol tartrate (LOPRESSOR) tablet 50 mg  50 mg Per NG tube BID Sandra Srivastava PA-C        ondansetron injection 4 mg  4 mg Intravenous Q8H PRN Arup K. Vanessa, DO        senna-docusate 8.6-50 mg per tablet 1 tablet  1 tablet Per NG tube BID Sandra Srivastava PA-C        senna-docusate 8.6-50 mg per tablet 2 tablet  2 tablet Oral BID PRN Arup K. Vanessa, DO        sodium chloride 0.9% flush 10 mL  10 mL Intravenous Q12H PRN Arup K. Vanessa, DO        sodium chloride 0.9% flush 10 mL  10 mL Intravenous PRN Sanrda EMILY Srivastava PA-C         Current Outpatient Medications on File Prior to Visit   Medication Sig Dispense Refill    acetaminophen (TYLENOL) 325 MG tablet Take 2 tablets (650 mg total) by mouth every 8 (eight) hours as needed. 30 tablet 0    albuterol (PROVENTIL) 2.5 mg /3 mL (0.083 %) nebulizer solution Take 3 mLs (2.5 mg total) by nebulization every 6 (six) hours as needed for Wheezing or Shortness of Breath. Rescue 3 mL 0    albuterol (PROVENTIL/VENTOLIN HFA) 90 mcg/actuation inhaler Inhale 2 puffs into the lungs every 6 (six) hours as needed for Wheezing or Shortness of Breath. 18 g 2    atorvastatin (LIPITOR) 80 MG tablet Take 1 tablet (80 mg total) by mouth once daily. 90 tablet 3    dulaglutide (TRULICITY) 1.5 mg/0.5 mL pen injector Inject 1.5 mg into the skin once a week.       empagliflozin (JARDIANCE) 10 mg tablet Take 1 tablet (10 mg total) by mouth once daily. 90 tablet 6    glimepiride (AMARYL) 4 MG tablet Take 4 mg by mouth 2 (two) times a day.      ipratropium-albuteroL (COMBIVENT)  mcg/actuation inhaler Inhale 1 puff into the lungs 4 (four) times daily. Rescue 4 g 3    metFORMIN (GLUCOPHAGE) 1000 MG tablet Take 1,000 mg by mouth 2 (two) times daily.       metoprolol succinate (TOPROL-XL) 100 MG 24 hr tablet Take 1 tablet (100 mg total) by mouth 2 (two) times daily. 90 tablet 6    rivaroxaban (XARELTO) 20 mg Tab Take 1 tablet (20 mg total) by mouth daily with dinner or evening meal. 30 tablet 6    sacubitriL-valsartan (ENTRESTO)  mg per tablet Take 1 tablet by mouth 2 (two) times daily. 90 tablet 3    spironolactone (ALDACTONE) 25 MG tablet Take 1 tablet (25 mg total) by mouth once daily. 30 tablet 6    torsemide (DEMADEX) 10 MG Tab Take 1 tablet (10 mg total) by mouth once daily. 60 tablet 6    traMADoL (ULTRAM) 50 mg tablet Take 100 mg by mouth every 12 (twelve) hours as needed for Pain.         Past Surgical History:   Procedure Laterality Date    BLADDER SUSPENSION      CARPAL TUNNEL RELEASE Right     HEEL SPUR SURGERY Left     HYSTERECTOMY      LEFT HEART CATHETERIZATION Bilateral 12/27/2019    Procedure: Left heart cath;  Surgeon: Steve Chambers MD;  Location: [facility] CATH LAB;  Service: Cardiology;  Laterality: Bilateral;    RIGHT HEART CATHETERIZATION Right 7/26/2021    Procedure: INSERTION, CATHETER, RIGHT HEART;  Surgeon: Petr Naranjo MD;  Location: [facility] CATH LAB;  Service: Cardiology;  Laterality: Right;    RIGHT HEART CATHETERIZATION Right 5/12/2022    Procedure: INSERTION, CATHETER, RIGHT HEART;  Surgeon: Chandana Ivory Jr., MD;  Location: [facility] CATH LAB;  Service: Cardiology;  Laterality: Right;    TUBAL LIGATION         OBJECTIVE:     Vital Signs Range (Last 24H):  Temp:  [36.3 °C (97.3 °F)-36.8 °C (98.2 °F)]   Pulse:   [69-86]   Resp:  [16-22]   BP: ()/(56-97)   SpO2:  [96 %-99 %]       Significant Labs:  Lab Results   Component Value Date    WBC 7.75 04/29/2023    HGB 13.0 04/29/2023    HCT 38.8 04/29/2023     04/29/2023    CHOL 152 12/08/2022    TRIG 106 12/08/2022    HDL 35 (L) 12/08/2022    ALT 13 04/29/2023    AST 15 04/29/2023     04/29/2023    K 3.6 04/29/2023     04/29/2023    CREATININE 0.9 04/29/2023    BUN 15 04/29/2023    CO2 27 04/29/2023    TSH 1.350 12/08/2022    INR 1.0 04/29/2023    HGBA1C 7.9 (H) 10/16/2022       Diagnostic Studies: No relevant studies.    EKG:   Results for orders placed or performed during the hospital encounter of 04/27/23   EKG 12-lead    Collection Time: 04/28/23 11:11 PM    Narrative    Test Reason : I73.9,    Vent. Rate : 072 BPM     Atrial Rate : 072 BPM     P-R Int : 124 ms          QRS Dur : 106 ms      QT Int : 414 ms       P-R-T Axes : 022 -03 172 degrees     QTc Int : 453 ms    Normal sinus rhythm  LVH with repolarization abnormality ( R in aVL )  Abnormal ECG  When compared with ECG of 01-MAR-2023 10:32,  Questionable change in The axis  ST no longer depressed in Inferior leads  ST elevation now present in Anterior leads  T wave inversion less evident in Inferior leads  Confirmed by Sujit RIVAS MD ([id number]) on 4/29/2023 9:29:11 AM    Referred By: [email or web address]   SELF           Confirmed By:Sujit RIVAS MD       ASSESSMENT/PLAN:                                                                                                                  04/29/2023  Diomedes Mohr is a 62 y.o., female.      Pre-op Assessment    I have reviewed the Patient Summary Reports.          Review of Systems  Anesthesia Hx:  No problems with previous Anesthesia    Social:  Non-Smoker    Hematology/Oncology:  Hematology Normal   Oncology Normal     EENT/Dental:EENT/Dental Normal   Cardiovascular:   Hypertension CAD   CHF (EF 15%, PASP 58mmHg)    Pulmonary:   COPD Asthma    Renal/:  Renal/  Normal     Hepatic/GI:  Hepatic/GI Normal    Musculoskeletal:  Musculoskeletal Normal    Neurological:   CVA    Endocrine:   Diabetes, type 2    Dermatological:  Skin Normal    Psych:   Psychiatric History (Schizophrenia) anxiety          Physical Exam  General: Well nourished and Alert  Aphasic, does not follow commands  Airway:  Mallampati: unable to assess           Anesthesia Plan  Type of Anesthesia, risks & benefits discussed:    Anesthesia Type: Gen ETT, Gen Natural Airway  Intra-op Monitoring Plan: Standard ASA Monitors  Post Op Pain Control Plan: multimodal analgesia and IV/PO Opioids PRN  Induction:  IV  Airway Plan: Direct, Post-Induction  Informed Consent: Informed consent signed with the Patient representative and all parties understand the risks and agree with anesthesia plan.  All questions answered.   ASA Score: 4 Emergent  Day of Surgery Review of History & Physical: H&P Update referred to the surgeon/provider.    Ready For Surgery From Anesthesia Perspective.     .

## 2023-04-30 NOTE — H&P
[facility]  Neurocritical Care  History & Physical    Admit Date: 4/27/2023  Service Date: 04/29/2023  Length of Stay: 2    Subjective:     Chief Complaint: Cerebrovascular accident (CVA) due to embolism of left middle cerebral artery    History of Present Illness: Pt is a 62 y.o. female with PMHx of HTN, DM2, HLD, prior stroke, PE (on xarelto), CAD HFrEF, AICD who presents to [facility] as an acute stroke code from the hospital floor. Patient was admitted to hospital medicine on 4/27 for an acute arterial occlusion of her R popliteal artery and started on a high intensity heparin gtt. Vascular surgery and hematology saw the patient and recommended the heparin gtt with bridge to pradaxa after 5 days. This evening patient was noted to not be able to talk or move her right side. LKN 20:15. A stroke code was called and CTA revealed acute L M2 occlusion. She was transferred to [facility] and then taken for emergent thrombectomy TICI 2c. She is admitted to [facility] for higher level care and neurologic monitoring.       Past Medical History:   Diagnosis Date    Acute on chronic combined systolic and diastolic heart failure 12/26/2019    Anticoagulant long-term use     Anxiety     Arthritis     Asthma     Depression     H/O: hysterectomy     Hyperlipemia     Hypertension     Pulmonary edema     Schizophrenia      Past Surgical History:   Procedure Laterality Date    BLADDER SUSPENSION      CARPAL TUNNEL RELEASE Right     HEEL SPUR SURGERY Left     HYSTERECTOMY      LEFT HEART CATHETERIZATION Bilateral 12/27/2019    Procedure: Left heart cath;  Surgeon: Steve Chambers MD;  Location: [facility] CATH LAB;  Service: Cardiology;  Laterality: Bilateral;    RIGHT HEART CATHETERIZATION Right 7/26/2021    Procedure: INSERTION, CATHETER, RIGHT HEART;  Surgeon: Petr Naranjo MD;  Location: [facility] CATH LAB;  Service: Cardiology;  Laterality: Right;    RIGHT HEART CATHETERIZATION Right 5/12/2022    Procedure:  INSERTION, CATHETER, RIGHT HEART;  Surgeon: Chandana Ivory Jr., MD;  Location: [facility] CATH LAB;  Service: Cardiology;  Laterality: Right;    TUBAL LIGATION        No current facility-administered medications on file prior to encounter.     Current Outpatient Medications on File Prior to Encounter   Medication Sig Dispense Refill    acetaminophen (TYLENOL) 325 MG tablet Take 2 tablets (650 mg total) by mouth every 8 (eight) hours as needed. 30 tablet 0    albuterol (PROVENTIL) 2.5 mg /3 mL (0.083 %) nebulizer solution Take 3 mLs (2.5 mg total) by nebulization every 6 (six) hours as needed for Wheezing or Shortness of Breath. Rescue 3 mL 0    albuterol (PROVENTIL/VENTOLIN HFA) 90 mcg/actuation inhaler Inhale 2 puffs into the lungs every 6 (six) hours as needed for Wheezing or Shortness of Breath. 18 g 2    atorvastatin (LIPITOR) 80 MG tablet Take 1 tablet (80 mg total) by mouth once daily. 90 tablet 3    dulaglutide (TRULICITY) 1.5 mg/0.5 mL pen injector Inject 1.5 mg into the skin once a week.      empagliflozin (JARDIANCE) 10 mg tablet Take 1 tablet (10 mg total) by mouth once daily. 90 tablet 6    glimepiride (AMARYL) 4 MG tablet Take 4 mg by mouth 2 (two) times a day.      ipratropium-albuteroL (COMBIVENT)  mcg/actuation inhaler Inhale 1 puff into the lungs 4 (four) times daily. Rescue 4 g 3    metFORMIN (GLUCOPHAGE) 1000 MG tablet Take 1,000 mg by mouth 2 (two) times daily.       metoprolol succinate (TOPROL-XL) 100 MG 24 hr tablet Take 1 tablet (100 mg total) by mouth 2 (two) times daily. 90 tablet 6    rivaroxaban (XARELTO) 20 mg Tab Take 1 tablet (20 mg total) by mouth daily with dinner or evening meal. 30 tablet 6    sacubitriL-valsartan (ENTRESTO)  mg per tablet Take 1 tablet by mouth 2 (two) times daily. 90 tablet 3    spironolactone (ALDACTONE) 25 MG tablet Take 1 tablet (25 mg total) by mouth once daily. 30 tablet 6    torsemide (DEMADEX) 10 MG Tab Take 1 tablet (10 mg total)  by mouth once daily. 60 tablet 6    traMADoL (ULTRAM) 50 mg tablet Take 100 mg by mouth every 12 (twelve) hours as needed for Pain.        Allergies: Adhesive and Captopril    Family History   Problem Relation Age of Onset    No Known Problems Mother     No Known Problems Father      Social History     Tobacco Use    Smoking status: Former     Types: Cigarettes     Quit date: 2016     Years since quittin.6    Smokeless tobacco: Never    Tobacco comments:     nonex 2 weeks   Substance Use Topics    Alcohol use: No     Alcohol/week: 0.0 standard drinks    Drug use: No     Review of Systems   Unable to perform ROS: Patient nonverbal   Objective:     Vitals:    Temp: 97.3 °F (36.3 °C)  Pulse: 74  Rhythm: normal sinus rhythm  BP: 132/72  MAP (mmHg): 96  Resp: 18  SpO2: 97 %    Temp  Min: 97.3 °F (36.3 °C)  Max: 98.3 °F (36.8 °C)  Pulse  Min: 69  Max: 86  BP  Min: 99/56  Max: 156/97  MAP (mmHg)  Min: 70  Max: 115  Resp  Min: 16  Max: 20  SpO2  Min: 96 %  Max: 99 %     0701 -  0700  In: 260 [P.O.:260]  Out: 650 [Urine:650]   Unmeasured Output  Urine Occurrence: 1       Physical Exam  Vitals and nursing note reviewed.   Constitutional:       General: She is not in acute distress.     Appearance: Normal appearance.   HENT:      Head: Normocephalic and atraumatic.      Mouth/Throat:      Mouth: Mucous membranes are moist.   Eyes:      Extraocular Movements: Extraocular movements intact.      Conjunctiva/sclera: Conjunctivae normal.      Pupils: Pupils are equal, round, and reactive to light.   Cardiovascular:      Rate and Rhythm: Normal rate and regular rhythm.      Pulses:           Dorsalis pedis pulses are detected w/ Doppler on the right side and 2+ on the left side.   Pulmonary:      Effort: Pulmonary effort is normal. No respiratory distress.   Abdominal:      General: Abdomen is flat. There is no distension.      Palpations: Abdomen is soft.      Tenderness: There is no abdominal tenderness.  There is no guarding.   Musculoskeletal:         General: No swelling or deformity.   Skin:     General: Skin is warm.   Neurological:      Mental Status: She is alert.      Comments: --sedation: none  --GCS:  E4 V1 M4  --Mental Status: Alert, mute, global aphasia  --CN II-XII severe R facial droop, L gaze preference, R hemianopia   --PERRL   --Motor: L side withdrawal to pain, falls to bed immediately, L side no drift   --sensory: diminished to R side      NIH on arrival to ICU - preprocedure  1a. Level of Consciousness 0-->Alert, keenly responsive   1b. LOC Questions 2-->Answers neither question correctly   1c. LOC Commands 2-->Performs neither task correctly   2. Best Gaze 1-->Partial gaze palsy, gaze is abnormal in one or both eyes,   but forced deviation or total gaze paresis is not present   3. Visual 2-->Complete hemianopia   4. Facial Palsy 2-->Partial paralysis (total or near-total paralysis of   lower face)   5a. Motor Arm, Left 0-->No drift, limb holds 90 (or 45) degrees for full   10 secs   5b. Motor Arm, Right 3-->No effort against gravity, limb falls   6a. Motor Leg, Left 0-->No drift, leg holds 30 degree position for full 5   secs   6b. Motor Leg, Right 3-->No effort against gravity, leg falls to bed   immediately   7. Limb Ataxia 0-->Absent   8. Sensory 1-->Mild-to-moderate sensory loss, patient feels pinprick is   less sharp or is dull on the affected side, or there is a loss of   superficial pain with pinprick, but patient is aware of being touched   9. Best Language 3-->Mute, global aphasia, no usable speech or auditory   comprehension   10. Dysarthria 2-->Severe dysarthria, patients speech is so slurred as to   be unintelligible in the absence of or out of proportion to any dysphasia,   or is mute/anarthric   11. Extinction and Inattention (formerly Neglect) 2-->Profound   vik-inattention/extinction more than 1 modality   Total (NIH Stroke Scale) 23           Unable to test orientation,  language, memory, judgment, insight, fund of knowledge, hearing, shoulder shrug, tongue protrusion, coordination, gait due to level of consciousness.(Aphasia)    Today I personally reviewed pertinent medications, lines/drains/airways, imaging, cardiology results, laboratory results, notably:    Imaging Results               CTA Runoff ABD PEL Bilat Lower Ext (Final result)  Result time 04/27/23 23:17:52      Final result by Dagoberto Briones MD (04/27/23 23:17:52)                   Impression:      Focal occlusion of the right popliteal artery with distal reconstitution and diminished runoff to the right foot.  Intermittent stenosis and occlusion of the right peroneal artery.    Focal occlusion or severe stenosis of the right profunda femoris artery at the level of the proximal femoral shaft.    Aortoiliac atherosclerosis.    Cardiomegaly and dilated left ventricle.    Bilateral renal cyst.    Small fat containing umbilical hernia.    Additional findings discussed in the body of the report.    This report was flagged in Epic as abnormal.    COMMUNICATION  This critical result was discovered/received at 23:03.  The critical information above was relayed directly by Dagoberto Briones MD by [facility] secure chat (with acknowledgement) to Zac Zhou MD on 4/27/2023 at 23:05.      Electronically signed by: Dagoberto Briones MD  Date:    04/27/2023  Time:    23:17               Narrative:    EXAMINATION:  CTA RUNOFF ABD PEL BILAT LOWER EXT    CLINICAL HISTORY:  Arterial embolism, lower extremity;    TECHNIQUE:  CT angiogram of the abdomen/pelvis with lower extremity runoff using 125 mL of  Omnipaque 350 IV contrast. Precontrast images also obtained.  Delayed phase images also obtained below the knee.  Multiplanar reconstructions performed, including MIP reformats.    COMPARISON:  CTA runoff, 02/24/2020.    FINDINGS:  Abdominopelvic Vasculature:    Moderate irregular atherosclerotic plaque involving the abdominal aorta and  major branches.  There is moderate calcified and noncalcified atherosclerosis of the bilateral common iliac arteries.  There is moderate to advanced calcified and noncalcified atherosclerosis of the external iliac arteries, internal iliac arteries, and common femoral arteries.  Celiac, superior mesenteric, bilateral renal, and inferior mesenteric arteries are grossly patent.    Right lower extremity:    - Common femoral: Significant atherosclerosis with mild-to-moderate narrowing of the right superficial femoral artery.    - SFA: Moderate atherosclerosis, though patent.    - Profunda: Possible occlusion or significant stenosis at the level of the proximal femoral shaft (series 3, image 815).    - Popliteal: Focal occlusion of the popliteal artery (series 3, image 1390).  Distal reconstitution at the bifurcation.    - Runoff: Diminished three-vessel runoff to the right lower extremity with grossly patent anterior and posterior tibial arteries on delayed phase images.  Multifocal intermittent stenosis and/or occlusion of the peroneal artery which is not well delineated beyond the level of the lower right calf (series 3, image 2853).    - Bones: No acute abnormality.    - Soft Tissues: No acute abnormality.    Left lower extremity:    - Common femoral: Mild-to-moderate atherosclerosis, grossly patent.    - SFA: Mild-to-moderate atherosclerosis, grossly patent.    - Profunda: Grossly patent.    - Popliteal: Patent without significant stenosis.    - Runoff: Patent three-vessel runoff.    - Bones: No acute abnormality.    - Soft Tissues: No acute abnormality.    Lower Chest:    Lung bases are essentially clear.  Heart is mildly enlarged.  There is significant dilation of the left ventricle.  AICD is present.    Abdomen:    Liver is normal in size and contour.  Subcentimeter hypodensity in the liver, too small to definitively characterize, but similar to the prior study in 2020.  Gallbladder is unremarkable. No  intrahepatic biliary ductal dilatation.    Spleen, adrenals, and pancreas are unremarkable.    The kidneys are symmetric. No hydronephrosis. Bilateral simple renal cysts.    No small bowel obstruction. No inflammatory changes identified involving the gastrointestinal tract.    No pneumoperitoneum or organized fluid collection.    No bulky lymphadenopathy.    Pelvis:    Urinary bladder and rectum are unremarkable.  Uterus appears surgically absent.  No significant pelvic free fluid.  No pelvic lymphadenopathy.    Bones and soft tissues:    No aggressive osseous lesions. Probable hemangioma at L4.  Mild degenerative changes in the spine.  Degenerative changes in the hips, right side greater than left.  Extraperitoneal soft tissues are negative for acute finding.  Small fat containing umbilical hernia.                                          Assessment/Plan:     Neuro  * Cerebrovascular accident (CVA) due to embolism of left middle cerebral artery  Acute LMCA stroke with LM2 LVO, no TNK given already anticoagulated   -Stroke code on floor, LKN 20:15  -anticoagulated on heparin gtt, will hold overnight until post procedure imaging   -Q 1 hour neuro checks   -Q 1 hour vitals     Antithrombotics for secondary stroke prevention: Antiplatelets: Aspirin: 81 mg daily    Statins for secondary stroke prevention and hyperlipidemia, if present:   Statins: Atorvastatin- 80 mg daily    Aggressive risk factor modification: HTN, DM, Diet, Exercise, Obesity, CAD     Rehab efforts: The patient has been evaluated by a stroke team provider and the therapy needs have been fully considered based off the presenting complaints and exam findings. The following therapy evaluations are needed: PT evaluate and treat, OT evaluate and treat, SLP evaluate and treat, PM&R evaluate for appropriate placement    Diagnostics ordered/pending: CT scan of head without contrast to asses brain parenchyma, CTA Head to assess vasculature , HgbA1C to assess  blood glucose levels, Lipid Profile to assess cholesterol levels, MRI head without contrast to assess brain parenchyma, TTE to assess cardiac function/status , TSH to assess thyroid function    VTE prophylaxis: None: Reason for No Pharmacological VTE Prophylaxis: Currently on anticoagulation    BP parameters: Infarct: Post sucessful thrombectomy, SBP <160        Global aphasia  2/2 to acute stroke   -SLP eval and treat     Cytotoxic brain edema  See stroke     Pulmonary  Pulmonary hypertension due to left heart disease  SBP goal <160 post successful thrombectomy   -hold PO meds overnight  -restart home heart failure meds in am   -PRN labetalol and hydralazine     Cardiac/Vascular  Critical lower limb ischemia  Admission to the hospital for RLE popliteal artery occlusion   -heme/onc and vascular surgery both saw patient   -patient failed management with xarelto at home, transitioned to heparin gtt with plan for pradaxa  -now with acute stroke, hold heparin gtt overnight until post procedure imaging complete   -continue daily ASA and statin   -neurovascular checks     ICD (implantable cardioverter-defibrillator) in place  CHF with AICD in place since 2021  -continuous cardiac monitoring       Chronic combined systolic and diastolic congestive heart failure  Patient is identified as having Combined Systolic and Diastolic heart failure that is Chronic. CHF is currently controlled. Latest ECHO performed and demonstrates- Results for orders placed during the hospital encounter of 04/27/23    Echo    Interpretation Summary  · The left ventricle is severely enlarged with eccentric hypertrophy and severely decreased systolic function.  · The estimated ejection fraction is 10-15%.  · There is left ventricular global hypokinesis.  · Grade II left ventricular diastolic dysfunction.  · Normal right ventricular size with normal right ventricular systolic function.  · Mild tricuspid regurgitation.  · The estimated PA systolic  pressure is 35 mmHg.  · Normal central venous pressure (3 mmHg).  · Severe left atrial enlargement.  . Continue Beta Blocker, ACE/ARB and Aldactone (begin in AM when po access obtained) and monitor clinical status closely. Monitor on telemetry. Patient is on CHF pathway.  Monitor strict Is&Os and daily weights.  Continue to stress to patient importance of self efficacy and  on diet for CHF. Last BNP reviewed- and noted below   Recent Labs   Lab 04/29/23  0533   *   .      Hyperlipemia  Lipid panel   -continue daily statin     Hematology  Long term current use of anticoagulant  On chronic xarelto for HFrEF and history of PE, but failed therapy as patient presented with RLE arterial occlusion  -now on heparin gtt bridge to pradaxa as well as ASA   -will hold heparin gtt overnight given acute stroke and will restart pending post procedure imaging   -continue daily ASA in AM     Endocrine  Type 2 diabetes mellitus without complication, without long-term current use of insulin  SSI protocol   -stroke risk factor   -A1c pending    Other  Right sided weakness  2/2 to acute LMCA stroke   -PT/OT        The patient is being Prophylaxed for:  Venous Thromboembolism with: Chemical  Stress Ulcer with: Not Applicable   Ventilator Pneumonia with: not applicable    Activity Orders          Turn patient starting at 04/29 2200    Elevate HOB starting at 04/29 2148    Diet NPO Except for: Medication, Sips with Medication: NPO starting at 04/29 2148    Progressive Mobility Protocol (mobilize patient to their highest level of functioning at least twice daily) starting at 04/28 0800        Full Code     Critical condition in that Patient has a condition that poses threat to life and bodily function: acute L MCA stroke s/p thrombectomy, on AC, high risk of hemorrhagic conversion     50 minutes of Critical care time was spent personally by me on the following activities: development of treatment plan with patient or surrogate  and bedside caregivers, discussions with consultants, evaluation of patient's response to treatment, examination of patient, ordering and performing treatments and interventions, ordering and review of laboratory studies, ordering and review of radiographic studies, pulse oximetry, antibiotic titration if applicable, vasopressor titration if applicable, re-evaluation of patient's condition. This critical care time did not overlap with that of any other provider or involve time for any procedures. There is high probability for acute neurological change leading to clinical and possibly life-threatening deterioration requiring highest level of physician preparedness for urgent intervention.         Sandra Srivastava PA-C  Neurocritical Care  [facility]

## 2023-04-30 NOTE — H&P
History and Physical  Interventional Radiology    Admit Date: 4/27/2023  LOS: 2    Code Status: Full Code     CC: Critical lower limb ischemia    SUBJECTIVE:     History of Present Illness: 61 yo female with concern for AIS secondary to L anterior circulation LVO.           OBJECTIVE:   Vital Signs (Most Recent):   Temp: 97.3 °F (36.3 °C) (04/29/23 1609)  Pulse: 82 (04/29/23 2023)  Resp: 16 (04/29/23 1609)  BP: (!) 156/97 (04/29/23 2023)  SpO2: 98 % (04/29/23 2023)    Vital Signs (24h Range):   Temp:  [97.3 °F (36.3 °C)-98.3 °F (36.8 °C)] 97.3 °F (36.3 °C)  Pulse:  [69-86] 82  Resp:  [16-20] 16  SpO2:  [96 %-99 %] 98 %  BP: ()/(55-97) 156/97      I & O (Last 24h):    Intake/Output Summary (Last 24 hours) at 4/29/2023 2104  Last data filed at 4/29/2023 1612  Gross per 24 hour   Intake 1060 ml   Output 1100 ml   Net -40 ml       Physical Exam:  General: Awake, alert  Cardio: Hemodynamically stable, no arrythmia  Pulm: No distress  ENT: Ears intact  Vascular: Pulses present  Muscloskeletal system: Normal to inspection  Skin: Normal to inspection           Lines/Drains/Airway:          Nutrition/Tube Feeds:   Current Diet Order   Procedures    Diet diabetic [facility]; 2000 Calorie; Cardiac (Low Na/Chol); Fluid - 1500mL     Order Specific Question:   Indicate patient location for additional diet options:     Answer:   [facility]     Order Specific Question:   Total calories:     Answer:   2000 Calorie     Order Specific Question:   Additional Diet Options:     Answer:   Cardiac (Low Na/Chol)     Order Specific Question:   Fluid restriction:     Answer:   Fluid - 1500mL       Labs:  ABG: No results for input(s): PH, PO2, PCO2, HCO3, POCSATURATED, BE in the last 24 hours.  BMP:  Recent Labs   Lab 04/29/23  0533      K 3.6      CO2 25   BUN 9   CREATININE 0.7   *   MG 1.9   PHOS 3.7     LFT:   Lab Results   Component Value Date    AST 15 04/27/2023    ALT 13 04/27/2023    ALKPHOS 142  (H) 04/27/2023    BILITOT 0.4 04/27/2023    ALBUMIN 3.4 (L) 04/27/2023    PROT 7.1 04/27/2023     CBC:   Lab Results   Component Value Date    WBC 6.43 04/29/2023    HGB 13.4 04/29/2023    HCT 42.0 04/29/2023    MCV 91 04/29/2023     04/29/2023     Microbiology x 7d:   Microbiology Results (last 7 days)       ** No results found for the last 168 hours. **              ASSESSMENT/PLAN:   63 yo female with concern for AIS secondary to L anterior circulation LVO.    --To angio for emergent neuroendovascular intervention.  --We will continue to monitor closely, please contact us with any questions or concerns.    Momo Chamberlain     Sedation per Anesthesia

## 2023-04-30 NOTE — CONSULTS
[facility] Critical [facility]  Vascular Neurology  [facility] Stroke [facility]  Consult Note    Consults  Assessment/Plan:     Patient is a 62 y.o. year old female with:    * Cerebrovascular accident (CVA) due to occlusion of right middle cerebral artery  62 y.o. female with PMH of HTN, DM2, hyperlipidemia, tobacco use (quit in 2020), stroke (2020, R MCA, no deficits), PE (December 2021, on xarelto),  CAD, DCM, HFpEF (15%) s/p AICD, Pulmonary hypertension, who is admited to  for the treatment of a right leg dvt on heparin gtt. Today around 8:15 patient was noted not to be able to talk or move her right side and a stroke code was called. Patient noted to be van+ , NIHSS 24,. L MCA syndrome. CTA showed a L M2 occlusion. Not a candidate for TNK because she was on a heaprin gtt and with therapeutic APTT. Patient will be taken to IR for thrombectomy.     Antithrombotics for secondary stroke prevention: Anticoagulants: Heparin protocol without bolus after imaging completed after IR without evidence of hemorragic conversion.    Statins for secondary stroke prevention and hyperlipidemia, if present:   Statins: Atorvastatin- 40 mg daily    Aggressive risk factor modification: HTN, DM, HLD, HF, DVT     Rehab efforts: The patient has been evaluated by a stroke team provider and the therapy needs have been fully considered based off the presenting complaints and exam findings. The following therapy evaluations are needed: PT evaluate and treat, OT evaluate and treat, SLP evaluate and treat, PM&R evaluate for appropriate placement    Diagnostics ordered/pending: MRI head without contrast to assess brain parenchyma, TTE to assess cardiac function/status     VTE prophylaxis: None: Reason for No Pharmacological VTE Prophylaxis: Currently on anticoagulation    BP parameters: Infarct: Post sucessful thrombectomy, SBP <140  Post unsucessful thrombectomy, SBP <220        Acute deep vein thrombosis (DVT)  Stroke risk factor  Admitted  for dvt was on heparin gtt  Restart heparin after ruling out hemorraghic conversion post IR procedure     Global aphasia  Therapy to evaluate and treat     Right sided weakness  Therapy to evaluate and treat     Cerebrovascular accident (CVA) due to embolism of left middle cerebral artery  History of r mca with no residual deficits      Chronic combined systolic and diastolic congestive heart failure  Stroke risk factor  Echo pending    Hyperlipemia  Stroke risk factor  High intensity statin     Hypertension  Stroke Risk factor  <140 if successful thrombectomy     Type 2 diabetes mellitus without complication, without long-term current use of insulin  Stroke risk factor  Inpatient goal [id number]        STROKE DOCUMENTATION     Acute Stroke Times   Last Known Normal Date: 04/29/23  Last Known Normal Time: 2015  Symptom Onset Date: 04/29/23  Symptom Onset Time: 2015  Stroke Team Called Date: 04/29/23  Stroke Team Called Time: 2022  Stroke Team Arrival Date: 04/29/23  Stroke Team Arrival Time: 2025  CT Interpretation Time: 2038  Thrombolytic Therapy Recommended: No  CTA Interpretation Time: 2045  Thrombectomy Recommended: Yes  Decision to Treat Time for IR: 2045    NIH Scale:  1a. Level of Consciousness: 0-->Alert, keenly responsive  1b. LOC Questions: 2-->Answers neither question correctly  1c. LOC Commands: 2-->Performs neither task correctly  2. Best Gaze: 0-->Normal  3. Visual: 2-->Complete hemianopia  4. Facial Palsy: 2-->Partial paralysis (total or near-total paralysis of lower face)  5a. Motor Arm, Left: 0-->No drift, limb holds 90 (or 45) degrees for full 10 secs  5b. Motor Arm, Right: 4-->No movement  6a. Motor Leg, Left: 0-->No drift, leg holds 30 degree position for full 5 secs  6b. Motor Leg, Right: 4-->No movement  7. Limb Ataxia: 0-->Absent  8. Sensory: 2-->Severe to total sensory loss, patient is not aware of being touched in the face, arm, and leg  9. Best Language: 3-->Mute, global aphasia, no usable  speech or auditory comprehension  10. Dysarthria: 2-->Severe dysarthria, patients speech is so slurred as to be unintelligible in the absence of or out of proportion to any dysphasia, or is mute/anarthric  11. Extinction and Inattention (formerly Neglect): 1-->Visual, tactile, auditory, spatial, or personal inattention or extinction to bilateral simultaneous stimulation in one of the sensory modalities  Total (NIH Stroke Scale): 24    Modified Becky Score: 0  Coleman Coma Scale:    ABCD2 Score:    [id number] Score:   HAS -BLED Score:   ICH Score:   Hunt & Vora Classification:       Thrombolysis Candidate? No, LMWH within the previous 24 hours (treatment doses)    Delays to Thrombolysis?  No    Interventional Revascularization Candidate?   Is the patient eligible for mechanical endovascular reperfusion (RENE)?  Yes    Delays to Thrombectomy? No    Hemorrhagic change of an Ischemic Stroke: Does this patient have an ischemic stroke with hemorrhagic changes? No     Subjective:     History of Present Illness:  62 y.o. female with PMH of HTN, DM2, hyperlipidemia, tobacco use (quit in 2020), stroke (2020, R MCA, no deficits), PE (December 2021, on xarelto),  CAD, DCM, HFpEF (15%) s/p AICD, Pulmonary hypertension, who is admited to  for the treatment of a right leg dvt on heparin gtt. Today around 8:15 patient was noted not to be able to talk or move her right side and a stroke code was called. Patient noted to be van+ , NIHSS 2,. L MCA syndrome. CTA showed a L M2 occlusion. Not a candidate for TNK because she was on a heaprin gtt and with therapeutic APTT. Patient will be taken to IR for thrombectomy.           Past Medical History:   Diagnosis Date    Acute on chronic combined systolic and diastolic heart failure 12/26/2019    Anticoagulant long-term use     Anxiety     Arthritis     Asthma     Depression     H/O: hysterectomy     Hyperlipemia     Hypertension     Pulmonary edema     Schizophrenia      Past  Surgical History:   Procedure Laterality Date    BLADDER SUSPENSION      CARPAL TUNNEL RELEASE Right     HEEL SPUR SURGERY Left     HYSTERECTOMY      LEFT HEART CATHETERIZATION Bilateral 2019    Procedure: Left heart cath;  Surgeon: Steve Chambers MD;  Location: [facility] CATH LAB;  Service: Cardiology;  Laterality: Bilateral;    RIGHT HEART CATHETERIZATION Right 2021    Procedure: INSERTION, CATHETER, RIGHT HEART;  Surgeon: Petr Naranjo MD;  Location: [facility] CATH LAB;  Service: Cardiology;  Laterality: Right;    RIGHT HEART CATHETERIZATION Right 2022    Procedure: INSERTION, CATHETER, RIGHT HEART;  Surgeon: Chandana Ivory Jr., MD;  Location: [facility] CATH LAB;  Service: Cardiology;  Laterality: Right;    TUBAL LIGATION       Family History   Problem Relation Age of Onset    No Known Problems Mother     No Known Problems Father      Social History     Tobacco Use    Smoking status: Former     Types: Cigarettes     Quit date: 2016     Years since quittin.6    Smokeless tobacco: Never    Tobacco comments:     nonex 2 weeks   Substance Use Topics    Alcohol use: No     Alcohol/week: 0.0 standard drinks    Drug use: No     Review of patient's allergies indicates:   Allergen Reactions    Adhesive Blisters    Captopril Other (See Comments)     COUGH       Medications: I have reviewed the current medication administration record.    Medications Prior to Admission   Medication Sig Dispense Refill Last Dose    acetaminophen (TYLENOL) 325 MG tablet Take 2 tablets (650 mg total) by mouth every 8 (eight) hours as needed. 30 tablet 0     albuterol (PROVENTIL) 2.5 mg /3 mL (0.083 %) nebulizer solution Take 3 mLs (2.5 mg total) by nebulization every 6 (six) hours as needed for Wheezing or Shortness of Breath. Rescue 3 mL 0     albuterol (PROVENTIL/VENTOLIN HFA) 90 mcg/actuation inhaler Inhale 2 puffs into the lungs every 6 (six) hours as needed for Wheezing or Shortness of  Breath. 18 g 2     atorvastatin (LIPITOR) 80 MG tablet Take 1 tablet (80 mg total) by mouth once daily. 90 tablet 3     dulaglutide (TRULICITY) 1.5 mg/0.5 mL pen injector Inject 1.5 mg into the skin once a week.       empagliflozin (JARDIANCE) 10 mg tablet Take 1 tablet (10 mg total) by mouth once daily. 90 tablet 6     glimepiride (AMARYL) 4 MG tablet Take 4 mg by mouth 2 (two) times a day.       ipratropium-albuteroL (COMBIVENT)  mcg/actuation inhaler Inhale 1 puff into the lungs 4 (four) times daily. Rescue 4 g 3     metFORMIN (GLUCOPHAGE) 1000 MG tablet Take 1,000 mg by mouth 2 (two) times daily.        metoprolol succinate (TOPROL-XL) 100 MG 24 hr tablet Take 1 tablet (100 mg total) by mouth 2 (two) times daily. 90 tablet 6     rivaroxaban (XARELTO) 20 mg Tab Take 1 tablet (20 mg total) by mouth daily with dinner or evening meal. 30 tablet 6     sacubitriL-valsartan (ENTRESTO)  mg per tablet Take 1 tablet by mouth 2 (two) times daily. 90 tablet 3     spironolactone (ALDACTONE) 25 MG tablet Take 1 tablet (25 mg total) by mouth once daily. 30 tablet 6     torsemide (DEMADEX) 10 MG Tab Take 1 tablet (10 mg total) by mouth once daily. 60 tablet 6     traMADoL (ULTRAM) 50 mg tablet Take 100 mg by mouth every 12 (twelve) hours as needed for Pain.          Review of Systems   Unable to perform ROS: Acuity of condition   Neurological:  Positive for speech difficulty and weakness.   Objective:     Vital Signs (Most Recent):  Temp: 97.3 °F (36.3 °C) (04/29/23 1609)  Pulse: 82 (04/29/23 2023)  Resp: 16 (04/29/23 1609)  BP: (!) 156/97 (04/29/23 2023)  SpO2: 98 % (04/29/23 2023)    Vital Signs Range (Last 24H):  Temp:  [97.3 °F (36.3 °C)-98.3 °F (36.8 °C)]   Pulse:  [69-86]   Resp:  [16-20]   BP: ()/(55-97)   SpO2:  [96 %-99 %]     Physical Exam  Constitutional:       General: She is not in acute distress.  HENT:      Head: Normocephalic.   Eyes:      Extraocular Movements:      Right eye:  Abnormal extraocular motion present.      Left eye: Abnormal extraocular motion present.   Cardiovascular:      Rate and Rhythm: Normal rate.   Pulmonary:      Effort: Pulmonary effort is normal.   Abdominal:      General: Abdomen is flat.   Musculoskeletal:         General: Normal range of motion.   Skin:     General: Skin is warm and dry.   Neurological:      Mental Status: She is alert.      Cranial Nerves: Dysarthria and facial asymmetry present.      Motor: Weakness present.   Psychiatric:         Mood and Affect: Mood normal.       Neurological Exam:   LOC: alert  Attention Span: Good   Language: Global aphasia  Articulation: Mute/Anarthric  Orientation: Untestable due to severe aphasia   Visual Fields: Hemianopsia right  EOM (CN III, IV, VI): Gaze preference  left  Facial Movement (CN VII): Upper facial weakness on the Left  Motor: Arm left  Normal 5/5  Leg left  Normal 5/5  Arm right  Plegia 0/5  Leg right Plegia 0/5  Cerebellum: No evidence of appendicular or axial ataxia      Laboratory:  CMP:   Recent Labs   Lab 04/29/23  0533   CALCIUM 9.6      K 3.6   CO2 25      BUN 9   CREATININE 0.7     CBC:   Recent Labs   Lab 04/29/23 2059   WBC 7.75   RBC 4.39   HGB 13.0   HCT 38.8      MCV 88   MCH 29.6   MCHC 33.5     Lipid Panel: No results for input(s): CHOL, LDLCALC, HDL, TRIG in the last 168 hours.  Coagulation:   Recent Labs   Lab 04/29/23 2059   INR 1.0   APTT 50.8*     Hgb A1C: No results for input(s): HGBA1C in the last 168 hours.  TSH: No results for input(s): TSH in the last 168 hours.    Diagnostic Results:      Brain imaging:  CTA- L M2 occlusion        Renetta Medina NP  [facility]  Department of Vascular Neurology   [facility]

## 2023-04-30 NOTE — PROGRESS NOTES
[facility] - Neuro Critical Care  Neurocritical Care  Progress Note    Admit Date: 4/27/2023  Service Date: 04/30/2023  Length of Stay: 3    Subjective:     Chief Complaint: Cerebrovascular accident (CVA) due to embolism of left middle cerebral artery    History of Present Illness: Pt is a 62 y.o. female with PMHx of HTN, DM2, HLD, prior stroke, PE (on xarelto), CAD HFrEF, AICD who presents to [facility] as an acute stroke code from the hospital floor. Patient was admitted to hospital medicine on 4/27 for an acute arterial occlusion of her R popliteal artery and started on a high intensity heparin gtt. Vascular surgery and hematology saw the patient and recommended the heparin gtt with bridge to pradaxa after 5 days. This evening patient was noted to not be able to talk or move her right side. LKN 20:15. A stroke code was called and CTA revealed acute L M2 occlusion. She was transferred to [facility] and then taken for emergent thrombectomy TICI 2c. She is admitted to [facility] for higher level care and neurologic monitoring.       Hospital Course: 4/30/23: To mechanical thrombectomy overnight with TICI 2c clot evacuation, this morning remains expressively aphasic with some weakness on Rt, but RLE/RUE antigravity at times spontaneously.  Heparin remains held since procedure, pending ICD evaluation to assess ability to obtain urgent MRI for characterization of stroke burden prior to resumption of anticoagulation.  Aspirin ordered given lack of hemorrhage on post-thrombectomy CT.         Objective:     Vitals:  Temp: 98.2 °F (36.8 °C)  Pulse: 64  Rhythm: normal sinus rhythm  BP: (!) 108/56  MAP (mmHg): 76  SpO2: (!) 94 %    Temp  Min: 97.3 °F (36.3 °C)  Max: 98.2 °F (36.8 °C)  Pulse  Min: 60  Max: 93  BP  Min: 86/50  Max: 156/97  MAP (mmHg)  Min: 65  Max: 115  Resp  Min: 16  Max: 22  SpO2  Min: 93 %  Max: 100 %    04/29 0701 - 04/30 0700  In: 1200 [P.O.:800; I.V.:400]  Out: 1101 [Urine:1100]   Unmeasured Output  Urine Occurrence: 1  Stool  Occurrence: 0       Physical Exam  Vitals and nursing note reviewed.   Constitutional:       General: She is not in acute distress.  HENT:      Head: Normocephalic and atraumatic.      Mouth/Throat:      Mouth: Mucous membranes are moist.   Eyes:      Extraocular Movements: Extraocular movements intact.      Conjunctiva/sclera: Conjunctivae normal.      Pupils: Pupils are equal, round, and reactive to light.   Cardiovascular:      Rate and Rhythm: Normal rate and regular rhythm.      Pulses:           Dorsalis pedis pulses are detected w/ Doppler on the right side and 2+ on the left side.   Pulmonary:      Effort: Pulmonary effort is normal. No respiratory distress.   Abdominal:      General: Abdomen is flat. There is no distension.      Palpations: Abdomen is soft.      Tenderness: There is no abdominal tenderness. There is no guarding.   Musculoskeletal:         General: No swelling or deformity.   Skin:     General: Skin is warm.   Neurological:   Alert, tracks examiner  --GCS:  E4 V1 M4  --Mental Status: Alert, mute, expressive aphasia - attempts to follow some simple verbal commands with word substitution  --CN II-XII: PERRLA, R facial droop, L gaze preference, R hemianopia   --Motor: Rt side withdrawal to pain, briefly antigravity with drift downwards to bed, L side no drift and volitional   --sensory: diminished to R side           Medications:  Continuous Scheduledaspirin, 81 mg, Daily  aspirin, 300 mg, Once  atorvastatin, 80 mg, Daily  metoprolol tartrate, 50 mg, BID  senna-docusate 8.6-50 mg, 1 tablet, BID    PRNacetaminophen, 650 mg, Q4H PRN  albuterol-ipratropium, 3 mL, Q4H PRN  calcium gluconate IVPB, 1 g, PRN  calcium gluconate IVPB, 2 g, PRN  calcium gluconate IVPB, 3 g, PRN  dextrose 10%, 12.5 g, PRN  dextrose 10%, 25 g, PRN  dextrose, 15 g, PRN  dextrose, 30 g, PRN  glucagon (human recombinant), 1 mg, PRN  insulin aspart U-100, 1-10 Units, Q6H PRN  labetalol, 10 mg, Q6H PRN  magnesium sulfate IVPB,  2 g, PRN  magnesium sulfate IVPB, 4 g, PRN  ondansetron, 4 mg, Q8H PRN  potassium chloride, 40 mEq, PRN   And  potassium chloride, 60 mEq, PRN   And  potassium chloride, 80 mEq, PRN  senna-docusate 8.6-50 mg, 2 tablet, BID PRN  sodium chloride 0.9%, 10 mL, Q12H PRN  sodium chloride 0.9%, 10 mL, PRN  sodium phosphate IVPB, 15 mmol, PRN  sodium phosphate IVPB, 20.01 mmol, PRN  sodium phosphate IVPB, 30 mmol, PRN      Today I personally reviewed pertinent medications, lines/drains/airways, imaging, cardiology results, laboratory results, microbiology results,     Diet  Diet NPO Except for: Medication, Sips with Medication  Diet NPO Except for: Medication, Sips with Medication          Assessment/Plan:     Neuro  * Cerebrovascular accident (CVA) due to embolism of left middle cerebral artery  Acute LMCA stroke with LM2 LVO, no TNK given already anticoagulated   -Stroke code on floor, LKN 20:15  -anticoagulated on heparin gtt, will hold overnight until post procedure imaging   -Q 1 hour neuro checks   -Q 1 hour vitals     Antithrombotics for secondary stroke prevention: Antiplatelets: Aspirin: 81 mg daily    Statins for secondary stroke prevention and hyperlipidemia, if present:   Statins: Atorvastatin- 80 mg daily    Aggressive risk factor modification: HTN, DM, Diet, Exercise, Obesity, CAD     Rehab efforts: The patient has been evaluated by a stroke team provider and the therapy needs have been fully considered based off the presenting complaints and exam findings. The following therapy evaluations are needed: PT evaluate and treat, OT evaluate and treat, SLP evaluate and treat, PM&R evaluate for appropriate placement    Diagnostics ordered/pending: CT scan of head without contrast to asses brain parenchyma, CTA Head to assess vasculature , HgbA1C to assess blood glucose levels, Lipid Profile to assess cholesterol levels, MRI head without contrast to assess brain parenchyma, TTE to assess cardiac function/status , TSH  to assess thyroid function    VTE prophylaxis: None: Reason for No Pharmacological VTE Prophylaxis: Currently on anticoagulation    BP parameters: Infarct: Post sucessful thrombectomy, SBP <160    4/30: S/p TICI2c thrombectomy, pending follow up imaging in order to safely resume anticoagulation, given rectal aspirin pending this    Global aphasia  2/2 to acute stroke   -SLP eval and treat     Cytotoxic brain edema  See stroke     Pulmonary  Pulmonary hypertension due to left heart disease  SBP goal <160 post successful thrombectomy   -hold PO meds overnight  -restart home heart failure meds in am   -PRN labetalol and hydralazine     Cardiac/Vascular  Critical lower limb ischemia  Admission to the hospital for RLE popliteal artery occlusion   -heme/onc and vascular surgery both saw patient   -patient failed management with xarelto at home, transitioned to heparin gtt with plan for pradaxa  -now with acute stroke, hold heparin gtt overnight until post procedure imaging complete   -continue daily ASA and statin   -neurovascular checks     4/30: Heparin gtt held pending cranial imaging, plan to resume ASAP continue neuro vascular checks    ICD (implantable cardioverter-defibrillator) in place  CHF with AICD in place since 2021  -continuous cardiac monitoring     4/30: Rep to investigate/interrogate to determine ability to obtain rapid MRI    Chronic combined systolic and diastolic congestive heart failure  Patient is identified as having Combined Systolic and Diastolic heart failure that is Chronic. CHF is currently controlled. Latest ECHO performed and demonstrates- Results for orders placed during the hospital encounter of 04/27/23    Echo    Interpretation Summary  · The left ventricle is severely enlarged with eccentric hypertrophy and severely decreased systolic function.  · The estimated ejection fraction is 10-15%.  · There is left ventricular global hypokinesis.  · Grade II left ventricular diastolic  dysfunction.  · Normal right ventricular size with normal right ventricular systolic function.  · Mild tricuspid regurgitation.  · The estimated PA systolic pressure is 35 mmHg.  · Normal central venous pressure (3 mmHg).  · Severe left atrial enlargement.  . Continue Beta Blocker, ACE/ARB and Aldactone (begin in AM when po access obtained) and monitor clinical status closely. Monitor on telemetry. Patient is on CHF pathway.  Monitor strict Is&Os and daily weights.  Continue to stress to patient importance of self efficacy and  on diet for CHF. Last BNP reviewed- and noted below   Recent Labs   Lab 04/29/23  0533   *   .      Dilated cardiomyopathy  History of severe decreased systolic function EF 10-15% followed with cardiology. Not currently in acute failure. Likely contributor to arterial occlusive disease    Hyperlipemia  Lipid panel   -continue daily statin     Hypertension  Baseline, maintain SBP <160 following thrombectomy    Hematology  Long term current use of anticoagulant  On chronic xarelto for HFrEF and history of PE, but failed therapy as patient presented with RLE arterial occlusion  -now on heparin gtt bridge to pradaxa as well as ASA   -will hold heparin gtt overnight given acute stroke and will restart pending post procedure imaging   -continue daily ASA in AM     Hypercoagulopathy  Presumed/possible, workup pending    Endocrine  Type 2 diabetes mellitus without complication, without long-term current use of insulin  SSI protocol   -stroke risk factor   -A1c 7.5%    Other  Right sided weakness  2/2 to acute LMCA stroke   -PT/OT          The patient is being Prophylaxed for:  Venous Thromboembolism with: Mechanical  Stress Ulcer with: H2B  Ventilator Pneumonia with: not applicable    Activity Orders          Turn patient starting at 04/29 2200    Elevate HOB starting at 04/29 2148    Diet NPO Except for: Medication, Sips with Medication: NPO starting at 04/29 2148    Progressive  Mobility Protocol (mobilize patient to their highest level of functioning at least twice daily) starting at 04/28 0800        Full Code    Critical condition in that Patient has a condition that poses threat to life and bodily function: see associated documentation     40 minutes of Critical care time was spent personally by me on the following activities: development of treatment plan with patient or surrogate and bedside caregivers, discussions with consultants, evaluation of patient's response to treatment, examination of patient, ordering and performing treatments and interventions, ordering and review of laboratory studies, ordering and review of radiographic studies, pulse oximetry, antibiotic titration if applicable, vasopressor titration if applicable, re-evaluation of patient's condition. This critical care time did not overlap with that of any other provider or involve time for any procedures. There is high probability for acute neurological change leading to clinical and possibly life-threatening deterioration requiring highest level of physician preparedness for urgent intervention.      Tony Souza, DO  Neurocritical Care  [facility] - Neuro Critical Care

## 2023-04-30 NOTE — PROGRESS NOTES
MRI called to obtain time for scan. Clearance for pacemaker obtained at this time. Awaiting  to come to bedside to travel to MRI to answer health history questions due to aphasia.

## 2023-04-30 NOTE — PT/OT/SLP EVAL
Occupational Therapy   Evaluation and Treatment    Name: Diomedes Mohr  MRN: [id number]  Admitting Diagnosis:  Cerebrovascular accident (CVA) due to embolism of left middle cerebral artery    Length of Stay: 3 days    Recommendations:     Discharge Recommendations: rehabilitation facility  Discharge Equipment Recommendations:  to be determined by next level of care  Barriers to discharge:  Other (Comment) (Increased skilled assistance required)    Plan:     Patient to be seen 4 x/week to address the above listed problems via self-care/home management, therapeutic activities, therapeutic exercises, neuromuscular re-education  Plan of Care Expires: 05/30/23  Plan of Care Reviewed with: patient, spouse    Assessment:     Diomedes Mohr is a 62 y.o. female with a medical diagnosis of Cerebrovascular accident (CVA) due to embolism of left middle cerebral artery.  She presents with the following performance deficits affecting function: weakness, impaired endurance, impaired self care skills, impaired functional mobility, gait instability, decreased lower extremity function, decreased upper extremity function, decreased ROM, decreased coordination, impaired coordination, impaired fine motor, decreased safety awareness, impaired balance.      Pt presenting with increased fatigue, impaired endurance, right hemiparesis,  impaired coordination, decreased activity tolerance, impaired balance, poor trunk stability/postural control, and decreased safety awareness upon evaluation this date, requiring increased assistance and time to complete functional tasks. Patient with increased expressive aphasia, unable to fully assess orientation, requiring increased time for task initiation and response. Patient required Max A x 2 for bed mobility this date. Upon EOB sitting, patient with increased right lateral lean, requiring Max A for midline posture. Patient noted with trace movement of RUE, full PROM, no reports of numbness/tingling.  "Patient and spouse educated on importance of increased attempts at vocal utilization and attention to right side. Pt would benefit from continued acute skilled OT services at this time to increase functional performance, and improve quality of life. OT to recommend Rehab at discharge once medically appropriate for increased functional independence and to improve patient safety before returning home.    Rehab Prognosis: Fair; patient would benefit from acute skilled OT services to address these deficits and reach maximum level of function.       Subjective   Patient states: " I'm fine " -only response majority of session with increased prompting    Communicated with: Nurse prior to session.  Patient found sleeping HOB elevated with arterial line, bed alarm, blood pressure cuff, pulse ox (continuous), telemetry, peripheral IV upon OT entry to room. Easily aroused with stimuli and willing to participate.    Chief Complaint: Leg Pain (Pain from r hip to r foot and tingling, denies bowel/bladder incontinence )    Patient/Family Comments/goals: to return [facility] at [facility]    Pain/Comfort:  Pain Rating 1: 0/10  Pain Rating Post-Intervention 1: 0/10    Patients cultural, spiritual, Taoist conflicts given the current situation: no    Occupational Profile:  Living Environment: lives with spouse in [facility]; tub/shower combo  Prior Level of Function: Patient reports being Independent with mobility & with ADLs.   Patient uses DME as follows: none.   DME owned (not currently used): none.  Roles/Repsonsibilities:   Hand Dominance: right   Work: no.   Drive: yes.   Managing Medicines/Managing Home: yes.     Equipment Used at Home:  none    Patient reports they will have assistance from spouse upon discharge.      Objective:     Patient found with: arterial line, bed alarm, blood pressure cuff, pulse ox (continuous), telemetry, peripheral IV   General Precautions: Standard, Cardiac aphasia, aspiration, fall, pureed diet, nectar " "thick   Orthopedic Precautions:N/A   Braces: N/A   Oxygen Device: Room Air  Vitals: BP (!) 108/56 (BP Location: Right arm, Patient Position: Lying)   Pulse 64   Temp 98.2 °F (36.8 °C) (Oral)   Resp 18   Ht 5' 7" (1.702 m)   Wt 85.7 kg (189 lb)   LMP  (LMP Unknown)   SpO2 (!) 94%   BMI 29.60 kg/m²     Cognitive and Psychosocial Function:   AxOx1 -- Person and with yes/no question and increased time; Unable to fully assess orientation due to aphasia   Follows Commands/attention:follows one-step commands and with increased time  Communication:  expressive aphasia  Memory: No Deficits noted and unable to fully assess  Safety awareness/insight to disability: impaired   Mood/Affect/Coping skills/emotional control: Appropriate to situation and Flat affect    Hearing: Intact    Vision:  absent right visual fields; limited right eye focus    Physical Exam:  Postural examination/scapula alignment:    -       Rounded shoulders  -       Forward head  Skin integrity: Visible skin intact     Left UE Right UE   UE Edema absent absent   UE ROM AROM WFL PROM WFL and limited by increased weakness   UE Strength 3+/5 1/5    Strength 3+/5 0/5   Sensation LUE INTACT:WFL RUE INTACT: WFL   Fine Motor Coordination:  LUE INTACT: WFL RUE IMPAIRED:    Gross Motor Coordination: LUE INTACT: WFL RUE IMPAIRED:hemiplegia/paresis     Occupational Performance:  Bed Mobility:    Patient completed Rolling/Turning to Right with maximal assistance and 2 persons  Patient completed Supine to Sit with maximal assistance and 2 persons on R side of bed  Scooting anteriorly to EOB to have both feet planted on floor: maximal assistance  Patient completed Sit to Supine with maximal assistance and 2 persons on R side of bed  Scooting to HOB in supine: dependent, 2 persons, and drawsheet pull via Trendelenburg position    Functional Mobility/Transfers:  Static Sitting EOB: Max A, right lateral lean  Dynamic Sitting EOB: Max A, right lateral " lean  Deferred stand trials due to poor sitting tolerance and balance      Activities of Daily Living:  Grooming: declined to complete at this time      [facility] 6 Click ADL:  [facility] Total Score: 13    Treatment & Education:  -Pt education on OT role and POC.  -Importance of E/OOB activity with staff assistance, emphasis on daily participation  -Patient tolerated EOB sitting for ~8 minutes while engaged in functional tasks as tolerated  -Safety during functional transfer and mobility ensured  -Patient provided with education on importance of Bilateral UB/LB integration during functional tasks for improvement in functional performance.   -Education provided/reviewed, questions answered within OT scope of practice.   -Patient will continue to require reinforcement to demonstrate understanding and learning this date.         Patient left HOB elevated with all lines intact, call button in reach, bed alarm on, nurse notified, and spouse present    GOALS:   Multidisciplinary Problems       Occupational Therapy Goals          Problem: Occupational Therapy    Goal Priority Disciplines Outcome Interventions   Occupational Therapy Goal     OT, PT/OT Ongoing, Progressing    Description: Goals set on 4/30, with expiration date 5/30:  Patient will increase functional independence with ADLs by performing:    Bed mobility with Min A  Grooming while standing at sink with Min A  UB Dressing with Min A.  LB Dressing with Min A.  Toileting from toilet with Min A for hygiene and clothing management.   Functional mobility of household and community distance with Mod A and AD as needed  Pt will demonstrate understanding of education provided regarding energy conservation and task modification through teach-back method.  Pt will demonstrate [place] in HEP for BUE strengthening GM/FM exercises to improve functional performance.                          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 and diastolic heart failure 12/26/2019    Anticoagulant long-term use     Anxiety     Arthritis     Asthma     Depression     H/O: hysterectomy     Hyperlipemia     Hypertension     Pulmonary edema     Schizophrenia          Past Surgical History:   Procedure Laterality Date    BLADDER SUSPENSION      CARPAL TUNNEL RELEASE Right     HEEL SPUR SURGERY Left     HYSTERECTOMY      LEFT HEART CATHETERIZATION Bilateral 12/27/2019    Procedure: Left heart cath;  Surgeon: Steve Chambers MD;  Location: [facility] CATH LAB;  Service: Cardiology;  Laterality: Bilateral;    RIGHT HEART CATHETERIZATION Right 7/26/2021    Procedure: INSERTION, CATHETER, RIGHT HEART;  Surgeon: Petr Naranjo MD;  Location: [facility] CATH LAB;  Service: Cardiology;  Laterality: Right;    RIGHT HEART CATHETERIZATION Right 5/12/2022    Procedure: INSERTION, CATHETER, RIGHT HEART;  Surgeon: Chandana Ivory Jr., MD;  Location: [facility] CATH LAB;  Service: Cardiology;  Laterality: Right;    TUBAL LIGATION         Time Tracking:       OT Date of Treatment: 04/30/23  OT Start Time: 1320  OT Stop Time: 1337  OT Total Time (min): 17 min    Billable Minutes:Evaluation 8  Neuromuscular Re-education 9      4/30/2023

## 2023-04-30 NOTE — CODE/ RAPID DOCUMENTATION
"  RAPID RESPONSE NURSE STROKE CODE NOTE         Admit Date: 2023  LOS: 2  Code Status: Full Code   Date of Consult: 2023  : 1960  Age: 62 y.o.  Weight:   Wt Readings from Last 1 Encounters:   23 85.7 kg (189 lb)     Sex: female  Race: Black or    Bed: 349  MRN: [id number]  Time Rapid Response Team page Received:   Time Rapid Response Team at Bedside:   Time Rapid Response Team left Bedside:   Was the patient discharged from an ICU this admission? No   Was the patient discharged from a PACU within last 24 hours? No   Did the patient receive conscious sedation/general anesthesia in last 24 hours? No  Was the patient in the ED within the past 24 hours? No  Was the patient on NIPPV within the past 24 hours? No   Did this progress into an ARC or CPA: No  Attending Physician: Tony Souza DO  Primary Service: [facility] MED C       SITUATION    Notified by pager.  Called to evaluate the patient for Neuro    BACKGROUND    Why is the patient in the hospital?: Cerebrovascular accident (CVA) due to embolism of left middle cerebral artery  Patient has a past medical history of Acute on chronic combined systolic and diastolic heart failure, Anticoagulant long-term use, Anxiety, Arthritis, Asthma, Depression, H/O: hysterectomy, Hyperlipemia, Hypertension, Pulmonary edema, and Schizophrenia.    ASSESSMENT    Last VS: /72   Pulse 74   Temp 97.3 °F (36.3 °C) (Oral)   Resp 18   Ht 5' 7" (1.702 m)   Wt 85.7 kg (189 lb)   LMP  (LMP Unknown)   SpO2 97%   BMI 29.60 kg/m²     24H Vital Sign Range:  Temp:  [97.3 °F (36.3 °C)-98.3 °F (36.8 °C)]   Pulse:  [69-86]   Resp:  [16-22]   BP: ()/(55-97)   SpO2:  [96 %-99 %]     Last know well time:     Glucose time:    Glucose result: 134    Physical Exam  Cardiovascular:      Rate and Rhythm: Normal rate and regular rhythm.   Pulmonary:      Effort: Pulmonary effort is normal.      Breath sounds: Normal breath sounds. "   Skin:     General: Skin is warm and dry.      Capillary Refill: Capillary refill takes less than 2 seconds.   Neurological:      Mental Status: She is alert.      GCS: GCS eye subscore is 4. GCS verbal subscore is 1. GCS motor subscore is 6.      Comments: NIH 24, see neuro flowsheet for detailed assessment      HR 82  /80 (102)  RR 22, SpO2 99% on RA    Time Stroke Code initiated: 2034  Stroke team Arrival time: 2036  Stroke Code activation triggers: Acute mental status change with R sided weakness, facial droop  Vascular Neurology provider you spoke with: Renetta Medina NP    Time arrived at CT: 2035  Time CT completed: 2047    VAN positive or negative: positive    Thrombolytic decision: No  Thrombolytic bolus (mg and time): n/a  Thrombolytic infusion (mg and time): n/a  Thrombolytic end time: n/a    IR decision: Yes and No  IR arrival: 2155  IR end time: 2305     RECOMMENDATIONS    We recommend: continue care per vascular neurology and neuro critical care    FOLLOW-UP/PLAN    Call the Rapid Response NurseLAYNE RN at [phone] for additional questions or concerns.    PHYSICIAN ESCALATION    Orders received and case discussed with Renetta Medina vascular neurology NP and [facility]     Disposition: Tx in ICU bed 7546.

## 2023-04-30 NOTE — ANESTHESIA POSTPROCEDURE EVALUATION
Anesthesia Post Evaluation    Patient: Diomedes Mohr    Procedure(s) Performed: * No procedures listed *    Final Anesthesia Type: general      Patient location during evaluation: ICU  Patient participation: Yes- Able to Participate  Level of consciousness: awake and alert  Post-procedure vital signs: reviewed and stable  Pain management: adequate  Airway patency: patent  NATHALIE mitigation strategies: Extubation while patient is awake  PONV status at discharge: No PONV  Anesthetic complications: no      Cardiovascular status: stable  Respiratory status: spontaneous ventilation and face mask  Hydration status: euvolemic  Follow-up not needed.          Vitals Value Taken Time   /59 04/30/23 0220   Temp 36.7 °C (98 °F) 04/29/23 2335   Pulse 71 04/30/23 0325   Resp 18 04/29/23 2130   SpO2 95 % 04/30/23 0325   Vitals shown include unvalidated device data.      No case tracking events are documented in the log.      Pain/Sosa Score: Pain Rating Prior to Med Admin: 8 (4/29/2023 11:12 AM)

## 2023-04-30 NOTE — NURSING TRANSFER
Pt. Transported to    with cardiac monitor and Ambu Bag via bed. No acute events during transfer. O2 device used room air. Pt had RNX2 and MDX3 present. Pt returned to [facility] @ 2330 to room 9078  and room monitor reapplied. GONZALEZ. RN WCTM. End time @2305.

## 2023-04-30 NOTE — SUBJECTIVE & OBJECTIVE
Past Medical History:   Diagnosis Date    Acute on chronic combined systolic and diastolic heart failure 12/26/2019    Anticoagulant long-term use     Anxiety     Arthritis     Asthma     Depression     H/O: hysterectomy     Hyperlipemia     Hypertension     Pulmonary edema     Schizophrenia      Past Surgical History:   Procedure Laterality Date    BLADDER SUSPENSION      CARPAL TUNNEL RELEASE Right     HEEL SPUR SURGERY Left     HYSTERECTOMY      LEFT HEART CATHETERIZATION Bilateral 12/27/2019    Procedure: Left heart cath;  Surgeon: Steve Chambers MD;  Location: [facility] CATH LAB;  Service: Cardiology;  Laterality: Bilateral;    RIGHT HEART CATHETERIZATION Right 7/26/2021    Procedure: INSERTION, CATHETER, RIGHT HEART;  Surgeon: Petr Naranjo MD;  Location: [facility] CATH LAB;  Service: Cardiology;  Laterality: Right;    RIGHT HEART CATHETERIZATION Right 5/12/2022    Procedure: INSERTION, CATHETER, RIGHT HEART;  Surgeon: Chandana Ivory Jr., MD;  Location: [facility] CATH LAB;  Service: Cardiology;  Laterality: Right;    TUBAL LIGATION        No current facility-administered medications on file prior to encounter.     Current Outpatient Medications on File Prior to Encounter   Medication Sig Dispense Refill    acetaminophen (TYLENOL) 325 MG tablet Take 2 tablets (650 mg total) by mouth every 8 (eight) hours as needed. 30 tablet 0    albuterol (PROVENTIL) 2.5 mg /3 mL (0.083 %) nebulizer solution Take 3 mLs (2.5 mg total) by nebulization every 6 (six) hours as needed for Wheezing or Shortness of Breath. Rescue 3 mL 0    albuterol (PROVENTIL/VENTOLIN HFA) 90 mcg/actuation inhaler Inhale 2 puffs into the lungs every 6 (six) hours as needed for Wheezing or Shortness of Breath. 18 g 2    atorvastatin (LIPITOR) 80 MG tablet Take 1 tablet (80 mg total) by mouth once daily. 90 tablet 3    dulaglutide (TRULICITY) 1.5 mg/0.5 mL pen injector Inject 1.5 mg into the skin once a week.      empagliflozin (JARDIANCE) 10 mg  tablet Take 1 tablet (10 mg total) by mouth once daily. 90 tablet 6    glimepiride (AMARYL) 4 MG tablet Take 4 mg by mouth 2 (two) times a day.      ipratropium-albuteroL (COMBIVENT)  mcg/actuation inhaler Inhale 1 puff into the lungs 4 (four) times daily. Rescue 4 g 3    metFORMIN (GLUCOPHAGE) 1000 MG tablet Take 1,000 mg by mouth 2 (two) times daily.       metoprolol succinate (TOPROL-XL) 100 MG 24 hr tablet Take 1 tablet (100 mg total) by mouth 2 (two) times daily. 90 tablet 6    rivaroxaban (XARELTO) 20 mg Tab Take 1 tablet (20 mg total) by mouth daily with dinner or evening meal. 30 tablet 6    sacubitriL-valsartan (ENTRESTO)  mg per tablet Take 1 tablet by mouth 2 (two) times daily. 90 tablet 3    spironolactone (ALDACTONE) 25 MG tablet Take 1 tablet (25 mg total) by mouth once daily. 30 tablet 6    torsemide (DEMADEX) 10 MG Tab Take 1 tablet (10 mg total) by mouth once daily. 60 tablet 6    traMADoL (ULTRAM) 50 mg tablet Take 100 mg by mouth every 12 (twelve) hours as needed for Pain.        Allergies: Adhesive and Captopril    Family History   Problem Relation Age of Onset    No Known Problems Mother     No Known Problems Father      Social History     Tobacco Use    Smoking status: Former     Types: Cigarettes     Quit date: 2016     Years since quittin.6    Smokeless tobacco: Never    Tobacco comments:     nonex 2 weeks   Substance Use Topics    Alcohol use: No     Alcohol/week: 0.0 standard drinks    Drug use: No     Review of Systems   Unable to perform ROS: Patient nonverbal   Objective:     Vitals:    Temp: 97.3 °F (36.3 °C)  Pulse: 74  Rhythm: normal sinus rhythm  BP: 132/72  MAP (mmHg): 96  Resp: 18  SpO2: 97 %    Temp  Min: 97.3 °F (36.3 °C)  Max: 98.3 °F (36.8 °C)  Pulse  Min: 69  Max: 86  BP  Min: 99/56  Max: 156/97  MAP (mmHg)  Min: 70  Max: 115  Resp  Min: 16  Max: 20  SpO2  Min: 96 %  Max: 99 %     0701 -  0700  In: 260 [P.O.:260]  Out: 650 [Urine:650]    Unmeasured Output  Urine Occurrence: 1       Physical Exam  Vitals and nursing note reviewed.   Constitutional:       General: She is not in acute distress.     Appearance: Normal appearance.   HENT:      Head: Normocephalic and atraumatic.      Mouth/Throat:      Mouth: Mucous membranes are moist.   Eyes:      Extraocular Movements: Extraocular movements intact.      Conjunctiva/sclera: Conjunctivae normal.      Pupils: Pupils are equal, round, and reactive to light.   Cardiovascular:      Rate and Rhythm: Normal rate and regular rhythm.      Pulses:           Dorsalis pedis pulses are detected w/ Doppler on the right side and 2+ on the left side.   Pulmonary:      Effort: Pulmonary effort is normal. No respiratory distress.   Abdominal:      General: Abdomen is flat. There is no distension.      Palpations: Abdomen is soft.      Tenderness: There is no abdominal tenderness. There is no guarding.   Musculoskeletal:         General: No swelling or deformity.   Skin:     General: Skin is warm.   Neurological:      Mental Status: She is alert.      Comments: --sedation: none  --GCS:  E4 V1 M4  --Mental Status: Alert, mute, global aphasia  --CN II-XII severe R facial droop, L gaze preference, R hemianopia   --PERRL   --Motor: L side withdrawal to pain, falls to bed immediately, L side no drift   --sensory: diminished to R side      NIH on arrival to ICU - preprocedure  1a. Level of Consciousness 0-->Alert, keenly responsive   1b. LOC Questions 2-->Answers neither question correctly   1c. LOC Commands 2-->Performs neither task correctly   2. Best Gaze 1-->Partial gaze palsy, gaze is abnormal in one or both eyes,   but forced deviation or total gaze paresis is not present   3. Visual 2-->Complete hemianopia   4. Facial Palsy 2-->Partial paralysis (total or near-total paralysis of   lower face)   5a. Motor Arm, Left 0-->No drift, limb holds 90 (or 45) degrees for full   10 secs   5b. Motor Arm, Right 3-->No effort  against gravity, limb falls   6a. Motor Leg, Left 0-->No drift, leg holds 30 degree position for full 5   secs   6b. Motor Leg, Right 3-->No effort against gravity, leg falls to bed   immediately   7. Limb Ataxia 0-->Absent   8. Sensory 1-->Mild-to-moderate sensory loss, patient feels pinprick is   less sharp or is dull on the affected side, or there is a loss of   superficial pain with pinprick, but patient is aware of being touched   9. Best Language 3-->Mute, global aphasia, no usable speech or auditory   comprehension   10. Dysarthria 2-->Severe dysarthria, patients speech is so slurred as to   be unintelligible in the absence of or out of proportion to any dysphasia,   or is mute/anarthric   11. Extinction and Inattention (formerly Neglect) 2-->Profound   vik-inattention/extinction more than 1 modality   Total (NIH Stroke Scale) 23           Unable to test orientation, language, memory, judgment, insight, fund of knowledge, hearing, shoulder shrug, tongue protrusion, coordination, gait due to level of consciousness.(Aphasia)    Today I personally reviewed pertinent medications, lines/drains/airways, imaging, cardiology results, laboratory results, notably:    Imaging Results               CTA Runoff ABD PEL Bilat Lower Ext (Final result)  Result time 04/27/23 23:17:52      Final result by Dagoberto Briones MD (04/27/23 23:17:52)                   Impression:      Focal occlusion of the right popliteal artery with distal reconstitution and diminished runoff to the right foot.  Intermittent stenosis and occlusion of the right peroneal artery.    Focal occlusion or severe stenosis of the right profunda femoris artery at the level of the proximal femoral shaft.    Aortoiliac atherosclerosis.    Cardiomegaly and dilated left ventricle.    Bilateral renal cyst.    Small fat containing umbilical hernia.    Additional findings discussed in the body of the report.    This report was flagged in Epic as  abnormal.    COMMUNICATION  This critical result was discovered/received at 23:03.  The critical information above was relayed directly by Dagoberto Briones MD by [employer] secure chat (with acknowledgement) to Zac Zhuo MD on 4/27/2023 at 23:05.      Electronically signed by: Dagoberto Briones MD  Date:    04/27/2023  Time:    23:17               Narrative:    EXAMINATION:  CTA RUNOFF ABD PEL BILAT LOWER EXT    CLINICAL HISTORY:  Arterial embolism, lower extremity;    TECHNIQUE:  CT angiogram of the abdomen/pelvis with lower extremity runoff using 125 mL of  Omnipaque 350 IV contrast. Precontrast images also obtained.  Delayed phase images also obtained below the knee.  Multiplanar reconstructions performed, including MIP reformats.    COMPARISON:  CTA runoff, 02/24/2020.    FINDINGS:  Abdominopelvic Vasculature:    Moderate irregular atherosclerotic plaque involving the abdominal aorta and major branches.  There is moderate calcified and noncalcified atherosclerosis of the bilateral common iliac arteries.  There is moderate to advanced calcified and noncalcified atherosclerosis of the external iliac arteries, internal iliac arteries, and common femoral arteries.  Celiac, superior mesenteric, bilateral renal, and inferior mesenteric arteries are grossly patent.    Right lower extremity:    - Common femoral: Significant atherosclerosis with mild-to-moderate narrowing of the right superficial femoral artery.    - SFA: Moderate atherosclerosis, though patent.    - Profunda: Possible occlusion or significant stenosis at the level of the proximal femoral shaft (series 3, image 815).    - Popliteal: Focal occlusion of the popliteal artery (series 3, image 1390).  Distal reconstitution at the bifurcation.    - Runoff: Diminished three-vessel runoff to the right lower extremity with grossly patent anterior and posterior tibial arteries on delayed phase images.  Multifocal intermittent stenosis and/or occlusion of the  peroneal artery which is not well delineated beyond the level of the lower right calf (series 3, image 2853).    - Bones: No acute abnormality.    - Soft Tissues: No acute abnormality.    Left lower extremity:    - Common femoral: Mild-to-moderate atherosclerosis, grossly patent.    - SFA: Mild-to-moderate atherosclerosis, grossly patent.    - Profunda: Grossly patent.    - Popliteal: Patent without significant stenosis.    - Runoff: Patent three-vessel runoff.    - Bones: No acute abnormality.    - Soft Tissues: No acute abnormality.    Lower Chest:    Lung bases are essentially clear.  Heart is mildly enlarged.  There is significant dilation of the left ventricle.  AICD is present.    Abdomen:    Liver is normal in size and contour.  Subcentimeter hypodensity in the liver, too small to definitively characterize, but similar to the prior study in 2020.  Gallbladder is unremarkable. No intrahepatic biliary ductal dilatation.    Spleen, adrenals, and pancreas are unremarkable.    The kidneys are symmetric. No hydronephrosis. Bilateral simple renal cysts.    No small bowel obstruction. No inflammatory changes identified involving the gastrointestinal tract.    No pneumoperitoneum or organized fluid collection.    No bulky lymphadenopathy.    Pelvis:    Urinary bladder and rectum are unremarkable.  Uterus appears surgically absent.  No significant pelvic free fluid.  No pelvic lymphadenopathy.    Bones and soft tissues:    No aggressive osseous lesions. Probable hemangioma at L4.  Mild degenerative changes in the spine.  Degenerative changes in the hips, right side greater than left.  Extraperitoneal soft tissues are negative for acute finding.  Small fat containing umbilical hernia.

## 2023-04-30 NOTE — ASSESSMENT & PLAN NOTE
On chronic xarelto for HFrEF and history of PE, but failed therapy as patient presented with RLE arterial occlusion  -now on heparin gtt bridge to pradaxa as well as ASA   -will hold heparin gtt overnight given acute stroke and will restart pending post procedure imaging   -continue daily ASA in AM

## 2023-05-01 LAB
ALBUMIN SERPL BCP-MCNC: 3 G/DL (ref 3.5–5.2)
ALP SERPL-CCNC: 131 U/L (ref 55–135)
ALT SERPL W/O P-5'-P-CCNC: 12 U/L (ref 10–44)
ANION GAP SERPL CALC-SCNC: 9 MMOL/L (ref 8–16)
APTT PPP: 32 SEC (ref 21–32)
APTT PPP: 39.3 SEC (ref 21–32)
APTT PPP: 44.6 SEC (ref 21–32)
AST SERPL-CCNC: 18 U/L (ref 10–40)
BASOPHILS # BLD AUTO: 0.03 K/UL (ref 0–0.2)
BASOPHILS NFR BLD: 0.4 % (ref 0–1.9)
BILIRUB SERPL-MCNC: 0.7 MG/DL (ref 0.1–1)
BUN SERPL-MCNC: 8 MG/DL (ref 8–23)
CALCIUM SERPL-MCNC: 9 MG/DL (ref 8.7–10.5)
CHLORIDE SERPL-SCNC: 105 MMOL/L (ref 95–110)
CO2 SERPL-SCNC: 24 MMOL/L (ref 23–29)
CREAT SERPL-MCNC: 0.7 MG/DL (ref 0.5–1.4)
DIFFERENTIAL METHOD: ABNORMAL
EOSINOPHIL # BLD AUTO: 0.1 K/UL (ref 0–0.5)
EOSINOPHIL NFR BLD: 1.1 % (ref 0–8)
ERYTHROCYTE [DISTWIDTH] IN BLOOD BY AUTOMATED COUNT: 14.7 % (ref 11.5–14.5)
EST. GFR  (NO RACE VARIABLE): >60 ML/MIN/1.73 M^2
GLUCOSE SERPL-MCNC: 148 MG/DL (ref 70–110)
HCT VFR BLD AUTO: 37.7 % (ref 37–48.5)
HGB BLD-MCNC: 12 G/DL (ref 12–16)
IMM GRANULOCYTES # BLD AUTO: 0.02 K/UL (ref 0–0.04)
IMM GRANULOCYTES NFR BLD AUTO: 0.3 % (ref 0–0.5)
LYMPHOCYTES # BLD AUTO: 2 K/UL (ref 1–4.8)
LYMPHOCYTES NFR BLD: 25.8 % (ref 18–48)
MAGNESIUM SERPL-MCNC: 1.8 MG/DL (ref 1.6–2.6)
MCH RBC QN AUTO: 29.2 PG (ref 27–31)
MCHC RBC AUTO-ENTMCNC: 31.8 G/DL (ref 32–36)
MCV RBC AUTO: 92 FL (ref 82–98)
MONOCYTES # BLD AUTO: 0.6 K/UL (ref 0.3–1)
MONOCYTES NFR BLD: 7.5 % (ref 4–15)
NEUTROPHILS # BLD AUTO: 5 K/UL (ref 1.8–7.7)
NEUTROPHILS NFR BLD: 64.9 % (ref 38–73)
NRBC BLD-RTO: 0 /100 WBC
PHOSPHATE SERPL-MCNC: 3.8 MG/DL (ref 2.7–4.5)
PLATELET # BLD AUTO: 247 K/UL (ref 150–450)
PMV BLD AUTO: 11.4 FL (ref 9.2–12.9)
POCT GLUCOSE: 132 MG/DL (ref 70–110)
POCT GLUCOSE: 136 MG/DL (ref 70–110)
POCT GLUCOSE: 149 MG/DL (ref 70–110)
POCT GLUCOSE: 155 MG/DL (ref 70–110)
POCT GLUCOSE: 165 MG/DL (ref 70–110)
POTASSIUM SERPL-SCNC: 3.5 MMOL/L (ref 3.5–5.1)
PROT SERPL-MCNC: 6.3 G/DL (ref 6–8.4)
RBC # BLD AUTO: 4.11 M/UL (ref 4–5.4)
SODIUM SERPL-SCNC: 138 MMOL/L (ref 136–145)
WBC # BLD AUTO: 7.61 K/UL (ref 3.9–12.7)

## 2023-05-01 PROCEDURE — 97535 SELF CARE MNGMENT TRAINING: CPT

## 2023-05-01 PROCEDURE — 25000003 PHARM REV CODE 250: Performed by: PHYSICIAN ASSISTANT

## 2023-05-01 PROCEDURE — 85025 COMPLETE CBC W/AUTO DIFF WBC: CPT

## 2023-05-01 PROCEDURE — 84100 ASSAY OF PHOSPHORUS: CPT | Performed by: PSYCHIATRY & NEUROLOGY

## 2023-05-01 PROCEDURE — 63600175 PHARM REV CODE 636 W HCPCS

## 2023-05-01 PROCEDURE — 99233 SBSQ HOSP IP/OBS HIGH 50: CPT | Mod: FS,,, | Performed by: PHYSICIAN ASSISTANT

## 2023-05-01 PROCEDURE — 97112 NEUROMUSCULAR REEDUCATION: CPT

## 2023-05-01 PROCEDURE — 63600175 PHARM REV CODE 636 W HCPCS: Performed by: PHYSICIAN ASSISTANT

## 2023-05-01 PROCEDURE — 87591 N.GONORRHOEAE DNA AMP PROB: CPT | Performed by: PHYSICIAN ASSISTANT

## 2023-05-01 PROCEDURE — 97162 PT EVAL MOD COMPLEX 30 MIN: CPT

## 2023-05-01 PROCEDURE — 25000003 PHARM REV CODE 250: Performed by: PSYCHIATRY & NEUROLOGY

## 2023-05-01 PROCEDURE — 25000003 PHARM REV CODE 250: Performed by: HOSPITALIST

## 2023-05-01 PROCEDURE — 83735 ASSAY OF MAGNESIUM: CPT | Performed by: PSYCHIATRY & NEUROLOGY

## 2023-05-01 PROCEDURE — 92526 ORAL FUNCTION THERAPY: CPT

## 2023-05-01 PROCEDURE — 85730 THROMBOPLASTIN TIME PARTIAL: CPT

## 2023-05-01 PROCEDURE — 80053 COMPREHEN METABOLIC PANEL: CPT | Performed by: PSYCHIATRY & NEUROLOGY

## 2023-05-01 PROCEDURE — 99233 PR SUBSEQUENT HOSPITAL CARE,LEVL III: ICD-10-PCS | Mod: GC,,, | Performed by: PSYCHIATRY & NEUROLOGY

## 2023-05-01 PROCEDURE — 99233 SBSQ HOSP IP/OBS HIGH 50: CPT | Mod: GC,,, | Performed by: PSYCHIATRY & NEUROLOGY

## 2023-05-01 PROCEDURE — 99233 PR SUBSEQUENT HOSPITAL CARE,LEVL III: ICD-10-PCS | Mod: FS,,, | Performed by: PHYSICIAN ASSISTANT

## 2023-05-01 PROCEDURE — 85730 THROMBOPLASTIN TIME PARTIAL: CPT | Mod: 91 | Performed by: PSYCHIATRY & NEUROLOGY

## 2023-05-01 PROCEDURE — 20000000 HC ICU ROOM

## 2023-05-01 PROCEDURE — 92523 SPEECH SOUND LANG COMPREHEN: CPT

## 2023-05-01 RX ORDER — AMOXICILLIN 250 MG
1 CAPSULE ORAL 2 TIMES DAILY
Status: DISCONTINUED | OUTPATIENT
Start: 2023-05-01 | End: 2023-05-12 | Stop reason: HOSPADM

## 2023-05-01 RX ORDER — INSULIN ASPART 100 [IU]/ML
1-10 INJECTION, SOLUTION INTRAVENOUS; SUBCUTANEOUS
Status: DISCONTINUED | OUTPATIENT
Start: 2023-05-01 | End: 2023-05-12 | Stop reason: HOSPADM

## 2023-05-01 RX ORDER — GLUCAGON 1 MG
1 KIT INJECTION
Status: DISCONTINUED | OUTPATIENT
Start: 2023-05-01 | End: 2023-05-12 | Stop reason: HOSPADM

## 2023-05-01 RX ORDER — DEXTROSE 40 %
15 GEL (GRAM) ORAL
Status: DISCONTINUED | OUTPATIENT
Start: 2023-05-01 | End: 2023-05-12 | Stop reason: HOSPADM

## 2023-05-01 RX ORDER — METOPROLOL TARTRATE 50 MG/1
50 TABLET ORAL 2 TIMES DAILY
Status: DISCONTINUED | OUTPATIENT
Start: 2023-05-01 | End: 2023-05-04

## 2023-05-01 RX ORDER — DEXTROSE 40 %
30 GEL (GRAM) ORAL
Status: DISCONTINUED | OUTPATIENT
Start: 2023-05-01 | End: 2023-05-12 | Stop reason: HOSPADM

## 2023-05-01 RX ADMIN — POTASSIUM CHLORIDE 40 MEQ: 7.46 INJECTION, SOLUTION INTRAVENOUS at 08:05

## 2023-05-01 RX ADMIN — ACETAMINOPHEN 650 MG: 325 TABLET ORAL at 10:05

## 2023-05-01 RX ADMIN — MUPIROCIN: 20 OINTMENT TOPICAL at 09:05

## 2023-05-01 RX ADMIN — SENNOSIDES AND DOCUSATE SODIUM 1 TABLET: 8.6; 5 TABLET ORAL at 09:05

## 2023-05-01 RX ADMIN — SENNOSIDES AND DOCUSATE SODIUM 1 TABLET: 50; 8.6 TABLET ORAL at 08:05

## 2023-05-01 RX ADMIN — MAGNESIUM SULFATE 2 G: 2 INJECTION INTRAVENOUS at 03:05

## 2023-05-01 RX ADMIN — METOPROLOL TARTRATE 50 MG: 50 TABLET, FILM COATED ORAL at 09:05

## 2023-05-01 RX ADMIN — INSULIN ASPART 2 UNITS: 100 INJECTION, SOLUTION INTRAVENOUS; SUBCUTANEOUS at 06:05

## 2023-05-01 RX ADMIN — HEPARIN SODIUM AND DEXTROSE 14 UNITS/KG/HR: 10000; 5 INJECTION INTRAVENOUS at 07:05

## 2023-05-01 RX ADMIN — METOPROLOL TARTRATE 50 MG: 50 TABLET, FILM COATED ORAL at 08:05

## 2023-05-01 RX ADMIN — INSULIN ASPART 2 UNITS: 100 INJECTION, SOLUTION INTRAVENOUS; SUBCUTANEOUS at 12:05

## 2023-05-01 RX ADMIN — ASPIRIN 81 MG: 81 TABLET, COATED ORAL at 08:05

## 2023-05-01 RX ADMIN — ATORVASTATIN CALCIUM 80 MG: 40 TABLET, FILM COATED ORAL at 08:05

## 2023-05-01 NOTE — ASSESSMENT & PLAN NOTE
62 y.o. female with PMH of HTN, DM2, hyperlipidemia, tobacco use (quit in 2020), stroke (2020, R MCA, no deficits), PE (December 2021, on xarelto),  CAD, DCM, HFpEF (15%) s/p AICD, Pulmonary hypertension, who is admited to  for the treatment of a right leg dvt on heparin gtt. Today around 8:15 patient was noted not to be able to talk or move her right side and a stroke code was called. Patient noted to be van+ , NIHSS 2,. L MCA syndrome. CTA showed a L M2 occlusion. Not a candidate for TNK because she was on a heaprin gtt and with therapeutic APTT. Patient will be taken to IR for thrombectomy.     S/p TICI 2c. Repeat CTH after IR with no acute infarct. MRI with Acute infarct within the left MCA distribution (left insula, left frontotemporal opercular cortex, patchy left frontal white matter, and small temporoparietal cortex). Small acute infarct in the right cerebellum. Stable volume loss and gliosis in the right insula/frontal operculum and posteromedial right temporal lobe compatible with prior infarct.     Antithrombotics for secondary stroke prevention: Anticoagulants: Heparin protocol without bolus after imaging completed showing no evidence of hemorragic conversion.    Statins for secondary stroke prevention and hyperlipidemia, if present:   Statins: Atorvastatin- 40 mg daily    Aggressive risk factor modification: HTN, DM, HLD, HF, DVT     Rehab efforts: The patient has been evaluated by a stroke team provider and the therapy needs have been fully considered based off the presenting complaints and exam findings. The following therapy evaluations are needed: PT evaluate and treat, OT evaluate and treat, SLP evaluate and treat, PM&R evaluate for appropriate placement    Diagnostics ordered/pending: MRI head without contrast to assess brain parenchyma, TTE to assess cardiac function/status     VTE prophylaxis: None: Reason for No Pharmacological VTE Prophylaxis: Currently on anticoagulation    BP parameters:  Infarct:  SBP <160

## 2023-05-01 NOTE — SUBJECTIVE & OBJECTIVE
Neurologic Chief Complaint: aphasia    Subjective:     Interval History: Patient is seen for follow-up neurological assessment and treatment recommendations: MRI completed showing acute L. MCA infarct. Pacemaker reinitiated post MRI to normal settings. Plan to start Pradaxa tomorrow per Heme/Onc. Neuro exam stable (r arm weakness and aphasia).    HPI, Past Medical, Family, and Social History remains the same as documented in the initial encounter.     Review of Systems   Unable to perform ROS: Patient nonverbal   Neurological:  Positive for speech difficulty and weakness.   Scheduled Meds:   aspirin  81 mg Oral Daily    aspirin  300 mg Rectal Once    atorvastatin  80 mg Oral Daily    metoprolol tartrate  50 mg Oral BID    mupirocin   Nasal BID    senna-docusate 8.6-50 mg  1 tablet Oral BID     Continuous Infusions:   heparin (porcine) in D5W 14 Units/kg/hr (05/01/23 1405)     PRN Meds:acetaminophen, albuterol-ipratropium, calcium gluconate IVPB, calcium gluconate IVPB, calcium gluconate IVPB, dextrose 10%, dextrose 10%, dextrose 10%, dextrose 10%, dextrose, dextrose, glucagon (human recombinant), insulin aspart U-100, labetalol, magnesium sulfate IVPB, magnesium sulfate IVPB, ondansetron, potassium chloride **AND** potassium chloride **AND** potassium chloride, senna-docusate 8.6-50 mg, sodium chloride 0.9%, sodium chloride 0.9%, sodium phosphate IVPB, sodium phosphate IVPB, sodium phosphate IVPB    Objective:     Vital Signs (Most Recent):  Temp: 98.3 °F (36.8 °C) (05/01/23 0705)  Pulse: 75 (05/01/23 1405)  Resp: 17 (05/01/23 1405)  BP: 112/63 (05/01/23 1405)  SpO2: 96 % (05/01/23 1405)  BP Location: Right arm    Vital Signs Range (Last 24H):  Temp:  [98.3 °F (36.8 °C)-99.2 °F (37.3 °C)]   Pulse:  [64-88]   Resp:  [12-22]   BP: (106-150)/(54-85)   SpO2:  [90 %-100 %]   Arterial Line BP: (115-147)/(51-67)   BP Location: Right arm    Physical Exam  Constitutional:       General: She is not in acute distress.  HENT:       Head: Normocephalic.   Eyes:      Extraocular Movements:      Right eye: Abnormal extraocular motion present.      Left eye: Abnormal extraocular motion present.   Cardiovascular:      Rate and Rhythm: Normal rate.   Pulmonary:      Effort: Pulmonary effort is normal.   Abdominal:      General: Abdomen is flat.   Musculoskeletal:      Comments: RSW   Skin:     General: Skin is warm and dry.   Neurological:      Mental Status: She is alert.      Cranial Nerves: Dysarthria and facial asymmetry present.      Motor: Weakness present.   Psychiatric:         Mood and Affect: Mood normal.       Neurological Exam:   LOC: alert  Attention Span: Good   Language: expressive aphasia  Articulation: aphasia  Orientation: Untestable due to severe aphasia   Visual Fields: Hemianopsia right  EOM (CN III, IV, VI): Gaze preference  left  Facial Movement (CN VII): Upper facial weakness on the Left  Motor: Arm left  Normal 5/5  Leg left  Normal 5/5  Arm right  Plegia 2/5  Leg right Plegia 2/5  Cerebellum: No evidence of appendicular or axial ataxia    Laboratory:  CMP:   Recent Labs   Lab 05/01/23 0107   CALCIUM 9.0   ALBUMIN 3.0*   PROT 6.3      K 3.5   CO2 24      BUN 8   CREATININE 0.7   ALKPHOS 131   ALT 12   AST 18   BILITOT 0.7       BMP:   Recent Labs   Lab 05/01/23 0107      K 3.5      CO2 24   BUN 8   CREATININE 0.7   CALCIUM 9.0       CBC:   Recent Labs   Lab 05/01/23 0107   WBC 7.61   RBC 4.11   HGB 12.0   HCT 37.7      MCV 92   MCH 29.2   MCHC 31.8*       Lipid Panel:   Recent Labs   Lab 04/30/23  0845   CHOL 142   LDLCALC 88.6   HDL 38*   TRIG 77       Coagulation:   Recent Labs   Lab 04/30/23  1814 05/01/23  0107 05/01/23  1020   INR 1.1  --   --    APTT 25.7   < > 39.3*    < > = values in this interval not displayed.       Platelet Aggregation Study: No results for input(s): PLTAGG, PLTAGINTERP, PLTAGREGLACO, ADPPLTAGGREG in the last 168 hours.  Hgb A1C:   Recent Labs   Lab  04/30/23  0845   HGBA1C 7.5*       TSH:   Recent Labs   Lab 04/30/23  0845   TSH 2.092         Diagnostic Results     Brain Imaging   MRI Brain 4/30/23    Acute left MCA distribution infarct.  No significant mass effect or acute hemorrhage at this time.     Small acute infarct in the right cerebellum.     Chronic right MCA distribution infarct.     CTH 4/30/2023  No acute territorial infarct or intracranial hemorrhage identified.    Vessel Imaging   CTA Stroke MP 4/29/2023  CT head: No evidence for acute intracranial hemorrhage.     Stable area encephalomalacia from remote right MCA territory infarct.     CTA head: Occlusion of M2 segment of left MCA.     Asymmetric decreased opacification of the left transverse sinus and jugular vein.  Similar finding was present on the prior CTA in 2022 and therefore could be related to sluggish flow.  Suggest attention on follow-up conventional angiogram.     CTA neck: No high-grade stenosis or aneurysm.    Cardiac Imaging   Echo 4/28/2023  The left ventricle is severely enlarged with eccentric hypertrophy and severely decreased systolic function.  The estimated ejection fraction is 10-15%.  There is left ventricular global hypokinesis.  Grade II left ventricular diastolic dysfunction.  Normal right ventricular size with normal right ventricular systolic function.  Mild tricuspid regurgitation.  The estimated PA systolic pressure is 35 mmHg.  Normal central venous pressure (3 mmHg).  Severe left atrial enlargement.

## 2023-05-01 NOTE — PLAN OF CARE
Problem: SLP  Goal: SLP Goal  Description: Speech Language Pathology Goals  Goals expected to be met by 5/7  1. Pt will tolerate puree & nectar thick liquids without s/s aspiration & adequate oral phase of swallow  2. Ongoing swallow assessment to determine least restrictive PO consistencies  3. SLP Speech/Language/Cognitive Evaluation- initiated 5/1  4. Pt will answer personal yes/no questions with 90% accuracy, MIN A  5. Pt will complete 1-unit commands 90% of the time, MIN A  6. Pt will complete automatic speech tasks with 90% accuracy, MIN A  7. Pt will complete object naming tasks with 70% accuracy or higher, MIN A    Outcome: Ongoing, Progressing     Pt seen for ongoing swallow assessment and further assessment of speech and language. Pt presents with Dysphagia, Dysarthria and Aphasia. Pt with minimal PO acceptance and consideration of temporary, supplemental means nutrition/hydration warranted to ensure adequate nutrition/hydration. Ongoing f/u with registered dietician also recommended.  Should PO medications be required, safest means would be crushed in puree. Pleasure feeds puree ok provided Pt is awake, alert, attentive, upright with 1:1 assistance and strict aspiration precautions. ST to continue to follow. Findings reviewed with RN and MD team.     5/1/2023

## 2023-05-01 NOTE — PLAN OF CARE
[facility]  Discharge Reassessment    Primary Care Provider: Fernando Manzo MD    Expected Discharge Date: 5/6/2023    Patient in [facility] S/p Procedure: Cerebral angiogram and aspiration thrombectomy x3.  Heparin gtt.   Therapy is recommending Rehab.      heparin (porcine) in D5W 14 Units/kg/hr (05/01/23 1605)      Patient not medically ready for discharge.       Reassessment (most recent)       Discharge Reassessment - 05/01/23 1640          Discharge Reassessment    Assessment Type Discharge Planning Reassessment     Did the patient's condition or plan change since previous assessment? No     Communicated KARLIE with patient/caregiver Date not available/Unable to determine     Discharge Plan A Rehab     Discharge Plan B Home Health     DME Needed Upon Discharge  none     Discharge Barriers Identified None     Why the patient remains in the hospital Requires continued medical care

## 2023-05-01 NOTE — PROGRESS NOTES
[facility] Critical [facility]  Vascular Neurology  [facility]  Progress Note    Assessment/Plan:     * Cerebrovascular accident (CVA) due to embolism of left middle cerebral artery    62 y.o. female with PMH of HTN, DM2, hyperlipidemia, tobacco use (quit in 2020), stroke (2020, R MCA, no deficits), PE (December 2021, on xarelto),  CAD, DCM, HFpEF (15%) s/p AICD, Pulmonary hypertension, who is admited to  for the treatment of a right leg dvt on heparin gtt. Today around 8:15 patient was noted not to be able to talk or move her right side and a stroke code was called. Patient noted to be van+ , NIHSS 2,. L MCA syndrome. CTA showed a L M2 occlusion. Not a candidate for TNK because she was on a heaprin gtt and with therapeutic APTT. Patient will be taken to IR for thrombectomy.     S/p TICI 2c. Repeat CTH after IR with no acute infarct. MRI with Acute infarct within the left MCA distribution (left insula, left frontotemporal opercular cortex, patchy left frontal white matter, and small temporoparietal cortex). Small acute infarct in the right cerebellum. Stable volume loss and gliosis in the right insula/frontal operculum and posteromedial right temporal lobe compatible with prior infarct.     Antithrombotics for secondary stroke prevention: Anticoagulants: Heparin protocol without bolus after imaging completed showing no evidence of hemorragic conversion.    Statins for secondary stroke prevention and hyperlipidemia, if present:   Statins: Atorvastatin- 40 mg daily    Aggressive risk factor modification: HTN, DM, HLD, HF, DVT     Rehab efforts: The patient has been evaluated by a stroke team provider and the therapy needs have been fully considered based off the presenting complaints and exam findings. The following therapy evaluations are needed: PT evaluate and treat, OT evaluate and treat, SLP evaluate and treat, PM&R evaluate for appropriate placement    Diagnostics ordered/pending: MRI head  without contrast to assess brain parenchyma, TTE to assess cardiac function/status     VTE prophylaxis: None: Reason for No Pharmacological VTE Prophylaxis: Currently on anticoagulation    BP parameters: Infarct:  SBP <160      Global aphasia  Therapy to evaluate and treat     Right sided weakness  Therapy to evaluate and treat   Recommendation for IPR    Critical lower limb ischemia  Stroke risk factor  Admitted for R. popliteal artery occlusion, was on heparin gtt  Restart heparin after ruling out hemorraghic conversion post IR procedure     Chronic combined systolic and diastolic congestive heart failure  Stroke risk factor  Echo ef 10%, lv hypokinesis, severe lae  GDMT    Hyperlipemia  Stroke risk factor  High intensity statin     Hypertension  Stroke Risk factor  SBP<160 if successful thrombectomy     Type 2 diabetes mellitus without complication, without long-term current use of insulin  Stroke risk factor  Inpatient goal 140-180       4/30-S/p TICI 2c. Repeat CTH after IR with no acute infarct. Will need MRI but has a pacemaker that will need be interrogated. Improving r arm weakness and  aphasia.  5/1/2023- MRI completed showing acute L. MCA infarct. Pacemaker reinitiated post MRI to normal settings. Plan to start Pradaxa tomorrow per Heme/Onc. Neuro exam stable.      STROKE DOCUMENTATION   Acute Stroke Times   Last Known Normal Date: 04/29/23  Last Known Normal Time: 2015  Symptom Onset Date: 04/29/23  Symptom Onset Time: 2015  Stroke Team Called Date: 04/29/23  Stroke Team Called Time: 2022  Stroke Team Arrival Date: 04/29/23  Stroke Team Arrival Time: 2025  CT Interpretation Time: 2038  Thrombolytic Therapy Recommended: No  CTA Interpretation Time: 2045  Thrombectomy Recommended: Yes  Decision to Treat Time for IR: 2045    NIH Scale:  1a. Level of Consciousness: 0-->Alert, keenly responsive  1b. LOC Questions: 0-->Answers both questions correctly  1c. LOC Commands: 0-->Performs both tasks correctly  2.  Best Gaze: 1-->Partial gaze palsy, gaze is abnormal in one or both eyes, but forced deviation or total gaze paresis is not present  3. Visual: 2-->Complete hemianopia  4. Facial Palsy: 2-->Partial paralysis (total or near-total paralysis of lower face)  5a. Motor Arm, Left: 0-->No drift, limb holds 90 (or 45) degrees for full 10 secs  5b. Motor Arm, Right: 3-->No effort against gravity, limb falls  6a. Motor Leg, Left: 0-->No drift, leg holds 30 degree position for full 5 secs  6b. Motor Leg, Right: 2-->Some effort against gravity, leg falls to bed by 5 secs, but has some effort against gravity  7. Limb Ataxia: 0-->Absent  8. Sensory: 0-->Normal, no sensory loss  9. Best Language: 2-->Severe aphasia, all communication is through fragmentary expression, great need for inference, questioning, and guessing by the listener. Range of information that can be exchanged is limited, listener carries burden of. . . (see row details)  10. Dysarthria: 1-->Mild-to-moderate dysarthria, patient slurs at least some words and, at worst, can be understood with some difficulty  11. Extinction and Inattention (formerly Neglect): 1-->Visual, tactile, auditory, spatial, or personal inattention or extinction to bilateral simultaneous stimulation in one of the sensory modalities  Total (NIH Stroke Scale): 14       Modified [place] Score: 0  [place] Coma Scale:    ABCD2 Score:    [id number] Score:   HAS -BLED Score:   ICH Score:   Hunt & Vora Classification:      Hemorrhagic change of an Ischemic Stroke: Does this patient have an ischemic stroke with hemorrhagic changes? No     Neurologic Chief Complaint: aphasia    Subjective:     Interval History: Patient is seen for follow-up neurological assessment and treatment recommendations: MRI completed showing acute L. MCA infarct. Pacemaker reinitiated post MRI to normal settings. Plan to start Pradaxa tomorrow per Heme/Onc. Neuro exam stable (r arm weakness and aphasia).    HPI, Past Medical,  Family, and Social History remains the same as documented in the initial encounter.     Review of Systems   Unable to perform ROS: Patient nonverbal   Neurological:  Positive for speech difficulty and weakness.   Scheduled Meds:   aspirin  81 mg Oral Daily    aspirin  300 mg Rectal Once    atorvastatin  80 mg Oral Daily    metoprolol tartrate  50 mg Oral BID    mupirocin   Nasal BID    senna-docusate 8.6-50 mg  1 tablet Oral BID     Continuous Infusions:   heparin (porcine) in D5W 14 Units/kg/hr (05/01/23 1405)     PRN Meds:acetaminophen, albuterol-ipratropium, calcium gluconate IVPB, calcium gluconate IVPB, calcium gluconate IVPB, dextrose 10%, dextrose 10%, dextrose 10%, dextrose 10%, dextrose, dextrose, glucagon (human recombinant), insulin aspart U-100, labetalol, magnesium sulfate IVPB, magnesium sulfate IVPB, ondansetron, potassium chloride **AND** potassium chloride **AND** potassium chloride, senna-docusate 8.6-50 mg, sodium chloride 0.9%, sodium chloride 0.9%, sodium phosphate IVPB, sodium phosphate IVPB, sodium phosphate IVPB    Objective:     Vital Signs (Most Recent):  Temp: 98.3 °F (36.8 °C) (05/01/23 0705)  Pulse: 75 (05/01/23 1405)  Resp: 17 (05/01/23 1405)  BP: 112/63 (05/01/23 1405)  SpO2: 96 % (05/01/23 1405)  BP Location: Right arm    Vital Signs Range (Last 24H):  Temp:  [98.3 °F (36.8 °C)-99.2 °F (37.3 °C)]   Pulse:  [64-88]   Resp:  [12-22]   BP: (106-150)/(54-85)   SpO2:  [90 %-100 %]   Arterial Line BP: (115-147)/(51-67)   BP Location: Right arm    Physical Exam  Constitutional:       General: She is not in acute distress.  HENT:      Head: Normocephalic.   Eyes:      Extraocular Movements:      Right eye: Abnormal extraocular motion present.      Left eye: Abnormal extraocular motion present.   Cardiovascular:      Rate and Rhythm: Normal rate.   Pulmonary:      Effort: Pulmonary effort is normal.   Abdominal:      General: Abdomen is flat.   Musculoskeletal:      Comments: RSW    Skin:     General: Skin is warm and dry.   Neurological:      Mental Status: She is alert.      Cranial Nerves: Dysarthria and facial asymmetry present.      Motor: Weakness present.   Psychiatric:         Mood and Affect: Mood normal.       Neurological Exam:   LOC: alert  Attention Span: Good   Language: expressive aphasia  Articulation: aphasia  Orientation: Untestable due to severe aphasia   Visual Fields: Hemianopsia right  EOM (CN III, IV, VI): Gaze preference  left  Facial Movement (CN VII): Upper facial weakness on the Left  Motor: Arm left  Normal 5/5  Leg left  Normal 5/5  Arm right  Plegia 2/5  Leg right Plegia 2/5  Cerebellum: No evidence of appendicular or axial ataxia    Laboratory:  CMP:   Recent Labs   Lab 05/01/23  0107   CALCIUM 9.0   ALBUMIN 3.0*   PROT 6.3      K 3.5   CO2 24      BUN 8   CREATININE 0.7   ALKPHOS 131   ALT 12   AST 18   BILITOT 0.7       BMP:   Recent Labs   Lab 05/01/23  0107      K 3.5      CO2 24   BUN 8   CREATININE 0.7   CALCIUM 9.0       CBC:   Recent Labs   Lab 05/01/23  0107   WBC 7.61   RBC 4.11   HGB 12.0   HCT 37.7      MCV 92   MCH 29.2   MCHC 31.8*       Lipid Panel:   Recent Labs   Lab 04/30/23  0845   CHOL 142   LDLCALC 88.6   HDL 38*   TRIG 77       Coagulation:   Recent Labs   Lab 04/30/23  1814 05/01/23  0107 05/01/23  1020   INR 1.1  --   --    APTT 25.7   < > 39.3*    < > = values in this interval not displayed.       Platelet Aggregation Study: No results for input(s): PLTAGG, PLTAGINTERP, PLTAGREGLACO, ADPPLTAGGREG in the last 168 hours.  Hgb A1C:   Recent Labs   Lab 04/30/23  0845   HGBA1C 7.5*       TSH:   Recent Labs   Lab 04/30/23  0845   TSH 2.092         Diagnostic Results     Brain Imaging   MRI Brain 4/30/23    Acute left MCA distribution infarct.  No significant mass effect or acute hemorrhage at this time.     Small acute infarct in the right cerebellum.     Chronic right MCA distribution infarct.     [facility]  4/30/2023  No acute territorial infarct or intracranial hemorrhage identified.    Vessel Imaging   CTA Stroke MP 4/29/2023  CT head: No evidence for acute intracranial hemorrhage.     Stable area encephalomalacia from remote right MCA territory infarct.     CTA head: Occlusion of M2 segment of left MCA.     Asymmetric decreased opacification of the left transverse sinus and jugular vein.  Similar finding was present on the prior CTA in 2022 and therefore could be related to sluggish flow.  Suggest attention on follow-up conventional angiogram.     CTA neck: No high-grade stenosis or aneurysm.    Cardiac Imaging   Echo 4/28/2023   The left ventricle is severely enlarged with eccentric hypertrophy and severely decreased systolic function.   The estimated ejection fraction is 10-15%.   There is left ventricular global hypokinesis.   Grade II left ventricular diastolic dysfunction.   Normal right ventricular size with normal right ventricular systolic function.   Mild tricuspid regurgitation.   The estimated PA systolic pressure is 35 mmHg.   Normal central venous pressure (3 mmHg).   Severe left atrial enlargement.      Tushar Ayala MD  [facility]  Department of Vascular Neurology   [facility] - Neuro [facility]

## 2023-05-01 NOTE — PLAN OF CARE
[facility] Care Plan    POC reviewed with Diomedes Mohr and family at 1400. Pt verbalized understanding. Questions and concerns addressed. No acute events today. Pt progressing toward goals. Will continue to monitor. See below and flowsheets for full assessment and VS info.     K and Mag replaced  Bath complete  Heparin gtt cont @ 14 u/kg/hr  2 therapeutic APTT values obtained. CT to be completed tonight  Pt voiding spontaneously        Is this a stroke patient? yes- Stroke booklet reviewed with patient and family, risk factors identified for patient and stroke booklet remains at bedside for ongoing education.     Neuro:  [place] Coma Scale  Best Eye Response: 4-->(E4) spontaneous  Best Motor Response: 6-->(M6) obeys commands  Best Verbal Response: 5-->(V5) oriented  [place] Coma Scale Score: 15  Assessment Qualifiers: patient not sedated/intubated  Pupil PERRLA: yes     24 hr Temp:  [98 °F (36.7 °C)-99.2 °F (37.3 °C)]     CV:   Rhythm: paced rhythm  BP goals:   SBP < 160  MAP > 65    Resp:           Plan: N/A    GI/:     Diet/Nutrition Received: mechanical/dental soft  Last Bowel Movement: 04/27/23  Voiding Characteristics: due to void    Intake/Output Summary (Last 24 hours) at 5/1/2023 1835  Last data filed at 5/1/2023 1805  Gross per 24 hour   Intake 792.65 ml   Output 800 ml   Net -7.35 ml     Unmeasured Output  Urine Occurrence: 1  Stool Occurrence: 0  Pad Count: 1    Labs/Accuchecks:  Recent Labs   Lab 05/01/23  0107   WBC 7.61   RBC 4.11   HGB 12.0   HCT 37.7         Recent Labs   Lab 05/01/23  0107      K 3.5   CO2 24      BUN 8   CREATININE 0.7   ALKPHOS 131   ALT 12   AST 18   BILITOT 0.7      Recent Labs   Lab 04/30/23  1814 05/01/23  0107 05/01/23  1734   INR 1.1  --   --    APTT 25.7   < > 44.6*    < > = values in this interval not displayed.    No results for input(s): CPK, CPKMB, TROPONINI, MB in the last 168 hours.    Electrolytes: Electrolytes replaced  Accuchecks: ACHS    Gtts:    heparin (porcine) in D5W 14 Units/kg/hr (05/01/23 1805)       LDA/Wounds:  Lines/Drains/Airways       Drain  Duration             Female External Urinary Catheter 05/01/23 0705 <1 day              Peripheral Intravenous Line  Duration                  Peripheral IV - Single Lumen 04/29/23 18 G Posterior;Right Hand 2 days         Peripheral IV - Single Lumen 05/01/23 1040 20 G Right Antecubital <1 day                  Wounds: No  Wound care consulted: No

## 2023-05-01 NOTE — PT/OT/SLP EVAL
Speech Language Pathology Evaluation  Cognitive Communication    Patient Name:  Diomedes Mohr   MRN:  [id number]  Admitting Diagnosis: Cerebrovascular accident (CVA) due to embolism of left middle cerebral artery    Recommendations:     Recommendations:                General Recommendations:  Dysphagia therapy and Speech/language therapy, follow up with Regsitered Dietician, consideration of further assessment via Modified Barium Swallow Study pending progress and PO acceptance   Diet recommendations: Pleasure Feedings puree, Other (consideration of temporary, alternative means nutrition/hydration warranted to ensure adequate nutrition/hydration) Should PO medications be required, safest means would be crushed in puree  Aspiration Precautions: Only when vital signs stable, Only when awake/alert/attentive, , 1 bite/sip at a time, Assistance with meals, Frequent oral care, HOB to 90 degrees, Puree for pleasure, Remain upright 30 minutes post PO, Small bites/sips, and Strict aspiration precautions Continue to monitor for signs and symptoms of aspiration and discontinue oral feeding should you notice any of the following: watery eyes, reddened facial area, wet vocal quality, increased work of breathing, change in respiratory status, increased congestion, coughing, fever and/or change in level of alertness.   General Precautions: Standard, aspiration, aphasia, fall  Communication strategies:  provide increased time to answer and go to room if call light pushed    History:     Past Medical History:   Diagnosis Date    Acute on chronic combined systolic and diastolic heart failure 12/26/2019    Anticoagulant long-term use     Anxiety     Arthritis     Asthma     Depression     H/O: hysterectomy     Hyperlipemia     Hypertension     Pulmonary edema     Schizophrenia        Past Surgical History:   Procedure Laterality Date    BLADDER SUSPENSION      CARPAL TUNNEL RELEASE Right     HEEL SPUR SURGERY Left     HYSTERECTOMY       "LEFT HEART CATHETERIZATION Bilateral 12/27/2019    Procedure: Left heart cath;  Surgeon: Steve Chambers MD;  Location: [facility] CATH LAB;  Service: Cardiology;  Laterality: Bilateral;    RIGHT HEART CATHETERIZATION Right 7/26/2021    Procedure: INSERTION, CATHETER, RIGHT HEART;  Surgeon: Petr Naranjo MD;  Location: [facility] CATH LAB;  Service: Cardiology;  Laterality: Right;    RIGHT HEART CATHETERIZATION Right 5/12/2022    Procedure: INSERTION, CATHETER, RIGHT HEART;  Surgeon: Chandana Ivory Jr., MD;  Location: [facility] CATH LAB;  Service: Cardiology;  Laterality: Right;    TUBAL LIGATION         Social History: Patient lives in a home with her Spouse     Prior Intubation HX:  4/29/23    Modified Barium Swallow: none prior at this facility     Chest X-Rays: 5/1/23: There is a pacemaker.  There is cardiomegaly.  Lungs are clear.  There is aortic plaque and DJD.    Prior diet: Pt ate regular textures, thin liquids prior to admit     Occupation/hobbies/homemaking: Per chart, Pt currently not employed.      Subjective     SLP reviewed Pt with RN, RN cleared for therapy, explained Pt with minimal PO acceptance of pureed textures or nectar-thickened liquids on meal trays, confirmed Pt coughed with sip of water  Pt presents awake  She explains, "February"     Pain/Comfort:  Pain Rating 1: other (see comments) (Pt did not rate)  Pain Addressed 1: Nurse notified    Respiratory Status: Room air    Objective:   Cognitive Status:    Pt alert and oriented to person and place. Pt with decreased sustained attention as assessment progressed. Assessment of cognitive domains for problem solving, math reasoning, sequencing and organizational skills limited due to decreased expressive/receptive language and attention. ST to continue to assess in session.        Receptive Language:   Comprehension:      Questions Simple yes/no 60% I"ly, up to 90% with min cues  Complex yes/no DNA  Commands  One step : 80% accuracy I'ly " "    Pragmatics:    Pt with flat affect, decreased eye contact     Expressive Language:  Verbal:    Automatic Speech  Counting WNL #1-5 and Days of the week Pt named 5/7 KRISSY and perseverated on "February" across attempts to continue  Initiation : inconsistent   Naming Confrontation : 20% accuracy I'ly, moderate perseveration appreciated  and Single word responsive naming : DEP   Conversational: Pt with severe word-finding difficulty as c/b perseveration and hesitations. Pt with some, spontaneous whle word utterances appreciated t/o assessment. Pt aware of word-finding difficulty some of the time and attempted to correct at times.   Nonverbal:   Ongoing assessment warranted, SLP unable to elicit return demonstration of gestures, finger point in imitation with mod cues.       Motor Speech:  Dysarthria : Pt with mildly decreased precision and fast rate of speech     Voice:   Quality Strained and intensity mildly decreased     Visual-Spatial:  Ongoing assessment warranted     Reading:   TBA      Written Expression:   TBA    Treatment: Pt found awake in bed with Spouse in the room. Family entered into the room during session. HOB elevated per RN clearance. Pt initially declined all PO trials. SLP noted untouched lunch meal tray on bedside table. Pt gradually accepted bites of puree (fed by clinician x2, self-fed x4) and sips nectar-thickened liquids (cup sips self-fed x2.)  Pt declined additional PO trials. Family provided encouragement; however Pt continued to decline PO trials. Pt with mild anterior loss of puree and mild oral holding or puree. No overt S/S aspiration with bites/sips observed. SLP educated Pt and family on SLP role, safety precautions, S/S aspiration, aspiration precautions, and compensatory strategies for fx communication. SLP further explained concerns for adequate means nutrition/hydration due to restrictive nature of diet and minimal PO acceptance of PO trials and need for ongoing assessment to " determine safety for PO advancement. Pt did not demonstrate or verbalize understanding. Family verbalized understanding. RN in room and notified of findings. MD team notified of findings following session.     Assessment:   Diomedes Mohr is a 62 y.o. female with an SLP diagnosis of Aphasia, Dysphagia, and Dysarthria.  She presents with minimal PO acceptance.    Goals:   Multidisciplinary Problems       SLP Goals          Problem: SLP    Goal Priority Disciplines Outcome   SLP Goal     SLP Ongoing, Progressing   Description: Speech Language Pathology Goals  Goals expected to be met by 5/7  1. Pt will tolerate puree & nectar thick liquids without s/s aspiration & adequate oral phase of swallow  2. Ongoing swallow assessment to determine least restrictive PO consistencies  3. SLP Speech/Language/Cognitive Evaluation- initiated 5/1  4. Pt will answer personal yes/no questions with 90% accuracy, MIN A  5. Pt will complete 1-unit commands 90% of the time, MIN A  6. Pt will complete automatic speech tasks with 90% accuracy, MIN A  7. Pt will complete object naming tasks with 70% accuracy or higher, MIN A                         Plan:   Patient to be seen:  4 x/week   Plan of Care expires:  05/29/23  Plan of Care reviewed with:  patient, family   SLP Follow-Up:  Yes       Discharge recommendations:  Discharge Facility/Level of Care Needs:  (tbd)       Time Tracking:   SLP Treatment Date:   05/01/23  Speech Start Time:  1224  Speech Stop Time:  1253     Speech Total Time (min):  29 min    Billable Minutes: Eval 9 , Treatment Swallowing Dysfunction 10, and Self Care/Home Management Training 10    05/01/2023

## 2023-05-01 NOTE — PLAN OF CARE
[facility] Care Plan    POC reviewed with Diomedes Mohr and family at 0300. Pt verbalized understanding. Questions and concerns addressed. No acute events overnight. Pt progressing toward goals. Will continue to monitor. See below and flowsheets for full assessment and VS info.     -Neuro exam stable  -Arterial line removed      Is this a stroke patient? yes- Stroke booklet reviewed with patient and family, risk factors identified for patient and stroke booklet remains at bedside for ongoing education.     Neuro:  [place] Coma Scale  Best Eye Response: 4-->(E4) spontaneous  Best Motor Response: 6-->(M6) obeys commands  Best Verbal Response: 1-->(V1) none  [place] Coma Scale Score: 11  Pupil PERRLA: yes     24hr Temp:  [97.7 °F (36.5 °C)-99.2 °F (37.3 °C)]     CV:   Rhythm: normal sinus rhythm  BP goals:   SBP < 160  MAP > 65    Resp:           Plan: N/A    GI/:     Diet/Nutrition Received: other (see comments) (pureed/nectar thick)  Last Bowel Movement: 04/27/23  Voiding Characteristics: due to void    Intake/Output Summary (Last 24 hours) at 5/1/2023 0508  Last data filed at 5/1/2023 0101  Gross per 24 hour   Intake 796.71 ml   Output 400 ml   Net 396.71 ml     Unmeasured Output  Urine Occurrence: 1  Stool Occurrence: 0    Labs/Accuchecks:  Recent Labs   Lab 05/01/23 0107   WBC 7.61   RBC 4.11   HGB 12.0   HCT 37.7         Recent Labs   Lab 05/01/23 0107      K 3.5   CO2 24      BUN 8   CREATININE 0.7   ALKPHOS 131   ALT 12   AST 18   BILITOT 0.7      Recent Labs   Lab 04/30/23  1814 05/01/23 0107   INR 1.1  --    APTT 25.7 32.0    No results for input(s): CPK, CPKMB, TROPONINI, MB in the last 168 hours.    Electrolytes: N/A - electrolytes WDL  Accuchecks: Q6H    Gtts:   heparin (porcine) in D5W 14 Units/kg/hr (05/01/23 0218)       LDA/Wounds:  Lines/Drains/Airways       Peripheral Intravenous Line  Duration                  Peripheral IV - Single Lumen 04/27/23 2029 20 G Left Antecubital 3 days          Peripheral IV - Single Lumen 04/29/23 18 G Posterior;Right Hand 2 days                  Wounds: No  Wound care consulted: No

## 2023-05-01 NOTE — SIGNIFICANT EVENT
Post MRI all settings were restored and leads tested well.  Performed by Abbot rodrigues, Jose Alfredo Prieto at ~ 5:15 pm.    Staci Edwards MD  Cardiovascular medicine; PGY4  [facility]  [address] [place] [address]

## 2023-05-01 NOTE — PLAN OF CARE
PT Eval complete, appropriate goals created    Problem: Physical Therapy  Goal: Physical Therapy Goal  Description: Goals to be completed by: 6/1/23    Pt will perform sup<>sit transfers w/ minimum assistance  Pt will have sufficient dynamic balance to sit EOB while performing ADLs/therex w/ contact guard assistance  PT will be able to stand up from EOB w/ moderate assistance using LRAD  Pt will ambulate 20 feet w/ moderate assistance using LRAD  Pt will be independent w/ HEP therex on BLE w/ good form and ROM  Outcome: Ongoing, Progressing

## 2023-05-01 NOTE — PLAN OF CARE
05/01/23 1636   Post-Acute Status   Post-Acute Authorization Placement   Post-Acute Placement Status Referrals Sent     Met with Pt and Pt sister in law to review discharge recommendation of rehab and is agreeable to possible plan. Discussed rehab criteria as being able to tolerate three hours of therapy and need for a MD to see Pt three times a week. If this changes, plan B would be a SNF facility.     Provided list of Rehab facilities in-network with patient's payor plan. Notified that blast referral sent to below listed facilities from list based on proximity to home/family support:   [facility]  2.   [facility]  3.   [facility]      Pt sister in law instructed to identify preference and reported Pt  and daughters will review asap.     If an additional preferred facility not listed above is identified, additional referral to be sent. If above facilities unable to accept, will send additional referrals to in-network providers.     Claudine Moise LCSW  Neurocritical Care   [facility]  [id number]

## 2023-05-01 NOTE — ASSESSMENT & PLAN NOTE
Stroke risk factor  Admitted for R. popliteal artery occlusion, was on heparin gtt  Restart heparin after ruling out hemorraghic conversion post IR procedure

## 2023-05-01 NOTE — CARE UPDATE
Plan of Care:    Pt has history of multiple clot formation with a stroke in 2020, and thought to have been provoked PE in 2021. After pt's PE pt was placed on xarelto 20mg by Dr. Le. Pt last saw him in July of 2022. Pt states 100% compliance with xarelto, but based on some notes and the pharmacy refill compliance this is uncertain. Pt does state she went to multiple pharmacies for refills. Pt workup with Dr. Le included cardiolipin and beta 2 glycoprotein both of which were negative back in 2022. Due to this being an arterial clot this has not been shown association with genetic clotting disorders and do not feel this needs to be worked up at this time.    - Heparin ggt (4/28 - 5/2) -> dabigatran 150 mg bid  - ASA resumed due to arterial nature of clot  - Arrange Dr. Le follow-up in 1 month  - Recommend outpatient colonoscopy and mammogram     Hematology to sign off at this time, please reach out if any questions or concerns.     Ronda Ariza, PGY-VI  Hematology/Oncology Fellow

## 2023-05-01 NOTE — PT/OT/SLP EVAL
Physical Therapy Evaluation and Treatment    Patient Name: Diomedes Mohr   MRN: [id number]  Recent Surgery: Procedure(s) (LRB):  ANGIOGRAM-CEREBRAL (N/A)  ANGIOGRAM (N/A)    Rehab technician utilized to assist w/ treatment session 2/2 pt requiring two person assist for functional mobility   Recommendations:     Discharge Recommendations: rehabilitation facility   Discharge Equipment Recommendations: to be determined by next level of care   Barriers to discharge: Increased level of assist    Assessment:     Diomedes Mohr is a 62 y.o. female admitted with a medical diagnosis of Cerebrovascular accident (CVA) due to embolism of left middle cerebral artery. She presents with the following impairments/functional limitations: weakness, impaired endurance, impaired functional mobility, impaired self care skills, gait instability, impaired sensation, impaired balance, impaired cognition, decreased coordination, decreased upper extremity function, decreased lower extremity function, decreased safety awareness, abnormal tone, decreased ROM, pain, impaired coordination, impaired fine motor. Pt w/ fair sitting balance and fair strength in LLE, w/ flaccid RLE and poor midline orientation. Rec d/c to IPR for continued treatment in order to maximize functional return as pt w/ high motivation, good activity tolerance, and w/ potential for significant improvements in functional mobility.      Rehab Prognosis: Good; patient would benefit from acute skilled PT services to address these deficits and reach maximum level of function.  Recent Surgery: Procedure(s) (LRB):  ANGIOGRAM-CEREBRAL (N/A)  ANGIOGRAM (N/A)      Plan:     During this hospitalization, patient to be seen 3 x/week to address the identified rehab impairments via gait training, therapeutic activities, neuromuscular re-education, therapeutic exercises and progress toward the following goals:    Plan of Care Expires: 06/01/23    Subjective     Chief Complaint: pain when LLE  "moving  Patient/Family Comments/Goals: "I want to go to [employer]"  Pain/Comfort:  Pain Rating 1: 0/10  Location 2:  (unrated pain in L hip)    Patients cultural, spiritual, Gnosticist conflicts given the current situation: no    Social History:  Living Environment: Patient lives with their spouse in a single story home, number of outside stairs: 0, tub-shower combo.  Prior Level of Function: Prior to admission, patient was independent with ADLs.  Equipment Used at Home: none    DME owned (not currently used): none  Assistance Upon Discharge: family    Objective:     Communicated with RN prior to session. Patient found HOB elevated with bed alarm, blood pressure cuff, pulse ox (continuous), telemetry, peripheral IV, PureWick upon PT entry to room.    General Precautions: Standard, aspiration, aphasia, fall   Orthopedic Precautions:    Braces: N/A  Respiratory Status: Room air    Exams:  Cognitive Exam: Patient is oriented to Person, Place, follows commands <50% of the time  RLE ROM: WFL except Only PROM  RLE Strength:  0/5  LLE ROM: WFL  LLE Strength: WFL, grossly 3/5  Sensation: -       Impaired  light/touch no sensation RLE  Tone: hypotonic RLE    Functional Mobility:  Bed Mobility:  Supine to Sit: moderate assistance  Sit to Supine: maximal assistance of 2 persons  Transfers:  Sit to Stand: NT 2/2 poor sitting balance and command following  Balance:   Static Sitting: mod-Milton at EOB 2/2 R lean/pushing  Dynamic Sitting: moderate assistance at EOB 2/2 R lean/pushing    Therapeutic Activities and Exercises:  Patient educated on role of acute care PT and PT POC, safety while in hospital including calling nurse for mobility, and call light usage  Patient performed 2 set(s) of 5 repetitions of  the following seated PROM exercises: ankle pumps, long arc quads, and marches for bilateral LE. Patient required skilled PT for instruction of exercises and appropriate cues to perform exercises safely and appropriately. Pt family " educated to perform PROM as able throughout the day  Patient educated about importance of OOB mobility  Sitting balance x15min w/ max constant cueing for LUE placement to assist w/ balance, midline orientation, and upright posture w/ head facing forward as pt maintaining flexed neck staring at ground    Patient not clear to transfer with RN/PCT, medi-chair transfer via drawsheet only.    AM-PAC 6 CLICK MOBILITY  Turning over in bed (including adjusting bedclothes, sheets and blankets)?: 2  Sitting down on and standing up from a chair with arms (e.g., wheelchair, bedside commode, etc.): 1  Moving from lying on back to sitting on the side of the bed?: 2  Moving to and from a bed to a chair (including a wheelchair)?: 1  Need to walk in hospital room?: 1  Climbing 3-5 steps with a railing?: 1  Basic Mobility Total Score: 8     Patient left HOB elevated with all lines intact, call button in reach, RN notified, bed alarm on, and family present.    GOALS:   Multidisciplinary Problems       Physical Therapy Goals          Problem: Physical Therapy    Goal Priority Disciplines Outcome Goal Variances Interventions   Physical Therapy Goal     PT, PT/OT Ongoing, Progressing     Description: Goals to be completed by: 6/1/23    Pt will perform sup<>sit transfers w/ minimum assistance  Pt will have sufficient dynamic balance to sit EOB while performing ADLs/therex w/ contact guard assistance  PT will be able to stand up from EOB w/ moderate assistance using LRAD  Pt will ambulate 20 feet w/ moderate assistance using LRAD  Pt will be independent w/ HEP therex on BLE w/ good form and ROM                       History:     Past Medical History:   Diagnosis Date    Acute on chronic combined systolic and diastolic heart failure 12/26/2019    Anticoagulant long-term use     Anxiety     Arthritis     Asthma     Depression     H/O: hysterectomy     Hyperlipemia     Hypertension     Pulmonary edema     Schizophrenia        Past Surgical  History:   Procedure Laterality Date    BLADDER SUSPENSION      CARPAL TUNNEL RELEASE Right     HEEL SPUR SURGERY Left     HYSTERECTOMY      LEFT HEART CATHETERIZATION Bilateral 12/27/2019    Procedure: Left heart cath;  Surgeon: Steve Chambers MD;  Location: [facility] CATH LAB;  Service: Cardiology;  Laterality: Bilateral;    RIGHT HEART CATHETERIZATION Right 7/26/2021    Procedure: INSERTION, CATHETER, RIGHT HEART;  Surgeon: Petr Naranjo MD;  Location: [facility] CATH LAB;  Service: Cardiology;  Laterality: Right;    RIGHT HEART CATHETERIZATION Right 5/12/2022    Procedure: INSERTION, CATHETER, RIGHT HEART;  Surgeon: Chandana Ivory Jr., MD;  Location: [facility] CATH LAB;  Service: Cardiology;  Laterality: Right;    TUBAL LIGATION         Time Tracking:     PT Received On: 05/01/23  PT Start Time: 1302  PT Stop Time: 1326  PT Total Time (min): 24 min     Billable Minutes: Evaluation 9 and Neuromuscular Re-education 15    05/01/2023

## 2023-05-02 PROBLEM — R47.01 GLOBAL APHASIA: Status: RESOLVED | Noted: 2023-04-29 | Resolved: 2023-05-02

## 2023-05-02 LAB
ALBUMIN SERPL BCP-MCNC: 2.8 G/DL (ref 3.5–5.2)
ALP SERPL-CCNC: 129 U/L (ref 55–135)
ALT SERPL W/O P-5'-P-CCNC: 20 U/L (ref 10–44)
ANION GAP SERPL CALC-SCNC: 9 MMOL/L (ref 8–16)
APTT PPP: 43 SEC (ref 21–32)
AST SERPL-CCNC: 30 U/L (ref 10–40)
BASOPHILS # BLD AUTO: 0.02 K/UL (ref 0–0.2)
BASOPHILS NFR BLD: 0.2 % (ref 0–1.9)
BILIRUB SERPL-MCNC: 0.6 MG/DL (ref 0.1–1)
BUN SERPL-MCNC: 8 MG/DL (ref 8–23)
C TRACH DNA SPEC QL NAA+PROBE: NOT DETECTED
CALCIUM SERPL-MCNC: 9.1 MG/DL (ref 8.7–10.5)
CHLORIDE SERPL-SCNC: 106 MMOL/L (ref 95–110)
CO2 SERPL-SCNC: 24 MMOL/L (ref 23–29)
CREAT SERPL-MCNC: 0.8 MG/DL (ref 0.5–1.4)
DIFFERENTIAL METHOD: ABNORMAL
EOSINOPHIL # BLD AUTO: 0.1 K/UL (ref 0–0.5)
EOSINOPHIL NFR BLD: 1.4 % (ref 0–8)
ERYTHROCYTE [DISTWIDTH] IN BLOOD BY AUTOMATED COUNT: 14.6 % (ref 11.5–14.5)
EST. GFR  (NO RACE VARIABLE): >60 ML/MIN/1.73 M^2
GLUCOSE SERPL-MCNC: 127 MG/DL (ref 70–110)
HCT VFR BLD AUTO: 34.4 % (ref 37–48.5)
HGB BLD-MCNC: 11.1 G/DL (ref 12–16)
IMM GRANULOCYTES # BLD AUTO: 0.03 K/UL (ref 0–0.04)
IMM GRANULOCYTES NFR BLD AUTO: 0.3 % (ref 0–0.5)
LYMPHOCYTES # BLD AUTO: 2.9 K/UL (ref 1–4.8)
LYMPHOCYTES NFR BLD: 32 % (ref 18–48)
MAGNESIUM SERPL-MCNC: 2.3 MG/DL (ref 1.6–2.6)
MCH RBC QN AUTO: 29.2 PG (ref 27–31)
MCHC RBC AUTO-ENTMCNC: 32.3 G/DL (ref 32–36)
MCV RBC AUTO: 91 FL (ref 82–98)
MONOCYTES # BLD AUTO: 0.6 K/UL (ref 0.3–1)
MONOCYTES NFR BLD: 6.6 % (ref 4–15)
N GONORRHOEA DNA SPEC QL NAA+PROBE: NOT DETECTED
NEUTROPHILS # BLD AUTO: 5.4 K/UL (ref 1.8–7.7)
NEUTROPHILS NFR BLD: 59.5 % (ref 38–73)
NRBC BLD-RTO: 0 /100 WBC
PHOSPHATE SERPL-MCNC: 4.4 MG/DL (ref 2.7–4.5)
PLATELET # BLD AUTO: 223 K/UL (ref 150–450)
PMV BLD AUTO: 11.5 FL (ref 9.2–12.9)
POCT GLUCOSE: 128 MG/DL (ref 70–110)
POCT GLUCOSE: 139 MG/DL (ref 70–110)
POCT GLUCOSE: 140 MG/DL (ref 70–110)
POTASSIUM SERPL-SCNC: 3.9 MMOL/L (ref 3.5–5.1)
PROT SERPL-MCNC: 6 G/DL (ref 6–8.4)
RBC # BLD AUTO: 3.8 M/UL (ref 4–5.4)
SODIUM SERPL-SCNC: 139 MMOL/L (ref 136–145)
WBC # BLD AUTO: 9.05 K/UL (ref 3.9–12.7)

## 2023-05-02 PROCEDURE — 99233 PR SUBSEQUENT HOSPITAL CARE,LEVL III: ICD-10-PCS | Mod: GC,,, | Performed by: PSYCHIATRY & NEUROLOGY

## 2023-05-02 PROCEDURE — 92507 TX SP LANG VOICE COMM INDIV: CPT

## 2023-05-02 PROCEDURE — 25000003 PHARM REV CODE 250: Performed by: PHYSICIAN ASSISTANT

## 2023-05-02 PROCEDURE — 85025 COMPLETE CBC W/AUTO DIFF WBC: CPT

## 2023-05-02 PROCEDURE — 97535 SELF CARE MNGMENT TRAINING: CPT

## 2023-05-02 PROCEDURE — 94761 N-INVAS EAR/PLS OXIMETRY MLT: CPT

## 2023-05-02 PROCEDURE — 25000003 PHARM REV CODE 250: Performed by: HOSPITALIST

## 2023-05-02 PROCEDURE — 25000003 PHARM REV CODE 250: Performed by: PSYCHIATRY & NEUROLOGY

## 2023-05-02 PROCEDURE — 20600001 HC STEP DOWN PRIVATE ROOM

## 2023-05-02 PROCEDURE — 92526 ORAL FUNCTION THERAPY: CPT

## 2023-05-02 PROCEDURE — 85730 THROMBOPLASTIN TIME PARTIAL: CPT

## 2023-05-02 PROCEDURE — 63600175 PHARM REV CODE 636 W HCPCS: Performed by: PHYSICIAN ASSISTANT

## 2023-05-02 PROCEDURE — 83735 ASSAY OF MAGNESIUM: CPT | Performed by: PSYCHIATRY & NEUROLOGY

## 2023-05-02 PROCEDURE — 84100 ASSAY OF PHOSPHORUS: CPT | Performed by: PSYCHIATRY & NEUROLOGY

## 2023-05-02 PROCEDURE — 99233 PR SUBSEQUENT HOSPITAL CARE,LEVL III: ICD-10-PCS | Mod: FS,,, | Performed by: PHYSICIAN ASSISTANT

## 2023-05-02 PROCEDURE — 80053 COMPREHEN METABOLIC PANEL: CPT

## 2023-05-02 PROCEDURE — 99233 SBSQ HOSP IP/OBS HIGH 50: CPT | Mod: GC,,, | Performed by: PSYCHIATRY & NEUROLOGY

## 2023-05-02 PROCEDURE — 99233 SBSQ HOSP IP/OBS HIGH 50: CPT | Mod: FS,,, | Performed by: PHYSICIAN ASSISTANT

## 2023-05-02 RX ORDER — DABIGATRAN ETEXILATE 150 MG/1
150 CAPSULE ORAL 2 TIMES DAILY
Status: DISCONTINUED | OUTPATIENT
Start: 2023-05-02 | End: 2023-05-12 | Stop reason: HOSPADM

## 2023-05-02 RX ORDER — SPIRONOLACTONE 25 MG/1
25 TABLET ORAL DAILY
Status: DISCONTINUED | OUTPATIENT
Start: 2023-05-02 | End: 2023-05-12 | Stop reason: HOSPADM

## 2023-05-02 RX ORDER — TORSEMIDE 10 MG/1
10 TABLET ORAL DAILY
Status: DISCONTINUED | OUTPATIENT
Start: 2023-05-02 | End: 2023-05-12 | Stop reason: HOSPADM

## 2023-05-02 RX ADMIN — MUPIROCIN: 20 OINTMENT TOPICAL at 09:05

## 2023-05-02 RX ADMIN — SPIRONOLACTONE 25 MG: 25 TABLET, FILM COATED ORAL at 08:05

## 2023-05-02 RX ADMIN — DABIGATRAN ETEXILATE MESYLATE 150 MG: 150 CAPSULE ORAL at 08:05

## 2023-05-02 RX ADMIN — SACUBITRIL AND VALSARTAN 1 TABLET: 97; 103 TABLET, FILM COATED ORAL at 08:05

## 2023-05-02 RX ADMIN — METOPROLOL TARTRATE 50 MG: 50 TABLET, FILM COATED ORAL at 08:05

## 2023-05-02 RX ADMIN — POTASSIUM CHLORIDE 10 MEQ: 7.46 INJECTION, SOLUTION INTRAVENOUS at 05:05

## 2023-05-02 RX ADMIN — POTASSIUM CHLORIDE 10 MEQ: 7.46 INJECTION, SOLUTION INTRAVENOUS at 02:05

## 2023-05-02 RX ADMIN — MUPIROCIN: 20 OINTMENT TOPICAL at 08:05

## 2023-05-02 RX ADMIN — SENNOSIDES AND DOCUSATE SODIUM 1 TABLET: 8.6; 5 TABLET ORAL at 08:05

## 2023-05-02 RX ADMIN — ATORVASTATIN CALCIUM 80 MG: 40 TABLET, FILM COATED ORAL at 08:05

## 2023-05-02 RX ADMIN — TORSEMIDE 10 MG: 10 TABLET ORAL at 09:05

## 2023-05-02 RX ADMIN — ACETAMINOPHEN 650 MG: 325 TABLET ORAL at 08:05

## 2023-05-02 RX ADMIN — POTASSIUM CHLORIDE 10 MEQ: 7.46 INJECTION, SOLUTION INTRAVENOUS at 03:05

## 2023-05-02 RX ADMIN — ASPIRIN 81 MG: 81 TABLET, COATED ORAL at 08:05

## 2023-05-02 RX ADMIN — POTASSIUM CHLORIDE 10 MEQ: 7.46 INJECTION, SOLUTION INTRAVENOUS at 04:05

## 2023-05-02 RX ADMIN — DABIGATRAN ETEXILATE MESYLATE 150 MG: 150 CAPSULE ORAL at 12:05

## 2023-05-02 NOTE — ASSESSMENT & PLAN NOTE
Acute LMCA stroke with LM2 LVO, no TNK given already anticoagulated for critical limb ischemia, taken for thrombectomy with TICI 2c reperfusion on 4/29/23    -Heparin gtt restarted 4/30 PM, therapeutic on 5/1 with CTH stable without hemorrhagic conversion.    - Transition to dabigatran per hematology recommendations today as patient now able to swallow whole pills.       Antithrombotics for secondary stroke prevention: Antiplatelets: Aspirin: 81 mg daily + dabigatran      Statins for secondary stroke prevention and hyperlipidemia, if present:   Statins: Atorvastatin- 80 mg daily    Aggressive risk factor modification: HTN, DM, Diet, Exercise, Obesity, CAD     Rehab efforts: The patient has been evaluated by a stroke team provider and the therapy needs have been fully considered based off the presenting complaints and exam findings. The following therapy evaluations are active: PT evaluate and treat, OT evaluate and treat, SLP evaluate and treat, PM&R evaluate for appropriate placement      VTE prophylaxis: None: Reason for No Pharmacological VTE Prophylaxis: Currently on anticoagulation    BP parameters: Infarct: Post sucessful thrombectomy, SBP <160

## 2023-05-02 NOTE — ASSESSMENT & PLAN NOTE
History of severe decreased systolic function EF 10-15% followed with cardiology. Not currently in acute failure. Likely contributor to arterial occlusive disease  - Home metoprolol, entresto, spironolactone, and torsemide restarted

## 2023-05-02 NOTE — SUBJECTIVE & OBJECTIVE
Neurologic Chief Complaint: L MCA stroke    Subjective:     Interval History: Patient is seen for follow-up neurological assessment and treatment recommendations:   NAEO, neuro exam stable. CTH overnight stable. Heparin gtt transitioned to Pradaxa today for AC of reduced EF and VTE history. Speech cleared for minced/moist diet. Stable for step down.    HPI, Past Medical, Family, and Social History remains the same as documented in the initial encounter.     Review of Systems   Unable to perform ROS: Other (severe aphasia)   Eyes:  Positive for visual disturbance.   Neurological:  Positive for facial asymmetry, speech difficulty and weakness.   Scheduled Meds:   aspirin  81 mg Oral Daily    aspirin  300 mg Rectal Once    atorvastatin  80 mg Oral Daily    metoprolol tartrate  50 mg Oral BID    mupirocin   Nasal BID    sacubitriL-valsartan  1 tablet Oral BID    senna-docusate 8.6-50 mg  1 tablet Oral BID    spironolactone  25 mg Oral Daily    torsemide  10 mg Oral Daily     Continuous Infusions:   heparin (porcine) in D5W 14 Units/kg/hr (05/01/23 1951)     PRN Meds:acetaminophen, albuterol-ipratropium, calcium gluconate IVPB, calcium gluconate IVPB, calcium gluconate IVPB, dextrose 10%, dextrose 10%, dextrose 10%, dextrose 10%, dextrose, dextrose, glucagon (human recombinant), insulin aspart U-100, labetalol, magnesium sulfate IVPB, magnesium sulfate IVPB, ondansetron, potassium chloride **AND** potassium chloride **AND** potassium chloride, senna-docusate 8.6-50 mg, sodium chloride 0.9%, sodium chloride 0.9%, sodium phosphate IVPB, sodium phosphate IVPB, sodium phosphate IVPB    Objective:     Vital Signs (Most Recent):  Temp: 97.2 °F (36.2 °C) (05/02/23 0705)  Pulse: 107 (05/02/23 0805)  Resp: 15 (05/02/23 0805)  BP: 124/73 (05/02/23 0805)  SpO2: 99 % (05/02/23 0805)  BP Location: Left arm    Vital Signs Range (Last 24H):  Temp:  [97.2 °F (36.2 °C)-98.6 °F (37 °C)]   Pulse:  []   Resp:  [12-21]   BP:  (104-140)/(56-95)   SpO2:  [96 %-100 %]   BP Location: Left arm    Physical Exam  Vitals and nursing note reviewed.   Constitutional:       General: She is not in acute distress.  HENT:      Head: Normocephalic.      Nose: Nose normal.      Mouth/Throat:      Mouth: Mucous membranes are moist.   Eyes:      General: Visual field deficit (R hemianopsia) present.      Conjunctiva/sclera: Conjunctivae normal.      Comments: L gaze preference   Cardiovascular:      Rate and Rhythm: Normal rate.   Pulmonary:      Effort: Pulmonary effort is normal.   Abdominal:      General: Abdomen is flat.   Skin:     General: Skin is warm and dry.   Neurological:      Mental Status: She is alert.      Cranial Nerves: Dysarthria and facial asymmetry present.      Sensory: Sensory deficit present.      Motor: Weakness present.   Psychiatric:         Mood and Affect: Mood normal.       Neurological Exam:   LOC: alert  Attention Span: Good   Language: expressive aphasia  Articulation: dysarthria  Orientation: Limited due to severe aphasia, oriented tos elf  Visual Fields: Hemianopsia right  EOM (CN III, IV, VI): Gaze preference  left  Facial Movement (CN VII): Lower facial weakness on the Right  Motor: Arm left  Normal 5/5  Leg left  Normal 5/5  Arm right  Paresis 1+/5  Leg right Paresis 2+/5      Laboratory:  CMP:   Recent Labs   Lab 05/02/23  0043   CALCIUM 9.1   ALBUMIN 2.8*   PROT 6.0      K 3.9   CO2 24      BUN 8   CREATININE 0.8   ALKPHOS 129   ALT 20   AST 30   BILITOT 0.6       CBC:   Recent Labs   Lab 05/02/23  0043   WBC 9.05   RBC 3.80*   HGB 11.1*   HCT 34.4*      MCV 91   MCH 29.2   MCHC 32.3       Lipid Panel:   Recent Labs   Lab 04/30/23  0845   CHOL 142   LDLCALC 88.6   HDL 38*   TRIG 77       Coagulation:   Recent Labs   Lab 04/30/23  1814 05/01/23  0107 05/02/23  0043   INR 1.1  --   --    APTT 25.7   < > 43.0*    < > = values in this interval not displayed.         Hgb A1C:   Recent Labs   Lab  04/30/23  0845   HGBA1C 7.5*       TSH:   Recent Labs   Lab 04/30/23  0845   TSH 2.092         Diagnostic Results     Brain Imaging   CTH without contrast 5/1/23  Acute left MCA distribution infarct appears stable in size and distribution compared to 04/30/2023 MRI.  No evidence of hemorrhagic conversion.   Acute small right cerebellar infarct is better demonstrated on MRI.   No acute intracranial hemorrhage or midline shift.     MRI Brain without contrast 4/30/23    Acute left MCA distribution infarct.  No significant mass effect or acute hemorrhage at this time.  Small acute infarct in the right cerebellum.  Chronic right MCA distribution infarct.     CTH 4/30/2023  No acute territorial infarct or intracranial hemorrhage identified.      Vessel Imaging   CTA Stroke MP 4/29/2023  CT head: No evidence for acute intracranial hemorrhage.  Stable area encephalomalacia from remote right MCA territory infarct.  CTA head: Occlusion of M2 segment of left MCA.  Asymmetric decreased opacification of the left transverse sinus and jugular vein.  Similar finding was present on the prior CTA in 2022 and therefore could be related to sluggish flow.  Suggest attention on follow-up conventional angiogram.  CTA neck: No high-grade stenosis or aneurysm.      Cardiac Imaging   Echo 4/28/2023  The left ventricle is severely enlarged with eccentric hypertrophy and severely decreased systolic function.  The estimated ejection fraction is 10-15%.  There is left ventricular global hypokinesis.  Grade II left ventricular diastolic dysfunction.  Normal right ventricular size with normal right ventricular systolic function.  Mild tricuspid regurgitation.  The estimated PA systolic pressure is 35 mmHg.  Normal central venous pressure (3 mmHg).  Severe left atrial enlargement.

## 2023-05-02 NOTE — PROGRESS NOTES
[facility] - Neuro Critical Care  Vascular Neurology  [facility]  Progress Note    Assessment/Plan:     * Cerebrovascular accident (CVA) due to embolism of left middle cerebral artery  62 y.o. female with PMH of HTN, DM2, HLD, tobacco use (quit in 2020), stroke (2020, R MCA, no deficits), PE (December 2021, on xarelto),  CAD, DCM, HFrEF (15%) s/p AICD, Pulmonary hypertension admited to  4/27 for the treatment of a right leg DVT on heparin gtt. Stroke code activated 4/29 for aphasia and R hemiplegia, LKN 20:15 (~5 min prior). NIHSS 24, L MCA syndrome. Not TNK candidate due to AC with heparin gtt and therapeutic aPTT. CTA with L M2 occlusion. Taken to IR for possible intervention, now s/p L M2 thrombectomy with TICI 2c reperfusion. Repeat CTH post-IR with no acute infarct or hemorrhage, heparin gtt restarted. MRI brain W/WO with acute L MCA territory infarcts (insula, frontotemporal opercular cortex, patchy frontal white matter, small temporoparietal cortex), as well as small acute R cerebellar infarct. Echo with EF 10-15%, global LV hypokinesis, severe LAE. Suspect etiology likely cardioembolic due to EF 15%.    5/2: NAEO, neuro exam stable. CTH overnight stable. Heparin gtt transitioned to Pradaxa today for AC of reduced EF and VTE history. Speech cleared for minced/moist diet. Stable for step down.       Antithrombotics for secondary stroke prevention: Anticoagulants: Pradaxa 150mg BID, ASA 81mg daily    Statins for secondary stroke prevention and HLD, if present: Statins: Atorvastatin- 80 mg daily    Aggressive risk factor modification: HTN, DM, HLD, HF, DVT    Rehab efforts: The patient has been evaluated by a stroke team provider and the therapy needs have been fully considered based off the presenting complaints and exam findings. The following therapy evaluations are needed: PT evaluate and treat, OT evaluate and treat, SLP evaluate and treat, PM&R evaluate for appropriate  placement    Diagnostics ordered/pending: CTH PRN for acute neuro exam changes    VTE prophylaxis: None: Reason for No Pharmacological VTE Prophylaxis: Currently on anticoagulation    BP parameters: Infarct:  SBP <160    Right sided weakness  Due to stroke  -PT/OT eval and treat  -Dispo recs for IPR    Critical lower limb ischemia  Stroke risk factor  Admitted for R popliteal artery occlusion, was started on heparin gtt  Heparin gtt restarted post-IR, per heme/onc recs transition to pradaxa today (5/2)    Chronic combined systolic and diastolic congestive heart failure  Stroke risk factor  -Echo with EF 10-15%, global LV hypokinesis, severe LAE  -Continue GDMT    Hyperlipemia  Stroke risk factor  -LDL 88, goal <70  -Atorvastatin 80mg daily    Hypertension  Stroke Risk factor  -SBP <160 s/p thrombectomy  -PRN hydralazine/labetalol    Type 2 diabetes mellitus without complication, without long-term current use of insulin  Stroke risk factor  -A1c 7.5  -BG goal while inpatient 140-180  -MC SSI PRN         4/30-S/p TICI 2c. Repeat CTH after IR with no acute infarct. Will need MRI but has a pacemaker that will need be interrogated. Improving r arm weakness and  aphasia.  5/1/2023- MRI completed showing acute L. MCA infarct. Pacemaker reinitiated post MRI to normal settings. Plan to start Pradaxa tomorrow per Heme/Onc. Neuro exam stable.  05/02/2023 NAEO, neuro exam stable. CTH overnight stable. Heparin gtt transitioned to Pradaxa today for AC of reduced EF and VTE history. Speech cleared for minced/moist diet. Stable for step down.      STROKE DOCUMENTATION   Acute Stroke Times   Last Known Normal Date: 04/29/23  Last Known Normal Time: 2015  Symptom Onset Date: 04/29/23  Symptom Onset Time: 2015  Stroke Team Called Date: 04/29/23  Stroke Team Called Time: 2022  Stroke Team Arrival Date: 04/29/23  Stroke Team Arrival Time: 2025  CT Interpretation Time: 2038  Thrombolytic Therapy Recommended: No  CTA Interpretation Time:  2045  Thrombectomy Recommended: Yes  Decision to Treat Time for IR: 2045    NIH Scale:  1a. Level of Consciousness: 0-->Alert, keenly responsive  1b. LOC Questions: 0-->Answers both questions correctly  1c. LOC Commands: 0-->Performs both tasks correctly  2. Best Gaze: 1-->Partial gaze palsy, gaze is abnormal in one or both eyes, but forced deviation or total gaze paresis is not present  3. Visual: 2-->Complete hemianopia  4. Facial Palsy: 2-->Partial paralysis (total or near-total paralysis of lower face)  5a. Motor Arm, Left: 0-->No drift, limb holds 90 (or 45) degrees for full 10 secs  5b. Motor Arm, Right: 4-->No movement  6a. Motor Leg, Left: 0-->No drift, leg holds 30 degree position for full 5 secs  6b. Motor Leg, Right: 2-->Some effort against gravity, leg falls to bed by 5 secs, but has some effort against gravity  7. Limb Ataxia: 0-->Absent  8. Sensory: 1-->Mild-to-moderate sensory loss, patient feels pinprick is less sharp or is dull on the affected side, or there is a loss of superficial pain with pinprick, but patient is aware of being touched  9. Best Language: 2-->Severe aphasia, all communication is through fragmentary expression, great need for inference, questioning, and guessing by the listener. Range of information that can be exchanged is limited, listener carries burden of. . . (see row details)  10. Dysarthria: 1-->Mild-to-moderate dysarthria, patient slurs at least some words and, at worst, can be understood with some difficulty  11. Extinction and Inattention (formerly Neglect): 1-->Visual, tactile, auditory, spatial, or personal inattention or extinction to bilateral simultaneous stimulation in one of the sensory modalities  Total (NIH Stroke Scale): 16       Modified Issue Score: 0  [place] Coma Scale:    ABCD2 Score:    ICFM2DR3-HYT Score:   HAS -BLED Score:   ICH Score:   Hunt & Vora Classification:      Hemorrhagic change of an Ischemic Stroke: Does this patient have an ischemic stroke  with hemorrhagic changes? No     Neurologic Chief Complaint: L MCA stroke    Subjective:     Interval History: Patient is seen for follow-up neurological assessment and treatment recommendations:   NAEO, neuro exam stable. CTH overnight stable. Heparin gtt transitioned to Pradaxa today for AC of reduced EF and VTE history. Speech cleared for minced/moist diet. Stable for step down.    HPI, Past Medical, Family, and Social History remains the same as documented in the initial encounter.     Review of Systems   Unable to perform ROS: Other (severe aphasia)   Eyes:  Positive for visual disturbance.   Neurological:  Positive for facial asymmetry, speech difficulty and weakness.   Scheduled Meds:   aspirin  81 mg Oral Daily    aspirin  300 mg Rectal Once    atorvastatin  80 mg Oral Daily    metoprolol tartrate  50 mg Oral BID    mupirocin   Nasal BID    sacubitriL-valsartan  1 tablet Oral BID    senna-docusate 8.6-50 mg  1 tablet Oral BID    spironolactone  25 mg Oral Daily    torsemide  10 mg Oral Daily     Continuous Infusions:   heparin (porcine) in D5W 14 Units/kg/hr (05/01/23 1951)     PRN Meds:acetaminophen, albuterol-ipratropium, calcium gluconate IVPB, calcium gluconate IVPB, calcium gluconate IVPB, dextrose 10%, dextrose 10%, dextrose 10%, dextrose 10%, dextrose, dextrose, glucagon (human recombinant), insulin aspart U-100, labetalol, magnesium sulfate IVPB, magnesium sulfate IVPB, ondansetron, potassium chloride **AND** potassium chloride **AND** potassium chloride, senna-docusate 8.6-50 mg, sodium chloride 0.9%, sodium chloride 0.9%, sodium phosphate IVPB, sodium phosphate IVPB, sodium phosphate IVPB    Objective:     Vital Signs (Most Recent):  Temp: 97.2 °F (36.2 °C) (05/02/23 0705)  Pulse: 107 (05/02/23 0805)  Resp: 15 (05/02/23 0805)  BP: 124/73 (05/02/23 0805)  SpO2: 99 % (05/02/23 0805)  BP Location: Left arm    Vital Signs Range (Last 24H):  Temp:  [97.2 °F (36.2 °C)-98.6 °F (37 °C)]   Pulse:  []    Resp:  [12-21]   BP: (104-140)/(56-95)   SpO2:  [96 %-100 %]   BP Location: Left arm    Physical Exam  Vitals and nursing note reviewed.   Constitutional:       General: She is not in acute distress.  HENT:      Head: Normocephalic.      Nose: Nose normal.      Mouth/Throat:      Mouth: Mucous membranes are moist.   Eyes:      General: Visual field deficit (R hemianopsia) present.      Conjunctiva/sclera: Conjunctivae normal.      Comments: L gaze preference   Cardiovascular:      Rate and Rhythm: Normal rate.   Pulmonary:      Effort: Pulmonary effort is normal.   Abdominal:      General: Abdomen is flat.   Skin:     General: Skin is warm and dry.   Neurological:      Mental Status: She is alert.      Cranial Nerves: Dysarthria and facial asymmetry present.      Sensory: Sensory deficit present.      Motor: Weakness present.   Psychiatric:         Mood and Affect: Mood normal.       Neurological Exam:   LOC: alert  Attention Span: Good   Language: expressive aphasia  Articulation: dysarthria  Orientation: Limited due to severe aphasia, oriented tos elf  Visual Fields: Hemianopsia right  EOM (CN III, IV, VI): Gaze preference  left  Facial Movement (CN VII): Lower facial weakness on the Right  Motor: Arm left  Normal 5/5  Leg left  Normal 5/5  Arm right  Paresis 1+/5  Leg right Paresis 2+/5      Laboratory:  CMP:   Recent Labs   Lab 05/02/23  0043   CALCIUM 9.1   ALBUMIN 2.8*   PROT 6.0      K 3.9   CO2 24      BUN 8   CREATININE 0.8   ALKPHOS 129   ALT 20   AST 30   BILITOT 0.6       CBC:   Recent Labs   Lab 05/02/23 0043   WBC 9.05   RBC 3.80*   HGB 11.1*   HCT 34.4*      MCV 91   MCH 29.2   MCHC 32.3       Lipid Panel:   Recent Labs   Lab 04/30/23  0845   CHOL 142   LDLCALC 88.6   HDL 38*   TRIG 77       Coagulation:   Recent Labs   Lab 04/30/23  1814 05/01/23  0107 05/02/23  0043   INR 1.1  --   --    APTT 25.7   < > 43.0*    < > = values in this interval not displayed.         Hgb A1C:    Recent Labs   Lab 04/30/23  0845   HGBA1C 7.5*       TSH:   Recent Labs   Lab 04/30/23  0845   TSH 2.092         Diagnostic Results     Brain Imaging   CTH without contrast 5/1/23  Acute left MCA distribution infarct appears stable in size and distribution compared to 04/30/2023 MRI.  No evidence of hemorrhagic conversion.   Acute small right cerebellar infarct is better demonstrated on MRI.   No acute intracranial hemorrhage or midline shift.     MRI Brain without contrast 4/30/23    Acute left MCA distribution infarct.  No significant mass effect or acute hemorrhage at this time.  Small acute infarct in the right cerebellum.  Chronic right MCA distribution infarct.     CTH 4/30/2023  No acute territorial infarct or intracranial hemorrhage identified.      Vessel Imaging   CTA Stroke MP 4/29/2023  CT head: No evidence for acute intracranial hemorrhage.  Stable area encephalomalacia from remote right MCA territory infarct.  CTA head: Occlusion of M2 segment of left MCA.  Asymmetric decreased opacification of the left transverse sinus and jugular vein.  Similar finding was present on the prior CTA in 2022 and therefore could be related to sluggish flow.  Suggest attention on follow-up conventional angiogram.  CTA neck: No high-grade stenosis or aneurysm.      Cardiac Imaging   Echo 4/28/2023  The left ventricle is severely enlarged with eccentric hypertrophy and severely decreased systolic function.  The estimated ejection fraction is 10-15%.  There is left ventricular global hypokinesis.  Grade II left ventricular diastolic dysfunction.  Normal right ventricular size with normal right ventricular systolic function.  Mild tricuspid regurgitation.  The estimated PA systolic pressure is 35 mmHg.  Normal central venous pressure (3 mmHg).  Severe left atrial enlargement.      CHARLIE Kelley  Comprehensive Stroke Center  Department of Vascular Neurology   [facility] - Neuro Critical Care

## 2023-05-02 NOTE — ASSESSMENT & PLAN NOTE
Acute LMCA stroke with LM2 LVO, no TNK given already anticoagulated   -Stroke code on floor, LKN 20:15 restarted, now again therapeutic  -anticoagulated on heparin gtt pre-stroke,    -Q 1 hour neuro checks   -Q 1 hour vitals     Antithrombotics for secondary stroke prevention: Antiplatelets: Aspirin: 81 mg daily + heparin     Statins for secondary stroke prevention and hyperlipidemia, if present:   Statins: Atorvastatin- 80 mg daily    Aggressive risk factor modification: HTN, DM, Diet, Exercise, Obesity, CAD     Rehab efforts: The patient has been evaluated by a stroke team provider and the therapy needs have been fully considered based off the presenting complaints and exam findings. The following therapy evaluations are needed: PT evaluate and treat, OT evaluate and treat, SLP evaluate and treat, PM&R evaluate for appropriate placement    Diagnostics ordered/pending: CT scan of head without contrast to asses brain parenchyma, CTA Head to assess vasculature , HgbA1C to assess blood glucose levels, Lipid Profile to assess cholesterol levels, MRI head without contrast to assess brain parenchyma, TTE to assess cardiac function/status , TSH to assess thyroid function    VTE prophylaxis: None: Reason for No Pharmacological VTE Prophylaxis: Currently on anticoagulation    BP parameters: Infarct: Post sucessful thrombectomy, SBP <160    4/30: S/p TICI2c thrombectomy, pending follow up imaging in order to safely resume anticoagulation, given rectal aspirin pending this  5/1: heparin gtt therapeutic, transition to PO AC tomorrow per hematology recommendations

## 2023-05-02 NOTE — PLAN OF CARE
[facility] Care Plan    POC reviewed with Diomedes Mohr and family at 1400. Pt verbalized understanding. Questions and concerns addressed. No acute events today. Pt progressing toward goals. Will continue to monitor. See below and flowsheets for full assessment and VS info.     Diet advanced to level 5. Aspiration watch  Heparin gtt discontinued. Pradaxa initiated  TTF      Is this a stroke patient? yes- Stroke booklet reviewed with patient and family, risk factors identified for patient and stroke booklet remains at bedside for ongoing education.     Neuro:  [place] Coma Scale  Best Eye Response: 4-->(E4) spontaneous  Best Motor Response: 6-->(M6) obeys commands  Best Verbal Response: 5-->(V5) oriented  [place] Coma Scale Score: 15  Assessment Qualifiers: patient not sedated/intubated  Pupil PERRLA: yes     24 hr Temp:  [97.2 °F (36.2 °C)-98.6 °F (37 °C)]     CV:   Rhythm: paced rhythm  BP goals:   SBP < 160  MAP > 65    Resp:           Plan: N/A    GI/:     Diet/Nutrition Received: mechanical/dental soft  Last Bowel Movement: 04/27/23  Voiding Characteristics: external catheter    Intake/Output Summary (Last 24 hours) at 5/2/2023 1657  Last data filed at 5/2/2023 1505  Gross per 24 hour   Intake 1003.75 ml   Output 2750 ml   Net -1746.25 ml     Unmeasured Output  Urine Occurrence: 2  Stool Occurrence: 0  Pad Count: 1    Labs/Accuchecks:  Recent Labs   Lab 05/02/23 0043   WBC 9.05   RBC 3.80*   HGB 11.1*   HCT 34.4*         Recent Labs   Lab 05/02/23  0043      K 3.9   CO2 24      BUN 8   CREATININE 0.8   ALKPHOS 129   ALT 20   AST 30   BILITOT 0.6      Recent Labs   Lab 04/30/23  1814 05/01/23  0107 05/02/23  0043   INR 1.1  --   --    APTT 25.7   < > 43.0*    < > = values in this interval not displayed.    No results for input(s): CPK, CPKMB, TROPONINI, MB in the last 168 hours.    Electrolytes: N/A - electrolytes WDL  Accuchecks: ACHS    Gtts:      LDA/Wounds:  Lines/Drains/Airways       Drain   Duration             Female External Urinary Catheter 05/01/23 0705 1 day              Peripheral Intravenous Line  Duration                  Peripheral IV - Single Lumen 04/29/23 18 G Posterior;Right Hand 3 days         Peripheral IV - Single Lumen 05/01/23 1040 20 G Right Antecubital 1 day                  Wounds: No  Wound care consulted: No

## 2023-05-02 NOTE — ASSESSMENT & PLAN NOTE
On chronic xarelto for HFrEF and history of PE, but failed therapy as patient presented with RLE arterial occlusion  -now on heparin gtt bridge to pradaxa as well as ASA   -continue daily ASA in AM

## 2023-05-02 NOTE — PHYSICIAN QUERY
PT Name: Diomedes Mohr  MR #: [id number]     DOCUMENTATION CLARIFICATION     CDS/: BUBBA Ruiz, RN, CCDS              Contact information: [email or web address]  This form is a permanent document in the medical record.     Query Date: May 2, 2023    By submitting this query, we are merely seeking further clarification of documentation.  Please utilize your independent clinical judgment when addressing the question(s) below.    The Medical Record contains the following   Indicators Supporting Clinical Findings Location in Medical Record   X Heart Failure documented Chronic combined systolic and diastolic congestive heart failure    Acute on chronic combined systolic and diastolic congestive heart failure    Chronic combined systolic and diastolic congestive heart failure    Chronic combined systolic and diastolic congestive heart failure   H & P: Dr. Mendez 4/27    HM PN: Dr. Tristan 4/29      Neuro CC: Dr. Souza 4/29    Vasc Neuro: Dr. Frederick 4/30   X    Lab: 4/29   X EF/Echo severely decreased systolic function.  The estimated ejection fraction is 10-15%.  There is left ventricular global hypokinesis.  Grade II left ventricular diastolic dysfunction.   Echo: 4/28   X Radiology findings  Early edema is a consideration although correlation is needed      CXR: 4/29   X Subjective/Objective Respiratory Conditions Reports cough  c/w SOB and feels she has extra fluid HM PN: Dr. Tristan 4/29    Recent/Current MI      Heart Transplant, LVAD      Edema, JVD      Ascites     X Diuretics/Meds furosemide injection 40 mg  Dose: 40 mg  Freq: Once Route: IV  Start: 04/29/23 1244 End: 04/29/23 1432   MAR    Other Treatment      Other       Heart failure is a clinical diagnosis which includes symptomatic fluid retention, elevated intracardiac pressures, and/or the inability of the heart to deliver adequate blood flow.    Heart Failure with reduced Ejection Fraction (HFrEF) or Systolic Heart Failure (loses ability to  contract normally, EF is <40%)    Heart Failure with preserved Ejection Fraction (HFpEF) or Diastolic Heart Failure (stiff ventricles, does not relax properly, EF is >50%)     Heart Failure with Combined Systolic and Diastolic Failure (stiff ventricles, does not relax properly and EF is <50%)    Mid-range or mildly reduced ejection fraction (HFmrEF) is classified as systolic heart failure.  Congestive heart failure with a recovered EF is classified as Diastolic Heart Failure.  Common clues to acute exacerbation:  Rapidly progressive symptoms (w/in 2 weeks of presentation), using IV diuretics, using supplemental O2, pulmonary edema on Xray, new or worsening pleural effusion, +JVD or other signs of volume overload, MI w/in 4 weeks, and/or BNP >500  The clinical guidelines noted are only system guidelines, and do not replace the providers clinical judgment.    Provider, please clarify conflicting acuity of combined CHF diagnosis associated with the above clinical findings.    [  x ]  Acute on Chronic Combined Systolic and Diastolic Heart Failure - worsening of CHF signs/symptoms in preexisting CHF   [   ]  Chronic Combined Systolic and Diastolic Heart Failure - pre-existing and stable   [   ]  Other (please specify): ___________________________________     Please document in your progress notes daily for the duration of treatment until resolved and include in your discharge summary.    References:  American Heart Association editorial staff. (2017, May). Ejection Fraction Heart Failure Measurement. American Heart Association. https://www.heart.org/en/health-topics/heart-failure/diagnosing-heart-failure/ejection-fraction-heart-failure-measurement#:~:text=Ejection%20fraction%20(EF)%20is%20a,pushed%20out%20with%20each%20heartbeat  LAN Alas (2020, December 15). Heart failure with preserved ejection fraction: Clinical manifestations and diagnosis. UpToDate.  https://www.[employer]/contents/heart-failure-with-preserved-ejection-fraction-clinical-manifestations-and-diagnosis.  ICD-10-CM/PCS Coding Clinic Third Quarter ICD-10, Effective with discharges: September 8, 2020 [facility] [place] Heart failure with mid-range or mildly reduced ejection fraction (2020).  ICD-10-CM/PCS Coding Clinic Third Quarter ICD-10, Effective with discharges: September 8, 2020 [facility] § Heart failure with recovered ejection fraction (2020).  Form No. [id number]

## 2023-05-02 NOTE — ASSESSMENT & PLAN NOTE
Stroke risk factor  Admitted for R popliteal artery occlusion, was started on heparin gtt  Heparin gtt restarted post-IR, per heme/onc recs transition to pradaxa today (5/2)

## 2023-05-02 NOTE — PT/OT/SLP PROGRESS
"Speech Language Pathology Treatment    Patient Name:  Diomedes Mohr   MRN:  [id number]  [id number]    Admitting Diagnosis: Cerebrovascular accident (CVA) due to embolism of left middle cerebral artery    Recommendations:                 General Recommendations:  Dysphagia therapy and Speech/language therapy  Diet recommendations:  Minced & Moist Diet - IDDSI Level 5, Liquid Diet Level: Thin liquids - IDDSI Level 0   Aspiration Precautions:   1 small bite/sip at a time,   Alternating bites/sips,   Assistance with meals,   Check for pocketing/oral residue,   HOB to 90 degrees,   Meds whole 1 at a time,   and Strict aspiration precautions   General Precautions: Standard, aspiration, aphasia, fall  Communication strategies:  provide increased time to answer    Subjective     Patient awake and cooperative.   Communicated with RN prior to entry.  Patient states, "No no no." -in reference to pureed tray  Patient goals: none stated     Pain/Comfort:  Pain Rating 1: 0/10    Objective:     Has the patient been evaluated by SLP for swallowing?   Yes  Keep patient NPO? No     Patient seen for an ongoing swallowing assessment. Wet cough noted prior to po trials. HOB elevated. Patient assessed with sips of water via teaspoon, cup, and straw and bites of Cheerios softened in milk. She presents with excess chewing and mild right side oral pocketing. Oral residue cleared given cues and liquid washes. No overt s/s of aspiration with all trials. SLP provided patient education on SLP recommendations, SLP role, s/s and risks of aspiration, safe swallow precautions, and POC. Patient demonstrated understanding, however, would benefit form reinforcement.     Patient counted 1-10 and named the days of the week with 100% acc no cues. She required min cues to name months of the year. She answered simple y/n questions with 100% acc no cues. She named objects with <50% acc no cues and 70% acc min cues. SLP provided education on SLP language " goals and recommendations. She nodded head in understanding.       Assessment:     Diomedes Mohr is a 62 y.o. female with an SLP diagnosis of Aphasia, Dysphagia, and Cognitive-Linguistic Impairment.  She presents with improved tolerance of po trials. ST will continue to follow.     Goals:   Multidisciplinary Problems       SLP Goals          Problem: SLP    Goal Priority Disciplines Outcome   SLP Goal     SLP Ongoing, Progressing   Description: Speech Language Pathology Goals  Goals expected to be met by 5/7  1. Pt will tolerate puree & nectar thick liquids without s/s aspiration & adequate oral phase of swallow  2. Ongoing swallow assessment to determine least restrictive PO consistencies  3. SLP Speech/Language/Cognitive Evaluation- initiated 5/1  4. Pt will answer personal yes/no questions with 90% accuracy, MIN A  5. Pt will complete 1-unit commands 90% of the time, MIN A  6. Pt will complete automatic speech tasks with 90% accuracy, MIN A  7. Pt will complete object naming tasks with 70% accuracy or higher, MIN A                         Plan:     Patient to be seen:  4 x/week   Plan of Care expires:  05/29/23  Plan of Care reviewed with:  patient   SLP Follow-Up:  Yes       Discharge recommendations:  rehabilitation facility   Barriers to Discharge:  None    Time Tracking:     SLP Treatment Date:   05/02/23  Speech Start Time:  0750  Speech Stop Time:  0815     Speech Total Time (min):  25 min    Billable Minutes: Speech Therapy Individual 9, Treatment Swallowing Dysfunction 8, and Self Care/Home Management Training 8    05/02/2023

## 2023-05-02 NOTE — ASSESSMENT & PLAN NOTE
Patient is identified as having Combined Systolic and Diastolic heart failure that is Chronic. CHF is currently controlled. Latest ECHO performed and demonstrates- Results for orders placed during the hospital encounter of 04/27/23    Echo    Interpretation Summary  · The left ventricle is severely enlarged with eccentric hypertrophy and severely decreased systolic function.  · The estimated ejection fraction is 10-15%.  · There is left ventricular global hypokinesis.  · Grade II left ventricular diastolic dysfunction.  · Normal right ventricular size with normal right ventricular systolic function.  · Mild tricuspid regurgitation.  · The estimated PA systolic pressure is 35 mmHg.  · Normal central venous pressure (3 mmHg).  · Severe left atrial enlargement.  . Continue Beta Blocker  and monitor clinical status closely. Monitor on telemetry. Patient is on CHF pathway.  Monitor strict Is&Os and daily weights.  Continue to stress to patient importance of self efficacy and  on diet for CHF. Last BNP reviewed- and noted below   Recent Labs   Lab 04/29/23  0519   *   .

## 2023-05-02 NOTE — PLAN OF CARE
HERMELINDA received message from Pt daughter and spoke with Pt  regarding preference. He advised that [facility] will be preference.     HERMELINDA advised by Sal with [facility] that this [employer] [place] [facility] [place] so the only options would be [facility], [facility], or [facility].     HERMELINDA met with Pt  and family at bedside regarding above. Per  between those options, he would want [facility] as preference.    HERMELINDA left message for admissions at [facility] ([phone]) requesting call back to discuss referral.    Claudine Moise, [facility]  Neurocritical Care   [facility]  [id number]

## 2023-05-02 NOTE — ASSESSMENT & PLAN NOTE
62 y.o. female with PMH of HTN, DM2, HLD, tobacco use (quit in 2020), stroke (2020, R MCA, no deficits), PE (December 2021, on xarelto),  CAD, DCM, HFrEF (15%) s/p AICD, Pulmonary hypertension admited to  4/27 for the treatment of a right leg DVT on heparin gtt. Stroke code activated 4/29 for aphasia and R hemiplegia, LKN 20:15 (~5 min prior). NIHSS 24, L MCA syndrome. Not TNK candidate due to AC with heparin gtt and therapeutic aPTT. CTA with L M2 occlusion. Taken to IR for possible intervention, now s/p L M2 thrombectomy with TICI 2c reperfusion. Repeat CTH post-IR with no acute infarct or hemorrhage, heparin gtt restarted. MRI brain W/WO with acute L MCA territory infarcts (insula, frontotemporal opercular cortex, patchy frontal white matter, small temporoparietal cortex), as well as small acute R cerebellar infarct. Echo with EF 10-15%, global LV hypokinesis, severe LAE. Suspect etiology likely cardioembolic due to EF 15%.    5/2: NAEO, neuro exam stable. CTH overnight stable. Heparin gtt transitioned to Pradaxa today for AC of reduced EF and VTE history. Speech cleared for minced/moist diet. Stable for step down.       Antithrombotics for secondary stroke prevention: Anticoagulants: Pradaxa 150mg BID, ASA 81mg daily    Statins for secondary stroke prevention and HLD, if present: Statins: Atorvastatin- 80 mg daily    Aggressive risk factor modification: HTN, DM, HLD, HF, DVT    Rehab efforts: The patient has been evaluated by a stroke team provider and the therapy needs have been fully considered based off the presenting complaints and exam findings. The following therapy evaluations are needed: PT evaluate and treat, OT evaluate and treat, SLP evaluate and treat, PM&R evaluate for appropriate placement    Diagnostics ordered/pending: CTH PRN for acute neuro exam changes    VTE prophylaxis: None: Reason for No Pharmacological VTE Prophylaxis: Currently on anticoagulation    BP parameters: Infarct:  SBP <160

## 2023-05-02 NOTE — ASSESSMENT & PLAN NOTE
Admission to the hospital for RLE popliteal artery occlusion   -heme/onc and vascular surgery both saw patient   -patient failed management with xarelto at home, transitioned to heparin gtt --> pradaxa  -continue daily ASA and statin   -neurovascular checks     4/30: Heparin gtt held pending cranial imaging, plan to resume ASAP continue neuro vascular checks  5/1: Heparin gtt resumed and now therapeutic, transition to PO AC tomorrow   5/2: Pradaxa started as patient now advanced to being able to swallow whole pills

## 2023-05-02 NOTE — ASSESSMENT & PLAN NOTE
Patient is identified as having Combined Systolic and Diastolic heart failure that is Chronic. CHF is currently controlled. Latest ECHO performed and demonstrates- Results for orders placed during the hospital encounter of 04/27/23    Echo    Interpretation Summary  · The left ventricle is severely enlarged with eccentric hypertrophy and severely decreased systolic function.  · The estimated ejection fraction is 10-15%.  · There is left ventricular global hypokinesis.  · Grade II left ventricular diastolic dysfunction.  · Normal right ventricular size with normal right ventricular systolic function.  · Mild tricuspid regurgitation.  · The estimated PA systolic pressure is 35 mmHg.  · Normal central venous pressure (3 mmHg).  · Severe left atrial enlargement.  Home beta blocker, entresto, spironilactone and torsemide restarted today, and monitor clinical status closely. Monitor on telemetry. Patient is on CHF pathway.  Monitor strict Is&Os and daily weights.  Continue to stress to patient importance of self efficacy and  on diet for CHF.     Last BNP reviewed- and noted below     Recent Labs   Lab 04/29/23  0533   *   .

## 2023-05-02 NOTE — PROGRESS NOTES
[facility] Neuro Critical Care  Neurocritical Care  Progress Note    Admit Date: 4/27/2023  Service Date: 05/01/2023  Length of Stay: 4    Subjective:     Chief Complaint: Cerebrovascular accident (CVA) due to embolism of left middle cerebral artery    History of Present Illness: Pt is a 62 y.o. female with PMHx of HTN, DM2, HLD, prior stroke, PE (on xarelto), CAD HFrEF, AICD who presents to [facility] as an acute stroke code from the hospital floor. Patient was admitted to hospital medicine on 4/27 for an acute arterial occlusion of her R popliteal artery and started on a high intensity heparin gtt. Vascular surgery and hematology saw the patient and recommended the heparin gtt with bridge to pradaxa after 5 days. This evening patient was noted to not be able to talk or move her right side. LKN 20:15. A stroke code was called and CTA revealed acute L M2 occlusion. She was transferred to [facility] and then taken for emergent thrombectomy TICI 2c. She is admitted to [facility] for higher level care and neurologic monitoring.       Hospital Course: 4/30/23: To mechanical thrombectomy overnight with TICI 2c clot evacuation, this morning remains expressively aphasic with some weakness on Rt, but RLE/RUE antigravity at times spontaneously.  Heparin remains held since procedure, pending ICD evaluation to assess ability to obtain urgent MRI for characterization of stroke burden prior to resumption of anticoagulation.  Aspirin ordered given lack of hemorrhage on post-thrombectomy CT.   05/01/2023: Heparin gtt therapeutic, CTH      Interval History: Heparin gtt, reach therapeutic level, CTH once therapeutic, begin PO AC tomorrow. Exam stable.     Review of Systems:  Unable to obtain a complete ROS due to aphasia.     Vitals:   Temp: 98.5 °F (36.9 °C)  Pulse: 71  Rhythm: paced rhythm  BP: (!) 117/58  MAP (mmHg): 82  Resp: 18  SpO2: 97 %    Temp  Min: 98 °F (36.7 °C)  Max: 98.7 °F (37.1 °C)  Pulse  Min: 64  Max: 84  BP  Min: 106/63  Max:  147/67  MAP (mmHg)  Min: 67  Max: 90  Resp  Min: 12  Max: 22  SpO2  Min: 95 %  Max: 100 %    04/30 0701 - 05/01 0700  In: 796.7 [I.V.:621.4]  Out: 400 [Urine:400]   Unmeasured Output  Urine Occurrence: 1  Stool Occurrence: 0  Pad Count: 1     Physical Exam  Vitals and nursing note reviewed.   Constitutional:       General: She is not in acute distress.  HENT:      Head: Normocephalic and atraumatic.      Mouth/Throat:      Mouth: Mucous membranes are moist.   Eyes:      Extraocular Movements: Extraocular movements intact.      Conjunctiva/sclera: Conjunctivae normal.      Pupils: Pupils are equal, round, and reactive to light.   Cardiovascular:      Rate and Rhythm: Normal rate and regular rhythm.   Pulmonary:      Effort: Pulmonary effort is normal. No respiratory distress.   Abdominal:      General: Abdomen is flat. There is no distension.      Palpations: Abdomen is soft.      Tenderness: There is no abdominal tenderness. There is no guarding.   Musculoskeletal:         General: No swelling or deformity.   Skin:     General: Skin is warm.   Neurological:   Alert, tracks examiner  --GCS:  E4 V2 M6  --Mental Status: Alert, mute, expressive aphasia - follows simple verbal commands and nods appropriately to questions   --CN II-XII: PERRLA, R facial droop, L gaze preference, R hemianopia   --Motor: Rt side withdrawal to pain, briefly antigravity with drift downwards to bed, L side purposeful and full strength   --sensory: diminished to R side       Medications:   Continuousheparin (porcine) in D5W, Last Rate: 14 Units/kg/hr (05/01/23 1805)    Scheduledaspirin, 81 mg, Daily  aspirin, 300 mg, Once  atorvastatin, 80 mg, Daily  metoprolol tartrate, 50 mg, BID  mupirocin, , BID  senna-docusate 8.6-50 mg, 1 tablet, BID    PRNacetaminophen, 650 mg, Q4H PRN  albuterol-ipratropium, 3 mL, Q4H PRN  calcium gluconate IVPB, 1 g, PRN  calcium gluconate IVPB, 2 g, PRN  calcium gluconate IVPB, 3 g, PRN  dextrose 10%, 12.5 g,  PRN  dextrose 10%, 12.5 g, PRN  dextrose 10%, 25 g, PRN  dextrose 10%, 25 g, PRN  dextrose, 15 g, PRN  dextrose, 30 g, PRN  glucagon (human recombinant), 1 mg, PRN  insulin aspart U-100, 1-10 Units, QID (AC + HS) PRN  labetalol, 10 mg, Q6H PRN  magnesium sulfate IVPB, 2 g, PRN  magnesium sulfate IVPB, 4 g, PRN  ondansetron, 4 mg, Q8H PRN  potassium chloride, 40 mEq, PRN   And  potassium chloride, 60 mEq, PRN   And  potassium chloride, 80 mEq, PRN  senna-docusate 8.6-50 mg, 2 tablet, BID PRN  sodium chloride 0.9%, 10 mL, Q12H PRN  sodium chloride 0.9%, 10 mL, PRN  sodium phosphate IVPB, 15 mmol, PRN  sodium phosphate IVPB, 20.01 mmol, PRN  sodium phosphate IVPB, 30 mmol, PRN       Today I independently reviewed pertinent medications, lines/drains/airways, imaging, laboratory results, notably:     ISTAT: No results for input(s): PH, PCO2, PO2, POCSATURATED, HCO3, BE, POCNA, POCK, POCTCO2, POCGLU, POCICA, POCLAC, SAMPLE in the last 24 hours.   Chem:   Recent Labs   Lab 05/01/23  0107      K 3.5      CO2 24   *   BUN 8   CREATININE 0.7   CALCIUM 9.0   MG 1.8   PHOS 3.8   ANIONGAP 9   PROT 6.3   ALBUMIN 3.0*   BILITOT 0.7   ALKPHOS 131   AST 18   ALT 12     Heme:   Recent Labs   Lab 05/01/23  0107   WBC 7.61   HGB 12.0   HCT 37.7        Endo:   Recent Labs   Lab 05/01/23  0603 05/01/23  1223 05/01/23  1632   POCTGLUCOSE 155* 165* 132*        Assessment/Plan:     Neuro  * Cerebrovascular accident (CVA) due to embolism of left middle cerebral artery  Acute LMCA stroke with LM2 LVO, no TNK given already anticoagulated   -Stroke code on floor, LKN 20:15 restarted, now again therapeutic  -anticoagulated on heparin gtt pre-stroke,    -Q 1 hour neuro checks   -Q 1 hour vitals     Antithrombotics for secondary stroke prevention: Antiplatelets: Aspirin: 81 mg daily + heparin     Statins for secondary stroke prevention and hyperlipidemia, if present:   Statins: Atorvastatin- 80 mg daily    Aggressive risk  factor modification: HTN, DM, Diet, Exercise, Obesity, CAD     Rehab efforts: The patient has been evaluated by a stroke team provider and the therapy needs have been fully considered based off the presenting complaints and exam findings. The following therapy evaluations are needed: PT evaluate and treat, OT evaluate and treat, SLP evaluate and treat, PM&R evaluate for appropriate placement    Diagnostics ordered/pending: CT scan of head without contrast to asses brain parenchyma, CTA Head to assess vasculature , HgbA1C to assess blood glucose levels, Lipid Profile to assess cholesterol levels, MRI head without contrast to assess brain parenchyma, TTE to assess cardiac function/status , TSH to assess thyroid function    VTE prophylaxis: None: Reason for No Pharmacological VTE Prophylaxis: Currently on anticoagulation    BP parameters: Infarct: Post sucessful thrombectomy, SBP <160    4/30: S/p TICI2c thrombectomy, pending follow up imaging in order to safely resume anticoagulation, given rectal aspirin pending this  5/1: heparin gtt therapeutic, transition to PO AC tomorrow per hematology recommendations     Global aphasia  2/2 to acute stroke   -SLP eval and treat     Cytotoxic brain edema  See stroke     Cardiac/Vascular  Critical lower limb ischemia  Admission to the hospital for RLE popliteal artery occlusion   -heme/onc and vascular surgery both saw patient   -patient failed management with xarelto at home, transitioned to heparin gtt with plan for pradaxa  -now with acute stroke, hold heparin gtt overnight until post procedure imaging complete   -continue daily ASA and statin   -neurovascular checks     4/30: Heparin gtt held pending cranial imaging, plan to resume ASAP continue neuro vascular checks  5/1: Heparin gtt resumed and now therapeutic, transition to PO AC tomorrow     ICD (implantable cardioverter-defibrillator) in place  CHF with AICD in place since 2021  -continuous cardiac monitoring     4/30:  Rep to investigate/interrogate to determine ability to obtain rapid MRI    Coronary artery disease involving native heart  Daily ASA resumed     Chronic combined systolic and diastolic congestive heart failure  Patient is identified as having Combined Systolic and Diastolic heart failure that is Chronic. CHF is currently controlled. Latest ECHO performed and demonstrates- Results for orders placed during the hospital encounter of 04/27/23    Echo    Interpretation Summary  · The left ventricle is severely enlarged with eccentric hypertrophy and severely decreased systolic function.  · The estimated ejection fraction is 10-15%.  · There is left ventricular global hypokinesis.  · Grade II left ventricular diastolic dysfunction.  · Normal right ventricular size with normal right ventricular systolic function.  · Mild tricuspid regurgitation.  · The estimated PA systolic pressure is 35 mmHg.  · Normal central venous pressure (3 mmHg).  · Severe left atrial enlargement.  . Continue Beta Blocker  and monitor clinical status closely. Monitor on telemetry. Patient is on CHF pathway.  Monitor strict Is&Os and daily weights.  Continue to stress to patient importance of self efficacy and  on diet for CHF. Last BNP reviewed- and noted below   Recent Labs   Lab 04/29/23  0533   *   .      Dilated cardiomyopathy  History of severe decreased systolic function EF 10-15% followed with cardiology. Not currently in acute failure. Likely contributor to arterial occlusive disease    Hyperlipemia  Lipid panel   -continue daily statin     Hypertension  Baseline, maintain SBP <160 following thrombectomy    Hematology  Long term current use of anticoagulant  On chronic xarelto for HFrEF and history of PE, but failed therapy as patient presented with RLE arterial occlusion  -now on heparin gtt bridge to pradaxa as well as ASA   -continue daily ASA in AM     Hypercoagulopathy  Presumed/possible  - Hematology following      Endocrine  Type 2 diabetes mellitus without complication, without long-term current use of insulin  SSI protocol   -stroke risk factor   -A1c 7.5%    Other  Right sided weakness  2/2 to acute LMCA stroke   -PT/OT          The patient is being Prophylaxed for:  Venous Thromboembolism with: Mechanical or Chemical  Stress Ulcer with: None  Ventilator Pneumonia with: none    Activity Orders          Diet Dysphagia Pureed (IDDSI Level 4) [facility]; Nectar Thick: Dysphagia 1 (Pureed) starting at 04/30 1517    Turn patient starting at 04/29 2200    Elevate HOB starting at 04/29 2148    Progressive Mobility Protocol (mobilize patient to their highest level of functioning at least twice daily) starting at 04/28 0800        Full Code    Carolann Guidry PA-C  Neurocritical Care  [facility] - Neuro Critical Care

## 2023-05-02 NOTE — ASSESSMENT & PLAN NOTE
Admission to the hospital for RLE popliteal artery occlusion   -heme/onc and vascular surgery both saw patient   -patient failed management with xarelto at home, transitioned to heparin gtt with plan for pradaxa  -now with acute stroke, hold heparin gtt overnight until post procedure imaging complete   -continue daily ASA and statin   -neurovascular checks     4/30: Heparin gtt held pending cranial imaging, plan to resume ASAP continue neuro vascular checks  5/1: Heparin gtt resumed and now therapeutic, transition to PO AC tomorrow

## 2023-05-02 NOTE — PROGRESS NOTES
[facility] - Neuro Critical Care  Neurocritical Care  Progress Note    Admit Date: 4/27/2023  Service Date: 05/02/2023  Length of Stay: 5    Subjective:     Chief Complaint: Cerebrovascular accident (CVA) due to embolism of left middle cerebral artery    History of Present Illness: Pt is a 62 y.o. female with PMHx of HTN, DM2, HLD, prior stroke, PE (on xarelto), CAD HFrEF, AICD who presents to [facility] as an acute stroke code from the hospital floor. Patient was admitted to hospital medicine on 4/27 for an acute arterial occlusion of her R popliteal artery and started on a high intensity heparin gtt. Vascular surgery and hematology saw the patient and recommended the heparin gtt with bridge to pradaxa after 5 days. This evening patient was noted to not be able to talk or move her right side. LKN 20:15. A stroke code was called and CTA revealed acute L M2 occlusion. She was transferred to [facility] and then taken for emergent thrombectomy TICI 2c. She is admitted to [facility] for higher level care and neurologic monitoring.       Hospital Course: 4/30/23: To mechanical thrombectomy overnight with TICI 2c clot evacuation, this morning remains expressively aphasic with some weakness on Rt, but RLE/RUE antigravity at times spontaneously.  Heparin remains held since procedure, pending ICD evaluation to assess ability to obtain urgent MRI for characterization of stroke burden prior to resumption of anticoagulation.  Aspirin ordered given lack of hemorrhage on post-thrombectomy CT.   05/01/2023: Heparin gtt therapeutic, CTH  05/02/2023: CTH overnight stable, diet advanced to mechanical soft, begin dabigatran, TTF under stroke service.       Interval History: CTH overnight stable, diet advanced to mechanical soft, begin dabigatran. All home heart failure meds restarted, BP tolerating. Exam continues to improve. TTF under stroke service    Review of Systems: Review of Systems   Constitutional: Negative for chills and fever.   HENT: Negative.     Eyes: Negative for blurred vision and double vision.   Respiratory: Negative for cough and shortness of breath.    Cardiovascular: Negative for chest pain and palpitations.   Gastrointestinal: Negative for abdominal pain, nausea and vomiting.   Genitourinary: Negative.    Musculoskeletal: Negative.    Neurological: Positive for sensory change, speech change, focal weakness and weakness. Negative for headaches.   Endo/Heme/Allergies: Negative.    Psychiatric/Behavioral: Negative.           Vitals:   Temp: 97.9 °F (36.6 °C)  Pulse: 83  Rhythm: paced rhythm  BP: 124/66  MAP (mmHg): 90  Resp: 16  SpO2: 99 %    Temp  Min: 97.2 °F (36.2 °C)  Max: 98.6 °F (37 °C)  Pulse  Min: 59  Max: 107  BP  Min: 104/56  Max: 140/95  MAP (mmHg)  Min: 76  Max: 109  Resp  Min: 12  Max: 21  SpO2  Min: 96 %  Max: 100 %    05/01 0701 - 05/02 0700  In: 739.9 [I.V.:215.2]  Out: 950 [Urine:950]   Unmeasured Output  Urine Occurrence: 2  Stool Occurrence: 0  Pad Count: 1     Examination:   Constitutional: Well-nourished and -developed. No apparent distress.   Eyes: Conjunctiva clear, anicteric. Lids no lesions.  Head/Ears/Nose/Mouth/Throat/Neck: Moist mucous membranes. External ears, nose atraumatic.   Cardiovascular: Regular rhythm. DP pulses now palpable in both feet.   Respiratory: Comfortable respirations. Clear to auscultation.  Gastrointestinal: Soft, nondistended, nontender. + bowel sounds.    Neurologic:  -GCS [id number]  -Alert. Oriented to person. Word finding difficulty. Speech dysarthirc. Follows commands consistently and participates in con.  -Cranial nerves: R facial droop and weakness, right sided neglect, eyes do cross midline to left side when prompted. PERRL.   -Motor right sided weakness, no volitional movement in right upper extremity, right lower extremity with some volitional antigravity movement. Left side with full strength   -Sensation diminished to right side.       Medications:   Continuous Scheduledaspirin, 81 mg,  Daily  aspirin, 300 mg, Once  atorvastatin, 80 mg, Daily  dabigatran etexilate, 150 mg, BID  metoprolol tartrate, 50 mg, BID  mupirocin, , BID  sacubitriL-valsartan, 1 tablet, BID  senna-docusate 8.6-50 mg, 1 tablet, BID  spironolactone, 25 mg, Daily  torsemide, 10 mg, Daily    PRNacetaminophen, 650 mg, Q4H PRN  albuterol-ipratropium, 3 mL, Q4H PRN  calcium gluconate IVPB, 1 g, PRN  calcium gluconate IVPB, 2 g, PRN  calcium gluconate IVPB, 3 g, PRN  dextrose 10%, 12.5 g, PRN  dextrose 10%, 12.5 g, PRN  dextrose 10%, 25 g, PRN  dextrose 10%, 25 g, PRN  dextrose, 15 g, PRN  dextrose, 30 g, PRN  glucagon (human recombinant), 1 mg, PRN  insulin aspart U-100, 1-10 Units, QID (AC + HS) PRN  labetalol, 10 mg, Q6H PRN  magnesium sulfate IVPB, 2 g, PRN  magnesium sulfate IVPB, 4 g, PRN  ondansetron, 4 mg, Q8H PRN  potassium chloride, 40 mEq, PRN   And  potassium chloride, 60 mEq, PRN   And  potassium chloride, 80 mEq, PRN  senna-docusate 8.6-50 mg, 2 tablet, BID PRN  sodium chloride 0.9%, 10 mL, Q12H PRN  sodium chloride 0.9%, 10 mL, PRN  sodium phosphate IVPB, 15 mmol, PRN  sodium phosphate IVPB, 20.01 mmol, PRN  sodium phosphate IVPB, 30 mmol, PRN       Today I independently reviewed pertinent medications, lines/drains/airways, imaging, laboratory results, notably:     ISTAT: No results for input(s): PH, PCO2, PO2, POCSATURATED, HCO3, BE, POCNA, POCK, POCTCO2, POCGLU, POCICA, POCLAC, SAMPLE in the last 24 hours.   Chem: Recent Labs   Lab 05/02/23  0043      K 3.9      CO2 24   *   BUN 8   CREATININE 0.8   CALCIUM 9.1   MG 2.3   PHOS 4.4   ANIONGAP 9   PROT 6.0   ALBUMIN 2.8*   BILITOT 0.6   ALKPHOS 129   AST 30   ALT 20     Heme:   Recent Labs   Lab 05/02/23  0043   WBC 9.05   HGB 11.1*   HCT 34.4*        Endo:   Recent Labs   Lab 05/01/23  1632 05/01/23  2205 05/02/23  0804   POCTGLUCOSE 132* 136* 128*        Assessment/Plan:     Neuro  * Cerebrovascular accident (CVA) due to embolism of left  middle cerebral artery  Acute LMCA stroke with LM2 LVO, no TNK given already anticoagulated for critical limb ischemia, taken for thrombectomy with TICI 2c reperfusion on 4/29/23    -Heparin gtt restarted 4/30 PM, therapeutic on 5/1 with CTH stable without hemorrhagic conversion.    - Transition to dabigatran per hematology recommendations today as patient now able to swallow whole pills.       Antithrombotics for secondary stroke prevention: Antiplatelets: Aspirin: 81 mg daily + dabigatran      Statins for secondary stroke prevention and hyperlipidemia, if present:   Statins: Atorvastatin- 80 mg daily    Aggressive risk factor modification: HTN, DM, Diet, Exercise, Obesity, CAD     Rehab efforts: The patient has been evaluated by a stroke team provider and the therapy needs have been fully considered based off the presenting complaints and exam findings. The following therapy evaluations are active: PT evaluate and treat, OT evaluate and treat, SLP evaluate and treat, PM&R evaluate for appropriate placement      VTE prophylaxis: None: Reason for No Pharmacological VTE Prophylaxis: Currently on anticoagulation    BP parameters: Infarct: Post sucessful thrombectomy, SBP <160        Cytotoxic brain edema  See stroke     Pulmonary  Pulmonary hypertension due to left heart disease  SBP goal <160 post successful thrombectomy   -restarted home heart failure meds      Cardiac/Vascular  Critical lower limb ischemia  Admission to the hospital for RLE popliteal artery occlusion   -heme/onc and vascular surgery both saw patient   -patient failed management with xarelto at home, transitioned to heparin gtt --> pradaxa  -continue daily ASA and statin   -neurovascular checks     4/30: Heparin gtt held pending cranial imaging, plan to resume ASAP continue neuro vascular checks  5/1: Heparin gtt resumed and now therapeutic, transition to PO AC tomorrow   5/2: Pradaxa started as patient now advanced to being able to swallow whole  pills     ICD (implantable cardioverter-defibrillator) in place  CHF with AICD in place since 2021  -continuous cardiac monitoring     4/30: Rep to investigate/interrogate to determine ability to obtain rapid MRI    Coronary artery disease involving native heart  Daily ASA resumed     Chronic combined systolic and diastolic congestive heart failure  Patient is identified as having Combined Systolic and Diastolic heart failure that is Chronic. CHF is currently controlled. Latest ECHO performed and demonstrates- Results for orders placed during the hospital encounter of 04/27/23    Echo    Interpretation Summary  · The left ventricle is severely enlarged with eccentric hypertrophy and severely decreased systolic function.  · The estimated ejection fraction is 10-15%.  · There is left ventricular global hypokinesis.  · Grade II left ventricular diastolic dysfunction.  · Normal right ventricular size with normal right ventricular systolic function.  · Mild tricuspid regurgitation.  · The estimated PA systolic pressure is 35 mmHg.  · Normal central venous pressure (3 mmHg).  · Severe left atrial enlargement.  Home beta blocker, entresto, spironilactone and torsemide restarted today, and monitor clinical status closely. Monitor on telemetry. Patient is on CHF pathway.  Monitor strict Is&Os and daily weights.  Continue to stress to patient importance of self efficacy and  on diet for CHF.     Last BNP reviewed- and noted below     Recent Labs   Lab 04/29/23  0533   *   .      Dilated cardiomyopathy  History of severe decreased systolic function EF 10-15% followed with cardiology. Not currently in acute failure. Likely contributor to arterial occlusive disease  - Home metoprolol, entresto, spironolactone, and torsemide restarted     Hyperlipemia  Lipid panel   -continue daily high dose statin     Hypertension  Baseline, maintain SBP <160 following thrombectomy  - Home antihypertensives on board      Hematology  Hypercoagulopathy  Presumed/possible  - Hematology following     Endocrine  Type 2 diabetes mellitus without complication, without long-term current use of insulin  SSI protocol   -stroke risk factor   -A1c 7.5%    Other  Right sided weakness  2/2 to acute LMCA stroke   -PT/OT          The patient is being Prophylaxed for:  Venous Thromboembolism with: Mechanical or Chemical  Stress Ulcer with: None  Ventilator Pneumonia with: not applicable    Activity Orders          Diet Dysphagia Mechanical Soft (IDDSI Level 5) Thin: Dysphagia 2 (Mechanical Soft Ground) starting at 05/02 0815    Turn patient starting at 04/29 2200    Elevate HOB starting at 04/29 2148    Progressive Mobility Protocol (mobilize patient to their highest level of functioning at least twice daily) starting at 04/28 0800        Full Code    Carolann Guidry PA-C  Neurocritical Care  [facility] - Neuro Critical Care

## 2023-05-02 NOTE — PLAN OF CARE
[facility] Care Plan    POC reviewed with Diomedes Mohr and family at 0300. Pt verbalized understanding. Questions and concerns addressed. No acute events overnight. Pt progressing toward goals. Will continue to monitor. See below and flowsheets for full assessment and VS info.     -CT scan completed  -Pt more verbal/less aphasic  -Increased spontaneous movement of R extremities       Is this a stroke patient? yes- Stroke booklet reviewed with patient and family, risk factors identified for patient and stroke booklet remains at bedside for ongoing education.     Neuro:  [place] Coma Scale  Best Eye Response: 4-->(E4) spontaneous  Best Motor Response: 6-->(M6) obeys commands  Best Verbal Response: 5-->(V5) oriented  [place] Coma Scale Score: 15  Assessment Qualifiers: patient not sedated/intubated  Pupil PERRLA: yes     24hr Temp:  [98 °F (36.7 °C)-98.6 °F (37 °C)]     CV:   Rhythm: normal sinus rhythm  BP goals:   SBP < 160  MAP > 65    Resp:           Plan: N/A    GI/:     Diet/Nutrition Received: mechanical/dental soft  Last Bowel Movement: 04/27/23  Voiding Characteristics: due to void    Intake/Output Summary (Last 24 hours) at 5/2/2023 0453  Last data filed at 5/1/2023 2301  Gross per 24 hour   Intake 739.94 ml   Output 600 ml   Net 139.94 ml     Unmeasured Output  Urine Occurrence: 1  Stool Occurrence: 0  Pad Count: 1    Labs/Accuchecks:  Recent Labs   Lab 05/02/23 0043   WBC 9.05   RBC 3.80*   HGB 11.1*   HCT 34.4*         Recent Labs   Lab 05/02/23  0043      K 3.9   CO2 24      BUN 8   CREATININE 0.8   ALKPHOS 129   ALT 20   AST 30   BILITOT 0.6      Recent Labs   Lab 04/30/23  1814 05/01/23  0107 05/02/23  0043   INR 1.1  --   --    APTT 25.7   < > 43.0*    < > = values in this interval not displayed.    No results for input(s): CPK, CPKMB, TROPONINI, MB in the last 168 hours.    Electrolytes: Electrolytes replaced  Accuchecks: ACHS    Gtts:   heparin (porcine) in D5W 14 Units/kg/hr  (05/01/23 1951)       LDA/Wounds:  Lines/Drains/Airways       Drain  Duration             Female External Urinary Catheter 05/01/23 0705 <1 day              Peripheral Intravenous Line  Duration                  Peripheral IV - Single Lumen 04/29/23 18 G Posterior;Right Hand 3 days         Peripheral IV - Single Lumen 05/01/23 1040 20 G Right Antecubital <1 day                  Wounds: No  Wound care consulted: No

## 2023-05-03 LAB
ALBUMIN SERPL BCP-MCNC: 2.6 G/DL (ref 3.5–5.2)
ALP SERPL-CCNC: 123 U/L (ref 55–135)
ALT SERPL W/O P-5'-P-CCNC: 20 U/L (ref 10–44)
ANION GAP SERPL CALC-SCNC: 8 MMOL/L (ref 8–16)
AST SERPL-CCNC: 25 U/L (ref 10–40)
BASOPHILS # BLD AUTO: 0.03 K/UL (ref 0–0.2)
BASOPHILS NFR BLD: 0.4 % (ref 0–1.9)
BILIRUB SERPL-MCNC: 0.5 MG/DL (ref 0.1–1)
BUN SERPL-MCNC: 8 MG/DL (ref 8–23)
CALCIUM SERPL-MCNC: 8.3 MG/DL (ref 8.7–10.5)
CHLORIDE SERPL-SCNC: 106 MMOL/L (ref 95–110)
CO2 SERPL-SCNC: 24 MMOL/L (ref 23–29)
CREAT SERPL-MCNC: 0.7 MG/DL (ref 0.5–1.4)
DIFFERENTIAL METHOD: ABNORMAL
EOSINOPHIL # BLD AUTO: 0.2 K/UL (ref 0–0.5)
EOSINOPHIL NFR BLD: 2.2 % (ref 0–8)
ERYTHROCYTE [DISTWIDTH] IN BLOOD BY AUTOMATED COUNT: 14.6 % (ref 11.5–14.5)
EST. GFR  (NO RACE VARIABLE): >60 ML/MIN/1.73 M^2
GLUCOSE SERPL-MCNC: 137 MG/DL (ref 70–110)
HCT VFR BLD AUTO: 35.2 % (ref 37–48.5)
HGB BLD-MCNC: 11.5 G/DL (ref 12–16)
IMM GRANULOCYTES # BLD AUTO: 0.01 K/UL (ref 0–0.04)
IMM GRANULOCYTES NFR BLD AUTO: 0.1 % (ref 0–0.5)
LYMPHOCYTES # BLD AUTO: 2.3 K/UL (ref 1–4.8)
LYMPHOCYTES NFR BLD: 28.8 % (ref 18–48)
MAGNESIUM SERPL-MCNC: 1.8 MG/DL (ref 1.6–2.6)
MCH RBC QN AUTO: 29.2 PG (ref 27–31)
MCHC RBC AUTO-ENTMCNC: 32.7 G/DL (ref 32–36)
MCV RBC AUTO: 89 FL (ref 82–98)
MONOCYTES # BLD AUTO: 0.5 K/UL (ref 0.3–1)
MONOCYTES NFR BLD: 6.7 % (ref 4–15)
NEUTROPHILS # BLD AUTO: 5 K/UL (ref 1.8–7.7)
NEUTROPHILS NFR BLD: 61.8 % (ref 38–73)
NRBC BLD-RTO: 0 /100 WBC
PHOSPHATE SERPL-MCNC: 3.9 MG/DL (ref 2.7–4.5)
PLATELET # BLD AUTO: 235 K/UL (ref 150–450)
PMV BLD AUTO: 10.7 FL (ref 9.2–12.9)
POCT GLUCOSE: 126 MG/DL (ref 70–110)
POCT GLUCOSE: 141 MG/DL (ref 70–110)
POCT GLUCOSE: 152 MG/DL (ref 70–110)
POCT GLUCOSE: 156 MG/DL (ref 70–110)
POTASSIUM SERPL-SCNC: 3.4 MMOL/L (ref 3.5–5.1)
PROT SERPL-MCNC: 5.7 G/DL (ref 6–8.4)
RBC # BLD AUTO: 3.94 M/UL (ref 4–5.4)
SODIUM SERPL-SCNC: 138 MMOL/L (ref 136–145)
WBC # BLD AUTO: 8.08 K/UL (ref 3.9–12.7)

## 2023-05-03 PROCEDURE — 99233 SBSQ HOSP IP/OBS HIGH 50: CPT | Mod: FS,,, | Performed by: NURSE PRACTITIONER

## 2023-05-03 PROCEDURE — 99233 PR SUBSEQUENT HOSPITAL CARE,LEVL III: ICD-10-PCS | Mod: GC,,, | Performed by: PSYCHIATRY & NEUROLOGY

## 2023-05-03 PROCEDURE — 25000003 PHARM REV CODE 250: Performed by: NURSE PRACTITIONER

## 2023-05-03 PROCEDURE — 99233 PR SUBSEQUENT HOSPITAL CARE,LEVL III: ICD-10-PCS | Mod: FS,,, | Performed by: NURSE PRACTITIONER

## 2023-05-03 PROCEDURE — 25000003 PHARM REV CODE 250: Performed by: PSYCHIATRY & NEUROLOGY

## 2023-05-03 PROCEDURE — 25000003 PHARM REV CODE 250: Performed by: PHYSICIAN ASSISTANT

## 2023-05-03 PROCEDURE — 92526 ORAL FUNCTION THERAPY: CPT

## 2023-05-03 PROCEDURE — 97530 THERAPEUTIC ACTIVITIES: CPT

## 2023-05-03 PROCEDURE — 80053 COMPREHEN METABOLIC PANEL: CPT | Performed by: PHYSICIAN ASSISTANT

## 2023-05-03 PROCEDURE — 63600175 PHARM REV CODE 636 W HCPCS: Performed by: PHYSICIAN ASSISTANT

## 2023-05-03 PROCEDURE — 63600175 PHARM REV CODE 636 W HCPCS: Performed by: HOSPITALIST

## 2023-05-03 PROCEDURE — 11000001 HC ACUTE MED/SURG PRIVATE ROOM

## 2023-05-03 PROCEDURE — 83735 ASSAY OF MAGNESIUM: CPT

## 2023-05-03 PROCEDURE — 25000003 PHARM REV CODE 250: Performed by: HOSPITALIST

## 2023-05-03 PROCEDURE — 85025 COMPLETE CBC W/AUTO DIFF WBC: CPT

## 2023-05-03 PROCEDURE — 94761 N-INVAS EAR/PLS OXIMETRY MLT: CPT

## 2023-05-03 PROCEDURE — 84100 ASSAY OF PHOSPHORUS: CPT

## 2023-05-03 PROCEDURE — 97535 SELF CARE MNGMENT TRAINING: CPT

## 2023-05-03 PROCEDURE — 92507 TX SP LANG VOICE COMM INDIV: CPT

## 2023-05-03 PROCEDURE — 97112 NEUROMUSCULAR REEDUCATION: CPT

## 2023-05-03 PROCEDURE — 99233 SBSQ HOSP IP/OBS HIGH 50: CPT | Mod: GC,,, | Performed by: PSYCHIATRY & NEUROLOGY

## 2023-05-03 PROCEDURE — 51798 US URINE CAPACITY MEASURE: CPT

## 2023-05-03 RX ORDER — POLYETHYLENE GLYCOL 3350 17 G/17G
17 POWDER, FOR SOLUTION ORAL DAILY
Status: DISCONTINUED | OUTPATIENT
Start: 2023-05-03 | End: 2023-05-12 | Stop reason: HOSPADM

## 2023-05-03 RX ADMIN — METOPROLOL TARTRATE 50 MG: 50 TABLET, FILM COATED ORAL at 09:05

## 2023-05-03 RX ADMIN — POTASSIUM CHLORIDE 10 MEQ: 7.46 INJECTION, SOLUTION INTRAVENOUS at 05:05

## 2023-05-03 RX ADMIN — ASPIRIN 81 MG: 81 TABLET, COATED ORAL at 09:05

## 2023-05-03 RX ADMIN — POLYETHYLENE GLYCOL 3350 17 G: 17 POWDER, FOR SOLUTION ORAL at 09:05

## 2023-05-03 RX ADMIN — POTASSIUM CHLORIDE 10 MEQ: 7.46 INJECTION, SOLUTION INTRAVENOUS at 03:05

## 2023-05-03 RX ADMIN — POTASSIUM CHLORIDE 10 MEQ: 7.46 INJECTION, SOLUTION INTRAVENOUS at 06:05

## 2023-05-03 RX ADMIN — POTASSIUM CHLORIDE 10 MEQ: 7.46 INJECTION, SOLUTION INTRAVENOUS at 04:05

## 2023-05-03 RX ADMIN — SENNOSIDES AND DOCUSATE SODIUM 1 TABLET: 8.6; 5 TABLET ORAL at 08:05

## 2023-05-03 RX ADMIN — INSULIN ASPART 1 UNITS: 100 INJECTION, SOLUTION INTRAVENOUS; SUBCUTANEOUS at 09:05

## 2023-05-03 RX ADMIN — ATORVASTATIN CALCIUM 80 MG: 40 TABLET, FILM COATED ORAL at 08:05

## 2023-05-03 RX ADMIN — METOPROLOL TARTRATE 50 MG: 50 TABLET, FILM COATED ORAL at 08:05

## 2023-05-03 RX ADMIN — SACUBITRIL AND VALSARTAN 1 TABLET: 97; 103 TABLET, FILM COATED ORAL at 08:05

## 2023-05-03 RX ADMIN — SACUBITRIL AND VALSARTAN 1 TABLET: 97; 103 TABLET, FILM COATED ORAL at 09:05

## 2023-05-03 RX ADMIN — DABIGATRAN ETEXILATE MESYLATE 150 MG: 150 CAPSULE ORAL at 08:05

## 2023-05-03 RX ADMIN — SENNOSIDES AND DOCUSATE SODIUM 1 TABLET: 8.6; 5 TABLET ORAL at 09:05

## 2023-05-03 RX ADMIN — ONDANSETRON 4 MG: 2 INJECTION INTRAMUSCULAR; INTRAVENOUS at 10:05

## 2023-05-03 RX ADMIN — INSULIN ASPART 2 UNITS: 100 INJECTION, SOLUTION INTRAVENOUS; SUBCUTANEOUS at 07:05

## 2023-05-03 RX ADMIN — MUPIROCIN: 20 OINTMENT TOPICAL at 09:05

## 2023-05-03 RX ADMIN — TORSEMIDE 10 MG: 10 TABLET ORAL at 08:05

## 2023-05-03 RX ADMIN — SPIRONOLACTONE 25 MG: 25 TABLET, FILM COATED ORAL at 08:05

## 2023-05-03 NOTE — PLAN OF CARE
SW received call from Miladis with [facility] rehab ([phone]) regarding this Pt. She reported Pt is a good candidate but they are not able to access all her clinicals via [employer]. Resent referral via [employer]. She reported they will review and go ahead and submit to [facility]. She will call once that is completed.    HERMELINDA received call from Regi with [facility] reporting they spoke with insurance and their NPI is actually not in network. Further they were informed the only in network is [facility].     HERMELINDA met with Pt and Pt  at bedside to discuss. Pt  advised [facility] was their next choice, they are agreeable.    Sent referral to [facility] and contacted Danielle with [facility] ([phone]) to provide update. She is on her way to [facility] and will visit the Pt on step down. If they can approve, she will submit.     Claudine Moise, NOELLE  Neurocritical Care   [facility]  [id number]

## 2023-05-03 NOTE — PLAN OF CARE
Problem: Adult Inpatient Plan of Care  Goal: Plan of Care Review  Outcome: Ongoing, Progressing     Problem: Bowel Elimination Impaired (Stroke, Ischemic/Transient Ischemic Attack)  Goal: Effective Bowel Elimination  Outcome: Ongoing, Progressing     Problem: Communication Impairment (Stroke, Ischemic/Transient Ischemic Attack)  Goal: Improved Communication Skills  Outcome: Ongoing, Progressing   POC and medications were reviewed with patient and family at bedside. Pt intake has decreased due to the diet ordered by speech. Encouraged patient to increase her oral intake to decrease risk of impaired skin integrity. Pt is able to communicate needs allowing extra time for communication.

## 2023-05-03 NOTE — PROGRESS NOTES
Dmitriy Triana - Neurosurgery ([facility])  Vascular Neurology  Comprehensive Stroke Center  Progress Note    Assessment/Plan:     * Cerebrovascular accident (CVA) due to embolism of left middle cerebral artery  62 y.o. female with PMH of HTN, DM2, HLD, tobacco use (quit in 2020), stroke (2020, R MCA, no deficits), PE (December 2021, on xarelto),  CAD, DCM, HFrEF (15%) s/p AICD, Pulmonary hypertension admited to  4/27 for the treatment of a right leg DVT on heparin gtt. Stroke code activated 4/29 for aphasia and R hemiplegia, LKN 20:15 (~5 min prior). NIHSS 24, L MCA syndrome. Not TNK candidate due to AC with heparin gtt and therapeutic aPTT. CTA with L M2 occlusion. Taken to IR for possible intervention, now s/p L M2 thrombectomy with TICI 2c reperfusion. Repeat CTH post-IR with no acute infarct or hemorrhage, heparin gtt restarted. MRI brain W/WO with acute L MCA territory infarcts (insula, frontotemporal opercular cortex, patchy frontal white matter, small temporoparietal cortex), as well as small acute R cerebellar infarct. Echo with EF 10-15%, global LV hypokinesis, severe LAE. Suspect etiology likely cardioembolic due to EF 15%.    5/3: Stepped down to NPU this morning, NAEO. Neuro exam stable, speech improving but continues to have significant RSW. Dispo recs for IPR.       Antithrombotics for secondary stroke prevention: Anticoagulants: Pradaxa 150mg BID, ASA 81mg daily    Statins for secondary stroke prevention and HLD, if present: Statins: Atorvastatin- 80 mg daily    Aggressive risk factor modification: HTN, DM, HLD, HF, DVT    Rehab efforts: The patient has been evaluated by a stroke team provider and the therapy needs have been fully considered based off the presenting complaints and exam findings. The following therapy evaluations are needed: PT evaluate and treat, OT evaluate and treat, SLP evaluate and treat, PM&R evaluate for appropriate placement    Diagnostics ordered/pending: CTH PRN for acute neuro  exam changes    VTE prophylaxis: None: Reason for No Pharmacological VTE Prophylaxis: Currently on anticoagulation    BP parameters: Infarct:  SBP <160    Right sided weakness  Due to stroke  -PT/OT eval and treat  -Dispo recs for IPR    Critical lower limb ischemia  Stroke risk factor  Admitted for R popliteal artery occlusion, was started on heparin gtt  Heparin gtt restarted post-IR, per heme/onc recs transition to pradaxa today (5/2)    Chronic combined systolic and diastolic congestive heart failure  Stroke risk factor  -Echo with EF 10-15%, global LV hypokinesis, severe LAE  -Continue GDMT    Hyperlipemia  Stroke risk factor  -LDL 88, goal <70  -Atorvastatin 80mg daily    Hypertension  Stroke Risk factor  -SBP <160 s/p thrombectomy  -PRN hydralazine/labetalol    Type 2 diabetes mellitus without complication, without long-term current use of insulin  Stroke risk factor  -A1c 7.5  -BG goal while inpatient 140-180  -MC SSI PRN         4/30-S/p TICI 2c. Repeat CTH after IR with no acute infarct. Will need MRI but has a pacemaker that will need be interrogated. Improving r arm weakness and  aphasia.  5/1/2023- MRI completed showing acute L. MCA infarct. Pacemaker reinitiated post MRI to normal settings. Plan to start Pradaxa tomorrow per Heme/Onc. Neuro exam stable.  05/02/2023 NAEO, neuro exam stable. CTH overnight stable. Heparin gtt transitioned to Pradaxa today for AC of reduced EF and VTE history. Speech cleared for minced/moist diet. Stable for step down.  05/03/2023 Stepped down to NPU this morning, NAEO. Neuro exam stable, speech improving but continues to have significant RSW. Dispo recs for IPR.        STROKE DOCUMENTATION   Acute Stroke Times   Last Known Normal Date: 04/29/23  Last Known Normal Time: 2015  Symptom Onset Date: 04/29/23  Symptom Onset Time: 2015  Stroke Team Called Date: 04/29/23  Stroke Team Called Time: 2022  Stroke Team Arrival Date: 04/29/23  Stroke Team Arrival Time: 2025  CT  Interpretation Time: 2038  Thrombolytic Therapy Recommended: No  CTA Interpretation Time: 2045  Thrombectomy Recommended: Yes  Decision to Treat Time for IR: 2045    NIH Scale:  1a. Level of Consciousness: 0-->Alert, keenly responsive  1b. LOC Questions: 0-->Answers both questions correctly  1c. LOC Commands: 0-->Performs both tasks correctly  2. Best Gaze: 1-->Partial gaze palsy, gaze is abnormal in one or both eyes, but forced deviation or total gaze paresis is not present  3. Visual: 1-->Partial hemianopia  4. Facial Palsy: 2-->Partial paralysis (total or near-total paralysis of lower face)  5a. Motor Arm, Left: 0-->No drift, limb holds 90 (or 45) degrees for full 10 secs  5b. Motor Arm, Right: 3-->No effort against gravity, limb falls  6a. Motor Leg, Left: 0-->No drift, leg holds 30 degree position for full 5 secs  6b. Motor Leg, Right: 2-->Some effort against gravity, leg falls to bed by 5 secs, but has some effort against gravity  7. Limb Ataxia: 0-->Absent  8. Sensory: 1-->Mild-to-moderate sensory loss, patient feels pinprick is less sharp or is dull on the affected side, or there is a loss of superficial pain with pinprick, but patient is aware of being touched  9. Best Language: 1-->Mild-to-moderate aphasia, some obvious loss of fluency or facility of comprehension, without significant limitation on ideas expressed or form of expression. Reduction of speech and/or comprehension, however, makes conversation. . . (see row details)  10. Dysarthria: 1-->Mild-to-moderate dysarthria, patient slurs at least some words and, at worst, can be understood with some difficulty  11. Extinction and Inattention (formerly Neglect): 1-->Visual, tactile, auditory, spatial, or personal inattention or extinction to bilateral simultaneous stimulation in one of the sensory modalities  Total (NIH Stroke Scale): 13       Modified Becky Score: 0  Coleman Coma Scale:    ABCD2 Score:    [id number] Score:   HAS -BLED Score:   ICH  Score:   Frye & Vora Classification:      Hemorrhagic change of an Ischemic Stroke: Does this patient have an ischemic stroke with hemorrhagic changes? No     Neurologic Chief Complaint: L MCA stroke    Subjective:     Interval History: Patient is seen for follow-up neurological assessment and treatment recommendations:   Stepped down to NPU this morning, BOYD. Neuro exam stable, speech improving but continues to have significant RSW. Dispo recs for IPR.    HPI, Past Medical, Family, and Social History remains the same as documented in the initial encounter.     Review of Systems   Unable to perform ROS: Other (severe aphasia)   Eyes:  Positive for visual disturbance.   Neurological:  Positive for facial asymmetry, speech difficulty (improving) and weakness.   Scheduled Meds:   aspirin  81 mg Oral Daily    aspirin  300 mg Rectal Once    atorvastatin  80 mg Oral Daily    dabigatran etexilate  150 mg Oral BID    metoprolol tartrate  50 mg Oral BID    mupirocin   Nasal BID    polyethylene glycol  17 g Oral Daily    sacubitriL-valsartan  1 tablet Oral BID    senna-docusate 8.6-50 mg  1 tablet Oral BID    spironolactone  25 mg Oral Daily    torsemide  10 mg Oral Daily     Continuous Infusions:    PRN Meds:acetaminophen, albuterol-ipratropium, calcium gluconate IVPB, calcium gluconate IVPB, calcium gluconate IVPB, dextrose 10%, dextrose 10%, dextrose 10%, dextrose 10%, dextrose, dextrose, glucagon (human recombinant), insulin aspart U-100, labetalol, magnesium sulfate IVPB, magnesium sulfate IVPB, ondansetron, potassium chloride **AND** potassium chloride **AND** potassium chloride, senna-docusate 8.6-50 mg, sodium chloride 0.9%, sodium chloride 0.9%, sodium phosphate IVPB, sodium phosphate IVPB, sodium phosphate IVPB    Objective:     Vital Signs (Most Recent):  Temp: 98.2 °F (36.8 °C) (05/03/23 1214)  Pulse: 68 (05/03/23 1214)  Resp: 18 (05/03/23 1214)  BP: (!) 110/59 (05/03/23 1214)  SpO2: 100 % (05/03/23  1214)  BP Location: Left arm    Vital Signs Range (Last 24H):  Temp:  [98.1 °F (36.7 °C)-98.6 °F (37 °C)]   Pulse:  [60-91]   Resp:  [12-27]   BP: (104-140)/(55-79)   SpO2:  [95 %-100 %]   BP Location: Left arm    Physical Exam  Vitals and nursing note reviewed.   Constitutional:       General: She is not in acute distress.  HENT:      Head: Normocephalic.      Nose: Nose normal.      Mouth/Throat:      Mouth: Mucous membranes are moist.   Eyes:      General: Visual field deficit (R hemianopsia) present.      Conjunctiva/sclera: Conjunctivae normal.      Comments: L gaze preference   Cardiovascular:      Rate and Rhythm: Normal rate.   Pulmonary:      Effort: Pulmonary effort is normal.   Abdominal:      General: Abdomen is flat.   Skin:     General: Skin is warm and dry.   Neurological:      Mental Status: She is alert.      Cranial Nerves: Dysarthria and facial asymmetry present.      Sensory: Sensory deficit present.      Motor: Weakness present.   Psychiatric:         Mood and Affect: Mood normal.       Neurological Exam:   LOC: alert  Attention Span: Good   Language: expressive aphasia  Articulation: dysarthria  Orientation: Limited due to severe aphasia, oriented tos elf  Visual Fields: Hemianopsia right  EOM (CN III, IV, VI): Gaze preference  left  Facial Movement (CN VII): Lower facial weakness on the Right  Motor: Arm left  Normal 5/5  Leg left  Normal 5/5  Arm right  Paresis 1+/5  Leg right Paresis 2+/5      Laboratory:  CMP:   Recent Labs   Lab 05/03/23  0103   CALCIUM 8.3*   ALBUMIN 2.6*   PROT 5.7*      K 3.4*   CO2 24      BUN 8   CREATININE 0.7   ALKPHOS 123   ALT 20   AST 25   BILITOT 0.5       CBC:   Recent Labs   Lab 05/03/23  0103   WBC 8.08   RBC 3.94*   HGB 11.5*   HCT 35.2*      MCV 89   MCH 29.2   MCHC 32.7       Lipid Panel:   Recent Labs   Lab 04/30/23  0845   CHOL 142   LDLCALC 88.6   HDL 38*   TRIG 77       Coagulation:   Recent Labs   Lab 04/30/23  1814 05/01/23  0107  05/02/23  0043   INR 1.1  --   --    APTT 25.7   < > 43.0*    < > = values in this interval not displayed.         Hgb A1C:   Recent Labs   Lab 04/30/23  0845   HGBA1C 7.5*       TSH:   Recent Labs   Lab 04/30/23  0845   TSH 2.092         Diagnostic Results     Brain Imaging   CTH without contrast 5/1/23  Acute left MCA distribution infarct appears stable in size and distribution compared to 04/30/2023 MRI.  No evidence of hemorrhagic conversion.   Acute small right cerebellar infarct is better demonstrated on MRI.   No acute intracranial hemorrhage or midline shift.     MRI Brain without contrast 4/30/23    Acute left MCA distribution infarct.  No significant mass effect or acute hemorrhage at this time.  Small acute infarct in the right cerebellum.  Chronic right MCA distribution infarct.     CTH 4/30/2023  No acute territorial infarct or intracranial hemorrhage identified.      Vessel Imaging   CTA Stroke MP 4/29/2023  CT head: No evidence for acute intracranial hemorrhage.  Stable area encephalomalacia from remote right MCA territory infarct.  CTA head: Occlusion of M2 segment of left MCA.  Asymmetric decreased opacification of the left transverse sinus and jugular vein.  Similar finding was present on the prior CTA in 2022 and therefore could be related to sluggish flow.  Suggest attention on follow-up conventional angiogram.  CTA neck: No high-grade stenosis or aneurysm.      Cardiac Imaging   Echo 4/28/2023   The left ventricle is severely enlarged with eccentric hypertrophy and severely decreased systolic function.   The estimated ejection fraction is 10-15%.   There is left ventricular global hypokinesis.   Grade II left ventricular diastolic dysfunction.   Normal right ventricular size with normal right ventricular systolic function.   Mild tricuspid regurgitation.   The estimated PA systolic pressure is 35 mmHg.   Normal central venous pressure (3 mmHg).   Severe left atrial  enlargement.      CHARLIE Kelley  Comprehensive Stroke Center  Department of Vascular Neurology   [facility] - Neurosurgery ([facility])

## 2023-05-03 NOTE — NURSING TRANSFER
Nursing Transfer Note      5/3/2023     Reason patient is being transferred: step down    Transfer From: [facility] [id number] to     Transfer via bed    Transfer with cardiac monitoring    Transported by RN    Medicines sent: Mupiricin    Any special needs or follow-up needed: Neuro checks    Chart send with patient: Yes    Notified: spouse    Patient reassessed at: bedside with NPU RN  Upon arrival to floor: cardiac monitor applied, patient oriented to room, call bell in reach, and bed in lowest position

## 2023-05-03 NOTE — ASSESSMENT & PLAN NOTE
History of severe decreased systolic function EF 10-15% followed with cardiology. Not currently in acute failure. Likely contributor to arterial occlusive disease  - metoprolol, entresto, spironolactone, and torsemide

## 2023-05-03 NOTE — ASSESSMENT & PLAN NOTE
On chronic xarelto for HFrEF and history of PE, but failed therapy as patient presented with RLE arterial occlusion  -heparin gtt bridged to pradaxa as well as ASA on 5/2/23  -continue daily ASA

## 2023-05-03 NOTE — ASSESSMENT & PLAN NOTE
62 y.o. female with PMH of HTN, DM2, HLD, tobacco use (quit in 2020), stroke (2020, R MCA, no deficits), PE (December 2021, on xarelto),  CAD, DCM, HFrEF (15%) s/p AICD, Pulmonary hypertension admited to  4/27 for the treatment of a right leg DVT on heparin gtt. Stroke code activated 4/29 for aphasia and R hemiplegia, LKN 20:15 (~5 min prior). NIHSS 24, L MCA syndrome. Not TNK candidate due to AC with heparin gtt and therapeutic aPTT. CTA with L M2 occlusion. Taken to IR for possible intervention, now s/p L M2 thrombectomy with TICI 2c reperfusion. Repeat CTH post-IR with no acute infarct or hemorrhage, heparin gtt restarted. MRI brain W/WO with acute L MCA territory infarcts (insula, frontotemporal opercular cortex, patchy frontal white matter, small temporoparietal cortex), as well as small acute R cerebellar infarct. Echo with EF 10-15%, global LV hypokinesis, severe LAE. Suspect etiology likely cardioembolic due to EF 15%.    5/3: Stepped down to NPU this morning, NAEO. Neuro exam stable, speech improving but continues to have significant RSW. Dispo recs for IPR.       Antithrombotics for secondary stroke prevention: Anticoagulants: Pradaxa 150mg BID, ASA 81mg daily    Statins for secondary stroke prevention and HLD, if present: Statins: Atorvastatin- 80 mg daily    Aggressive risk factor modification: HTN, DM, HLD, HF, DVT    Rehab efforts: The patient has been evaluated by a stroke team provider and the therapy needs have been fully considered based off the presenting complaints and exam findings. The following therapy evaluations are needed: PT evaluate and treat, OT evaluate and treat, SLP evaluate and treat, PM&R evaluate for appropriate placement    Diagnostics ordered/pending: CTH PRN for acute neuro exam changes    VTE prophylaxis: None: Reason for No Pharmacological VTE Prophylaxis: Currently on anticoagulation    BP parameters: Infarct:  SBP <160

## 2023-05-03 NOTE — PLAN OF CARE
[facility] Care Plan    POC reviewed with Diomedes Mohr at 0430. Pt verbalized understanding. Questions and concerns addressed. No acute events overnight. Pt progressing toward goals. Will continue to monitor. See below and flowsheets for full assessment and VS info.   - Awaiting bed to TTF          Is this a stroke patient? yes- Stroke booklet reviewed with patient, risk factors identified for patient and stroke booklet remains at bedside for ongoing education.     Neuro:  [place] Coma Scale  Best Eye Response: 4-->(E4) spontaneous  Best Motor Response: 6-->(M6) obeys commands  Best Verbal Response: 5-->(V5) oriented  Coleman Coma Scale Score: 15  Assessment Qualifiers: patient not sedated/intubated  Pupil PERRLA: yes     24hr Temp:  [97.2 °F (36.2 °C)-98.6 °F (37 °C)]     CV:   Rhythm: paced rhythm  BP goals:   SBP < 160  MAP > 65    Resp:           Plan: N/A    GI/:     Diet/Nutrition Received: mechanical/dental soft, consistent carb/diabetic diet  Last Bowel Movement: 04/27/23  Voiding Characteristics: external catheter    Intake/Output Summary (Last 24 hours) at 5/3/2023 0440  Last data filed at 5/3/2023 0405  Gross per 24 hour   Intake 1623.1 ml   Output 2200 ml   Net -576.9 ml     Unmeasured Output  Urine Occurrence: 1  Stool Occurrence: 0  Pad Count: 2    Labs/Accuchecks:  Recent Labs   Lab 05/03/23  0103   WBC 8.08   RBC 3.94*   HGB 11.5*   HCT 35.2*         Recent Labs   Lab 05/03/23  0103      K 3.4*   CO2 24      BUN 8   CREATININE 0.7   ALKPHOS 123   ALT 20   AST 25   BILITOT 0.5      Recent Labs   Lab 04/30/23  1814 05/01/23  0107 05/02/23  0043   INR 1.1  --   --    APTT 25.7   < > 43.0*    < > = values in this interval not displayed.    No results for input(s): CPK, CPKMB, TROPONINI, MB in the last 168 hours.    Electrolytes: Electrolytes replaced  Accuchecks: ACHS    Gtts:      LDA/Wounds:  Lines/Drains/Airways       Drain  Duration             Female External Urinary Catheter  05/01/23 0705 1 day              Peripheral Intravenous Line  Duration                  Peripheral IV - Single Lumen 04/29/23 18 G Posterior;Right Hand 4 days         Peripheral IV - Single Lumen 05/01/23 1040 20 G Right Antecubital 1 day                  Wounds: No; L groin site   Wound care consulted: No

## 2023-05-03 NOTE — ASSESSMENT & PLAN NOTE
Admission to the hospital for RLE popliteal artery occlusion   -heme/onc and vascular surgery both saw patient   -patient failed management with xarelto at home, transitioned to heparin gtt --> pradaxa  -continue daily ASA and statin   -neurovascular checks   4/30: Heparin gtt held pending cranial imaging, plan to resume ASAP continue neuro vascular checks  5/1: Heparin gtt resumed and now therapeutic, transition to PO AC tomorrow   5/2: Pradaxa started as patient now advanced to being able to swallow whole pills   5/3: CTH stable, Transfer to stroke service, Electrolyte replacement

## 2023-05-03 NOTE — SUBJECTIVE & OBJECTIVE
ROS  Constitutional: Negative for fever and malaise/fatigue.   Eyes: Negative for blurred vision, double vision and discharge.   Respiratory: Negative for cough, shortness of breath and wheezing.    Cardiovascular: Negative for chest pain, palpitations, claudication, leg swelling and PND.   Gastrointestinal: Negative for abdominal pain, constipation, diarrhea, nausea and vomiting.   Genitourinary: Negative for dysuria, frequency and urgency.   Musculoskeletal:  + generalized weakness. Negative for back pain and myalgias.   Skin: Negative for itching and rash.   Neurological: Negative for dizziness, speech change, seizures, and headaches.   Psychiatric/Behavioral: Negative for depression. The patient is not nervous/anxious.    Objective:     Vitals:  Temp: 98.6 °F (37 °C)  Pulse: 69  Rhythm: paced rhythm  BP: (!) 111/59  MAP (mmHg): 81  Resp: 20  SpO2: 97 %    Temp  Min: 97.9 °F (36.6 °C)  Max: 98.6 °F (37 °C)  Pulse  Min: 60  Max: 91  BP  Min: 104/55  Max: 140/79  MAP (mmHg)  Min: 74  Max: 105  Resp  Min: 12  Max: 27  SpO2  Min: 95 %  Max: 100 %    05/02 0701 - 05/03 0700  In: 1812.9 [P.O.:1044; I.V.:226.1]  Out: 2250 [Urine:2250]   Unmeasured Output  Urine Occurrence: 1  Stool Occurrence: 0  Pad Count: 2       Physical Exam  Vitals and nursing note reviewed.   Constitutional:       General: She is not in acute distress.  HENT:      Head: Normocephalic and atraumatic.      Mouth/Throat:      Mouth: Mucous membranes are moist.   Eyes:      Extraocular Movements: Extraocular movements intact.      Conjunctiva/sclera: Conjunctivae normal.      Pupils: Pupils are equal, round, and reactive to light.   Cardiovascular:      Rate and Rhythm: Normal rate and regular rhythm.      Pulses:           Dorsalis pedis pulses are detected w/ Doppler on the right side and 2+ on the left side.   Pulmonary:      Effort: Pulmonary effort is normal. No respiratory distress.   Abdominal:      General: Abdomen is flat. There is no  distension.      Palpations: Abdomen is soft.      Tenderness: There is no abdominal tenderness. There is no guarding.   Musculoskeletal:         General: No swelling or deformity.   Skin:     General: Skin is warm.   Neurological:   Alert, tracks examiner  --GCS:  E4 V5 M5  --Mental Status: Alert, expressive aphasia   --CN II-XII: PERRLA, R facial droop, L gaze preference, R hemianopia   --Motor: Rt side weakness, briefly antigravity with drift downwards to bed, L side no drift  --sensory: diminished to R side           Medications:  Continuous Scheduledaspirin, 81 mg, Daily  aspirin, 300 mg, Once  atorvastatin, 80 mg, Daily  dabigatran etexilate, 150 mg, BID  metoprolol tartrate, 50 mg, BID  mupirocin, , BID  polyethylene glycol, 17 g, Daily  sacubitriL-valsartan, 1 tablet, BID  senna-docusate 8.6-50 mg, 1 tablet, BID  spironolactone, 25 mg, Daily  torsemide, 10 mg, Daily    PRNacetaminophen, 650 mg, Q4H PRN  albuterol-ipratropium, 3 mL, Q4H PRN  calcium gluconate IVPB, 1 g, PRN  calcium gluconate IVPB, 2 g, PRN  calcium gluconate IVPB, 3 g, PRN  dextrose 10%, 12.5 g, PRN  dextrose 10%, 12.5 g, PRN  dextrose 10%, 25 g, PRN  dextrose 10%, 25 g, PRN  dextrose, 15 g, PRN  dextrose, 30 g, PRN  glucagon (human recombinant), 1 mg, PRN  insulin aspart U-100, 1-10 Units, QID (AC + HS) PRN  labetalol, 10 mg, Q6H PRN  magnesium sulfate IVPB, 2 g, PRN  magnesium sulfate IVPB, 4 g, PRN  ondansetron, 4 mg, Q8H PRN  potassium chloride, 40 mEq, PRN   And  potassium chloride, 60 mEq, PRN   And  potassium chloride, 80 mEq, PRN  senna-docusate 8.6-50 mg, 2 tablet, BID PRN  sodium chloride 0.9%, 10 mL, Q12H PRN  sodium chloride 0.9%, 10 mL, PRN  sodium phosphate IVPB, 15 mmol, PRN  sodium phosphate IVPB, 20.01 mmol, PRN  sodium phosphate IVPB, 30 mmol, PRN      Today I personally reviewed pertinent medications, lines/drains/airways, imaging, cardiology results, laboratory results, microbiology results,     Diet  Diet diabetic  [facility]; 2000 Calorie; Dysphagia Mechanical Soft (IDDSI Level 5)  Diet diabetic [facility]; 2000 Calorie; Dysphagia Mechanical Soft (IDDSI Level 5)

## 2023-05-03 NOTE — SUBJECTIVE & OBJECTIVE
Neurologic Chief Complaint: L MCA stroke    Subjective:     Interval History: Patient is seen for follow-up neurological assessment and treatment recommendations:   Stepped down to NPU this morning, SRINIVASANEO. Neuro exam stable, speech improving but continues to have significant RSW. Dispo recs for IPR.    HPI, Past Medical, Family, and Social History remains the same as documented in the initial encounter.     Review of Systems   Unable to perform ROS: Other (severe aphasia)   Eyes:  Positive for visual disturbance.   Neurological:  Positive for facial asymmetry, speech difficulty (improving) and weakness.   Scheduled Meds:   aspirin  81 mg Oral Daily    aspirin  300 mg Rectal Once    atorvastatin  80 mg Oral Daily    dabigatran etexilate  150 mg Oral BID    metoprolol tartrate  50 mg Oral BID    mupirocin   Nasal BID    polyethylene glycol  17 g Oral Daily    sacubitriL-valsartan  1 tablet Oral BID    senna-docusate 8.6-50 mg  1 tablet Oral BID    spironolactone  25 mg Oral Daily    torsemide  10 mg Oral Daily     Continuous Infusions:    PRN Meds:acetaminophen, albuterol-ipratropium, calcium gluconate IVPB, calcium gluconate IVPB, calcium gluconate IVPB, dextrose 10%, dextrose 10%, dextrose 10%, dextrose 10%, dextrose, dextrose, glucagon (human recombinant), insulin aspart U-100, labetalol, magnesium sulfate IVPB, magnesium sulfate IVPB, ondansetron, potassium chloride **AND** potassium chloride **AND** potassium chloride, senna-docusate 8.6-50 mg, sodium chloride 0.9%, sodium chloride 0.9%, sodium phosphate IVPB, sodium phosphate IVPB, sodium phosphate IVPB    Objective:     Vital Signs (Most Recent):  Temp: 98.2 °F (36.8 °C) (05/03/23 1214)  Pulse: 68 (05/03/23 1214)  Resp: 18 (05/03/23 1214)  BP: (!) 110/59 (05/03/23 1214)  SpO2: 100 % (05/03/23 1214)  BP Location: Left arm    Vital Signs Range (Last 24H):  Temp:  [98.1 °F (36.7 °C)-98.6 °F (37 °C)]   Pulse:  [60-91]   Resp:  [12-27]   BP: (104-140)/(55-79)   SpO2:   [95 %-100 %]   BP Location: Left arm    Physical Exam  Vitals and nursing note reviewed.   Constitutional:       General: She is not in acute distress.  HENT:      Head: Normocephalic.      Nose: Nose normal.      Mouth/Throat:      Mouth: Mucous membranes are moist.   Eyes:      General: Visual field deficit (R hemianopsia) present.      Conjunctiva/sclera: Conjunctivae normal.      Comments: L gaze preference   Cardiovascular:      Rate and Rhythm: Normal rate.   Pulmonary:      Effort: Pulmonary effort is normal.   Abdominal:      General: Abdomen is flat.   Skin:     General: Skin is warm and dry.   Neurological:      Mental Status: She is alert.      Cranial Nerves: Dysarthria and facial asymmetry present.      Sensory: Sensory deficit present.      Motor: Weakness present.   Psychiatric:         Mood and Affect: Mood normal.       Neurological Exam:   LOC: alert  Attention Span: Good   Language: expressive aphasia  Articulation: dysarthria  Orientation: Limited due to severe aphasia, oriented tos elf  Visual Fields: Hemianopsia right  EOM (CN III, IV, VI): Gaze preference  left  Facial Movement (CN VII): Lower facial weakness on the Right  Motor: Arm left  Normal 5/5  Leg left  Normal 5/5  Arm right  Paresis 1+/5  Leg right Paresis 2+/5      Laboratory:  CMP:   Recent Labs   Lab 05/03/23  0103   CALCIUM 8.3*   ALBUMIN 2.6*   PROT 5.7*      K 3.4*   CO2 24      BUN 8   CREATININE 0.7   ALKPHOS 123   ALT 20   AST 25   BILITOT 0.5       CBC:   Recent Labs   Lab 05/03/23  0103   WBC 8.08   RBC 3.94*   HGB 11.5*   HCT 35.2*      MCV 89   MCH 29.2   MCHC 32.7       Lipid Panel:   Recent Labs   Lab 04/30/23  0845   CHOL 142   LDLCALC 88.6   HDL 38*   TRIG 77       Coagulation:   Recent Labs   Lab 04/30/23  1814 05/01/23  0107 05/02/23  0043   INR 1.1  --   --    APTT 25.7   < > 43.0*    < > = values in this interval not displayed.         Hgb A1C:   Recent Labs   Lab 04/30/23  0845   HGBA1C 7.5*        TSH:   Recent Labs   Lab 04/30/23  0845   TSH 2.092         Diagnostic Results     Brain Imaging   CTH without contrast 5/1/23  Acute left MCA distribution infarct appears stable in size and distribution compared to 04/30/2023 MRI.  No evidence of hemorrhagic conversion.   Acute small right cerebellar infarct is better demonstrated on MRI.   No acute intracranial hemorrhage or midline shift.     MRI Brain without contrast 4/30/23    Acute left MCA distribution infarct.  No significant mass effect or acute hemorrhage at this time.  Small acute infarct in the right cerebellum.  Chronic right MCA distribution infarct.     CTH 4/30/2023  No acute territorial infarct or intracranial hemorrhage identified.      Vessel Imaging   CTA Stroke MP 4/29/2023  CT head: No evidence for acute intracranial hemorrhage.  Stable area encephalomalacia from remote right MCA territory infarct.  CTA head: Occlusion of M2 segment of left MCA.  Asymmetric decreased opacification of the left transverse sinus and jugular vein.  Similar finding was present on the prior CTA in 2022 and therefore could be related to sluggish flow.  Suggest attention on follow-up conventional angiogram.  CTA neck: No high-grade stenosis or aneurysm.      Cardiac Imaging   Echo 4/28/2023  The left ventricle is severely enlarged with eccentric hypertrophy and severely decreased systolic function.  The estimated ejection fraction is 10-15%.  There is left ventricular global hypokinesis.  Grade II left ventricular diastolic dysfunction.  Normal right ventricular size with normal right ventricular systolic function.  Mild tricuspid regurgitation.  The estimated PA systolic pressure is 35 mmHg.  Normal central venous pressure (3 mmHg).  Severe left atrial enlargement.

## 2023-05-03 NOTE — PT/OT/SLP PROGRESS
Physical Therapy Treatment    Patient Name:  Diomedes Mohr   MRN:  [id number]  Co-tx w/ OT 2/2 suspected pt complexity and requirement of 2 skilled therapists to assist in order to maximize pt treatment   Recommendations:     Discharge Recommendations: rehabilitation facility  Discharge Equipment Recommendations: to be determined by next level of care  Barriers to discharge:  inc level of assist needed    Assessment:     Diomedes Mohr is a 62 y.o. female admitted with a medical diagnosis of Cerebrovascular accident (CVA) due to embolism of left middle cerebral artery.  She presents with the following impairments/functional limitations: weakness, impaired endurance, impaired self care skills, impaired functional mobility, impaired balance, gait instability, decreased coordination, decreased upper extremity function, decreased lower extremity function, impaired cognition, decreased safety awareness, decreased ROM, abnormal tone, impaired coordination, impaired fine motor. Today pt w/ 2-/5 R quadricep strength, and good leg management w/out cueing needed to assist RLE w/ LUE when performing bed mobility. She had improved midline orientation in sitting, and stood w/ Milton on L side and modA on R side w/ RLE blocked. Rec d/c to IPR for continued treatment in order to maximize functional return as pt w/ high motivation, good activity tolerance, and w/ potential for significant improvements in functional mobility.      Rehab Prognosis: Good; patient would benefit from acute skilled PT services to address these deficits and reach maximum level of function.    Recent Surgery: Procedure(s) (LRB):  ANGIOGRAM-CEREBRAL (N/A)  ANGIOGRAM (N/A)      Plan:     During this hospitalization, patient to be seen 4 x/week to address the identified rehab impairments via gait training, therapeutic activities, therapeutic exercises, neuromuscular re-education and progress toward the following goals:    Plan of Care Expires:   06/01/23    Subjective     Chief Complaint: pt c/o not liking breakfast and therefore not eating anything  Patient/Family Comments/goals: pt wants to be able to sit up at EOB throughout the day; long term goal is pt wants to be able to walk around [employer] again  Pain/Comfort:  Pain Rating 1: 0/10  Pain Rating Post-Intervention 1: 0/10      Objective:     Communicated with RN prior to session.  Patient found HOB elevated with blood pressure cuff, pulse ox (continuous), telemetry, peripheral IV, PureWick upon PT entry to room.     General Precautions: Standard, aspiration, aphasia, fall  Orthopedic Precautions:    Braces: N/A  Respiratory Status: Room air     Functional Mobility:  Bed Mobility:     Scooting: moderate assistance  Supine to Sit: moderate assistance  Sit to Supine: moderate assistance  Transfers:     Sit to Stand:  minimum assistance on L and moderate assistance on R w/ RLE blocked with hand-held assist  Gait: one step w/ LLE w/ severe buckling on RLE, unsafe to attempt further ambulation until pt w/ improved RLE strength  Balance:   20min EOB sitting balance CGA w/ 2 LOB requiring min-modA to recover  2min static standing balance w/ Milton x2 HHA and BLE blocked R>L      AM-PAC 6 CLICK MOBILITY  Turning over in bed (including adjusting bedclothes, sheets and blankets)?: 2  Sitting down on and standing up from a chair with arms (e.g., wheelchair, bedside commode, etc.): 2  Moving from lying on back to sitting on the side of the bed?: 2  Moving to and from a bed to a chair (including a wheelchair)?: 2  Need to walk in hospital room?: 1  Climbing 3-5 steps with a railing?: 1  Basic Mobility Total Score: 10       Treatment & Education:  Pt educated on PT POC/goals, d/c recs, and continued treatment. All questions answered and pt in agreement w/ POC.  Bed mobility w/ pt independently grabbing RLE w/ LUE to self-manage leg movement, assistance required for trunk   Sitting balance x20min w/ BLE therex LAQ 2x10,  and then cueing for postural control, midline orientation, trunk/neck extension, and attention to task    Patient left with bed in chair position with all lines intact, call button in reach, bed alarm on, and RN notified..    GOALS:   Multidisciplinary Problems       Physical Therapy Goals          Problem: Physical Therapy    Goal Priority Disciplines Outcome Goal Variances Interventions   Physical Therapy Goal     PT, PT/OT Ongoing, Progressing     Description: Goals to be completed by: 6/1/23    Pt will perform sup<>sit transfers w/ minimum assistance  Pt will have sufficient dynamic balance to sit EOB while performing ADLs/therex w/ contact guard assistance  PT will be able to stand up from EOB w/ moderate assistance using LRAD  Pt will ambulate 20 feet w/ moderate assistance using LRAD  Pt will be independent w/ HEP therex on BLE w/ good form and ROM                       Time Tracking:     PT Received On: 05/03/23  PT Start Time: 0825     PT Stop Time: 0856  PT Total Time (min): 31 min     Billable Minutes: Therapeutic Activity 15 and Neuromuscular Re-education 16    Treatment Type: Treatment  PT/PTA: PT     Number of PTA visits since last PT visit: 0     05/03/2023

## 2023-05-03 NOTE — PROGRESS NOTES
[facility]  Neurocritical Care  Progress Note    Admit Date: 4/27/2023  Service Date: 05/03/2023  Length of Stay: 6    Subjective:     Chief Complaint: Cerebrovascular accident (CVA) due to embolism of left middle cerebral artery    History of Present Illness: Pt is a 62 y.o. female with PMHx of HTN, DM2, HLD, prior stroke, PE (on xarelto), CAD HFrEF, AICD who presents to [facility] as an acute stroke code from the hospital floor. Patient was admitted to hospital medicine on 4/27 for an acute arterial occlusion of her R popliteal artery and started on a high intensity heparin gtt. Vascular surgery and hematology saw the patient and recommended the heparin gtt with bridge to pradaxa after 5 days. This evening patient was noted to not be able to talk or move her right side. LKN 20:15. A stroke code was called and CTA revealed acute L M2 occlusion. She was transferred to [facility] and then taken for emergent thrombectomy TICI 2c. She is admitted to [facility] for higher level care and neurologic monitoring.       Hospital Course: 4/30/23: To mechanical thrombectomy overnight with TICI 2c clot evacuation, this morning remains expressively aphasic with some weakness on Rt, but RLE/RUE antigravity at times spontaneously.  Heparin remains held since procedure, pending ICD evaluation to assess ability to obtain urgent MRI for characterization of stroke burden prior to resumption of anticoagulation.  Aspirin ordered given lack of hemorrhage on post-thrombectomy CT.   05/01/2023: Heparin gtt therapeutic, CTH  05/02/2023: CTH overnight stable, diet advanced to mechanical soft, begin dabigatran, TTF under stroke service.   5/3/2023 CTH stable, Transfer to stroke service, Electrolyte replacement      ROS  Constitutional: Negative for fever and malaise/fatigue.   Eyes: Negative for blurred vision, double vision and discharge.   Respiratory: Negative for cough, shortness of breath and wheezing.    Cardiovascular: Negative for  chest pain, palpitations, claudication, leg swelling and PND.   Gastrointestinal: Negative for abdominal pain, constipation, diarrhea, nausea and vomiting.   Genitourinary: Negative for dysuria, frequency and urgency.   Musculoskeletal:  + generalized weakness. Negative for back pain and myalgias.   Skin: Negative for itching and rash.   Neurological: Negative for dizziness, speech change, seizures, and headaches.   Psychiatric/Behavioral: Negative for depression. The patient is not nervous/anxious.    Objective:     Vitals:  Temp: 98.6 °F (37 °C)  Pulse: 69  Rhythm: paced rhythm  BP: (!) 111/59  MAP (mmHg): 81  Resp: 20  SpO2: 97 %    Temp  Min: 97.9 °F (36.6 °C)  Max: 98.6 °F (37 °C)  Pulse  Min: 60  Max: 91  BP  Min: 104/55  Max: 140/79  MAP (mmHg)  Min: 74  Max: 105  Resp  Min: 12  Max: 27  SpO2  Min: 95 %  Max: 100 %    05/02 0701 - 05/03 0700  In: 1812.9 [P.O.:1044; I.V.:226.1]  Out: 2250 [Urine:2250]   Unmeasured Output  Urine Occurrence: 1  Stool Occurrence: 0  Pad Count: 2       Physical Exam  Vitals and nursing note reviewed.   Constitutional:       General: She is not in acute distress.  HENT:      Head: Normocephalic and atraumatic.      Mouth/Throat:      Mouth: Mucous membranes are moist.   Eyes:      Extraocular Movements: Extraocular movements intact.      Conjunctiva/sclera: Conjunctivae normal.      Pupils: Pupils are equal, round, and reactive to light.   Cardiovascular:      Rate and Rhythm: Normal rate and regular rhythm.      Pulses:           Dorsalis pedis pulses are detected w/ Doppler on the right side and 2+ on the left side.   Pulmonary:      Effort: Pulmonary effort is normal. No respiratory distress.   Abdominal:      General: Abdomen is flat. There is no distension.      Palpations: Abdomen is soft.      Tenderness: There is no abdominal tenderness. There is no guarding.   Musculoskeletal:         General: No swelling or deformity.   Skin:     General: Skin is warm.   Neurological:    Alert, tracks examiner  --GCS:  E4 V5 M5  --Mental Status: Alert, expressive aphasia   --CN II-XII: PERRLA, R facial droop, L gaze preference, R hemianopia   --Motor: Rt side weakness, briefly antigravity with drift downwards to bed, L side no drift  --sensory: diminished to R side           Medications:  Continuous Scheduledaspirin, 81 mg, Daily  aspirin, 300 mg, Once  atorvastatin, 80 mg, Daily  dabigatran etexilate, 150 mg, BID  metoprolol tartrate, 50 mg, BID  mupirocin, , BID  polyethylene glycol, 17 g, Daily  sacubitriL-valsartan, 1 tablet, BID  senna-docusate 8.6-50 mg, 1 tablet, BID  spironolactone, 25 mg, Daily  torsemide, 10 mg, Daily    PRNacetaminophen, 650 mg, Q4H PRN  albuterol-ipratropium, 3 mL, Q4H PRN  calcium gluconate IVPB, 1 g, PRN  calcium gluconate IVPB, 2 g, PRN  calcium gluconate IVPB, 3 g, PRN  dextrose 10%, 12.5 g, PRN  dextrose 10%, 12.5 g, PRN  dextrose 10%, 25 g, PRN  dextrose 10%, 25 g, PRN  dextrose, 15 g, PRN  dextrose, 30 g, PRN  glucagon (human recombinant), 1 mg, PRN  insulin aspart U-100, 1-10 Units, QID (AC + HS) PRN  labetalol, 10 mg, Q6H PRN  magnesium sulfate IVPB, 2 g, PRN  magnesium sulfate IVPB, 4 g, PRN  ondansetron, 4 mg, Q8H PRN  potassium chloride, 40 mEq, PRN   And  potassium chloride, 60 mEq, PRN   And  potassium chloride, 80 mEq, PRN  senna-docusate 8.6-50 mg, 2 tablet, BID PRN  sodium chloride 0.9%, 10 mL, Q12H PRN  sodium chloride 0.9%, 10 mL, PRN  sodium phosphate IVPB, 15 mmol, PRN  sodium phosphate IVPB, 20.01 mmol, PRN  sodium phosphate IVPB, 30 mmol, PRN      Today I personally reviewed pertinent medications, lines/drains/airways, imaging, cardiology results, laboratory results, microbiology results,     Diet  Diet diabetic [facility]; 2000 Calorie; Dysphagia Mechanical Soft (IDDSI Level 5)  Diet diabetic [facility]; 2000 Calorie; Dysphagia Mechanical Soft (IDDSI Level 5)      Assessment/Plan:     Neuro  * Cerebrovascular accident (CVA) due to  embolism of left middle cerebral artery  Acute LMCA stroke with LM2 LVO, no TNK given already anticoagulated for critical limb ischemia, taken for thrombectomy with TICI 2c reperfusion on 4/29/23  -Heparin gtt restarted 4/30 PM, therapeutic on 5/1 with CTH stable without hemorrhagic conversion.    - Transition to dabigatran per hematology recommendations today as patient now able to swallow whole pills.   Antithrombotics for secondary stroke prevention: Antiplatelets: Aspirin: 81 mg daily + dabigatran    Statins for secondary stroke prevention and hyperlipidemia, if present:   Statins: Atorvastatin- 80 mg daily  Aggressive risk factor modification: HTN, DM, Diet, Exercise, Obesity, CAD  Rehab efforts: The patient has been evaluated by a stroke team provider and the therapy needs have been fully considered based off the presenting complaints and exam findings. The following therapy evaluations are active: PT evaluate and treat, OT evaluate and treat, SLP evaluate and treat, PM&R evaluate for appropriate placement  VTE prophylaxis: None: Reason for No Pharmacological VTE Prophylaxis: Currently on anticoagulation  BP parameters: Infarct: Post sucessful thrombectomy, SBP <160    Cytotoxic brain edema  See stroke     Pulmonary  Pulmonary hypertension due to left heart disease  SBP goal <160 post successful thrombectomy   - continue home heart failure meds      Cardiac/Vascular  Critical lower limb ischemia  Admission to the hospital for RLE popliteal artery occlusion   -heme/onc and vascular surgery both saw patient   -patient failed management with xarelto at home, transitioned to heparin gtt --> pradaxa  -continue daily ASA and statin   -neurovascular checks   4/30: Heparin gtt held pending cranial imaging, plan to resume ASAP continue neuro vascular checks  5/1: Heparin gtt resumed and now therapeutic, transition to PO AC tomorrow   5/2: Pradaxa started as patient now advanced to being able to swallow whole pills    5/3: CTH stable, Transfer to stroke service, Electrolyte replacement    ICD (implantable cardioverter-defibrillator) in place  CHF with AICD in place since 2021  -continuous cardiac monitoring   4/30: Rep to investigate/interrogate to determine ability to obtain rapid MRI    Coronary artery disease involving native heart  Daily ASA resumed     Chronic combined systolic and diastolic congestive heart failure  Patient is identified as having Combined Systolic and Diastolic heart failure that is Chronic. CHF is currently controlled. Latest ECHO performed and demonstrates- Results for orders placed during the hospital encounter of 04/27/23  Echo  Interpretation Summary  · The left ventricle is severely enlarged with eccentric hypertrophy and severely decreased systolic function.  · The estimated ejection fraction is 10-15%.  · There is left ventricular global hypokinesis.  · Grade II left ventricular diastolic dysfunction.  · Normal right ventricular size with normal right ventricular systolic function.  · Mild tricuspid regurgitation.  · The estimated PA systolic pressure is 35 mmHg.  · Normal central venous pressure (3 mmHg).  · Severe left atrial enlargement.  Home beta blocker, entresto, spironilactone and torsemide, and monitor clinical status closely. Monitor on telemetry. Patient is on CHF pathway.  Monitor strict Is&Os and daily weights.  Continue to stress to patient importance of self efficacy and  on diet for CHF.   Last BNP reviewed- and noted below   Recent Labs   Lab 04/29/23  0533   *   .      Dilated cardiomyopathy  History of severe decreased systolic function EF 10-15% followed with cardiology. Not currently in acute failure. Likely contributor to arterial occlusive disease  - metoprolol, entresto, spironolactone, and torsemide     Hyperlipemia  Lipid panel   -continue daily high dose statin     Hypertension  Baseline, maintain SBP <160 following thrombectomy  - Home antihypertensives  on board     Renal/  Hypokalemia  - Replaced    Hematology  Long term current use of anticoagulant  On chronic xarelto for HFrEF and history of PE, but failed therapy as patient presented with RLE arterial occlusion  -heparin gtt bridged to pradaxa as well as ASA on 5/2/23  -continue daily ASA    Hypercoagulopathy  Presumed/possible  - Hematology following     Endocrine  Type 2 diabetes mellitus without complication, without long-term current use of insulin  SSI protocol   -stroke risk factor   -A1c 7.5%    Other  Right sided weakness  2/2 to acute LMCA stroke   -PT/OT          Activity Orders          Diet diabetic [facility]; 2000 Calorie; Dysphagia Mechanical Soft (IDDSI Level 5): Diabetic starting at 05/02 1648    Turn patient starting at 04/29 2200    Elevate HOB starting at 04/29 2148    Progressive Mobility Protocol (mobilize patient to their highest level of functioning at least twice daily) starting at 04/28 0800        Full Code    Hugh Bray NP  Neurocritical Care  [facility]

## 2023-05-03 NOTE — PT/OT/SLP PROGRESS
Occupational Therapy   Co-Treatment    Name: Diomedes Mohr  MRN: [id number]  Admitting Diagnosis:  Cerebrovascular accident (CVA) due to embolism of left middle cerebral artery       Recommendations:     Discharge Recommendations: rehabilitation facility  Discharge Equipment Recommendations:  to be determined by next level of care  Barriers to discharge:  Other (Comment) (increased skilled (A) required)    Assessment:     Diomedes Mohr is a 62 y.o. female with a medical diagnosis of Cerebrovascular accident (CVA) due to embolism of left middle cerebral artery.  She presents with the following performance deficits affecting function are weakness, impaired endurance, impaired self care skills, impaired functional mobility, gait instability, impaired balance, decreased coordination, decreased upper extremity function, decreased lower extremity function, decreased safety awareness, abnormal tone, decreased ROM, impaired coordination, impaired fine motor. Pt presents sitting with HOB elevated, motivated to participate in therapy. She demonstrated great improvements with RUE movement and strength, sitting balance, and overall improved functional mobility. She was able to participate in sit to stand transfer and tolerated static standing with (A) 2/2 RLE buckling. She continues to have some difficulty communicating 2/2 aphasia, but remains extremely motivated with therapy. Pt would benefit from continued skilled acute OT services in order to maximize (I) and with ADLs and functional mobility to ensure safe return to PLOF in the least restrictive environment. OT recommending IPR once pt is medically appropriate for d/c.       Rehab Prognosis:  Good; patient would benefit from acute skilled OT services to address these deficits and reach maximum level of function.       Plan:     Patient to be seen 4 x/week to address the above listed problems via self-care/home management, therapeutic activities, therapeutic exercises,  "neuromuscular re-education  Plan of Care Expires: 05/30/23  Plan of Care Reviewed with: patient    Subjective   "Y'all coming back?"     Chief Complaint: None stated   Patient/Family Comments/goals: Sit on side of bed   Pain/Comfort:  Pain Rating 1: 0/10 (at rest)  Pain Rating Post-Intervention 1: 0/10  Pain Rating 2: other (see comments) (unrated L hip pain with movement)  Location - Side 2: Left  Location 2: hip  Pain Rating Post-Intervention 2: other (see comments) (not rated)    Objective:     Communicated with: RN prior to session.  Patient found HOB elevated with blood pressure cuff, pulse ox (continuous), telemetry, peripheral IV, PureWick upon OT entry to room.    General Precautions: Standard, aspiration, aphasia, fall    Orthopedic Precautions:N/A  Braces: N/A  Respiratory Status: Room air     Occupational Performance:     Bed Mobility:    Patient completed Scooting/Bridging with moderate assistance  Patient completed Supine to Sit with moderate assistance  Patient completed Sit to Supine with moderate assistance     Functional Mobility/Transfers:  Patient completed Sit <> Stand Transfer with minimum assistance on L side and Mod A on R side 2/2 RSW  with  hand-held assist    Pt required verbal cues for upright posture and returning to midline 2/2 gradual R lateral lean with fatigue.   Functional Mobility: Pt was able to take one step with LLE with cues, but buckled with RLE. Further mobility trial deferred. Pt was able to tolerate static standing ~2 min with Min A x2 and BLE blocked. Pt was actively looking out of window, pointing things out, and engaging with therapist.     Activities of Daily Living:  Grooming: set-up (A) for facial hygiene while sitting EOB with L hand.   Upper Body Dressing: moderate assistance to don/doff gown while sitting EOB. Pt educated on vik dressing techniques. Pt verbalized understanding.       [facility] 6 Click ADL: 14    Treatment & Education:  Pt sat EOB ~20 min with CGA with " 2 instances of LOB to R side, requiring Min-Mod A for recovering. Pt required some verbal cues for midline orientation and posture.   BUE shoulder shrugs with (A) with RUE. Pt educated on performed BUE therex outside of therapy to improve strength.   Educated on proper handling techniques of RUE with transfers and all movement for joint protection.   Pt  educated on:   Role of OT, POC, and d/c planning.   Importance of OOB activities to increase endurance and tolerance for increased participation in daily ADLs.   Utilizing the call bell to request for assistance with all functional mobility to ensure safety during hospital stay.      Pt verbalized understanding and all questions were addressed within the scope of OT.       Patient left with bed in chair position with all lines intact, call button in reach, RN notified, and RN present    GOALS:   Multidisciplinary Problems       Occupational Therapy Goals          Problem: Occupational Therapy    Goal Priority Disciplines Outcome Interventions   Occupational Therapy Goal     OT, PT/OT Ongoing, Progressing    Description: Goals set on 4/30, with expiration date 5/30:  Patient will increase functional independence with ADLs by performing:    Bed mobility with Min A  Grooming while standing at sink with Min A  UB Dressing with Min A.  LB Dressing with Min A.  Toileting from toilet with Min A for hygiene and clothing management.   Functional mobility of household and community distance with Mod A and AD as needed  Pt will demonstrate understanding of education provided regarding energy conservation and task modification through teach-back method.  Pt will demonstrate [place] in HEP for BUE strengthening GM/FM exercises to improve functional performance.                          Time Tracking:     OT Date of Treatment: 05/03/23  OT Start Time: 0825  OT Stop Time: 0855  OT Total Time (min): 30 min    Billable Minutes:Self Care/Home Management 15  Therapeutic Activity  15    OT/JENNIFER: OT          5/3/2023   Co-evaluation/treatment performed due to patient's multiple deficits requiring two skilled therapists to appropriately and safely assess patient's strength, endurance, functional mobility, and ADL performance while facilitating functional tasks in addition to accommodating for patient's activity tolerance and medical acuity 2/2 CVA of L MCA.

## 2023-05-03 NOTE — ASSESSMENT & PLAN NOTE
Patient is identified as having Combined Systolic and Diastolic heart failure that is Chronic. CHF is currently controlled. Latest ECHO performed and demonstrates- Results for orders placed during the hospital encounter of 04/27/23  Echo  Interpretation Summary  · The left ventricle is severely enlarged with eccentric hypertrophy and severely decreased systolic function.  · The estimated ejection fraction is 10-15%.  · There is left ventricular global hypokinesis.  · Grade II left ventricular diastolic dysfunction.  · Normal right ventricular size with normal right ventricular systolic function.  · Mild tricuspid regurgitation.  · The estimated PA systolic pressure is 35 mmHg.  · Normal central venous pressure (3 mmHg).  · Severe left atrial enlargement.  Home beta blocker, entresto, spironilactone and torsemide, and monitor clinical status closely. Monitor on telemetry. Patient is on CHF pathway.  Monitor strict Is&Os and daily weights.  Continue to stress to patient importance of self efficacy and  on diet for CHF.   Last BNP reviewed- and noted below   Recent Labs   Lab 04/29/23  0533   *   .

## 2023-05-03 NOTE — PT/OT/SLP PROGRESS
"Speech Language Pathology Treatment    Patient Name:  Diomedes Mohr   MRN:  [id number]  Admitting Diagnosis: Cerebrovascular accident (CVA) due to embolism of left middle cerebral artery    Recommendations:                 General Recommendations:  Dysphagia therapy and Speech/language therapy  Diet recommendations:  Mechanical soft, Liquid Diet Level: Thin   Aspiration Precautions: Alternating bites/sips, Check for pocketing/oral residue, HOB to 90 degrees, Meds crushed in puree, No straws, Small bites/sips, and Standard aspiration precautions   General Precautions: Standard, aspiration  Communication strategies:  provide increased time to answer    Subjective     "I dont like it" referring to food  Patient goals: home     Pain/Comfort:  Pain Rating 1: 0/10  Pain Rating Post-Intervention 1: 0/10    Respiratory Status: Room air    Objective:     Has the patient been evaluated by SLP for swallowing?   Yes  Keep patient NPO? No   Current Respiratory Status:        Pt. Seen at bedside with  present.  She reported poor po intake stating she did not like the food.  Food texture recommendations discussed with pt. And spouse as well as safe swallow strategies and aspiration precautions.  Suggestions also discussed re ways to increase po intake.  Ms. Mohr did not accurately name hospital but she did name month and year.  Pt. Recalled common nouns in response to questions with 80% accuracy with perseveration noted and mod cues needed for pt. To name one category member with delays in responding noted.  Ms. Mohr named common objects with 100% accuracy with no cues needed.  Word finding deficits noted in conversation.  Communication strategies and language activities reviewed with pt. And spouse.       Assessment:     Diomedes Mohr is a 62 y.o. female with an SLP diagnosis of Aphasia and Dysphagia.    Goals:   Multidisciplinary Problems       SLP Goals          Problem: SLP    Goal Priority Disciplines Outcome   SLP " Goal     SLP Ongoing, Progressing   Description: Speech Language Pathology Goals  Goals expected to be met by 5/7  1. Pt will tolerate puree & nectar thick liquids without s/s aspiration & adequate oral phase of swallow  2. Ongoing swallow assessment to determine least restrictive PO consistencies  3. SLP Speech/Language/Cognitive Evaluation- initiated 5/1  4. Pt will answer personal yes/no questions with 90% accuracy, MIN A  5. Pt will complete 1-unit commands 90% of the time, MIN A  6. Pt will complete automatic speech tasks with 90% accuracy, MIN A  7. Pt will complete object naming tasks with 70% accuracy or higher, MIN A                         Plan:     Patient to be seen:  4 x/week   Plan of Care expires:  05/29/23  Plan of Care reviewed with:  patient   SLP Follow-Up:  Yes       Discharge recommendations:  rehabilitation facility   Barriers to Discharge:  Decreased Care Giver Support    Time Tracking:     SLP Treatment Date:   05/03/23  Speech Start Time:  1036  Speech Stop Time:  1100     Speech Total Time (min):  24 min    Billable Minutes: Speech Therapy Individual 14 and Treatment Swallowing Dysfunction 10    05/03/2023

## 2023-05-04 LAB
ALBUMIN SERPL BCP-MCNC: 3.3 G/DL (ref 3.5–5.2)
ALP SERPL-CCNC: 148 U/L (ref 55–135)
ALT SERPL W/O P-5'-P-CCNC: 24 U/L (ref 10–44)
ANION GAP SERPL CALC-SCNC: 12 MMOL/L (ref 8–16)
AST SERPL-CCNC: 29 U/L (ref 10–40)
BILIRUB SERPL-MCNC: 0.6 MG/DL (ref 0.1–1)
BUN SERPL-MCNC: 14 MG/DL (ref 8–23)
CALCIUM SERPL-MCNC: 9.7 MG/DL (ref 8.7–10.5)
CHLORIDE SERPL-SCNC: 102 MMOL/L (ref 95–110)
CO2 SERPL-SCNC: 23 MMOL/L (ref 23–29)
CREAT SERPL-MCNC: 1 MG/DL (ref 0.5–1.4)
EST. GFR  (NO RACE VARIABLE): >60 ML/MIN/1.73 M^2
GLUCOSE SERPL-MCNC: 155 MG/DL (ref 70–110)
POCT GLUCOSE: 138 MG/DL (ref 70–110)
POCT GLUCOSE: 138 MG/DL (ref 70–110)
POCT GLUCOSE: 141 MG/DL (ref 70–110)
POCT GLUCOSE: 148 MG/DL (ref 70–110)
POTASSIUM SERPL-SCNC: 4.4 MMOL/L (ref 3.5–5.1)
PROT SERPL-MCNC: 7.2 G/DL (ref 6–8.4)
SODIUM SERPL-SCNC: 137 MMOL/L (ref 136–145)

## 2023-05-04 PROCEDURE — 25000003 PHARM REV CODE 250: Performed by: PSYCHIATRY & NEUROLOGY

## 2023-05-04 PROCEDURE — 97530 THERAPEUTIC ACTIVITIES: CPT | Mod: CQ

## 2023-05-04 PROCEDURE — 25000003 PHARM REV CODE 250: Performed by: PHYSICIAN ASSISTANT

## 2023-05-04 PROCEDURE — 99233 SBSQ HOSP IP/OBS HIGH 50: CPT | Mod: GC,,, | Performed by: PSYCHIATRY & NEUROLOGY

## 2023-05-04 PROCEDURE — 36415 COLL VENOUS BLD VENIPUNCTURE: CPT | Performed by: PHYSICIAN ASSISTANT

## 2023-05-04 PROCEDURE — 11000001 HC ACUTE MED/SURG PRIVATE ROOM

## 2023-05-04 PROCEDURE — 25000003 PHARM REV CODE 250: Performed by: HOSPITALIST

## 2023-05-04 PROCEDURE — 80053 COMPREHEN METABOLIC PANEL: CPT | Performed by: PHYSICIAN ASSISTANT

## 2023-05-04 PROCEDURE — 25000003 PHARM REV CODE 250: Performed by: NURSE PRACTITIONER

## 2023-05-04 PROCEDURE — 97112 NEUROMUSCULAR REEDUCATION: CPT | Mod: CQ

## 2023-05-04 PROCEDURE — 97116 GAIT TRAINING THERAPY: CPT | Mod: CQ

## 2023-05-04 PROCEDURE — 99233 PR SUBSEQUENT HOSPITAL CARE,LEVL III: ICD-10-PCS | Mod: GC,,, | Performed by: PSYCHIATRY & NEUROLOGY

## 2023-05-04 RX ORDER — METOPROLOL SUCCINATE 100 MG/1
100 TABLET, EXTENDED RELEASE ORAL DAILY
Status: DISCONTINUED | OUTPATIENT
Start: 2023-05-05 | End: 2023-05-12 | Stop reason: HOSPADM

## 2023-05-04 RX ADMIN — POLYETHYLENE GLYCOL 3350 17 G: 17 POWDER, FOR SOLUTION ORAL at 11:05

## 2023-05-04 RX ADMIN — ATORVASTATIN CALCIUM 80 MG: 40 TABLET, FILM COATED ORAL at 11:05

## 2023-05-04 RX ADMIN — SENNOSIDES AND DOCUSATE SODIUM 1 TABLET: 8.6; 5 TABLET ORAL at 08:05

## 2023-05-04 RX ADMIN — SENNOSIDES AND DOCUSATE SODIUM 1 TABLET: 8.6; 5 TABLET ORAL at 11:05

## 2023-05-04 RX ADMIN — ASPIRIN 81 MG: 81 TABLET, COATED ORAL at 11:05

## 2023-05-04 RX ADMIN — SACUBITRIL AND VALSARTAN 1 TABLET: 97; 103 TABLET, FILM COATED ORAL at 08:05

## 2023-05-04 RX ADMIN — DABIGATRAN ETEXILATE MESYLATE 150 MG: 150 CAPSULE ORAL at 11:05

## 2023-05-04 RX ADMIN — MUPIROCIN: 20 OINTMENT TOPICAL at 11:05

## 2023-05-04 RX ADMIN — DABIGATRAN ETEXILATE MESYLATE 150 MG: 150 CAPSULE ORAL at 08:05

## 2023-05-04 RX ADMIN — SPIRONOLACTONE 25 MG: 25 TABLET, FILM COATED ORAL at 11:05

## 2023-05-04 RX ADMIN — MUPIROCIN: 20 OINTMENT TOPICAL at 08:05

## 2023-05-04 RX ADMIN — TORSEMIDE 10 MG: 10 TABLET ORAL at 11:05

## 2023-05-04 NOTE — NURSING
Report received on this patient from FABRICE Carey.  Patient resting quietly with eyes closed, curled up on right side.  Nurse offered to assist patient in turning to straighten herself, but patient declined, saying, 'I'm comfortable right now.'.   at bedside.

## 2023-05-04 NOTE — ASSESSMENT & PLAN NOTE
62 y.o. female with PMH of HTN, DM2, HLD, tobacco use (quit in 2020), stroke (2020, R MCA, no deficits), PE (December 2021, on xarelto),  CAD, DCM, HFrEF (15%) s/p AICD, Pulmonary hypertension admited to  4/27 for the treatment of a right leg DVT on heparin gtt. Stroke code activated 4/29 for aphasia and R hemiplegia, LKN 20:15 (~5 min prior). NIHSS 24, L MCA syndrome. Not TNK candidate due to AC with heparin gtt and therapeutic aPTT. CTA with L M2 occlusion. Taken to IR for possible intervention, now s/p L M2 thrombectomy with TICI 2c reperfusion. Repeat CTH post-IR with no acute infarct or hemorrhage, heparin gtt restarted. MRI brain W/WO with acute L MCA territory infarcts (insula, frontotemporal opercular cortex, patchy frontal white matter, small temporoparietal cortex), as well as small acute R cerebellar infarct. Echo with EF 10-15%, global LV hypokinesis, severe LAE. Suspect etiology likely cardioembolic due to EF 15%.    Pt's Neuro exam is improving; dysarthria improved. Labs stable.     Antithrombotics for secondary stroke prevention: Anticoagulants: Pradaxa 150mg BID, ASA 81mg daily    Statins for secondary stroke prevention and HLD, if present: Statins: Atorvastatin- 80 mg daily    Aggressive risk factor modification: HTN, DM, HLD, HF, DVT    Rehab efforts: The patient has been evaluated by a stroke team provider and the therapy needs have been fully considered based off the presenting complaints and exam findings. The following therapy evaluations are needed: PT evaluate and treat, OT evaluate and treat, SLP evaluate and treat, PM&R evaluate for appropriate placement    Diagnostics ordered/pending: CTH PRN for acute neuro exam changes    VTE prophylaxis: None: Reason for No Pharmacological VTE Prophylaxis: Currently on anticoagulation    BP parameters: Infarct:  SBP <160

## 2023-05-04 NOTE — NURSING
Nurse called dietary in order to request chocolate Boost, as it was supposed to be on patient's tray.   Dietary stated it would be brought to patient.

## 2023-05-04 NOTE — SUBJECTIVE & OBJECTIVE
Neurologic Chief Complaint: L MCA stroke    Subjective:     Interval History: Patient is seen for follow-up neurological assessment and treatment recommendations:   Pt improving this AM. No aphasia noted on exam. Restarted Pradaxa BID and switched Metoprolol tartrate to metop succinate for GDMT.     HPI, Past Medical, Family, and Social History remains the same as documented in the initial encounter.     Review of Systems   Reason unable to perform ROS: severe aphasia.   Constitutional:  Negative for fatigue.   Eyes:  Positive for visual disturbance.   Respiratory:  Negative for shortness of breath.    Cardiovascular:  Negative for chest pain.   Gastrointestinal:  Negative for abdominal distention and abdominal pain.   Neurological:  Positive for facial asymmetry and weakness. Negative for speech difficulty (improving).   Psychiatric/Behavioral:  Negative for agitation and behavioral problems.    Scheduled Meds:   aspirin  81 mg Oral Daily    aspirin  300 mg Rectal Once    atorvastatin  80 mg Oral Daily    dabigatran etexilate  150 mg Oral BID    [START ON 5/5/2023] metoprolol succinate  100 mg Oral Daily    mupirocin   Nasal BID    polyethylene glycol  17 g Oral Daily    sacubitriL-valsartan  1 tablet Oral BID    senna-docusate 8.6-50 mg  1 tablet Oral BID    spironolactone  25 mg Oral Daily    torsemide  10 mg Oral Daily     Continuous Infusions:    PRN Meds:acetaminophen, albuterol-ipratropium, calcium gluconate IVPB, calcium gluconate IVPB, calcium gluconate IVPB, dextrose 10%, dextrose 10%, dextrose 10%, dextrose 10%, dextrose, dextrose, glucagon (human recombinant), insulin aspart U-100, labetalol, magnesium sulfate IVPB, magnesium sulfate IVPB, ondansetron, potassium chloride **AND** potassium chloride **AND** potassium chloride, senna-docusate 8.6-50 mg, sodium chloride 0.9%, sodium chloride 0.9%, sodium phosphate IVPB, sodium phosphate IVPB, sodium phosphate IVPB    Objective:     Vital Signs (Most  Recent):  Temp: 96.7 °F (35.9 °C) (05/04/23 1117)  Pulse: 99 (05/04/23 1453)  Resp: 18 (05/04/23 1117)  BP: 95/60 (05/04/23 1119)  SpO2: 97 % (05/04/23 1117)  BP Location: Right leg    Vital Signs Range (Last 24H):  Temp:  [96.7 °F (35.9 °C)-98.7 °F (37.1 °C)]   Pulse:  [72-99]   Resp:  [18]   BP: ()/(56-74)   SpO2:  [93 %-97 %]   BP Location: Right leg    Physical Exam  Vitals and nursing note reviewed.   Constitutional:       General: She is not in acute distress.  HENT:      Head: Normocephalic.      Nose: Nose normal.      Mouth/Throat:      Mouth: Mucous membranes are moist.   Eyes:      General: Visual field deficit (R hemianopsia) present.      Conjunctiva/sclera: Conjunctivae normal.      Comments: L gaze preference   Cardiovascular:      Rate and Rhythm: Normal rate.   Pulmonary:      Effort: Pulmonary effort is normal.   Abdominal:      General: Abdomen is flat.   Skin:     General: Skin is warm and dry.   Neurological:      Mental Status: She is alert.      Cranial Nerves: Dysarthria and facial asymmetry present.      Sensory: Sensory deficit present.      Motor: Weakness present.   Psychiatric:         Mood and Affect: Mood normal.       Neurological Exam:   LOC: alert  Attention Span: Good   Language: expressive aphasia  Articulation: dysarthria  Orientation: Limited due to severe aphasia, oriented tos elf  Visual Fields: Hemianopsia right  EOM (CN III, IV, VI): Gaze preference  left  Facial Movement (CN VII): Lower facial weakness on the Right  Motor: Arm left  Normal 5/5  Leg left  Normal 5/5  Arm right  Paresis 1+/5  Leg right Paresis 2+/5      Laboratory:  CMP:   Recent Labs   Lab 05/04/23  0419   CALCIUM 9.7   ALBUMIN 3.3*   PROT 7.2      K 4.4   CO2 23      BUN 14   CREATININE 1.0   ALKPHOS 148*   ALT 24   AST 29   BILITOT 0.6       CBC:   Recent Labs   Lab 05/03/23  0103   WBC 8.08   RBC 3.94*   HGB 11.5*   HCT 35.2*      MCV 89   MCH 29.2   MCHC 32.7       Lipid Panel:    Recent Labs   Lab 04/30/23  0845   CHOL 142   LDLCALC 88.6   HDL 38*   TRIG 77       Coagulation:   Recent Labs   Lab 04/30/23  1814 05/01/23  0107 05/02/23  0043   INR 1.1  --   --    APTT 25.7   < > 43.0*    < > = values in this interval not displayed.         Hgb A1C:   Recent Labs   Lab 04/30/23  0845   HGBA1C 7.5*       TSH:   Recent Labs   Lab 04/30/23  0845   TSH 2.092         Diagnostic Results     Brain Imaging   CTH without contrast 5/1/23  Acute left MCA distribution infarct appears stable in size and distribution compared to 04/30/2023 MRI.  No evidence of hemorrhagic conversion.   Acute small right cerebellar infarct is better demonstrated on MRI.   No acute intracranial hemorrhage or midline shift.     MRI Brain without contrast 4/30/23    Acute left MCA distribution infarct.  No significant mass effect or acute hemorrhage at this time.  Small acute infarct in the right cerebellum.  Chronic right MCA distribution infarct.     CTH 4/30/2023  No acute territorial infarct or intracranial hemorrhage identified.      Vessel Imaging   CTA Stroke MP 4/29/2023  CT head: No evidence for acute intracranial hemorrhage.  Stable area encephalomalacia from remote right MCA territory infarct.  CTA head: Occlusion of M2 segment of left MCA.  Asymmetric decreased opacification of the left transverse sinus and jugular vein.  Similar finding was present on the prior CTA in 2022 and therefore could be related to sluggish flow.  Suggest attention on follow-up conventional angiogram.  CTA neck: No high-grade stenosis or aneurysm.      Cardiac Imaging   Echo 4/28/2023  The left ventricle is severely enlarged with eccentric hypertrophy and severely decreased systolic function.  The estimated ejection fraction is 10-15%.  There is left ventricular global hypokinesis.  Grade II left ventricular diastolic dysfunction.  Normal right ventricular size with normal right ventricular systolic function.  Mild tricuspid  regurgitation.  The estimated PA systolic pressure is 35 mmHg.  Normal central venous pressure (3 mmHg).  Severe left atrial enlargement.

## 2023-05-04 NOTE — PT/OT/SLP PROGRESS
"Physical Therapy Treatment    Patient Name:  Diomedes Mohr   MRN:  [id number]    Recommendations:     Discharge Recommendations: rehabilitation facility  Discharge Equipment Recommendations: to be determined by next level of care  Barriers to discharge:  impaired functional mobility requiring increased assistance    Assessment:     Diomedes Mohr is a 62 y.o. female admitted with a medical diagnosis of Cerebrovascular accident (CVA) due to embolism of left middle cerebral artery.  She presents with the following impairments/functional limitations: weakness, impaired endurance, impaired self care skills, impaired functional mobility, gait instability, impaired balance, decreased coordination, decreased upper extremity function, decreased lower extremity function, decreased safety awareness, impaired cognition, decreased ROM, abnormal tone, impaired coordination, impaired fine motor.    Rehab Prognosis: Good; patient would benefit from acute skilled PT services to address these deficits and reach maximum level of function.    Recent Surgery: Procedure(s) (LRB):  ANGIOGRAM-CEREBRAL (N/A)  ANGIOGRAM (N/A)      Plan:     During this hospitalization, patient to be seen 4 x/week to address the identified rehab impairments via gait training, therapeutic activities, therapeutic exercises, neuromuscular re-education and progress toward the following goals:    Plan of Care Expires:  06/01/23    Subjective     Chief Complaint: no c/o, denies pain   Patient/Family Comments/goals: "I don't want that." - pt when asked if she wanted to try and eat her breakfast  Pain/Comfort:  Pain Rating 1: other (see comments) (denies pain when asked; pt wincing and with c/o pain with mobility to roll to L side)  Location - Orientation 1: generalized  Location 1:  (pt does not localize pain when asked)  Pain Addressed 1: Reposition, Distraction  Pain Rating Post-Intervention 1: other (see comments) (no pain at rest)      Objective:     Communicated " with RN prior to session.  Patient found HOB elevated with telemetry, PureWick, bed alarm upon PTA entry to room.     General Precautions: Standard, aspiration  Orthopedic Precautions: N/A  Braces: N/A  Respiratory Status: Room air     Functional Mobility:  Bed Mobility:     Rolling Left:  moderate assistance  Supine to Sit: moderate assistance  Sit to Supine: minimum assistance  Transfers:     Sit to Stand:  moderate assistance with hemiwalker  Gait: pre gait weight shifting performed with therapist supporting RLE to prevent hyperextension and knee buckling while shifting into RLE weight bearing      AM-PAC 6 CLICK MOBILITY  Turning over in bed (including adjusting bedclothes, sheets and blankets)?: 2  Sitting down on and standing up from a chair with arms (e.g., wheelchair, bedside commode, etc.): 2  Moving from lying on back to sitting on the side of the bed?: 2  Moving to and from a bed to a chair (including a wheelchair)?: 2  Need to walk in hospital room?: 1  Climbing 3-5 steps with a railing?: 1  Basic Mobility Total Score: 10       Treatment & Education:  Pt assisted with functional mobility as noted above.   RUE weight bearing facilitated for joint approximation and sensory input. Support at elbow and wrist. RUE flexion movement of shoulder and elbow, no extension noted. No wrist or hand movement.   Pt performs RLE seated therex: HF and LAQ x 10 reps each with AAROM, inconsistent active participation in repetitions.   Standing x 2 trials with HHA on trial 1 and HW in LUE on trial 2. R knee blocked d/t buckling. Therapist seated on second trial for better assistance with preventing hyperextension of RLE in weight bearing.     Patient left HOB elevated with all lines intact, call button in reach, bed alarm on, and RN notified.    GOALS:   Multidisciplinary Problems       Physical Therapy Goals          Problem: Physical Therapy    Goal Priority Disciplines Outcome Goal Variances Interventions   Physical  Therapy Goal     PT, PT/OT Ongoing, Progressing     Description: Goals to be completed by: 6/1/23    Pt will perform sup<>sit transfers w/ minimum assistance  Pt will have sufficient dynamic balance to sit EOB while performing ADLs/therex w/ contact guard assistance  PT will be able to stand up from EOB w/ moderate assistance using LRAD  Pt will ambulate 20 feet w/ moderate assistance using LRAD  Pt will be independent w/ HEP therex on BLE w/ good form and ROM                       Time Tracking:     PT Received On: 05/04/23  PT Start Time: 0904     PT Stop Time: 0942  PT Total Time (min): 38 min     Billable Minutes: Gait Training 15, Therapeutic Activity 13, and Neuromuscular Re-education 10    Treatment Type: Treatment  PT/PTA: PTA     Number of PTA visits since last PT visit: 1 05/04/2023

## 2023-05-04 NOTE — PROGRESS NOTES
Dmitriy Triana - Neurosurgery ([facility])  Vascular Neurology  Comprehensive Stroke Center  Progress Note    Assessment/Plan:     * Cerebrovascular accident (CVA) due to embolism of left middle cerebral artery  62 y.o. female with PMH of HTN, DM2, HLD, tobacco use (quit in 2020), stroke (2020, R MCA, no deficits), PE (December 2021, on xarelto),  CAD, DCM, HFrEF (15%) s/p AICD, Pulmonary hypertension admited to  4/27 for the treatment of a right leg DVT on heparin gtt. Stroke code activated 4/29 for aphasia and R hemiplegia, LKN 20:15 (~5 min prior). NIHSS 24, L MCA syndrome. Not TNK candidate due to AC with heparin gtt and therapeutic aPTT. CTA with L M2 occlusion. Taken to IR for possible intervention, now s/p L M2 thrombectomy with TICI 2c reperfusion. Repeat CTH post-IR with no acute infarct or hemorrhage, heparin gtt restarted. MRI brain W/WO with acute L MCA territory infarcts (insula, frontotemporal opercular cortex, patchy frontal white matter, small temporoparietal cortex), as well as small acute R cerebellar infarct. Echo with EF 10-15%, global LV hypokinesis, severe LAE. Suspect etiology likely cardioembolic due to EF 15%.    Pt's Neuro exam is improving; dysarthria improved. Labs stable.     Antithrombotics for secondary stroke prevention: Anticoagulants: Pradaxa 150mg BID, ASA 81mg daily    Statins for secondary stroke prevention and HLD, if present: Statins: Atorvastatin- 80 mg daily    Aggressive risk factor modification: HTN, DM, HLD, HF, DVT    Rehab efforts: The patient has been evaluated by a stroke team provider and the therapy needs have been fully considered based off the presenting complaints and exam findings. The following therapy evaluations are needed: PT evaluate and treat, OT evaluate and treat, SLP evaluate and treat, PM&R evaluate for appropriate placement    Diagnostics ordered/pending: CTH PRN for acute neuro exam changes    VTE prophylaxis: None: Reason for No Pharmacological VTE  Prophylaxis: Currently on anticoagulation    BP parameters: Infarct:  SBP <160    Right sided weakness  Due to stroke  -PT/OT eval and treat  -Dispo recs for IPR    Critical lower limb ischemia  Stroke risk factor  Admitted for R popliteal artery occlusion, was started on heparin gtt  Heparin gtt restarted post-IR, per heme/onc recs transition to pradaxa today (5/2)    Chronic combined systolic and diastolic congestive heart failure  Stroke risk factor  -Echo with EF 10-15%, global LV hypokinesis, severe LAE  -Continue GDMT  - Switched metoprolol tartrate to succinate    Hyperlipemia  Stroke risk factor  -LDL 88, goal <70  -Atorvastatin 80mg daily    Hypertension  Stroke Risk factor  -SBP <160 s/p thrombectomy  -PRN hydralazine/labetalol    Type 2 diabetes mellitus without complication, without long-term current use of insulin  Stroke risk factor  -A1c 7.5  -BG goal while inpatient 140-180  -MC SSI PRN         4/30-S/p TICI 2c. Repeat CTH after IR with no acute infarct. Will need MRI but has a pacemaker that will need be interrogated. Improving r arm weakness and  aphasia.  5/1/2023- MRI completed showing acute L. MCA infarct. Pacemaker reinitiated post MRI to normal settings. Plan to start Pradaxa tomorrow per Heme/Onc. Neuro exam stable.  05/02/2023 NAEO, neuro exam stable. CTH overnight stable. Heparin gtt transitioned to Pradaxa today for AC of reduced EF and VTE history. Speech cleared for minced/moist diet. Stable for step down.  05/03/2023 Stepped down to NPU this morning, NAEO. Neuro exam stable, speech improving but continues to have significant RSW. Dispo recs for IPR.  05/04/2023 Pt improving this AM. No aphasia noted on exam. Restarted Pradaxa BID and switched Metoprolol tartrate to metop succinate for GDMT.       STROKE DOCUMENTATION   Acute Stroke Times   Last Known Normal Date: 04/29/23  Last Known Normal Time: 2015  Symptom Onset Date: 04/29/23  Symptom Onset Time: 2015  Stroke Team Called Date:  04/29/23  Stroke Team Called Time: 2022  Stroke Team Arrival Date: 04/29/23  Stroke Team Arrival Time: 2025  CT Interpretation Time: 2038  Thrombolytic Therapy Recommended: No  CTA Interpretation Time: 2045  Thrombectomy Recommended: Yes  Decision to Treat Time for IR: 2045    NIH Scale:    1a. Level of Consciousness: 0-->Alert, keenly responsive  1b. LOC Questions: 0-->Answers both questions correctly  1c. LOC Commands: 0-->Performs both tasks correctly  2. Best Gaze: 1-->Partial gaze palsy, gaze is abnormal in one or both eyes, but forced deviation or total gaze paresis is not present  3. Visual: 1-->Partial hemianopia  4. Facial Palsy: 2-->Partial paralysis (total or near-total paralysis of lower face)  5a. Motor Arm, Left: 0-->No drift, limb holds 90 (or 45) degrees for full 10 secs  5b. Motor Arm, Right: 3-->No effort against gravity, limb falls  6a. Motor Leg, Left: 0-->No drift, leg holds 30 degree position for full 5 secs  6b. Motor Leg, Right: 2-->Some effort against gravity, leg falls to bed by 5 secs, but has some effort against gravity  7. Limb Ataxia: 0-->Absent  8. Sensory: 1-->Mild-to-moderate sensory loss, patient feels pinprick is less sharp or is dull on the affected side, or there is a loss of superficial pain with pinprick, but patient is aware of being touched  9. Best Language: 0-->No aphasia noted  10. Dysarthria: 1-->Mild-to-moderate dysarthria, patient slurs at least some words and, at worst, can be understood with some difficulty  11. Extinction and Inattention (formerly Neglect): 1-->Visual, tactile, auditory, spatial, or personal inattention or extinction to bilateral simultaneous stimulation in one of the sensory modalities  Total (NIH Stroke Scale): 12      Modified Becky Score: 0  [place] Coma Scale:    ABCD2 Score:    [id number] Score:   HAS -BLED Score:   ICH Score:   Hunt & Vora Classification:      Hemorrhagic change of an Ischemic Stroke: Does this patient have an ischemic  stroke with hemorrhagic changes? No     Neurologic Chief Complaint: L MCA stroke    Subjective:     Interval History: Patient is seen for follow-up neurological assessment and treatment recommendations:   Pt improving this AM. No aphasia noted on exam. Restarted Pradaxa BID and switched Metoprolol tartrate to metop succinate for GDMT.     HPI, Past Medical, Family, and Social History remains the same as documented in the initial encounter.     Review of Systems   Reason unable to perform ROS: severe aphasia.   Constitutional:  Negative for fatigue.   Eyes:  Positive for visual disturbance.   Respiratory:  Negative for shortness of breath.    Cardiovascular:  Negative for chest pain.   Gastrointestinal:  Negative for abdominal distention and abdominal pain.   Neurological:  Positive for facial asymmetry and weakness. Negative for speech difficulty (improving).   Psychiatric/Behavioral:  Negative for agitation and behavioral problems.    Scheduled Meds:   aspirin  81 mg Oral Daily    aspirin  300 mg Rectal Once    atorvastatin  80 mg Oral Daily    dabigatran etexilate  150 mg Oral BID    [START ON 5/5/2023] metoprolol succinate  100 mg Oral Daily    mupirocin   Nasal BID    polyethylene glycol  17 g Oral Daily    sacubitriL-valsartan  1 tablet Oral BID    senna-docusate 8.6-50 mg  1 tablet Oral BID    spironolactone  25 mg Oral Daily    torsemide  10 mg Oral Daily     Continuous Infusions:    PRN Meds:acetaminophen, albuterol-ipratropium, calcium gluconate IVPB, calcium gluconate IVPB, calcium gluconate IVPB, dextrose 10%, dextrose 10%, dextrose 10%, dextrose 10%, dextrose, dextrose, glucagon (human recombinant), insulin aspart U-100, labetalol, magnesium sulfate IVPB, magnesium sulfate IVPB, ondansetron, potassium chloride **AND** potassium chloride **AND** potassium chloride, senna-docusate 8.6-50 mg, sodium chloride 0.9%, sodium chloride 0.9%, sodium phosphate IVPB, sodium phosphate IVPB, sodium  phosphate IVPB    Objective:     Vital Signs (Most Recent):  Temp: 96.7 °F (35.9 °C) (05/04/23 1117)  Pulse: 99 (05/04/23 1453)  Resp: 18 (05/04/23 1117)  BP: 95/60 (05/04/23 1119)  SpO2: 97 % (05/04/23 1117)  BP Location: Right leg    Vital Signs Range (Last 24H):  Temp:  [96.7 °F (35.9 °C)-98.7 °F (37.1 °C)]   Pulse:  [72-99]   Resp:  [18]   BP: ()/(56-74)   SpO2:  [93 %-97 %]   BP Location: Right leg    Physical Exam  Vitals and nursing note reviewed.   Constitutional:       General: She is not in acute distress.  HENT:      Head: Normocephalic.      Nose: Nose normal.      Mouth/Throat:      Mouth: Mucous membranes are moist.   Eyes:      General: Visual field deficit (R hemianopsia) present.      Conjunctiva/sclera: Conjunctivae normal.      Comments: L gaze preference   Cardiovascular:      Rate and Rhythm: Normal rate.   Pulmonary:      Effort: Pulmonary effort is normal.   Abdominal:      General: Abdomen is flat.   Skin:     General: Skin is warm and dry.   Neurological:      Mental Status: She is alert.      Cranial Nerves: Dysarthria and facial asymmetry present.      Sensory: Sensory deficit present.      Motor: Weakness present.   Psychiatric:         Mood and Affect: Mood normal.       Neurological Exam:   LOC: alert  Attention Span: Good   Language: expressive aphasia  Articulation: dysarthria  Orientation: Limited due to severe aphasia, oriented tos elf  Visual Fields: Hemianopsia right  EOM (CN III, IV, VI): Gaze preference  left  Facial Movement (CN VII): Lower facial weakness on the Right  Motor: Arm left  Normal 5/5  Leg left  Normal 5/5  Arm right  Paresis 1+/5  Leg right Paresis 2+/5      Laboratory:  CMP:   Recent Labs   Lab 05/04/23  0419   CALCIUM 9.7   ALBUMIN 3.3*   PROT 7.2      K 4.4   CO2 23      BUN 14   CREATININE 1.0   ALKPHOS 148*   ALT 24   AST 29   BILITOT 0.6       CBC:   Recent Labs   Lab 05/03/23  0103   WBC 8.08   RBC 3.94*   HGB 11.5*   HCT 35.2*       MCV 89   MCH 29.2   MCHC 32.7       Lipid Panel:   Recent Labs   Lab 04/30/23  0845   CHOL 142   LDLCALC 88.6   HDL 38*   TRIG 77       Coagulation:   Recent Labs   Lab 04/30/23  1814 05/01/23  0107 05/02/23  0043   INR 1.1  --   --    APTT 25.7   < > 43.0*    < > = values in this interval not displayed.         Hgb A1C:   Recent Labs   Lab 04/30/23  0845   HGBA1C 7.5*       TSH:   Recent Labs   Lab 04/30/23  0845   TSH 2.092         Diagnostic Results     Brain Imaging   CTH without contrast 5/1/23  Acute left MCA distribution infarct appears stable in size and distribution compared to 04/30/2023 MRI.  No evidence of hemorrhagic conversion.   Acute small right cerebellar infarct is better demonstrated on MRI.   No acute intracranial hemorrhage or midline shift.     MRI Brain without contrast 4/30/23    Acute left MCA distribution infarct.  No significant mass effect or acute hemorrhage at this time.  Small acute infarct in the right cerebellum.  Chronic right MCA distribution infarct.     CTH 4/30/2023  No acute territorial infarct or intracranial hemorrhage identified.      Vessel Imaging   CTA Stroke MP 4/29/2023  CT head: No evidence for acute intracranial hemorrhage.  Stable area encephalomalacia from remote right MCA territory infarct.  CTA head: Occlusion of M2 segment of left MCA.  Asymmetric decreased opacification of the left transverse sinus and jugular vein.  Similar finding was present on the prior CTA in 2022 and therefore could be related to sluggish flow.  Suggest attention on follow-up conventional angiogram.  CTA neck: No high-grade stenosis or aneurysm.      Cardiac Imaging   Echo 4/28/2023   The left ventricle is severely enlarged with eccentric hypertrophy and severely decreased systolic function.   The estimated ejection fraction is 10-15%.   There is left ventricular global hypokinesis.   Grade II left ventricular diastolic dysfunction.   Normal right ventricular size with normal right  ventricular systolic function.   Mild tricuspid regurgitation.   The estimated PA systolic pressure is 35 mmHg.   Normal central venous pressure (3 mmHg).   Severe left atrial enlargement.      Acacia Bashir MD  Comprehensive Stroke Center  Department of Vascular Neurology   [facility])

## 2023-05-04 NOTE — ASSESSMENT & PLAN NOTE
Stroke risk factor  -Echo with EF 10-15%, global LV hypokinesis, severe LAE  -Continue GDMT  - Switched metoprolol tartrate to succinate

## 2023-05-05 DIAGNOSIS — I70.229 CRITICAL LOWER LIMB ISCHEMIA: Primary | ICD-10-CM

## 2023-05-05 LAB
ALBUMIN SERPL BCP-MCNC: 3.2 G/DL (ref 3.5–5.2)
ALP SERPL-CCNC: 139 U/L (ref 55–135)
ALT SERPL W/O P-5'-P-CCNC: 26 U/L (ref 10–44)
ANION GAP SERPL CALC-SCNC: 14 MMOL/L (ref 8–16)
AST SERPL-CCNC: 33 U/L (ref 10–40)
BILIRUB SERPL-MCNC: 0.6 MG/DL (ref 0.1–1)
BUN SERPL-MCNC: 21 MG/DL (ref 8–23)
CALCIUM SERPL-MCNC: 9.6 MG/DL (ref 8.7–10.5)
CHLORIDE SERPL-SCNC: 103 MMOL/L (ref 95–110)
CO2 SERPL-SCNC: 24 MMOL/L (ref 23–29)
CREAT SERPL-MCNC: 1 MG/DL (ref 0.5–1.4)
EST. GFR  (NO RACE VARIABLE): >60 ML/MIN/1.73 M^2
GLUCOSE SERPL-MCNC: 148 MG/DL (ref 70–110)
POCT GLUCOSE: 136 MG/DL (ref 70–110)
POCT GLUCOSE: 137 MG/DL (ref 70–110)
POCT GLUCOSE: 152 MG/DL (ref 70–110)
POTASSIUM SERPL-SCNC: 3.8 MMOL/L (ref 3.5–5.1)
PROT SERPL-MCNC: 7 G/DL (ref 6–8.4)
SODIUM SERPL-SCNC: 141 MMOL/L (ref 136–145)

## 2023-05-05 PROCEDURE — 80053 COMPREHEN METABOLIC PANEL: CPT | Performed by: PHYSICIAN ASSISTANT

## 2023-05-05 PROCEDURE — 11000001 HC ACUTE MED/SURG PRIVATE ROOM

## 2023-05-05 PROCEDURE — 97110 THERAPEUTIC EXERCISES: CPT

## 2023-05-05 PROCEDURE — 25000003 PHARM REV CODE 250: Performed by: PHYSICIAN ASSISTANT

## 2023-05-05 PROCEDURE — 25000003 PHARM REV CODE 250: Performed by: HOSPITALIST

## 2023-05-05 PROCEDURE — 25000003 PHARM REV CODE 250

## 2023-05-05 PROCEDURE — 25000003 PHARM REV CODE 250: Performed by: PSYCHIATRY & NEUROLOGY

## 2023-05-05 PROCEDURE — 63600175 PHARM REV CODE 636 W HCPCS: Performed by: HOSPITALIST

## 2023-05-05 PROCEDURE — 36415 COLL VENOUS BLD VENIPUNCTURE: CPT | Performed by: PHYSICIAN ASSISTANT

## 2023-05-05 PROCEDURE — 99233 PR SUBSEQUENT HOSPITAL CARE,LEVL III: ICD-10-PCS | Mod: ,,, | Performed by: PSYCHIATRY & NEUROLOGY

## 2023-05-05 PROCEDURE — 97530 THERAPEUTIC ACTIVITIES: CPT

## 2023-05-05 PROCEDURE — 99233 SBSQ HOSP IP/OBS HIGH 50: CPT | Mod: ,,, | Performed by: PSYCHIATRY & NEUROLOGY

## 2023-05-05 RX ORDER — METOPROLOL SUCCINATE 100 MG/1
100 TABLET, EXTENDED RELEASE ORAL DAILY
Qty: 30 TABLET | Refills: 11 | Status: ON HOLD | OUTPATIENT
Start: 2023-05-06 | End: 2023-06-01 | Stop reason: SDUPTHER

## 2023-05-05 RX ORDER — DABIGATRAN ETEXILATE 150 MG/1
150 CAPSULE ORAL 2 TIMES DAILY
Qty: 60 CAPSULE | Refills: 11 | Status: ON HOLD | OUTPATIENT
Start: 2023-05-05 | End: 2023-06-01 | Stop reason: HOSPADM

## 2023-05-05 RX ORDER — ASPIRIN 81 MG/1
81 TABLET ORAL DAILY
Qty: 30 TABLET | Refills: 11 | Status: SHIPPED | OUTPATIENT
Start: 2023-05-06 | End: 2024-05-05

## 2023-05-05 RX ADMIN — TORSEMIDE 10 MG: 10 TABLET ORAL at 11:05

## 2023-05-05 RX ADMIN — SPIRONOLACTONE 25 MG: 25 TABLET, FILM COATED ORAL at 11:05

## 2023-05-05 RX ADMIN — DABIGATRAN ETEXILATE MESYLATE 150 MG: 150 CAPSULE ORAL at 11:05

## 2023-05-05 RX ADMIN — SENNOSIDES AND DOCUSATE SODIUM 1 TABLET: 8.6; 5 TABLET ORAL at 08:05

## 2023-05-05 RX ADMIN — DABIGATRAN ETEXILATE MESYLATE 150 MG: 150 CAPSULE ORAL at 08:05

## 2023-05-05 RX ADMIN — ONDANSETRON 4 MG: 2 INJECTION INTRAMUSCULAR; INTRAVENOUS at 01:05

## 2023-05-05 RX ADMIN — SACUBITRIL AND VALSARTAN 1 TABLET: 97; 103 TABLET, FILM COATED ORAL at 09:05

## 2023-05-05 RX ADMIN — SENNOSIDES AND DOCUSATE SODIUM 1 TABLET: 8.6; 5 TABLET ORAL at 11:05

## 2023-05-05 RX ADMIN — SACUBITRIL AND VALSARTAN 1 TABLET: 97; 103 TABLET, FILM COATED ORAL at 11:05

## 2023-05-05 RX ADMIN — MUPIROCIN: 20 OINTMENT TOPICAL at 11:05

## 2023-05-05 RX ADMIN — METOPROLOL SUCCINATE 100 MG: 100 TABLET, EXTENDED RELEASE ORAL at 11:05

## 2023-05-05 RX ADMIN — ATORVASTATIN CALCIUM 80 MG: 40 TABLET, FILM COATED ORAL at 11:05

## 2023-05-05 RX ADMIN — ASPIRIN 81 MG: 81 TABLET, COATED ORAL at 11:05

## 2023-05-05 NOTE — PLAN OF CARE
[facility]    FACILITY TRANSFER ORDERS      Patient Name: Diomedes Mohr  YOB: 1960    PCP: Fernando Manzo MD   PCP Address: [address] [place] [address] / ALLIE GARDINER  PCP Phone Number: [phone]  PCP Fax: [phone]    Encounter Date: 05/12/2023    Admit to: SNF    Vital Signs:  Routine    Diagnoses:   Active Hospital Problems    Diagnosis  POA    *Cerebrovascular accident (CVA) due to embolism of left middle cerebral artery [I63.412]  No    Right sided weakness [R53.1]  Yes    Long term current use of anticoagulant [Z79.01]  Not Applicable     Chronic    Critical lower limb ischemia [I70.229]  Yes    Bilateral primary osteoarthritis of hip [M16.0]  Yes    Hypercoagulopathy [D68.59]  Yes    Pulmonary hypertension due to left heart disease [I27.22]  Yes     Chronic    History of pulmonary embolism [Z86.711]  Yes     Chronic    ICD (implantable cardioverter-defibrillator) in place [Z95.810]  Yes     Chronic    Coronary artery disease involving native heart [I25.10]  Yes     Chronic    Hypokalemia [E87.6]  Yes    Cytotoxic brain edema [G93.6]  Yes    Chronic combined systolic and diastolic congestive heart failure [I50.42]  Yes     Chronic    Dilated cardiomyopathy [I42.0]  Yes     Chronic    Hypertension [I10]  Yes     Chronic    Hyperlipemia [E78.5]  Yes     Chronic    Type 2 diabetes mellitus without complication, without long-term current use of insulin [E11.9]  Yes     Chronic      Resolved Hospital Problems    Diagnosis Date Resolved POA    Global aphasia [R47.01] 05/02/2023 Yes    Acute deep vein thrombosis (DVT) [I82.409] 04/30/2023 Yes    Cerebrovascular accident (CVA) due to occlusion of right middle cerebral artery [I63.511] 04/29/2023 Yes       Allergies:  Review of patient's allergies indicates:   Allergen Reactions    Adhesive Blisters    Captopril Other (See Comments)     COUGH       Diet: diabetic diet: 2000 calorie    Activities: Activity as tolerated and Weight  "bearing as tolerated    Goals of Care Treatment Preferences:  Code Status: Full Code         CONSULTS:    Physical Therapy to evaluate and treat. , Occupational Therapy to evaluate and treat., and Speech Therapy to evaluate and treat for Language and Cognition.    MISCELLANEOUS CARE:  Diabetes Care:   SN to perform and educate Diabetic management with blood glucose monitoring: and Report CBG < 60 or > 350 to physician.    WOUND CARE ORDERS  None    Medications: Review discharge medications with patient and family and provide education.      Current Discharge Medication List        START taking these medications    Details   aspirin (ECOTRIN) 81 MG EC tablet Take 1 tablet (81 mg total) by mouth once daily.  Qty: 30 tablet, Refills: 11      dabigatran etexilate (PRADAXA) 150 mg Cap Take 1 capsule (150 mg total) by mouth 2 (two) times a day. "Do NOT break, chew, or open capsules."  Qty: 60 capsule, Refills: 11           CONTINUE these medications which have CHANGED    Details   metoprolol succinate (TOPROL-XL) 100 MG 24 hr tablet Take 1 tablet (100 mg total) by mouth once daily.  Qty: 30 tablet, Refills: 11    Comments: .           CONTINUE these medications which have NOT CHANGED    Details   acetaminophen (TYLENOL) 325 MG tablet Take 2 tablets (650 mg total) by mouth every 8 (eight) hours as needed.  Qty: 30 tablet, Refills: 0      albuterol (PROVENTIL) 2.5 mg /3 mL (0.083 %) nebulizer solution Take 3 mLs (2.5 mg total) by nebulization every 6 (six) hours as needed for Wheezing or Shortness of Breath. Rescue  Qty: 3 mL, Refills: 0      albuterol (PROVENTIL/VENTOLIN HFA) 90 mcg/actuation inhaler Inhale 2 puffs into the lungs every 6 (six) hours as needed for Wheezing or Shortness of Breath.  Qty: 18 g, Refills: 2    Associated Diagnoses: Chronic obstructive pulmonary disease, unspecified COPD type      atorvastatin (LIPITOR) 80 MG tablet Take 1 tablet (80 mg total) by mouth once daily.  Qty: 90 tablet, Refills: 3    " Associated Diagnoses: Dilated cardiomyopathy      dulaglutide (TRULICITY) 1.5 mg/0.5 mL pen injector Inject 1.5 mg into the skin once a week.      empagliflozin (JARDIANCE) 10 mg tablet Take 1 tablet (10 mg total) by mouth once daily.  Qty: 90 tablet, Refills: 6    Associated Diagnoses: Chronic combined systolic and diastolic congestive heart failure; Obesity (BMI 30.0-34.9); History of pulmonary embolism; Chronic obstructive pulmonary disease, unspecified COPD type      glimepiride (AMARYL) 4 MG tablet Take 4 mg by mouth 2 (two) times a day.      ipratropium-albuteroL (COMBIVENT)  mcg/actuation inhaler Inhale 1 puff into the lungs 4 (four) times daily. Rescue  Qty: 4 g, Refills: 3    Associated Diagnoses: Shortness of breath      metFORMIN (GLUCOPHAGE) 1000 MG tablet Take 1,000 mg by mouth 2 (two) times daily.       sacubitriL-valsartan (ENTRESTO)  mg per tablet Take 1 tablet by mouth 2 (two) times daily.  Qty: 90 tablet, Refills: 3    Associated Diagnoses: Dilated cardiomyopathy      spironolactone (ALDACTONE) 25 MG tablet Take 1 tablet (25 mg total) by mouth once daily.  Qty: 30 tablet, Refills: 6    Comments: .  Associated Diagnoses: Dilated cardiomyopathy      torsemide (DEMADEX) 10 MG Tab Take 1 tablet (10 mg total) by mouth once daily.  Qty: 60 tablet, Refills: 6    Associated Diagnoses: Chronic combined systolic and diastolic congestive heart failure; Dilated cardiomyopathy           STOP taking these medications       rivaroxaban (XARELTO) 20 mg Tab Comments:   Reason for Stopping:         traMADoL (ULTRAM) 50 mg tablet Comments:   Reason for Stopping:                  Immunizations Administered as of 5/12/2023       Name Date Dose VIS Date Route Exp Date    COVID-19, MRNA, LN-S, PF (Moderna) 3/17/2021 0.5 mL -- -- --    Lot: [id number]     External: Auto Reconciled From Outside Source     COVID-19, MRNA, LN-S, PF (Moderna) 2/17/2021 0.5 mL -- -- --    Lot: [id number]     External: Auto Reconciled  From Outside Source                 Some patients may experience side effects after vaccination.  These may include fever, headache, muscle or joint aches.  Most symptoms resolve with 24-48 hours and do not require urgent medical evaluation unless they persist for more than 72 hours or symptoms are concerning for an unrelated medical condition.          _________________________________  Tushar Ayala MD  05/12/2023

## 2023-05-05 NOTE — PT/OT/SLP PROGRESS
Occupational Therapy   Treatment    Name: Diomedes Mohr  MRN: [id number]  Admitting Diagnosis:  Cerebrovascular accident (CVA) due to embolism of left middle cerebral artery       Recommendations:     Discharge Recommendations: rehabilitation facility  Discharge Equipment Recommendations:  to be determined by next level of care  Barriers to discharge:  Other (Comment) (increased skilled assistance require d)    Assessment:     Diomedes Mohr is a 62 y.o. female with a medical diagnosis of Cerebrovascular accident (CVA) due to embolism of left middle cerebral artery.  She presents with the following performance deficits affecting function:  weakness, impaired endurance, impaired self care skills, impaired functional mobility, gait instability, impaired balance, decreased coordination, decreased upper extremity function, decreased lower extremity function, impaired fine motor, impaired coordination, decreased ROM, decreased safety awareness.     Pt agreeable to therapy and tolerated the session well. Performed bed mobility with Mod A and sat EOB with CGA-SBA while she participated in BUE therex. AAROM provided to the RUE where she demonstrates muscle activation throughout the entire extremity. Pt easily fatigues and requires increased rest breaks and verbal cues to attend to the task. Performed one sit to stand with Mod A with increased physical and verbal cues for upright posture. Pt remains highly motivated to participate in therapy and shows excellent rehab potential. Pt would benefit from continued skilled acute OT services in order to maximize independence and safety with ADLs and functional mobility to ensure safe return to PLOF in the least restrictive environment. OT recommending Rehab once pt is medically appropriate for d/c.     Rehab Prognosis:  Good; patient would benefit from acute skilled OT services to address these deficits and reach maximum level of function.       Plan:     Patient to be seen 4 x/week  "to address the above listed problems via self-care/home management, therapeutic activities, therapeutic exercises, neuromuscular re-education  Plan of Care Expires: 05/30/23  Plan of Care Reviewed with: patient, spouse    Subjective     "I have got to get better"     Chief Complaint: Weakness   Patient/Family Comments/goals: To return to PLOF  Pain/Comfort:  Pain Rating 1: 0/10    Objective:     Communicated with: RN prior to session.  Patient found HOB elevated with telemetry, PureWick, bed alarm upon OT entry to room.    General Precautions: Standard, aspiration    Orthopedic Precautions:N/A  Braces: N/A  Respiratory Status: Room air     Occupational Performance:     Bed Mobility:    Patient completed Rolling/Turning to Right with minimum assistance  Patient completed Scooting/Bridging with moderate assistance  Patient completed Supine to Sit with moderate assistance  Patient completed Sit to Supine with moderate assistance   Pt sat EOB ~ 12 minutes with SBA-CGA     Functional Mobility/Transfers:  Patient completed Sit <> Stand Transfer with moderate assistance  with  no assistive device   Increased verbal and physical cues to come to a complete stand     Activities of Daily Living:  Grooming: stand by assistance : To wipe her face sitting EOB due to poor management of oral secretions     Therapeutic Exercise:   Pt performed the following exercises for 2x10 with AAROM  Shoulder elevation/depression  Shoulder retraction/protraction  Alternating punches   Bicep curls   Forearm supination/pronation  Wrist extension   Composite fist     [facility] 6 Click ADL: 14    Treatment & Education:  Therapist provided facilitation and instruction of proper body mechanics and fall prevention strategies during tasks listed above.  Instructed patient to sit in bedside chair daily to increase OOB/activity tolerance.  Instructed patient to use call light to have nursing staff assist with needs/transfers.  Discussed OT POC and answered " all questions within OT scope of practice.  Whiteboard updated       Patient left HOB elevated with all lines intact, call button in reach, bed alarm on, and   present    GOALS:   Multidisciplinary Problems       Occupational Therapy Goals          Problem: Occupational Therapy    Goal Priority Disciplines Outcome Interventions   Occupational Therapy Goal     OT, PT/OT Ongoing, Progressing    Description: Goals set on 4/30, with expiration date 5/30:  Patient will increase functional independence with ADLs by performing:    Bed mobility with Min A  Grooming while standing at sink with Min A  UB Dressing with Min A.  LB Dressing with Min A.  Toileting from toilet with Min A for hygiene and clothing management.   Functional mobility of household and community distance with Mod A and AD as needed  Pt will demonstrate understanding of education provided regarding energy conservation and task modification through teach-back method.  Pt will demonstrate [place] in HEP for BUE strengthening GM/FM exercises to improve functional performance.                          Time Tracking:     OT Date of Treatment: 05/05/23  OT Start Time: 1509  OT Stop Time: 1532  OT Total Time (min): 23 min    Billable Minutes:Therapeutic Activity 10  Therapeutic Exercise 13    OT/JENNIFER: OT          5/5/2023

## 2023-05-05 NOTE — PLAN OF CARE
05/04/23 0835   Post-Acute Status   Post-Acute Authorization Placement   Post-Acute Placement Status Pending payor review/awaiting authorization (if required)   Discharge Plan   Discharge Plan A Rehab     Patient has been accepted at [facility] and liajason Hernandez has submitted auth.  HERMELINDA will continue to follow.  KARLIE 5/5.      12:55 PM  HERMELINDA spoke with Mary at [facility].  Auth is still pending.  Patient is medically cleared.     Mandy Rutherford LMSW  [facility]  [phone]

## 2023-05-06 LAB
ALBUMIN SERPL BCP-MCNC: 3.3 G/DL (ref 3.5–5.2)
ALP SERPL-CCNC: 143 U/L (ref 55–135)
ALT SERPL W/O P-5'-P-CCNC: 28 U/L (ref 10–44)
ANION GAP SERPL CALC-SCNC: 10 MMOL/L (ref 8–16)
AST SERPL-CCNC: 33 U/L (ref 10–40)
BILIRUB SERPL-MCNC: 0.7 MG/DL (ref 0.1–1)
BUN SERPL-MCNC: 19 MG/DL (ref 8–23)
CALCIUM SERPL-MCNC: 9.7 MG/DL (ref 8.7–10.5)
CHLORIDE SERPL-SCNC: 103 MMOL/L (ref 95–110)
CO2 SERPL-SCNC: 27 MMOL/L (ref 23–29)
CREAT SERPL-MCNC: 0.9 MG/DL (ref 0.5–1.4)
EST. GFR  (NO RACE VARIABLE): >60 ML/MIN/1.73 M^2
GLUCOSE SERPL-MCNC: 141 MG/DL (ref 70–110)
MAGNESIUM SERPL-MCNC: 2.1 MG/DL (ref 1.6–2.6)
PHOSPHATE SERPL-MCNC: 3.6 MG/DL (ref 2.7–4.5)
POCT GLUCOSE: 141 MG/DL (ref 70–110)
POCT GLUCOSE: 99 MG/DL (ref 70–110)
POTASSIUM SERPL-SCNC: 4 MMOL/L (ref 3.5–5.1)
PROT SERPL-MCNC: 7 G/DL (ref 6–8.4)
SODIUM SERPL-SCNC: 140 MMOL/L (ref 136–145)

## 2023-05-06 PROCEDURE — 25000003 PHARM REV CODE 250: Performed by: PHYSICIAN ASSISTANT

## 2023-05-06 PROCEDURE — 80053 COMPREHEN METABOLIC PANEL: CPT | Performed by: PHYSICIAN ASSISTANT

## 2023-05-06 PROCEDURE — 25000003 PHARM REV CODE 250: Performed by: PSYCHIATRY & NEUROLOGY

## 2023-05-06 PROCEDURE — 84100 ASSAY OF PHOSPHORUS: CPT | Performed by: PHYSICIAN ASSISTANT

## 2023-05-06 PROCEDURE — 11000001 HC ACUTE MED/SURG PRIVATE ROOM

## 2023-05-06 PROCEDURE — 25000003 PHARM REV CODE 250: Performed by: HOSPITALIST

## 2023-05-06 PROCEDURE — 99232 SBSQ HOSP IP/OBS MODERATE 35: CPT | Mod: ,,, | Performed by: PSYCHIATRY & NEUROLOGY

## 2023-05-06 PROCEDURE — 99232 PR SUBSEQUENT HOSPITAL CARE,LEVL II: ICD-10-PCS | Mod: ,,, | Performed by: PSYCHIATRY & NEUROLOGY

## 2023-05-06 PROCEDURE — 83735 ASSAY OF MAGNESIUM: CPT | Performed by: PHYSICIAN ASSISTANT

## 2023-05-06 PROCEDURE — 36415 COLL VENOUS BLD VENIPUNCTURE: CPT | Performed by: PHYSICIAN ASSISTANT

## 2023-05-06 RX ADMIN — SENNOSIDES AND DOCUSATE SODIUM 1 TABLET: 8.6; 5 TABLET ORAL at 09:05

## 2023-05-06 RX ADMIN — SACUBITRIL AND VALSARTAN 1 TABLET: 97; 103 TABLET, FILM COATED ORAL at 08:05

## 2023-05-06 RX ADMIN — TORSEMIDE 10 MG: 10 TABLET ORAL at 09:05

## 2023-05-06 RX ADMIN — SENNOSIDES AND DOCUSATE SODIUM 1 TABLET: 8.6; 5 TABLET ORAL at 08:05

## 2023-05-06 RX ADMIN — SPIRONOLACTONE 25 MG: 25 TABLET, FILM COATED ORAL at 09:05

## 2023-05-06 RX ADMIN — ATORVASTATIN CALCIUM 80 MG: 40 TABLET, FILM COATED ORAL at 09:05

## 2023-05-06 RX ADMIN — SACUBITRIL AND VALSARTAN 1 TABLET: 97; 103 TABLET, FILM COATED ORAL at 09:05

## 2023-05-06 RX ADMIN — DABIGATRAN ETEXILATE MESYLATE 150 MG: 150 CAPSULE ORAL at 08:05

## 2023-05-06 RX ADMIN — ASPIRIN 81 MG: 81 TABLET, COATED ORAL at 09:05

## 2023-05-06 RX ADMIN — DABIGATRAN ETEXILATE MESYLATE 150 MG: 150 CAPSULE ORAL at 09:05

## 2023-05-06 NOTE — PROGRESS NOTES
Dmitriy Triana - Neurosurgery ([facility])  Vascular Neurology  Comprehensive Stroke Center  Progress Note    Assessment/Plan:     * Cerebrovascular accident (CVA) due to embolism of left middle cerebral artery  62 y.o. female with PMH of HTN, DM2, HLD, tobacco use (quit in 2020), stroke (2020, R MCA, no deficits), PE (December 2021, on xarelto),  CAD, DCM, HFrEF (15%) s/p AICD, Pulmonary hypertension admited to  4/27 for the treatment of a right leg DVT on heparin gtt. Stroke code activated 4/29 for aphasia and R hemiplegia, LKN 20:15 (~5 min prior). NIHSS 24, L MCA syndrome. Not TNK candidate due to AC with heparin gtt and therapeutic aPTT. CTA with L M2 occlusion. Taken to IR for possible intervention, now s/p L M2 thrombectomy with TICI 2c reperfusion. Repeat CTH post-IR with no acute infarct or hemorrhage, heparin gtt restarted. MRI brain W/WO with acute L MCA territory infarcts (insula, frontotemporal opercular cortex, patchy frontal white matter, small temporoparietal cortex), as well as small acute R cerebellar infarct. Echo with EF 10-15%, global LV hypokinesis, severe LAE. Suspect etiology likely cardioembolic due to EF 15%.    Pt's Neuro exam is improving; dysarthria improved. Labs stable.     Antithrombotics for secondary stroke prevention: Anticoagulants: Pradaxa 150mg BID, ASA 81mg daily    Statins for secondary stroke prevention and HLD, if present: Statins: Atorvastatin- 80 mg daily    Aggressive risk factor modification: HTN, DM, HLD, HF, DVT    Rehab efforts: The patient has been evaluated by a stroke team provider and the therapy needs have been fully considered based off the presenting complaints and exam findings. The following therapy evaluations are needed: PT evaluate and treat, OT evaluate and treat, SLP evaluate and treat, PM&R evaluate for appropriate placement    Diagnostics ordered/pending: CTH PRN for acute neuro exam changes    VTE prophylaxis: None: Reason for No Pharmacological VTE  Prophylaxis: Currently on anticoagulation    BP parameters: Infarct:  SBP <160    Right sided weakness  Due to stroke  -PT/OT eval and treat  -Dispo recs for IPR    Critical lower limb ischemia  Stroke risk factor  Admitted for R popliteal artery occlusion, was started on heparin gtt  Heparin gtt restarted post-IR, per heme/onc recs transition to pradaxa today (5/2)    Chronic combined systolic and diastolic congestive heart failure  Stroke risk factor  -Echo with EF 10-15%, global LV hypokinesis, severe LAE  -Continue GDMT  - Switched metoprolol tartrate to succinate    Hyperlipemia  Stroke risk factor  -LDL 88, goal <70  -Atorvastatin 80mg daily    Hypertension  Stroke Risk factor  -SBP <160 s/p thrombectomy  -PRN hydralazine/labetalol    Type 2 diabetes mellitus without complication, without long-term current use of insulin  Stroke risk factor  -A1c 7.5  -BG goal while inpatient 140-180  -MC SSI PRN         4/30-S/p TICI 2c. Repeat CTH after IR with no acute infarct. Will need MRI but has a pacemaker that will need be interrogated. Improving r arm weakness and  aphasia.  5/1/2023- MRI completed showing acute L. MCA infarct. Pacemaker reinitiated post MRI to normal settings. Plan to start Pradaxa tomorrow per Heme/Onc. Neuro exam stable.  05/02/2023 NAEO, neuro exam stable. CTH overnight stable. Heparin gtt transitioned to Pradaxa today for AC of reduced EF and VTE history. Speech cleared for minced/moist diet. Stable for step down.  05/03/2023 Stepped down to NPU this morning, NAEO. Neuro exam stable, speech improving but continues to have significant RSW. Dispo recs for IPR.  05/04/2023 Pt improving this AM. No aphasia noted on exam. Restarted Pradaxa BID and switched Metoprolol tartrate to metop succinate for GDMT.   05/05/2023 Pt neuro exam improving. Aphasia noted on exam today. Pt reports insomnia and disliking hospital meals. In good spirits and excited for discharge to IPR.        STROKE DOCUMENTATION    Acute Stroke Times   Last Known Normal Date: 04/29/23  Last Known Normal Time: 2015  Symptom Onset Date: 04/29/23  Symptom Onset Time: 2015  Stroke Team Called Date: 04/29/23  Stroke Team Called Time: 2022  Stroke Team Arrival Date: 04/29/23  Stroke Team Arrival Time: 2025  CT Interpretation Time: 2038  Thrombolytic Therapy Recommended: No  CTA Interpretation Time: 2045  Thrombectomy Recommended: Yes  Decision to Treat Time for IR: 2045    NIH Scale:  1a. Level of Consciousness: 0-->Alert, keenly responsive  1b. LOC Questions: 0-->Answers both questions correctly  1c. LOC Commands: 0-->Performs both tasks correctly  2. Best Gaze: 1-->Partial gaze palsy, gaze is abnormal in one or both eyes, but forced deviation or total gaze paresis is not present  3. Visual: 1-->Partial hemianopia  4. Facial Palsy: 1-->Minor paralysis (flattened nasolabial fold, asymmetry on smiling)  5a. Motor Arm, Left: 0-->No drift, limb holds 90 (or 45) degrees for full 10 secs  5b. Motor Arm, Right: 2-->Some effort against gravity, limb cannot get to or maintain (if cued) 90 (or 45) degrees, drifts down to bed, but has some effort against gravity  6a. Motor Leg, Left: 0-->No drift, leg holds 30 degree position for full 5 secs  6b. Motor Leg, Right: 2-->Some effort against gravity, leg falls to bed by 5 secs, but has some effort against gravity  7. Limb Ataxia: 0-->Absent  8. Sensory: 1-->Mild-to-moderate sensory loss, patient feels pinprick is less sharp or is dull on the affected side, or there is a loss of superficial pain with pinprick, but patient is aware of being touched  9. Best Language: 1-->Mild-to-moderate aphasia, some obvious loss of fluency or facility of comprehension, without significant limitation on ideas expressed or form of expression. Reduction of speech and/or comprehension, however, makes conversation. . . (see row details)  10. Dysarthria: 1-->Mild-to-moderate dysarthria, patient slurs at least some words and, at  worst, can be understood with some difficulty  11. Extinction and Inattention (formerly Neglect): 1-->Visual, tactile, auditory, spatial, or personal inattention or extinction to bilateral simultaneous stimulation in one of the sensory modalities  Total (NIH Stroke Scale): 11       Modified Jack Score: 0  Kendrick Coma Scale:    ABCD2 Score:    [id number] Score:   HAS -BLED Score:   ICH Score:   Hunt & Vora Classification:      Hemorrhagic change of an Ischemic Stroke: Does this patient have an ischemic stroke with hemorrhagic changes? No     Neurologic Chief Complaint: L MCA stroke    Subjective:     Interval History: Patient is seen for follow-up neurological assessment and treatment recommendations:   Pt improving this AM. No aphasia noted on exam. Restarted Pradaxa BID and switched Metoprolol tartrate to metop succinate for GDMT.     HPI, Past Medical, Family, and Social History remains the same as documented in the initial encounter.     Review of Systems   Reason unable to perform ROS: severe aphasia.   Constitutional:  Negative for fatigue.   Eyes:  Positive for visual disturbance.   Respiratory:  Negative for shortness of breath.    Cardiovascular:  Negative for chest pain.   Gastrointestinal:  Negative for abdominal distention and abdominal pain.   Neurological:  Positive for facial asymmetry, speech difficulty (improving) and weakness.   Psychiatric/Behavioral:  Positive for sleep disturbance. Negative for agitation and behavioral problems.    Scheduled Meds:   aspirin  81 mg Oral Daily    aspirin  300 mg Rectal Once    atorvastatin  80 mg Oral Daily    dabigatran etexilate  150 mg Oral BID    metoprolol succinate  100 mg Oral Daily    polyethylene glycol  17 g Oral Daily    sacubitriL-valsartan  1 tablet Oral BID    senna-docusate 8.6-50 mg  1 tablet Oral BID    spironolactone  25 mg Oral Daily    torsemide  10 mg Oral Daily     Continuous Infusions:    PRN Meds:acetaminophen,  albuterol-ipratropium, calcium gluconate IVPB, calcium gluconate IVPB, calcium gluconate IVPB, dextrose 10%, dextrose 10%, dextrose 10%, dextrose 10%, dextrose, dextrose, glucagon (human recombinant), insulin aspart U-100, labetalol, magnesium sulfate IVPB, magnesium sulfate IVPB, ondansetron, potassium chloride **AND** potassium chloride **AND** potassium chloride, senna-docusate 8.6-50 mg, sodium chloride 0.9%, sodium chloride 0.9%, sodium phosphate IVPB, sodium phosphate IVPB, sodium phosphate IVPB    Objective:     Vital Signs (Most Recent):  Temp: 98.1 °F (36.7 °C) (05/05/23 1606)  Pulse: (!) 46 (05/05/23 1606)  Resp: 18 (05/05/23 1606)  BP: (!) 96/51 (05/05/23 1606)  SpO2: 95 % (05/05/23 1606)  BP Location: Right arm    Vital Signs Range (Last 24H):  Temp:  [98.1 °F (36.7 °C)-98.5 °F (36.9 °C)]   Pulse:  []   Resp:  [18]   BP: ()/(51-77)   SpO2:  [95 %-100 %]   BP Location: Right arm    Physical Exam  Constitutional:       General: She is not in acute distress.  HENT:      Head: Normocephalic.      Nose: Nose normal.      Mouth/Throat:      Mouth: Mucous membranes are moist.   Eyes:      General: Visual field deficit (R hemianopsia) present.      Conjunctiva/sclera: Conjunctivae normal.      Comments: L gaze preference   Cardiovascular:      Rate and Rhythm: Normal rate.   Pulmonary:      Effort: Pulmonary effort is normal.   Abdominal:      General: Abdomen is flat.   Skin:     General: Skin is warm and dry.   Neurological:      Mental Status: She is alert.      Cranial Nerves: Dysarthria and facial asymmetry present.      Sensory: Sensory deficit present.      Motor: Weakness present.   Psychiatric:         Mood and Affect: Mood normal.       Neurological Exam:   LOC: alert  Attention Span: Good   Language: expressive aphasia  Articulation: dysarthria  Orientation: Limited due to severe aphasia, oriented tos elf  Visual Fields: Hemianopsia right  EOM (CN III, IV, VI): Gaze preference   left  Facial Movement (CN VII): Lower facial weakness on the Right  Motor: Arm left  Normal 5/5  Leg left  Normal 5/5  Arm right  Paresis 3/5  Leg right Paresis 3/5      Laboratory:  CMP:   Recent Labs   Lab 05/05/23  0306   CALCIUM 9.6   ALBUMIN 3.2*   PROT 7.0      K 3.8   CO2 24      BUN 21   CREATININE 1.0   ALKPHOS 139*   ALT 26   AST 33   BILITOT 0.6       CBC:   Recent Labs   Lab 05/03/23  0103   WBC 8.08   RBC 3.94*   HGB 11.5*   HCT 35.2*      MCV 89   MCH 29.2   MCHC 32.7       Lipid Panel:   Recent Labs   Lab 04/30/23  0845   CHOL 142   LDLCALC 88.6   HDL 38*   TRIG 77       Coagulation:   Recent Labs   Lab 04/30/23  1814 05/01/23  0107 05/02/23  0043   INR 1.1  --   --    APTT 25.7   < > 43.0*    < > = values in this interval not displayed.         Hgb A1C:   Recent Labs   Lab 04/30/23  0845   HGBA1C 7.5*       TSH:   Recent Labs   Lab 04/30/23  0845   TSH 2.092         Diagnostic Results     Brain Imaging   CTH without contrast 5/1/23  Acute left MCA distribution infarct appears stable in size and distribution compared to 04/30/2023 MRI.  No evidence of hemorrhagic conversion.   Acute small right cerebellar infarct is better demonstrated on MRI.   No acute intracranial hemorrhage or midline shift.     MRI Brain without contrast 4/30/23    Acute left MCA distribution infarct.  No significant mass effect or acute hemorrhage at this time.  Small acute infarct in the right cerebellum.  Chronic right MCA distribution infarct.     CTH 4/30/2023  No acute territorial infarct or intracranial hemorrhage identified.      Vessel Imaging   CTA Stroke MP 4/29/2023  CT head: No evidence for acute intracranial hemorrhage.  Stable area encephalomalacia from remote right MCA territory infarct.  CTA head: Occlusion of M2 segment of left MCA.  Asymmetric decreased opacification of the left transverse sinus and jugular vein.  Similar finding was present on the prior CTA in 2022 and therefore could be  related to sluggish flow.  Suggest attention on follow-up conventional angiogram.  CTA neck: No high-grade stenosis or aneurysm.      Cardiac Imaging   Echo 4/28/2023   The left ventricle is severely enlarged with eccentric hypertrophy and severely decreased systolic function.   The estimated ejection fraction is 10-15%.   There is left ventricular global hypokinesis.   Grade II left ventricular diastolic dysfunction.   Normal right ventricular size with normal right ventricular systolic function.   Mild tricuspid regurgitation.   The estimated PA systolic pressure is 35 mmHg.   Normal central venous pressure (3 mmHg).   Severe left atrial enlargement.      Tushar Ayala MD  Comprehensive Stroke Center  Department of Vascular Neurology   [facility])

## 2023-05-06 NOTE — PLAN OF CARE
Problem: Adult Inpatient Plan of Care  Goal: Optimal Comfort and Wellbeing  Outcome: Ongoing, Progressing  Goal: Readiness for Transition of Care  Outcome: Ongoing, Progressing     Problem: Adult Inpatient Plan of Care  Goal: Optimal Comfort and Wellbeing  Outcome: Ongoing, Progressing     Problem: Adult Inpatient Plan of Care  Goal: Readiness for Transition of Care  Outcome: Ongoing, Progressing       POC and stroke booklet reviewed with patient and her family at the bedside. Verbalization of understanding voiced. Questions and concerns addressed. Stroke booklet remains at the bedside. Precautions maintained. Patient progressing towards goals. No events noted at present during this shift. Cardiac monitoring ongoing. Vital signs all shift. See flow sheet. Bed low and locked with call light within reach and bed alarm activated. Family remains at the bedside. POC ongoing.

## 2023-05-06 NOTE — SUBJECTIVE & OBJECTIVE
Neurologic Chief Complaint: L MCA stroke    Subjective:     Interval History: Patient is seen for follow-up neurological assessment and treatment recommendations:   Pt improving this AM. No aphasia noted on exam. Restarted Pradaxa BID and switched Metoprolol tartrate to metop succinate for GDMT.     HPI, Past Medical, Family, and Social History remains the same as documented in the initial encounter.     Review of Systems   Reason unable to perform ROS: severe aphasia.   Constitutional:  Negative for fatigue.   Eyes:  Positive for visual disturbance.   Respiratory:  Negative for shortness of breath.    Cardiovascular:  Negative for chest pain.   Gastrointestinal:  Negative for abdominal distention and abdominal pain.   Neurological:  Positive for facial asymmetry, speech difficulty (improving) and weakness.   Psychiatric/Behavioral:  Positive for sleep disturbance. Negative for agitation and behavioral problems.    Scheduled Meds:   aspirin  81 mg Oral Daily    aspirin  300 mg Rectal Once    atorvastatin  80 mg Oral Daily    dabigatran etexilate  150 mg Oral BID    metoprolol succinate  100 mg Oral Daily    polyethylene glycol  17 g Oral Daily    sacubitriL-valsartan  1 tablet Oral BID    senna-docusate 8.6-50 mg  1 tablet Oral BID    spironolactone  25 mg Oral Daily    torsemide  10 mg Oral Daily     Continuous Infusions:    PRN Meds:acetaminophen, albuterol-ipratropium, calcium gluconate IVPB, calcium gluconate IVPB, calcium gluconate IVPB, dextrose 10%, dextrose 10%, dextrose 10%, dextrose 10%, dextrose, dextrose, glucagon (human recombinant), insulin aspart U-100, labetalol, magnesium sulfate IVPB, magnesium sulfate IVPB, ondansetron, potassium chloride **AND** potassium chloride **AND** potassium chloride, senna-docusate 8.6-50 mg, sodium chloride 0.9%, sodium chloride 0.9%, sodium phosphate IVPB, sodium phosphate IVPB, sodium phosphate IVPB    Objective:     Vital Signs (Most Recent):  Temp: 98.1 °F (36.7 °C)  (05/05/23 1606)  Pulse: (!) 46 (05/05/23 1606)  Resp: 18 (05/05/23 1606)  BP: (!) 96/51 (05/05/23 1606)  SpO2: 95 % (05/05/23 1606)  BP Location: Right arm    Vital Signs Range (Last 24H):  Temp:  [98.1 °F (36.7 °C)-98.5 °F (36.9 °C)]   Pulse:  []   Resp:  [18]   BP: ()/(51-77)   SpO2:  [95 %-100 %]   BP Location: Right arm    Physical Exam  Constitutional:       General: She is not in acute distress.  HENT:      Head: Normocephalic.      Nose: Nose normal.      Mouth/Throat:      Mouth: Mucous membranes are moist.   Eyes:      General: Visual field deficit (R hemianopsia) present.      Conjunctiva/sclera: Conjunctivae normal.      Comments: L gaze preference   Cardiovascular:      Rate and Rhythm: Normal rate.   Pulmonary:      Effort: Pulmonary effort is normal.   Abdominal:      General: Abdomen is flat.   Skin:     General: Skin is warm and dry.   Neurological:      Mental Status: She is alert.      Cranial Nerves: Dysarthria and facial asymmetry present.      Sensory: Sensory deficit present.      Motor: Weakness present.   Psychiatric:         Mood and Affect: Mood normal.       Neurological Exam:   LOC: alert  Attention Span: Good   Language: expressive aphasia  Articulation: dysarthria  Orientation: Limited due to severe aphasia, oriented tos elf  Visual Fields: Hemianopsia right  EOM (CN III, IV, VI): Gaze preference  left  Facial Movement (CN VII): Lower facial weakness on the Right  Motor: Arm left  Normal 5/5  Leg left  Normal 5/5  Arm right  Paresis 3/5  Leg right Paresis 3/5      Laboratory:  CMP:   Recent Labs   Lab 05/05/23  0306   CALCIUM 9.6   ALBUMIN 3.2*   PROT 7.0      K 3.8   CO2 24      BUN 21   CREATININE 1.0   ALKPHOS 139*   ALT 26   AST 33   BILITOT 0.6       CBC:   Recent Labs   Lab 05/03/23  0103   WBC 8.08   RBC 3.94*   HGB 11.5*   HCT 35.2*      MCV 89   MCH 29.2   MCHC 32.7       Lipid Panel:   Recent Labs   Lab 04/30/23  0845   CHOL 142   LDLCALC 88.6    HDL 38*   TRIG 77       Coagulation:   Recent Labs   Lab 04/30/23  1814 05/01/23  0107 05/02/23  0043   INR 1.1  --   --    APTT 25.7   < > 43.0*    < > = values in this interval not displayed.         Hgb A1C:   Recent Labs   Lab 04/30/23  0845   HGBA1C 7.5*       TSH:   Recent Labs   Lab 04/30/23  0845   TSH 2.092         Diagnostic Results     Brain Imaging   CTH without contrast 5/1/23  Acute left MCA distribution infarct appears stable in size and distribution compared to 04/30/2023 MRI.  No evidence of hemorrhagic conversion.   Acute small right cerebellar infarct is better demonstrated on MRI.   No acute intracranial hemorrhage or midline shift.     MRI Brain without contrast 4/30/23    Acute left MCA distribution infarct.  No significant mass effect or acute hemorrhage at this time.  Small acute infarct in the right cerebellum.  Chronic right MCA distribution infarct.     CTH 4/30/2023  No acute territorial infarct or intracranial hemorrhage identified.      Vessel Imaging   CTA Stroke MP 4/29/2023  CT head: No evidence for acute intracranial hemorrhage.  Stable area encephalomalacia from remote right MCA territory infarct.  CTA head: Occlusion of M2 segment of left MCA.  Asymmetric decreased opacification of the left transverse sinus and jugular vein.  Similar finding was present on the prior CTA in 2022 and therefore could be related to sluggish flow.  Suggest attention on follow-up conventional angiogram.  CTA neck: No high-grade stenosis or aneurysm.      Cardiac Imaging   Echo 4/28/2023  The left ventricle is severely enlarged with eccentric hypertrophy and severely decreased systolic function.  The estimated ejection fraction is 10-15%.  There is left ventricular global hypokinesis.  Grade II left ventricular diastolic dysfunction.  Normal right ventricular size with normal right ventricular systolic function.  Mild tricuspid regurgitation.  The estimated PA systolic pressure is 35 mmHg.  Normal  central venous pressure (3 mmHg).  Severe left atrial enlargement.

## 2023-05-07 LAB
ALBUMIN SERPL BCP-MCNC: 3.2 G/DL (ref 3.5–5.2)
ALP SERPL-CCNC: 143 U/L (ref 55–135)
ALT SERPL W/O P-5'-P-CCNC: 27 U/L (ref 10–44)
ANION GAP SERPL CALC-SCNC: 12 MMOL/L (ref 8–16)
AST SERPL-CCNC: 28 U/L (ref 10–40)
BASOPHILS # BLD AUTO: 0.03 K/UL (ref 0–0.2)
BASOPHILS NFR BLD: 0.3 % (ref 0–1.9)
BILIRUB SERPL-MCNC: 0.6 MG/DL (ref 0.1–1)
BUN SERPL-MCNC: 20 MG/DL (ref 8–23)
CALCIUM SERPL-MCNC: 10 MG/DL (ref 8.7–10.5)
CHLORIDE SERPL-SCNC: 103 MMOL/L (ref 95–110)
CO2 SERPL-SCNC: 24 MMOL/L (ref 23–29)
CREAT SERPL-MCNC: 1 MG/DL (ref 0.5–1.4)
DIFFERENTIAL METHOD: ABNORMAL
EOSINOPHIL # BLD AUTO: 0.2 K/UL (ref 0–0.5)
EOSINOPHIL NFR BLD: 1.9 % (ref 0–8)
ERYTHROCYTE [DISTWIDTH] IN BLOOD BY AUTOMATED COUNT: 14.5 % (ref 11.5–14.5)
EST. GFR  (NO RACE VARIABLE): >60 ML/MIN/1.73 M^2
GLUCOSE SERPL-MCNC: 157 MG/DL (ref 70–110)
HCT VFR BLD AUTO: 42.5 % (ref 37–48.5)
HGB BLD-MCNC: 13.4 G/DL (ref 12–16)
IMM GRANULOCYTES # BLD AUTO: 0.01 K/UL (ref 0–0.04)
IMM GRANULOCYTES NFR BLD AUTO: 0.1 % (ref 0–0.5)
LYMPHOCYTES # BLD AUTO: 3 K/UL (ref 1–4.8)
LYMPHOCYTES NFR BLD: 30.8 % (ref 18–48)
MAGNESIUM SERPL-MCNC: 2.1 MG/DL (ref 1.6–2.6)
MCH RBC QN AUTO: 28.3 PG (ref 27–31)
MCHC RBC AUTO-ENTMCNC: 31.5 G/DL (ref 32–36)
MCV RBC AUTO: 90 FL (ref 82–98)
MONOCYTES # BLD AUTO: 0.8 K/UL (ref 0.3–1)
MONOCYTES NFR BLD: 7.7 % (ref 4–15)
NEUTROPHILS # BLD AUTO: 5.8 K/UL (ref 1.8–7.7)
NEUTROPHILS NFR BLD: 59.2 % (ref 38–73)
NRBC BLD-RTO: 0 /100 WBC
PHOSPHATE SERPL-MCNC: 3.7 MG/DL (ref 2.7–4.5)
PLATELET # BLD AUTO: 381 K/UL (ref 150–450)
PMV BLD AUTO: 10.6 FL (ref 9.2–12.9)
POCT GLUCOSE: 196 MG/DL (ref 70–110)
POTASSIUM SERPL-SCNC: 3.7 MMOL/L (ref 3.5–5.1)
PROT SERPL-MCNC: 7.1 G/DL (ref 6–8.4)
RBC # BLD AUTO: 4.73 M/UL (ref 4–5.4)
SODIUM SERPL-SCNC: 139 MMOL/L (ref 136–145)
WBC # BLD AUTO: 9.82 K/UL (ref 3.9–12.7)

## 2023-05-07 PROCEDURE — 25000003 PHARM REV CODE 250: Performed by: HOSPITALIST

## 2023-05-07 PROCEDURE — 36415 COLL VENOUS BLD VENIPUNCTURE: CPT | Performed by: PHYSICIAN ASSISTANT

## 2023-05-07 PROCEDURE — 11000001 HC ACUTE MED/SURG PRIVATE ROOM

## 2023-05-07 PROCEDURE — 83735 ASSAY OF MAGNESIUM: CPT

## 2023-05-07 PROCEDURE — 99232 PR SUBSEQUENT HOSPITAL CARE,LEVL II: ICD-10-PCS | Mod: ,,, | Performed by: PSYCHIATRY & NEUROLOGY

## 2023-05-07 PROCEDURE — 80053 COMPREHEN METABOLIC PANEL: CPT | Performed by: PHYSICIAN ASSISTANT

## 2023-05-07 PROCEDURE — 84100 ASSAY OF PHOSPHORUS: CPT

## 2023-05-07 PROCEDURE — 25000003 PHARM REV CODE 250: Performed by: PSYCHIATRY & NEUROLOGY

## 2023-05-07 PROCEDURE — 85025 COMPLETE CBC W/AUTO DIFF WBC: CPT

## 2023-05-07 PROCEDURE — 99232 SBSQ HOSP IP/OBS MODERATE 35: CPT | Mod: ,,, | Performed by: PSYCHIATRY & NEUROLOGY

## 2023-05-07 PROCEDURE — 25000003 PHARM REV CODE 250: Performed by: PHYSICIAN ASSISTANT

## 2023-05-07 RX ADMIN — TORSEMIDE 10 MG: 10 TABLET ORAL at 09:05

## 2023-05-07 RX ADMIN — ATORVASTATIN CALCIUM 80 MG: 40 TABLET, FILM COATED ORAL at 09:05

## 2023-05-07 RX ADMIN — SPIRONOLACTONE 25 MG: 25 TABLET, FILM COATED ORAL at 09:05

## 2023-05-07 RX ADMIN — DOCUSATE SODIUM 50 MG AND SENNOSIDES 8.6 MG 2 TABLET: 8.6; 5 TABLET, FILM COATED ORAL at 09:05

## 2023-05-07 RX ADMIN — DABIGATRAN ETEXILATE MESYLATE 150 MG: 150 CAPSULE ORAL at 09:05

## 2023-05-07 RX ADMIN — ASPIRIN 81 MG: 81 TABLET, COATED ORAL at 09:05

## 2023-05-07 RX ADMIN — SACUBITRIL AND VALSARTAN 1 TABLET: 97; 103 TABLET, FILM COATED ORAL at 10:05

## 2023-05-07 RX ADMIN — SENNOSIDES AND DOCUSATE SODIUM 1 TABLET: 8.6; 5 TABLET ORAL at 09:05

## 2023-05-07 NOTE — SUBJECTIVE & OBJECTIVE
Neurologic Chief Complaint: L MCA stroke    Subjective:     Interval History: Patient is seen for follow-up neurological assessment and treatment recommendations:     Neuro exam stable. Pending IPR.     HPI, Past Medical, Family, and Social History remains the same as documented in the initial encounter.     Review of Systems   Reason unable to perform ROS: severe aphasia.   Constitutional:  Negative for fatigue.   HENT:  Negative for congestion.    Eyes:  Positive for visual disturbance.   Respiratory:  Negative for shortness of breath.    Cardiovascular:  Negative for chest pain.   Gastrointestinal:  Negative for abdominal distention and abdominal pain.   Skin:  Negative for rash.   Neurological:  Positive for facial asymmetry, speech difficulty (improving) and weakness.   Psychiatric/Behavioral:  Positive for sleep disturbance. Negative for agitation and behavioral problems.    Scheduled Meds:   aspirin  81 mg Oral Daily    atorvastatin  80 mg Oral Daily    dabigatran etexilate  150 mg Oral BID    metoprolol succinate  100 mg Oral Daily    polyethylene glycol  17 g Oral Daily    sacubitriL-valsartan  1 tablet Oral BID    senna-docusate 8.6-50 mg  1 tablet Oral BID    spironolactone  25 mg Oral Daily    torsemide  10 mg Oral Daily     Continuous Infusions:    PRN Meds:acetaminophen, albuterol-ipratropium, calcium gluconate IVPB, calcium gluconate IVPB, calcium gluconate IVPB, dextrose 10%, dextrose 10%, dextrose 10%, dextrose 10%, dextrose, dextrose, glucagon (human recombinant), insulin aspart U-100, labetalol, magnesium sulfate IVPB, magnesium sulfate IVPB, ondansetron, potassium chloride **AND** potassium chloride **AND** potassium chloride, senna-docusate 8.6-50 mg, sodium chloride 0.9%, sodium chloride 0.9%, sodium phosphate IVPB, sodium phosphate IVPB, sodium phosphate IVPB    Objective:     Vital Signs (Most Recent):  Temp: 98.6 °F (37 °C) (05/06/23 1600)  Pulse: 85 (05/06/23 1600)  Resp: 16 (05/06/23  1600)  BP: (!) 101/59 (05/06/23 1600)  SpO2: 98 % (05/06/23 1600)  BP Location: Left arm    Vital Signs Range (Last 24H):  Temp:  [97.9 °F (36.6 °C)-98.6 °F (37 °C)]   Pulse:  [64-88]   Resp:  [16-18]   BP: ()/(53-68)   SpO2:  [97 %-98 %]   BP Location: Left arm    Physical Exam  Vitals and nursing note reviewed.   Constitutional:       General: She is not in acute distress.  HENT:      Head: Normocephalic.      Nose: Nose normal.      Mouth/Throat:      Mouth: Mucous membranes are moist.   Eyes:      General: Visual field deficit (R hemianopsia) present.      Conjunctiva/sclera: Conjunctivae normal.      Comments: L gaze preference   Cardiovascular:      Rate and Rhythm: Normal rate.   Pulmonary:      Effort: Pulmonary effort is normal.   Abdominal:      General: Abdomen is flat.   Skin:     General: Skin is warm and dry.   Neurological:      Mental Status: She is alert.      Cranial Nerves: Dysarthria and facial asymmetry present.      Sensory: Sensory deficit present.      Motor: Weakness present.   Psychiatric:         Mood and Affect: Mood normal.       Neurological Exam:   LOC: alert  Attention Span: Good   Language: expressive aphasia  Articulation: dysarthria  Orientation: Limited due to severe aphasia, oriented tos elf  Visual Fields: Hemianopsia right  EOM (CN III, IV, VI): Gaze preference  left  Facial Movement (CN VII): Lower facial weakness on the Right  Motor: Arm left  Normal 5/5  Leg left  Normal 5/5  Arm right  Paresis 3/5  Leg right Paresis 3/5      Laboratory:  CMP:   Recent Labs   Lab 05/06/23  0246   CALCIUM 9.7   ALBUMIN 3.3*   PROT 7.0      K 4.0   CO2 27      BUN 19   CREATININE 0.9   ALKPHOS 143*   ALT 28   AST 33   BILITOT 0.7       CBC:   Recent Labs   Lab 05/03/23  0103   WBC 8.08   RBC 3.94*   HGB 11.5*   HCT 35.2*      MCV 89   MCH 29.2   MCHC 32.7       Lipid Panel:   Recent Labs   Lab 04/30/23  0845   CHOL 142   LDLCALC 88.6   HDL 38*   TRIG 77        Coagulation:   Recent Labs   Lab 04/30/23  1814 05/01/23  0107 05/02/23  0043   INR 1.1  --   --    APTT 25.7   < > 43.0*    < > = values in this interval not displayed.         Hgb A1C:   Recent Labs   Lab 04/30/23  0845   HGBA1C 7.5*       TSH:   Recent Labs   Lab 04/30/23  0845   TSH 2.092         Diagnostic Results     Brain Imaging   CTH without contrast 5/1/23  Acute left MCA distribution infarct appears stable in size and distribution compared to 04/30/2023 MRI.  No evidence of hemorrhagic conversion.   Acute small right cerebellar infarct is better demonstrated on MRI.   No acute intracranial hemorrhage or midline shift.     MRI Brain without contrast 4/30/23    Acute left MCA distribution infarct.  No significant mass effect or acute hemorrhage at this time.  Small acute infarct in the right cerebellum.  Chronic right MCA distribution infarct.     CTH 4/30/2023  No acute territorial infarct or intracranial hemorrhage identified.      Vessel Imaging   CTA Stroke MP 4/29/2023  CT head: No evidence for acute intracranial hemorrhage.  Stable area encephalomalacia from remote right MCA territory infarct.  CTA head: Occlusion of M2 segment of left MCA.  Asymmetric decreased opacification of the left transverse sinus and jugular vein.  Similar finding was present on the prior CTA in 2022 and therefore could be related to sluggish flow.  Suggest attention on follow-up conventional angiogram.  CTA neck: No high-grade stenosis or aneurysm.      Cardiac Imaging   Echo 4/28/2023  The left ventricle is severely enlarged with eccentric hypertrophy and severely decreased systolic function.  The estimated ejection fraction is 10-15%.  There is left ventricular global hypokinesis.  Grade II left ventricular diastolic dysfunction.  Normal right ventricular size with normal right ventricular systolic function.  Mild tricuspid regurgitation.  The estimated PA systolic pressure is 35 mmHg.  Normal central venous pressure  (3 mmHg).  Severe left atrial enlargement.

## 2023-05-07 NOTE — PLAN OF CARE
Problem: Adult Inpatient Plan of Care  Goal: Optimal Comfort and Wellbeing  Outcome: Ongoing, Progressing  Goal: Readiness for Transition of Care  Outcome: Ongoing, Progressing     Problem: Adult Inpatient Plan of Care  Goal: Optimal Comfort and Wellbeing  Outcome: Ongoing, Progressing     Problem: Adult Inpatient Plan of Care  Goal: Readiness for Transition of Care  Outcome: Ongoing, Progressing     Problem: Adjustment to Illness (Stroke, Ischemic/Transient Ischemic Attack)  Goal: Optimal Coping  Outcome: Ongoing, Progressing     Problem: Adjustment to Illness (Stroke, Ischemic/Transient Ischemic Attack)  Goal: Optimal Coping  Outcome: Ongoing, Progressing       POC and stroke booklet reviewed with patient and her family at the bedside. Verbalization of understanding voiced. Questions and concerns addressed. Stroke booklet remains at the bedside. Precautions maintained. Patient progressing towards goals. No events noted at present during this shift. Cardiac monitoring ongoing. Vital signs all shift. See flow sheet. Bed low and locked with call light within reach and bed alarm activated. Family remains at the bedside. POC ongoing.

## 2023-05-07 NOTE — SUBJECTIVE & OBJECTIVE
Neurologic Chief Complaint: L MCA stroke    Subjective:     Interval History: Patient is seen for follow-up neurological assessment and treatment recommendations:     Neuro exam improving. Pending IPR.     HPI, Past Medical, Family, and Social History remains the same as documented in the initial encounter.     Review of Systems   Reason unable to perform ROS: severe aphasia.   Constitutional:  Negative for fatigue.   HENT:  Negative for congestion.    Eyes:  Positive for visual disturbance.   Respiratory:  Negative for shortness of breath.    Cardiovascular:  Negative for chest pain.   Gastrointestinal:  Negative for abdominal distention and abdominal pain.   Skin:  Negative for rash.   Neurological:  Positive for facial asymmetry, speech difficulty (improving) and weakness.   Psychiatric/Behavioral:  Positive for sleep disturbance. Negative for agitation and behavioral problems.    Scheduled Meds:   aspirin  81 mg Oral Daily    atorvastatin  80 mg Oral Daily    dabigatran etexilate  150 mg Oral BID    metoprolol succinate  100 mg Oral Daily    polyethylene glycol  17 g Oral Daily    sacubitriL-valsartan  1 tablet Oral BID    senna-docusate 8.6-50 mg  1 tablet Oral BID    spironolactone  25 mg Oral Daily    torsemide  10 mg Oral Daily     Continuous Infusions:    PRN Meds:acetaminophen, albuterol-ipratropium, calcium gluconate IVPB, calcium gluconate IVPB, calcium gluconate IVPB, dextrose 10%, dextrose 10%, dextrose 10%, dextrose 10%, dextrose, dextrose, glucagon (human recombinant), insulin aspart U-100, labetalol, magnesium sulfate IVPB, magnesium sulfate IVPB, ondansetron, potassium chloride **AND** potassium chloride **AND** potassium chloride, senna-docusate 8.6-50 mg, sodium chloride 0.9%, sodium chloride 0.9%, sodium phosphate IVPB, sodium phosphate IVPB, sodium phosphate IVPB    Objective:     Vital Signs (Most Recent):  Temp: 98 °F (36.7 °C) (05/07/23 1614)  Pulse: 86 (05/07/23 1614)  Resp: 18 (05/07/23  1614)  BP: 115/83 (05/07/23 1614)  SpO2: 97 % (05/07/23 1614)  BP Location: Left arm    Vital Signs Range (Last 24H):  Temp:  [96.8 °F (36 °C)-98.5 °F (36.9 °C)]   Pulse:  []   Resp:  [16-18]   BP: ()/(56-83)   SpO2:  [97 %-98 %]   BP Location: Left arm    Physical Exam  Vitals and nursing note reviewed.   Constitutional:       General: She is not in acute distress.  HENT:      Head: Normocephalic.      Nose: Nose normal.      Mouth/Throat:      Mouth: Mucous membranes are moist.   Eyes:      General: Visual field deficit (R hemianopsia) present.      Conjunctiva/sclera: Conjunctivae normal.      Comments: L gaze preference   Cardiovascular:      Rate and Rhythm: Normal rate.   Pulmonary:      Effort: Pulmonary effort is normal.   Abdominal:      General: Abdomen is flat.   Skin:     General: Skin is warm and dry.   Neurological:      Mental Status: She is alert.      Cranial Nerves: Dysarthria and facial asymmetry present.      Sensory: Sensory deficit present.      Motor: Weakness present.   Psychiatric:         Mood and Affect: Mood normal.       Neurological Exam:   LOC: alert  Attention Span: Good   Language: expressive aphasia  Articulation: dysarthria  Orientation: Limited due to severe aphasia, oriented tos elf  Visual Fields: Hemianopsia right  EOM (CN III, IV, VI): Gaze preference  left  Facial Movement (CN VII): Lower facial weakness on the Right  Motor: Arm left  Normal 5/5  Leg left  Normal 5/5  Arm right  Paresis 3/5  Leg right Paresis 3/5      Laboratory:  CMP:   Recent Labs   Lab 05/07/23  0459   CALCIUM 10.0   ALBUMIN 3.2*   PROT 7.1      K 3.7   CO2 24      BUN 20   CREATININE 1.0   ALKPHOS 143*   ALT 27   AST 28   BILITOT 0.6       CBC:   Recent Labs   Lab 05/07/23  0459   WBC 9.82   RBC 4.73   HGB 13.4   HCT 42.5      MCV 90   MCH 28.3   MCHC 31.5*       Lipid Panel:   No results for input(s): CHOL, LDLCALC, HDL, TRIG in the last 168 hours.    Coagulation:   Recent  Labs   Lab 05/02/23  0043   APTT 43.0*         Hgb A1C:   No results for input(s): HGBA1C in the last 168 hours.    TSH:   No results for input(s): TSH in the last 168 hours.      Diagnostic Results     Brain Imaging   CTH without contrast 5/1/23  Acute left MCA distribution infarct appears stable in size and distribution compared to 04/30/2023 MRI.  No evidence of hemorrhagic conversion.   Acute small right cerebellar infarct is better demonstrated on MRI.   No acute intracranial hemorrhage or midline shift.     MRI Brain without contrast 4/30/23    Acute left MCA distribution infarct.  No significant mass effect or acute hemorrhage at this time.  Small acute infarct in the right cerebellum.  Chronic right MCA distribution infarct.     CTH 4/30/2023  No acute territorial infarct or intracranial hemorrhage identified.      Vessel Imaging   CTA Stroke MP 4/29/2023  CT head: No evidence for acute intracranial hemorrhage.  Stable area encephalomalacia from remote right MCA territory infarct.  CTA head: Occlusion of M2 segment of left MCA.  Asymmetric decreased opacification of the left transverse sinus and jugular vein.  Similar finding was present on the prior CTA in 2022 and therefore could be related to sluggish flow.  Suggest attention on follow-up conventional angiogram.  CTA neck: No high-grade stenosis or aneurysm.      Cardiac Imaging   Echo 4/28/2023  The left ventricle is severely enlarged with eccentric hypertrophy and severely decreased systolic function.  The estimated ejection fraction is 10-15%.  There is left ventricular global hypokinesis.  Grade II left ventricular diastolic dysfunction.  Normal right ventricular size with normal right ventricular systolic function.  Mild tricuspid regurgitation.  The estimated PA systolic pressure is 35 mmHg.  Normal central venous pressure (3 mmHg).  Severe left atrial enlargement.

## 2023-05-07 NOTE — PROGRESS NOTES
Dmitriy Triana - Neurosurgery ([facility])  Vascular Neurology  Comprehensive Stroke Center  Progress Note    Assessment/Plan:     * Cerebrovascular accident (CVA) due to embolism of left middle cerebral artery  62 y.o. female with PMH of HTN, DM2, HLD, tobacco use (quit in 2020), stroke (2020, R MCA, no deficits), PE (December 2021, on xarelto),  CAD, DCM, HFrEF (15%) s/p AICD, Pulmonary hypertension admited to  4/27 for the treatment of a right leg DVT on heparin gtt. Stroke code activated 4/29 for aphasia and R hemiplegia, LKN 20:15 (~5 min prior). NIHSS 24, L MCA syndrome. Not TNK candidate due to AC with heparin gtt and therapeutic aPTT. CTA with L M2 occlusion. Taken to IR for possible intervention, now s/p L M2 thrombectomy with TICI 2c reperfusion. Repeat CTH post-IR with no acute infarct or hemorrhage, heparin gtt restarted. MRI brain W/WO with acute L MCA territory infarcts (insula, frontotemporal opercular cortex, patchy frontal white matter, small temporoparietal cortex), as well as small acute R cerebellar infarct. Echo with EF 10-15%, global LV hypokinesis, severe LAE. Suspect etiology likely cardioembolic due to EF 15%.    Pt's Neuro exam is improving; dysarthria improved. Labs stable.     Antithrombotics for secondary stroke prevention: Anticoagulants: Pradaxa 150mg BID, ASA 81mg daily    Statins for secondary stroke prevention and HLD, if present: Statins: Atorvastatin- 80 mg daily    Aggressive risk factor modification: HTN, DM, HLD, HF, DVT    Rehab efforts: The patient has been evaluated by a stroke team provider and the therapy needs have been fully considered based off the presenting complaints and exam findings. The following therapy evaluations are needed: PT evaluate and treat, OT evaluate and treat, SLP evaluate and treat, PM&R evaluate for appropriate placement    Diagnostics ordered/pending: CTH PRN for acute neuro exam changes    VTE prophylaxis: None: Reason for No Pharmacological VTE  Prophylaxis: Currently on anticoagulation    BP parameters: Infarct:  SBP <160    Right sided weakness  Due to stroke  -PT/OT eval and treat  -Dispo recs for IPR    Critical lower limb ischemia  Stroke risk factor  Admitted for R popliteal artery occlusion, was started on heparin gtt  Heparin gtt restarted post-IR, per heme/onc recs transition to pradaxa today (5/2)    Chronic combined systolic and diastolic congestive heart failure  Stroke risk factor  -Echo with EF 10-15%, global LV hypokinesis, severe LAE  -Continue GDMT  - Switched metoprolol tartrate to succinate    Hyperlipemia  Stroke risk factor  -LDL 88, goal <70  -Atorvastatin 80mg daily    Hypertension  Stroke Risk factor  -SBP <160 s/p thrombectomy  -PRN hydralazine/labetalol    Type 2 diabetes mellitus without complication, without long-term current use of insulin  Stroke risk factor  -A1c 7.5  -BG goal while inpatient 140-180  -MC SSI PRN         4/30-S/p TICI 2c. Repeat CTH after IR with no acute infarct. Will need MRI but has a pacemaker that will need be interrogated. Improving r arm weakness and  aphasia.  5/1/2023- MRI completed showing acute L. MCA infarct. Pacemaker reinitiated post MRI to normal settings. Plan to start Pradaxa tomorrow per Heme/Onc. Neuro exam stable.  05/02/2023 NAEO, neuro exam stable. CTH overnight stable. Heparin gtt transitioned to Pradaxa today for AC of reduced EF and VTE history. Speech cleared for minced/moist diet. Stable for step down.  05/03/2023 Stepped down to NPU this morning, NAEO. Neuro exam stable, speech improving but continues to have significant RSW. Dispo recs for IPR.  05/04/2023 Pt improving this AM. No aphasia noted on exam. Restarted Pradaxa BID and switched Metoprolol tartrate to metop succinate for GDMT.   05/05/2023 Pt neuro exam improving. Aphasia noted on exam today. Pt reports insomnia and disliking hospital meals. In good spirits and excited for discharge to IPR.  05/06/2023 Neuro exam stable.  Pending IPR.         STROKE DOCUMENTATION   Acute Stroke Times   Last Known Normal Date: 04/29/23  Last Known Normal Time: 2015  Symptom Onset Date: 04/29/23  Symptom Onset Time: 2015  Stroke Team Called Date: 04/29/23  Stroke Team Called Time: 2022  Stroke Team Arrival Date: 04/29/23  Stroke Team Arrival Time: 2025  CT Interpretation Time: 2038  Thrombolytic Therapy Recommended: No  CTA Interpretation Time: 2045  Thrombectomy Recommended: Yes  Decision to Treat Time for IR: 2045    NIH Scale:  1a. Level of Consciousness: 0-->Alert, keenly responsive  1b. LOC Questions: 0-->Answers both questions correctly  1c. LOC Commands: 0-->Performs both tasks correctly  2. Best Gaze: 1-->Partial gaze palsy, gaze is abnormal in one or both eyes, but forced deviation or total gaze paresis is not present  3. Visual: 1-->Partial hemianopia  4. Facial Palsy: 1-->Minor paralysis (flattened nasolabial fold, asymmetry on smiling)  5a. Motor Arm, Left: 2-->Some effort against gravity, limb cannot get to or maintain (if cued) 90 (or 45) degrees, drifts down to bed, but has some effort against gravity  5b. Motor Arm, Right: 0-->No drift, limb holds 90 (or 45) degrees for full 10 secs  6a. Motor Leg, Left: 2-->Some effort against gravity, leg falls to bed by 5 secs, but has some effort against gravity  6b. Motor Leg, Right: 0-->No drift, leg holds 30 degree position for full 5 secs  7. Limb Ataxia: 0-->Absent  8. Sensory: 1-->Mild-to-moderate sensory loss, patient feels pinprick is less sharp or is dull on the affected side, or there is a loss of superficial pain with pinprick, but patient is aware of being touched  9. Best Language: 1-->Mild-to-moderate aphasia, some obvious loss of fluency or facility of comprehension, without significant limitation on ideas expressed or form of expression. Reduction of speech and/or comprehension, however, makes conversation. . . (see row details)  10. Dysarthria: 1-->Mild-to-moderate dysarthria,  patient slurs at least some words and, at worst, can be understood with some difficulty  11. Extinction and Inattention (formerly Neglect): 1-->Visual, tactile, auditory, spatial, or personal inattention or extinction to bilateral simultaneous stimulation in one of the sensory modalities  Total (NIH Stroke Scale): 11       Modified Stewart Score: 0  Louin Coma Scale:    ABCD2 Score:    [id number] Score:   HAS -BLED Score:   ICH Score:   Hunt & Vora Classification:      Hemorrhagic change of an Ischemic Stroke: Does this patient have an ischemic stroke with hemorrhagic changes? No     Neurologic Chief Complaint: L MCA stroke    Subjective:     Interval History: Patient is seen for follow-up neurological assessment and treatment recommendations:     Neuro exam stable. Pending IPR.     HPI, Past Medical, Family, and Social History remains the same as documented in the initial encounter.     Review of Systems   Reason unable to perform ROS: severe aphasia.   Constitutional:  Negative for fatigue.   HENT:  Negative for congestion.    Eyes:  Positive for visual disturbance.   Respiratory:  Negative for shortness of breath.    Cardiovascular:  Negative for chest pain.   Gastrointestinal:  Negative for abdominal distention and abdominal pain.   Skin:  Negative for rash.   Neurological:  Positive for facial asymmetry, speech difficulty (improving) and weakness.   Psychiatric/Behavioral:  Positive for sleep disturbance. Negative for agitation and behavioral problems.    Scheduled Meds:   aspirin  81 mg Oral Daily    atorvastatin  80 mg Oral Daily    dabigatran etexilate  150 mg Oral BID    metoprolol succinate  100 mg Oral Daily    polyethylene glycol  17 g Oral Daily    sacubitriL-valsartan  1 tablet Oral BID    senna-docusate 8.6-50 mg  1 tablet Oral BID    spironolactone  25 mg Oral Daily    torsemide  10 mg Oral Daily     Continuous Infusions:    PRN Meds:acetaminophen, albuterol-ipratropium, calcium gluconate  IVPB, calcium gluconate IVPB, calcium gluconate IVPB, dextrose 10%, dextrose 10%, dextrose 10%, dextrose 10%, dextrose, dextrose, glucagon (human recombinant), insulin aspart U-100, labetalol, magnesium sulfate IVPB, magnesium sulfate IVPB, ondansetron, potassium chloride **AND** potassium chloride **AND** potassium chloride, senna-docusate 8.6-50 mg, sodium chloride 0.9%, sodium chloride 0.9%, sodium phosphate IVPB, sodium phosphate IVPB, sodium phosphate IVPB    Objective:     Vital Signs (Most Recent):  Temp: 98.6 °F (37 °C) (05/06/23 1600)  Pulse: 85 (05/06/23 1600)  Resp: 16 (05/06/23 1600)  BP: (!) 101/59 (05/06/23 1600)  SpO2: 98 % (05/06/23 1600)  BP Location: Left arm    Vital Signs Range (Last 24H):  Temp:  [97.9 °F (36.6 °C)-98.6 °F (37 °C)]   Pulse:  [64-88]   Resp:  [16-18]   BP: ()/(53-68)   SpO2:  [97 %-98 %]   BP Location: Left arm    Physical Exam  Vitals and nursing note reviewed.   Constitutional:       General: She is not in acute distress.  HENT:      Head: Normocephalic.      Nose: Nose normal.      Mouth/Throat:      Mouth: Mucous membranes are moist.   Eyes:      General: Visual field deficit (R hemianopsia) present.      Conjunctiva/sclera: Conjunctivae normal.      Comments: L gaze preference   Cardiovascular:      Rate and Rhythm: Normal rate.   Pulmonary:      Effort: Pulmonary effort is normal.   Abdominal:      General: Abdomen is flat.   Skin:     General: Skin is warm and dry.   Neurological:      Mental Status: She is alert.      Cranial Nerves: Dysarthria and facial asymmetry present.      Sensory: Sensory deficit present.      Motor: Weakness present.   Psychiatric:         Mood and Affect: Mood normal.       Neurological Exam:   LOC: alert  Attention Span: Good   Language: expressive aphasia  Articulation: dysarthria  Orientation: Limited due to severe aphasia, oriented tos elf  Visual Fields: Hemianopsia right  EOM (CN III, IV, VI): Gaze preference  left  Facial Movement  (CN VII): Lower facial weakness on the Right  Motor: Arm left  Normal 5/5  Leg left  Normal 5/5  Arm right  Paresis 3/5  Leg right Paresis 3/5      Laboratory:  CMP:   Recent Labs   Lab 05/06/23  0246   CALCIUM 9.7   ALBUMIN 3.3*   PROT 7.0      K 4.0   CO2 27      BUN 19   CREATININE 0.9   ALKPHOS 143*   ALT 28   AST 33   BILITOT 0.7       CBC:   Recent Labs   Lab 05/03/23  0103   WBC 8.08   RBC 3.94*   HGB 11.5*   HCT 35.2*      MCV 89   MCH 29.2   MCHC 32.7       Lipid Panel:   Recent Labs   Lab 04/30/23  0845   CHOL 142   LDLCALC 88.6   HDL 38*   TRIG 77       Coagulation:   Recent Labs   Lab 04/30/23  1814 05/01/23  0107 05/02/23  0043   INR 1.1  --   --    APTT 25.7   < > 43.0*    < > = values in this interval not displayed.         Hgb A1C:   Recent Labs   Lab 04/30/23  0845   HGBA1C 7.5*       TSH:   Recent Labs   Lab 04/30/23  0845   TSH 2.092         Diagnostic Results     Brain Imaging   CTH without contrast 5/1/23  Acute left MCA distribution infarct appears stable in size and distribution compared to 04/30/2023 MRI.  No evidence of hemorrhagic conversion.   Acute small right cerebellar infarct is better demonstrated on MRI.   No acute intracranial hemorrhage or midline shift.     MRI Brain without contrast 4/30/23    Acute left MCA distribution infarct.  No significant mass effect or acute hemorrhage at this time.  Small acute infarct in the right cerebellum.  Chronic right MCA distribution infarct.     CTH 4/30/2023  No acute territorial infarct or intracranial hemorrhage identified.      Vessel Imaging   CTA Stroke MP 4/29/2023  CT head: No evidence for acute intracranial hemorrhage.  Stable area encephalomalacia from remote right MCA territory infarct.  CTA head: Occlusion of M2 segment of left MCA.  Asymmetric decreased opacification of the left transverse sinus and jugular vein.  Similar finding was present on the prior CTA in 2022 and therefore could be related to sluggish flow.   Suggest attention on follow-up conventional angiogram.  CTA neck: No high-grade stenosis or aneurysm.      Cardiac Imaging   Echo 4/28/2023   The left ventricle is severely enlarged with eccentric hypertrophy and severely decreased systolic function.   The estimated ejection fraction is 10-15%.   There is left ventricular global hypokinesis.   Grade II left ventricular diastolic dysfunction.   Normal right ventricular size with normal right ventricular systolic function.   Mild tricuspid regurgitation.   The estimated PA systolic pressure is 35 mmHg.   Normal central venous pressure (3 mmHg).   Severe left atrial enlargement.      Tushar Ayala MD  Comprehensive Stroke Center  Department of Vascular Neurology   [facility])

## 2023-05-07 NOTE — PROGRESS NOTES
Dmitriy Triana - Neurosurgery ([facility])  Vascular Neurology  Comprehensive Stroke Center  Progress Note    Assessment/Plan:     * Cerebrovascular accident (CVA) due to embolism of left middle cerebral artery  62 y.o. female with PMH of HTN, DM2, HLD, tobacco use (quit in 2020), stroke (2020, R MCA, no deficits), PE (December 2021, on xarelto),  CAD, DCM, HFrEF (15%) s/p AICD, Pulmonary hypertension admited to  4/27 for the treatment of a right leg DVT on heparin gtt. Stroke code activated 4/29 for aphasia and R hemiplegia, LKN 20:15 (~5 min prior). NIHSS 24, L MCA syndrome. Not TNK candidate due to AC with heparin gtt and therapeutic aPTT. CTA with L M2 occlusion. Taken to IR for possible intervention, now s/p L M2 thrombectomy with TICI 2c reperfusion. Repeat CTH post-IR with no acute infarct or hemorrhage, heparin gtt restarted. MRI brain W/WO with acute L MCA territory infarcts (insula, frontotemporal opercular cortex, patchy frontal white matter, small temporoparietal cortex), as well as small acute R cerebellar infarct. Echo with EF 10-15%, global LV hypokinesis, severe LAE. Suspect etiology likely cardioembolic due to EF 15%.    Pt's Neuro exam is improving; dysarthria improved. Labs stable.     Antithrombotics for secondary stroke prevention: Anticoagulants: Pradaxa 150mg BID, ASA 81mg daily    Statins for secondary stroke prevention and HLD, if present: Statins: Atorvastatin- 80 mg daily    Aggressive risk factor modification: HTN, DM, HLD, HF, DVT    Rehab efforts: The patient has been evaluated by a stroke team provider and the therapy needs have been fully considered based off the presenting complaints and exam findings. The following therapy evaluations are needed: PT evaluate and treat, OT evaluate and treat, SLP evaluate and treat, PM&R evaluate for appropriate placement    Diagnostics ordered/pending: CTH PRN for acute neuro exam changes    VTE prophylaxis: None: Reason for No Pharmacological VTE  Prophylaxis: Currently on anticoagulation    BP parameters: Infarct:  SBP <160    Right sided weakness  Due to stroke  -PT/OT eval and treat  -Dispo recs for IPR    Critical lower limb ischemia  Stroke risk factor  Admitted for R popliteal artery occlusion, was started on heparin gtt  Heparin gtt restarted post-IR, per heme/onc recs transition to pradaxa today (5/2)    Chronic combined systolic and diastolic congestive heart failure  Stroke risk factor  -Echo with EF 10-15%, global LV hypokinesis, severe LAE  -Continue GDMT  - Switched metoprolol tartrate to succinate    Hyperlipemia  Stroke risk factor  -LDL 88, goal <70  -Atorvastatin 80mg daily    Hypertension  Stroke Risk factor  -SBP <160 s/p thrombectomy  -PRN hydralazine/labetalol    Type 2 diabetes mellitus without complication, without long-term current use of insulin  Stroke risk factor  -A1c 7.5  -BG goal while inpatient 140-180  -MC SSI PRN         4/30-S/p TICI 2c. Repeat CTH after IR with no acute infarct. Will need MRI but has a pacemaker that will need be interrogated. Improving r arm weakness and  aphasia.  5/1/2023- MRI completed showing acute L. MCA infarct. Pacemaker reinitiated post MRI to normal settings. Plan to start Pradaxa tomorrow per Heme/Onc. Neuro exam stable.  05/02/2023 NAEO, neuro exam stable. CTH overnight stable. Heparin gtt transitioned to Pradaxa today for AC of reduced EF and VTE history. Speech cleared for minced/moist diet. Stable for step down.  05/03/2023 Stepped down to NPU this morning, NAEO. Neuro exam stable, speech improving but continues to have significant RSW. Dispo recs for IPR.  05/04/2023 Pt improving this AM. No aphasia noted on exam. Restarted Pradaxa BID and switched Metoprolol tartrate to metop succinate for GDMT.   05/05/2023 Pt neuro exam improving. Aphasia noted on exam today. Pt reports insomnia and disliking hospital meals. In good spirits and excited for discharge to IPR.  05/06/2023 Neuro exam stable.  Pending IPR.   05/07/2023 Neuro exam improving. Pending IPR.      STROKE DOCUMENTATION   Acute Stroke Times   Last Known Normal Date: 04/29/23  Last Known Normal Time: 2015  Symptom Onset Date: 04/29/23  Symptom Onset Time: 2015  Stroke Team Called Date: 04/29/23  Stroke Team Called Time: 2022  Stroke Team Arrival Date: 04/29/23  Stroke Team Arrival Time: 2025  CT Interpretation Time: 2038  Thrombolytic Therapy Recommended: No  CTA Interpretation Time: 2045  Thrombectomy Recommended: Yes  Decision to Treat Time for IR: 2045    NIH Scale:  1a. Level of Consciousness: 0-->Alert, keenly responsive  1b. LOC Questions: 0-->Answers both questions correctly  1c. LOC Commands: 0-->Performs both tasks correctly  2. Best Gaze: 1-->Partial gaze palsy, gaze is abnormal in one or both eyes, but forced deviation or total gaze paresis is not present  3. Visual: 1-->Partial hemianopia  4. Facial Palsy: 1-->Minor paralysis (flattened nasolabial fold, asymmetry on smiling)  5a. Motor Arm, Left: 0-->No drift, limb holds 90 (or 45) degrees for full 10 secs  5b. Motor Arm, Right: 1-->Drift, limb holds 90 (or 45) degrees, but drifts down before full 10 secs, does not hit bed or other support  6a. Motor Leg, Left: 0-->No drift, leg holds 30 degree position for full 5 secs  6b. Motor Leg, Right: 1-->Drift, leg falls by the end of the 5-sec period but does not hit bed  7. Limb Ataxia: 0-->Absent  8. Sensory: 1-->Mild-to-moderate sensory loss, patient feels pinprick is less sharp or is dull on the affected side, or there is a loss of superficial pain with pinprick, but patient is aware of being touched  9. Best Language: 1-->Mild-to-moderate aphasia, some obvious loss of fluency or facility of comprehension, without significant limitation on ideas expressed or form of expression. Reduction of speech and/or comprehension, however, makes conversation. . . (see row details)  10. Dysarthria: 1-->Mild-to-moderate dysarthria, patient slurs at  least some words and, at worst, can be understood with some difficulty  11. Extinction and Inattention (formerly Neglect): 1-->Visual, tactile, auditory, spatial, or personal inattention or extinction to bilateral simultaneous stimulation in one of the sensory modalities  Total (NIH Stroke Scale): 9       Modified [place] Score: 0  Wendel Coma Scale:    ABCD2 Score:    [id number] Score:   HAS -BLED Score:   ICH Score:   Hunt & Vora Classification:      Hemorrhagic change of an Ischemic Stroke: Does this patient have an ischemic stroke with hemorrhagic changes? No     Neurologic Chief Complaint: L MCA stroke    Subjective:     Interval History: Patient is seen for follow-up neurological assessment and treatment recommendations:     Neuro exam improving. Pending IPR.     HPI, Past Medical, Family, and Social History remains the same as documented in the initial encounter.     Review of Systems   Reason unable to perform ROS: severe aphasia.   Constitutional:  Negative for fatigue.   HENT:  Negative for congestion.    Eyes:  Positive for visual disturbance.   Respiratory:  Negative for shortness of breath.    Cardiovascular:  Negative for chest pain.   Gastrointestinal:  Negative for abdominal distention and abdominal pain.   Skin:  Negative for rash.   Neurological:  Positive for facial asymmetry, speech difficulty (improving) and weakness.   Psychiatric/Behavioral:  Positive for sleep disturbance. Negative for agitation and behavioral problems.    Scheduled Meds:   aspirin  81 mg Oral Daily    atorvastatin  80 mg Oral Daily    dabigatran etexilate  150 mg Oral BID    metoprolol succinate  100 mg Oral Daily    polyethylene glycol  17 g Oral Daily    sacubitriL-valsartan  1 tablet Oral BID    senna-docusate 8.6-50 mg  1 tablet Oral BID    spironolactone  25 mg Oral Daily    torsemide  10 mg Oral Daily     Continuous Infusions:    PRN Meds:acetaminophen, albuterol-ipratropium, calcium gluconate IVPB, calcium  gluconate IVPB, calcium gluconate IVPB, dextrose 10%, dextrose 10%, dextrose 10%, dextrose 10%, dextrose, dextrose, glucagon (human recombinant), insulin aspart U-100, labetalol, magnesium sulfate IVPB, magnesium sulfate IVPB, ondansetron, potassium chloride **AND** potassium chloride **AND** potassium chloride, senna-docusate 8.6-50 mg, sodium chloride 0.9%, sodium chloride 0.9%, sodium phosphate IVPB, sodium phosphate IVPB, sodium phosphate IVPB    Objective:     Vital Signs (Most Recent):  Temp: 98 °F (36.7 °C) (05/07/23 1614)  Pulse: 86 (05/07/23 1614)  Resp: 18 (05/07/23 1614)  BP: 115/83 (05/07/23 1614)  SpO2: 97 % (05/07/23 1614)  BP Location: Left arm    Vital Signs Range (Last 24H):  Temp:  [96.8 °F (36 °C)-98.5 °F (36.9 °C)]   Pulse:  []   Resp:  [16-18]   BP: ()/(56-83)   SpO2:  [97 %-98 %]   BP Location: Left arm    Physical Exam  Vitals and nursing note reviewed.   Constitutional:       General: She is not in acute distress.  HENT:      Head: Normocephalic.      Nose: Nose normal.      Mouth/Throat:      Mouth: Mucous membranes are moist.   Eyes:      General: Visual field deficit (R hemianopsia) present.      Conjunctiva/sclera: Conjunctivae normal.      Comments: L gaze preference   Cardiovascular:      Rate and Rhythm: Normal rate.   Pulmonary:      Effort: Pulmonary effort is normal.   Abdominal:      General: Abdomen is flat.   Skin:     General: Skin is warm and dry.   Neurological:      Mental Status: She is alert.      Cranial Nerves: Dysarthria and facial asymmetry present.      Sensory: Sensory deficit present.      Motor: Weakness present.   Psychiatric:         Mood and Affect: Mood normal.       Neurological Exam:   LOC: alert  Attention Span: Good   Language: expressive aphasia  Articulation: dysarthria  Orientation: Limited due to severe aphasia, oriented tos elf  Visual Fields: Hemianopsia right  EOM (CN III, IV, VI): Gaze preference  left  Facial Movement (CN VII): Lower  facial weakness on the Right  Motor: Arm left  Normal 5/5  Leg left  Normal 5/5  Arm right  Paresis 3/5  Leg right Paresis 3/5      Laboratory:  CMP:   Recent Labs   Lab 05/07/23  0459   CALCIUM 10.0   ALBUMIN 3.2*   PROT 7.1      K 3.7   CO2 24      BUN 20   CREATININE 1.0   ALKPHOS 143*   ALT 27   AST 28   BILITOT 0.6       CBC:   Recent Labs   Lab 05/07/23  0459   WBC 9.82   RBC 4.73   HGB 13.4   HCT 42.5      MCV 90   MCH 28.3   MCHC 31.5*       Lipid Panel:   No results for input(s): CHOL, LDLCALC, HDL, TRIG in the last 168 hours.    Coagulation:   Recent Labs   Lab 05/02/23  0043   APTT 43.0*         Hgb A1C:   No results for input(s): HGBA1C in the last 168 hours.    TSH:   No results for input(s): TSH in the last 168 hours.      Diagnostic Results     Brain Imaging   CTH without contrast 5/1/23  Acute left MCA distribution infarct appears stable in size and distribution compared to 04/30/2023 MRI.  No evidence of hemorrhagic conversion.   Acute small right cerebellar infarct is better demonstrated on MRI.   No acute intracranial hemorrhage or midline shift.     MRI Brain without contrast 4/30/23    Acute left MCA distribution infarct.  No significant mass effect or acute hemorrhage at this time.  Small acute infarct in the right cerebellum.  Chronic right MCA distribution infarct.     CTH 4/30/2023  No acute territorial infarct or intracranial hemorrhage identified.      Vessel Imaging   CTA Stroke MP 4/29/2023  CT head: No evidence for acute intracranial hemorrhage.  Stable area encephalomalacia from remote right MCA territory infarct.  CTA head: Occlusion of M2 segment of left MCA.  Asymmetric decreased opacification of the left transverse sinus and jugular vein.  Similar finding was present on the prior CTA in 2022 and therefore could be related to sluggish flow.  Suggest attention on follow-up conventional angiogram.  CTA neck: No high-grade stenosis or aneurysm.      Cardiac Imaging    Echo 4/28/2023   The left ventricle is severely enlarged with eccentric hypertrophy and severely decreased systolic function.   The estimated ejection fraction is 10-15%.   There is left ventricular global hypokinesis.   Grade II left ventricular diastolic dysfunction.   Normal right ventricular size with normal right ventricular systolic function.   Mild tricuspid regurgitation.   The estimated PA systolic pressure is 35 mmHg.   Normal central venous pressure (3 mmHg).   Severe left atrial enlargement.      Tushar Ayala MD  [facility] St[facility]  Department of Vascular Neurology   [facility] Neurosurgery [facility])

## 2023-05-08 PROBLEM — Z79.01 LONG TERM CURRENT USE OF ANTICOAGULANT: Chronic | Status: ACTIVE | Noted: 2023-04-29

## 2023-05-08 PROBLEM — Z95.810 ICD (IMPLANTABLE CARDIOVERTER-DEFIBRILLATOR) IN PLACE: Chronic | Status: ACTIVE | Noted: 2022-03-31

## 2023-05-08 PROBLEM — I25.10 CORONARY ARTERY DISEASE INVOLVING NATIVE HEART: Chronic | Status: ACTIVE | Noted: 2021-10-11

## 2023-05-08 PROBLEM — E78.5 HYPERLIPEMIA: Chronic | Status: ACTIVE | Noted: 2019-12-25

## 2023-05-08 PROBLEM — I50.42 CHRONIC COMBINED SYSTOLIC AND DIASTOLIC CONGESTIVE HEART FAILURE: Chronic | Status: ACTIVE | Noted: 2020-01-12

## 2023-05-08 PROBLEM — I27.22 PULMONARY HYPERTENSION DUE TO LEFT HEART DISEASE: Chronic | Status: ACTIVE | Noted: 2023-02-24

## 2023-05-08 LAB
ALBUMIN SERPL BCP-MCNC: 3.1 G/DL (ref 3.5–5.2)
ALP SERPL-CCNC: 140 U/L (ref 55–135)
ALT SERPL W/O P-5'-P-CCNC: 24 U/L (ref 10–44)
ANION GAP SERPL CALC-SCNC: 11 MMOL/L (ref 8–16)
AST SERPL-CCNC: 25 U/L (ref 10–40)
BASOPHILS # BLD AUTO: 0.06 K/UL (ref 0–0.2)
BASOPHILS NFR BLD: 0.7 % (ref 0–1.9)
BILIRUB SERPL-MCNC: 0.6 MG/DL (ref 0.1–1)
BUN SERPL-MCNC: 16 MG/DL (ref 8–23)
CALCIUM SERPL-MCNC: 9.3 MG/DL (ref 8.7–10.5)
CHLORIDE SERPL-SCNC: 100 MMOL/L (ref 95–110)
CO2 SERPL-SCNC: 28 MMOL/L (ref 23–29)
CREAT SERPL-MCNC: 0.8 MG/DL (ref 0.5–1.4)
DIFFERENTIAL METHOD: ABNORMAL
EOSINOPHIL # BLD AUTO: 0.2 K/UL (ref 0–0.5)
EOSINOPHIL NFR BLD: 2.2 % (ref 0–8)
ERYTHROCYTE [DISTWIDTH] IN BLOOD BY AUTOMATED COUNT: 14.6 % (ref 11.5–14.5)
EST. GFR  (NO RACE VARIABLE): >60 ML/MIN/1.73 M^2
GLUCOSE SERPL-MCNC: 186 MG/DL (ref 70–110)
HCT VFR BLD AUTO: 39.9 % (ref 37–48.5)
HGB BLD-MCNC: 12.9 G/DL (ref 12–16)
IMM GRANULOCYTES # BLD AUTO: 0.02 K/UL (ref 0–0.04)
IMM GRANULOCYTES NFR BLD AUTO: 0.2 % (ref 0–0.5)
LYMPHOCYTES # BLD AUTO: 2.4 K/UL (ref 1–4.8)
LYMPHOCYTES NFR BLD: 28.7 % (ref 18–48)
MAGNESIUM SERPL-MCNC: 1.9 MG/DL (ref 1.6–2.6)
MCH RBC QN AUTO: 29.1 PG (ref 27–31)
MCHC RBC AUTO-ENTMCNC: 32.3 G/DL (ref 32–36)
MCV RBC AUTO: 90 FL (ref 82–98)
MONOCYTES # BLD AUTO: 0.7 K/UL (ref 0.3–1)
MONOCYTES NFR BLD: 8.6 % (ref 4–15)
NEUTROPHILS # BLD AUTO: 4.9 K/UL (ref 1.8–7.7)
NEUTROPHILS NFR BLD: 59.6 % (ref 38–73)
NRBC BLD-RTO: 0 /100 WBC
PHOSPHATE SERPL-MCNC: 3.5 MG/DL (ref 2.7–4.5)
PLATELET # BLD AUTO: 381 K/UL (ref 150–450)
PMV BLD AUTO: 11.1 FL (ref 9.2–12.9)
POCT GLUCOSE: 170 MG/DL (ref 70–110)
POCT GLUCOSE: 176 MG/DL (ref 70–110)
POCT GLUCOSE: 185 MG/DL (ref 70–110)
POCT GLUCOSE: 208 MG/DL (ref 70–110)
POTASSIUM SERPL-SCNC: 3.3 MMOL/L (ref 3.5–5.1)
PROT SERPL-MCNC: 6.7 G/DL (ref 6–8.4)
RBC # BLD AUTO: 4.44 M/UL (ref 4–5.4)
SODIUM SERPL-SCNC: 139 MMOL/L (ref 136–145)
WBC # BLD AUTO: 8.27 K/UL (ref 3.9–12.7)

## 2023-05-08 PROCEDURE — 80053 COMPREHEN METABOLIC PANEL: CPT | Performed by: PHYSICIAN ASSISTANT

## 2023-05-08 PROCEDURE — 36415 COLL VENOUS BLD VENIPUNCTURE: CPT | Performed by: PHYSICIAN ASSISTANT

## 2023-05-08 PROCEDURE — 84100 ASSAY OF PHOSPHORUS: CPT

## 2023-05-08 PROCEDURE — 25000003 PHARM REV CODE 250: Performed by: PSYCHIATRY & NEUROLOGY

## 2023-05-08 PROCEDURE — 25000003 PHARM REV CODE 250: Performed by: HOSPITALIST

## 2023-05-08 PROCEDURE — 99232 PR SUBSEQUENT HOSPITAL CARE,LEVL II: ICD-10-PCS | Mod: ,,, | Performed by: PSYCHIATRY & NEUROLOGY

## 2023-05-08 PROCEDURE — 25000003 PHARM REV CODE 250: Performed by: PHYSICIAN ASSISTANT

## 2023-05-08 PROCEDURE — 92526 ORAL FUNCTION THERAPY: CPT

## 2023-05-08 PROCEDURE — 99232 SBSQ HOSP IP/OBS MODERATE 35: CPT | Mod: ,,, | Performed by: PSYCHIATRY & NEUROLOGY

## 2023-05-08 PROCEDURE — 97116 GAIT TRAINING THERAPY: CPT | Mod: CQ

## 2023-05-08 PROCEDURE — 83735 ASSAY OF MAGNESIUM: CPT

## 2023-05-08 PROCEDURE — 11000001 HC ACUTE MED/SURG PRIVATE ROOM

## 2023-05-08 PROCEDURE — 85025 COMPLETE CBC W/AUTO DIFF WBC: CPT

## 2023-05-08 PROCEDURE — 97530 THERAPEUTIC ACTIVITIES: CPT | Mod: CQ

## 2023-05-08 PROCEDURE — 92507 TX SP LANG VOICE COMM INDIV: CPT

## 2023-05-08 PROCEDURE — 25000003 PHARM REV CODE 250: Performed by: NURSE PRACTITIONER

## 2023-05-08 PROCEDURE — 94761 N-INVAS EAR/PLS OXIMETRY MLT: CPT

## 2023-05-08 PROCEDURE — 63600175 PHARM REV CODE 636 W HCPCS

## 2023-05-08 PROCEDURE — 25000003 PHARM REV CODE 250

## 2023-05-08 RX ORDER — LANOLIN ALCOHOL/MO/W.PET/CERES
400 CREAM (GRAM) TOPICAL ONCE
Status: COMPLETED | OUTPATIENT
Start: 2023-05-08 | End: 2023-05-08

## 2023-05-08 RX ORDER — MAGNESIUM SULFATE HEPTAHYDRATE 40 MG/ML
2 INJECTION, SOLUTION INTRAVENOUS ONCE
Status: DISCONTINUED | OUTPATIENT
Start: 2023-05-08 | End: 2023-05-08

## 2023-05-08 RX ADMIN — INSULIN ASPART 4 UNITS: 100 INJECTION, SOLUTION INTRAVENOUS; SUBCUTANEOUS at 08:05

## 2023-05-08 RX ADMIN — POLYETHYLENE GLYCOL 3350 17 G: 17 POWDER, FOR SOLUTION ORAL at 08:05

## 2023-05-08 RX ADMIN — DABIGATRAN ETEXILATE MESYLATE 150 MG: 150 CAPSULE ORAL at 08:05

## 2023-05-08 RX ADMIN — DABIGATRAN ETEXILATE MESYLATE 150 MG: 150 CAPSULE ORAL at 09:05

## 2023-05-08 RX ADMIN — SACUBITRIL AND VALSARTAN 1 TABLET: 97; 103 TABLET, FILM COATED ORAL at 11:05

## 2023-05-08 RX ADMIN — Medication 400 MG: at 11:05

## 2023-05-08 RX ADMIN — ATORVASTATIN CALCIUM 80 MG: 40 TABLET, FILM COATED ORAL at 08:05

## 2023-05-08 RX ADMIN — POTASSIUM BICARBONATE 40 MEQ: 391 TABLET, EFFERVESCENT ORAL at 11:05

## 2023-05-08 RX ADMIN — ACETAMINOPHEN 650 MG: 325 TABLET ORAL at 10:05

## 2023-05-08 RX ADMIN — POTASSIUM BICARBONATE 40 MEQ: 391 TABLET, EFFERVESCENT ORAL at 09:05

## 2023-05-08 RX ADMIN — SENNOSIDES AND DOCUSATE SODIUM 1 TABLET: 8.6; 5 TABLET ORAL at 08:05

## 2023-05-08 RX ADMIN — SPIRONOLACTONE 25 MG: 25 TABLET, FILM COATED ORAL at 08:05

## 2023-05-08 RX ADMIN — TORSEMIDE 10 MG: 10 TABLET ORAL at 08:05

## 2023-05-08 RX ADMIN — MAGNESIUM SULFATE 2 G: 2 INJECTION INTRAVENOUS at 09:05

## 2023-05-08 RX ADMIN — ASPIRIN 81 MG: 81 TABLET, COATED ORAL at 08:05

## 2023-05-08 RX ADMIN — SENNOSIDES AND DOCUSATE SODIUM 1 TABLET: 8.6; 5 TABLET ORAL at 09:05

## 2023-05-08 NOTE — PT/OT/SLP PROGRESS
"Speech Language Pathology Treatment    Patient Name:  Diomedes Mohr   MRN:  [id number]  Admitting Diagnosis: Cerebrovascular accident (CVA) due to embolism of left middle cerebral artery    Recommendations:                 General Recommendations:  Dysphagia therapy, Speech/language therapy, and Cognitive-linguistic therapy  Diet recommendations:  Mechanical soft, Liquid Diet Level: Thin   All items must be minced and moist. Please avoid mixed consistencies (such as cereals, soups with large pieces of meat/vegetable, etc.), avoid dry/coarse/crumbly items such as rice ,nuts, seeds, dried fruit, coconut, avoid fibrous foods, avoid tough skins, avoid tough vegetables (i.e. no corn, brussel sprouts,etc) avoid chewy/sticky foods (i.e. no peanut putter, caramel, licorice, etc.)    Aspiration Precautions: Strict 1:1 assistance required with all PO for safety, alternate bties./sips, 1 bite/sip at a time, Check for pocketing/oral residue, lingual sweeps between bites encouraged, Feed only when awake/alert, Frequent oral care, HOB to 90 degrees, Meds crushed in puree, No straws, Remain upright 30 minutes post meal, Small bites/sips, and Strict aspiration precautions Continue to monitor for signs and symptoms of aspiration and discontinue oral feeding should you notice any of the following: watery eyes, reddened facial area, wet vocal quality, increased work of breathing, change in respiratory status, increased congestion, coughing, fever and/or change in level of alertness  General Precautions: Standard, aspiration, fall  Communication strategies:  provide increased time to answer and go to room if call light pushed    Subjective     SLP reviewed Pt with RN, RN cleared for tx  Pt presents calm  She explains, "They are suppose to bring me it" (re: boost)    Pain/Comfort:  Pain Rating 1: 0/10    Respiratory Status: Room air    Objective:     Has the patient been evaluated by SLP for swallowing?   Yes  Keep patient NPO? No "   Current Respiratory Status: room air        Pt found awake and upright in bed eating meal from outside of hospital for regular textures (Salad.) She was oriented x3. She followed simple commands WNL. She demonstrated moderate word-finding difficulty as characterized by perseveration. Dietary in room to deliver dinner meal tray for mechanical soft textures.  RN entered into room to review Pt.  RN notified of findings and Pt consuming regular textures upon SLP entrance to room. Pt with moderately prolonged mastication of solid and moderate solid stasis  in R buccal cavity post swallow. Pt partially removed stasis with use of tongue sweep. Remaining stasis removed by SLP with toothette. Pt and family were educated on SLP role, aspiration precautions, modifications for diet for Level V mechanical soft textures v. Regular textures, safe swallow strategies and ongoing SLP POC. Pt and Family verbalized understanding. Pt moved food brought from home and declined additional bites of meal tray items. She was seen with sips of thin liquids via open cup x2. No overt S/S aspiration with sips thin liquids. She expressed desire for supplemental nutrition. SLP noted order on meal tray ticket for vanilla Boost and reviewed with Dietary, Dietary confirmed Boost not on meal tray 2/2 out of stock. RN and RD notified of findings. Pt remained upright in bed with Spouse at bedside, call light in reach, upon SLP exit.     Assessment:     Diomedes Mohr is a 62 y.o. female with an SLP diagnosis of Aphasia, Dysphagia, and Cognitive-Linguistic Impairment.  She presents with R buccal pocketing of solids upon SLP entrance to room. Strict aspiration precautions required.     Goals:   Multidisciplinary Problems       SLP Goals          Problem: SLP    Goal Priority Disciplines Outcome   SLP Goal     SLP Ongoing, Progressing   Description: Speech Language Pathology Goals  Goals expected to be met by 5/7  1. Pt will tolerate puree & nectar thick  liquids without s/s aspiration & adequate oral phase of swallow  2. Ongoing swallow assessment to determine least restrictive PO consistencies  3. SLP Speech/Language/Cognitive Evaluation- initiated 5/1  4. Pt will answer personal yes/no questions with 90% accuracy, MIN A  5. Pt will complete 1-unit commands 90% of the time, MIN A  6. Pt will complete automatic speech tasks with 90% accuracy, MIN A  7. Pt will complete object naming tasks with 70% accuracy or higher, MIN A                         Plan:     Patient to be seen:  4 x/week   Plan of Care expires:  05/29/23  Plan of Care reviewed with:  patient, spouse   SLP Follow-Up:  Yes       Discharge recommendations:  rehabilitation facility     Time Tracking:     SLP Treatment Date:   05/08/23  Speech Start Time:  1611  Speech Stop Time:  1642     Speech Total Time (min):  31 min    Billable Minutes: Speech Therapy Individual 10 and Treatment Swallowing Dysfunction 21    05/08/2023

## 2023-05-08 NOTE — PLAN OF CARE
CM received message from [facility] that insurance authorization is still pending and will contact me with update.    UPDATE (3:27 PM):  ALLISON spoke with Carla Mae ([phone]) with [facility] who stated [facility] has denied insurance authorization for inpatient rehab but will approve SNF placement.   CM met with patient and her  and provided a list of SNF facilities.   SNF referrals sent to [facility], [facility], [facility], [facility], [facility], and [facility].

## 2023-05-08 NOTE — PT/OT/SLP PROGRESS
Physical Therapy Treatment    Patient Name:  Diomedes Mohr   MRN:  [id number]    Recommendations:     Discharge Recommendations: rehabilitation facility  Discharge Equipment Recommendations: to be determined by next level of care  Barriers to discharge:  impaired functional mobility requiring increased assistance    Assessment:     Diomedes Mohr is a 62 y.o. female admitted with a medical diagnosis of Cerebrovascular accident (CVA) due to embolism of left middle cerebral artery.  She presents with the following impairments/functional limitations: weakness, impaired endurance, impaired self care skills, impaired functional mobility, gait instability, impaired balance, decreased coordination, decreased upper extremity function, decreased lower extremity function, decreased safety awareness, impaired coordination, impaired fine motor, decreased ROM, abnormal tone, impaired sensation.    Rehab Prognosis: Good; patient would benefit from acute skilled PT services to address these deficits and reach maximum level of function.    Recent Surgery: Procedure(s) (LRB):  ANGIOGRAM-CEREBRAL (N/A)  ANGIOGRAM (N/A)      Plan:     During this hospitalization, patient to be seen 4 x/week to address the identified rehab impairments via gait training, therapeutic activities, therapeutic exercises, neuromuscular re-education and progress toward the following goals:    Plan of Care Expires:  06/01/23    Subjective     Chief Complaint: no c/o  Pain/Comfort:  Pain Rating 1: 0/10  Pain Rating Post-Intervention 1: 0/10      Objective:     Communicated with RN prior to session.  Patient found HOB elevated with telemetry, PureWick, bed alarm upon PTA entry to room.     General Precautions: Standard, aspiration  Orthopedic Precautions: N/A  Braces: N/A  Respiratory Status: Room air     Functional Mobility:  Bed Mobility:     Rolling Left:  minimum assistance  Supine to Sit: moderate assistance  Transfers:     Sit to Stand:  moderate assistance  with RW/hemiwalker/HHA  Gait: Attemped short trial ~3 ft fwd/bwd with pt requiring Max A for balance, weight shifting, and RLE progression/management. Inconsistent progression of RLE and assist needed for knee stability; terminated d/t R knee hyperextension and poor compensatory posturing in stance.   pre gait weight shifting performed with therapist supporting RLE to prevent hyperextension and knee buckling while shifting into RLE weight bearing      AM-PAC 6 CLICK MOBILITY  Turning over in bed (including adjusting bedclothes, sheets and blankets)?: 3  Sitting down on and standing up from a chair with arms (e.g., wheelchair, bedside commode, etc.): 2  Moving from lying on back to sitting on the side of the bed?: 2  Moving to and from a bed to a chair (including a wheelchair)?: 2  Need to walk in hospital room?: 2  Climbing 3-5 steps with a railing?: 1  Basic Mobility Total Score: 12       Treatment & Education:  Pt assisted with functional mobility as noted above.   RUE weight bearing facilitated for joint approximation and sensory input. Support at elbow and wrist. RUE flexion movement of shoulder and elbow, no extension noted. No wrist or hand movement.   Pt performs RLE seated therex: HF and LAQ x 10 reps each with AAROM, improved activation with all reps  Standing x 3 trials with RW on trial 1 and HW in LUE on trial 2 and no AD on trial 3. R knee assist/blocking d/t buckling. Severe hyperextension of R knee without assistance.    Patient left HOB elevated with all lines intact, call button in reach, bed alarm on, and RN notified.    GOALS:   Multidisciplinary Problems       Physical Therapy Goals          Problem: Physical Therapy    Goal Priority Disciplines Outcome Goal Variances Interventions   Physical Therapy Goal     PT, PT/OT Ongoing, Progressing     Description: Goals to be completed by: 6/1/23    Pt will perform sup<>sit transfers w/ minimum assistance  Pt will have sufficient dynamic balance to sit  EOB while performing ADLs/therex w/ contact guard assistance  PT will be able to stand up from EOB w/ moderate assistance using LRAD  Pt will ambulate 20 feet w/ moderate assistance using LRAD  Pt will be independent w/ HEP therex on BLE w/ good form and ROM                       Time Tracking:     PT Received On: 05/08/23  PT Start Time: 0829     PT Stop Time: 0900  PT Total Time (min): 31 min     Billable Minutes: Gait Training 10 and Neuromuscular Re-education 21    Treatment Type: Treatment  PT/PTA: PTA     Number of PTA visits since last PT visit: 2     05/08/2023

## 2023-05-08 NOTE — ASSESSMENT & PLAN NOTE
62 y.o. female with PMH of HTN, DM2, HLD, tobacco use (quit in 2020), stroke (2020, R MCA, no deficits), PE (December 2021, on xarelto),  CAD, DCM, HFrEF (15%) s/p AICD, Pulmonary hypertension admited to  4/27 for the treatment of a right leg DVT on heparin gtt. Stroke code activated 4/29 for aphasia and R hemiplegia, LKN 20:15 (~5 min prior). NIHSS 24, L MCA syndrome. Not TNK candidate due to AC with heparin gtt and therapeutic aPTT. CTA with L M2 occlusion. Taken to IR for possible intervention, now s/p L M2 thrombectomy with TICI 2c reperfusion. Repeat CTH post-IR with no acute infarct or hemorrhage, heparin gtt restarted. MRI brain W/WO with acute L MCA territory infarcts (insula, frontotemporal opercular cortex, patchy frontal white matter, small temporoparietal cortex), as well as small acute R cerebellar infarct. Echo with EF 10-15%, global LV hypokinesis, severe LAE. Suspect etiology likely cardioembolic due to EF 15%.    Neuro exam stable today. IPR auth not approved, SNF pending.    Antithrombotics for secondary stroke prevention: Anticoagulants: Pradaxa 150mg BID, ASA 81mg daily    Statins for secondary stroke prevention and HLD, if present: Statins: Atorvastatin- 80 mg daily    Aggressive risk factor modification: HTN, DM, HLD, HF, DVT    Rehab efforts: The patient has been evaluated by a stroke team provider and the therapy needs have been fully considered based off the presenting complaints and exam findings. The following therapy evaluations are needed: PT evaluate and treat, OT evaluate and treat, SLP evaluate and treat, PM&R evaluate for appropriate placement    Diagnostics ordered/pending: CTH PRN for acute neuro exam changes    VTE prophylaxis: None: Reason for No Pharmacological VTE Prophylaxis: Currently on anticoagulation    BP parameters: Infarct:  SBP <160

## 2023-05-08 NOTE — PLAN OF CARE
Recommendations     1. Continue current mechanical soft diet with diabetic (2000 kcal) restrictions- encourage adequate PO intake.      2. RD to cancel Boost Glucose TID order 2/2 being out of stock. RD to add Opisource BID.   - If alternative ONS warranted, modify to Novasource Renal BID.      3. RD following.     Goals: Will meet 75% or greater of EEN/EPN by next RD f/u.  Nutrition Goal Status: new  Communication of RD Recs:  (POC)    Eugenia Lofton RDN,LDN

## 2023-05-08 NOTE — PROGRESS NOTES
[facility] - Neurosurgery ([facility])  Vascular Neurology  Comprehensive Stroke Center  Progress Note    Assessment/Plan:     * Cerebrovascular accident (CVA) due to embolism of left middle cerebral artery  62 y.o. female with PMH of HTN, DM2, HLD, tobacco use (quit in 2020), stroke (2020, R MCA, no deficits), PE (December 2021, on xarelto),  CAD, DCM, HFrEF (15%) s/p AICD, Pulmonary hypertension admited to  4/27 for the treatment of a right leg DVT on heparin gtt. Stroke code activated 4/29 for aphasia and R hemiplegia, LKN 20:15 (~5 min prior). NIHSS 24, L MCA syndrome. Not TNK candidate due to AC with heparin gtt and therapeutic aPTT. CTA with L M2 occlusion. Taken to IR for possible intervention, now s/p L M2 thrombectomy with TICI 2c reperfusion. Repeat CTH post-IR with no acute infarct or hemorrhage, heparin gtt restarted. MRI brain W/WO with acute L MCA territory infarcts (insula, frontotemporal opercular cortex, patchy frontal white matter, small temporoparietal cortex), as well as small acute R cerebellar infarct. Echo with EF 10-15%, global LV hypokinesis, severe LAE. Suspect etiology likely cardioembolic due to EF 15%.    Neuro exam stable today. IPR auth not approved, SNF pending.    Antithrombotics for secondary stroke prevention: Anticoagulants: Pradaxa 150mg BID, ASA 81mg daily    Statins for secondary stroke prevention and HLD, if present: Statins: Atorvastatin- 80 mg daily    Aggressive risk factor modification: HTN, DM, HLD, HF, DVT    Rehab efforts: The patient has been evaluated by a stroke team provider and the therapy needs have been fully considered based off the presenting complaints and exam findings. The following therapy evaluations are needed: PT evaluate and treat, OT evaluate and treat, SLP evaluate and treat, PM&R evaluate for appropriate placement    Diagnostics ordered/pending: CTH PRN for acute neuro exam changes    VTE prophylaxis: None: Reason for No Pharmacological VTE  Prophylaxis: Currently on anticoagulation    BP parameters: Infarct:  SBP <160    Right sided weakness  Due to stroke  -PT/OT eval and treat  -Dispo recs for IPR    Critical lower limb ischemia  Stroke risk factor  Admitted for R popliteal artery occlusion, was started on heparin gtt  Heparin gtt restarted post-IR, per heme/onc recs transition to pradaxa today (5/2)    Chronic combined systolic and diastolic congestive heart failure  Stroke risk factor  -Echo with EF 10-15%, global LV hypokinesis, severe LAE  -Continue GDMT  - Switched metoprolol tartrate to succinate    Hyperlipemia  Stroke risk factor  -LDL 88, goal <70  -Atorvastatin 80mg daily    Hypertension  Stroke Risk factor  -SBP <160 s/p thrombectomy  -PRN hydralazine/labetalol    Type 2 diabetes mellitus without complication, without long-term current use of insulin  Stroke risk factor  -A1c 7.5  -BG goal while inpatient 140-180  -MC SSI PRN         4/30-S/p TICI 2c. Repeat CTH after IR with no acute infarct. Will need MRI but has a pacemaker that will need be interrogated. Improving r arm weakness and  aphasia.  5/1/2023- MRI completed showing acute L. MCA infarct. Pacemaker reinitiated post MRI to normal settings. Plan to start Pradaxa tomorrow per Heme/Onc. Neuro exam stable.  05/02/2023 NAEO, neuro exam stable. CTH overnight stable. Heparin gtt transitioned to Pradaxa today for AC of reduced EF and VTE history. Speech cleared for minced/moist diet. Stable for step down.  05/03/2023 Stepped down to NPU this morning, NAEO. Neuro exam stable, speech improving but continues to have significant RSW. Dispo recs for IPR.  05/04/2023 Pt improving this AM. No aphasia noted on exam. Restarted Pradaxa BID and switched Metoprolol tartrate to metop succinate for GDMT.   05/05/2023 Pt neuro exam improving. Aphasia noted on exam today. Pt reports insomnia and disliking hospital meals. In good spirits and excited for discharge to IPR.  05/06/2023 Neuro exam stable.  Pending IPR.   05/07/2023 Neuro exam improving. Pending IPR.  05/08/2023 Neuro exam stable today. IPR auth not approved, SNF pending.        STROKE DOCUMENTATION   Acute Stroke Times   Last Known Normal Date: 04/29/23  Last Known Normal Time: 2015  Symptom Onset Date: 04/29/23  Symptom Onset Time: 2015  Stroke Team Called Date: 04/29/23  Stroke Team Called Time: 2022  Stroke Team Arrival Date: 04/29/23  Stroke Team Arrival Time: 2025  CT Interpretation Time: 2038  Thrombolytic Therapy Recommended: No  CTA Interpretation Time: 2045  Thrombectomy Recommended: Yes  Decision to Treat Time for IR: 2045    NIH Scale:  1a. Level of Consciousness: 0-->Alert, keenly responsive  1b. LOC Questions: 0-->Answers both questions correctly  1c. LOC Commands: 0-->Performs both tasks correctly  2. Best Gaze: 1-->Partial gaze palsy, gaze is abnormal in one or both eyes, but forced deviation or total gaze paresis is not present  3. Visual: 1-->Partial hemianopia  4. Facial Palsy: 1-->Minor paralysis (flattened nasolabial fold, asymmetry on smiling)  5a. Motor Arm, Left: 0-->No drift, limb holds 90 (or 45) degrees for full 10 secs  5b. Motor Arm, Right: 1-->Drift, limb holds 90 (or 45) degrees, but drifts down before full 10 secs, does not hit bed or other support  6a. Motor Leg, Left: 0-->No drift, leg holds 30 degree position for full 5 secs  6b. Motor Leg, Right: 1-->Drift, leg falls by the end of the 5-sec period but does not hit bed  7. Limb Ataxia: 0-->Absent  8. Sensory: 1-->Mild-to-moderate sensory loss, patient feels pinprick is less sharp or is dull on the affected side, or there is a loss of superficial pain with pinprick, but patient is aware of being touched  9. Best Language: 1-->Mild-to-moderate aphasia, some obvious loss of fluency or facility of comprehension, without significant limitation on ideas expressed or form of expression. Reduction of speech and/or comprehension, however, makes conversation. . . (see row  details)  10. Dysarthria: 1-->Mild-to-moderate dysarthria, patient slurs at least some words and, at worst, can be understood with some difficulty  11. Extinction and Inattention (formerly Neglect): 1-->Visual, tactile, auditory, spatial, or personal inattention or extinction to bilateral simultaneous stimulation in one of the sensory modalities  Total (NIH Stroke Scale): 9       Modified [place] Score: 0  [place] Coma Scale:    ABCD2 Score:    [id number] Score:   HAS -BLED Score:   ICH Score:   Hunt & Vora Classification:      Hemorrhagic change of an Ischemic Stroke: Does this patient have an ischemic stroke with hemorrhagic changes? No     Neurologic Chief Complaint: L MCA stroke    Subjective:     Interval History: Patient is seen for follow-up neurological assessment and treatment recommendations:     Neuro exam stable today. IPR auth not approved, SNF pending.    HPI, Past Medical, Family, and Social History remains the same as documented in the initial encounter.     Review of Systems   Reason unable to perform ROS: severe aphasia.   Constitutional:  Negative for fatigue.   HENT:  Negative for congestion.    Eyes:  Positive for visual disturbance.   Respiratory:  Negative for shortness of breath.    Cardiovascular:  Negative for chest pain.   Gastrointestinal:  Negative for abdominal distention and abdominal pain.   Skin:  Negative for rash.   Neurological:  Positive for facial asymmetry, speech difficulty (improving) and weakness.   Psychiatric/Behavioral:  Positive for sleep disturbance. Negative for agitation and behavioral problems.    Scheduled Meds:   aspirin  81 mg Oral Daily    atorvastatin  80 mg Oral Daily    dabigatran etexilate  150 mg Oral BID    metoprolol succinate  100 mg Oral Daily    polyethylene glycol  17 g Oral Daily    sacubitriL-valsartan  1 tablet Oral BID    senna-docusate 8.6-50 mg  1 tablet Oral BID    spironolactone  25 mg Oral Daily    torsemide  10 mg Oral Daily      Continuous Infusions:    PRN Meds:acetaminophen, albuterol-ipratropium, dextrose 10%, dextrose 10%, dextrose 10%, dextrose 10%, dextrose, dextrose, glucagon (human recombinant), insulin aspart U-100, labetalol, ondansetron, senna-docusate 8.6-50 mg, sodium chloride 0.9%, sodium chloride 0.9%    Objective:     Vital Signs (Most Recent):  Temp: 97 °F (36.1 °C) (05/08/23 1500)  Pulse: 88 (05/08/23 1500)  Resp: 20 (05/08/23 1500)  BP: 108/60 (05/08/23 1500)  SpO2: 99 % (05/08/23 1500)  BP Location: Left arm    Vital Signs Range (Last 24H):  Temp:  [96.5 °F (35.8 °C)-98.7 °F (37.1 °C)]   Pulse:  []   Resp:  [16-20]   BP: ()/(57-68)   SpO2:  [97 %-99 %]   BP Location: Left arm    Physical Exam  Vitals and nursing note reviewed.   Constitutional:       General: She is not in acute distress.  HENT:      Head: Normocephalic.      Nose: Nose normal.      Mouth/Throat:      Mouth: Mucous membranes are moist.   Eyes:      General: Visual field deficit (R hemianopsia) present.      Conjunctiva/sclera: Conjunctivae normal.      Comments: L gaze preference   Cardiovascular:      Rate and Rhythm: Normal rate.   Pulmonary:      Effort: Pulmonary effort is normal.   Abdominal:      General: Abdomen is flat.   Skin:     General: Skin is warm and dry.   Neurological:      Mental Status: She is alert.      Cranial Nerves: Dysarthria and facial asymmetry present.      Sensory: Sensory deficit present.      Motor: Weakness present.   Psychiatric:         Mood and Affect: Mood normal.       Neurological Exam:   LOC: alert  Attention Span: Good   Language: expressive aphasia  Articulation: dysarthria  Orientation: Limited due to severe aphasia, oriented tos elf  Visual Fields: Hemianopsia right  EOM (CN III, IV, VI): Gaze preference  left  Facial Movement (CN VII): Lower facial weakness on the Right  Motor: Arm left  Normal 5/5  Leg left  Normal 5/5  Arm right  Paresis 3/5  Leg right Paresis 3/5      Laboratory:  CMP:    Recent Labs   Lab 05/08/23  0557   CALCIUM 9.3   ALBUMIN 3.1*   PROT 6.7      K 3.3*   CO2 28      BUN 16   CREATININE 0.8   ALKPHOS 140*   ALT 24   AST 25   BILITOT 0.6       CBC:   Recent Labs   Lab 05/08/23  0556   WBC 8.27   RBC 4.44   HGB 12.9   HCT 39.9      MCV 90   MCH 29.1   MCHC 32.3       Lipid Panel:   No results for input(s): CHOL, LDLCALC, HDL, TRIG in the last 168 hours.    Coagulation:   Recent Labs   Lab 05/02/23  0043   APTT 43.0*         Hgb A1C:   No results for input(s): HGBA1C in the last 168 hours.    TSH:   No results for input(s): TSH in the last 168 hours.      Diagnostic Results     Brain Imaging   CTH without contrast 5/1/23  Acute left MCA distribution infarct appears stable in size and distribution compared to 04/30/2023 MRI.  No evidence of hemorrhagic conversion.   Acute small right cerebellar infarct is better demonstrated on MRI.   No acute intracranial hemorrhage or midline shift.     MRI Brain without contrast 4/30/23    Acute left MCA distribution infarct.  No significant mass effect or acute hemorrhage at this time.  Small acute infarct in the right cerebellum.  Chronic right MCA distribution infarct.     CTH 4/30/2023  No acute territorial infarct or intracranial hemorrhage identified.      Vessel Imaging   CTA Stroke MP 4/29/2023  CT head: No evidence for acute intracranial hemorrhage.  Stable area encephalomalacia from remote right MCA territory infarct.  CTA head: Occlusion of M2 segment of left MCA.  Asymmetric decreased opacification of the left transverse sinus and jugular vein.  Similar finding was present on the prior CTA in 2022 and therefore could be related to sluggish flow.  Suggest attention on follow-up conventional angiogram.  CTA neck: No high-grade stenosis or aneurysm.      Cardiac Imaging   Echo 4/28/2023   The left ventricle is severely enlarged with eccentric hypertrophy and severely decreased systolic function.   The estimated  ejection fraction is 10-15%.   There is left ventricular global hypokinesis.   Grade II left ventricular diastolic dysfunction.   Normal right ventricular size with normal right ventricular systolic function.   Mild tricuspid regurgitation.   The estimated PA systolic pressure is 35 mmHg.   Normal central venous pressure (3 mmHg).   Severe left atrial enlargement.      Tushar Ayala MD  [facility]  Department of Vascular Neurology   [facility])

## 2023-05-08 NOTE — PLAN OF CARE
Problem: Adult Inpatient Plan of Care  Goal: Plan of Care Review  Outcome: Ongoing, Progressing  Goal: Patient-Specific Goal (Individualized)  Description: Admit Date: 4/27/2023    Cerebrovascular accident (CVA) due to embolism of left middle cerebral artery    Past Medical History:  12/26/2019: Acute on chronic combined systolic and diastolic heart   failure  No date: Anticoagulant long-term use  No date: Anxiety  No date: Arthritis  No date: Asthma  No date: Depression  No date: H/O: hysterectomy  No date: Hyperlipemia  No date: Hypertension  No date: Pulmonary edema  No date: Schizophrenia    Past Surgical History:  No date: BLADDER SUSPENSION  No date: CARPAL TUNNEL RELEASE; Right  No date: HEEL SPUR SURGERY; Left  No date: HYSTERECTOMY  12/27/2019: LEFT HEART CATHETERIZATION; Bilateral      Comment:  Procedure: Left heart cath;  Surgeon: Steve Chabmers MD;  Location: [facility] CATH LAB;  Service:                Cardiology;  Laterality: Bilateral;  7/26/2021: RIGHT HEART CATHETERIZATION; Right      Comment:  Procedure: INSERTION, CATHETER, RIGHT HEART;  Surgeon:                Petr Naranjo MD;  Location: [facility] CATH LAB;                 Service: Cardiology;  Laterality: Right;  5/12/2022: RIGHT HEART CATHETERIZATION; Right      Comment:  Procedure: INSERTION, CATHETER, RIGHT HEART;  Surgeon:                Chandana Ivory Jr., MD;  Location: [facility] CATH LAB;                 Service: Cardiology;  Laterality: Right;  No date: TUBAL LIGATION    Individualization:   1. Patient wants to be told all care  2. Keep communication board at bedside    Restraints: None             Outcome: Ongoing, Progressing  Goal: Absence of Hospital-Acquired Illness or Injury  Outcome: Ongoing, Progressing  Goal: Optimal Comfort and Wellbeing  Outcome: Ongoing, Progressing  Goal: Readiness for Transition of Care  Outcome: Ongoing, Progressing     Problem: Diabetes Comorbidity  Goal: Blood Glucose Level  Within Targeted Range  Outcome: Ongoing, Progressing     Problem: Fall Injury Risk  Goal: Absence of Fall and Fall-Related Injury  Outcome: Ongoing, Progressing     Problem: Adjustment to Illness (Stroke, Ischemic/Transient Ischemic Attack)  Goal: Optimal Coping  Outcome: Ongoing, Progressing     Problem: Bowel Elimination Impaired (Stroke, Ischemic/Transient Ischemic Attack)  Goal: Effective Bowel Elimination  Outcome: Ongoing, Progressing     Problem: Cerebral Tissue Perfusion (Stroke, Ischemic/Transient Ischemic Attack)  Goal: Optimal Cerebral Tissue Perfusion  Outcome: Ongoing, Progressing     Problem: Cognitive Impairment (Stroke, Ischemic/Transient Ischemic Attack)  Goal: Optimal Cognitive Function  Outcome: Ongoing, Progressing     Problem: Communication Impairment (Stroke, Ischemic/Transient Ischemic Attack)  Goal: Improved Communication Skills  Outcome: Ongoing, Progressing     Problem: Functional Ability Impaired (Stroke, Ischemic/Transient Ischemic Attack)  Goal: Optimal Functional Ability  Outcome: Ongoing, Progressing     Problem: Respiratory Compromise (Stroke, Ischemic/Transient Ischemic Attack)  Goal: Effective Oxygenation and Ventilation  Outcome: Ongoing, Progressing     Problem: Sensorimotor Impairment (Stroke, Ischemic/Transient Ischemic Attack)  Goal: Improved Sensorimotor Function  Outcome: Ongoing, Progressing     Problem: Swallowing Impairment (Stroke, Ischemic/Transient Ischemic Attack)  Goal: Optimal Eating and Swallowing without Aspiration  Outcome: Ongoing, Progressing     Problem: Urinary Elimination Impaired (Stroke, Ischemic/Transient Ischemic Attack)  Goal: Effective Urinary Elimination  Outcome: Ongoing, Progressing     Problem: Skin Injury Risk Increased  Goal: Skin Health and Integrity  Outcome: Ongoing, Progressing     Problem: Adjustment to Illness (Delirium)  Goal: Optimal Coping  Outcome: Ongoing, Progressing     Problem: Altered Behavior (Delirium)  Goal: Improved Behavioral  Control  Outcome: Ongoing, Progressing     Problem: Attention and Thought Clarity Impairment (Delirium)  Goal: Improved Attention and Thought Clarity  Outcome: Ongoing, Progressing     Problem: Sleep Disturbance (Delirium)  Goal: Improved Sleep  Outcome: Ongoing, Progressing

## 2023-05-08 NOTE — SUBJECTIVE & OBJECTIVE
Neurologic Chief Complaint: L MCA stroke    Subjective:     Interval History: Patient is seen for follow-up neurological assessment and treatment recommendations:     Neuro exam stable today. IPR auth not approved, SNF pending.    HPI, Past Medical, Family, and Social History remains the same as documented in the initial encounter.     Review of Systems   Reason unable to perform ROS: severe aphasia.   Constitutional:  Negative for fatigue.   HENT:  Negative for congestion.    Eyes:  Positive for visual disturbance.   Respiratory:  Negative for shortness of breath.    Cardiovascular:  Negative for chest pain.   Gastrointestinal:  Negative for abdominal distention and abdominal pain.   Skin:  Negative for rash.   Neurological:  Positive for facial asymmetry, speech difficulty (improving) and weakness.   Psychiatric/Behavioral:  Positive for sleep disturbance. Negative for agitation and behavioral problems.    Scheduled Meds:   aspirin  81 mg Oral Daily    atorvastatin  80 mg Oral Daily    dabigatran etexilate  150 mg Oral BID    metoprolol succinate  100 mg Oral Daily    polyethylene glycol  17 g Oral Daily    sacubitriL-valsartan  1 tablet Oral BID    senna-docusate 8.6-50 mg  1 tablet Oral BID    spironolactone  25 mg Oral Daily    torsemide  10 mg Oral Daily     Continuous Infusions:    PRN Meds:acetaminophen, albuterol-ipratropium, dextrose 10%, dextrose 10%, dextrose 10%, dextrose 10%, dextrose, dextrose, glucagon (human recombinant), insulin aspart U-100, labetalol, ondansetron, senna-docusate 8.6-50 mg, sodium chloride 0.9%, sodium chloride 0.9%    Objective:     Vital Signs (Most Recent):  Temp: 97 °F (36.1 °C) (05/08/23 1500)  Pulse: 88 (05/08/23 1500)  Resp: 20 (05/08/23 1500)  BP: 108/60 (05/08/23 1500)  SpO2: 99 % (05/08/23 1500)  BP Location: Left arm    Vital Signs Range (Last 24H):  Temp:  [96.5 °F (35.8 °C)-98.7 °F (37.1 °C)]   Pulse:  []   Resp:  [16-20]   BP: ()/(57-68)   SpO2:  [97 %-99  %]   BP Location: Left arm    Physical Exam  Vitals and nursing note reviewed.   Constitutional:       General: She is not in acute distress.  HENT:      Head: Normocephalic.      Nose: Nose normal.      Mouth/Throat:      Mouth: Mucous membranes are moist.   Eyes:      General: Visual field deficit (R hemianopsia) present.      Conjunctiva/sclera: Conjunctivae normal.      Comments: L gaze preference   Cardiovascular:      Rate and Rhythm: Normal rate.   Pulmonary:      Effort: Pulmonary effort is normal.   Abdominal:      General: Abdomen is flat.   Skin:     General: Skin is warm and dry.   Neurological:      Mental Status: She is alert.      Cranial Nerves: Dysarthria and facial asymmetry present.      Sensory: Sensory deficit present.      Motor: Weakness present.   Psychiatric:         Mood and Affect: Mood normal.       Neurological Exam:   LOC: alert  Attention Span: Good   Language: expressive aphasia  Articulation: dysarthria  Orientation: Limited due to severe aphasia, oriented tos elf  Visual Fields: Hemianopsia right  EOM (CN III, IV, VI): Gaze preference  left  Facial Movement (CN VII): Lower facial weakness on the Right  Motor: Arm left  Normal 5/5  Leg left  Normal 5/5  Arm right  Paresis 3/5  Leg right Paresis 3/5      Laboratory:  CMP:   Recent Labs   Lab 05/08/23  0557   CALCIUM 9.3   ALBUMIN 3.1*   PROT 6.7      K 3.3*   CO2 28      BUN 16   CREATININE 0.8   ALKPHOS 140*   ALT 24   AST 25   BILITOT 0.6       CBC:   Recent Labs   Lab 05/08/23  0556   WBC 8.27   RBC 4.44   HGB 12.9   HCT 39.9      MCV 90   MCH 29.1   MCHC 32.3       Lipid Panel:   No results for input(s): CHOL, LDLCALC, HDL, TRIG in the last 168 hours.    Coagulation:   Recent Labs   Lab 05/02/23  0043   APTT 43.0*         Hgb A1C:   No results for input(s): HGBA1C in the last 168 hours.    TSH:   No results for input(s): TSH in the last 168 hours.      Diagnostic Results     Brain Imaging   CTH without contrast  5/1/23  Acute left MCA distribution infarct appears stable in size and distribution compared to 04/30/2023 MRI.  No evidence of hemorrhagic conversion.   Acute small right cerebellar infarct is better demonstrated on MRI.   No acute intracranial hemorrhage or midline shift.     MRI Brain without contrast 4/30/23    Acute left MCA distribution infarct.  No significant mass effect or acute hemorrhage at this time.  Small acute infarct in the right cerebellum.  Chronic right MCA distribution infarct.     CTH 4/30/2023  No acute territorial infarct or intracranial hemorrhage identified.      Vessel Imaging   CTA Stroke MP 4/29/2023  CT head: No evidence for acute intracranial hemorrhage.  Stable area encephalomalacia from remote right MCA territory infarct.  CTA head: Occlusion of M2 segment of left MCA.  Asymmetric decreased opacification of the left transverse sinus and jugular vein.  Similar finding was present on the prior CTA in 2022 and therefore could be related to sluggish flow.  Suggest attention on follow-up conventional angiogram.  CTA neck: No high-grade stenosis or aneurysm.      Cardiac Imaging   Echo 4/28/2023  The left ventricle is severely enlarged with eccentric hypertrophy and severely decreased systolic function.  The estimated ejection fraction is 10-15%.  There is left ventricular global hypokinesis.  Grade II left ventricular diastolic dysfunction.  Normal right ventricular size with normal right ventricular systolic function.  Mild tricuspid regurgitation.  The estimated PA systolic pressure is 35 mmHg.  Normal central venous pressure (3 mmHg).  Severe left atrial enlargement.

## 2023-05-08 NOTE — PROGRESS NOTES
Dmitriy Triana - Neurosurgery ([facility])  Adult Nutrition  Progress Note    SUMMARY       Recommendations    1. Continue current mechanical soft diet with diabetic (2000 kcal) restrictions- encourage adequate PO intake.     2. RD to cancel Boost Glucose TID order 2/2 being out of stock. RD to add Opisource BID.   - If alternative ONS warranted, modify to Novasource Renal BID.     3. RD following.    Goals: Will meet 75% or greater of EEN/EPN by next RD f/u.  Nutrition Goal Status: new  Communication of RD Recs:  (POC)    Assessment and Plan    Nutrition Problem  Inadequate protein energy intake    Related to (etiology):   Food preferences     Signs and Symptoms (as evidenced by):   PO intake 25-50%     Interventions/Recommendations (treatment strategy):  Collaboration of nutrition care with other providers  [employer]    Nutrition Diagnosis Status:   New    Reason for Assessment    Reason For Assessment: length of stay  Diagnosis: stroke/CVA  Relevant Medical History: T2DM, HTN, HLD, CHF, CAD  Interdisciplinary Rounds: did not attend  General Information Comments: RD visit for LOS x 11 day. Spoke with pt at bedside. Endorses good appetite PTA with 3 meals/day on general diet. States intake now 25-50% of meals provided 2/2 not liking meals. RD observed [employer] bag on pt's bedside table. Spoke with pt about ONS ordered TID- states she has not been recieving them. Boost Glucose TID ordered via [employer]- RD checked stockroom this AM and currently no Boost Glucose Control is in stock. RD to cancel current order and order alternative. Pt states she is willing to try ONS and prefers chocolate flavor is avaliable. RD reported issues via [employer]. No issues with n/v/d/c. -199# per chart review. No s/s of malnutrition, appears well-nourished. LBM 5/7.  Nutrition Discharge Planning: Diabetic diet with texture per SLP    Nutrition Risk Screen    Nutrition Risk Screen: difficulty chewing/swallowing    Nutrition/Diet  "History    Patient Reported Diet/Restrictions/Preferences: general  Spiritual, Cultural Beliefs, Hindu Practices, Values that Affect Care: no  Factors Affecting Nutritional Intake: other (see comments) (food preferences)    Anthropometrics    Temp: 96.5 °F (35.8 °C)  Height Method: Stated  Height: 5' 7" (170.2 cm)  Height (inches): 67 in  Weight Method: Standard Scale  Weight: 85.7 kg (188 lb 15 oz)  Weight (lb): 188.94 lb  Ideal Body Weight (IBW), Female: 135 lb  % Ideal Body Weight, Female (lb): 139.96 %  BMI (Calculated): 29.6  BMI Grade: 25 - 29.9 - overweight    Lab/Procedures/Meds    Pertinent Labs Reviewed: reviewed  Pertinent Labs Comments: Glucose 186, A1C 7.5, Albumin 3.1, Potassium 3.3, Al Phos 140,   Pertinent Medications Reviewed: reviewed  Pertinent Medications Comments: aspirin, atorvastatin, metoprolol succinate, spironolactone    Estimated/Assessed Needs    Weight Used For Calorie Calculations: 85.7 kg (188 lb 15 oz)  Energy Calorie Requirements (kcal): 1600 kcals  Energy Need Method: [place]-St Jeor (MSJ x 1.0 PAL)  Protein Requirements: 69-86 g (0.8-1.0 g/kg)  Weight Used For Protein Calculations: 85.7 kg (188 lb 15 oz)  Fluid Requirements (mL): 1 mL/kcal or fluid per MD  Estimated Fluid Requirement Method: RDA Method  RDA Method (mL): 1600  CHO Requirement: 200 g    Nutrition Prescription Ordered    Current Diet Order: Mechanical soft (DM, 2000 kcal)  Oral Nutrition Supplement: Boost Glucose TID    Evaluation of Received Nutrient/Fluid Intake    I/O: -2.0 L since admit  Energy Calories Required: not meeting needs  Protein Required: not meeting needs  Tolerance: tolerating  % Intake of Estimated Energy Needs: 25 - 50 %  % Meal Intake: 25 - 50 %    Nutrition Risk    Level of Risk/Frequency of Follow-up:  (1 time/week)     Monitor and Evaluation    Food and Nutrient Intake: food and beverage intake, energy intake  Food and Nutrient Adminstration: diet order  Knowledge/Beliefs/Attitudes: " food and nutrition knowledge/skill, beliefs and attitudes  Physical Activity and Function: nutrition-related ADLs and IADLs  Anthropometric Measurements: body mass index, weight change, weight, height/length  Biochemical Data, Medical Tests and Procedures: lipid profile, inflammatory profile, glucose/endocrine profile, electrolyte and renal panel, gastrointestinal profile  Nutrition-Focused Physical Findings: overall appearance     Nutrition Follow-Up    RD Follow-up?: Yes    Eugenia Colon RDN,LDN

## 2023-05-08 NOTE — PLAN OF CARE
Problem: Cognitive Impairment (Stroke, Ischemic/Transient Ischemic Attack)  Goal: Optimal Cognitive Function  Outcome: Ongoing, Progressing     Problem: Communication Impairment (Stroke, Ischemic/Transient Ischemic Attack)  Goal: Improved Communication Skills  Outcome: Ongoing, Progressing     Problem: Functional Ability Impaired (Stroke, Ischemic/Transient Ischemic Attack)  Goal: Optimal Functional Ability  Outcome: Ongoing, Progressing     Problem: Respiratory Compromise (Stroke, Ischemic/Transient Ischemic Attack)  Goal: Effective Oxygenation and Ventilation  Outcome: Ongoing, Progressing     Problem: Sensorimotor Impairment (Stroke, Ischemic/Transient Ischemic Attack)  Goal: Improved Sensorimotor Function  Outcome: Ongoing, Progressing     Problem: Swallowing Impairment (Stroke, Ischemic/Transient Ischemic Attack)  Goal: Optimal Eating and Swallowing without Aspiration  Outcome: Ongoing, Progressing     Problem: Skin Injury Risk Increased  Goal: Skin Health and Integrity  Outcome: Ongoing, Progressing     Problem: Adjustment to Illness (Delirium)  Goal: Optimal Coping  Outcome: Ongoing, Progressing     Problem: Altered Behavior (Delirium)  Goal: Improved Behavioral Control  Outcome: Ongoing, Progressing

## 2023-05-09 LAB
ALBUMIN SERPL BCP-MCNC: 3.1 G/DL (ref 3.5–5.2)
ALP SERPL-CCNC: 146 U/L (ref 55–135)
ALT SERPL W/O P-5'-P-CCNC: 24 U/L (ref 10–44)
ANION GAP SERPL CALC-SCNC: 8 MMOL/L (ref 8–16)
AST SERPL-CCNC: 24 U/L (ref 10–40)
BASOPHILS # BLD AUTO: 0.05 K/UL (ref 0–0.2)
BASOPHILS NFR BLD: 0.6 % (ref 0–1.9)
BILIRUB SERPL-MCNC: 0.7 MG/DL (ref 0.1–1)
BUN SERPL-MCNC: 16 MG/DL (ref 8–23)
CALCIUM SERPL-MCNC: 9.5 MG/DL (ref 8.7–10.5)
CHLORIDE SERPL-SCNC: 100 MMOL/L (ref 95–110)
CO2 SERPL-SCNC: 28 MMOL/L (ref 23–29)
CREAT SERPL-MCNC: 0.9 MG/DL (ref 0.5–1.4)
DIFFERENTIAL METHOD: NORMAL
EOSINOPHIL # BLD AUTO: 0.2 K/UL (ref 0–0.5)
EOSINOPHIL NFR BLD: 2 % (ref 0–8)
ERYTHROCYTE [DISTWIDTH] IN BLOOD BY AUTOMATED COUNT: 14.5 % (ref 11.5–14.5)
EST. GFR  (NO RACE VARIABLE): >60 ML/MIN/1.73 M^2
GLUCOSE SERPL-MCNC: 167 MG/DL (ref 70–110)
HCT VFR BLD AUTO: 37.7 % (ref 37–48.5)
HGB BLD-MCNC: 12.7 G/DL (ref 12–16)
IMM GRANULOCYTES # BLD AUTO: 0.03 K/UL (ref 0–0.04)
IMM GRANULOCYTES NFR BLD AUTO: 0.4 % (ref 0–0.5)
LYMPHOCYTES # BLD AUTO: 2.4 K/UL (ref 1–4.8)
LYMPHOCYTES NFR BLD: 29.2 % (ref 18–48)
MAGNESIUM SERPL-MCNC: 2 MG/DL (ref 1.6–2.6)
MCH RBC QN AUTO: 29.5 PG (ref 27–31)
MCHC RBC AUTO-ENTMCNC: 33.7 G/DL (ref 32–36)
MCV RBC AUTO: 88 FL (ref 82–98)
MONOCYTES # BLD AUTO: 0.6 K/UL (ref 0.3–1)
MONOCYTES NFR BLD: 7 % (ref 4–15)
NEUTROPHILS # BLD AUTO: 5.1 K/UL (ref 1.8–7.7)
NEUTROPHILS NFR BLD: 60.8 % (ref 38–73)
NRBC BLD-RTO: 0 /100 WBC
PHOSPHATE SERPL-MCNC: 3.8 MG/DL (ref 2.7–4.5)
PLATELET # BLD AUTO: 394 K/UL (ref 150–450)
PMV BLD AUTO: 10.7 FL (ref 9.2–12.9)
POCT GLUCOSE: 143 MG/DL (ref 70–110)
POCT GLUCOSE: 157 MG/DL (ref 70–110)
POCT GLUCOSE: 163 MG/DL (ref 70–110)
POCT GLUCOSE: 171 MG/DL (ref 70–110)
POTASSIUM SERPL-SCNC: 3.9 MMOL/L (ref 3.5–5.1)
PROT SERPL-MCNC: 6.7 G/DL (ref 6–8.4)
RBC # BLD AUTO: 4.31 M/UL (ref 4–5.4)
SODIUM SERPL-SCNC: 136 MMOL/L (ref 136–145)
WBC # BLD AUTO: 8.37 K/UL (ref 3.9–12.7)

## 2023-05-09 PROCEDURE — 84100 ASSAY OF PHOSPHORUS: CPT

## 2023-05-09 PROCEDURE — 36415 COLL VENOUS BLD VENIPUNCTURE: CPT | Performed by: PHYSICIAN ASSISTANT

## 2023-05-09 PROCEDURE — 99232 PR SUBSEQUENT HOSPITAL CARE,LEVL II: ICD-10-PCS | Mod: ,,, | Performed by: PSYCHIATRY & NEUROLOGY

## 2023-05-09 PROCEDURE — 80053 COMPREHEN METABOLIC PANEL: CPT | Performed by: PHYSICIAN ASSISTANT

## 2023-05-09 PROCEDURE — 25000003 PHARM REV CODE 250: Performed by: PHYSICIAN ASSISTANT

## 2023-05-09 PROCEDURE — 97110 THERAPEUTIC EXERCISES: CPT

## 2023-05-09 PROCEDURE — 25000003 PHARM REV CODE 250

## 2023-05-09 PROCEDURE — 97116 GAIT TRAINING THERAPY: CPT | Mod: CQ

## 2023-05-09 PROCEDURE — 92526 ORAL FUNCTION THERAPY: CPT

## 2023-05-09 PROCEDURE — 97530 THERAPEUTIC ACTIVITIES: CPT | Mod: CQ

## 2023-05-09 PROCEDURE — 97535 SELF CARE MNGMENT TRAINING: CPT

## 2023-05-09 PROCEDURE — 85025 COMPLETE CBC W/AUTO DIFF WBC: CPT

## 2023-05-09 PROCEDURE — 83735 ASSAY OF MAGNESIUM: CPT

## 2023-05-09 PROCEDURE — 25000003 PHARM REV CODE 250: Performed by: PSYCHIATRY & NEUROLOGY

## 2023-05-09 PROCEDURE — 25000003 PHARM REV CODE 250: Performed by: HOSPITALIST

## 2023-05-09 PROCEDURE — 63600175 PHARM REV CODE 636 W HCPCS: Performed by: PHYSICIAN ASSISTANT

## 2023-05-09 PROCEDURE — 99232 SBSQ HOSP IP/OBS MODERATE 35: CPT | Mod: ,,, | Performed by: PSYCHIATRY & NEUROLOGY

## 2023-05-09 PROCEDURE — 11000001 HC ACUTE MED/SURG PRIVATE ROOM

## 2023-05-09 PROCEDURE — 94761 N-INVAS EAR/PLS OXIMETRY MLT: CPT

## 2023-05-09 PROCEDURE — 92507 TX SP LANG VOICE COMM INDIV: CPT

## 2023-05-09 RX ADMIN — DABIGATRAN ETEXILATE MESYLATE 150 MG: 150 CAPSULE ORAL at 08:05

## 2023-05-09 RX ADMIN — DABIGATRAN ETEXILATE MESYLATE 150 MG: 150 CAPSULE ORAL at 09:05

## 2023-05-09 RX ADMIN — SPIRONOLACTONE 25 MG: 25 TABLET, FILM COATED ORAL at 08:05

## 2023-05-09 RX ADMIN — SACUBITRIL AND VALSARTAN 1 TABLET: 97; 103 TABLET, FILM COATED ORAL at 11:05

## 2023-05-09 RX ADMIN — TORSEMIDE 10 MG: 10 TABLET ORAL at 08:05

## 2023-05-09 RX ADMIN — SACUBITRIL AND VALSARTAN 1 TABLET: 97; 103 TABLET, FILM COATED ORAL at 08:05

## 2023-05-09 RX ADMIN — ATORVASTATIN CALCIUM 80 MG: 40 TABLET, FILM COATED ORAL at 08:05

## 2023-05-09 RX ADMIN — SENNOSIDES AND DOCUSATE SODIUM 1 TABLET: 8.6; 5 TABLET ORAL at 08:05

## 2023-05-09 RX ADMIN — ASPIRIN 81 MG: 81 TABLET, COATED ORAL at 08:05

## 2023-05-09 RX ADMIN — INSULIN ASPART 2 UNITS: 100 INJECTION, SOLUTION INTRAVENOUS; SUBCUTANEOUS at 12:05

## 2023-05-09 RX ADMIN — METOPROLOL SUCCINATE 100 MG: 100 TABLET, EXTENDED RELEASE ORAL at 08:05

## 2023-05-09 RX ADMIN — ACETAMINOPHEN 650 MG: 325 TABLET ORAL at 09:05

## 2023-05-09 NOTE — ASSESSMENT & PLAN NOTE
62 y.o. female with PMH of HTN, DM2, HLD, tobacco use (quit in 2020), stroke (2020, R MCA, no deficits), PE (December 2021, on xarelto),  CAD, DCM, HFrEF (15%) s/p AICD, Pulmonary hypertension admited to  4/27 for the treatment of a right leg DVT on heparin gtt. Stroke code activated 4/29 for aphasia and R hemiplegia, LKN 20:15 (~5 min prior). NIHSS 24, L MCA syndrome. Not TNK candidate due to AC with heparin gtt and therapeutic aPTT. CTA with L M2 occlusion. Taken to IR for possible intervention, now s/p L M2 thrombectomy with TICI 2c reperfusion. Repeat CTH post-IR with no acute infarct or hemorrhage, heparin gtt restarted. MRI brain W/WO with acute L MCA territory infarcts (insula, frontotemporal opercular cortex, patchy frontal white matter, small temporoparietal cortex), as well as small acute R cerebellar infarct. Echo with EF 10-15%, global LV hypokinesis, severe LAE. Suspect etiology likely cardioembolic due to EF 15%.    Neuro exam stable. NAOEN. Discharge to SNF pending.    Antithrombotics for secondary stroke prevention: Anticoagulants: Pradaxa 150mg BID, ASA 81mg daily    Statins for secondary stroke prevention and HLD, if present: Statins: Atorvastatin- 80 mg daily    Aggressive risk factor modification: HTN, DM, HLD, HF, DVT    Rehab efforts: The patient has been evaluated by a stroke team provider and the therapy needs have been fully considered based off the presenting complaints and exam findings. The following therapy evaluations are needed: PT evaluate and treat, OT evaluate and treat, SLP evaluate and treat, PM&R evaluate for appropriate placement    Diagnostics ordered/pending: CTH PRN for acute neuro exam changes    VTE prophylaxis: None: Reason for No Pharmacological VTE Prophylaxis: Currently on anticoagulation    BP parameters: Infarct:  SBP <160

## 2023-05-09 NOTE — PT/OT/SLP PROGRESS
Physical Therapy Treatment    Patient Name:  Diomedes Mohr   MRN:  [id number]    Recommendations:     Discharge Recommendations: rehabilitation facility   Discharge Equipment Recommendations: to be determined by next level of care  Barriers to discharge:  impaired functional mobility requiring increased assistance    Assessment:     Diomedes Mohr is a 62 y.o. female admitted with a medical diagnosis of Cerebrovascular accident (CVA) due to embolism of left middle cerebral artery.  She presents with the following impairments/functional limitations: weakness, impaired endurance, impaired self care skills, impaired functional mobility, gait instability, impaired balance, decreased coordination, decreased upper extremity function, decreased lower extremity function, decreased safety awareness, abnormal tone, impaired sensation, impaired coordination, impaired fine motor.    Rehab Prognosis: Good; patient would benefit from acute skilled PT services to address these deficits and reach maximum level of function.    Recent Surgery: Procedure(s) (LRB):  ANGIOGRAM-CEREBRAL (N/A)  ANGIOGRAM (N/A)      Plan:     During this hospitalization, patient to be seen 4 x/week to address the identified rehab impairments via gait training, therapeutic activities, therapeutic exercises, neuromuscular re-education and progress toward the following goals:    Plan of Care Expires:  06/01/23    Subjective     Chief Complaint: no c/o  Pain/Comfort:  Pain Rating 1: 0/10  Pain Rating Post-Intervention 1: 0/10      Objective:     Communicated with RN prior to session.  Patient found HOB elevated with bed alarm, PureWick, telemetry upon PTA entry to room.     General Precautions: Standard, aspiration, fall   Orthopedic Precautions: N/A  Braces: N/A  Respiratory Status: Room air     Functional Mobility:  Bed Mobility:     Rolling Left:  minimum assistance  Supine to Sit: minimum assistance  Transfers:     Sit to Stand:  moderate assistance with  HHA  Bed > chair: moderate assistance with HHA; R knee blocked  Gait: 5 x 12 ft with LUE support to hallway railing and therapist assist at hips and RLE for balance, stability, weight shifting. Therapist seated and managing RLE progression and knee block d/t R knee instability in stance. Pt tendency for hyperextension requiring additional posterior support. Close chair follow and seated rests following each trial.       AM-PAC 6 CLICK MOBILITY  Turning over in bed (including adjusting bedclothes, sheets and blankets)?: 3  Sitting down on and standing up from a chair with arms (e.g., wheelchair, bedside commode, etc.): 2  Moving from lying on back to sitting on the side of the bed?: 2  Moving to and from a bed to a chair (including a wheelchair)?: 2  Need to walk in hospital room?: 2  Climbing 3-5 steps with a railing?: 1  Basic Mobility Total Score: 12       Treatment & Education:  Pt assisted with functional mobility as noted above.   Standing x 6 trials with no AD/HHA/L rail support. R knee assist/blocking d/t buckling/hyperextension.    Patient left up in chair with all lines intact, call button in reach, chair alarm on, RN notified, and sister and pts spouse present.    GOALS:   Multidisciplinary Problems       Physical Therapy Goals          Problem: Physical Therapy    Goal Priority Disciplines Outcome Goal Variances Interventions   Physical Therapy Goal     PT, PT/OT Ongoing, Progressing     Description: Goals to be completed by: 6/1/23    Pt will perform sup<>sit transfers w/ minimum assistance  Pt will have sufficient dynamic balance to sit EOB while performing ADLs/therex w/ contact guard assistance  PT will be able to stand up from EOB w/ moderate assistance using LRAD  Pt will ambulate 20 feet w/ moderate assistance using LRAD  Pt will be independent w/ HEP therex on BLE w/ good form and ROM                       Time Tracking:     PT Received On: 05/09/23  PT Start Time: 1037     PT Stop Time: 1106  PT  Total Time (min): 29 min     Billable Minutes: Gait Training 20 and Neuromuscular Re-education 9    Treatment Type: Treatment  PT/PTA: PTA     Number of PTA visits since last PT visit: 3     05/09/2023

## 2023-05-09 NOTE — PLAN OF CARE
Review POC with patient and family at bedside.  Progressing as anticipated.  Inform per CM note that patient has been accepted to [facility]  for SNF and CM left a message on spouse's phone.  Plan is SNF at discharge.

## 2023-05-09 NOTE — PROGRESS NOTES
RD received private message from SLP in regards to pt with poor PO intake and wanting alternative supplementation. Spoke with pt yesterday and provided issues with meals provided to Patient Services Ambassador and Patient Experience Manager. Yesterday, pt reported that Boost Glucose was not being received on trays- out of stock of Boost Glucose for past couple of days. RD ordered alternative ONS. RD recently notified today that Boost Glucose Control is now back in stock. RD to modify ONS order to Boost Glucose Control TID in chocolate 2/2 pt preference. Please see RD note from 5/8 for full nutrition assessment. RD to monitor and follow. Please contact RD if any other issues arise.     Thanks!    Eugenia Lofton RDN,LDN

## 2023-05-09 NOTE — PLAN OF CARE
05/09/23 1522   Post-Acute Status   Post-Acute Authorization Placement   Post-Acute Placement Status Referrals Sent   Discharge Plan   Discharge Plan A Skilled Nursing Facility     [facility] patient.  SW attempted to reach  Maged for decision, but he was not available.  SW left message and will continue to follow.      Mandy Rutherford LMSW  [facility]  [phone]

## 2023-05-09 NOTE — PT/OT/SLP PROGRESS
Occupational Therapy   Treatment    Name: Diomedes Mohr  MRN: [id number]  Admitting Diagnosis:  Cerebrovascular accident (CVA) due to embolism of left middle cerebral artery       Recommendations:     Discharge Recommendations: rehabilitation facility  Discharge Equipment Recommendations:  to be determined by next level of care  Barriers to discharge:  Other (Comment) (increased skilled (A) required)    Assessment:     Diomedes Mohr is a 62 y.o. female with a medical diagnosis of Cerebrovascular accident (CVA) due to embolism of left middle cerebral artery.  She presents with the following performance deficits affecting function are weakness, impaired endurance, impaired self care skills, impaired functional mobility, gait instability, impaired balance, decreased upper extremity function, decreased lower extremity function, decreased coordination, decreased safety awareness, abnormal tone, impaired fine motor. Pt agreeable to therapy and demonstrated good tolerance in today's session. She participated in seated BUE therex for strengthening and demo good activation in RUE. However, continues to require verbal cues for attending to RUE with mobility. She performed grooming and don/doffing socks while sitting EOB and required increased (A) 2/2  impaired R hand dexterity, in-hand manipulation, strength, and coordination. Pt is eager to perform and initiates problem solving all tasks and is highly motivated to return to PLOF. Pt would benefit from continued skilled acute OT services in order to maximize (I) and with ADLs and functional mobility to ensure safe return to PLOF in the least restrictive environment. OT recommending IPR once pt is medically appropriate for d/c. Patient is an excellent candidate for IPR due to having a qualifying diagnosis, high level of motivation to improve, appropriate support following discharge, and able to tolerate 3 hours of intensive therapy in order to achieve a greater level of  "mobility.       Rehab Prognosis:  Good; patient would benefit from acute skilled OT services to address these deficits and reach maximum level of function.       Plan:     Patient to be seen 4 x/week to address the above listed problems via self-care/home management, therapeutic activities, therapeutic exercises, neuromuscular re-education  Plan of Care Expires: 05/30/23  Plan of Care Reviewed with: patient, spouse, family    Subjective   "I can do it"    Chief Complaint: none stated   Patient/Family Comments/goals: continue to progress and participate in therapy   Pain/Comfort:  Pain Rating 1: 0/10    Objective:     Communicated with: RN prior to session.  Patient found HOB elevated with bed alarm, PureWick, telemetry upon OT entry to room.    General Precautions: Standard, aspiration, fall    Orthopedic Precautions:N/A  Braces: N/A  Respiratory Status: Room air     Occupational Performance:     Bed Mobility:    Patient completed Scooting/Bridging with contact guard assistance  Patient completed Supine to Sit with minimum assistance  Patient completed Sit to Supine with minimum assistance     Functional Mobility:  Functional Mobility: OOB mobility deferred this date. Pt participated in static/dynamic sitting ADL tasks. Pt tolerated sitting EOB ~25 min with SBA-CGA for safety with dynamic sitting balance.     Activities of Daily Living:  Grooming: moderate assistance to complete oral hygiene. Pt required [place] A to maintain grasp on toothpaste with RUE and to facilitate composite grasp for squeezing tooth brush. Pt was able to   Lower Body Dressing: contact guard assistance for LLE while sitting EOB and Mod A for RUE 2/2 RSW.       [facility] 6 Click ADL: 15    Treatment & Education:  Pt required consistent verbal cues for attending to RUE and facilitating correct positioning throughout for joint integrity, especially with bed mobility. Pt educated on utilizing L UE to (A) with positioning.   Therapeutic Exercises while " seated EOB with SBA-CGA for safety   Bicep curls- with 3 lb dowel and some (A) for grasp with R hand.   Wrist ext/flexion   Pronation/supination   Pt required (A) for controlled eccentric movements and correct positioning of adjacent joints to prevent compensation.   Positioned RUE elevated on pillow to prevent subluxation. No subluxation noted this date.   Turning in bed q. 2 hours for pressure relief for the preventions of pressure wounds.   Pt and family educated on:   Role of OT, POC, and d/c planning.   Importance of OOB activities to increase endurance and tolerance for increased participation in daily ADLs.   Utilizing the call bell to request for assistance with all functional mobility to ensure safety during hospital stay.      Pt and family verbalized understanding and all questions were addressed within the scope of OT.       Patient left HOB elevated with all lines intact, call button in reach, bed alarm on, and RN notified    GOALS:   Multidisciplinary Problems       Occupational Therapy Goals          Problem: Occupational Therapy    Goal Priority Disciplines Outcome Interventions   Occupational Therapy Goal     OT, PT/OT Ongoing, Progressing    Description: Goals set on 4/30, with expiration date 5/30:  Patient will increase functional independence with ADLs by performing:    Bed mobility with Min A  Grooming while standing at sink with Min A  UB Dressing with Min A.  LB Dressing with Min A.  Toileting from toilet with Min A for hygiene and clothing management.   Functional mobility of household and community distance with Mod A and AD as needed  Pt will demonstrate understanding of education provided regarding energy conservation and task modification through teach-back method.  Pt will demonstrate [place] in HEP for BUE strengthening GM/FM exercises to improve functional performance.                          Time Tracking:     OT Date of Treatment: 05/09/23  OT Start Time: 1546  OT Stop Time:  1625  OT Total Time (min): 39 min    Billable Minutes:Self Care/Home Management 24  Therapeutic Exercise 15    OT/JENNIFER: OT          5/9/2023

## 2023-05-09 NOTE — SUBJECTIVE & OBJECTIVE
Neurologic Chief Complaint: L MCA stroke    Subjective:     Interval History: Patient is seen for follow-up neurological assessment and treatment recommendations:     Neuro exam stable. NAOEN. Discharge to SNF pending.    HPI, Past Medical, Family, and Social History remains the same as documented in the initial encounter.     Review of Systems   Reason unable to perform ROS: severe aphasia.   Constitutional:  Negative for fatigue.   HENT:  Negative for congestion.    Eyes:  Positive for visual disturbance.   Respiratory:  Negative for shortness of breath.    Cardiovascular:  Negative for chest pain.   Gastrointestinal:  Negative for abdominal distention and abdominal pain.   Skin:  Negative for rash.   Neurological:  Positive for facial asymmetry, speech difficulty (improving) and weakness.   Psychiatric/Behavioral:  Positive for sleep disturbance. Negative for agitation and behavioral problems.    Scheduled Meds:   aspirin  81 mg Oral Daily    atorvastatin  80 mg Oral Daily    dabigatran etexilate  150 mg Oral BID    metoprolol succinate  100 mg Oral Daily    polyethylene glycol  17 g Oral Daily    sacubitriL-valsartan  1 tablet Oral BID    senna-docusate 8.6-50 mg  1 tablet Oral BID    spironolactone  25 mg Oral Daily    torsemide  10 mg Oral Daily     Continuous Infusions:    PRN Meds:acetaminophen, albuterol-ipratropium, dextrose 10%, dextrose 10%, dextrose 10%, dextrose 10%, dextrose, dextrose, glucagon (human recombinant), insulin aspart U-100, labetalol, ondansetron, senna-docusate 8.6-50 mg, sodium chloride 0.9%, sodium chloride 0.9%    Objective:     Vital Signs (Most Recent):  Temp: 98 °F (36.7 °C) (05/09/23 1204)  Pulse: 74 (05/09/23 1204)  Resp: 18 (05/09/23 1204)  BP: (!) 99/59 (05/09/23 1204)  SpO2: 96 % (05/09/23 1204)  BP Location: Right arm    Vital Signs Range (Last 24H):  Temp:  [97 °F (36.1 °C)-98.5 °F (36.9 °C)]   Pulse:  [70-95]   Resp:  [17-20]   BP: ()/(55-70)   SpO2:  [96 %-100 %]    BP Location: Right arm    Physical Exam  Constitutional:       General: She is not in acute distress.  HENT:      Head: Normocephalic.      Nose: Nose normal.      Mouth/Throat:      Mouth: Mucous membranes are moist.   Eyes:      General: Visual field deficit (R hemianopsia) present.      Conjunctiva/sclera: Conjunctivae normal.      Pupils: Pupils are equal, round, and reactive to light.      Comments: L gaze preference   Cardiovascular:      Rate and Rhythm: Normal rate.   Pulmonary:      Effort: Pulmonary effort is normal.   Abdominal:      General: Abdomen is flat.   Skin:     General: Skin is warm and dry.   Neurological:      Mental Status: She is alert and oriented to person, place, and time.      Cranial Nerves: Dysarthria and facial asymmetry present.      Sensory: Sensory deficit present.      Motor: Weakness present.   Psychiatric:         Mood and Affect: Mood normal.       Neurological Exam:   LOC: alert  Attention Span: Good   Language: expressive aphasia  Articulation: dysarthria  Orientation: Limited due to severe aphasia, oriented to self  Visual Fields: Hemianopsia right  EOM (CN III, IV, VI): Gaze preference  left  Facial Movement (CN VII): Lower facial weakness on the Right  Motor: Arm left  Normal 5/5  Leg left  Normal 5/5  Arm right  Paresis 3+/5  Leg right Paresis 3+/5      Laboratory:  CMP:   Recent Labs   Lab 05/09/23  0248   CALCIUM 9.5   ALBUMIN 3.1*   PROT 6.7      K 3.9   CO2 28      BUN 16   CREATININE 0.9   ALKPHOS 146*   ALT 24   AST 24   BILITOT 0.7       CBC:   Recent Labs   Lab 05/09/23  0248   WBC 8.37   RBC 4.31   HGB 12.7   HCT 37.7      MCV 88   MCH 29.5   MCHC 33.7       Diagnostic Results     Brain Imaging   CTH without contrast 5/1/23  Acute left MCA distribution infarct appears stable in size and distribution compared to 04/30/2023 MRI.  No evidence of hemorrhagic conversion.   Acute small right cerebellar infarct is better demonstrated on MRI.   No  acute intracranial hemorrhage or midline shift.     MRI Brain without contrast 4/30/23    Acute left MCA distribution infarct.  No significant mass effect or acute hemorrhage at this time.  Small acute infarct in the right cerebellum.  Chronic right MCA distribution infarct.     CTH 4/30/2023  No acute territorial infarct or intracranial hemorrhage identified.      Vessel Imaging   CTA Stroke MP 4/29/2023  CT head: No evidence for acute intracranial hemorrhage.  Stable area encephalomalacia from remote right MCA territory infarct.  CTA head: Occlusion of M2 segment of left MCA.  Asymmetric decreased opacification of the left transverse sinus and jugular vein.  Similar finding was present on the prior CTA in 2022 and therefore could be related to sluggish flow.  Suggest attention on follow-up conventional angiogram.  CTA neck: No high-grade stenosis or aneurysm.      Cardiac Imaging   Echo 4/28/2023  The left ventricle is severely enlarged with eccentric hypertrophy and severely decreased systolic function.  The estimated ejection fraction is 10-15%.  There is left ventricular global hypokinesis.  Grade II left ventricular diastolic dysfunction.  Normal right ventricular size with normal right ventricular systolic function.  Mild tricuspid regurgitation.  The estimated PA systolic pressure is 35 mmHg.  Normal central venous pressure (3 mmHg).  Severe left atrial enlargement.

## 2023-05-09 NOTE — PT/OT/SLP PROGRESS
"Speech Language Pathology Treatment    Patient Name:  Diomedes Mohr   MRN:  [id number]  [id number] A    Admitting Diagnosis: Cerebrovascular accident (CVA) due to embolism of left middle cerebral artery    Recommendations:                 General Recommendations:  Dysphagia therapy, Speech/language therapy, and Cognitive-linguistic therapy  Diet recommendations:  Minced & Moist Diet - IDDSI Level 5, Liquid Diet Level: Thin liquids - IDDSI Level 0   All items must be minced and moist. Please avoid mixed consistencies (such as cereals, soups with large pieces of meat/vegetable, etc.), avoid dry/coarse/crumbly items such as rice ,nuts, seeds, dried fruit, coconut, avoid fibrous foods, avoid tough skins, avoid tough vegetables (i.e. no corn, brussel sprouts,etc) avoid chewy/sticky foods (i.e. no peanut putter, caramel, licorice, etc.)    Aspiration Precautions:   Strict 1:1 assistance required with all PO for safety,   alternate bties./sips,   1 small bite/sip at a time,   Check for pocketing/oral residue,   lingual sweeps between bites encouraged,   Feed only when awake/alert,   Frequent oral care,   HOB to 90 degrees,   General Precautions: Standard, fall, aspiration  Communication strategies:  provide increased time to answer and go to room if call light pushed    Subjective     Patient awake and cooperative. Family members present in room.   Patient states, "Yeah. You got a point." -when SLP reviewed need for soft solids 2/2 significant oral residue with solids    Respiratory Status: Room air    Objective:     Has the patient been evaluated by SLP for swallowing?   Yes  Keep patient NPO? No   Current Respiratory Status: room air        Swallowing: Patient seen for an ongoing swallowing assessment. Patient continues to report dislike of mechanical soft diet. She initially denied difficulty with swallowing. Patient assessed with self fed bites of blueberries present in room with sips of water from water bottle. SLP cued " "patient to utilize liquid wash and lingual sweeps to clear oral cavity prior to opening mouth. Patient with significant residue noted following lingual sweeps and liquid wash. Patient requested oral swab and SLP cued patient to clear buccal cavities and lingual surface with toothette. Patient removed essentially a whole chewed up blueberry from oral cavity. When patient saw blueberry residue on toothette she stated, "Yeah. You got a point." SLP then provided education on SLP recommendations, SLP role, s/s and risks of aspiration, safe swallow precautions, and POC. Patient in agreement.     Speech/language/cognition: Patient reports continued word finding difficulties in conversation. She identified occupations given a description with 70% acc no cues and 100% acc min cues. Patient noted to independently use descriptions to find words. She identified words given a description and initial letter with 60% acc no cues and 100% acc min cues. SLP reviewed word finding strategies in conversation. All questions addressed within SLP scope and patient verbalized understanding of all discussed.     Assessment:     Diomedes Mohr is a 62 y.o. female with an SLP diagnosis of Aphasia, Dysphagia, and Cognitive-Linguistic Impairment. ST will continue to follow.     Goals:   Multidisciplinary Problems       SLP Goals          Problem: SLP    Goal Priority Disciplines Outcome   SLP Goal     SLP Ongoing, Progressing   Description: Speech Language Pathology Goals  Goals expected to be met by 5/7  1. Pt will tolerate puree & nectar thick liquids without s/s aspiration & adequate oral phase of swallow  2. Ongoing swallow assessment to determine least restrictive PO consistencies  3. SLP Speech/Language/Cognitive Evaluation- initiated 5/1  4. Pt will answer personal yes/no questions with 90% accuracy, MIN A  5. Pt will complete 1-unit commands 90% of the time, MIN A  6. Pt will complete automatic speech tasks with 90% accuracy, MIN A  7. " Pt will complete object naming tasks with 70% accuracy or higher, MIN A                         Plan:     Patient to be seen:  3 x/week   Plan of Care expires:  05/29/23  Plan of Care reviewed with:  patient   SLP Follow-Up:  Yes       Discharge recommendations:  rehabilitation facility     Time Tracking:     SLP Treatment Date:   05/09/23  Speech Start Time:  1320  Speech Stop Time:  1348     Speech Total Time (min):  28 min    Billable Minutes: Speech Therapy Individual 10 and Treatment Swallowing Dysfunction 10 Education 8    05/09/2023

## 2023-05-09 NOTE — PROGRESS NOTES
Dmitriy Triana - Neurosurgery ([facility])  Vascular Neurology  Comprehensive Stroke Center  Progress Note    Assessment/Plan:     * Cerebrovascular accident (CVA) due to embolism of left middle cerebral artery  62 y.o. female with PMH of HTN, DM2, HLD, tobacco use (quit in 2020), stroke (2020, R MCA, no deficits), PE (December 2021, on xarelto),  CAD, DCM, HFrEF (15%) s/p AICD, Pulmonary hypertension admited to  4/27 for the treatment of a right leg DVT on heparin gtt. Stroke code activated 4/29 for aphasia and R hemiplegia, LKN 20:15 (~5 min prior). NIHSS 24, L MCA syndrome. Not TNK candidate due to AC with heparin gtt and therapeutic aPTT. CTA with L M2 occlusion. Taken to IR for possible intervention, now s/p L M2 thrombectomy with TICI 2c reperfusion. Repeat CTH post-IR with no acute infarct or hemorrhage, heparin gtt restarted. MRI brain W/WO with acute L MCA territory infarcts (insula, frontotemporal opercular cortex, patchy frontal white matter, small temporoparietal cortex), as well as small acute R cerebellar infarct. Echo with EF 10-15%, global LV hypokinesis, severe LAE. Suspect etiology likely cardioembolic due to EF 15%.    Neuro exam stable. NAOEN. Discharge to SNF pending.    Antithrombotics for secondary stroke prevention: Anticoagulants: Pradaxa 150mg BID, ASA 81mg daily    Statins for secondary stroke prevention and HLD, if present: Statins: Atorvastatin- 80 mg daily    Aggressive risk factor modification: HTN, DM, HLD, HF, DVT    Rehab efforts: The patient has been evaluated by a stroke team provider and the therapy needs have been fully considered based off the presenting complaints and exam findings. The following therapy evaluations are needed: PT evaluate and treat, OT evaluate and treat, SLP evaluate and treat, PM&R evaluate for appropriate placement    Diagnostics ordered/pending: CTH PRN for acute neuro exam changes    VTE prophylaxis: None: Reason for No Pharmacological VTE Prophylaxis:  Currently on anticoagulation    BP parameters: Infarct:  SBP <160    Right sided weakness  Due to stroke  -PT/OT eval and treat  -Dispo recs for IPR    Critical lower limb ischemia  Stroke risk factor  Admitted for R popliteal artery occlusion, was started on heparin gtt  Heparin gtt restarted post-IR, per heme/onc recs transition to pradaxa today (5/2)    Chronic combined systolic and diastolic congestive heart failure  Stroke risk factor  -Echo with EF 10-15%, global LV hypokinesis, severe LAE  -Continue GDMT  - Switched metoprolol tartrate to succinate    Hyperlipemia  Stroke risk factor  -LDL 88, goal <70  -Atorvastatin 80mg daily    Hypertension  Stroke Risk factor  -SBP <160 s/p thrombectomy  -PRN hydralazine/labetalol    Type 2 diabetes mellitus without complication, without long-term current use of insulin  Stroke risk factor  -A1c 7.5  -BG goal while inpatient 140-180  -MC SSI PRN         4/30-S/p TICI 2c. Repeat CTH after IR with no acute infarct. Will need MRI but has a pacemaker that will need be interrogated. Improving r arm weakness and  aphasia.  5/1/2023- MRI completed showing acute L. MCA infarct. Pacemaker reinitiated post MRI to normal settings. Plan to start Pradaxa tomorrow per Heme/Onc. Neuro exam stable.  05/02/2023 NAEO, neuro exam stable. CTH overnight stable. Heparin gtt transitioned to Pradaxa today for AC of reduced EF and VTE history. Speech cleared for minced/moist diet. Stable for step down.  05/03/2023 Stepped down to NPU this morning, NAEO. Neuro exam stable, speech improving but continues to have significant RSW. Dispo recs for IPR.  05/04/2023 Pt improving this AM. No aphasia noted on exam. Restarted Pradaxa BID and switched Metoprolol tartrate to metop succinate for GDMT.   05/05/2023 Pt neuro exam improving. Aphasia noted on exam today. Pt reports insomnia and disliking hospital meals. In good spirits and excited for discharge to IPR.  05/06/2023 Neuro exam stable. Pending IPR.    05/07/2023 Neuro exam improving. Pending IPR.  05/08/2023 Neuro exam stable today. IPR auth not approved, SNF pending.  05/09/2023 Neuro exam stable. NAOEN. Discharge to SNF pending.        STROKE DOCUMENTATION   Acute Stroke Times   Last Known Normal Date: 04/29/23  Last Known Normal Time: 2015  Symptom Onset Date: 04/29/23  Symptom Onset Time: 2015  Stroke Team Called Date: 04/29/23  Stroke Team Called Time: 2022  Stroke Team Arrival Date: 04/29/23  Stroke Team Arrival Time: 2025  CT Interpretation Time: 2038  Thrombolytic Therapy Recommended: No  CTA Interpretation Time: 2045  Thrombectomy Recommended: Yes  Decision to Treat Time for IR: 2045    NIH Scale:  1a. Level of Consciousness: 0-->Alert, keenly responsive  1b. LOC Questions: 0-->Answers both questions correctly  1c. LOC Commands: 0-->Performs both tasks correctly  2. Best Gaze: 1-->Partial gaze palsy, gaze is abnormal in one or both eyes, but forced deviation or total gaze paresis is not present  3. Visual: 1-->Partial hemianopia  4. Facial Palsy: 1-->Minor paralysis (flattened nasolabial fold, asymmetry on smiling)  5a. Motor Arm, Left: 0-->No drift, limb holds 90 (or 45) degrees for full 10 secs  5b. Motor Arm, Right: 1-->Drift, limb holds 90 (or 45) degrees, but drifts down before full 10 secs, does not hit bed or other support  6a. Motor Leg, Left: 0-->No drift, leg holds 30 degree position for full 5 secs  6b. Motor Leg, Right: 1-->Drift, leg falls by the end of the 5-sec period but does not hit bed  7. Limb Ataxia: 0-->Absent  8. Sensory: 1-->Mild-to-moderate sensory loss, patient feels pinprick is less sharp or is dull on the affected side, or there is a loss of superficial pain with pinprick, but patient is aware of being touched  9. Best Language: 1-->Mild-to-moderate aphasia, some obvious loss of fluency or facility of comprehension, without significant limitation on ideas expressed or form of expression. Reduction of speech and/or  comprehension, however, makes conversation. . . (see row details)  10. Dysarthria: 1-->Mild-to-moderate dysarthria, patient slurs at least some words and, at worst, can be understood with some difficulty  11. Extinction and Inattention (formerly Neglect): 1-->Visual, tactile, auditory, spatial, or personal inattention or extinction to bilateral simultaneous stimulation in one of the sensory modalities  Total (NIH Stroke Scale): 9       Modified Canyon Score: 0  Coleman Coma Scale:    ABCD2 Score:    [id number] Score:   HAS -BLED Score:   ICH Score:   Hunt & Vora Classification:      Hemorrhagic change of an Ischemic Stroke: Does this patient have an ischemic stroke with hemorrhagic changes? No     Neurologic Chief Complaint: L MCA stroke    Subjective:     Interval History: Patient is seen for follow-up neurological assessment and treatment recommendations:     Neuro exam stable. NAOEN. Discharge to SNF pending.    HPI, Past Medical, Family, and Social History remains the same as documented in the initial encounter.     Review of Systems   Reason unable to perform ROS: severe aphasia.   Constitutional:  Negative for fatigue.   HENT:  Negative for congestion.    Eyes:  Positive for visual disturbance.   Respiratory:  Negative for shortness of breath.    Cardiovascular:  Negative for chest pain.   Gastrointestinal:  Negative for abdominal distention and abdominal pain.   Skin:  Negative for rash.   Neurological:  Positive for facial asymmetry, speech difficulty (improving) and weakness.   Psychiatric/Behavioral:  Positive for sleep disturbance. Negative for agitation and behavioral problems.    Scheduled Meds:   aspirin  81 mg Oral Daily    atorvastatin  80 mg Oral Daily    dabigatran etexilate  150 mg Oral BID    metoprolol succinate  100 mg Oral Daily    polyethylene glycol  17 g Oral Daily    sacubitriL-valsartan  1 tablet Oral BID    senna-docusate 8.6-50 mg  1 tablet Oral BID    spironolactone  25 mg  Oral Daily    torsemide  10 mg Oral Daily     Continuous Infusions:    PRN Meds:acetaminophen, albuterol-ipratropium, dextrose 10%, dextrose 10%, dextrose 10%, dextrose 10%, dextrose, dextrose, glucagon (human recombinant), insulin aspart U-100, labetalol, ondansetron, senna-docusate 8.6-50 mg, sodium chloride 0.9%, sodium chloride 0.9%    Objective:     Vital Signs (Most Recent):  Temp: 98 °F (36.7 °C) (05/09/23 1204)  Pulse: 74 (05/09/23 1204)  Resp: 18 (05/09/23 1204)  BP: (!) 99/59 (05/09/23 1204)  SpO2: 96 % (05/09/23 1204)  BP Location: Right arm    Vital Signs Range (Last 24H):  Temp:  [97 °F (36.1 °C)-98.5 °F (36.9 °C)]   Pulse:  [70-95]   Resp:  [17-20]   BP: ()/(55-70)   SpO2:  [96 %-100 %]   BP Location: Right arm    Physical Exam  Constitutional:       General: She is not in acute distress.  HENT:      Head: Normocephalic.      Nose: Nose normal.      Mouth/Throat:      Mouth: Mucous membranes are moist.   Eyes:      General: Visual field deficit (R hemianopsia) present.      Conjunctiva/sclera: Conjunctivae normal.      Pupils: Pupils are equal, round, and reactive to light.      Comments: L gaze preference   Cardiovascular:      Rate and Rhythm: Normal rate.   Pulmonary:      Effort: Pulmonary effort is normal.   Abdominal:      General: Abdomen is flat.   Skin:     General: Skin is warm and dry.   Neurological:      Mental Status: She is alert and oriented to person, place, and time.      Cranial Nerves: Dysarthria and facial asymmetry present.      Sensory: Sensory deficit present.      Motor: Weakness present.   Psychiatric:         Mood and Affect: Mood normal.       Neurological Exam:   LOC: alert  Attention Span: Good   Language: expressive aphasia  Articulation: dysarthria  Orientation: Limited due to severe aphasia, oriented to self  Visual Fields: Hemianopsia right  EOM (CN III, IV, VI): Gaze preference  left  Facial Movement (CN VII): Lower facial weakness on the Right  Motor: Arm left   Normal 5/5  Leg left  Normal 5/5  Arm right  Paresis 3+/5  Leg right Paresis 3+/5      Laboratory:  CMP:   Recent Labs   Lab 05/09/23  0248   CALCIUM 9.5   ALBUMIN 3.1*   PROT 6.7      K 3.9   CO2 28      BUN 16   CREATININE 0.9   ALKPHOS 146*   ALT 24   AST 24   BILITOT 0.7       CBC:   Recent Labs   Lab 05/09/23  0248   WBC 8.37   RBC 4.31   HGB 12.7   HCT 37.7      MCV 88   MCH 29.5   MCHC 33.7       Diagnostic Results     Brain Imaging   CTH without contrast 5/1/23  Acute left MCA distribution infarct appears stable in size and distribution compared to 04/30/2023 MRI.  No evidence of hemorrhagic conversion.   Acute small right cerebellar infarct is better demonstrated on MRI.   No acute intracranial hemorrhage or midline shift.     MRI Brain without contrast 4/30/23    Acute left MCA distribution infarct.  No significant mass effect or acute hemorrhage at this time.  Small acute infarct in the right cerebellum.  Chronic right MCA distribution infarct.     CTH 4/30/2023  No acute territorial infarct or intracranial hemorrhage identified.      Vessel Imaging   CTA Stroke MP 4/29/2023  CT head: No evidence for acute intracranial hemorrhage.  Stable area encephalomalacia from remote right MCA territory infarct.  CTA head: Occlusion of M2 segment of left MCA.  Asymmetric decreased opacification of the left transverse sinus and jugular vein.  Similar finding was present on the prior CTA in 2022 and therefore could be related to sluggish flow.  Suggest attention on follow-up conventional angiogram.  CTA neck: No high-grade stenosis or aneurysm.      Cardiac Imaging   Echo 4/28/2023   The left ventricle is severely enlarged with eccentric hypertrophy and severely decreased systolic function.   The estimated ejection fraction is 10-15%.   There is left ventricular global hypokinesis.   Grade II left ventricular diastolic dysfunction.   Normal right ventricular size with normal right ventricular  systolic function.   Mild tricuspid regurgitation.   The estimated PA systolic pressure is 35 mmHg.   Normal central venous pressure (3 mmHg).   Severe left atrial enlargement.      Tushar Ayala MD  Comprehensive Stroke Center  Department of Vascular Neurology   [facility] [place])

## 2023-05-10 LAB
ALBUMIN SERPL BCP-MCNC: 3.1 G/DL (ref 3.5–5.2)
ALP SERPL-CCNC: 152 U/L (ref 55–135)
ALT SERPL W/O P-5'-P-CCNC: 18 U/L (ref 10–44)
ANION GAP SERPL CALC-SCNC: 7 MMOL/L (ref 8–16)
AST SERPL-CCNC: 25 U/L (ref 10–40)
BASOPHILS # BLD AUTO: 0.04 K/UL (ref 0–0.2)
BASOPHILS NFR BLD: 0.5 % (ref 0–1.9)
BILIRUB SERPL-MCNC: 0.6 MG/DL (ref 0.1–1)
BUN SERPL-MCNC: 16 MG/DL (ref 8–23)
CALCIUM SERPL-MCNC: 10.2 MG/DL (ref 8.7–10.5)
CHLORIDE SERPL-SCNC: 102 MMOL/L (ref 95–110)
CO2 SERPL-SCNC: 28 MMOL/L (ref 23–29)
CREAT SERPL-MCNC: 0.9 MG/DL (ref 0.5–1.4)
DIFFERENTIAL METHOD: ABNORMAL
EOSINOPHIL # BLD AUTO: 0.2 K/UL (ref 0–0.5)
EOSINOPHIL NFR BLD: 2.3 % (ref 0–8)
ERYTHROCYTE [DISTWIDTH] IN BLOOD BY AUTOMATED COUNT: 14.3 % (ref 11.5–14.5)
EST. GFR  (NO RACE VARIABLE): >60 ML/MIN/1.73 M^2
GLUCOSE SERPL-MCNC: 157 MG/DL (ref 70–110)
HCT VFR BLD AUTO: 40.8 % (ref 37–48.5)
HGB BLD-MCNC: 12.8 G/DL (ref 12–16)
IMM GRANULOCYTES # BLD AUTO: 0.01 K/UL (ref 0–0.04)
IMM GRANULOCYTES NFR BLD AUTO: 0.1 % (ref 0–0.5)
LYMPHOCYTES # BLD AUTO: 2.6 K/UL (ref 1–4.8)
LYMPHOCYTES NFR BLD: 32.5 % (ref 18–48)
MCH RBC QN AUTO: 28.1 PG (ref 27–31)
MCHC RBC AUTO-ENTMCNC: 31.4 G/DL (ref 32–36)
MCV RBC AUTO: 90 FL (ref 82–98)
MONOCYTES # BLD AUTO: 0.6 K/UL (ref 0.3–1)
MONOCYTES NFR BLD: 7.3 % (ref 4–15)
NEUTROPHILS # BLD AUTO: 4.7 K/UL (ref 1.8–7.7)
NEUTROPHILS NFR BLD: 57.3 % (ref 38–73)
NRBC BLD-RTO: 0 /100 WBC
PLATELET # BLD AUTO: 395 K/UL (ref 150–450)
PMV BLD AUTO: 10.6 FL (ref 9.2–12.9)
POCT GLUCOSE: 164 MG/DL (ref 70–110)
POCT GLUCOSE: 187 MG/DL (ref 70–110)
POCT GLUCOSE: 193 MG/DL (ref 70–110)
POTASSIUM SERPL-SCNC: 3.8 MMOL/L (ref 3.5–5.1)
PROT SERPL-MCNC: 6.7 G/DL (ref 6–8.4)
RBC # BLD AUTO: 4.55 M/UL (ref 4–5.4)
SARS-COV-2 RNA RESP QL NAA+PROBE: NOT DETECTED
SODIUM SERPL-SCNC: 137 MMOL/L (ref 136–145)
WBC # BLD AUTO: 8.12 K/UL (ref 3.9–12.7)

## 2023-05-10 PROCEDURE — 99232 SBSQ HOSP IP/OBS MODERATE 35: CPT | Mod: ,,, | Performed by: PSYCHIATRY & NEUROLOGY

## 2023-05-10 PROCEDURE — 92507 TX SP LANG VOICE COMM INDIV: CPT

## 2023-05-10 PROCEDURE — 36415 COLL VENOUS BLD VENIPUNCTURE: CPT | Performed by: PHYSICIAN ASSISTANT

## 2023-05-10 PROCEDURE — 97535 SELF CARE MNGMENT TRAINING: CPT

## 2023-05-10 PROCEDURE — U0005 INFEC AGEN DETEC AMPLI PROBE: HCPCS | Performed by: PSYCHIATRY & NEUROLOGY

## 2023-05-10 PROCEDURE — 85025 COMPLETE CBC W/AUTO DIFF WBC: CPT

## 2023-05-10 PROCEDURE — 25000003 PHARM REV CODE 250: Performed by: STUDENT IN AN ORGANIZED HEALTH CARE EDUCATION/TRAINING PROGRAM

## 2023-05-10 PROCEDURE — 25000003 PHARM REV CODE 250: Performed by: PHYSICIAN ASSISTANT

## 2023-05-10 PROCEDURE — 86580 TB INTRADERMAL TEST: CPT | Performed by: PSYCHIATRY & NEUROLOGY

## 2023-05-10 PROCEDURE — 30200315 PPD INTRADERMAL TEST REV CODE 302: Performed by: PSYCHIATRY & NEUROLOGY

## 2023-05-10 PROCEDURE — 99232 PR SUBSEQUENT HOSPITAL CARE,LEVL II: ICD-10-PCS | Mod: ,,, | Performed by: PSYCHIATRY & NEUROLOGY

## 2023-05-10 PROCEDURE — 11000001 HC ACUTE MED/SURG PRIVATE ROOM

## 2023-05-10 PROCEDURE — 25000003 PHARM REV CODE 250

## 2023-05-10 PROCEDURE — 80053 COMPREHEN METABOLIC PANEL: CPT | Performed by: PHYSICIAN ASSISTANT

## 2023-05-10 PROCEDURE — 63600175 PHARM REV CODE 636 W HCPCS: Performed by: PHYSICIAN ASSISTANT

## 2023-05-10 RX ORDER — SODIUM CHLORIDE 0.9 % (FLUSH) 0.9 %
10 SYRINGE (ML) INJECTION
Status: DISCONTINUED | OUTPATIENT
Start: 2023-05-10 | End: 2023-05-12 | Stop reason: HOSPADM

## 2023-05-10 RX ADMIN — INSULIN ASPART 2 UNITS: 100 INJECTION, SOLUTION INTRAVENOUS; SUBCUTANEOUS at 11:05

## 2023-05-10 RX ADMIN — SPIRONOLACTONE 25 MG: 25 TABLET, FILM COATED ORAL at 09:05

## 2023-05-10 RX ADMIN — METOPROLOL SUCCINATE 100 MG: 100 TABLET, EXTENDED RELEASE ORAL at 09:05

## 2023-05-10 RX ADMIN — SENNOSIDES AND DOCUSATE SODIUM 1 TABLET: 8.6; 5 TABLET ORAL at 09:05

## 2023-05-10 RX ADMIN — SACUBITRIL AND VALSARTAN 1 TABLET: 97; 103 TABLET, FILM COATED ORAL at 09:05

## 2023-05-10 RX ADMIN — SENNOSIDES AND DOCUSATE SODIUM 1 TABLET: 8.6; 5 TABLET ORAL at 08:05

## 2023-05-10 RX ADMIN — ATORVASTATIN CALCIUM 80 MG: 40 TABLET, FILM COATED ORAL at 09:05

## 2023-05-10 RX ADMIN — ACETAMINOPHEN 650 MG: 325 TABLET ORAL at 08:05

## 2023-05-10 RX ADMIN — TUBERCULIN PURIFIED PROTEIN DERIVATIVE 5 UNITS: 5 INJECTION, SOLUTION INTRADERMAL at 01:05

## 2023-05-10 RX ADMIN — INSULIN ASPART 2 UNITS: 100 INJECTION, SOLUTION INTRAVENOUS; SUBCUTANEOUS at 09:05

## 2023-05-10 RX ADMIN — DABIGATRAN ETEXILATE MESYLATE 150 MG: 150 CAPSULE ORAL at 09:05

## 2023-05-10 RX ADMIN — SACUBITRIL AND VALSARTAN 1 TABLET: 97; 103 TABLET, FILM COATED ORAL at 08:05

## 2023-05-10 RX ADMIN — INSULIN ASPART 2 UNITS: 100 INJECTION, SOLUTION INTRAVENOUS; SUBCUTANEOUS at 04:05

## 2023-05-10 RX ADMIN — TORSEMIDE 10 MG: 10 TABLET ORAL at 09:05

## 2023-05-10 RX ADMIN — DABIGATRAN ETEXILATE MESYLATE 150 MG: 150 CAPSULE ORAL at 08:05

## 2023-05-10 RX ADMIN — ASPIRIN 81 MG: 81 TABLET, COATED ORAL at 09:05

## 2023-05-10 NOTE — PLAN OF CARE
CM in communication with Siri with [facility].  Per Siri, spouse going to sign paperwork. ALLISON requested MAYRA Desir obtain passr/142 for patient.  Covid ordered.

## 2023-05-10 NOTE — PLAN OF CARE
Patient accepted by [facility] submitted for.  Patient is pending auth and state paperwork.   05/10/23 1233   Post-Acute Status   Post-Acute Authorization Placement   Post-Acute Placement Status Pending state direction/certification

## 2023-05-10 NOTE — SUBJECTIVE & OBJECTIVE
Neurologic Chief Complaint: L MCA stroke    Subjective:     Interval History: Patient is seen for follow-up neurological assessment and treatment recommendations:     Neuro exam stable. NAOEN. Discharge pending.    HPI, Past Medical, Family, and Social History remains the same as documented in the initial encounter.     Review of Systems   Reason unable to perform ROS: severe aphasia.   Constitutional:  Negative for fatigue.   HENT:  Negative for congestion.    Eyes:  Positive for visual disturbance.   Respiratory:  Negative for shortness of breath.    Cardiovascular:  Negative for chest pain.   Gastrointestinal:  Negative for abdominal distention and abdominal pain.   Skin:  Negative for rash.   Neurological:  Positive for facial asymmetry, speech difficulty (improving) and weakness.   Psychiatric/Behavioral:  Positive for sleep disturbance. Negative for agitation and behavioral problems.    Scheduled Meds:   aspirin  81 mg Oral Daily    atorvastatin  80 mg Oral Daily    dabigatran etexilate  150 mg Oral BID    metoprolol succinate  100 mg Oral Daily    polyethylene glycol  17 g Oral Daily    sacubitriL-valsartan  1 tablet Oral BID    senna-docusate 8.6-50 mg  1 tablet Oral BID    spironolactone  25 mg Oral Daily    torsemide  10 mg Oral Daily     Continuous Infusions:    PRN Meds:acetaminophen, albuterol-ipratropium, dextrose 10%, dextrose 10%, dextrose 10%, dextrose 10%, dextrose, dextrose, glucagon (human recombinant), insulin aspart U-100, labetalol, ondansetron, senna-docusate 8.6-50 mg, sodium chloride 0.9%, sodium chloride 0.9%, sodium chloride 0.9%    Objective:     Vital Signs (Most Recent):  Temp: 97.8 °F (36.6 °C) (05/10/23 1512)  Pulse: 89 (05/10/23 1518)  Resp: 19 (05/10/23 1512)  BP: (!) 96/55 (05/10/23 1512)  SpO2: 99 % (05/10/23 1512)  BP Location: Left arm    Vital Signs Range (Last 24H):  Temp:  [96.5 °F (35.8 °C)-98.6 °F (37 °C)]   Pulse:  []   Resp:  [18-19]   BP: ()/(55-68)   SpO2:   [97 %-99 %]   BP Location: Left arm    Physical Exam  Constitutional:       General: She is not in acute distress.  HENT:      Head: Normocephalic.      Nose: Nose normal.      Mouth/Throat:      Mouth: Mucous membranes are moist.   Eyes:      General: Visual field deficit (R hemianopsia) present.      Conjunctiva/sclera: Conjunctivae normal.      Pupils: Pupils are equal, round, and reactive to light.      Comments: L gaze preference   Cardiovascular:      Rate and Rhythm: Normal rate.   Pulmonary:      Effort: Pulmonary effort is normal.   Abdominal:      General: Abdomen is flat.   Skin:     General: Skin is warm and dry.   Neurological:      Mental Status: She is alert and oriented to person, place, and time.      Cranial Nerves: Dysarthria and facial asymmetry present.      Sensory: Sensory deficit present.      Motor: Weakness present.   Psychiatric:         Mood and Affect: Mood normal.       Neurological Exam:   LOC: alert  Attention Span: Good   Language: expressive aphasia  Articulation: dysarthria  Orientation: Limited due to severe aphasia, oriented to self  Visual Fields: Hemianopsia right  EOM (CN III, IV, VI): Gaze preference  left  Facial Movement (CN VII): Lower facial weakness on the Right  Motor: Arm left  Normal 5/5  Leg left  Normal 5/5  Arm right  Paresis 3+/5  Leg right Paresis 3+/5      Laboratory:  CMP:   Recent Labs   Lab 05/10/23  0801   CALCIUM 10.2   ALBUMIN 3.1*   PROT 6.7      K 3.8   CO2 28      BUN 16   CREATININE 0.9   ALKPHOS 152*   ALT 18   AST 25   BILITOT 0.6       CBC:   Recent Labs   Lab 05/10/23  0801   WBC 8.12   RBC 4.55   HGB 12.8   HCT 40.8      MCV 90   MCH 28.1   MCHC 31.4*       Diagnostic Results     Brain Imaging   CTH without contrast 5/1/23  Acute left MCA distribution infarct appears stable in size and distribution compared to 04/30/2023 MRI.  No evidence of hemorrhagic conversion.   Acute small right cerebellar infarct is better demonstrated on  MRI.   No acute intracranial hemorrhage or midline shift.     MRI Brain without contrast 4/30/23    Acute left MCA distribution infarct.  No significant mass effect or acute hemorrhage at this time.  Small acute infarct in the right cerebellum.  Chronic right MCA distribution infarct.     CTH 4/30/2023  No acute territorial infarct or intracranial hemorrhage identified.      Vessel Imaging   CTA Stroke MP 4/29/2023  CT head: No evidence for acute intracranial hemorrhage.  Stable area encephalomalacia from remote right MCA territory infarct.  CTA head: Occlusion of M2 segment of left MCA.  Asymmetric decreased opacification of the left transverse sinus and jugular vein.  Similar finding was present on the prior CTA in 2022 and therefore could be related to sluggish flow.  Suggest attention on follow-up conventional angiogram.  CTA neck: No high-grade stenosis or aneurysm.      Cardiac Imaging   Echo 4/28/2023  The left ventricle is severely enlarged with eccentric hypertrophy and severely decreased systolic function.  The estimated ejection fraction is 10-15%.  There is left ventricular global hypokinesis.  Grade II left ventricular diastolic dysfunction.  Normal right ventricular size with normal right ventricular systolic function.  Mild tricuspid regurgitation.  The estimated PA systolic pressure is 35 mmHg.  Normal central venous pressure (3 mmHg).  Severe left atrial enlargement.

## 2023-05-10 NOTE — ASSESSMENT & PLAN NOTE
62 y.o. female with PMH of HTN, DM2, HLD, tobacco use (quit in 2020), stroke (2020, R MCA, no deficits), PE (December 2021, on xarelto),  CAD, DCM, HFrEF (15%) s/p AICD, Pulmonary hypertension admited to  4/27 for the treatment of a right leg DVT on heparin gtt. Stroke code activated 4/29 for aphasia and R hemiplegia, LKN 20:15 (~5 min prior). NIHSS 24, L MCA syndrome. Not TNK candidate due to AC with heparin gtt and therapeutic aPTT. CTA with L M2 occlusion. Taken to IR for possible intervention, now s/p L M2 thrombectomy with TICI 2c reperfusion. Repeat CTH post-IR with no acute infarct or hemorrhage, heparin gtt restarted. MRI brain W/WO with acute L MCA territory infarcts (insula, frontotemporal opercular cortex, patchy frontal white matter, small temporoparietal cortex), as well as small acute R cerebellar infarct. Echo with EF 10-15%, global LV hypokinesis, severe LAE. Suspect etiology likely cardioembolic due to EF 15%.    Neuro exam stable. NAEON. Discharge pending. Possibly today/tomorrow.    Antithrombotics for secondary stroke prevention: Anticoagulants: Pradaxa 150mg BID, ASA 81mg daily    Statins for secondary stroke prevention and HLD, if present: Statins: Atorvastatin- 80 mg daily    Aggressive risk factor modification: HTN, DM, HLD, HF, DVT    Rehab efforts: The patient has been evaluated by a stroke team provider and the therapy needs have been fully considered based off the presenting complaints and exam findings. The following therapy evaluations are needed: PT evaluate and treat, OT evaluate and treat, SLP evaluate and treat, PM&R evaluate for appropriate placement    Diagnostics ordered/pending: CTH PRN for acute neuro exam changes    VTE prophylaxis: None: Reason for No Pharmacological VTE Prophylaxis: Currently on anticoagulation    BP parameters: Infarct:  SBP <160

## 2023-05-10 NOTE — PROGRESS NOTES
Dmitriy Triana - Neurosurgery ([facility])  Vascular Neurology  Comprehensive Stroke Center  Progress Note    Assessment/Plan:     * Cerebrovascular accident (CVA) due to embolism of left middle cerebral artery  62 y.o. female with PMH of HTN, DM2, HLD, tobacco use (quit in 2020), stroke (2020, R MCA, no deficits), PE (December 2021, on xarelto),  CAD, DCM, HFrEF (15%) s/p AICD, Pulmonary hypertension admited to  4/27 for the treatment of a right leg DVT on heparin gtt. Stroke code activated 4/29 for aphasia and R hemiplegia, LKN 20:15 (~5 min prior). NIHSS 24, L MCA syndrome. Not TNK candidate due to AC with heparin gtt and therapeutic aPTT. CTA with L M2 occlusion. Taken to IR for possible intervention, now s/p L M2 thrombectomy with TICI 2c reperfusion. Repeat CTH post-IR with no acute infarct or hemorrhage, heparin gtt restarted. MRI brain W/WO with acute L MCA territory infarcts (insula, frontotemporal opercular cortex, patchy frontal white matter, small temporoparietal cortex), as well as small acute R cerebellar infarct. Echo with EF 10-15%, global LV hypokinesis, severe LAE. Suspect etiology likely cardioembolic due to EF 15%.    Neuro exam stable. NAEON. Discharge pending. Possibly today/tomorrow.    Antithrombotics for secondary stroke prevention: Anticoagulants: Pradaxa 150mg BID, ASA 81mg daily    Statins for secondary stroke prevention and HLD, if present: Statins: Atorvastatin- 80 mg daily    Aggressive risk factor modification: HTN, DM, HLD, HF, DVT    Rehab efforts: The patient has been evaluated by a stroke team provider and the therapy needs have been fully considered based off the presenting complaints and exam findings. The following therapy evaluations are needed: PT evaluate and treat, OT evaluate and treat, SLP evaluate and treat, PM&R evaluate for appropriate placement    Diagnostics ordered/pending: CTH PRN for acute neuro exam changes    VTE prophylaxis: None: Reason for No Pharmacological VTE  Prophylaxis: Currently on anticoagulation    BP parameters: Infarct:  SBP <160    Right sided weakness  Due to stroke  -PT/OT eval and treat  -Dispo recs for IPR    Critical lower limb ischemia  Stroke risk factor  Admitted for R popliteal artery occlusion, was started on heparin gtt  Heparin gtt restarted post-IR, per heme/onc recs transition to pradaxa today (5/2)    Chronic combined systolic and diastolic congestive heart failure  Stroke risk factor  -Echo with EF 10-15%, global LV hypokinesis, severe LAE  -Continue GDMT  - Switched metoprolol tartrate to succinate    Hyperlipemia  Stroke risk factor  -LDL 88, goal <70  -Atorvastatin 80mg daily    Hypertension  Stroke Risk factor  -SBP <160 s/p thrombectomy  -PRN hydralazine/labetalol    Type 2 diabetes mellitus without complication, without long-term current use of insulin  Stroke risk factor  -A1c 7.5  -BG goal while inpatient 140-180  -MC SSI PRN         4/30-S/p TICI 2c. Repeat CTH after IR with no acute infarct. Will need MRI but has a pacemaker that will need be interrogated. Improving r arm weakness and  aphasia.  5/1/2023- MRI completed showing acute L. MCA infarct. Pacemaker reinitiated post MRI to normal settings. Plan to start Pradaxa tomorrow per Heme/Onc. Neuro exam stable.  05/02/2023 NAEO, neuro exam stable. CTH overnight stable. Heparin gtt transitioned to Pradaxa today for AC of reduced EF and VTE history. Speech cleared for minced/moist diet. Stable for step down.  05/03/2023 Stepped down to NPU this morning, NAEO. Neuro exam stable, speech improving but continues to have significant RSW. Dispo recs for IPR.  05/04/2023 Pt improving this AM. No aphasia noted on exam. Restarted Pradaxa BID and switched Metoprolol tartrate to metop succinate for GDMT.   05/05/2023 Pt neuro exam improving. Aphasia noted on exam today. Pt reports insomnia and disliking hospital meals. In good spirits and excited for discharge to IPR.  05/06/2023 Neuro exam stable.  Pending IPR.   05/07/2023 Neuro exam improving. Pending IPR.  05/08/2023 Neuro exam stable today. IPR auth not approved, SNF pending.  05/09/2023 Neuro exam stable. NAEON. Discharge to SNF pending.  05/10/2023 Neuro exam stable. NAEON. Possible discharge to [facility].         STROKE DOCUMENTATION   Acute Stroke Times   Last Known Normal Date: 04/29/23  Last Known Normal Time: 2015  Symptom Onset Date: 04/29/23  Symptom Onset Time: 2015  Stroke Team Called Date: 04/29/23  Stroke Team Called Time: 2022  Stroke Team Arrival Date: 04/29/23  Stroke Team Arrival Time: 2025  CT Interpretation Time: 2038  Thrombolytic Therapy Recommended: No  CTA Interpretation Time: 2045  Thrombectomy Recommended: Yes  Decision to Treat Time for IR: 2045    NIH Scale:  1a. Level of Consciousness: 0-->Alert, keenly responsive  1b. LOC Questions: 0-->Answers both questions correctly  1c. LOC Commands: 0-->Performs both tasks correctly  2. Best Gaze: 1-->Partial gaze palsy, gaze is abnormal in one or both eyes, but forced deviation or total gaze paresis is not present  3. Visual: 0-->No visual loss  4. Facial Palsy: 1-->Minor paralysis (flattened nasolabial fold, asymmetry on smiling)  5a. Motor Arm, Left: 0-->No drift, limb holds 90 (or 45) degrees for full 10 secs  5b. Motor Arm, Right: 1-->Drift, limb holds 90 (or 45) degrees, but drifts down before full 10 secs, does not hit bed or other support  6a. Motor Leg, Left: 0-->No drift, leg holds 30 degree position for full 5 secs  6b. Motor Leg, Right: 1-->Drift, leg falls by the end of the 5-sec period but does not hit bed  7. Limb Ataxia: 0-->Absent  8. Sensory: 0-->Normal, no sensory loss  9. Best Language: 0-->No aphasia, normal  10. Dysarthria: 1-->Mild-to-moderate dysarthria, patient slurs at least some words and, at worst, can be understood with some difficulty  11. Extinction and Inattention (formerly Neglect): 0-->No abnormality  Total (NIH Stroke Scale): 5       Modified Becky  Score: 0  Dobbins Coma Scale:    ABCD2 Score:    [id number] Score:   HAS -BLED Score:   ICH Score:   Hunt & Vora Classification:      Hemorrhagic change of an Ischemic Stroke: Does this patient have an ischemic stroke with hemorrhagic changes? No     Neurologic Chief Complaint: L MCA stroke    Subjective:     Interval History: Patient is seen for follow-up neurological assessment and treatment recommendations:     Neuro exam stable. NAOEN. Discharge pending.    HPI, Past Medical, Family, and Social History remains the same as documented in the initial encounter.     Review of Systems   Reason unable to perform ROS: severe aphasia.   Constitutional:  Negative for fatigue.   HENT:  Negative for congestion.    Eyes:  Positive for visual disturbance.   Respiratory:  Negative for shortness of breath.    Cardiovascular:  Negative for chest pain.   Gastrointestinal:  Negative for abdominal distention and abdominal pain.   Skin:  Negative for rash.   Neurological:  Positive for facial asymmetry, speech difficulty (improving) and weakness.   Psychiatric/Behavioral:  Positive for sleep disturbance. Negative for agitation and behavioral problems.    Scheduled Meds:   aspirin  81 mg Oral Daily    atorvastatin  80 mg Oral Daily    dabigatran etexilate  150 mg Oral BID    metoprolol succinate  100 mg Oral Daily    polyethylene glycol  17 g Oral Daily    sacubitriL-valsartan  1 tablet Oral BID    senna-docusate 8.6-50 mg  1 tablet Oral BID    spironolactone  25 mg Oral Daily    torsemide  10 mg Oral Daily     Continuous Infusions:    PRN Meds:acetaminophen, albuterol-ipratropium, dextrose 10%, dextrose 10%, dextrose 10%, dextrose 10%, dextrose, dextrose, glucagon (human recombinant), insulin aspart U-100, labetalol, ondansetron, senna-docusate 8.6-50 mg, sodium chloride 0.9%, sodium chloride 0.9%, sodium chloride 0.9%    Objective:     Vital Signs (Most Recent):  Temp: 97.8 °F (36.6 °C) (05/10/23 1512)  Pulse: 89  (05/10/23 1518)  Resp: 19 (05/10/23 1512)  BP: (!) 96/55 (05/10/23 1512)  SpO2: 99 % (05/10/23 1512)  BP Location: Left arm    Vital Signs Range (Last 24H):  Temp:  [96.5 °F (35.8 °C)-98.6 °F (37 °C)]   Pulse:  []   Resp:  [18-19]   BP: ()/(55-68)   SpO2:  [97 %-99 %]   BP Location: Left arm    Physical Exam  Constitutional:       General: She is not in acute distress.  HENT:      Head: Normocephalic.      Nose: Nose normal.      Mouth/Throat:      Mouth: Mucous membranes are moist.   Eyes:      General: Visual field deficit (R hemianopsia) present.      Conjunctiva/sclera: Conjunctivae normal.      Pupils: Pupils are equal, round, and reactive to light.      Comments: L gaze preference   Cardiovascular:      Rate and Rhythm: Normal rate.   Pulmonary:      Effort: Pulmonary effort is normal.   Abdominal:      General: Abdomen is flat.   Skin:     General: Skin is warm and dry.   Neurological:      Mental Status: She is alert and oriented to person, place, and time.      Cranial Nerves: Dysarthria and facial asymmetry present.      Sensory: Sensory deficit present.      Motor: Weakness present.   Psychiatric:         Mood and Affect: Mood normal.       Neurological Exam:   LOC: alert  Attention Span: Good   Language: expressive aphasia  Articulation: dysarthria  Orientation: Limited due to severe aphasia, oriented to self  Visual Fields: Hemianopsia right  EOM (CN III, IV, VI): Gaze preference  left  Facial Movement (CN VII): Lower facial weakness on the Right  Motor: Arm left  Normal 5/5  Leg left  Normal 5/5  Arm right  Paresis 3+/5  Leg right Paresis 3+/5      Laboratory:  CMP:   Recent Labs   Lab 05/10/23  0801   CALCIUM 10.2   ALBUMIN 3.1*   PROT 6.7      K 3.8   CO2 28      BUN 16   CREATININE 0.9   ALKPHOS 152*   ALT 18   AST 25   BILITOT 0.6       CBC:   Recent Labs   Lab 05/10/23  0801   WBC 8.12   RBC 4.55   HGB 12.8   HCT 40.8      MCV 90   MCH 28.1   MCHC 31.4*        Diagnostic Results     Brain Imaging   CTH without contrast 5/1/23  Acute left MCA distribution infarct appears stable in size and distribution compared to 04/30/2023 MRI.  No evidence of hemorrhagic conversion.   Acute small right cerebellar infarct is better demonstrated on MRI.   No acute intracranial hemorrhage or midline shift.     MRI Brain without contrast 4/30/23    Acute left MCA distribution infarct.  No significant mass effect or acute hemorrhage at this time.  Small acute infarct in the right cerebellum.  Chronic right MCA distribution infarct.     CTH 4/30/2023  No acute territorial infarct or intracranial hemorrhage identified.      Vessel Imaging   CTA Stroke MP 4/29/2023  CT head: No evidence for acute intracranial hemorrhage.  Stable area encephalomalacia from remote right MCA territory infarct.  CTA head: Occlusion of M2 segment of left MCA.  Asymmetric decreased opacification of the left transverse sinus and jugular vein.  Similar finding was present on the prior CTA in 2022 and therefore could be related to sluggish flow.  Suggest attention on follow-up conventional angiogram.  CTA neck: No high-grade stenosis or aneurysm.      Cardiac Imaging   Echo 4/28/2023   The left ventricle is severely enlarged with eccentric hypertrophy and severely decreased systolic function.   The estimated ejection fraction is 10-15%.   There is left ventricular global hypokinesis.   Grade II left ventricular diastolic dysfunction.   Normal right ventricular size with normal right ventricular systolic function.   Mild tricuspid regurgitation.   The estimated PA systolic pressure is 35 mmHg.   Normal central venous pressure (3 mmHg).   Severe left atrial enlargement.      Tushar Ayala MD  Comprehensive Stroke Center  Department of Vascular Neurology   [facility] Neurosurgery [facility])

## 2023-05-10 NOTE — PT/OT/SLP PROGRESS
"Speech Language Pathology Treatment    Patient Name:  Diomedes Mohr   MRN:  [id number]  Admitting Diagnosis: Cerebrovascular accident (CVA) due to embolism of left middle cerebral artery    Recommendations:                 General Recommendations:  Dysphagia therapy, Speech/language therapy, and Cognitive-linguistic therapy, ongoing f/u with Registered Dietician warranted   Diet recommendations:  Minced & Moist Diet - IDDSI Level 5, Liquid Diet Level: Thin liquids - IDDSI Level 0   All items must be minced and moist. Please avoid mixed consistencies (such as cereals, soups with large pieces of meat/vegetable, etc.), avoid dry/coarse/crumbly items such as rice ,nuts, seeds, dried fruit, coconut, avoid fibrous foods, avoid tough skins, avoid tough vegetables (i.e. no corn, brussel sprouts,etc) avoid chewy/sticky foods (i.e. no peanut putter, caramel, licorice, etc.)    Aspiration Precautions: Strict 1:1 assistance required with all PO for safety, alternate bties./sips, 1 bite/sip at a time, Check for pocketing/oral residue, lingual sweeps between bites encouraged, Feed only when awake/alert, Frequent oral care, HOB to 90 degrees, Meds crushed in puree, No straws, Remain upright 30 minutes post meal, Small bites/sips, and Strict aspiration precautions Continue to monitor for signs and symptoms of aspiration and discontinue oral feeding should you notice any of the following: watery eyes, reddened facial area, wet vocal quality, increased work of breathing, change in respiratory status, increased congestion, coughing, fever and/or change in level of alertness  General Precautions: Standard, aspiration, fall  Communication strategies:  provide increased time to answer and go to room if call light pushed       Subjective     SLP reviewed Pt with RN pre/post session  Pt presents calm  She explains, "Wait, I'm going to get it, don't tell me"     Pain/Comfort:  Pain Rating 1: 0/10    Respiratory Status: Room air    Objective: "     Has the patient been evaluated by SLP for swallowing?   Yes  Keep patient NPO? No   Current Respiratory Status:    Room air     Pt found awake and upright in bed with call light in reach. She was oriented x4. She demonstrated mild word-finding difficulty t/o conversational speech tasks as c/b hesitations and perseveration. She completed phrases WNL. She completed naming tasks (antonyms) WNL. She completed convergent naming tasks with 66% accuracy provided extra time and min phonemic cues. Pt denied difficulty with meals. She pointed to partially completed lunch meal tray and explained she received supplemental shakes on meal trays 2/2 minimal appetite. Oral care provided via toothette. No R pocketing appreciated.  She was educated on ongoing aspiration precautions, oral care, compensatory strategies for word-finding and recommendations for ongoing ST upon d/c from acute. She verbalized understanding. No additional questions. She requested RN upon SLP exit, RN notified.     Assessment:     Diomedes Mohr is a 62 y.o. female with an SLP diagnosis of Aphasia, Dysphagia, and Dysarthria.  She presents with progress with goals. ST to continue to follow.      Goals:   Multidisciplinary Problems       SLP Goals          Problem: SLP    Goal Priority Disciplines Outcome   SLP Goal     SLP Ongoing, Progressing   Description: Speech Language Pathology Goals  Goals expected to be met by 5/15  1. Pt will tolerate mechanical soft textures with thin liquids without s/s aspiration & adequate oral phase of swallow  2. Pt will recall 3+ safe swallow strategies 90% of attempts, I'ly   3. Pt will complete object naming tasks with 90% accuracy, Supervision   4. Pt will answer m/u  yes/no questions with 90% accuracy, MIN A  5. Pt will complete 2-unit commands 90% of the time, Supervision  6. Pt will complete automatic speech tasks with 90% accuracy, MIN A  7. Pt will complete object naming tasks with 70% accuracy or higher, MIN A                          Plan:     Patient to be seen:  3 x/week   Plan of Care expires:  05/29/23  Plan of Care reviewed with:  patient, spouse   SLP Follow-Up:  Yes       Discharge recommendations:  rehabilitation facility     Time Tracking:     SLP Treatment Date:   05/10/23  Speech Start Time:  1516  Speech Stop Time:  1532     Speech Total Time (min):  16 min    Billable Minutes: Speech Therapy Individual 8 and Self Care/Home Management Training 8    05/10/2023

## 2023-05-10 NOTE — PLAN OF CARE
[facility] completed LOCET via phone. [facility] faxed PASSR to obtain the 142 for NH admission.    SAAD Desir  [phone]

## 2023-05-10 NOTE — PLAN OF CARE
Problem: Adult Inpatient Plan of Care  Goal: Plan of Care Review  Outcome: Ongoing, Progressing  Goal: Patient-Specific Goal (Individualized)  Description: Admit Date: 4/27/2023    Cerebrovascular accident (CVA) due to embolism of left middle cerebral artery    Past Medical History:  12/26/2019: Acute on chronic combined systolic and diastolic heart   failure  No date: Anticoagulant long-term use  No date: Anxiety  No date: Arthritis  No date: Asthma  No date: Depression  No date: H/O: hysterectomy  No date: Hyperlipemia  No date: Hypertension  No date: Pulmonary edema  No date: Schizophrenia    Past Surgical History:  No date: BLADDER SUSPENSION  No date: CARPAL TUNNEL RELEASE; Right  No date: HEEL SPUR SURGERY; Left  No date: HYSTERECTOMY  12/27/2019: LEFT HEART CATHETERIZATION; Bilateral      Comment:  Procedure: Left heart cath;  Surgeon: Steve Chambers MD;  Location: [facility] CATH LAB;  Service:                Cardiology;  Laterality: Bilateral;  7/26/2021: RIGHT HEART CATHETERIZATION; Right      Comment:  Procedure: INSERTION, CATHETER, RIGHT HEART;  Surgeon:                Petr Naranjo MD;  Location: [facility] CATH LAB;                 Service: Cardiology;  Laterality: Right;  5/12/2022: RIGHT HEART CATHETERIZATION; Right      Comment:  Procedure: INSERTION, CATHETER, RIGHT HEART;  Surgeon:                Chandana Ivory Jr., MD;  Location: [facility] CATH LAB;                 Service: Cardiology;  Laterality: Right;  No date: TUBAL LIGATION    Individualization:   1. Patient wants to be told all care  2. Keep communication board at bedside    Restraints: None             Outcome: Ongoing, Progressing  Goal: Absence of Hospital-Acquired Illness or Injury  Outcome: Ongoing, Progressing  Goal: Optimal Comfort and Wellbeing  Outcome: Ongoing, Progressing  Goal: Readiness for Transition of Care  Outcome: Ongoing, Progressing     Problem: Diabetes Comorbidity  Goal: Blood Glucose Level  Within Targeted Range  Outcome: Ongoing, Progressing     Problem: Fall Injury Risk  Goal: Absence of Fall and Fall-Related Injury  Outcome: Ongoing, Progressing     Problem: Adjustment to Illness (Stroke, Ischemic/Transient Ischemic Attack)  Goal: Optimal Coping  Outcome: Ongoing, Progressing     Problem: Bowel Elimination Impaired (Stroke, Ischemic/Transient Ischemic Attack)  Goal: Effective Bowel Elimination  Outcome: Ongoing, Progressing     Problem: Cerebral Tissue Perfusion (Stroke, Ischemic/Transient Ischemic Attack)  Goal: Optimal Cerebral Tissue Perfusion  Outcome: Ongoing, Progressing     Problem: Cognitive Impairment (Stroke, Ischemic/Transient Ischemic Attack)  Goal: Optimal Cognitive Function  Outcome: Ongoing, Progressing     Problem: Communication Impairment (Stroke, Ischemic/Transient Ischemic Attack)  Goal: Improved Communication Skills  Outcome: Ongoing, Progressing     Problem: Functional Ability Impaired (Stroke, Ischemic/Transient Ischemic Attack)  Goal: Optimal Functional Ability  Outcome: Ongoing, Progressing     Problem: Respiratory Compromise (Stroke, Ischemic/Transient Ischemic Attack)  Goal: Effective Oxygenation and Ventilation  Outcome: Ongoing, Progressing     Problem: Sensorimotor Impairment (Stroke, Ischemic/Transient Ischemic Attack)  Goal: Improved Sensorimotor Function  Outcome: Ongoing, Progressing     Problem: Swallowing Impairment (Stroke, Ischemic/Transient Ischemic Attack)  Goal: Optimal Eating and Swallowing without Aspiration  Outcome: Ongoing, Progressing     Problem: Urinary Elimination Impaired (Stroke, Ischemic/Transient Ischemic Attack)  Goal: Effective Urinary Elimination  Outcome: Ongoing, Progressing     Problem: Skin Injury Risk Increased  Goal: Skin Health and Integrity  Outcome: Ongoing, Progressing     Problem: Adjustment to Illness (Delirium)  Goal: Optimal Coping  Outcome: Ongoing, Progressing     Problem: Altered Behavior (Delirium)  Goal: Improved Behavioral  Control  Outcome: Ongoing, Progressing     Problem: Attention and Thought Clarity Impairment (Delirium)  Goal: Improved Attention and Thought Clarity  Outcome: Ongoing, Progressing     Problem: Sleep Disturbance (Delirium)  Goal: Improved Sleep  Outcome: Ongoing, Progressing

## 2023-05-11 PROBLEM — Z86.711 HISTORY OF PULMONARY EMBOLISM: Chronic | Status: ACTIVE | Noted: 2022-07-29

## 2023-05-11 LAB
ALBUMIN SERPL BCP-MCNC: 3.1 G/DL (ref 3.5–5.2)
ALP SERPL-CCNC: 149 U/L (ref 55–135)
ALT SERPL W/O P-5'-P-CCNC: 19 U/L (ref 10–44)
ANION GAP SERPL CALC-SCNC: 9 MMOL/L (ref 8–16)
AST SERPL-CCNC: 24 U/L (ref 10–40)
BASOPHILS # BLD AUTO: 0.05 K/UL (ref 0–0.2)
BASOPHILS NFR BLD: 0.6 % (ref 0–1.9)
BILIRUB SERPL-MCNC: 0.4 MG/DL (ref 0.1–1)
BUN SERPL-MCNC: 20 MG/DL (ref 8–23)
CALCIUM SERPL-MCNC: 9.8 MG/DL (ref 8.7–10.5)
CHLORIDE SERPL-SCNC: 101 MMOL/L (ref 95–110)
CO2 SERPL-SCNC: 28 MMOL/L (ref 23–29)
CREAT SERPL-MCNC: 0.8 MG/DL (ref 0.5–1.4)
DIFFERENTIAL METHOD: ABNORMAL
EOSINOPHIL # BLD AUTO: 0.2 K/UL (ref 0–0.5)
EOSINOPHIL NFR BLD: 2.5 % (ref 0–8)
ERYTHROCYTE [DISTWIDTH] IN BLOOD BY AUTOMATED COUNT: 14.2 % (ref 11.5–14.5)
EST. GFR  (NO RACE VARIABLE): >60 ML/MIN/1.73 M^2
GLUCOSE SERPL-MCNC: 137 MG/DL (ref 70–110)
HCT VFR BLD AUTO: 42.6 % (ref 37–48.5)
HGB BLD-MCNC: 13.6 G/DL (ref 12–16)
IMM GRANULOCYTES # BLD AUTO: 0.02 K/UL (ref 0–0.04)
IMM GRANULOCYTES NFR BLD AUTO: 0.2 % (ref 0–0.5)
LYMPHOCYTES # BLD AUTO: 3.4 K/UL (ref 1–4.8)
LYMPHOCYTES NFR BLD: 40.8 % (ref 18–48)
MCH RBC QN AUTO: 28.9 PG (ref 27–31)
MCHC RBC AUTO-ENTMCNC: 31.9 G/DL (ref 32–36)
MCV RBC AUTO: 90 FL (ref 82–98)
MONOCYTES # BLD AUTO: 0.5 K/UL (ref 0.3–1)
MONOCYTES NFR BLD: 5.8 % (ref 4–15)
NEUTROPHILS # BLD AUTO: 4.2 K/UL (ref 1.8–7.7)
NEUTROPHILS NFR BLD: 50.1 % (ref 38–73)
NRBC BLD-RTO: 0 /100 WBC
PLATELET # BLD AUTO: 427 K/UL (ref 150–450)
PMV BLD AUTO: 10.9 FL (ref 9.2–12.9)
POCT GLUCOSE: 135 MG/DL (ref 70–110)
POCT GLUCOSE: 142 MG/DL (ref 70–110)
POCT GLUCOSE: 159 MG/DL (ref 70–110)
POCT GLUCOSE: 204 MG/DL (ref 70–110)
POTASSIUM SERPL-SCNC: 3.6 MMOL/L (ref 3.5–5.1)
PROT SERPL-MCNC: 6.7 G/DL (ref 6–8.4)
RBC # BLD AUTO: 4.71 M/UL (ref 4–5.4)
SODIUM SERPL-SCNC: 138 MMOL/L (ref 136–145)
WBC # BLD AUTO: 8.41 K/UL (ref 3.9–12.7)

## 2023-05-11 PROCEDURE — 25000003 PHARM REV CODE 250: Performed by: STUDENT IN AN ORGANIZED HEALTH CARE EDUCATION/TRAINING PROGRAM

## 2023-05-11 PROCEDURE — 25000003 PHARM REV CODE 250

## 2023-05-11 PROCEDURE — 99232 SBSQ HOSP IP/OBS MODERATE 35: CPT | Mod: ,,, | Performed by: PSYCHIATRY & NEUROLOGY

## 2023-05-11 PROCEDURE — 36415 COLL VENOUS BLD VENIPUNCTURE: CPT | Performed by: STUDENT IN AN ORGANIZED HEALTH CARE EDUCATION/TRAINING PROGRAM

## 2023-05-11 PROCEDURE — 97530 THERAPEUTIC ACTIVITIES: CPT

## 2023-05-11 PROCEDURE — 80053 COMPREHEN METABOLIC PANEL: CPT | Performed by: STUDENT IN AN ORGANIZED HEALTH CARE EDUCATION/TRAINING PROGRAM

## 2023-05-11 PROCEDURE — 94761 N-INVAS EAR/PLS OXIMETRY MLT: CPT

## 2023-05-11 PROCEDURE — 25000003 PHARM REV CODE 250: Performed by: PHYSICIAN ASSISTANT

## 2023-05-11 PROCEDURE — 99232 PR SUBSEQUENT HOSPITAL CARE,LEVL II: ICD-10-PCS | Mod: ,,, | Performed by: PSYCHIATRY & NEUROLOGY

## 2023-05-11 PROCEDURE — 85025 COMPLETE CBC W/AUTO DIFF WBC: CPT

## 2023-05-11 PROCEDURE — 25000003 PHARM REV CODE 250: Performed by: NURSE PRACTITIONER

## 2023-05-11 PROCEDURE — 11000001 HC ACUTE MED/SURG PRIVATE ROOM

## 2023-05-11 RX ADMIN — DABIGATRAN ETEXILATE MESYLATE 150 MG: 150 CAPSULE ORAL at 08:05

## 2023-05-11 RX ADMIN — METOPROLOL SUCCINATE 100 MG: 100 TABLET, EXTENDED RELEASE ORAL at 08:05

## 2023-05-11 RX ADMIN — SACUBITRIL AND VALSARTAN 1 TABLET: 97; 103 TABLET, FILM COATED ORAL at 08:05

## 2023-05-11 RX ADMIN — POTASSIUM BICARBONATE 40 MEQ: 391 TABLET, EFFERVESCENT ORAL at 09:05

## 2023-05-11 RX ADMIN — ASPIRIN 81 MG: 81 TABLET, COATED ORAL at 08:05

## 2023-05-11 RX ADMIN — POTASSIUM BICARBONATE 40 MEQ: 391 TABLET, EFFERVESCENT ORAL at 08:05

## 2023-05-11 RX ADMIN — INSULIN ASPART 2 UNITS: 100 INJECTION, SOLUTION INTRAVENOUS; SUBCUTANEOUS at 09:05

## 2023-05-11 RX ADMIN — SPIRONOLACTONE 25 MG: 25 TABLET, FILM COATED ORAL at 08:05

## 2023-05-11 RX ADMIN — INSULIN ASPART 2 UNITS: 100 INJECTION, SOLUTION INTRAVENOUS; SUBCUTANEOUS at 07:05

## 2023-05-11 RX ADMIN — TORSEMIDE 10 MG: 10 TABLET ORAL at 08:05

## 2023-05-11 RX ADMIN — SENNOSIDES AND DOCUSATE SODIUM 1 TABLET: 8.6; 5 TABLET ORAL at 08:05

## 2023-05-11 RX ADMIN — ATORVASTATIN CALCIUM 80 MG: 40 TABLET, FILM COATED ORAL at 08:05

## 2023-05-11 RX ADMIN — POLYETHYLENE GLYCOL 3350 17 G: 17 POWDER, FOR SOLUTION ORAL at 09:05

## 2023-05-11 RX ADMIN — ACETAMINOPHEN 650 MG: 325 TABLET ORAL at 08:05

## 2023-05-11 NOTE — PT/OT/SLP PROGRESS
"Physical Therapy Treatment    Patient Name:  Diomedes Mohr   MRN:  [id number]    Recommendations:     Discharge Recommendations: rehabilitation facility  Discharge Equipment Recommendations: to be determined by next level of care  Barriers to discharge:  inc level of assist needed    Assessment:     Diomedes Mohr is a 62 y.o. female admitted with a medical diagnosis of Cerebrovascular accident (CVA) due to embolism of left middle cerebral artery.  She presents with the following impairments/functional limitations: weakness, impaired endurance, impaired self care skills, impaired functional mobility, gait instability, impaired balance, decreased coordination, decreased lower extremity function, decreased upper extremity function, decreased safety awareness, abnormal tone, decreased ROM, impaired coordination, impaired fine motor. Pt continues to improve w/ strength, balance, and motor control during transfers and gait. Rec d/c to I[facility] for continued treatment in order to maximize functional return as pt w/ high motivation, good activity tolerance, and w/ potential for significant improvements in functional mobility.      Rehab Prognosis: Good; patient would benefit from acute skilled PT services to address these deficits and reach maximum level of function.    Recent Surgery: Procedure(s) (LRB):  ANGIOGRAM-CEREBRAL (N/A)  ANGIOGRAM (N/A)      Plan:     During this hospitalization, patient to be seen 4 x/week to address the identified rehab impairments via gait training, therapeutic activities, therapeutic exercises, neuromuscular re-education and progress toward the following goals:    Plan of Care Expires:  06/01/23    Subjective     Chief Complaint: "why won't this leg just work"  Patient/Family Comments/goals: pt wants to get back to walking independently  Pain/Comfort:  Pain Rating 1: 0/10  Pain Rating Post-Intervention 1: 0/10      Objective:     Communicated with RN prior to session.  Patient found HOB elevated " with bed alarm, PureWick, telemetry upon PT entry to room.     General Precautions: Standard, aspiration, fall  Orthopedic Precautions: N/A  Braces: N/A  Respiratory Status: Room air     Functional Mobility:  Bed Mobility:     Supine to Sit: minimum assistance  Transfers:     Sit to Stand:  moderate assistance with hand-held assist  Bed to Chair: moderate assistance with  hand-held assist  using  Step Transfer  Gait: 6' w/ maxA HHA  Balance: Static standing balance min-modA HHA      AM-PAC 6 CLICK MOBILITY  Turning over in bed (including adjusting bedclothes, sheets and blankets)?: 3  Sitting down on and standing up from a chair with arms (e.g., wheelchair, bedside commode, etc.): 2  Moving from lying on back to sitting on the side of the bed?: 3  Moving to and from a bed to a chair (including a wheelchair)?: 2  Need to walk in hospital room?: 2  Climbing 3-5 steps with a railing?: 1  Basic Mobility Total Score: 13       Treatment & Education:  Pt educated on PT POC/goals, d/c recs, and continued treatment. All questions answered and pt in agreement w/ POC.  Sts x2 from EOB w/ RLE blocked, HHA to LUE, cueing for upright posture and hip/knee extension  Bed to chair trfr w/ cueing for each step direction, length, and width of LUIS w/ frequent cueing to activate RLE quadriceps to assist w/ knee extension  Sts from chair w/ mod-maxA HHA followed by Gait training w/ RLE blocked, assistance weight shifting to each side, HHA to LUE and cueing for step length, step height, postural control, and width of LUIS      Patient left up in chair with all lines intact, call button in reach, chair alarm on, RN notified, and spouse present..    GOALS:   Multidisciplinary Problems       Physical Therapy Goals          Problem: Physical Therapy    Goal Priority Disciplines Outcome Goal Variances Interventions   Physical Therapy Goal     PT, PT/OT Ongoing, Progressing     Description: Goals to be completed by: 6/1/23    Pt will perform  sup<>sit transfers w/ minimum assistance  Pt will have sufficient dynamic balance to sit EOB while performing ADLs/therex w/ contact guard assistance  PT will be able to stand up from EOB w/ moderate assistance using LRAD  Pt will ambulate 20 feet w/ moderate assistance using LRAD  Pt will be independent w/ HEP therex on BLE w/ good form and ROM                       Time Tracking:     PT Received On: 05/11/23  PT Start Time: 1503     PT Stop Time: 1519  PT Total Time (min): 16 min     Billable Minutes: Therapeutic Activity 16    Treatment Type: Treatment  PT/PTA: PT     Number of PTA visits since last PT visit: 0     05/11/2023

## 2023-05-11 NOTE — PLAN OF CARE
Problem: Adult Inpatient Plan of Care  Goal: Plan of Care Review  Outcome: Ongoing, Progressing  Goal: Patient-Specific Goal (Individualized)  Description: Admit Date: 4/27/2023    Cerebrovascular accident (CVA) due to embolism of left middle cerebral artery    Past Medical History:  12/26/2019: Acute on chronic combined systolic and diastolic heart   failure  No date: Anticoagulant long-term use  No date: Anxiety  No date: Arthritis  No date: Asthma  No date: Depression  No date: H/O: hysterectomy  No date: Hyperlipemia  No date: Hypertension  No date: Pulmonary edema  No date: Schizophrenia    Past Surgical History:  No date: BLADDER SUSPENSION  No date: CARPAL TUNNEL RELEASE; Right  No date: HEEL SPUR SURGERY; Left  No date: HYSTERECTOMY  12/27/2019: LEFT HEART CATHETERIZATION; Bilateral      Comment:  Procedure: Left heart cath;  Surgeon: Steve Chambers MD;  Location: [facility] CATH LAB;  Service:                Cardiology;  Laterality: Bilateral;  7/26/2021: RIGHT HEART CATHETERIZATION; Right      Comment:  Procedure: INSERTION, CATHETER, RIGHT HEART;  Surgeon:                Petr Naranjo MD;  Location: [facility] CATH LAB;                 Service: Cardiology;  Laterality: Right;  5/12/2022: RIGHT HEART CATHETERIZATION; Right      Comment:  Procedure: INSERTION, CATHETER, RIGHT HEART;  Surgeon:                Chandana Ivory Jr., MD;  Location: [facility] CATH LAB;                 Service: Cardiology;  Laterality: Right;  No date: TUBAL LIGATION    Individualization:   1. Patient wants to be told all care  2. Keep communication board at bedside    Restraints: None             Outcome: Ongoing, Progressing  Goal: Absence of Hospital-Acquired Illness or Injury  Outcome: Ongoing, Progressing  Goal: Optimal Comfort and Wellbeing  Outcome: Ongoing, Progressing  Goal: Readiness for Transition of Care  Outcome: Ongoing, Progressing     Problem: Diabetes Comorbidity  Goal: Blood Glucose Level  Within Targeted Range  Outcome: Ongoing, Progressing     Problem: Fall Injury Risk  Goal: Absence of Fall and Fall-Related Injury  Outcome: Ongoing, Progressing     Problem: Adjustment to Illness (Stroke, Ischemic/Transient Ischemic Attack)  Goal: Optimal Coping  Outcome: Ongoing, Progressing     Problem: Bowel Elimination Impaired (Stroke, Ischemic/Transient Ischemic Attack)  Goal: Effective Bowel Elimination  Outcome: Ongoing, Progressing     Problem: Cerebral Tissue Perfusion (Stroke, Ischemic/Transient Ischemic Attack)  Goal: Optimal Cerebral Tissue Perfusion  Outcome: Ongoing, Progressing     Problem: Cognitive Impairment (Stroke, Ischemic/Transient Ischemic Attack)  Goal: Optimal Cognitive Function  Outcome: Ongoing, Progressing     Problem: Communication Impairment (Stroke, Ischemic/Transient Ischemic Attack)  Goal: Improved Communication Skills  Outcome: Ongoing, Progressing     Problem: Functional Ability Impaired (Stroke, Ischemic/Transient Ischemic Attack)  Goal: Optimal Functional Ability  Outcome: Ongoing, Progressing     Problem: Respiratory Compromise (Stroke, Ischemic/Transient Ischemic Attack)  Goal: Effective Oxygenation and Ventilation  Outcome: Ongoing, Progressing     Problem: Sensorimotor Impairment (Stroke, Ischemic/Transient Ischemic Attack)  Goal: Improved Sensorimotor Function  Outcome: Ongoing, Progressing     Problem: Swallowing Impairment (Stroke, Ischemic/Transient Ischemic Attack)  Goal: Optimal Eating and Swallowing without Aspiration  Outcome: Ongoing, Progressing     Problem: Urinary Elimination Impaired (Stroke, Ischemic/Transient Ischemic Attack)  Goal: Effective Urinary Elimination  Outcome: Ongoing, Progressing     Problem: Skin Injury Risk Increased  Goal: Skin Health and Integrity  Outcome: Ongoing, Progressing     Problem: Adjustment to Illness (Delirium)  Goal: Optimal Coping  Outcome: Ongoing, Progressing     Problem: Altered Behavior (Delirium)  Goal: Improved Behavioral  Control  Outcome: Ongoing, Progressing     Problem: Attention and Thought Clarity Impairment (Delirium)  Goal: Improved Attention and Thought Clarity  Outcome: Ongoing, Progressing     Problem: Sleep Disturbance (Delirium)  Goal: Improved Sleep  Outcome: Ongoing, Progressing

## 2023-05-11 NOTE — SUBJECTIVE & OBJECTIVE
Neurologic Chief Complaint: L MCA stroke    Subjective:     Interval History: Patient is seen for follow-up neurological assessment and treatment recommendations:     Neuro exam stable. NAOEN. Discharge pending.    HPI, Past Medical, Family, and Social History remains the same as documented in the initial encounter.     Review of Systems   Reason unable to perform ROS: severe aphasia.   Constitutional:  Negative for fatigue.   HENT:  Negative for congestion.    Eyes:  Positive for visual disturbance.   Respiratory:  Negative for shortness of breath.    Cardiovascular:  Negative for chest pain.   Gastrointestinal:  Negative for abdominal distention and abdominal pain.   Skin:  Negative for rash.   Neurological:  Positive for facial asymmetry, speech difficulty (improving) and weakness.   Psychiatric/Behavioral:  Positive for sleep disturbance. Negative for agitation and behavioral problems.    Scheduled Meds:   aspirin  81 mg Oral Daily    atorvastatin  80 mg Oral Daily    dabigatran etexilate  150 mg Oral BID    metoprolol succinate  100 mg Oral Daily    polyethylene glycol  17 g Oral Daily    sacubitriL-valsartan  1 tablet Oral BID    senna-docusate 8.6-50 mg  1 tablet Oral BID    spironolactone  25 mg Oral Daily    torsemide  10 mg Oral Daily     Continuous Infusions:    PRN Meds:acetaminophen, albuterol-ipratropium, dextrose 10%, dextrose 10%, dextrose 10%, dextrose 10%, dextrose, dextrose, glucagon (human recombinant), insulin aspart U-100, labetalol, ondansetron, senna-docusate 8.6-50 mg, sodium chloride 0.9%, sodium chloride 0.9%, sodium chloride 0.9%    Objective:     Vital Signs (Most Recent):  Temp: 98.5 °F (36.9 °C) (05/11/23 1154)  Pulse: 74 (05/11/23 1533)  Resp: 17 (05/11/23 1154)  BP: 90/60 (05/11/23 1154)  SpO2: 99 % (05/11/23 1429)  BP Location: Left arm    Vital Signs Range (Last 24H):  Temp:  [97.1 °F (36.2 °C)-98.8 °F (37.1 °C)]   Pulse:  [60-74]   Resp:  [17-18]   BP: ()/(57-72)   SpO2:   [94 %-99 %]   BP Location: Left arm    Physical Exam  Constitutional:       General: She is not in acute distress.  HENT:      Head: Normocephalic.      Nose: Nose normal.      Mouth/Throat:      Mouth: Mucous membranes are moist.   Eyes:      General: Visual field deficit (R hemianopsia) present.      Conjunctiva/sclera: Conjunctivae normal.      Pupils: Pupils are equal, round, and reactive to light.      Comments: L gaze preference   Cardiovascular:      Rate and Rhythm: Normal rate.   Pulmonary:      Effort: Pulmonary effort is normal.   Abdominal:      General: Abdomen is flat.   Skin:     General: Skin is warm and dry.   Neurological:      Mental Status: She is alert and oriented to person, place, and time.      Cranial Nerves: Dysarthria and facial asymmetry present.      Sensory: Sensory deficit present.      Motor: Weakness present.   Psychiatric:         Mood and Affect: Mood normal.       Neurological Exam:   LOC: alert  Attention Span: Good   Language: expressive aphasia  Articulation: dysarthria  Orientation: Limited due to severe aphasia, oriented to self  Visual Fields: Hemianopsia right  EOM (CN III, IV, VI): Gaze preference  left  Facial Movement (CN VII): Lower facial weakness on the Right  Motor: Arm left  Normal 5/5  Leg left  Normal 5/5  Arm right  Paresis 3+/5  Leg right Paresis 3+/5      Laboratory:  CMP:   Recent Labs   Lab 05/11/23  0306   CALCIUM 9.8   ALBUMIN 3.1*   PROT 6.7      K 3.6   CO2 28      BUN 20   CREATININE 0.8   ALKPHOS 149*   ALT 19   AST 24   BILITOT 0.4       CBC:   Recent Labs   Lab 05/11/23  0306   WBC 8.41   RBC 4.71   HGB 13.6   HCT 42.6      MCV 90   MCH 28.9   MCHC 31.9*       Diagnostic Results     Brain Imaging   CTH without contrast 5/1/23  Acute left MCA distribution infarct appears stable in size and distribution compared to 04/30/2023 MRI.  No evidence of hemorrhagic conversion.   Acute small right cerebellar infarct is better demonstrated on  MRI.   No acute intracranial hemorrhage or midline shift.     MRI Brain without contrast 4/30/23    Acute left MCA distribution infarct.  No significant mass effect or acute hemorrhage at this time.  Small acute infarct in the right cerebellum.  Chronic right MCA distribution infarct.     CTH 4/30/2023  No acute territorial infarct or intracranial hemorrhage identified.      Vessel Imaging   CTA Stroke MP 4/29/2023  CT head: No evidence for acute intracranial hemorrhage.  Stable area encephalomalacia from remote right MCA territory infarct.  CTA head: Occlusion of M2 segment of left MCA.  Asymmetric decreased opacification of the left transverse sinus and jugular vein.  Similar finding was present on the prior CTA in 2022 and therefore could be related to sluggish flow.  Suggest attention on follow-up conventional angiogram.  CTA neck: No high-grade stenosis or aneurysm.      Cardiac Imaging   Echo 4/28/2023  The left ventricle is severely enlarged with eccentric hypertrophy and severely decreased systolic function.  The estimated ejection fraction is 10-15%.  There is left ventricular global hypokinesis.  Grade II left ventricular diastolic dysfunction.  Normal right ventricular size with normal right ventricular systolic function.  Mild tricuspid regurgitation.  The estimated PA systolic pressure is 35 mmHg.  Normal central venous pressure (3 mmHg).  Severe left atrial enlargement.

## 2023-05-11 NOTE — PLAN OF CARE
05/11/23 1630   Post-Acute Status   Post-Acute Authorization Placement   Post-Acute Placement Status Set-up Complete/Auth obtained   Discharge Plan   Discharge Plan A Skilled Nursing Facility     HERMELINDA reached out to Siri at [facility] after patient's  stated that he had called [employer] and that they had issued auth.  She reached out and was able to secure the auth number.  [facility] won't be able to accept till tomorrow due to the time and needing to be before 4pm in order to get the meds ordered.  HERMELINDA to notify family and patient.      Mandy Rutherford, SABRINA  [facility]  [phone]

## 2023-05-11 NOTE — PROGRESS NOTES
Dmitriy Triana - Neurosurgery ([facility]  Vascular Neurology  Comprehensive Stroke Center  Progress Note    Assessment/Plan:     * Cerebrovascular accident (CVA) due to embolism of left middle cerebral artery  62 y.o. female with PMH of HTN, DM2, HLD, tobacco use (quit in 2020), stroke (2020, R MCA, no deficits), PE (December 2021, on xarelto),  CAD, DCM, HFrEF (15%) s/p AICD, Pulmonary hypertension admited to  4/27 for the treatment of a right leg DVT on heparin gtt. Stroke code activated 4/29 for aphasia and R hemiplegia, LKN 20:15 (~5 min prior). NIHSS 24, L MCA syndrome. Not TNK candidate due to AC with heparin gtt and therapeutic aPTT. CTA with L M2 occlusion. Taken to IR for possible intervention, now s/p L M2 thrombectomy with TICI 2c reperfusion. Repeat CTH post-IR with no acute infarct or hemorrhage, heparin gtt restarted. MRI brain W/WO with acute L MCA territory infarcts (insula, frontotemporal opercular cortex, patchy frontal white matter, small temporoparietal cortex), as well as small acute R cerebellar infarct. Echo with EF 10-15%, global LV hypokinesis, severe LAE. Suspect etiology likely cardioembolic due to EF 15%.    Neuro exam stable. NAEON. Discharge pending. Possibly today/tomorrow.    Antithrombotics for secondary stroke prevention: Anticoagulants: Pradaxa 150mg BID, ASA 81mg daily    Statins for secondary stroke prevention and HLD, if present: Statins: Atorvastatin- 80 mg daily    Aggressive risk factor modification: HTN, DM, HLD, HF, DVT    Rehab efforts: The patient has been evaluated by a stroke team provider and the therapy needs have been fully considered based off the presenting complaints and exam findings. The following therapy evaluations are needed: PT evaluate and treat, OT evaluate and treat, SLP evaluate and treat, PM&R evaluate for appropriate placement    Diagnostics ordered/pending: CTH PRN for acute neuro exam changes    VTE prophylaxis: None: Reason for No Pharmacological VTE  Prophylaxis: Currently on anticoagulation    BP parameters: Infarct:  SBP <160    Right sided weakness  Due to stroke  -PT/OT eval and treat  -Dispo recs for IPR    Critical lower limb ischemia  Stroke risk factor  Admitted for R popliteal artery occlusion, was started on heparin gtt  Heparin gtt restarted post-IR, per heme/onc recs transition to pradaxa today (5/2)    Chronic combined systolic and diastolic congestive heart failure  Stroke risk factor  -Echo with EF 10-15%, global LV hypokinesis, severe LAE  -Continue GDMT  - Switched metoprolol tartrate to succinate    Hyperlipemia  Stroke risk factor  -LDL 88, goal <70  -Atorvastatin 80mg daily    Hypertension  Stroke Risk factor  -SBP <160 s/p thrombectomy  -PRN hydralazine/labetalol    Type 2 diabetes mellitus without complication, without long-term current use of insulin  Stroke risk factor  -A1c 7.5  -BG goal while inpatient 140-180  -MC SSI PRN         4/30-S/p TICI 2c. Repeat CTH after IR with no acute infarct. Will need MRI but has a pacemaker that will need be interrogated. Improving r arm weakness and  aphasia.  5/1/2023- MRI completed showing acute L. MCA infarct. Pacemaker reinitiated post MRI to normal settings. Plan to start Pradaxa tomorrow per Heme/Onc. Neuro exam stable.  05/02/2023 NAEO, neuro exam stable. CTH overnight stable. Heparin gtt transitioned to Pradaxa today for AC of reduced EF and VTE history. Speech cleared for minced/moist diet. Stable for step down.  05/03/2023 Stepped down to NPU this morning, NAEO. Neuro exam stable, speech improving but continues to have significant RSW. Dispo recs for IPR.  05/04/2023 Pt improving this AM. No aphasia noted on exam. Restarted Pradaxa BID and switched Metoprolol tartrate to metop succinate for GDMT.   05/05/2023 Pt neuro exam improving. Aphasia noted on exam today. Pt reports insomnia and disliking hospital meals. In good spirits and excited for discharge to IPR.  05/06/2023 Neuro exam stable.  Pending IPR.   05/07/2023 Neuro exam improving. Pending IPR.  05/08/2023 Neuro exam stable today. IPR auth not approved, SNF pending.  05/09/2023 Neuro exam stable. NAEON. Discharge to SNF pending.  05/10/2023 Neuro exam stable. NAEON. Possible discharge to [facility].   05/11/2023 Neuro exam improving. NAEON. Pending discharge.        STROKE DOCUMENTATION   Acute Stroke Times   Last Known Normal Date: 04/29/23  Last Known Normal Time: 2015  Symptom Onset Date: 04/29/23  Symptom Onset Time: 2015  Stroke Team Called Date: 04/29/23  Stroke Team Called Time: 2022  Stroke Team Arrival Date: 04/29/23  Stroke Team Arrival Time: 2025  CT Interpretation Time: 2038  Thrombolytic Therapy Recommended: No  CTA Interpretation Time: 2045  Thrombectomy Recommended: Yes  Decision to Treat Time for IR: 2045    NIH Scale:  1a. Level of Consciousness: 0-->Alert, keenly responsive  1b. LOC Questions: 0-->Answers both questions correctly  1c. LOC Commands: 0-->Performs both tasks correctly  2. Best Gaze: 0-->Normal  3. Visual: 1-->Partial hemianopia  4. Facial Palsy: 1-->Minor paralysis (flattened nasolabial fold, asymmetry on smiling)  5a. Motor Arm, Left: 0-->No drift, limb holds 90 (or 45) degrees for full 10 secs  5b. Motor Arm, Right: 1-->Drift, limb holds 90 (or 45) degrees, but drifts down before full 10 secs, does not hit bed or other support  6a. Motor Leg, Left: 0-->No drift, leg holds 30 degree position for full 5 secs  6b. Motor Leg, Right: 1-->Drift, leg falls by the end of the 5-sec period but does not hit bed  7. Limb Ataxia: 0-->Absent  8. Sensory: 0-->Normal, no sensory loss  9. Best Language: 1-->Mild-to-moderate aphasia, some obvious loss of fluency or facility of comprehension, without significant limitation on ideas expressed or form of expression. Reduction of speech and/or comprehension, however, makes conversation. . . (see row details)  10. Dysarthria: 1-->Mild-to-moderate dysarthria, patient slurs at least  some words and, at worst, can be understood with some difficulty  11. Extinction and Inattention (formerly Neglect): 0-->No abnormality  Total (NIH Stroke Scale): 6       Modified Chelsea Score: 0  Coleman Coma Scale:    ABCD2 Score:    [id number] Score:   HAS -BLED Score:   ICH Score:   Hunt & Vora Classification:      Hemorrhagic change of an Ischemic Stroke: Does this patient have an ischemic stroke with hemorrhagic changes? No     Neurologic Chief Complaint: L MCA stroke    Subjective:     Interval History: Patient is seen for follow-up neurological assessment and treatment recommendations:     Neuro exam stable. NAOEN. Discharge pending.    HPI, Past Medical, Family, and Social History remains the same as documented in the initial encounter.     Review of Systems   Reason unable to perform ROS: severe aphasia.   Constitutional:  Negative for fatigue.   HENT:  Negative for congestion.    Eyes:  Positive for visual disturbance.   Respiratory:  Negative for shortness of breath.    Cardiovascular:  Negative for chest pain.   Gastrointestinal:  Negative for abdominal distention and abdominal pain.   Skin:  Negative for rash.   Neurological:  Positive for facial asymmetry, speech difficulty (improving) and weakness.   Psychiatric/Behavioral:  Positive for sleep disturbance. Negative for agitation and behavioral problems.    Scheduled Meds:   aspirin  81 mg Oral Daily    atorvastatin  80 mg Oral Daily    dabigatran etexilate  150 mg Oral BID    metoprolol succinate  100 mg Oral Daily    polyethylene glycol  17 g Oral Daily    sacubitriL-valsartan  1 tablet Oral BID    senna-docusate 8.6-50 mg  1 tablet Oral BID    spironolactone  25 mg Oral Daily    torsemide  10 mg Oral Daily     Continuous Infusions:    PRN Meds:acetaminophen, albuterol-ipratropium, dextrose 10%, dextrose 10%, dextrose 10%, dextrose 10%, dextrose, dextrose, glucagon (human recombinant), insulin aspart U-100, labetalol, ondansetron,  senna-docusate 8.6-50 mg, sodium chloride 0.9%, sodium chloride 0.9%, sodium chloride 0.9%    Objective:     Vital Signs (Most Recent):  Temp: 98.5 °F (36.9 °C) (05/11/23 1154)  Pulse: 74 (05/11/23 1533)  Resp: 17 (05/11/23 1154)  BP: 90/60 (05/11/23 1154)  SpO2: 99 % (05/11/23 1429)  BP Location: Left arm    Vital Signs Range (Last 24H):  Temp:  [97.1 °F (36.2 °C)-98.8 °F (37.1 °C)]   Pulse:  [60-74]   Resp:  [17-18]   BP: ()/(57-72)   SpO2:  [94 %-99 %]   BP Location: Left arm    Physical Exam  Constitutional:       General: She is not in acute distress.  HENT:      Head: Normocephalic.      Nose: Nose normal.      Mouth/Throat:      Mouth: Mucous membranes are moist.   Eyes:      General: Visual field deficit (R hemianopsia) present.      Conjunctiva/sclera: Conjunctivae normal.      Pupils: Pupils are equal, round, and reactive to light.      Comments: L gaze preference   Cardiovascular:      Rate and Rhythm: Normal rate.   Pulmonary:      Effort: Pulmonary effort is normal.   Abdominal:      General: Abdomen is flat.   Skin:     General: Skin is warm and dry.   Neurological:      Mental Status: She is alert and oriented to person, place, and time.      Cranial Nerves: Dysarthria and facial asymmetry present.      Sensory: Sensory deficit present.      Motor: Weakness present.   Psychiatric:         Mood and Affect: Mood normal.       Neurological Exam:   LOC: alert  Attention Span: Good   Language: expressive aphasia  Articulation: dysarthria  Orientation: Limited due to severe aphasia, oriented to self  Visual Fields: Hemianopsia right  EOM (CN III, IV, VI): Gaze preference  left  Facial Movement (CN VII): Lower facial weakness on the Right  Motor: Arm left  Normal 5/5  Leg left  Normal 5/5  Arm right  Paresis 3+/5  Leg right Paresis 3+/5      Laboratory:  CMP:   Recent Labs   Lab 05/11/23  0306   CALCIUM 9.8   ALBUMIN 3.1*   PROT 6.7      K 3.6   CO2 28      BUN 20   CREATININE 0.8   ALKPHOS  149*   ALT 19   AST 24   BILITOT 0.4       CBC:   Recent Labs   Lab 05/11/23  0306   WBC 8.41   RBC 4.71   HGB 13.6   HCT 42.6      MCV 90   MCH 28.9   MCHC 31.9*       Diagnostic Results     Brain Imaging   CTH without contrast 5/1/23  Acute left MCA distribution infarct appears stable in size and distribution compared to 04/30/2023 MRI.  No evidence of hemorrhagic conversion.   Acute small right cerebellar infarct is better demonstrated on MRI.   No acute intracranial hemorrhage or midline shift.     MRI Brain without contrast 4/30/23    Acute left MCA distribution infarct.  No significant mass effect or acute hemorrhage at this time.  Small acute infarct in the right cerebellum.  Chronic right MCA distribution infarct.     CTH 4/30/2023  No acute territorial infarct or intracranial hemorrhage identified.      Vessel Imaging   CTA Stroke MP 4/29/2023  CT head: No evidence for acute intracranial hemorrhage.  Stable area encephalomalacia from remote right MCA territory infarct.  CTA head: Occlusion of M2 segment of left MCA.  Asymmetric decreased opacification of the left transverse sinus and jugular vein.  Similar finding was present on the prior CTA in 2022 and therefore could be related to sluggish flow.  Suggest attention on follow-up conventional angiogram.  CTA neck: No high-grade stenosis or aneurysm.      Cardiac Imaging   Echo 4/28/2023   The left ventricle is severely enlarged with eccentric hypertrophy and severely decreased systolic function.   The estimated ejection fraction is 10-15%.   There is left ventricular global hypokinesis.   Grade II left ventricular diastolic dysfunction.   Normal right ventricular size with normal right ventricular systolic function.   Mild tricuspid regurgitation.   The estimated PA systolic pressure is 35 mmHg.   Normal central venous pressure (3 mmHg).   Severe left atrial enlargement.      Tushar Ayala MD  Comprehensive Stroke Center  Department of  Vascular Neurology   Dmitriy Triana - Neurosurgery ([facility])

## 2023-05-11 NOTE — PLAN OF CARE
Problem: Adult Inpatient Plan of Care  Goal: Plan of Care Review  5/11/2023 0708 by Aubrey Cain RN  Outcome: Ongoing, Progressing  5/10/2023 1800 by Aubrey Cain RN  Outcome: Ongoing, Progressing  Goal: Patient-Specific Goal (Individualized)  Description: Admit Date: 4/27/2023    Cerebrovascular accident (CVA) due to embolism of left middle cerebral artery    Past Medical History:  12/26/2019: Acute on chronic combined systolic and diastolic heart   failure  No date: Anticoagulant long-term use  No date: Anxiety  No date: Arthritis  No date: Asthma  No date: Depression  No date: H/O: hysterectomy  No date: Hyperlipemia  No date: Hypertension  No date: Pulmonary edema  No date: Schizophrenia    Past Surgical History:  No date: BLADDER SUSPENSION  No date: CARPAL TUNNEL RELEASE; Right  No date: HEEL SPUR SURGERY; Left  No date: HYSTERECTOMY  12/27/2019: LEFT HEART CATHETERIZATION; Bilateral      Comment:  Procedure: Left heart cath;  Surgeon: Steve Chambers MD;  Location: [facility] CATH LAB;  Service:                Cardiology;  Laterality: Bilateral;  7/26/2021: RIGHT HEART CATHETERIZATION; Right      Comment:  Procedure: INSERTION, CATHETER, RIGHT HEART;  Surgeon:                Petr Naranjo MD;  Location: [facility] CATH LAB;                 Service: Cardiology;  Laterality: Right;  5/12/2022: RIGHT HEART CATHETERIZATION; Right      Comment:  Procedure: INSERTION, CATHETER, RIGHT HEART;  Surgeon:                Chandana Ivory Jr., MD;  Location: [facility] CATH LAB;                 Service: Cardiology;  Laterality: Right;  No date: TUBAL LIGATION    Individualization:   1. Patient wants to be told all care  2. Keep communication board at bedside    Restraints: None             5/11/2023 0708 by Aubrey Cain RN  Outcome: Ongoing, Progressing  5/10/2023 1800 by Aubrey Cain RN  Outcome: Ongoing, Progressing  Goal: Absence of Hospital-Acquired Illness or Injury  5/11/2023 0708 by Aubrey  FABRICE Cain  Outcome: Ongoing, Progressing  5/10/2023 1800 by Aubrey Cain RN  Outcome: Ongoing, Progressing  Goal: Optimal Comfort and Wellbeing  5/11/2023 0708 by Aubrey Cain RN  Outcome: Ongoing, Progressing  5/10/2023 1800 by Aubrey Cain RN  Outcome: Ongoing, Progressing  Goal: Readiness for Transition of Care  5/11/2023 0708 by Aubrey Cain RN  Outcome: Ongoing, Progressing  5/10/2023 1800 by Aubrey Cain RN  Outcome: Ongoing, Progressing     Problem: Diabetes Comorbidity  Goal: Blood Glucose Level Within Targeted Range  5/11/2023 0708 by Aubrey Cain RN  Outcome: Ongoing, Progressing  5/10/2023 1800 by Aubrey Cain RN  Outcome: Ongoing, Progressing     Problem: Fall Injury Risk  Goal: Absence of Fall and Fall-Related Injury  5/11/2023 0708 by Aubrey Cain RN  Outcome: Ongoing, Progressing  5/10/2023 1800 by Aubrey Cain RN  Outcome: Ongoing, Progressing     Problem: Adjustment to Illness (Stroke, Ischemic/Transient Ischemic Attack)  Goal: Optimal Coping  5/11/2023 0708 by Aubrey Cain RN  Outcome: Ongoing, Progressing  5/10/2023 1800 by Aubrey Cain RN  Outcome: Ongoing, Progressing     Problem: Bowel Elimination Impaired (Stroke, Ischemic/Transient Ischemic Attack)  Goal: Effective Bowel Elimination  5/11/2023 0708 by Aubrey Cain RN  Outcome: Ongoing, Progressing  5/10/2023 1800 by Aubrey Cain RN  Outcome: Ongoing, Progressing     Problem: Cerebral Tissue Perfusion (Stroke, Ischemic/Transient Ischemic Attack)  Goal: Optimal Cerebral Tissue Perfusion  5/11/2023 0708 by Aubrey Cain RN  Outcome: Ongoing, Progressing  5/10/2023 1800 by Aubrey Cain RN  Outcome: Ongoing, Progressing     Problem: Cognitive Impairment (Stroke, Ischemic/Transient Ischemic Attack)  Goal: Optimal Cognitive Function  5/11/2023 0708 by Aubrey Cain RN  Outcome: Ongoing, Progressing  5/10/2023 1800 by Aubrey Cain RN  Outcome: Ongoing, Progressing     Problem: Communication Impairment (Stroke, Ischemic/Transient  Ischemic Attack)  Goal: Improved Communication Skills  5/11/2023 0708 by Aubrey Cain RN  Outcome: Ongoing, Progressing  5/10/2023 1800 by Aubrey Cain RN  Outcome: Ongoing, Progressing     Problem: Functional Ability Impaired (Stroke, Ischemic/Transient Ischemic Attack)  Goal: Optimal Functional Ability  5/11/2023 0708 by Aubrey Cain RN  Outcome: Ongoing, Progressing  5/10/2023 1800 by Aubrey Cain RN  Outcome: Ongoing, Progressing     Problem: Respiratory Compromise (Stroke, Ischemic/Transient Ischemic Attack)  Goal: Effective Oxygenation and Ventilation  5/11/2023 0708 by Aubrey Cain RN  Outcome: Ongoing, Progressing  5/10/2023 1800 by Aubrey Cain RN  Outcome: Ongoing, Progressing     Problem: Sensorimotor Impairment (Stroke, Ischemic/Transient Ischemic Attack)  Goal: Improved Sensorimotor Function  5/11/2023 0708 by Aubrey Cain RN  Outcome: Ongoing, Progressing  5/10/2023 1800 by Aubrey Cain RN  Outcome: Ongoing, Progressing     Problem: Swallowing Impairment (Stroke, Ischemic/Transient Ischemic Attack)  Goal: Optimal Eating and Swallowing without Aspiration  5/11/2023 0708 by Aubrey Cain RN  Outcome: Ongoing, Progressing  5/10/2023 1800 by Aubrey Cain RN  Outcome: Ongoing, Progressing     Problem: Urinary Elimination Impaired (Stroke, Ischemic/Transient Ischemic Attack)  Goal: Effective Urinary Elimination  5/11/2023 0708 by Aubrey Cain RN  Outcome: Ongoing, Progressing  5/10/2023 1800 by Aubrey Cain RN  Outcome: Ongoing, Progressing     Problem: Skin Injury Risk Increased  Goal: Skin Health and Integrity  5/11/2023 0708 by Aubrey Cain RN  Outcome: Ongoing, Progressing  5/10/2023 1800 by Aubrey Cain RN  Outcome: Ongoing, Progressing     Problem: Adjustment to Illness (Delirium)  Goal: Optimal Coping  5/11/2023 0708 by Aubrey Cain RN  Outcome: Ongoing, Progressing  5/10/2023 1800 by Aubrey Cain RN  Outcome: Ongoing, Progressing     Problem: Altered Behavior (Delirium)  Goal: Improved  Behavioral Control  5/11/2023 0708 by Aubrey Cain RN  Outcome: Ongoing, Progressing  5/10/2023 1800 by Aubrey Cain RN  Outcome: Ongoing, Progressing     Problem: Attention and Thought Clarity Impairment (Delirium)  Goal: Improved Attention and Thought Clarity  5/11/2023 0708 by Aubrey Cain RN  Outcome: Ongoing, Progressing  5/10/2023 1800 by Aubrey Cain RN  Outcome: Ongoing, Progressing     Problem: Sleep Disturbance (Delirium)  Goal: Improved Sleep  5/11/2023 0708 by Aubrey Cain RN  Outcome: Ongoing, Progressing  5/10/2023 1800 by Aubrey Cain RN  Outcome: Ongoing, Progressing

## 2023-05-12 VITALS
HEART RATE: 82 BPM | TEMPERATURE: 98 F | WEIGHT: 178.56 LBS | DIASTOLIC BLOOD PRESSURE: 60 MMHG | HEIGHT: 67 IN | BODY MASS INDEX: 28.02 KG/M2 | OXYGEN SATURATION: 95 % | SYSTOLIC BLOOD PRESSURE: 99 MMHG | RESPIRATION RATE: 18 BRPM

## 2023-05-12 LAB
ALBUMIN SERPL BCP-MCNC: 3.1 G/DL (ref 3.5–5.2)
ALP SERPL-CCNC: 142 U/L (ref 55–135)
ALT SERPL W/O P-5'-P-CCNC: 21 U/L (ref 10–44)
ANION GAP SERPL CALC-SCNC: 9 MMOL/L (ref 8–16)
AST SERPL-CCNC: 25 U/L (ref 10–40)
BILIRUB SERPL-MCNC: 0.5 MG/DL (ref 0.1–1)
BUN SERPL-MCNC: 22 MG/DL (ref 8–23)
CALCIUM SERPL-MCNC: 10 MG/DL (ref 8.7–10.5)
CHLORIDE SERPL-SCNC: 99 MMOL/L (ref 95–110)
CO2 SERPL-SCNC: 26 MMOL/L (ref 23–29)
CREAT SERPL-MCNC: 0.8 MG/DL (ref 0.5–1.4)
EST. GFR  (NO RACE VARIABLE): >60 ML/MIN/1.73 M^2
GLUCOSE SERPL-MCNC: 162 MG/DL (ref 70–110)
POCT GLUCOSE: 181 MG/DL (ref 70–110)
POTASSIUM SERPL-SCNC: 4.2 MMOL/L (ref 3.5–5.1)
PROT SERPL-MCNC: 6.8 G/DL (ref 6–8.4)
SODIUM SERPL-SCNC: 134 MMOL/L (ref 136–145)

## 2023-05-12 PROCEDURE — 25000003 PHARM REV CODE 250: Performed by: NURSE PRACTITIONER

## 2023-05-12 PROCEDURE — 25000003 PHARM REV CODE 250: Performed by: STUDENT IN AN ORGANIZED HEALTH CARE EDUCATION/TRAINING PROGRAM

## 2023-05-12 PROCEDURE — 99233 SBSQ HOSP IP/OBS HIGH 50: CPT | Mod: GC,,, | Performed by: PSYCHIATRY & NEUROLOGY

## 2023-05-12 PROCEDURE — 25000003 PHARM REV CODE 250: Performed by: PHYSICIAN ASSISTANT

## 2023-05-12 PROCEDURE — 99233 PR SUBSEQUENT HOSPITAL CARE,LEVL III: ICD-10-PCS | Mod: GC,,, | Performed by: PSYCHIATRY & NEUROLOGY

## 2023-05-12 PROCEDURE — 36415 COLL VENOUS BLD VENIPUNCTURE: CPT | Performed by: STUDENT IN AN ORGANIZED HEALTH CARE EDUCATION/TRAINING PROGRAM

## 2023-05-12 PROCEDURE — 25000003 PHARM REV CODE 250

## 2023-05-12 PROCEDURE — 80053 COMPREHEN METABOLIC PANEL: CPT | Performed by: STUDENT IN AN ORGANIZED HEALTH CARE EDUCATION/TRAINING PROGRAM

## 2023-05-12 RX ORDER — ONDANSETRON 4 MG/1
4 TABLET, ORALLY DISINTEGRATING ORAL ONCE
Status: COMPLETED | OUTPATIENT
Start: 2023-05-12 | End: 2023-05-12

## 2023-05-12 RX ADMIN — POLYETHYLENE GLYCOL 3350 17 G: 17 POWDER, FOR SOLUTION ORAL at 08:05

## 2023-05-12 RX ADMIN — ATORVASTATIN CALCIUM 80 MG: 40 TABLET, FILM COATED ORAL at 08:05

## 2023-05-12 RX ADMIN — TORSEMIDE 10 MG: 10 TABLET ORAL at 08:05

## 2023-05-12 RX ADMIN — DABIGATRAN ETEXILATE MESYLATE 150 MG: 150 CAPSULE ORAL at 08:05

## 2023-05-12 RX ADMIN — METOPROLOL SUCCINATE 100 MG: 100 TABLET, EXTENDED RELEASE ORAL at 08:05

## 2023-05-12 RX ADMIN — SENNOSIDES AND DOCUSATE SODIUM 1 TABLET: 8.6; 5 TABLET ORAL at 08:05

## 2023-05-12 RX ADMIN — SACUBITRIL AND VALSARTAN 1 TABLET: 97; 103 TABLET, FILM COATED ORAL at 08:05

## 2023-05-12 RX ADMIN — SPIRONOLACTONE 25 MG: 25 TABLET, FILM COATED ORAL at 08:05

## 2023-05-12 RX ADMIN — ONDANSETRON 4 MG: 4 TABLET, ORALLY DISINTEGRATING ORAL at 04:05

## 2023-05-12 RX ADMIN — ASPIRIN 81 MG: 81 TABLET, COATED ORAL at 08:05

## 2023-05-12 NOTE — ASSESSMENT & PLAN NOTE
62 y.o. female with PMH of HTN, DM2, HLD, tobacco use (quit in 2020), stroke (2020, R MCA, no deficits), PE (December 2021, on xarelto),  CAD, DCM, HFrEF (15%) s/p AICD, Pulmonary hypertension admited to  4/27 for the treatment of a right leg DVT on heparin gtt. Stroke code activated 4/29 for aphasia and R hemiplegia, LKN 20:15 (~5 min prior). NIHSS 24, L MCA syndrome. Not TNK candidate due to AC with heparin gtt and therapeutic aPTT. CTA with L M2 occlusion. Taken to IR for possible intervention, now s/p L M2 thrombectomy with TICI 2c reperfusion. Repeat CTH post-IR with no acute infarct or hemorrhage, heparin gtt restarted. MRI brain W/WO with acute L MCA territory infarcts (insula, frontotemporal opercular cortex, patchy frontal white matter, small temporoparietal cortex), as well as small acute R cerebellar infarct. Echo with EF 10-15%, global LV hypokinesis, severe LAE. Suspect etiology likely cardioembolic due to EF 15%.    Neuro exam stable. 1 episode n/v overnight, responded well to zofran. NAEON. Discharge planned today.  messaged to be added to Dr. Ayala's clinic.    Antithrombotics for secondary stroke prevention: Anticoagulants: Pradaxa 150mg BID, ASA 81mg daily    Statins for secondary stroke prevention and HLD, if present: Statins: Atorvastatin- 80 mg daily    Aggressive risk factor modification: HTN, DM, HLD, HF, DVT    Rehab efforts: The patient has been evaluated by a stroke team provider and the therapy needs have been fully considered based off the presenting complaints and exam findings. The following therapy evaluations are needed: PT evaluate and treat, OT evaluate and treat, SLP evaluate and treat, PM&R evaluate for appropriate placement    Diagnostics ordered/pending: CTH PRN for acute neuro exam changes    VTE prophylaxis: None: Reason for No Pharmacological VTE Prophylaxis: Currently on anticoagulation    BP parameters: Infarct:  SBP <160

## 2023-05-12 NOTE — PLAN OF CARE
Problem: Adult Inpatient Plan of Care  Goal: Plan of Care Review  Outcome: Ongoing, Progressing  Goal: Patient-Specific Goal (Individualized)  Outcome: Ongoing, Progressing  Goal: Optimal Comfort and Wellbeing  Outcome: Ongoing, Progressing  Goal: Readiness for Transition of Care  Outcome: Ongoing, Progressing     Problem: Adjustment to Illness (Stroke, Ischemic/Transient Ischemic Attack)  Goal: Optimal Coping  Outcome: Ongoing, Progressing     Problem: Cerebral Tissue Perfusion (Stroke, Ischemic/Transient Ischemic Attack)  Goal: Optimal Cerebral Tissue Perfusion  Outcome: Ongoing, Progressing     Problem: Functional Ability Impaired (Stroke, Ischemic/Transient Ischemic Attack)  Goal: Optimal Functional Ability  Outcome: Ongoing, Progressing   Pt to be d/c today to rehab. Had 1 episode of n/v relieved with Zofran. Stroke booklet reviewed by patient.

## 2023-05-12 NOTE — ASSESSMENT & PLAN NOTE
62 y.o. female with PMH of HTN, DM2, HLD, tobacco use (quit in 2020), stroke (2020, R MCA, no deficits), PE (December 2021, on xarelto),  CAD, DCM, HFrEF (15%) s/p AICD, Pulmonary hypertension admited to  4/27 for the treatment of a right leg DVT on heparin gtt. Stroke code activated 4/29 for aphasia and R hemiplegia, LKN 20:15 (~5 min prior). NIHSS 24, L MCA syndrome. Not TNK candidate due to AC with heparin gtt and therapeutic aPTT. CTA with L M2 occlusion. Taken to IR for possible intervention, now s/p L M2 thrombectomy with TICI 2c reperfusion. Repeat CTH post-IR with no acute infarct or hemorrhage, heparin gtt restarted. MRI brain W/WO with acute L MCA territory infarcts (insula, frontotemporal opercular cortex, patchy frontal white matter, small temporoparietal cortex), as well as small acute R cerebellar infarct. Echo with EF 10-15%, global LV hypokinesis, severe LAE. Suspect etiology likely cardioembolic due to EF 15%.    Neuro exam stable. 1 episode n/v overnight, responded well to zofran. NAEON. Discharge planned today.    Antithrombotics for secondary stroke prevention: Anticoagulants: Pradaxa 150mg BID, ASA 81mg daily    Statins for secondary stroke prevention and HLD, if present: Statins: Atorvastatin- 80 mg daily    Aggressive risk factor modification: HTN, DM, HLD, HF, DVT    Rehab efforts: The patient has been evaluated by a stroke team provider and the therapy needs have been fully considered based off the presenting complaints and exam findings. The following therapy evaluations are needed: PT evaluate and treat, OT evaluate and treat, SLP evaluate and treat, PM&R evaluate for appropriate placement    Diagnostics ordered/pending: CTH PRN for acute neuro exam changes    VTE prophylaxis: None: Reason for No Pharmacological VTE Prophylaxis: Currently on anticoagulation    BP parameters: Infarct:  SBP <160

## 2023-05-12 NOTE — SUBJECTIVE & OBJECTIVE
Neurologic Chief Complaint: L MCA stroke    Subjective:     Interval History: Patient is seen for follow-up neurological assessment and treatment recommendations:     Neuro exam stable. 1 episode n/v overnight, responded well to zofran. NAEON. Discharge planned today.    HPI, Past Medical, Family, and Social History remains the same as documented in the initial encounter.     Review of Systems   Reason unable to perform ROS: severe aphasia.   Constitutional:  Negative for fatigue.   HENT:  Negative for congestion.    Eyes:  Positive for visual disturbance.   Respiratory:  Negative for shortness of breath.    Cardiovascular:  Negative for chest pain.   Gastrointestinal:  Negative for abdominal distention and abdominal pain.   Skin:  Negative for rash.   Neurological:  Positive for facial asymmetry, speech difficulty (improving) and weakness.   Psychiatric/Behavioral:  Positive for sleep disturbance. Negative for agitation and behavioral problems.    Scheduled Meds:   aspirin  81 mg Oral Daily    atorvastatin  80 mg Oral Daily    dabigatran etexilate  150 mg Oral BID    metoprolol succinate  100 mg Oral Daily    polyethylene glycol  17 g Oral Daily    sacubitriL-valsartan  1 tablet Oral BID    senna-docusate 8.6-50 mg  1 tablet Oral BID    spironolactone  25 mg Oral Daily    torsemide  10 mg Oral Daily     Continuous Infusions:    PRN Meds:acetaminophen, albuterol-ipratropium, dextrose 10%, dextrose 10%, dextrose 10%, dextrose 10%, dextrose, dextrose, glucagon (human recombinant), insulin aspart U-100, labetalol, senna-docusate 8.6-50 mg, sodium chloride 0.9%, sodium chloride 0.9%, sodium chloride 0.9%    Objective:     Vital Signs (Most Recent):  Temp: 98.1 °F (36.7 °C) (05/12/23 0836)  Pulse: 74 (05/12/23 0836)  Resp: 18 (05/12/23 0836)  BP: (!) 100/53 (05/12/23 0836)  SpO2: 97 % (05/12/23 0836)  BP Location: Right arm    Vital Signs Range (Last 24H):  Temp:  [97 °F (36.1 °C)-98.5 °F (36.9 °C)]   Pulse:  [71-86]    Resp:  [17-18]   BP: ()/(53-64)   SpO2:  [95 %-100 %]   BP Location: Right arm    Physical Exam  Constitutional:       General: She is not in acute distress.  HENT:      Head: Normocephalic.      Nose: Nose normal.      Mouth/Throat:      Mouth: Mucous membranes are moist.   Eyes:      General: Visual field deficit (R hemianopsia) present.      Conjunctiva/sclera: Conjunctivae normal.      Pupils: Pupils are equal, round, and reactive to light.      Comments: L gaze preference   Cardiovascular:      Rate and Rhythm: Normal rate.   Pulmonary:      Effort: Pulmonary effort is normal.   Abdominal:      General: Abdomen is flat.   Skin:     General: Skin is warm and dry.   Neurological:      Mental Status: She is alert and oriented to person, place, and time.      Cranial Nerves: Dysarthria and facial asymmetry present.      Sensory: Sensory deficit present.      Motor: Weakness present.   Psychiatric:         Mood and Affect: Mood normal.       Neurological Exam:   LOC: alert  Attention Span: Good   Language: expressive aphasia  Articulation: dysarthria  Orientation: Limited due to severe aphasia, oriented to self  Visual Fields: Hemianopsia right  EOM (CN III, IV, VI): Gaze preference  left  Facial Movement (CN VII): Lower facial weakness on the Right  Motor: Arm left  Normal 5/5  Leg left  Normal 5/5  Arm right  Paresis 3+/5  Leg right Paresis 3+/5      Laboratory:  CMP:   Recent Labs   Lab 05/12/23  0733   CALCIUM 10.0   ALBUMIN 3.1*   PROT 6.8   *   K 4.2   CO2 26   CL 99   BUN 22   CREATININE 0.8   ALKPHOS 142*   ALT 21   AST 25   BILITOT 0.5       CBC:   Recent Labs   Lab 05/11/23  0306   WBC 8.41   RBC 4.71   HGB 13.6   HCT 42.6      MCV 90   MCH 28.9   MCHC 31.9*       Diagnostic Results     Brain Imaging   CTH without contrast 5/1/23  Acute left MCA distribution infarct appears stable in size and distribution compared to 04/30/2023 MRI.  No evidence of hemorrhagic conversion.   Acute small  right cerebellar infarct is better demonstrated on MRI.   No acute intracranial hemorrhage or midline shift.     MRI Brain without contrast 4/30/23    Acute left MCA distribution infarct.  No significant mass effect or acute hemorrhage at this time.  Small acute infarct in the right cerebellum.  Chronic right MCA distribution infarct.     CTH 4/30/2023  No acute territorial infarct or intracranial hemorrhage identified.      Vessel Imaging   CTA Stroke MP 4/29/2023  CT head: No evidence for acute intracranial hemorrhage.  Stable area encephalomalacia from remote right MCA territory infarct.  CTA head: Occlusion of M2 segment of left MCA.  Asymmetric decreased opacification of the left transverse sinus and jugular vein.  Similar finding was present on the prior CTA in 2022 and therefore could be related to sluggish flow.  Suggest attention on follow-up conventional angiogram.  CTA neck: No high-grade stenosis or aneurysm.      Cardiac Imaging   Echo 4/28/2023  The left ventricle is severely enlarged with eccentric hypertrophy and severely decreased systolic function.  The estimated ejection fraction is 10-15%.  There is left ventricular global hypokinesis.  Grade II left ventricular diastolic dysfunction.  Normal right ventricular size with normal right ventricular systolic function.  Mild tricuspid regurgitation.  The estimated PA systolic pressure is 35 mmHg.  Normal central venous pressure (3 mmHg).  Severe left atrial enlargement.

## 2023-05-12 NOTE — PROGRESS NOTES
[facility] - Neurosurgery ([facility])  Vascular Neurology  Comprehensive Stroke Center  Progress Note    Assessment/Plan:     * Cerebrovascular accident (CVA) due to embolism of left middle cerebral artery  62 y.o. female with PMH of HTN, DM2, HLD, tobacco use (quit in 2020), stroke (2020, R MCA, no deficits), PE (December 2021, on xarelto),  CAD, DCM, HFrEF (15%) s/p AICD, Pulmonary hypertension admited to  4/27 for the treatment of a right leg DVT on heparin gtt. Stroke code activated 4/29 for aphasia and R hemiplegia, LKN 20:15 (~5 min prior). NIHSS 24, L MCA syndrome. Not TNK candidate due to AC with heparin gtt and therapeutic aPTT. CTA with L M2 occlusion. Taken to IR for possible intervention, now s/p L M2 thrombectomy with TICI 2c reperfusion. Repeat CTH post-IR with no acute infarct or hemorrhage, heparin gtt restarted. MRI brain W/WO with acute L MCA territory infarcts (insula, frontotemporal opercular cortex, patchy frontal white matter, small temporoparietal cortex), as well as small acute R cerebellar infarct. Echo with EF 10-15%, global LV hypokinesis, severe LAE. Suspect etiology likely cardioembolic due to EF 15%.    Neuro exam stable. 1 episode n/v overnight, responded well to zofran. NAEON. Discharge planned today.    Antithrombotics for secondary stroke prevention: Anticoagulants: Pradaxa 150mg BID, ASA 81mg daily    Statins for secondary stroke prevention and HLD, if present: Statins: Atorvastatin- 80 mg daily    Aggressive risk factor modification: HTN, DM, HLD, HF, DVT    Rehab efforts: The patient has been evaluated by a stroke team provider and the therapy needs have been fully considered based off the presenting complaints and exam findings. The following therapy evaluations are needed: PT evaluate and treat, OT evaluate and treat, SLP evaluate and treat, PM&R evaluate for appropriate placement    Diagnostics ordered/pending: CTH PRN for acute neuro exam changes    VTE prophylaxis: None:  Reason for No Pharmacological VTE Prophylaxis: Currently on anticoagulation    BP parameters: Infarct:  SBP <160    Right sided weakness  Due to stroke  -PT/OT eval and treat  -Dispo recs for IPR    Critical lower limb ischemia  Stroke risk factor  Admitted for R popliteal artery occlusion, was started on heparin gtt  Heparin gtt restarted post-IR, per heme/onc recs transition to pradaxa today (5/2)    Chronic combined systolic and diastolic congestive heart failure  Stroke risk factor  -Echo with EF 10-15%, global LV hypokinesis, severe LAE  -Continue GDMT  - Switched metoprolol tartrate to succinate    Hyperlipemia  Stroke risk factor  -LDL 88, goal <70  -Atorvastatin 80mg daily    Hypertension  Stroke Risk factor  -SBP <160 s/p thrombectomy  -PRN hydralazine/labetalol    Type 2 diabetes mellitus without complication, without long-term current use of insulin  Stroke risk factor  -A1c 7.5  -BG goal while inpatient 140-180  -MC SSI PRN         4/30-S/p TICI 2c. Repeat CTH after IR with no acute infarct. Will need MRI but has a pacemaker that will need be interrogated. Improving r arm weakness and  aphasia.  5/1/2023- MRI completed showing acute L. MCA infarct. Pacemaker reinitiated post MRI to normal settings. Plan to start Pradaxa tomorrow per Heme/Onc. Neuro exam stable.  05/02/2023 NAEO, neuro exam stable. CTH overnight stable. Heparin gtt transitioned to Pradaxa today for AC of reduced EF and VTE history. Speech cleared for minced/moist diet. Stable for step down.  05/03/2023 Stepped down to NPU this morning, SRINIVASANERICHARD. Neuro exam stable, speech improving but continues to have significant RSW. Dispo recs for IPR.  05/04/2023 Pt improving this AM. No aphasia noted on exam. Restarted Pradaxa BID and switched Metoprolol tartrate to metop succinate for GDMT.   05/05/2023 Pt neuro exam improving. Aphasia noted on exam today. Pt reports insomnia and disliking hospital meals. In good spirits and excited for discharge to  IPR.  05/06/2023 Neuro exam stable. Pending IPR.   05/07/2023 Neuro exam improving. Pending IPR.  05/08/2023 Neuro exam stable today. IPR auth not approved, SNF pending.  05/09/2023 Neuro exam stable. NAEON. Discharge to SNF pending.  05/10/2023 Neuro exam stable. NAEON. Possible discharge to [facility].   05/11/2023 Neuro exam improving. NAEON. Pending discharge.  05/12/2023 Neuro exam stable. 1 episode n/v overnight, responded well to zofran. NAEON. Discharge planned today.        STROKE DOCUMENTATION   Acute Stroke Times   Last Known Normal Date: 04/29/23  Last Known Normal Time: 2015  Symptom Onset Date: 04/29/23  Symptom Onset Time: 2015  Stroke Team Called Date: 04/29/23  Stroke Team Called Time: 2022  Stroke Team Arrival Date: 04/29/23  Stroke Team Arrival Time: 2025  CT Interpretation Time: 2038  Thrombolytic Therapy Recommended: No  CTA Interpretation Time: 2045  Thrombectomy Recommended: Yes  Decision to Treat Time for IR: 2045    NIH Scale:  1a. Level of Consciousness: 0-->Alert, keenly responsive  1b. LOC Questions: 0-->Answers both questions correctly  1c. LOC Commands: 0-->Performs both tasks correctly  2. Best Gaze: 0-->Normal  3. Visual: 1-->Partial hemianopia  4. Facial Palsy: 1-->Minor paralysis (flattened nasolabial fold, asymmetry on smiling)  5a. Motor Arm, Left: 0-->No drift, limb holds 90 (or 45) degrees for full 10 secs  5b. Motor Arm, Right: 1-->Drift, limb holds 90 (or 45) degrees, but drifts down before full 10 secs, does not hit bed or other support  6a. Motor Leg, Left: 0-->No drift, leg holds 30 degree position for full 5 secs  6b. Motor Leg, Right: 1-->Drift, leg falls by the end of the 5-sec period but does not hit bed  7. Limb Ataxia: 0-->Absent  8. Sensory: 0-->Normal, no sensory loss  9. Best Language: 1-->Mild-to-moderate aphasia, some obvious loss of fluency or facility of comprehension, without significant limitation on ideas expressed or form of expression. Reduction of speech  and/or comprehension, however, makes conversation. . . (see row details)  10. Dysarthria: 1-->Mild-to-moderate dysarthria, patient slurs at least some words and, at worst, can be understood with some difficulty  11. Extinction and Inattention (formerly Neglect): 0-->No abnormality  Total (NIH Stroke Scale): 6       Modified Gunnison Score: 3  Coleman Coma Scale:    ABCD2 Score:    [id number] Score:   HAS -BLED Score:   ICH Score:   Hunt & Vora Classification:      Hemorrhagic change of an Ischemic Stroke: Does this patient have an ischemic stroke with hemorrhagic changes? No     Neurologic Chief Complaint: L MCA stroke    Subjective:     Interval History: Patient is seen for follow-up neurological assessment and treatment recommendations:     Neuro exam stable. 1 episode n/v overnight, responded well to zofran. NAEON. Discharge planned today.    HPI, Past Medical, Family, and Social History remains the same as documented in the initial encounter.     Review of Systems   Reason unable to perform ROS: severe aphasia.   Constitutional:  Negative for fatigue.   HENT:  Negative for congestion.    Eyes:  Positive for visual disturbance.   Respiratory:  Negative for shortness of breath.    Cardiovascular:  Negative for chest pain.   Gastrointestinal:  Negative for abdominal distention and abdominal pain.   Skin:  Negative for rash.   Neurological:  Positive for facial asymmetry, speech difficulty (improving) and weakness.   Psychiatric/Behavioral:  Positive for sleep disturbance. Negative for agitation and behavioral problems.    Scheduled Meds:   aspirin  81 mg Oral Daily    atorvastatin  80 mg Oral Daily    dabigatran etexilate  150 mg Oral BID    metoprolol succinate  100 mg Oral Daily    polyethylene glycol  17 g Oral Daily    sacubitriL-valsartan  1 tablet Oral BID    senna-docusate 8.6-50 mg  1 tablet Oral BID    spironolactone  25 mg Oral Daily    torsemide  10 mg Oral Daily     Continuous Infusions:    PRN  Meds:acetaminophen, albuterol-ipratropium, dextrose 10%, dextrose 10%, dextrose 10%, dextrose 10%, dextrose, dextrose, glucagon (human recombinant), insulin aspart U-100, labetalol, senna-docusate 8.6-50 mg, sodium chloride 0.9%, sodium chloride 0.9%, sodium chloride 0.9%    Objective:     Vital Signs (Most Recent):  Temp: 98.1 °F (36.7 °C) (05/12/23 0836)  Pulse: 74 (05/12/23 0836)  Resp: 18 (05/12/23 0836)  BP: (!) 100/53 (05/12/23 0836)  SpO2: 97 % (05/12/23 0836)  BP Location: Right arm    Vital Signs Range (Last 24H):  Temp:  [97 °F (36.1 °C)-98.5 °F (36.9 °C)]   Pulse:  [71-86]   Resp:  [17-18]   BP: ()/(53-64)   SpO2:  [95 %-100 %]   BP Location: Right arm    Physical Exam  Constitutional:       General: She is not in acute distress.  HENT:      Head: Normocephalic.      Nose: Nose normal.      Mouth/Throat:      Mouth: Mucous membranes are moist.   Eyes:      General: Visual field deficit (R hemianopsia) present.      Conjunctiva/sclera: Conjunctivae normal.      Pupils: Pupils are equal, round, and reactive to light.      Comments: L gaze preference   Cardiovascular:      Rate and Rhythm: Normal rate.   Pulmonary:      Effort: Pulmonary effort is normal.   Abdominal:      General: Abdomen is flat.   Skin:     General: Skin is warm and dry.   Neurological:      Mental Status: She is alert and oriented to person, place, and time.      Cranial Nerves: Dysarthria and facial asymmetry present.      Sensory: Sensory deficit present.      Motor: Weakness present.   Psychiatric:         Mood and Affect: Mood normal.       Neurological Exam:   LOC: alert  Attention Span: Good   Language: expressive aphasia  Articulation: dysarthria  Orientation: Limited due to severe aphasia, oriented to self  Visual Fields: Hemianopsia right  EOM (CN III, IV, VI): Gaze preference  left  Facial Movement (CN VII): Lower facial weakness on the Right  Motor: Arm left  Normal 5/5  Leg left  Normal 5/5  Arm right  Paresis 3+/5  Leg  right Paresis 3+/5      Laboratory:  CMP:   Recent Labs   Lab 05/12/23  0733   CALCIUM 10.0   ALBUMIN 3.1*   PROT 6.8   *   K 4.2   CO2 26   CL 99   BUN 22   CREATININE 0.8   ALKPHOS 142*   ALT 21   AST 25   BILITOT 0.5       CBC:   Recent Labs   Lab 05/11/23  0306   WBC 8.41   RBC 4.71   HGB 13.6   HCT 42.6      MCV 90   MCH 28.9   MCHC 31.9*       Diagnostic Results     Brain Imaging   CTH without contrast 5/1/23  Acute left MCA distribution infarct appears stable in size and distribution compared to 04/30/2023 MRI.  No evidence of hemorrhagic conversion.   Acute small right cerebellar infarct is better demonstrated on MRI.   No acute intracranial hemorrhage or midline shift.     MRI Brain without contrast 4/30/23    Acute left MCA distribution infarct.  No significant mass effect or acute hemorrhage at this time.  Small acute infarct in the right cerebellum.  Chronic right MCA distribution infarct.     CTH 4/30/2023  No acute territorial infarct or intracranial hemorrhage identified.      Vessel Imaging   CTA Stroke MP 4/29/2023  CT head: No evidence for acute intracranial hemorrhage.  Stable area encephalomalacia from remote right MCA territory infarct.  CTA head: Occlusion of M2 segment of left MCA.  Asymmetric decreased opacification of the left transverse sinus and jugular vein.  Similar finding was present on the prior CTA in 2022 and therefore could be related to sluggish flow.  Suggest attention on follow-up conventional angiogram.  CTA neck: No high-grade stenosis or aneurysm.      Cardiac Imaging   Echo 4/28/2023   The left ventricle is severely enlarged with eccentric hypertrophy and severely decreased systolic function.   The estimated ejection fraction is 10-15%.   There is left ventricular global hypokinesis.   Grade II left ventricular diastolic dysfunction.   Normal right ventricular size with normal right ventricular systolic function.   Mild tricuspid regurgitation.   The  estimated PA systolic pressure is 35 mmHg.   Normal central venous pressure (3 mmHg).   Severe left atrial enlargement.      Tushar Ayala MD  Comprehensive Stroke Center  Department of Vascular Neurology   [facility] - Neurosurgery [facility])

## 2023-05-12 NOTE — DISCHARGE SUMMARY
Dmitriy Triana - Neurosurgery ([facility])  Vascular Neurology  Comprehensive Stroke Center  Discharge Summary     Summary:     Admit Date: 4/27/2023  7:11 PM    Discharge Date and Time:  05/12/2023 11:11 AM    Attending Physician: Romulo Ho MD     Discharge Provider: Tushar Ayala MD    History of Present Illness: 62 y.o. female with PMH of HTN, DM2, hyperlipidemia, tobacco use (quit in 2020), stroke (2020, R MCA, no deficits), PE (December 2021, on xarelto),  CAD, DCM, HFpEF (15%) s/p AICD, Pulmonary hypertension, who is admited to  for the treatment of a right leg dvt on heparin gtt. Today around 8:15 patient was noted not to be able to talk or move her right side and a stroke code was called. Patient noted to be van+ , NIHSS 2,. L MCA syndrome. CTA showed a L M2 occlusion. Not a candidate for TNK because she was on a heaprin gtt and with therapeutic APTT. Patient will be taken to IR for thrombectomy.       Hospital Course (synopsis of major diagnoses, care, treatment, and services provided during the course of the hospital stay): Diomedes Mohr is a 62 y.o. female with L. MCA stroke NIHSS 24 on 4/29 after originally admitted for R. Popliteal A occlusion on heparin gtt. She was transferred  after developing aphasia and R. Hemiplegia. She was not a candidate for TNK bc she was on anticoagulation. Transferred to [facility] s/p MT with TICI 2c reperfusion. Repeat CTH after IR with no acute infarct. MRI completed showing acute L. MCA infarct. Pacemaker reinitiated post MRI to normal settings. Started Pradaxa per Heme/Onc. Neuro exam steadily improved after thrombectomy and therapy (5/12 NIHSS 6). CTH overnight stable. Heparin gtt transitioned to Pradaxa today for AC of reduced EF and VTE history. Speech cleared for minced/moist diet. Patient to be discharged today 5/12/23 to SNF. Continue ASA, pradaxa, atorvastatin 80mg, BP meds, and aggressive diet and exercise. Education given about mediteranean diet. Follow up with  VN and PCP in 4-6 weeks. She will also need cardiology follow up given HFrEF 10-15%.      Goals of Care Treatment Preferences:  Code Status: Full Code      Stroke Etiology: Ischemic Cardioembolic Ischemic cardiomyopathy with EF < 28%    STROKE DOCUMENTATION   Acute Stroke Times   Last Known Normal Date: 04/29/23  Last Known Normal Time: 2015  Symptom Onset Date: 04/29/23  Symptom Onset Time: 2015  Stroke Team Called Date: 04/29/23  Stroke Team Called Time: 2022  Stroke Team Arrival Date: 04/29/23  Stroke Team Arrival Time: 2025  CT Interpretation Time: 2038  Thrombolytic Therapy Recommended: No  CTA Interpretation Time: 2045  Thrombectomy Recommended: Yes  Decision to Treat Time for IR: 2045     NIH Scale:  1a. Level of Consciousness: 0-->Alert, keenly responsive  1b. LOC Questions: 0-->Answers both questions correctly  1c. LOC Commands: 0-->Performs both tasks correctly  2. Best Gaze: 0-->Normal  3. Visual: 1-->Partial hemianopia  4. Facial Palsy: 1-->Minor paralysis (flattened nasolabial fold, asymmetry on smiling)  5a. Motor Arm, Left: 0-->No drift, limb holds 90 (or 45) degrees for full 10 secs  5b. Motor Arm, Right: 1-->Drift, limb holds 90 (or 45) degrees, but drifts down before full 10 secs, does not hit bed or other support  6a. Motor Leg, Left: 0-->No drift, leg holds 30 degree position for full 5 secs  6b. Motor Leg, Right: 1-->Drift, leg falls by the end of the 5-sec period but does not hit bed  7. Limb Ataxia: 0-->Absent  8. Sensory: 0-->Normal, no sensory loss  9. Best Language: 1-->Mild-to-moderate aphasia, some obvious loss of fluency or facility of comprehension, without significant limitation on ideas expressed or form of expression. Reduction of speech and/or comprehension, however, makes conversation. . . (see row details)  10. Dysarthria: 1-->Mild-to-moderate dysarthria, patient slurs at least some words and, at worst, can be understood with some difficulty  11. Extinction and Inattention  (formerly Neglect): 0-->No abnormality  Total (NIH Stroke Scale): 6        Modified Ochiltree Score: 3  Plover Coma Scale:    ABCD2 Score:    [id number]-OTF Score:   HAS -BLED Score:   ICH Score:   Hunt & Vora Classification:       Assessment/Plan:     Diagnostic Results:      Brain Imaging   CTH without contrast 5/1/23  Acute left MCA distribution infarct appears stable in size and distribution compared to 04/30/2023 MRI.  No evidence of hemorrhagic conversion.   Acute small right cerebellar infarct is better demonstrated on MRI.   No acute intracranial hemorrhage or midline shift.      MRI Brain without contrast 4/30/23    Acute left MCA distribution infarct.  No significant mass effect or acute hemorrhage at this time.  Small acute infarct in the right cerebellum.  Chronic right MCA distribution infarct.     CTH 4/30/2023  No acute territorial infarct or intracranial hemorrhage identified.        Vessel Imaging   CTA Stroke MP 4/29/2023  CT head: No evidence for acute intracranial hemorrhage.  Stable area encephalomalacia from remote right MCA territory infarct.  CTA head: Occlusion of M2 segment of left MCA.  Asymmetric decreased opacification of the left transverse sinus and jugular vein.  Similar finding was present on the prior CTA in 2022 and therefore could be related to sluggish flow.  Suggest attention on follow-up conventional angiogram.  CTA neck: No high-grade stenosis or aneurysm.        Cardiac Imaging   Echo 4/28/2023   The left ventricle is severely enlarged with eccentric hypertrophy and severely decreased systolic function.   The estimated ejection fraction is 10-15%.   There is left ventricular global hypokinesis.   Grade II left ventricular diastolic dysfunction.   Normal right ventricular size with normal right ventricular systolic function.   Mild tricuspid regurgitation.   The estimated PA systolic pressure is 35 mmHg.   Normal central venous pressure (3 mmHg).   Severe left atrial  enlargement.    Interventions: Thrombectomy    Complications: None    Disposition: Skilled Nursing Facility    Final Active Diagnoses:    Diagnosis Date Noted POA    PRINCIPAL PROBLEM:  Cerebrovascular accident (CVA) due to embolism of left middle cerebral artery [I63.412] 04/29/2023 No    Right sided weakness [R53.1] 04/29/2023 Yes    Long term current use of anticoagulant [Z79.01] 04/29/2023 Not Applicable     Chronic    Critical lower limb ischemia [I70.229] 04/28/2023 Yes    Bilateral primary osteoarthritis of hip [M16.0] 04/28/2023 Yes    Hypercoagulopathy [D68.59] 04/28/2023 Yes    Pulmonary hypertension due to left heart disease [I27.22] 02/24/2023 Yes     Chronic    History of pulmonary embolism [Z86.711] 07/29/2022 Yes     Chronic    ICD (implantable cardioverter-defibrillator) in place [Z95.810] 03/31/2022 Yes     Chronic    Coronary artery disease involving native heart [I25.10] 10/11/2021 Yes     Chronic    Hypokalemia [E87.6] 08/16/2020 Yes    Cytotoxic brain edema [G93.6] 08/14/2020 Yes    Chronic combined systolic and diastolic congestive heart failure [I50.42] 01/12/2020 Yes     Chronic    Dilated cardiomyopathy [I42.0] 12/27/2019 Yes     Chronic    Hypertension [I10] 12/25/2019 Yes     Chronic    Hyperlipemia [E78.5] 12/25/2019 Yes     Chronic    Type 2 diabetes mellitus without complication, without long-term current use of insulin [E11.9] 12/25/2019 Yes     Chronic      Problems Resolved During this Admission:    Diagnosis Date Noted Date Resolved POA    Global aphasia [R47.01] 04/29/2023 05/02/2023 Yes    Acute deep vein thrombosis (DVT) [I82.409] 04/29/2023 04/30/2023 Yes    Cerebrovascular accident (CVA) due to occlusion of right middle cerebral artery [I63.511] 08/15/2020 04/29/2023 Yes     Neuro  * Cerebrovascular accident (CVA) due to embolism of left middle cerebral artery  62 y.o. female with PMH of HTN, DM2, HLD, tobacco use (quit in 2020), stroke (2020, R MCA, no  deficits), PE (December 2021, on xarelto),  CAD, DCM, HFrEF (15%) s/p AICD, Pulmonary hypertension admited to  4/27 for the treatment of a right leg DVT on heparin gtt. Stroke code activated 4/29 for aphasia and R hemiplegia, LKN 20:15 (~5 min prior). NIHSS 24, L MCA syndrome. Not TNK candidate due to AC with heparin gtt and therapeutic aPTT. CTA with L M2 occlusion. Taken to IR for possible intervention, now s/p L M2 thrombectomy with TICI 2c reperfusion. Repeat CTH post-IR with no acute infarct or hemorrhage, heparin gtt restarted. MRI brain W/WO with acute L MCA territory infarcts (insula, frontotemporal opercular cortex, patchy frontal white matter, small temporoparietal cortex), as well as small acute R cerebellar infarct. Echo with EF 10-15%, global LV hypokinesis, severe LAE. Suspect etiology likely cardioembolic due to EF 15%.    Neuro exam stable. 1 episode n/v overnight, responded well to zofran. NAEON. Discharge planned today.  messaged to be added to Dr. Ayala's clinic.    Antithrombotics for secondary stroke prevention: Anticoagulants: Pradaxa 150mg BID, ASA 81mg daily    Statins for secondary stroke prevention and HLD, if present: Statins: Atorvastatin- 80 mg daily    Aggressive risk factor modification: HTN, DM, HLD, HF, DVT    Rehab efforts: The patient has been evaluated by a stroke team provider and the therapy needs have been fully considered based off the presenting complaints and exam findings. The following therapy evaluations are needed: PT evaluate and treat, OT evaluate and treat, SLP evaluate and treat, PM&R evaluate for appropriate placement    Diagnostics ordered/pending: CTH PRN for acute neuro exam changes    VTE prophylaxis: None: Reason for No Pharmacological VTE Prophylaxis: Currently on anticoagulation    BP parameters: Infarct:  SBP <160        Recommendations:     Post-discharge complication risks: Falls    Stroke Education given to: patient and family    Follow-up in  Stroke Clinic in 4-6 weeks.     Discharge Plan:  Antithrombotics: Aspirin 81mg  Statin: Atorvastatin 80mg  Anticoagulant: Dabigatran  Aggresive risk factor modification:  Hypertension  Diabetes  High Cholesterol  Diet  Exercise  Obesity    Follow Up:   Follow-up Information     Fernando Manzo MD Follow up in 1 month(s).    Specialty: Endocrinology  Contact information:  [facility] [place] [id number]  [phone]                         Patient Instructions:      CBC Auto Differential   Standing Status: Future Standing Exp. Date: 08/01/23     Comprehensive Metabolic Panel   Standing Status: Future Standing Exp. Date: 08/01/23     Ambulatory referral/consult to Vascular Neurology   Standing Status: Future   Referral Priority: Routine Referral Type: Consultation   Referral Reason: Specialty Services Required   Requested Specialty: Vascular Neurology   Number of Visits Requested: 1     Ambulatory referral/consult to Cardiology   Standing Status: Future   Referral Priority: Routine Referral Type: Consultation   Referral Reason: Specialty Services Required   Requested Specialty: Cardiology   Number of Visits Requested: 1     Diet Cardiac   Order Comments: See Stroke Patient Education Guide Booklet for details.     Call 911 for any of the following:   Order Comments: Call 911  right away if any of the following warning signs come on suddenly, even if the symptoms only last for a few minutes. With stroke, timing is very important.   - Warning Signs of Stroke:  - Weakness: You may feel a sudden weakness, tingling or loss of feeling on one side of your face or body.  - Vision Problems: You may have sudden double vision or trouble seeing in one or both eyes.  - Speech Problems: You may have sudden trouble talking, slured speech, or problems understanding others.  - Headache: You may have sudden, severe headache.  - Movement Problems: You may experience dizziness, a feeling of spinning, a loss of balance, a  "feeling of falling or blackouts.       Medications:  Reconciled Home Medications:      Medication List      START taking these medications    aspirin 81 MG EC tablet  Commonly known as: ECOTRIN  Take 1 tablet (81 mg total) by mouth once daily.     dabigatran etexilate 150 mg Cap  Commonly known as: PRADAXA  Take 1 capsule (150 mg total) by mouth 2 (two) times a day. "Do NOT break, chew, or open capsules."        CHANGE how you take these medications    metoprolol succinate 100 MG 24 hr tablet  Commonly known as: TOPROL-XL  Take 1 tablet (100 mg total) by mouth once daily.  What changed: when to take this        CONTINUE taking these medications    acetaminophen 325 MG tablet  Commonly known as: TYLENOL  Take 2 tablets (650 mg total) by mouth every 8 (eight) hours as needed.     * albuterol 90 mcg/actuation inhaler  Commonly known as: PROVENTIL/VENTOLIN HFA  Inhale 2 puffs into the lungs every 6 (six) hours as needed for Wheezing or Shortness of Breath.     * albuterol 2.5 mg /3 mL (0.083 %) nebulizer solution  Commonly known as: PROVENTIL  Take 3 mLs (2.5 mg total) by nebulization every 6 (six) hours as needed for Wheezing or Shortness of Breath. Rescue     atorvastatin 80 MG tablet  Commonly known as: LIPITOR  Take 1 tablet (80 mg total) by mouth once daily.     dulaglutide 1.5 mg/0.5 mL pen injector  Commonly known as: TRULICITY  Inject 1.5 mg into the skin once a week.     empagliflozin 10 mg tablet  Commonly known as: JARDIANCE  Take 1 tablet (10 mg total) by mouth once daily.     ENTRESTO  mg per tablet  Generic drug: sacubitriL-valsartan  Take 1 tablet by mouth 2 (two) times daily.     glimepiride 4 MG tablet  Commonly known as: AMARYL  Take 4 mg by mouth 2 (two) times a day.     ipratropium-albuteroL  mcg/actuation inhaler  Commonly known as: CombiVENT  Inhale 1 puff into the lungs 4 (four) times daily. Rescue     metFORMIN 1000 MG tablet  Commonly known as: GLUCOPHAGE  Take 1,000 mg by mouth 2 " (two) times daily.     spironolactone 25 MG tablet  Commonly known as: ALDACTONE  Take 1 tablet (25 mg total) by mouth once daily.     torsemide 10 MG Tab  Commonly known as: DEMADEX  Take 1 tablet (10 mg total) by mouth once daily.         * This list has 2 medication(s) that are the same as other medications prescribed for you. Read the directions carefully, and ask your doctor or other care provider to review them with you.            STOP taking these medications    traMADoL 50 mg tablet  Commonly known as: ULTRAM     XARELTO 20 mg Tab  Generic drug: rivaroxaban            Tushar Ayala MD  Comprehensive Stroke Center  Department of Vascular Neurology   [facility] Neurosurgery [facility])

## 2023-05-12 NOTE — PLAN OF CARE
05/12/23 1235   Final Note   Assessment Type Final Discharge Note   Anticipated Discharge Disposition SNF   Post-Acute Status   Post-Acute Authorization Placement   Post-Acute Placement Status Set-up Complete/Auth obtained     Patient admitting to [facility].  SW provided RN with number for report and set up stretcher transport, ETA 330pm.  Patient and spouse aware.      Mandy Rutherford LMSW  [facility]  [phone]

## 2023-05-15 ENCOUNTER — TELEPHONE (OUTPATIENT)
Dept: NEUROLOGY | Facility: CLINIC | Age: 63
End: 2023-05-15
Payer: MEDICARE

## 2023-05-15 NOTE — TELEPHONE ENCOUNTER
----- Message from Tushar Ayala MD sent at 5/12/2023  5:35 PM CDT -----  Regarding: Follow up with me in clinic 4-6 weeks  Hi, Can we schedule her June 2nd or maybe my next clinic block?    Thanks!  Devon

## 2023-05-24 ENCOUNTER — CLINICAL SUPPORT (OUTPATIENT)
Dept: CARDIOLOGY | Facility: HOSPITAL | Age: 63
End: 2023-05-24
Payer: MEDICARE

## 2023-05-24 DIAGNOSIS — Z95.810 PRESENCE OF AUTOMATIC (IMPLANTABLE) CARDIAC DEFIBRILLATOR: ICD-10-CM

## 2023-05-24 DIAGNOSIS — I49.8 OTHER SPECIFIED CARDIAC ARRHYTHMIAS: ICD-10-CM

## 2023-05-24 DIAGNOSIS — I42.0 DILATED CARDIOMYOPATHY: ICD-10-CM

## 2023-05-24 DIAGNOSIS — I47.29 OTHER VENTRICULAR TACHYCARDIA: ICD-10-CM

## 2023-05-24 DIAGNOSIS — I50.42 CHRONIC COMBINED SYSTOLIC (CONGESTIVE) AND DIASTOLIC (CONGESTIVE) HEART FAILURE: ICD-10-CM

## 2023-05-24 PROCEDURE — 93296 REM INTERROG EVL PM/IDS: CPT | Performed by: STUDENT IN AN ORGANIZED HEALTH CARE EDUCATION/TRAINING PROGRAM

## 2023-05-29 ENCOUNTER — HOSPITAL ENCOUNTER (OUTPATIENT)
Facility: HOSPITAL | Age: 63
Discharge: ANOTHER HOSPITAL | End: 2023-05-30
Attending: EMERGENCY MEDICINE | Admitting: STUDENT IN AN ORGANIZED HEALTH CARE EDUCATION/TRAINING PROGRAM
Payer: MEDICARE

## 2023-05-29 DIAGNOSIS — R00.0 TACHYCARDIA, UNSPECIFIED: ICD-10-CM

## 2023-05-29 DIAGNOSIS — M79.606 LEG PAIN: ICD-10-CM

## 2023-05-29 DIAGNOSIS — I95.9 HYPOTENSION: Primary | ICD-10-CM

## 2023-05-29 LAB
ALBUMIN SERPL BCP-MCNC: 3.8 G/DL (ref 3.5–5.2)
ALP SERPL-CCNC: 139 U/L (ref 55–135)
ALT SERPL W/O P-5'-P-CCNC: 24 U/L (ref 10–44)
ANION GAP SERPL CALC-SCNC: 17 MMOL/L (ref 8–16)
AST SERPL-CCNC: 29 U/L (ref 10–40)
BASOPHILS # BLD AUTO: 0.04 K/UL (ref 0–0.2)
BASOPHILS NFR BLD: 0.4 % (ref 0–1.9)
BILIRUB SERPL-MCNC: 0.8 MG/DL (ref 0.1–1)
BUN SERPL-MCNC: 26 MG/DL (ref 8–23)
BUN SERPL-MCNC: 32 MG/DL (ref 6–30)
CALCIUM SERPL-MCNC: 10.3 MG/DL (ref 8.7–10.5)
CHLORIDE SERPL-SCNC: 93 MMOL/L (ref 95–110)
CHLORIDE SERPL-SCNC: 94 MMOL/L (ref 95–110)
CO2 SERPL-SCNC: 24 MMOL/L (ref 23–29)
CREAT SERPL-MCNC: 1.1 MG/DL (ref 0.5–1.4)
CREAT SERPL-MCNC: 1.1 MG/DL (ref 0.5–1.4)
DIFFERENTIAL METHOD: NORMAL
EOSINOPHIL # BLD AUTO: 0.1 K/UL (ref 0–0.5)
EOSINOPHIL NFR BLD: 1.1 % (ref 0–8)
ERYTHROCYTE [DISTWIDTH] IN BLOOD BY AUTOMATED COUNT: 14 % (ref 11.5–14.5)
EST. GFR  (NO RACE VARIABLE): 57.2 ML/MIN/1.73 M^2
GLUCOSE SERPL-MCNC: 122 MG/DL (ref 70–110)
GLUCOSE SERPL-MCNC: 128 MG/DL (ref 70–110)
HCT VFR BLD AUTO: 42.6 % (ref 37–48.5)
HCT VFR BLD CALC: 47 %PCV (ref 36–54)
HGB BLD-MCNC: 14.9 G/DL (ref 12–16)
IMM GRANULOCYTES # BLD AUTO: 0.03 K/UL (ref 0–0.04)
IMM GRANULOCYTES NFR BLD AUTO: 0.3 % (ref 0–0.5)
LIPASE SERPL-CCNC: 43 U/L (ref 4–60)
LYMPHOCYTES # BLD AUTO: 3.4 K/UL (ref 1–4.8)
LYMPHOCYTES NFR BLD: 35.3 % (ref 18–48)
MAGNESIUM SERPL-MCNC: 2.3 MG/DL (ref 1.6–2.6)
MCH RBC QN AUTO: 29.7 PG (ref 27–31)
MCHC RBC AUTO-ENTMCNC: 35 G/DL (ref 32–36)
MCV RBC AUTO: 85 FL (ref 82–98)
MONOCYTES # BLD AUTO: 0.6 K/UL (ref 0.3–1)
MONOCYTES NFR BLD: 6.1 % (ref 4–15)
NEUTROPHILS # BLD AUTO: 5.5 K/UL (ref 1.8–7.7)
NEUTROPHILS NFR BLD: 56.8 % (ref 38–73)
NRBC BLD-RTO: 0 /100 WBC
PHOSPHATE SERPL-MCNC: 3.6 MG/DL (ref 2.7–4.5)
PLATELET # BLD AUTO: 261 K/UL (ref 150–450)
PMV BLD AUTO: 10.5 FL (ref 9.2–12.9)
POC IONIZED CALCIUM: 1.11 MMOL/L (ref 1.06–1.42)
POC TCO2 (MEASURED): 29 MMOL/L (ref 23–29)
POTASSIUM BLD-SCNC: 3.9 MMOL/L (ref 3.5–5.1)
POTASSIUM SERPL-SCNC: 4.4 MMOL/L (ref 3.5–5.1)
PROT SERPL-MCNC: 8.1 G/DL (ref 6–8.4)
RBC # BLD AUTO: 5.01 M/UL (ref 4–5.4)
SAMPLE: ABNORMAL
SODIUM BLD-SCNC: 135 MMOL/L (ref 136–145)
SODIUM SERPL-SCNC: 135 MMOL/L (ref 136–145)
TSH SERPL DL<=0.005 MIU/L-ACNC: 2.7 UIU/ML (ref 0.4–4)
WBC # BLD AUTO: 9.61 K/UL (ref 3.9–12.7)

## 2023-05-29 PROCEDURE — 93010 ELECTROCARDIOGRAM REPORT: CPT | Mod: ,,, | Performed by: INTERNAL MEDICINE

## 2023-05-29 PROCEDURE — 85025 COMPLETE CBC W/AUTO DIFF WBC: CPT

## 2023-05-29 PROCEDURE — 99285 EMERGENCY DEPT VISIT HI MDM: CPT | Mod: GC,,, | Performed by: EMERGENCY MEDICINE

## 2023-05-29 PROCEDURE — 96361 HYDRATE IV INFUSION ADD-ON: CPT

## 2023-05-29 PROCEDURE — 83690 ASSAY OF LIPASE: CPT

## 2023-05-29 PROCEDURE — 99285 PR EMERGENCY DEPT VISIT,LEVEL V: ICD-10-PCS | Mod: GC,,, | Performed by: EMERGENCY MEDICINE

## 2023-05-29 PROCEDURE — 96374 THER/PROPH/DIAG INJ IV PUSH: CPT | Mod: 59

## 2023-05-29 PROCEDURE — 84100 ASSAY OF PHOSPHORUS: CPT

## 2023-05-29 PROCEDURE — 83735 ASSAY OF MAGNESIUM: CPT

## 2023-05-29 PROCEDURE — 93005 ELECTROCARDIOGRAM TRACING: CPT | Mod: 59

## 2023-05-29 PROCEDURE — 80047 BASIC METABLC PNL IONIZED CA: CPT

## 2023-05-29 PROCEDURE — 93010 EKG 12-LEAD: ICD-10-PCS | Mod: ,,, | Performed by: INTERNAL MEDICINE

## 2023-05-29 PROCEDURE — 84443 ASSAY THYROID STIM HORMONE: CPT

## 2023-05-29 PROCEDURE — 51702 INSERT TEMP BLADDER CATH: CPT

## 2023-05-29 PROCEDURE — 99285 EMERGENCY DEPT VISIT HI MDM: CPT | Mod: 25

## 2023-05-29 PROCEDURE — 63600175 PHARM REV CODE 636 W HCPCS

## 2023-05-29 PROCEDURE — 80053 COMPREHEN METABOLIC PANEL: CPT

## 2023-05-29 RX ORDER — ONDANSETRON 2 MG/ML
4 INJECTION INTRAMUSCULAR; INTRAVENOUS
Status: COMPLETED | OUTPATIENT
Start: 2023-05-29 | End: 2023-05-29

## 2023-05-29 RX ADMIN — ONDANSETRON 4 MG: 2 INJECTION INTRAMUSCULAR; INTRAVENOUS at 10:05

## 2023-05-29 RX ADMIN — SODIUM CHLORIDE, POTASSIUM CHLORIDE, SODIUM LACTATE AND CALCIUM CHLORIDE 250 ML: 600; 310; 30; 20 INJECTION, SOLUTION INTRAVENOUS at 11:05

## 2023-05-29 NOTE — Clinical Note
Diagnosis: Tachycardia, unspecified [785.0.ICD-9-CM]   Future Attending Provider: PRETTY CHARLES [[id number]]   Is the patient being sent to ED Observation?: No   Admitting Provider:: PRETTY CHARLES [[id number]]   Special Needs:: No Special Needs [1]

## 2023-05-30 VITALS
OXYGEN SATURATION: 99 % | SYSTOLIC BLOOD PRESSURE: 113 MMHG | BODY MASS INDEX: 28.93 KG/M2 | HEIGHT: 66 IN | DIASTOLIC BLOOD PRESSURE: 56 MMHG | WEIGHT: 180 LBS | RESPIRATION RATE: 16 BRPM | TEMPERATURE: 98 F | HEART RATE: 100 BPM

## 2023-05-30 PROBLEM — N17.9 AKI (ACUTE KIDNEY INJURY): Status: ACTIVE | Noted: 2023-05-30

## 2023-05-30 PROBLEM — E87.20 METABOLIC ACIDOSIS: Status: ACTIVE | Noted: 2023-05-30

## 2023-05-30 PROBLEM — R62.7 FAILURE TO THRIVE IN ADULT: Status: ACTIVE | Noted: 2023-05-30

## 2023-05-30 PROBLEM — R06.02 SOB (SHORTNESS OF BREATH): Status: RESOLVED | Noted: 2020-07-13 | Resolved: 2023-05-30

## 2023-05-30 PROBLEM — I82.403 ACUTE DEEP VEIN THROMBOSIS (DVT) OF BOTH LOWER EXTREMITIES: Status: ACTIVE | Noted: 2023-05-30

## 2023-05-30 PROBLEM — R11.2 NAUSEA & VOMITING: Status: ACTIVE | Noted: 2023-05-30

## 2023-05-30 PROBLEM — R06.02 SHORTNESS OF BREATH: Status: RESOLVED | Noted: 2020-09-04 | Resolved: 2023-05-30

## 2023-05-30 LAB
BACTERIA #/AREA URNS AUTO: ABNORMAL /HPF
BILIRUB UR QL STRIP: NEGATIVE
BNP SERPL-MCNC: 161 PG/ML (ref 0–99)
CLARITY UR REFRACT.AUTO: ABNORMAL
COLOR UR AUTO: YELLOW
GLUCOSE UR QL STRIP: ABNORMAL
HGB UR QL STRIP: ABNORMAL
KETONES UR QL STRIP: NEGATIVE
LACTATE SERPL-SCNC: 1.7 MMOL/L (ref 0.5–2.2)
LEUKOCYTE ESTERASE UR QL STRIP: ABNORMAL
MICROSCOPIC COMMENT: ABNORMAL
NITRITE UR QL STRIP: NEGATIVE
PH UR STRIP: 5 [PH] (ref 5–8)
POCT GLUCOSE: 103 MG/DL (ref 70–110)
PROT UR QL STRIP: NEGATIVE
RBC #/AREA URNS AUTO: 4 /HPF (ref 0–4)
SP GR UR STRIP: 1.02 (ref 1–1.03)
SQUAMOUS #/AREA URNS AUTO: 6 /HPF
TROPONIN I SERPL DL<=0.01 NG/ML-MCNC: 0.02 NG/ML (ref 0–0.03)
TROPONIN I SERPL DL<=0.01 NG/ML-MCNC: 0.03 NG/ML (ref 0–0.03)
URN SPEC COLLECT METH UR: ABNORMAL
WBC #/AREA URNS AUTO: 2 /HPF (ref 0–5)
YEAST UR QL AUTO: ABNORMAL

## 2023-05-30 PROCEDURE — 63600175 PHARM REV CODE 636 W HCPCS: Performed by: EMERGENCY MEDICINE

## 2023-05-30 PROCEDURE — 99223 1ST HOSP IP/OBS HIGH 75: CPT | Mod: GC,,, | Performed by: HOSPITALIST

## 2023-05-30 PROCEDURE — 51798 US URINE CAPACITY MEASURE: CPT

## 2023-05-30 PROCEDURE — 93010 ELECTROCARDIOGRAM REPORT: CPT | Mod: ,,, | Performed by: INTERNAL MEDICINE

## 2023-05-30 PROCEDURE — G0378 HOSPITAL OBSERVATION PER HR: HCPCS

## 2023-05-30 PROCEDURE — 84484 ASSAY OF TROPONIN QUANT: CPT | Performed by: EMERGENCY MEDICINE

## 2023-05-30 PROCEDURE — 25500020 PHARM REV CODE 255: Performed by: EMERGENCY MEDICINE

## 2023-05-30 PROCEDURE — 93010 EKG 12-LEAD: ICD-10-PCS | Mod: ,,, | Performed by: INTERNAL MEDICINE

## 2023-05-30 PROCEDURE — 25000003 PHARM REV CODE 250

## 2023-05-30 PROCEDURE — 99223 PR INITIAL HOSPITAL CARE,LEVL III: ICD-10-PCS | Mod: GC,,, | Performed by: HOSPITALIST

## 2023-05-30 PROCEDURE — 96376 TX/PRO/DX INJ SAME DRUG ADON: CPT | Mod: 59

## 2023-05-30 PROCEDURE — 83605 ASSAY OF LACTIC ACID: CPT | Performed by: EMERGENCY MEDICINE

## 2023-05-30 PROCEDURE — 83880 ASSAY OF NATRIURETIC PEPTIDE: CPT | Performed by: EMERGENCY MEDICINE

## 2023-05-30 PROCEDURE — 25000003 PHARM REV CODE 250: Performed by: HOSPITALIST

## 2023-05-30 PROCEDURE — 84484 ASSAY OF TROPONIN QUANT: CPT | Mod: 91

## 2023-05-30 PROCEDURE — 96361 HYDRATE IV INFUSION ADD-ON: CPT

## 2023-05-30 PROCEDURE — 94761 N-INVAS EAR/PLS OXIMETRY MLT: CPT

## 2023-05-30 PROCEDURE — 25000242 PHARM REV CODE 250 ALT 637 W/ HCPCS

## 2023-05-30 PROCEDURE — 94640 AIRWAY INHALATION TREATMENT: CPT

## 2023-05-30 PROCEDURE — 81001 URINALYSIS AUTO W/SCOPE: CPT

## 2023-05-30 PROCEDURE — 63600175 PHARM REV CODE 636 W HCPCS: Performed by: HOSPITALIST

## 2023-05-30 PROCEDURE — 93005 ELECTROCARDIOGRAM TRACING: CPT

## 2023-05-30 RX ORDER — DABIGATRAN ETEXILATE 150 MG/1
150 CAPSULE ORAL 2 TIMES DAILY
Status: DISCONTINUED | OUTPATIENT
Start: 2023-05-30 | End: 2023-05-30 | Stop reason: HOSPADM

## 2023-05-30 RX ORDER — PANTOPRAZOLE SODIUM 40 MG/1
40 TABLET, DELAYED RELEASE ORAL DAILY
Status: DISCONTINUED | OUTPATIENT
Start: 2023-05-30 | End: 2023-05-30 | Stop reason: HOSPADM

## 2023-05-30 RX ORDER — ENOXAPARIN SODIUM 100 MG/ML
40 INJECTION SUBCUTANEOUS EVERY 24 HOURS
Status: DISCONTINUED | OUTPATIENT
Start: 2023-05-30 | End: 2023-05-30

## 2023-05-30 RX ORDER — ALBUTEROL SULFATE 2.5 MG/.5ML
2.5 SOLUTION RESPIRATORY (INHALATION) EVERY 4 HOURS
Status: DISCONTINUED | OUTPATIENT
Start: 2023-05-30 | End: 2023-05-30 | Stop reason: HOSPADM

## 2023-05-30 RX ORDER — IBUPROFEN 200 MG
24 TABLET ORAL
Status: DISCONTINUED | OUTPATIENT
Start: 2023-05-30 | End: 2023-05-30

## 2023-05-30 RX ORDER — METOCLOPRAMIDE 10 MG/1
10 TABLET ORAL
Status: DISCONTINUED | OUTPATIENT
Start: 2023-05-30 | End: 2023-05-30 | Stop reason: HOSPADM

## 2023-05-30 RX ORDER — INSULIN ASPART 100 [IU]/ML
0-5 INJECTION, SOLUTION INTRAVENOUS; SUBCUTANEOUS
Status: DISCONTINUED | OUTPATIENT
Start: 2023-05-30 | End: 2023-05-30 | Stop reason: HOSPADM

## 2023-05-30 RX ORDER — ASPIRIN 81 MG/1
81 TABLET ORAL DAILY
Status: DISCONTINUED | OUTPATIENT
Start: 2023-05-30 | End: 2023-05-30 | Stop reason: HOSPADM

## 2023-05-30 RX ORDER — DICLOFENAC SODIUM 10 MG/G
GEL TOPICAL
COMMUNITY
End: 2023-07-20

## 2023-05-30 RX ORDER — ATORVASTATIN CALCIUM 40 MG/1
80 TABLET, FILM COATED ORAL DAILY
Status: DISCONTINUED | OUTPATIENT
Start: 2023-05-30 | End: 2023-05-30 | Stop reason: HOSPADM

## 2023-05-30 RX ORDER — OXYCODONE HYDROCHLORIDE 5 MG/1
5 TABLET ORAL
Status: COMPLETED | OUTPATIENT
Start: 2023-05-30 | End: 2023-05-30

## 2023-05-30 RX ORDER — DEXTROSE 40 %
30 GEL (GRAM) ORAL
Status: DISCONTINUED | OUTPATIENT
Start: 2023-05-30 | End: 2023-05-30 | Stop reason: HOSPADM

## 2023-05-30 RX ORDER — DEXTROSE 40 %
15 GEL (GRAM) ORAL
Status: DISCONTINUED | OUTPATIENT
Start: 2023-05-30 | End: 2023-05-30 | Stop reason: HOSPADM

## 2023-05-30 RX ORDER — IBUPROFEN 200 MG
16 TABLET ORAL
Status: DISCONTINUED | OUTPATIENT
Start: 2023-05-30 | End: 2023-05-30

## 2023-05-30 RX ORDER — NALOXONE HCL 0.4 MG/ML
0.02 VIAL (ML) INJECTION
Status: DISCONTINUED | OUTPATIENT
Start: 2023-05-30 | End: 2023-05-30 | Stop reason: HOSPADM

## 2023-05-30 RX ORDER — SODIUM CHLORIDE 0.9 % (FLUSH) 0.9 %
10 SYRINGE (ML) INJECTION EVERY 12 HOURS PRN
Status: DISCONTINUED | OUTPATIENT
Start: 2023-05-30 | End: 2023-05-30 | Stop reason: HOSPADM

## 2023-05-30 RX ORDER — GLUCAGON 1 MG
1 KIT INJECTION
Status: DISCONTINUED | OUTPATIENT
Start: 2023-05-30 | End: 2023-05-30 | Stop reason: HOSPADM

## 2023-05-30 RX ORDER — MORPHINE SULFATE 2 MG/ML
2 INJECTION, SOLUTION INTRAMUSCULAR; INTRAVENOUS EVERY 4 HOURS PRN
Status: DISCONTINUED | OUTPATIENT
Start: 2023-05-30 | End: 2023-05-30 | Stop reason: HOSPADM

## 2023-05-30 RX ORDER — ONDANSETRON 2 MG/ML
4 INJECTION INTRAMUSCULAR; INTRAVENOUS EVERY 6 HOURS PRN
Status: DISCONTINUED | OUTPATIENT
Start: 2023-05-30 | End: 2023-05-30 | Stop reason: HOSPADM

## 2023-05-30 RX ORDER — GLUCAGON 1 MG
1 KIT INJECTION
Status: DISCONTINUED | OUTPATIENT
Start: 2023-05-30 | End: 2023-05-30

## 2023-05-30 RX ADMIN — ASPIRIN 81 MG: 81 TABLET, COATED ORAL at 09:05

## 2023-05-30 RX ADMIN — SODIUM CHLORIDE, POTASSIUM CHLORIDE, SODIUM LACTATE AND CALCIUM CHLORIDE 500 ML: 600; 310; 30; 20 INJECTION, SOLUTION INTRAVENOUS at 02:05

## 2023-05-30 RX ADMIN — IOHEXOL 75 ML: 350 INJECTION, SOLUTION INTRAVENOUS at 03:05

## 2023-05-30 RX ADMIN — ALBUTEROL SULFATE 2.5 MG: 2.5 SOLUTION RESPIRATORY (INHALATION) at 08:05

## 2023-05-30 RX ADMIN — ATORVASTATIN CALCIUM 80 MG: 40 TABLET, FILM COATED ORAL at 09:05

## 2023-05-30 RX ADMIN — PANTOPRAZOLE SODIUM 40 MG: 40 TABLET, DELAYED RELEASE ORAL at 09:05

## 2023-05-30 RX ADMIN — OXYCODONE HYDROCHLORIDE 5 MG: 5 TABLET ORAL at 02:05

## 2023-05-30 RX ADMIN — ONDANSETRON 4 MG: 2 INJECTION INTRAMUSCULAR; INTRAVENOUS at 01:05

## 2023-05-30 NOTE — HPI
Ms. Mohr is a 62 YOF with HTN, DM2, HLD, tobacco use (quit in 2020), CHF EF of 10-15%, PE (December 2021, on pradaxa), CAD, DCM, HFpEF (15%) s/p AICD, pulmonary hypertension, osteoarthritis of bilateral hips (L>R) with chronic pain, CVA (2020, R MCA, no deficits), left MCA stroke during recent admission s/p thrombectomy (4/29/23) presenting with chief complaint of N/V.  Onset, 2 days prior, vomited 3-4 times since, no hemoptysis, no bile, unable to keep PO down, amount is half a cup. Associated with fatigue, and dry mouth, no exacerbating or remitting factors. Has never experienced this kind of N/V before, no exposure to sick contacts, no change in diet. ROS negative for lightheadedness, weakness, abdominal pain, fever, chills, diarrhea, chest pain, sob, new focal weakness or numbness. ROS positive for R LE pain.       ED course notable for: Hypotensive on admit down to 56/40. Afebrile. O2 sat high 90s on RA. WBC and Hgb wnl. Electrolytes stable. Cr 1.1. . Trop 0.029. Lactate 1.7. CXR unremarkable. CTA with cardiomegaly but unremarkable for PE. U/S of LE shows partially occlusive age-indeterminate deep vein thrombosis involving the bilateral common femoral veins. Given 500cc LR for hypotension. CTA chest negative for PE. She received IVF, zofran and oxycodone in ED.

## 2023-05-30 NOTE — H&P
[facility] - Emergency Dept  [facility] Medicine  History & Physical    Patient Name: Diomedes Mohr  MRN: [id number]  Patient Class: OP- Observation  Admission Date: 5/29/2023  Attending Physician: Marlo Alba MD   Primary Care Provider: Fernando Manzo MD         Patient information was obtained from patient, past medical records and ER records.     Subjective:     Principal Problem:Failure to thrive in adult    Chief Complaint:   Chief Complaint   Patient presents with    Emesis     Vomiting and intermittent nausea x2-3 days. Per pts , pt has not been able to eat anything for the past month. Now more fatigued than usual. Denies fever or chills. Hx of CVA 4/29        HPI: Ms. Mohr is a 62 YOF with HTN, DM2, HLD, tobacco use (quit in 2020), CHF EF of 10-15%, PE (December 2021, on pradaxa), CAD, DCM, HFpEF (15%) s/p AICD, pulmonary hypertension, osteoarthritis of bilateral hips (L>R) with chronic pain, CVA (2020, R MCA, no deficits), left MCA stroke during recent admission s/p thrombectomy (4/29/23) presenting with chief complaint of N/V.  Onset, 2 days prior, vomited 3-4 times since, no hemoptysis, no bile, unable to keep PO down, amount is half a cup. Associated with fatigue, and dry mouth, no exacerbating or remitting factors. Has never experienced this kind of N/V before, no exposure to sick contacts, no change in diet. ROS negative for lightheadedness, weakness, abdominal pain, fever, chills, diarrhea, chest pain, sob, new focal weakness or numbness. ROS positive for R LE pain.       ED course notable for: Hypotensive on admit down to 56/40. Afebrile. O2 sat high 90s on RA. WBC and Hgb wnl. Electrolytes stable. Cr 1.1. . Trop 0.029. Lactate 1.7. CXR unremarkable. CTA with cardiomegaly but unremarkable for PE. U/S of LE shows partially occlusive age-indeterminate deep vein thrombosis involving the bilateral common femoral veins. Given 500cc LR for hypotension. CTA chest negative for PE.  She received IVF, zofran and oxycodone in ED.        Past Medical History:   Diagnosis Date    Acute on chronic combined systolic and diastolic heart failure 12/26/2019    Anticoagulant long-term use     Anxiety     Arthritis     Asthma     Depression     H/O: hysterectomy     Hyperlipemia     Hypertension     Pulmonary edema     Schizophrenia        Past Surgical History:   Procedure Laterality Date    BLADDER SUSPENSION      CARPAL TUNNEL RELEASE Right     HEEL SPUR SURGERY Left     HYSTERECTOMY      LEFT HEART CATHETERIZATION Bilateral 12/27/2019    Procedure: Left heart cath;  Surgeon: Steve Chambers MD;  Location: [facility] CATH LAB;  Service: Cardiology;  Laterality: Bilateral;    RIGHT HEART CATHETERIZATION Right 7/26/2021    Procedure: INSERTION, CATHETER, RIGHT HEART;  Surgeon: Petr Naranjo MD;  Location: [facility] CATH LAB;  Service: Cardiology;  Laterality: Right;    RIGHT HEART CATHETERIZATION Right 5/12/2022    Procedure: INSERTION, CATHETER, RIGHT HEART;  Surgeon: Chandana Ivory Jr., MD;  Location: [facility] CATH LAB;  Service: Cardiology;  Laterality: Right;    TUBAL LIGATION         Review of patient's allergies indicates:   Allergen Reactions    Adhesive Blisters    Captopril Other (See Comments)     COUGH       No current facility-administered medications on file prior to encounter.     Current Outpatient Medications on File Prior to Encounter   Medication Sig    acetaminophen (TYLENOL) 325 MG tablet Take 2 tablets (650 mg total) by mouth every 8 (eight) hours as needed.    albuterol (PROVENTIL) 2.5 mg /3 mL (0.083 %) nebulizer solution Take 3 mLs (2.5 mg total) by nebulization every 6 (six) hours as needed for Wheezing or Shortness of Breath. Rescue    albuterol (PROVENTIL/VENTOLIN HFA) 90 mcg/actuation inhaler Inhale 2 puffs into the lungs every 6 (six) hours as needed for Wheezing or Shortness of Breath.    aspirin (ECOTRIN) 81 MG EC tablet Take 1 tablet (81 mg  "total) by mouth once daily.    atorvastatin (LIPITOR) 80 MG tablet Take 1 tablet (80 mg total) by mouth once daily.    dabigatran etexilate (PRADAXA) 150 mg Cap Take 1 capsule (150 mg total) by mouth 2 (two) times a day. "Do NOT break, chew, or open capsules."    dulaglutide (TRULICITY) 1.5 mg/0.5 mL pen injector Inject 1.5 mg into the skin once a week.    empagliflozin (JARDIANCE) 10 mg tablet Take 1 tablet (10 mg total) by mouth once daily.    glimepiride (AMARYL) 4 MG tablet Take 4 mg by mouth 2 (two) times a day.    ipratropium-albuteroL (COMBIVENT)  mcg/actuation inhaler Inhale 1 puff into the lungs 4 (four) times daily. Rescue    metFORMIN (GLUCOPHAGE) 1000 MG tablet Take 1,000 mg by mouth 2 (two) times daily.     metoprolol succinate (TOPROL-XL) 100 MG 24 hr tablet Take 1 tablet (100 mg total) by mouth once daily.    sacubitriL-valsartan (ENTRESTO)  mg per tablet Take 1 tablet by mouth 2 (two) times daily.    spironolactone (ALDACTONE) 25 MG tablet Take 1 tablet (25 mg total) by mouth once daily.    torsemide (DEMADEX) 10 MG Tab Take 1 tablet (10 mg total) by mouth once daily.     Family History       Problem Relation (Age of Onset)    No Known Problems Mother, Father          Tobacco Use    Smoking status: Former     Types: Cigarettes     Quit date: 2016     Years since quittin.7    Smokeless tobacco: Never    Tobacco comments:     nonex 2 weeks   Substance and Sexual Activity    Alcohol use: No     Alcohol/week: 0.0 standard drinks    Drug use: No    Sexual activity: Not on file     Review of Systems   Constitutional:  Negative for chills and fever.   HENT:  Negative for sneezing and sore throat.    Respiratory:  Negative for cough and shortness of breath.    Cardiovascular:  Negative for chest pain and leg swelling.   Gastrointestinal:  Positive for nausea and vomiting. Negative for abdominal distention, abdominal pain, anal bleeding, blood in stool, constipation, " diarrhea and rectal pain.   Genitourinary:  Negative for difficulty urinating.   Musculoskeletal:  Negative for back pain.   Neurological:  Negative for headaches.   Psychiatric/Behavioral:  Negative for agitation and confusion.    Objective:     Vital Signs (Most Recent):  Temp: 97.7 °F (36.5 °C) (05/30/23 0700)  Pulse: 71 (05/30/23 0700)  Resp: 16 (05/30/23 0700)  BP: (!) 103/56 (05/30/23 0700)  SpO2: 97 % (05/30/23 0700) Vital Signs (24h Range):  Temp:  [97.7 °F (36.5 °C)-98 °F (36.7 °C)] 97.7 °F (36.5 °C)  Pulse:  [53-94] 71  Resp:  [13-18] 16  SpO2:  [95 %-100 %] 97 %  BP: ()/(38-68) 103/56     Weight: 81.6 kg (180 lb)  Body mass index is 29.05 kg/m².     Physical Exam  Vitals and nursing note reviewed.   Constitutional:       General: She is not in acute distress.     Appearance: Normal appearance. She is not ill-appearing.   HENT:      Head: Normocephalic and atraumatic.      Right Ear: External ear normal.      Left Ear: External ear normal.      Nose: Nose normal.      Mouth/Throat:      Mouth: Mucous membranes are dry.      Pharynx: Oropharynx is clear.   Eyes:      General:         Right eye: No discharge.         Left eye: No discharge.   Cardiovascular:      Rate and Rhythm: Normal rate and regular rhythm.      Pulses: Normal pulses.      Heart sounds: Normal heart sounds. No murmur heard.  Pulmonary:      Effort: Pulmonary effort is normal. No respiratory distress.      Breath sounds: Normal breath sounds.   Abdominal:      General: Bowel sounds are normal. There is no distension.      Palpations: Abdomen is soft. There is no mass.      Tenderness: There is no abdominal tenderness. There is no guarding or rebound.      Hernia: No hernia is present.   Musculoskeletal:         General: Normal range of motion.      Cervical back: Normal range of motion.      Right lower leg: No edema.      Left lower leg: No edema.   Skin:     General: Skin is warm and dry.      Capillary Refill: Capillary refill  takes less than 2 seconds.   Neurological:      General: No focal deficit present.      Mental Status: She is alert and oriented to person, place, and time.   Psychiatric:         Mood and Affect: Mood normal.         Behavior: Behavior normal.              Significant Labs: All pertinent labs within the past 24 hours have been reviewed.    Significant Imaging: I have reviewed all pertinent imaging results/findings within the past 24 hours.    Assessment/Plan:     * Failure to thrive in adult  Pt with new N/V with inability to tolerate PO. No blood, no bile, amount half a cup. No abdominal pain. Fatigued. Abdominal exam unremarkable. Dry oral mucosa and overall hypovolemic on exam. CTA negative for PE. EKG NSR and LVH.     Ddx: diabetic gastroparesis vs gastritis vs gastroenteritis     - reglan  - PPI   - zofran   - hold metformin  - outpatient EGD if symptoms persist.      Nausea & vomiting  See failure to thrive in adult       ARBEN (acute kidney injury)  Patient with acute kidney injury likely due to IVVD/dehydration ARBEN is currently stable. Labs reviewed- Renal function/electrolytes with Estimated Creatinine Clearance: 57.8 mL/min (based on SCr of 1.1 mg/dL). according to latest data. Monitor urine output and serial BMP and adjust therapy as needed. Avoid nephrotoxins and renally dose meds for GFR listed above.     Patient presenting with ARBEN with admission sCr 1.1 (baseline sCr 0.8). received 750 cc in the ED.     DDx: pre-renal ARBEN 2/2 intractable N/V vs poor PO intake     - additions of 250 cc IVF at a time in the setting of HFrEF (EF = 10-15%)  - Trend Cr  - Strict I&Os  - Avoid nephrotoxic agents  - Renally adjust medications    Chronic combined systolic and diastolic congestive heart failure  Patient is identified as having Combined Systolic and Diastolic heart failure that is Chronic. CHF is currently controlled. Latest ECHO performed and demonstrates- Results for orders placed during the hospital encounter  of 04/27/23. Continue Beta Blocker, ACE/ARB, Furosemide and Aldactone and monitor clinical status closely. Monitor on telemetry. Patient is off CHF pathway.  Monitor strict Is&Os and daily weights.  Place on fluid restriction of 1500 cc. Continue to stress to patient importance of self efficacy and  on diet for CHF. Last BNP reviewed- and noted below. .   Recent Labs   Lab 05/30/23  0222   *     - hold home entresto in setting of initial hypotension  - hold home metoprolol in setting of initial hypotension  - hold home spironolactone in setting of initial hypotension  - careful IVF resuscitation in the setting of ARBEN and low EF    Acute deep vein thrombosis (DVT) of both lower extremities  US LE showed partially occlusive age-indeterminate deep vein thrombosis involving the bilateral common femoral veins. Pt already on pradaxa    - continue home pradaxa      Metabolic acidosis  AG 17 on admission, HCO3 24, Cl 94. Lactate 1.7. BUN 26.     Ddx: lactic acidosis 2/2 to metformin use vs ASA use.     - hold home metformin  - continue home ASA    Long term current use of anticoagulant  - continue home pradaxa      Pulmonary hypertension due to left heart disease  PE on 12/2021     - continue home pradaxa      Coronary artery disease involving native heart  - continue home ASA      Elevated troponin  Pt prsenting with troponin of 0.029 from the ED. EKG NSR, no CP, no palpitations.    - trend troponin q6h  - continue to monitor      Cerebrovascular accident (CVA) due to embolism of right middle cerebral artery  - continue home ASA      Hyperlipemia  - continue home statin       Hypertension  - hold home metoprolol in setting of initial hypotension  - hold home entresto in setting of initial hypotension      Type 2 diabetes mellitus without complication, without long-term current use of insulin  A1c 7.5, Patient's FSGs are controlled on current medication regimen.  Last A1c reviewed-   Lab Results    Component Value Date    HGBA1C 7.5 (H) 04/30/2023     Most recent fingerstick glucose reviewed- No results for input(s): POCTGLUCOSE in the last 24 hours.  Current correctional scale  Low  Maintain anti-hyperglycemic dose as follows-   Antihyperglycemics (From admission, onward)    None        Hold Oral hypoglycemics while patient is in the hospital.    - LDSSI   - bsg goal 140-180 inpatient  - hold home antihyperglycemics  - diabetic diet     VTE Risk Mitigation (From admission, onward)         Ordered     dabigatran etexilate capsule 150 mg  2 times daily         05/30/23 0739     IP VTE HIGH RISK PATIENT  Once         05/30/23 0553     Place sequential compression device  Until discontinued         05/30/23 0553                  On 05/30/2023, patient should be placed in hospital observation services under my care in collaboration with Dr. Mendez.    Nelda Mota,   Department of Hospital Medicine  [facility] - Emergency Dept

## 2023-05-30 NOTE — ASSESSMENT & PLAN NOTE
US LE showed partially occlusive age-indeterminate deep vein thrombosis involving the bilateral common femoral veins. Pt already on pradaxa    - continue home pradaxa

## 2023-05-30 NOTE — PROVIDER TRANSFER
(Physician in Lead of Transfers)  Outside Transfer Acceptance Note / Regional Referral Center    Upon patient arrival, please contact Hospital Medicine on call.    Referring facility: [facility]  Referring provider: PRETTY CHARLES  Accepting facility: [facility]  Accepting provider: LORELEI PAYTON  Admitting provider: SUNKARA, PALLAVI  Reason for transfer:  capacity  Transfer diagnosis: Failure to thrive  Transfer specialty requested: Hospital Medicine  Transfer specialty notified: Yes  Transfer level: NUMBER 1-5: 2  Bed type requested: med-tele  Isolation status: No active isolations   Admission class or status: OP- Observation      Narrative     62-year-old female with a history of combined systolic and diastolic heart failure, hypertension, hyperlipidemia, diabetes, venous thromboembolism (on oral anticoagulation), coronary artery disease, AICD placement, arthritis, and prior strokes admitted to [facility] on May 29 with 2 days of nausea and vomiting.  She reported approximately 3-4 episodes with no hemoptysis.  Poor oral intake.  With that she noted fatigue and dry mouth.  She had no lightheadedness or abdominal pain, no fever or diarrhea, no chest pain or dyspnea, and no focal weakness.  She was hypotensive early in her emergency department stay, but blood pressure improved after IV fluids.  She was admitted with failure to thrive, metabolic acidosis, DVT, ARBEN, and hypotension.  Home blood pressure medicines were held.  Due to bed capacity issues.  Requesting transfer to Hospital Medicine at [facility] for further treatment of failure to thrive.    May 30:  Lactic acid 1.7, , troponin 0.029 (repeat 0.022)  Urinalysis with 4+ glucose, trace blood, trace leukocytes, 4 RBC, 2 WBC, moderate bacteria, 6 squamous epithelial cells  -CTA chest had no evidence of pulmonary thromboembolism.  No acute intrathoracic abnormality identified.   Cardiomegaly with left ventricular enlargement.  Similar appearance of right upper lobe pulmonary micro nodules compared with imaging from January 2020.  Bilateral renal cysts.  -abdomen x-ray had no definite dilated gas-filled loops of small or large bowel appreciated.  Osseous soft tissue structures appear without definite acute abnormality.  Moderate and moods overlie the chest.  Partially imaged AICD.  Residual contrast from earlier same day CTA noted.  -EKG showed normal sinus rhythm with ventricular rate 86, LVH with repolarization abnormality.    May 29:  White blood cells 9.61, hemoglobin 14.9, hematocrit 42.6, platelets 261, magnesium 2.3, phosphorus 3.6, lipase 43, sodium 135, potassium 4.4, chloride 94, CO2 24, BUN 26, creatinine 1.1, glucose 122, AST 29, ALT 24, TSH 2.701  -RLE doppler venous US had partially occlusive age indeterminate DVT involving bilateral common femoral veins.    April 28: Echocardiogram with EF 10-15%.  Grade 2 diastolic dysfunction.  Left ventricular global hypokinesis.  Severely enlarged left ventricle with eccentric hypertrophy and severely decreased systolic function.  Normal RV size and systolic function.    Objective     Vitals: Temp: 97.7 °F (36.5 °C) (05/30/23 1241)  Pulse: (!) 56 (05/30/23 1241)  Resp: 16 (05/30/23 1241)  BP: (!) 121/56 (05/30/23 1241)  SpO2: 99 % (05/30/23 1241)  Recent Labs: CBC:   Recent Labs   Lab 05/29/23 2255 05/29/23  2304   WBC 9.61  --    HGB 14.9  --    HCT 42.6 47     --      CMP:   Recent Labs   Lab 05/29/23 2255   *   K 4.4   CL 94*   CO2 24   *   BUN 26*   CREATININE 1.1   CALCIUM 10.3   PROT 8.1   ALBUMIN 3.8   BILITOT 0.8   ALKPHOS 139*   AST 29   ALT 24   ANIONGAP 17*     Lactic Acid:   Recent Labs   Lab 05/30/23 0222   LACTATE 1.7     Troponin:   Recent Labs   Lab 05/30/23 0222 05/30/23  0926   TROPONINI 0.029* 0.022         Instructions    Admit to Hospital Medicine      LAYNE Young MD  [facility]  Staff  Cell: [phone]

## 2023-05-30 NOTE — ED TRIAGE NOTES
Diomedes Mohr, a 62 y.o. female presents to the ED w/ complaint of nausea, vomiting x2-3days. Pt says unable to keep anything down and everything tasting funny. Hx of CVA and right sided weakness. Per  pt has been more fatigued than usual. Denies fever, CP, SOB.    Triage note:  Chief Complaint   Patient presents with    Emesis     Vomiting and intermittent nausea x2-3 days. Per pts , pt has not been able to eat anything for the past month. Now more fatigued than usual. Denies fever or chills. Hx of CVA 4/29     Review of patient's allergies indicates:   Allergen Reactions    Adhesive Blisters    Captopril Other (See Comments)     COUGH     Past Medical History:   Diagnosis Date    Acute on chronic combined systolic and diastolic heart failure 12/26/2019    Anticoagulant long-term use     Anxiety     Arthritis     Asthma     Depression     H/O: hysterectomy     Hyperlipemia     Hypertension     Pulmonary edema     Schizophrenia

## 2023-05-30 NOTE — ASSESSMENT & PLAN NOTE
- hold home metoprolol in setting of initial hypotension  - hold home entresto in setting of initial hypotension

## 2023-05-30 NOTE — ASSESSMENT & PLAN NOTE
Pt prsenting with troponin of 0.029 from the ED. EKG NSR, no CP, no palpitations.    - trend troponin q6h  - continue to monitor

## 2023-05-30 NOTE — PROVIDER PROGRESS NOTES - EMERGENCY DEPT.
Encounter Date: 5/29/2023    ED Physician Progress Notes          Physician Attestation Statement for Resident:  As the supervising MD   Physician Attestation Statement: I have personally seen and examined this patient.       ***I agree with the history unless otherwise noted.     ***As the supervising MD I agree with the PE unless otherwise noted.      ***I have reviewed and agree with the residents interpretation of the following unless otherwise noted:   ***I have personally reviewed and interpreted the patients laboratory studies:  ***I have personally reviewed and interpreted imaging studies:  ***I have personally reviewed and interpreted the patient's EKG: NSR @ 99bpm, LAD, TWI inforeolat  Repeat NSR @ 86bpm, LAD, TWI inferolat        ***I have also reviewed the following:   ***old records at this facility,   ***records from referring facility   ***old EKGs,   ***old imaging and results    ***As the supervising MD I agree with the treatment, course, plan, and disposition unless otherwise noted.

## 2023-05-30 NOTE — ASSESSMENT & PLAN NOTE
Patient with acute kidney injury likely due to IVVD/dehydration ARBEN is currently stable. Labs reviewed- Renal function/electrolytes with Estimated Creatinine Clearance: 57.8 mL/min (based on SCr of 1.1 mg/dL). according to latest data. Monitor urine output and serial BMP and adjust therapy as needed. Avoid nephrotoxins and renally dose meds for GFR listed above.     Patient presenting with ARBEN with admission sCr 1.1 (baseline sCr 0.8). received 750 cc in the ED.     DDx: pre-renal ARBEN 2/2 intractable N/V vs poor PO intake     - additions of 250 cc IVF at a time in the setting of HFrEF (EF = 10-15%)  - Trend Cr  - Strict I&Os  - Avoid nephrotoxic agents  - Renally adjust medications

## 2023-05-30 NOTE — ED PROVIDER NOTES
Source of History:   Patient, family member, and medical record, without language barrier or      Chief complaint:  Emesis (Vomiting and intermittent nausea x2-3 days. Per pts , pt has not been able to eat anything for the past month. Now more fatigued than usual. Denies fever or chills. Hx of CVA 4/29)    HPI:  Diomedes Mohr is a 62 y.o. female with history of CVA 4/29 with right-sided deficits, CHF EF of 10-15%, PE on anticoagulation presenting with chief complaint of nausea and vomiting.  Patient's family member at bedside provides much of the history.  He states that she has had a progressive decline since her stroke at the end of April.  Since being discharged from rehab she has not been eating not been drinking and has been very lethargic.  He states that she spits up every day and has no appetite.  She is had only 2 bowel movements in the past 3 weeks and denies diarrhea.  She also denies fevers, chills, abdominal pain    This is the extent to the patients complaints today here in the emergency department.    ROS: As per HPI and below:  ROS    Review of patient's allergies indicates:   Allergen Reactions    Adhesive Blisters    Captopril Other (See Comments)     COUGH     PMH:  As per HPI and below:  Past Medical History:   Diagnosis Date    Acute on chronic combined systolic and diastolic heart failure 12/26/2019    Anticoagulant long-term use     Anxiety     Arthritis     Asthma     Depression     H/O: hysterectomy     Hyperlipemia     Hypertension     Pulmonary edema     Schizophrenia      Past Surgical History:   Procedure Laterality Date    BLADDER SUSPENSION      CARPAL TUNNEL RELEASE Right     HEEL SPUR SURGERY Left     HYSTERECTOMY      LEFT HEART CATHETERIZATION Bilateral 12/27/2019    Procedure: Left heart cath;  Surgeon: Steve Chambers MD;  Location: [facility] CATH LAB;  Service: Cardiology;  Laterality: Bilateral;    RIGHT HEART CATHETERIZATION Right 7/26/2021    Procedure:  "INSERTION, CATHETER, RIGHT HEART;  Surgeon: Petr Naranjo MD;  Location: [facility] CATH LAB;  Service: Cardiology;  Laterality: Right;    RIGHT HEART CATHETERIZATION Right 2022    Procedure: INSERTION, CATHETER, RIGHT HEART;  Surgeon: Chandana Ivory Jr., MD;  Location: [facility] CATH LAB;  Service: Cardiology;  Laterality: Right;    TUBAL LIGATION       Social History     Tobacco Use    Smoking status: Former     Types: Cigarettes     Quit date: 2016     Years since quittin.7    Smokeless tobacco: Never    Tobacco comments:     nonex 2 weeks   Substance Use Topics    Alcohol use: No     Alcohol/week: 0.0 standard drinks    Drug use: No     Vitals:    BP 98/60   Pulse 86   Temp 97.8 °F (36.6 °C) (Oral)   Resp 18   Ht 5' 6" (1.676 m)   Wt 81.6 kg (180 lb)   LMP  (LMP Unknown)   SpO2 98%   BMI 29.05 kg/m²     Physical Exam  Vitals and nursing note reviewed.   Constitutional:       General: She is not in acute distress.     Appearance: She is ill-appearing. She is not toxic-appearing.   HENT:      Head: Normocephalic and atraumatic.      Mouth/Throat:      Mouth: Mucous membranes are dry.   Eyes:      General: No scleral icterus.  Cardiovascular:      Rate and Rhythm: Regular rhythm. Bradycardia present.      Pulses: Normal pulses.      Heart sounds: Normal heart sounds. No murmur heard.    No friction rub. No gallop.   Pulmonary:      Effort: Pulmonary effort is normal. No respiratory distress.      Breath sounds: Normal breath sounds. No stridor. No wheezing, rhonchi or rales.   Abdominal:      General: Abdomen is flat. There is no distension.      Palpations: Abdomen is soft.      Tenderness: There is no abdominal tenderness. There is no guarding.   Musculoskeletal:         General: No swelling or deformity.      Cervical back: Normal range of motion and neck supple.   Skin:     General: Skin is warm and dry.      Capillary Refill: Capillary refill takes less than 2 seconds.      Coloration: " Skin is not jaundiced.      Findings: No rash.   Neurological:      Mental Status: She is alert and oriented to person, place, and time. Mental status is at baseline.      Motor: Weakness (Right-sided) present.     Procedures    Laboratory Studies:  Labs that have been ordered have been independently reviewed and interpreted by myself.  Labs Reviewed   COMPREHENSIVE METABOLIC PANEL - Abnormal; Notable for the following components:       Result Value    Sodium 135 (*)     Chloride 94 (*)     Glucose 122 (*)     BUN 26 (*)     Alkaline Phosphatase 139 (*)     Anion Gap 17 (*)     eGFR 57.2 (*)     All other components within normal limits   TROPONIN I - Abnormal; Notable for the following components:    Troponin I 0.029 (*)     All other components within normal limits   B-TYPE NATRIURETIC PEPTIDE - Abnormal; Notable for the following components:     (*)     All other components within normal limits   ISTAT PROCEDURE - Abnormal; Notable for the following components:    POC Glucose 128 (*)     POC BUN 32 (*)     POC Sodium 135 (*)     POC Chloride 93 (*)     All other components within normal limits   CBC W/ AUTO DIFFERENTIAL   LIPASE   MAGNESIUM   PHOSPHORUS   TSH   LACTIC ACID, PLASMA   URINALYSIS, REFLEX TO URINE CULTURE   ISTAT CHEM8     Imaging Results              CTA Chest Non-Coronary (PE Studies) (Final result)  Result time 05/30/23 04:46:22      Final result by Dagoberto Briones MD (05/30/23 04:46:22)                   Impression:      No evidence of pulmonary thromboembolism as clinically questioned.  No acute intrathoracic abnormality identified.    Cardiomegaly with left ventricular enlargement.    Similar appearance right upper lobe pulmonary micro nodules when compared with CT chest dated 01/31/2020.    Bilateral renal cysts.    Electronically signed by resident: Jesus Main  Date:    05/30/2023  Time:    03:58    Electronically signed by: Dagoberto Briones  MD  Date:    05/30/2023  Time:    04:46               Narrative:    EXAMINATION:  CTA CHEST NON CORONARY (PE STUDIES)    CLINICAL HISTORY:  Pulmonary embolism (PE) suspected, high prob;    TECHNIQUE:  Low dose axial images, sagittal and coronal reformations were obtained from the thoracic inlet to the lung bases following the IV administration of 75 mL of Omnipaque 350.  MIP images were performed.    COMPARISON:  Chest radiograph 05/30/2023, CTA chest 10/16/2022.  CT chest without contrast, 01/31/2020.    FINDINGS:  Base of Neck: No significant abnormality.    Thoracic soft tissues: Left chest wall pacemaker/AICD and cardiac wires in stable position.  No axillary lymphadenopathy.    Aorta/vasculature: Left-sided aortic arch.  Mild scattered calcific atherosclerosis.  No aneurysm.  Evaluation for dissection is limited by suboptimal opacification of the thoracic aorta.    Heart: Left-sided calcification of coronary arteries.  Left ventricular enlargement.  No pericardial effusion.    Pulmonary vasculature: This study is satisfactory for the evaluation of the pulmonary arteries. There is no filling defect of the pulmonary arteries to suggest pulmonary thromboembolism.  Pulmonary veins are unremarkable.    Zaida/Mediastinum: Borderline prominent mediastinal lymph nodes similar to the prior study.  No new bulky mediastinal lymphadenopathy.    Airways: Patent.    Lungs/Pleura: Detailed evaluation of the pulmonary parenchyma is limited by motion.  Dependent atelectasis.  Stable micronodule in the apicoposterior segment of the left upper lobe (series 3, image 102).  Stable 4 mm nodule in the apical segment of the right upper lobe (series 3, image 134).  Stable 5 mm nodule in the posterior segment right upper lobe (series 3, image 198).  Both nodules are stable when compared with prior noncontrast CT chest dated 01/31/2020.  No significant pulmonary edema.    No consolidation, pleural effusion, or pneumothorax.    Esophagus:  Normal.    Upper Abdomen: No abnormality of the partially imaged upper abdomen.  Similar appearing bilateral renal hypodensities favored to represent simple cysts.    Bones: No acute fracture. No suspicious lytic or sclerotic lesions.                                        US Lower Extremity Veins Right (Final result)  Result time 05/30/23 01:48:32      Final result by Dagoberto Briones MD (05/30/23 01:48:32)                   Impression:      Partially occlusive age-indeterminate deep vein thrombosis involving the bilateral common femoral veins.    Otherwise, no evidence of DVT elsewhere in the right lower extremity.    This report was flagged in Epic as abnormal.    COMMUNICATION  This critical result was discovered/received at 01:45.  The critical information above was relayed directly by Dagoberto Briones MD by [employer] secure chat (with acknowledgement) to Deondre Elizondo MD on 5/30/2023 at 01:47.      Electronically signed by: Dagoberto Briones MD  Date:    05/30/2023  Time:    01:48               Narrative:    EXAMINATION:  US LOWER EXTREMITY VEINS RIGHT    CLINICAL HISTORY:  Pain in leg, unspecified    TECHNIQUE:  Duplex and color flow Doppler evaluation and graded compression of the right lower extremity veins was performed.    COMPARISON:  None    FINDINGS:  Right thigh veins: The right common femoral vein is partially compressible with partial color Doppler filling suggestive of age-indeterminate partially occlusive deep vein thrombosis.  The right femoral, popliteal, upper greater saphenous, and deep femoral veins are patent and free of thrombus. The veins are normally compressible and have normal phasic flow and augmentation response.    Right calf veins: The visualized calf veins are patent.    Contralateral CFV: The contralateral left common femoral vein is partially compressible with partial color Doppler filling suggestive of age-indeterminate partially occlusive deep vein thrombosis.    Miscellaneous:  Bilateral iliac veins are grossly patent.  No organized fluid collection.                                       X-Ray Chest AP Portable (Final result)  Result time 05/30/23 00:28:36      Final result by Hi Ibarra MD (05/30/23 00:28:36)                   Impression:      No significant detrimental change compared to prior examination of 05/01/2023.      Electronically signed by: Hi Ibarra MD  Date:    05/30/2023  Time:    00:28               Narrative:    EXAMINATION:  XR CHEST AP PORTABLE    CLINICAL HISTORY:  Cerebral infarction, unspecified    TECHNIQUE:  Single frontal view of the chest was performed.    COMPARISON:  05/01/2023    FINDINGS:  Cardiac monitoring leads overlie the chest.  There is a left chest wall cardiac pacing device.  Cardiac silhouette is mildly enlarged.  The lungs demonstrate mild coarse interstitial attenuation.  No large confluent airspace consolidation identified.  No significant volume of pleural fluid or pneumothorax identified.  Osseous structures demonstrate mild degenerative changes.                                    EKG (independently interpreted by me): Rhythm: NSR, rate of  99 BPM, no ST elevations or depressions, QTc 456    Imaging (independently interpreted by me):  Cardiomegaly, no effusions, consolidations, edema    I decided to obtain the patient's medical records.  Summary of Medical Records:    Medications   sodium chloride 0.9% flush 10 mL (has no administration in time range)   naloxone 0.4 mg/mL injection 0.02 mg (has no administration in time range)   glucagon (human recombinant) injection 1 mg (has no administration in time range)   enoxaparin injection 40 mg (has no administration in time range)   dextrose 10% bolus 125 mL 125 mL (has no administration in time range)   dextrose 10% bolus 250 mL 250 mL (has no administration in time range)   dextrose 40 % gel 15,000 mg (has no administration in time range)   dextrose 40 % gel 30,000 mg (has no  administration in time range)   pantoprazole EC tablet 40 mg (has no administration in time range)   ondansetron injection 4 mg (has no administration in time range)   metoclopramide HCl tablet 10 mg (has no administration in time range)   morphine injection 2 mg (0 mg Intravenous Hold 5/30/23 0620)   ondansetron injection 4 mg (4 mg Intravenous Given 5/29/23 4615)   lactated ringers bolus 250 mL (0 mLs Intravenous Stopped 5/30/23 0009)   lactated ringers bolus 500 mL (0 mLs Intravenous Stopped 5/30/23 0418)   oxyCODONE immediate release tablet 5 mg (5 mg Oral Given 5/30/23 0252)   iohexoL (OMNIPAQUE 350) injection 75 mL (75 mLs Intravenous Given 5/30/23 0339)     MDM:    62 y.o. female with decreased p.o. intake after suffering a stroke 1 month ago    Differential Dx:  Differential includes but is not limited to failure to thrive, chronic aspiration, less likely surgical abdominal pathology    ED Management:  Abdominal pain workup started for an acute presentation of an emergent condition with multiple comorbidities.  Patient has low blood pressures and appears volume down on exam, will judiciously fluid resuscitate as she has severe heart failure.    Discussed with:  Hospital medicine  Considerations/MDM Components:  Considered obtaining CT abdomen pelvis but due to lack of abdominal tenderness will not ordered at this time.  Second fluid bolus ordered for persistent hypotension.  Will obtain CT PE as patient has age indeterminate DVTs on ultrasound.  Patient found to have mild troponin and BNP elevation.  Patient admitted to Hospital Medicine for further workup and treatment     ED Course as of 05/30/23 0700   Tue May 30, 2023   0659 Troponin I(!): 0.029 [AW]   0659 BNP(!): 161 [AW]      ED Course User Index  [AW] Deondre Elizondo MD     Diagnostic Impression:    Final diagnoses:  [R00.0] Tachycardia, unspecified  [M79.606] Leg pain  [I95.9] Hypotension (Primary)     ED Disposition Condition    Observation  Stable                   Deondre Elizondo MD  Resident  05/30/23 0700

## 2023-05-30 NOTE — DISCHARGE SUMMARY
[facility] - Emergency Dept  Hospital Medicine  Discharge Summary      Patient Name: Diomedes Mohr  MRN: [id number]  JOSE: [id number]  Patient Class: OP- Observation  Admission Date: 5/29/2023  Hospital Length of Stay: 0 days  Discharge Date and Time:  05/30/2023 3:13 PM  Attending Physician: Marlo Alba MD   Discharging Provider: Homer Churchill DO  Primary Care Provider: Fernando Manzo MD  Hospital Medicine Team: [facility] 5 Homer Churchill DO  Primary Care Team: [facility] 5    HPI:   Ms. Mohr is a 62 YOF with HTN, DM2, HLD, tobacco use (quit in 2020), CHF EF of 10-15%, PE (December 2021, on pradaxa), CAD, DCM, HFpEF (15%) s/p AICD, pulmonary hypertension, osteoarthritis of bilateral hips (L>R) with chronic pain, CVA (2020, R MCA, no deficits), left MCA stroke during recent admission s/p thrombectomy (4/29/23) presenting with chief complaint of N/V.  Onset, 2 days prior, vomited 3-4 times since, no hemoptysis, no bile, unable to keep PO down, amount is half a cup. Associated with fatigue, and dry mouth, no exacerbating or remitting factors. Has never experienced this kind of N/V before, no exposure to sick contacts, no change in diet. ROS negative for lightheadedness, weakness, abdominal pain, fever, chills, diarrhea, chest pain, sob, new focal weakness or numbness. ROS positive for R LE pain.       ED course notable for: Hypotensive on admit down to 56/40. Afebrile. O2 sat high 90s on RA. WBC and Hgb wnl. Electrolytes stable. Cr 1.1. . Trop 0.029. Lactate 1.7. CXR unremarkable. CTA with cardiomegaly but unremarkable for PE. U/S of LE shows partially occlusive age-indeterminate deep vein thrombosis involving the bilateral common femoral veins. Given 500cc LR for hypotension. CTA chest negative for PE. She received IVF, zofran and oxycodone in ED.        * No surgery found *      Hospital Course:   Patient admitted to hospital medicine with failure to thrive, ARBEN, and hypotension. Home  blood pressure medicines were held. Hypotension improved following IVF. Due to bed capacity issues patient transferred to Hospital Medicine at [facility] for further treatment of failure to thrive. Medicine team discussed plan with patient and family at bedside, who expressed understanding and were agreeable to transfer. Patient medically stable for transfer 5/30.        Goals of Care Treatment Preferences:  Code Status: Full Code      Consults: n/a      Final Active Diagnoses:    Diagnosis Date Noted POA    PRINCIPAL PROBLEM:  Failure to thrive in adult [R62.7] 05/30/2023 Unknown    Nausea & vomiting [R11.2] 05/30/2023 Unknown    Metabolic acidosis [E87.20] 05/30/2023 Unknown    ARBEN (acute kidney injury) [N17.9] 05/30/2023 Unknown    Acute deep vein thrombosis (DVT) of both lower extremities [I82.403] 05/30/2023 Unknown    Long term current use of anticoagulant [Z79.01] 04/29/2023 Not Applicable     Chronic    Pulmonary hypertension due to left heart disease [I27.22] 02/24/2023 Yes     Chronic    History of pulmonary embolism [Z86.711] 07/29/2022 Yes     Chronic    ICD (implantable cardioverter-defibrillator) in place [Z95.810] 03/31/2022 Yes     Chronic    Coronary artery disease involving native heart [I25.10] 10/11/2021 Yes     Chronic    Elevated troponin [R77.8] 08/26/2020 Yes    Cerebrovascular accident (CVA) due to embolism of right middle cerebral artery [I63.411] 08/14/2020 Yes    Chronic combined systolic and diastolic congestive heart failure [I50.42] 01/12/2020 Yes     Chronic    Dilated cardiomyopathy [I42.0] 12/27/2019 Yes     Chronic    Type 2 diabetes mellitus without complication, without long-term current use of insulin [E11.9] 12/25/2019 Yes     Chronic    Hypertension [I10] 12/25/2019 Yes     Chronic    Hyperlipemia [E78.5] 12/25/2019 Yes     Chronic      Problems Resolved During this Admission:       Discharged Condition: stable    Disposition: Per final  "discharging physician     Follow Up: Per final discharging physician     Patient Instructions:   No discharge procedures on file.    Significant Diagnostic Studies: Labs: All labs within the past 24 hours have been reviewed    Pending Diagnostic Studies:     None         Medications:  Reconciled Home Medications:      Medication List      ASK your doctor about these medications    acetaminophen 325 MG tablet  Commonly known as: TYLENOL  Take 2 tablets (650 mg total) by mouth every 8 (eight) hours as needed.     * albuterol 90 mcg/actuation inhaler  Commonly known as: PROVENTIL/VENTOLIN HFA  Inhale 2 puffs into the lungs every 6 (six) hours as needed for Wheezing or Shortness of Breath.     * albuterol 2.5 mg /3 mL (0.083 %) nebulizer solution  Commonly known as: PROVENTIL  Take 3 mLs (2.5 mg total) by nebulization every 6 (six) hours as needed for Wheezing or Shortness of Breath. Rescue     aspirin 81 MG EC tablet  Commonly known as: ECOTRIN  Take 1 tablet (81 mg total) by mouth once daily.     atorvastatin 80 MG tablet  Commonly known as: LIPITOR  Take 1 tablet (80 mg total) by mouth once daily.     dabigatran etexilate 150 mg Cap  Commonly known as: PRADAXA  Take 1 capsule (150 mg total) by mouth 2 (two) times a day. "Do NOT break, chew, or open capsules."     dulaglutide 1.5 mg/0.5 mL pen injector  Commonly known as: TRULICITY  Inject 1.5 mg into the skin once a week.     empagliflozin 10 mg tablet  Commonly known as: JARDIANCE  Take 1 tablet (10 mg total) by mouth once daily.     ENTRESTO  mg per tablet  Generic drug: sacubitriL-valsartan  Take 1 tablet by mouth 2 (two) times daily.     glimepiride 4 MG tablet  Commonly known as: AMARYL  Take 4 mg by mouth 2 (two) times a day.     ipratropium-albuteroL  mcg/actuation inhaler  Commonly known as: CombiVENT  Inhale 1 puff into the lungs 4 (four) times daily. Rescue     metFORMIN 1000 MG tablet  Commonly known as: GLUCOPHAGE  Take 1,000 mg by mouth 2 " (two) times daily.     metoprolol succinate 100 MG 24 hr tablet  Commonly known as: TOPROL-XL  Take 1 tablet (100 mg total) by mouth once daily.     spironolactone 25 MG tablet  Commonly known as: ALDACTONE  Take 1 tablet (25 mg total) by mouth once daily.     torsemide 10 MG Tab  Commonly known as: DEMADEX  Take 1 tablet (10 mg total) by mouth once daily.     VOLTAREN 1 % Gel  Generic drug: diclofenac sodium  Apply topically to the affected area daily as needed for pain.         * This list has 2 medication(s) that are the same as other medications prescribed for you. Read the directions carefully, and ask your doctor or other care provider to review them with you.                Indwelling Lines/Drains at time of discharge:   Lines/Drains/Airways     Drain  Duration                Urethral Catheter 05/30/23 0915 Silicone 16 Fr. <1 day                Time spent on the discharge of patient: 35 minutes         Homer Churchill DO  Department of Hospital Medicine  [facility] - Emergency Dept

## 2023-05-30 NOTE — ASSESSMENT & PLAN NOTE
AG 17 on admission, HCO3 24, Cl 94. Lactate 1.7. BUN 26.     Ddx: lactic acidosis 2/2 to metformin use vs ASA use.     - hold home metformin  - continue home ASA

## 2023-05-30 NOTE — HOSPITAL COURSE
Patient admitted to hospital medicine with failure to thrive, ARBEN, and hypotension. Home blood pressure medicines were held. Hypotension improved following IVF. Due to bed capacity issues patient transferred to Hospital Medicine at [facility] for further treatment of failure to thrive. Medicine team discussed plan with patient and family at bedside, who expressed understanding and were agreeable to transfer. Patient medically stable for transfer 5/30.

## 2023-05-30 NOTE — ED NOTES
Pt and family agreeable to [facility] capacity transfer. PFC lead informed.

## 2023-05-30 NOTE — ED NOTES
Assumed care of pt at this time. Pt denies nausea or vomiting. Pt endorses pain and limited active ROM to RLE resulting from stroke 2 weeks ago. Pt denies needs at this time.

## 2023-05-30 NOTE — SUBJECTIVE & OBJECTIVE
Past Medical History:   Diagnosis Date    Acute on chronic combined systolic and diastolic heart failure 12/26/2019    Anticoagulant long-term use     Anxiety     Arthritis     Asthma     Depression     H/O: hysterectomy     Hyperlipemia     Hypertension     Pulmonary edema     Schizophrenia        Past Surgical History:   Procedure Laterality Date    BLADDER SUSPENSION      CARPAL TUNNEL RELEASE Right     HEEL SPUR SURGERY Left     HYSTERECTOMY      LEFT HEART CATHETERIZATION Bilateral 12/27/2019    Procedure: Left heart cath;  Surgeon: Steve Chambers MD;  Location: [facility] CATH LAB;  Service: Cardiology;  Laterality: Bilateral;    RIGHT HEART CATHETERIZATION Right 7/26/2021    Procedure: INSERTION, CATHETER, RIGHT HEART;  Surgeon: Petr Naranjo MD;  Location: [facility] CATH LAB;  Service: Cardiology;  Laterality: Right;    RIGHT HEART CATHETERIZATION Right 5/12/2022    Procedure: INSERTION, CATHETER, RIGHT HEART;  Surgeon: Chandana Ivory Jr., MD;  Location: [facility] CATH LAB;  Service: Cardiology;  Laterality: Right;    TUBAL LIGATION         Review of patient's allergies indicates:   Allergen Reactions    Adhesive Blisters    Captopril Other (See Comments)     COUGH       No current facility-administered medications on file prior to encounter.     Current Outpatient Medications on File Prior to Encounter   Medication Sig    acetaminophen (TYLENOL) 325 MG tablet Take 2 tablets (650 mg total) by mouth every 8 (eight) hours as needed.    albuterol (PROVENTIL) 2.5 mg /3 mL (0.083 %) nebulizer solution Take 3 mLs (2.5 mg total) by nebulization every 6 (six) hours as needed for Wheezing or Shortness of Breath. Rescue    albuterol (PROVENTIL/VENTOLIN HFA) 90 mcg/actuation inhaler Inhale 2 puffs into the lungs every 6 (six) hours as needed for Wheezing or Shortness of Breath.    aspirin (ECOTRIN) 81 MG EC tablet Take 1 tablet (81 mg total) by mouth once daily.    atorvastatin (LIPITOR) 80 MG tablet Take 1 tablet  "(80 mg total) by mouth once daily.    dabigatran etexilate (PRADAXA) 150 mg Cap Take 1 capsule (150 mg total) by mouth 2 (two) times a day. "Do NOT break, chew, or open capsules."    dulaglutide (TRULICITY) 1.5 mg/0.5 mL pen injector Inject 1.5 mg into the skin once a week.    empagliflozin (JARDIANCE) 10 mg tablet Take 1 tablet (10 mg total) by mouth once daily.    glimepiride (AMARYL) 4 MG tablet Take 4 mg by mouth 2 (two) times a day.    ipratropium-albuteroL (COMBIVENT)  mcg/actuation inhaler Inhale 1 puff into the lungs 4 (four) times daily. Rescue    metFORMIN (GLUCOPHAGE) 1000 MG tablet Take 1,000 mg by mouth 2 (two) times daily.     metoprolol succinate (TOPROL-XL) 100 MG 24 hr tablet Take 1 tablet (100 mg total) by mouth once daily.    sacubitriL-valsartan (ENTRESTO)  mg per tablet Take 1 tablet by mouth 2 (two) times daily.    spironolactone (ALDACTONE) 25 MG tablet Take 1 tablet (25 mg total) by mouth once daily.    torsemide (DEMADEX) 10 MG Tab Take 1 tablet (10 mg total) by mouth once daily.     Family History       Problem Relation (Age of Onset)    No Known Problems Mother, Father          Tobacco Use    Smoking status: Former     Types: Cigarettes     Quit date: 2016     Years since quittin.7    Smokeless tobacco: Never    Tobacco comments:     nonex 2 weeks   Substance and Sexual Activity    Alcohol use: No     Alcohol/week: 0.0 standard drinks    Drug use: No    Sexual activity: Not on file     Review of Systems   Constitutional:  Negative for chills and fever.   HENT:  Negative for sneezing and sore throat.    Respiratory:  Negative for cough and shortness of breath.    Cardiovascular:  Negative for chest pain and leg swelling.   Gastrointestinal:  Positive for nausea and vomiting. Negative for abdominal distention, abdominal pain, anal bleeding, blood in stool, constipation, diarrhea and rectal pain.   Genitourinary:  Negative for difficulty urinating.   Musculoskeletal:  " Negative for back pain.   Neurological:  Negative for headaches.   Psychiatric/Behavioral:  Negative for agitation and confusion.    Objective:     Vital Signs (Most Recent):  Temp: 97.7 °F (36.5 °C) (05/30/23 0700)  Pulse: 71 (05/30/23 0700)  Resp: 16 (05/30/23 0700)  BP: (!) 103/56 (05/30/23 0700)  SpO2: 97 % (05/30/23 0700) Vital Signs (24h Range):  Temp:  [97.7 °F (36.5 °C)-98 °F (36.7 °C)] 97.7 °F (36.5 °C)  Pulse:  [53-94] 71  Resp:  [13-18] 16  SpO2:  [95 %-100 %] 97 %  BP: ()/(38-68) 103/56     Weight: 81.6 kg (180 lb)  Body mass index is 29.05 kg/m².     Physical Exam  Vitals and nursing note reviewed.   Constitutional:       General: She is not in acute distress.     Appearance: Normal appearance. She is not ill-appearing.   HENT:      Head: Normocephalic and atraumatic.      Right Ear: External ear normal.      Left Ear: External ear normal.      Nose: Nose normal.      Mouth/Throat:      Mouth: Mucous membranes are dry.      Pharynx: Oropharynx is clear.   Eyes:      General:         Right eye: No discharge.         Left eye: No discharge.   Cardiovascular:      Rate and Rhythm: Normal rate and regular rhythm.      Pulses: Normal pulses.      Heart sounds: Normal heart sounds. No murmur heard.  Pulmonary:      Effort: Pulmonary effort is normal. No respiratory distress.      Breath sounds: Normal breath sounds.   Abdominal:      General: Bowel sounds are normal. There is no distension.      Palpations: Abdomen is soft. There is no mass.      Tenderness: There is no abdominal tenderness. There is no guarding or rebound.      Hernia: No hernia is present.   Musculoskeletal:         General: Normal range of motion.      Cervical back: Normal range of motion.      Right lower leg: No edema.      Left lower leg: No edema.   Skin:     General: Skin is warm and dry.      Capillary Refill: Capillary refill takes less than 2 seconds.   Neurological:      General: No focal deficit present.      Mental  Status: She is alert and oriented to person, place, and time.   Psychiatric:         Mood and Affect: Mood normal.         Behavior: Behavior normal.              Significant Labs: All pertinent labs within the past 24 hours have been reviewed.    Significant Imaging: I have reviewed all pertinent imaging results/findings within the past 24 hours.

## 2023-05-30 NOTE — ASSESSMENT & PLAN NOTE
A1c 7.5, Patient's FSGs are controlled on current medication regimen.  Last A1c reviewed-   Lab Results   Component Value Date    HGBA1C 7.5 (H) 04/30/2023     Most recent fingerstick glucose reviewed- No results for input(s): POCTGLUCOSE in the last 24 hours.  Current correctional scale  Low  Maintain anti-hyperglycemic dose as follows-   Antihyperglycemics (From admission, onward)    None        Hold Oral hypoglycemics while patient is in the hospital.    - LDSSI   - bsg goal 140-180 inpatient  - hold home antihyperglycemics  - diabetic diet

## 2023-05-30 NOTE — PHARMACY MED REC
"  Admission Medication History     The home medication history was taken by Vandana Vick.    You may go to "Admission" then "Reconcile Home Medications" tabs to review and/or act upon these items.     The home medication list has been updated by the Pharmacy department.   Please read ALL comments highlighted in yellow.   Please address this information as you see fit.    Feel free to contact us if you have any questions or require assistance.      Medications listed below were obtained from: Patient  Current Outpatient Medications on File Prior to Encounter   Medication Sig    acetaminophen (TYLENOL) 325 MG tablet   Take 2 tablets (650 mg total) by mouth every 8 (eight) hours as needed.    aspirin (ECOTRIN) 81 MG EC tablet   Take 1 tablet (81 mg total) by mouth once daily.    atorvastatin (LIPITOR) 80 MG tablet   Take 1 tablet (80 mg total) by mouth once daily.    dabigatran etexilate (PRADAXA) 150 mg Cap Take 1 capsule (150 mg total) by mouth 2 (two) times a day. "Do NOT break, chew, or open capsules."      diclofenac sodium (VOLTAREN) 1 % Gel   Apply topically to the affected area daily as needed for pain.    dulaglutide (TRULICITY) 1.5 mg/0.5 mL pen injector   Inject 1.5 mg into the skin once a week.    empagliflozin (JARDIANCE) 10 mg tablet   Take 1 tablet (10 mg total) by mouth once daily.    glimepiride (AMARYL) 4 MG tablet   Take 4 mg by mouth 2 (two) times a day.    ipratropium-albuteroL (COMBIVENT)  mcg/actuation inhaler   Inhale 1 puff into the lungs 4 (four) times daily as needed for Shortness of Breath. Rescue    metFORMIN (GLUCOPHAGE) 1000 MG tablet   Take 1,000 mg by mouth 2 (two) times daily.     metoprolol succinate (TOPROL-XL) 100 MG 24 hr tablet   Take 1 tablet (100 mg total) by mouth once daily.    sacubitriL-valsartan (ENTRESTO)  mg per tablet   Take 1 tablet by mouth 2 (two) times daily.    spironolactone (ALDACTONE) 25 MG tablet   Take 1 tablet (25 mg total) by mouth once daily.    " torsemide (DEMADEX) 10 MG Tab   Take 1 tablet (10 mg total) by mouth once daily.    albuterol (PROVENTIL) 2.5 mg /3 mL (0.083 %) nebulizer solution Take 3 mLs (2.5 mg total) by nebulization every 6 (six) hours as needed for Wheezing or Shortness of Breath. Rescue      albuterol (PROVENTIL/VENTOLIN HFA) 90 mcg/actuation inhaler Inhale 2 puffs into the lungs every 6 (six) hours as needed for Wheezing or Shortness of Breath.                 Vandana Vick  [id number]                .

## 2023-05-30 NOTE — ASSESSMENT & PLAN NOTE
Patient is identified as having Combined Systolic and Diastolic heart failure that is Chronic. CHF is currently controlled. Latest ECHO performed and demonstrates- Results for orders placed during the hospital encounter of 04/27/23. Continue Beta Blocker, ACE/ARB, Furosemide and Aldactone and monitor clinical status closely. Monitor on telemetry. Patient is off CHF pathway.  Monitor strict Is&Os and daily weights.  Place on fluid restriction of 1500 cc. Continue to stress to patient importance of self efficacy and  on diet for CHF. Last BNP reviewed- and noted below. .   Recent Labs   Lab 05/30/23  0222   *     - hold home entresto in setting of initial hypotension  - hold home metoprolol in setting of initial hypotension  - hold home spironolactone in setting of initial hypotension  - careful IVF resuscitation in the setting of ARBEN and low EF

## 2023-05-30 NOTE — ED NOTES
I-STAT Chem-8+ Results:   Value Reference Range   Sodium 135 136-145 mmol/L   Potassium  3.9 3.5-5.1 mmol/L   Chloride 93  mmol/L   Ionized Calcium 1.11 1.06-1.42 mmol/L   CO2 (measured) 29 23-29 mmol/L   Glucose 128  mg/dL   BUN 32 6-30 mg/dL   Creatinine 1.1 0.5-1.4 mg/dL   Hematocrit 47 36-54%

## 2023-05-30 NOTE — ASSESSMENT & PLAN NOTE
Pt with new N/V with inability to tolerate PO. No blood, no bile, amount half a cup. No abdominal pain. Fatigued. Abdominal exam unremarkable. Dry oral mucosa and overall hypovolemic on exam. CTA negative for PE. EKG NSR and LVH.     Ddx: diabetic gastroparesis vs gastritis vs gastroenteritis     - reglan  - PPI   - zofran   - hold metformin  - outpatient EGD if symptoms persist.

## 2023-05-31 PROBLEM — E83.51 HYPOCALCEMIA: Status: ACTIVE | Noted: 2023-05-31

## 2023-06-02 PROBLEM — R11.2 NAUSEA & VOMITING: Status: RESOLVED | Noted: 2023-05-30 | Resolved: 2023-06-02

## 2023-06-02 PROBLEM — E83.51 HYPOCALCEMIA: Status: RESOLVED | Noted: 2023-05-31 | Resolved: 2023-06-02

## 2023-06-07 ENCOUNTER — TELEPHONE (OUTPATIENT)
Dept: HEMATOLOGY/ONCOLOGY | Facility: CLINIC | Age: 63
End: 2023-06-07
Payer: MEDICARE

## 2023-06-08 ENCOUNTER — OFFICE VISIT (OUTPATIENT)
Dept: HEMATOLOGY/ONCOLOGY | Facility: CLINIC | Age: 63
End: 2023-06-08
Payer: MEDICARE

## 2023-06-08 ENCOUNTER — OFFICE VISIT (OUTPATIENT)
Dept: FAMILY MEDICINE | Facility: CLINIC | Age: 63
End: 2023-06-08
Payer: MEDICARE

## 2023-06-08 ENCOUNTER — LAB VISIT (OUTPATIENT)
Dept: LAB | Facility: HOSPITAL | Age: 63
End: 2023-06-08
Payer: MEDICARE

## 2023-06-08 VITALS
SYSTOLIC BLOOD PRESSURE: 112 MMHG | HEIGHT: 66 IN | BODY MASS INDEX: 27.83 KG/M2 | DIASTOLIC BLOOD PRESSURE: 64 MMHG | HEART RATE: 67 BPM | OXYGEN SATURATION: 98 %

## 2023-06-08 VITALS
SYSTOLIC BLOOD PRESSURE: 94 MMHG | OXYGEN SATURATION: 97 % | HEIGHT: 66 IN | TEMPERATURE: 97 F | RESPIRATION RATE: 18 BRPM | DIASTOLIC BLOOD PRESSURE: 53 MMHG | HEART RATE: 80 BPM | BODY MASS INDEX: 27.83 KG/M2

## 2023-06-08 DIAGNOSIS — E11.9 TYPE 2 DIABETES MELLITUS WITHOUT COMPLICATION, WITHOUT LONG-TERM CURRENT USE OF INSULIN: ICD-10-CM

## 2023-06-08 DIAGNOSIS — Z09 HOSPITAL DISCHARGE FOLLOW-UP: ICD-10-CM

## 2023-06-08 DIAGNOSIS — Z12.31 ENCOUNTER FOR SCREENING MAMMOGRAM FOR MALIGNANT NEOPLASM OF BREAST: ICD-10-CM

## 2023-06-08 DIAGNOSIS — I10 ESSENTIAL HYPERTENSION: ICD-10-CM

## 2023-06-08 DIAGNOSIS — Z12.11 ENCOUNTER FOR COLORECTAL CANCER SCREENING: ICD-10-CM

## 2023-06-08 DIAGNOSIS — I42.0 DILATED CARDIOMYOPATHY: ICD-10-CM

## 2023-06-08 DIAGNOSIS — I63.511 CEREBROVASCULAR ACCIDENT (CVA) DUE TO OCCLUSION OF RIGHT MIDDLE CEREBRAL ARTERY: ICD-10-CM

## 2023-06-08 DIAGNOSIS — I42.9 HEART FAILURE WITH REDUCED EJECTION FRACTION DUE TO CARDIOMYOPATHY: ICD-10-CM

## 2023-06-08 DIAGNOSIS — Z12.12 ENCOUNTER FOR COLORECTAL CANCER SCREENING: ICD-10-CM

## 2023-06-08 DIAGNOSIS — E11.9 TYPE 2 DIABETES MELLITUS WITHOUT COMPLICATION, WITHOUT LONG-TERM CURRENT USE OF INSULIN: Chronic | ICD-10-CM

## 2023-06-08 DIAGNOSIS — E86.1 HYPOTENSION DUE TO HYPOVOLEMIA: ICD-10-CM

## 2023-06-08 DIAGNOSIS — Z23 NEED FOR PNEUMOCOCCAL VACCINE: ICD-10-CM

## 2023-06-08 DIAGNOSIS — I50.20 HEART FAILURE WITH REDUCED EJECTION FRACTION DUE TO CARDIOMYOPATHY: ICD-10-CM

## 2023-06-08 DIAGNOSIS — I69.30 HISTORY OF CVA WITH RESIDUAL DEFICIT: ICD-10-CM

## 2023-06-08 DIAGNOSIS — O22.30 DVT (DEEP VEIN THROMBOSIS) IN PREGNANCY: ICD-10-CM

## 2023-06-08 DIAGNOSIS — M16.0 PRIMARY OSTEOARTHRITIS OF BOTH HIPS: ICD-10-CM

## 2023-06-08 DIAGNOSIS — I26.99 ACUTE PULMONARY EMBOLISM WITHOUT ACUTE COR PULMONALE, UNSPECIFIED PULMONARY EMBOLISM TYPE: Primary | ICD-10-CM

## 2023-06-08 DIAGNOSIS — R11.2 INTRACTABLE NAUSEA AND VOMITING: ICD-10-CM

## 2023-06-08 DIAGNOSIS — M54.50 LOW BACK PAIN POTENTIALLY ASSOCIATED WITH RADICULOPATHY: ICD-10-CM

## 2023-06-08 DIAGNOSIS — Z79.01 LONG TERM CURRENT USE OF ANTICOAGULANT: ICD-10-CM

## 2023-06-08 LAB
ALBUMIN SERPL BCP-MCNC: 2.8 G/DL (ref 3.5–5.2)
ALP SERPL-CCNC: 135 U/L (ref 55–135)
ALT SERPL W/O P-5'-P-CCNC: 11 U/L (ref 10–44)
ANION GAP SERPL CALC-SCNC: 8 MMOL/L (ref 8–16)
AST SERPL-CCNC: 16 U/L (ref 10–40)
BASOPHILS # BLD AUTO: 0.02 K/UL (ref 0–0.2)
BASOPHILS NFR BLD: 0.3 % (ref 0–1.9)
BILIRUB SERPL-MCNC: 0.5 MG/DL (ref 0.1–1)
BUN SERPL-MCNC: 7 MG/DL (ref 8–23)
CALCIUM SERPL-MCNC: 9.5 MG/DL (ref 8.7–10.5)
CHLORIDE SERPL-SCNC: 104 MMOL/L (ref 95–110)
CO2 SERPL-SCNC: 27 MMOL/L (ref 23–29)
CREAT SERPL-MCNC: 0.7 MG/DL (ref 0.5–1.4)
DIFFERENTIAL METHOD: ABNORMAL
EOSINOPHIL # BLD AUTO: 0.1 K/UL (ref 0–0.5)
EOSINOPHIL NFR BLD: 1.8 % (ref 0–8)
ERYTHROCYTE [DISTWIDTH] IN BLOOD BY AUTOMATED COUNT: 15 % (ref 11.5–14.5)
EST. GFR  (NO RACE VARIABLE): >60 ML/MIN/1.73 M^2
GLUCOSE SERPL-MCNC: 211 MG/DL (ref 70–110)
HCT VFR BLD AUTO: 36.2 % (ref 37–48.5)
HGB BLD-MCNC: 11.5 G/DL (ref 12–16)
IMM GRANULOCYTES # BLD AUTO: 0.01 K/UL (ref 0–0.04)
IMM GRANULOCYTES NFR BLD AUTO: 0.1 % (ref 0–0.5)
LYMPHOCYTES # BLD AUTO: 2.3 K/UL (ref 1–4.8)
LYMPHOCYTES NFR BLD: 31.6 % (ref 18–48)
MCH RBC QN AUTO: 29 PG (ref 27–31)
MCHC RBC AUTO-ENTMCNC: 31.8 G/DL (ref 32–36)
MCV RBC AUTO: 91 FL (ref 82–98)
MONOCYTES # BLD AUTO: 0.4 K/UL (ref 0.3–1)
MONOCYTES NFR BLD: 6.2 % (ref 4–15)
NEUTROPHILS # BLD AUTO: 4.3 K/UL (ref 1.8–7.7)
NEUTROPHILS NFR BLD: 60 % (ref 38–73)
NRBC BLD-RTO: 0 /100 WBC
PLATELET # BLD AUTO: 328 K/UL (ref 150–450)
PMV BLD AUTO: 9.2 FL (ref 9.2–12.9)
POTASSIUM SERPL-SCNC: 3.8 MMOL/L (ref 3.5–5.1)
PROT SERPL-MCNC: 6.3 G/DL (ref 6–8.4)
RBC # BLD AUTO: 3.97 M/UL (ref 4–5.4)
SODIUM SERPL-SCNC: 139 MMOL/L (ref 136–145)
WBC # BLD AUTO: 7.13 K/UL (ref 3.9–12.7)

## 2023-06-08 PROCEDURE — 3051F HG A1C>EQUAL 7.0%<8.0%: CPT | Mod: CPTII,S$GLB,, | Performed by: STUDENT IN AN ORGANIZED HEALTH CARE EDUCATION/TRAINING PROGRAM

## 2023-06-08 PROCEDURE — 99214 OFFICE O/P EST MOD 30 MIN: CPT | Mod: S$GLB,,, | Performed by: STUDENT IN AN ORGANIZED HEALTH CARE EDUCATION/TRAINING PROGRAM

## 2023-06-08 PROCEDURE — 3074F PR MOST RECENT SYSTOLIC BLOOD PRESSURE < 130 MM HG: ICD-10-PCS | Mod: CPTII,S$GLB,, | Performed by: STUDENT IN AN ORGANIZED HEALTH CARE EDUCATION/TRAINING PROGRAM

## 2023-06-08 PROCEDURE — 4010F ACE/ARB THERAPY RXD/TAKEN: CPT | Mod: CPTII,S$GLB,, | Performed by: STUDENT IN AN ORGANIZED HEALTH CARE EDUCATION/TRAINING PROGRAM

## 2023-06-08 PROCEDURE — 1111F PR DISCHARGE MEDS RECONCILED W/ CURRENT OUTPATIENT MED LIST: ICD-10-PCS | Mod: CPTII,S$GLB,, | Performed by: STUDENT IN AN ORGANIZED HEALTH CARE EDUCATION/TRAINING PROGRAM

## 2023-06-08 PROCEDURE — 99999 PR PBB SHADOW E&M-EST. PATIENT-LVL IV: CPT | Mod: PBBFAC,,, | Performed by: STUDENT IN AN ORGANIZED HEALTH CARE EDUCATION/TRAINING PROGRAM

## 2023-06-08 PROCEDURE — 1159F PR MEDICATION LIST DOCUMENTED IN MEDICAL RECORD: ICD-10-PCS | Mod: CPTII,S$GLB,, | Performed by: STUDENT IN AN ORGANIZED HEALTH CARE EDUCATION/TRAINING PROGRAM

## 2023-06-08 PROCEDURE — 3074F SYST BP LT 130 MM HG: CPT | Mod: CPTII,S$GLB,, | Performed by: STUDENT IN AN ORGANIZED HEALTH CARE EDUCATION/TRAINING PROGRAM

## 2023-06-08 PROCEDURE — 99214 OFFICE O/P EST MOD 30 MIN: CPT | Mod: S$PBB,HCNC,, | Performed by: NURSE PRACTITIONER

## 2023-06-08 PROCEDURE — 99214 PR OFFICE/OUTPT VISIT, EST, LEVL IV, 30-39 MIN: ICD-10-PCS | Mod: S$GLB,,, | Performed by: STUDENT IN AN ORGANIZED HEALTH CARE EDUCATION/TRAINING PROGRAM

## 2023-06-08 PROCEDURE — 3008F BODY MASS INDEX DOCD: CPT | Mod: CPTII,S$GLB,, | Performed by: STUDENT IN AN ORGANIZED HEALTH CARE EDUCATION/TRAINING PROGRAM

## 2023-06-08 PROCEDURE — 99999 PR PBB SHADOW E&M-EST. PATIENT-LVL IV: ICD-10-PCS | Mod: PBBFAC,,, | Performed by: STUDENT IN AN ORGANIZED HEALTH CARE EDUCATION/TRAINING PROGRAM

## 2023-06-08 PROCEDURE — 1160F RVW MEDS BY RX/DR IN RCRD: CPT | Mod: CPTII,S$GLB,, | Performed by: STUDENT IN AN ORGANIZED HEALTH CARE EDUCATION/TRAINING PROGRAM

## 2023-06-08 PROCEDURE — 90677 PNEUMOCOCCAL CONJUGATE VACCINE 20-VALENT: ICD-10-PCS | Mod: S$GLB,,, | Performed by: STUDENT IN AN ORGANIZED HEALTH CARE EDUCATION/TRAINING PROGRAM

## 2023-06-08 PROCEDURE — 1160F PR REVIEW ALL MEDS BY PRESCRIBER/CLIN PHARMACIST DOCUMENTED: ICD-10-PCS | Mod: CPTII,S$GLB,, | Performed by: STUDENT IN AN ORGANIZED HEALTH CARE EDUCATION/TRAINING PROGRAM

## 2023-06-08 PROCEDURE — 99999 PR PBB SHADOW E&M-EST. PATIENT-LVL IV: ICD-10-PCS | Mod: PBBFAC,HCNC,, | Performed by: NURSE PRACTITIONER

## 2023-06-08 PROCEDURE — 36415 COLL VENOUS BLD VENIPUNCTURE: CPT | Performed by: STUDENT IN AN ORGANIZED HEALTH CARE EDUCATION/TRAINING PROGRAM

## 2023-06-08 PROCEDURE — 1111F DSCHRG MED/CURRENT MED MERGE: CPT | Mod: CPTII,S$GLB,, | Performed by: STUDENT IN AN ORGANIZED HEALTH CARE EDUCATION/TRAINING PROGRAM

## 2023-06-08 PROCEDURE — 3066F NEPHROPATHY DOC TX: CPT | Mod: HCNC,,, | Performed by: NURSE PRACTITIONER

## 2023-06-08 PROCEDURE — G0009 PNEUMOCOCCAL CONJUGATE VACCINE 20-VALENT: ICD-10-PCS | Mod: S$GLB,,, | Performed by: STUDENT IN AN ORGANIZED HEALTH CARE EDUCATION/TRAINING PROGRAM

## 2023-06-08 PROCEDURE — 1159F MED LIST DOCD IN RCRD: CPT | Mod: CPTII,S$GLB,, | Performed by: STUDENT IN AN ORGANIZED HEALTH CARE EDUCATION/TRAINING PROGRAM

## 2023-06-08 PROCEDURE — 3008F PR BODY MASS INDEX (BMI) DOCUMENTED: ICD-10-PCS | Mod: CPTII,S$GLB,, | Performed by: STUDENT IN AN ORGANIZED HEALTH CARE EDUCATION/TRAINING PROGRAM

## 2023-06-08 PROCEDURE — 3078F DIAST BP <80 MM HG: CPT | Mod: CPTII,S$GLB,, | Performed by: STUDENT IN AN ORGANIZED HEALTH CARE EDUCATION/TRAINING PROGRAM

## 2023-06-08 PROCEDURE — G0009 ADMIN PNEUMOCOCCAL VACCINE: HCPCS | Mod: S$GLB,,, | Performed by: STUDENT IN AN ORGANIZED HEALTH CARE EDUCATION/TRAINING PROGRAM

## 2023-06-08 PROCEDURE — 4010F PR ACE/ARB THEARPY RXD/TAKEN: ICD-10-PCS | Mod: CPTII,S$GLB,, | Performed by: STUDENT IN AN ORGANIZED HEALTH CARE EDUCATION/TRAINING PROGRAM

## 2023-06-08 PROCEDURE — 3061F PR NEG MICROALBUMINURIA RESULT DOCUMENTED/REVIEW: ICD-10-PCS | Mod: HCNC,,, | Performed by: NURSE PRACTITIONER

## 2023-06-08 PROCEDURE — 99999 PR PBB SHADOW E&M-EST. PATIENT-LVL IV: CPT | Mod: PBBFAC,HCNC,, | Performed by: NURSE PRACTITIONER

## 2023-06-08 PROCEDURE — 99214 PR OFFICE/OUTPT VISIT, EST, LEVL IV, 30-39 MIN: ICD-10-PCS | Mod: S$PBB,HCNC,, | Performed by: NURSE PRACTITIONER

## 2023-06-08 PROCEDURE — 4010F PR ACE/ARB THEARPY RXD/TAKEN: ICD-10-PCS | Mod: HCNC,,, | Performed by: NURSE PRACTITIONER

## 2023-06-08 PROCEDURE — 4010F ACE/ARB THERAPY RXD/TAKEN: CPT | Mod: HCNC,,, | Performed by: NURSE PRACTITIONER

## 2023-06-08 PROCEDURE — 3061F NEG MICROALBUMINURIA REV: CPT | Mod: HCNC,,, | Performed by: NURSE PRACTITIONER

## 2023-06-08 PROCEDURE — 85025 COMPLETE CBC W/AUTO DIFF WBC: CPT | Performed by: STUDENT IN AN ORGANIZED HEALTH CARE EDUCATION/TRAINING PROGRAM

## 2023-06-08 PROCEDURE — 3078F PR MOST RECENT DIASTOLIC BLOOD PRESSURE < 80 MM HG: ICD-10-PCS | Mod: CPTII,S$GLB,, | Performed by: STUDENT IN AN ORGANIZED HEALTH CARE EDUCATION/TRAINING PROGRAM

## 2023-06-08 PROCEDURE — 90677 PCV20 VACCINE IM: CPT | Mod: S$GLB,,, | Performed by: STUDENT IN AN ORGANIZED HEALTH CARE EDUCATION/TRAINING PROGRAM

## 2023-06-08 PROCEDURE — 80053 COMPREHEN METABOLIC PANEL: CPT | Performed by: STUDENT IN AN ORGANIZED HEALTH CARE EDUCATION/TRAINING PROGRAM

## 2023-06-08 PROCEDURE — 3051F PR MOST RECENT HEMOGLOBIN A1C LEVEL 7.0 - < 8.0%: ICD-10-PCS | Mod: CPTII,S$GLB,, | Performed by: STUDENT IN AN ORGANIZED HEALTH CARE EDUCATION/TRAINING PROGRAM

## 2023-06-08 PROCEDURE — 3066F PR DOCUMENTATION OF TREATMENT FOR NEPHROPATHY: ICD-10-PCS | Mod: HCNC,,, | Performed by: NURSE PRACTITIONER

## 2023-06-08 RX ORDER — GABAPENTIN 600 MG/1
600 TABLET ORAL
COMMUNITY
Start: 2023-05-17 | End: 2023-06-08

## 2023-06-08 RX ORDER — GABAPENTIN 300 MG/1
300 CAPSULE ORAL 3 TIMES DAILY
Qty: 90 CAPSULE | Refills: 3 | Status: SHIPPED | OUTPATIENT
Start: 2023-06-08 | End: 2023-07-20

## 2023-06-08 NOTE — PROGRESS NOTES
[facility] Note    Chief Complaint      Chief Complaint   Patient presents with    Hospital Follow Up    Establish Care     History of Present Illness      HPI    Diomedes Mohr is a 62 y.o. female with sHTN, T2DM, HLD, tobacco use (quit in 2020), HFrEF 2/2 DCM and CAD (10-15%, s/p AICD), PE (December 2021), pHTN, osteoarthritis of bilateral hips (L>R) with chronic pain, CVA (2020, R MCA, no deficits), left MCA stroke during recent admission s/p thrombectomy (4/29/23) with right sided hemiparesis who presents for hospital f/u visit and establishment of care. She recently presented at the [facility] and manged in patient (05/29/2023- 06/1/2023) found to be hypotensive in the 50s/40s and was resuscitated with IVF, She had pan-imaging  with no sig findings  however venous duplex done for her LE showed bilateral DVT so was switched from pradax to eliquis. Today, she c/o worsening right LE pain beginning from her hip and radiating to her leg, rated 10/10, worsens with any kind of activity. Her nausea and vomiting has resolved, she denies any CP, SOB, orthopnea, leg swelling, palpitations, syncope or presyncope.    Problem List Addressed This Visit:  1. Hospital discharge follow-up    2. Hypotension due to hypovolemia    3. Intractable nausea and vomiting    4. Type 2 diabetes mellitus without complication, without long-term current use of insulin  -     Microalbumin/Creatinine Ratio, Urine; Standing    5. Essential hypertension    6. Heart failure with reduced ejection fraction due to cardiomyopathy    7. Low back pain potentially associated with radiculopathy  -     gabapentin (NEURONTIN) 300 MG capsule; Take 1 capsule (300 mg total) by mouth 3 (three) times daily.  Dispense: 90 capsule; Refill: 3  -     MRI Lumbar Spine Without Contrast; Future; Expected date: 06/08/2023    8. History of CVA with residual deficit    9. Primary osteoarthritis of both hips    10. Encounter for screening mammogram for  malignant neoplasm of breast  -     Mammo Digital Screening Bilat w/ Pete; Future; Expected date: 06/08/2023    11. Encounter for colorectal cancer screening  -     Cologuard Screening (Multitarget Stool DNA); Future; Expected date: 06/08/2023    12. Need for pneumococcal vaccine  -     (In Office Administered) Pneumococcal Conjugate Vaccine (20 Valent) (IM)         Health Maintenance   Topic Date Due    Foot Exam  Never done    TETANUS VACCINE  Never done    Mammogram  Never done    Eye Exam  04/08/2023    Hemoglobin A1c  10/30/2023    Lipid Panel  04/30/2024    High Dose Statin  06/08/2024    Hepatitis C Screening  Completed       Past Medical History:   Diagnosis Date    Acute on chronic combined systolic and diastolic heart failure 12/26/2019    ARBEN (acute kidney injury) 5/30/2023    Anticoagulant long-term use     Anxiety     Arthritis     Asthma     Depression     H/O: hysterectomy     Hyperlipemia     Hypertension     Pulmonary edema     Schizophrenia        Past Surgical History:   Procedure Laterality Date    BLADDER SUSPENSION      CARPAL TUNNEL RELEASE Right     HEEL SPUR SURGERY Left     HYSTERECTOMY      LEFT HEART CATHETERIZATION Bilateral 12/27/2019    Procedure: Left heart cath;  Surgeon: Steve Chambers MD;  Location: [facility] CATH LAB;  Service: Cardiology;  Laterality: Bilateral;    RIGHT HEART CATHETERIZATION Right 7/26/2021    Procedure: INSERTION, CATHETER, RIGHT HEART;  Surgeon: Petr Naranjo MD;  Location: [facility] CATH LAB;  Service: Cardiology;  Laterality: Right;    RIGHT HEART CATHETERIZATION Right 5/12/2022    Procedure: INSERTION, CATHETER, RIGHT HEART;  Surgeon: Chandana Ivory Jr., MD;  Location: [facility] CATH LAB;  Service: Cardiology;  Laterality: Right;    TUBAL LIGATION         family history includes No Known Problems in her father and mother.    Social History     Tobacco Use    Smoking status: Former     Types: Cigarettes     Quit date: 8/30/2016     Years since  quittin.7    Smokeless tobacco: Never    Tobacco comments:     nonex 2 weeks   Substance Use Topics    Alcohol use: No     Alcohol/week: 0.0 standard drinks    Drug use: No       Review of Systems   Constitutional:  Negative for fatigue and fever.   Respiratory:  Negative for cough and chest tightness.    Gastrointestinal:  Negative for abdominal pain, diarrhea and vomiting.   Endocrine: Negative for polydipsia and polyphagia.   Genitourinary:  Negative for difficulty urinating, dysuria and frequency.   Musculoskeletal:  Positive for arthralgias, back pain and gait problem. Negative for joint swelling.   Skin:  Negative for rash.   Neurological:  Positive for weakness (right upper and lower extremities). Negative for seizures, numbness and headaches.   Psychiatric/Behavioral:  Negative for sleep disturbance.      Outpatient Encounter Medications as of 2023   Medication Sig Note Dispense Refill    acetaminophen (TYLENOL) 325 MG tablet Take 2 tablets (650 mg total) by mouth every 8 (eight) hours as needed.  30 tablet 0    albuterol (PROVENTIL) 2.5 mg /3 mL (0.083 %) nebulizer solution Take 3 mLs (2.5 mg total) by nebulization every 6 (six) hours as needed for Wheezing or Shortness of Breath. Rescue  3 mL 0    albuterol (PROVENTIL/VENTOLIN HFA) 90 mcg/actuation inhaler Inhale 2 puffs into the lungs every 6 (six) hours as needed for Wheezing or Shortness of Breath.  18 g 2    apixaban (ELIQUIS) 5 mg Tab Take 1 tablet (5 mg total) by mouth 2 (two) times daily.  60 tablet 2    aspirin (ECOTRIN) 81 MG EC tablet Take 1 tablet (81 mg total) by mouth once daily.  30 tablet 11    atorvastatin (LIPITOR) 80 MG tablet Take 1 tablet (80 mg total) by mouth once daily.  90 tablet 3    cyproheptadine (PERIACTIN) 4 mg tablet Take 1 tablet (4 mg total) by mouth 2 (two) times daily.  60 tablet 1    diclofenac sodium (VOLTAREN) 1 % Gel Apply topically to the affected area daily as needed for pain.       dulaglutide (TRULICITY)  "1.5 mg/0.5 mL pen injector Inject 1.5 mg into the skin once a week. 5/30/2023: Takes on Wednesday      empagliflozin (JARDIANCE) 10 mg tablet Take 1 tablet (10 mg total) by mouth once daily.  90 tablet 6    ipratropium-albuteroL (COMBIVENT)  mcg/actuation inhaler Inhale 1 puff into the lungs 4 (four) times daily as needed for Shortness of Breath. Rescue       metFORMIN (GLUCOPHAGE) 1000 MG tablet Take 0.5 tablets (500 mg total) by mouth 2 (two) times daily.       metoclopramide HCl (REGLAN) 5 MG tablet Take 1 tablet (5 mg total) by mouth 3 (three) times daily before meals. for 15 days  45 tablet 0    metoprolol succinate (TOPROL-XL) 50 MG 24 hr tablet Take 1 tablet (50 mg total) by mouth once daily.  30 tablet 1    sacubitriL-valsartan (ENTRESTO)  mg per tablet Take 1 tablet by mouth 2 (two) times daily.  90 tablet 3    [DISCONTINUED] gabapentin (NEURONTIN) 100 MG capsule Take 2 capsules (200 mg total) by mouth every evening.  60 capsule 2    [DISCONTINUED] gabapentin (NEURONTIN) 600 MG tablet Take 600 mg by mouth.       gabapentin (NEURONTIN) 300 MG capsule Take 1 capsule (300 mg total) by mouth 3 (three) times daily.  90 capsule 3     No facility-administered encounter medications on file as of 6/8/2023.        Review of patient's allergies indicates:   Allergen Reactions    Adhesive Blisters    Captopril Other (See Comments)     COUGH       Physical Exam      Vital Signs  Pulse: 67  SpO2: 98 %  BP: 112/64  Pain Score:   8  Height and Weight  Height: 5' 6" (167.6 cm)  Weight in (lb) to have BMI = 25: 154.6]    Physical Exam  Constitutional:       Appearance: Normal appearance.   HENT:      Head: Normocephalic and atraumatic.      Right Ear: Tympanic membrane normal.      Left Ear: Tympanic membrane normal.      Mouth/Throat:      Mouth: Mucous membranes are moist.      Pharynx: Oropharynx is clear.   Eyes:      Extraocular Movements: Extraocular movements intact.      Conjunctiva/sclera: Conjunctivae " normal.      Pupils: Pupils are equal, round, and reactive to light.   Cardiovascular:      Rate and Rhythm: Normal rate and regular rhythm.      Pulses: Normal pulses.   Pulmonary:      Breath sounds: Normal breath sounds.   Abdominal:      General: Abdomen is flat. Bowel sounds are normal.      Palpations: Abdomen is soft.   Musculoskeletal:      Cervical back: Normal range of motion.   Skin:     General: Skin is warm and dry.   Neurological:      Mental Status: She is alert and oriented to person, place, and time.      Sensory: No sensory deficit.      Motor: Weakness (4/5 power RUE, 3/5 power right LE) present.      Gait: Gait abnormal.   Psychiatric:         Mood and Affect: Mood normal.         Behavior: Behavior normal.        Laboratory:  CBC:  Recent Labs   Lab Result Units 05/11/23  0306 05/29/23  2255 05/29/23  2304 05/31/23  0544   WBC K/uL 8.41 9.61  --  6.68   RBC M/uL 4.71 5.01  --  3.81*   Hemoglobin g/dL 13.6 14.9  --  10.9*   POC Hematocrit   --   --    < >  --    Hematocrit % 42.6 42.6  --  34.0*   Platelets K/uL 427 261  --  188   MCV fL 90 85  --  89   MCH pg 28.9 29.7  --  28.6   MCHC g/dL 31.9* 35.0  --  32.1    < > = values in this interval not displayed.     CMP:  Recent Labs   Lab Result Units 05/11/23  0306 05/12/23  0733 05/29/23  2255 05/31/23  0543 06/01/23  0542   Glucose mg/dL 137* 162* 122*   < > 85   Calcium mg/dL 9.8 10.0 10.3   < > 8.5*   Albumin g/dL 3.1* 3.1* 3.8  --   --    Total Protein g/dL 6.7 6.8 8.1  --   --    Sodium mmol/L 138 134* 135*   < > 136   Potassium mmol/L 3.6 4.2 4.4   < > 3.9   CO2 mmol/L 28 26 24   < > 28   Chloride mmol/L 101 99 94*   < > 102   BUN mg/dL 20 22 26*   < > 7   Alkaline Phosphatase U/L 149* 142* 139*  --   --    ALT U/L 19 21 24  --   --    AST U/L 24 25 29  --   --    Total Bilirubin mg/dL 0.4 0.5 0.8  --   --     < > = values in this interval not displayed.     URINALYSIS:  Recent Labs   Lab Result Units 05/30/23  0930   Color, UA  Yellow    Specific Gravity, UA  1.020   pH, UA  5.0   Protein, UA  Negative   Bacteria /hpf Moderate*   Nitrite, UA  Negative   Leukocytes, UA  Trace*      LIPIDS:  Recent Labs   Lab Result Units 04/30/23  0845 05/29/23  2255   TSH uIU/mL 2.092 2.701   HDL mg/dL 38*  --    Cholesterol mg/dL 142  --    Triglycerides mg/dL 77  --    LDL Cholesterol mg/dL 88.6  --    HDL/Cholesterol Ratio % 26.8  --    Non-HDL Cholesterol mg/dL 104  --    Total Cholesterol/HDL Ratio  3.7  --      TSH:  Recent Labs   Lab Result Units 04/30/23  0845 05/29/23  2255   TSH uIU/mL 2.092 2.701     A1C:  Recent Labs   Lab Result Units 04/30/23  0845   Hemoglobin A1C % 7.5*       Radiology:      Assessment/Plan       1. Hospital discharge follow-up    2. Hypotension due to hypovolemia    3. Intractable nausea and vomiting    4. Type 2 diabetes mellitus without complication, without long-term current use of insulin  - Microalbumin/Creatinine Ratio, Urine; Standing    5. Essential hypertension    6. Heart failure with reduced ejection fraction due to cardiomyopathy    7. Low back pain potentially associated with radiculopathy  - gabapentin (NEURONTIN) 300 MG capsule; Take 1 capsule (300 mg total) by mouth 3 (three) times daily.  Dispense: 90 capsule; Refill: 3  - MRI Lumbar Spine Without Contrast; Future    8. History of CVA with residual deficit    9. Primary osteoarthritis of both hips    10. Encounter for screening mammogram for malignant neoplasm of breast  - Mammo Digital Screening Bilat w/ Pete; Future    11. Encounter for colorectal cancer screening  - Cologuard Screening (Multitarget Stool DNA); Future  - Cologuard Screening (Multitarget Stool DNA)    12. Need for pneumococcal vaccine  - (In Office Administered) Pneumococcal Conjugate Vaccine (20 Valent) (IM)    Plan :  -now back to baseline  -compensated on examination  -recent renal function and electrolytes WNL  -will order MRI lumbar for new onset worsening Right LE pain  and  weakness  -patient and  were confused about medication regimen, extensively educated on med use  -will c/w current regimen  -increase gabapentin to 300mg for pain  -already has scheduled PT/OT for right sided hemiparesis  -f/u annual microalbumin/Cr ratio  -referral for eye exam also provided    Transitional Care Note    Family and/or Caretaker present at visit?  Yes. ()  Diagnostic tests reviewed/disposition: I have reviewed all completed as well as pending diagnostic tests at the time of discharge.  Disease/illness education: yes  Home health/community services discussion/referrals: Patient does not have home health established from hospital visit.  They do not need home health.  If needed, we will set up home health for the patient.   Establishment or re-establishment of referral orders for community resources: No other necessary community resources.   Discussion with other health care providers: No discussion with other health care providers necessary.            -Continue current medications and maintain follow up with specialists.      Patient verbalizes understanding and agrees with current treatment plan.      Yolie Michael MD  [facility]

## 2023-06-08 NOTE — PROGRESS NOTES
CC: History of pulmonary embolism, hematology follow up    HPI: , 61, is here for hematology follow up for pulmonary embolism.  She has COPD, CHF, DM, h/o CVA, HLD, HTn. She has cardiomyopathy with EF of 10-15%. She had defibrillator placed on 11/30/21. Patient states she was mostly immobile at home after that, and spent most of her time in her chair or bed. She had worsening dyspnea around Christmas of 2021 and was evaluated at the ER on 12/27/21. She had CTA chest which identified right sided sub-segmental PE.  She denies using hormonal supplement, estrogen supplement or smoking at the time. No prior personal or family h/o blood clots/ bad obstetric outcomes.Denies using recreational drugs. No recent weight loss. She is not uptodate with routine cancer screenings. She is now on xarelto.      Interval History:  is here for a follow up visit.  Admitted on 05/12/23 with R.hemiplegia from acute L. MCA infarct.this incident occurred while she was on daily dose of Xarelto. She had thrombectomy and changed to pradaxa and ASA following stroke, Bilateral common femoral vein DVT developed while on Pradaxa(05/30/23) changed to eliquis following that. Per patient she is tolerating this. Residual effect of CVA present. Participating therapy now.      Past Medical History:   Diagnosis Date    Anxiety     Arthritis     Asthma     CHF (congestive heart failure)     COPD (chronic obstructive pulmonary disease)     Depression     Diabetes mellitus     Dyspnea     H/O: hysterectomy     Hyperlipemia     Hypertension     Schizophrenia     Stroke          Past Surgical History:   Procedure Laterality Date    BLADDER SUSPENSION      CARPAL TUNNEL RELEASE Right     HEEL SPUR SURGERY Left     HYSTERECTOMY      LEFT HEART CATHETERIZATION Bilateral 12/27/2019    Procedure: Left heart cath;  Surgeon: Steve Chambers MD;  Location: [facility] CATH LAB;  Service: Cardiology;  Laterality: Bilateral;    RIGHT HEART  CATHETERIZATION Right 2021    Procedure: INSERTION, CATHETER, RIGHT HEART;  Surgeon: Petr Naranjo MD;  Location: [facility] CATH LAB;  Service: Cardiology;  Laterality: Right;    RIGHT HEART CATHETERIZATION Right 2022    Procedure: INSERTION, CATHETER, RIGHT HEART;  Surgeon: Chandana Ivory Jr., MD;  Location: [facility] CATH LAB;  Service: Cardiology;  Laterality: Right;    TUBAL LIGATION         Social History     Socioeconomic History    Marital status:    Tobacco Use    Smoking status: Former     Types: Cigarettes     Quit date: 2016     Years since quittin.7    Smokeless tobacco: Never    Tobacco comments:     nonex 2 weeks   Substance and Sexual Activity    Alcohol use: No     Alcohol/week: 0.0 standard drinks    Drug use: No     Social Determinants of Health     Financial Resource Strain: Low Risk     Difficulty of Paying Living Expenses: Not hard at all   Food Insecurity: No Food Insecurity    Worried About Running Out of Food in the Last Year: Never true    Ran Out of Food in the Last Year: Never true   Transportation Needs: No Transportation Needs    Lack of Transportation (Medical): No    Lack of Transportation (Non-Medical): No   Physical Activity: Insufficiently Active    Days of Exercise per Week: 1 day    Minutes of Exercise per Session: 20 min   Stress: Stress Concern Present    Feeling of Stress : To some extent   Housing Stability: Low Risk     Unable to Pay for Housing in the Last Year: No    Number of Places Lived in the Last Year: 1    Unstable Housing in the Last Year: No       Review of patient's allergies indicates:   Allergen Reactions    Adhesive Blisters    Captopril Other (See Comments)     COUGH       Current Outpatient Medications   Medication Sig    acetaminophen (TYLENOL) 325 MG tablet Take 2 tablets (650 mg total) by mouth every 8 (eight) hours as needed.    albuterol (PROVENTIL) 2.5 mg /3 mL (0.083 %) nebulizer solution Take 3 mLs (2.5 mg total) by  nebulization every 6 (six) hours as needed for Wheezing or Shortness of Breath. Rescue    albuterol (PROVENTIL/VENTOLIN HFA) 90 mcg/actuation inhaler Inhale 2 puffs into the lungs every 6 (six) hours as needed for Wheezing or Shortness of Breath.    apixaban (ELIQUIS) 5 mg Tab Take 1 tablet (5 mg total) by mouth 2 (two) times daily.    aspirin (ECOTRIN) 81 MG EC tablet Take 1 tablet (81 mg total) by mouth once daily.    atorvastatin (LIPITOR) 80 MG tablet Take 1 tablet (80 mg total) by mouth once daily.    cyproheptadine (PERIACTIN) 4 mg tablet Take 1 tablet (4 mg total) by mouth 2 (two) times daily.    diclofenac sodium (VOLTAREN) 1 % Gel Apply topically to the affected area daily as needed for pain.    dulaglutide (TRULICITY) 1.5 mg/0.5 mL pen injector Inject 1.5 mg into the skin once a week.    empagliflozin (JARDIANCE) 10 mg tablet Take 1 tablet (10 mg total) by mouth once daily.    gabapentin (NEURONTIN) 300 MG capsule Take 1 capsule (300 mg total) by mouth 3 (three) times daily.    ipratropium-albuteroL (COMBIVENT)  mcg/actuation inhaler Inhale 1 puff into the lungs 4 (four) times daily as needed for Shortness of Breath. Rescue    metFORMIN (GLUCOPHAGE) 1000 MG tablet Take 0.5 tablets (500 mg total) by mouth 2 (two) times daily.    metoclopramide HCl (REGLAN) 5 MG tablet Take 1 tablet (5 mg total) by mouth 3 (three) times daily before meals. for 15 days    metoprolol succinate (TOPROL-XL) 50 MG 24 hr tablet Take 1 tablet (50 mg total) by mouth once daily.    sacubitriL-valsartan (ENTRESTO)  mg per tablet Take 1 tablet by mouth 2 (two) times daily.     No current facility-administered medications for this visit.       ROS  See HPI    Vitals:    06/08/23 1353   BP: (!) 94/53   Pulse: 80   Resp: 18   Temp: 97.3 °F (36.3 °C)         Physical Exam  HENT:      Head: Normocephalic.   Eyes:      General: No scleral icterus.  Cardiovascular:      Rate and Rhythm: Normal rate and regular rhythm.      Pulses:  Normal pulses.      Heart sounds: No murmur heard.  Pulmonary:      Effort: Pulmonary effort is normal. No respiratory distress.      Breath sounds: Normal breath sounds.   Abdominal:      General: There is no distension.      Palpations: There is no mass.      Tenderness: There is no abdominal tenderness.   Musculoskeletal:         General: No swelling.      Right lower leg: Edema present.      Left lower leg: Edema present.      Comments: Right weakness  W/c bound   Skin:     Coloration: Skin is not jaundiced or pale.      Findings: No bruising.   Neurological:      General: No focal deficit present.      Mental Status: She is alert.   Psychiatric:      Comments: Slow response       12/27/21 CTA chest    FINDINGS:  Pulmonary vasculature: There is satisfactory opacification.  There is embolic material appreciated in the secondary or tertiary branches of the right pulmonary artery supplying the inferior aspect of the right lung.  This is appreciated on image number 167 of series 601 and image number 285 of series 3.     Aorta: Left-sided aortic arch.  No aneurysm and no significant atherosclerosis     Base of Neck: No significant abnormality.     Thoracic soft tissues: Normal.     Heart: Normal size. No effusion.     Zaida/Mediastinum: No pathologic rito enlargement.     Airways: Patent.     Lungs/Pleura: Clear lungs. No pleural effusion or thickening.     Esophagus: Normal.     Upper Abdomen: No abnormality of the partially imaged upper abdomen.     Bones: No acute fracture. No suspicious lytic or sclerotic lesions.     Impression:     Findings positive for small pulmonary emboli or embolus seen in a single secondary ortertiary branch of the right pulmonary artery.     This report was flagged in Epic as abnormal.        Component      Latest Ref Rng & Units 2/7/2022   Beta-2 Glyco 1 IgG      <=20 SGU <9   Beta-2 Glyco 1 IgM      <=20 SMU <9   Beta-2 Glyco 1 IgA      <=20 GUY <9   APA Isotype IgG      0.00 - 14.99  GPL <9.40   APA Isotype IgM      0.00 - 12.49 MPL <9.40     Assessment    1. Sub-segmental pulmonary embolism  2. CHF  3. Type 2 DM not on long term insulin without complications  4. H/o CVA      Plan:    1. Diagnosed in Dec 2021. This was the first known thrombotic episode. She was immobile preceding the PE diagnosis, so likely this is a provoked event. COVID 19 was negative. Optimal anti-coagulation for first provoked PE is 3-6 months. However, she has severely decreased LVEF, with last 2D ECHO documenting LVEF of ~ 15%. She will likely benefit from long term anti-coagulation. No h/o bleeding or falls. She is on ASA, and DOAC+ASA increase bleeding risk significantly compared to either agent alone.Anti-cardiolipin and anti-beta2GP-I antibodies negative in Feb 2022. Recommend age appropriate anti-cancer screenings, including PAP smear, mammogram, colonoscopy.   CVA while on  Xarelto. Xarelto changed to Pradaxa, developed DVT while on pradaxa. Now changed to eliquis. patient not interested to change to any other DOAC including Warfarin so keep same   She is tolerating it well.     2. She is followed at the transplant/ CHF clinic here    3. She follows with her PCP.    4. She was previously on anti-coagulation. She is on ASA.  Changed DOAC to eliquis as above      BMT Chart Routing      Follow up with physician . Schedule with  in 3 month following labs   Follow up with CHUCK    Provider visit type    Infusion scheduling note    Injection scheduling note    Labs   Scheduling:  Preferred lab:  Lab interval:  Cbc, cmp in 3 month   Imaging    Pharmacy appointment    Other referrals            María Dennis NP  Hematology/Oncology/BMT

## 2023-06-12 PROBLEM — I69.398 IMPAIRED BALANCE AS LATE EFFECT OF CEREBROVASCULAR ACCIDENT: Status: ACTIVE | Noted: 2023-06-12

## 2023-06-12 PROBLEM — R27.8 DECREASED COORDINATION: Status: ACTIVE | Noted: 2023-06-12

## 2023-06-12 PROBLEM — I69.320 APHASIA AS LATE EFFECT OF CEREBROVASCULAR ACCIDENT: Status: ACTIVE | Noted: 2023-06-12

## 2023-06-12 PROBLEM — R29.898 DECREASED STRENGTH OF LOWER EXTREMITY: Status: ACTIVE | Noted: 2023-06-12

## 2023-06-12 PROBLEM — R26.89 IMPAIRED BALANCE AS LATE EFFECT OF CEREBROVASCULAR ACCIDENT: Status: ACTIVE | Noted: 2023-06-12

## 2023-06-20 PROBLEM — Z74.09 IMPAIRED MOBILITY AND ADLS: Status: ACTIVE | Noted: 2023-06-20

## 2023-06-20 PROBLEM — M25.60 LIMITED JOINT RANGE OF MOTION (ROM): Status: ACTIVE | Noted: 2023-06-20

## 2023-06-20 PROBLEM — Z78.9 IMPAIRED MOBILITY AND ADLS: Status: ACTIVE | Noted: 2023-06-20

## 2023-06-21 ENCOUNTER — OFFICE VISIT (OUTPATIENT)
Dept: VASCULAR SURGERY | Facility: CLINIC | Age: 63
End: 2023-06-21
Payer: MEDICARE

## 2023-06-21 ENCOUNTER — HOSPITAL ENCOUNTER (OUTPATIENT)
Dept: VASCULAR SURGERY | Facility: CLINIC | Age: 63
Discharge: HOME | End: 2023-06-21
Payer: MEDICARE

## 2023-06-21 VITALS — TEMPERATURE: 98 F | DIASTOLIC BLOOD PRESSURE: 54 MMHG | HEART RATE: 84 BPM | SYSTOLIC BLOOD PRESSURE: 97 MMHG

## 2023-06-21 DIAGNOSIS — I70.229 CRITICAL LOWER LIMB ISCHEMIA: ICD-10-CM

## 2023-06-21 DIAGNOSIS — M54.50 LOW BACK PAIN POTENTIALLY ASSOCIATED WITH RADICULOPATHY: ICD-10-CM

## 2023-06-21 DIAGNOSIS — I73.9 PVD (PERIPHERAL VASCULAR DISEASE): Primary | ICD-10-CM

## 2023-06-21 PROCEDURE — 3051F PR MOST RECENT HEMOGLOBIN A1C LEVEL 7.0 - < 8.0%: ICD-10-PCS | Mod: CPTII,S$GLB,, | Performed by: SURGERY

## 2023-06-21 PROCEDURE — 93923 UPR/LXTR ART STDY 3+ LVLS: CPT | Mod: S$GLB,,, | Performed by: SURGERY

## 2023-06-21 PROCEDURE — 4010F ACE/ARB THERAPY RXD/TAKEN: CPT | Mod: CPTII,S$GLB,, | Performed by: SURGERY

## 2023-06-21 PROCEDURE — 3078F DIAST BP <80 MM HG: CPT | Mod: CPTII,S$GLB,, | Performed by: SURGERY

## 2023-06-21 PROCEDURE — 99214 PR OFFICE/OUTPT VISIT, EST, LEVL IV, 30-39 MIN: ICD-10-PCS | Mod: S$GLB,,, | Performed by: SURGERY

## 2023-06-21 PROCEDURE — 1159F MED LIST DOCD IN RCRD: CPT | Mod: CPTII,S$GLB,, | Performed by: SURGERY

## 2023-06-21 PROCEDURE — 4010F PR ACE/ARB THEARPY RXD/TAKEN: ICD-10-PCS | Mod: CPTII,S$GLB,, | Performed by: SURGERY

## 2023-06-21 PROCEDURE — 3061F PR NEG MICROALBUMINURIA RESULT DOCUMENTED/REVIEW: ICD-10-PCS | Mod: CPTII,S$GLB,, | Performed by: SURGERY

## 2023-06-21 PROCEDURE — 93923 PR NON-INVASIVE PHYSIOLOGIC STUDY EXTREMITY 3 LEVELS: ICD-10-PCS | Mod: S$GLB,,, | Performed by: SURGERY

## 2023-06-21 PROCEDURE — 3078F PR MOST RECENT DIASTOLIC BLOOD PRESSURE < 80 MM HG: ICD-10-PCS | Mod: CPTII,S$GLB,, | Performed by: SURGERY

## 2023-06-21 PROCEDURE — 3074F PR MOST RECENT SYSTOLIC BLOOD PRESSURE < 130 MM HG: ICD-10-PCS | Mod: CPTII,S$GLB,, | Performed by: SURGERY

## 2023-06-21 PROCEDURE — 3051F HG A1C>EQUAL 7.0%<8.0%: CPT | Mod: CPTII,S$GLB,, | Performed by: SURGERY

## 2023-06-21 PROCEDURE — 99999 PR PBB SHADOW E&M-EST. PATIENT-LVL III: CPT | Mod: PBBFAC,,, | Performed by: SURGERY

## 2023-06-21 PROCEDURE — 99999 PR PBB SHADOW E&M-EST. PATIENT-LVL III: ICD-10-PCS | Mod: PBBFAC,,, | Performed by: SURGERY

## 2023-06-21 PROCEDURE — 1159F PR MEDICATION LIST DOCUMENTED IN MEDICAL RECORD: ICD-10-PCS | Mod: CPTII,S$GLB,, | Performed by: SURGERY

## 2023-06-21 PROCEDURE — 3074F SYST BP LT 130 MM HG: CPT | Mod: CPTII,S$GLB,, | Performed by: SURGERY

## 2023-06-21 PROCEDURE — 99214 OFFICE O/P EST MOD 30 MIN: CPT | Mod: S$GLB,,, | Performed by: SURGERY

## 2023-06-21 PROCEDURE — 3066F NEPHROPATHY DOC TX: CPT | Mod: CPTII,S$GLB,, | Performed by: SURGERY

## 2023-06-21 PROCEDURE — 3066F PR DOCUMENTATION OF TREATMENT FOR NEPHROPATHY: ICD-10-PCS | Mod: CPTII,S$GLB,, | Performed by: SURGERY

## 2023-06-21 PROCEDURE — 3061F NEG MICROALBUMINURIA REV: CPT | Mod: CPTII,S$GLB,, | Performed by: SURGERY

## 2023-06-21 RX ORDER — GABAPENTIN 400 MG/1
400 CAPSULE ORAL 3 TIMES DAILY
Qty: 90 CAPSULE | Refills: 11 | Status: SHIPPED | OUTPATIENT
Start: 2023-06-21 | End: 2023-06-21

## 2023-06-21 NOTE — PROGRESS NOTES
Diomedes Mohr  06/21/2023    HPI:  Diomedes Mohr is a 62 y.o. woman with history of HTN, DMII, stroke (2020, R MCA, no deficits), CHF, dilated cardiomyopathy, arrhythmia, LVEF 15% as of 10/2022, HLD, PE on Xarelto who presented to ED in Apr 2023 with right leg pain from knee down, numbness and weakness. At that time, she had stated she has crampy pain in her right thigh after 50 feet that improves with rest. CTA with runoff concerning for right popliteal short segment occlusion with reconstitution of PT and AT on delayed images. She was found to have embolic popliteal occlusion at that time while on Xarelto and was switched to Eliquis. No thrombectomy was performed at that time.     Of note, she went to the ED last night for pain to her right leg.  Patient says that she was lying down when the pain began.  The pain radiates from her groin to her toes.  She denies trauma.  Patient is on Eliquis, off Xarelto, secondary to a DVT to both femoral veins.  Patient admits that the pain has been intermittent since she had the stroke, occurring every other day.  She gabapentin for pain at home. Currently, she still reports the pain, able to move her right leg though slow and feels weaker.     MI: no  Stroke: Yes     Tobacco: quit 2020, 30 years 1/2 ppd.        Current Outpatient Medications:     acetaminophen (TYLENOL) 325 MG tablet, Take 2 tablets (650 mg total) by mouth every 8 (eight) hours as needed., Disp: 30 tablet, Rfl: 0    albuterol (PROVENTIL) 2.5 mg /3 mL (0.083 %) nebulizer solution, Take 3 mLs (2.5 mg total) by nebulization every 6 (six) hours as needed for Wheezing or Shortness of Breath. Rescue, Disp: 3 mL, Rfl: 0    albuterol (PROVENTIL/VENTOLIN HFA) 90 mcg/actuation inhaler, Inhale 2 puffs into the lungs every 6 (six) hours as needed for Wheezing or Shortness of Breath., Disp: 18 g, Rfl: 2    apixaban (ELIQUIS) 5 mg Tab, Take 1 tablet (5 mg total) by mouth 2 (two) times daily., Disp: 60 tablet, Rfl: 2     aspirin (ECOTRIN) 81 MG EC tablet, Take 1 tablet (81 mg total) by mouth once daily., Disp: 30 tablet, Rfl: 11    atorvastatin (LIPITOR) 80 MG tablet, Take 1 tablet (80 mg total) by mouth once daily., Disp: 90 tablet, Rfl: 3    cyproheptadine (PERIACTIN) 4 mg tablet, Take 1 tablet (4 mg total) by mouth 2 (two) times daily., Disp: 60 tablet, Rfl: 1    diazePAM (VALIUM) 5 MG tablet, Take 1 tablet (5 mg total) by mouth every 6 (six) hours as needed for Anxiety., Disp: 10 tablet, Rfl: 0    diclofenac sodium (VOLTAREN) 1 % Gel, Apply topically to the affected area daily as needed for pain., Disp: , Rfl:     dulaglutide (TRULICITY) 1.5 mg/0.5 mL pen injector, Inject 1.5 mg into the skin once a week., Disp: , Rfl:     empagliflozin (JARDIANCE) 10 mg tablet, Take 1 tablet (10 mg total) by mouth once daily., Disp: 90 tablet, Rfl: 6    gabapentin (NEURONTIN) 300 MG capsule, Take 1 capsule (300 mg total) by mouth 3 (three) times daily., Disp: 90 capsule, Rfl: 3    ipratropium-albuteroL (COMBIVENT)  mcg/actuation inhaler, Inhale 1 puff into the lungs 4 (four) times daily as needed for Shortness of Breath. Rescue, Disp: , Rfl:     metFORMIN (GLUCOPHAGE) 1000 MG tablet, Take 0.5 tablets (500 mg total) by mouth 2 (two) times daily., Disp: , Rfl:     metoprolol succinate (TOPROL-XL) 50 MG 24 hr tablet, Take 1 tablet (50 mg total) by mouth once daily., Disp: 30 tablet, Rfl: 1    sacubitriL-valsartan (ENTRESTO)  mg per tablet, Take 1 tablet by mouth 2 (two) times daily., Disp: 90 tablet, Rfl: 3    gabapentin (NEURONTIN) 400 MG capsule, Take 1 capsule (400 mg total) by mouth 3 (three) times daily., Disp: 90 capsule, Rfl: 11  No current facility-administered medications for this visit.    PHYSICAL EXAM:   Right Arm BP - Sittin/57 (23 1107)  Left Arm BP - Sittin/54 (23 1107)  Pulse: 84  Temp: 98.2 °F (36.8 °C)                   Neck: No carotid bruit can be auscultated             Cardiac: Normal rate  and regular rhythm, S1 and S2 normal; PMI non-displaced          Abdomen: Soft, nontender, non-distended     Pulsatile aortic mass is not palpable.      Extremities:   2+ femoral pulses bilaterally     2+ pedal pulses palpable.     No pre-tibial edema     No ulcerations    LAB RESULTS:  Lab Results   Component Value Date    K 3.3 (L) 06/21/2023    K 3.8 06/08/2023    K 3.9 06/01/2023    CREATININE 0.59 06/21/2023    CREATININE 0.7 06/08/2023    CREATININE 0.60 06/01/2023     Lab Results   Component Value Date    WBC 7.13 06/08/2023    WBC 6.68 05/31/2023    WBC 9.61 05/29/2023    HCT 36.2 (L) 06/08/2023    HCT 34.0 (L) 05/31/2023    HCT 47 05/29/2023     06/08/2023     05/31/2023     05/29/2023     Lab Results   Component Value Date    HGBA1C 7.5 (H) 04/30/2023    HGBA1C 7.9 (H) 10/16/2022    HGBA1C 6.8 (H) 01/10/2022       IMAGING (I have independently reviewed and interpreted the following tests):    TYRONE   R- 1.08  L- 1.12    PVRs triphasic waveforms x2  Toe pressures:   R 99  L 77    IMP/PLAN:     62 y.o. female with a PMHx of HTN, DMII, stroke (2020, R MCA, no deficits), CHF, dilated cardiomyopathy, arrhythmia, LVEF 15% as of 10/2022, HLD, PE on Xarelto (now on Eliquis) who presented to ED in Apr 2023 with R popliteal artery embolus; treated medically due to EF 15%; now with palpable DP pulses.  Re-assured, good perfusion to both legs x2  Important to stay on a/c - now on eliquis  RTC OSWALDO Alvares MD DFSVS FACS   Vascular/Endovascular Surgery

## 2023-06-26 DIAGNOSIS — I42.0 DILATED CARDIOMYOPATHY: ICD-10-CM

## 2023-06-26 RX ORDER — TORSEMIDE 10 MG/1
TABLET ORAL
Qty: 30 TABLET | Refills: 0 | Status: SHIPPED | OUTPATIENT
Start: 2023-06-26 | End: 2023-08-14 | Stop reason: SDUPTHER

## 2023-06-29 LAB — NONINV COLON CA DNA+OCC BLD SCRN STL QL: NEGATIVE

## 2023-07-19 ENCOUNTER — TELEPHONE (OUTPATIENT)
Dept: FAMILY MEDICINE | Facility: CLINIC | Age: 63
End: 2023-07-19
Payer: MEDICARE

## 2023-07-20 ENCOUNTER — OFFICE VISIT (OUTPATIENT)
Dept: FAMILY MEDICINE | Facility: CLINIC | Age: 63
End: 2023-07-20
Payer: MEDICARE

## 2023-07-20 VITALS
HEIGHT: 66 IN | WEIGHT: 168.38 LBS | HEART RATE: 100 BPM | OXYGEN SATURATION: 97 % | DIASTOLIC BLOOD PRESSURE: 72 MMHG | SYSTOLIC BLOOD PRESSURE: 90 MMHG | BODY MASS INDEX: 27.06 KG/M2

## 2023-07-20 DIAGNOSIS — I42.9 HEART FAILURE WITH REDUCED EJECTION FRACTION DUE TO CARDIOMYOPATHY: ICD-10-CM

## 2023-07-20 DIAGNOSIS — M16.0 PRIMARY OSTEOARTHRITIS OF BOTH HIPS: ICD-10-CM

## 2023-07-20 DIAGNOSIS — Z79.4 TYPE 2 DIABETES MELLITUS WITH HYPERGLYCEMIA, WITH LONG-TERM CURRENT USE OF INSULIN: ICD-10-CM

## 2023-07-20 DIAGNOSIS — D64.9 NORMOCYTIC ANEMIA: ICD-10-CM

## 2023-07-20 DIAGNOSIS — M79.604 CHRONIC PAIN OF RIGHT LOWER EXTREMITY: ICD-10-CM

## 2023-07-20 DIAGNOSIS — I10 ESSENTIAL HYPERTENSION: ICD-10-CM

## 2023-07-20 DIAGNOSIS — E66.01 MORBID (SEVERE) OBESITY DUE TO EXCESS CALORIES: ICD-10-CM

## 2023-07-20 DIAGNOSIS — I42.0 DILATED CARDIOMYOPATHY: ICD-10-CM

## 2023-07-20 DIAGNOSIS — I69.30 HISTORY OF CVA WITH RESIDUAL DEFICIT: ICD-10-CM

## 2023-07-20 DIAGNOSIS — M54.50 LOW BACK PAIN POTENTIALLY ASSOCIATED WITH RADICULOPATHY: ICD-10-CM

## 2023-07-20 DIAGNOSIS — G89.29 CHRONIC PAIN OF RIGHT LOWER EXTREMITY: ICD-10-CM

## 2023-07-20 DIAGNOSIS — E11.65 TYPE 2 DIABETES MELLITUS WITH HYPERGLYCEMIA, WITH LONG-TERM CURRENT USE OF INSULIN: ICD-10-CM

## 2023-07-20 DIAGNOSIS — I50.20 HEART FAILURE WITH REDUCED EJECTION FRACTION DUE TO CARDIOMYOPATHY: ICD-10-CM

## 2023-07-20 DIAGNOSIS — E87.6 HYPOKALEMIA: ICD-10-CM

## 2023-07-20 DIAGNOSIS — I25.119 ATHEROSCLEROSIS OF NATIVE CORONARY ARTERY WITH ANGINA PECTORIS, UNSPECIFIED WHETHER NATIVE OR TRANSPLANTED HEART: ICD-10-CM

## 2023-07-20 PROCEDURE — 99999 PR PBB SHADOW E&M-EST. PATIENT-LVL IV: CPT | Mod: PBBFAC,,, | Performed by: STUDENT IN AN ORGANIZED HEALTH CARE EDUCATION/TRAINING PROGRAM

## 2023-07-20 PROCEDURE — 1159F MED LIST DOCD IN RCRD: CPT | Mod: CPTII,S$GLB,, | Performed by: STUDENT IN AN ORGANIZED HEALTH CARE EDUCATION/TRAINING PROGRAM

## 2023-07-20 PROCEDURE — 3074F SYST BP LT 130 MM HG: CPT | Mod: CPTII,S$GLB,, | Performed by: STUDENT IN AN ORGANIZED HEALTH CARE EDUCATION/TRAINING PROGRAM

## 2023-07-20 PROCEDURE — 1160F RVW MEDS BY RX/DR IN RCRD: CPT | Mod: CPTII,S$GLB,, | Performed by: STUDENT IN AN ORGANIZED HEALTH CARE EDUCATION/TRAINING PROGRAM

## 2023-07-20 PROCEDURE — 3078F DIAST BP <80 MM HG: CPT | Mod: CPTII,S$GLB,, | Performed by: STUDENT IN AN ORGANIZED HEALTH CARE EDUCATION/TRAINING PROGRAM

## 2023-07-20 PROCEDURE — 4010F ACE/ARB THERAPY RXD/TAKEN: CPT | Mod: CPTII,S$GLB,, | Performed by: STUDENT IN AN ORGANIZED HEALTH CARE EDUCATION/TRAINING PROGRAM

## 2023-07-20 PROCEDURE — 3051F HG A1C>EQUAL 7.0%<8.0%: CPT | Mod: CPTII,S$GLB,, | Performed by: STUDENT IN AN ORGANIZED HEALTH CARE EDUCATION/TRAINING PROGRAM

## 2023-07-20 PROCEDURE — 99215 OFFICE O/P EST HI 40 MIN: CPT | Mod: 25,S$GLB,, | Performed by: STUDENT IN AN ORGANIZED HEALTH CARE EDUCATION/TRAINING PROGRAM

## 2023-07-20 PROCEDURE — 3078F PR MOST RECENT DIASTOLIC BLOOD PRESSURE < 80 MM HG: ICD-10-PCS | Mod: CPTII,S$GLB,, | Performed by: STUDENT IN AN ORGANIZED HEALTH CARE EDUCATION/TRAINING PROGRAM

## 2023-07-20 PROCEDURE — 1160F PR REVIEW ALL MEDS BY PRESCRIBER/CLIN PHARMACIST DOCUMENTED: ICD-10-PCS | Mod: CPTII,S$GLB,, | Performed by: STUDENT IN AN ORGANIZED HEALTH CARE EDUCATION/TRAINING PROGRAM

## 2023-07-20 PROCEDURE — 3066F NEPHROPATHY DOC TX: CPT | Mod: CPTII,S$GLB,, | Performed by: STUDENT IN AN ORGANIZED HEALTH CARE EDUCATION/TRAINING PROGRAM

## 2023-07-20 PROCEDURE — 4010F PR ACE/ARB THEARPY RXD/TAKEN: ICD-10-PCS | Mod: CPTII,S$GLB,, | Performed by: STUDENT IN AN ORGANIZED HEALTH CARE EDUCATION/TRAINING PROGRAM

## 2023-07-20 PROCEDURE — 3061F PR NEG MICROALBUMINURIA RESULT DOCUMENTED/REVIEW: ICD-10-PCS | Mod: CPTII,S$GLB,, | Performed by: STUDENT IN AN ORGANIZED HEALTH CARE EDUCATION/TRAINING PROGRAM

## 2023-07-20 PROCEDURE — 3066F PR DOCUMENTATION OF TREATMENT FOR NEPHROPATHY: ICD-10-PCS | Mod: CPTII,S$GLB,, | Performed by: STUDENT IN AN ORGANIZED HEALTH CARE EDUCATION/TRAINING PROGRAM

## 2023-07-20 PROCEDURE — 3051F PR MOST RECENT HEMOGLOBIN A1C LEVEL 7.0 - < 8.0%: ICD-10-PCS | Mod: CPTII,S$GLB,, | Performed by: STUDENT IN AN ORGANIZED HEALTH CARE EDUCATION/TRAINING PROGRAM

## 2023-07-20 PROCEDURE — 3074F PR MOST RECENT SYSTOLIC BLOOD PRESSURE < 130 MM HG: ICD-10-PCS | Mod: CPTII,S$GLB,, | Performed by: STUDENT IN AN ORGANIZED HEALTH CARE EDUCATION/TRAINING PROGRAM

## 2023-07-20 PROCEDURE — 1159F PR MEDICATION LIST DOCUMENTED IN MEDICAL RECORD: ICD-10-PCS | Mod: CPTII,S$GLB,, | Performed by: STUDENT IN AN ORGANIZED HEALTH CARE EDUCATION/TRAINING PROGRAM

## 2023-07-20 PROCEDURE — 96372 PR INJECTION,THERAP/PROPH/DIAG2ST, IM OR SUBCUT: ICD-10-PCS | Mod: S$GLB,,, | Performed by: STUDENT IN AN ORGANIZED HEALTH CARE EDUCATION/TRAINING PROGRAM

## 2023-07-20 PROCEDURE — 3061F NEG MICROALBUMINURIA REV: CPT | Mod: CPTII,S$GLB,, | Performed by: STUDENT IN AN ORGANIZED HEALTH CARE EDUCATION/TRAINING PROGRAM

## 2023-07-20 PROCEDURE — 96372 THER/PROPH/DIAG INJ SC/IM: CPT | Mod: S$GLB,,, | Performed by: STUDENT IN AN ORGANIZED HEALTH CARE EDUCATION/TRAINING PROGRAM

## 2023-07-20 PROCEDURE — 3008F BODY MASS INDEX DOCD: CPT | Mod: CPTII,S$GLB,, | Performed by: STUDENT IN AN ORGANIZED HEALTH CARE EDUCATION/TRAINING PROGRAM

## 2023-07-20 PROCEDURE — 99215 PR OFFICE/OUTPT VISIT, EST, LEVL V, 40-54 MIN: ICD-10-PCS | Mod: 25,S$GLB,, | Performed by: STUDENT IN AN ORGANIZED HEALTH CARE EDUCATION/TRAINING PROGRAM

## 2023-07-20 PROCEDURE — 3008F PR BODY MASS INDEX (BMI) DOCUMENTED: ICD-10-PCS | Mod: CPTII,S$GLB,, | Performed by: STUDENT IN AN ORGANIZED HEALTH CARE EDUCATION/TRAINING PROGRAM

## 2023-07-20 PROCEDURE — 99999 PR PBB SHADOW E&M-EST. PATIENT-LVL IV: ICD-10-PCS | Mod: PBBFAC,,, | Performed by: STUDENT IN AN ORGANIZED HEALTH CARE EDUCATION/TRAINING PROGRAM

## 2023-07-20 RX ORDER — TRAMADOL HYDROCHLORIDE 50 MG/1
50 TABLET ORAL EVERY 6 HOURS
Qty: 28 TABLET | Refills: 0 | Status: SHIPPED | OUTPATIENT
Start: 2023-07-20 | End: 2023-07-27

## 2023-07-20 RX ORDER — POTASSIUM CHLORIDE 750 MG/1
10 CAPSULE, EXTENDED RELEASE ORAL DAILY
Qty: 30 CAPSULE | Refills: 3 | Status: SHIPPED | OUTPATIENT
Start: 2023-07-20 | End: 2023-08-25

## 2023-07-20 RX ORDER — GABAPENTIN 600 MG/1
600 TABLET ORAL 3 TIMES DAILY
Qty: 270 TABLET | Refills: 3 | Status: SHIPPED | OUTPATIENT
Start: 2023-07-20 | End: 2023-08-25

## 2023-07-20 RX ORDER — KETOROLAC TROMETHAMINE 30 MG/ML
30 INJECTION, SOLUTION INTRAMUSCULAR; INTRAVENOUS ONCE
Status: COMPLETED | OUTPATIENT
Start: 2023-07-20 | End: 2023-07-20

## 2023-07-20 RX ORDER — ATORVASTATIN CALCIUM 80 MG/1
80 TABLET, FILM COATED ORAL DAILY
Qty: 90 TABLET | Refills: 3 | Status: SHIPPED | OUTPATIENT
Start: 2023-07-20 | End: 2024-02-08

## 2023-07-20 RX ADMIN — KETOROLAC TROMETHAMINE 30 MG: 30 INJECTION, SOLUTION INTRAMUSCULAR; INTRAVENOUS at 04:07

## 2023-07-20 NOTE — PROGRESS NOTES
[facility] Note    Chief Complaint      Chief Complaint   Patient presents with    Leg Pain     Right side      History of Present Illness      Leg Pain   Pertinent negatives include no numbness.     Diomedes Mohr is a 62 y.o. female with sHTN, T2DM, HLD, tobacco use (quit in 2020), HFrEF 2/2 DCM and CAD (10-15%, s/p AICD), PE (December 2021), pHTN, osteoarthritis of bilateral hips (L>R) with chronic pain, CVA (2020, R MCA, no deficits), left MCA stroke during recent admission s/p thrombectomy (4/29/23) with right sided hemiparesis and recent diagnosis of bilateral DVT (05/2023)presents today for f/u on chronic conditions in addition continues to complain of severe rated 9-10/10 worsening right LE pain beginning from her hip and radiating to her leg that worsens with any kind of activity and limiting her twice weekly participation in her physical therapy. Her nausea and vomiting has resolved, she denies any CP, SOB, orthopnea, leg swelling, palpitations, syncope or presyncope.    Problem List Addressed This Visit:  1. Chronic pain of right lower extremity  -     gabapentin (NEURONTIN) 600 MG tablet; Take 1 tablet (600 mg total) by mouth 3 (three) times daily.  Dispense: 270 tablet; Refill: 3  -     traMADoL (ULTRAM) 50 mg tablet; Take 1 tablet (50 mg total) by mouth every 6 (six) hours. for 7 days  Dispense: 28 tablet; Refill: 0  -     ketorolac injection 30 mg    2. Type 2 diabetes mellitus with hyperglycemia, with long-term current use of insulin  -     atorvastatin (LIPITOR) 80 MG tablet; Take 1 tablet (80 mg total) by mouth once daily.  Dispense: 90 tablet; Refill: 3    3. Essential hypertension    4. Heart failure with reduced ejection fraction due to cardiomyopathy    5. Dilated cardiomyopathy  -     atorvastatin (LIPITOR) 80 MG tablet; Take 1 tablet (80 mg total) by mouth once daily.  Dispense: 90 tablet; Refill: 3    6. Morbid (severe) obesity due to excess calories    7.  Atherosclerosis of native coronary artery with angina pectoris, unspecified whether native or transplanted heart    8. Low back pain potentially associated with radiculopathy    9. History of CVA with residual deficit    10. Primary osteoarthritis of both hips         Health Maintenance   Topic Date Due    TETANUS VACCINE  Never done    Mammogram  Never done    Eye Exam  2023    Hemoglobin A1c  10/30/2023    Lipid Panel  2024    Foot Exam  2024    High Dose Statin  2024    Hepatitis C Screening  Completed       Past Medical History:   Diagnosis Date    Acute on chronic combined systolic and diastolic heart failure 2019    ARBEN (acute kidney injury) 2023    Anticoagulant long-term use     Anxiety     Arthritis     Asthma     Depression     H/O: hysterectomy     Hyperlipemia     Hypertension     Pulmonary edema     Schizophrenia        Past Surgical History:   Procedure Laterality Date    BLADDER SUSPENSION      CARPAL TUNNEL RELEASE Right     HEEL SPUR SURGERY Left     HYSTERECTOMY      LEFT HEART CATHETERIZATION Bilateral 2019    Procedure: Left heart cath;  Surgeon: Steve Chambers MD;  Location: [facility] CATH LAB;  Service: Cardiology;  Laterality: Bilateral;    RIGHT HEART CATHETERIZATION Right 2021    Procedure: INSERTION, CATHETER, RIGHT HEART;  Surgeon: Petr Naranjo MD;  Location: [facility] CATH LAB;  Service: Cardiology;  Laterality: Right;    RIGHT HEART CATHETERIZATION Right 2022    Procedure: INSERTION, CATHETER, RIGHT HEART;  Surgeon: Chandana Ivory Jr., MD;  Location: [facility] CATH LAB;  Service: Cardiology;  Laterality: Right;    TUBAL LIGATION         family history includes No Known Problems in her father and mother; Ovarian cancer (age of onset: 42) in her sister.    Social History     Tobacco Use    Smoking status: Former     Types: Cigarettes     Quit date: 2016     Years since quittin.8    Smokeless tobacco: Never    Tobacco  comments:     nonex 2 weeks   Substance Use Topics    Alcohol use: No     Alcohol/week: 0.0 standard drinks    Drug use: No       Review of Systems   Constitutional:  Negative for fatigue and fever.   Respiratory:  Negative for cough and chest tightness.    Gastrointestinal:  Negative for abdominal pain, diarrhea and vomiting.   Endocrine: Negative for polydipsia and polyphagia.   Genitourinary:  Negative for difficulty urinating, dysuria and frequency.   Musculoskeletal:  Positive for arthralgias, back pain and gait problem. Negative for joint swelling.   Skin:  Negative for rash.   Neurological:  Positive for weakness (right upper and lower extremities). Negative for seizures, numbness and headaches.   Psychiatric/Behavioral:  Negative for sleep disturbance.      Outpatient Encounter Medications as of 7/20/2023   Medication Sig Note Dispense Refill    acetaminophen (TYLENOL) 325 MG tablet Take 2 tablets (650 mg total) by mouth every 8 (eight) hours as needed.  30 tablet 0    apixaban (ELIQUIS) 5 mg Tab Take 1 tablet (5 mg total) by mouth 2 (two) times daily.  60 tablet 2    aspirin (ECOTRIN) 81 MG EC tablet Take 1 tablet (81 mg total) by mouth once daily.  30 tablet 11    dulaglutide (TRULICITY) 1.5 mg/0.5 mL pen injector Inject 1.5 mg into the skin once a week. 5/30/2023: Takes on Wednesday      empagliflozin (JARDIANCE) 10 mg tablet Take 1 tablet (10 mg total) by mouth once daily.  90 tablet 6    metFORMIN (GLUCOPHAGE) 1000 MG tablet Take 0.5 tablets (500 mg total) by mouth 2 (two) times daily.       metoprolol succinate (TOPROL-XL) 50 MG 24 hr tablet Take 1 tablet (50 mg total) by mouth once daily.  30 tablet 1    sacubitriL-valsartan (ENTRESTO)  mg per tablet Take 1 tablet by mouth 2 (two) times daily.  90 tablet 3    torsemide (DEMADEX) 10 MG Tab Take 1 tablet by mouth once daily  30 tablet 0    [DISCONTINUED] atorvastatin (LIPITOR) 80 MG tablet Take 1 tablet (80 mg total) by mouth once daily.  90  tablet 3    [DISCONTINUED] cyproheptadine (PERIACTIN) 4 mg tablet Take 1 tablet (4 mg total) by mouth 2 (two) times daily.  60 tablet 1    [DISCONTINUED] diazePAM (VALIUM) 5 MG tablet Take 1 tablet (5 mg total) by mouth every 6 (six) hours as needed for Anxiety.  10 tablet 0    [DISCONTINUED] diclofenac sodium (VOLTAREN) 1 % Gel Apply topically to the affected area daily as needed for pain.       [DISCONTINUED] gabapentin (NEURONTIN) 300 MG capsule Take 1 capsule (300 mg total) by mouth 3 (three) times daily.  90 capsule 3    albuterol (PROVENTIL) 2.5 mg /3 mL (0.083 %) nebulizer solution Take 3 mLs (2.5 mg total) by nebulization every 6 (six) hours as needed for Wheezing or Shortness of Breath. Rescue (Patient not taking: Reported on 7/20/2023)  3 mL 0    albuterol (PROVENTIL/VENTOLIN HFA) 90 mcg/actuation inhaler Inhale 2 puffs into the lungs every 6 (six) hours as needed for Wheezing or Shortness of Breath. (Patient not taking: Reported on 7/20/2023)  18 g 2    atorvastatin (LIPITOR) 80 MG tablet Take 1 tablet (80 mg total) by mouth once daily.  90 tablet 3    gabapentin (NEURONTIN) 600 MG tablet Take 1 tablet (600 mg total) by mouth 3 (three) times daily.  270 tablet 3    ipratropium-albuteroL (COMBIVENT)  mcg/actuation inhaler Inhale 1 puff into the lungs 4 (four) times daily as needed for Shortness of Breath. Rescue (Patient not taking: Reported on 7/20/2023)       traMADoL (ULTRAM) 50 mg tablet Take 1 tablet (50 mg total) by mouth every 6 (six) hours. for 7 days  28 tablet 0     Facility-Administered Encounter Medications as of 7/20/2023   Medication Dose Route Frequency Provider Last Rate Last Admin    [COMPLETED] ketorolac injection 30 mg  30 mg Intravenous Once Yolie Michael MD   30 mg at 07/20/23 1611        Review of patient's allergies indicates:   Allergen Reactions    Adhesive Blisters    Captopril Other (See Comments)     COUGH       Physical Exam      Vital Signs  Pulse: 100  SpO2: 97  "%  BP: 90/72  Pain Score:   8  Height and Weight  Height: 5' 6" (167.6 cm)  Weight: 76.4 kg (168 lb 6.4 oz)  BSA (Calculated - sq m): 1.89 sq meters  BMI (Calculated): 27.2  Weight in (lb) to have BMI = 25: 154.6]    Physical Exam  Constitutional:       Appearance: Normal appearance.   HENT:      Head: Normocephalic and atraumatic.      Right Ear: Tympanic membrane normal.      Left Ear: Tympanic membrane normal.      Mouth/Throat:      Mouth: Mucous membranes are moist.      Pharynx: Oropharynx is clear.   Eyes:      Extraocular Movements: Extraocular movements intact.      Conjunctiva/sclera: Conjunctivae normal.      Pupils: Pupils are equal, round, and reactive to light.   Cardiovascular:      Rate and Rhythm: Normal rate and regular rhythm.      Pulses: Normal pulses.   Pulmonary:      Breath sounds: Normal breath sounds.   Abdominal:      General: Abdomen is flat. Bowel sounds are normal.      Palpations: Abdomen is soft.   Musculoskeletal:      Cervical back: Normal range of motion.   Skin:     General: Skin is warm and dry.   Neurological:      Mental Status: She is alert and oriented to person, place, and time.      Sensory: No sensory deficit.      Motor: Weakness (4/5 power RUE, 3/5 power right LE) present.      Gait: Gait abnormal.   Psychiatric:         Mood and Affect: Mood normal.         Behavior: Behavior normal.        Laboratory:  CBC:  Recent Labs   Lab Result Units 05/29/23 2255 05/29/23  2304 05/31/23  0544 06/08/23  1313   WBC K/uL 9.61  --  6.68 7.13   RBC M/uL 5.01  --  3.81* 3.97*   Hemoglobin g/dL 14.9  --  10.9* 11.5*   POC Hematocrit   --    < >  --   --    Hematocrit % 42.6  --  34.0* 36.2*   Platelets K/uL 261  --  188 328   MCV fL 85  --  89 91   MCH pg 29.7  --  28.6 29.0   MCHC g/dL 35.0  --  32.1 31.8*    < > = values in this interval not displayed.     CMP:  Recent Labs   Lab Result Units 05/29/23 2255 05/31/23  0543 06/08/23  1313 06/21/23  0032   Glucose mg/dL 122*   < > 211* " 174*   Calcium mg/dL 10.3   < > 9.5 8.8   Albumin g/dL 3.8  --  2.8* 3.5   Total Protein g/dL 8.1  --  6.3 6.6   Sodium mmol/L 135*   < > 139 137   Potassium mmol/L 4.4   < > 3.8 3.3*   CO2 mmol/L 24   < > 27 27   Chloride mmol/L 94*   < > 104 100   BUN mg/dL 26*   < > 7* 7   Alkaline Phosphatase U/L 139*  --  135 122   ALT U/L 24  --  11 22   AST U/L 29  --  16 30   Total Bilirubin mg/dL 0.8  --  0.5 0.5    < > = values in this interval not displayed.     URINALYSIS:  Recent Labs   Lab Result Units 05/30/23  0930   Color, UA  Yellow   Specific Gravity, UA  1.020   pH, UA  5.0   Protein, UA  Negative   Bacteria /hpf Moderate*   Nitrite, UA  Negative   Leukocytes, UA  Trace*      LIPIDS:  Recent Labs   Lab Result Units 04/30/23  0845 05/29/23  2255   TSH uIU/mL 2.092 2.701   HDL mg/dL 38*  --    Cholesterol mg/dL 142  --    Triglycerides mg/dL 77  --    LDL Cholesterol mg/dL 88.6  --    HDL/Cholesterol Ratio % 26.8  --    Non-HDL Cholesterol mg/dL 104  --    Total Cholesterol/HDL Ratio  3.7  --      TSH:  Recent Labs   Lab Result Units 04/30/23  0845 05/29/23  2255   TSH uIU/mL 2.092 2.701     A1C:  Recent Labs   Lab Result Units 04/30/23  0845   Hemoglobin A1C % 7.5*       Radiology:      Assessment/Plan       1. Acute on chronic RLE pain    2. Type 2 diabetes mellitus without complication, without long-term current use of insulin.    3. Essential hypertension    4. Heart failure with reduced ejection fraction due to cardiomyopathy    5. Low back pain potentially associated with radiculopathy  - gabapentin (NEURONTIN) 300 MG capsule; Take 1 capsule (300 mg total) by mouth 3 (three) times daily.  Dispense: 90 capsule; Refill: 3  - MRI Lumbar Spine Without Contrast; Future    6. History of CVA with residual deficit    7. Primary osteoarthritis of both hips    8. BMI 27    9. Bilateral LE DVT    10. Normocytic anemia    11. Hypokalemia    Plan :  -still with severe Right LE pain, rated 10/10, likely due to sequelae of  CVA(hemiparesis)  -compensated on examination  -recent renal function WNL and electrolytes with hypopkalemia  -start KCL 10mg daily  -toradol 30mg given in office today  - reviewed, tramadol 50mg q6hr X7days  -increase gabapentin to 600mg TID for pain  -already has scheduled PT/OT for right sided hemiparesis  -stable H&H  -referral for eye exam also provided       Continue current medications and maintain follow up with specialists.      Patient verbalizes understanding and agrees with current treatment plan.      MD [facility]

## 2023-08-08 ENCOUNTER — TELEPHONE (OUTPATIENT)
Dept: FAMILY MEDICINE | Facility: CLINIC | Age: 63
End: 2023-08-08
Payer: MEDICARE

## 2023-08-10 ENCOUNTER — TELEPHONE (OUTPATIENT)
Dept: FAMILY MEDICINE | Facility: CLINIC | Age: 63
End: 2023-08-10
Payer: MEDICARE

## 2023-08-14 DIAGNOSIS — I42.0 DILATED CARDIOMYOPATHY: ICD-10-CM

## 2023-08-14 DIAGNOSIS — F50.00 ANOREXIA MENTALIS: Primary | ICD-10-CM

## 2023-08-14 RX ORDER — CYPROHEPTADINE HYDROCHLORIDE 4 MG/1
4 TABLET ORAL 3 TIMES DAILY PRN
Qty: 90 TABLET | Refills: 3 | Status: SHIPPED | OUTPATIENT
Start: 2023-08-14 | End: 2023-08-28 | Stop reason: SDUPTHER

## 2023-08-14 RX ORDER — TORSEMIDE 10 MG/1
10 TABLET ORAL DAILY
Qty: 30 TABLET | Refills: 0 | Status: SHIPPED | OUTPATIENT
Start: 2023-08-14 | End: 2023-08-25

## 2023-08-22 ENCOUNTER — CLINICAL SUPPORT (OUTPATIENT)
Dept: CARDIOLOGY | Facility: HOSPITAL | Age: 63
End: 2023-08-22
Payer: MEDICARE

## 2023-08-22 DIAGNOSIS — Z95.810 PRESENCE OF AUTOMATIC (IMPLANTABLE) CARDIAC DEFIBRILLATOR: ICD-10-CM

## 2023-08-22 PROCEDURE — 93295 DEV INTERROG REMOTE 1/2/MLT: CPT | Mod: ,,, | Performed by: STUDENT IN AN ORGANIZED HEALTH CARE EDUCATION/TRAINING PROGRAM

## 2023-08-22 PROCEDURE — 93296 REM INTERROG EVL PM/IDS: CPT | Performed by: STUDENT IN AN ORGANIZED HEALTH CARE EDUCATION/TRAINING PROGRAM

## 2023-08-22 PROCEDURE — 93295 CARDIAC DEVICE CHECK - REMOTE: ICD-10-PCS | Mod: ,,, | Performed by: STUDENT IN AN ORGANIZED HEALTH CARE EDUCATION/TRAINING PROGRAM

## 2023-08-25 DIAGNOSIS — G89.29 CHRONIC PAIN OF RIGHT LOWER EXTREMITY: ICD-10-CM

## 2023-08-25 DIAGNOSIS — M79.604 CHRONIC PAIN OF RIGHT LOWER EXTREMITY: ICD-10-CM

## 2023-08-25 DIAGNOSIS — I42.0 DILATED CARDIOMYOPATHY: ICD-10-CM

## 2023-08-25 RX ORDER — GABAPENTIN 600 MG/1
600 TABLET ORAL 3 TIMES DAILY
Qty: 270 TABLET | Refills: 3 | Status: SHIPPED | OUTPATIENT
Start: 2023-08-25

## 2023-08-25 RX ORDER — METOPROLOL SUCCINATE 50 MG/1
50 TABLET, EXTENDED RELEASE ORAL DAILY
Qty: 90 TABLET | Refills: 3 | Status: SHIPPED | OUTPATIENT
Start: 2023-08-25 | End: 2023-08-28

## 2023-08-25 RX ORDER — POTASSIUM CHLORIDE 750 MG/1
10 TABLET, EXTENDED RELEASE ORAL DAILY
Qty: 90 TABLET | Refills: 3 | Status: SHIPPED | OUTPATIENT
Start: 2023-08-25 | End: 2024-08-24

## 2023-08-25 RX ORDER — TORSEMIDE 10 MG/1
10 TABLET ORAL DAILY PRN
Qty: 90 TABLET | Refills: 3 | Status: ON HOLD | OUTPATIENT
Start: 2023-08-25 | End: 2023-09-20 | Stop reason: SDUPTHER

## 2023-08-28 ENCOUNTER — OFFICE VISIT (OUTPATIENT)
Dept: FAMILY MEDICINE | Facility: CLINIC | Age: 63
End: 2023-08-28
Payer: MEDICARE

## 2023-08-28 VITALS
BODY MASS INDEX: 23.89 KG/M2 | WEIGHT: 148.69 LBS | HEIGHT: 66 IN | DIASTOLIC BLOOD PRESSURE: 56 MMHG | HEART RATE: 52 BPM | OXYGEN SATURATION: 98 % | SYSTOLIC BLOOD PRESSURE: 108 MMHG

## 2023-08-28 DIAGNOSIS — R11.2 INTRACTABLE NAUSEA AND VOMITING: ICD-10-CM

## 2023-08-28 DIAGNOSIS — E11.65 TYPE 2 DIABETES MELLITUS WITH HYPERGLYCEMIA, WITH LONG-TERM CURRENT USE OF INSULIN: ICD-10-CM

## 2023-08-28 DIAGNOSIS — F50.00 ANOREXIA MENTALIS: ICD-10-CM

## 2023-08-28 DIAGNOSIS — Z79.4 TYPE 2 DIABETES MELLITUS WITH HYPERGLYCEMIA, WITH LONG-TERM CURRENT USE OF INSULIN: ICD-10-CM

## 2023-08-28 DIAGNOSIS — I10 ESSENTIAL HYPERTENSION: ICD-10-CM

## 2023-08-28 DIAGNOSIS — E87.6 HYPOKALEMIA DUE TO EXCESSIVE RENAL LOSS OF POTASSIUM: ICD-10-CM

## 2023-08-28 PROCEDURE — 3061F PR NEG MICROALBUMINURIA RESULT DOCUMENTED/REVIEW: ICD-10-PCS | Mod: CPTII,S$GLB,, | Performed by: STUDENT IN AN ORGANIZED HEALTH CARE EDUCATION/TRAINING PROGRAM

## 2023-08-28 PROCEDURE — 99999 PR PBB SHADOW E&M-EST. PATIENT-LVL IV: CPT | Mod: PBBFAC,,, | Performed by: STUDENT IN AN ORGANIZED HEALTH CARE EDUCATION/TRAINING PROGRAM

## 2023-08-28 PROCEDURE — 3074F PR MOST RECENT SYSTOLIC BLOOD PRESSURE < 130 MM HG: ICD-10-PCS | Mod: CPTII,S$GLB,, | Performed by: STUDENT IN AN ORGANIZED HEALTH CARE EDUCATION/TRAINING PROGRAM

## 2023-08-28 PROCEDURE — 4010F ACE/ARB THERAPY RXD/TAKEN: CPT | Mod: CPTII,S$GLB,, | Performed by: STUDENT IN AN ORGANIZED HEALTH CARE EDUCATION/TRAINING PROGRAM

## 2023-08-28 PROCEDURE — 3061F NEG MICROALBUMINURIA REV: CPT | Mod: CPTII,S$GLB,, | Performed by: STUDENT IN AN ORGANIZED HEALTH CARE EDUCATION/TRAINING PROGRAM

## 2023-08-28 PROCEDURE — 1159F PR MEDICATION LIST DOCUMENTED IN MEDICAL RECORD: ICD-10-PCS | Mod: CPTII,S$GLB,, | Performed by: STUDENT IN AN ORGANIZED HEALTH CARE EDUCATION/TRAINING PROGRAM

## 2023-08-28 PROCEDURE — 3078F DIAST BP <80 MM HG: CPT | Mod: CPTII,S$GLB,, | Performed by: STUDENT IN AN ORGANIZED HEALTH CARE EDUCATION/TRAINING PROGRAM

## 2023-08-28 PROCEDURE — 99214 OFFICE O/P EST MOD 30 MIN: CPT | Mod: S$GLB,,, | Performed by: STUDENT IN AN ORGANIZED HEALTH CARE EDUCATION/TRAINING PROGRAM

## 2023-08-28 PROCEDURE — 1159F MED LIST DOCD IN RCRD: CPT | Mod: CPTII,S$GLB,, | Performed by: STUDENT IN AN ORGANIZED HEALTH CARE EDUCATION/TRAINING PROGRAM

## 2023-08-28 PROCEDURE — 3051F PR MOST RECENT HEMOGLOBIN A1C LEVEL 7.0 - < 8.0%: ICD-10-PCS | Mod: CPTII,S$GLB,, | Performed by: STUDENT IN AN ORGANIZED HEALTH CARE EDUCATION/TRAINING PROGRAM

## 2023-08-28 PROCEDURE — 4010F PR ACE/ARB THEARPY RXD/TAKEN: ICD-10-PCS | Mod: CPTII,S$GLB,, | Performed by: STUDENT IN AN ORGANIZED HEALTH CARE EDUCATION/TRAINING PROGRAM

## 2023-08-28 PROCEDURE — 3066F NEPHROPATHY DOC TX: CPT | Mod: CPTII,S$GLB,, | Performed by: STUDENT IN AN ORGANIZED HEALTH CARE EDUCATION/TRAINING PROGRAM

## 2023-08-28 PROCEDURE — 3051F HG A1C>EQUAL 7.0%<8.0%: CPT | Mod: CPTII,S$GLB,, | Performed by: STUDENT IN AN ORGANIZED HEALTH CARE EDUCATION/TRAINING PROGRAM

## 2023-08-28 PROCEDURE — 3066F PR DOCUMENTATION OF TREATMENT FOR NEPHROPATHY: ICD-10-PCS | Mod: CPTII,S$GLB,, | Performed by: STUDENT IN AN ORGANIZED HEALTH CARE EDUCATION/TRAINING PROGRAM

## 2023-08-28 PROCEDURE — 1160F PR REVIEW ALL MEDS BY PRESCRIBER/CLIN PHARMACIST DOCUMENTED: ICD-10-PCS | Mod: CPTII,S$GLB,, | Performed by: STUDENT IN AN ORGANIZED HEALTH CARE EDUCATION/TRAINING PROGRAM

## 2023-08-28 PROCEDURE — 1160F RVW MEDS BY RX/DR IN RCRD: CPT | Mod: CPTII,S$GLB,, | Performed by: STUDENT IN AN ORGANIZED HEALTH CARE EDUCATION/TRAINING PROGRAM

## 2023-08-28 PROCEDURE — 3078F PR MOST RECENT DIASTOLIC BLOOD PRESSURE < 80 MM HG: ICD-10-PCS | Mod: CPTII,S$GLB,, | Performed by: STUDENT IN AN ORGANIZED HEALTH CARE EDUCATION/TRAINING PROGRAM

## 2023-08-28 PROCEDURE — 3008F BODY MASS INDEX DOCD: CPT | Mod: CPTII,S$GLB,, | Performed by: STUDENT IN AN ORGANIZED HEALTH CARE EDUCATION/TRAINING PROGRAM

## 2023-08-28 PROCEDURE — 3074F SYST BP LT 130 MM HG: CPT | Mod: CPTII,S$GLB,, | Performed by: STUDENT IN AN ORGANIZED HEALTH CARE EDUCATION/TRAINING PROGRAM

## 2023-08-28 PROCEDURE — 3008F PR BODY MASS INDEX (BMI) DOCUMENTED: ICD-10-PCS | Mod: CPTII,S$GLB,, | Performed by: STUDENT IN AN ORGANIZED HEALTH CARE EDUCATION/TRAINING PROGRAM

## 2023-08-28 PROCEDURE — 99999 PR PBB SHADOW E&M-EST. PATIENT-LVL IV: ICD-10-PCS | Mod: PBBFAC,,, | Performed by: STUDENT IN AN ORGANIZED HEALTH CARE EDUCATION/TRAINING PROGRAM

## 2023-08-28 PROCEDURE — 99214 PR OFFICE/OUTPT VISIT, EST, LEVL IV, 30-39 MIN: ICD-10-PCS | Mod: S$GLB,,, | Performed by: STUDENT IN AN ORGANIZED HEALTH CARE EDUCATION/TRAINING PROGRAM

## 2023-08-28 RX ORDER — CYPROHEPTADINE HYDROCHLORIDE 4 MG/1
4 TABLET ORAL 3 TIMES DAILY PRN
Qty: 270 TABLET | Refills: 3 | Status: SHIPPED | OUTPATIENT
Start: 2023-08-28 | End: 2023-10-31

## 2023-08-28 RX ORDER — METOPROLOL SUCCINATE 25 MG/1
25 TABLET, EXTENDED RELEASE ORAL DAILY
Qty: 90 TABLET | Refills: 3 | Status: ON HOLD | OUTPATIENT
Start: 2023-08-28 | End: 2023-09-02 | Stop reason: HOSPADM

## 2023-08-28 NOTE — PATIENT INSTRUCTIONS
Please take the torsemide 10mg three times a week instead of daily for now  Take metoprolol 25mg daily instead of 50mg daily  Continue to monitor BP at least twice a day

## 2023-08-30 ENCOUNTER — HOSPITAL ENCOUNTER (INPATIENT)
Facility: HOSPITAL | Age: 63
LOS: 5 days | Discharge: HOME HEALTH | DRG: 871 | End: 2023-09-05
Attending: EMERGENCY MEDICINE | Admitting: INTERNAL MEDICINE
Payer: MEDICARE

## 2023-08-30 DIAGNOSIS — R11.2 NAUSEA AND VOMITING, UNSPECIFIED VOMITING TYPE: ICD-10-CM

## 2023-08-30 DIAGNOSIS — R29.898 DECREASED STRENGTH OF LOWER EXTREMITY: ICD-10-CM

## 2023-08-30 DIAGNOSIS — R00.0 TACHYCARDIA: ICD-10-CM

## 2023-08-30 DIAGNOSIS — I50.9 CHF (CONGESTIVE HEART FAILURE): ICD-10-CM

## 2023-08-30 DIAGNOSIS — A41.9 SEPSIS: ICD-10-CM

## 2023-08-30 DIAGNOSIS — N17.9 AKI (ACUTE KIDNEY INJURY): Primary | ICD-10-CM

## 2023-08-30 DIAGNOSIS — I95.9 HYPOTENSION: ICD-10-CM

## 2023-08-30 PROBLEM — I69.320 APHASIA AS LATE EFFECT OF CEREBROVASCULAR ACCIDENT: Status: RESOLVED | Noted: 2023-06-12 | Resolved: 2023-08-30

## 2023-08-30 PROBLEM — Z74.09 IMPAIRED MOBILITY AND ADLS: Status: RESOLVED | Noted: 2023-06-20 | Resolved: 2023-08-30

## 2023-08-30 PROBLEM — Z78.9 IMPAIRED MOBILITY AND ADLS: Status: RESOLVED | Noted: 2023-06-20 | Resolved: 2023-08-30

## 2023-08-30 PROBLEM — M25.60 LIMITED JOINT RANGE OF MOTION (ROM): Status: RESOLVED | Noted: 2023-06-20 | Resolved: 2023-08-30

## 2023-08-30 LAB
ALBUMIN SERPL BCP-MCNC: 3.6 G/DL (ref 3.5–5.2)
ALLENS TEST: ABNORMAL
ALLENS TEST: NORMAL
ALP SERPL-CCNC: 92 U/L (ref 55–135)
ALT SERPL W/O P-5'-P-CCNC: 11 U/L (ref 10–44)
ANION GAP SERPL CALC-SCNC: 15 MMOL/L (ref 8–16)
AST SERPL-CCNC: 16 U/L (ref 10–40)
BACTERIA #/AREA URNS AUTO: ABNORMAL /HPF
BASOPHILS # BLD AUTO: 0.02 K/UL (ref 0–0.2)
BASOPHILS NFR BLD: 0.2 % (ref 0–1.9)
BILIRUB SERPL-MCNC: 0.6 MG/DL (ref 0.1–1)
BILIRUB UR QL STRIP: NEGATIVE
BNP SERPL-MCNC: 166 PG/ML (ref 0–99)
BUN SERPL-MCNC: 23 MG/DL (ref 8–23)
CALCIUM SERPL-MCNC: 9.4 MG/DL (ref 8.7–10.5)
CHLORIDE SERPL-SCNC: 102 MMOL/L (ref 95–110)
CLARITY UR REFRACT.AUTO: ABNORMAL
CO2 SERPL-SCNC: 26 MMOL/L (ref 23–29)
COLOR UR AUTO: YELLOW
CREAT SERPL-MCNC: 2.4 MG/DL (ref 0.5–1.4)
DIFFERENTIAL METHOD: ABNORMAL
EOSINOPHIL # BLD AUTO: 0 K/UL (ref 0–0.5)
EOSINOPHIL NFR BLD: 0.2 % (ref 0–8)
ERYTHROCYTE [DISTWIDTH] IN BLOOD BY AUTOMATED COUNT: 14.6 % (ref 11.5–14.5)
EST. GFR  (NO RACE VARIABLE): 22.4 ML/MIN/1.73 M^2
GLUCOSE SERPL-MCNC: 128 MG/DL (ref 70–110)
GLUCOSE UR QL STRIP: ABNORMAL
HCT VFR BLD AUTO: 39.4 % (ref 37–48.5)
HGB BLD-MCNC: 12.8 G/DL (ref 12–16)
HGB UR QL STRIP: ABNORMAL
HYALINE CASTS UR QL AUTO: 18 /LPF
IMM GRANULOCYTES # BLD AUTO: 0.03 K/UL (ref 0–0.04)
IMM GRANULOCYTES NFR BLD AUTO: 0.3 % (ref 0–0.5)
KETONES UR QL STRIP: ABNORMAL
LDH SERPL L TO P-CCNC: 1.37 MMOL/L (ref 0.5–2.2)
LDH SERPL L TO P-CCNC: 2.26 MMOL/L (ref 0.5–2.2)
LEUKOCYTE ESTERASE UR QL STRIP: ABNORMAL
LIPASE SERPL-CCNC: 40 U/L (ref 4–60)
LYMPHOCYTES # BLD AUTO: 2.5 K/UL (ref 1–4.8)
LYMPHOCYTES NFR BLD: 24.4 % (ref 18–48)
MAGNESIUM SERPL-MCNC: 1.6 MG/DL (ref 1.6–2.6)
MCH RBC QN AUTO: 29.4 PG (ref 27–31)
MCHC RBC AUTO-ENTMCNC: 32.5 G/DL (ref 32–36)
MCV RBC AUTO: 90 FL (ref 82–98)
MICROSCOPIC COMMENT: ABNORMAL
MONOCYTES # BLD AUTO: 0.5 K/UL (ref 0.3–1)
MONOCYTES NFR BLD: 4.6 % (ref 4–15)
NEUTROPHILS # BLD AUTO: 7.2 K/UL (ref 1.8–7.7)
NEUTROPHILS NFR BLD: 70.3 % (ref 38–73)
NITRITE UR QL STRIP: NEGATIVE
NRBC BLD-RTO: 0 /100 WBC
PH UR STRIP: 5 [PH] (ref 5–8)
PLATELET # BLD AUTO: 367 K/UL (ref 150–450)
PMV BLD AUTO: 9.8 FL (ref 9.2–12.9)
POTASSIUM SERPL-SCNC: 3.7 MMOL/L (ref 3.5–5.1)
PROT SERPL-MCNC: 7.1 G/DL (ref 6–8.4)
PROT UR QL STRIP: ABNORMAL
RBC # BLD AUTO: 4.36 M/UL (ref 4–5.4)
RBC #/AREA URNS AUTO: 18 /HPF (ref 0–4)
SAMPLE: ABNORMAL
SAMPLE: NORMAL
SITE: ABNORMAL
SITE: NORMAL
SODIUM SERPL-SCNC: 143 MMOL/L (ref 136–145)
SP GR UR STRIP: 1.02 (ref 1–1.03)
TROPONIN I SERPL DL<=0.01 NG/ML-MCNC: 0.04 NG/ML (ref 0–0.03)
URN SPEC COLLECT METH UR: ABNORMAL
WBC # BLD AUTO: 10.19 K/UL (ref 3.9–12.7)
WBC #/AREA URNS AUTO: 22 /HPF (ref 0–5)
YEAST UR QL AUTO: ABNORMAL

## 2023-08-30 PROCEDURE — 87088 URINE BACTERIA CULTURE: CPT | Performed by: EMERGENCY MEDICINE

## 2023-08-30 PROCEDURE — 84484 ASSAY OF TROPONIN QUANT: CPT | Performed by: EMERGENCY MEDICINE

## 2023-08-30 PROCEDURE — 93005 ELECTROCARDIOGRAM TRACING: CPT

## 2023-08-30 PROCEDURE — 87186 SC STD MICRODIL/AGAR DIL: CPT | Performed by: EMERGENCY MEDICINE

## 2023-08-30 PROCEDURE — 83690 ASSAY OF LIPASE: CPT | Performed by: EMERGENCY MEDICINE

## 2023-08-30 PROCEDURE — 25000003 PHARM REV CODE 250: Performed by: EMERGENCY MEDICINE

## 2023-08-30 PROCEDURE — 99291 CRITICAL CARE FIRST HOUR: CPT

## 2023-08-30 PROCEDURE — 83735 ASSAY OF MAGNESIUM: CPT | Performed by: EMERGENCY MEDICINE

## 2023-08-30 PROCEDURE — 87086 URINE CULTURE/COLONY COUNT: CPT | Performed by: EMERGENCY MEDICINE

## 2023-08-30 PROCEDURE — 87077 CULTURE AEROBIC IDENTIFY: CPT | Performed by: EMERGENCY MEDICINE

## 2023-08-30 PROCEDURE — 83605 ASSAY OF LACTIC ACID: CPT

## 2023-08-30 PROCEDURE — 96360 HYDRATION IV INFUSION INIT: CPT | Mod: 59

## 2023-08-30 PROCEDURE — 99900035 HC TECH TIME PER 15 MIN (STAT)

## 2023-08-30 PROCEDURE — 63600175 PHARM REV CODE 636 W HCPCS: Performed by: EMERGENCY MEDICINE

## 2023-08-30 PROCEDURE — 81001 URINALYSIS AUTO W/SCOPE: CPT | Performed by: EMERGENCY MEDICINE

## 2023-08-30 PROCEDURE — 96365 THER/PROPH/DIAG IV INF INIT: CPT

## 2023-08-30 PROCEDURE — 87040 BLOOD CULTURE FOR BACTERIA: CPT | Performed by: EMERGENCY MEDICINE

## 2023-08-30 PROCEDURE — 83880 ASSAY OF NATRIURETIC PEPTIDE: CPT | Performed by: EMERGENCY MEDICINE

## 2023-08-30 PROCEDURE — 93010 EKG 12-LEAD: ICD-10-PCS | Mod: ,,, | Performed by: INTERNAL MEDICINE

## 2023-08-30 PROCEDURE — 93010 ELECTROCARDIOGRAM REPORT: CPT | Mod: ,,, | Performed by: INTERNAL MEDICINE

## 2023-08-30 PROCEDURE — 80053 COMPREHEN METABOLIC PANEL: CPT | Performed by: EMERGENCY MEDICINE

## 2023-08-30 PROCEDURE — 85025 COMPLETE CBC W/AUTO DIFF WBC: CPT | Performed by: EMERGENCY MEDICINE

## 2023-08-30 PROCEDURE — 96375 TX/PRO/DX INJ NEW DRUG ADDON: CPT

## 2023-08-30 RX ORDER — ONDANSETRON 2 MG/ML
4 INJECTION INTRAMUSCULAR; INTRAVENOUS
Status: COMPLETED | OUTPATIENT
Start: 2023-08-30 | End: 2023-08-30

## 2023-08-30 RX ADMIN — SODIUM CHLORIDE 500 ML: 0.9 INJECTION, SOLUTION INTRAVENOUS at 10:08

## 2023-08-30 RX ADMIN — CEFTRIAXONE 1 G: 1 INJECTION, POWDER, FOR SOLUTION INTRAMUSCULAR; INTRAVENOUS at 10:08

## 2023-08-30 RX ADMIN — ONDANSETRON 4 MG: 2 INJECTION INTRAMUSCULAR; INTRAVENOUS at 08:08

## 2023-08-30 NOTE — ED PROVIDER NOTES
Encounter Date: 8/30/2023       History     Chief Complaint   Patient presents with    Vomiting     Arrived via EMS from home. N/v x 2 weeks, has been treated with zofran. Had 4 episodes today. Received 4mg of zofran with EMS. Emesis is tea colored.     Hypotension     Pt is a 61 yo F with PMH of CHF, schizophrenia couple hypertension, hyperlipidemia, asthma, anxiety and depression who presents with nausea and vomiting for the past 2-3 weeks.  She denies any pain or diarrhea.  She states this is a new problem for her.  She was given Zofran by EMS EN route to the ER today.    Per chart review:  Patient was seen 08/23/2023 in the [facility] and was hypotensive at that time she was found to have a UTI and was diagnosed on Bactrim.  On 08/28/2023 she was seen by her primary care doctor and given her complaint of ongoing hypotension she had her beta-blocker and her diuretic dosages decreased.      ECHO 04/28/2023:  · The left ventricle is severely enlarged with eccentric hypertrophy and severely decreased systolic function.  · The estimated ejection fraction is 10-15%.  · There is left ventricular global hypokinesis.  · Grade II left ventricular diastolic dysfunction.  · Normal right ventricular size with normal right ventricular systolic function.  · Mild tricuspid regurgitation.  · The estimated PA systolic pressure is 35 mmHg.  · Normal central venous pressure (3 mmHg).  · Severe left atrial enlargement.      The history is provided by the patient and medical records.     Review of patient's allergies indicates:   Allergen Reactions    Adhesive Blisters    Captopril Other (See Comments)     COUGH     Past Medical History:   Diagnosis Date    Acute on chronic combined systolic and diastolic heart failure 12/26/2019    ARBEN (acute kidney injury) 5/30/2023    Anticoagulant long-term use     Anxiety     Arthritis     Asthma     Depression     H/O: hysterectomy     Hyperlipemia     Hypertension     Pulmonary edema      Schizophrenia      Past Surgical History:   Procedure Laterality Date    BLADDER SUSPENSION      CARPAL TUNNEL RELEASE Right     HEEL SPUR SURGERY Left     HYSTERECTOMY      LEFT HEART CATHETERIZATION Bilateral 2019    Procedure: Left heart cath;  Surgeon: Steve Chambers MD;  Location: [facility] CATH LAB;  Service: Cardiology;  Laterality: Bilateral;    RIGHT HEART CATHETERIZATION Right 2021    Procedure: INSERTION, CATHETER, RIGHT HEART;  Surgeon: Petr Naranjo MD;  Location: [facility] CATH LAB;  Service: Cardiology;  Laterality: Right;    RIGHT HEART CATHETERIZATION Right 2022    Procedure: INSERTION, CATHETER, RIGHT HEART;  Surgeon: Chandana Ivory Jr., MD;  Location: [facility] CATH LAB;  Service: Cardiology;  Laterality: Right;    TUBAL LIGATION       Family History   Problem Relation Age of Onset    No Known Problems Mother     No Known Problems Father     Ovarian cancer Sister 42     Social History     Tobacco Use    Smoking status: Former     Current packs/day: 0.00     Types: Cigarettes     Quit date: 2016     Years since quittin.0    Smokeless tobacco: Never    Tobacco comments:     nonex 2 weeks   Substance Use Topics    Alcohol use: No     Alcohol/week: 0.0 standard drinks of alcohol    Drug use: No         Physical Exam     Initial Vitals [23 1735]   BP Pulse Resp Temp SpO2   (!) 76/52 102 18 98.7 °F (37.1 °C) 100 %      MAP       --         Physical Exam    Nursing note and vitals reviewed.  Constitutional: She appears well-developed and well-nourished. She is not diaphoretic. No distress.   HENT:   Head: Normocephalic and atraumatic.   Eyes: Conjunctivae and EOM are normal.   Neck: Neck supple.   Normal range of motion.  Cardiovascular:  Normal rate and regular rhythm.           Pulmonary/Chest: No respiratory distress.   Abdominal: She exhibits no distension.   Musculoskeletal:      Cervical back: Normal range of motion and neck supple.     Neurological: She is  alert and oriented to person, place, and time. GCS score is 15. GCS eye subscore is 4. GCS verbal subscore is 5. GCS motor subscore is 6.   Skin: Skin is warm and dry.   Psychiatric: She has a normal mood and affect. Her behavior is normal. Judgment and thought content normal.         ED Course   Procedures  Labs Reviewed   CBC W/ AUTO DIFFERENTIAL - Abnormal; Notable for the following components:       Result Value    RDW 14.6 (*)     All other components within normal limits   COMPREHENSIVE METABOLIC PANEL - Abnormal; Notable for the following components:    Glucose 128 (*)     Creatinine 2.4 (*)     eGFR 22.4 (*)     All other components within normal limits   URINALYSIS, REFLEX TO URINE CULTURE - Abnormal; Notable for the following components:    Appearance, UA Hazy (*)     Protein, UA 1+ (*)     Glucose, UA 4+ (*)     Ketones, UA Trace (*)     Occult Blood UA 3+ (*)     Leukocytes, UA 3+ (*)     All other components within normal limits    Narrative:     Specimen Source->Urine   TROPONIN I - Abnormal; Notable for the following components:    Troponin I 0.035 (*)     All other components within normal limits   B-TYPE NATRIURETIC PEPTIDE - Abnormal; Notable for the following components:     (*)     All other components within normal limits   URINALYSIS MICROSCOPIC - Abnormal; Notable for the following components:    RBC, UA 18 (*)     WBC, UA 22 (*)     Hyaline Casts, UA 18 (*)     All other components within normal limits    Narrative:     Specimen Source->Urine   ISTAT LACTATE - Abnormal; Notable for the following components:    POC Lactate 2.26 (*)     All other components within normal limits   MAGNESIUM   LIPASE   ISTAT LACTATE   POCT GLUCOSE MONITORING CONTINUOUS        ECG Results              EKG 12-lead (Final result)  Result time 08/31/23 14:01:35      Final result by Interface, Lab In [facility] (08/31/23 14:01:35)                   Narrative:    Test Reason : I95.9,    Vent. Rate : 079 BPM      Atrial Rate : 079 BPM     P-R Int : 134 ms          QRS Dur : 100 ms      QT Int : 402 ms       P-R-T Axes : 044 043 225 degrees     QTc Int : 460 ms    Sinus rhythm with sinus arrhythmia with occasional Premature ventricular  complexes and occcasional atrial paced complexes  LVH with repolarization abnormality  Abnormal ECG  When compared with ECG of 30-MAY-2023 02:23,  Premature ventricular complexes are now Present  Demand atrial pacing now present  Confirmed by Cinthia Cooper MD ([id number]) on 8/31/2023 2:01:28 PM    Referred By: ONEIL   SELF           Confirmed By:Cinthia Cooper MD                                     EKG 12-lead (Final result)  Result time 09/01/23 10:36:04      Final result by Interface, Lab In [facility] (09/01/23 10:36:04)                   Narrative:    Test Reason : I95.9,    Vent. Rate : 097 BPM     Atrial Rate : 097 BPM     P-R Int : 144 ms          QRS Dur : 104 ms      QT Int : 376 ms       P-R-T Axes : 049 045 210 degrees     QTc Int : 477 ms    Sinus rhythm with occasional Premature ventricular complexes  Possible Left atrial enlargement  Possible Anterior infarct ,age undetermined vs lead placement  ST and T wave abnormality, consider inferolateral ischemia  Abnormal ECG  When compared with ECG of 30-MAY-2023 02:23,  Premature ventricular complexes are now Present  Confirmed by Sujit RIVAS MD ([id number]) on 9/1/2023 10:35:58 AM    Referred By: ONEIL   SELF           Confirmed By:Sujit RIVAS MD                                  Imaging Results              CT Abdomen Pelvis  Without Contrast (Final result)  Result time 08/30/23 22:11:40      Final result by Aaron Hernandez MD (08/30/23 22:11:40)                   Impression:      Trace intraluminal gas within the urinary bladder, recommend correlation for recent instrumentation.    Gallbladder sludge, without CT evidence of acute cholecystitis.    Otherwise, there is no acute CT abnormality in the abdomen or pelvis.    Partially visualized  indwelling ICD/pacemaker leads.    Electronically signed by resident: Cruz Vance  Date:    08/30/2023  Time:    21:01    Electronically signed by: Aaron Hernandez  Date:    08/30/2023  Time:    22:11               Narrative:    EXAMINATION:  CT ABDOMEN PELVIS WITHOUT CONTRAST    CLINICAL HISTORY:  Nausea/vomiting;    TECHNIQUE:  Routine axial CT images of the abdomen and pelvis were obtained without contrast.  Coronal and Sagittal reformatted images were also obtained.    COMPARISON:  CTA chest 05/30/2023    FINDINGS:  LUNG BASES: Elevated left hemidiaphragm.  Dependent atelectasis.  Pacemaker wires visualized.    HEPATOBILIARY: Subcentimeter right hepatic hypodensity too small to characterize unchanged dating back to at least 02/24/2020.  No biliary ductal dilatation.  Sludge within the gallbladder.  No acute cholecystitis..    SPLEEN: Diminutive lobulated spleen.    PANCREAS: No focal masses or ductal dilatation.    ADRENALS: No adrenal nodules.    KIDNEYS/URETERS: Bilateral renal sinus and cortical cysts.  No hydronephrosis or stones..    PELVIC ORGANS/BLADDER: Hysterectomy.  Urinary bladder is contains intraluminal gas, recommend correlation for recent instrumentation.  No focal wall thickening.    PERITONEUM / RETROPERITONEUM: No free air or fluid.    LYMPH NODES: No lymphadenopathy.    VESSELS: Extensive calcific atherosclerosis of the aorta and branch vessels.    GI TRACT: No distention or wall thickening. Normal appendix.    BONES AND SOFT TISSUES: L4 vertebral body hemangioma.  Advanced osteoarthritis of the right hip and mild osteoarthritis of the left hip.  No fractures or focal osseous lesions.                                       X-Ray Chest AP Portable (Final result)  Result time 08/30/23 20:03:05      Final result by Horacio Peres MD (08/30/23 20:03:05)                   Impression:      1. No acute cardiopulmonary process.      Electronically signed by: Horacio Peres  MD  Date:    08/30/2023  Time:    20:03               Narrative:    EXAMINATION:  XR CHEST AP PORTABLE    CLINICAL HISTORY:  Sepsis;    TECHNIQUE:  Single frontal view of the chest was performed.    COMPARISON:  05/30/2023    FINDINGS:  Left chest wall pacer noted.  The cardiomediastinal silhouette is prominent, similar to the previous examination noting calcification of the aorta..  There is no pleural effusion.  The trachea is midline.  The lungs are symmetrically expanded bilaterally with mildly coarse central hilar interstitial attenuation, accentuated by shallow inspiratory effort..  No large focal consolidation seen.  There is no pneumothorax.  The osseous structures are remarkable for degenerative change..                                       Medications   sodium chloride 0.9% flush 10 mL (has no administration in time range)   melatonin tablet 6 mg (6 mg Oral Given 9/4/23 2213)   apixaban tablet 5 mg (5 mg Oral Given 9/5/23 0858)   aspirin EC tablet 81 mg (81 mg Oral Given 9/5/23 0858)   atorvastatin tablet 80 mg (80 mg Oral Given 9/5/23 0858)   glucose chewable tablet 16 g (has no administration in time range)   glucose chewable tablet 24 g (has no administration in time range)   glucagon (human recombinant) injection 1 mg (has no administration in time range)   insulin aspart U-100 pen 0-5 Units (2 Units Subcutaneous Given 9/2/23 1146)   dextrose 10% bolus 125 mL 125 mL (has no administration in time range)   dextrose 10% bolus 250 mL 250 mL (has no administration in time range)   mupirocin 2 % ointment ( Nasal Given 9/4/23 2212)   gabapentin capsule 300 mg (300 mg Oral Given 9/5/23 0858)   promethazine tablet 12.5 mg (has no administration in time range)   ondansetron disintegrating tablet 8 mg (has no administration in time range)   polyethylene glycol packet 17 g (17 g Oral Not Given 9/5/23 0900)   senna tablet 8.6 mg (8.6 mg Oral Not Given 9/5/23 0900)   docusate sodium capsule 50 mg (has no  administration in time range)   acetaminophen tablet 1,000 mg (1,000 mg Oral Given 9/5/23 0901)   metoprolol succinate (TOPROL-XL) 24 hr tablet 25 mg (25 mg Oral Given 9/5/23 0858)   ondansetron injection 4 mg (4 mg Intravenous Given 8/30/23 2033)   cefTRIAXone (ROCEPHIN) 1 g in dextrose 5 % in water (D5W) 100 mL IVPB (MB+) (0 g Intravenous Stopped 8/30/23 2327)   sodium chloride 0.9% bolus 500 mL 500 mL (0 mLs Intravenous Stopped 8/30/23 2329)   sodium chloride 0.9% bolus 250 mL 250 mL (0 mLs Intravenous Stopped 8/31/23 0510)   magnesium sulfate 2g in water 50mL IVPB (premix) (0 g Intravenous Stopped 8/31/23 0821)   lactated ringers bolus 500 mL (0 mLs Intravenous Stopped 8/31/23 0915)   perflutren protein-A microsphr 0.22 mg/mL IV susp (0.66 mg Intravenous Given by Other 8/31/23 1350)   senna-docusate 8.6-50 mg per tablet 2 tablet (2 tablets Oral Given 9/1/23 2207)   magnesium oxide tablet 400 mg (400 mg Oral Given 9/2/23 1429)   potassium chloride SA CR tablet 40 mEq (40 mEq Oral Given 9/2/23 1429)   sodium chloride 0.9% bolus 1,000 mL 1,000 mL (0 mLs Intravenous Stopped 9/2/23 1515)   potassium chloride SA CR tablet 40 mEq (40 mEq Oral Given 9/3/23 1513)   magnesium oxide tablet 400 mg (400 mg Oral Given 9/3/23 1513)   magnesium oxide tablet 400 mg (400 mg Oral Given 9/4/23 0905)     Medical Decision Making  Patient with nausea and vomiting for the past 2 weeks  Is hypotensive in the ER, she has significant CHF with an EF of only 10-15% so she was given fluids judiciously  Was found to have a UTI DX including hypovolemia, dehydration, sepsis, medication side effect, SBO, gastritis    Patient care transferred to Dr. Gaytan pending further evaluation    Amount and/or Complexity of Data Reviewed  Labs: ordered.  Radiology: ordered.    Risk  Prescription drug management.  Decision regarding hospitalization.              Attending Attestation:         Attending Critical Care:   Critical Care Times:    ==============================================================  Total Critical Care Time - exclusive of procedural time: 45 minutes.  ==============================================================  Critical care was necessary to treat or prevent imminent or life-threatening deterioration of the following conditions: sepsis and renal failure.   Critical care was time spent personally by me on the following activities: obtaining history from patient or relative, examination of patient, review of old charts, development of treatment plan with patient or relative, ordering lab, x-rays, and/or EKG, evaluation of patient's response to treatment, ordering and performing treatments and interventions, discussion with consultants and re-evaluation of patient's conition.   Critical Care Condition: potentially life-threatening                           Clinical Impression:   Final diagnoses:  [I95.9] Hypotension  [N17.9] ARBEN (acute kidney injury) (Primary)  [R11.2] Nausea and vomiting, unspecified vomiting type  [A41.9] Sepsis        ED Disposition Condition    Admit Stable                Carley Malin MD  09/05/23 [id number]

## 2023-08-31 PROBLEM — N30.00 ACUTE CYSTITIS: Status: ACTIVE | Noted: 2023-08-31

## 2023-08-31 PROBLEM — I95.9 HYPOTENSION: Status: ACTIVE | Noted: 2023-08-31

## 2023-08-31 LAB
ALBUMIN SERPL BCP-MCNC: 3.1 G/DL (ref 3.5–5.2)
ALP SERPL-CCNC: 80 U/L (ref 55–135)
ALT SERPL W/O P-5'-P-CCNC: 10 U/L (ref 10–44)
ANION GAP SERPL CALC-SCNC: 12 MMOL/L (ref 8–16)
AST SERPL-CCNC: 18 U/L (ref 10–40)
AV INDEX (PROSTH): 0.61
AV MEAN GRADIENT: 2 MMHG
AV PEAK GRADIENT: 3 MMHG
AV VALVE AREA BY VELOCITY RATIO: 2.64 CM²
AV VALVE AREA: 2.34 CM²
AV VELOCITY RATIO: 0.69
BASOPHILS # BLD AUTO: 0.03 K/UL (ref 0–0.2)
BASOPHILS NFR BLD: 0.3 % (ref 0–1.9)
BILIRUB SERPL-MCNC: 0.6 MG/DL (ref 0.1–1)
BSA FOR ECHO PROCEDURE: 1.72 M2
BUN SERPL-MCNC: 20 MG/DL (ref 8–23)
CALCIUM SERPL-MCNC: 9 MG/DL (ref 8.7–10.5)
CHLORIDE SERPL-SCNC: 105 MMOL/L (ref 95–110)
CO2 SERPL-SCNC: 23 MMOL/L (ref 23–29)
CREAT SERPL-MCNC: 1.5 MG/DL (ref 0.5–1.4)
CV ECHO LV RWT: 0.21 CM
DIFFERENTIAL METHOD: ABNORMAL
DOP CALC AO PEAK VEL: 0.87 M/S
DOP CALC AO VTI: 14.2 CM
DOP CALC LVOT AREA: 3.8 CM2
DOP CALC LVOT DIAMETER: 2.21 CM
DOP CALC LVOT PEAK VEL: 0.6 M/S
DOP CALC LVOT STROKE VOLUME: 33.28 CM3
DOP CALCLVOT PEAK VEL VTI: 8.68 CM
E/A RATIO: 0.4
E/E' RATIO: 14.4 M/S
ECHO LV POSTERIOR WALL: 0.76 CM (ref 0.6–1.1)
EOSINOPHIL # BLD AUTO: 0 K/UL (ref 0–0.5)
EOSINOPHIL NFR BLD: 0.3 % (ref 0–8)
ERYTHROCYTE [DISTWIDTH] IN BLOOD BY AUTOMATED COUNT: 14.6 % (ref 11.5–14.5)
EST. GFR  (NO RACE VARIABLE): 39.4 ML/MIN/1.73 M^2
FRACTIONAL SHORTENING: 3 % (ref 28–44)
GLUCOSE SERPL-MCNC: 110 MG/DL (ref 70–110)
HCT VFR BLD AUTO: 35 % (ref 37–48.5)
HGB BLD-MCNC: 11.2 G/DL (ref 12–16)
IMM GRANULOCYTES # BLD AUTO: 0.02 K/UL (ref 0–0.04)
IMM GRANULOCYTES NFR BLD AUTO: 0.2 % (ref 0–0.5)
INTERVENTRICULAR SEPTUM: 0.67 CM (ref 0.6–1.1)
LA MAJOR: 4.21 CM
LA MINOR: 3.31 CM
LA WIDTH: 3.06 CM
LACTATE SERPL-SCNC: 1 MMOL/L (ref 0.5–2.2)
LEFT ATRIUM SIZE: 1.97 CM
LEFT ATRIUM VOLUME INDEX MOD: 16.1 ML/M2
LEFT ATRIUM VOLUME INDEX: 11 ML/M2
LEFT ATRIUM VOLUME MOD: 27.77 CM3
LEFT ATRIUM VOLUME: 18.99 CM3
LEFT INTERNAL DIMENSION IN SYSTOLE: 6.89 CM (ref 2.1–4)
LEFT VENTRICLE DIASTOLIC VOLUME INDEX: 153.98 ML/M2
LEFT VENTRICLE DIASTOLIC VOLUME: 264.85 ML
LEFT VENTRICLE MASS INDEX: 128 G/M2
LEFT VENTRICLE SYSTOLIC VOLUME INDEX: 143.5 ML/M2
LEFT VENTRICLE SYSTOLIC VOLUME: 246.74 ML
LEFT VENTRICULAR INTERNAL DIMENSION IN DIASTOLE: 7.11 CM (ref 3.5–6)
LEFT VENTRICULAR MASS: 219.76 G
LV LATERAL E/E' RATIO: 12 M/S
LV SEPTAL E/E' RATIO: 18 M/S
LYMPHOCYTES # BLD AUTO: 2.7 K/UL (ref 1–4.8)
LYMPHOCYTES NFR BLD: 31.2 % (ref 18–48)
MAGNESIUM SERPL-MCNC: 1.6 MG/DL (ref 1.6–2.6)
MCH RBC QN AUTO: 29.3 PG (ref 27–31)
MCHC RBC AUTO-ENTMCNC: 32 G/DL (ref 32–36)
MCV RBC AUTO: 92 FL (ref 82–98)
MONOCYTES # BLD AUTO: 0.4 K/UL (ref 0.3–1)
MONOCYTES NFR BLD: 4.8 % (ref 4–15)
MV PEAK A VEL: 0.89 M/S
MV PEAK E VEL: 0.36 M/S
NEUTROPHILS # BLD AUTO: 5.5 K/UL (ref 1.8–7.7)
NEUTROPHILS NFR BLD: 63.2 % (ref 38–73)
NRBC BLD-RTO: 0 /100 WBC
OHS LV EJECTION FRACTION SIMPSONS BIPLANE MOD: 17 %
PHOSPHATE SERPL-MCNC: 3.3 MG/DL (ref 2.7–4.5)
PLATELET # BLD AUTO: 322 K/UL (ref 150–450)
PMV BLD AUTO: 10 FL (ref 9.2–12.9)
POCT GLUCOSE: 132 MG/DL (ref 70–110)
POCT GLUCOSE: 159 MG/DL (ref 70–110)
POCT GLUCOSE: 180 MG/DL (ref 70–110)
POCT GLUCOSE: 99 MG/DL (ref 70–110)
POTASSIUM SERPL-SCNC: 3.7 MMOL/L (ref 3.5–5.1)
PROCALCITONIN SERPL IA-MCNC: 0.05 NG/ML
PROT SERPL-MCNC: 6.1 G/DL (ref 6–8.4)
RA MAJOR: 2.72 CM
RA PRESSURE ESTIMATED: 8 MMHG
RA WIDTH: 3.28 CM
RBC # BLD AUTO: 3.82 M/UL (ref 4–5.4)
RV TISSUE DOPPLER FREE WALL SYSTOLIC VELOCITY 1 (APICAL 4 CHAMBER VIEW): 12.88 CM/S
SINUS: 3.53 CM
SODIUM SERPL-SCNC: 140 MMOL/L (ref 136–145)
STJ: 2.83 CM
TDI LATERAL: 0.03 M/S
TDI SEPTAL: 0.02 M/S
TDI: 0.03 M/S
TRICUSPID ANNULAR PLANE SYSTOLIC EXCURSION: 1.51 CM
TROPONIN I SERPL DL<=0.01 NG/ML-MCNC: 0.03 NG/ML (ref 0–0.03)
WBC # BLD AUTO: 8.77 K/UL (ref 3.9–12.7)
Z-SCORE OF LEFT VENTRICULAR DIMENSION IN END DIASTOLE: 3.99
Z-SCORE OF LEFT VENTRICULAR DIMENSION IN END SYSTOLE: 6.7

## 2023-08-31 PROCEDURE — 99291 CRITICAL CARE FIRST HOUR: CPT | Mod: ,,, | Performed by: NURSE PRACTITIONER

## 2023-08-31 PROCEDURE — 25000003 PHARM REV CODE 250: Performed by: INTERNAL MEDICINE

## 2023-08-31 PROCEDURE — 25000003 PHARM REV CODE 250: Performed by: EMERGENCY MEDICINE

## 2023-08-31 PROCEDURE — 80053 COMPREHEN METABOLIC PANEL: CPT | Performed by: NURSE PRACTITIONER

## 2023-08-31 PROCEDURE — 25000003 PHARM REV CODE 250: Performed by: STUDENT IN AN ORGANIZED HEALTH CARE EDUCATION/TRAINING PROGRAM

## 2023-08-31 PROCEDURE — 93005 ELECTROCARDIOGRAM TRACING: CPT

## 2023-08-31 PROCEDURE — 93010 EKG 12-LEAD: ICD-10-PCS | Mod: ,,, | Performed by: INTERNAL MEDICINE

## 2023-08-31 PROCEDURE — 84484 ASSAY OF TROPONIN QUANT: CPT | Performed by: NURSE PRACTITIONER

## 2023-08-31 PROCEDURE — 21400001 HC TELEMETRY ROOM

## 2023-08-31 PROCEDURE — 99291 PR CRITICAL CARE, E/M 30-74 MINUTES: ICD-10-PCS | Mod: ,,, | Performed by: NURSE PRACTITIONER

## 2023-08-31 PROCEDURE — 63600175 PHARM REV CODE 636 W HCPCS: Performed by: PHYSICIAN ASSISTANT

## 2023-08-31 PROCEDURE — 63600175 PHARM REV CODE 636 W HCPCS: Performed by: NURSE PRACTITIONER

## 2023-08-31 PROCEDURE — 83735 ASSAY OF MAGNESIUM: CPT | Performed by: NURSE PRACTITIONER

## 2023-08-31 PROCEDURE — 93010 ELECTROCARDIOGRAM REPORT: CPT | Mod: ,,, | Performed by: INTERNAL MEDICINE

## 2023-08-31 PROCEDURE — 94761 N-INVAS EAR/PLS OXIMETRY MLT: CPT

## 2023-08-31 PROCEDURE — 25500020 PHARM REV CODE 255: Performed by: INTERNAL MEDICINE

## 2023-08-31 PROCEDURE — 25000003 PHARM REV CODE 250: Performed by: PHYSICIAN ASSISTANT

## 2023-08-31 PROCEDURE — 63600175 PHARM REV CODE 636 W HCPCS

## 2023-08-31 PROCEDURE — 63600175 PHARM REV CODE 636 W HCPCS: Performed by: STUDENT IN AN ORGANIZED HEALTH CARE EDUCATION/TRAINING PROGRAM

## 2023-08-31 PROCEDURE — 85025 COMPLETE CBC W/AUTO DIFF WBC: CPT | Performed by: NURSE PRACTITIONER

## 2023-08-31 PROCEDURE — 84145 PROCALCITONIN (PCT): CPT | Performed by: NURSE PRACTITIONER

## 2023-08-31 PROCEDURE — 84100 ASSAY OF PHOSPHORUS: CPT | Performed by: NURSE PRACTITIONER

## 2023-08-31 PROCEDURE — 83605 ASSAY OF LACTIC ACID: CPT | Performed by: NURSE PRACTITIONER

## 2023-08-31 RX ORDER — ASPIRIN 81 MG/1
81 TABLET ORAL DAILY
Status: DISCONTINUED | OUTPATIENT
Start: 2023-08-31 | End: 2023-09-05 | Stop reason: HOSPADM

## 2023-08-31 RX ORDER — LANOLIN ALCOHOL/MO/W.PET/CERES
800 CREAM (GRAM) TOPICAL
Status: DISCONTINUED | OUTPATIENT
Start: 2023-08-31 | End: 2023-09-01

## 2023-08-31 RX ORDER — PROMETHAZINE HYDROCHLORIDE 12.5 MG/1
12.5 TABLET ORAL EVERY 6 HOURS PRN
Status: DISCONTINUED | OUTPATIENT
Start: 2023-08-31 | End: 2023-09-05 | Stop reason: HOSPADM

## 2023-08-31 RX ORDER — GABAPENTIN 300 MG/1
300 CAPSULE ORAL 3 TIMES DAILY
Status: DISCONTINUED | OUTPATIENT
Start: 2023-08-31 | End: 2023-09-05 | Stop reason: HOSPADM

## 2023-08-31 RX ORDER — SODIUM,POTASSIUM PHOSPHATES 280-250MG
2 POWDER IN PACKET (EA) ORAL
Status: DISCONTINUED | OUTPATIENT
Start: 2023-08-31 | End: 2023-09-01

## 2023-08-31 RX ORDER — MAGNESIUM SULFATE HEPTAHYDRATE 40 MG/ML
2 INJECTION, SOLUTION INTRAVENOUS ONCE
Status: COMPLETED | OUTPATIENT
Start: 2023-08-31 | End: 2023-08-31

## 2023-08-31 RX ORDER — ONDANSETRON 2 MG/ML
4 INJECTION INTRAMUSCULAR; INTRAVENOUS EVERY 8 HOURS PRN
Status: DISCONTINUED | OUTPATIENT
Start: 2023-08-31 | End: 2023-08-31

## 2023-08-31 RX ORDER — GABAPENTIN 300 MG/1
600 CAPSULE ORAL 3 TIMES DAILY
Status: DISCONTINUED | OUTPATIENT
Start: 2023-08-31 | End: 2023-08-31

## 2023-08-31 RX ORDER — IBUPROFEN 200 MG
24 TABLET ORAL
Status: DISCONTINUED | OUTPATIENT
Start: 2023-08-31 | End: 2023-09-05 | Stop reason: HOSPADM

## 2023-08-31 RX ORDER — INSULIN ASPART 100 [IU]/ML
0-5 INJECTION, SOLUTION INTRAVENOUS; SUBCUTANEOUS
Status: DISCONTINUED | OUTPATIENT
Start: 2023-08-31 | End: 2023-09-05 | Stop reason: HOSPADM

## 2023-08-31 RX ORDER — IBUPROFEN 200 MG
16 TABLET ORAL
Status: DISCONTINUED | OUTPATIENT
Start: 2023-08-31 | End: 2023-09-05 | Stop reason: HOSPADM

## 2023-08-31 RX ORDER — SODIUM CHLORIDE 0.9 % (FLUSH) 0.9 %
10 SYRINGE (ML) INJECTION
Status: DISCONTINUED | OUTPATIENT
Start: 2023-08-31 | End: 2023-09-05 | Stop reason: HOSPADM

## 2023-08-31 RX ORDER — MUPIROCIN 20 MG/G
OINTMENT TOPICAL 2 TIMES DAILY
Status: DISPENSED | OUTPATIENT
Start: 2023-08-31 | End: 2023-09-05

## 2023-08-31 RX ORDER — TALC
6 POWDER (GRAM) TOPICAL NIGHTLY PRN
Status: DISCONTINUED | OUTPATIENT
Start: 2023-08-31 | End: 2023-09-05 | Stop reason: HOSPADM

## 2023-08-31 RX ORDER — ATORVASTATIN CALCIUM 20 MG/1
80 TABLET, FILM COATED ORAL DAILY
Status: DISCONTINUED | OUTPATIENT
Start: 2023-08-31 | End: 2023-09-05 | Stop reason: HOSPADM

## 2023-08-31 RX ORDER — ONDANSETRON 8 MG/1
8 TABLET, ORALLY DISINTEGRATING ORAL EVERY 8 HOURS PRN
Status: DISCONTINUED | OUTPATIENT
Start: 2023-08-31 | End: 2023-09-05 | Stop reason: HOSPADM

## 2023-08-31 RX ORDER — GLUCAGON 1 MG
1 KIT INJECTION
Status: DISCONTINUED | OUTPATIENT
Start: 2023-08-31 | End: 2023-09-05 | Stop reason: HOSPADM

## 2023-08-31 RX ADMIN — GABAPENTIN 300 MG: 300 CAPSULE ORAL at 09:08

## 2023-08-31 RX ADMIN — HUMAN ALBUMIN MICROSPHERES AND PERFLUTREN 0.66 MG: 10; .22 INJECTION, SOLUTION INTRAVENOUS at 01:08

## 2023-08-31 RX ADMIN — MAGNESIUM SULFATE HEPTAHYDRATE 2 G: 40 INJECTION, SOLUTION INTRAVENOUS at 06:08

## 2023-08-31 RX ADMIN — ONDANSETRON 4 MG: 2 INJECTION INTRAMUSCULAR; INTRAVENOUS at 08:08

## 2023-08-31 RX ADMIN — ASPIRIN 81 MG: 81 TABLET, COATED ORAL at 08:08

## 2023-08-31 RX ADMIN — APIXABAN 5 MG: 5 TABLET, FILM COATED ORAL at 09:08

## 2023-08-31 RX ADMIN — GABAPENTIN 600 MG: 300 CAPSULE ORAL at 08:08

## 2023-08-31 RX ADMIN — SODIUM CHLORIDE, POTASSIUM CHLORIDE, SODIUM LACTATE AND CALCIUM CHLORIDE 500 ML: 600; 310; 30; 20 INJECTION, SOLUTION INTRAVENOUS at 08:08

## 2023-08-31 RX ADMIN — ATORVASTATIN CALCIUM 80 MG: 40 TABLET, FILM COATED ORAL at 08:08

## 2023-08-31 RX ADMIN — GABAPENTIN 300 MG: 300 CAPSULE ORAL at 04:08

## 2023-08-31 RX ADMIN — APIXABAN 5 MG: 5 TABLET, FILM COATED ORAL at 08:08

## 2023-08-31 RX ADMIN — CEFTRIAXONE 1 G: 1 INJECTION, POWDER, FOR SOLUTION INTRAMUSCULAR; INTRAVENOUS at 09:08

## 2023-08-31 RX ADMIN — SODIUM CHLORIDE 250 ML: 0.9 INJECTION, SOLUTION INTRAVENOUS at 04:08

## 2023-08-31 NOTE — PLAN OF CARE
MICU DAILY GOALS     Family/Goals of care/Code Status   Code Status: Full Code    24H Vital Sign Range  Temp:  [97.8 °F (36.6 °C)-98.7 °F (37.1 °C)]   Pulse:  []   Resp:  [11-26]   BP: ()/(44-75)   SpO2:  [97 %-100 %]      Shift Events   No acute events throughout shift    AWAKE RASS: Goal -    Actual -      Restraint necessity:    BREATHE SBT: Not intubated    Coordinate A & B, analgesics/sedatives Pain: managed   SAT: Not intubated   Delirium CAM-ICU: Overall CAM-ICU: Negative   Early(intubated/ Progressive (non-intubated) Mobility MOVE Screen: Pass   Activity: Activity Management: Rolling - L1   Feeding/Nutrition Diet order: Diet/Nutrition Received: regular, Specialty Diet/Nutrition Received: other (see comments) (diabetic)   Thrombus DVT prophylaxis: VTE Required Core Measure: Pharmacological prophylaxis initiated/maintained   HOB Elevation Head of Bed (HOB) Positioning: HOB elevated   Ulcer Prophylaxis GI: yes   Glucose control managed Glycemic Management: blood glucose monitored   Skin Skin assessed during daily assessment.    Bowel Function no issues    Indwelling Catheter Necessity            De-escalation Antibiotics Yes       VS and assessment per flow sheet, patient progressing towards goals as tolerated, plan of care reviewed with family, all concerns addressed, will continue to monitor.

## 2023-08-31 NOTE — PLAN OF CARE
MICU DAILY GOALS     Family/Goals of care/Code Status   Code Status: Full Code    24H Vital Sign Range  Temp:  [97.9 °F (36.6 °C)-98.7 °F (37.1 °C)]   Pulse:  []   Resp:  [11-23]   BP: ()/(44-75)   SpO2:  [97 %-100 %]      Shift Events   No acute events throughout shift, Pt admitted from the ED this shift. Electrolytes replaced.    AWAKE RASS: Goal -    Actual -      Restraint necessity: Not necessary   BREATHE SBT: Not intubated    Coordinate A & B, analgesics/sedatives Pain: managed   SAT: Not intubated   Delirium CAM-ICU: Overall CAM-ICU: Negative   Early(intubated/ Progressive (non-intubated) Mobility MOVE Screen: Pass   Activity: Activity Management: Rolling - L1   Feeding/Nutrition Diet order: Diet/Nutrition Received: regular, Specialty Diet/Nutrition Received: other (see comments) (diabetic)   Thrombus DVT prophylaxis: VTE Required Core Measure: Pharmacological prophylaxis initiated/maintained   HOB Elevation Head of Bed (HOB) Positioning: HOB at 30 degrees   Ulcer Prophylaxis GI: no   Glucose control managed Glycemic Management: blood glucose monitored   Skin Skin assessed during daily assessment. No wounds noted.   Bowel Function no issues    Indwelling Catheter Necessity            De-escalation Antibiotics No Abx ordered       VS and assessment per flow sheet, patient progressing towards goals as tolerated, plan of care reviewed with  Diomedes Mohr , all concerns addressed, will continue to monitor.

## 2023-08-31 NOTE — ED TRIAGE NOTES
Diomedes Mohr, a 62 y.o. female presents to the ED w/ complaint of vomiting and hypotension.  Pt says she has had n/v for the past 2x weeks unable to keep anything down.  Pt was brought in by EMS and was hypotensive on arrival.      Triage note:  Chief Complaint   Patient presents with    Vomiting     Arrived via EMS from home. N/v x 2 weeks, has been treated with zofran. Had 4 episodes today. Received 4mg of zofran with EMS. Emesis is tea colored.     Hypotension     Review of patient's allergies indicates:   Allergen Reactions    Adhesive Blisters    Captopril Other (See Comments)     COUGH     Past Medical History:   Diagnosis Date    Acute on chronic combined systolic and diastolic heart failure 12/26/2019    ARBEN (acute kidney injury) 5/30/2023    Anticoagulant long-term use     Anxiety     Arthritis     Asthma     Depression     H/O: hysterectomy     Hyperlipemia     Hypertension     Pulmonary edema     Schizophrenia

## 2023-08-31 NOTE — CONSULTS
Patient evaluated by and admitted to Critical Care Medicine. Full H&P to follow.    Bailey Joseph NP  Critical Care Medicine

## 2023-08-31 NOTE — ASSESSMENT & PLAN NOTE
E. Coli UTI, received bactrim last week but found to be resistant.  - Unimpressive UA but will treat given nausea, vomiting, hypotension.    - Received CTX in ED. Will continue for now given susceptibility.

## 2023-08-31 NOTE — PLAN OF CARE
[facility] - Cardiac Medical ICU  Initial Discharge Assessment       Primary Care Provider: Yolie Michael MD    Admission Diagnosis: CHF (congestive heart failure) [I50.9]  ARBEN (acute kidney injury) [N17.9]  Hypotension [I95.9]  Sepsis [A41.9]  Nausea and vomiting, unspecified vomiting type [R11.2]    Admission Date: 8/30/2023  Expected Discharge Date:     Transition of Care Barriers: None    Payor: [employer] MEDICARE / Plan: HUMANA MEDICARE HMO / Product Type: Capitation /     Extended Emergency Contact Information  Primary Emergency Contact: Maged Mohr  Address: [address] [place] [address] [facility]  Home Phone: [phone]  Mobile Phone: [phone]  Relation: Spouse  Secondary Emergency Contact: Yael Mohr  Home Phone: [phone]  Relation: Daughter    Discharge Plan A: Skilled Nursing Facility  Discharge Plan B: Home with family, Home Health      [facility] - [place], [place] - [address]  [address]  [place] [address]  Phone: [phone] Fax: [phone]    [facility] - [facility] [address] [facility]  [address] [facility] [address]  Phone: [phone] Fax: [phone]      Transferred from:     Past Medical History:   Diagnosis Date    Acute on chronic combined systolic and diastolic heart failure 12/26/2019    ARBEN (acute kidney injury) 5/30/2023    Anticoagulant long-term use     Anxiety     Arthritis     Asthma     Depression     H/O: hysterectomy     Hyperlipemia     Hypertension     Pulmonary edema     Schizophrenia          CM met with patient and Alan Mohr (grandson) [phone] in room for Discharge Planning Assessment.  Patient was able to answer questions.  Per patient, she lives with Maged Mohr (spouse) [phone] in a 1-story home with 0 step(s) to enter.   Per patient, she was dependent with ADLS and used rollator and wheelchair for ambulation. Per patient, she is not on dialysis and does  not take Coumadin.  Patient will have help from Maged Mohr (spouse) [phone] upon discharge.   Discharge Planning Booklet given to patient/family and discussed.  All questions addressed.  CM will follow for needs.      Initial Assessment (most recent)       Adult Discharge Assessment - 08/31/23 1621          Discharge Assessment    Assessment Type Discharge Planning Assessment     Confirmed/corrected address, phone number and insurance Yes     Confirmed Demographics Correct on Facesheet     Source of Information patient     When was your last doctors appointment? 08/28/23     Communicated KARLIE with patient/caregiver Date not available/Unable to determine     Reason For Admission Hypotension, CHF     People in Home spouse     Facility Arrived From: Home     Do you expect to return to your current living situation? Yes     Do you have help at home or someone to help you manage your care at home? Yes     Who are your caregiver(s) and their phone number(s)? Maged Mohr (spouse) [phone]     Prior to hospitilization cognitive status: Alert/Oriented     Current cognitive status: Alert/Oriented     Walking or Climbing Stairs ambulation difficulty, requires equipment     Mobility Management rollator     Dressing/Bathing bathing difficulty, assistance 1 person;dressing difficulty, assistance 1 person     Dressing/Bathing Management spouse assists in these tasks     Home Layout Able to live on 1st floor     Equipment Currently Used at Home rollator;wheelchair     Readmission within 30 days? No     Patient currently being followed by outpatient case management? No     Do you currently have service(s) that help you manage your care at home? Yes     Name and Contact number of agency patient has aide that comes and bathes her twice a week but cannot remember company.     Is the pt/caregiver preference to resume services with current agency Yes     Do you take prescription medications? Yes     Do you have  prescription coverage? Yes     Coverage HUMANA MANAGED MEDICARE - HUMANA MEDICARE HMO     Do you have any problems affording any of your prescribed medications? No     Is the patient taking medications as prescribed? yes     Who is going to help you get home at discharge? Maged Mohr (spouse) [phone]     How do you get to doctors appointments? family or friend will provide     Are you on dialysis? No     Do you take coumadin? No     DME Needed Upon Discharge  other (see comments)   TBD    Discharge Plan discussed with: Patient;Adult children   grandson    Transition of Care Barriers None     Discharge Plan A Skilled Nursing Facility     Discharge Plan B Home with family;Home Health        Physical Activity    On average, how many days per week do you engage in moderate to strenuous exercise (like a brisk walk)? 0 days     On average, how many minutes do you engage in exercise at this level? 0 min        Financial Resource Strain    How hard is it for you to pay for the very basics like food, housing, medical care, and heating? Somewhat hard        Housing Stability    In the last 12 months, was there a time when you were not able to pay the mortgage or rent on time? Yes     In the last 12 months, how many places have you lived? 1     In the last 12 months, was there a time when you did not have a steady place to sleep or slept in a shelter (including now)? No        Transportation Needs    In the past 12 months, has lack of transportation kept you from medical appointments or from getting medications? No     In the past 12 months, has lack of transportation kept you from meetings, work, or from getting things needed for daily living? No        Food Insecurity    Within the past 12 months, you worried that your food would run out before you got the money to buy more. Sometimes true     Within the past 12 months, the food you bought just didn't last and you didn't have money to get more. Sometimes true         Stress    Do you feel stress - tense, restless, nervous, or anxious, or unable to sleep at night because your mind is troubled all the time - these days? To some extent        Social Connections    In a typical week, how many times do you talk on the phone with family, friends, or neighbors? More than three times a week     How often do you get together with friends or relatives? More than three times a week     How often do you attend Samaritan or Latter-day services? More than 4 times per year     Do you belong to any clubs or organizations such as [employer] groups, unions, [employer] or athletic groups, or school groups? No     How often do you attend meetings of the clubs or organizations you belong to? Never     Are you , , , , never , or living with a partner?         Alcohol Use    Q1: How often do you have a drink containing alcohol? Never     Q2: How many drinks containing alcohol do you have on a typical day when you are drinking? Patient does not drink     Q3: How often do you have six or more drinks on one occasion? Never        OTHER    Name(s) of People in Home Maged Mohr (spouse) [phone]                            PCP:  Yolie Michael MD  [phone]        Pharmacy:    [facility], [place] - [address] [address]  [address] [address]  [facility] [address]  Phone: [phone] Fax: [phone]    [facility] - [facility] [address] [facility]  [address] [facility] [address]  Phone: [phone] Fax: [phone]        Emergency Contacts:  Extended Emergency Contact Information  Primary Emergency Contact: Onur,Maged  Address: [address] [place] [address] [id number] [place] [address]  Home Phone: [phone]  Mobile Phone: [phone]  Relation: Spouse  Secondary Emergency Contact: Yael Mohr  Home Phone: [phone]  Relation: Daughter      Insurance:    Payor: HUMANA MANAGED MEDICARE / Plan: HUMANA  MEDICARE HMO / Product Type: Capitation /     Siri Shah RN     [phone]      08/31/2023  4:33 PM

## 2023-08-31 NOTE — ASSESSMENT & PLAN NOTE
Hypotension 2/2 nausea/vomiting possibly 2/2 untreated UTI albeit UA unimpressive on arrival, possible iatrogenic component w/ home torsemide, jardiance, entresto. Lactate 2.26 on arrival, improved to 1.37    BP on arrival 70s/50s MAP 55, received ~1700cc IVF w transient improvement with MAP 85 but declined to <60 overnight    - Ordered repeat 500mL bolus, Ongoing judicious IVF resuscitation w/ small boluses as clinical status dictates; will consider vasopressors pending response   - HFrEF w EF <10%, maintain on telemetry.  - Low index of suspicion for septic shock or cardiogenic shock  - Treating E coli UTI with Rocephin.    - 2x Blood culture  - Holding home GDMT, restart when appropriate  - Antiemetics PRN

## 2023-08-31 NOTE — H&P
[facility] - Cardiac Medical ICU  Critical Care Medicine  History & Physical    Patient Name: Diomedes Mohr  MRN: [id number]  Admission Date: 8/30/2023  Hospital Length of Stay: 0 days  Code Status: Full Code  Attending Physician: Betty Belle MD   Primary Care Provider: Yolie Michael MD   Principal Problem: Hypotension    Subjective:     HPI:  Patient is a 62 y.o. female with HTN, HLD, CHF (LVEF 10-15% with G2 DD), pulmonary HTN, DM II, prior CVA, DVT (on eliquis), PVD and schizophrenia presented to hospital complaining of N/V x2 weeks. Pt was dx'd with UTI at OSH on 8/23 and started on bactrim (Ucx ended up growing out e coli resistant to bactrim). On 8/28 pt was re-evaluated by her PCP for ongoing hypotension and had her BB & diuretic dosages decreased. The patient presented again today with ongoing N/V.     Work up in ED revealed normal WBC, ARBEN (creatinine 2.4, baseline ~ 0.9), BNP mildly elevated at 166, trop mildly elevated at 0.035 and UA with rare bacteria, 22 WBCs and +3 leuks. CT abd/pelvis unrevealing for infectious etiology.     Pt rec'd a total of 1750cc IVF in the ED. BP responds to boluses, and then trends back down.   Bedside POCUS done at time of CCM evaluation; LV severely dilated, RV small. IVC small and collapses with inspiration.   Clinic visit BPs appear to run 90s/50s.     Pt initially felt stable for HM, however after several hours, BP down-trended again. Decision made to upgrade pt to ICU level of care.            Hospital/ICU Course:  No notes on file     Past Medical History:   Diagnosis Date    Acute on chronic combined systolic and diastolic heart failure 12/26/2019    ARBEN (acute kidney injury) 5/30/2023    Anticoagulant long-term use     Anxiety     Arthritis     Asthma     Depression     H/O: hysterectomy     Hyperlipemia     Hypertension     Pulmonary edema     Schizophrenia        Past Surgical History:   Procedure Laterality Date    BLADDER SUSPENSION       CARPAL TUNNEL RELEASE Right     HEEL SPUR SURGERY Left     HYSTERECTOMY      LEFT HEART CATHETERIZATION Bilateral 2019    Procedure: Left heart cath;  Surgeon: Steve Chambers MD;  Location: [facility] CATH LAB;  Service: Cardiology;  Laterality: Bilateral;    RIGHT HEART CATHETERIZATION Right 2021    Procedure: INSERTION, CATHETER, RIGHT HEART;  Surgeon: Petr Naranjo MD;  Location: [facility] CATH LAB;  Service: Cardiology;  Laterality: Right;    RIGHT HEART CATHETERIZATION Right 2022    Procedure: INSERTION, CATHETER, RIGHT HEART;  Surgeon: Chandana Ivory Jr., MD;  Location: [facility] CATH LAB;  Service: Cardiology;  Laterality: Right;    TUBAL LIGATION         Review of patient's allergies indicates:   Allergen Reactions    Adhesive Blisters    Captopril Other (See Comments)     COUGH       Family History       Problem Relation (Age of Onset)    No Known Problems Mother, Father    Ovarian cancer Sister (42)          Tobacco Use    Smoking status: Former     Current packs/day: 0.00     Types: Cigarettes     Quit date: 2016     Years since quittin.0    Smokeless tobacco: Never    Tobacco comments:     nonex 2 weeks   Substance and Sexual Activity    Alcohol use: No     Alcohol/week: 0.0 standard drinks of alcohol    Drug use: No    Sexual activity: Not on file      Review of Systems   Constitutional:  Positive for appetite change.   Respiratory:  Negative for shortness of breath and wheezing.    Cardiovascular:  Negative for chest pain and leg swelling.   Gastrointestinal:  Positive for nausea and vomiting. Negative for abdominal pain and diarrhea.   Neurological:  Positive for weakness. Negative for syncope.     Objective:     Vital Signs (Most Recent):  Temp: 98.4 °F (36.9 °C) (23)  Pulse: 83 (23)  Resp: 13 (23)  BP: (!) 107/57 (23)  SpO2: 98 % (23) Vital Signs (24h Range):  Temp:  [98 °F (36.7 °C)-98.7 °F (37.1 °C)]  98.4 °F (36.9 °C)  Pulse:  [] 83  Resp:  [11-23] 13  SpO2:  [97 %-100 %] 98 %  BP: ()/(44-75) 107/57   Weight: 63.5 kg (140 lb)  Body mass index is 22.6 kg/m².      Intake/Output Summary (Last 24 hours) at 8/31/2023 0541  Last data filed at 8/31/2023 0510  Gross per 24 hour   Intake 500 ml   Output 100 ml   Net 400 ml          Physical Exam  Vitals and nursing note reviewed.   Constitutional:       General: She is not in acute distress.  HENT:      Head: Normocephalic and atraumatic.      Right Ear: External ear normal.      Left Ear: External ear normal.      Mouth/Throat:      Mouth: Mucous membranes are dry.      Pharynx: Oropharynx is clear.   Cardiovascular:      Rate and Rhythm: Normal rate and regular rhythm.      Pulses: Normal pulses.      Heart sounds: Normal heart sounds.   Pulmonary:      Effort: Pulmonary effort is normal. No respiratory distress.      Breath sounds: Normal breath sounds.   Abdominal:      General: Abdomen is flat. Bowel sounds are normal. There is no distension.      Palpations: Abdomen is soft.      Tenderness: There is no abdominal tenderness.   Musculoskeletal:         General: No swelling.   Skin:     General: Skin is warm and dry.   Neurological:      Mental Status: She is oriented to person, place, and time.            Vents:     Lines/Drains/Airways       Drain  Duration             Female External Urinary Catheter 08/30/23 2034 <1 day              Peripheral Intravenous Line  Duration                  Peripheral IV - Single Lumen 08/30/23 20 G Left Antecubital 1 day         Peripheral IV - Single Lumen 08/30/23 1926 22 G Right Antecubital <1 day                  Significant Labs:    CBC/Anemia Profile:  Recent Labs   Lab 08/30/23 1928   WBC 10.19   HGB 12.8   HCT 39.4      MCV 90   RDW 14.6*        Chemistries:  Recent Labs   Lab 08/30/23 1928      K 3.7      CO2 26   BUN 23   CREATININE 2.4*   CALCIUM 9.4   ALBUMIN 3.6   PROT 7.1   BILITOT 0.6    ALKPHOS 92   ALT 11   AST 16   MG 1.6       All pertinent labs within the past 24 hours have been reviewed.    Significant Imaging: I have reviewed all pertinent imaging results/findings within the past 24 hours.    Assessment/Plan:     Neuro  Cerebrovascular accident (CVA) due to embolism of right middle cerebral artery  Significant thromboembolic hx, residual left-sided numbness, weakness.   - Continue home ASA and eliquis.     Cardiac/Vascular  * Hypotension  Hypotension 2/2 nausea/vomiting possibly 2/2 untreated UTI albeit UA unimpressive on arrival, possible iatrogenic component w/ home torsemide, jardiance, entresto. Lactate 2.26 on arrival, improved to 1.37    BP on arrival 70s/50s MAP 55, received ~1700cc IVF w transient improvement with MAP 85 but declined to <60 overnight    - Ordered repeat 500mL bolus, Ongoing judicious IVF resuscitation w/ small boluses as clinical status dictates; will consider vasopressors pending response   - HFrEF w EF <10%, maintain on telemetry.  - Low index of suspicion for septic shock or cardiogenic shock  - Treating E coli UTI with Rocephin.    - 2x Blood culture  - Holding home GDMT, restart when appropriate  - Antiemetics PRN    Chronic combined systolic and diastolic congestive heart failure  Patient is identified as having Combined Systolic and Diastolic heart failure that is Chronic. CHF is currently controlled. Latest ECHO performed and demonstrates- Results for orders placed during the hospital encounter of 04/27/23    Echo    Interpretation Summary  · The left ventricle is severely enlarged with eccentric hypertrophy and severely decreased systolic function.  · The estimated ejection fraction is 10-15%.  · There is left ventricular global hypokinesis.  · Grade II left ventricular diastolic dysfunction.  · Normal right ventricular size with normal right ventricular systolic function.  · Mild tricuspid regurgitation.  · The estimated PA systolic pressure is 35 mmHg.  ·  Normal central venous pressure (3 mmHg).  · Severe left atrial enlargement.  .  Monitor on telemetry.   Monitor strict Is&Os and daily weights.   Continue to stress to patient importance of self efficacy and  on diet for CHF. Last BNP reviewed- and noted below   Recent Labs   Lab 08/30/23 1928   *     Holding home metoprolol succinate, entresto, torsemide, empagliflozin d/t hypotension   Will resume when appropriate   Low index of suspicion for active heart failure but will monitor IVF resuscitation closely.  Checking repeat echo    Renal/  Acute cystitis  E. Coli UTI, received bactrim last week but found to be resistant.  - Unimpressive UA but will treat given nausea, vomiting, hypotension.    - Received CTX in ED. Will continue for now given susceptibility.       ARBEN (acute kidney injury)  Creatinine 2.4 on admit, baseline around 0.9  - Likely pre-renal from dehydration and volume losses  - Check urine lytes, will consider renal US if fails to improve w/ IVF.   - Check urine protein creatinine ratio.  - Strict I&Os and daily weights   - Gentle IVF given CHF  - Avoid nephrotoxic agents such as NSAIDs, gadolinium and IV radiocontrast.  - Renally dose meds to current GFR.  - Maintain MAP > 65.      Endocrine  Type 2 diabetes mellitus with hyperglycemia, with long-term current use of insulin  Hold home metformin, empagliflozin, dulaglutide  - A1c this week 6.3  - Glc on arrival 128    Start LDSSI; titrate prn        Critical Care Daily Checklist:    A: Awake: RASS Goal/Actual Goal:    Actual:     B: Spontaneous Breathing Trial Performed?     C: SAT & SBT Coordinated?  n/a                      D: Delirium: CAM-ICU     E: Early Mobility Performed? Yes   F: Feeding Goal:    Status:     Current Diet Order   Procedures    Diet diabetic 2000 Calorie     Order Specific Question:   Total calories:     Answer:   2000 Calorie      AS: Analgesia/Sedation    T: Thromboembolic Prophylaxis eliquis   H: HOB > 300  Yes   U: Stress Ulcer Prophylaxis (if needed)    G: Glucose Control ssi   B: Bowel Function     I: Indwelling Catheter (Lines & Fabian) Necessity piv   D: De-escalation of Antimicrobials/Pharmacotherapies rocephin    Plan for the day/ETD Volume resuscitation    Code Status:  Family/Goals of Care: Full Code  Discussed with sister at bedside     Case discussed with [facility] Fellow Dr. Ambrosio Serrano.     Critical Care Time: 45 minutes  Critical secondary to Patient has a condition that poses threat to life and bodily function: ongoing episodic hypotension and LVEF 10-15% necessitating close monitoring with IVF administration     Critical care was time spent personally by me on the following activities: development of treatment plan with patient or surrogate and bedside caregivers, discussions with consultants, evaluation of patient's response to treatment, examination of patient, ordering and performing treatments and interventions, ordering and review of laboratory studies, ordering and review of radiographic studies, pulse oximetry, re-evaluation of patient's condition. This critical care time did not overlap with that of any other provider or involve time for any procedures.     Bailey Joseph NP  Critical Care Medicine  [facility] - Cardiac Medical ICU

## 2023-08-31 NOTE — ASSESSMENT & PLAN NOTE
Creatinine 2.4 on admit, baseline around 0.9  - Likely pre-renal from dehydration and volume losses  - Check urine lytes, will consider renal US if fails to improve w/ IVF.   - Check urine protein creatinine ratio.  - Strict I&Os and daily weights   - Gentle IVF given CHF  - Avoid nephrotoxic agents such as NSAIDs, gadolinium and IV radiocontrast.  - Renally dose meds to current GFR.  - Maintain MAP > 65.

## 2023-08-31 NOTE — PROVIDER TRANSFER
ICU Transfer of Care Note  Critical Care Medicine    Admit Date: 8/30/2023  LOS: 0    CC: Hypotension    Code Status: Full Code         HPI and Hospital Course:     HPI:  Patient is a 62 y.o. female with HTN, HLD, CHF (LVEF 10-15% with G2 DD), pulmonary HTN, DM II, prior CVA, DVT (on eliquis), PVD and schizophrenia presented to hospital complaining of N/V x2 weeks. Pt was dx'd with UTI at OSH on 8/23 and started on bactrim (Ucx ended up growing out e coli resistant to bactrim). On 8/28 pt was re-evaluated by her PCP for ongoing hypotension and had her BB & diuretic dosages decreased. The patient presented again today with ongoing N/V.     Work up in ED revealed normal WBC, ARBEN (creatinine 2.4, baseline ~ 0.9), BNP mildly elevated at 166, trop mildly elevated at 0.035 and UA with rare bacteria, 22 WBCs and +3 leuks. CT abd/pelvis unrevealing for infectious etiology.     Pt rec'd a total of 1750cc IVF in the ED. BP responds to boluses, and then trends back down.   Bedside POCUS done at time of CCM evaluation; LV severely dilated, RV small. IVC small and collapses with inspiration.   Clinic visit BPs appear to run 90s/50s.     Pt initially felt stable for HM, however after several hours, BP down-trended again. Decision made to upgrade pt to ICU level of care.          Hospital/ICU Course:  Patient admitted to the MICU for septic shock 2/2 UTI with underlying HFrEF (EF 10-15%). She received 1750 cc of IVF and never required vasopressors to maintain BP. Patient stable for step down.       To Follow Up:     Follow up urine culture and tailor abx according to data    Bedside POCUS with collapsible IVC. Patient could tolerate more fluids if develops hypotension. Would do small volumes and reassess POCUS.     ARBEN: Monitor UOP and BMP        Discharge Plan:     home    Call with questions.     Cori Sanders PA-C  Critical Care Medicine  8/31/2023   3:26 PM

## 2023-08-31 NOTE — SUBJECTIVE & OBJECTIVE
Past Medical History:   Diagnosis Date    Acute on chronic combined systolic and diastolic heart failure 2019    ARBEN (acute kidney injury) 2023    Anticoagulant long-term use     Anxiety     Arthritis     Asthma     Depression     H/O: hysterectomy     Hyperlipemia     Hypertension     Pulmonary edema     Schizophrenia        Past Surgical History:   Procedure Laterality Date    BLADDER SUSPENSION      CARPAL TUNNEL RELEASE Right     HEEL SPUR SURGERY Left     HYSTERECTOMY      LEFT HEART CATHETERIZATION Bilateral 2019    Procedure: Left heart cath;  Surgeon: Steve Chambers MD;  [facility];  Service: Cardiology;  Laterality: Bilateral;    RIGHT HEART CATHETERIZATION Right 2021    Procedure: INSERTION, CATHETER, RIGHT HEART;  Surgeon: Petr Naranjo MD;  Location: [facility] CATH LAB;  Service: Cardiology;  Laterality: Right;    RIGHT HEART CATHETERIZATION Right 2022    Procedure: INSERTION, CATHETER, RIGHT HEART;  Surgeon: Chandana Ivory Jr., MD;  Location: [facility] CATH LAB;  Service: Cardiology;  Laterality: Right;    TUBAL LIGATION         Review of patient's allergies indicates:   Allergen Reactions    Adhesive Blisters    Captopril Other (See Comments)     COUGH       Family History       Problem Relation (Age of Onset)    No Known Problems Mother, Father    Ovarian cancer Sister (42)          Tobacco Use    Smoking status: Former     Current packs/day: 0.00     Types: Cigarettes     Quit date: 2016     Years since quittin.0    Smokeless tobacco: Never    Tobacco comments:     nonex 2 weeks   Substance and Sexual Activity    Alcohol use: No     Alcohol/week: 0.0 standard drinks of alcohol    Drug use: No    Sexual activity: Not on file      Review of Systems   Constitutional:  Positive for appetite change.   Respiratory:  Negative for shortness of breath and wheezing.    Cardiovascular:  Negative for chest pain and leg swelling.   Gastrointestinal:   Positive for nausea and vomiting. Negative for abdominal pain and diarrhea.   Neurological:  Positive for weakness. Negative for syncope.     Objective:     Vital Signs (Most Recent):  Temp: 98.4 °F (36.9 °C) (08/31/23 0217)  Pulse: 83 (08/31/23 0532)  Resp: 13 (08/31/23 0532)  BP: (!) 107/57 (08/31/23 0532)  SpO2: 98 % (08/31/23 0532) Vital Signs (24h Range):  Temp:  [98 °F (36.7 °C)-98.7 °F (37.1 °C)] 98.4 °F (36.9 °C)  Pulse:  [] 83  Resp:  [11-23] 13  SpO2:  [97 %-100 %] 98 %  BP: ()/(44-75) 107/57   Weight: 63.5 kg (140 lb)  Body mass index is 22.6 kg/m².      Intake/Output Summary (Last 24 hours) at 8/31/2023 0541  Last data filed at 8/31/2023 0510  Gross per 24 hour   Intake 500 ml   Output 100 ml   Net 400 ml          Physical Exam  Vitals and nursing note reviewed.   Constitutional:       General: She is not in acute distress.  HENT:      Head: Normocephalic and atraumatic.      Right Ear: External ear normal.      Left Ear: External ear normal.      Mouth/Throat:      Mouth: Mucous membranes are dry.      Pharynx: Oropharynx is clear.   Cardiovascular:      Rate and Rhythm: Normal rate and regular rhythm.      Pulses: Normal pulses.      Heart sounds: Normal heart sounds.   Pulmonary:      Effort: Pulmonary effort is normal. No respiratory distress.      Breath sounds: Normal breath sounds.   Abdominal:      General: Abdomen is flat. Bowel sounds are normal. There is no distension.      Palpations: Abdomen is soft.      Tenderness: There is no abdominal tenderness.   Musculoskeletal:         General: No swelling.   Skin:     General: Skin is warm and dry.   Neurological:      Mental Status: She is oriented to person, place, and time.            Vents:     Lines/Drains/Airways       Drain  Duration             Female External Urinary Catheter 08/30/23 2034 <1 day              Peripheral Intravenous Line  Duration                  Peripheral IV - Single Lumen 08/30/23 20 G Left Antecubital 1 day          Peripheral IV - Single Lumen 08/30/23 1926 22 G Right Antecubital <1 day                  Significant Labs:    CBC/Anemia Profile:  Recent Labs   Lab 08/30/23 1928   WBC 10.19   HGB 12.8   HCT 39.4      MCV 90   RDW 14.6*        Chemistries:  Recent Labs   Lab 08/30/23 1928      K 3.7      CO2 26   BUN 23   CREATININE 2.4*   CALCIUM 9.4   ALBUMIN 3.6   PROT 7.1   BILITOT 0.6   ALKPHOS 92   ALT 11   AST 16   MG 1.6       All pertinent labs within the past 24 hours have been reviewed.    Significant Imaging: I have reviewed all pertinent imaging results/findings within the past 24 hours.

## 2023-08-31 NOTE — NURSING
Pt arrived to the floor. Pt steady at time of transfer, no signs or symptoms of distress noted nor any complaints thereof. Assessment reviewed and agreed with transferring nurse. Pt  vitals wnl, pt settled in bed, safety measures in place, will continue to monitor.

## 2023-08-31 NOTE — ASSESSMENT & PLAN NOTE
Hold home metformin, empagliflozin, dulaglutide  - A1c this week 6.3  - Glc on arrival 128    Start LDSSI; titrate prn

## 2023-08-31 NOTE — HPI
Patient is a 62 y.o. female with HTN, HLD, CHF (LVEF 10-15% with G2 DD), pulmonary HTN, DM II, prior CVA, DVT (on eliquis), PVD and schizophrenia presented to hospital complaining of N/V x2 weeks. Pt was dx'd with UTI at OSH on 8/23 and started on bactrim (Ucx ended up growing out e coli resistant to bactrim). On 8/28 pt was re-evaluated by her PCP for ongoing hypotension and had her BB & diuretic dosages decreased. The patient presented again today with ongoing N/V.     Work up in ED revealed normal WBC, ARBEN (creatinine 2.4, baseline ~ 0.9), BNP mildly elevated at 166, trop mildly elevated at 0.035 and UA with rare bacteria, 22 WBCs and +3 leuks. CT abd/pelvis unrevealing for infectious etiology.     Pt rec'd a total of 1750cc IVF in the ED. BP responds to boluses, and then trends back down.   Bedside POCUS done at time of CCM evaluation; LV severely dilated, RV small. IVC small and collapses with inspiration.   Clinic visit BPs appear to run 90s/50s.     Pt initially felt stable for HM, however after several hours, BP down-trended again. Decision made to upgrade pt to ICU level of care.

## 2023-08-31 NOTE — ASSESSMENT & PLAN NOTE
Patient is identified as having Combined Systolic and Diastolic heart failure that is Chronic. CHF is currently controlled. Latest ECHO performed and demonstrates- Results for orders placed during the hospital encounter of 04/27/23    Echo    Interpretation Summary  · The left ventricle is severely enlarged with eccentric hypertrophy and severely decreased systolic function.  · The estimated ejection fraction is 10-15%.  · There is left ventricular global hypokinesis.  · Grade II left ventricular diastolic dysfunction.  · Normal right ventricular size with normal right ventricular systolic function.  · Mild tricuspid regurgitation.  · The estimated PA systolic pressure is 35 mmHg.  · Normal central venous pressure (3 mmHg).  · Severe left atrial enlargement.  .  Monitor on telemetry.   Monitor strict Is&Os and daily weights.   Continue to stress to patient importance of self efficacy and  on diet for CHF. Last BNP reviewed- and noted below   Recent Labs   Lab 08/30/23 1928   *     Holding home metoprolol succinate, entresto, torsemide, empagliflozin d/t hypotension   Will resume when appropriate   Low index of suspicion for active heart failure but will monitor IVF resuscitation closely.  Checking repeat echo

## 2023-08-31 NOTE — ASSESSMENT & PLAN NOTE
Significant thromboembolic hx, residual left-sided numbness, weakness.   - Continue home ASA and eliquis.

## 2023-08-31 NOTE — PROVIDER PROGRESS NOTES - EMERGENCY DEPT.
Patient was signed out to me by Dr. Malin pending admission to . Briefly, this is a 63yo F with vomiting, found to be hypotensive though fluid responsive after 1L by EMS, hx CHF with EF 10-15%.    PE:   Const: Awake and alert, NAD  Resp: Even and unlabored  Abd: Soft, ND, NTTP  Neuro: Moves all extremities equally  Psych: Normal mood and affect    Data:  Results for orders placed or performed during the hospital encounter of 08/30/23   CBC auto differential   Result Value Ref Range    WBC 10.19 3.90 - 12.70 K/uL    RBC 4.36 4.00 - 5.40 M/uL    Hemoglobin 12.8 12.0 - 16.0 g/dL    Hematocrit 39.4 37.0 - 48.5 %    MCV 90 82 - 98 fL    MCH 29.4 27.0 - 31.0 pg    MCHC 32.5 32.0 - 36.0 g/dL    RDW 14.6 (H) 11.5 - 14.5 %    Platelets 367 150 - 450 K/uL    MPV 9.8 9.2 - 12.9 fL    Immature Granulocytes 0.3 0.0 - 0.5 %    Gran # (ANC) 7.2 1.8 - 7.7 K/uL    Immature Grans (Abs) 0.03 0.00 - 0.04 K/uL    Lymph # 2.5 1.0 - 4.8 K/uL    Mono # 0.5 0.3 - 1.0 K/uL    Eos # 0.0 0.0 - 0.5 K/uL    Baso # 0.02 0.00 - 0.20 K/uL    nRBC 0 0 /100 WBC    Gran % 70.3 38.0 - 73.0 %    Lymph % 24.4 18.0 - 48.0 %    Mono % 4.6 4.0 - 15.0 %    Eosinophil % 0.2 0.0 - 8.0 %    Basophil % 0.2 0.0 - 1.9 %    Differential Method Automated    Comprehensive metabolic panel   Result Value Ref Range    Sodium 143 136 - 145 mmol/L    Potassium 3.7 3.5 - 5.1 mmol/L    Chloride 102 95 - 110 mmol/L    CO2 26 23 - 29 mmol/L    Glucose 128 (H) 70 - 110 mg/dL    BUN 23 8 - 23 mg/dL    Creatinine 2.4 (H) 0.5 - 1.4 mg/dL    Calcium 9.4 8.7 - 10.5 mg/dL    Total Protein 7.1 6.0 - 8.4 g/dL    Albumin 3.6 3.5 - 5.2 g/dL    Total Bilirubin 0.6 0.1 - 1.0 mg/dL    Alkaline Phosphatase 92 55 - 135 U/L    AST 16 10 - 40 U/L    ALT 11 10 - 44 U/L    eGFR 22.4 (A) >60 mL/min/1.73 m^2    Anion Gap 15 8 - 16 mmol/L   Urinalysis, Reflex to Urine Culture Urine, Catheterized    Specimen: Urine   Result Value Ref Range    Specimen UA Urine, Catheterized     Color, UA  Yellow Yellow, Straw, Michelle    Appearance, UA Hazy (A) Clear    pH, UA 5.0 5.0 - 8.0    Specific Gravity, UA 1.020 1.005 - 1.030    Protein, UA 1+ (A) Negative    Glucose, UA 4+ (A) Negative    Ketones, UA Trace (A) Negative    Bilirubin (UA) Negative Negative    Occult Blood UA 3+ (A) Negative    Nitrite, UA Negative Negative    Leukocytes, UA 3+ (A) Negative   Magnesium   Result Value Ref Range    Magnesium 1.6 1.6 - 2.6 mg/dL   Lipase   Result Value Ref Range    Lipase 40 4 - 60 U/L   Troponin I   Result Value Ref Range    Troponin I 0.035 (H) 0.000 - 0.026 ng/mL   Brain natriuretic peptide   Result Value Ref Range     (H) 0 - 99 pg/mL   Urinalysis Microscopic   Result Value Ref Range    RBC, UA 18 (H) 0 - 4 /hpf    WBC, UA 22 (H) 0 - 5 /hpf    Bacteria Rare None-Occ /hpf    Yeast, UA None None    Hyaline Casts, UA 18 (A) 0-1/lpf /lpf    Microscopic Comment SEE COMMENT    ISTAT Lactate   Result Value Ref Range    POC Lactate 2.26 (H) 0.5 - 2.2 mmol/L    Sample VENOUS     Site Other     Allens Test N/A    ISTAT Lactate   Result Value Ref Range    POC Lactate 1.37 0.5 - 2.2 mmol/L    Sample VENOUS     Site Other     Allens Test N/A       Imaging Results              CT Abdomen Pelvis  Without Contrast (Final result)  Result time 08/30/23 22:11:40      Final result by Aaron Hernandez MD (08/30/23 22:11:40)                   Impression:      Trace intraluminal gas within the urinary bladder, recommend correlation for recent instrumentation.    Gallbladder sludge, without CT evidence of acute cholecystitis.    Otherwise, there is no acute CT abnormality in the abdomen or pelvis.    Partially visualized indwelling ICD/pacemaker leads.    Electronically signed by resident: Cruz Vance  Date:    08/30/2023  Time:    21:01    Electronically signed by: Aaron Hernandez  Date:    08/30/2023  Time:    22:11               Narrative:    EXAMINATION:  CT ABDOMEN PELVIS WITHOUT CONTRAST    CLINICAL  HISTORY:  Nausea/vomiting;    TECHNIQUE:  Routine axial CT images of the abdomen and pelvis were obtained without contrast.  Coronal and Sagittal reformatted images were also obtained.    COMPARISON:  CTA chest 05/30/2023    FINDINGS:  LUNG BASES: Elevated left hemidiaphragm.  Dependent atelectasis.  Pacemaker wires visualized.    HEPATOBILIARY: Subcentimeter right hepatic hypodensity too small to characterize unchanged dating back to at least 02/24/2020.  No biliary ductal dilatation.  Sludge within the gallbladder.  No acute cholecystitis..    SPLEEN: Diminutive lobulated spleen.    PANCREAS: No focal masses or ductal dilatation.    ADRENALS: No adrenal nodules.    KIDNEYS/URETERS: Bilateral renal sinus and cortical cysts.  No hydronephrosis or stones..    PELVIC ORGANS/BLADDER: Hysterectomy.  Urinary bladder is contains intraluminal gas, recommend correlation for recent instrumentation.  No focal wall thickening.    PERITONEUM / RETROPERITONEUM: No free air or fluid.    LYMPH NODES: No lymphadenopathy.    VESSELS: Extensive calcific atherosclerosis of the aorta and branch vessels.    GI TRACT: No distention or wall thickening. Normal appendix.    BONES AND SOFT TISSUES: L4 vertebral body hemangioma.  Advanced osteoarthritis of the right hip and mild osteoarthritis of the left hip.  No fractures or focal osseous lesions.                                       X-Ray Chest AP Portable (Final result)  Result time 08/30/23 20:03:05      Final result by Horacio Peres MD (08/30/23 20:03:05)                   Impression:      1. No acute cardiopulmonary process.      Electronically signed by: Horacio Peres MD  Date:    08/30/2023  Time:    20:03               Narrative:    EXAMINATION:  XR CHEST AP PORTABLE    CLINICAL HISTORY:  Sepsis;    TECHNIQUE:  Single frontal view of the chest was performed.    COMPARISON:  05/30/2023    FINDINGS:  Left chest wall pacer noted.  The cardiomediastinal silhouette is prominent,  similar to the previous examination noting calcification of the aorta..  There is no pleural effusion.  The trachea is midline.  The lungs are symmetrically expanded bilaterally with mildly coarse central hilar interstitial attenuation, accentuated by shallow inspiratory effort..  No large focal consolidation seen.  There is no pneumothorax.  The osseous structures are remarkable for degenerative change..                                       MDM:   Shortly after sign-out, patient began to become more hypotensive.  IVC collapsible on bedside ultrasound.  She was given a 250 cc bolus with resolution in her hypotension and improvement in her mental status.  Her blood pressure again began to trend down, given other 250 cc bolus, again resolution in her hypotension.  Repeat lactic acid now normal.  Blood pressure does run low at baseline, 90s over 50s, though map less than 65 today.  She was observed further in the emergency department.  On reassessment, she continues to become hypotensive after some time following IV fluid administration.  [facility] was consulted and accepted the patient to their service.    Critical Care Time: 50 minutes  Treatments/Evaluations: Close monitoring and treatment of unstable vital signs, cardiorespiratory, and neurologic status, while maintaining tight balance of fluid, respiratory, and cardiac interventions. This time includes discussing the case with the patient and the patients family. This time does not include all procedures stated elsewhere in this record. This time also includes reviewing old records, labs and radiological studies. This time includes examining and re-examining the patient. Additionally, this time also includes arranging care with admitting and consulting physicians.

## 2023-09-01 LAB
ALBUMIN SERPL BCP-MCNC: 2.8 G/DL (ref 3.5–5.2)
ALP SERPL-CCNC: 78 U/L (ref 55–135)
ALT SERPL W/O P-5'-P-CCNC: 8 U/L (ref 10–44)
ANION GAP SERPL CALC-SCNC: 11 MMOL/L (ref 8–16)
AST SERPL-CCNC: 17 U/L (ref 10–40)
BASOPHILS # BLD AUTO: 0.05 K/UL (ref 0–0.2)
BASOPHILS NFR BLD: 0.7 % (ref 0–1.9)
BILIRUB SERPL-MCNC: 0.7 MG/DL (ref 0.1–1)
BUN SERPL-MCNC: 10 MG/DL (ref 8–23)
CALCIUM SERPL-MCNC: 9.1 MG/DL (ref 8.7–10.5)
CHLORIDE SERPL-SCNC: 103 MMOL/L (ref 95–110)
CO2 SERPL-SCNC: 26 MMOL/L (ref 23–29)
CREAT SERPL-MCNC: 0.7 MG/DL (ref 0.5–1.4)
DIFFERENTIAL METHOD: ABNORMAL
EOSINOPHIL # BLD AUTO: 0.1 K/UL (ref 0–0.5)
EOSINOPHIL NFR BLD: 1.8 % (ref 0–8)
ERYTHROCYTE [DISTWIDTH] IN BLOOD BY AUTOMATED COUNT: 14.4 % (ref 11.5–14.5)
EST. GFR  (NO RACE VARIABLE): >60 ML/MIN/1.73 M^2
GLUCOSE SERPL-MCNC: 98 MG/DL (ref 70–110)
HCT VFR BLD AUTO: 34.9 % (ref 37–48.5)
HGB BLD-MCNC: 11.2 G/DL (ref 12–16)
IMM GRANULOCYTES # BLD AUTO: 0.01 K/UL (ref 0–0.04)
IMM GRANULOCYTES NFR BLD AUTO: 0.1 % (ref 0–0.5)
LYMPHOCYTES # BLD AUTO: 3.7 K/UL (ref 1–4.8)
LYMPHOCYTES NFR BLD: 48.8 % (ref 18–48)
MAGNESIUM SERPL-MCNC: 1.9 MG/DL (ref 1.6–2.6)
MCH RBC QN AUTO: 28.2 PG (ref 27–31)
MCHC RBC AUTO-ENTMCNC: 32.1 G/DL (ref 32–36)
MCV RBC AUTO: 88 FL (ref 82–98)
MONOCYTES # BLD AUTO: 0.5 K/UL (ref 0.3–1)
MONOCYTES NFR BLD: 6.1 % (ref 4–15)
NEUTROPHILS # BLD AUTO: 3.2 K/UL (ref 1.8–7.7)
NEUTROPHILS NFR BLD: 42.5 % (ref 38–73)
NRBC BLD-RTO: 0 /100 WBC
PHOSPHATE SERPL-MCNC: 2.6 MG/DL (ref 2.7–4.5)
PLATELET # BLD AUTO: 334 K/UL (ref 150–450)
PMV BLD AUTO: 9.8 FL (ref 9.2–12.9)
POCT GLUCOSE: 115 MG/DL (ref 70–110)
POCT GLUCOSE: 125 MG/DL (ref 70–110)
POCT GLUCOSE: 126 MG/DL (ref 70–110)
POCT GLUCOSE: 96 MG/DL (ref 70–110)
POTASSIUM SERPL-SCNC: 3.3 MMOL/L (ref 3.5–5.1)
PROT SERPL-MCNC: 5.6 G/DL (ref 6–8.4)
RBC # BLD AUTO: 3.97 M/UL (ref 4–5.4)
SODIUM SERPL-SCNC: 140 MMOL/L (ref 136–145)
WBC # BLD AUTO: 7.6 K/UL (ref 3.9–12.7)

## 2023-09-01 PROCEDURE — 51798 US URINE CAPACITY MEASURE: CPT

## 2023-09-01 PROCEDURE — 25000003 PHARM REV CODE 250: Performed by: INTERNAL MEDICINE

## 2023-09-01 PROCEDURE — 80053 COMPREHEN METABOLIC PANEL: CPT | Performed by: STUDENT IN AN ORGANIZED HEALTH CARE EDUCATION/TRAINING PROGRAM

## 2023-09-01 PROCEDURE — 84100 ASSAY OF PHOSPHORUS: CPT

## 2023-09-01 PROCEDURE — 99232 PR SUBSEQUENT HOSPITAL CARE,LEVL II: ICD-10-PCS | Mod: ,,, | Performed by: STUDENT IN AN ORGANIZED HEALTH CARE EDUCATION/TRAINING PROGRAM

## 2023-09-01 PROCEDURE — 83735 ASSAY OF MAGNESIUM: CPT | Performed by: STUDENT IN AN ORGANIZED HEALTH CARE EDUCATION/TRAINING PROGRAM

## 2023-09-01 PROCEDURE — 99232 SBSQ HOSP IP/OBS MODERATE 35: CPT | Mod: ,,, | Performed by: STUDENT IN AN ORGANIZED HEALTH CARE EDUCATION/TRAINING PROGRAM

## 2023-09-01 PROCEDURE — 85025 COMPLETE CBC W/AUTO DIFF WBC: CPT | Performed by: STUDENT IN AN ORGANIZED HEALTH CARE EDUCATION/TRAINING PROGRAM

## 2023-09-01 PROCEDURE — 63600175 PHARM REV CODE 636 W HCPCS: Performed by: STUDENT IN AN ORGANIZED HEALTH CARE EDUCATION/TRAINING PROGRAM

## 2023-09-01 PROCEDURE — 25000003 PHARM REV CODE 250: Performed by: STUDENT IN AN ORGANIZED HEALTH CARE EDUCATION/TRAINING PROGRAM

## 2023-09-01 PROCEDURE — 36415 COLL VENOUS BLD VENIPUNCTURE: CPT | Performed by: STUDENT IN AN ORGANIZED HEALTH CARE EDUCATION/TRAINING PROGRAM

## 2023-09-01 PROCEDURE — 21400001 HC TELEMETRY ROOM

## 2023-09-01 RX ORDER — SENNOSIDES 8.6 MG/1
8.6 TABLET ORAL DAILY
Status: DISCONTINUED | OUTPATIENT
Start: 2023-09-02 | End: 2023-09-05 | Stop reason: HOSPADM

## 2023-09-01 RX ORDER — POLYETHYLENE GLYCOL 3350 17 G/17G
17 POWDER, FOR SOLUTION ORAL DAILY
Status: DISCONTINUED | OUTPATIENT
Start: 2023-09-02 | End: 2023-09-05 | Stop reason: HOSPADM

## 2023-09-01 RX ORDER — AMOXICILLIN 250 MG
2 CAPSULE ORAL ONCE
Status: COMPLETED | OUTPATIENT
Start: 2023-09-01 | End: 2023-09-01

## 2023-09-01 RX ADMIN — MUPIROCIN: 20 OINTMENT TOPICAL at 10:09

## 2023-09-01 RX ADMIN — APIXABAN 5 MG: 5 TABLET, FILM COATED ORAL at 10:09

## 2023-09-01 RX ADMIN — CEFTRIAXONE 1 G: 1 INJECTION, POWDER, FOR SOLUTION INTRAMUSCULAR; INTRAVENOUS at 05:09

## 2023-09-01 RX ADMIN — SENNOSIDES AND DOCUSATE SODIUM 2 TABLET: 50; 8.6 TABLET ORAL at 10:09

## 2023-09-01 RX ADMIN — GABAPENTIN 300 MG: 300 CAPSULE ORAL at 02:09

## 2023-09-01 RX ADMIN — ASPIRIN 81 MG: 81 TABLET, COATED ORAL at 08:09

## 2023-09-01 RX ADMIN — ATORVASTATIN CALCIUM 80 MG: 40 TABLET, FILM COATED ORAL at 08:09

## 2023-09-01 RX ADMIN — GABAPENTIN 300 MG: 300 CAPSULE ORAL at 10:09

## 2023-09-01 RX ADMIN — APIXABAN 5 MG: 5 TABLET, FILM COATED ORAL at 08:09

## 2023-09-01 RX ADMIN — MUPIROCIN: 20 OINTMENT TOPICAL at 08:09

## 2023-09-01 RX ADMIN — GABAPENTIN 300 MG: 300 CAPSULE ORAL at 08:09

## 2023-09-01 NOTE — CARE UPDATE
"RAPID RESPONSE NURSE PROACTIVE ROUNDING NOTE       Time of Visit: 0019    Admit Date: 2023  LOS: 1  Code Status: Full Code   Date of Visit: 2023  : 1960  Age: 62 y.o.  Sex: female  Race: Black or   Bed: 312/312 A:   MRN: [id number]  Was the patient discharged from an ICU this admission? Yes   Was the patient discharged from a PACU within last 24 hours? No   Did the patient receive conscious sedation/general anesthesia in last 24 hours? No  Was the patient in the ED within the past 24 hours? Yes  Was the patient on NIPPV within the past 24 hours? No   Attending Physician: Omar Ybarra MD  Primary Service: [facility]   Time spent at the bedside: < 15 min    SITUATION    Notified by charge RN during rounding.  Reason for alert: HypoTN  Called to evaluate the patient for Circulatory    BACKGROUND     Why is the patient in the hospital?: Hypotension    Patient has a past medical history of Acute on chronic combined systolic and diastolic heart failure, ARBEN (acute kidney injury), Anticoagulant long-term use, Anxiety, Arthritis, Asthma, Depression, H/O: hysterectomy, Hyperlipemia, Hypertension, Pulmonary edema, and Schizophrenia.    Last Vitals:  Temp: 97.4 °F (36.3 °C) (2351)  Pulse: 76 (28)  Resp: 16 (28)  BP: 98/51 (28)  SpO2: 99 % (28)    24 Hours Vitals Range:  Temp:  [97.2 °F (36.2 °C)-98.4 °F (36.9 °C)]   Pulse:  []   Resp:  [12-26]   BP: ()/(44-75)   SpO2:  [97 %-100 %]     Labs:  Recent Labs     23  0532   WBC 10.19 8.77   HGB 12.8 11.2*   HCT 39.4 35.0*    322       Recent Labs     23  0532    140   K 3.7 3.7    105   CO2 26 23   BUN 23 20   CREATININE 2.4* 1.5*   * 110   PHOS  --  3.3   MG 1.6 1.6        No results for input(s): "PH", "PCO2", "PO2", "HCO3", "POCSATURATED", "BE" in the last 72 hours.     ASSESSMENT    Physical Exam    INTERVENTIONS    The " patient was seen for Cardiac problem. Staff concerns included hypotension. The following interventions were performed: No additional interventions needed at this time..    RECOMMENDATIONS    Administer ordered bolus if SBP decreases.    PROVIDER ESCALATION    Yes/No  No    Orders received and case discussed with NA.    Disposition: Remain in room 312.    FOLLOW-UP     updated on plan of care. Instructed Bedside RNMagdiel to call the Rapid Response Nurse, Ana Hurt RN at [phone] for additional questions or concerns.

## 2023-09-01 NOTE — ASSESSMENT & PLAN NOTE
Patient's FSGs are controlled on current medication regimen.  Last A1c reviewed-   Lab Results   Component Value Date    HGBA1C 6.3 (H) 08/28/2023     Most recent fingerstick glucose reviewed-   Recent Labs   Lab 08/31/23  1640 08/31/23  1954 09/01/23  0743 09/01/23  1115   POCTGLUCOSE 99 159* 126* 96     Current correctional scale  Low  Maintain anti-hyperglycemic dose as follows-   Antihyperglycemics (From admission, onward)    Start     Stop Route Frequency Ordered    08/31/23 0627  insulin aspart U-100 pen 0-5 Units         -- SubQ Before meals & nightly PRN 08/31/23 0529        Home metformin, empagliflozin, dulaglutide  Hold Oral hypoglycemics while patient is in the hospital.

## 2023-09-01 NOTE — ASSESSMENT & PLAN NOTE
Patient is identified as having Combined Systolic and Diastolic heart failure that is Chronic. CHF is currently controlled. Latest ECHO performed and demonstrates- Results for orders placed during the hospital encounter of 08/30/23    Echo    Interpretation Summary    Left Ventricle: The left ventricle is severely dilated. Normal wall thickness. Severe global hypokinesis present. There is severely reduced systolic function with a visually estimated ejection fraction of 15 - 20%. Biplane (2D) method of discs ejection fraction is 17%. Grade I diastolic dysfunction.    Right Ventricle: Normal right ventricular cavity size. Wall thickness is normal. Right ventricle wall motion  is normal. Systolic function is normal. Pacemaker lead present in the ventricle.    Aortic Valve: The aortic valve is a trileaflet valve. There is mild aortic valve sclerosis. Mild annular calcification.    IVC/SVC: Intermediate venous pressure at 8 mmHg.  . Continue SGMT and monitor clinical status closely. Monitor on telemetry. Patient is off CHF pathway.  Monitor strict Is&Os and daily weights.  Place on fluid restriction of 2 L. Cardiology has not been consulted. Continue to stress to patient importance of self efficacy and  on diet for CHF. Last BNP reviewed- and noted below   Recent Labs   Lab 08/30/23 1928   *   .    - Holding home metoprolol succinate, entresto, torsemide, empagliflozin d/t hypotension   -- Will resume when appropriate   - Low index of suspicion for active heart failure but will monitor IVF resuscitation closely

## 2023-09-01 NOTE — SUBJECTIVE & OBJECTIVE
Interval History:   No events overnight.  Patient with no complaints this morning.  BP improved.  Urine culture with E coli.  Continue ceftriaxone.      Review of Systems   Constitutional:  Negative for activity change, appetite change, chills, diaphoresis, fatigue and fever.   HENT:  Negative for rhinorrhea and sore throat.    Respiratory:  Negative for cough, chest tightness and shortness of breath.    Cardiovascular:  Negative for chest pain and palpitations.   Gastrointestinal:  Positive for nausea. Negative for abdominal pain, constipation and diarrhea.   Endocrine: Negative for cold intolerance.   Genitourinary:  Negative for decreased urine volume and dysuria.   Musculoskeletal:  Negative for arthralgias and myalgias.   Skin:  Negative for rash and wound.   Neurological:  Negative for dizziness, weakness, numbness and headaches.   Psychiatric/Behavioral:  Negative for agitation, behavioral problems and confusion.      Objective:     Vital Signs (Most Recent):  Temp: 98.1 °F (36.7 °C) (09/01/23 1131)  Pulse: 91 (09/01/23 1131)  Resp: 18 (09/01/23 1131)  BP: (!) 103/59 (09/01/23 1131)  SpO2: 97 % (09/01/23 1131) Vital Signs (24h Range):  Temp:  [97.2 °F (36.2 °C)-99.3 °F (37.4 °C)] 98.1 °F (36.7 °C)  Pulse:  [75-93] 91  Resp:  [12-18] 18  SpO2:  [95 %-100 %] 97 %  BP: ()/(51-59) 103/59     Weight: 63.5 kg (140 lb)  Body mass index is 22.6 kg/m².    Intake/Output Summary (Last 24 hours) at 9/1/2023 1339  Last data filed at 8/31/2023 1809  Gross per 24 hour   Intake 118 ml   Output 350 ml   Net -232 ml         Physical Exam  Constitutional:       General: She is not in acute distress.     Appearance: Normal appearance.   HENT:      Head: Normocephalic and atraumatic.      Mouth/Throat:      Mouth: Mucous membranes are moist.   Eyes:      Extraocular Movements: Extraocular movements intact.      Conjunctiva/sclera: Conjunctivae normal.   Cardiovascular:      Rate and Rhythm: Normal rate and regular rhythm.    Pulmonary:      Effort: Pulmonary effort is normal. No respiratory distress.      Breath sounds: Normal breath sounds. No wheezing or rales.   Abdominal:      General: Abdomen is flat. Bowel sounds are normal.      Palpations: Abdomen is soft.      Tenderness: There is no abdominal tenderness. There is no guarding.   Musculoskeletal:         General: No swelling or tenderness.      Right lower leg: No edema.      Left lower leg: No edema.   Skin:     Findings: No rash.   Neurological:      General: No focal deficit present.      Mental Status: She is alert and oriented to person, place, and time. Mental status is at baseline.   Psychiatric:         Mood and Affect: Mood normal.         Behavior: Behavior normal.             Significant Labs: All pertinent labs within the past 24 hours have been reviewed.  CBC:   Recent Labs   Lab 08/30/23 1928 08/31/23 0532 09/01/23  0515   WBC 10.19 8.77 7.60   HGB 12.8 11.2* 11.2*   HCT 39.4 35.0* 34.9*    322 334     CMP:   Recent Labs   Lab 08/30/23 1928 08/31/23 0532 09/01/23  0515    140 140   K 3.7 3.7 3.3*    105 103   CO2 26 23 26   * 110 98   BUN 23 20 10   CREATININE 2.4* 1.5* 0.7   CALCIUM 9.4 9.0 9.1   PROT 7.1 6.1 5.6*   ALBUMIN 3.6 3.1* 2.8*   BILITOT 0.6 0.6 0.7   ALKPHOS 92 80 78   AST 16 18 17   ALT 11 10 8*   ANIONGAP 15 12 11       Significant Imaging: I have reviewed all pertinent imaging results/findings within the past 24 hours.

## 2023-09-01 NOTE — HPI
62 y.o. female with HTN, HLD, CHF (LVEF 10-15% with G2 DD), pulmonary HTN, DM II, prior CVA, DVT (on eliquis), PVD and schizophrenia presented to hospital complaining of N/V x2 weeks. Pt was dx'd with UTI at OSH on 8/23 and started on bactrim (Ucx ended up growing out e coli resistant to bactrim). On 8/28 pt was re-evaluated by her PCP for ongoing hypotension and had her BB & diuretic dosages decreased. The patient presented again today with ongoing N/V.      Work up in ED revealed normal WBC, ARBEN (creatinine 2.4, baseline ~ 0.9), BNP mildly elevated at 166, trop mildly elevated at 0.035 and UA with rare bacteria, 22 WBCs and +3 leuks. CT abd/pelvis unrevealing for infectious etiology.      Pt rec'd a total of 1750cc IVF in the ED. BP responds to boluses, and then trends back down.   Bedside POCUS done at time of CCM evaluation; LV severely dilated, RV small. IVC small and collapses with inspiration.   Clinic visit BPs appear to run 90s/50s.      Pt initially felt stable for HM, however after several hours, BP down-trended again. Decision made to upgrade pt to ICU level of care.

## 2023-09-01 NOTE — BRIEF OP NOTE
"RAPID RESPONSE NURSE PROACTIVE ROUNDING NOTE       Time of Visit: ***    Admit Date: 2023  LOS: 1  Code Status: Full Code   Date of Visit: 2023  : 1960  Age: 62 y.o.  Sex: female  Race: Black or   Bed: 312/312 A:   MRN: [id number]  Was the patient discharged from an ICU this admission? Yes   Was the patient discharged from a PACU within last 24 hours? No   Did the patient receive conscious sedation/general anesthesia in last 24 hours? No  Was the patient in the ED within the past 24 hours? Yes  Was the patient on NIPPV within the past 24 hours? No   Attending Physician: Omar Ybarra MD  [place] [facility] [place] [facility]   Time spent at the bedside: < 15 min    SITUATION    Notified by bedside RN via phone call.  Reason for alert: hypoTN  Called to evaluate the patient for Respiratory    BACKGROUND     Why is the patient in the hospital?: Hypotension    Patient has a past medical history of Acute on chronic combined systolic and diastolic heart failure, ARBEN (acute kidney injury), Anticoagulant long-term use, Anxiety, Arthritis, Asthma, Depression, H/O: hysterectomy, Hyperlipemia, Hypertension, Pulmonary edema, and Schizophrenia.    Last Vitals:  Temp: 97.4 °F (36.3 °C) (2351)  Pulse: 76 (28)  Resp: 16 (28)  BP: 98/51 (28)  SpO2: 99 % (28)    24 Hours Vitals Range:  Temp:  [97.2 °F (36.2 °C)-98.4 °F (36.9 °C)]   Pulse:  []   Resp:  [12-26]   BP: ()/(44-75)   SpO2:  [97 %-100 %]     Labs:  Recent Labs     23  0532   WBC 10.19 8.77   HGB 12.8 11.2*   HCT 39.4 35.0*    322       Recent Labs     23  0532    140   K 3.7 3.7    105   CO2 26 23   BUN 23 20   CREATININE 2.4* 1.5*   * 110   PHOS  --  3.3   MG 1.6 1.6        No results for input(s): "PH", "PCO2", "PO2", "HCO3", "POCSATURATED", "BE" in the last 72 hours.     ASSESSMENT    Physical Exam    INTERVENTIONS    The " patient was seen for Cardiac problem. Staff concerns included hypotension. The following interventions were performed: No additional interventions needed at this time..    RECOMMENDATIONS    Administer ordered bolus if SBP decreases     PROVIDER ESCALATION    Yes/No    No  Orders received and case discussed with NA.    Disposition: Remain in room 312.    FOLLOW-UP    Charge Rachel AVINA  updated on plan of care. Instructed to call the Rapid Response Nurse, Ana Hurt RN at [phone] for additional questions or concerns.

## 2023-09-01 NOTE — HOSPITAL COURSE
Patient admitted to the MICU for septic shock 2/2 UTI with underlying HFrEF (EF 10-15%). She received 1750 cc of IVF and never required vasopressors to maintain BP.  Completed treatment of UTI.  Consulted Cardiology for management of guideline directed medical therapy.  Patient able to restart metoprolol with maintenance of normotension.  Outpatient follow-up with Cardiology and primary care.

## 2023-09-01 NOTE — ASSESSMENT & PLAN NOTE
E. Coli UTI, received bactrim last week but found to be resistant.  - Unimpressive UA but will treat given nausea, vomiting, hypotension.   - Received CTX in ED.  - Follow up UCx  - Continue CTX

## 2023-09-01 NOTE — ASSESSMENT & PLAN NOTE
Creatinine 2.4 on admit, baseline around 0.9  - Likely pre-renal from dehydration and volume losses  - CT A/P with bilateral renal sinus and cortical cysts.  No hydronephrosis or stones.  Urinary bladder is contains intraluminal gas.  - Strict I&Os and daily weights   - Gentle IVF given CHF  - Avoid nephrotoxic agents such as NSAIDs, gadolinium and IV radiocontrast.  - Renally dose meds to current GFR.  - Maintain MAP > 65  - Improved

## 2023-09-01 NOTE — ASSESSMENT & PLAN NOTE
Hypotension 2/2 nausea/vomiting possibly 2/2 untreated UTI albeit UA unimpressive on arrival, possible iatrogenic component w/ home torsemide, jardiance, entresto. Lactate 2.26 on arrival, improved to 1.37     BP on arrival 70s/50s MAP 55, received ~1700cc IVF w transient improvement with MAP 85 but declined to <60 overnight on admission     - Concern for septic shock or cardiogenic shock  - s/p IVF resuscitation in the ED  - Use small boluses as clinical status dictates  - HFrEF w EF <10%, maintain on telemetry  - Urine culture with E coli  - Blood culture NGTD  - Holding home GDMT, restart when appropriate  - Antiemetics PRN

## 2023-09-02 LAB
ALBUMIN SERPL BCP-MCNC: 2.8 G/DL (ref 3.5–5.2)
ALP SERPL-CCNC: 84 U/L (ref 55–135)
ALT SERPL W/O P-5'-P-CCNC: 12 U/L (ref 10–44)
ANION GAP SERPL CALC-SCNC: 11 MMOL/L (ref 8–16)
AST SERPL-CCNC: 21 U/L (ref 10–40)
BACTERIA UR CULT: ABNORMAL
BASOPHILS # BLD AUTO: 0.05 K/UL (ref 0–0.2)
BASOPHILS NFR BLD: 0.8 % (ref 0–1.9)
BILIRUB SERPL-MCNC: 0.5 MG/DL (ref 0.1–1)
BUN SERPL-MCNC: 7 MG/DL (ref 8–23)
CALCIUM SERPL-MCNC: 9 MG/DL (ref 8.7–10.5)
CHLORIDE SERPL-SCNC: 99 MMOL/L (ref 95–110)
CO2 SERPL-SCNC: 26 MMOL/L (ref 23–29)
CREAT SERPL-MCNC: 0.7 MG/DL (ref 0.5–1.4)
DIFFERENTIAL METHOD: ABNORMAL
EOSINOPHIL # BLD AUTO: 0.2 K/UL (ref 0–0.5)
EOSINOPHIL NFR BLD: 2.5 % (ref 0–8)
ERYTHROCYTE [DISTWIDTH] IN BLOOD BY AUTOMATED COUNT: 14.3 % (ref 11.5–14.5)
EST. GFR  (NO RACE VARIABLE): >60 ML/MIN/1.73 M^2
GLUCOSE SERPL-MCNC: 93 MG/DL (ref 70–110)
HCT VFR BLD AUTO: 35.9 % (ref 37–48.5)
HGB BLD-MCNC: 11.8 G/DL (ref 12–16)
IMM GRANULOCYTES # BLD AUTO: 0.01 K/UL (ref 0–0.04)
IMM GRANULOCYTES NFR BLD AUTO: 0.2 % (ref 0–0.5)
LACTATE SERPL-SCNC: 1.5 MMOL/L (ref 0.5–2.2)
LACTATE SERPL-SCNC: 2.6 MMOL/L (ref 0.5–2.2)
LYMPHOCYTES # BLD AUTO: 3.9 K/UL (ref 1–4.8)
LYMPHOCYTES NFR BLD: 60.5 % (ref 18–48)
MAGNESIUM SERPL-MCNC: 1.7 MG/DL (ref 1.6–2.6)
MCH RBC QN AUTO: 28.8 PG (ref 27–31)
MCHC RBC AUTO-ENTMCNC: 32.9 G/DL (ref 32–36)
MCV RBC AUTO: 88 FL (ref 82–98)
MONOCYTES # BLD AUTO: 0.4 K/UL (ref 0.3–1)
MONOCYTES NFR BLD: 6.3 % (ref 4–15)
NEUTROPHILS # BLD AUTO: 1.9 K/UL (ref 1.8–7.7)
NEUTROPHILS NFR BLD: 29.7 % (ref 38–73)
NRBC BLD-RTO: 0 /100 WBC
PHOSPHATE SERPL-MCNC: 3.1 MG/DL (ref 2.7–4.5)
PLATELET # BLD AUTO: 344 K/UL (ref 150–450)
PMV BLD AUTO: 9.9 FL (ref 9.2–12.9)
POCT GLUCOSE: 120 MG/DL (ref 70–110)
POCT GLUCOSE: 129 MG/DL (ref 70–110)
POCT GLUCOSE: 231 MG/DL (ref 70–110)
POTASSIUM SERPL-SCNC: 3.4 MMOL/L (ref 3.5–5.1)
PROT SERPL-MCNC: 5.7 G/DL (ref 6–8.4)
RBC # BLD AUTO: 4.1 M/UL (ref 4–5.4)
SODIUM SERPL-SCNC: 136 MMOL/L (ref 136–145)
WBC # BLD AUTO: 6.38 K/UL (ref 3.9–12.7)

## 2023-09-02 PROCEDURE — 80053 COMPREHEN METABOLIC PANEL: CPT | Performed by: STUDENT IN AN ORGANIZED HEALTH CARE EDUCATION/TRAINING PROGRAM

## 2023-09-02 PROCEDURE — 99233 PR SUBSEQUENT HOSPITAL CARE,LEVL III: ICD-10-PCS | Mod: ,,, | Performed by: STUDENT IN AN ORGANIZED HEALTH CARE EDUCATION/TRAINING PROGRAM

## 2023-09-02 PROCEDURE — 99233 SBSQ HOSP IP/OBS HIGH 50: CPT | Mod: ,,, | Performed by: STUDENT IN AN ORGANIZED HEALTH CARE EDUCATION/TRAINING PROGRAM

## 2023-09-02 PROCEDURE — 36415 COLL VENOUS BLD VENIPUNCTURE: CPT | Performed by: STUDENT IN AN ORGANIZED HEALTH CARE EDUCATION/TRAINING PROGRAM

## 2023-09-02 PROCEDURE — 93005 ELECTROCARDIOGRAM TRACING: CPT

## 2023-09-02 PROCEDURE — 63600175 PHARM REV CODE 636 W HCPCS: Performed by: STUDENT IN AN ORGANIZED HEALTH CARE EDUCATION/TRAINING PROGRAM

## 2023-09-02 PROCEDURE — 25000003 PHARM REV CODE 250: Performed by: STUDENT IN AN ORGANIZED HEALTH CARE EDUCATION/TRAINING PROGRAM

## 2023-09-02 PROCEDURE — 21400001 HC TELEMETRY ROOM

## 2023-09-02 PROCEDURE — 85025 COMPLETE CBC W/AUTO DIFF WBC: CPT | Performed by: STUDENT IN AN ORGANIZED HEALTH CARE EDUCATION/TRAINING PROGRAM

## 2023-09-02 PROCEDURE — 93010 EKG 12-LEAD: ICD-10-PCS | Mod: ,,, | Performed by: INTERNAL MEDICINE

## 2023-09-02 PROCEDURE — 84100 ASSAY OF PHOSPHORUS: CPT

## 2023-09-02 PROCEDURE — 83735 ASSAY OF MAGNESIUM: CPT | Performed by: STUDENT IN AN ORGANIZED HEALTH CARE EDUCATION/TRAINING PROGRAM

## 2023-09-02 PROCEDURE — 83605 ASSAY OF LACTIC ACID: CPT | Performed by: STUDENT IN AN ORGANIZED HEALTH CARE EDUCATION/TRAINING PROGRAM

## 2023-09-02 PROCEDURE — 93010 ELECTROCARDIOGRAM REPORT: CPT | Mod: ,,, | Performed by: INTERNAL MEDICINE

## 2023-09-02 PROCEDURE — 25000003 PHARM REV CODE 250: Performed by: INTERNAL MEDICINE

## 2023-09-02 RX ORDER — METOPROLOL SUCCINATE 25 MG/1
12.5 TABLET, EXTENDED RELEASE ORAL DAILY
Qty: 15 TABLET | Refills: 11 | Status: SHIPPED | OUTPATIENT
Start: 2023-09-02 | End: 2023-09-05 | Stop reason: SDUPTHER

## 2023-09-02 RX ORDER — LANOLIN ALCOHOL/MO/W.PET/CERES
400 CREAM (GRAM) TOPICAL ONCE
Status: COMPLETED | OUTPATIENT
Start: 2023-09-02 | End: 2023-09-02

## 2023-09-02 RX ORDER — POTASSIUM CHLORIDE 20 MEQ/1
40 TABLET, EXTENDED RELEASE ORAL ONCE
Status: COMPLETED | OUTPATIENT
Start: 2023-09-02 | End: 2023-09-02

## 2023-09-02 RX ORDER — CEFDINIR 300 MG/1
300 CAPSULE ORAL 2 TIMES DAILY
Qty: 12 CAPSULE | Refills: 0 | Status: SHIPPED | OUTPATIENT
Start: 2023-09-02 | End: 2023-09-05 | Stop reason: HOSPADM

## 2023-09-02 RX ADMIN — Medication 400 MG: at 02:09

## 2023-09-02 RX ADMIN — APIXABAN 5 MG: 5 TABLET, FILM COATED ORAL at 09:09

## 2023-09-02 RX ADMIN — CEFTRIAXONE 1 G: 1 INJECTION, POWDER, FOR SOLUTION INTRAMUSCULAR; INTRAVENOUS at 11:09

## 2023-09-02 RX ADMIN — SENNOSIDES 8.6 MG: 8.6 TABLET, FILM COATED ORAL at 09:09

## 2023-09-02 RX ADMIN — GABAPENTIN 300 MG: 300 CAPSULE ORAL at 02:09

## 2023-09-02 RX ADMIN — ATORVASTATIN CALCIUM 80 MG: 40 TABLET, FILM COATED ORAL at 09:09

## 2023-09-02 RX ADMIN — POTASSIUM CHLORIDE 40 MEQ: 1500 TABLET, EXTENDED RELEASE ORAL at 02:09

## 2023-09-02 RX ADMIN — Medication 6 MG: at 09:09

## 2023-09-02 RX ADMIN — GABAPENTIN 300 MG: 300 CAPSULE ORAL at 09:09

## 2023-09-02 RX ADMIN — SODIUM CHLORIDE 1000 ML: 9 INJECTION, SOLUTION INTRAVENOUS at 02:09

## 2023-09-02 RX ADMIN — MUPIROCIN: 20 OINTMENT TOPICAL at 09:09

## 2023-09-02 RX ADMIN — INSULIN ASPART 2 UNITS: 100 INJECTION, SOLUTION INTRAVENOUS; SUBCUTANEOUS at 11:09

## 2023-09-02 RX ADMIN — ASPIRIN 81 MG: 81 TABLET, COATED ORAL at 09:09

## 2023-09-02 NOTE — PLAN OF CARE
Problem: Adult Inpatient Plan of Care  Goal: Plan of Care Review  Outcome: Ongoing, Progressing  Goal: Patient-Specific Goal (Individualized)  Outcome: Ongoing, Progressing  Flowsheets (Taken 9/2/2023 9195)  Anxieties, Fears or Concerns: low bp  Individualized Care Needs: VSand BG  monitored  Goal: Absence of Hospital-Acquired Illness or Injury  Outcome: Ongoing, Progressing  Goal: Optimal Comfort and Wellbeing  Outcome: Ongoing, Progressing  Goal: Readiness for Transition of Care  Outcome: Ongoing, Progressing     Problem: Diabetes Comorbidity  Goal: Blood Glucose Level Within Targeted Range  Outcome: Ongoing, Progressing     Problem: Fluid and Electrolyte Imbalance (Acute Kidney Injury/Impairment)  Goal: Fluid and Electrolyte Balance  Outcome: Ongoing, Progressing     Problem: Oral Intake Inadequate (Acute Kidney Injury/Impairment)  Goal: Optimal Nutrition Intake  Outcome: Ongoing, Progressing     Problem: Renal Function Impairment (Acute Kidney Injury/Impairment)  Goal: Effective Renal Function  Outcome: Ongoing, Progressing     Problem: Skin Injury Risk Increased  Goal: Skin Health and Integrity  Outcome: Ongoing, Progressing     Problem: Fall Injury Risk  Goal: Absence of Fall and Fall-Related Injury  Outcome: Ongoing, Progressing  Plan of care discussed with patient. Patient is free of fall/trauma/injury. Denies CP, SOB, or pain/discomfort. BP being monitored closely and increasing at this time. Family at bedside. AAOx4. Call light within reach. All questions addressed. Will continue to monitor

## 2023-09-02 NOTE — PLAN OF CARE
SW sent referrals to all home health agencies in 's area. Awaiting responses.     Josseline Jordan [facility]  Case Management Department  [email or web address]       09/02/23 1637   Post-Acute Status   Post-Acute Authorization Home Health   Home Health Status Referrals Sent   Discharge Delays None known at this time   Discharge Plan   Discharge Plan A Home with family;Home Health   Discharge Plan B Home Health

## 2023-09-02 NOTE — PLAN OF CARE
[facility] - Cardiology Stepdown      HOME HEALTH ORDERS  FACE TO FACE ENCOUNTER    Patient Name: Diomedes Mohr  YOB: 1960    PCP: Yolie Michael MD   PCP Address: [address] [place] [address] [place] / [place] [address]  PCP Phone Number: [phone]  PCP Fax: [phone]    Encounter Date: 8/30/23    Admit to Home Health    Diagnoses:  Active Hospital Problems    Diagnosis  POA    *Hypotension [I95.9]  Yes     Priority: 1 - High    Acute cystitis [N30.00]  Yes     Priority: 2     ARBEN (acute kidney injury) [N17.9]  Yes     Priority: 4     Chronic combined systolic and diastolic congestive heart failure [I50.42]  Yes     Priority: 4      Chronic    Cerebrovascular accident (CVA) due to embolism of right middle cerebral artery [I63.411]  Yes    Type 2 diabetes mellitus with hyperglycemia, with long-term current use of insulin [E11.65, Z79.4]  Not Applicable      Resolved Hospital Problems   No resolved problems to display.       Follow Up Appointments:  Future Appointments   Date Time Provider Department Center   9/6/2023  1:30 PM [facility] LAB [facility] [place]   9/6/2023  2:30 PM María Dennis NP [facility] B[facility] [place]   9/7/2023  9:40 AM Cindi Segundo PT [facility] OP [facility]   9/29/2023 10:30 AM Yolie Michael MD [facility] [place] [facility] [place]   11/20/2023 10:00 AM HOME MONITOR DEVICE CHECK, [facility]       Allergies:  Review of patient's allergies indicates:   Allergen Reactions    Adhesive Blisters    Captopril Other (See Comments)     COUGH       Medications: Review discharge medications with patient and family and provide education.    Current Facility-Administered Medications   Medication Dose Route Frequency Provider Last Rate Last Admin    apixaban tablet 5 mg  5 mg Oral BID Aleisha Durham MD   5 mg at 09/02/23 0903    aspirin EC tablet 81 mg  81 mg Oral Daily Aleisha Durham MD   81 mg at 09/02/23 0903    atorvastatin tablet 80 mg  80 mg Oral Daily Aleisha Durham MD    80 mg at 09/02/23 0903    cefTRIAXone (ROCEPHIN) 1 g in dextrose 5 % in water (D5W) 100 mL IVPB (MB+)  1 g Intravenous Q24H Omar Ybarra MD        dextrose 10% bolus 125 mL 125 mL  12.5 g Intravenous PRN Aleisha Durham MD        dextrose 10% bolus 250 mL 250 mL  25 g Intravenous PRN Aleisha Durham MD        docusate sodium capsule 50 mg  50 mg Oral BID PRN Omar Ybarra MD        gabapentin capsule 300 mg  300 mg Oral TID Betty Belle MD   300 mg at 09/02/23 0903    glucagon (human recombinant) injection 1 mg  1 mg Intramuscular PRN Aleisha Durham MD        glucose chewable tablet 16 g  16 g Oral PRN Aleisha Durham MD        glucose chewable tablet 24 g  24 g Oral PRN Aleisha Durham MD        insulin aspart U-100 pen 0-5 Units  0-5 Units Subcutaneous QID (AC + HS) PRN Aleisha Durham MD        melatonin tablet 6 mg  6 mg Oral Nightly PRN Aleisha Durham MD        metoprolol succinate (TOPROL-XL) 24 hr split tablet 12.5 mg  12.5 mg Oral Daily Omar Ybarra MD        mupirocin 2 % ointment   Nasal BID Betty Belle MD   Given at 09/02/23 0902    ondansetron disintegrating tablet 8 mg  8 mg Oral Q8H PRN Cori Sanders PA-C        polyethylene glycol packet 17 g  17 g Oral Daily Omar Ybarra MD        promethazine tablet 12.5 mg  12.5 mg Oral Q6H PRN Cori Sanders PA-C        senna tablet 8.6 mg  8.6 mg Oral Daily Omar Ybarra MD   8.6 mg at 09/02/23 0903    sodium chloride 0.9% flush 10 mL  10 mL Intravenous PRN Aleisha Durham MD         Current Discharge Medication List        START taking these medications    Details   cefdinir (OMNICEF) 300 MG capsule Take 1 capsule (300 mg total) by mouth 2 (two) times daily. for 6 days  Qty: 12 capsule, Refills: 0           CONTINUE these medications which have CHANGED    Details   apixaban (ELIQUIS) 5 mg Tab Take 1 tablet (5 mg total) by mouth 2 (two) times daily.  Qty: 60 tablet, Refills: 2      metoprolol  succinate (TOPROL-XL) 25 MG 24 hr tablet Take 0.5 tablets (12.5 mg total) by mouth once daily.  Qty: 15 tablet, Refills: 11    Comments: .           CONTINUE these medications which have NOT CHANGED    Details   acetaminophen (TYLENOL) 325 MG tablet Take 2 tablets (650 mg total) by mouth every 8 (eight) hours as needed.  Qty: 30 tablet, Refills: 0      aspirin (ECOTRIN) 81 MG EC tablet Take 1 tablet (81 mg total) by mouth once daily.  Qty: 30 tablet, Refills: 11      atorvastatin (LIPITOR) 80 MG tablet Take 1 tablet (80 mg total) by mouth once daily.  Qty: 90 tablet, Refills: 3    Associated Diagnoses: Type 2 diabetes mellitus with hyperglycemia, with long-term current use of insulin; Dilated cardiomyopathy      cyproheptadine (PERIACTIN) 4 mg tablet Take 1 tablet (4 mg total) by mouth 3 (three) times daily as needed.  Qty: 270 tablet, Refills: 3    Associated Diagnoses: Anorexia mentalis      dulaglutide (TRULICITY) 1.5 mg/0.5 mL pen injector Inject 1.5 mg into the skin once a week.      gabapentin (NEURONTIN) 600 MG tablet Take 1 tablet (600 mg total) by mouth 3 (three) times daily.  Qty: 270 tablet, Refills: 3    Associated Diagnoses: Chronic pain of right lower extremity      ipratropium-albuteroL (COMBIVENT)  mcg/actuation inhaler Inhale 1 puff into the lungs 4 (four) times daily as needed for Shortness of Breath. Rescue    Associated Diagnoses: Shortness of breath      metFORMIN (GLUCOPHAGE) 1000 MG tablet Take 0.5 tablets (500 mg total) by mouth 2 (two) times daily.      ondansetron (ZOFRAN-ODT) 4 MG TbDL Take 1 tablet (4 mg total) by mouth every 8 (eight) hours as needed.  Qty: 14 tablet, Refills: 1      potassium chloride (KLOR-CON) 10 MEQ TbSR Take 1 tablet (10 mEq total) by mouth once daily.  Qty: 90 tablet, Refills: 3      torsemide (DEMADEX) 10 MG Tab Take 1 tablet (10 mg total) by mouth daily as needed.  Qty: 90 tablet, Refills: 3    Associated Diagnoses: Dilated cardiomyopathy           STOP  taking these medications       empagliflozin (JARDIANCE) 10 mg tablet Comments:   Reason for Stopping:         sacubitriL-valsartan (ENTRESTO)  mg per tablet Comments:   Reason for Stopping:                 I have seen and examined this patient within the last 30 days. My clinical findings that support the need for the home health skilled services and home bound status are the following:no   Weakness/numbness causing balance and gait disturbance due to Infection and Weakness/Debility making it taxing to leave home.     Diet:   diabetic diet 2000 calorie    Labs:  None    Referrals/ Consults  Aide to provide assistance with personal care, ADLs, and vital signs.    Activities:   activity as tolerated    Nursing:   Agency to admit patient within 24 hours of hospital discharge unless specified on physician order or at patient request    SN to complete comprehensive assessment including routine vital signs. Instruct on disease process and s/s of complications to report to MD. Review/verify medication list sent home with the patient at time of discharge  and instruct patient/caregiver as needed. Frequency may be adjusted depending on start of care date.     Skilled nurse to perform up to 3 visits PRN for symptoms related to diagnosis    Notify MD if SBP > 160 or < 90; DBP > 90 or < 50; HR > 120 or < 50; Temp > 101; O2 < 88%; Other:       Ok to schedule additional visits based on staff availability and patient request on consecutive days within the home health episode.    When multiple disciplines ordered:    Start of Care occurs on Sunday - Wednesday schedule remaining discipline evaluations as ordered on separate consecutive days following the start of care.    Thursday SOC -schedule subsequent evaluations Friday and Monday the following week.     Friday - Saturday SOC - schedule subsequent discipline evaluations on consecutive days starting Monday of the following week.    For all post-discharge communication and  subsequent orders please contact patient's primary care physician. If unable to reach primary care physician or do not receive response within 30 minutes, please contact [facility] [place] order clarification    Miscellaneous   None    Home Health Aide:  Home Health Aide Twice weekly    Wound Care Orders  no    I certify that this patient is confined to her home and needs intermittent skilled nursing care.

## 2023-09-02 NOTE — ASSESSMENT & PLAN NOTE
Patient is identified as having Combined Systolic and Diastolic heart failure that is Chronic. CHF is currently controlled. Latest ECHO performed and demonstrates- Results for orders placed during the hospital encounter of 08/30/23    Echo    Interpretation Summary    Left Ventricle: The left ventricle is severely dilated. Normal wall thickness. Severe global hypokinesis present. There is severely reduced systolic function with a visually estimated ejection fraction of 15 - 20%. Biplane (2D) method of discs ejection fraction is 17%. Grade I diastolic dysfunction.    Right Ventricle: Normal right ventricular cavity size. Wall thickness is normal. Right ventricle wall motion  is normal. Systolic function is normal. Pacemaker lead present in the ventricle.    Aortic Valve: The aortic valve is a trileaflet valve. There is mild aortic valve sclerosis. Mild annular calcification.    IVC/SVC: Intermediate venous pressure at 8 mmHg.  . Continue SGMT and monitor clinical status closely. Monitor on telemetry. Patient is off CHF pathway.  Monitor strict Is&Os and daily weights.  Place on fluid restriction of 2 L. Cardiology has not been consulted. Continue to stress to patient importance of self efficacy and  on diet for CHF. Last BNP reviewed- and noted below   Recent Labs   Lab 08/30/23 1928   *   .    - Holding home metoprolol succinate, entresto, torsemide, empagliflozin d/t hypotension   -- Will resume when appropriate   - Low index of suspicion for active heart failure but will monitor IVF resuscitation closely  - Will discuss case with cardiology give hypotension with reduced EF and evidence of possible volume overload on TTE

## 2023-09-02 NOTE — SUBJECTIVE & OBJECTIVE
Interval History:   No events overnight. Patient hypotensive in the afternoon to 80/56 and given 1L IVF. Follow up lactate ordered. BP after IVF was 90/60. Patient asymptomatic and with asymptomatic non sustained episode of v tach.        Review of Systems   Constitutional:  Negative for activity change, appetite change, chills, diaphoresis, fatigue and fever.   HENT:  Negative for rhinorrhea and sore throat.    Respiratory:  Negative for cough, chest tightness and shortness of breath.    Cardiovascular:  Negative for chest pain and palpitations.   Gastrointestinal:  Negative for abdominal pain, constipation, diarrhea and nausea.   Endocrine: Negative for cold intolerance.   Genitourinary:  Negative for decreased urine volume and dysuria.   Musculoskeletal:  Negative for arthralgias and myalgias.   Skin:  Negative for rash and wound.   Neurological:  Negative for dizziness, weakness, numbness and headaches.   Psychiatric/Behavioral:  Negative for agitation, behavioral problems and confusion.      Objective:     Vital Signs (Most Recent):  Temp: 97.9 °F (36.6 °C) (09/02/23 1526)  Pulse: 99 (09/02/23 1600)  Resp: 18 (09/02/23 1526)  BP: 90/60 (09/02/23 1656)  SpO2: 96 % (09/02/23 1526) Vital Signs (24h Range):  Temp:  [97.7 °F (36.5 °C)-98.1 °F (36.7 °C)] 97.9 °F (36.6 °C)  Pulse:  [] 99  Resp:  [18] 18  SpO2:  [93 %-99 %] 96 %  BP: ()/(49-66) 90/60     Weight: 63.5 kg (140 lb)  Body mass index is 22.6 kg/m².    Intake/Output Summary (Last 24 hours) at 9/2/2023 1702  Last data filed at 9/2/2023 1316  Gross per 24 hour   Intake 120 ml   Output 351 ml   Net -231 ml         Physical Exam  Constitutional:       General: She is not in acute distress.     Appearance: Normal appearance.   HENT:      Head: Normocephalic and atraumatic.      Mouth/Throat:      Mouth: Mucous membranes are moist.   Eyes:      Extraocular Movements: Extraocular movements intact.      Conjunctiva/sclera: Conjunctivae normal.    Cardiovascular:      Rate and Rhythm: Normal rate and regular rhythm.   Pulmonary:      Effort: Pulmonary effort is normal. No respiratory distress.      Breath sounds: Normal breath sounds. No wheezing or rales.   Abdominal:      General: Abdomen is flat. Bowel sounds are normal.      Palpations: Abdomen is soft.      Tenderness: There is no abdominal tenderness. There is no guarding.   Musculoskeletal:         General: No swelling or tenderness.      Right lower leg: Edema present.      Left lower leg: Edema present.   Skin:     Findings: No rash.   Neurological:      General: No focal deficit present.      Mental Status: She is alert and oriented to person, place, and time. Mental status is at baseline.      Motor: Weakness (chronic BLE) present.   Psychiatric:         Mood and Affect: Mood normal.         Behavior: Behavior normal.             Significant Labs: All pertinent labs within the past 24 hours have been reviewed.  CBC:   Recent Labs   Lab 09/01/23 0515 09/02/23  0549   WBC 7.60 6.38   HGB 11.2* 11.8*   HCT 34.9* 35.9*    344     CMP:   Recent Labs   Lab 09/01/23 0515 09/02/23  0549    136   K 3.3* 3.4*    99   CO2 26 26   GLU 98 93   BUN 10 7*   CREATININE 0.7 0.7   CALCIUM 9.1 9.0   PROT 5.6* 5.7*   ALBUMIN 2.8* 2.8*   BILITOT 0.7 0.5   ALKPHOS 78 84   AST 17 21   ALT 8* 12   ANIONGAP 11 11       Significant Imaging: I have reviewed all pertinent imaging results/findings within the past 24 hours.

## 2023-09-02 NOTE — PROGRESS NOTES
[facility] - Cardiology [facility] Medicine  Progress Note    Patient Name: Diomedes Mohr  MRN: [id number]  Patient Class: IP- Inpatient   Admission Date: 8/30/2023  Length of Stay: 2 days  Attending Physician: Omar Ybarra MD  Primary Care Provider: Yolie Michael MD        Subjective:     Principal Problem:Hypotension        HPI:  62 y.o. female with HTN, HLD, CHF (LVEF 10-15% with G2 DD), pulmonary HTN, DM II, prior CVA, DVT (on eliquis), PVD and schizophrenia presented to hospital complaining of N/V x2 weeks. Pt was dx'd with UTI at OSH on 8/23 and started on bactrim (Ucx ended up growing out e coli resistant to bactrim). On 8/28 pt was re-evaluated by her PCP for ongoing hypotension and had her BB & diuretic dosages decreased. The patient presented again today with ongoing N/V.      Work up in ED revealed normal WBC, ARBEN (creatinine 2.4, baseline ~ 0.9), BNP mildly elevated at 166, trop mildly elevated at 0.035 and UA with rare bacteria, 22 WBCs and +3 leuks. CT abd/pelvis unrevealing for infectious etiology.      Pt rec'd a total of 1750cc IVF in the ED. BP responds to boluses, and then trends back down.   Bedside POCUS done at time of [facility] evaluation; LV severely dilated, RV small. IVC small and collapses with inspiration.   Clinic visit BPs appear to run 90s/50s.      Pt initially felt stable for HM, however after several hours, BP down-trended again. Decision made to upgrade pt to ICU level of care.       Overview/Hospital Course:  Patient admitted to the MICU for septic shock 2/2 UTI with underlying HFrEF (EF 10-15%). She received 1750 cc of IVF and never required vasopressors to maintain BP      Interval History:   No events overnight. Patient hypotensive in the afternoon to 80/56 and given 1L IVF. Follow up lactate ordered. BP after IVF was 90/60. Patient asymptomatic and with asymptomatic non sustained episode of v tach.        Review of Systems   Constitutional:  Negative for  activity change, appetite change, chills, diaphoresis, fatigue and fever.   HENT:  Negative for rhinorrhea and sore throat.    Respiratory:  Negative for cough, chest tightness and shortness of breath.    Cardiovascular:  Negative for chest pain and palpitations.   Gastrointestinal:  Negative for abdominal pain, constipation, diarrhea and nausea.   Endocrine: Negative for cold intolerance.   Genitourinary:  Negative for decreased urine volume and dysuria.   Musculoskeletal:  Negative for arthralgias and myalgias.   Skin:  Negative for rash and wound.   Neurological:  Negative for dizziness, weakness, numbness and headaches.   Psychiatric/Behavioral:  Negative for agitation, behavioral problems and confusion.      Objective:     Vital Signs (Most Recent):  Temp: 97.9 °F (36.6 °C) (09/02/23 1526)  Pulse: 99 (09/02/23 1600)  Resp: 18 (09/02/23 1526)  BP: 90/60 (09/02/23 1656)  SpO2: 96 % (09/02/23 1526) Vital Signs (24h Range):  Temp:  [97.7 °F (36.5 °C)-98.1 °F (36.7 °C)] 97.9 °F (36.6 °C)  Pulse:  [] 99  Resp:  [18] 18  SpO2:  [93 %-99 %] 96 %  BP: ()/(49-66) 90/60     Weight: 63.5 kg (140 lb)  Body mass index is 22.6 kg/m².    Intake/Output Summary (Last 24 hours) at 9/2/2023 1702  Last data filed at 9/2/2023 1316  Gross per 24 hour   Intake 120 ml   Output 351 ml   Net -231 ml         Physical Exam  Constitutional:       General: She is not in acute distress.     Appearance: Normal appearance.   HENT:      Head: Normocephalic and atraumatic.      Mouth/Throat:      Mouth: Mucous membranes are moist.   Eyes:      Extraocular Movements: Extraocular movements intact.      Conjunctiva/sclera: Conjunctivae normal.   Cardiovascular:      Rate and Rhythm: Normal rate and regular rhythm.   Pulmonary:      Effort: Pulmonary effort is normal. No respiratory distress.      Breath sounds: Normal breath sounds. No wheezing or rales.   Abdominal:      General: Abdomen is flat. Bowel sounds are normal.       Palpations: Abdomen is soft.      Tenderness: There is no abdominal tenderness. There is no guarding.   Musculoskeletal:         General: No swelling or tenderness.      Right lower leg: Edema present.      Left lower leg: Edema present.   Skin:     Findings: No rash.   Neurological:      General: No focal deficit present.      Mental Status: She is alert and oriented to person, place, and time. Mental status is at baseline.      Motor: Weakness (chronic BLE) present.   Psychiatric:         Mood and Affect: Mood normal.         Behavior: Behavior normal.             Significant Labs: All pertinent labs within the past 24 hours have been reviewed.  CBC:   Recent Labs   Lab 09/01/23  0515 09/02/23  0549   WBC 7.60 6.38   HGB 11.2* 11.8*   HCT 34.9* 35.9*    344     CMP:   Recent Labs   Lab 09/01/23  0515 09/02/23  0549    136   K 3.3* 3.4*    99   CO2 26 26   GLU 98 93   BUN 10 7*   CREATININE 0.7 0.7   CALCIUM 9.1 9.0   PROT 5.6* 5.7*   ALBUMIN 2.8* 2.8*   BILITOT 0.7 0.5   ALKPHOS 78 84   AST 17 21   ALT 8* 12   ANIONGAP 11 11       Significant Imaging: I have reviewed all pertinent imaging results/findings within the past 24 hours.      Assessment/Plan:      * Hypotension  Hypotension 2/2 nausea/vomiting possibly 2/2 untreated UTI albeit UA unimpressive on arrival, possible iatrogenic component w/ home torsemide, jardiance, entresto. Lactate 2.26 on arrival, improved to 1.37     BP on arrival 70s/50s MAP 55, received ~1700cc IVF w transient improvement with MAP 85 but declined to <60 overnight on admission     - Concern for septic shock or cardiogenic shock  - s/p IVF resuscitation in the ED  - Use small boluses as clinical status dictates  - HFrEF w EF <10%, maintain on telemetry  - Urine culture with E coli  - Blood culture NGTD  - Holding home GDMT, restart when appropriate  - Antiemetics PRN    Acute cystitis  E. Coli UTI, received bactrim last week but found to be resistant.  - Unimpressive  UA but will treat given nausea, vomiting, hypotension.   - Received CTX in ED.  - UCx with E coli  - Continue CTX    ARBEN (acute kidney injury)  Creatinine 2.4 on admit, baseline around 0.9  - Likely pre-renal from dehydration and volume losses  - CT A/P with bilateral renal sinus and cortical cysts.  No hydronephrosis or stones.  Urinary bladder is contains intraluminal gas.  - Strict I&Os and daily weights   - Gentle IVF given CHF  - Avoid nephrotoxic agents such as NSAIDs, gadolinium and IV radiocontrast.  - Renally dose meds to current GFR.  - Maintain MAP > 65  - Improved     Chronic combined systolic and diastolic congestive heart failure  Patient is identified as having Combined Systolic and Diastolic heart failure that is Chronic. CHF is currently controlled. Latest ECHO performed and demonstrates- Results for orders placed during the hospital encounter of 08/30/23    Echo    Interpretation Summary    Left Ventricle: The left ventricle is severely dilated. Normal wall thickness. Severe global hypokinesis present. There is severely reduced systolic function with a visually estimated ejection fraction of 15 - 20%. Biplane (2D) method of discs ejection fraction is 17%. Grade I diastolic dysfunction.    Right Ventricle: Normal right ventricular cavity size. Wall thickness is normal. Right ventricle wall motion  is normal. Systolic function is normal. Pacemaker lead present in the ventricle.    Aortic Valve: The aortic valve is a trileaflet valve. There is mild aortic valve sclerosis. Mild annular calcification.    IVC/SVC: Intermediate venous pressure at 8 mmHg.  . Continue SGMT and monitor clinical status closely. Monitor on telemetry. Patient is off CHF pathway.  Monitor strict Is&Os and daily weights.  Place on fluid restriction of 2 L. Cardiology has not been consulted. Continue to stress to patient importance of self efficacy and  on diet for CHF. Last BNP reviewed- and noted below   Recent Labs    Lab 08/30/23  1928   *   .    - Holding home metoprolol succinate, entresto, torsemide, empagliflozin d/t hypotension   -- Will resume when appropriate   - Low index of suspicion for active heart failure but will monitor IVF resuscitation closely  - Will discuss case with cardiology give hypotension with reduced EF and evidence of possible volume overload on TTE    Cerebrovascular accident (CVA) due to embolism of right middle cerebral artery  Significant thromboembolic hx, residual left-sided numbness, weakness.   - Continue home ASA and eliquis.     Type 2 diabetes mellitus with hyperglycemia, with long-term current use of insulin  Patient's FSGs are controlled on current medication regimen.  Last A1c reviewed-   Lab Results   Component Value Date    HGBA1C 6.3 (H) 08/28/2023     Most recent fingerstick glucose reviewed-   Recent Labs   Lab 08/31/23  1640 08/31/23  1954 09/01/23  0743 09/01/23  1115   POCTGLUCOSE 99 159* 126* 96     Current correctional scale  Low  Maintain anti-hyperglycemic dose as follows-   Antihyperglycemics (From admission, onward)    Start     Stop Route Frequency Ordered    08/31/23 0627  insulin aspart U-100 pen 0-5 Units         -- SubQ Before meals & nightly PRN 08/31/23 0529        Home metformin, empagliflozin, dulaglutide  Hold Oral hypoglycemics while patient is in the hospital.      VTE Risk Mitigation (From admission, onward)         Ordered     apixaban tablet 5 mg  2 times daily         08/31/23 0529     IP VTE HIGH RISK PATIENT  Once         08/31/23 0523     Place sequential compression device  Until discontinued         08/31/23 0523                Discharge Planning   KARLIE: 9/3/2023     Code Status: Full Code   Is the patient medically ready for discharge?: No    Reason for patient still in hospital (select all that apply): Patient trending condition, Laboratory test, Treatment and Pending disposition  Discharge Plan A: Home with family, Home Health   Discharge  Delays: None known at this time              Omar Ybarra MD  Department of Hospital Medicine   [facility] - Cardiology Stepdown

## 2023-09-02 NOTE — PROGRESS NOTES
09/02/23 1402 09/02/23 1430 09/02/23 1515   Vital Signs   BP (!) 78/50 (!) 80/56 (!) 76/53   MAP (mmHg) 58  --  61   BP Location Right arm Left arm Right arm   BP Method Automatic Manual Automatic   Patient Position Lying Lying Lying      09/02/23 1520   Vital Signs   BP (!) 80/52   MAP (mmHg)  --    BP Location Right arm   BP Method Manual   Patient Position Lying       1400: 9 beats of Vtach, K+ 3.4, BP as seen above   Team notified, K+ and Mag replaced, 1000 mL bolus given, ekg ordered   1500: patient still hypotensive   Team notified, lactic ordered

## 2023-09-02 NOTE — ASSESSMENT & PLAN NOTE
E. Coli UTI, received bactrim last week but found to be resistant.  - Unimpressive UA but will treat given nausea, vomiting, hypotension.   - Received CTX in ED.  - UCx with E coli  - Continue CTX

## 2023-09-02 NOTE — PROGRESS NOTES
[facility]mit[facility]y Triana - Cardiology [facility] Medicine  Progress Note    Patient Name: Diomedes Mohr  MRN: [id number]  Patient Class: IP- Inpatient   Admission Date: 8/30/2023  Length of Stay: 1 days  Attending Physician: Omar Ybarra MD  Primary Care Provider: Yolie Michael MD        Subjective:     Principal Problem:Hypotension        HPI:  62 y.o. female with HTN, HLD, CHF (LVEF 10-15% with G2 DD), pulmonary HTN, DM II, prior CVA, DVT (on eliquis), PVD and schizophrenia presented to hospital complaining of N/V x2 weeks. Pt was dx'd with UTI at OSH on 8/23 and started on bactrim (Ucx ended up growing out e coli resistant to bactrim). On 8/28 pt was re-evaluated by her PCP for ongoing hypotension and had her BB & diuretic dosages decreased. The patient presented again today with ongoing N/V.      Work up in ED revealed normal WBC, ARBEN (creatinine 2.4, baseline ~ 0.9), BNP mildly elevated at 166, trop mildly elevated at 0.035 and UA with rare bacteria, 22 WBCs and +3 leuks. CT abd/pelvis unrevealing for infectious etiology.      Pt rec'd a total of 1750cc IVF in the ED. BP responds to boluses, and then trends back down.   Bedside POCUS done at time of [facility] evaluation; LV severely dilated, RV small. IVC small and collapses with inspiration.   Clinic visit BPs appear to run 90s/50s.      Pt initially felt stable for HM, however after several hours, BP down-trended again. Decision made to upgrade pt to ICU level of care.       Overview/Hospital Course:  Patient admitted to the MICU for septic shock 2/2 UTI with underlying HFrEF (EF 10-15%). She received 1750 cc of IVF and never required vasopressors to maintain BP      Interval History:   No events overnight.  Patient with no complaints this morning.  BP improved.  Urine culture with E coli.  Continue ceftriaxone.      Review of Systems   Constitutional:  Negative for activity change, appetite change, chills, diaphoresis, fatigue and fever.   HENT:  Negative  for rhinorrhea and sore throat.    Respiratory:  Negative for cough, chest tightness and shortness of breath.    Cardiovascular:  Negative for chest pain and palpitations.   Gastrointestinal:  Positive for nausea. Negative for abdominal pain, constipation and diarrhea.   Endocrine: Negative for cold intolerance.   Genitourinary:  Negative for decreased urine volume and dysuria.   Musculoskeletal:  Negative for arthralgias and myalgias.   Skin:  Negative for rash and wound.   Neurological:  Negative for dizziness, weakness, numbness and headaches.   Psychiatric/Behavioral:  Negative for agitation, behavioral problems and confusion.      Objective:     Vital Signs (Most Recent):  Temp: 98.1 °F (36.7 °C) (09/01/23 1131)  Pulse: 91 (09/01/23 1131)  Resp: 18 (09/01/23 1131)  BP: (!) 103/59 (09/01/23 1131)  SpO2: 97 % (09/01/23 1131) Vital Signs (24h Range):  Temp:  [97.2 °F (36.2 °C)-99.3 °F (37.4 °C)] 98.1 °F (36.7 °C)  Pulse:  [75-93] 91  Resp:  [12-18] 18  SpO2:  [95 %-100 %] 97 %  BP: ()/(51-59) 103/59     Weight: 63.5 kg (140 lb)  Body mass index is 22.6 kg/m².    Intake/Output Summary (Last 24 hours) at 9/1/2023 1339  Last data filed at 8/31/2023 1809  Gross per 24 hour   Intake 118 ml   Output 350 ml   Net -232 ml         Physical Exam  Constitutional:       General: She is not in acute distress.     Appearance: Normal appearance.   HENT:      Head: Normocephalic and atraumatic.      Mouth/Throat:      Mouth: Mucous membranes are moist.   Eyes:      Extraocular Movements: Extraocular movements intact.      Conjunctiva/sclera: Conjunctivae normal.   Cardiovascular:      Rate and Rhythm: Normal rate and regular rhythm.   Pulmonary:      Effort: Pulmonary effort is normal. No respiratory distress.      Breath sounds: Normal breath sounds. No wheezing or rales.   Abdominal:      General: Abdomen is flat. Bowel sounds are normal.      Palpations: Abdomen is soft.      Tenderness: There is no abdominal tenderness.  There is no guarding.   Musculoskeletal:         General: No swelling or tenderness.      Right lower leg: No edema.      Left lower leg: No edema.   Skin:     Findings: No rash.   Neurological:      General: No focal deficit present.      Mental Status: She is alert and oriented to person, place, and time. Mental status is at baseline.   Psychiatric:         Mood and Affect: Mood normal.         Behavior: Behavior normal.             Significant Labs: All pertinent labs within the past 24 hours have been reviewed.  CBC:   Recent Labs   Lab 08/30/23 1928 08/31/23 0532 09/01/23  0515   WBC 10.19 8.77 7.60   HGB 12.8 11.2* 11.2*   HCT 39.4 35.0* 34.9*    322 334     CMP:   Recent Labs   Lab 08/30/23 1928 08/31/23 0532 09/01/23  0515    140 140   K 3.7 3.7 3.3*    105 103   CO2 26 23 26   * 110 98   BUN 23 20 10   CREATININE 2.4* 1.5* 0.7   CALCIUM 9.4 9.0 9.1   PROT 7.1 6.1 5.6*   ALBUMIN 3.6 3.1* 2.8*   BILITOT 0.6 0.6 0.7   ALKPHOS 92 80 78   AST 16 18 17   ALT 11 10 8*   ANIONGAP 15 12 11       Significant Imaging: I have reviewed all pertinent imaging results/findings within the past 24 hours.      Assessment/Plan:      * Hypotension  Hypotension 2/2 nausea/vomiting possibly 2/2 untreated UTI albeit UA unimpressive on arrival, possible iatrogenic component w/ home torsemide, jardiance, entresto. Lactate 2.26 on arrival, improved to 1.37     BP on arrival 70s/50s MAP 55, received ~1700cc IVF w transient improvement with MAP 85 but declined to <60 overnight on admission     - Concern for septic shock or cardiogenic shock  - s/p IVF resuscitation in the ED  - Use small boluses as clinical status dictates  - HFrEF w EF <10%, maintain on telemetry  - Urine culture with E coli  - Blood culture NGTD  - Holding home GDMT, restart when appropriate  - Antiemetics PRN    Acute cystitis  E. Coli UTI, received bactrim last week but found to be resistant.  - Unimpressive UA but will treat given  nausea, vomiting, hypotension.   - Received CTX in ED.  - Follow up UCx  - Continue CTX    ARBEN (acute kidney injury)  Creatinine 2.4 on admit, baseline around 0.9  - Likely pre-renal from dehydration and volume losses  - CT A/P with bilateral renal sinus and cortical cysts.  No hydronephrosis or stones.  Urinary bladder is contains intraluminal gas.  - Strict I&Os and daily weights   - Gentle IVF given CHF  - Avoid nephrotoxic agents such as NSAIDs, gadolinium and IV radiocontrast.  - Renally dose meds to current GFR.  - Maintain MAP > 65  - Improved     Chronic combined systolic and diastolic congestive heart failure  Patient is identified as having Combined Systolic and Diastolic heart failure that is Chronic. CHF is currently controlled. Latest ECHO performed and demonstrates- Results for orders placed during the hospital encounter of 08/30/23    Echo    Interpretation Summary    Left Ventricle: The left ventricle is severely dilated. Normal wall thickness. Severe global hypokinesis present. There is severely reduced systolic function with a visually estimated ejection fraction of 15 - 20%. Biplane (2D) method of discs ejection fraction is 17%. Grade I diastolic dysfunction.    Right Ventricle: Normal right ventricular cavity size. Wall thickness is normal. Right ventricle wall motion  is normal. Systolic function is normal. Pacemaker lead present in the ventricle.    Aortic Valve: The aortic valve is a trileaflet valve. There is mild aortic valve sclerosis. Mild annular calcification.    IVC/SVC: Intermediate venous pressure at 8 mmHg.  . Continue SGMT and monitor clinical status closely. Monitor on telemetry. Patient is off CHF pathway.  Monitor strict Is&Os and daily weights.  Place on fluid restriction of 2 L. Cardiology has not been consulted. Continue to stress to patient importance of self efficacy and  on diet for CHF. Last BNP reviewed- and noted below   Recent Labs   Lab 08/30/23 1928   BNP  166*   .    - Holding home metoprolol succinate, entresto, torsemide, empagliflozin d/t hypotension   -- Will resume when appropriate   - Low index of suspicion for active heart failure but will monitor IVF resuscitation closely    Cerebrovascular accident (CVA) due to embolism of right middle cerebral artery  Significant thromboembolic hx, residual left-sided numbness, weakness.   - Continue home ASA and eliquis.     Type 2 diabetes mellitus with hyperglycemia, with long-term current use of insulin  Patient's FSGs are controlled on current medication regimen.  Last A1c reviewed-   Lab Results   Component Value Date    HGBA1C 6.3 (H) 08/28/2023     Most recent fingerstick glucose reviewed-   Recent Labs   Lab 08/31/23  1640 08/31/23  1954 09/01/23  0743 09/01/23  1115   POCTGLUCOSE 99 159* 126* 96     Current correctional scale  Low  Maintain anti-hyperglycemic dose as follows-   Antihyperglycemics (From admission, onward)    Start     Stop Route Frequency Ordered    08/31/23 0627  insulin aspart U-100 pen 0-5 Units         -- SubQ Before meals & nightly PRN 08/31/23 0529        Home metformin, empagliflozin, dulaglutide  Hold Oral hypoglycemics while patient is in the hospital.      VTE Risk Mitigation (From admission, onward)         Ordered     apixaban tablet 5 mg  2 times daily         08/31/23 0529     IP VTE HIGH RISK PATIENT  Once         08/31/23 0523     Place sequential compression device  Until discontinued         08/31/23 0523                Discharge Planning   KARLIE: 9/2/2023     Code Status: Full Code   Is the patient medically ready for discharge?: No    Reason for patient still in hospital (select all that apply): Patient trending condition, Laboratory test, Treatment and Pending disposition  Discharge Plan A: Skilled Nursing Facility                  Omar Ybarra MD  Department of Hospital Medicine   [facility] - Cardiology Stepdown

## 2023-09-03 LAB
ALBUMIN SERPL BCP-MCNC: 2.6 G/DL (ref 3.5–5.2)
ALP SERPL-CCNC: 75 U/L (ref 55–135)
ALT SERPL W/O P-5'-P-CCNC: 11 U/L (ref 10–44)
ANION GAP SERPL CALC-SCNC: 9 MMOL/L (ref 8–16)
AST SERPL-CCNC: 16 U/L (ref 10–40)
BASOPHILS # BLD AUTO: 0.05 K/UL (ref 0–0.2)
BASOPHILS NFR BLD: 0.8 % (ref 0–1.9)
BILIRUB SERPL-MCNC: 0.3 MG/DL (ref 0.1–1)
BUN SERPL-MCNC: 7 MG/DL (ref 8–23)
CALCIUM SERPL-MCNC: 8.5 MG/DL (ref 8.7–10.5)
CHLORIDE SERPL-SCNC: 103 MMOL/L (ref 95–110)
CO2 SERPL-SCNC: 26 MMOL/L (ref 23–29)
CREAT SERPL-MCNC: 0.7 MG/DL (ref 0.5–1.4)
DIFFERENTIAL METHOD: ABNORMAL
EOSINOPHIL # BLD AUTO: 0.2 K/UL (ref 0–0.5)
EOSINOPHIL NFR BLD: 3.4 % (ref 0–8)
ERYTHROCYTE [DISTWIDTH] IN BLOOD BY AUTOMATED COUNT: 14.4 % (ref 11.5–14.5)
EST. GFR  (NO RACE VARIABLE): >60 ML/MIN/1.73 M^2
GLUCOSE SERPL-MCNC: 91 MG/DL (ref 70–110)
HCT VFR BLD AUTO: 34.3 % (ref 37–48.5)
HGB BLD-MCNC: 11.1 G/DL (ref 12–16)
IMM GRANULOCYTES # BLD AUTO: 0.01 K/UL (ref 0–0.04)
IMM GRANULOCYTES NFR BLD AUTO: 0.2 % (ref 0–0.5)
LYMPHOCYTES # BLD AUTO: 3.4 K/UL (ref 1–4.8)
LYMPHOCYTES NFR BLD: 54.9 % (ref 18–48)
MAGNESIUM SERPL-MCNC: 1.7 MG/DL (ref 1.6–2.6)
MCH RBC QN AUTO: 28.8 PG (ref 27–31)
MCHC RBC AUTO-ENTMCNC: 32.4 G/DL (ref 32–36)
MCV RBC AUTO: 89 FL (ref 82–98)
MONOCYTES # BLD AUTO: 0.4 K/UL (ref 0.3–1)
MONOCYTES NFR BLD: 6.4 % (ref 4–15)
NEUTROPHILS # BLD AUTO: 2.1 K/UL (ref 1.8–7.7)
NEUTROPHILS NFR BLD: 34.3 % (ref 38–73)
NRBC BLD-RTO: 0 /100 WBC
PHOSPHATE SERPL-MCNC: 3.1 MG/DL (ref 2.7–4.5)
PLATELET # BLD AUTO: 330 K/UL (ref 150–450)
PMV BLD AUTO: 10.3 FL (ref 9.2–12.9)
POCT GLUCOSE: 122 MG/DL (ref 70–110)
POCT GLUCOSE: 126 MG/DL (ref 70–110)
POCT GLUCOSE: 129 MG/DL (ref 70–110)
POCT GLUCOSE: 152 MG/DL (ref 70–110)
POTASSIUM SERPL-SCNC: 3.9 MMOL/L (ref 3.5–5.1)
PROT SERPL-MCNC: 5.2 G/DL (ref 6–8.4)
RBC # BLD AUTO: 3.85 M/UL (ref 4–5.4)
SODIUM SERPL-SCNC: 138 MMOL/L (ref 136–145)
WBC # BLD AUTO: 6.1 K/UL (ref 3.9–12.7)

## 2023-09-03 PROCEDURE — 63600175 PHARM REV CODE 636 W HCPCS: Performed by: STUDENT IN AN ORGANIZED HEALTH CARE EDUCATION/TRAINING PROGRAM

## 2023-09-03 PROCEDURE — 36415 COLL VENOUS BLD VENIPUNCTURE: CPT | Performed by: STUDENT IN AN ORGANIZED HEALTH CARE EDUCATION/TRAINING PROGRAM

## 2023-09-03 PROCEDURE — 99232 PR SUBSEQUENT HOSPITAL CARE,LEVL II: ICD-10-PCS | Mod: ,,, | Performed by: STUDENT IN AN ORGANIZED HEALTH CARE EDUCATION/TRAINING PROGRAM

## 2023-09-03 PROCEDURE — 80053 COMPREHEN METABOLIC PANEL: CPT | Performed by: STUDENT IN AN ORGANIZED HEALTH CARE EDUCATION/TRAINING PROGRAM

## 2023-09-03 PROCEDURE — 99232 SBSQ HOSP IP/OBS MODERATE 35: CPT | Mod: GC,,, | Performed by: INTERNAL MEDICINE

## 2023-09-03 PROCEDURE — 25000003 PHARM REV CODE 250: Performed by: STUDENT IN AN ORGANIZED HEALTH CARE EDUCATION/TRAINING PROGRAM

## 2023-09-03 PROCEDURE — 25000003 PHARM REV CODE 250: Performed by: INTERNAL MEDICINE

## 2023-09-03 PROCEDURE — 85025 COMPLETE CBC W/AUTO DIFF WBC: CPT | Performed by: STUDENT IN AN ORGANIZED HEALTH CARE EDUCATION/TRAINING PROGRAM

## 2023-09-03 PROCEDURE — 99232 PR SUBSEQUENT HOSPITAL CARE,LEVL II: ICD-10-PCS | Mod: GC,,, | Performed by: INTERNAL MEDICINE

## 2023-09-03 PROCEDURE — 84100 ASSAY OF PHOSPHORUS: CPT

## 2023-09-03 PROCEDURE — 99232 SBSQ HOSP IP/OBS MODERATE 35: CPT | Mod: ,,, | Performed by: STUDENT IN AN ORGANIZED HEALTH CARE EDUCATION/TRAINING PROGRAM

## 2023-09-03 PROCEDURE — 21400001 HC TELEMETRY ROOM

## 2023-09-03 PROCEDURE — 83735 ASSAY OF MAGNESIUM: CPT | Performed by: STUDENT IN AN ORGANIZED HEALTH CARE EDUCATION/TRAINING PROGRAM

## 2023-09-03 RX ORDER — LANOLIN ALCOHOL/MO/W.PET/CERES
400 CREAM (GRAM) TOPICAL ONCE
Status: COMPLETED | OUTPATIENT
Start: 2023-09-03 | End: 2023-09-03

## 2023-09-03 RX ORDER — POTASSIUM CHLORIDE 20 MEQ/1
40 TABLET, EXTENDED RELEASE ORAL ONCE
Status: COMPLETED | OUTPATIENT
Start: 2023-09-03 | End: 2023-09-03

## 2023-09-03 RX ORDER — METOPROLOL TARTRATE 25 MG/1
12.5 TABLET ORAL 2 TIMES DAILY
Status: DISCONTINUED | OUTPATIENT
Start: 2023-09-03 | End: 2023-09-04

## 2023-09-03 RX ADMIN — MUPIROCIN: 20 OINTMENT TOPICAL at 09:09

## 2023-09-03 RX ADMIN — APIXABAN 5 MG: 5 TABLET, FILM COATED ORAL at 09:09

## 2023-09-03 RX ADMIN — ASPIRIN 81 MG: 81 TABLET, COATED ORAL at 09:09

## 2023-09-03 RX ADMIN — CEFTRIAXONE 1 G: 1 INJECTION, POWDER, FOR SOLUTION INTRAMUSCULAR; INTRAVENOUS at 12:09

## 2023-09-03 RX ADMIN — ATORVASTATIN CALCIUM 80 MG: 40 TABLET, FILM COATED ORAL at 09:09

## 2023-09-03 RX ADMIN — GABAPENTIN 300 MG: 300 CAPSULE ORAL at 09:09

## 2023-09-03 RX ADMIN — POTASSIUM CHLORIDE 40 MEQ: 1500 TABLET, EXTENDED RELEASE ORAL at 03:09

## 2023-09-03 RX ADMIN — GABAPENTIN 300 MG: 300 CAPSULE ORAL at 03:09

## 2023-09-03 RX ADMIN — SENNOSIDES 8.6 MG: 8.6 TABLET, FILM COATED ORAL at 09:09

## 2023-09-03 RX ADMIN — Medication 400 MG: at 03:09

## 2023-09-03 RX ADMIN — Medication 6 MG: at 11:09

## 2023-09-03 NOTE — HPI
61yo F patient with history of HFrEF (15%), HTN, HLD, pulmonary HTN, T2DM, prior CVA, DVT on eliquis, PVD and schizophrenia, who was admitted to [facility] ICU on 09/02/23 with diagnosis hypotension responsive to IVF (1.7L) due to UTI , E coli infection resistant to ampicillin an trimethoprim/sulfamethoxazole, and acute renal failure. She received gentle hydration, did not require pressors and was downgraded on same day of admission to the regular floor.  During the night of 09/01-09/02 patient had episodes of hypotension.     Currently GDMT held and managed with small boluses of IVF.     EKG 09/02/23  Sinus tachycardia with occasional Premature ventricular complexes   Minimal voltage criteria for LVH, may be normal variant ( Sokolow-Cline )   Non-specific ST-T abn possibly due to the LVH with strain     TTE 08/31/23    Left Ventricle: The left ventricle is severely dilated. Normal wall thickness. Severe global hypokinesis present. There is severely reduced systolic function with a visually estimated ejection fraction of 15 - 20%. Biplane (2D) method of discs ejection fraction is 17%. Grade I diastolic dysfunction.    Right Ventricle: Normal right ventricular cavity size. Wall thickness is normal. Right ventricle wall motion  is normal. Systolic function is normal. Pacemaker lead present in the ventricle.    Aortic Valve: The aortic valve is a trileaflet valve. There is mild aortic valve sclerosis. Mild annular calcification.    IVC/SVC: Intermediate venous pressure at 8 mmHg.    [facility] 05/2022    RA: 14/ 10/ 12 RV: 45/ 11 PA: 44/ 25/ 31 PWP: 29/ 29/ 25 .   Cardiac output was 4.3  by Jv. Cardiac index is 2.2 L/min/m2.   O2 Sat: PA 61%.   Pulmonary vascular resistance: 112. Systemic vascular resistance: 1657.     /80(101); 100%  Wedge proven with fluroscopy  Right atrial pressure is mildly elevated.  Pulmonary capillary wedge pressure is moderately elevated.  Pulmonary artery pressure is mildly elevated.  Jv cardiac  output and index is low normal.  Pulmonary vascular resistance is normal.  Systemic vascular resistance is 1657.  BP above target of < 130/80 during procedure.    [facility] 12/2019  - Non-obstructive CAD.  - There is severe left ventricular systolic dysfunction.  - The ejection fraction is calculated to be 15%.  - Diastolic dysfunction.  - LVEDP (Pre): 28

## 2023-09-03 NOTE — ASSESSMENT & PLAN NOTE
Hypotension 2/2 nausea/vomiting possibly 2/2 untreated UTI albeit UA unimpressive on arrival, possible iatrogenic component w/ home torsemide, jardiance, entresto. Lactate 2.26 on arrival, improved to 1.37     BP on arrival 70s/50s MAP 55, received ~1700cc IVF w transient improvement with MAP 85 but declined to <60 overnight on admission     - Concern for septic shock or cardiogenic shock  - s/p IVF resuscitation in the ED  - Use small boluses as clinical status dictates  - HFrEF w EF <10%, maintain on telemetry  - UTI treatment with E coli as below  - Blood culture NGTD  - Holding home GDMT, restart when appropriate  - Antiemetics PRN

## 2023-09-03 NOTE — ASSESSMENT & PLAN NOTE
E. Coli UTI, received bactrim last week but found to be resistant.  - Unimpressive UA but will treat given nausea, vomiting, hypotension.   - Received CTX in ED.  - UCx with E coli  - Continue CTX x5 days

## 2023-09-03 NOTE — HPI
63yo F patient with history of HFrEF (15%), HTN, HLD, pulmonary HTN, T2DM, prior CVA, DVT on eliquis, PVD and schizophrenia, who was admitted to [facility] ICU on 09/02/23 with diagnosis hypotension responsive to IVF (1.7L) due to UTI , E coli infection resistant to ampicillin an trimethoprim/sulfamethoxazole, and acute renal failure. She received gentle hydration, did not require pressors and was downgraded on same day of admission to the regular floor.  During the night of 09/01-09/02 patient had episodes of hypotension.     Currently GDMT held and managed with small boluses of IVF and her blood pressures have been responding. She feels on the drier side.     EKG 09/02/23  Sinus tachycardia with occasional Premature ventricular complexes   Minimal voltage criteria for LVH, may be normal variant ( Sokolow-Cline )   Non-specific ST-T abn possibly due to the LVH with strain     TTE 08/31/23    Left Ventricle: The left ventricle is severely dilated. Normal wall thickness. Severe global hypokinesis present. There is severely reduced systolic function with a visually estimated ejection fraction of 15 - 20%. Biplane (2D) method of discs ejection fraction is 17%. Grade I diastolic dysfunction.    Right Ventricle: Normal right ventricular cavity size. Wall thickness is normal. Right ventricle wall motion  is normal. Systolic function is normal. Pacemaker lead present in the ventricle.    Aortic Valve: The aortic valve is a trileaflet valve. There is mild aortic valve sclerosis. Mild annular calcification.    IVC/SVC: Intermediate venous pressure at 8 mmHg.    [facility] 05/2022    RA: 14/ 10/ 12 RV: 45/ 11 PA: 44/ 25/ 31 PWP: 29/ 29/ 25 .   Cardiac output was 4.3  by Jv. Cardiac index is 2.2 L/min/m2.   O2 Sat: PA 61%.   Pulmonary vascular resistance: 112. Systemic vascular resistance: 1657.     /80(101); 100%  Wedge proven with fluroscopy  Right atrial pressure is mildly elevated.  Pulmonary capillary wedge pressure is  moderately elevated.  Pulmonary artery pressure is mildly elevated.  Jv cardiac output and index is low normal.  Pulmonary vascular resistance is normal.  Systemic vascular resistance is 1657.  BP above target of < 130/80 during procedure.        [facility] 12/2019  - Non-obstructive CAD.  - There is severe left ventricular systolic dysfunction.  - The ejection fraction is calculated to be 15%.  - Diastolic dysfunction.  - LVEDP (Pre): 28

## 2023-09-03 NOTE — PROGRESS NOTES
[facility] - Cardiology [facility] Medicine  Progress Note    Patient Name: Diomedes Mohr  MRN: [id number]  Patient Class: IP- Inpatient   Admission Date: 8/30/2023  Length of Stay: 3 days  Attending Physician: Omar Ybarra MD  Primary Care Provider: Yolie Michael MD        Subjective:     Principal Problem:Hypotension        HPI:  62 y.o. female with HTN, HLD, CHF (LVEF 10-15% with G2 DD), pulmonary HTN, DM II, prior CVA, DVT (on eliquis), PVD and schizophrenia presented to hospital complaining of N/V x2 weeks. Pt was dx'd with UTI at OSH on 8/23 and started on bactrim (Ucx ended up growing out e coli resistant to bactrim). On 8/28 pt was re-evaluated by her PCP for ongoing hypotension and had her BB & diuretic dosages decreased. The patient presented again today with ongoing N/V.      Work up in ED revealed normal WBC, ARBEN (creatinine 2.4, baseline ~ 0.9), BNP mildly elevated at 166, trop mildly elevated at 0.035 and UA with rare bacteria, 22 WBCs and +3 leuks. CT abd/pelvis unrevealing for infectious etiology.      Pt rec'd a total of 1750cc IVF in the ED. BP responds to boluses, and then trends back down.   Bedside POCUS done at time of [facility] evaluation; LV severely dilated, RV small. IVC small and collapses with inspiration.   Clinic visit BPs appear to run 90s/50s.      Pt initially felt stable for HM, however after several hours, BP down-trended again. Decision made to upgrade pt to ICU level of care.       Overview/Hospital Course:  Patient admitted to the MICU for septic shock 2/2 UTI with underlying HFrEF (EF 10-15%). She received 1750 cc of IVF and never required vasopressors to maintain BP      Interval History:   No events overnight. Patient with no complaints. Continued soft blood pressures. Cardiology consulted.       Review of Systems   Constitutional:  Negative for activity change, appetite change, chills, diaphoresis, fatigue and fever.   HENT:  Negative for rhinorrhea and sore  throat.    Respiratory:  Negative for cough, chest tightness and shortness of breath.    Cardiovascular:  Negative for chest pain and palpitations.   Gastrointestinal:  Negative for abdominal pain, constipation, diarrhea and nausea.   Endocrine: Negative for cold intolerance.   Genitourinary:  Negative for decreased urine volume and dysuria.   Musculoskeletal:  Negative for arthralgias and myalgias.   Skin:  Negative for rash and wound.   Neurological:  Negative for dizziness, weakness, numbness and headaches.   Psychiatric/Behavioral:  Negative for agitation, behavioral problems and confusion.      Objective:     Vital Signs (Most Recent):  Temp: 97.6 °F (36.4 °C) (09/03/23 1102)  Pulse: 83 (09/03/23 1102)  Resp: 18 (09/03/23 1102)  BP: (!) 95/51 (09/03/23 1102)  SpO2: 96 % (09/03/23 1102) Vital Signs (24h Range):  Temp:  [97.6 °F (36.4 °C)-98 °F (36.7 °C)] 97.6 °F (36.4 °C)  Pulse:  [] 83  Resp:  [16-18] 18  SpO2:  [93 %-100 %] 96 %  BP: (76-96)/(49-60) 95/51     Weight: 63.5 kg (140 lb)  Body mass index is 22.6 kg/m².    Intake/Output Summary (Last 24 hours) at 9/3/2023 1127  Last data filed at 9/2/2023 1316  Gross per 24 hour   Intake --   Output 1 ml   Net -1 ml         Physical Exam  Constitutional:       General: She is not in acute distress.     Appearance: Normal appearance.   HENT:      Head: Normocephalic and atraumatic.      Mouth/Throat:      Mouth: Mucous membranes are moist.   Eyes:      Extraocular Movements: Extraocular movements intact.      Conjunctiva/sclera: Conjunctivae normal.   Cardiovascular:      Rate and Rhythm: Normal rate and regular rhythm.   Pulmonary:      Effort: Pulmonary effort is normal. No respiratory distress.      Breath sounds: Normal breath sounds. No wheezing or rales.   Abdominal:      General: Abdomen is flat. Bowel sounds are normal.      Palpations: Abdomen is soft.      Tenderness: There is no abdominal tenderness. There is no guarding.   Musculoskeletal:          General: No swelling or tenderness.      Right lower leg: Edema present.      Left lower leg: Edema present.   Skin:     Findings: No rash.   Neurological:      General: No focal deficit present.      Mental Status: She is alert and oriented to person, place, and time. Mental status is at baseline.      Motor: Weakness (chronic BLE) present.   Psychiatric:         Mood and Affect: Mood normal.         Behavior: Behavior normal.             Significant Labs: All pertinent labs within the past 24 hours have been reviewed.  CBC:   Recent Labs   Lab 09/02/23  0549 09/03/23  0430   WBC 6.38 6.10   HGB 11.8* 11.1*   HCT 35.9* 34.3*    330     CMP:   Recent Labs   Lab 09/02/23  0549 09/03/23  0430    138   K 3.4* 3.9   CL 99 103   CO2 26 26   GLU 93 91   BUN 7* 7*   CREATININE 0.7 0.7   CALCIUM 9.0 8.5*   PROT 5.7* 5.2*   ALBUMIN 2.8* 2.6*   BILITOT 0.5 0.3   ALKPHOS 84 75   AST 21 16   ALT 12 11   ANIONGAP 11 9       Significant Imaging: I have reviewed all pertinent imaging results/findings within the past 24 hours.      Assessment/Plan:      * Hypotension  Hypotension 2/2 nausea/vomiting possibly 2/2 untreated UTI albeit UA unimpressive on arrival, possible iatrogenic component w/ home torsemide, jardiance, entresto. Lactate 2.26 on arrival, improved to 1.37     BP on arrival 70s/50s MAP 55, received ~1700cc IVF w transient improvement with MAP 85 but declined to <60 overnight on admission     - Concern for septic shock or cardiogenic shock  - s/p IVF resuscitation in the ED  - Use small boluses as clinical status dictates  - HFrEF w EF <10%, maintain on telemetry  - UTI treatment with E coli as below  - Blood culture NGTD  - Holding home GDMT, restart when appropriate  - Antiemetics PRN    Acute cystitis  E. Coli UTI, received bactrim last week but found to be resistant.  - Unimpressive UA but will treat given nausea, vomiting, hypotension.   - Received CTX in ED.  - UCx with E coli  - Continue CTX x5  days    AREBN (acute kidney injury)  Creatinine 2.4 on admit, baseline around 0.9  - Likely pre-renal from dehydration and volume losses  - CT A/P with bilateral renal sinus and cortical cysts.  No hydronephrosis or stones.  Urinary bladder is contains intraluminal gas.  - Strict I&Os and daily weights   - Gentle IVF given CHF  - Avoid nephrotoxic agents such as NSAIDs, gadolinium and IV radiocontrast.  - Renally dose meds to current GFR.  - Maintain MAP > 65  - Improved     Chronic combined systolic and diastolic congestive heart failure  Patient is identified as having Combined Systolic and Diastolic heart failure that is Chronic. CHF is currently controlled. Latest ECHO performed and demonstrates- Results for orders placed during the hospital encounter of 08/30/23    Echo    Interpretation Summary    Left Ventricle: The left ventricle is severely dilated. Normal wall thickness. Severe global hypokinesis present. There is severely reduced systolic function with a visually estimated ejection fraction of 15 - 20%. Biplane (2D) method of discs ejection fraction is 17%. Grade I diastolic dysfunction.    Right Ventricle: Normal right ventricular cavity size. Wall thickness is normal. Right ventricle wall motion  is normal. Systolic function is normal. Pacemaker lead present in the ventricle.    Aortic Valve: The aortic valve is a trileaflet valve. There is mild aortic valve sclerosis. Mild annular calcification.    IVC/SVC: Intermediate venous pressure at 8 mmHg.  . Continue SGMT and monitor clinical status closely. Monitor on telemetry. Patient is off CHF pathway.  Monitor strict Is&Os and daily weights.  Place on fluid restriction of 2 L. Cardiology has not been consulted. Continue to stress to patient importance of self efficacy and  on diet for CHF. Last BNP reviewed- and noted below   Recent Labs   Lab 08/30/23 1928   *   .    - Holding home metoprolol succinate, entresto, torsemide, empagliflozin  d/t hypotension   -- Will resume when appropriate   - Low index of suspicion for active heart failure but will monitor IVF resuscitation closely  - Cardiology consult given hypotension with reduced EF and evidence of possible volume overload on TTE    Cerebrovascular accident (CVA) due to embolism of right middle cerebral artery  Significant thromboembolic hx, residual left-sided numbness, weakness.   - Continue home ASA and eliquis.     Type 2 diabetes mellitus with hyperglycemia, with long-term current use of insulin  Patient's FSGs are controlled on current medication regimen.  Last A1c reviewed-   Lab Results   Component Value Date    HGBA1C 6.3 (H) 08/28/2023     Most recent fingerstick glucose reviewed-   Recent Labs   Lab 08/31/23  1640 08/31/23  1954 09/01/23  0743 09/01/23  1115   POCTGLUCOSE 99 159* 126* 96     Current correctional scale  Low  Maintain anti-hyperglycemic dose as follows-   Antihyperglycemics (From admission, onward)    Start     Stop Route Frequency Ordered    08/31/23 0627  insulin aspart U-100 pen 0-5 Units         -- SubQ Before meals & nightly PRN 08/31/23 0529        Home metformin, empagliflozin, dulaglutide  Hold Oral hypoglycemics while patient is in the hospital.      VTE Risk Mitigation (From admission, onward)         Ordered     apixaban tablet 5 mg  2 times daily         08/31/23 0529     IP VTE HIGH RISK PATIENT  Once         08/31/23 0523     Place sequential compression device  Until discontinued         08/31/23 0523                Discharge Planning   KARLIE: 9/3/2023     Code Status: Full Code   Is the patient medically ready for discharge?: No    Reason for patient still in hospital (select all that apply): Patient trending condition, Laboratory test, Treatment, Consult recommendations and Pending disposition  Discharge Plan A: Home with family, Home Health   Discharge Delays: None known at this time              Omar Ybarra MD  Department of Hospital Medicine    [facility] - Cardiology Stepdown

## 2023-09-03 NOTE — ASSESSMENT & PLAN NOTE
Patient is identified as having Combined Systolic and Diastolic heart failure that is Chronic. CHF is currently controlled. Latest ECHO performed and demonstrates- Results for orders placed during the hospital encounter of 08/30/23    Echo    Interpretation Summary    Left Ventricle: The left ventricle is severely dilated. Normal wall thickness. Severe global hypokinesis present. There is severely reduced systolic function with a visually estimated ejection fraction of 15 - 20%. Biplane (2D) method of discs ejection fraction is 17%. Grade I diastolic dysfunction.    Right Ventricle: Normal right ventricular cavity size. Wall thickness is normal. Right ventricle wall motion  is normal. Systolic function is normal. Pacemaker lead present in the ventricle.    Aortic Valve: The aortic valve is a trileaflet valve. There is mild aortic valve sclerosis. Mild annular calcification.    IVC/SVC: Intermediate venous pressure at 8 mmHg.  . Continue SGMT and monitor clinical status closely. Monitor on telemetry. Patient is off CHF pathway.  Monitor strict Is&Os and daily weights.  Place on fluid restriction of 2 L. Cardiology has not been consulted. Continue to stress to patient importance of self efficacy and  on diet for CHF. Last BNP reviewed- and noted below   Recent Labs   Lab 08/30/23 1928   *   .    - Holding home metoprolol succinate, entresto, torsemide, empagliflozin d/t hypotension   -- Will resume when appropriate   - Low index of suspicion for active heart failure but will monitor IVF resuscitation closely  - Cardiology consult given hypotension with reduced EF and evidence of possible volume overload on TTE

## 2023-09-03 NOTE — SUBJECTIVE & OBJECTIVE
Interval History:   No events overnight. Patient with no complaints. Continued soft blood pressures. Cardiology consulted.       Review of Systems   Constitutional:  Negative for activity change, appetite change, chills, diaphoresis, fatigue and fever.   HENT:  Negative for rhinorrhea and sore throat.    Respiratory:  Negative for cough, chest tightness and shortness of breath.    Cardiovascular:  Negative for chest pain and palpitations.   Gastrointestinal:  Negative for abdominal pain, constipation, diarrhea and nausea.   Endocrine: Negative for cold intolerance.   Genitourinary:  Negative for decreased urine volume and dysuria.   Musculoskeletal:  Negative for arthralgias and myalgias.   Skin:  Negative for rash and wound.   Neurological:  Negative for dizziness, weakness, numbness and headaches.   Psychiatric/Behavioral:  Negative for agitation, behavioral problems and confusion.      Objective:     Vital Signs (Most Recent):  Temp: 97.6 °F (36.4 °C) (09/03/23 1102)  Pulse: 83 (09/03/23 1102)  Resp: 18 (09/03/23 1102)  BP: (!) 95/51 (09/03/23 1102)  SpO2: 96 % (09/03/23 1102) Vital Signs (24h Range):  Temp:  [97.6 °F (36.4 °C)-98 °F (36.7 °C)] 97.6 °F (36.4 °C)  Pulse:  [] 83  Resp:  [16-18] 18  SpO2:  [93 %-100 %] 96 %  BP: (76-96)/(49-60) 95/51     Weight: 63.5 kg (140 lb)  Body mass index is 22.6 kg/m².    Intake/Output Summary (Last 24 hours) at 9/3/2023 1127  Last data filed at 9/2/2023 1316  Gross per 24 hour   Intake --   Output 1 ml   Net -1 ml         Physical Exam  Constitutional:       General: She is not in acute distress.     Appearance: Normal appearance.   HENT:      Head: Normocephalic and atraumatic.      Mouth/Throat:      Mouth: Mucous membranes are moist.   Eyes:      Extraocular Movements: Extraocular movements intact.      Conjunctiva/sclera: Conjunctivae normal.   Cardiovascular:      Rate and Rhythm: Normal rate and regular rhythm.   Pulmonary:      Effort: Pulmonary effort is  normal. No respiratory distress.      Breath sounds: Normal breath sounds. No wheezing or rales.   Abdominal:      General: Abdomen is flat. Bowel sounds are normal.      Palpations: Abdomen is soft.      Tenderness: There is no abdominal tenderness. There is no guarding.   Musculoskeletal:         General: No swelling or tenderness.      Right lower leg: Edema present.      Left lower leg: Edema present.   Skin:     Findings: No rash.   Neurological:      General: No focal deficit present.      Mental Status: She is alert and oriented to person, place, and time. Mental status is at baseline.      Motor: Weakness (chronic BLE) present.   Psychiatric:         Mood and Affect: Mood normal.         Behavior: Behavior normal.             Significant Labs: All pertinent labs within the past 24 hours have been reviewed.  CBC:   Recent Labs   Lab 09/02/23  0549 09/03/23  0430   WBC 6.38 6.10   HGB 11.8* 11.1*   HCT 35.9* 34.3*    330     CMP:   Recent Labs   Lab 09/02/23  0549 09/03/23  0430    138   K 3.4* 3.9   CL 99 103   CO2 26 26   GLU 93 91   BUN 7* 7*   CREATININE 0.7 0.7   CALCIUM 9.0 8.5*   PROT 5.7* 5.2*   ALBUMIN 2.8* 2.6*   BILITOT 0.5 0.3   ALKPHOS 84 75   AST 21 16   ALT 12 11   ANIONGAP 11 9       Significant Imaging: I have reviewed all pertinent imaging results/findings within the past 24 hours.

## 2023-09-03 NOTE — CARE UPDATE
RAPID RESPONSE NURSE ROUND       Rounding completed with charge RN,  for  reports of decreased Bp. Currently 94/59 . All Bp meds held this morning. No additional concerns verbalized at this time. Instructed to call [phone] for further concerns or assistance.

## 2023-09-03 NOTE — PLAN OF CARE
Problem: Adult Inpatient Plan of Care  Goal: Plan of Care Review  Outcome: Ongoing, Progressing  Goal: Patient-Specific Goal (Individualized)  Outcome: Ongoing, Progressing  Goal: Absence of Hospital-Acquired Illness or Injury  Outcome: Ongoing, Progressing  Goal: Optimal Comfort and Wellbeing  Outcome: Ongoing, Progressing  Goal: Readiness for Transition of Care  Outcome: Ongoing, Progressing     Problem: Diabetes Comorbidity  Goal: Blood Glucose Level Within Targeted Range  Outcome: Ongoing, Progressing     Problem: Fluid and Electrolyte Imbalance (Acute Kidney Injury/Impairment)  Goal: Fluid and Electrolyte Balance  Outcome: Ongoing, Progressing     Problem: Oral Intake Inadequate (Acute Kidney Injury/Impairment)  Goal: Optimal Nutrition Intake  Outcome: Ongoing, Progressing     Problem: Renal Function Impairment (Acute Kidney Injury/Impairment)  Goal: Effective Renal Function  Outcome: Ongoing, Progressing     Problem: Skin Injury Risk Increased  Goal: Skin Health and Integrity  Outcome: Ongoing, Progressing     Problem: Fall Injury Risk  Goal: Absence of Fall and Fall-Related Injury  Outcome: Ongoing, Progressing   Plan of care discussed with patient. Patient is free of fall/trauma/injury. Pt ambulating with assist and walker. AAOx4 and VSS. Call light within reach and family at bedside.  Denies CP, SOB, or pain/discomfort. All questions addressed. Will continue to monitor

## 2023-09-04 PROBLEM — I63.412 CEREBROVASCULAR ACCIDENT (CVA) DUE TO EMBOLISM OF LEFT MIDDLE CEREBRAL ARTERY: Status: RESOLVED | Noted: 2023-04-29 | Resolved: 2023-09-04

## 2023-09-04 LAB
ALBUMIN SERPL BCP-MCNC: 2.6 G/DL (ref 3.5–5.2)
ALP SERPL-CCNC: 76 U/L (ref 55–135)
ALT SERPL W/O P-5'-P-CCNC: 10 U/L (ref 10–44)
ANION GAP SERPL CALC-SCNC: 8 MMOL/L (ref 8–16)
AST SERPL-CCNC: 14 U/L (ref 10–40)
BACTERIA BLD CULT: NORMAL
BACTERIA BLD CULT: NORMAL
BASOPHILS # BLD AUTO: 0.04 K/UL (ref 0–0.2)
BASOPHILS NFR BLD: 0.6 % (ref 0–1.9)
BILIRUB SERPL-MCNC: 0.3 MG/DL (ref 0.1–1)
BUN SERPL-MCNC: 7 MG/DL (ref 8–23)
CALCIUM SERPL-MCNC: 8.9 MG/DL (ref 8.7–10.5)
CHLORIDE SERPL-SCNC: 104 MMOL/L (ref 95–110)
CO2 SERPL-SCNC: 26 MMOL/L (ref 23–29)
CREAT SERPL-MCNC: 0.7 MG/DL (ref 0.5–1.4)
DIFFERENTIAL METHOD: ABNORMAL
EOSINOPHIL # BLD AUTO: 0.3 K/UL (ref 0–0.5)
EOSINOPHIL NFR BLD: 4.1 % (ref 0–8)
ERYTHROCYTE [DISTWIDTH] IN BLOOD BY AUTOMATED COUNT: 14.5 % (ref 11.5–14.5)
EST. GFR  (NO RACE VARIABLE): >60 ML/MIN/1.73 M^2
GLUCOSE SERPL-MCNC: 95 MG/DL (ref 70–110)
HCT VFR BLD AUTO: 34 % (ref 37–48.5)
HGB BLD-MCNC: 10.9 G/DL (ref 12–16)
IMM GRANULOCYTES # BLD AUTO: 0.01 K/UL (ref 0–0.04)
IMM GRANULOCYTES NFR BLD AUTO: 0.2 % (ref 0–0.5)
LYMPHOCYTES # BLD AUTO: 3.6 K/UL (ref 1–4.8)
LYMPHOCYTES NFR BLD: 56.4 % (ref 18–48)
MAGNESIUM SERPL-MCNC: 1.7 MG/DL (ref 1.6–2.6)
MCH RBC QN AUTO: 28.5 PG (ref 27–31)
MCHC RBC AUTO-ENTMCNC: 32.1 G/DL (ref 32–36)
MCV RBC AUTO: 89 FL (ref 82–98)
MONOCYTES # BLD AUTO: 0.4 K/UL (ref 0.3–1)
MONOCYTES NFR BLD: 5.5 % (ref 4–15)
NEUTROPHILS # BLD AUTO: 2.1 K/UL (ref 1.8–7.7)
NEUTROPHILS NFR BLD: 33.2 % (ref 38–73)
NRBC BLD-RTO: 0 /100 WBC
PHOSPHATE SERPL-MCNC: 3.2 MG/DL (ref 2.7–4.5)
PLATELET # BLD AUTO: 323 K/UL (ref 150–450)
PMV BLD AUTO: 10.5 FL (ref 9.2–12.9)
POCT GLUCOSE: 100 MG/DL (ref 70–110)
POCT GLUCOSE: 143 MG/DL (ref 70–110)
POCT GLUCOSE: 183 MG/DL (ref 70–110)
POCT GLUCOSE: 99 MG/DL (ref 70–110)
POTASSIUM SERPL-SCNC: 4 MMOL/L (ref 3.5–5.1)
PROT SERPL-MCNC: 5.2 G/DL (ref 6–8.4)
RBC # BLD AUTO: 3.83 M/UL (ref 4–5.4)
SODIUM SERPL-SCNC: 138 MMOL/L (ref 136–145)
WBC # BLD AUTO: 6.35 K/UL (ref 3.9–12.7)

## 2023-09-04 PROCEDURE — 80053 COMPREHEN METABOLIC PANEL: CPT | Performed by: STUDENT IN AN ORGANIZED HEALTH CARE EDUCATION/TRAINING PROGRAM

## 2023-09-04 PROCEDURE — 25000003 PHARM REV CODE 250: Performed by: INTERNAL MEDICINE

## 2023-09-04 PROCEDURE — 84100 ASSAY OF PHOSPHORUS: CPT

## 2023-09-04 PROCEDURE — 25000003 PHARM REV CODE 250: Performed by: STUDENT IN AN ORGANIZED HEALTH CARE EDUCATION/TRAINING PROGRAM

## 2023-09-04 PROCEDURE — 99232 PR SUBSEQUENT HOSPITAL CARE,LEVL II: ICD-10-PCS | Mod: ,,, | Performed by: STUDENT IN AN ORGANIZED HEALTH CARE EDUCATION/TRAINING PROGRAM

## 2023-09-04 PROCEDURE — 36415 COLL VENOUS BLD VENIPUNCTURE: CPT | Performed by: STUDENT IN AN ORGANIZED HEALTH CARE EDUCATION/TRAINING PROGRAM

## 2023-09-04 PROCEDURE — 85025 COMPLETE CBC W/AUTO DIFF WBC: CPT | Performed by: STUDENT IN AN ORGANIZED HEALTH CARE EDUCATION/TRAINING PROGRAM

## 2023-09-04 PROCEDURE — 83735 ASSAY OF MAGNESIUM: CPT | Performed by: STUDENT IN AN ORGANIZED HEALTH CARE EDUCATION/TRAINING PROGRAM

## 2023-09-04 PROCEDURE — 99232 SBSQ HOSP IP/OBS MODERATE 35: CPT | Mod: ,,, | Performed by: STUDENT IN AN ORGANIZED HEALTH CARE EDUCATION/TRAINING PROGRAM

## 2023-09-04 PROCEDURE — 21400001 HC TELEMETRY ROOM

## 2023-09-04 RX ORDER — ACETAMINOPHEN 500 MG
1000 TABLET ORAL EVERY 6 HOURS PRN
Status: DISCONTINUED | OUTPATIENT
Start: 2023-09-04 | End: 2023-09-05 | Stop reason: HOSPADM

## 2023-09-04 RX ORDER — LANOLIN ALCOHOL/MO/W.PET/CERES
400 CREAM (GRAM) TOPICAL ONCE
Status: COMPLETED | OUTPATIENT
Start: 2023-09-04 | End: 2023-09-04

## 2023-09-04 RX ADMIN — GABAPENTIN 300 MG: 300 CAPSULE ORAL at 09:09

## 2023-09-04 RX ADMIN — ATORVASTATIN CALCIUM 80 MG: 40 TABLET, FILM COATED ORAL at 09:09

## 2023-09-04 RX ADMIN — GABAPENTIN 300 MG: 300 CAPSULE ORAL at 10:09

## 2023-09-04 RX ADMIN — GABAPENTIN 300 MG: 300 CAPSULE ORAL at 02:09

## 2023-09-04 RX ADMIN — METOPROLOL SUCCINATE 12.5 MG: 25 TABLET, EXTENDED RELEASE ORAL at 09:09

## 2023-09-04 RX ADMIN — Medication 6 MG: at 10:09

## 2023-09-04 RX ADMIN — ASPIRIN 81 MG: 81 TABLET, COATED ORAL at 09:09

## 2023-09-04 RX ADMIN — APIXABAN 5 MG: 5 TABLET, FILM COATED ORAL at 09:09

## 2023-09-04 RX ADMIN — MUPIROCIN: 20 OINTMENT TOPICAL at 09:09

## 2023-09-04 RX ADMIN — ACETAMINOPHEN 1000 MG: 500 TABLET ORAL at 12:09

## 2023-09-04 RX ADMIN — Medication 400 MG: at 09:09

## 2023-09-04 RX ADMIN — MUPIROCIN: 20 OINTMENT TOPICAL at 10:09

## 2023-09-04 RX ADMIN — POLYETHYLENE GLYCOL 3350 17 G: 17 POWDER, FOR SOLUTION ORAL at 09:09

## 2023-09-04 RX ADMIN — APIXABAN 5 MG: 5 TABLET, FILM COATED ORAL at 10:09

## 2023-09-04 RX ADMIN — SENNOSIDES 8.6 MG: 8.6 TABLET, FILM COATED ORAL at 09:09

## 2023-09-04 NOTE — ASSESSMENT & PLAN NOTE
Patient is identified as having Combined Systolic and Diastolic heart failure that is Chronic. CHF is currently controlled. Latest ECHO performed and demonstrates- Results for orders placed during the hospital encounter of 08/30/23    Echo    Interpretation Summary    Left Ventricle: The left ventricle is severely dilated. Normal wall thickness. Severe global hypokinesis present. There is severely reduced systolic function with a visually estimated ejection fraction of 15 - 20%. Biplane (2D) method of discs ejection fraction is 17%. Grade I diastolic dysfunction.    Right Ventricle: Normal right ventricular cavity size. Wall thickness is normal. Right ventricle wall motion  is normal. Systolic function is normal. Pacemaker lead present in the ventricle.    Aortic Valve: The aortic valve is a trileaflet valve. There is mild aortic valve sclerosis. Mild annular calcification.    IVC/SVC: Intermediate venous pressure at 8 mmHg.  . Continue SGMT and monitor clinical status closely. Monitor on telemetry. Patient is off CHF pathway.  Monitor strict Is&Os and daily weights.  Place on fluid restriction of 2 L. Cardiology has not been consulted. Continue to stress to patient importance of self efficacy and  on diet for CHF. Last BNP reviewed- and noted below   Recent Labs   Lab 08/30/23 1928   *   .  - Metop XL 12.5mg daily  - Holding home entresto, torsemide, empagliflozin d/t hypotension   - Cardiology consulted  - Additional GDMT as tolerated  - Low index of suspicion for active heart failure but will monitor IVF resuscitation closely

## 2023-09-04 NOTE — CARE UPDATE
"RAPID RESPONSE NURSE CHART REVIEW        Chart Reviewed: 09/04/2023, 7:33 AM    MRN: [id number]  Bed: 312/312 A    Dx: Hypotension    Diomedes Mohr has a past medical history of Acute on chronic combined systolic and diastolic heart failure, ARBEN (acute kidney injury), Anticoagulant long-term use, Anxiety, Arthritis, Asthma, Depression, H/O: hysterectomy, Hyperlipemia, Hypertension, Pulmonary edema, and Schizophrenia.    Last VS: BP (!) 100/59 (BP Location: Left arm, Patient Position: Lying)   Pulse 90   Temp 97.8 °F (36.6 °C) (Oral)   Resp 18   Ht 5' 6" (1.676 m)   Wt 63.5 kg (140 lb)   LMP  (LMP Unknown)   SpO2 98%   Breastfeeding No   BMI 22.60 kg/m²     24H Vital Sign Range:  Temp:  [97.5 °F (36.4 °C)-97.8 °F (36.6 °C)]   Pulse:  []   Resp:  [18-20]   BP: ()/(50-59)   SpO2:  [96 %-100 %]     Level of Consciousness (AVPU): responds to pain    Recent Labs     09/02/23  0549 09/03/23  0430 09/04/23  0347   WBC 6.38 6.10 6.35   HGB 11.8* 11.1* 10.9*   HCT 35.9* 34.3* 34.0*    330 323       Recent Labs     09/02/23  0549 09/03/23  0430 09/04/23  0347    138 138   K 3.4* 3.9 4.0   CL 99 103 104   CO2 26 26 26   BUN 7* 7* 7*   CREATININE 0.7 0.7 0.7   GLU 93 91 95   PHOS 3.1 3.1 3.2   MG 1.7 1.7 1.7        MEWS score: 2    Bedside RN, Miladis  contacted for hypotension. Reports patient VS are stable to morning and she remains asymptomatic. No additional concerns verbalized at this time. Instructed to call [phone] for further concerns or assistance.    Alyssa Cortes RN        "

## 2023-09-04 NOTE — CONSULTS
[facility] - Cardiology Stepdown  Cardiology  Consult Note    Patient Name: Diomedes Mohr  MRN: [id number]  Admission Date: 8/30/2023  Hospital Length of Stay: 3 days  Code Status: Full Code   Attending Provider: Omar Ybarra MD   Consulting Provider: Connor M Gillies, MD  Primary Care Physician: Yolie Michael MD  Principal Problem:Hypotension    Patient information was obtained from patient, past medical records and ER records.     Inpatient consult to Cardiology  Consult performed by: Gillies, Connor M, MD  Consult ordered by: Omar Ybarra MD        Subjective:     Chief Complaint:  Nausea, vomiting     HPI:   61yo F patient with history of HFrEF (15%), HTN, HLD, pulmonary HTN, T2DM, prior CVA, DVT on eliquis, PVD and schizophrenia, who was [place] to [facility] [place]02/23 with diagnosis hypotension responsive to IVF (1.7L) due to UTI , E coli infection resistant to ampicillin an trimethoprim/sulfamethoxazole, and acute renal failure. She received gentle hydration, did not require pressors and was downgraded on same day of admission to the regular floor.  During the night of 09/01-09/02 patient had episodes of hypotension.     Currently GDMT held and managed with small boluses of IVF and her blood pressures have been responding. She feels on the drier side.     EKG 09/02/23  Sinus tachycardia with occasional Premature ventricular complexes   Minimal voltage criteria for LVH, may be normal variant ( Sokolow-Cline )   Non-specific ST-T abn possibly due to the LVH with strain     TTE 08/31/23    Left Ventricle: The left ventricle is severely dilated. Normal wall thickness. Severe global hypokinesis present. There is severely reduced systolic function with a visually estimated ejection fraction of 15 - 20%. Biplane (2D) method of discs ejection fraction is 17%. Grade I diastolic dysfunction.    Right Ventricle: Normal right ventricular cavity size. Wall thickness is normal. Right ventricle wall  motion  is normal. Systolic function is normal. Pacemaker lead present in the ventricle.    Aortic Valve: The aortic valve is a trileaflet valve. There is mild aortic valve sclerosis. Mild annular calcification.    IVC/SVC: Intermediate venous pressure at 8 mmHg.    [facility] 05/2022    RA: 14/ 10/ 12 RV: 45/ 11 PA: 44/ 25/ 31 PWP: 29/ 29/ 25 .   Cardiac output was 4.3  by Jv. Cardiac index is 2.2 L/min/m2.   O2 Sat: PA 61%.   Pulmonary vascular resistance: 112. Systemic vascular resistance: 1657.     /80(101); 100%  Wedge proven with fluroscopy  Right atrial pressure is mildly elevated.  Pulmonary capillary wedge pressure is moderately elevated.  Pulmonary artery pressure is mildly elevated.  Jv cardiac output and index is low normal.  Pulmonary vascular resistance is normal.  Systemic vascular resistance is 1657.  BP above target of < 130/80 during procedure.        [facility] 12/2019  - Non-obstructive CAD.  - There is severe left ventricular systolic dysfunction.  - The ejection fraction is calculated to be 15%.  - Diastolic dysfunction.  - LVEDP (Pre): 28           Past Medical History:   Diagnosis Date    Acute on chronic combined systolic and diastolic heart failure 12/26/2019    ARBEN (acute kidney injury) 5/30/2023    Anticoagulant long-term use     Anxiety     Arthritis     Asthma     Depression     H/O: hysterectomy     Hyperlipemia     Hypertension     Pulmonary edema     Schizophrenia        Past Surgical History:   Procedure Laterality Date    BLADDER SUSPENSION      CARPAL TUNNEL RELEASE Right     HEEL SPUR SURGERY Left     HYSTERECTOMY      LEFT HEART CATHETERIZATION Bilateral 12/27/2019    Procedure: Left heart cath;  Surgeon: Steve Chambers MD;  Location: [facility] CATH LAB;  Service: Cardiology;  Laterality: Bilateral;    RIGHT HEART CATHETERIZATION Right 7/26/2021    Procedure: INSERTION, CATHETER, RIGHT HEART;  Surgeon: Petr Naranjo MD;  Location: [facility] CATH LAB;   Service: Cardiology;  Laterality: Right;    RIGHT HEART CATHETERIZATION Right 5/12/2022    Procedure: INSERTION, CATHETER, RIGHT HEART;  Surgeon: Chandnaa Ivory Jr., MD;  Location: [facility] CATH LAB;  Service: Cardiology;  Laterality: Right;    TUBAL LIGATION         Review of patient's allergies indicates:   Allergen Reactions    Adhesive Blisters    Captopril Other (See Comments)     COUGH       No current facility-administered medications on file prior to encounter.     Current Outpatient Medications on File Prior to Encounter   Medication Sig    acetaminophen (TYLENOL) 325 MG tablet Take 2 tablets (650 mg total) by mouth every 8 (eight) hours as needed.    aspirin (ECOTRIN) 81 MG EC tablet Take 1 tablet (81 mg total) by mouth once daily.    atorvastatin (LIPITOR) 80 MG tablet Take 1 tablet (80 mg total) by mouth once daily.    cyproheptadine (PERIACTIN) 4 mg tablet Take 1 tablet (4 mg total) by mouth 3 (three) times daily as needed.    dulaglutide (TRULICITY) 1.5 mg/0.5 mL pen injector Inject 1.5 mg into the skin once a week.    gabapentin (NEURONTIN) 600 MG tablet Take 1 tablet (600 mg total) by mouth 3 (three) times daily.    ipratropium-albuteroL (COMBIVENT)  mcg/actuation inhaler Inhale 1 puff into the lungs 4 (four) times daily as needed for Shortness of Breath. Rescue    metFORMIN (GLUCOPHAGE) 1000 MG tablet Take 0.5 tablets (500 mg total) by mouth 2 (two) times daily.    ondansetron (ZOFRAN-ODT) 4 MG TbDL Take 1 tablet (4 mg total) by mouth every 8 (eight) hours as needed.    potassium chloride (KLOR-CON) 10 MEQ TbSR Take 1 tablet (10 mEq total) by mouth once daily.    torsemide (DEMADEX) 10 MG Tab Take 1 tablet (10 mg total) by mouth daily as needed.     Family History       Problem Relation (Age of Onset)    No Known Problems Mother, Father    Ovarian cancer Sister (42)          Tobacco Use    Smoking status: Former     Current packs/day: 0.00     Types: Cigarettes     Quit date:  2016     Years since quittin.0    Smokeless tobacco: Never    Tobacco comments:     nonex 2 weeks   Substance and Sexual Activity    Alcohol use: No     Alcohol/week: 0.0 standard drinks of alcohol    Drug use: No    Sexual activity: Not on file     Review of Systems   Constitutional: Negative for diaphoresis and fever.   Cardiovascular:  Negative for chest pain, dyspnea on exertion, leg swelling, near-syncope, orthopnea, palpitations, paroxysmal nocturnal dyspnea and syncope.   Respiratory:  Negative for cough and shortness of breath.    Gastrointestinal:  Negative for abdominal pain, diarrhea, nausea and vomiting.   Neurological:  Negative for light-headedness.   Psychiatric/Behavioral:  Negative for altered mental status and substance abuse.      Objective:     Vital Signs (Most Recent):  Temp: 97.7 °F (36.5 °C) (23)  Pulse: (!) 52 (23)  Resp: 18 (23)  BP: (!) 99/59 (23)  SpO2: 99 % (23) Vital Signs (24h Range):  Temp:  [97.5 °F (36.4 °C)-97.8 °F (36.6 °C)] 97.7 °F (36.5 °C)  Pulse:  [] 52  Resp:  [16-20] 18  SpO2:  [96 %-100 %] 99 %  BP: ()/(50-59) 99/59     Weight: 63.5 kg (140 lb)  Body mass index is 22.6 kg/m².    SpO2: 99 %         Intake/Output Summary (Last 24 hours) at 9/3/2023 2158  Last data filed at 9/3/2023 1722  Gross per 24 hour   Intake 720 ml   Output 1000 ml   Net -280 ml       Lines/Drains/Airways       Peripheral Intravenous Line  Duration                  Peripheral IV - Single Lumen 23 1926 22 G Right Antecubital 4 days         Peripheral IV - Single Lumen 23 20 G Left Antecubital 4 days                     Physical Exam  Constitutional:       Appearance: Normal appearance.   Cardiovascular:      Rate and Rhythm: Normal rate and regular rhythm.      Pulses: Normal pulses.      Heart sounds: Normal heart sounds.      Comments: Normal JVP, no HJR  Pulmonary:      Effort: Pulmonary effort is normal.       Breath sounds: Normal breath sounds. No wheezing or rales.   Abdominal:      General: Abdomen is flat. There is no distension.      Palpations: Abdomen is soft.      Tenderness: There is no abdominal tenderness.   Musculoskeletal:      Right lower leg: No edema.      Left lower leg: No edema.   Skin:     General: Skin is warm and dry.   Neurological:      Mental Status: She is alert and oriented to person, place, and time. Mental status is at baseline.   Psychiatric:         Mood and Affect: Mood normal.         Behavior: Behavior normal.          Significant Labs: All pertinent lab results from the last 24 hours have been reviewed.    Significant Imaging:  Reviewed    Assessment and Plan:     * Hypotension  #Chronic HFrEF  Patient with chronic hypotension in the setting of HFrEF (EF10-15) managed aggressively with a BB and max dose entresto outpatient presented volume down in the setting of N/V, UTI and responded well to IVF. Cardiology consulted for continued low blood pressures. She is not in shock and is dry to euvolemic on exam (POCUS with CVP 8; IVC 15mm, non-collapsible).     Baseline blood pressure as documented with outpatient HTS clinic visits is in the 90s/50s-60s. Was being uptitrated on GDMT with these pressures. Likely hypotensive in the setting of volume depletion. Pre-renal ARBEN resolved with volume resuscitation and patient appears euvolemic now so can likely tolerate slow uptitration of GDMT.    Recommendations:  - Liberalize fluid intake and encourage food intake  - Can restart low dose Toprol XL. 12.5mg qd and see how she tolerates  - Slowly will continue to add back GDMT  - Cardiology will continue to follow along      VTE Risk Mitigation (From admission, onward)         Ordered     apixaban tablet 5 mg  2 times daily         08/31/23 0527     IP VTE HIGH RISK PATIENT  Once         08/31/23 0523     Place sequential compression device  Until discontinued         08/31/23 0523                Thank  you for your consult. I will follow-up with patient. Please contact us if you have any additional questions.    Connor M Gillies, MD  Cardiology   [facility] - Cardiology Stepdown

## 2023-09-04 NOTE — SUBJECTIVE & OBJECTIVE
Interval History:   No overnight acute events. Patient reports feeling better this morning; tolerating her diet. Renal function appears to be improving.     Objective:     Vital Signs (Most Recent):  Temp: 97.8 °F (36.6 °C) (09/04/23 1130)  Pulse: 110 (09/04/23 1130)  Resp: 18 (09/04/23 1130)  BP: 98/62 (09/04/23 1130)  SpO2: 98 % (09/04/23 1130) Vital Signs (24h Range):  Temp:  [97.5 °F (36.4 °C)-97.8 °F (36.6 °C)] 97.8 °F (36.6 °C)  Pulse:  [] 110  Resp:  [18-20] 18  SpO2:  [98 %-100 %] 98 %  BP: ()/(50-72) 98/62     Weight: 63.5 kg (140 lb)  Body mass index is 22.6 kg/m².     SpO2: 98 %         Intake/Output Summary (Last 24 hours) at 9/4/2023 1159  Last data filed at 9/4/2023 0916  Gross per 24 hour   Intake 1020 ml   Output 1700 ml   Net -680 ml       Lines/Drains/Airways       Peripheral Intravenous Line  Duration                  Peripheral IV - Single Lumen 08/30/23 20 G Left Antecubital 5 days         Peripheral IV - Single Lumen 08/30/23 1926 22 G Right Antecubital 4 days                       Physical Exam  Vitals and nursing note reviewed.   Constitutional:       Appearance: Normal appearance.   Eyes:      Conjunctiva/sclera: Conjunctivae normal.   Cardiovascular:      Rate and Rhythm: Normal rate and regular rhythm.      Heart sounds: No murmur heard.  Pulmonary:      Effort: Pulmonary effort is normal. No respiratory distress.      Breath sounds: Normal breath sounds. No wheezing.   Abdominal:      General: There is no distension.      Palpations: Abdomen is soft.      Tenderness: There is no abdominal tenderness.   Musculoskeletal:      Right lower leg: No edema.      Left lower leg: No edema.   Skin:     General: Skin is warm.       Significant Labs: All pertinent lab results from the last 24 hours have been reviewed.

## 2023-09-04 NOTE — SUBJECTIVE & OBJECTIVE
Past Medical History:   Diagnosis Date    Acute on chronic combined systolic and diastolic heart failure 12/26/2019    ARBEN (acute kidney injury) 5/30/2023    Anticoagulant long-term use     Anxiety     Arthritis     Asthma     Depression     H/O: hysterectomy     Hyperlipemia     Hypertension     Pulmonary edema     Schizophrenia        Past Surgical History:   Procedure Laterality Date    BLADDER SUSPENSION      CARPAL TUNNEL RELEASE Right     HEEL SPUR SURGERY Left     HYSTERECTOMY      LEFT HEART CATHETERIZATION Bilateral 12/27/2019    Procedure: Left heart cath;  Surgeon: Steve Chambers MD;  Location: [facility] CATH LAB;  Service: Cardiology;  Laterality: Bilateral;    RIGHT HEART CATHETERIZATION Right 7/26/2021    Procedure: INSERTION, CATHETER, RIGHT HEART;  Surgeon: Petr Naranjo MD;  Location: [facility] CATH LAB;  Service: Cardiology;  Laterality: Right;    RIGHT HEART CATHETERIZATION Right 5/12/2022    Procedure: INSERTION, CATHETER, RIGHT HEART;  Surgeon: Chandana Ivory Jr., MD;  Location: [facility] CATH LAB;  Service: Cardiology;  Laterality: Right;    TUBAL LIGATION         Review of patient's allergies indicates:   Allergen Reactions    Adhesive Blisters    Captopril Other (See Comments)     COUGH       No current facility-administered medications on file prior to encounter.     Current Outpatient Medications on File Prior to Encounter   Medication Sig    acetaminophen (TYLENOL) 325 MG tablet Take 2 tablets (650 mg total) by mouth every 8 (eight) hours as needed.    aspirin (ECOTRIN) 81 MG EC tablet Take 1 tablet (81 mg total) by mouth once daily.    atorvastatin (LIPITOR) 80 MG tablet Take 1 tablet (80 mg total) by mouth once daily.    cyproheptadine (PERIACTIN) 4 mg tablet Take 1 tablet (4 mg total) by mouth 3 (three) times daily as needed.    dulaglutide (TRULICITY) 1.5 mg/0.5 mL pen injector Inject 1.5 mg into the skin once a week.    gabapentin (NEURONTIN) 600 MG tablet Take 1 tablet (600 mg  total) by mouth 3 (three) times daily.    ipratropium-albuteroL (COMBIVENT)  mcg/actuation inhaler Inhale 1 puff into the lungs 4 (four) times daily as needed for Shortness of Breath. Rescue    metFORMIN (GLUCOPHAGE) 1000 MG tablet Take 0.5 tablets (500 mg total) by mouth 2 (two) times daily.    ondansetron (ZOFRAN-ODT) 4 MG TbDL Take 1 tablet (4 mg total) by mouth every 8 (eight) hours as needed.    potassium chloride (KLOR-CON) 10 MEQ TbSR Take 1 tablet (10 mEq total) by mouth once daily.    torsemide (DEMADEX) 10 MG Tab Take 1 tablet (10 mg total) by mouth daily as needed.     Family History       Problem Relation (Age of Onset)    No Known Problems Mother, Father    Ovarian cancer Sister (42)          Tobacco Use    Smoking status: Former     Current packs/day: 0.00     Types: Cigarettes     Quit date: 2016     Years since quittin.0    Smokeless tobacco: Never    Tobacco comments:     nonex 2 weeks   Substance and Sexual Activity    Alcohol use: No     Alcohol/week: 0.0 standard drinks of alcohol    Drug use: No    Sexual activity: Not on file     Review of Systems   Constitutional: Negative for diaphoresis and fever.   Cardiovascular:  Negative for chest pain, dyspnea on exertion, leg swelling, near-syncope, orthopnea, palpitations, paroxysmal nocturnal dyspnea and syncope.   Respiratory:  Negative for cough and shortness of breath.    Gastrointestinal:  Negative for abdominal pain, diarrhea, nausea and vomiting.   Neurological:  Negative for light-headedness.   Psychiatric/Behavioral:  Negative for altered mental status and substance abuse.      Objective:     Vital Signs (Most Recent):  Temp: 97.7 °F (36.5 °C) (23)  Pulse: (!) 52 (23)  Resp: 18 (23)  BP: (!) 99/59 (23)  SpO2: 99 % (23) Vital Signs (24h Range):  Temp:  [97.5 °F (36.4 °C)-97.8 °F (36.6 °C)] 97.7 °F (36.5 °C)  Pulse:  [] 52  Resp:  [16-20] 18  SpO2:  [96 %-100 %] 99  %  BP: ()/(50-59) 99/59     Weight: 63.5 kg (140 lb)  Body mass index is 22.6 kg/m².    SpO2: 99 %         Intake/Output Summary (Last 24 hours) at 9/3/2023 2158  Last data filed at 9/3/2023 1722  Gross per 24 hour   Intake 720 ml   Output 1000 ml   Net -280 ml       Lines/Drains/Airways       Peripheral Intravenous Line  Duration                  Peripheral IV - Single Lumen 08/30/23 1926 22 G Right Antecubital 4 days         Peripheral IV - Single Lumen 08/30/23 20 G Left Antecubital 4 days                     Physical Exam  Constitutional:       Appearance: Normal appearance.   Cardiovascular:      Rate and Rhythm: Normal rate and regular rhythm.      Pulses: Normal pulses.      Heart sounds: Normal heart sounds.      Comments: Normal JVP, no HJR  Pulmonary:      Effort: Pulmonary effort is normal.      Breath sounds: Normal breath sounds. No wheezing or rales.   Abdominal:      General: Abdomen is flat. There is no distension.      Palpations: Abdomen is soft.      Tenderness: There is no abdominal tenderness.   Musculoskeletal:      Right lower leg: No edema.      Left lower leg: No edema.   Skin:     General: Skin is warm and dry.   Neurological:      Mental Status: She is alert and oriented to person, place, and time. Mental status is at baseline.   Psychiatric:         Mood and Affect: Mood normal.         Behavior: Behavior normal.          Significant Labs: All pertinent lab results from the last 24 hours have been reviewed.    Significant Imaging:  Reviewed

## 2023-09-04 NOTE — ASSESSMENT & PLAN NOTE
#Chronic HFrEF  Patient with chronic hypotension in the setting of HFrEF (EF10-15) managed aggressively with a BB and max dose entresto outpatient presented volume down in the setting of N/V, UTI and responded well to IVF. Cardiology consulted for continued low blood pressures. She is not in shock and is dry to euvolemic on exam (POCUS with CVP 8; IVC 15mm, non-collapsible).     Baseline blood pressure as documented with outpatient HTS clinic visits is in the 90s/50s-60s. Was being uptitrated on GDMT with these pressures. Likely hypotensive in the setting of volume depletion. Pre-renal ARBEN resolved with volume resuscitation and patient appears euvolemic now so can likely tolerate slow uptitration of GDMT.    Recommendations:  - Liberalize fluid intake and encourage food intake  - Can restart low dose Toprol XL. 12.5mg qd and see how she tolerates  - Slowly will continue to add back GDMT  - Cardiology will continue to follow along

## 2023-09-04 NOTE — SUBJECTIVE & OBJECTIVE
Interval History:   No events overnight. Continue Metoprolol. Cardiology following. GDMT as tolerated. No complaints.       Review of Systems   Constitutional:  Negative for activity change, appetite change, chills, diaphoresis, fatigue and fever.   HENT:  Negative for rhinorrhea and sore throat.    Respiratory:  Negative for cough, chest tightness and shortness of breath.    Cardiovascular:  Negative for chest pain and palpitations.   Gastrointestinal:  Negative for abdominal pain, constipation, diarrhea and nausea.   Endocrine: Negative for cold intolerance.   Genitourinary:  Negative for decreased urine volume and dysuria.   Musculoskeletal:  Negative for arthralgias and myalgias.   Skin:  Negative for rash and wound.   Neurological:  Negative for dizziness, weakness, numbness and headaches.   Psychiatric/Behavioral:  Negative for agitation, behavioral problems and confusion.      Objective:     Vital Signs (Most Recent):  Temp: 97.8 °F (36.6 °C) (09/04/23 1130)  Pulse: 110 (09/04/23 1130)  Resp: 18 (09/04/23 1130)  BP: 98/62 (09/04/23 1130)  SpO2: 98 % (09/04/23 1130) Vital Signs (24h Range):  Temp:  [97.5 °F (36.4 °C)-97.8 °F (36.6 °C)] 97.8 °F (36.6 °C)  Pulse:  [] 110  Resp:  [18-20] 18  SpO2:  [98 %-100 %] 98 %  BP: ()/(50-72) 98/62     Weight: 63.5 kg (140 lb)  Body mass index is 22.6 kg/m².    Intake/Output Summary (Last 24 hours) at 9/4/2023 1343  Last data filed at 9/4/2023 0916  Gross per 24 hour   Intake 1020 ml   Output 1700 ml   Net -680 ml         Physical Exam  Constitutional:       General: She is not in acute distress.     Appearance: Normal appearance.   HENT:      Head: Normocephalic and atraumatic.      Mouth/Throat:      Mouth: Mucous membranes are moist.   Eyes:      Extraocular Movements: Extraocular movements intact.      Conjunctiva/sclera: Conjunctivae normal.   Cardiovascular:      Rate and Rhythm: Normal rate and regular rhythm.   Pulmonary:      Effort: Pulmonary effort is  normal. No respiratory distress.      Breath sounds: Normal breath sounds. No wheezing or rales.   Abdominal:      General: Abdomen is flat. Bowel sounds are normal.      Palpations: Abdomen is soft.      Tenderness: There is no abdominal tenderness. There is no guarding.   Musculoskeletal:         General: No swelling or tenderness.      Right lower leg: Edema present.      Left lower leg: Edema present.   Skin:     Findings: No rash.   Neurological:      General: No focal deficit present.      Mental Status: She is alert and oriented to person, place, and time. Mental status is at baseline.      Motor: Weakness (chronic BLE) present.   Psychiatric:         Mood and Affect: Mood normal.         Behavior: Behavior normal.             Significant Labs: All pertinent labs within the past 24 hours have been reviewed.  CBC:   Recent Labs   Lab 09/03/23  0430 09/04/23  0347   WBC 6.10 6.35   HGB 11.1* 10.9*   HCT 34.3* 34.0*    323     CMP:   Recent Labs   Lab 09/03/23  0430 09/04/23  0347    138   K 3.9 4.0    104   CO2 26 26   GLU 91 95   BUN 7* 7*   CREATININE 0.7 0.7   CALCIUM 8.5* 8.9   PROT 5.2* 5.2*   ALBUMIN 2.6* 2.6*   BILITOT 0.3 0.3   ALKPHOS 75 76   AST 16 14   ALT 11 10   ANIONGAP 9 8       Significant Imaging: I have reviewed all pertinent imaging results/findings within the past 24 hours.

## 2023-09-04 NOTE — ASSESSMENT & PLAN NOTE
Hypotension 2/2 nausea/vomiting possibly 2/2 untreated UTI albeit UA unimpressive on arrival, possible iatrogenic component w/ home torsemide, jardiance, entresto. Lactate 2.26 on arrival, improved to 1.37     BP on arrival 70s/50s MAP 55, received ~1700cc IVF w transient improvement with MAP 85 but declined to <60 overnight on admission     - Concern for septic shock or cardiogenic shock  - s/p IVF resuscitation in the ED  - Use small boluses as clinical status dictates  - HFrEF w EF <10%, maintain on telemetry  - UTI treatment with E coli as below  - Blood culture NGTD  - Resume GDMT as toelrate  - Antiemetics PRN

## 2023-09-04 NOTE — PROGRESS NOTES
[facility] - Cardiology [facility] Medicine  Progress Note    Patient Name: Diomedes Mohr  MRN: [id number]  Patient Class: IP- Inpatient   Admission Date: 8/30/2023  Length of Stay: 4 days  Attending Physician: Omar Ybarra MD  Primary Care Provider: Yolie Michael MD        Subjective:     Principal Problem:Hypotension        HPI:  62 y.o. female with HTN, HLD, CHF (LVEF 10-15% with G2 DD), pulmonary HTN, DM II, prior CVA, DVT (on eliquis), PVD and schizophrenia presented to hospital complaining of N/V x2 weeks. Pt was dx'd with UTI at OSH on 8/23 and started on bactrim (Ucx ended up growing out e coli resistant to bactrim). On 8/28 pt was re-evaluated by her PCP for ongoing hypotension and had her BB & diuretic dosages decreased. The patient presented again today with ongoing N/V.      Work up in ED revealed normal WBC, ARBEN (creatinine 2.4, baseline ~ 0.9), BNP mildly elevated at 166, trop mildly elevated at 0.035 and UA with rare bacteria, 22 WBCs and +3 leuks. CT abd/pelvis unrevealing for infectious etiology.      Pt rec'd a total of 1750cc IVF in the ED. BP responds to boluses, and then trends back down.   Bedside POCUS done at time of [facility] evaluation; LV severely dilated, RV small. IVC small and collapses with inspiration.   Clinic visit BPs appear to run 90s/50s.      Pt initially felt stable for HM, however after several hours, BP down-trended again. Decision made to upgrade pt to ICU level of care.       Overview/Hospital Course:  Patient admitted to the MICU for septic shock 2/2 UTI with underlying HFrEF (EF 10-15%). She received 1750 cc of IVF and never required vasopressors to maintain BP      Interval History:   No events overnight. Continue Metoprolol. Cardiology following. GDMT as tolerated. No complaints.       Review of Systems   Constitutional:  Negative for activity change, appetite change, chills, diaphoresis, fatigue and fever.   HENT:  Negative for rhinorrhea and sore  throat.    Respiratory:  Negative for cough, chest tightness and shortness of breath.    Cardiovascular:  Negative for chest pain and palpitations.   Gastrointestinal:  Negative for abdominal pain, constipation, diarrhea and nausea.   Endocrine: Negative for cold intolerance.   Genitourinary:  Negative for decreased urine volume and dysuria.   Musculoskeletal:  Negative for arthralgias and myalgias.   Skin:  Negative for rash and wound.   Neurological:  Negative for dizziness, weakness, numbness and headaches.   Psychiatric/Behavioral:  Negative for agitation, behavioral problems and confusion.      Objective:     Vital Signs (Most Recent):  Temp: 97.8 °F (36.6 °C) (09/04/23 1130)  Pulse: 110 (09/04/23 1130)  Resp: 18 (09/04/23 1130)  BP: 98/62 (09/04/23 1130)  SpO2: 98 % (09/04/23 1130) Vital Signs (24h Range):  Temp:  [97.5 °F (36.4 °C)-97.8 °F (36.6 °C)] 97.8 °F (36.6 °C)  Pulse:  [] 110  Resp:  [18-20] 18  SpO2:  [98 %-100 %] 98 %  BP: ()/(50-72) 98/62     Weight: 63.5 kg (140 lb)  Body mass index is 22.6 kg/m².    Intake/Output Summary (Last 24 hours) at 9/4/2023 1343  Last data filed at 9/4/2023 0916  Gross per 24 hour   Intake 1020 ml   Output 1700 ml   Net -680 ml         Physical Exam  Constitutional:       General: She is not in acute distress.     Appearance: Normal appearance.   HENT:      Head: Normocephalic and atraumatic.      Mouth/Throat:      Mouth: Mucous membranes are moist.   Eyes:      Extraocular Movements: Extraocular movements intact.      Conjunctiva/sclera: Conjunctivae normal.   Cardiovascular:      Rate and Rhythm: Normal rate and regular rhythm.   Pulmonary:      Effort: Pulmonary effort is normal. No respiratory distress.      Breath sounds: Normal breath sounds. No wheezing or rales.   Abdominal:      General: Abdomen is flat. Bowel sounds are normal.      Palpations: Abdomen is soft.      Tenderness: There is no abdominal tenderness. There is no guarding.    Musculoskeletal:         General: No swelling or tenderness.      Right lower leg: Edema present.      Left lower leg: Edema present.   Skin:     Findings: No rash.   Neurological:      General: No focal deficit present.      Mental Status: She is alert and oriented to person, place, and time. Mental status is at baseline.      Motor: Weakness (chronic BLE) present.   Psychiatric:         Mood and Affect: Mood normal.         Behavior: Behavior normal.             Significant Labs: All pertinent labs within the past 24 hours have been reviewed.  CBC:   Recent Labs   Lab 09/03/23 0430 09/04/23  0347   WBC 6.10 6.35   HGB 11.1* 10.9*   HCT 34.3* 34.0*    323     CMP:   Recent Labs   Lab 09/03/23 0430 09/04/23 0347    138   K 3.9 4.0    104   CO2 26 26   GLU 91 95   BUN 7* 7*   CREATININE 0.7 0.7   CALCIUM 8.5* 8.9   PROT 5.2* 5.2*   ALBUMIN 2.6* 2.6*   BILITOT 0.3 0.3   ALKPHOS 75 76   AST 16 14   ALT 11 10   ANIONGAP 9 8       Significant Imaging: I have reviewed all pertinent imaging results/findings within the past 24 hours.      Assessment/Plan:      * Hypotension  Hypotension 2/2 nausea/vomiting possibly 2/2 untreated UTI albeit UA unimpressive on arrival, possible iatrogenic component w/ home torsemide, jardiance, entresto. Lactate 2.26 on arrival, improved to 1.37     BP on arrival 70s/50s MAP 55, received ~1700cc IVF w transient improvement with MAP 85 but declined to <60 overnight on admission     - Concern for septic shock or cardiogenic shock  - s/p IVF resuscitation in the ED  - Use small boluses as clinical status dictates  - HFrEF w EF <10%, maintain on telemetry  - UTI treatment with E coli as below  - Blood culture NGTD  - Resume GDMT as toelrate  - Antiemetics PRN    Acute cystitis  E. Coli UTI, received bactrim last week but found to be resistant.  - Unimpressive UA but will treat given nausea, vomiting, hypotension.   - Received CTX in ED.  - UCx with E coli  - s/p CTX x5  days    ARBEN (acute kidney injury)  Creatinine 2.4 on admit, baseline around 0.9  - Likely pre-renal from dehydration and volume losses  - CT A/P with bilateral renal sinus and cortical cysts.  No hydronephrosis or stones.  Urinary bladder is contains intraluminal gas.  - Strict I&Os and daily weights   - Gentle IVF given CHF  - Avoid nephrotoxic agents such as NSAIDs, gadolinium and IV radiocontrast.  - Renally dose meds to current GFR.  - Maintain MAP > 65  - Improved     Chronic combined systolic and diastolic congestive heart failure  Patient is identified as having Combined Systolic and Diastolic heart failure that is Chronic. CHF is currently controlled. Latest ECHO performed and demonstrates- Results for orders placed during the hospital encounter of 08/30/23    Echo    Interpretation Summary    Left Ventricle: The left ventricle is severely dilated. Normal wall thickness. Severe global hypokinesis present. There is severely reduced systolic function with a visually estimated ejection fraction of 15 - 20%. Biplane (2D) method of discs ejection fraction is 17%. Grade I diastolic dysfunction.    Right Ventricle: Normal right ventricular cavity size. Wall thickness is normal. Right ventricle wall motion  is normal. Systolic function is normal. Pacemaker lead present in the ventricle.    Aortic Valve: The aortic valve is a trileaflet valve. There is mild aortic valve sclerosis. Mild annular calcification.    IVC/SVC: Intermediate venous pressure at 8 mmHg.  . Continue SGMT and monitor clinical status closely. Monitor on telemetry. Patient is off CHF pathway.  Monitor strict Is&Os and daily weights.  Place on fluid restriction of 2 L. Cardiology has not been consulted. Continue to stress to patient importance of self efficacy and  on diet for CHF. Last BNP reviewed- and noted below   Recent Labs   Lab 08/30/23 1928   *   .  - Metop XL 12.5mg daily  - Holding home entresto, torsemide,  empagliflozin d/t hypotension   - Cardiology consulted  - Additional GDMT as tolerated  - Low index of suspicion for active heart failure but will monitor IVF resuscitation closely    Cerebrovascular accident (CVA) due to embolism of right middle cerebral artery  Significant thromboembolic hx, residual left-sided numbness, weakness.   - Continue home ASA and eliquis.     Type 2 diabetes mellitus with hyperglycemia, with long-term current use of insulin  Patient's FSGs are controlled on current medication regimen.  Last A1c reviewed-   Lab Results   Component Value Date    HGBA1C 6.3 (H) 08/28/2023     Most recent fingerstick glucose reviewed-   Recent Labs   Lab 08/31/23  1640 08/31/23  1954 09/01/23  0743 09/01/23  1115   POCTGLUCOSE 99 159* 126* 96     Current correctional scale  Low  Maintain anti-hyperglycemic dose as follows-   Antihyperglycemics (From admission, onward)    Start     Stop Route Frequency Ordered    08/31/23 0627  insulin aspart U-100 pen 0-5 Units         -- SubQ Before meals & nightly PRN 08/31/23 0529        Home metformin, empagliflozin, dulaglutide  Hold Oral hypoglycemics while patient is in the hospital.      VTE Risk Mitigation (From admission, onward)         Ordered     apixaban tablet 5 mg  2 times daily         08/31/23 0529     IP VTE HIGH RISK PATIENT  Once         08/31/23 0523     Place sequential compression device  Until discontinued         08/31/23 0523                Discharge Planning   KARLIE: 9/3/2023     Code Status: Full Code   Is the patient medically ready for discharge?: No    Reason for patient still in hospital (select all that apply): Patient trending condition, Laboratory test, Consult recommendations and Pending disposition  Discharge Plan A: Home with family, Home Health   Discharge Delays: None known at this time              Omar Ybarra MD  Department of Hospital Medicine   [facility] - Cardiology Stepdown

## 2023-09-04 NOTE — PROGRESS NOTES
[facility] - Cardiology Stepdown  Cardiology  Progress Note    Patient Name: Diomedes Mohr  MRN: [id number]  Admission Date: 8/30/2023  Hospital Length of Stay: 4 days  Code Status: Full Code   Attending Physician: Omar Ybarra MD   Primary Care Physician: Yolie Michael MD  Expected Discharge Date: 9/3/2023  Principal Problem:Hypotension    Subjective:     Hospital Course:   63yo F patient with history of HFrEF (15%), HTN, HLD, pulmonary HTN, T2DM, prior CVA, DVT on eliquis, PVD and schizophrenia, who was admitted to [facility] ICU on 09/02/23 with diagnosis hypotension responsive to IVF (1.7L) due to UTI , E coli infection resistant to ampicillin an trimethoprim/sulfamethoxazole, and acute renal failure. She received gentle hydration, did not require pressors and was downgraded on same day of admission to the regular floor.  During the night of 09/01-09/02 patient had episodes of hypotension.      Currently GDMT held and managed with small boluses of IVF and her blood pressures have been responding. She feels on the drier side.     Interval History:   No overnight acute events. Patient reports feeling better this morning; tolerating her diet. Renal function appears to be improving.     Objective:     Vital Signs (Most Recent):  Temp: 97.8 °F (36.6 °C) (09/04/23 1130)  Pulse: 110 (09/04/23 1130)  Resp: 18 (09/04/23 1130)  BP: 98/62 (09/04/23 1130)  SpO2: 98 % (09/04/23 1130) Vital Signs (24h Range):  Temp:  [97.5 °F (36.4 °C)-97.8 °F (36.6 °C)] 97.8 °F (36.6 °C)  Pulse:  [] 110  Resp:  [18-20] 18  SpO2:  [98 %-100 %] 98 %  BP: ()/(50-72) 98/62     Weight: 63.5 kg (140 lb)  Body mass index is 22.6 kg/m².     SpO2: 98 %         Intake/Output Summary (Last 24 hours) at 9/4/2023 1159  Last data filed at 9/4/2023 0916  Gross per 24 hour   Intake 1020 ml   Output 1700 ml   Net -680 ml       Lines/Drains/Airways       Peripheral Intravenous Line  Duration                  Peripheral IV - Single Lumen  08/30/23 20 G Left Antecubital 5 days         Peripheral IV - Single Lumen 08/30/23 1926 22 G Right Antecubital 4 days                       Physical Exam  Vitals and nursing note reviewed.   Constitutional:       Appearance: Normal appearance.   Eyes:      Conjunctiva/sclera: Conjunctivae normal.   Cardiovascular:      Rate and Rhythm: Normal rate and regular rhythm.      Heart sounds: No murmur heard.  Pulmonary:      Effort: Pulmonary effort is normal. No respiratory distress.      Breath sounds: Normal breath sounds. No wheezing.   Abdominal:      General: There is no distension.      Palpations: Abdomen is soft.      Tenderness: There is no abdominal tenderness.   Musculoskeletal:      Right lower leg: No edema.      Left lower leg: No edema.   Skin:     General: Skin is warm.       Significant Labs: All pertinent lab results from the last 24 hours have been reviewed.      Assessment and Plan:     * Hypotension  #Chronic HFrEF  Patient with chronic hypotension in the setting of HFrEF (EF10-15) managed aggressively with a BB and max dose entresto outpatient presented volume down in the setting of N/V, UTI and responded well to IVF. Cardiology consulted for hypotension  - Likely hypotensive in the setting of volume depletion.  - ARBEN resolved  - Toprol 12.5 mg, daily resumed; would recommended increasing back to 25. Will re-assess and plan to resume low-dose entresto if BP tolerates  - Continue with Treatment of UTI    Thank you for this consult Please follow up Cardiology Attending Attestation for Further recommendations. Will continue to follow    VTE Risk Mitigation (From admission, onward)         Ordered     apixaban tablet 5 mg  2 times daily         08/31/23 0529     IP VTE HIGH RISK PATIENT  Once         08/31/23 0523     Place sequential compression device  Until discontinued         08/31/23 0523                Yris Reynaga MD  Cardiology  [facility] - Cardiology Stepdown

## 2023-09-04 NOTE — ASSESSMENT & PLAN NOTE
E. Coli UTI, received bactrim last week but found to be resistant.  - Unimpressive UA but will treat given nausea, vomiting, hypotension.   - Received CTX in ED.  - UCx with E coli  - s/p CTX x5 days

## 2023-09-05 ENCOUNTER — TELEPHONE (OUTPATIENT)
Dept: HEMATOLOGY/ONCOLOGY | Facility: CLINIC | Age: 63
End: 2023-09-05
Payer: MEDICARE

## 2023-09-05 VITALS
WEIGHT: 140 LBS | HEIGHT: 66 IN | BODY MASS INDEX: 22.5 KG/M2 | RESPIRATION RATE: 20 BRPM | HEART RATE: 86 BPM | DIASTOLIC BLOOD PRESSURE: 56 MMHG | TEMPERATURE: 98 F | SYSTOLIC BLOOD PRESSURE: 99 MMHG | OXYGEN SATURATION: 98 %

## 2023-09-05 LAB
ALBUMIN SERPL BCP-MCNC: 2.6 G/DL (ref 3.5–5.2)
ALP SERPL-CCNC: 69 U/L (ref 55–135)
ALT SERPL W/O P-5'-P-CCNC: 11 U/L (ref 10–44)
ANION GAP SERPL CALC-SCNC: 9 MMOL/L (ref 8–16)
AST SERPL-CCNC: 15 U/L (ref 10–40)
BASOPHILS # BLD AUTO: 0.03 K/UL (ref 0–0.2)
BASOPHILS NFR BLD: 0.6 % (ref 0–1.9)
BILIRUB SERPL-MCNC: 0.2 MG/DL (ref 0.1–1)
BUN SERPL-MCNC: 8 MG/DL (ref 8–23)
CALCIUM SERPL-MCNC: 8.9 MG/DL (ref 8.7–10.5)
CHLORIDE SERPL-SCNC: 106 MMOL/L (ref 95–110)
CO2 SERPL-SCNC: 23 MMOL/L (ref 23–29)
CREAT SERPL-MCNC: 0.7 MG/DL (ref 0.5–1.4)
DIFFERENTIAL METHOD: ABNORMAL
EOSINOPHIL # BLD AUTO: 0.2 K/UL (ref 0–0.5)
EOSINOPHIL NFR BLD: 4.1 % (ref 0–8)
ERYTHROCYTE [DISTWIDTH] IN BLOOD BY AUTOMATED COUNT: 14.5 % (ref 11.5–14.5)
EST. GFR  (NO RACE VARIABLE): >60 ML/MIN/1.73 M^2
GLUCOSE SERPL-MCNC: 90 MG/DL (ref 70–110)
HCT VFR BLD AUTO: 33.1 % (ref 37–48.5)
HGB BLD-MCNC: 10.3 G/DL (ref 12–16)
IMM GRANULOCYTES # BLD AUTO: 0.01 K/UL (ref 0–0.04)
IMM GRANULOCYTES NFR BLD AUTO: 0.2 % (ref 0–0.5)
LYMPHOCYTES # BLD AUTO: 3 K/UL (ref 1–4.8)
LYMPHOCYTES NFR BLD: 55.4 % (ref 18–48)
MAGNESIUM SERPL-MCNC: 1.9 MG/DL (ref 1.6–2.6)
MCH RBC QN AUTO: 28.5 PG (ref 27–31)
MCHC RBC AUTO-ENTMCNC: 31.1 G/DL (ref 32–36)
MCV RBC AUTO: 92 FL (ref 82–98)
MONOCYTES # BLD AUTO: 0.3 K/UL (ref 0.3–1)
MONOCYTES NFR BLD: 5.4 % (ref 4–15)
NEUTROPHILS # BLD AUTO: 1.8 K/UL (ref 1.8–7.7)
NEUTROPHILS NFR BLD: 34.3 % (ref 38–73)
NRBC BLD-RTO: 0 /100 WBC
PHOSPHATE SERPL-MCNC: 3.6 MG/DL (ref 2.7–4.5)
PLATELET # BLD AUTO: 294 K/UL (ref 150–450)
PMV BLD AUTO: 10.8 FL (ref 9.2–12.9)
POCT GLUCOSE: 102 MG/DL (ref 70–110)
POCT GLUCOSE: 113 MG/DL (ref 70–110)
POCT GLUCOSE: 131 MG/DL (ref 70–110)
POTASSIUM SERPL-SCNC: 4.1 MMOL/L (ref 3.5–5.1)
PROT SERPL-MCNC: 5.1 G/DL (ref 6–8.4)
RBC # BLD AUTO: 3.61 M/UL (ref 4–5.4)
SODIUM SERPL-SCNC: 138 MMOL/L (ref 136–145)
WBC # BLD AUTO: 5.34 K/UL (ref 3.9–12.7)

## 2023-09-05 PROCEDURE — 83735 ASSAY OF MAGNESIUM: CPT | Performed by: STUDENT IN AN ORGANIZED HEALTH CARE EDUCATION/TRAINING PROGRAM

## 2023-09-05 PROCEDURE — 84100 ASSAY OF PHOSPHORUS: CPT

## 2023-09-05 PROCEDURE — 99239 HOSP IP/OBS DSCHRG MGMT >30: CPT | Mod: ,,, | Performed by: STUDENT IN AN ORGANIZED HEALTH CARE EDUCATION/TRAINING PROGRAM

## 2023-09-05 PROCEDURE — 1111F DSCHRG MED/CURRENT MED MERGE: CPT | Mod: CPTII,,, | Performed by: STUDENT IN AN ORGANIZED HEALTH CARE EDUCATION/TRAINING PROGRAM

## 2023-09-05 PROCEDURE — 80053 COMPREHEN METABOLIC PANEL: CPT | Performed by: STUDENT IN AN ORGANIZED HEALTH CARE EDUCATION/TRAINING PROGRAM

## 2023-09-05 PROCEDURE — 99239 PR HOSPITAL DISCHARGE DAY,>30 MIN: ICD-10-PCS | Mod: ,,, | Performed by: STUDENT IN AN ORGANIZED HEALTH CARE EDUCATION/TRAINING PROGRAM

## 2023-09-05 PROCEDURE — 85025 COMPLETE CBC W/AUTO DIFF WBC: CPT | Performed by: STUDENT IN AN ORGANIZED HEALTH CARE EDUCATION/TRAINING PROGRAM

## 2023-09-05 PROCEDURE — 25000003 PHARM REV CODE 250: Performed by: STUDENT IN AN ORGANIZED HEALTH CARE EDUCATION/TRAINING PROGRAM

## 2023-09-05 PROCEDURE — 1111F PR DISCHARGE MEDS RECONCILED W/ CURRENT OUTPATIENT MED LIST: ICD-10-PCS | Mod: CPTII,,, | Performed by: STUDENT IN AN ORGANIZED HEALTH CARE EDUCATION/TRAINING PROGRAM

## 2023-09-05 PROCEDURE — 25000003 PHARM REV CODE 250: Performed by: INTERNAL MEDICINE

## 2023-09-05 PROCEDURE — 36415 COLL VENOUS BLD VENIPUNCTURE: CPT | Performed by: STUDENT IN AN ORGANIZED HEALTH CARE EDUCATION/TRAINING PROGRAM

## 2023-09-05 RX ORDER — METOPROLOL SUCCINATE 25 MG/1
25 TABLET, EXTENDED RELEASE ORAL DAILY
Qty: 30 TABLET | Refills: 11 | Status: ON HOLD | OUTPATIENT
Start: 2023-09-05 | End: 2023-09-20 | Stop reason: SDUPTHER

## 2023-09-05 RX ORDER — METOPROLOL SUCCINATE 25 MG/1
25 TABLET, EXTENDED RELEASE ORAL DAILY
Status: DISCONTINUED | OUTPATIENT
Start: 2023-09-05 | End: 2023-09-05 | Stop reason: HOSPADM

## 2023-09-05 RX ADMIN — METOPROLOL SUCCINATE 25 MG: 25 TABLET, EXTENDED RELEASE ORAL at 08:09

## 2023-09-05 RX ADMIN — APIXABAN 5 MG: 5 TABLET, FILM COATED ORAL at 08:09

## 2023-09-05 RX ADMIN — GABAPENTIN 300 MG: 300 CAPSULE ORAL at 08:09

## 2023-09-05 RX ADMIN — ATORVASTATIN CALCIUM 80 MG: 40 TABLET, FILM COATED ORAL at 08:09

## 2023-09-05 RX ADMIN — ASPIRIN 81 MG: 81 TABLET, COATED ORAL at 08:09

## 2023-09-05 RX ADMIN — ACETAMINOPHEN 1000 MG: 500 TABLET ORAL at 09:09

## 2023-09-05 NOTE — NURSING
Pt being discharged. Discharge instructions given to the patient and  at bedside. Discharge teaching on follow up appointments, new medications to start and stop taking, signs and symptoms to look for, and when to follow up with MD. Pt and  verbalized understanding. Iv's and telemetry taken out, pt tolerated well. No signs or symptoms of distress noted, nor any complaints thereof. Pt is stable at time of discharge. Pt will return home via private vehicle with spouse. Safety measures in place until pt off of the unit.

## 2023-09-05 NOTE — PLAN OF CARE
CHW met with patient/family at bedside. Patient experience rounding completed and reviewed the following.     Do you know your discharge plan? Yes,    If yes, what is the plan? Home Health     Have you discussed your needs and preferences with your SW/CM? Yes     If you are discharging home, do you have help at home? Yes, spouse    Do you think you will need help additional at home at discharge? Yes    Do you currently have difficulty keeping up with bills, affording medicine or buying food? No    Assigned SW/CM notified of any patient/family needs or concerns. Appropriate resources provided to address patient's needs.

## 2023-09-05 NOTE — TELEPHONE ENCOUNTER
----- Message from Demetria Bonilla sent at 9/5/2023 10:53 AM CDT -----  Regarding: Reschedule Upcoming Appts  Contact: Pt spouse Maged  Pt spouse is requesting a callback regarding pt appts scheduled for tomorrow. He stated the pt have to be reschedule due to being admitted in the hospital. Please adv       Confirmed contact below:   Maged Mohr (Spouse)   [phone] (Mobile)

## 2023-09-05 NOTE — PLAN OF CARE
[facility] - Cardiology Stepdown  Discharge Final Note    Primary Care Provider: Yolie Michael MD    Expected Discharge Date: 9/5/2023    Final Discharge Note (most recent)       Final Note - 09/05/23 1527          Final Note    Assessment Type Final Discharge Note     Anticipated Discharge Disposition Home or Self Care                 SW informed by RN Miladis that per pt's daughter pt was getting an aide from [facility].  However, when SW called [facility] to resume care she was informed by Mery that pt was actually a client through the [facility], with whom they contract, not a HH patient.  Mery stated that she would inform the [facility] that pt needs to resume her services.      Alma Suarez, LMSW [facility]  [id number]      Important Message from Medicare  Important Message from Medicare regarding Discharge Appeal Rights: Given to patient/caregiver, Explained to patient/caregiver, Signed/date by patient/caregiver     Date IMM was signed: 09/05/23  Time IMM was signed: 1001      Future Appointments   Date Time Provider Department Center   9/7/2023  9:40 AM Cindi Segundo, PT [facility]   9/8/2023 10:00 AM Yolie Michael MD [facility] [place]   9/29/2023 10:30 AM Yolie Michael MD [facility] [place]   11/20/2023 10:00 AM HOME MONITOR DEVICE CHECK, [facility] MICHELE Triana       Contact Info       Yolie Michael MD   Specialty: Internal Medicine   Relationship: PCP - General    [id number] [facility]  [address]  [place] [address]   Phone: [phone]       Next Steps: Follow up on 9/8/2023    Instructions: F/U appointment 10:00 am

## 2023-09-05 NOTE — PLAN OF CARE
[facility] - Cardiology Stepdown      HOME HEALTH ORDERS  FACE TO FACE ENCOUNTER    Patient Name: Diomedes Mohr  YOB: 1960    PCP: Yolie Michael MD   PCP Address: [address] [place] [address] [place] / [place] [address]  PCP Phone Number: [phone]  PCP Fax: [phone]    Encounter Date: 8/30/23    Admit to Home Health    Diagnoses:  Active Hospital Problems    Diagnosis  POA    *Hypotension [I95.9]  Yes     Priority: 1 - High    Acute cystitis [N30.00]  Yes     Priority: 2     ARBEN (acute kidney injury) [N17.9]  Yes     Priority: 4     Chronic combined systolic and diastolic congestive heart failure [I50.42]  Yes     Priority: 4      Chronic    Cerebrovascular accident (CVA) due to embolism of right middle cerebral artery [I63.411]  Yes    Type 2 diabetes mellitus with hyperglycemia, with long-term current use of insulin [E11.65, Z79.4]  Not Applicable      Resolved Hospital Problems   No resolved problems to display.       Follow Up Appointments:  Future Appointments   Date Time Provider Department Center   9/6/2023  1:30 PM [facility] LAB [facility] [place]   9/6/2023  2:30 PM María Dennis NP [facility] B[facility] [place]   9/7/2023  9:40 AM Cindi Segundo PT [facility] OP [facility]   9/8/2023 10:00 AM Yolie Michael MD [facility]   9/29/2023 10:30 AM Yolie Michael MD [facility]   11/20/2023 10:00 AM HOME MONITOR DEVICE CHECK, [facility] MICHELE Triana       Allergies:  Review of patient's allergies indicates:   Allergen Reactions    Adhesive Blisters    Captopril Other (See Comments)     COUGH       Medications: Review discharge medications with patient and family and provide education.    Current Facility-Administered Medications   Medication Dose Route Frequency Provider Last Rate Last Admin    acetaminophen tablet 1,000 mg  1,000 mg Oral Q6H PRN Omar Ybarra MD   1,000 mg at 09/05/23 0901    apixaban tablet 5 mg  5 mg Oral BID Aleisha Durham MD   5  mg at 09/05/23 0858    aspirin EC tablet 81 mg  81 mg Oral Daily Aleisha Durham MD   81 mg at 09/05/23 0858    atorvastatin tablet 80 mg  80 mg Oral Daily Aleisha Durham MD   80 mg at 09/05/23 0858    dextrose 10% bolus 125 mL 125 mL  12.5 g Intravenous PRN Aleisha Durham MD        dextrose 10% bolus 250 mL 250 mL  25 g Intravenous PRN Aleisha Durham MD        docusate sodium capsule 50 mg  50 mg Oral BID PRN Omar Ybarra MD        gabapentin capsule 300 mg  300 mg Oral TID Betty Belle MD   300 mg at 09/05/23 0858    glucagon (human recombinant) injection 1 mg  1 mg Intramuscular PRN Aleisha Durham MD        glucose chewable tablet 16 g  16 g Oral PRN Aleisha Durham MD        glucose chewable tablet 24 g  24 g Oral PRN Aleisha Durham MD        insulin aspart U-100 pen 0-5 Units  0-5 Units Subcutaneous QID (AC + HS) PRN Aleisha Durham MD   2 Units at 09/02/23 1146    melatonin tablet 6 mg  6 mg Oral Nightly PRN Aleisha Durham MD   6 mg at 09/04/23 2213    metoprolol succinate (TOPROL-XL) 24 hr tablet 25 mg  25 mg Oral Daily Omar Ybarra MD   25 mg at 09/05/23 0858    ondansetron disintegrating tablet 8 mg  8 mg Oral Q8H PRN Cori Sanders PA-C        polyethylene glycol packet 17 g  17 g Oral Daily Omar Ybarra MD   17 g at 09/04/23 0904    promethazine tablet 12.5 mg  12.5 mg Oral Q6H PRN Cori Sanders PA-C        senna tablet 8.6 mg  8.6 mg Oral Daily Omar Ybarra MD   8.6 mg at 09/04/23 0905    sodium chloride 0.9% flush 10 mL  10 mL Intravenous PRN Aleisha Durham MD         Current Discharge Medication List        CONTINUE these medications which have CHANGED    Details   apixaban (ELIQUIS) 5 mg Tab Take 1 tablet (5 mg total) by mouth 2 (two) times daily.  Qty: 60 tablet, Refills: 2      metoprolol succinate (TOPROL-XL) 25 MG 24 hr tablet Take 1 tablet (25 mg total) by mouth once daily.  Qty: 30 tablet, Refills: 11    Comments: .           CONTINUE  these medications which have NOT CHANGED    Details   acetaminophen (TYLENOL) 325 MG tablet Take 2 tablets (650 mg total) by mouth every 8 (eight) hours as needed.  Qty: 30 tablet, Refills: 0      aspirin (ECOTRIN) 81 MG EC tablet Take 1 tablet (81 mg total) by mouth once daily.  Qty: 30 tablet, Refills: 11      atorvastatin (LIPITOR) 80 MG tablet Take 1 tablet (80 mg total) by mouth once daily.  Qty: 90 tablet, Refills: 3    Associated Diagnoses: Type 2 diabetes mellitus with hyperglycemia, with long-term current use of insulin; Dilated cardiomyopathy      cyproheptadine (PERIACTIN) 4 mg tablet Take 1 tablet (4 mg total) by mouth 3 (three) times daily as needed.  Qty: 270 tablet, Refills: 3    Associated Diagnoses: Anorexia mentalis      dulaglutide (TRULICITY) 1.5 mg/0.5 mL pen injector Inject 1.5 mg into the skin once a week.      gabapentin (NEURONTIN) 600 MG tablet Take 1 tablet (600 mg total) by mouth 3 (three) times daily.  Qty: 270 tablet, Refills: 3    Associated Diagnoses: Chronic pain of right lower extremity      ipratropium-albuteroL (COMBIVENT)  mcg/actuation inhaler Inhale 1 puff into the lungs 4 (four) times daily as needed for Shortness of Breath. Rescue    Associated Diagnoses: Shortness of breath      metFORMIN (GLUCOPHAGE) 1000 MG tablet Take 0.5 tablets (500 mg total) by mouth 2 (two) times daily.      ondansetron (ZOFRAN-ODT) 4 MG TbDL Take 1 tablet (4 mg total) by mouth every 8 (eight) hours as needed.  Qty: 14 tablet, Refills: 1      potassium chloride (KLOR-CON) 10 MEQ TbSR Take 1 tablet (10 mEq total) by mouth once daily.  Qty: 90 tablet, Refills: 3      torsemide (DEMADEX) 10 MG Tab Take 1 tablet (10 mg total) by mouth daily as needed.  Qty: 90 tablet, Refills: 3    Associated Diagnoses: Dilated cardiomyopathy           STOP taking these medications       empagliflozin (JARDIANCE) 10 mg tablet Comments:   Reason for Stopping:         sacubitriL-valsartan (ENTRESTO)  mg per  tablet Comments:   Reason for Stopping:                 I have seen and examined this patient within the last 30 days. My clinical findings that support the need for the home health skilled services and home bound status are the following:no   Weakness/numbness causing balance and gait disturbance due to Weakness/Debility making it taxing to leave home.     Diet:   regular diet    Labs:  None    Referrals/ Consults  Aide to provide assistance with personal care, ADLs, and vital signs.    Activities:   activity as tolerated    Nursing:   Agency to admit patient within 24 hours of hospital discharge unless specified on physician order or at patient request    SN to complete comprehensive assessment including routine vital signs. Instruct on disease process and s/s of complications to report to MD. Review/verify medication list sent home with the patient at time of discharge  and instruct patient/caregiver as needed. Frequency may be adjusted depending on start of care date.     Skilled nurse to perform up to 3 visits PRN for symptoms related to diagnosis    Notify MD if SBP > 160 or < 90; DBP > 90 or < 50; HR > 120 or < 50; Temp > 101; O2 < 88%; Other:         Ok to schedule additional visits based on staff availability and patient request on consecutive days within the home health episode.    When multiple disciplines ordered:    Start of Care occurs on Sunday - Wednesday schedule remaining discipline evaluations as ordered on separate consecutive days following the start of care.    Thursday SOC -schedule subsequent evaluations Friday and Monday the following week.     Friday - Saturday SOC - schedule subsequent discipline evaluations on consecutive days starting Monday of the following week.    For all post-discharge communication and subsequent orders please contact patient's primary care physician. If unable to reach primary care physician or do not receive response within 30 minutes, please contact [facility] for  clinical staff order clarification    Miscellaneous   None    Home Health Aide:  Home Health Aide Three times weekly    Wound Care Orders  no    I certify that this patient is confined to her home and needs intermittent skilled nursing care.

## 2023-09-07 ENCOUNTER — PATIENT OUTREACH (OUTPATIENT)
Dept: ADMINISTRATIVE | Facility: CLINIC | Age: 63
End: 2023-09-07
Payer: MEDICARE

## 2023-09-07 DIAGNOSIS — I42.0 DILATED CARDIOMYOPATHY: Primary | ICD-10-CM

## 2023-09-07 NOTE — PROGRESS NOTES
C3 nurse spoke with Diomedes Mohr and spouse, Maged, for a TCC post hospital discharge follow up call. The patient has a scheduled [facility] appointment with Yolie Michael MD  on 09/08/2023 @ 10 AM.

## 2023-09-07 NOTE — PROGRESS NOTES
C3 nurse attempted to contact Diomedes Mohr, spouse Maged and daughter, Yael, for a TCC post hospital discharge follow up call. No answer. Left voicemail with callback information. The patient has a scheduled HOSFU appointment with Yolie Michael MD on 9/8/2023 @ 10AM.

## 2023-09-08 ENCOUNTER — OFFICE VISIT (OUTPATIENT)
Dept: FAMILY MEDICINE | Facility: CLINIC | Age: 63
End: 2023-09-08
Payer: MEDICARE

## 2023-09-08 VITALS
SYSTOLIC BLOOD PRESSURE: 122 MMHG | BODY MASS INDEX: 26.63 KG/M2 | OXYGEN SATURATION: 100 % | WEIGHT: 165.69 LBS | HEART RATE: 98 BPM | HEIGHT: 66 IN | DIASTOLIC BLOOD PRESSURE: 78 MMHG

## 2023-09-08 DIAGNOSIS — B96.20 E. COLI UTI: ICD-10-CM

## 2023-09-08 DIAGNOSIS — Z09 HOSPITAL DISCHARGE FOLLOW-UP: ICD-10-CM

## 2023-09-08 DIAGNOSIS — I10 ESSENTIAL HYPERTENSION: ICD-10-CM

## 2023-09-08 DIAGNOSIS — E11.65 TYPE 2 DIABETES MELLITUS WITH HYPERGLYCEMIA, WITH LONG-TERM CURRENT USE OF INSULIN: ICD-10-CM

## 2023-09-08 DIAGNOSIS — N17.9 AKI (ACUTE KIDNEY INJURY): ICD-10-CM

## 2023-09-08 DIAGNOSIS — Z79.4 TYPE 2 DIABETES MELLITUS WITH HYPERGLYCEMIA, WITH LONG-TERM CURRENT USE OF INSULIN: ICD-10-CM

## 2023-09-08 DIAGNOSIS — N39.0 E. COLI UTI: ICD-10-CM

## 2023-09-08 PROCEDURE — 4010F ACE/ARB THERAPY RXD/TAKEN: CPT | Mod: CPTII,S$GLB,, | Performed by: STUDENT IN AN ORGANIZED HEALTH CARE EDUCATION/TRAINING PROGRAM

## 2023-09-08 PROCEDURE — 1159F PR MEDICATION LIST DOCUMENTED IN MEDICAL RECORD: ICD-10-PCS | Mod: CPTII,S$GLB,, | Performed by: STUDENT IN AN ORGANIZED HEALTH CARE EDUCATION/TRAINING PROGRAM

## 2023-09-08 PROCEDURE — 3066F PR DOCUMENTATION OF TREATMENT FOR NEPHROPATHY: ICD-10-PCS | Mod: CPTII,S$GLB,, | Performed by: STUDENT IN AN ORGANIZED HEALTH CARE EDUCATION/TRAINING PROGRAM

## 2023-09-08 PROCEDURE — 99496 TRANSJ CARE MGMT HIGH F2F 7D: CPT | Mod: S$GLB,,, | Performed by: STUDENT IN AN ORGANIZED HEALTH CARE EDUCATION/TRAINING PROGRAM

## 2023-09-08 PROCEDURE — 99999 PR PBB SHADOW E&M-EST. PATIENT-LVL IV: CPT | Mod: PBBFAC,,, | Performed by: STUDENT IN AN ORGANIZED HEALTH CARE EDUCATION/TRAINING PROGRAM

## 2023-09-08 PROCEDURE — 3061F PR NEG MICROALBUMINURIA RESULT DOCUMENTED/REVIEW: ICD-10-PCS | Mod: CPTII,S$GLB,, | Performed by: STUDENT IN AN ORGANIZED HEALTH CARE EDUCATION/TRAINING PROGRAM

## 2023-09-08 PROCEDURE — 4010F PR ACE/ARB THEARPY RXD/TAKEN: ICD-10-PCS | Mod: CPTII,S$GLB,, | Performed by: STUDENT IN AN ORGANIZED HEALTH CARE EDUCATION/TRAINING PROGRAM

## 2023-09-08 PROCEDURE — 3061F NEG MICROALBUMINURIA REV: CPT | Mod: CPTII,S$GLB,, | Performed by: STUDENT IN AN ORGANIZED HEALTH CARE EDUCATION/TRAINING PROGRAM

## 2023-09-08 PROCEDURE — 1160F PR REVIEW ALL MEDS BY PRESCRIBER/CLIN PHARMACIST DOCUMENTED: ICD-10-PCS | Mod: CPTII,S$GLB,, | Performed by: STUDENT IN AN ORGANIZED HEALTH CARE EDUCATION/TRAINING PROGRAM

## 2023-09-08 PROCEDURE — 1111F PR DISCHARGE MEDS RECONCILED W/ CURRENT OUTPATIENT MED LIST: ICD-10-PCS | Mod: CPTII,S$GLB,, | Performed by: STUDENT IN AN ORGANIZED HEALTH CARE EDUCATION/TRAINING PROGRAM

## 2023-09-08 PROCEDURE — 1159F MED LIST DOCD IN RCRD: CPT | Mod: CPTII,S$GLB,, | Performed by: STUDENT IN AN ORGANIZED HEALTH CARE EDUCATION/TRAINING PROGRAM

## 2023-09-08 PROCEDURE — 3078F PR MOST RECENT DIASTOLIC BLOOD PRESSURE < 80 MM HG: ICD-10-PCS | Mod: CPTII,S$GLB,, | Performed by: STUDENT IN AN ORGANIZED HEALTH CARE EDUCATION/TRAINING PROGRAM

## 2023-09-08 PROCEDURE — 3044F PR MOST RECENT HEMOGLOBIN A1C LEVEL <7.0%: ICD-10-PCS | Mod: CPTII,S$GLB,, | Performed by: STUDENT IN AN ORGANIZED HEALTH CARE EDUCATION/TRAINING PROGRAM

## 2023-09-08 PROCEDURE — 3044F HG A1C LEVEL LT 7.0%: CPT | Mod: CPTII,S$GLB,, | Performed by: STUDENT IN AN ORGANIZED HEALTH CARE EDUCATION/TRAINING PROGRAM

## 2023-09-08 PROCEDURE — 3074F PR MOST RECENT SYSTOLIC BLOOD PRESSURE < 130 MM HG: ICD-10-PCS | Mod: CPTII,S$GLB,, | Performed by: STUDENT IN AN ORGANIZED HEALTH CARE EDUCATION/TRAINING PROGRAM

## 2023-09-08 PROCEDURE — 99496 TRANSITIONAL CARE MANAGE SERVICE 7 DAY DISCHARGE: ICD-10-PCS | Mod: S$GLB,,, | Performed by: STUDENT IN AN ORGANIZED HEALTH CARE EDUCATION/TRAINING PROGRAM

## 2023-09-08 PROCEDURE — 3078F DIAST BP <80 MM HG: CPT | Mod: CPTII,S$GLB,, | Performed by: STUDENT IN AN ORGANIZED HEALTH CARE EDUCATION/TRAINING PROGRAM

## 2023-09-08 PROCEDURE — 3008F BODY MASS INDEX DOCD: CPT | Mod: CPTII,S$GLB,, | Performed by: STUDENT IN AN ORGANIZED HEALTH CARE EDUCATION/TRAINING PROGRAM

## 2023-09-08 PROCEDURE — 99999 PR PBB SHADOW E&M-EST. PATIENT-LVL IV: ICD-10-PCS | Mod: PBBFAC,,, | Performed by: STUDENT IN AN ORGANIZED HEALTH CARE EDUCATION/TRAINING PROGRAM

## 2023-09-08 PROCEDURE — 1111F DSCHRG MED/CURRENT MED MERGE: CPT | Mod: CPTII,S$GLB,, | Performed by: STUDENT IN AN ORGANIZED HEALTH CARE EDUCATION/TRAINING PROGRAM

## 2023-09-08 PROCEDURE — 1160F RVW MEDS BY RX/DR IN RCRD: CPT | Mod: CPTII,S$GLB,, | Performed by: STUDENT IN AN ORGANIZED HEALTH CARE EDUCATION/TRAINING PROGRAM

## 2023-09-08 PROCEDURE — 3074F SYST BP LT 130 MM HG: CPT | Mod: CPTII,S$GLB,, | Performed by: STUDENT IN AN ORGANIZED HEALTH CARE EDUCATION/TRAINING PROGRAM

## 2023-09-08 PROCEDURE — 3008F PR BODY MASS INDEX (BMI) DOCUMENTED: ICD-10-PCS | Mod: CPTII,S$GLB,, | Performed by: STUDENT IN AN ORGANIZED HEALTH CARE EDUCATION/TRAINING PROGRAM

## 2023-09-08 PROCEDURE — 3066F NEPHROPATHY DOC TX: CPT | Mod: CPTII,S$GLB,, | Performed by: STUDENT IN AN ORGANIZED HEALTH CARE EDUCATION/TRAINING PROGRAM

## 2023-09-08 NOTE — DISCHARGE SUMMARY
[facility]y [facility]ana - Cardiology [facility] Medicine  Discharge Summary      Patient Name: Diomedes Mohr  MRN: [id number]  JOSE: [id number]  Patient Class: IP- Inpatient  Admission Date: 8/30/2023  Hospital Length of Stay: 5 days  Discharge Date and Time: 9/5/2023  5:50 PM  Attending Physician: Shira att. providers found   Discharging Provider: Omar Ybarra MD  Primary Care Provider: Yolie Michael MD  [facility] Medicine Team: [facility] Omar Ybarra MD  Primary Care Team: [facility] MED S    HPI:   62 y.o. female with HTN, HLD, CHF (LVEF 10-15% with G2 DD), pulmonary HTN, DM II, prior CVA, DVT (on eliquis), PVD and schizophrenia presented to hospital complaining of N/V x2 weeks. Pt was dx'd with UTI at OSH on 8/23 and started on bactrim (Ucx ended up growing out e coli resistant to bactrim). On 8/28 pt was re-evaluated by her PCP for ongoing hypotension and had her BB & diuretic dosages decreased. The patient presented again today with ongoing N/V.      Work up in ED revealed normal WBC, ARBEN (creatinine 2.4, baseline ~ 0.9), BNP mildly elevated at 166, trop mildly elevated at 0.035 and UA with rare bacteria, 22 WBCs and +3 leuks. CT abd/pelvis unrevealing for infectious etiology.      Pt rec'd a total of 1750cc IVF in the ED. BP responds to boluses, and then trends back down.   Bedside POCUS done at time of [facility] evaluation; LV severely dilated, RV small. IVC small and collapses with inspiration.   Clinic visit BPs appear to run 90s/50s.      Pt initially felt stable for HM, however after several hours, BP down-trended again. Decision made to upgrade pt to ICU level of care.       * No surgery found *      Hospital Course:   Patient admitted to the MICU for septic shock 2/2 UTI with underlying HFrEF (EF 10-15%). She received 1750 cc of IVF and never required vasopressors to maintain BP.  Completed treatment of UTI.  Consulted Cardiology for management of guideline directed medical therapy.  Patient able  to restart metoprolol with maintenance of normotension.  Outpatient follow-up with Cardiology and primary care.       Goals of Care Treatment Preferences:  Code Status: Full Code          What is most important right now is to focus on remaining as independent as possible.  Accordingly, we have decided that the best plan to meet the patient's goals includes continuing with treatment.      Consults:   Consults (From admission, onward)        Status Ordering Provider     Inpatient consult to Cardiology  Once        Provider:  (Not yet assigned)    Completed JUAN FRANCISCO RGAMAJO     Inpatient consult to Critical Care Medicine  Once        Provider:  (Not yet assigned)    Completed KAROLINA CULP new Assessment & Plan notes have been filed under this hospital service since the last note was generated.  Service: Hospital Medicine    Final Active Diagnoses:    Diagnosis Date Noted POA    PRINCIPAL PROBLEM:  Hypotension [I95.9] 08/31/2023 Yes    Acute cystitis [N30.00] 08/31/2023 Yes    ARBEN (acute kidney injury) [N17.9] 05/30/2023 Yes    Chronic combined systolic and diastolic congestive heart failure [I50.42] 01/12/2020 Yes     Chronic    Cerebrovascular accident (CVA) due to embolism of right middle cerebral artery [I63.411] 08/14/2020 Yes    Type 2 diabetes mellitus with hyperglycemia, with long-term current use of insulin [E11.65, Z79.4]  Not Applicable      Problems Resolved During this Admission:       Discharged Condition: good    Disposition: Home-Health Care [facility]    Follow Up:   Follow-up Information     Yolie Michael MD Follow up on 9/8/2023.    Specialty: Internal Medicine  Why: F/U appointment 10:00 am  Contact information:  [address] [facility] [address]  [place] [address] [phone]                       Patient Instructions:      Ambulatory referral/consult to Internal Medicine   Standing Status: Future   Referral Priority: Routine Referral Type: Consultation   Referral Reason: Specialty  Services Required   Requested Specialty: Internal Medicine   Number of Visits Requested: 1     Ambulatory referral/consult to Cardiology   Standing Status: Future   Referral Priority: Routine Referral Type: Consultation   Referral Reason: Specialty Services Required   Requested Specialty: Cardiology   Number of Visits Requested: 1     Ambulatory referral/consult to Physical/Occupational Therapy   Standing Status: Future   Referral Priority: Routine Referral Type: Physical Medicine   Referral Reason: Specialty Services Required   Number of Visits Requested: 1     Diet Cardiac     Diet Adult Regular     Notify your health care provider if you experience any of the following:  increased confusion or weakness     Notify your health care provider if you experience any of the following:  persistent dizziness, light-headedness, or visual disturbances     Notify your health care provider if you experience any of the following:  worsening rash     Notify your health care provider if you experience any of the following:  severe persistent headache     Notify your health care provider if you experience any of the following:  difficulty breathing or increased cough     Notify your health care provider if you experience any of the following:  redness, tenderness, or signs of infection (pain, swelling, redness, odor or green/yellow discharge around incision site)     Notify your health care provider if you experience any of the following:  severe uncontrolled pain     Notify your health care provider if you experience any of the following:  persistent nausea and vomiting or diarrhea     Notify your health care provider if you experience any of the following:  temperature >100.4     Notify your health care provider if you experience any of the following:  increased confusion or weakness     Notify your health care provider if you experience any of the following:  persistent dizziness, light-headedness, or visual disturbances      Notify your health care provider if you experience any of the following:  worsening rash     Notify your health care provider if you experience any of the following:  severe persistent headache     Notify your health care provider if you experience any of the following:  difficulty breathing or increased cough     Notify your health care provider if you experience any of the following:  redness, tenderness, or signs of infection (pain, swelling, redness, odor or green/yellow discharge around incision site)     Notify your health care provider if you experience any of the following:  severe uncontrolled pain     Notify your health care provider if you experience any of the following:  persistent nausea and vomiting or diarrhea     Notify your health care provider if you experience any of the following:  temperature >100.4     Activity as tolerated     Activity as tolerated       Significant Diagnostic Studies: Labs: All labs within the past 24 hours have been reviewed    Pending Diagnostic Studies:     None         Medications:  Reconciled Home Medications:      Medication List      CONTINUE taking these medications    acetaminophen 325 MG tablet  Commonly known as: TYLENOL  Take 2 tablets (650 mg total) by mouth every 8 (eight) hours as needed.     apixaban 5 mg Tab  Commonly known as: ELIQUIS  Take 1 tablet (5 mg total) by mouth 2 (two) times daily.     aspirin 81 MG EC tablet  Commonly known as: ECOTRIN  Take 1 tablet (81 mg total) by mouth once daily.     atorvastatin 80 MG tablet  Commonly known as: LIPITOR  Take 1 tablet (80 mg total) by mouth once daily.     cyproheptadine 4 mg tablet  Commonly known as: PERIACTIN  Take 1 tablet (4 mg total) by mouth 3 (three) times daily as needed.     dulaglutide 1.5 mg/0.5 mL pen injector  Commonly known as: TRULICITY  Inject 1.5 mg into the skin once a week.     gabapentin 600 MG tablet  Commonly known as: NEURONTIN  Take 1 tablet (600 mg total) by mouth 3 (three)  times daily.     ipratropium-albuteroL  mcg/actuation inhaler  Commonly known as: CombiVENT  Inhale 1 puff into the lungs 4 (four) times daily as needed for Shortness of Breath. Rescue     metFORMIN 1000 MG tablet  Commonly known as: GLUCOPHAGE  Take 0.5 tablets (500 mg total) by mouth 2 (two) times daily.     metoprolol succinate 25 MG 24 hr tablet  Commonly known as: TOPROL-XL  Take 1 tablet (25 mg total) by mouth once daily.     ondansetron 4 MG Tbdl  Commonly known as: ZOFRAN-ODT  Take 1 tablet (4 mg total) by mouth every 8 (eight) hours as needed.     potassium chloride 10 MEQ Tbsr  Commonly known as: KLOR-CON  Take 1 tablet (10 mEq total) by mouth once daily.     torsemide 10 MG Tab  Commonly known as: DEMADEX  Take 1 tablet (10 mg total) by mouth daily as needed.        STOP taking these medications    empagliflozin 10 mg tablet  Commonly known as: JARDIANCE     ENTRESTO  mg per tablet  Generic drug: sacubitriL-valsartan            Indwelling Lines/Drains at time of discharge:   Lines/Drains/Airways     None                 Time spent on the discharge of patient: 35 minutes         Omar Ybarra MD  Department of Hospital Medicine  [facility] - Cardiology Stepdown

## 2023-09-08 NOTE — PHYSICIAN QUERY
PT Name: Diomedes Mohr  MR #: [id number]     DOCUMENTATION CLARIFICATION     CDS/: BUBBA Caldera, RN, CDS               Contact information:[email or web address]  This form is a permanent document in the medical record.     Query Date: September 8, 2023    By submitting this query, we are merely seeking further clarification of documentation.  Please utilize your independent clinical judgment when addressing the question(s) below.  The Medical Record contains the following:  Indicators Supporting Clinical Findings Location in Medical Record   X Acute Illness (e.g. AMI, Sepsis, etc.) Septic shock-Upgraded to medical ICU for hypotension but did not end up needing vasopressor support    Acute cystitis, acute renal failure   H&PPATT NP/Dr. Belle, 8/31         X Vital Signs  Vital Signs (24h Range):  Temp:  [98 °F (36.7 °C)-98.7 °F (37.1 °C)] 98.4 °F (36.9 °C)  Pulse:  [] 83  Resp:  [11-23] 13  SpO2:  [97 %-100 %] 98 %  BP: ()/(44-75) 107/57    Vital Signs (24h Range):  Temp:  [97.2 °F (36.2 °C)-99.3 °F (37.4 °C)] 98.1 °F (36.7 °C)  Pulse:  [75-93] 91  Resp:  [12-18] 18  SpO2:  [95 %-100 %] 97 %  BP: ()/(51-59) 103/59   H&PPATT NP/Dr. Belle, 8/31              , Dr. Ybarra, 9/1    Acidosis documented     X ABGs/Labs ARBEN (creatinine 2.4, baseline ~ 0.9)    Bedside POCUS with collapsible IVC     08/30/23 19:33 08/30/23 22:50 08/31/23 05:32 09/02/23 16:06 09/02/23 19:38   Lactate, Sergei   1.0 2.6 (H) 1.5   POC Lactate 2.26 (H) 1.37         08/30 08/31 09/01 09/02 09/03 09/04 09/05   WBC 10.19 8.77 7.60 6.38 6.10 6.35 5.34      08/31   Procalcitonin 0.05     Urine culture: Escherichia coli     H&P, PATT Joseph, NP/Dr. Belle, 8/31        Lab 8/30-9/2              Lab 8/30- 9/5        Lab 8/31      Lab 8/30   X Hypotension or Low Blood Pressure documented Hypotension 2/2 nausea/vomiting possibly 2/2 untreated UTI     BP on arrival 70s/50s MAP 55, received ~1700cc IVF w transient  improvement with MAP 85 but declined to <60 overnight     Ordered repeat 500mL bolus, Ongoing judicious IVF resuscitation w/ small boluses as clinical status dictates; will consider vasopressors pending response    She received 1750 cc of IVF and never required vasopressors to maintain BP. Completed treatment of UTI   H&PPATT NP/Dr. Belle, 8/31                DCS, Dr. Ybarra, 9/5    Altered Mental Status or Confusion      Diaphoresis, Cold Extremities or Cyanosis      Oliguria     X Medication/Treatment  -Vasopressors  -Inotropic Drugs  -IV Fluids   -IV Antibiotics  -Cardiac Assist Devices  -Hemodynamic Monitoring  -Blood/Blood Products Pt rec'd a total of 1750cc IVF in the ED. BP responds to boluses, and then trends back down.      Pt initially felt stable for HM, however after several hours, BP down-trended again. Decision made to upgrade pt to ICU level of care.     BP on arrival 70s/50s MAP 55, received ~1700cc IVF w transient improvement with MAP 85 but declined to <60 overnight     Ordered repeat 500mL bolus, Ongoing judicious IVF resuscitation w/ small boluses as clinical status dictates; will consider vasopressors pending response    Treating E coli UTI with Rocephin    Upgraded to medical ICU for hypotension but did not end up needing vasopressor support   H&PPATT NP/Dr. Belle, 8/31   X Other Patient admitted to the MICU for septic shock 2/2 UTI with underlying HFrEF (EF 10-15%). She received 1750 cc of IVF and never required vasopressors to maintain BP   HM, Dr. Ybarra, 9/1     Provider, please Clarify the Septic Shock diagnoses associated with above clinical findings.    [ x   ] Septic Shock Ruled In     [    ] Septic Shock Ruled Out     [    ] Other Condition (please specify): _________     [  ] Clinically Undetermined               Please document in your progress notes daily for the duration of treatment until resolved and include in your discharge summary.     Form No. [id number]

## 2023-09-08 NOTE — PROGRESS NOTES
[facility] Note    Chief Complaint      Chief Complaint   Patient presents with    Hospital Follow Up     History of Present Illness     Diomedes Mohr is a 62 y.o. female with sHTN, T2DM, HLD, tobacco use (quit in 2020), HFrEF 2/2 DCM and CAD (10-15%, s/p AICD), PE (December 2021), pHTN, osteoarthritis of bilateral hips (L>R) with chronic pain, CVA (2020, R MCA, no deficits), left MCA stroke during recent admission s/p thrombectomy (4/29/23) with right sided hemiparesis and recent diagnosis of bilateral DVT (05/2023)presents today for hospital follow up visit for E coli UTI with sepsis , s/p ICU mgt with aggressive fluid resuscitation, now back to baseline, no new complaints, her appetite, nausea and vomiting have all resolved.  she denies any CP, SOB, orthopnea, leg swelling, palpitations, syncope or presyncope.    Problem List Addressed This Visit:  1. Hospital discharge follow-up    2. E. coli UTI  -     Urinalysis, Reflex to Urine Culture Urine, Clean Catch; Future    3. ARBEN (acute kidney injury)  -     Ambulatory referral/consult to Internal Medicine    4. Essential hypertension    5. Type 2 diabetes mellitus with hyperglycemia, with long-term current use of insulin         Health Maintenance   Topic Date Due    TETANUS VACCINE  Never done    Eye Exam  04/08/2023    Shingles Vaccine (2 of 2) 09/21/2023    Hemoglobin A1c  02/28/2024    Lipid Panel  04/30/2024    Foot Exam  06/08/2024    Mammogram  07/19/2024    High Dose Statin  09/07/2024    Colorectal Cancer Screening  06/23/2026    Hepatitis C Screening  Completed       Past Medical History:   Diagnosis Date    Acute on chronic combined systolic and diastolic heart failure 12/26/2019    ARBEN (acute kidney injury) 5/30/2023    Anticoagulant long-term use     Anxiety     Arthritis     Asthma     Depression     H/O: hysterectomy     Hyperlipemia     Hypertension     Pulmonary edema     Schizophrenia        Past Surgical History:    Procedure Laterality Date    BLADDER SUSPENSION      CARPAL TUNNEL RELEASE Right     HEEL SPUR SURGERY Left     HYSTERECTOMY      LEFT HEART CATHETERIZATION Bilateral 2019    Procedure: Left heart cath;  Surgeon: Steve Chambers MD;  Location: [facility] CATH LAB;  Service: Cardiology;  Laterality: Bilateral;    RIGHT HEART CATHETERIZATION Right 2021    Procedure: INSERTION, CATHETER, RIGHT HEART;  Surgeon: Petr Naranjo MD;  Location: [facility] CATH LAB;  Service: Cardiology;  Laterality: Right;    RIGHT HEART CATHETERIZATION Right 2022    Procedure: INSERTION, CATHETER, RIGHT HEART;  Surgeon: Chandana Ivory Jr., MD;  Location: [facility] CATH LAB;  Service: Cardiology;  Laterality: Right;    TUBAL LIGATION         family history includes No Known Problems in her father and mother; Ovarian cancer (age of onset: 42) in her sister.    Social History     Tobacco Use    Smoking status: Former     Current packs/day: 0.00     Types: Cigarettes     Quit date: 2016     Years since quittin.0    Smokeless tobacco: Never    Tobacco comments:     nonex 2 weeks   Substance Use Topics    Alcohol use: No     Alcohol/week: 0.0 standard drinks of alcohol    Drug use: No       Review of Systems   Constitutional:  Negative for fatigue and fever.   Respiratory:  Negative for cough and chest tightness.    Gastrointestinal:  Positive for nausea. Negative for abdominal pain, diarrhea and vomiting.   Endocrine: Negative for polydipsia and polyphagia.   Genitourinary:  Negative for difficulty urinating, dysuria and frequency.   Musculoskeletal:  Positive for arthralgias, back pain and gait problem. Negative for joint swelling.   Skin:  Negative for rash.   Neurological:  Positive for weakness (right upper and lower extremities). Negative for seizures, numbness and headaches.   Psychiatric/Behavioral:  Negative for sleep disturbance.        Outpatient Encounter Medications as of 2023   Medication Sig Note  Dispense Refill    acetaminophen (TYLENOL) 325 MG tablet Take 2 tablets (650 mg total) by mouth every 8 (eight) hours as needed.  30 tablet 0    apixaban (ELIQUIS) 5 mg Tab Take 1 tablet (5 mg total) by mouth 2 (two) times daily.  60 tablet 2    aspirin (ECOTRIN) 81 MG EC tablet Take 1 tablet (81 mg total) by mouth once daily.  30 tablet 11    atorvastatin (LIPITOR) 80 MG tablet Take 1 tablet (80 mg total) by mouth once daily.  90 tablet 3    cyproheptadine (PERIACTIN) 4 mg tablet Take 1 tablet (4 mg total) by mouth 3 (three) times daily as needed.  270 tablet 3    dulaglutide (TRULICITY) 1.5 mg/0.5 mL pen injector Inject 1.5 mg into the skin once a week. 2023: Takes on Wednesday      gabapentin (NEURONTIN) 600 MG tablet Take 1 tablet (600 mg total) by mouth 3 (three) times daily.  270 tablet 3    metFORMIN (GLUCOPHAGE) 1000 MG tablet Take 0.5 tablets (500 mg total) by mouth 2 (two) times daily.       metoprolol succinate (TOPROL-XL) 25 MG 24 hr tablet Take 1 tablet (25 mg total) by mouth once daily.  30 tablet 11    ondansetron (ZOFRAN-ODT) 4 MG TbDL Take 1 tablet (4 mg total) by mouth every 8 (eight) hours as needed.  14 tablet 1    potassium chloride (KLOR-CON) 10 MEQ TbSR Take 1 tablet (10 mEq total) by mouth once daily.  90 tablet 3    torsemide (DEMADEX) 10 MG Tab Take 1 tablet (10 mg total) by mouth daily as needed. 2023: 1 tablet on Monday / Wednesday / Friday 90 tablet 3    ipratropium-albuteroL (COMBIVENT)  mcg/actuation inhaler Inhale 1 puff into the lungs 4 (four) times daily as needed for Shortness of Breath. Rescue (Patient not taking: Reported on 2023)       [DISCONTINUED] cefdinir (OMNICEF) 300 MG capsule Take 1 capsule (300 mg total) by mouth 2 (two) times daily. for 6 days  12 capsule 0    [DISCONTINUED] metoprolol succinate (TOPROL-XL) 25 MG 24 hr tablet Take 0.5 tablets (12.5 mg total) by mouth once daily.  15 tablet 11    [] mupirocin 2 % ointment        [DISCONTINUED]  "acetaminophen tablet 1,000 mg        [DISCONTINUED] apixaban tablet 5 mg        [DISCONTINUED] aspirin EC tablet 81 mg        [DISCONTINUED] atorvastatin tablet 80 mg        [DISCONTINUED] cefTRIAXone (ROCEPHIN) 1 g in dextrose 5 % in water (D5W) 100 mL IVPB (MB+)        [DISCONTINUED] dextrose 10% bolus 125 mL 125 mL        [DISCONTINUED] dextrose 10% bolus 250 mL 250 mL        [DISCONTINUED] docusate sodium capsule 50 mg        [DISCONTINUED] gabapentin capsule 300 mg        [DISCONTINUED] glucagon (human recombinant) injection 1 mg        [DISCONTINUED] glucose chewable tablet 16 g        [DISCONTINUED] glucose chewable tablet 24 g        [DISCONTINUED] insulin aspart U-100 pen 0-5 Units        [DISCONTINUED] melatonin tablet 6 mg        [DISCONTINUED] metoprolol succinate (TOPROL-XL) 24 hr split tablet 12.5 mg        [DISCONTINUED] metoprolol succinate (TOPROL-XL) 24 hr split tablet 12.5 mg        [DISCONTINUED] metoprolol tartrate (LOPRESSOR) split tablet 12.5 mg        [DISCONTINUED] ondansetron disintegrating tablet 8 mg        [DISCONTINUED] polyethylene glycol packet 17 g        [DISCONTINUED] promethazine tablet 12.5 mg        [DISCONTINUED] senna tablet 8.6 mg        [DISCONTINUED] sodium chloride 0.9% flush 10 mL         No facility-administered encounter medications on file as of 9/8/2023.        Review of patient's allergies indicates:   Allergen Reactions    Adhesive Blisters    Captopril Other (See Comments)     COUGH       Physical Exam      Vital Signs  Pulse: 98  SpO2: 100 %  BP: 122/78  BP Location: Left arm  Patient Position: Sitting  Pain Score:   8  Pain Loc: Hand  Height and Weight  Height: 5' 6" (167.6 cm)  Weight: 75.1 kg (165 lb 10.8 oz)  BSA (Calculated - sq m): 1.87 sq meters  BMI (Calculated): 26.8  Weight in (lb) to have BMI = 25: 154.6]    Physical Exam  Constitutional:       Appearance: Normal appearance.   HENT:      Head: Normocephalic and atraumatic.      Right Ear: Tympanic " membrane normal.      Left Ear: Tympanic membrane normal.      Mouth/Throat:      Mouth: Mucous membranes are moist.      Pharynx: Oropharynx is clear.   Eyes:      Extraocular Movements: Extraocular movements intact.      Conjunctiva/sclera: Conjunctivae normal.      Pupils: Pupils are equal, round, and reactive to light.   Cardiovascular:      Rate and Rhythm: Normal rate and regular rhythm.      Pulses: Normal pulses.   Pulmonary:      Breath sounds: Normal breath sounds.   Abdominal:      General: Abdomen is flat. Bowel sounds are normal.      Palpations: Abdomen is soft.   Musculoskeletal:      Cervical back: Normal range of motion.   Skin:     General: Skin is warm and dry.   Neurological:      Mental Status: She is alert and oriented to person, place, and time.      Sensory: No sensory deficit.      Motor: Weakness (4/5 power RUE, 3/5 power right LE) present.      Gait: Gait abnormal.   Psychiatric:         Mood and Affect: Mood normal.         Behavior: Behavior normal.          Laboratory:  CBC:  Recent Labs   Lab Result Units 09/03/23  0430 09/04/23  0347 09/05/23  0436   WBC K/uL 6.10 6.35 5.34   RBC M/uL 3.85* 3.83* 3.61*   Hemoglobin g/dL 11.1* 10.9* 10.3*   Hematocrit % 34.3* 34.0* 33.1*   Platelets K/uL 330 323 294   MCV fL 89 89 92   MCH pg 28.8 28.5 28.5   MCHC g/dL 32.4 32.1 31.1*     CMP:  Recent Labs   Lab Result Units 09/03/23  0430 09/04/23  0347 09/05/23  0436   Glucose mg/dL 91 95 90   Calcium mg/dL 8.5* 8.9 8.9   Albumin g/dL 2.6* 2.6* 2.6*   Total Protein g/dL 5.2* 5.2* 5.1*   Sodium mmol/L 138 138 138   Potassium mmol/L 3.9 4.0 4.1   CO2 mmol/L 26 26 23   Chloride mmol/L 103 104 106   BUN mg/dL 7* 7* 8   Alkaline Phosphatase U/L 75 76 69   ALT U/L 11 10 11   AST U/L 16 14 15   Total Bilirubin mg/dL 0.3 0.3 0.2     URINALYSIS:  Recent Labs   Lab Result Units 08/23/23  1936 08/30/23  2216   Color, UA  Yellow Yellow   Specific Gravity, UA  1.020 1.020   pH, UA  6.0 5.0   Protein, UA  1+* 1+*  "  Bacteria /hpf Many* Rare   Nitrite, UA  Negative Negative   Leukocytes, UA  Trace* 3+*   Urobilinogen, UA EU/dL Negative  --    Hyaline Casts, UA /lpf 0 18*      LIPIDS:  No results for input(s): "TSH", "HDL", "CHOL", "TRIG", "LDLCALC", "CHOLHDL", "NONHDLCHOL", "TOTALCHOLEST" in the last 2160 hours.    TSH:  No results for input(s): "TSH" in the last 2160 hours.    A1C:  Recent Labs   Lab Result Units 08/28/23  1023   Hemoglobin A1C % 6.3*         Radiology:      Assessment/Plan       1. Hospital discharge f/u for E.coli UTI complicated by mild ARBEN    2. Type 2 diabetes mellitus without complication, without long-term current use of insulin.    3. Essential hypertension-now hypotensive per BP readings at home    4. Heart failure with reduced ejection fraction due to cardiomyopathy    5. Low back pain potentially associated with radiculopathy    6. History of CVA with residual deficit    7. Primary osteoarthritis of both hips    8. BMI 24    9. Bilateral LE DVT    10. Normocytic anemia    Plan :  -s/p cefdinir 300mg BID X 7days  -symptoms have resolved  -will do a repeat Urinalysis/urine culture today  -c/w cyproheptadine for anorexia  -compensated on examination  -recent renal function WNL , K level back to normal  -c/w KCL 10mg BID  -c/w  torsemide three times per week from daily  -c/w gabapentin to 600mg TID for pain  -c/w metoprolol 25mg for now  -already has scheduled PT/OT for right sided hemiparesis  -stable H&H  -referral for eye exam also provided       Continue current medications and maintain follow up with specialists.      Patient verbalizes understanding and agrees with current treatment plan.    Transitional Care Note    Family and/or Caretaker present at visit?  Yes. ( )  Diagnostic tests reviewed/disposition: I have reviewed all completed as well as pending diagnostic tests at the time of discharge.  Disease/illness education: Yes  Home health/community services discussion/referrals: Patient " does not have home health established from hospital visit.  They do not need home health.  If needed, we will set up home health for the patient.   Establishment or re-establishment of referral orders for community resources: No other necessary community resources.   Discussion with other health care providers: No discussion with other health care providers necessary.            Dr Yolie Michael MD  [facility] [place]

## 2023-09-11 ENCOUNTER — PATIENT MESSAGE (OUTPATIENT)
Dept: HEMATOLOGY/ONCOLOGY | Facility: CLINIC | Age: 63
End: 2023-09-11
Payer: MEDICARE

## 2023-09-14 ENCOUNTER — LAB VISIT (OUTPATIENT)
Dept: LAB | Facility: HOSPITAL | Age: 63
End: 2023-09-14
Payer: MEDICARE

## 2023-09-14 ENCOUNTER — OFFICE VISIT (OUTPATIENT)
Dept: HEMATOLOGY/ONCOLOGY | Facility: CLINIC | Age: 63
End: 2023-09-14
Payer: MEDICARE

## 2023-09-14 VITALS
WEIGHT: 176.5 LBS | SYSTOLIC BLOOD PRESSURE: 105 MMHG | DIASTOLIC BLOOD PRESSURE: 69 MMHG | HEART RATE: 98 BPM | HEIGHT: 66 IN | RESPIRATION RATE: 20 BRPM | BODY MASS INDEX: 28.37 KG/M2 | TEMPERATURE: 98 F | OXYGEN SATURATION: 99 %

## 2023-09-14 DIAGNOSIS — E11.9 TYPE 2 DIABETES MELLITUS WITHOUT COMPLICATION, WITHOUT LONG-TERM CURRENT USE OF INSULIN: ICD-10-CM

## 2023-09-14 DIAGNOSIS — I50.22 CHRONIC SYSTOLIC CONGESTIVE HEART FAILURE: ICD-10-CM

## 2023-09-14 DIAGNOSIS — Z82.3 FAMILY HISTORY OF CVA: ICD-10-CM

## 2023-09-14 DIAGNOSIS — I26.99 ACUTE PULMONARY EMBOLISM WITHOUT ACUTE COR PULMONALE, UNSPECIFIED PULMONARY EMBOLISM TYPE: ICD-10-CM

## 2023-09-14 DIAGNOSIS — G62.9 NEUROPATHY: ICD-10-CM

## 2023-09-14 DIAGNOSIS — Z86.711 HISTORY OF PULMONARY EMBOLISM: Primary | ICD-10-CM

## 2023-09-14 LAB
ALBUMIN SERPL BCP-MCNC: 2.8 G/DL (ref 3.5–5.2)
ALP SERPL-CCNC: 78 U/L (ref 55–135)
ALT SERPL W/O P-5'-P-CCNC: 12 U/L (ref 10–44)
ANION GAP SERPL CALC-SCNC: 11 MMOL/L (ref 8–16)
AST SERPL-CCNC: 16 U/L (ref 10–40)
BASOPHILS # BLD AUTO: 0.03 K/UL (ref 0–0.2)
BASOPHILS NFR BLD: 0.5 % (ref 0–1.9)
BILIRUB SERPL-MCNC: 0.4 MG/DL (ref 0.1–1)
BUN SERPL-MCNC: 9 MG/DL (ref 8–23)
CALCIUM SERPL-MCNC: 9.1 MG/DL (ref 8.7–10.5)
CHLORIDE SERPL-SCNC: 105 MMOL/L (ref 95–110)
CO2 SERPL-SCNC: 26 MMOL/L (ref 23–29)
CREAT SERPL-MCNC: 0.7 MG/DL (ref 0.5–1.4)
DIFFERENTIAL METHOD: ABNORMAL
EOSINOPHIL # BLD AUTO: 0.2 K/UL (ref 0–0.5)
EOSINOPHIL NFR BLD: 3.3 % (ref 0–8)
ERYTHROCYTE [DISTWIDTH] IN BLOOD BY AUTOMATED COUNT: 16 % (ref 11.5–14.5)
EST. GFR  (NO RACE VARIABLE): >60 ML/MIN/1.73 M^2
GLUCOSE SERPL-MCNC: 139 MG/DL (ref 70–110)
HCT VFR BLD AUTO: 30.9 % (ref 37–48.5)
HGB BLD-MCNC: 9.7 G/DL (ref 12–16)
IMM GRANULOCYTES # BLD AUTO: 0.02 K/UL (ref 0–0.04)
IMM GRANULOCYTES NFR BLD AUTO: 0.3 % (ref 0–0.5)
LYMPHOCYTES # BLD AUTO: 2.1 K/UL (ref 1–4.8)
LYMPHOCYTES NFR BLD: 32.5 % (ref 18–48)
MCH RBC QN AUTO: 29.3 PG (ref 27–31)
MCHC RBC AUTO-ENTMCNC: 31.4 G/DL (ref 32–36)
MCV RBC AUTO: 93 FL (ref 82–98)
MONOCYTES # BLD AUTO: 0.4 K/UL (ref 0.3–1)
MONOCYTES NFR BLD: 6.6 % (ref 4–15)
NEUTROPHILS # BLD AUTO: 3.6 K/UL (ref 1.8–7.7)
NEUTROPHILS NFR BLD: 56.8 % (ref 38–73)
NRBC BLD-RTO: 0 /100 WBC
PLATELET # BLD AUTO: 355 K/UL (ref 150–450)
PMV BLD AUTO: 9.4 FL (ref 9.2–12.9)
POTASSIUM SERPL-SCNC: 4.2 MMOL/L (ref 3.5–5.1)
PROT SERPL-MCNC: 5.9 G/DL (ref 6–8.4)
RBC # BLD AUTO: 3.31 M/UL (ref 4–5.4)
SODIUM SERPL-SCNC: 142 MMOL/L (ref 136–145)
WBC # BLD AUTO: 6.4 K/UL (ref 3.9–12.7)

## 2023-09-14 PROCEDURE — 3066F NEPHROPATHY DOC TX: CPT | Mod: HCNC,,, | Performed by: NURSE PRACTITIONER

## 2023-09-14 PROCEDURE — 36415 COLL VENOUS BLD VENIPUNCTURE: CPT | Performed by: NURSE PRACTITIONER

## 2023-09-14 PROCEDURE — 85025 COMPLETE CBC W/AUTO DIFF WBC: CPT | Performed by: NURSE PRACTITIONER

## 2023-09-14 PROCEDURE — 4010F ACE/ARB THERAPY RXD/TAKEN: CPT | Mod: HCNC,,, | Performed by: NURSE PRACTITIONER

## 2023-09-14 PROCEDURE — 3061F NEG MICROALBUMINURIA REV: CPT | Mod: HCNC,,, | Performed by: NURSE PRACTITIONER

## 2023-09-14 PROCEDURE — 80053 COMPREHEN METABOLIC PANEL: CPT | Performed by: NURSE PRACTITIONER

## 2023-09-14 PROCEDURE — 4010F PR ACE/ARB THEARPY RXD/TAKEN: ICD-10-PCS | Mod: HCNC,,, | Performed by: NURSE PRACTITIONER

## 2023-09-14 PROCEDURE — 3066F PR DOCUMENTATION OF TREATMENT FOR NEPHROPATHY: ICD-10-PCS | Mod: HCNC,,, | Performed by: NURSE PRACTITIONER

## 2023-09-14 PROCEDURE — 3061F PR NEG MICROALBUMINURIA RESULT DOCUMENTED/REVIEW: ICD-10-PCS | Mod: HCNC,,, | Performed by: NURSE PRACTITIONER

## 2023-09-14 PROCEDURE — 99999 PR PBB SHADOW E&M-EST. PATIENT-LVL V: ICD-10-PCS | Mod: PBBFAC,HCNC,, | Performed by: NURSE PRACTITIONER

## 2023-09-14 PROCEDURE — 99214 PR OFFICE/OUTPT VISIT, EST, LEVL IV, 30-39 MIN: ICD-10-PCS | Mod: HCNC,S$GLB,, | Performed by: NURSE PRACTITIONER

## 2023-09-14 PROCEDURE — 99214 OFFICE O/P EST MOD 30 MIN: CPT | Mod: HCNC,S$GLB,, | Performed by: NURSE PRACTITIONER

## 2023-09-14 PROCEDURE — 99999 PR PBB SHADOW E&M-EST. PATIENT-LVL V: CPT | Mod: PBBFAC,HCNC,, | Performed by: NURSE PRACTITIONER

## 2023-09-14 RX ORDER — LISINOPRIL 20 MG/1
TABLET ORAL
Status: ON HOLD | COMMUNITY
End: 2023-09-20 | Stop reason: HOSPADM

## 2023-09-14 RX ORDER — PREGABALIN 100 MG/1
CAPSULE ORAL
COMMUNITY
End: 2023-09-19

## 2023-09-14 RX ORDER — FOSINOPRIL SODIUM 20 MG/1
TABLET ORAL
Status: ON HOLD | COMMUNITY
End: 2023-09-20 | Stop reason: HOSPADM

## 2023-09-14 RX ORDER — FUROSEMIDE 20 MG/1
TABLET ORAL
Status: ON HOLD | COMMUNITY
End: 2023-09-20 | Stop reason: HOSPADM

## 2023-09-14 NOTE — PROGRESS NOTES
CC: History of pulmonary embolism, hematology follow up    HPI: , 61, is here for hematology follow up for pulmonary embolism.  She has COPD, CHF, DM, h/o CVA, HLD, HTn. She has cardiomyopathy with EF of 10-15%. She had defibrillator placed on 11/30/21. Patient states she was mostly immobile at home after that, and spent most of her time in her chair or bed. She had worsening dyspnea around Christmas of 2021 and was evaluated at the ER on 12/27/21. She had CTA chest which identified right sided sub-segmental PE.  She denies using hormonal supplement, estrogen supplement or smoking at the time. No prior personal or family h/o blood clots/ bad obstetric outcomes.Denies using recreational drugs. No recent weight loss. She is not uptodate with routine cancer screenings. She is now on xarelto.      Interval History:  is here for a follow up visit following recent hospital admission. Recent admission with sepsis and UTI. Compliant with po eliquis.     Admitted on 05/12/23 with R.hemiplegia from acute L. MCA infarct.this incident occurred while she was on daily dose of Xarelto. She had thrombectomy and changed to pradaxa and ASA following stroke, Bilateral common femoral vein DVT developed while on Pradaxa(05/30/23) changed to eliquis following that. Per patient she is tolerating this. Residual effect of CVA present. Participating therapy. Wheel chair bound. Complaints of joint pain.       Past Medical History:   Diagnosis Date    Anxiety     Arthritis     Asthma     CHF (congestive heart failure)     COPD (chronic obstructive pulmonary disease)     Depression     Diabetes mellitus     Dyspnea     H/O: hysterectomy     Hyperlipemia     Hypertension     Schizophrenia     Stroke          Past Surgical History:   Procedure Laterality Date    BLADDER SUSPENSION      CARPAL TUNNEL RELEASE Right     HEEL SPUR SURGERY Left     HYSTERECTOMY      LEFT HEART CATHETERIZATION Bilateral 12/27/2019    Procedure: Left  heart cath;  Surgeon: Steve Chambers MD;  Location: [facility] CATH LAB;  Service: Cardiology;  Laterality: Bilateral;    RIGHT HEART CATHETERIZATION Right 2021    Procedure: INSERTION, CATHETER, RIGHT HEART;  Surgeon: Petr Naranjo MD;  Location: [facility] CATH LAB;  Service: Cardiology;  Laterality: Right;    RIGHT HEART CATHETERIZATION Right 2022    Procedure: INSERTION, CATHETER, RIGHT HEART;  Surgeon: Chandana Ivory Jr., MD;  Location: [facility] CATH LAB;  Service: Cardiology;  Laterality: Right;    TUBAL LIGATION         Social History     Socioeconomic History    Marital status:    Tobacco Use    Smoking status: Former     Current packs/day: 0.00     Types: Cigarettes     Quit date: 2016     Years since quittin.0    Smokeless tobacco: Never    Tobacco comments:     nonex 2 weeks   Substance and Sexual Activity    Alcohol use: No     Alcohol/week: 0.0 standard drinks of alcohol    Drug use: No     Social Determinants of Health     Financial Resource Strain: Medium Risk (2023)    Overall Financial Resource Strain (CARDIA)     Difficulty of Paying Living Expenses: Somewhat hard   Food Insecurity: Food Insecurity Present (2023)    Hunger Vital Sign     Worried About Running Out of Food in the Last Year: Sometimes true     Ran Out of Food in the Last Year: Sometimes true   Transportation Needs: No Transportation Needs (2023)    PRAPARE - Transportation     Lack of Transportation (Medical): No     Lack of Transportation (Non-Medical): No   Physical Activity: Inactive (2023)    Exercise Vital Sign     Days of Exercise per Week: 0 days     Minutes of Exercise per Session: 0 min   Stress: Stress Concern Present (2023)    [place] [employer] - Occupational Stress Questionnaire     Feeling of Stress : To some extent   Social Connections: Moderately Integrated (2023)    Social Connection and Isolation Panel [NHANES]     Frequency of  Communication with Friends and Family: More than three times a week     Frequency of Social Gatherings with Friends and Family: More than three times a week     Attends Holiness Services: More than 4 times per year     Active Member of Clubs or Organizations: No     Attends Club or Organization Meetings: Never     Marital Status:    Housing Stability: High Risk (8/31/2023)    Housing Stability Vital Sign     Unable to Pay for Housing in the Last Year: Yes     Number of Places Lived in the Last Year: 1     Unstable Housing in the Last Year: No       Review of patient's allergies indicates:   Allergen Reactions    Adhesive Blisters    Captopril Other (See Comments)     COUGH       Current Outpatient Medications   Medication Sig    acetaminophen (TYLENOL) 325 MG tablet Take 2 tablets (650 mg total) by mouth every 8 (eight) hours as needed.    apixaban (ELIQUIS) 5 mg Tab Take 1 tablet (5 mg total) by mouth 2 (two) times daily.    aspirin (ECOTRIN) 81 MG EC tablet Take 1 tablet (81 mg total) by mouth once daily.    atorvastatin (LIPITOR) 80 MG tablet Take 1 tablet (80 mg total) by mouth once daily.    cyproheptadine (PERIACTIN) 4 mg tablet Take 1 tablet (4 mg total) by mouth 3 (three) times daily as needed.    dulaglutide (TRULICITY) 1.5 mg/0.5 mL pen injector Inject 1.5 mg into the skin once a week.    fosinopriL (MONOPRIL) 20 MG tablet     furosemide (LASIX) 20 MG tablet     gabapentin (NEURONTIN) 600 MG tablet Take 1 tablet (600 mg total) by mouth 3 (three) times daily.    lisinopriL (PRINIVIL,ZESTRIL) 20 MG tablet     metFORMIN (GLUCOPHAGE) 1000 MG tablet Take 0.5 tablets (500 mg total) by mouth 2 (two) times daily.    metoprolol succinate (TOPROL-XL) 25 MG 24 hr tablet Take 1 tablet (25 mg total) by mouth once daily.    ondansetron (ZOFRAN-ODT) 4 MG TbDL Take 1 tablet (4 mg total) by mouth every 8 (eight) hours as needed.    potassium chloride (KLOR-CON) 10 MEQ TbSR Take 1 tablet (10 mEq total) by mouth once  daily.    pregabalin (LYRICA) 100 MG capsule     torsemide (DEMADEX) 10 MG Tab Take 1 tablet (10 mg total) by mouth daily as needed.    ipratropium-albuteroL (COMBIVENT)  mcg/actuation inhaler Inhale 1 puff into the lungs 4 (four) times daily as needed for Shortness of Breath. Rescue (Patient not taking: Reported on 9/7/2023)     No current facility-administered medications for this visit.       ROS  See HPI    Vitals:    09/14/23 1006   BP: 105/69   Pulse: 98   Resp: 20   Temp: 97.7 °F (36.5 °C)           Physical Exam  HENT:      Head: Normocephalic.   Eyes:      General: No scleral icterus.  Cardiovascular:      Rate and Rhythm: Normal rate and regular rhythm.      Pulses: Normal pulses.      Heart sounds: No murmur heard.  Pulmonary:      Effort: Pulmonary effort is normal. No respiratory distress.      Breath sounds: Normal breath sounds.   Abdominal:      General: There is no distension.      Palpations: There is no mass.      Tenderness: There is no abdominal tenderness.   Musculoskeletal:         General: No swelling.      Right lower leg: Edema present.      Left lower leg: Edema present.      Comments: Right weakness  W/c bound   Skin:     Coloration: Skin is not jaundiced or pale.      Findings: No bruising.   Neurological:      General: No focal deficit present.      Mental Status: She is alert.   Psychiatric:      Comments: Slow response         12/27/21 CTA chest    FINDINGS:  Pulmonary vasculature: There is satisfactory opacification.  There is embolic material appreciated in the secondary or tertiary branches of the right pulmonary artery supplying the inferior aspect of the right lung.  This is appreciated on image number 167 of series 601 and image number 285 of series 3.     Aorta: Left-sided aortic arch.  No aneurysm and no significant atherosclerosis     Base of Neck: No significant abnormality.     Thoracic soft tissues: Normal.     Heart: Normal size. No effusion.     Zaida/Mediastinum:  No pathologic rito enlargement.     Airways: Patent.     Lungs/Pleura: Clear lungs. No pleural effusion or thickening.     Esophagus: Normal.     Upper Abdomen: No abnormality of the partially imaged upper abdomen.     Bones: No acute fracture. No suspicious lytic or sclerotic lesions.     Impression:     Findings positive for small pulmonary emboli or embolus seen in a single secondary ortertiary branch of the right pulmonary artery.     This report was flagged in Epic as abnormal.        Component      Latest Ref Rng & Units 2/7/2022   Beta-2 Glyco 1 IgG      <=20 SGU <9   Beta-2 Glyco 1 IgM      <=20 SMU <9   Beta-2 Glyco 1 IgA      <=20 GUY <9   APA Isotype IgG      0.00 - 14.99 GPL <9.40   APA Isotype IgM      0.00 - 12.49 MPL <9.40     Assessment    1. Sub-segmental pulmonary embolism  2. CHF  3. Type 2 DM not on long term insulin without complications  4. H/o CVA  5. Joint pain    Plan:    1. Diagnosed in Dec 2021. This was the first known thrombotic episode. She was immobile preceding the PE diagnosis, so likely this is a provoked event. COVID 19 was negative. Optimal anti-coagulation for first provoked PE is 3-6 months. However, she has severely decreased LVEF, with last 2D ECHO documenting LVEF of ~ 15%. She will likely benefit from long term anti-coagulation. No h/o bleeding or falls. She is on ASA, and DOAC+ASA increase bleeding risk significantly compared to either agent alone.Anti-cardiolipin and anti-beta2GP-I antibodies negative in Feb 2022. Recommend age appropriate anti-cancer screenings, including PAP smear, mammogram, colonoscopy.   CVA while on  Xarelto. Xarelto changed to Pradaxa, developed DVT while on pradaxa. Now changed to eliquis. patient not interested to change to any other DOAC including Warfarin so keep same   She is tolerating it well.  No issues reported     2. She is followed at the transplant/ CHF clinic here    3. She follows with her PCP.    4. She was previously on  anti-coagulation. She is on ASA.  Changed DOAC to eliquis as above.   5. Referred to rheumatology    Patient to f/u with pcp for chronic issues    BMT Chart Routing      Follow up with physician 6 months.  in 6 months   Follow up with CHUCK    Provider visit type    Infusion scheduling note    Injection scheduling note    Labs CBC and CMP   Scheduling:  Preferred lab:  Lab interval:  cbc, cmp in 6 months   Imaging    Pharmacy appointment    Other referrals              María Dennis NP  Hematology/Oncology/BMT

## 2023-09-15 ENCOUNTER — PATIENT MESSAGE (OUTPATIENT)
Dept: FAMILY MEDICINE | Facility: CLINIC | Age: 63
End: 2023-09-15
Payer: MEDICARE

## 2023-09-19 PROBLEM — I50.9 CHF EXACERBATION: Status: ACTIVE | Noted: 2023-09-19

## 2023-09-19 PROBLEM — I50.20 HEART FAILURE, SYSTOLIC: Status: ACTIVE | Noted: 2023-09-19

## 2023-09-20 PROBLEM — I50.43 ACUTE ON CHRONIC COMBINED SYSTOLIC AND DIASTOLIC HEART FAILURE: Status: ACTIVE | Noted: 2023-09-19

## 2023-09-20 PROBLEM — E87.20 METABOLIC ACIDOSIS: Status: RESOLVED | Noted: 2023-05-30 | Resolved: 2023-09-20

## 2023-09-20 PROBLEM — I95.9 HYPOTENSION: Status: RESOLVED | Noted: 2023-08-31 | Resolved: 2023-09-20

## 2023-09-20 PROBLEM — N17.9 AKI (ACUTE KIDNEY INJURY): Status: RESOLVED | Noted: 2023-05-30 | Resolved: 2023-09-20

## 2023-09-20 PROBLEM — E66.811 OBESITY (BMI 30.0-34.9): Status: RESOLVED | Noted: 2021-10-11 | Resolved: 2023-09-20

## 2023-09-20 PROBLEM — N30.00 ACUTE CYSTITIS: Status: RESOLVED | Noted: 2023-08-31 | Resolved: 2023-09-20

## 2023-09-20 PROBLEM — E66.01 MORBID (SEVERE) OBESITY DUE TO EXCESS CALORIES: Status: RESOLVED | Noted: 2023-07-20 | Resolved: 2023-09-20

## 2023-09-20 PROBLEM — E66.9 OBESITY (BMI 30.0-34.9): Status: RESOLVED | Noted: 2021-10-11 | Resolved: 2023-09-20

## 2023-09-21 ENCOUNTER — LAB VISIT (OUTPATIENT)
Dept: LAB | Facility: HOSPITAL | Age: 63
End: 2023-09-21
Payer: MEDICARE

## 2023-09-21 ENCOUNTER — OFFICE VISIT (OUTPATIENT)
Dept: TRANSPLANT | Facility: CLINIC | Age: 63
End: 2023-09-21
Payer: MEDICARE

## 2023-09-21 VITALS
HEART RATE: 113 BPM | DIASTOLIC BLOOD PRESSURE: 74 MMHG | HEIGHT: 66 IN | OXYGEN SATURATION: 100 % | BODY MASS INDEX: 27.17 KG/M2 | WEIGHT: 169.06 LBS | SYSTOLIC BLOOD PRESSURE: 124 MMHG

## 2023-09-21 DIAGNOSIS — I42.0 DILATED CARDIOMYOPATHY: ICD-10-CM

## 2023-09-21 DIAGNOSIS — I50.43 ACUTE ON CHRONIC COMBINED SYSTOLIC AND DIASTOLIC HEART FAILURE: Primary | ICD-10-CM

## 2023-09-21 LAB
ANION GAP SERPL CALC-SCNC: 6 MMOL/L (ref 8–16)
BNP SERPL-MCNC: 1713 PG/ML (ref 0–99)
BUN SERPL-MCNC: 12 MG/DL (ref 8–23)
CALCIUM SERPL-MCNC: 9.4 MG/DL (ref 8.7–10.5)
CHLORIDE SERPL-SCNC: 106 MMOL/L (ref 95–110)
CO2 SERPL-SCNC: 30 MMOL/L (ref 23–29)
CREAT SERPL-MCNC: 0.8 MG/DL (ref 0.5–1.4)
EST. GFR  (NO RACE VARIABLE): >60 ML/MIN/1.73 M^2
GLUCOSE SERPL-MCNC: 156 MG/DL (ref 70–110)
POTASSIUM SERPL-SCNC: 3.8 MMOL/L (ref 3.5–5.1)
SODIUM SERPL-SCNC: 142 MMOL/L (ref 136–145)

## 2023-09-21 PROCEDURE — 1159F MED LIST DOCD IN RCRD: CPT | Mod: HCNC,CPTII,S$GLB,

## 2023-09-21 PROCEDURE — 99999 PR PBB SHADOW E&M-EST. PATIENT-LVL IV: ICD-10-PCS | Mod: PBBFAC,HCNC,,

## 2023-09-21 PROCEDURE — 4010F PR ACE/ARB THEARPY RXD/TAKEN: ICD-10-PCS | Mod: HCNC,CPTII,S$GLB,

## 2023-09-21 PROCEDURE — 3044F PR MOST RECENT HEMOGLOBIN A1C LEVEL <7.0%: ICD-10-PCS | Mod: HCNC,CPTII,S$GLB,

## 2023-09-21 PROCEDURE — 80048 BASIC METABOLIC PNL TOTAL CA: CPT | Mod: HCNC

## 2023-09-21 PROCEDURE — 3078F DIAST BP <80 MM HG: CPT | Mod: HCNC,CPTII,S$GLB,

## 2023-09-21 PROCEDURE — 3008F BODY MASS INDEX DOCD: CPT | Mod: HCNC,CPTII,S$GLB,

## 2023-09-21 PROCEDURE — 1111F PR DISCHARGE MEDS RECONCILED W/ CURRENT OUTPATIENT MED LIST: ICD-10-PCS | Mod: HCNC,CPTII,S$GLB,

## 2023-09-21 PROCEDURE — 99214 OFFICE O/P EST MOD 30 MIN: CPT | Mod: HCNC,S$GLB,,

## 2023-09-21 PROCEDURE — 3074F PR MOST RECENT SYSTOLIC BLOOD PRESSURE < 130 MM HG: ICD-10-PCS | Mod: HCNC,CPTII,S$GLB,

## 2023-09-21 PROCEDURE — 4010F ACE/ARB THERAPY RXD/TAKEN: CPT | Mod: HCNC,CPTII,S$GLB,

## 2023-09-21 PROCEDURE — 3066F NEPHROPATHY DOC TX: CPT | Mod: HCNC,CPTII,S$GLB,

## 2023-09-21 PROCEDURE — 1111F DSCHRG MED/CURRENT MED MERGE: CPT | Mod: HCNC,CPTII,S$GLB,

## 2023-09-21 PROCEDURE — 3061F PR NEG MICROALBUMINURIA RESULT DOCUMENTED/REVIEW: ICD-10-PCS | Mod: HCNC,CPTII,S$GLB,

## 2023-09-21 PROCEDURE — 36415 COLL VENOUS BLD VENIPUNCTURE: CPT | Mod: HCNC

## 2023-09-21 PROCEDURE — 3008F PR BODY MASS INDEX (BMI) DOCUMENTED: ICD-10-PCS | Mod: HCNC,CPTII,S$GLB,

## 2023-09-21 PROCEDURE — 3044F HG A1C LEVEL LT 7.0%: CPT | Mod: HCNC,CPTII,S$GLB,

## 2023-09-21 PROCEDURE — 3066F PR DOCUMENTATION OF TREATMENT FOR NEPHROPATHY: ICD-10-PCS | Mod: HCNC,CPTII,S$GLB,

## 2023-09-21 PROCEDURE — 3061F NEG MICROALBUMINURIA REV: CPT | Mod: HCNC,CPTII,S$GLB,

## 2023-09-21 PROCEDURE — 99999 PR PBB SHADOW E&M-EST. PATIENT-LVL IV: CPT | Mod: PBBFAC,HCNC,,

## 2023-09-21 PROCEDURE — 3078F PR MOST RECENT DIASTOLIC BLOOD PRESSURE < 80 MM HG: ICD-10-PCS | Mod: HCNC,CPTII,S$GLB,

## 2023-09-21 PROCEDURE — 99214 PR OFFICE/OUTPT VISIT, EST, LEVL IV, 30-39 MIN: ICD-10-PCS | Mod: HCNC,S$GLB,,

## 2023-09-21 PROCEDURE — 1159F PR MEDICATION LIST DOCUMENTED IN MEDICAL RECORD: ICD-10-PCS | Mod: HCNC,CPTII,S$GLB,

## 2023-09-21 PROCEDURE — 83880 ASSAY OF NATRIURETIC PEPTIDE: CPT | Mod: HCNC

## 2023-09-21 PROCEDURE — 3074F SYST BP LT 130 MM HG: CPT | Mod: HCNC,CPTII,S$GLB,

## 2023-09-21 RX ORDER — SACUBITRIL AND VALSARTAN 24; 26 MG/1; MG/1
1 TABLET, FILM COATED ORAL 2 TIMES DAILY
Qty: 60 TABLET | Refills: 11 | Status: ON HOLD | OUTPATIENT
Start: 2023-09-21 | End: 2024-01-10 | Stop reason: HOSPADM

## 2023-09-21 NOTE — LETTER
September 26, 2023        Michelle Young  [address] [address]  Phone: [phone]  Fax: [phone]             [id number]  [address]  [facility] [address]  Phone: [phone]   Patient: Diomedes Mohr   MR Number: [id number]   YOB: 1960   Date of Visit: 9/21/2023       Dear Dr. Michelle Young    Thank you for referring Diomedes Mohr to me for evaluation. Attached you will find relevant portions of my assessment and plan of care.    If you have questions, please do not hesitate to call me. I look forward to following Diomedes Mohr along with you.    Sincerely,    Leyla Ziegler, NP    Enclosure    If you would like to receive this communication electronically, please contact [email or web address] or [phone] to request Figure 1 Link access.    Figure 1 Link is a tool which provides read-only access to select patient information with whom you have a relationship. Its easy to use and provides real time access to review your patients record including encounter summaries, notes, results, and demographic information.    If you feel you have received this communication in error or would no longer like to receive these types of communications, please e-mail [email or web address]

## 2023-09-21 NOTE — PROGRESS NOTES
ADVANCED HEART FAILURE AND TRANSPLANTATION CLINIC VISIT    CHIEF COMPLAINT:  Follow-up heart failure    HISTORY OF PRESENT ILLNESS:  Diomedes Mohr is a 62 y.o.  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 she had a PET Stress 4/2022 which was pertinent for small (<5%) mild to moderate intensity lateral apical perfusion abnormality of 3% LV myocardium. EF 10% and rest, 13% during stress. ED workup pertinent for: HDS, HR 80's, /77, 97% RA. CBC and BMP unremarkable. EKG with no acute abnormalities. Troponin 0.018, repeat 0.011. BNP elevated 1,038. CXR with mild interstitial and patchy alveolar infiltrate. CTA negative for PE. She was discharged 10/16/2022    She has had several admissions in 2020 for decompensated HF and now most recently this. In [facility] clinic 4/25/2022 PET stress was completed as well as RHC noted below with consideration for pulmonary follow-up after history of PE (evaluated by hematology) and history of COPD. This month she has already presented twice to hospital. Notes SOB constantly even while seated and is more difficult wearing the mask. Has history of tobacco use prior to stroke and was diagnosed with COPD and is not on any maintenance inhaler only PRN albuterol. States checks weights daily and notes 1 lb gain since discharge. Notes sleeping on side or using 3 pillows constantly. Does not recall having sleep study previously. States appetite is good and no constipation.     9/21/2023: Since last visit in January, Mrs. Mohr had a CPX done in March, but did not show for her RHC. She has had frequent  hospitalizations.  4/27-5/12 with c/o knee pain. MCA stroke NIHSS 24 on 4/29 after originally admitted for R. Popliteal A occlusion on heparin gtt. She was transferred  after developing aphasia and R. Hemiplegia. She was not a candidate for TNK bc she was on anticoagulation. Transferred to [facility] s/p MT with TICI 2c reperfusion. Repeat CTH after IR with no acute infarct. MRI completed showing acute L. MCA infarct. Pacemaker reinitiated post MRI to normal settings. Started Pradaxa per Heme/Onc. Neuro exam steadily improved after thrombectomy and therapy (5/12 NIHSS 6). CTH overnight stable. Heparin gtt transitioned to Pradaxa today for AC of reduced EF and VTE history. Discharged to SNF.    5/31 :  Patient had no vomiting since on the floor and nausea improving. But oral intake still poor inspite of starting Periactin yesterday. Says food does not taste good and so she swallows the juice and spits out solid food. No problems swallowing. Willing to try liquids. Glucerna therefore added. Does not want PEG tube placement at this time. Severe hypokalemia and hypocalcemia on labs today, which are being replaced. C/o right leg pain, which is worse at night and has been present since recent CVA- ?sec to neuropathy. Started on small dose Neurontin.      6/1 : Oral intake improved significantly since last evening. Says can taste some foods now. Tolerating Glucerna well too. No more nausea or vomiting. Labs today showed normal electrolytes. Therefore being d.amanda home in a stable condition. Will cont Periactin for appetite stimulation and Reglan for diabetic gastroparesis. Out patient PT/OT/ST arranged as per therapy recommendations. As oral intake still not optimal, will d/c Glimepiride and cont Trulicity and Jardiance. Metformin dose decreased to 500 mg BID. Blood pressure improved, but still low normal and has no signs of fluid overload and so Aldactone and Torsemide d/amanda. Toprol dose decreased to 50 mg daily. Pradaxa d/amanda  and Eliquis started as she had DVT while on Pradaxa.     8/30-9/5 - Patient admitted to the MICU for septic shock 2/2 UTI with underlying HFrEF (EF 10-15%). She received 1750 cc of IVF and never required vasopressors to maintain BP.  Completed treatment of UTI.     9/18-9/20 - Patient was started on I.V Lasix, diuresed well and leg edema improved a lot. SOB improved too. Diuretics changed to Torsemide on 9/19, but increased dose from home dose. Cont to diurese well with this and lost about 7 pounds weight since admit. H.R not well controlled. Toprol dose therefore increased, with improvement in same. Blood pressure wnl since on the floor. Troponin increased initially, but wnl today . No chest pain since admit. Troponin elevation mostly sec to demand ischemia sec to acute on chronic combined HF. She is now being d/amanda home in a stable condition with increased dose Torsemide and Toprol.    Previous GDMT was Toprol 100 mg daily  Entresto 49-51 mg BID, Aldactone 25 mg daily, Jardiance 10 mg daily, torsemide 10 mg, daily.  Today in clinic she is on torsemide 20 mg, once daily; metoprolol 25 mg, once daily.    Mrs. Mohr reports feeling better, but still SOB when walking about a block. She denies CP, palpitations, near-syncope, syncope.    Cardiac Data:    ECHO: 8/31/2023  Left Ventricle: The left ventricle is severely dilated. Normal wall thickness. Severe global hypokinesis present. There is severely reduced systolic function with a visually estimated ejection fraction of 15 - 20%. Biplane (2D) method of discs ejection fraction is 17%. Grade I diastolic dysfunction.    Right Ventricle: Normal right ventricular cavity size. Wall thickness is normal. Right ventricle wall motion  is normal. Systolic function is normal. Pacemaker lead present in the ventricle.    Aortic Valve: The aortic valve is a trileaflet valve. There is mild aortic valve sclerosis. Mild annular calcification.    IVC/SVC: Intermediate venous pressure  at 8 mmHg.    ECHO: 2023  The left ventricle is severely enlarged with eccentric hypertrophy and severely decreased systolic function.  The estimated ejection fraction is 10-15%.  There is left ventricular global hypokinesis.  Grade II left ventricular diastolic dysfunction.  Normal right ventricular size with normal right ventricular systolic function.  Mild tricuspid regurgitation.  The estimated PA systolic pressure is 35 mmHg.  Normal central venous pressure (3 mmHg).  Severe left atrial enlargement.    Transthoracic Echo: Results for orders placed during the hospital encounter of 10/15/22  · The left ventricle is severely enlarged LVEDD 64 mm with eccentric hypertrophy and severely decreased systolic function. The estimated ejection fraction is 15%.  · Normal right ventricular size with normal right ventricular systolic function.  · Grade II left ventricular diastolic dysfunction.  · Mild left atrial enlargement.  · The estimated PA systolic pressure is 55 mmHg.  · Normal central venous pressure (3 mmHg).    EC2023  Vent. Rate : 113 BPM     Atrial Rate : 113 BPM      P-R Int : 144 ms          QRS Dur : 104 ms       QT Int : 354 ms       P-R-T Axes : 040 045 187 degrees      QTc Int : 485 ms     Sinus tachycardia   Minimal voltage criteria for LVH, may be normal variant   T wave abnormality, consider inferolateral ischemia   Abnormal ECG   When compared with ECG of 02-SEP-2023 14:15,   Premature ventricular complexes are no longer Present   ST no longer depressed in Inferior leads   ST no longer depressed in Anterior leads   ST no longer elevated in Lateral leads   Inverted T waves have replaced nonspecific T wave abnormality in Inferior   leads   Confirmed by Dagoberto Acharya MD ([id number]) on 2023 6:26:24 PM     ECG 10/16/2022: SR 64 bpm, LVH with ST T changes hypertrophy  ms    Left Heart Catheterization:   Right Heart Catheterization: Results for orders placed during the hospital  encounter of 05/12/22  Right atrial pressure is mildly elevated.  Pulmonary capillary wedge pressure is moderately elevated.  Pulmonary artery pressure is mildly elevated.  Jv cardiac output and index is low normal.  Pulmonary vascular resistance is normal.  Systemic vascular resistance is 1657.  BP above target of < 130/80 during procedure.    RA: 14/ 10/ 12 RV: 45/ 11 PA: 44/ 25/ 31 PWP: 29/ 29/ 25 .   Cardiac output was 4.3  by Jv. Cardiac index is 2.2 L/min/m2.   O2 Sat: PA 61%.   Pulmonary vascular resistance: 112. Systemic vascular resistance: 1657.     Devices: ICD    PAST MEDICAL HISTORY:  Past Medical History:   Diagnosis Date    Acute on chronic combined systolic and diastolic heart failure 12/26/2019    ARBEN (acute kidney injury) 5/30/2023    Anticoagulant long-term use     Anxiety     Arthritis     Asthma     Depression     H/O: hysterectomy     Hyperlipemia     Hypertension     Pulmonary edema     Schizophrenia        PAST SURGICAL HISTORY:  Past Surgical History:   Procedure Laterality Date    BLADDER SUSPENSION      CARPAL TUNNEL RELEASE Right     HEEL SPUR SURGERY Left     HYSTERECTOMY      LEFT HEART CATHETERIZATION Bilateral 12/27/2019    Procedure: Left heart cath;  Surgeon: Steve Chambers MD;  Location: [facility] CATH LAB;  Service: Cardiology;  Laterality: Bilateral;    RIGHT HEART CATHETERIZATION Right 7/26/2021    Procedure: INSERTION, CATHETER, RIGHT HEART;  Surgeon: Petr Naranjo MD;  Location: [facility] CATH LAB;  Service: Cardiology;  Laterality: Right;    RIGHT HEART CATHETERIZATION Right 5/12/2022    Procedure: INSERTION, CATHETER, RIGHT HEART;  Surgeon: Chandana Ivory Jr., MD;  Location: [facility] CATH LAB;  Service: Cardiology;  Laterality: Right;    TUBAL LIGATION         SOCIAL HISTORY  Social History     Socioeconomic History    Marital status:    Tobacco Use    Smoking status: Former     Current packs/day: 0.00     Types: Cigarettes     Quit date: 8/30/2016      Years since quittin.0    Smokeless tobacco: Never    Tobacco comments:     nonex 2 weeks   Substance and Sexual Activity    Alcohol use: No     Alcohol/week: 0.0 standard drinks of alcohol    Drug use: No     Social Determinants of Health     Financial Resource Strain: Medium Risk (2023)    Overall Financial Resource Strain (CARDIA)     Difficulty of Paying Living Expenses: Somewhat hard   Food Insecurity: Food Insecurity Present (2023)    Hunger Vital Sign     Worried About Running Out of Food in the Last Year: Sometimes true     Ran Out of Food in the Last Year: Sometimes true   Transportation Needs: No Transportation Needs (2023)    PRAPARE - Transportation     Lack of Transportation (Medical): No     Lack of Transportation (Non-Medical): No   Physical Activity: Inactive (2023)    Exercise Vital Sign     Days of Exercise per Week: 0 days     Minutes of Exercise per Session: 0 min   Stress: Stress Concern Present (2023)    [employer] - Occupational Stress Questionnaire     Feeling of Stress : To some extent   Social Connections: Moderately Integrated (2023)    Social Connection and Isolation Panel [NHANES]     Frequency of Communication with Friends and Family: More than three times a week     Frequency of Social Gatherings with Friends and Family: More than three times a week     Attends Tenriism Services: More than 4 times per year     Active Member of Clubs or Organizations: No     Attends Club or Organization Meetings: Never     Marital Status:    Housing Stability: High Risk (2023)    Housing Stability Vital Sign     Unable to Pay for Housing in the Last Year: Yes     Number of Places Lived in the Last Year: 1     Unstable Housing in the Last Year: No       FAMILY HISTORY  Family History   Problem Relation Age of Onset    No Known Problems Mother     No Known Problems Father     Ovarian cancer Sister 42       ALLERGIES:  Review of  patient's allergies indicates:   Allergen Reactions    Adhesive Blisters    Captopril Other (See Comments)     COUGH       MEDICATIONS  Current Outpatient Medications on File Prior to Visit   Medication Sig Dispense Refill    acetaminophen (TYLENOL) 325 MG tablet Take 2 tablets (650 mg total) by mouth every 8 (eight) hours as needed. 30 tablet 0    apixaban (ELIQUIS) 5 mg Tab Take 1 tablet (5 mg total) by mouth 2 (two) times daily. 60 tablet 2    aspirin (ECOTRIN) 81 MG EC tablet Take 1 tablet (81 mg total) by mouth once daily. 30 tablet 11    atorvastatin (LIPITOR) 80 MG tablet Take 1 tablet (80 mg total) by mouth once daily. 90 tablet 3    cyproheptadine (PERIACTIN) 4 mg tablet Take 1 tablet (4 mg total) by mouth 3 (three) times daily as needed. 270 tablet 3    dulaglutide (TRULICITY) 1.5 mg/0.5 mL pen injector Inject 1.5 mg into the skin once a week.      gabapentin (NEURONTIN) 600 MG tablet Take 1 tablet (600 mg total) by mouth 3 (three) times daily. 270 tablet 3    metFORMIN (GLUCOPHAGE) 1000 MG tablet Take 0.5 tablets (500 mg total) by mouth 2 (two) times daily.      metoprolol succinate (TOPROL-XL) 25 MG 24 hr tablet Take 1 tablet (25 mg total) by mouth 2 (two) times daily. 60 tablet 0    ondansetron (ZOFRAN-ODT) 4 MG TbDL Take 1 tablet (4 mg total) by mouth every 8 (eight) hours as needed. 14 tablet 1    potassium chloride (KLOR-CON) 10 MEQ TbSR Take 1 tablet (10 mEq total) by mouth once daily. 90 tablet 3    torsemide (DEMADEX) 20 MG Tab Take 1 tablet (20 mg total) by mouth once daily.       No current facility-administered medications on file prior to visit.       REVIEW OF SYSTEMS  Review of Systems   Constitutional: Negative.    HENT: Negative.     Respiratory:  Positive for shortness of breath.    Cardiovascular: Negative.    Gastrointestinal: Negative.    Endocrine: Negative.    Musculoskeletal: Negative.    Neurological:         See HPI   Psychiatric/Behavioral: Negative.         PHYSICAL EXAM:    BP  "124/74 (BP Location: Left arm, Patient Position: Sitting, BP Method: Medium (Automatic))   Pulse (!) 113   Ht 5' 6" (1.676 m)   Wt 76.7 kg (169 lb 1.5 oz)   LMP  (LMP Unknown)   SpO2 100%   BMI 27.29 kg/m²     GENERAL: Alert, NAD   HEENT: Anicteric sclerae  NECK: JVP not visible above level of clavicle while sitting upright, estimated at 8 cmH20  CARDIOVASCULAR: Regular rate and rhythm. Normal S1, S2 no murmurs, rubs or gallops.  PULMONARY: Lungs clear to auscultation bilaterally  ABDOMEN: Soft, nontender, nondistended. No guarding, no rebound, no hepatomegaly  EXTREMITIES: Warm. No clubbing, cyanosis or edema. No pre-sacral edema  VASCULAR: 2+ bilateral radial pulses  NEUROLOGIC: No focal deficits    LABS:    Lab Results   Component Value Date     09/20/2023    K 3.3 (L) 09/20/2023     09/20/2023    CO2 31 (H) 09/20/2023    BUN 8 09/20/2023    CREATININE 0.64 09/20/2023    CALCIUM 8.4 (L) 09/20/2023    ANIONGAP 4 (L) 09/20/2023    ESTGFRAFRICA >60.0 07/31/2022    EGFRNONAA >60.0 07/31/2022       Magnesium   Date Value Ref Range Status   09/20/2023 1.9 1.6 - 2.6 mg/dL Final       Lab Results   Component Value Date    WBC 7.03 09/19/2023    HGB 9.8 (L) 09/19/2023    HCT 30.3 (L) 09/19/2023    MCV 91 09/19/2023     09/19/2023       Lab Results   Component Value Date    INR 1.1 04/30/2023    INR 1.0 04/29/2023    INR 1.0 04/27/2023       BNP   Date Value Ref Range Status   08/30/2023 166 (H) 0 - 99 pg/mL Final     Comment:     Values of less than 100 pg/ml are consistent with non-CHF populations.   05/30/2023 161 (H) 0 - 99 pg/mL Final     Comment:     Values of less than 100 pg/ml are consistent with non-CHF populations.   04/29/2023 650 (H) 0 - 99 pg/mL Final     Comment:     Values of less than 100 pg/ml are consistent with non-CHF populations.       No results found for: "LDH"    IMPRESSION:    NYHA Class III   ACC/AHA Stage C  Zuñiga profile A        PLAN:  Euvolemic on exam. CPX " results were not revealing due to poor effort. Do not think we need to do another RHC at this time. Will slowly add back GDMT.     Restart Jardiance 10 mg, once daily.    Restart Entresto 24-26, twice daily.    Repeat labs in 1 week (BMP)    Return to clinic in 1 month with labs.    If you are on any blood pressure medications make sure you have taken them. For blood pressure readings, sit in a calm, quite room with dim lighting. Feet flat on the floor, without stimulation (no TV, no talking) and relax for at least 3-5 minutes prior to measuring.    Recommend 2 gram sodium restriction and 1500cc fluid restriction.  Encourage physical activity with graded exercise program.  Requested patient to weigh themselves daily, and to notify us if their weight increases by more than 3 lbs in 1 day or 5 lbs in 1 week.      Pt to call us with more shortness of breath, swelling or unexpected weight changes, fever, chills, bloody or black bowel movements, or other concerns.    Electronically signed by:   Leyla Ziegler   09/21/2023 12:43 PM

## 2023-09-21 NOTE — PATIENT INSTRUCTIONS
Restart Jardiance 10 mg, once daily.    Restart Entresto 24-26, twice daily.    Repeat labs in 1 week (BMP)    Return to clinic in 1 month with labs.    If you are on any blood pressure medications make sure you have taken them. For blood pressure readings, sit in a calm, quite room with dim lighting. Feet flat on the floor, without stimulation (no TV, no talking) and relax for at least 3-5 minutes prior to measuring.    Recommend 2 gram sodium restriction and 1500cc fluid restriction.  Encourage physical activity with graded exercise program.  Requested patient to weigh themselves daily, and to notify us if their weight increases by more than 3 lbs in 1 day or 5 lbs in 1 week.

## 2023-10-03 ENCOUNTER — OUTPATIENT CASE MANAGEMENT (OUTPATIENT)
Dept: ADMINISTRATIVE | Facility: OTHER | Age: 63
End: 2023-10-03
Payer: MEDICARE

## 2023-10-03 ENCOUNTER — PATIENT MESSAGE (OUTPATIENT)
Dept: FAMILY MEDICINE | Facility: CLINIC | Age: 63
End: 2023-10-03
Payer: MEDICARE

## 2023-10-03 NOTE — LETTER
Diomedes Mohr  [address]  [place] [address]    Dear Ms. Diomedes Mohr,     Welcome to [facility]. We are here to assist patients with multiple long-term (chronic) conditions who often need more personalized healthcare.    It was a pleasure talking with you today. My name is Mabel Dan RN. I look forward to working with you as your Care Manager. I will be contacting you by telephone routinely to help coordinate care and resolve issues.    My goal is to help you function at the healthiest and highest level possible. You can contact me directly at [phone].    As an [facility] patient with [employer], some of the services we provide, at no cost to you, include:      Development of an individualized care plan with a Registered Nurse    Connection with a    Assistance from a Community Health Worker   Connection with available resources and services     Coordinate communication among your care team members    Provide coaching and education   Help you understand your doctor's treatment plan   Help you obtain information about your insurance coverage.    All services provided by [facility]s Outpatient Care Managers and other care team members are coordinated with and communicated to your primary care team.      As part of your enrollment, you will be receiving education materials and more information about these services in your [facility] account, by phone, or through the mail. If you do not wish to participate or receive information, you can Opt Out by contacting our office at [phone].      Sincerely,        Mabel Dan RN  [facility]   Outpatient Care Management                    Clinical References    Patient Education        DASH Diet   About this topic   DASH stands for Dietary Approaches to Stop Hypertension. The DASH diet may help you lower blood pressure. It may also help keep you from getting high blood pressure. You will eat  less fat and more fiber on the DASH diet.  This diet gives you more minerals that fight high blood pressure. Some nutrients in this diet are:   Potassium ? Acts to help you get rid of salt. This may help to lower blood pressure.   Calcium ? Makes blood vessels and muscles work the right way   Magnesium - Helps blood vessels relax   Fiber ? Helps you feel full. It also helps digestion.  What will the results be?   The DASH diet may help you:   Lower your blood pressure and cholesterol   Lower your risk for cancer, heart disease, heart attack, and stroke. It may also lower your risk for heart failure, kidney stones, and diabetes.   Lose weight or keep a healthy weight  What lifestyle changes are needed?    Add regular exercise to get the most help from this diet.   Try to lower stress. Find ways to relax.   Stop smoking. Avoid secondhand smoke.   Limit alcohol intake.  What changes to diet are needed?    Know about poor eating habits. Then, you can fix them as you work with the program.   This diet encourages fruits and vegetables, whole grains, lean meats, healthy fats, and low-fat or fat-free dairy products.   This diet is lower in saturated fats, trans-fats, cholesterol, added sugars, and sodium.  Who should use this diet?   This eating plan is good for the whole family. It is also good for people with high blood pressure and those at risk for high blood pressure.  What foods are good to eat?    Grains: Try to eat 6 to 8 servings of whole grain, high fiber foods each day. These are bread, cereals, brown rice, or pasta.   Fruits and vegetables: Eat 4 to 5 servings each day. Try to pick many kinds and colors. Fresh or frozen are best. Look for low sodium or salt-free if you choose canned.   Dairy: Try to eat 2 to 3 servings of fat free and low fat milk products each day.   Lean meats, poultry, and seafood: Try to eat 6 servings or less of lean meats, poultry, and seafood each day. Try to choose more  low fat or lean meats like chicken and turkey. Eat less red meat. Eat more fish instead.   Nuts, seeds, and legumes (dry beans and peas): Try to eat 4 to 5 servings each week. Try to pick nuts such as almonds and walnuts, sunflower seeds, peanut butter, soy beans, lentils, kidney beans, and split peas.   Fats and oils: Try to eat 2 to 3 servings of fats and oils each day. Eat good fats found in fish, nuts, and avocados. Try using olive oil or vegetable oils such as canola oil. Other good oils to try are corn, safflower, sunflower, or soybean oils. Use low-sodium and low-fat salad dressing and mayonnaise.   Condiments: Pepper, herbs, spices, vinegar, lemon or lime juices are great for seasoning. Be careful to choose low-sodium or salt-free products if you use broths, soups, or soy sauce.   Sweets: Try to eat less than 5 servings each week. Choose low-fat and trans fat-free desserts. These are things like fruit flavored gelatin, sorbet, jelly beans, eileen crackers, animal crackers, low-fat fig bars, and ashvin snaps. Eat fruit to satisfy your desire for sweets.     What foods should be limited or avoided?    Grains: Salted breads, rolls, crackers, quick breads, self-rising flours, biscuit mixes, regular bread crumbs, instant hot cereals, commercially-prepared rice, pasta, stuffing mixes   Fruits and vegetables: Commercially-prepared potatoes and vegetable mixes, regular canned vegetables and juices, vegetables frozen with sauce or pickled vegetables, processed fruits with salt or sodium   Milk: Whole milk, malted milk, chocolate milk, buttermilk, cheese, ice cream   Meats and beans: Smoked, cured, salted, or canned fish; meats or poultry such as landeros, sausages, sardines; high-fat cuts of meat like beef, lamb, or pork; chicken with the skin on it   Fats: Cut back on solid fats like butter, lard, and margarine. Eat less food with high saturated fat, cholesterol and total fat.   Condiments and snacks: Salted  and canned peas, beans, and olives; salted snack foods; fried foods; soda or other sweetened drinks   Sweets: High-fat baked goods such as muffins, donuts, pastries, commercial baked goods, candy bars   If you choose to drink alcohol, limit the amount you drink. Women should have 1 drink or less per day and men should have 2 drinks or less per day.  Helpful tips    Avoid eating canned vegetables and processed foods. These have a lot of salt in them. Look for a low-salt or low-sodium choice.   Try baking or broiling instead of frying food.   Write down the foods you eat. This will help you track what you have eaten each week.   When you go to a grocery store, have a list or a meal plan. Do not shop when you are hungry to avoid cravings for foods.   Read food labels with care. They will show you how much is in a serving. The amount is given as a percentage of the total amount you need each day. Reading labels will help you make healthy food choices.        Avoid fast foods.   Talk to your doctor or dietitian to see if you need vitamin and mineral supplements to help you balance your diet.   Talk to a dietitian for help.  Where can I learn more?   Academy of [employer] and Dietetics  https://www.eatright.org/health/wellness/heart-and-cardiovascular-health/dash-diet-reducing-hypertension-through-diet-and-lifestyle   FamilyDoctor.org  http://familydoctor.org/familydoctor/en/prevention-wellness/food-nutrition/weight-loss/the-dash-diet-healthy-eating-to-control-your-blood-pressure.html   Last Reviewed Date   2021-03-15  Consumer Information Use and Disclaimer   This information is not specific medical advice and does not replace information you receive from your health care provider. This is only a brief summary of general information. It does NOT include all information about conditions, illnesses, injuries, tests, procedures, treatments, therapies, discharge instructions or life-style choices that may apply to  you. You must talk with your health care provider for complete information about your health and treatment options. This information should not be used to decide whether or not to accept your health care providers advice, instructions or recommendations. Only your health care provider has the knowledge and training to provide advice that is right for you.  Copyright   Copyright © 2021 [employer]. and its affiliates and/or licensors. All rights reserved.     Patient Education        Heart Failure, Adult   About this topic   Heart failure is a condition where your heart does not pump well. Your heart has trouble pumping the right amount of blood throughout your body. Because of this, fluid can back up in your body and your organs may not get as much blood as they need.  Heart failure is a long-term problem and will get worse over time. Your doctor will work hard to treat your heart failure and to keep you as healthy as possible.     What are the causes?   Heart failure is most often caused by coronary artery disease or a heart attack. It may also be caused by problems with the heart's valves. You may have heart failure because you had an infection in your heart muscle. Heart failure may be due to high blood pressure or an abnormal heart rhythm. Sleep apnea or high blood sugar may also cause your heart not to work as well as it should. These causes result in a weak or damaged heart muscle. When your heart is weak or damaged, it has trouble pumping the right amount of blood throughout your body.  What can make this more likely to happen?   You are more likely to have heart failure based on:   If your blood vessels that supply blood to the heart are narrow or blocked.   If you have a problem with your heart valves.   If you smoke.   If you have high blood pressure.   If you have diabetes.   Your age. Your risk goes up as you get older.   Your weight. Your risk goes up when you are overweight.  What are the  main signs?    Breathing problems like:  ? Shortness of breath  ? Cough that won't go away  ? Wheezing  ? More trouble breathing at night or when you lay down flat   Extra fluid that causes:  ? Swelling of feet, ankles, legs, or belly  ? Gaining weight and you don't know why  ? Need to pass urine more often, especially at night   Problems sleeping like:  ? Need to sleep sitting up or on many pillows  ? Waking up often during sleep times  ? Feeling tired, weak, or have no energy   General signs like:  ? Increased heart rate or irregular heartbeat  ? Loss of appetite and nausea  ? Feeling lightheaded or dizzy, especially right when you stand up  ? Confusion and impaired thinking     How does the doctor diagnose this health problem?   The doctor will take your history and will do an exam. The doctor will listen to your heart and lungs, and may also feel your belly for liver swelling. The doctor will check your feet, ankles, and legs for swelling.  The doctor may order:   Lab tests   Chest x-ray   Electrocardiogram (ECG or EKG)      Echocardiogram   Exercise stress test   Radioactive imaging. This is a radionuclide scan.   Dye injected into the heart's arteries. This is coronary angiography.  How does the doctor treat this health problem?   Your doctor will treat you to help your heart work better. The doctor will also work to control your signs. Since other health problems may cause or make heart failure worse, it is important to also treat these. Your doctor may suggest:   Diet and lifestyle changes to slow down progress of the heart failure   Drugs to help your heart work better, get rid of the extra fluid, and control your heart rate and blood pressure   Exercise and cardiac rehab   Bypass surgery or a heart stent to open blocked vessels to the muscle of your heart   Devices like a heart pump   Heart transplant  What follow-up care is needed?   Your doctor may ask you to come back to the office to  check on your progress. Be sure to keep these visits.  What lifestyle changes are needed?    Limit how much beer, wine, and mixed drinks (alcohol) you drink.   Your doctor may ask you to limit the amount of salt in your diet. You may also be asked to limit how much fluid you drink.   Try to lose weight if you are overweight. Your doctor or nurse can help.   If you smoke, try to quit. Your doctor or nurse can help.   Unless your doctor tells you to limit your activities, try to be active. Walk, garden, or do something active for 30 minutes or more on most days of the week. Stop activity if you have symptoms like chest pain, shortness of breath, or feel dizzy.   Let your doctor know if you get more tired while you do an activity or you are not able to do as much activity as you did before.   Be careful that you take your drugs each day as ordered by your doctor.  ? If you cannot afford your drugs, talk to your doctor.  ? Do not stop your drugs if you have side effects. Talk to your doctor about them.  ? Take your drugs even if you feel well.   Check your weight each morning and write down your weight in a notebook. This will tell you if you are building up too much fluid. Weigh in the morning after you have passed urine. Weigh yourself with clothes on or off, but do it the same way each day. Make sure your scale is on a hard surface, not on carpet. Your doctor will tell you when you should call based on how much weight you gain in a day or over a week. Take your notebook to your doctor on your next visit.  What drugs may be needed?   Often patients will need to take more than one drug. Together, they will help the heart work as well as it can. Do not take any other prescription drugs, over-the-counter (OTC) drugs, herbals, or diet aids without asking your doctor.  The doctor may order drugs to:   Help relax blood vessels. This makes it easier for the heart to work and may also lower your blood pressure. These  are ACE inhibitors, ARBs, and calcium channel blockers.   Slow down the heart rate so that it doesn't have to work as hard. These are beta-blockers and calcium channel blockers.   Help the heart beat stronger and better. This is digoxin.   Get rid of extra salt and water in the body. These are water pills or diuretics.  What changes to diet are needed?    Ask your doctor or dietician what kind of diet is best for you. The doctor may tell you to limit your salt and fat or how much fluid you drink.   The DASH diet may be helpful. The DASH diet helps to lower blood pressure. This diet includes lots of fruits, vegetables, low-fat dairy foods, and foods that are low in saturated fat, total fat, and cholesterol. Using the DASH diet with a low salt diet may lower blood pressure even more.   Learn to read labels to see how much salt or sodium is in foods. Lowering your salt intake will help you control some of your symptoms.  When do I need to call the doctor?   Activate the emergency medical system right away if you have signs of a heart attack. Call 911 in the [place] or [place]. The sooner treatment begins, the better your chances for recovery. Call for emergency help right away if you have:   Signs of heart attack, which may include:  ? Severe chest pain, pressure, or discomfort with:  - Breathing trouble; sweating; upset stomach; or cold, clammy skin.  - Pain in your arms, back, or jaw.  - Worse pain with activity like walking up stairs.  ? Fast or irregular heartbeat.  ? Feeling dizzy, faint, or weak.  Call your doctor if:   You have so much trouble breathing that you can only say one or two words at a time.   You need to sit upright at all times to be able to breathe and/or cannot lie down.   You are very tired from working to catch your breath or you are sweating from trying to breathe.   You have trouble breathing when talking, sitting still, or with activity.   You have wheezing or chest tightness  when you are resting.   You wake up at night because you cant breathe well.   You become very confused or you faint.   You have a very fast or irregular heartbeat.   You cough more than normal or cough up frothy or pink saliva.   You feel very weak, dizzy, or more tired than normal.   You need more pillows than normal to sleep.   You gain 2 to 3 pounds (0.9 to 1.4 kg) overnight or more than 5 pounds (2.3 kg) in 1 week.   You have more swelling than usual, especially in your feet and ankles or in your belly.   You feel like your heart is beating very fast.  Where can I learn more?   American Heart Association  http://www.heart.org/HEARTORG/Conditions/HeartFailure/AboutHeartFailure/About-Heart-Failure_UCM_002044_Article.jsp   [employer]  https://www.betterhealth.[place].gov.au/health/conditionsandtreatments/congestive-heart-failure-chf   NHS Choices  http://www.nhs.uk/conditions/heart-failure/pages/introduction.aspx   Last Reviewed Date   2021-06-03  Consumer Information Use and Disclaimer   This information is not specific medical advice and does not replace information you receive from your health care provider. This is only a brief summary of general information. It does NOT include all information about conditions, illnesses, injuries, tests, procedures, treatments, therapies, discharge instructions or life-style choices that may apply to you. You must talk with your health care provider for complete information about your health and treatment options. This information should not be used to decide whether or not to accept your health care providers advice, instructions or recommendations. Only your health care provider has the knowledge and training to provide advice that is right for you.  Copyright   Copyright © 2021 [employer]Date, [employer]. and its affiliates and/or licensors. All rights reserved.

## 2023-10-03 NOTE — PROGRESS NOTES
"Outpatient Care Management  Initial Patient Assessment    Patient: Diomedes Mohr  MRN: [id number]  Date of Service: 10/03/2023  Completed by: Mabel Dan RN  Referral Date: 09/20/2023  Program: High Risk  Status: Ongoing  Effective Dates: 10/3/2023 - present  Responsible Staff: Mabel Dan, RN        Reason for Visit   Patient presents with    OPCM Chart Review    OPCM Enrollment Call    Nursing Assessment     10/03/23       Brief Summary:  Diomedes Mohr was referred by Dr. 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 Patient qualifies for program based on risk score of 90.8%.   Active problem list, medical, surgical and social history reviewed. Active comorbidities include CHF, HTN. Areas of need identified by patient include Knowledge Deficit as evidenced by patient having limited knowledge to perform cardiac interventions to manage her CHF and Physical Limitation as evidenced by patient has right sided weakness from previous stroke.   Next steps: Sending educational information "DASH Diet,/ Heart Failure, Adult" via patient portal. Contacting [employer] to request OTC catalog be mailed to patient. During continued communication ensure receipt of Mom's Meals from [employer], expected ETA 10/05/23. Evaluate patient's weight trends from 1-2 weeks, agreed to perform 2-3 times a week. Further educate patient on s/s correlated to CHF. Continue to engage gradual increase in physical activity as tolerated. Agrees to communicate with OPCM SW for identified needs (resources/programs to aid with utility payments). Patient agrees to follow up call with in 2 weeks    Disability Status  Is the patient alert and oriented (person, place, time, and situation)?: Alert and oriented x 4  Hearing Difficulty or Deaf: no  Visual Difficulty or Blind: no (glasses permanent)  Visual and Hearing Needs Conclusion: pt does not use any assistive device " to facilitate her hearing, use permanent glasses  Difficulty Concentrating, Remembering or Making Decisions: yes  Concentration Management: forgets, events who she talked  Communication Difficulty: no (may need time to speak, vocalize)  Eating/Swallowing Difficulty: no  Walking or Climbing Stairs Difficulty: yes  Walking or Climbing Stairs: ambulation difficulty, requires equipment; stair climbing difficulty, requires equipment (stoke R side)  Mobility Management: pt uses walker to aid her mobility  Dressing/Bathing Difficulty: yes  Dressing/Bathing: bathing difficulty, assistance 1 person (2x week bathe aide)  Dressing/Bathing Management: pt has difficulty with placing items on her feet, socks,shoes  Toileting : Independent  Difficulty Managing Errands Independently: yes  Errands Management: spouse will assist pt, no longer drives  Equipment Currently Used at Home: walker, rolling; blood pressure machine; shower chair; bath bench; bedside commode; glucometer; wheelchair  ADL Conclusion Statement: pt due to stroke has R side weakness, has some difficulty with dressing does have aide that comes twice a week to bathe pt. Lives with spouse who will support pt in her care  Change in Functional Status Since Onset of Current Illness/Injury: yes        Spiritual Beliefs  Spiritual, Cultural Beliefs, Orthodoxy Practices, Values that Affect Care: no      Social History     Socioeconomic History    Marital status:    Tobacco Use    Smoking status: Former     Current packs/day: 0.00     Types: Cigarettes     Quit date: 2016     Years since quittin.0    Smokeless tobacco: Never    Tobacco comments:     nonex 2 weeks   Substance and Sexual Activity    Alcohol use: No     Alcohol/week: 0.0 standard drinks of alcohol    Drug use: No     Social Determinants of Health     Financial Resource Strain: Medium Risk (10/3/2023)    Overall Financial Resource Strain (CARDIA)     Difficulty of Paying Living Expenses: Somewhat  hard   Food Insecurity: No Food Insecurity (10/3/2023)    Hunger Vital Sign     Worried About Running Out of Food in the Last Year: Never true     Ran Out of Food in the Last Year: Never true   Recent Concern: Food Insecurity - Food Insecurity Present (8/31/2023)    Hunger Vital Sign     Worried About Running Out of Food in the Last Year: Sometimes true     Ran Out of Food in the Last Year: Sometimes true   Transportation Needs: No Transportation Needs (10/3/2023)    PRAPARE - Transportation     Lack of Transportation (Medical): No     Lack of Transportation (Non-Medical): No   Physical Activity: Insufficiently Active (10/3/2023)    Exercise Vital Sign     Days of Exercise per Week: 3 days     Minutes of Exercise per Session: 10 min   Stress: No Stress Concern Present (10/3/2023)    [place] [employer] - Occupational Stress Questionnaire     Feeling of Stress : Not at all   Recent Concern: Stress - Stress Concern Present (8/31/2023)    [place] [employer] - Occupational Stress Questionnaire     Feeling of Stress : To some extent   Social Connections: Socially Integrated (10/3/2023)    Social Connection and Isolation Panel [NHANES]     Frequency of Communication with Friends and Family: More than three times a week     Frequency of Social Gatherings with Friends and Family: More than three times a week     Attends [employer] Services: 1 to 4 times per year     Active Member of Clubs or Organizations: Yes     Attends Club or Organization Meetings: Never     Marital Status:    Housing Stability: Low Risk  (10/3/2023)    Housing Stability Vital Sign     Unable to Pay for Housing in the Last Year: No     Number of Places Lived in the Last Year: 1     Unstable Housing in the Last Year: No   Recent Concern: Housing Stability - High Risk (8/31/2023)    Housing Stability Vital Sign     Unable to Pay for Housing in the Last Year: Yes     Number of Places Lived in the Last Year:  1     Unstable Housing in the Last Year: No       Roles and Relationships  Primary Source of Support/Comfort: spouse  Name of Support/Comfort Primary Source: Maged Mohr  Primary Roles/Responsibilities: disabled  Secondary Source of Support/Comfort: child(priyanka)  Name of Support/Comfort Secondary Source: Yael Thiagonydia      Advance Directives (For Healthcare)  Advance Directive  (If Adv Dir status is received, view document under Adv Dir in header or Chart Review Media tab): Patient does not have Advance Directive, declines information.        Patient Reported Insurance  Verified current insurance plan:: [employer] Medicare Advantage; Medicaid  [employer] benefits discussed:: Transportation; Mail Order Pharmacy; OTC Prescription Discounts; Well Dine; Silver Sneakers (Mom's meals)            10/3/2023    12:15 PM 9/21/2023     9:57 AM 2/24/2023    10:40 AM 2/24/2023    10:39 AM 1/12/2022     9:17 AM 6/29/2021     1:30 PM 3/1/2021     9:30 AM   Depression Patient Health Questionnaire   Over the last two weeks how often have you been bothered by little interest or pleasure in doing things Not at all Not at all Not at all Not at all Not at all Not at all Not at all   Over the last two weeks how often have you been bothered by feeling down, depressed or hopeless Not at all Not at all Not at all Not at all Not at all Not at all Not at all   PHQ-2 Total Score 0 0 0 0 0 0 0       Learning Assessment       10/03/2023 1518 [facility] (10/3/2023 - Present)   Created by Mabel Dan, RN - RN (Nurse) Status: Complete                 PRIMARY LEARNER     Primary Learner Name:  Diomedes Mohr  - 10/03/2023 1518    Relationship:  Patient [facility] - 10/03/2023 1518    Does the primary learner have any barriers to learning?:  No Barriers  - 10/03/2023 1518    What is the preferred language of the primary learner?:  English  - 10/03/2023 1518    Is an  required?:  No  - 10/03/2023 1518    How does the primary  learner prefer to learn new concepts?:  Listening, Demonstration  - 10/03/2023 1518    How often do you need to have someone help you read instructions, pamphlets, or written material from your doctor or pharmacy?:  Sometimes  - 10/03/2023 1511    Comment: dgt may help patient         CO-LEARNER #1     No question answered        CO-LEARNER #2     No question answered        SPECIAL TOPICS     No question answered        ANSWERED BY:     No question answered        Edit History       Mabel Dan, RN - RN (Nurse)   10/03/2023 1519

## 2023-10-04 ENCOUNTER — TELEPHONE (OUTPATIENT)
Dept: PHARMACY | Facility: CLINIC | Age: 63
End: 2023-10-04
Payer: MEDICARE

## 2023-10-04 ENCOUNTER — PATIENT MESSAGE (OUTPATIENT)
Dept: PHARMACY | Facility: CLINIC | Age: 63
End: 2023-10-04
Payer: MEDICARE

## 2023-10-04 NOTE — TELEPHONE ENCOUNTER
I have reached out to Diomedes Mohr to inform her of the [employer] , and [employer] application process for Eliquis, Entresto, and Jardiance and whats required to apply.  Diomedes Mohr did not answer. I left a voicemail and mailed a letter introducing her to the pharmacy patient assistance program. I will follow up in 5 business days.

## 2023-10-09 ENCOUNTER — OUTPATIENT CASE MANAGEMENT (OUTPATIENT)
Dept: ADMINISTRATIVE | Facility: OTHER | Age: 63
End: 2023-10-09
Payer: MEDICARE

## 2023-10-09 NOTE — PROGRESS NOTES
Outpatient Care Management   - High Risk Patient Assessment    Patient: Diomedes Mohr  MRN:  [id number]  Date of Service:  10/9/2023  Completed by:  Demetria Muniz LMSW  Referral Date: 09/20/2023    Reason for Visit   Patient presents with    Social Work Assessment - High Risk     10/9/2023       Brief Summary:  received a referral from OPCM RN Mabel Dan for the following patient identified psycho-social needs : utility resources. SW completed social assessment with pt via telephone. Pt lives with her spouse who is retired. Pt does have two daughters who live nearby. Pt had a stroke in June and has R sided weakness. She requires assistance with ADLs. Pt has an aide who comes twice a week to help with bathing via [place] [facility]. Pt utilizes a rollator and wheelchair. Pt reported she is not behind on utilities and does not need assistance with groceries. Pt is not behind on her utility bills, but having trouble keeping up because it is so high. Pt brings in too much income for SNAP. Pt's daughter works for [employer] and informed pt that they do not have utility assistance at this time. SW will search for any other community resources that may be able to help. SW reviewed pt's supplemental benefits with her. She did receive her [employer] meals on Thursday. Pt would like to order a life alert. HERMELINDA informed her they are on back order and will task this to CHW. Pt does have $75/per quarter to order from OTC Catalog. Pt stated she does not have a catalog. HERMELINDA will ask OPCM RN to mail one to her. Pt agreeable to follow up in 1 week.Care plan was created in collaboration with patient/caregiver input.  completed the SDOH questionnaire.

## 2023-10-10 ENCOUNTER — PATIENT MESSAGE (OUTPATIENT)
Dept: ADMINISTRATIVE | Facility: OTHER | Age: 63
End: 2023-10-10
Payer: MEDICARE

## 2023-10-10 ENCOUNTER — OUTPATIENT CASE MANAGEMENT (OUTPATIENT)
Dept: ADMINISTRATIVE | Facility: OTHER | Age: 63
End: 2023-10-10
Payer: MEDICARE

## 2023-10-10 NOTE — PROGRESS NOTES
Outpatient Care Management  Plan of Care Follow Up Visit    Patient: Diomedes Mohr  MRN: [id number]  Date of Service: 10/10/2023  Completed by: Mabel Dan RN  Referral Date: 09/20/2023    Reason for Visit   Patient presents with    Update Plan Of Care       Brief Summary: OPCM follow up complete. Continued education on CHF.   Next Steps: Continue to evaluate patient's weight trends. Reinforce weight gain of 2-3 lbs or 5 lbs a week should be informed to PCP and or Cardiologist. Continue to educate on s/s of CHF, help patient identify 2 symptoms she can correlate to CHF. Encourage placing OTC order once catalog arrives. Patient agrees to follow up call with in 2 weeks

## 2023-10-11 NOTE — TELEPHONE ENCOUNTER
A 2nd attempt has been made to establish contact with Diomedes Mohr  via LETTER. The final contact attempt will be made in 5 business days

## 2023-10-13 ENCOUNTER — PATIENT OUTREACH (OUTPATIENT)
Dept: ADMINISTRATIVE | Facility: OTHER | Age: 63
End: 2023-10-13
Payer: MEDICARE

## 2023-10-13 NOTE — PROGRESS NOTES
Community Health Worker's assistance requested from Demetria Muniz LMSW to contact Human life alert  Agency Representative: n/a   Outcome of Call: Patient selected third option for life alert at home and away with fall detection. Will be received in 1-2 weeks by [employer]. Conf# [id number]  Follow up needed: No  Next Scheduled follow up: n/a

## 2023-10-16 ENCOUNTER — OUTPATIENT CASE MANAGEMENT (OUTPATIENT)
Dept: ADMINISTRATIVE | Facility: OTHER | Age: 63
End: 2023-10-16
Payer: MEDICARE

## 2023-10-16 NOTE — PROGRESS NOTES
Outpatient Care Management   - Care Plan Follow Up    Patient: Diomedes Mohr  MRN:  [id number]  Date of Service:  10/16/2023  Completed by:  Demetria Muniz LMSW  Referral Date: 09/20/2023    Reason for Visit   Patient presents with    OPCM SW Follow Up Call     10/16/2023       Brief Summary: SW followed up with pt via telephone. Pt reported she is doing well. OPCM RN has mailed the OTC catalog but pt has not received it just yet. CHW and pt ordered Life alert system and it should be delivered next week. Pt has not reviewed portal resources re: utility assistance, just yet. She is waiting for her daughter to come over to review it with her. Pt agreeable to follow up in 2 weeks.     Complex Care Plan     Care plan was discussed and completed today with input from patient and/or caregiver.    Patient Instructions     No follow-ups on file.

## 2023-10-24 ENCOUNTER — TELEPHONE (OUTPATIENT)
Dept: FAMILY MEDICINE | Facility: CLINIC | Age: 63
End: 2023-10-24
Payer: MEDICARE

## 2023-10-24 DIAGNOSIS — Z79.4 TYPE 2 DIABETES MELLITUS WITH HYPERGLYCEMIA, WITH LONG-TERM CURRENT USE OF INSULIN: Primary | ICD-10-CM

## 2023-10-24 DIAGNOSIS — E11.65 TYPE 2 DIABETES MELLITUS WITH HYPERGLYCEMIA, WITH LONG-TERM CURRENT USE OF INSULIN: Primary | ICD-10-CM

## 2023-10-24 NOTE — TELEPHONE ENCOUNTER
----- Message from Breana Hsu sent at 10/24/2023 12:16 PM CDT -----  Contact: spouse  Type:  Needs Referral     Who Called: spouse   Would the patient rather a call back or a response via [facility]? call  Best Call Back Number: [phone]  Additional Information:   Pt stated a referral need to be [facility]ed [facility]  Phone # [phone] pt do not have fax #

## 2023-10-27 ENCOUNTER — TELEPHONE (OUTPATIENT)
Dept: FAMILY MEDICINE | Facility: CLINIC | Age: 63
End: 2023-10-27
Payer: MEDICARE

## 2023-10-27 NOTE — TELEPHONE ENCOUNTER
----- Message from Anastasiia Daniels sent at 10/27/2023  3:49 PM CDT -----  Type:  Needs Medical Advice    Who Called:  PT'S   Symptoms (please be specific):    How long has patient had these symptoms:    Pharmacy name and phone #:    Would the patient rather a call back or a response via [facility]?   Best Call Back Number:   Additional Information: NEEDS REFERRAL TO SEND TO DR TARYN YOST PHONE [phone]

## 2023-10-30 ENCOUNTER — TELEPHONE (OUTPATIENT)
Dept: ADMINISTRATIVE | Facility: CLINIC | Age: 63
End: 2023-10-30
Payer: MEDICARE

## 2023-10-30 ENCOUNTER — TELEPHONE (OUTPATIENT)
Dept: FAMILY MEDICINE | Facility: CLINIC | Age: 63
End: 2023-10-30
Payer: MEDICARE

## 2023-10-30 NOTE — TELEPHONE ENCOUNTER
----- Message from Anastasiia Daniels sent at 10/30/2023 10:48 AM CDT -----  Type:  Needs Medical Advice    Who Called: PT'S , RONDA  Symptoms (please be specific):    How long has patient had these symptoms:    Pharmacy name and phone #:    Would the patient rather a call back or a response via [facility]? CALL THE CONFIRM  Best Call Back Number: [phone]  Additional Information: DR SANDRA REFERRAL IS STILL PENDING. THE .[phone] APPT IS THIS THURSDAY

## 2023-10-31 ENCOUNTER — OFFICE VISIT (OUTPATIENT)
Dept: INTERNAL MEDICINE | Facility: CLINIC | Age: 63
End: 2023-10-31
Payer: MEDICARE

## 2023-10-31 ENCOUNTER — OFFICE VISIT (OUTPATIENT)
Dept: TRANSPLANT | Facility: CLINIC | Age: 63
End: 2023-10-31
Payer: MEDICARE

## 2023-10-31 ENCOUNTER — IMMUNIZATION (OUTPATIENT)
Dept: INTERNAL MEDICINE | Facility: CLINIC | Age: 63
End: 2023-10-31
Payer: MEDICARE

## 2023-10-31 ENCOUNTER — TELEPHONE (OUTPATIENT)
Dept: TRANSPLANT | Facility: CLINIC | Age: 63
End: 2023-10-31

## 2023-10-31 ENCOUNTER — OUTPATIENT CASE MANAGEMENT (OUTPATIENT)
Dept: ADMINISTRATIVE | Facility: OTHER | Age: 63
End: 2023-10-31
Payer: MEDICARE

## 2023-10-31 VITALS
HEIGHT: 66 IN | WEIGHT: 155.63 LBS | SYSTOLIC BLOOD PRESSURE: 114 MMHG | DIASTOLIC BLOOD PRESSURE: 63 MMHG | HEART RATE: 91 BPM | BODY MASS INDEX: 25.01 KG/M2

## 2023-10-31 VITALS
BODY MASS INDEX: 25.01 KG/M2 | SYSTOLIC BLOOD PRESSURE: 110 MMHG | DIASTOLIC BLOOD PRESSURE: 64 MMHG | OXYGEN SATURATION: 99 % | HEART RATE: 90 BPM | WEIGHT: 155.63 LBS | HEIGHT: 66 IN

## 2023-10-31 DIAGNOSIS — E78.5 HYPERLIPIDEMIA, UNSPECIFIED HYPERLIPIDEMIA TYPE: Chronic | ICD-10-CM

## 2023-10-31 DIAGNOSIS — I25.119 ATHEROSCLEROSIS OF NATIVE CORONARY ARTERY WITH ANGINA PECTORIS, UNSPECIFIED WHETHER NATIVE OR TRANSPLANTED HEART: ICD-10-CM

## 2023-10-31 DIAGNOSIS — Z00.00 ENCOUNTER FOR PREVENTIVE HEALTH EXAMINATION: Primary | ICD-10-CM

## 2023-10-31 DIAGNOSIS — D63.8 ANEMIA IN OTHER CHRONIC DISEASES CLASSIFIED ELSEWHERE: ICD-10-CM

## 2023-10-31 DIAGNOSIS — F20.9 SCHIZOPHRENIA, UNSPECIFIED TYPE: ICD-10-CM

## 2023-10-31 DIAGNOSIS — I10 HYPERTENSION, UNSPECIFIED TYPE: Chronic | ICD-10-CM

## 2023-10-31 DIAGNOSIS — I73.9 PVD (PERIPHERAL VASCULAR DISEASE): ICD-10-CM

## 2023-10-31 DIAGNOSIS — E11.69 TYPE 2 DIABETES MELLITUS WITH OTHER SPECIFIED COMPLICATION, WITHOUT LONG-TERM CURRENT USE OF INSULIN: ICD-10-CM

## 2023-10-31 DIAGNOSIS — Z95.810 ICD (IMPLANTABLE CARDIOVERTER-DEFIBRILLATOR) IN PLACE: Chronic | ICD-10-CM

## 2023-10-31 DIAGNOSIS — I27.22 PULMONARY HYPERTENSION DUE TO LEFT HEART DISEASE: Chronic | ICD-10-CM

## 2023-10-31 DIAGNOSIS — I27.22 PULMONARY HYPERTENSION DUE TO LEFT HEART DISEASE: Primary | Chronic | ICD-10-CM

## 2023-10-31 DIAGNOSIS — I63.412 CEREBROVASCULAR ACCIDENT (CVA) DUE TO EMBOLISM OF LEFT MIDDLE CEREBRAL ARTERY: ICD-10-CM

## 2023-10-31 DIAGNOSIS — R26.89 IMPAIRED BALANCE AS LATE EFFECT OF CEREBROVASCULAR ACCIDENT: ICD-10-CM

## 2023-10-31 DIAGNOSIS — D68.59 HYPERCOAGULOPATHY: ICD-10-CM

## 2023-10-31 DIAGNOSIS — I69.398 IMPAIRED BALANCE AS LATE EFFECT OF CEREBROVASCULAR ACCIDENT: ICD-10-CM

## 2023-10-31 DIAGNOSIS — I50.42 CHRONIC COMBINED SYSTOLIC AND DIASTOLIC CONGESTIVE HEART FAILURE: Chronic | ICD-10-CM

## 2023-10-31 DIAGNOSIS — Z71.89 GOALS OF CARE, COUNSELING/DISCUSSION: ICD-10-CM

## 2023-10-31 PROBLEM — U07.1 COVID: Status: RESOLVED | Noted: 2022-05-28 | Resolved: 2023-10-31

## 2023-10-31 PROBLEM — I50.20 HEART FAILURE, SYSTOLIC: Status: RESOLVED | Noted: 2023-09-19 | Resolved: 2023-10-31

## 2023-10-31 PROBLEM — Z86.718 HISTORY OF DVT OF LOWER EXTREMITY: Status: ACTIVE | Noted: 2023-05-30

## 2023-10-31 PROBLEM — I82.409 ACUTE DVT (DEEP VENOUS THROMBOSIS): Status: RESOLVED | Noted: 2023-04-29 | Resolved: 2023-10-31

## 2023-10-31 PROBLEM — I50.43 ACUTE ON CHRONIC COMBINED SYSTOLIC AND DIASTOLIC HEART FAILURE: Status: RESOLVED | Noted: 2023-09-19 | Resolved: 2023-10-31

## 2023-10-31 PROBLEM — E87.6 HYPOKALEMIA: Status: RESOLVED | Noted: 2020-08-16 | Resolved: 2023-10-31

## 2023-10-31 PROCEDURE — 99999 PR PBB SHADOW E&M-EST. PATIENT-LVL III: ICD-10-PCS | Mod: PBBFAC,HCNC,, | Performed by: INTERNAL MEDICINE

## 2023-10-31 PROCEDURE — 99999 PR PBB SHADOW E&M-EST. PATIENT-LVL IV: CPT | Mod: PBBFAC,HCNC,, | Performed by: PHYSICIAN ASSISTANT

## 2023-10-31 PROCEDURE — G0008 ADMIN INFLUENZA VIRUS VAC: HCPCS | Mod: HCNC,S$GLB,, | Performed by: INTERNAL MEDICINE

## 2023-10-31 PROCEDURE — 3074F SYST BP LT 130 MM HG: CPT | Mod: HCNC,CPTII,S$GLB, | Performed by: INTERNAL MEDICINE

## 2023-10-31 PROCEDURE — 3044F PR MOST RECENT HEMOGLOBIN A1C LEVEL <7.0%: ICD-10-PCS | Mod: HCNC,CPTII,S$GLB, | Performed by: PHYSICIAN ASSISTANT

## 2023-10-31 PROCEDURE — 3078F DIAST BP <80 MM HG: CPT | Mod: HCNC,CPTII,S$GLB, | Performed by: PHYSICIAN ASSISTANT

## 2023-10-31 PROCEDURE — 4010F PR ACE/ARB THEARPY RXD/TAKEN: ICD-10-PCS | Mod: HCNC,CPTII,S$GLB, | Performed by: INTERNAL MEDICINE

## 2023-10-31 PROCEDURE — 3066F NEPHROPATHY DOC TX: CPT | Mod: HCNC,CPTII,S$GLB, | Performed by: PHYSICIAN ASSISTANT

## 2023-10-31 PROCEDURE — 3074F PR MOST RECENT SYSTOLIC BLOOD PRESSURE < 130 MM HG: ICD-10-PCS | Mod: HCNC,CPTII,S$GLB, | Performed by: PHYSICIAN ASSISTANT

## 2023-10-31 PROCEDURE — 1159F MED LIST DOCD IN RCRD: CPT | Mod: HCNC,CPTII,S$GLB, | Performed by: PHYSICIAN ASSISTANT

## 2023-10-31 PROCEDURE — 1159F PR MEDICATION LIST DOCUMENTED IN MEDICAL RECORD: ICD-10-PCS | Mod: HCNC,CPTII,S$GLB, | Performed by: PHYSICIAN ASSISTANT

## 2023-10-31 PROCEDURE — 3061F PR NEG MICROALBUMINURIA RESULT DOCUMENTED/REVIEW: ICD-10-PCS | Mod: HCNC,CPTII,S$GLB, | Performed by: PHYSICIAN ASSISTANT

## 2023-10-31 PROCEDURE — 3044F HG A1C LEVEL LT 7.0%: CPT | Mod: HCNC,CPTII,S$GLB, | Performed by: INTERNAL MEDICINE

## 2023-10-31 PROCEDURE — 3066F PR DOCUMENTATION OF TREATMENT FOR NEPHROPATHY: ICD-10-PCS | Mod: HCNC,CPTII,S$GLB, | Performed by: PHYSICIAN ASSISTANT

## 2023-10-31 PROCEDURE — 3044F HG A1C LEVEL LT 7.0%: CPT | Mod: HCNC,CPTII,S$GLB, | Performed by: PHYSICIAN ASSISTANT

## 2023-10-31 PROCEDURE — 3074F SYST BP LT 130 MM HG: CPT | Mod: HCNC,CPTII,S$GLB, | Performed by: PHYSICIAN ASSISTANT

## 2023-10-31 PROCEDURE — 3044F PR MOST RECENT HEMOGLOBIN A1C LEVEL <7.0%: ICD-10-PCS | Mod: HCNC,CPTII,S$GLB, | Performed by: INTERNAL MEDICINE

## 2023-10-31 PROCEDURE — 3008F PR BODY MASS INDEX (BMI) DOCUMENTED: ICD-10-PCS | Mod: HCNC,CPTII,S$GLB, | Performed by: INTERNAL MEDICINE

## 2023-10-31 PROCEDURE — 90686 IIV4 VACC NO PRSV 0.5 ML IM: CPT | Mod: HCNC,S$GLB,, | Performed by: INTERNAL MEDICINE

## 2023-10-31 PROCEDURE — 3061F NEG MICROALBUMINURIA REV: CPT | Mod: HCNC,CPTII,S$GLB, | Performed by: INTERNAL MEDICINE

## 2023-10-31 PROCEDURE — 99214 OFFICE O/P EST MOD 30 MIN: CPT | Mod: HCNC,S$GLB,, | Performed by: INTERNAL MEDICINE

## 2023-10-31 PROCEDURE — G0439 PPPS, SUBSEQ VISIT: HCPCS | Mod: HCNC,S$GLB,, | Performed by: PHYSICIAN ASSISTANT

## 2023-10-31 PROCEDURE — 4010F ACE/ARB THERAPY RXD/TAKEN: CPT | Mod: HCNC,CPTII,S$GLB, | Performed by: INTERNAL MEDICINE

## 2023-10-31 PROCEDURE — 3074F PR MOST RECENT SYSTOLIC BLOOD PRESSURE < 130 MM HG: ICD-10-PCS | Mod: HCNC,CPTII,S$GLB, | Performed by: INTERNAL MEDICINE

## 2023-10-31 PROCEDURE — 3008F BODY MASS INDEX DOCD: CPT | Mod: HCNC,CPTII,S$GLB, | Performed by: INTERNAL MEDICINE

## 2023-10-31 PROCEDURE — 99999 PR PBB SHADOW E&M-EST. PATIENT-LVL IV: ICD-10-PCS | Mod: PBBFAC,HCNC,, | Performed by: PHYSICIAN ASSISTANT

## 2023-10-31 PROCEDURE — 4010F PR ACE/ARB THEARPY RXD/TAKEN: ICD-10-PCS | Mod: HCNC,CPTII,S$GLB, | Performed by: PHYSICIAN ASSISTANT

## 2023-10-31 PROCEDURE — 3078F DIAST BP <80 MM HG: CPT | Mod: HCNC,CPTII,S$GLB, | Performed by: INTERNAL MEDICINE

## 2023-10-31 PROCEDURE — 90686 FLU VACCINE (QUAD) GREATER THAN OR EQUAL TO 3YO PRESERVATIVE FREE IM: ICD-10-PCS | Mod: HCNC,S$GLB,, | Performed by: INTERNAL MEDICINE

## 2023-10-31 PROCEDURE — 99999 PR PBB SHADOW E&M-EST. PATIENT-LVL III: CPT | Mod: PBBFAC,HCNC,, | Performed by: INTERNAL MEDICINE

## 2023-10-31 PROCEDURE — 3066F PR DOCUMENTATION OF TREATMENT FOR NEPHROPATHY: ICD-10-PCS | Mod: HCNC,CPTII,S$GLB, | Performed by: INTERNAL MEDICINE

## 2023-10-31 PROCEDURE — 3066F NEPHROPATHY DOC TX: CPT | Mod: HCNC,CPTII,S$GLB, | Performed by: INTERNAL MEDICINE

## 2023-10-31 PROCEDURE — 1160F RVW MEDS BY RX/DR IN RCRD: CPT | Mod: HCNC,CPTII,S$GLB, | Performed by: PHYSICIAN ASSISTANT

## 2023-10-31 PROCEDURE — 4010F ACE/ARB THERAPY RXD/TAKEN: CPT | Mod: HCNC,CPTII,S$GLB, | Performed by: PHYSICIAN ASSISTANT

## 2023-10-31 PROCEDURE — 3061F NEG MICROALBUMINURIA REV: CPT | Mod: HCNC,CPTII,S$GLB, | Performed by: PHYSICIAN ASSISTANT

## 2023-10-31 PROCEDURE — 1160F PR REVIEW ALL MEDS BY PRESCRIBER/CLIN PHARMACIST DOCUMENTED: ICD-10-PCS | Mod: HCNC,CPTII,S$GLB, | Performed by: PHYSICIAN ASSISTANT

## 2023-10-31 PROCEDURE — 3061F PR NEG MICROALBUMINURIA RESULT DOCUMENTED/REVIEW: ICD-10-PCS | Mod: HCNC,CPTII,S$GLB, | Performed by: INTERNAL MEDICINE

## 2023-10-31 PROCEDURE — 3078F PR MOST RECENT DIASTOLIC BLOOD PRESSURE < 80 MM HG: ICD-10-PCS | Mod: HCNC,CPTII,S$GLB, | Performed by: PHYSICIAN ASSISTANT

## 2023-10-31 PROCEDURE — G0439 PR MEDICARE ANNUAL WELLNESS SUBSEQUENT VISIT: ICD-10-PCS | Mod: HCNC,S$GLB,, | Performed by: PHYSICIAN ASSISTANT

## 2023-10-31 PROCEDURE — 3078F PR MOST RECENT DIASTOLIC BLOOD PRESSURE < 80 MM HG: ICD-10-PCS | Mod: HCNC,CPTII,S$GLB, | Performed by: INTERNAL MEDICINE

## 2023-10-31 PROCEDURE — G0008 FLU VACCINE (QUAD) GREATER THAN OR EQUAL TO 3YO PRESERVATIVE FREE IM: ICD-10-PCS | Mod: HCNC,S$GLB,, | Performed by: INTERNAL MEDICINE

## 2023-10-31 PROCEDURE — 99214 PR OFFICE/OUTPT VISIT, EST, LEVL IV, 30-39 MIN: ICD-10-PCS | Mod: HCNC,S$GLB,, | Performed by: INTERNAL MEDICINE

## 2023-10-31 RX ORDER — METOPROLOL SUCCINATE 25 MG/1
50 TABLET, EXTENDED RELEASE ORAL DAILY
Qty: 60 TABLET | Refills: 0 | Status: SHIPPED | OUTPATIENT
Start: 2023-10-31 | End: 2023-12-14 | Stop reason: SDUPTHER

## 2023-10-31 NOTE — PATIENT INSTRUCTIONS
Counseling and Referral of Other Preventative  (Italic type indicates deductible and co-insurance are waived)    Patient Name: Diomedes Mohr  Today's Date: 10/31/2023    Health Maintenance       Date Due Completion Date    COVID-19 Vaccine (1) Never done ---    TETANUS VACCINE Never done ---    RSV Vaccine (Age 60+) (1 - 1-dose 60+ series) Never done ---    Eye Exam 04/08/2023 4/8/2022    Influenza Vaccine (1) 09/01/2023 10/25/2022    Shingles Vaccine (2 of 2) 09/21/2023 7/27/2023    Hemoglobin A1c 02/28/2024 8/28/2023    Lipid Panel 04/30/2024 4/30/2023    Diabetes Urine Screening 06/08/2024 6/8/2023    Foot Exam 06/08/2024 6/8/2023 (Done)    Override on 6/8/2023: Done    Mammogram 07/19/2024 7/19/2023    High Dose Statin 10/03/2024 10/3/2023    Colorectal Cancer Screening 06/23/2026 6/23/2023        No orders of the defined types were placed in this encounter.    The following information is provided to all patients.  This information is to help you find resources for any of the problems found today that may be affecting your health:                Living healthy guide: www.[facility].[place].gov      Understanding Diabetes: www.diabetes.org      Eating healthy: www.cdc.gov/healthyweight      CDC home safety checklist: www.cdc.gov/steadi/patient.html      Agency on Aging: www.goea.[place].[facility]      Alcoholics anonymous (AA): www.aa.org      Physical Activity: www.veda.nih.gov/da8ibfu      Tobacco use: www.quitwithusla.org

## 2023-10-31 NOTE — LETTER
October 31, 2023        Michelle Young  [address] [address]  Phone: [phone]  Fax: [phone]             [id number]  [address]  [place] [address]  Phone: [phone]   Patient: Diomedes Mohr   MR Number: [id number]   YOB: 1960   Date of Visit: 10/31/2023       Dear Dr. Michelle Young    Thank you for referring Diomedes Mohr to me for evaluation. Attached you will find relevant portions of my assessment and plan of care.    If you have questions, please do not hesitate to call me. I look forward to following Diomedes Mohr along with you.    Sincerely,    Justina Smith MD    Enclosure    If you would like to receive this communication electronically, please contact [email or web address] or [phone] to request [employer] Link access.    [employer] Link is a tool which provides read-only access to select patient information with whom you have a relationship. Its easy to use and provides real time access to review your patients record including encounter summaries, notes, results, and demographic information.    If you feel you have received this communication in error or would no longer like to receive these types of communications, please e-mail [email or web address]

## 2023-10-31 NOTE — PROGRESS NOTES
"  Diomedes Mohr presented for a  Medicare AWV and comprehensive Health Risk Assessment today. The following components were reviewed and updated:    Medical history  Family History  Social history  Allergies and Current Medications  Health Risk Assessment  Health Maintenance  Care Team         ** See Completed Assessments for Annual Wellness Visit within the encounter summary.**         The following assessments were completed:  Living Situation  CAGE  Depression Screening  Timed Get Up and Go  Whisper Test  Cognitive Function Screening (recent CVA)    Nutrition Screening  ADL Screening  PAQ Screening        Vitals:    10/31/23 0912   BP: 110/64   BP Location: Left arm   Patient Position: Sitting   BP Method: Medium (Manual)   Pulse: 90   SpO2: 99%   Weight: 70.6 kg (155 lb 10.3 oz)   Height: 5' 6" (1.676 m)     Body mass index is 25.12 kg/m².  Physical Exam  Vitals reviewed.   Constitutional:       General: She is not in acute distress.     Appearance: She is well-developed. She is not ill-appearing.   HENT:      Head: Normocephalic and atraumatic.   Cardiovascular:      Rate and Rhythm: Normal rate and regular rhythm.      Heart sounds: No murmur heard.  Pulmonary:      Effort: Pulmonary effort is normal.      Breath sounds: Normal breath sounds. No wheezing or rales.   Abdominal:      General: Bowel sounds are normal.      Palpations: Abdomen is soft.      Tenderness: There is no abdominal tenderness.   Musculoskeletal:      Right lower leg: No edema.      Left lower leg: No edema.      Comments: In wheelchair   Skin:     General: Skin is warm and dry.      Findings: No rash.   Neurological:      Mental Status: She is alert.   Psychiatric:         Mood and Affect: Mood normal.               Diagnoses and health risks identified today and associated recommendations/orders:    1. Encounter for preventive health examination  Exam and Assessments performed  Chart Review Complete  Health Maintenance Reviewed and " updated  Flu shot today    2. Chronic combined systolic and diastolic congestive heart failure  S/p ICD  Stable on current meds  Followed by Cardiology    3. Atherosclerosis of native coronary artery with angina pectoris, unspecified whether native or transplanted heart  Stable on current meds  Followed by Cardiology    4. Pulmonary hypertension due to left heart disease  Stable on current meds  Followed by Cardiology    5. PVD (peripheral vascular disease)  Stable on current meds  Followed by Cardiology    6. Type 2 diabetes mellitus with other specified complication, without long-term current use of insulin  Stable on current meds  Followed by PCP    7. Hypercoagulopathy  Stable  Hx of PE/DVT  On eliquis  Followed by Heme     8. Schizophrenia, unspecified type  Stable  Not on pharmacotherapy  Followed by PCP    9. Anemia in other chronic diseases classified elsewhere  Stable  Followed by PCP/HEme    10. ICD (implantable cardioverter-defibrillator) in place  Stable  Followed by Cardiology/Device Clinic    11. Hyperlipidemia, unspecified hyperlipidemia type  Stable on current meds  Followed by PCP/Cardiology    12. Hypertension, unspecified type  Stable on current meds  Followed by PCP/Cardiology      Provided Diomedes with a 5-10 year written screening schedule and personal prevention plan. Recommendations were developed using the USPSTF age appropriate recommendations. Education, counseling, and referrals were provided as needed. After Visit Summary printed and given to patient which includes a list of additional screenings\tests needed.    Follow up for next available for PCP follow up and 1 year for next Medicare AWV.    Thuy Brock PA-C    Future Appointments   Date Time Provider Department Center   11/20/2023 10:00 AM HOME MONITOR DEVICE CHECK, [facility] [address]   1/26/2024  7:10 AM LAB, APPOINTMENT [facility]   1/26/2024  8:30 AM Justina Smith MD [facility] [address]

## 2023-10-31 NOTE — PROGRESS NOTES
ADVANCED HEART FAILURE AND TRANSPLANTATION CLINIC VISIT    CHIEF COMPLAINT:  Follow-up heart failure    HISTORY OF PRESENT ILLNESS:  Diomedes Mohr is a 63 y.o.  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 she had a PET Stress 4/2022 which was pertinent for small (<5%) mild to moderate intensity lateral apical perfusion abnormality of 3% LV myocardium. EF 10% and rest, 13% during stress. ED workup pertinent for: HDS, HR 80's, /77, 97% RA. CBC and BMP unremarkable. EKG with no acute abnormalities. Troponin 0.018, repeat 0.011. BNP elevated 1,038. CXR with mild interstitial and patchy alveolar infiltrate. CTA negative for PE. She was discharged 10/16/2022    She has had several admissions in 2020 for decompensated HF and now most recently this. In [facility] clinic 4/25/2022 PET stress was completed as well as RHC noted below with consideration for pulmonary follow-up after history of PE (evaluated by hematology) and history of COPD. This month she has already presented twice to hospital. Notes SOB constantly even while seated and is more difficult wearing the mask. Has history of tobacco use prior to stroke and was diagnosed with COPD and is not on any maintenance inhaler only PRN albuterol. States checks weights daily and notes 1 lb gain since discharge. Notes sleeping on side or using 3 pillows constantly. Does not recall having sleep study previously. States appetite is good and no constipation.     9/21/2023: Since last visit in January, Mrs. Mohr had a CPX done in March, but did not show for her RHC. She has had frequent  hospitalizations.  4/27-5/12 with c/o knee pain. MCA stroke NIHSS 24 on 4/29 after originally admitted for R. Popliteal A occlusion on heparin gtt. She was transferred  after developing aphasia and R. Hemiplegia. She was not a candidate for TNK bc she was on anticoagulation. Transferred to [facility] s/p MT with TICI 2c reperfusion. Repeat CTH after IR with no acute infarct. MRI completed showing acute L. MCA infarct. Pacemaker reinitiated post MRI to normal settings. Started Pradaxa per Heme/Onc. Neuro exam steadily improved after thrombectomy and therapy (5/12 NIHSS 6). CTH overnight stable. Heparin gtt transitioned to Pradaxa today for AC of reduced EF and VTE history. Discharged to SNF.    5/31 :  Patient had no vomiting since on the floor and nausea improving. But oral intake still poor inspite of starting Periactin yesterday. Says food does not taste good and so she swallows the juice and spits out solid food. No problems swallowing. Willing to try liquids. Glucerna therefore added. Does not want PEG tube placement at this time. Severe hypokalemia and hypocalcemia on labs today, which are being replaced. C/o right leg pain, which is worse at night and has been present since recent CVA- ?sec to neuropathy. Started on small dose Neurontin.      6/1 : Oral intake improved significantly since last evening. Says can taste some foods now. Tolerating Glucerna well too. No more nausea or vomiting. Labs today showed normal electrolytes. Therefore being d.amanda home in a stable condition. Will cont Periactin for appetite stimulation and Reglan for diabetic gastroparesis. Out patient PT/OT/ST arranged as per therapy recommendations. As oral intake still not optimal, will d/c Glimepiride and cont Trulicity and Jardiance. Metformin dose decreased to 500 mg BID. Blood pressure improved, but still low normal and has no signs of fluid overload and so Aldactone and Torsemide d/amanda. Toprol dose decreased to 50 mg daily. Pradaxa d/amanda  and Eliquis started as she had DVT while on Pradaxa.     8/30-9/5 - Patient admitted to the MICU for septic shock 2/2 UTI with underlying HFrEF (EF 10-15%). She received 1750 cc of IVF and never required vasopressors to maintain BP.  Completed treatment of UTI.     9/18-9/20 - Patient was started on I.V Lasix, diuresed well and leg edema improved a lot. SOB improved too. Diuretics changed to Torsemide on 9/19, but increased dose from home dose. Cont to diurese well with this and lost about 7 pounds weight since admit. H.R not well controlled. Toprol dose therefore increased, with improvement in same. Blood pressure wnl since on the floor. Troponin increased initially, but wnl today . No chest pain since admit. Troponin elevation mostly sec to demand ischemia sec to acute on chronic combined HF. She is now being d/amanda home in a stable condition with increased dose Torsemide and Toprol.     Mrs. Mohr was seen last month in followup after which she was started back on jardiance. Has tolerated it well since then, but continues to have sequela of stroke, not able to walk or do much, was in rehab but out of it and did therapy but states needs more. Additionally in talking to patient and , there are no ACP documents in the chart even though she had the big stroke, talked about HCPOA and living will. Stable from heart failure standpoint.       Cardiac Data:    ECHO: 8/31/2023  Left Ventricle: The left ventricle is severely dilated. Normal wall thickness. Severe global hypokinesis present. There is severely reduced systolic function with a visually estimated ejection fraction of 15 - 20%. Biplane (2D) method of discs ejection fraction is 17%. Grade I diastolic dysfunction.    Right Ventricle: Normal right ventricular cavity size. Wall thickness is normal. Right ventricle wall motion  is normal. Systolic function is normal. Pacemaker lead present in the ventricle.    Aortic Valve: The aortic valve is a trileaflet  valve. There is mild aortic valve sclerosis. Mild annular calcification.    IVC/SVC: Intermediate venous pressure at 8 mmHg.    ECHO: 2023  The left ventricle is severely enlarged with eccentric hypertrophy and severely decreased systolic function.  The estimated ejection fraction is 10-15%.  There is left ventricular global hypokinesis.  Grade II left ventricular diastolic dysfunction.  Normal right ventricular size with normal right ventricular systolic function.  Mild tricuspid regurgitation.  The estimated PA systolic pressure is 35 mmHg.  Normal central venous pressure (3 mmHg).  Severe left atrial enlargement.    Transthoracic Echo: Results for orders placed during the hospital encounter of 10/15/22  · The left ventricle is severely enlarged LVEDD 64 mm with eccentric hypertrophy and severely decreased systolic function. The estimated ejection fraction is 15%.  · Normal right ventricular size with normal right ventricular systolic function.  · Grade II left ventricular diastolic dysfunction.  · Mild left atrial enlargement.  · The estimated PA systolic pressure is 55 mmHg.  · Normal central venous pressure (3 mmHg).    EC2023  Vent. Rate : 113 BPM     Atrial Rate : 113 BPM      P-R Int : 144 ms          QRS Dur : 104 ms       QT Int : 354 ms       P-R-T Axes : 040 045 187 degrees      QTc Int : 485 ms     Sinus tachycardia   Minimal voltage criteria for LVH, may be normal variant   T wave abnormality, consider inferolateral ischemia   Abnormal ECG   When compared with ECG of 02-SEP-2023 14:15,   Premature ventricular complexes are no longer Present   ST no longer depressed in Inferior leads   ST no longer depressed in Anterior leads   ST no longer elevated in Lateral leads   Inverted T waves have replaced nonspecific T wave abnormality in Inferior   leads   Confirmed by Dagoberto Acharya MD ([id number]) on 2023 6:26:24 PM     ECG 10/16/2022: SR 64 bpm, LVH with ST T changes hypertrophy   "ms    Left Heart Catheterization:   Right Heart Catheterization: Results for orders placed during the hospital encounter of 05/12/22  Right atrial pressure is mildly elevated.  Pulmonary capillary wedge pressure is moderately elevated.  Pulmonary artery pressure is mildly elevated.  Jv cardiac output and index is low normal.  Pulmonary vascular resistance is normal.  Systemic vascular resistance is 1657.  BP above target of < 130/80 during procedure.    RA: 14/ 10/ 12 RV: 45/ 11 PA: 44/ 25/ 31 PWP: 29/ 29/ 25 .   Cardiac output was 4.3  by Jv. Cardiac index is 2.2 L/min/m2.   O2 Sat: PA 61%.   Pulmonary vascular resistance: 112. Systemic vascular resistance: 1657.     Devices: ICD    REVIEW OF SYSTEMS  Review of Systems   Constitutional: Negative.    HENT: Negative.     Respiratory:  Positive for shortness of breath.    Cardiovascular: Negative.    Gastrointestinal: Negative.    Endocrine: Negative.    Musculoskeletal: Negative.         Tailbone pain since stroke   Neurological:  Positive for weakness.        See HPI   Psychiatric/Behavioral: Negative.         PHYSICAL EXAM:    /63 (BP Location: Left arm, Patient Position: Sitting, BP Method: Medium (Automatic))   Pulse 91   Ht 5' 6" (1.676 m)   Wt 70.6 kg (155 lb 10.3 oz)   LMP  (LMP Unknown)   BMI 25.12 kg/m²     GENERAL: Alert, NAD   HEENT: Anicteric sclerae  NECK: JVP not visible above level of clavicle while sitting upright, estimated at 8 cmH20  CARDIOVASCULAR: Regular rate and rhythm. Normal S1, S2 no murmurs, rubs or gallops.  PULMONARY: Lungs clear to auscultation bilaterally  ABDOMEN: Soft, nontender, nondistended. No guarding, no rebound, no hepatomegaly  EXTREMITIES: Warm. No clubbing, cyanosis or edema. No pre-sacral edema  VASCULAR: 2+ bilateral radial pulses  NEUROLOGIC: No focal deficits    LABS:    Lab Results   Component Value Date     10/27/2023    K 3.6 10/27/2023     10/27/2023    CO2 33 (H) 10/27/2023    BUN 6 (L) " 10/27/2023    CREATININE 0.74 10/27/2023    CALCIUM 9.1 10/27/2023    ANIONGAP 8 10/27/2023    ESTGFRAFRICA >60.0 07/31/2022    EGFRNONAA >60.0 07/31/2022       Magnesium   Date Value Ref Range Status   09/20/2023 1.9 1.6 - 2.6 mg/dL Final       Lab Results   Component Value Date    WBC 5.96 10/27/2023    HGB 11.2 (L) 10/27/2023    HCT 34.5 (L) 10/27/2023    MCV 90 10/27/2023     10/27/2023       Lab Results   Component Value Date    INR 1.1 04/30/2023    INR 1.0 04/29/2023    INR 1.0 04/27/2023       BNP   Date Value Ref Range Status   10/27/2023 1,203 (H) 0 - 99 pg/mL Final     Comment:     Values of less than 100 pg/ml are consistent with non-CHF populations.   10/02/2023 1,209 (H) 0 - 99 pg/mL Final     Comment:     Values of less than 100 pg/ml are consistent with non-CHF populations.   09/21/2023 1,713 (H) 0 - 99 pg/mL Final     Comment:     Values of less than 100 pg/ml are consistent with non-CHF populations.       IMPRESSION:    NYHA Class III   ACC/AHA Stage C  CVA, due to left embolism of left middle cerebral artery  ICD  Chronic combined systolic and diastolic congestive heart failure  Pulmonary hypertension due to left heart disease  Atherosclerosis of native of heart arteries    PLAN:  Patient is stable from heart failure standpoint,but not taking entresto correctly.   Will increase it to twice a day. In one week would like to increase to 49/51mg po BID as long as blood pressures are stable and she is tolerating well.   Additionally increase to metoprolol XL 50mg po daily.   Stable on jardiance that was started last visit.   Dsicussed goals of care, and health care power of - gave paper to her , he states he is HCPOA and his daughter is second, but wants to take it home to review and be ready.  Talked to them about the fact that given she is quite debilitated following stroke, she is not a candidate for advanced options currently, and for them to discuss what her wishes are if and  when she gets sicker. They listened and are open to talk more about this next visit. Additioanlly since she has sequelea from stroke, wondered if palliative care would be able to talk about living with stroke, debility and end stage heart failure all together, and help continue goals of care conversation. Therefore referral placed.  Advance Care Planning     Date: 10/31/2023    Power of   I initiated the process of voluntary advance care planning today and explained the importance of this process to the patient.  I introduced the concept of advance directives to the patient, as well. Then the patient received detailed information about the importance of designating a Health Care Power of  (HCPOA). She was also instructed to communicate with this person about their wishes for future healthcare, should she become sick and lose decision-making capacity. The patient has not previously appointed a HCPOA. After our discussion, the patient has decided to complete a HCPOA and has appointed her significant other, health care agent:  Randall Mohr  & health care agent number: in chart as emergency contact I spent a total time of 10 minutes discussing this issue with the patient. They are going to complete and sign the paper when they return next visit with PCP or whoever they see next.         Recommend 2 gram sodium restriction and 1500cc fluid restriction.  Encourage physical activity with graded exercise program.  Requested patient to weigh themselves daily, and to notify us if their weight increases by more than 3 lbs in 1 day or 5 lbs in 1 week.        Electronically signed by:   Justina Smith   10/31/2023 12:43 PM

## 2023-10-31 NOTE — TELEPHONE ENCOUNTER
0910 am  Message below noted and I have added to HF nurse reminder for Tuesday Nov 7th    ----- Message from Justina Smith MD sent at 10/31/2023  8:51 AM CDT -----  Hey gulesly would you kindly talk to her in a week and see what pressures are running? If blood pressure okay would like to increase to 49/51 entresto and repeat labs in 1 week. Thank you.

## 2023-10-31 NOTE — PROGRESS NOTES
Outpatient Care Management   - Care Plan Follow Up    Patient: Diomedes Mohr  MRN:  [id number]  Date of Service:  10/31/2023  Completed by:  Demetria Muniz LMSW  Referral Date: 09/20/2023    Reason for Visit   Patient presents with    OPCM SW Follow Up Call     10/31/2023       Brief Summary: SW followed up with pt via telephone. She reported she has been doing well. She received the [employer] Alert that was ordered with CHW. Pt has yet to receive the [employer] OTC catalog. SW notified OPCM RN. Per chart review, portal resources were read by pt. Pt stated her daughter must have read it. Pt reported she will be seeing her daughter today and will ask her about it. Pt agreeable with follow up in 3 weeks.     Complex Care Plan     Care plan was discussed and completed today with input from patient and/or caregiver.    Patient Instructions     No follow-ups on file.

## 2023-10-31 NOTE — Clinical Note
Ivan benitez would you kindly talk to her in a week and see what pressures are running? If blood pressure okay would like to increase to 49/51 entresto and repeat labs in 1 week. Thank you.

## 2023-11-04 ENCOUNTER — NURSE TRIAGE (OUTPATIENT)
Dept: ADMINISTRATIVE | Facility: CLINIC | Age: 63
End: 2023-11-04
Payer: MEDICARE

## 2023-11-04 ENCOUNTER — HOSPITAL ENCOUNTER (EMERGENCY)
Facility: HOSPITAL | Age: 63
Discharge: HOME | End: 2023-11-04
Attending: EMERGENCY MEDICINE
Payer: MEDICARE

## 2023-11-04 VITALS
OXYGEN SATURATION: 98 % | DIASTOLIC BLOOD PRESSURE: 61 MMHG | HEIGHT: 66 IN | WEIGHT: 155 LBS | HEART RATE: 86 BPM | TEMPERATURE: 99 F | BODY MASS INDEX: 24.91 KG/M2 | RESPIRATION RATE: 17 BRPM | SYSTOLIC BLOOD PRESSURE: 106 MMHG

## 2023-11-04 DIAGNOSIS — I95.9 HYPOTENSION, UNSPECIFIED HYPOTENSION TYPE: Primary | ICD-10-CM

## 2023-11-04 LAB
ALBUMIN SERPL BCP-MCNC: 3.6 G/DL (ref 3.5–5.2)
ALLENS TEST: NORMAL
ALP SERPL-CCNC: 89 U/L (ref 55–135)
ALT SERPL W/O P-5'-P-CCNC: 12 U/L (ref 10–44)
ANION GAP SERPL CALC-SCNC: 13 MMOL/L (ref 8–16)
AST SERPL-CCNC: 22 U/L (ref 10–40)
BACTERIA #/AREA URNS AUTO: ABNORMAL /HPF
BASOPHILS # BLD AUTO: 0.04 K/UL (ref 0–0.2)
BASOPHILS NFR BLD: 0.7 % (ref 0–1.9)
BILIRUB SERPL-MCNC: 0.6 MG/DL (ref 0.1–1)
BILIRUB UR QL STRIP: NEGATIVE
BUN SERPL-MCNC: 6 MG/DL (ref 8–23)
CALCIUM SERPL-MCNC: 9.3 MG/DL (ref 8.7–10.5)
CHLORIDE SERPL-SCNC: 102 MMOL/L (ref 95–110)
CLARITY UR REFRACT.AUTO: CLEAR
CO2 SERPL-SCNC: 26 MMOL/L (ref 23–29)
COLOR UR AUTO: ABNORMAL
CREAT SERPL-MCNC: 0.8 MG/DL (ref 0.5–1.4)
DIFFERENTIAL METHOD: ABNORMAL
EOSINOPHIL # BLD AUTO: 0.1 K/UL (ref 0–0.5)
EOSINOPHIL NFR BLD: 2 % (ref 0–8)
ERYTHROCYTE [DISTWIDTH] IN BLOOD BY AUTOMATED COUNT: 15 % (ref 11.5–14.5)
EST. GFR  (NO RACE VARIABLE): >60 ML/MIN/1.73 M^2
GLUCOSE SERPL-MCNC: 91 MG/DL (ref 70–110)
GLUCOSE UR QL STRIP: ABNORMAL
HCT VFR BLD AUTO: 35.4 % (ref 37–48.5)
HGB BLD-MCNC: 10.9 G/DL (ref 12–16)
HGB UR QL STRIP: ABNORMAL
IMM GRANULOCYTES # BLD AUTO: 0 K/UL (ref 0–0.04)
IMM GRANULOCYTES NFR BLD AUTO: 0 % (ref 0–0.5)
KETONES UR QL STRIP: NEGATIVE
LDH SERPL L TO P-CCNC: 0.88 MMOL/L (ref 0.5–2.2)
LEUKOCYTE ESTERASE UR QL STRIP: NEGATIVE
LYMPHOCYTES # BLD AUTO: 2.9 K/UL (ref 1–4.8)
LYMPHOCYTES NFR BLD: 48.1 % (ref 18–48)
MAGNESIUM SERPL-MCNC: 1.7 MG/DL (ref 1.6–2.6)
MCH RBC QN AUTO: 28.5 PG (ref 27–31)
MCHC RBC AUTO-ENTMCNC: 30.8 G/DL (ref 32–36)
MCV RBC AUTO: 93 FL (ref 82–98)
MICROSCOPIC COMMENT: ABNORMAL
MONOCYTES # BLD AUTO: 0.3 K/UL (ref 0.3–1)
MONOCYTES NFR BLD: 5.7 % (ref 4–15)
NEUTROPHILS # BLD AUTO: 2.6 K/UL (ref 1.8–7.7)
NEUTROPHILS NFR BLD: 43.5 % (ref 38–73)
NITRITE UR QL STRIP: NEGATIVE
NRBC BLD-RTO: 0 /100 WBC
PH UR STRIP: 6 [PH] (ref 5–8)
PHOSPHATE SERPL-MCNC: 3.7 MG/DL (ref 2.7–4.5)
PLATELET # BLD AUTO: 329 K/UL (ref 150–450)
PMV BLD AUTO: 10.7 FL (ref 9.2–12.9)
POTASSIUM SERPL-SCNC: 3.7 MMOL/L (ref 3.5–5.1)
PROT SERPL-MCNC: 6.9 G/DL (ref 6–8.4)
PROT UR QL STRIP: NEGATIVE
RBC # BLD AUTO: 3.82 M/UL (ref 4–5.4)
RBC #/AREA URNS AUTO: 9 /HPF (ref 0–4)
SAMPLE: NORMAL
SITE: NORMAL
SODIUM SERPL-SCNC: 141 MMOL/L (ref 136–145)
SP GR UR STRIP: 1.01 (ref 1–1.03)
SQUAMOUS #/AREA URNS AUTO: 0 /HPF
URN SPEC COLLECT METH UR: ABNORMAL
WBC # BLD AUTO: 5.92 K/UL (ref 3.9–12.7)
WBC #/AREA URNS AUTO: 1 /HPF (ref 0–5)
YEAST UR QL AUTO: ABNORMAL

## 2023-11-04 PROCEDURE — 84100 ASSAY OF PHOSPHORUS: CPT | Mod: HCNC

## 2023-11-04 PROCEDURE — 80053 COMPREHEN METABOLIC PANEL: CPT | Mod: HCNC

## 2023-11-04 PROCEDURE — 99283 EMERGENCY DEPT VISIT LOW MDM: CPT | Mod: HCNC

## 2023-11-04 PROCEDURE — 83735 ASSAY OF MAGNESIUM: CPT | Mod: HCNC

## 2023-11-04 PROCEDURE — 83605 ASSAY OF LACTIC ACID: CPT | Mod: HCNC

## 2023-11-04 PROCEDURE — 99900035 HC TECH TIME PER 15 MIN (STAT): Mod: HCNC

## 2023-11-04 PROCEDURE — 85025 COMPLETE CBC W/AUTO DIFF WBC: CPT | Mod: HCNC

## 2023-11-04 PROCEDURE — 81001 URINALYSIS AUTO W/SCOPE: CPT | Mod: HCNC

## 2023-11-04 NOTE — ED PROVIDER NOTES
Encounter Date: 11/4/2023       History     Chief Complaint   Patient presents with    Hypotension     Reports systolic bp in 80s at home. On HTN meds. Took it this am. On Eliquis and has recently starting taking twice a day.     63-year-old female with history of HTN, HLD, CHF, DM2, A-fib with pacermaker, CVA on Eliquis presents to the ED regarding hypotension.  Son at bedside.  States she monitors her blood pressure 1-2 times per day per her PCP due to being on Eliquis.  Today she checked her BP and it was 80s/60s mmHg.  She reports she had mild lightheadedness that resolved.  She also states she is not eaten anything today.  She denies any pain or complaints.  Denies chest pain, shortness of breath, urinary complaints, nausea, vomiting, abdominal pain, URI symptoms, or any other complaints at this time.  Per her son she is normally systolically low 100s.      The history is provided by the patient, medical records and a relative.     Review of patient's allergies indicates:   Allergen Reactions    Adhesive Blisters    Captopril Other (See Comments)     COUGH     Past Medical History:   Diagnosis Date    Acute on chronic combined systolic and diastolic heart failure 12/26/2019    ARBEN (acute kidney injury) 5/30/2023    Anticoagulant long-term use     Anxiety     Arthritis     Asthma     Depression     H/O: hysterectomy     Hyperlipemia     Hypertension     Pulmonary edema     Schizophrenia      Past Surgical History:   Procedure Laterality Date    BLADDER SUSPENSION      CARPAL TUNNEL RELEASE Right     HEEL SPUR SURGERY Left     HYSTERECTOMY      LEFT HEART CATHETERIZATION Bilateral 12/27/2019    Procedure: Left heart cath;  Surgeon: Steve Chambers MD;  Location: [facility] CATH LAB;  Service: Cardiology;  Laterality: Bilateral;    RIGHT HEART CATHETERIZATION Right 7/26/2021    Procedure: INSERTION, CATHETER, RIGHT HEART;  Surgeon: Petr Naranjo MD;  Location: [facility] CATH LAB;  Service: Cardiology;   Laterality: Right;    RIGHT HEART CATHETERIZATION Right 2022    Procedure: INSERTION, CATHETER, RIGHT HEART;  Surgeon: Chandana Ivory Jr., MD;  Location: [facility] CATH LAB;  Service: Cardiology;  Laterality: Right;    TUBAL LIGATION       Family History   Problem Relation Age of Onset    No Known Problems Mother     No Known Problems Father     Ovarian cancer Sister 42     Social History     Tobacco Use    Smoking status: Former     Current packs/day: 0.00     Types: Cigarettes     Quit date: 2016     Years since quittin.1    Smokeless tobacco: Never    Tobacco comments:     nonex 2 weeks   Substance Use Topics    Alcohol use: No     Alcohol/week: 0.0 standard drinks of alcohol    Drug use: No     Review of Systems   Constitutional:  Negative for fever.   HENT:  Negative for sore throat.    Respiratory:  Negative for shortness of breath.    Cardiovascular:  Negative for chest pain.   Gastrointestinal:  Negative for nausea.   Genitourinary:  Negative for dysuria.   Musculoskeletal:  Negative for back pain.   Skin:  Negative for rash.   Neurological:  Positive for light-headedness. Negative for weakness.   Hematological:  Does not bruise/bleed easily.       Physical Exam     Initial Vitals [23 1410]   BP Pulse Resp Temp SpO2   111/61 97 19 98.3 °F (36.8 °C) 98 %      MAP       --         Physical Exam    Vitals reviewed.  Constitutional: She appears well-developed and well-nourished. She is not diaphoretic. No distress.   HENT:   Head: Normocephalic and atraumatic.   Neck: Neck supple.   Cardiovascular:  Normal rate, regular rhythm and normal heart sounds.     Exam reveals no gallop and no friction rub.       No murmur heard.  Pulmonary/Chest: Breath sounds normal. No respiratory distress. She has no wheezes. She has no rhonchi. She has no rales.   Musculoskeletal:      Cervical back: Neck supple.     Neurological: She is alert and oriented to person, place, and time.   Psychiatric: She has a  normal mood and affect.         ED Course   Procedures  Labs Reviewed   URINALYSIS, REFLEX TO URINE CULTURE - Abnormal; Notable for the following components:       Result Value    Glucose, UA 3+ (*)     Occult Blood UA 1+ (*)     All other components within normal limits    Narrative:     Specimen Source->Urine   COMPREHENSIVE METABOLIC PANEL - Abnormal; Notable for the following components:    BUN 6 (*)     All other components within normal limits    Narrative:     add on mg and phos per  /order#[id number] and [id number]   @ 11/04/2023    CBC W/ AUTO DIFFERENTIAL - Abnormal; Notable for the following components:    RBC 3.82 (*)     Hemoglobin 10.9 (*)     Hematocrit 35.4 (*)     MCHC 30.8 (*)     RDW 15.0 (*)     Lymph % 48.1 (*)     All other components within normal limits   URINALYSIS MICROSCOPIC - Abnormal; Notable for the following components:    RBC, UA 9 (*)     All other components within normal limits    Narrative:     Specimen Source->Urine   MAGNESIUM   PHOSPHORUS   MAGNESIUM    Narrative:     add on mg and phos per  /order#[id number] and [id number]   @ 11/04/2023    PHOSPHORUS    Narrative:     add on mg and phos per  /order#[id number] and [id number]   @ 11/04/2023    ISTAT LACTATE     EKG Readings: (Independently Interpreted)   Initial Reading: No STEMI. Previous EKG: Compared with most recent EKG Previous EKG Date: 9/18/23. Rhythm: Normal Sinus Rhythm. Heart Rate: 89. ST Segments: Normal ST Segments.   Similar to previous EKGs, nonspecific T-wave abnormality       Imaging Results    None          Medications - No data to display  Medical Decision Making  Amount and/or Complexity of Data Reviewed  Labs: ordered. Decision-making details documented in ED Course.         APC / Resident Notes:   63-year-old female with history of HTN, HLD, CHF, DM2, A-fib with pacermaker, CVA on Eliquis presents to the ED regarding hypotension.  Patient asymptomatic.  Vitals WNL.   Clinically well-appearing, in no acute distress.  Lungs CTAB.  Heart RRR. Will get basic labs. Patient has EF 17%, will hold off on fluids for now.    My differential diagnoses include but are not limited to:   Hypotension, medication side effect, electrolyte abnormality, anemia, hypovolemia, dehydration    See ED course.  I have reviewed the patient's records and discussed with my supervising physician.        ED Course as of 11/04/23 2001   Sat Nov 04, 2023   1446 Echo 8/2023  ·  Left Ventricle: The left ventricle is severely dilated. Normal wall thickness. Severe global hypokinesis present. There is severely reduced systolic function with a visually estimated ejection fraction of 15 - 20%. Biplane (2D) method of discs ejection fraction is 17%. Grade I diastolic dysfunction.  ·  Right Ventricle: Normal right ventricular cavity size. Wall thickness is normal. Right ventricle wall motion  is normal. Systolic function is normal. Pacemaker lead present in the ventricle.  ·  Aortic Valve: The aortic valve is a trileaflet valve. There is mild aortic valve sclerosis. Mild annular calcification.  ·  IVC/SVC: Intermediate venous pressure at 8 mmHg. [KB]   1543 WBC: 5.92  No leukocytosis [KB]   1543 Hemoglobin(!): 10.9  Stable, baseline [KB]   1543 POC Lactate: 0.88 [KB]   1605 Sodium: 141 [KB]   1605 Potassium: 3.7 [KB]   1605 Chloride: 102 [KB]   1605 Calcium: 9.3 [KB]   1605 Magnesium : 1.7 [KB]   1605 Phosphorus Level: 3.7  No electrolyte abnormality [KB]   1605 Creatinine: 0.8  No ARBEN [KB]   1605 eGFR: >60.0 [KB]   1605 CO2: 26 [KB]   1605 Anion Gap: 13 [KB]   1803 Bladder scan showed 770ml, RN will straight cath [KB]   1836 BP: 116/69 [KB]   1836 BP: 105/65 [KB]   1844 Occult Blood UA(!): 1+ [KB]   1844 NITRITE UA: Negative [KB]   1844 RBC, UA(!): 9 [KB]   1844 WBC, UA: 1 [KB]   1844 Bacteria, UA: Rare  Will wait for culture [KB]   1844 Yeast, UA: None [KB]   1844 Squam Epithel, UA: 0 [KB]   1926 Negative  orthostatics  Lyin/62 mmHg  Sitting 110/66 mmHg  Standing 99/60 mmHg [KB]    Upon reassessment, patient is eating a sandwich and comfortably lying down. Patient remains asymptomatic throughout ED stay.  Patient and son educated on results.  Patient is blood pressure remains stable throughout ED stay. Advised to follow up with PCP and continue monitoring blood pressure.  Strict ED return precautions given with all questions answered.  Patient and son verbalized understanding and agreed to plan.  Vitals are stable and safe for discharge.  [KB]    BP: 106/61 [KB]    Pulse: 86 [KB]    Resp: 17 [KB]    SpO2: 98 % [KB]      ED Course User Index  [KB] Leyla Oleary PA-C                    Clinical Impression:   Final diagnoses:  [I95.9] Hypotension, unspecified hypotension type (Primary)        ED Disposition Condition    Discharge Stable          ED Prescriptions    None       Follow-up Information       Follow up With Specialties Details Why Contact Info    Yolie Michael MD Internal Medicine Schedule an appointment as soon as possible for a visit   [id number] [place] [address] [address]  [facility] [address]  [phone]      [facility] - Emergency Dept Emergency Medicine Go to  If symptoms worsen [address] [facility] [id number] [phone]             Leyla Oleary PA-C  23

## 2023-11-04 NOTE — TELEPHONE ENCOUNTER
Pt  calls and states they have been monitoring bp per dr order after starting eliquis and this am blood pressure running low current bp 84/60 pt reports head is hurting and felling a little dizzy. Per protocol instructed pt to hang up and call 911 now.   Reason for Disposition   [1] Systolic BP < 90 AND [2] dizzy, lightheaded, or weak    Additional Information   Negative: Started suddenly after an allergic medicine, an allergic food, or bee sting   Negative: Shock suspected (e.g., cold/pale/clammy skin, too weak to stand, low BP, rapid pulse)   Negative: Difficult to awaken or acting confused (e.g., disoriented, slurred speech)   Negative: Fainted    Protocols used: Blood Pressure - Low-A-AH

## 2023-11-04 NOTE — ED TRIAGE NOTES
Diomedes Mohr, a 63 y.o. female presents to the ED w/ complaint of low BP at home. 80s/40s. No SOB. Hx stoke and HTN. Took HTN meds this morning.     Triage note:  Chief Complaint   Patient presents with    Hypotension     Reports systolic bp in 80s at home. On HTN meds. Took it this am. On Eliquis and has recently starting taking twice a day.     Review of patient's allergies indicates:   Allergen Reactions    Adhesive Blisters    Captopril Other (See Comments)     COUGH     Past Medical History:   Diagnosis Date    Acute on chronic combined systolic and diastolic heart failure 12/26/2019    ARBEN (acute kidney injury) 5/30/2023    Anticoagulant long-term use     Anxiety     Arthritis     Asthma     Depression     H/O: hysterectomy     Hyperlipemia     Hypertension     Pulmonary edema     Schizophrenia

## 2023-11-06 ENCOUNTER — OUTPATIENT CASE MANAGEMENT (OUTPATIENT)
Dept: ADMINISTRATIVE | Facility: OTHER | Age: 63
End: 2023-11-06
Payer: MEDICARE

## 2023-11-06 NOTE — TELEPHONE ENCOUNTER
Diomedes Mohr has not returned calls or messages regarding support for her Eliquis, Entresto, and Jardiance after multiple attempts to reach her over 10 business days. A final letter has been mailed to the patient to reach back out to Pharmacy Patient Assistance to initiate this process. Please instruct the patient to reach out to Milli Galarza   @ [phone] or [email or web address] if she is still in need of assistance.

## 2023-11-06 NOTE — PROGRESS NOTES
Outpatient Care Management  Plan of Care Follow Up Visit    Patient: Diomedes Mohr  MRN: [id number]  Date of Service: 11/06/2023  Completed by: Mabel Dan RN  Referral Date: 09/20/2023    No chief complaint on file.      Brief Summary: OPCM follow up complete. Continued education on CHF.   Next Steps: Continue to encourage daily weight  and BP checks. Patient has been performed. ED visit on 11/04 d/t Hypotension, lightheaded. Inquire on patient's diet what is her current regimen. Was patient able to contact Ms. Geovanni BLANCO [phone].  Patient agrees to follow up call with in 2 weeks

## 2023-11-07 ENCOUNTER — TELEPHONE (OUTPATIENT)
Dept: TRANSPLANT | Facility: CLINIC | Age: 63
End: 2023-11-07
Payer: MEDICARE

## 2023-11-07 ENCOUNTER — OFFICE VISIT (OUTPATIENT)
Dept: FAMILY MEDICINE | Facility: CLINIC | Age: 63
End: 2023-11-07
Payer: MEDICARE

## 2023-11-07 VITALS
SYSTOLIC BLOOD PRESSURE: 104 MMHG | DIASTOLIC BLOOD PRESSURE: 72 MMHG | OXYGEN SATURATION: 100 % | HEART RATE: 84 BPM | HEIGHT: 66 IN | WEIGHT: 154.13 LBS | BODY MASS INDEX: 24.77 KG/M2

## 2023-11-07 DIAGNOSIS — Z00.00 ANNUAL PHYSICAL EXAM: ICD-10-CM

## 2023-11-07 DIAGNOSIS — E11.65 TYPE 2 DIABETES MELLITUS WITH HYPERGLYCEMIA, WITH LONG-TERM CURRENT USE OF INSULIN: ICD-10-CM

## 2023-11-07 DIAGNOSIS — I50.22 CHRONIC HFREF (HEART FAILURE WITH REDUCED EJECTION FRACTION): ICD-10-CM

## 2023-11-07 DIAGNOSIS — Z79.4 TYPE 2 DIABETES MELLITUS WITH HYPERGLYCEMIA, WITH LONG-TERM CURRENT USE OF INSULIN: ICD-10-CM

## 2023-11-07 DIAGNOSIS — I42.0 DILATED CARDIOMYOPATHY: ICD-10-CM

## 2023-11-07 DIAGNOSIS — I10 ESSENTIAL HYPERTENSION: ICD-10-CM

## 2023-11-07 DIAGNOSIS — R29.898 RIGHT HAND WEAKNESS: ICD-10-CM

## 2023-11-07 PROCEDURE — 3066F PR DOCUMENTATION OF TREATMENT FOR NEPHROPATHY: ICD-10-PCS | Mod: HCNC,CPTII,S$GLB, | Performed by: STUDENT IN AN ORGANIZED HEALTH CARE EDUCATION/TRAINING PROGRAM

## 2023-11-07 PROCEDURE — 3066F NEPHROPATHY DOC TX: CPT | Mod: HCNC,CPTII,S$GLB, | Performed by: STUDENT IN AN ORGANIZED HEALTH CARE EDUCATION/TRAINING PROGRAM

## 2023-11-07 PROCEDURE — 3061F PR NEG MICROALBUMINURIA RESULT DOCUMENTED/REVIEW: ICD-10-PCS | Mod: HCNC,CPTII,S$GLB, | Performed by: STUDENT IN AN ORGANIZED HEALTH CARE EDUCATION/TRAINING PROGRAM

## 2023-11-07 PROCEDURE — 3044F PR MOST RECENT HEMOGLOBIN A1C LEVEL <7.0%: ICD-10-PCS | Mod: HCNC,CPTII,S$GLB, | Performed by: STUDENT IN AN ORGANIZED HEALTH CARE EDUCATION/TRAINING PROGRAM

## 2023-11-07 PROCEDURE — 1160F RVW MEDS BY RX/DR IN RCRD: CPT | Mod: HCNC,CPTII,S$GLB, | Performed by: STUDENT IN AN ORGANIZED HEALTH CARE EDUCATION/TRAINING PROGRAM

## 2023-11-07 PROCEDURE — 3078F PR MOST RECENT DIASTOLIC BLOOD PRESSURE < 80 MM HG: ICD-10-PCS | Mod: HCNC,CPTII,S$GLB, | Performed by: STUDENT IN AN ORGANIZED HEALTH CARE EDUCATION/TRAINING PROGRAM

## 2023-11-07 PROCEDURE — 99999 PR PBB SHADOW E&M-EST. PATIENT-LVL V: ICD-10-PCS | Mod: PBBFAC,HCNC,, | Performed by: STUDENT IN AN ORGANIZED HEALTH CARE EDUCATION/TRAINING PROGRAM

## 2023-11-07 PROCEDURE — 1159F MED LIST DOCD IN RCRD: CPT | Mod: HCNC,CPTII,S$GLB, | Performed by: STUDENT IN AN ORGANIZED HEALTH CARE EDUCATION/TRAINING PROGRAM

## 2023-11-07 PROCEDURE — 4010F PR ACE/ARB THEARPY RXD/TAKEN: ICD-10-PCS | Mod: HCNC,CPTII,S$GLB, | Performed by: STUDENT IN AN ORGANIZED HEALTH CARE EDUCATION/TRAINING PROGRAM

## 2023-11-07 PROCEDURE — 4010F ACE/ARB THERAPY RXD/TAKEN: CPT | Mod: HCNC,CPTII,S$GLB, | Performed by: STUDENT IN AN ORGANIZED HEALTH CARE EDUCATION/TRAINING PROGRAM

## 2023-11-07 PROCEDURE — 3008F BODY MASS INDEX DOCD: CPT | Mod: HCNC,CPTII,S$GLB, | Performed by: STUDENT IN AN ORGANIZED HEALTH CARE EDUCATION/TRAINING PROGRAM

## 2023-11-07 PROCEDURE — 1160F PR REVIEW ALL MEDS BY PRESCRIBER/CLIN PHARMACIST DOCUMENTED: ICD-10-PCS | Mod: HCNC,CPTII,S$GLB, | Performed by: STUDENT IN AN ORGANIZED HEALTH CARE EDUCATION/TRAINING PROGRAM

## 2023-11-07 PROCEDURE — 1159F PR MEDICATION LIST DOCUMENTED IN MEDICAL RECORD: ICD-10-PCS | Mod: HCNC,CPTII,S$GLB, | Performed by: STUDENT IN AN ORGANIZED HEALTH CARE EDUCATION/TRAINING PROGRAM

## 2023-11-07 PROCEDURE — 3074F SYST BP LT 130 MM HG: CPT | Mod: HCNC,CPTII,S$GLB, | Performed by: STUDENT IN AN ORGANIZED HEALTH CARE EDUCATION/TRAINING PROGRAM

## 2023-11-07 PROCEDURE — 99396 PR PREVENTIVE VISIT,EST,40-64: ICD-10-PCS | Mod: HCNC,S$GLB,, | Performed by: STUDENT IN AN ORGANIZED HEALTH CARE EDUCATION/TRAINING PROGRAM

## 2023-11-07 PROCEDURE — 3074F PR MOST RECENT SYSTOLIC BLOOD PRESSURE < 130 MM HG: ICD-10-PCS | Mod: HCNC,CPTII,S$GLB, | Performed by: STUDENT IN AN ORGANIZED HEALTH CARE EDUCATION/TRAINING PROGRAM

## 2023-11-07 PROCEDURE — 3044F HG A1C LEVEL LT 7.0%: CPT | Mod: HCNC,CPTII,S$GLB, | Performed by: STUDENT IN AN ORGANIZED HEALTH CARE EDUCATION/TRAINING PROGRAM

## 2023-11-07 PROCEDURE — 99999 PR PBB SHADOW E&M-EST. PATIENT-LVL V: CPT | Mod: PBBFAC,HCNC,, | Performed by: STUDENT IN AN ORGANIZED HEALTH CARE EDUCATION/TRAINING PROGRAM

## 2023-11-07 PROCEDURE — 3061F NEG MICROALBUMINURIA REV: CPT | Mod: HCNC,CPTII,S$GLB, | Performed by: STUDENT IN AN ORGANIZED HEALTH CARE EDUCATION/TRAINING PROGRAM

## 2023-11-07 PROCEDURE — 3078F DIAST BP <80 MM HG: CPT | Mod: HCNC,CPTII,S$GLB, | Performed by: STUDENT IN AN ORGANIZED HEALTH CARE EDUCATION/TRAINING PROGRAM

## 2023-11-07 PROCEDURE — 3008F PR BODY MASS INDEX (BMI) DOCUMENTED: ICD-10-PCS | Mod: HCNC,CPTII,S$GLB, | Performed by: STUDENT IN AN ORGANIZED HEALTH CARE EDUCATION/TRAINING PROGRAM

## 2023-11-07 PROCEDURE — 99396 PREV VISIT EST AGE 40-64: CPT | Mod: HCNC,S$GLB,, | Performed by: STUDENT IN AN ORGANIZED HEALTH CARE EDUCATION/TRAINING PROGRAM

## 2023-11-07 RX ORDER — TORSEMIDE 20 MG/1
20 TABLET ORAL DAILY
Qty: 90 TABLET | Refills: 3 | Status: ON HOLD
Start: 2023-11-07 | End: 2024-01-10 | Stop reason: HOSPADM

## 2023-11-07 NOTE — TELEPHONE ENCOUNTER
I have spoken with Diomedes Mohr and informed her of the [place]  and [employer] application process for Eliquis and Entresto and what's required to apply.  Diomedes Mohr will provide the following documents: Proof of household Income( such as social security statement, 1099 form, pension statement or 3 consecutive pay stubs, Copy of all Insurance cards( front and back), Printout from your Insurance or Pharmacy that shows how much you have spent on prescriptions this year, and Signed and dated HIPAA /Patient Information Forms        I will follow up with the patient in 5 business days.

## 2023-11-07 NOTE — PROGRESS NOTES
[facility] Note    Chief Complaint      Chief Complaint   Patient presents with    Follow-up    Annual Exam     History of Present Illness     Diomedes Mohr is a 63 y.o. female with sHTN, T2DM, HLD, tobacco use (quit in 2020), HFrEF 2/2 DCM and CAD (10-15%, s/p AICD), PE (December 2021), pHTN, osteoarthritis of bilateral hips (L>R) with chronic pain, CVA (2020, R MCA, no deficits), left MCA stroke during recent admission s/p thrombectomy (4/29/23) with right sided hemiparesis and recent diagnosis of bilateral DVT (05/2023)presents for annual physicals today . She had an episode of hypotension recently for which she presented at the ER and was observed for few hours, otherwise she still has pain in her right hand with associated weakness.   She denies any CP, SOB, orthopnea, leg swelling, palpitations, syncope or presyncope.    Problem List Addressed This Visit:  1. Annual physical exam    2. Right hand weakness  -     Ambulatory referral/consult to Physical/Occupational Therapy; Future; Expected date: 11/14/2023    3. Essential hypertension    4. Type 2 diabetes mellitus with hyperglycemia, with long-term current use of insulin    5. Chronic HFrEF (heart failure with reduced ejection fraction)    6. Dilated cardiomyopathy  -     torsemide (DEMADEX) 20 MG Tab; Take 1 tablet (20 mg total) by mouth once daily.  Dispense: 90 tablet; Refill: 3         Health Maintenance   Topic Date Due    Shingles Vaccine (2 of 2) 09/21/2023    Hemoglobin A1c  02/28/2024    Lipid Panel  04/30/2024    Foot Exam  06/08/2024    Mammogram  07/19/2024    High Dose Statin  10/03/2024    Eye Exam  10/20/2024    Colorectal Cancer Screening  06/23/2026    TETANUS VACCINE  07/27/2033    Hepatitis C Screening  Completed       Past Medical History:   Diagnosis Date    Acute on chronic combined systolic and diastolic heart failure 12/26/2019    ARBEN (acute kidney injury) 5/30/2023    Anticoagulant long-term use     Anxiety      Arthritis     Asthma     Depression     H/O: hysterectomy     Hyperlipemia     Hypertension     Pulmonary edema     Schizophrenia        Past Surgical History:   Procedure Laterality Date    BLADDER SUSPENSION      CARPAL TUNNEL RELEASE Right     HEEL SPUR SURGERY Left     HYSTERECTOMY      LEFT HEART CATHETERIZATION Bilateral 2019    Procedure: Left heart cath;  Surgeon: Steve Chambers MD;  Location: [facility] CATH LAB;  Service: Cardiology;  Laterality: Bilateral;    RIGHT HEART CATHETERIZATION Right 2021    Procedure: INSERTION, CATHETER, RIGHT HEART;  Surgeon: Petr Naranjo MD;  Location: [facility] CATH LAB;  Service: Cardiology;  Laterality: Right;    RIGHT HEART CATHETERIZATION Right 2022    Procedure: INSERTION, CATHETER, RIGHT HEART;  Surgeon: Chandana Ivory Jr., MD;  Location: [facility] CATH LAB;  Service: Cardiology;  Laterality: Right;    TUBAL LIGATION         family history includes No Known Problems in her father and mother; Ovarian cancer (age of onset: 42) in her sister.    Social History     Tobacco Use    Smoking status: Former     Current packs/day: 0.00     Types: Cigarettes     Quit date: 2016     Years since quittin.1    Smokeless tobacco: Never    Tobacco comments:     nonex 2 weeks   Substance Use Topics    Alcohol use: No     Alcohol/week: 0.0 standard drinks of alcohol    Drug use: No       Review of Systems   Constitutional:  Negative for fatigue and fever.   Respiratory:  Negative for cough and chest tightness.    Gastrointestinal:  Negative for abdominal pain, diarrhea and vomiting.   Endocrine: Negative for polydipsia and polyphagia.   Genitourinary:  Negative for difficulty urinating, dysuria and frequency.   Musculoskeletal:  Positive for arthralgias, back pain and gait problem. Negative for joint swelling.   Skin:  Negative for rash.   Neurological:  Positive for weakness (right upper and lower extremities). Negative for seizures, numbness and  headaches.   Psychiatric/Behavioral:  Negative for sleep disturbance.        Outpatient Encounter Medications as of 11/7/2023   Medication Sig Dispense Refill    acetaminophen (TYLENOL) 325 MG tablet Take 2 tablets (650 mg total) by mouth every 8 (eight) hours as needed. 30 tablet 0    apixaban (ELIQUIS) 5 mg Tab Take 1 tablet (5 mg total) by mouth 2 (two) times daily. 60 tablet 2    aspirin (ECOTRIN) 81 MG EC tablet Take 1 tablet (81 mg total) by mouth once daily. 30 tablet 11    atorvastatin (LIPITOR) 80 MG tablet Take 1 tablet (80 mg total) by mouth once daily. 90 tablet 3    dulaglutide (TRULICITY) 1.5 mg/0.5 mL pen injector Inject 1.5 mg into the skin once a week.      empagliflozin (JARDIANCE) 10 mg tablet Take 1 tablet (10 mg total) by mouth once daily. 30 tablet 11    gabapentin (NEURONTIN) 600 MG tablet Take 1 tablet (600 mg total) by mouth 3 (three) times daily. 270 tablet 3    metFORMIN (GLUCOPHAGE) 1000 MG tablet Take 0.5 tablets (500 mg total) by mouth 2 (two) times daily.      metoprolol succinate (TOPROL-XL) 25 MG 24 hr tablet Take 2 tablets (50 mg total) by mouth once daily. 60 tablet 0    ondansetron (ZOFRAN-ODT) 4 MG TbDL Take 1 tablet (4 mg total) by mouth every 8 (eight) hours as needed. 14 tablet 1    potassium chloride (KLOR-CON) 10 MEQ TbSR Take 1 tablet (10 mEq total) by mouth once daily. 90 tablet 3    sacubitriL-valsartan (ENTRESTO) 24-26 mg per tablet Take 1 tablet by mouth 2 (two) times daily. 60 tablet 11    [DISCONTINUED] torsemide (DEMADEX) 20 MG Tab Take 1 tablet (20 mg total) by mouth once daily.      torsemide (DEMADEX) 20 MG Tab Take 1 tablet (20 mg total) by mouth once daily. 90 tablet 3     No facility-administered encounter medications on file as of 11/7/2023.        Review of patient's allergies indicates:   Allergen Reactions    Adhesive Blisters    Captopril Other (See Comments)     COUGH       Physical Exam      Vital Signs  Pulse: 84  SpO2: 100 %  BP: 104/72  Pain Score:    "8  Height and Weight  Height: 5' 6" (167.6 cm)  Weight: 69.9 kg (154 lb 1.6 oz)  BSA (Calculated - sq m): 1.8 sq meters  BMI (Calculated): 24.9  Weight in (lb) to have BMI = 25: 154.6]    Physical Exam  Vitals reviewed.   Constitutional:       Appearance: Normal appearance.   HENT:      Head: Normocephalic and atraumatic.      Right Ear: Tympanic membrane normal.      Left Ear: Tympanic membrane normal.      Mouth/Throat:      Mouth: Mucous membranes are moist.      Pharynx: Oropharynx is clear.   Eyes:      Extraocular Movements: Extraocular movements intact.      Conjunctiva/sclera: Conjunctivae normal.      Pupils: Pupils are equal, round, and reactive to light.   Cardiovascular:      Rate and Rhythm: Normal rate and regular rhythm.      Pulses: Normal pulses.   Pulmonary:      Effort: Pulmonary effort is normal.      Breath sounds: Normal breath sounds. No wheezing or rales.   Abdominal:      General: Abdomen is flat. Bowel sounds are normal.      Palpations: Abdomen is soft.   Musculoskeletal:      Cervical back: Normal range of motion.      Right lower leg: No edema.      Left lower leg: No edema.   Skin:     General: Skin is warm and dry.   Neurological:      Mental Status: She is alert and oriented to person, place, and time.      Sensory: No sensory deficit.      Motor: Weakness (4/5 power RUE, 3/5 power right LE) present.      Gait: Gait abnormal.   Psychiatric:         Mood and Affect: Mood normal.         Behavior: Behavior normal.          Laboratory:  CBC:  Recent Labs   Lab Result Units 10/02/23  0913 10/27/23  0808 11/04/23  1504   WBC K/uL 7.54 5.96 5.92   RBC M/uL 3.51* 3.83* 3.82*   Hemoglobin g/dL 10.4* 11.2* 10.9*   Hematocrit % 32.5* 34.5* 35.4*   Platelets K/uL 459* 413 329   MCV fL 93 90 93   MCH pg 29.6 29.2 28.5   MCHC g/dL 32.0 32.5 30.8*     CMP:  Recent Labs   Lab Result Units 10/02/23  0913 10/27/23  0808 11/04/23  1504   Glucose mg/dL 143*  143* 107 91   Calcium mg/dL 9.3  9.3 9.1 " "9.3   Albumin g/dL 4.0 3.8 3.6   Total Protein g/dL 7.4 7.2 6.9   Sodium mmol/L 140  140 141 141   Potassium mmol/L 3.8  3.8 3.6 3.7   CO2 mmol/L 31*  31* 33* 26   Chloride mmol/L 101  101 100 102   BUN mg/dL 6*  6* 6* 6*   Alkaline Phosphatase U/L 80 98 89   ALT U/L 18 15 12   AST U/L 26 25 22   Total Bilirubin mg/dL 0.5 0.7 0.6     URINALYSIS:  Recent Labs   Lab Result Units 08/23/23  1936 08/30/23  2216 09/08/23  1047 09/18/23  2304 11/04/23  1808   Color, UA  Yellow Yellow Yellow Yellow Straw   Specific [place], UA  1.020 1.020 1.020 1.010 1.010   pH, UA  6.0 5.0 6.0 6.0 6.0   Protein, UA  1+* 1+* 1+* Negative Negative   Bacteria /hpf Many* Rare Moderate* Occasional Rare   Nitrite, UA  Negative Negative Negative Negative Negative   Leukocytes, UA  Trace* 3+* Negative 1+* Negative   Urobilinogen, UA EU/dL Negative  --  Negative Negative  --    Hyaline Casts, UA /lpf 0 18* 3*  --   --       LIPIDS:  No results for input(s): "TSH", "HDL", "CHOL", "TRIG", "LDLCALC", "CHOLHDL", "NONHDLCHOL", "TOTALCHOLEST" in the last 2160 hours.    TSH:  No results for input(s): "TSH" in the last 2160 hours.    A1C:  Recent Labs   Lab Result Units 08/28/23  1023   Hemoglobin A1C % 6.3*         Radiology:      Assessment/Plan       1. Annual physicals    2. Type 2 diabetes mellitus without complication, without long-term current use of insulin.    3. Essential hypertension-now hypotensive per BP readings at home    4. Heart failure with reduced ejection fraction due to cardiomyopathy    5. Low back pain potentially associated with radiculopathy    6. History of CVA with residual deficit    7. Primary osteoarthritis of both hips    9. Bilateral LE DVT    10. Normocytic anemia    Plan :    -preventive counseling provided  -labs reviewed and UTD  -vaccines recommended : shingles (second shot), RSV and COVID  -refer to occupational therapy for persistent right UE paresis  -refill for torsemide given today  -c/w other medications as " prescribed  -patient advised to monitor and keep BP log  -come with BP monitor in next clinic visit  -H&H stable  -patient compensated on exam, c/w GDMT     Continue current medications and maintain follow up with specialists.      Patient verbalizes understanding and agrees with current treatment plan.         MD [facility] DEJAN MORAN

## 2023-11-07 NOTE — PATIENT INSTRUCTIONS
Please get your second shingles shot, COVID and RSV vaccine from any local pharmacy of your choice

## 2023-11-07 NOTE — TELEPHONE ENCOUNTER
1200 pm:  F/U on todays nurse reminder as per orders I have copied below from Dr. Smith dated 10/31/23       0910 am  Message below noted and I have added to HF nurse reminder for Tuesday Nov 7th     ----- Message from Justina Smith MD sent at 10/31/2023  8:51 AM CDT -----  Hey guys would you kindly talk to her in a week and see what pressures are running? If blood pressure okay would like to increase to 49/51 entresto and repeat labs in 1 week. Thank you.     1210 pm :Called pt at this time for assessment:   Pt said she has been feeling well  Pt had her  Maged assist with the rest of the call  I was told , she does weigh herself every morning, but doesn't write it down  And the wts and bp's that are written down, both seemed to have some difficulty w/ locating and reading back to me  The following is what he had to report: :    Reported wts and b/p's as:  10/31:  153.4    100/61  11/1:     113/72     11/2:       11/3  11/4:   161.6   100/67  11/5:  11/6:     11/7:  153 today   100/70    Denies her to have any light headed or dizzy symptosm  However:   He also told me she went :  TO ER Saturday 11/4 not feeling well and bp low : 86/60  Called [facility] 800 # and told to take her to the ED  They did so and bp came up   over 100-I looked at this note  He also said they were told she should see her primary care doctor, and that she has an appt to see him today @ 2:30 pm    She is taking the following medicines:  Entresto 24/26 one tablet twice daily - he said before see Dr. Smith on 10/31, she was taking one of these tablets once daily and since 10/31 one tablet twice daily   Metoprolol 25 mg one tablet twice daily  Torsemide 10 mg   one tablet twice daily   Potassium  10 meq once tablet once daily  Jardiance 10  mg once daily   Baby aspirin once a day  Eliqus 5 mg twice daily     Told  in light of these bp readings , dr. Smith will likely not increase Entresto dose  Advised for him to have her  continue to take medications as prescribed.     Advised to try to get a calendar or write down each date on a separate line of paper and writh the weight and bp each day   Time may not really matter if adding to some of the confusion    Asked to notify this office for any concerns as well as a wt gain> 3 pds overnight or 5 pds in a week, swelling to legs/ankles, feet or abdomen, sob or heavy breathing , low bp's or signs of low bp's  He agreed    Reviewed next scheduled appt in the dept w/ him , being end of January, and encouraged to call at any time if needed     He took my name, Dr. Martines name and this office phone #: [phone]    Routing to Dr. Smith for her review.

## 2023-11-13 ENCOUNTER — TELEPHONE (OUTPATIENT)
Dept: FAMILY MEDICINE | Facility: CLINIC | Age: 63
End: 2023-11-13
Payer: MEDICARE

## 2023-11-13 DIAGNOSIS — I42.0 DILATED CARDIOMYOPATHY: ICD-10-CM

## 2023-11-13 NOTE — TELEPHONE ENCOUNTER
----- Message from Swapna Davey sent at 11/13/2023  9:59 AM CST -----  Type:  Needs Medical Advice    Who Called: Maged/  Would the patient rather a call back or a response via [facility]? call  Best Call Back Number: [phone]  Additional Information: [facility] did not received the prescription that was sent on 11/7    Disp Refills Start End BRIANNA  torsemide (DEMADEX) 20 MG Tab 90 tablet 3 11/7/2023 11/6/2024 No  Sig - Route: Take 1 tablet (20 mg total) by mouth once daily. - Oral  Class: No Print  Notes to Pharmacy: .  Order: [id number]  Date/Time Signed: 11/7/2023 14:32     [facility] - [facility] [address]   Phone: [phone]  Fax: [phone]      She will need it resent and also a small supply sent to the local pharmacy.  [facility] [address] [facility] [address] [address]   Phone: [phone]  Fax: [phone]

## 2023-11-13 NOTE — TELEPHONE ENCOUNTER
----- Message from Evelyn Verdni sent at 11/13/2023  9:24 AM CST -----  Type:  RX Refill Request    Who Called:  staff of [facility]  Refill or New Rx: refill  RX Name and Strength: torsemide (DEMADEX)  Tab 90 tablet 3 (caller said 10MG)    Preferred Pharmacy: [facility] mail order    Ordering Provider:  Reach pt via:  Best Call Back Number: ([facility] call)  Additional Information:

## 2023-11-13 NOTE — TELEPHONE ENCOUNTER
Spoek with pharmacy. Stated that the rx demadex is 20mg and to be taken once daily. Verbally understood

## 2023-11-13 NOTE — TELEPHONE ENCOUNTER
Spoke with pt. Informed rx has been confirmed and sent to [facility]. Stated that we cannot send small amount to another pharmacy. Pt verbally understood

## 2023-11-20 ENCOUNTER — OUTPATIENT CASE MANAGEMENT (OUTPATIENT)
Dept: ADMINISTRATIVE | Facility: OTHER | Age: 63
End: 2023-11-20
Payer: MEDICARE

## 2023-11-20 NOTE — PROGRESS NOTES
Outpatient Care Management  Plan of Care Follow Up Visit    Patient: Diomedes Mohr  MRN: [id number]  Date of Service: 11/20/2023  Completed by: Mabel Dan RN  Referral Date: 09/20/2023    Reason for Visit   Patient presents with    Update Plan Of Care     11/20/23       Brief Summary: OPCM follow up complete. Continued education on CHF.  Next Steps: Continue to follow up patient to evaluate weight checks. Encourage participation in PT services. Reinforce s/s of CHF. Help patient teach back s/s (at least 2)  Patient agrees to follow up call with in 2 weeks

## 2023-11-21 ENCOUNTER — CLINICAL SUPPORT (OUTPATIENT)
Dept: CARDIOLOGY | Facility: HOSPITAL | Age: 63
End: 2023-11-21
Payer: MEDICARE

## 2023-11-21 ENCOUNTER — OUTPATIENT CASE MANAGEMENT (OUTPATIENT)
Dept: ADMINISTRATIVE | Facility: OTHER | Age: 63
End: 2023-11-21
Payer: MEDICARE

## 2023-11-21 ENCOUNTER — CLINICAL SUPPORT (OUTPATIENT)
Dept: CARDIOLOGY | Facility: HOSPITAL | Age: 63
End: 2023-11-21
Attending: STUDENT IN AN ORGANIZED HEALTH CARE EDUCATION/TRAINING PROGRAM
Payer: MEDICARE

## 2023-11-21 DIAGNOSIS — Z95.810 PRESENCE OF AUTOMATIC (IMPLANTABLE) CARDIAC DEFIBRILLATOR: ICD-10-CM

## 2023-11-21 PROCEDURE — 93296 REM INTERROG EVL PM/IDS: CPT | Mod: HCNC | Performed by: STUDENT IN AN ORGANIZED HEALTH CARE EDUCATION/TRAINING PROGRAM

## 2023-11-21 PROCEDURE — 93295 CARDIAC DEVICE CHECK CHECK - REMOTE ALERT: ICD-10-PCS | Mod: HCNC,,, | Performed by: STUDENT IN AN ORGANIZED HEALTH CARE EDUCATION/TRAINING PROGRAM

## 2023-11-21 PROCEDURE — 93295 DEV INTERROG REMOTE 1/2/MLT: CPT | Mod: HCNC,,, | Performed by: STUDENT IN AN ORGANIZED HEALTH CARE EDUCATION/TRAINING PROGRAM

## 2023-11-21 NOTE — PROGRESS NOTES
Outpatient Care Management   - Care Plan Follow Up    Patient: Diomedes Mohr  MRN:  [id number]  Date of Service:  11/21/2023  Completed by:  Demetria Muniz LMSW  Referral Date: 09/20/2023    Reason for Visit   Patient presents with    OPCM SW Follow Up Call     11/21/2023       Brief Summary: SW followed up with pt via telephone. Pt has been going to outpatient OT and it has been going very well. Pt had received the OTC catalog and SW reviewed her benefits with that. Pt is not behind on any of her utility bills at this time. Pt also has not reviewed the portal resources for utility assistance. SW will mail them to her PO [address]. Pt agreeable to follow up in 2 weeks.     Complex Care Plan     Care plan was discussed and completed today with input from patient and/or caregiver.    Patient Instructions     No follow-ups on file.

## 2023-11-27 LAB
OHS CV AF BURDEN PERCENT: < 1
OHS CV DC REMOTE DEVICE TYPE: NORMAL
OHS CV ICD SHOCK: NO
OHS CV RV PACING PERCENT: 1 %

## 2023-11-29 ENCOUNTER — OFFICE VISIT (OUTPATIENT)
Dept: URGENT CARE | Facility: CLINIC | Age: 63
End: 2023-11-29
Payer: MEDICARE

## 2023-11-29 VITALS
HEIGHT: 66 IN | SYSTOLIC BLOOD PRESSURE: 105 MMHG | DIASTOLIC BLOOD PRESSURE: 55 MMHG | HEART RATE: 92 BPM | OXYGEN SATURATION: 99 % | WEIGHT: 154 LBS | BODY MASS INDEX: 24.75 KG/M2 | RESPIRATION RATE: 16 BRPM | TEMPERATURE: 100 F

## 2023-11-29 DIAGNOSIS — R09.81 NASAL CONGESTION: ICD-10-CM

## 2023-11-29 DIAGNOSIS — R05.9 COUGH, UNSPECIFIED TYPE: ICD-10-CM

## 2023-11-29 DIAGNOSIS — J06.9 VIRAL URI: Primary | ICD-10-CM

## 2023-11-29 LAB
CTP QC/QA: YES
CTP QC/QA: YES
POC MOLECULAR INFLUENZA A AGN: NEGATIVE
POC MOLECULAR INFLUENZA B AGN: NEGATIVE
SARS-COV-2 AG RESP QL IA.RAPID: NEGATIVE

## 2023-11-29 PROCEDURE — 87811 SARS CORONAVIRUS 2 ANTIGEN POCT, MANUAL READ: ICD-10-PCS | Mod: QW,S$GLB,, | Performed by: NURSE PRACTITIONER

## 2023-11-29 PROCEDURE — 99214 PR OFFICE/OUTPT VISIT, EST, LEVL IV, 30-39 MIN: ICD-10-PCS | Mod: S$GLB,,, | Performed by: NURSE PRACTITIONER

## 2023-11-29 PROCEDURE — 87502 INFLUENZA DNA AMP PROBE: CPT | Mod: QW,S$GLB,, | Performed by: NURSE PRACTITIONER

## 2023-11-29 PROCEDURE — 87811 SARS-COV-2 COVID19 W/OPTIC: CPT | Mod: QW,S$GLB,, | Performed by: NURSE PRACTITIONER

## 2023-11-29 PROCEDURE — 87502 POCT INFLUENZA A/B MOLECULAR: ICD-10-PCS | Mod: QW,S$GLB,, | Performed by: NURSE PRACTITIONER

## 2023-11-29 PROCEDURE — 99214 OFFICE O/P EST MOD 30 MIN: CPT | Mod: S$GLB,,, | Performed by: NURSE PRACTITIONER

## 2023-11-29 RX ORDER — AZELASTINE 1 MG/ML
1 SPRAY, METERED NASAL 2 TIMES DAILY
Qty: 30 ML | Refills: 0 | Status: SHIPPED | OUTPATIENT
Start: 2023-11-29 | End: 2024-11-28

## 2023-11-29 RX ORDER — BENZONATATE 100 MG/1
100 CAPSULE ORAL 3 TIMES DAILY PRN
Qty: 30 CAPSULE | Refills: 0 | Status: SHIPPED | OUTPATIENT
Start: 2023-11-29 | End: 2023-12-09

## 2023-11-29 NOTE — PROGRESS NOTES
"Subjective:      Patient ID: Diomedes Mohr is a 63 y.o. female.    Vitals:  height is 5' 6" (1.676 m) and weight is 69.9 kg (154 lb). Her oral temperature is 99.5 °F (37.5 °C). Her blood pressure is 105/55 (abnormal) and her pulse is 92. Her respiration is 16 and oxygen saturation is 99%.     Chief Complaint: Sinus Problem    63-year-old female presents with a complaint of nasal congestion, sinus pressure, nonproductive cough and sneezing.  She reports onset of symptoms, 3 days ago.  She denies fever, chills, chest pain, or palpitations.  She denies COVID exposure.  She reports a negative home COVID test prior to visit.  However she requests additional COVID testing in clinic today.    Sinus Problem  This is a new problem. Episode onset: Monday. The problem is unchanged. There has been no fever. Her pain is at a severity of 0/10. She is experiencing no pain. Associated symptoms include congestion, coughing, sinus pressure and sneezing. Pertinent negatives include no chills, diaphoresis, ear pain, headaches, hoarse voice, neck pain, shortness of breath, sore throat or swollen glands. Past treatments include nothing. The treatment provided no relief.       Constitution: Negative for chills, sweating, fatigue, fever and generalized weakness.   HENT:  Positive for congestion, postnasal drip and sinus pressure. Negative for ear pain, nosebleeds, foreign body in nose, sinus pain and sore throat.    Neck: Negative for neck pain.   Cardiovascular:  Negative for chest pain and palpitations.   Respiratory:  Positive for cough. Negative for sputum production, bloody sputum, COPD, shortness of breath, stridor, wheezing and asthma.    Skin:  Negative for rash.   Allergic/Immunologic: Positive for sneezing. Negative for asthma.   Neurological:  Negative for dizziness and headaches.      Objective:     Physical Exam   Constitutional: She is oriented to person, place, and time. She appears well-developed. She is cooperative.  " Non-toxic appearance. She does not appear ill. No distress.   HENT:   Head: Normocephalic and atraumatic.   Ears:   Right Ear: Hearing, tympanic membrane, external ear and ear canal normal. impacted cerumen  Left Ear: Hearing, tympanic membrane, external ear and ear canal normal. impacted cerumen  Nose: Rhinorrhea and congestion present. No mucosal edema or nasal deformity. No epistaxis. Right sinus exhibits no maxillary sinus tenderness and no frontal sinus tenderness. Left sinus exhibits no maxillary sinus tenderness and no frontal sinus tenderness.   Mouth/Throat: Uvula is midline, oropharynx is clear and moist and mucous membranes are normal. No trismus in the jaw. Normal dentition. No uvula swelling. No oropharyngeal exudate, posterior oropharyngeal edema or posterior oropharyngeal erythema.   Eyes: Conjunctivae and lids are normal. No scleral icterus.   Neck: Trachea normal and phonation normal. Neck supple. No edema present. No erythema present. No neck rigidity present.   Cardiovascular: Normal rate, regular rhythm, normal heart sounds and normal pulses.   Pulmonary/Chest: Effort normal and breath sounds normal. No respiratory distress. She has no decreased breath sounds. She has no rhonchi.   Abdominal: Normal appearance.   Musculoskeletal: Normal range of motion.         General: No deformity. Normal range of motion.   Neurological: She is alert and oriented to person, place, and time. She exhibits normal muscle tone. Coordination normal.   Skin: Skin is warm, dry, intact, not diaphoretic and not pale.   Psychiatric: Her speech is normal and behavior is normal. Judgment and thought content normal.   Nursing note and vitals reviewed.      Assessment:     1. Viral URI    2. Cough, unspecified type    3. Nasal congestion      Results for orders placed or performed in visit on 11/29/23   SARS Coronavirus 2 Antigen, POCT Manual Read   Result Value Ref Range    SARS Coronavirus 2 Antigen Negative Negative     " Acceptable Yes    POCT Influenza A/B MOLECULAR   Result Value Ref Range    POC Molecular Influenza A Ag Negative Negative, Not Reported    POC Molecular Influenza B Ag Negative Negative, Not Reported     Acceptable Yes       Plan:     Viral URI    Cough, unspecified type  -     SARS Coronavirus 2 Antigen, POCT Manual Read  -     POCT Influenza A/B MOLECULAR  -     benzonatate (TESSALON) 100 MG capsule; Take 1 capsule (100 mg total) by mouth 3 (three) times daily as needed for Cough.  Dispense: 30 capsule; Refill: 0    Nasal congestion  -     azelastine (ASTELIN) 137 mcg (0.1 %) nasal spray; 1 spray (137 mcg total) by Nasal route 2 (two) times daily.  Dispense: 30 mL; Refill: 0      Patient Instructions   PLEASE READ YOUR DISCHARGE INSTRUCTIONS ENTIRELY AS IT CONTAINS IMPORTANT INFORMATION.        Please drink plenty of fluids. You body needs increased water but other beverages may aid in comfort.  You will know that you have had enough water to be hydrated when your urine is clear or at least a very pale yellow. Nasal saline may help with removal of mucus as well.  Ibuprofen is preferred for aches and pains as well as fever reduction. If you do not have high blood pressure, then you may use a decongestant such as pseudoephedrine or one of the above medications that have the letter, "-D" following it.  Hot tea with honey can help with sore throats as the heat with reduce the inflammation and the honey will coat your throat to help it feel better. Prescription pain medicine should be used for the significant throat sxs you are experiencing. Do not drive after taking this medication.     Lastly, good hand washing and cough hygiene (cough into your elbow) will help prevent the spread of the illness.  A general rule is that you are no longer contagious once you have been without a fever for over 24 hours without requiring fever reducing medications.   Please get plenty of rest.     Please " return here or go to the Emergency Department for any concerns or worsening of condition.     Please take an over the counter antihistamine medication (allegra/Claritin/Zyrtec) of your choice as directed, they may help with some of the runny nose symptoms if you are having them.       Can continue mucinex. Use mucinex (guaifenisin) to break up mucous up to 2400mg/day to loosen any mucous. The mucinex DM pill has a cough suppressant that can be used at night to stop the tickle at the back of your throat. You may use Mucinex to help thin thick secretions to allow you to expel them but it only works if you drink more water.     If not allergic, please take over the counter Tylenol (Acetaminophen) and/or Motrin (Ibuprofen) as directed for control of pain and/or fever.  Please follow up with your primary care doctor or specialist as needed.     Sore throat recommendations: Warm fluids, warm salt water gargles, throat lozenges, tea, honey, soup, rest, hydration.     Use over the counter flonase: one spray each nostril twice daily OR two sprays each nostril once daily.      Sinus rinses DO NOT USE TAP WATER, if you must, water must be a rolling boil for 1 minute, let it cool, then use.  May use distilled water, or over the counter nasal saline rinses.  Vics vapor rub in shower to help open nasal passages.  May use nasal gel to keep passages moisturized.  May use Nasal saline sprays during the day for added relief of congestion.   For those who go to the gym, please do not use the sauna or steam room now to clear sinuses.     If you  smoke, please stop smoking.        Please return or see your primary care doctor if you develop new or worsening symptoms.      Please arrange follow up with your primary medical clinic as soon as possible. You must understand that you've received an Urgent Care treatment only and that you may be released before all of your medical problems are known or treated. You, the patient, will arrange  for follow up as instructed. If your symptoms worsen or fail to improve you should go to the Emergency Room.         Additional MDM:     Heart Failure Score:   COPD = No

## 2023-11-29 NOTE — PATIENT INSTRUCTIONS
"Patient Instructions   PLEASE READ YOUR DISCHARGE INSTRUCTIONS ENTIRELY AS IT CONTAINS IMPORTANT INFORMATION.        Please drink plenty of fluids. You body needs increased water but other beverages may aid in comfort.  You will know that you have had enough water to be hydrated when your urine is clear or at least a very pale yellow. Nasal saline may help with removal of mucus as well.  Ibuprofen is preferred for aches and pains as well as fever reduction. If you do not have high blood pressure, then you may use a decongestant such as pseudoephedrine or one of the above medications that have the letter, "-D" following it.  Hot tea with honey can help with sore throats as the heat with reduce the inflammation and the honey will coat your throat to help it feel better. Prescription pain medicine should be used for the significant throat sxs you are experiencing. Do not drive after taking this medication.     Lastly, good hand washing and cough hygiene (cough into your elbow) will help prevent the spread of the illness.  A general rule is that you are no longer contagious once you have been without a fever for over 24 hours without requiring fever reducing medications.   Please get plenty of rest.     Please return here or go to the Emergency Department for any concerns or worsening of condition.     Please take an over the counter antihistamine medication (allegra/Claritin/Zyrtec) of your choice as directed, they may help with some of the runny nose symptoms if you are having them.       Can continue mucinex. Use mucinex (guaifenisin) to break up mucous up to 2400mg/day to loosen any mucous. The mucinex DM pill has a cough suppressant that can be used at night to stop the tickle at the back of your throat. You may use Mucinex to help thin thick secretions to allow you to expel them but it only works if you drink more water.     If not allergic, please take over the counter Tylenol (Acetaminophen) and/or Motrin " (Ibuprofen) as directed for control of pain and/or fever.  Please follow up with your primary care doctor or specialist as needed.     Sore throat recommendations: Warm fluids, warm salt water gargles, throat lozenges, tea, honey, soup, rest, hydration.     Use over the counter flonase: one spray each nostril twice daily OR two sprays each nostril once daily.      Sinus rinses DO NOT USE TAP WATER, if you must, water must be a rolling boil for 1 minute, let it cool, then use.  May use distilled water, or over the counter nasal saline rinses.  Vics vapor rub in shower to help open nasal passages.  May use nasal gel to keep passages moisturized.  May use Nasal saline sprays during the day for added relief of congestion.   For those who go to the gym, please do not use the sauna or steam room now to clear sinuses.     If you  smoke, please stop smoking.        Please return or see your primary care doctor if you develop new or worsening symptoms.      Please arrange follow up with your primary medical clinic as soon as possible. You must understand that you've received an Urgent Care treatment only and that you may be released before all of your medical problems are known or treated. You, the patient, will arrange for follow up as instructed. If your symptoms worsen or fail to improve you should go to the Emergency Room.    You must understand that you've received an Urgent Care treatment only and that you may be released before all your medical problems are known or treated. You, the patient, will arrange for follow up care as instructed.  Follow up with your PCP or specialty clinic as directed in the next 1-2 weeks if not improved or as needed.  You can call [phone] to schedule an appointment with the appropriate provider.  If your condition worsens we recommend that you receive another evaluation at the emergency room immediately or contact your primary medical clinics after hours call service to discuss your  concerns.  Please return here or go to the Emergency Department for any concerns or worsening of condition.    If you were prescribed a narcotic or controlled medication, do not drive or operate heavy equipment or machinery while taking these medications.    Thank you for choosing [facility]!    Our goal in the Urgent Care is to always provide outstanding medical care. You may receive a survey by mail or e-mail in the next week regarding your experience today. We would greatly appreciate you completing and returning the survey. Your feedback provides us with a way to recognize our staff who provide very good care, and it helps us learn how to improve when your experience was below our aspiration of excellence.      We appreciate you trusting us with your medical care. We hope you feel better soon. We will be happy to take care of you for all of your future medical needs.   This note was prepared using voice-recognition software.  Although efforts are made to proofread the note, some errors may persist in the final document.     Sincerely,    Henry Hsu DNP, ENRRIQUE-C

## 2023-12-04 PROBLEM — I63.411 CEREBROVASCULAR ACCIDENT (CVA) DUE TO EMBOLISM OF RIGHT MIDDLE CEREBRAL ARTERY: Status: RESOLVED | Noted: 2020-08-14 | Resolved: 2023-12-04

## 2023-12-05 ENCOUNTER — OUTPATIENT CASE MANAGEMENT (OUTPATIENT)
Dept: ADMINISTRATIVE | Facility: OTHER | Age: 63
End: 2023-12-05
Payer: MEDICARE

## 2023-12-05 NOTE — PROGRESS NOTES
Outpatient Care Management   - Care Plan Follow Up    Patient: Diomedes Mohr  MRN:  [id number]  Date of Service:  12/5/2023  Completed by:  Demetria Muniz LMSW  Referral Date: 09/20/2023    Reason for Visit   Patient presents with    Case Closure     12/5/2023       Brief Summary: SW followed up with pt via telephone. She stated she has been doing well with outpatient therapy. Mailed utility resources haven't made it just yet to patient's home. Pt denied any issues or concerns at this time. SW reminded her the resources are also in her patient portal (last log in today). SW encouraged her to reach out if any other needs arise. Case closed, notified OPCM RN.     Complex Care Plan     Care plan was discussed and completed today with input from patient and/or caregiver.    Patient Instructions     No follow-ups on file.

## 2023-12-06 ENCOUNTER — OUTPATIENT CASE MANAGEMENT (OUTPATIENT)
Dept: ADMINISTRATIVE | Facility: OTHER | Age: 63
End: 2023-12-06
Payer: MEDICARE

## 2023-12-06 NOTE — PROGRESS NOTES
Outpatient Care Management  Plan of Care Follow Up Visit    Patient: Diomedes Mohr  MRN: [id number]  Date of Service: 12/06/2023  Completed by: Mabel Dan RN  Referral Date: 09/20/2023    Reason for Visit   Patient presents with    OPCM Chart Review    Update Plan Of Care       Brief Summary: OPCM follow up complete. Continued education on CHF.   Next Steps: Sending message to patient's PCP to inform on patient's report of symptoms. Continue to encourage participation on PT, Reinforce communicating with provider any sudden changes or developments in her symptoms  Patient agrees to follow up call with in 2 weeks

## 2023-12-12 ENCOUNTER — PATIENT MESSAGE (OUTPATIENT)
Dept: PULMONOLOGY | Facility: CLINIC | Age: 63
End: 2023-12-12
Payer: MEDICARE

## 2023-12-13 ENCOUNTER — TELEPHONE (OUTPATIENT)
Dept: PULMONOLOGY | Facility: CLINIC | Age: 63
End: 2023-12-13
Payer: MEDICARE

## 2023-12-13 NOTE — TELEPHONE ENCOUNTER
I received your mother's documents but if this is her only household income she do not qualify for assistance. She may qualify for Low Income Subsidy with Medicare.  She can call Medicare at [phone] and apply for Extra Help with her medications.  If she has more income in the household please provide necessary documents so I can submit her application.  Please advise if she has more income.

## 2023-12-14 ENCOUNTER — TELEPHONE (OUTPATIENT)
Dept: FAMILY MEDICINE | Facility: CLINIC | Age: 63
End: 2023-12-14
Payer: MEDICARE

## 2023-12-14 DIAGNOSIS — I82.513 CHRONIC DEEP VEIN THROMBOSIS (DVT) OF FEMORAL VEIN OF BOTH LOWER EXTREMITIES: Primary | ICD-10-CM

## 2023-12-14 NOTE — TELEPHONE ENCOUNTER
----- Message from Haydee Owusu RN sent at 12/14/2023 10:44 AM CST -----  Regarding: FW: MEDICATION  Good morning    Please see request below from pt    Looking at her profile; pt has been prescribed Eliquis by her Medicine physicians for DVT    Thank you  Senait    ----- Message -----  From: Isabela Escamilla MA  Sent: 12/14/2023   9:43 AM CST  To: [facility] Heart Failure Clinical  Subject: MEDICATION                                       Good Morning,    This patient is calling asking for a refill on 5MG Eliquis. I don't see this medicine on her medication list so that I can refill it. Can you please assist me with this please?    Isabela

## 2023-12-15 RX ORDER — METOPROLOL SUCCINATE 25 MG/1
50 TABLET, EXTENDED RELEASE ORAL DAILY
Qty: 60 TABLET | Refills: 3 | Status: ON HOLD | OUTPATIENT
Start: 2023-12-15 | End: 2024-01-10 | Stop reason: HOSPADM

## 2023-12-27 ENCOUNTER — OUTPATIENT CASE MANAGEMENT (OUTPATIENT)
Dept: ADMINISTRATIVE | Facility: OTHER | Age: 63
End: 2023-12-27
Payer: MEDICARE

## 2024-01-07 ENCOUNTER — HOSPITAL ENCOUNTER (INPATIENT)
Facility: HOSPITAL | Age: 64
LOS: 2 days | Discharge: HOME | DRG: 637 | End: 2024-01-10
Attending: EMERGENCY MEDICINE | Admitting: HOSPITALIST
Payer: MEDICARE

## 2024-01-07 DIAGNOSIS — R79.89 ELEVATED TROPONIN: ICD-10-CM

## 2024-01-07 DIAGNOSIS — R07.9 CHEST PAIN: ICD-10-CM

## 2024-01-07 DIAGNOSIS — R06.02 SOB (SHORTNESS OF BREATH): ICD-10-CM

## 2024-01-07 DIAGNOSIS — R11.2 INTRACTABLE NAUSEA AND VOMITING: ICD-10-CM

## 2024-01-07 DIAGNOSIS — N12 PYELONEPHRITIS: Primary | ICD-10-CM

## 2024-01-07 DIAGNOSIS — R06.02 SHORTNESS OF BREATH: ICD-10-CM

## 2024-01-07 LAB
ALBUMIN SERPL BCP-MCNC: 4.1 G/DL (ref 3.5–5.2)
ALLENS TEST: ABNORMAL
ALP SERPL-CCNC: 94 U/L (ref 55–135)
ALT SERPL W/O P-5'-P-CCNC: 11 U/L (ref 10–44)
ANION GAP SERPL CALC-SCNC: 15 MMOL/L (ref 8–16)
AST SERPL-CCNC: 21 U/L (ref 10–40)
BASOPHILS # BLD AUTO: 0.03 K/UL (ref 0–0.2)
BASOPHILS NFR BLD: 0.4 % (ref 0–1.9)
BILIRUB SERPL-MCNC: 1.4 MG/DL (ref 0.1–1)
BNP SERPL-MCNC: 559 PG/ML (ref 0–99)
BUN SERPL-MCNC: 16 MG/DL (ref 8–23)
CALCIUM SERPL-MCNC: 10.2 MG/DL (ref 8.7–10.5)
CHLORIDE SERPL-SCNC: 92 MMOL/L (ref 95–110)
CO2 SERPL-SCNC: 33 MMOL/L (ref 23–29)
CREAT SERPL-MCNC: 1.1 MG/DL (ref 0.5–1.4)
D DIMER PPP IA.FEU-MCNC: <0.19 MG/L FEU
DIFFERENTIAL METHOD BLD: ABNORMAL
EOSINOPHIL # BLD AUTO: 0.1 K/UL (ref 0–0.5)
EOSINOPHIL NFR BLD: 0.7 % (ref 0–8)
ERYTHROCYTE [DISTWIDTH] IN BLOOD BY AUTOMATED COUNT: 15.8 % (ref 11.5–14.5)
EST. GFR  (NO RACE VARIABLE): 56.8 ML/MIN/1.73 M^2
GLUCOSE SERPL-MCNC: 88 MG/DL (ref 70–110)
HCT VFR BLD AUTO: 35.7 % (ref 37–48.5)
HGB BLD-MCNC: 11.9 G/DL (ref 12–16)
IMM GRANULOCYTES # BLD AUTO: 0.01 K/UL (ref 0–0.04)
IMM GRANULOCYTES NFR BLD AUTO: 0.1 % (ref 0–0.5)
INFLUENZA A, MOLECULAR: NOT DETECTED
INFLUENZA B, MOLECULAR: NOT DETECTED
LDH SERPL L TO P-CCNC: 2.84 MMOL/L (ref 0.5–2.2)
LIPASE SERPL-CCNC: 43 U/L (ref 4–60)
LYMPHOCYTES # BLD AUTO: 3 K/UL (ref 1–4.8)
LYMPHOCYTES NFR BLD: 42.9 % (ref 18–48)
MCH RBC QN AUTO: 29 PG (ref 27–31)
MCHC RBC AUTO-ENTMCNC: 33.3 G/DL (ref 32–36)
MCV RBC AUTO: 87 FL (ref 82–98)
MONOCYTES # BLD AUTO: 0.5 K/UL (ref 0.3–1)
MONOCYTES NFR BLD: 6.6 % (ref 4–15)
NEUTROPHILS # BLD AUTO: 3.4 K/UL (ref 1.8–7.7)
NEUTROPHILS NFR BLD: 49.3 % (ref 38–73)
NRBC BLD-RTO: 0 /100 WBC
PLATELET # BLD AUTO: 502 K/UL (ref 150–450)
PMV BLD AUTO: 10.4 FL (ref 9.2–12.9)
POTASSIUM SERPL-SCNC: 3.1 MMOL/L (ref 3.5–5.1)
PROT SERPL-MCNC: 7.8 G/DL (ref 6–8.4)
RBC # BLD AUTO: 4.1 M/UL (ref 4–5.4)
RSV AG BY MOLECULAR METHOD: NOT DETECTED
SAMPLE: ABNORMAL
SARS-COV-2 RNA RESP QL NAA+PROBE: NOT DETECTED
SITE: ABNORMAL
SODIUM SERPL-SCNC: 140 MMOL/L (ref 136–145)
TROPONIN I SERPL DL<=0.01 NG/ML-MCNC: 0.05 NG/ML (ref 0–0.03)
WBC # BLD AUTO: 6.94 K/UL (ref 3.9–12.7)

## 2024-01-07 PROCEDURE — 99900035 HC TECH TIME PER 15 MIN (STAT)

## 2024-01-07 PROCEDURE — 93010 ELECTROCARDIOGRAM REPORT: CPT | Mod: HCNC,,, | Performed by: INTERNAL MEDICINE

## 2024-01-07 PROCEDURE — 99285 EMERGENCY DEPT VISIT HI MDM: CPT | Mod: 25,HCNC

## 2024-01-07 PROCEDURE — 93005 ELECTROCARDIOGRAM TRACING: CPT

## 2024-01-07 PROCEDURE — 83690 ASSAY OF LIPASE: CPT | Mod: HCNC | Performed by: EMERGENCY MEDICINE

## 2024-01-07 PROCEDURE — 80053 COMPREHEN METABOLIC PANEL: CPT | Mod: HCNC | Performed by: EMERGENCY MEDICINE

## 2024-01-07 PROCEDURE — 83880 ASSAY OF NATRIURETIC PEPTIDE: CPT | Mod: HCNC | Performed by: EMERGENCY MEDICINE

## 2024-01-07 PROCEDURE — 84484 ASSAY OF TROPONIN QUANT: CPT | Mod: HCNC | Performed by: EMERGENCY MEDICINE

## 2024-01-07 PROCEDURE — 93005 ELECTROCARDIOGRAM TRACING: CPT | Mod: HCNC

## 2024-01-07 PROCEDURE — 0241U SARS-COV2 (COVID) WITH FLU/RSV BY PCR: CPT | Mod: HCNC

## 2024-01-07 PROCEDURE — 85025 COMPLETE CBC W/AUTO DIFF WBC: CPT | Mod: HCNC | Performed by: EMERGENCY MEDICINE

## 2024-01-07 PROCEDURE — 85379 FIBRIN DEGRADATION QUANT: CPT | Mod: HCNC

## 2024-01-07 PROCEDURE — 83605 ASSAY OF LACTIC ACID: CPT

## 2024-01-07 PROCEDURE — 87040 BLOOD CULTURE FOR BACTERIA: CPT | Mod: HCNC

## 2024-01-07 PROCEDURE — 93010 ELECTROCARDIOGRAM REPORT: CPT | Mod: ,,, | Performed by: INTERNAL MEDICINE

## 2024-01-07 NOTE — Clinical Note
Diagnosis: SOB (shortness of breath) [443599]   Future Attending Provider: JUAN FRANCISCO GRAMAJO [[id number]]   Admitting Provider:: JUAN FRANCISCO GRAMAJO [[id number]]   Special Needs:: No Special Needs [1]

## 2024-01-08 PROBLEM — R41.82 ALTERED MENTAL STATUS: Status: ACTIVE | Noted: 2024-01-08

## 2024-01-08 LAB
ALBUMIN SERPL BCP-MCNC: 3.3 G/DL (ref 3.5–5.2)
ALLENS TEST: ABNORMAL
ALP SERPL-CCNC: 76 U/L (ref 55–135)
ALT SERPL W/O P-5'-P-CCNC: 9 U/L (ref 10–44)
ANION GAP SERPL CALC-SCNC: 13 MMOL/L (ref 8–16)
ANION GAP SERPL CALC-SCNC: 8 MMOL/L (ref 8–16)
AST SERPL-CCNC: 16 U/L (ref 10–40)
BACTERIA #/AREA URNS AUTO: ABNORMAL /HPF
BASOPHILS # BLD AUTO: 0.03 K/UL (ref 0–0.2)
BASOPHILS # BLD AUTO: 0.03 K/UL (ref 0–0.2)
BASOPHILS NFR BLD: 0.4 % (ref 0–1.9)
BASOPHILS NFR BLD: 0.5 % (ref 0–1.9)
BILIRUB SERPL-MCNC: 1.2 MG/DL (ref 0.1–1)
BILIRUB UR QL STRIP: NEGATIVE
BUN SERPL-MCNC: 14 MG/DL (ref 8–23)
BUN SERPL-MCNC: 8 MG/DL (ref 8–23)
CALCIUM SERPL-MCNC: 8.7 MG/DL (ref 8.7–10.5)
CALCIUM SERPL-MCNC: 8.8 MG/DL (ref 8.7–10.5)
CHLORIDE SERPL-SCNC: 103 MMOL/L (ref 95–110)
CHLORIDE SERPL-SCNC: 97 MMOL/L (ref 95–110)
CLARITY UR REFRACT.AUTO: ABNORMAL
CO2 SERPL-SCNC: 25 MMOL/L (ref 23–29)
CO2 SERPL-SCNC: 30 MMOL/L (ref 23–29)
COLOR UR AUTO: YELLOW
CREAT SERPL-MCNC: 0.7 MG/DL (ref 0.5–1.4)
CREAT SERPL-MCNC: 0.8 MG/DL (ref 0.5–1.4)
DIFFERENTIAL METHOD BLD: ABNORMAL
DIFFERENTIAL METHOD BLD: ABNORMAL
EOSINOPHIL # BLD AUTO: 0.1 K/UL (ref 0–0.5)
EOSINOPHIL # BLD AUTO: 0.1 K/UL (ref 0–0.5)
EOSINOPHIL NFR BLD: 1.3 % (ref 0–8)
EOSINOPHIL NFR BLD: 1.5 % (ref 0–8)
ERYTHROCYTE [DISTWIDTH] IN BLOOD BY AUTOMATED COUNT: 15.7 % (ref 11.5–14.5)
ERYTHROCYTE [DISTWIDTH] IN BLOOD BY AUTOMATED COUNT: 15.9 % (ref 11.5–14.5)
EST. GFR  (NO RACE VARIABLE): >60 ML/MIN/1.73 M^2
EST. GFR  (NO RACE VARIABLE): >60 ML/MIN/1.73 M^2
GLUCOSE SERPL-MCNC: 74 MG/DL (ref 70–110)
GLUCOSE SERPL-MCNC: 87 MG/DL (ref 70–110)
GLUCOSE SERPL-MCNC: 97 MG/DL (ref 70–110)
GLUCOSE UR QL STRIP: ABNORMAL
HCO3 UR-SCNC: 34.1 MMOL/L (ref 24–28)
HCT VFR BLD AUTO: 31 % (ref 37–48.5)
HCT VFR BLD AUTO: 32.7 % (ref 37–48.5)
HCT VFR BLD CALC: 29 %PCV (ref 36–54)
HGB BLD-MCNC: 10.4 G/DL (ref 12–16)
HGB BLD-MCNC: 10.6 G/DL (ref 12–16)
HGB UR QL STRIP: ABNORMAL
HYALINE CASTS UR QL AUTO: 138 /LPF
IMM GRANULOCYTES # BLD AUTO: 0.01 K/UL (ref 0–0.04)
IMM GRANULOCYTES # BLD AUTO: 0.03 K/UL (ref 0–0.04)
IMM GRANULOCYTES NFR BLD AUTO: 0.2 % (ref 0–0.5)
IMM GRANULOCYTES NFR BLD AUTO: 0.4 % (ref 0–0.5)
KETONES UR QL STRIP: NEGATIVE
LACTATE SERPL-SCNC: 1.2 MMOL/L (ref 0.5–2.2)
LACTATE SERPL-SCNC: 2.1 MMOL/L (ref 0.5–2.2)
LACTATE SERPL-SCNC: 3.2 MMOL/L (ref 0.5–2.2)
LEUKOCYTE ESTERASE UR QL STRIP: ABNORMAL
LYMPHOCYTES # BLD AUTO: 2.7 K/UL (ref 1–4.8)
LYMPHOCYTES # BLD AUTO: 3 K/UL (ref 1–4.8)
LYMPHOCYTES NFR BLD: 33.6 % (ref 18–48)
LYMPHOCYTES NFR BLD: 48.9 % (ref 18–48)
MAGNESIUM SERPL-MCNC: 1.3 MG/DL (ref 1.6–2.6)
MCH RBC QN AUTO: 29.2 PG (ref 27–31)
MCH RBC QN AUTO: 29.2 PG (ref 27–31)
MCHC RBC AUTO-ENTMCNC: 32.4 G/DL (ref 32–36)
MCHC RBC AUTO-ENTMCNC: 33.5 G/DL (ref 32–36)
MCV RBC AUTO: 87 FL (ref 82–98)
MCV RBC AUTO: 90 FL (ref 82–98)
MICROSCOPIC COMMENT: ABNORMAL
MONOCYTES # BLD AUTO: 0.5 K/UL (ref 0.3–1)
MONOCYTES # BLD AUTO: 0.5 K/UL (ref 0.3–1)
MONOCYTES NFR BLD: 5.7 % (ref 4–15)
MONOCYTES NFR BLD: 8 % (ref 4–15)
NEUTROPHILS # BLD AUTO: 2.5 K/UL (ref 1.8–7.7)
NEUTROPHILS # BLD AUTO: 4.6 K/UL (ref 1.8–7.7)
NEUTROPHILS NFR BLD: 41.1 % (ref 38–73)
NEUTROPHILS NFR BLD: 58.4 % (ref 38–73)
NITRITE UR QL STRIP: NEGATIVE
NRBC BLD-RTO: 0 /100 WBC
NRBC BLD-RTO: 0 /100 WBC
PCO2 BLDA: 54.2 MMHG (ref 35–45)
PH SMN: 7.41 [PH] (ref 7.35–7.45)
PH UR STRIP: 5 [PH] (ref 5–8)
PHOSPHATE SERPL-MCNC: 3.3 MG/DL (ref 2.7–4.5)
PLATELET # BLD AUTO: 354 K/UL (ref 150–450)
PLATELET # BLD AUTO: 466 K/UL (ref 150–450)
PMV BLD AUTO: 10.9 FL (ref 9.2–12.9)
PMV BLD AUTO: 9.9 FL (ref 9.2–12.9)
PO2 BLDA: 26 MMHG (ref 40–60)
POC BE: 9 MMOL/L
POC IONIZED CALCIUM: 1.22 MMOL/L (ref 1.06–1.42)
POC SATURATED O2: 47 % (ref 95–100)
POC TCO2: 36 MMOL/L (ref 24–29)
POCT GLUCOSE: 100 MG/DL (ref 70–110)
POCT GLUCOSE: 130 MG/DL (ref 70–110)
POCT GLUCOSE: 67 MG/DL (ref 70–110)
POCT GLUCOSE: 81 MG/DL (ref 70–110)
POTASSIUM BLD-SCNC: 4.5 MMOL/L (ref 3.5–5.1)
POTASSIUM SERPL-SCNC: 2.9 MMOL/L (ref 3.5–5.1)
POTASSIUM SERPL-SCNC: 4.3 MMOL/L (ref 3.5–5.1)
PROCALCITONIN SERPL IA-MCNC: 0.04 NG/ML
PROT SERPL-MCNC: 6.3 G/DL (ref 6–8.4)
PROT UR QL STRIP: ABNORMAL
RBC # BLD AUTO: 3.56 M/UL (ref 4–5.4)
RBC # BLD AUTO: 3.63 M/UL (ref 4–5.4)
RBC #/AREA URNS AUTO: 11 /HPF (ref 0–4)
SAMPLE: ABNORMAL
SITE: ABNORMAL
SODIUM BLD-SCNC: 138 MMOL/L (ref 136–145)
SODIUM SERPL-SCNC: 136 MMOL/L (ref 136–145)
SODIUM SERPL-SCNC: 140 MMOL/L (ref 136–145)
SP GR UR STRIP: 1.01 (ref 1–1.03)
SQUAMOUS #/AREA URNS AUTO: 0 /HPF
TROPONIN I SERPL DL<=0.01 NG/ML-MCNC: 0.04 NG/ML (ref 0–0.03)
URN SPEC COLLECT METH UR: ABNORMAL
WBC # BLD AUTO: 6.1 K/UL (ref 3.9–12.7)
WBC # BLD AUTO: 7.92 K/UL (ref 3.9–12.7)
WBC #/AREA URNS AUTO: 13 /HPF (ref 0–5)
YEAST UR QL AUTO: ABNORMAL

## 2024-01-08 PROCEDURE — 25000003 PHARM REV CODE 250: Mod: HCNC | Performed by: STUDENT IN AN ORGANIZED HEALTH CARE EDUCATION/TRAINING PROGRAM

## 2024-01-08 PROCEDURE — 84295 ASSAY OF SERUM SODIUM: CPT | Mod: HCNC

## 2024-01-08 PROCEDURE — 84484 ASSAY OF TROPONIN QUANT: CPT | Mod: HCNC | Performed by: STUDENT IN AN ORGANIZED HEALTH CARE EDUCATION/TRAINING PROGRAM

## 2024-01-08 PROCEDURE — 63600175 PHARM REV CODE 636 W HCPCS: Mod: JZ,JG,HCNC | Performed by: STUDENT IN AN ORGANIZED HEALTH CARE EDUCATION/TRAINING PROGRAM

## 2024-01-08 PROCEDURE — 87086 URINE CULTURE/COLONY COUNT: CPT | Mod: HCNC

## 2024-01-08 PROCEDURE — 99900035 HC TECH TIME PER 15 MIN (STAT): Mod: HCNC

## 2024-01-08 PROCEDURE — 96361 HYDRATE IV INFUSION ADD-ON: CPT | Mod: HCNC

## 2024-01-08 PROCEDURE — 63600175 PHARM REV CODE 636 W HCPCS: Mod: HCNC

## 2024-01-08 PROCEDURE — 84145 PROCALCITONIN (PCT): CPT | Mod: HCNC | Performed by: STUDENT IN AN ORGANIZED HEALTH CARE EDUCATION/TRAINING PROGRAM

## 2024-01-08 PROCEDURE — 82962 GLUCOSE BLOOD TEST: CPT | Mod: HCNC

## 2024-01-08 PROCEDURE — 80053 COMPREHEN METABOLIC PANEL: CPT | Mod: HCNC | Performed by: STUDENT IN AN ORGANIZED HEALTH CARE EDUCATION/TRAINING PROGRAM

## 2024-01-08 PROCEDURE — 81001 URINALYSIS AUTO W/SCOPE: CPT | Mod: HCNC

## 2024-01-08 PROCEDURE — 82803 BLOOD GASES ANY COMBINATION: CPT | Mod: HCNC

## 2024-01-08 PROCEDURE — 85025 COMPLETE CBC W/AUTO DIFF WBC: CPT | Mod: 91,HCNC | Performed by: STUDENT IN AN ORGANIZED HEALTH CARE EDUCATION/TRAINING PROGRAM

## 2024-01-08 PROCEDURE — 82800 BLOOD PH: CPT | Mod: HCNC

## 2024-01-08 PROCEDURE — 84100 ASSAY OF PHOSPHORUS: CPT | Mod: HCNC | Performed by: STUDENT IN AN ORGANIZED HEALTH CARE EDUCATION/TRAINING PROGRAM

## 2024-01-08 PROCEDURE — 21400001 HC TELEMETRY ROOM: Mod: HCNC

## 2024-01-08 PROCEDURE — 36415 COLL VENOUS BLD VENIPUNCTURE: CPT | Mod: HCNC,XB | Performed by: STUDENT IN AN ORGANIZED HEALTH CARE EDUCATION/TRAINING PROGRAM

## 2024-01-08 PROCEDURE — 63600175 PHARM REV CODE 636 W HCPCS: Mod: HCNC | Performed by: STUDENT IN AN ORGANIZED HEALTH CARE EDUCATION/TRAINING PROGRAM

## 2024-01-08 PROCEDURE — 25000003 PHARM REV CODE 250: Mod: HCNC

## 2024-01-08 PROCEDURE — 99223 1ST HOSP IP/OBS HIGH 75: CPT | Mod: HCNC,,, | Performed by: PSYCHIATRY & NEUROLOGY

## 2024-01-08 PROCEDURE — 85025 COMPLETE CBC W/AUTO DIFF WBC: CPT | Mod: HCNC | Performed by: STUDENT IN AN ORGANIZED HEALTH CARE EDUCATION/TRAINING PROGRAM

## 2024-01-08 PROCEDURE — 99223 1ST HOSP IP/OBS HIGH 75: CPT | Mod: HCNC,GC,, | Performed by: EMERGENCY MEDICINE

## 2024-01-08 PROCEDURE — 25500020 PHARM REV CODE 255: Mod: HCNC | Performed by: STUDENT IN AN ORGANIZED HEALTH CARE EDUCATION/TRAINING PROGRAM

## 2024-01-08 PROCEDURE — 85014 HEMATOCRIT: CPT | Mod: HCNC

## 2024-01-08 PROCEDURE — 83605 ASSAY OF LACTIC ACID: CPT | Mod: HCNC | Performed by: STUDENT IN AN ORGANIZED HEALTH CARE EDUCATION/TRAINING PROGRAM

## 2024-01-08 PROCEDURE — 84132 ASSAY OF SERUM POTASSIUM: CPT | Mod: HCNC

## 2024-01-08 PROCEDURE — 96365 THER/PROPH/DIAG IV INF INIT: CPT | Mod: HCNC

## 2024-01-08 PROCEDURE — 80048 BASIC METABOLIC PNL TOTAL CA: CPT | Mod: HCNC,XB | Performed by: STUDENT IN AN ORGANIZED HEALTH CARE EDUCATION/TRAINING PROGRAM

## 2024-01-08 PROCEDURE — 83735 ASSAY OF MAGNESIUM: CPT | Mod: HCNC | Performed by: STUDENT IN AN ORGANIZED HEALTH CARE EDUCATION/TRAINING PROGRAM

## 2024-01-08 PROCEDURE — 36415 COLL VENOUS BLD VENIPUNCTURE: CPT | Mod: HCNC | Performed by: STUDENT IN AN ORGANIZED HEALTH CARE EDUCATION/TRAINING PROGRAM

## 2024-01-08 PROCEDURE — 87088 URINE BACTERIA CULTURE: CPT | Mod: HCNC

## 2024-01-08 PROCEDURE — 83605 ASSAY OF LACTIC ACID: CPT | Mod: 91,HCNC | Performed by: STUDENT IN AN ORGANIZED HEALTH CARE EDUCATION/TRAINING PROGRAM

## 2024-01-08 PROCEDURE — 82330 ASSAY OF CALCIUM: CPT | Mod: HCNC

## 2024-01-08 RX ORDER — INSULIN ASPART 100 [IU]/ML
0-5 INJECTION, SOLUTION INTRAVENOUS; SUBCUTANEOUS
Status: DISCONTINUED | OUTPATIENT
Start: 2024-01-08 | End: 2024-01-10

## 2024-01-08 RX ORDER — POTASSIUM CHLORIDE 7.45 MG/ML
10 INJECTION INTRAVENOUS
Status: DISPENSED | OUTPATIENT
Start: 2024-01-08 | End: 2024-01-08

## 2024-01-08 RX ORDER — PROCHLORPERAZINE EDISYLATE 5 MG/ML
5 INJECTION INTRAMUSCULAR; INTRAVENOUS EVERY 6 HOURS PRN
Status: DISCONTINUED | OUTPATIENT
Start: 2024-01-08 | End: 2024-01-10 | Stop reason: HOSPADM

## 2024-01-08 RX ORDER — MAGNESIUM SULFATE HEPTAHYDRATE 40 MG/ML
2 INJECTION, SOLUTION INTRAVENOUS ONCE
Status: COMPLETED | OUTPATIENT
Start: 2024-01-08 | End: 2024-01-08

## 2024-01-08 RX ORDER — POTASSIUM CHLORIDE 7.45 MG/ML
10 INJECTION INTRAVENOUS
Status: DISCONTINUED | OUTPATIENT
Start: 2024-01-08 | End: 2024-01-08

## 2024-01-08 RX ORDER — IBUPROFEN 200 MG
16 TABLET ORAL
Status: DISCONTINUED | OUTPATIENT
Start: 2024-01-08 | End: 2024-01-10 | Stop reason: HOSPADM

## 2024-01-08 RX ORDER — SODIUM CHLORIDE 0.9 % (FLUSH) 0.9 %
10 SYRINGE (ML) INJECTION
Status: DISCONTINUED | OUTPATIENT
Start: 2024-01-08 | End: 2024-01-10 | Stop reason: HOSPADM

## 2024-01-08 RX ORDER — ASPIRIN 81 MG/1
81 TABLET ORAL DAILY
Status: DISCONTINUED | OUTPATIENT
Start: 2024-01-08 | End: 2024-01-10 | Stop reason: HOSPADM

## 2024-01-08 RX ORDER — SODIUM CHLORIDE, SODIUM LACTATE, POTASSIUM CHLORIDE, CALCIUM CHLORIDE 600; 310; 30; 20 MG/100ML; MG/100ML; MG/100ML; MG/100ML
INJECTION, SOLUTION INTRAVENOUS CONTINUOUS
Status: DISCONTINUED | OUTPATIENT
Start: 2024-01-08 | End: 2024-01-10

## 2024-01-08 RX ORDER — IBUPROFEN 200 MG
24 TABLET ORAL
Status: DISCONTINUED | OUTPATIENT
Start: 2024-01-08 | End: 2024-01-10 | Stop reason: HOSPADM

## 2024-01-08 RX ORDER — ATORVASTATIN CALCIUM 40 MG/1
80 TABLET, FILM COATED ORAL DAILY
Status: DISCONTINUED | OUTPATIENT
Start: 2024-01-08 | End: 2024-01-10 | Stop reason: HOSPADM

## 2024-01-08 RX ORDER — NALOXONE HCL 0.4 MG/ML
0.02 VIAL (ML) INJECTION
Status: DISCONTINUED | OUTPATIENT
Start: 2024-01-08 | End: 2024-01-10 | Stop reason: HOSPADM

## 2024-01-08 RX ORDER — DIPHENHYDRAMINE HCL 25 MG
25 CAPSULE ORAL EVERY 6 HOURS PRN
Status: DISCONTINUED | OUTPATIENT
Start: 2024-01-08 | End: 2024-01-10 | Stop reason: HOSPADM

## 2024-01-08 RX ORDER — GLUCAGON 1 MG
1 KIT INJECTION
Status: DISCONTINUED | OUTPATIENT
Start: 2024-01-08 | End: 2024-01-10 | Stop reason: HOSPADM

## 2024-01-08 RX ORDER — METRONIDAZOLE 500 MG/100ML
500 INJECTION, SOLUTION INTRAVENOUS
Status: DISCONTINUED | OUTPATIENT
Start: 2024-01-08 | End: 2024-01-08

## 2024-01-08 RX ORDER — AZELASTINE 1 MG/ML
1 SPRAY, METERED NASAL 2 TIMES DAILY
Status: DISCONTINUED | OUTPATIENT
Start: 2024-01-08 | End: 2024-01-10 | Stop reason: HOSPADM

## 2024-01-08 RX ORDER — POTASSIUM CHLORIDE 20 MEQ/1
40 TABLET, EXTENDED RELEASE ORAL ONCE
Status: COMPLETED | OUTPATIENT
Start: 2024-01-08 | End: 2024-01-08

## 2024-01-08 RX ORDER — TALC
6 POWDER (GRAM) TOPICAL NIGHTLY PRN
Status: DISCONTINUED | OUTPATIENT
Start: 2024-01-08 | End: 2024-01-10 | Stop reason: HOSPADM

## 2024-01-08 RX ORDER — SODIUM CHLORIDE 0.9 % (FLUSH) 0.9 %
10 SYRINGE (ML) INJECTION EVERY 12 HOURS PRN
Status: DISCONTINUED | OUTPATIENT
Start: 2024-01-08 | End: 2024-01-10 | Stop reason: HOSPADM

## 2024-01-08 RX ORDER — ACETAMINOPHEN 325 MG/1
650 TABLET ORAL EVERY 4 HOURS PRN
Status: DISCONTINUED | OUTPATIENT
Start: 2024-01-08 | End: 2024-01-10 | Stop reason: HOSPADM

## 2024-01-08 RX ORDER — GABAPENTIN 300 MG/1
600 CAPSULE ORAL 3 TIMES DAILY
Status: DISCONTINUED | OUTPATIENT
Start: 2024-01-08 | End: 2024-01-08

## 2024-01-08 RX ORDER — TORSEMIDE 20 MG/1
20 TABLET ORAL DAILY
Status: DISCONTINUED | OUTPATIENT
Start: 2024-01-08 | End: 2024-01-08

## 2024-01-08 RX ORDER — METOPROLOL SUCCINATE 50 MG/1
50 TABLET, EXTENDED RELEASE ORAL DAILY
Status: DISCONTINUED | OUTPATIENT
Start: 2024-01-08 | End: 2024-01-08

## 2024-01-08 RX ADMIN — SODIUM CHLORIDE, POTASSIUM CHLORIDE, SODIUM LACTATE AND CALCIUM CHLORIDE: 600; 310; 30; 20 INJECTION, SOLUTION INTRAVENOUS at 08:01

## 2024-01-08 RX ADMIN — PIPERACILLIN SODIUM AND TAZOBACTAM SODIUM 4.5 G: 4; .5 INJECTION, POWDER, FOR SOLUTION INTRAVENOUS at 10:01

## 2024-01-08 RX ADMIN — POTASSIUM CHLORIDE 10 MEQ: 7.46 INJECTION, SOLUTION INTRAVENOUS at 11:01

## 2024-01-08 RX ADMIN — DEXTROSE MONOHYDRATE 125 ML: 100 INJECTION, SOLUTION INTRAVENOUS at 08:01

## 2024-01-08 RX ADMIN — POTASSIUM CHLORIDE 10 MEQ: 7.46 INJECTION, SOLUTION INTRAVENOUS at 12:01

## 2024-01-08 RX ADMIN — APIXABAN 5 MG: 5 TABLET, FILM COATED ORAL at 10:01

## 2024-01-08 RX ADMIN — VANCOMYCIN HYDROCHLORIDE 1500 MG: 1.5 INJECTION, POWDER, LYOPHILIZED, FOR SOLUTION INTRAVENOUS at 10:01

## 2024-01-08 RX ADMIN — DIPHENHYDRAMINE HYDROCHLORIDE 25 MG: 25 CAPSULE ORAL at 07:01

## 2024-01-08 RX ADMIN — IOHEXOL 75 ML: 350 INJECTION, SOLUTION INTRAVENOUS at 08:01

## 2024-01-08 RX ADMIN — SODIUM CHLORIDE, POTASSIUM CHLORIDE, SODIUM LACTATE AND CALCIUM CHLORIDE 250 ML: 600; 310; 30; 20 INJECTION, SOLUTION INTRAVENOUS at 01:01

## 2024-01-08 RX ADMIN — ASPIRIN 81 MG: 81 TABLET, COATED ORAL at 09:01

## 2024-01-08 RX ADMIN — METRONIDAZOLE 500 MG: 5 INJECTION, SOLUTION INTRAVENOUS at 09:01

## 2024-01-08 RX ADMIN — POTASSIUM CHLORIDE 10 MEQ: 7.46 INJECTION, SOLUTION INTRAVENOUS at 07:01

## 2024-01-08 RX ADMIN — GABAPENTIN 600 MG: 300 CAPSULE ORAL at 09:01

## 2024-01-08 RX ADMIN — CEFTRIAXONE 2 G: 2 INJECTION, POWDER, FOR SOLUTION INTRAMUSCULAR; INTRAVENOUS at 04:01

## 2024-01-08 RX ADMIN — POTASSIUM CHLORIDE 40 MEQ: 1500 TABLET, EXTENDED RELEASE ORAL at 03:01

## 2024-01-08 RX ADMIN — POTASSIUM BICARBONATE 40 MEQ: 391 TABLET, EFFERVESCENT ORAL at 05:01

## 2024-01-08 RX ADMIN — SODIUM CHLORIDE, POTASSIUM CHLORIDE, SODIUM LACTATE AND CALCIUM CHLORIDE 500 ML: 600; 310; 30; 20 INJECTION, SOLUTION INTRAVENOUS at 04:01

## 2024-01-08 RX ADMIN — ATORVASTATIN CALCIUM 80 MG: 40 TABLET, FILM COATED ORAL at 09:01

## 2024-01-08 RX ADMIN — POTASSIUM CHLORIDE 10 MEQ: 7.46 INJECTION, SOLUTION INTRAVENOUS at 09:01

## 2024-01-08 RX ADMIN — ACETAMINOPHEN 650 MG: 325 TABLET ORAL at 10:01

## 2024-01-08 RX ADMIN — METRONIDAZOLE 500 MG: 5 INJECTION, SOLUTION INTRAVENOUS at 06:01

## 2024-01-08 RX ADMIN — MAGNESIUM SULFATE HEPTAHYDRATE 2 G: 40 INJECTION, SOLUTION INTRAVENOUS at 05:01

## 2024-01-08 RX ADMIN — SODIUM CHLORIDE, POTASSIUM CHLORIDE, SODIUM LACTATE AND CALCIUM CHLORIDE: 600; 310; 30; 20 INJECTION, SOLUTION INTRAVENOUS at 06:01

## 2024-01-08 RX ADMIN — POTASSIUM BICARBONATE 60 MEQ: 391 TABLET, EFFERVESCENT ORAL at 04:01

## 2024-01-08 RX ADMIN — GABAPENTIN 600 MG: 300 CAPSULE ORAL at 03:01

## 2024-01-08 RX ADMIN — APIXABAN 5 MG: 5 TABLET, FILM COATED ORAL at 09:01

## 2024-01-08 RX ADMIN — SODIUM CHLORIDE, SODIUM LACTATE, POTASSIUM CHLORIDE, AND CALCIUM CHLORIDE 500 ML: .6; .31; .03; .02 INJECTION, SOLUTION INTRAVENOUS at 07:01

## 2024-01-08 NOTE — ED NOTES
Bed: [facility]  Expected date:   Expected time:   Means of arrival:   Comments:  Jason

## 2024-01-08 NOTE — ED TRIAGE NOTES
Diomedes Mohr, a 63 y.o. female presents to the ED w/ complaint of SOB, N/V x3 days.Pt also endorses decreased appetite.    Triage note:  Chief Complaint   Patient presents with    Shortness of Breath     C/o SOB and N/V x 3 days. Hx stroke, ICD     Review of patient's allergies indicates:   Allergen Reactions    Adhesive Blisters    Captopril Other (See Comments)     COUGH     Past Medical History:   Diagnosis Date    Acute on chronic combined systolic and diastolic heart failure 12/26/2019    ARBEN (acute kidney injury) 5/30/2023    Anticoagulant long-term use     Anxiety     Arthritis     Asthma     Depression     H/O: hysterectomy     Hyperlipemia     Hypertension     Pulmonary edema     Schizophrenia

## 2024-01-08 NOTE — ASSESSMENT & PLAN NOTE
Troponin of 0.051 on admission. Will trend troponin. If increasing can consider further evaluation. Likely in setting of hypotension.

## 2024-01-08 NOTE — ED NOTES
Pt is stating that her back is itching and would like me contact the physician for him to prescribe her some benadryl. MD notified.

## 2024-01-08 NOTE — CONSULTS
[facility] - Emergency Dept  Critical Care Medicine  Consult Note    Patient Name: Diomedes Mohr  MRN: [id number]  Admission Date: 1/7/2024  Hospital Length of Stay: 0 days  Code Status: Full Code  Attending Physician: Omar Ybarra MD   Primary Care Provider: Yolie Michael MD   Principal Problem: Intractable nausea and vomiting    Inpatient consult to Critical Care Medicine  Consult performed by: Deondre Peterson MD  Consult ordered by: Sylvester Seymour MD  Reason for consult: intractable nausea and vomiting        Subjective:     HPI:  No notes on file    Hospital/ICU Course:  Patient is a 64yo old female with past medical history of CHF (EF 20%) presenting for x4 days abdominal pain associated with nausea and vomiting after meals. Last BM x2 days. Endorses feeling fatigue with decreased PO intake. No fevers, chest pain, shortness of breath. MICU consulted for intractable nausea and vomiting      ED workup  Infectious workup initiated in the ED  WBC 6.9, Hgb stable  CMP 3.1 otherwise no acute electrolyte derangement  UA suspicious for UTI, given ceftriaxone in ED  CTAP with no acute intraabdominal pathology  RUQ US pending to r/o gallbladder dx  Given 250cc fluid bolus in the ED        Past Medical History:   Diagnosis Date    Acute on chronic combined systolic and diastolic heart failure 12/26/2019    ARBEN (acute kidney injury) 5/30/2023    Anticoagulant long-term use     Anxiety     Arthritis     Asthma     Depression     H/O: hysterectomy     Hyperlipemia     Hypertension     Pulmonary edema     Schizophrenia        Past Surgical History:   Procedure Laterality Date    BLADDER SUSPENSION      CARPAL TUNNEL RELEASE Right     HEEL SPUR SURGERY Left     HYSTERECTOMY      LEFT HEART CATHETERIZATION Bilateral 12/27/2019    Procedure: Left heart cath;  Surgeon: Steve Chambers MD;  Location: [facility] CATH LAB;  Service: Cardiology;  Laterality: Bilateral;    RIGHT HEART CATHETERIZATION Right 7/26/2021     Procedure: INSERTION, CATHETER, RIGHT HEART;  Surgeon: Petr Naranjo MD;  Location: [facility] CATH LAB;  Service: Cardiology;  Laterality: Right;    RIGHT HEART CATHETERIZATION Right 2022    Procedure: INSERTION, CATHETER, RIGHT HEART;  Surgeon: Chandana Ivory Jr., MD;  Location: [facility] CATH LAB;  Service: Cardiology;  Laterality: Right;    TUBAL LIGATION         Review of patient's allergies indicates:   Allergen Reactions    Adhesive Blisters    Captopril Other (See Comments)     COUGH       Family History       Problem Relation (Age of Onset)    No Known Problems Mother, Father    Ovarian cancer Sister (42)          Tobacco Use    Smoking status: Former     Current packs/day: 0.00     Types: Cigarettes     Quit date: 2016     Years since quittin.3    Smokeless tobacco: Never    Tobacco comments:     nonex 2 weeks   Substance and Sexual Activity    Alcohol use: No     Alcohol/week: 0.0 standard drinks of alcohol    Drug use: No    Sexual activity: Not on file      Review of Systems   Constitutional:  Negative for activity change and appetite change.   HENT:  Negative for drooling.    Eyes:  Negative for discharge.   Respiratory:  Negative for shortness of breath.    Gastrointestinal:  Positive for abdominal pain.   Genitourinary:  Negative for dysuria.   Musculoskeletal:  Negative for back pain.   Neurological:  Negative for dizziness and seizures.     Objective:     Vital Signs (Most Recent):  Temp: 97.5 °F (36.4 °C) (24)  Pulse: 79 (24)  Resp: 18 (24)  BP: (!) 94/57 (24)  SpO2: 100 % (24) Vital Signs (24h Range):  Temp:  [97.5 °F (36.4 °C)] 97.5 °F (36.4 °C)  Pulse:  [] 79  Resp:  [18-22] 18  SpO2:  [98 %-100 %] 100 %  BP: ()/(50-62) 94/57   Weight: 69.9 kg (154 lb)  Body mass index is 24.86 kg/m².    No intake or output data in the 24 hours ending 246       Physical Exam  Vitals and nursing note reviewed.  "  Constitutional:       Appearance: She is not toxic-appearing.      Comments: No in acute distress. Resting in bed comfortably   HENT:      Head: Normocephalic and atraumatic.   Eyes:      Pupils: Pupils are equal, round, and reactive to light.   Cardiovascular:      Rate and Rhythm: Normal rate.   Pulmonary:      Effort: Pulmonary effort is normal.      Breath sounds: Normal breath sounds.   Abdominal:      Palpations: There is no mass.      Tenderness: There is no right CVA tenderness, left CVA tenderness, guarding or rebound.      Comments: Mild abdominal tenderness on palpation. No rebound or guarding   Musculoskeletal:         General: Normal range of motion.      Cervical back: Normal range of motion and neck supple.   Skin:     General: Skin is warm.      Capillary Refill: Capillary refill takes less than 2 seconds.   Neurological:      General: No focal deficit present.            Vents:     Lines/Drains/Airways       Peripheral Intravenous Line  Duration                  Peripheral IV - Single Lumen 01/07/24 2306 20 G Right Antecubital <1 day                  Significant Labs:    CBC/Anemia Profile:  Recent Labs   Lab 01/07/24  2304   WBC 6.94   HGB 11.9*   HCT 35.7*   *   MCV 87   RDW 15.8*        Chemistries:  Recent Labs   Lab 01/07/24  2304      K 3.1*   CL 92*   CO2 33*   BUN 16   CREATININE 1.1   CALCIUM 10.2   ALBUMIN 4.1   PROT 7.8   BILITOT 1.4*   ALKPHOS 94   ALT 11   AST 21       All pertinent labs within the past 24 hours have been reviewed.    Significant Imaging: I have reviewed all pertinent imaging results/findings within the past 24 hours.    ABG  No results for input(s): "PH", "PO2", "PCO2", "HCO3", "BE" in the last 168 hours.  Assessment/Plan:     GI  * Intractable nausea and vomiting  Recommendations  -no septic appearing  -appears dry on exam  -continue Abx  -continue gentle fluid resuscitation. Please give additional 500ml   -remainder per ED   -no new pressor or oxygen " requirement   -ok for floor  -does not meet criteria for MICU at this time          Critical care was time spent personally by me on the following activities: development of treatment plan with patient or surrogate and bedside caregivers, discussions with consultants, evaluation of patient's response to treatment, examination of patient, ordering and performing treatments and interventions, ordering and review of laboratory studies, ordering and review of radiographic studies, pulse oximetry, re-evaluation of patient's condition. This critical care time did not overlap with that of any other provider or involve time for any procedures.    Thank you for your consult. I will sign off. Please contact us if you have any additional questions.     Deondre Peterson MD  Critical Care Medicine  [facility] - Emergency Dept

## 2024-01-08 NOTE — ASSESSMENT & PLAN NOTE
Patient has hypokalemia which is Acute and currently uncontrolled. Most recent potassium levels reviewed-   Lab Results   Component Value Date    K 2.9 (L) 01/08/2024   . Will continue potassium replacement per protocol and recheck repeat levels after replacement completed.

## 2024-01-08 NOTE — H&P
[facility] - Emergency Dept  [facility]  History & Physical    Patient Name: Diomedes Mohr  MRN: [id number]  Patient Class: IP- Inpatient  Admission Date: 1/7/2024  Attending Physician: Mariaelena Moffett MD   Primary Care Provider: Yolie Michael MD         Patient information was obtained from patient, past medical records, and ER records.     Subjective:     Principal Problem:Intractable nausea and vomiting    Chief Complaint:   Chief Complaint   Patient presents with    Shortness of Breath     C/o SOB and N/V x 3 days. Hx stroke, ICD        HPI: Mrs. Mohr is a 62 yo F with HTN, T2DM, HLD, tobacco use (quit in 2020), HFrEF 2/2 DCM (10-15%, s/p AICD), PE in 2021/ DVTs 2023, pHTN, osteoarthritis of bilateral hips (L>R) with chronic pain, and CVA (2020, R MCA, no deficits then L MCA stroke during recent admission s/p thrombectomy 4/2023) with right sided hemiparesis who presents with complaint of 4 days abdominal pain. Patient reports abdominal pain is associated with nausea and vomiting after meals. On repeat evaluation patient denied abdominal pain. Last BM x2 days ago and denies any melena or BRBPR. Patient reports feeling fatigue with decreased PO intake. Patient denies missing any medications prior to admission including BP meds and reports BP was low at home. Patient reports associated LH and dizziness with ambulation. Patient denies any frequency or urgency but does report suprapubic pain that is mild in nature.     In the ED patient was afebrile, hypotensive and tachycardic with SpO2 >95 on RA. Labs were significant for lactic 2.9->3.2, WBC 6K, hgb 10, , K 2.9, Bili 1.2, troponin 0.05-0.039. CXR demonstrated no effusion or infiltrate. Given hypotension patient was given 250 then 500 cc bolus and MICU evaluated for lactic acidosis. Patient admitted to  for management of hypotension, ARBEN and N/V.      Past Medical History:   Diagnosis Date    Acute on chronic combined systolic and  diastolic heart failure 12/26/2019    ARBEN (acute kidney injury) 5/30/2023    Anticoagulant long-term use     Anxiety     Arthritis     Asthma     Depression     H/O: hysterectomy     Hyperlipemia     Hypertension     Pulmonary edema     Schizophrenia        Past Surgical History:   Procedure Laterality Date    BLADDER SUSPENSION      CARPAL TUNNEL RELEASE Right     HEEL SPUR SURGERY Left     HYSTERECTOMY      LEFT HEART CATHETERIZATION Bilateral 12/27/2019    Procedure: Left heart cath;  Surgeon: Steve Chambers MD;  Location: [facility] CATH LAB;  Service: Cardiology;  Laterality: Bilateral;    RIGHT HEART CATHETERIZATION Right 7/26/2021    Procedure: INSERTION, CATHETER, RIGHT HEART;  Surgeon: Petr Naranjo MD;  Location: [facility] CATH LAB;  Service: Cardiology;  Laterality: Right;    RIGHT HEART CATHETERIZATION Right 5/12/2022    Procedure: INSERTION, CATHETER, RIGHT HEART;  Surgeon: Chandana Ivory Jr., MD;  Location: [facility] CATH LAB;  Service: Cardiology;  Laterality: Right;    TUBAL LIGATION         Review of patient's allergies indicates:   Allergen Reactions    Adhesive Blisters    Captopril Other (See Comments)     COUGH       No current facility-administered medications on file prior to encounter.     Current Outpatient Medications on File Prior to Encounter   Medication Sig    acetaminophen (TYLENOL) 325 MG tablet Take 2 tablets (650 mg total) by mouth every 8 (eight) hours as needed.    apixaban (ELIQUIS) 5 mg Tab Take 1 tablet (5 mg total) by mouth 2 (two) times daily.    aspirin (ECOTRIN) 81 MG EC tablet Take 1 tablet (81 mg total) by mouth once daily.    atorvastatin (LIPITOR) 80 MG tablet Take 1 tablet (80 mg total) by mouth once daily.    azelastine (ASTELIN) 137 mcg (0.1 %) nasal spray 1 spray (137 mcg total) by Nasal route 2 (two) times daily.    dulaglutide (TRULICITY) 1.5 mg/0.5 mL pen injector Inject 1.5 mg into the skin once a week.    empagliflozin (JARDIANCE) 10 mg tablet Take 1  tablet (10 mg total) by mouth once daily.    gabapentin (NEURONTIN) 600 MG tablet Take 1 tablet (600 mg total) by mouth 3 (three) times daily.    metFORMIN (GLUCOPHAGE) 1000 MG tablet Take 0.5 tablets (500 mg total) by mouth 2 (two) times daily.    metoprolol succinate (TOPROL-XL) 25 MG 24 hr tablet Take 2 tablets (50 mg total) by mouth once daily.    ondansetron (ZOFRAN-ODT) 4 MG TbDL Take 1 tablet (4 mg total) by mouth every 8 (eight) hours as needed.    potassium chloride (KLOR-CON) 10 MEQ TbSR Take 1 tablet (10 mEq total) by mouth once daily.    sacubitriL-valsartan (ENTRESTO) 24-26 mg per tablet Take 1 tablet by mouth 2 (two) times daily.    torsemide (DEMADEX) 20 MG Tab Take 1 tablet (20 mg total) by mouth once daily.     Family History       Problem Relation (Age of Onset)    No Known Problems Mother, Father    Ovarian cancer Sister (42)          Tobacco Use    Smoking status: Former     Current packs/day: 0.00     Types: Cigarettes     Quit date: 2016     Years since quittin.3    Smokeless tobacco: Never    Tobacco comments:     nonex 2 weeks   Substance and Sexual Activity    Alcohol use: No     Alcohol/week: 0.0 standard drinks of alcohol    Drug use: No    Sexual activity: Not on file     Review of Systems   Constitutional:  Positive for appetite change and fatigue. Negative for activity change, chills and fever.   HENT:  Negative for drooling.    Eyes:  Negative for discharge and visual disturbance.   Respiratory:  Positive for shortness of breath. Negative for cough and wheezing.    Cardiovascular:  Negative for chest pain, palpitations and leg swelling.   Gastrointestinal:  Positive for abdominal pain, nausea and vomiting. Negative for constipation and diarrhea.   Genitourinary:  Negative for decreased urine volume, dysuria, flank pain, frequency and hematuria.        Suprapubic pain   Musculoskeletal:  Negative for back pain.   Skin:  Negative for rash and wound.   Neurological:  Positive for  weakness and light-headedness. Negative for dizziness and seizures.     Objective:     Vital Signs (Most Recent):  Temp: 97.5 °F (36.4 °C) (01/07/24 2127)  Pulse: 79 (01/08/24 0145)  Resp: 18 (01/08/24 0145)  BP: (!) 94/57 (01/08/24 0145)  SpO2: 100 % (01/08/24 0145) Vital Signs (24h Range):  Temp:  [97.5 °F (36.4 °C)] 97.5 °F (36.4 °C)  Pulse:  [] 79  Resp:  [18-22] 18  SpO2:  [98 %-100 %] 100 %  BP: ()/(50-62) 94/57     Weight: 69.9 kg (154 lb)  Body mass index is 24.86 kg/m².     Physical Exam  Vitals and nursing note reviewed.   Constitutional:       Appearance: Normal appearance. She is not toxic-appearing or diaphoretic.      Comments: No in acute distress. Resting in bed comfortably   HENT:      Head: Normocephalic and atraumatic.      Mouth/Throat:      Mouth: Mucous membranes are dry.   Eyes:      Extraocular Movements: Extraocular movements intact.      Pupils: Pupils are equal, round, and reactive to light.   Cardiovascular:      Rate and Rhythm: Normal rate and regular rhythm.      Heart sounds: No murmur heard.  Pulmonary:      Effort: Pulmonary effort is normal.      Breath sounds: Normal breath sounds. No wheezing or rales.   Abdominal:      General: There is no distension.      Palpations: Abdomen is soft. There is no mass.      Tenderness: There is abdominal tenderness. There is no right CVA tenderness, left CVA tenderness, guarding or rebound.      Comments: Mild abdominal tenderness on palpation. No rebound or guarding   Musculoskeletal:         General: Normal range of motion.      Cervical back: Normal range of motion and neck supple.      Right lower leg: No edema.      Left lower leg: No edema.   Skin:     General: Skin is warm.      Capillary Refill: Capillary refill takes less than 2 seconds.   Neurological:      General: No focal deficit present.      Mental Status: She is alert.              CRANIAL NERVES     CN III, IV, VI   Pupils are equal, round, and reactive to light.      "  Significant Labs: All pertinent labs within the past 24 hours have been reviewed.  A1C:   Recent Labs   Lab 08/28/23  1023   HGBA1C 6.3*     CBC:   Recent Labs   Lab 01/07/24 2304 01/08/24  0414   WBC 6.94 6.10   HGB 11.9* 10.4*   HCT 35.7* 31.0*   * 466*     CMP:   Recent Labs   Lab 01/07/24 2304 01/08/24  0414    140   K 3.1* 2.9*   CL 92* 97   CO2 33* 30*   GLU 88 87   BUN 16 14   CREATININE 1.1 0.8   CALCIUM 10.2 8.8   PROT 7.8 6.3   ALBUMIN 4.1 3.3*   BILITOT 1.4* 1.2*   ALKPHOS 94 76   AST 21 16   ALT 11 9*   ANIONGAP 15 13     Lactic Acid:   Recent Labs   Lab 01/08/24  0414   LACTATE 3.2*     Lipase:   Recent Labs   Lab 01/07/24  2304   LIPASE 43     Magnesium:   Recent Labs   Lab 01/08/24  0414   MG 1.3*     Troponin:   Recent Labs   Lab 01/07/24 2304 01/08/24  0414   TROPONINI 0.051* 0.039*     Urine Culture: No results for input(s): "LABURIN" in the last 48 hours.  Urine Studies:   Recent Labs   Lab 01/08/24  0207   COLORU Yellow   APPEARANCEUA Cloudy*   PHUR 5.0   SPECGRAV 1.015   PROTEINUA 1+*   GLUCUA 3+*   KETONESU Negative   BILIRUBINUA Negative   OCCULTUA 1+*   NITRITE Negative   LEUKOCYTESUR Trace*   RBCUA 11*   WBCUA 13*   BACTERIA Many*   SQUAMEPITHEL 0   HYALINECASTS 138*       Significant Imaging: I have reviewed all pertinent imaging results/findings within the past 24 hours.  Assessment/Plan:     * Intractable nausea and vomiting  Concern for biliary etiology of recurrent N/V. Questionable abdominal pain. On DM PO meds but not in DKA. Low concern for gastroenteritis at this time. Elevated bilirubin to 1.4.     Plan  Consider additional IVF bolus and patient is IVVD  RUQ US ordered given elevated Bili  Will add on flagyl for abdominal symptoms        Elevated troponin  Troponin of 0.051 on admission. Will trend troponin. If increasing can consider further evaluation. Likely in setting of hypotension.       Acute cystitis without hematuria  UA demonstrated leukocytes, blood and " glucose as well as many bacteria. No leukocytosis. Previous cultures has grown e coli with varying resistance.     Plan  - will continue CTX while admitted  - consider RP imaging to rule out pyelo if febrile or worsening hypotension      ARBEN (acute kidney injury)  Patient with acute kidney injury/acute renal failure likely due to pre-renal azotemia due to IVVD ARBEN is currently worsening. Baseline creatinine  0.8  - Labs reviewed- Renal function/electrolytes with Estimated Creatinine Clearance: 49.6 mL/min (based on SCr of 1.1 mg/dL). according to latest data. Monitor urine output and serial BMP and adjust therapy as needed. Avoid nephrotoxins and renally dose meds for GFR listed above.    Very mild ARBEN and will replete IVF. Hold PO lasix and reassess.     Lactic acidosis  On metformin. Patient continued to take meds despite N/V and has elevated lactic to 3.2.     Holding all PO DM meds while admitted  Will trend lactic as gone from 2.9 to 3.2  Consider additional IVF if not improved      History of pulmonary embolism  Continue home eliquis      Hypokalemia  Patient has hypokalemia which is Acute and currently uncontrolled. Most recent potassium levels reviewed-   Lab Results   Component Value Date    K 2.9 (L) 01/08/2024   . Will continue potassium replacement per protocol and recheck repeat levels after replacement completed.     Chronic combined systolic and diastolic congestive heart failure  Patient is identified as having Combined Systolic and Diastolic heart failure that is Chronic. CHF is currently controlled. Latest ECHO performed and demonstrates- Results for orders placed during the hospital encounter of 08/30/23    Echo    Interpretation Summary    Left Ventricle: The left ventricle is severely dilated. Normal wall thickness. Severe global hypokinesis present. There is severely reduced systolic function with a visually estimated ejection fraction of 15 - 20%. Biplane (2D) method of discs ejection fraction  is 17%. Grade I diastolic dysfunction.    Right Ventricle: Normal right ventricular cavity size. Wall thickness is normal. Right ventricle wall motion  is normal. Systolic function is normal. Pacemaker lead present in the ventricle.    Aortic Valve: The aortic valve is a trileaflet valve. There is mild aortic valve sclerosis. Mild annular calcification.    IVC/SVC: Intermediate venous pressure at 8 mmHg.  .Monitor clinical status closely. Monitor on telemetry. Patient is off CHF pathway.  Monitor strict Is&Os and daily weights.  Place on fluid restriction of 2 L. Cardiology has not been consulted. Continue to stress to patient importance of self efficacy and  on diet for CHF. Last BNP reviewed- and noted below   Recent Labs   Lab 01/07/24  2304   *     Will hold all GDMT given hypotension currently. No concern for cardiogenic shock    Hyperlipemia  Continue home statin      Hypertension  Chronic, controlled. Latest blood pressure and vitals reviewed-     Temp:  [97.5 °F (36.4 °C)]   Pulse:  []   Resp:  [18-22]   BP: ()/(50-62)   SpO2:  [98 %-100 %] .   Home meds for hypertension were reviewed and noted below.   Hypertension Medications               metoprolol succinate (TOPROL-XL) 25 MG 24 hr tablet Take 2 tablets (50 mg total) by mouth once daily.    sacubitriL-valsartan (ENTRESTO) 24-26 mg per tablet Take 1 tablet by mouth 2 (two) times daily.    torsemide (DEMADEX) 20 MG Tab Take 1 tablet (20 mg total) by mouth once daily.            While in the hospital, will manage blood pressure as follows; Adjust home antihypertensive regimen as follows- holding home meds with likley volume depletion as etiology . Baseline systolic BP of 100s    Will utilize p.r.n. blood pressure medication only if patient's blood pressure greater than 180/110 and she develops symptoms such as worsening chest pain or shortness of breath.    Type 2 diabetes mellitus, without long-term current use of insulin  Hold  home metformin, empagliflozin, dulaglutide  - A1c  6.3  - Glc on arrival 88    Start LDSSI; titrate prn      VTE Risk Mitigation (From admission, onward)           Ordered     apixaban tablet 5 mg  2 times daily         01/08/24 0222     IP VTE HIGH RISK PATIENT  Once         01/08/24 0222     Place sequential compression device  Until discontinued         01/08/24 0222                                    Marlon Ferrer DO  Department of Hospital Medicine  [facility] - Emergency Dept

## 2024-01-08 NOTE — ASSESSMENT & PLAN NOTE
Patient is identified as having Combined Systolic and Diastolic heart failure that is Chronic. CHF is currently controlled. Latest ECHO performed and demonstrates- Results for orders placed during the hospital encounter of 08/30/23    Echo    Interpretation Summary    Left Ventricle: The left ventricle is severely dilated. Normal wall thickness. Severe global hypokinesis present. There is severely reduced systolic function with a visually estimated ejection fraction of 15 - 20%. Biplane (2D) method of discs ejection fraction is 17%. Grade I diastolic dysfunction.    Right Ventricle: Normal right ventricular cavity size. Wall thickness is normal. Right ventricle wall motion  is normal. Systolic function is normal. Pacemaker lead present in the ventricle.    Aortic Valve: The aortic valve is a trileaflet valve. There is mild aortic valve sclerosis. Mild annular calcification.    IVC/SVC: Intermediate venous pressure at 8 mmHg.  .Monitor clinical status closely. Monitor on telemetry. Patient is off CHF pathway.  Monitor strict Is&Os and daily weights.  Place on fluid restriction of 2 L. Cardiology has not been consulted. Continue to stress to patient importance of self efficacy and  on diet for CHF. Last BNP reviewed- and noted below   Recent Labs   Lab 01/07/24  2304   *     Will hold all GDMT given hypotension currently. No concern for cardiogenic shock

## 2024-01-08 NOTE — ASSESSMENT & PLAN NOTE
Recommendations  -no septic appearing  -appears dry on exam  -continue Abx  -continue gentle fluid resuscitation. Please give additional 500ml   -remainder per ED   -no new pressor or oxygen requirement   -ok for floor  -does not meet criteria for MICU at this time

## 2024-01-08 NOTE — ASSESSMENT & PLAN NOTE
Hold home metformin, empagliflozin, dulaglutide  - A1c  6.3  - Glc on arrival 88    Start LDSSI; titrate prn

## 2024-01-08 NOTE — ED NOTES
Pt placed on telemetry box [id number] . Rhythm and rate confirmed by telemetry technician: NSR at a rate of 88

## 2024-01-08 NOTE — HPI
Mrs. Mohr is a 64 yo F with HTN, T2DM, HLD, tobacco use (quit in 2020), HFrEF 2/2 DCM (10-15%, s/p AICD), PE in 2021/ DVTs 2023, pHTN, osteoarthritis of bilateral hips (L>R) with chronic pain, and CVA (2020, R MCA, no deficits then L MCA stroke during recent admission s/p thrombectomy 4/2023) with right sided hemiparesis who presents with complaint of 4 days abdominal pain. Patient reports abdominal pain is associated with nausea and vomiting after meals. On repeat evaluation patient denied abdominal pain. Last BM x2 days ago and denies any melena or BRBPR. Patient reports feeling fatigue with decreased PO intake. Patient denies missing any medications prior to admission including BP meds and reports BP was low at home. Patient reports associated LH and dizziness with ambulation. Patient denies any frequency or urgency but does report suprapubic pain that is mild in nature.     In the ED patient was afebrile, hypotensive and tachycardic with SpO2 >95 on RA. Labs were significant for lactic 2.9->3.2, WBC 6K, hgb 10, , K 2.9, Bili 1.2, troponin 0.05-0.039. CXR demonstrated no effusion or infiltrate. Given hypotension patient was given 250 then 500 cc bolus and MICU evaluated for lactic acidosis. Patient admitted to  for management of hypotension, ARBEN and N/V.

## 2024-01-08 NOTE — NURSING
Nurses Note -- 4 Eyes      1/8/2024   5:44 PM      Skin assessed during: Admit      [] No Altered Skin Integrity Present    []Prevention Measures Documented      [x] Yes- Altered Skin Integrity Present or Discovered   [x] LDA Added if Not in Epic (Describe Wound)   [x] New Altered Skin Integrity was Present on Admit and Documented in LDA   [x] Wound Image Taken    Wound Care Consulted? No    Attending Nurse:  Kathy Bolaños RN/Staff Member:   Martín Jo RN

## 2024-01-08 NOTE — ASSESSMENT & PLAN NOTE
Patient with acute kidney injury/acute renal failure likely due to pre-renal azotemia due to IVVD ARBEN is currently worsening. Baseline creatinine  0.8  - Labs reviewed- Renal function/electrolytes with Estimated Creatinine Clearance: 49.6 mL/min (based on SCr of 1.1 mg/dL). according to latest data. Monitor urine output and serial BMP and adjust therapy as needed. Avoid nephrotoxins and renally dose meds for GFR listed above.    Very mild ARBEN and will replete IVF. Hold PO lasix and reassess.

## 2024-01-08 NOTE — SUBJECTIVE & OBJECTIVE
Past Medical History:   Diagnosis Date    Acute on chronic combined systolic and diastolic heart failure 2019    ARBEN (acute kidney injury) 2023    Anticoagulant long-term use     Anxiety     Arthritis     Asthma     Depression     H/O: hysterectomy     Hyperlipemia     Hypertension     Pulmonary edema     Schizophrenia        Past Surgical History:   Procedure Laterality Date    BLADDER SUSPENSION      CARPAL TUNNEL RELEASE Right     HEEL SPUR SURGERY Left     HYSTERECTOMY      LEFT HEART CATHETERIZATION Bilateral 2019    Procedure: Left heart cath;  Surgeon: Steve Chambers MD;  Location: [facility] CATH LAB;  Service: Cardiology;  Laterality: Bilateral;    RIGHT HEART CATHETERIZATION Right 2021    Procedure: INSERTION, CATHETER, RIGHT HEART;  Surgeon: Petr Naranjo MD;  Location: [facility] CATH LAB;  Service: Cardiology;  Laterality: Right;    RIGHT HEART CATHETERIZATION Right 2022    Procedure: INSERTION, CATHETER, RIGHT HEART;  Surgeon: Chandana Ivory Jr., MD;  Location: [facility] CATH LAB;  Service: Cardiology;  Laterality: Right;    TUBAL LIGATION         Review of patient's allergies indicates:   Allergen Reactions    Adhesive Blisters    Captopril Other (See Comments)     COUGH       Family History       Problem Relation (Age of Onset)    No Known Problems Mother, Father    Ovarian cancer Sister (42)          Tobacco Use    Smoking status: Former     Current packs/day: 0.00     Types: Cigarettes     Quit date: 2016     Years since quittin.3    Smokeless tobacco: Never    Tobacco comments:     nonex 2 weeks   Substance and Sexual Activity    Alcohol use: No     Alcohol/week: 0.0 standard drinks of alcohol    Drug use: No    Sexual activity: Not on file      Review of Systems   Constitutional:  Negative for activity change and appetite change.   HENT:  Negative for drooling.    Eyes:  Negative for discharge.   Respiratory:  Negative for shortness of breath.     Gastrointestinal:  Positive for abdominal pain.   Genitourinary:  Negative for dysuria.   Musculoskeletal:  Negative for back pain.   Neurological:  Negative for dizziness and seizures.     Objective:     Vital Signs (Most Recent):  Temp: 97.5 °F (36.4 °C) (01/07/24 2127)  Pulse: 79 (01/08/24 0145)  Resp: 18 (01/08/24 0145)  BP: (!) 94/57 (01/08/24 0145)  SpO2: 100 % (01/08/24 0145) Vital Signs (24h Range):  Temp:  [97.5 °F (36.4 °C)] 97.5 °F (36.4 °C)  Pulse:  [] 79  Resp:  [18-22] 18  SpO2:  [98 %-100 %] 100 %  BP: ()/(50-62) 94/57   Weight: 69.9 kg (154 lb)  Body mass index is 24.86 kg/m².    No intake or output data in the 24 hours ending 01/08/24 0426       Physical Exam  Vitals and nursing note reviewed.   Constitutional:       Appearance: She is not toxic-appearing.      Comments: No in acute distress. Resting in bed comfortably   HENT:      Head: Normocephalic and atraumatic.   Eyes:      Pupils: Pupils are equal, round, and reactive to light.   Cardiovascular:      Rate and Rhythm: Normal rate.   Pulmonary:      Effort: Pulmonary effort is normal.      Breath sounds: Normal breath sounds.   Abdominal:      Palpations: There is no mass.      Tenderness: There is no right CVA tenderness, left CVA tenderness, guarding or rebound.      Comments: Mild abdominal tenderness on palpation. No rebound or guarding   Musculoskeletal:         General: Normal range of motion.      Cervical back: Normal range of motion and neck supple.   Skin:     General: Skin is warm.      Capillary Refill: Capillary refill takes less than 2 seconds.   Neurological:      General: No focal deficit present.            Vents:     Lines/Drains/Airways       Peripheral Intravenous Line  Duration                  Peripheral IV - Single Lumen 01/07/24 2306 20 G Right Antecubital <1 day                  Significant Labs:    CBC/Anemia Profile:  Recent Labs   Lab 01/07/24  2304   WBC 6.94   HGB 11.9*   HCT 35.7*   *   MCV 87    RDW 15.8*        Chemistries:  Recent Labs   Lab 01/07/24  2304      K 3.1*   CL 92*   CO2 33*   BUN 16   CREATININE 1.1   CALCIUM 10.2   ALBUMIN 4.1   PROT 7.8   BILITOT 1.4*   ALKPHOS 94   ALT 11   AST 21       All pertinent labs within the past 24 hours have been reviewed.    Significant Imaging: I have reviewed all pertinent imaging results/findings within the past 24 hours.

## 2024-01-08 NOTE — ED NOTES
Received report from Kallie PHELAN  Pt awake and alert; resting quietly on stretcher.  Pt remains on continuous cardiac and pulse ox monitoring with non-invasive blood pressure to cycle every 30 minutes.  VS stable; NSR noted. Pt denies pain at this time; no acute distress or discomfort reported or observed.  Pt denies restroom needs at this time; is able to reposition self on stretcher. Bed locked in lowest position; side rails up and locked x 2; call light, bedside table, and personal belongings within reach. Room assessed for safety measures and cleanliness; no action needed at this time. Plan of care discussed.  Pt instructed to alert nurse for assistance and before attempting to get out of bed; verbalizes understanding. Pt denies needs or complaints at this time; will continue to monitor.

## 2024-01-08 NOTE — ASSESSMENT & PLAN NOTE
Concern for biliary etiology of recurrent N/V. Questionable abdominal pain. On DM PO meds but not in DKA. Low concern for gastroenteritis at this time. Elevated bilirubin to 1.4.     Plan  Consider additional IVF bolus and patient is IVVD  RUQ US ordered given elevated Bili  Will add on flagyl for abdominal symptoms

## 2024-01-08 NOTE — ASSESSMENT & PLAN NOTE
UA demonstrated leukocytes, blood and glucose as well as many bacteria. No leukocytosis. Previous cultures has grown e coli with varying resistance.     Plan  - will continue CTX while admitted  - consider RP imaging to rule out pyelo if febrile or worsening hypotension

## 2024-01-08 NOTE — ASSESSMENT & PLAN NOTE
Chronic, controlled. Latest blood pressure and vitals reviewed-     Temp:  [97.5 °F (36.4 °C)]   Pulse:  []   Resp:  [18-22]   BP: ()/(50-62)   SpO2:  [98 %-100 %] .   Home meds for hypertension were reviewed and noted below.   Hypertension Medications               metoprolol succinate (TOPROL-XL) 25 MG 24 hr tablet Take 2 tablets (50 mg total) by mouth once daily.    sacubitriL-valsartan (ENTRESTO) 24-26 mg per tablet Take 1 tablet by mouth 2 (two) times daily.    torsemide (DEMADEX) 20 MG Tab Take 1 tablet (20 mg total) by mouth once daily.            While in the hospital, will manage blood pressure as follows; Adjust home antihypertensive regimen as follows- holding home meds with likley volume depletion as etiology . Baseline systolic BP of 100s    Will utilize p.r.n. blood pressure medication only if patient's blood pressure greater than 180/110 and she develops symptoms such as worsening chest pain or shortness of breath.

## 2024-01-08 NOTE — ASSESSMENT & PLAN NOTE
On metformin. Patient continued to take meds despite N/V and has elevated lactic to 3.2.     Holding all PO DM meds while admitted  Will trend lactic as gone from 2.9 to 3.2  Consider additional IVF if not improved

## 2024-01-08 NOTE — ED PROVIDER NOTES
Encounter Date: 1/7/2024       History     Chief Complaint   Patient presents with    Shortness of Breath     C/o SOB and N/V x 3 days. Hx stroke, ICD     63-year-old female with a past medical history of schizophrenia, hypertension, hyperlipidemia, anxiety, arthritis, asthma, heart failure with a documented EF of 15-20% who is on Eliquis presents with a chief complaint of shortness of breath.  The patient says that she has had vomiting for the past few days in addition to shortness of breath.  She says that she has been able to take her oral medications despite this.  She says that she still has an appetite and has been eating and drinking normally.  She is also denying any cough, sore throat, abdominal pain, diarrhea, dysuria or new rashes.    The history is provided by the patient. No  was used.     Review of patient's allergies indicates:   Allergen Reactions    Adhesive Blisters    Captopril Other (See Comments)     COUGH     Past Medical History:   Diagnosis Date    Acute on chronic combined systolic and diastolic heart failure 12/26/2019    ARBEN (acute kidney injury) 5/30/2023    Anticoagulant long-term use     Anxiety     Arthritis     Asthma     Depression     H/O: hysterectomy     Hyperlipemia     Hypertension     Pulmonary edema     Schizophrenia      Past Surgical History:   Procedure Laterality Date    BLADDER SUSPENSION      CARPAL TUNNEL RELEASE Right     HEEL SPUR SURGERY Left     HYSTERECTOMY      LEFT HEART CATHETERIZATION Bilateral 12/27/2019    Procedure: Left heart cath;  Surgeon: Steve Chambers MD;  Location: [facility] CATH LAB;  Service: Cardiology;  Laterality: Bilateral;    RIGHT HEART CATHETERIZATION Right 7/26/2021    Procedure: INSERTION, CATHETER, RIGHT HEART;  Surgeon: Petr Naranjo MD;  Location: [facility] CATH LAB;  Service: Cardiology;  Laterality: Right;    RIGHT HEART CATHETERIZATION Right 5/12/2022    Procedure: INSERTION, CATHETER, RIGHT HEART;  Surgeon:  Chandana Ivory Jr., MD;  Location: [facility] CATH LAB;  Service: Cardiology;  Laterality: Right;    TUBAL LIGATION       Family History   Problem Relation Age of Onset    No Known Problems Mother     No Known Problems Father     Ovarian cancer Sister 42     Social History     Tobacco Use    Smoking status: Former     Current packs/day: 0.00     Types: Cigarettes     Quit date: 2016     Years since quittin.3    Smokeless tobacco: Never    Tobacco comments:     nonex 2 weeks   Substance Use Topics    Alcohol use: No     Alcohol/week: 0.0 standard drinks of alcohol    Drug use: No     Review of Systems    Physical Exam     Initial Vitals [24]   BP Pulse Resp Temp SpO2   (!) 88/50 (!) 115 (!) 22 97.5 °F (36.4 °C) 100 %      MAP       --         Physical Exam    Nursing note and vitals reviewed.  Constitutional: She appears well-developed and well-nourished. She is not diaphoretic. No distress.   HENT:   Head: Normocephalic and atraumatic.   Eyes: EOM are normal. Pupils are equal, round, and reactive to light. Right eye exhibits no discharge. Left eye exhibits no discharge.   Neck: Neck supple.   Cardiovascular:  Regular rhythm, normal heart sounds and intact distal pulses.           Patient is tachycardic   Pulmonary/Chest: Breath sounds normal. No respiratory distress. She has no wheezes. She has no rhonchi. She has no rales. She exhibits no tenderness.   Abdominal: Abdomen is soft. There is no abdominal tenderness. There is no rebound and no guarding.   Musculoskeletal:         General: No tenderness or edema. Normal range of motion.      Cervical back: Neck supple.     Neurological: She is alert and oriented to person, place, and time. She has normal strength. GCS score is 15. GCS eye subscore is 4. GCS verbal subscore is 5. GCS motor subscore is 6.   Skin: Skin is warm and dry. Capillary refill takes less than 2 seconds. No rash noted. No erythema. No pallor.   Psychiatric: She has a normal mood  and affect. Her behavior is normal. Judgment and thought content normal.         ED Course   Procedures  Labs Reviewed   CBC W/ AUTO DIFFERENTIAL - Abnormal; Notable for the following components:       Result Value    Hemoglobin 11.9 (*)     Hematocrit 35.7 (*)     RDW 15.8 (*)     Platelets 502 (*)     All other components within normal limits   COMPREHENSIVE METABOLIC PANEL - Abnormal; Notable for the following components:    Potassium 3.1 (*)     Chloride 92 (*)     CO2 33 (*)     Total Bilirubin 1.4 (*)     eGFR 56.8 (*)     All other components within normal limits   TROPONIN I - Abnormal; Notable for the following components:    Troponin I 0.051 (*)     All other components within normal limits   B-TYPE NATRIURETIC PEPTIDE - Abnormal; Notable for the following components:     (*)     All other components within normal limits   URINALYSIS, REFLEX TO URINE CULTURE - Abnormal; Notable for the following components:    Appearance, UA Cloudy (*)     Protein, UA 1+ (*)     Glucose, UA 3+ (*)     Occult Blood UA 1+ (*)     Leukocytes, UA Trace (*)     All other components within normal limits    Narrative:     Specimen Source->Urine   LACTIC ACID, PLASMA - Abnormal; Notable for the following components:    Lactate (Lactic Acid) 3.2 (*)     All other components within normal limits   TROPONIN I - Abnormal; Notable for the following components:    Troponin I 0.039 (*)     All other components within normal limits   COMPREHENSIVE METABOLIC PANEL - Abnormal; Notable for the following components:    Potassium 2.9 (*)     CO2 30 (*)     Albumin 3.3 (*)     Total Bilirubin 1.2 (*)     ALT 9 (*)     All other components within normal limits   MAGNESIUM - Abnormal; Notable for the following components:    Magnesium 1.3 (*)     All other components within normal limits   CBC W/ AUTO DIFFERENTIAL - Abnormal; Notable for the following components:    RBC 3.56 (*)     Hemoglobin 10.4 (*)     Hematocrit 31.0 (*)     RDW 15.7  (*)     Platelets 466 (*)     Lymph % 48.9 (*)     All other components within normal limits   URINALYSIS MICROSCOPIC - Abnormal; Notable for the following components:    RBC, UA 11 (*)     WBC, UA 13 (*)     Bacteria Many (*)     Hyaline Casts,  (*)     All other components within normal limits    Narrative:     Specimen Source->Urine   ISTAT LACTATE - Abnormal; Notable for the following components:    POC Lactate 2.84 (*)     All other components within normal limits   CULTURE, BLOOD   CULTURE, BLOOD   CULTURE, URINE   LIPASE   SARS-COV2 (COVID) WITH FLU/RSV BY PCR   D DIMER, QUANTITATIVE   PHOSPHORUS   POCT GLUCOSE, HAND-HELD DEVICE        ECG Results              EKG 12-lead (Final result)  Result time 01/07/24 21:55:14      Final result by Interface, Lab In [facility] (01/07/24 21:55:14)                   Narrative:    Test Reason : R06.02,    Vent. Rate : 114 BPM     Atrial Rate : 114 BPM     P-R Int : 132 ms          QRS Dur : 108 ms      QT Int : 396 ms       P-R-T Axes : -01 050 160 degrees     QTc Int : 545 ms    Sinus tachycardia  Incomplete left bundle branch block  LVH with repolarization abnormality ( Sokolow-Cline )  Prolonged QT  Abnormal ECG  When compared with ECG of 04-NOV-2023 14:24,  Non-specific change in ST segment in Inferior leads  T wave inversion less evident in Lateral leads  Confirmed by Sujit RIVAS MD ([id number]) on 1/7/2024 9:55:01 PM    Referred By: [email or web address]   SELF           Confirmed By:Sujit RIVAS MD                                  Imaging Results              X-Ray Chest AP Portable (Final result)  Result time 01/07/24 22:41:20      Final result by Sofi Ibarra MD (01/07/24 22:41:20)                   Impression:      Please see above.      Electronically signed by: Sofi Ibarra MD  Date:    01/07/2024  Time:    22:41               Narrative:    EXAMINATION:  XR CHEST AP PORTABLE    CLINICAL HISTORY:  CHF;    TECHNIQUE:  Single frontal view of the chest was  performed.    COMPARISON:  09/18/2023    FINDINGS:  There is a stably positioned left-sided cardiac pacing device in place.  There is unchanged enlargement of the cardiomediastinal silhouette.  There is asymmetric elevation of the left hemidiaphragm.  The lungs demonstrate slight increased interstitial prominence which could reflect component of interstitial edema although correlation for infectious process advised.  No substantial volume of pleural fluid, pneumothorax or confluent airspace consolidation identified.  Visualized osseous structures are intact.                                       Medications   sodium chloride 0.9% flush 10 mL (has no administration in time range)   melatonin tablet 6 mg (has no administration in time range)   apixaban tablet 5 mg (has no administration in time range)   aspirin EC tablet 81 mg (has no administration in time range)   atorvastatin tablet 80 mg (has no administration in time range)   azelastine 137 mcg (0.1 %) nasal spray 137 mcg (has no administration in time range)   gabapentin capsule 600 mg (has no administration in time range)   sodium chloride 0.9% flush 10 mL (has no administration in time range)   naloxone 0.4 mg/mL injection 0.02 mg (has no administration in time range)   glucose chewable tablet 16 g (has no administration in time range)   glucose chewable tablet 24 g (has no administration in time range)   glucagon (human recombinant) injection 1 mg (has no administration in time range)   acetaminophen tablet 650 mg (has no administration in time range)   prochlorperazine injection Soln 5 mg (has no administration in time range)   insulin aspart U-100 pen 0-5 Units (has no administration in time range)   dextrose 10% bolus 125 mL 125 mL (has no administration in time range)   dextrose 10% bolus 250 mL 250 mL (has no administration in time range)   magnesium sulfate 2g in water 50mL IVPB (premix) (2 g Intravenous New Bag 1/8/24 0549)   potassium chloride 10 mEq  in 100 mL IVPB (has no administration in time range)   metronidazole IVPB 500 mg (has no administration in time range)   lactated ringers bolus 250 mL (0 mLs Intravenous Stopped 1/8/24 0225)   potassium bicarbonate disintegrating tablet 60 mEq (60 mEq Oral Given 1/8/24 0416)   cefTRIAXone (ROCEPHIN) 2 g in dextrose 5 % in water (D5W) 100 mL IVPB (MB+) (0 g Intravenous Stopped 1/8/24 0450)   lactated ringers bolus 500 mL (0 mLs Intravenous Stopped 1/8/24 0522)   potassium bicarbonate disintegrating tablet 40 mEq (40 mEq Oral Given 1/8/24 0547)     Medical Decision Making  See ED course for remainder of care    Amount and/or Complexity of Data Reviewed  Labs: ordered.    Risk  OTC drugs.  Prescription drug management.  Decision regarding hospitalization.               ED Course as of 01/08/24 0637   Sun Jan 07, 2024   2155 Sinus tachycardia at 114 beats per minute, left bundle-branch block, no STEMI based on modified Sgarbossa criteria [BP]   2230 63-year-old female in no acute distress patient is denying any acute pain.  Patient was initially hypotensive, but is overall nontoxic appearing.  Overall impression, patient appears euvolemic on exam with no lower extremity edema, no JVD and clear lung sounds.  Differential includes but is not limited to CHF exacerbation versus ACS versus sepsis versus URI [BP]   Mon Jan 08, 2024   0008 SBP currently 86 [GK]   0619 Patient's initial trop was at baseline.  No significant derangements on her CMP, CBC is also unremarkable, hemoglobin is at baseline, patient has thrombocytosis is noted on multiple prior occasions.  Patient has mildly elevated lactate.  Chest x-ray did not show significant edema, BNP was below her prior, I do not think that she is having a CHF exacerbation.  UA was ultimately positive.  Not sure if this is the cause of her hypotension, it is possible that she was hypovolemia from her vomiting.  We gave gentle fluid resuscitation given the patient's EF and  admitted to Hospital Medicine for further management of her pyelonephritis. [BP]      ED Course User Index  [BP] Sylvester Seymour MD  [GK] Carley Malin MD                           Clinical Impression:  Final diagnoses:  [R06.02] SOB (shortness of breath)  [R06.02] Shortness of breath  [R11.2] Intractable nausea and vomiting  [N12] Pyelonephritis (Primary)          ED Disposition Condition    Admit                 Sylvester Seymour MD  Resident  01/08/24 0637

## 2024-01-08 NOTE — HOSPITAL COURSE
Patient is a 64yo old female with past medical history of CHF (EF 20%) presenting for x4 days abdominal pain associated with nausea and vomiting after meals. Last BM x2 days. Endorses feeling fatigue with decreased PO intake. No fevers, chest pain, shortness of breath. MICU consulted for intractable nausea and vomiting      ED workup  Infectious workup initiated in the ED  WBC 6.9, Hgb stable  CMP 3.1 otherwise no acute electrolyte derangement  UA suspicious for UTI, given ceftriaxone in ED  CTAP with no acute intraabdominal pathology  RUQ US pending to r/o gallbladder dx  Given 250cc fluid bolus in the ED

## 2024-01-08 NOTE — SUBJECTIVE & OBJECTIVE
Past Medical History:   Diagnosis Date    Acute on chronic combined systolic and diastolic heart failure 12/26/2019    ABREN (acute kidney injury) 5/30/2023    Anticoagulant long-term use     Anxiety     Arthritis     Asthma     Depression     H/O: hysterectomy     Hyperlipemia     Hypertension     Pulmonary edema     Schizophrenia        Past Surgical History:   Procedure Laterality Date    BLADDER SUSPENSION      CARPAL TUNNEL RELEASE Right     HEEL SPUR SURGERY Left     HYSTERECTOMY      LEFT HEART CATHETERIZATION Bilateral 12/27/2019    Procedure: Left heart cath;  Surgeon: Steve Chambers MD;  Location: [facility] CATH LAB;  Service: Cardiology;  Laterality: Bilateral;    RIGHT HEART CATHETERIZATION Right 7/26/2021    Procedure: INSERTION, CATHETER, RIGHT HEART;  Surgeon: Petr Naranjo MD;  Location: [facility] CATH LAB;  Service: Cardiology;  Laterality: Right;    RIGHT HEART CATHETERIZATION Right 5/12/2022    Procedure: INSERTION, CATHETER, RIGHT HEART;  Surgeon: Chandana Ivory Jr., MD;  Location: [facility] CATH LAB;  Service: Cardiology;  Laterality: Right;    TUBAL LIGATION         Review of patient's allergies indicates:   Allergen Reactions    Adhesive Blisters    Captopril Other (See Comments)     COUGH       No current facility-administered medications on file prior to encounter.     Current Outpatient Medications on File Prior to Encounter   Medication Sig    acetaminophen (TYLENOL) 325 MG tablet Take 2 tablets (650 mg total) by mouth every 8 (eight) hours as needed.    apixaban (ELIQUIS) 5 mg Tab Take 1 tablet (5 mg total) by mouth 2 (two) times daily.    aspirin (ECOTRIN) 81 MG EC tablet Take 1 tablet (81 mg total) by mouth once daily.    atorvastatin (LIPITOR) 80 MG tablet Take 1 tablet (80 mg total) by mouth once daily.    azelastine (ASTELIN) 137 mcg (0.1 %) nasal spray 1 spray (137 mcg total) by Nasal route 2 (two) times daily.    dulaglutide (TRULICITY) 1.5 mg/0.5 mL pen injector Inject 1.5 mg  into the skin once a week.    empagliflozin (JARDIANCE) 10 mg tablet Take 1 tablet (10 mg total) by mouth once daily.    gabapentin (NEURONTIN) 600 MG tablet Take 1 tablet (600 mg total) by mouth 3 (three) times daily.    metFORMIN (GLUCOPHAGE) 1000 MG tablet Take 0.5 tablets (500 mg total) by mouth 2 (two) times daily.    metoprolol succinate (TOPROL-XL) 25 MG 24 hr tablet Take 2 tablets (50 mg total) by mouth once daily.    ondansetron (ZOFRAN-ODT) 4 MG TbDL Take 1 tablet (4 mg total) by mouth every 8 (eight) hours as needed.    potassium chloride (KLOR-CON) 10 MEQ TbSR Take 1 tablet (10 mEq total) by mouth once daily.    sacubitriL-valsartan (ENTRESTO) 24-26 mg per tablet Take 1 tablet by mouth 2 (two) times daily.    torsemide (DEMADEX) 20 MG Tab Take 1 tablet (20 mg total) by mouth once daily.     Family History       Problem Relation (Age of Onset)    No Known Problems Mother, Father    Ovarian cancer Sister (42)          Tobacco Use    Smoking status: Former     Current packs/day: 0.00     Types: Cigarettes     Quit date: 2016     Years since quittin.3    Smokeless tobacco: Never    Tobacco comments:     nonex 2 weeks   Substance and Sexual Activity    Alcohol use: No     Alcohol/week: 0.0 standard drinks of alcohol    Drug use: No    Sexual activity: Not on file     Review of Systems   Constitutional:  Positive for appetite change and fatigue. Negative for activity change, chills and fever.   HENT:  Negative for drooling.    Eyes:  Negative for discharge and visual disturbance.   Respiratory:  Positive for shortness of breath. Negative for cough and wheezing.    Cardiovascular:  Negative for chest pain, palpitations and leg swelling.   Gastrointestinal:  Positive for abdominal pain, nausea and vomiting. Negative for constipation and diarrhea.   Genitourinary:  Negative for decreased urine volume, dysuria, flank pain, frequency and hematuria.        Suprapubic pain   Musculoskeletal:  Negative for  back pain.   Skin:  Negative for rash and wound.   Neurological:  Positive for weakness and light-headedness. Negative for dizziness and seizures.     Objective:     Vital Signs (Most Recent):  Temp: 97.5 °F (36.4 °C) (01/07/24 2127)  Pulse: 79 (01/08/24 0145)  Resp: 18 (01/08/24 0145)  BP: (!) 94/57 (01/08/24 0145)  SpO2: 100 % (01/08/24 0145) Vital Signs (24h Range):  Temp:  [97.5 °F (36.4 °C)] 97.5 °F (36.4 °C)  Pulse:  [] 79  Resp:  [18-22] 18  SpO2:  [98 %-100 %] 100 %  BP: ()/(50-62) 94/57     Weight: 69.9 kg (154 lb)  Body mass index is 24.86 kg/m².     Physical Exam  Vitals and nursing note reviewed.   Constitutional:       Appearance: Normal appearance. She is not toxic-appearing or diaphoretic.      Comments: No in acute distress. Resting in bed comfortably   HENT:      Head: Normocephalic and atraumatic.      Mouth/Throat:      Mouth: Mucous membranes are dry.   Eyes:      Extraocular Movements: Extraocular movements intact.      Pupils: Pupils are equal, round, and reactive to light.   Cardiovascular:      Rate and Rhythm: Normal rate and regular rhythm.      Heart sounds: No murmur heard.  Pulmonary:      Effort: Pulmonary effort is normal.      Breath sounds: Normal breath sounds. No wheezing or rales.   Abdominal:      General: There is no distension.      Palpations: Abdomen is soft. There is no mass.      Tenderness: There is abdominal tenderness. There is no right CVA tenderness, left CVA tenderness, guarding or rebound.      Comments: Mild abdominal tenderness on palpation. No rebound or guarding   Musculoskeletal:         General: Normal range of motion.      Cervical back: Normal range of motion and neck supple.      Right lower leg: No edema.      Left lower leg: No edema.   Skin:     General: Skin is warm.      Capillary Refill: Capillary refill takes less than 2 seconds.   Neurological:      General: No focal deficit present.      Mental Status: She is alert.              CRANIAL  "NERVES     CN III, IV, VI   Pupils are equal, round, and reactive to light.       Significant Labs: All pertinent labs within the past 24 hours have been reviewed.  A1C:   Recent Labs   Lab 08/28/23  1023   HGBA1C 6.3*     CBC:   Recent Labs   Lab 01/07/24  2304 01/08/24  0414   WBC 6.94 6.10   HGB 11.9* 10.4*   HCT 35.7* 31.0*   * 466*     CMP:   Recent Labs   Lab 01/07/24 2304 01/08/24  0414    140   K 3.1* 2.9*   CL 92* 97   CO2 33* 30*   GLU 88 87   BUN 16 14   CREATININE 1.1 0.8   CALCIUM 10.2 8.8   PROT 7.8 6.3   ALBUMIN 4.1 3.3*   BILITOT 1.4* 1.2*   ALKPHOS 94 76   AST 21 16   ALT 11 9*   ANIONGAP 15 13     Lactic Acid:   Recent Labs   Lab 01/08/24  0414   LACTATE 3.2*     Lipase:   Recent Labs   Lab 01/07/24  2304   LIPASE 43     Magnesium:   Recent Labs   Lab 01/08/24  0414   MG 1.3*     Troponin:   Recent Labs   Lab 01/07/24  2304 01/08/24  0414   TROPONINI 0.051* 0.039*     Urine Culture: No results for input(s): "LABURIN" in the last 48 hours.  Urine Studies:   Recent Labs   Lab 01/08/24  0207   COLORU Yellow   APPEARANCEUA Cloudy*   PHUR 5.0   SPECGRAV 1.015   PROTEINUA 1+*   GLUCUA 3+*   KETONESU Negative   BILIRUBINUA Negative   OCCULTUA 1+*   NITRITE Negative   LEUKOCYTESUR Trace*   RBCUA 11*   WBCUA 13*   BACTERIA Many*   SQUAMEPITHEL 0   HYALINECASTS 138*       Significant Imaging: I have reviewed all pertinent imaging results/findings within the past 24 hours.  "

## 2024-01-08 NOTE — ED NOTES
Patient identifiers for Diomedes Mohr 63 y.o. female checked and correct.  Chief Complaint   Patient presents with    Shortness of Breath     C/o SOB and N/V x 3 days. Hx stroke, ICD     Past Medical History:   Diagnosis Date    Acute on chronic combined systolic and diastolic heart failure 12/26/2019    ARBEN (acute kidney injury) 5/30/2023    Anticoagulant long-term use     Anxiety     Arthritis     Asthma     Depression     H/O: hysterectomy     Hyperlipemia     Hypertension     Pulmonary edema     Schizophrenia      Allergies reported:   Review of patient's allergies indicates:   Allergen Reactions    Adhesive Blisters    Captopril Other (See Comments)     COUGH         HEENT: Denies vision changes. Denies ear drainage or hearing loss. No c/o nasal drainage. Denies dysphagia or voice changes.   Appearance: Pt awake, alert & oriented to person, place & time. Pt in no acute distress at present time. Pt is clean and well groomed with clothes appropriately fastened.   Skin: Skin warm, dry & intact. Color consistent with ethnicity. Mucous membranes moist. No breakdown or brusing noted.   Musculoskeletal: Patient moving all extremities well, no obvious swelling or deformities noted.   Respiratory: Respirations spontaneous, even, and non-labored. Visible chest rise noted. Airway is open and patent. No accessory muscle use noted.   Neurologic: Sensation is intact. Speech is clear and appropriate. Eyes open spontaneously, behavior appropriate to situation, follows commands, facial expression symmetrical, bilateral hand grasp equal and even, purposeful motor response noted.  Cardiac: All peripheral pulses present. No Bilateral lower extremity edema. Cap refill is <3 seconds.  Abdomen: Abdomen soft, non distended, non tender to palpation.   : Pt voids independently, denies dysuria, hematuria, frequency.

## 2024-01-09 LAB
ALBUMIN SERPL BCP-MCNC: 3.4 G/DL (ref 3.5–5.2)
ALP SERPL-CCNC: 90 U/L (ref 55–135)
ALT SERPL W/O P-5'-P-CCNC: 10 U/L (ref 10–44)
ANION GAP SERPL CALC-SCNC: 9 MMOL/L (ref 8–16)
AST SERPL-CCNC: 20 U/L (ref 10–40)
BACTERIA UR CULT: ABNORMAL
BASOPHILS # BLD AUTO: 0.04 K/UL (ref 0–0.2)
BASOPHILS NFR BLD: 0.6 % (ref 0–1.9)
BILIRUB SERPL-MCNC: 1.1 MG/DL (ref 0.1–1)
BUN SERPL-MCNC: 7 MG/DL (ref 8–23)
CALCIUM SERPL-MCNC: 9.3 MG/DL (ref 8.7–10.5)
CHLORIDE SERPL-SCNC: 98 MMOL/L (ref 95–110)
CO2 SERPL-SCNC: 29 MMOL/L (ref 23–29)
CREAT SERPL-MCNC: 0.7 MG/DL (ref 0.5–1.4)
DIFFERENTIAL METHOD BLD: ABNORMAL
EOSINOPHIL # BLD AUTO: 0.2 K/UL (ref 0–0.5)
EOSINOPHIL NFR BLD: 2.4 % (ref 0–8)
ERYTHROCYTE [DISTWIDTH] IN BLOOD BY AUTOMATED COUNT: 15.9 % (ref 11.5–14.5)
EST. GFR  (NO RACE VARIABLE): >60 ML/MIN/1.73 M^2
GLUCOSE SERPL-MCNC: 85 MG/DL (ref 70–110)
HCT VFR BLD AUTO: 34.5 % (ref 37–48.5)
HGB BLD-MCNC: 11.3 G/DL (ref 12–16)
IMM GRANULOCYTES # BLD AUTO: 0.02 K/UL (ref 0–0.04)
IMM GRANULOCYTES NFR BLD AUTO: 0.3 % (ref 0–0.5)
LYMPHOCYTES # BLD AUTO: 2.4 K/UL (ref 1–4.8)
LYMPHOCYTES NFR BLD: 36.2 % (ref 18–48)
MAGNESIUM SERPL-MCNC: 1.8 MG/DL (ref 1.6–2.6)
MCH RBC QN AUTO: 29 PG (ref 27–31)
MCHC RBC AUTO-ENTMCNC: 32.8 G/DL (ref 32–36)
MCV RBC AUTO: 89 FL (ref 82–98)
MONOCYTES # BLD AUTO: 0.3 K/UL (ref 0.3–1)
MONOCYTES NFR BLD: 4 % (ref 4–15)
NEUTROPHILS # BLD AUTO: 3.8 K/UL (ref 1.8–7.7)
NEUTROPHILS NFR BLD: 56.5 % (ref 38–73)
NRBC BLD-RTO: 0 /100 WBC
PHOSPHATE SERPL-MCNC: 2.6 MG/DL (ref 2.7–4.5)
PLATELET # BLD AUTO: 507 K/UL (ref 150–450)
PMV BLD AUTO: 10.7 FL (ref 9.2–12.9)
POCT GLUCOSE: 117 MG/DL (ref 70–110)
POCT GLUCOSE: 143 MG/DL (ref 70–110)
POCT GLUCOSE: 83 MG/DL (ref 70–110)
POCT GLUCOSE: 95 MG/DL (ref 70–110)
POTASSIUM SERPL-SCNC: 3.6 MMOL/L (ref 3.5–5.1)
PROT SERPL-MCNC: 6.5 G/DL (ref 6–8.4)
RBC # BLD AUTO: 3.9 M/UL (ref 4–5.4)
SODIUM SERPL-SCNC: 136 MMOL/L (ref 136–145)
WBC # BLD AUTO: 6.69 K/UL (ref 3.9–12.7)

## 2024-01-09 PROCEDURE — 94761 N-INVAS EAR/PLS OXIMETRY MLT: CPT | Mod: HCNC

## 2024-01-09 PROCEDURE — 85025 COMPLETE CBC W/AUTO DIFF WBC: CPT | Mod: HCNC | Performed by: STUDENT IN AN ORGANIZED HEALTH CARE EDUCATION/TRAINING PROGRAM

## 2024-01-09 PROCEDURE — 63600175 PHARM REV CODE 636 W HCPCS: Mod: HCNC | Performed by: STUDENT IN AN ORGANIZED HEALTH CARE EDUCATION/TRAINING PROGRAM

## 2024-01-09 PROCEDURE — 80053 COMPREHEN METABOLIC PANEL: CPT | Mod: HCNC | Performed by: STUDENT IN AN ORGANIZED HEALTH CARE EDUCATION/TRAINING PROGRAM

## 2024-01-09 PROCEDURE — 25000003 PHARM REV CODE 250: Mod: HCNC | Performed by: STUDENT IN AN ORGANIZED HEALTH CARE EDUCATION/TRAINING PROGRAM

## 2024-01-09 PROCEDURE — 36415 COLL VENOUS BLD VENIPUNCTURE: CPT | Mod: HCNC | Performed by: STUDENT IN AN ORGANIZED HEALTH CARE EDUCATION/TRAINING PROGRAM

## 2024-01-09 PROCEDURE — 84100 ASSAY OF PHOSPHORUS: CPT | Mod: HCNC | Performed by: STUDENT IN AN ORGANIZED HEALTH CARE EDUCATION/TRAINING PROGRAM

## 2024-01-09 PROCEDURE — 21400001 HC TELEMETRY ROOM: Mod: HCNC

## 2024-01-09 PROCEDURE — 83735 ASSAY OF MAGNESIUM: CPT | Mod: HCNC | Performed by: STUDENT IN AN ORGANIZED HEALTH CARE EDUCATION/TRAINING PROGRAM

## 2024-01-09 RX ORDER — POTASSIUM CHLORIDE 20 MEQ/1
40 TABLET, EXTENDED RELEASE ORAL ONCE
Status: COMPLETED | OUTPATIENT
Start: 2024-01-09 | End: 2024-01-09

## 2024-01-09 RX ORDER — MAGNESIUM SULFATE HEPTAHYDRATE 40 MG/ML
2 INJECTION, SOLUTION INTRAVENOUS ONCE
Status: COMPLETED | OUTPATIENT
Start: 2024-01-09 | End: 2024-01-09

## 2024-01-09 RX ORDER — SODIUM,POTASSIUM PHOSPHATES 280-250MG
2 POWDER IN PACKET (EA) ORAL ONCE
Status: COMPLETED | OUTPATIENT
Start: 2024-01-09 | End: 2024-01-09

## 2024-01-09 RX ADMIN — APIXABAN 5 MG: 5 TABLET, FILM COATED ORAL at 09:01

## 2024-01-09 RX ADMIN — ASPIRIN 81 MG: 81 TABLET, COATED ORAL at 09:01

## 2024-01-09 RX ADMIN — POTASSIUM & SODIUM PHOSPHATES POWDER PACK 280-160-250 MG 2 PACKET: 280-160-250 PACK at 10:01

## 2024-01-09 RX ADMIN — SODIUM CHLORIDE, POTASSIUM CHLORIDE, SODIUM LACTATE AND CALCIUM CHLORIDE: 600; 310; 30; 20 INJECTION, SOLUTION INTRAVENOUS at 07:01

## 2024-01-09 RX ADMIN — ATORVASTATIN CALCIUM 80 MG: 40 TABLET, FILM COATED ORAL at 09:01

## 2024-01-09 RX ADMIN — POTASSIUM CHLORIDE 40 MEQ: 1500 TABLET, EXTENDED RELEASE ORAL at 09:01

## 2024-01-09 RX ADMIN — AZELASTINE 137 MCG: 1 SPRAY, METERED NASAL at 09:01

## 2024-01-09 RX ADMIN — PIPERACILLIN SODIUM AND TAZOBACTAM SODIUM 4.5 G: 4; .5 INJECTION, POWDER, FOR SOLUTION INTRAVENOUS at 03:01

## 2024-01-09 RX ADMIN — DIPHENHYDRAMINE HYDROCHLORIDE 25 MG: 25 CAPSULE ORAL at 09:01

## 2024-01-09 RX ADMIN — ACETAMINOPHEN 650 MG: 325 TABLET ORAL at 09:01

## 2024-01-09 RX ADMIN — SODIUM CHLORIDE, POTASSIUM CHLORIDE, SODIUM LACTATE AND CALCIUM CHLORIDE: 600; 310; 30; 20 INJECTION, SOLUTION INTRAVENOUS at 10:01

## 2024-01-09 RX ADMIN — MAGNESIUM SULFATE HEPTAHYDRATE 2 G: 40 INJECTION, SOLUTION INTRAVENOUS at 09:01

## 2024-01-09 RX ADMIN — VANCOMYCIN HYDROCHLORIDE 1250 MG: 1.25 INJECTION, POWDER, LYOPHILIZED, FOR SOLUTION INTRAVENOUS at 10:01

## 2024-01-09 RX ADMIN — PIPERACILLIN SODIUM AND TAZOBACTAM SODIUM 4.5 G: 4; .5 INJECTION, POWDER, FOR SOLUTION INTRAVENOUS at 05:01

## 2024-01-09 NOTE — PLAN OF CARE
Ms. Mohr is a 63-year-old female who was admitted for nausea and vomiting for past 2 days.  Patient was started on metronidazole for possible cholangitis.  Patient underwent ultrasound this morning.  Ultrasound showed no acute process.  Patient reported significant improved on regular dietement in nausea at the time of my encounter and requested to advance diet.  Patient was placed on regular diet.  Around half an hour ago patient's systolic blood pressure was noted to be high 80s,, gave a bolus of 500 LR and started patient on maintenance fluids.  Patient was afebrile, no leukocytosis.  Blood cultures were collected the time of admission.  Broaden antibiotics to vancomycin and Zosyn.  Ordered lactate and procalcitonin.  Signed outpatient tonight team through secure chat

## 2024-01-09 NOTE — CONSULTS
[facility]mit[facility]y [facility] - [facility]  Vascular Neurology  Comprehensive Stroke Center  Consult Note    Consults   Reason for Consult: Stroke code for AMS  Assessment/Plan:     Patient is a 63 y.o. year old female with:    Altered mental status  Diomedes Mohr is a 63 y.o. female ith PMHx of HTN, T2DM, HLD, tobacco use (quit in 2020), HFrEF 2/2 DCM (10-15%, s/p AICD), PE in 2021/ DVTs 2023, pHTN, osteoarthritis of bilateral hips (L>R) with chronic pain, and CVA (2020, R MCA, no deficits then L MCA stroke during recent admission s/p thrombectomy 4/2023) with right sided hemiparesis was admitted to [facility] on 1/7/24 for HM management of hypotension, ARBEN and N/V. On 01/08/2024 patient was reportedly more lethargic, and less responsive prompting initiation of a stroke code.     Patient was initially difficult to arouse at bedside, at this time it was noted that she had hypotension with a MAP of 60. Patient appeared to be very sleepy, and would fall back asleep shortly after being woken up. Patient transported for CTA Stroke Multi-Phase. While in CT, patient was AAOx4, no gaze preferences noted, answering all questions appropriately, moving all extremities purposefully, and equal strength in all four extremities. NIHSS 0. No obvious bleed or LVO noted on CTA. After transporting patient back to room, SBP was noted to be >100, and patient displayed increased alertness and interaction with healthcare team in room. Patient mentation and neuro exam appeared to be back to baseline. Patient not a candidate for TNK secondary to return to neurologic baseline and low NIHSS.    The transient AMS and increased lethargy appeared to correlate with patient's episode of hypotension. Low suspicion for cerebral origin of patient's symptoms due to grossly intact,  non-focal neuro examination. No further stroke workup recommended by vascular neurology. Ongoing management per primary team. Vascular Neurology signing off. Please call stroke team for  any questions/concerns.                   STROKE DOCUMENTATION     Acute Stroke Times   Last Known Normal Date: 01/08/24  Last Known Normal Time:  (before bedside nurse shift change)  Unknown Normal Time: Unknown Time  Symptom Onset Date: 01/08/24  Unknown Symptom Onset Time: Unknown Time  Stroke Team Called Date: 01/08/24  Stroke Team Called Time: 2041  Stroke Team Arrival Date: 01/08/24  Stroke Team Arrival Time: 2044  CT Interpretation Time: 2105  Thrombolytic Therapy Recommended: No  CTA Interpretation Time: 2106  Thrombectomy Recommended: No    NIH Scale:  Interval: baseline  1a. Level of Consciousness: 1-->Not alert, but arousable by minor stimulation to obey, answer, or respond  1b. LOC Questions: 0-->Answers both questions correctly  1c. LOC Commands: 0-->Performs both tasks correctly  2. Best Gaze: 0-->Normal  3. Visual: 0-->No visual loss  4. Facial Palsy: 0-->Normal symmetrical movements  5a. Motor Arm, Left: 0-->No drift, limb holds 90 (or 45) degrees for full 10 secs  5b. Motor Arm, Right: 0-->No drift, limb holds 90 (or 45) degrees for full 10 secs  6a. Motor Leg, Left: 0-->No drift, leg holds 30 degree position for full 5 secs  6b. Motor Leg, Right: 0-->No drift, leg holds 30 degree position for full 5 secs  7. Limb Ataxia: 0-->Absent  8. Sensory: 0-->Normal, no sensory loss  9. Best Language: 0-->No aphasia, normal  10. Dysarthria: 0-->Normal  11. Extinction and Inattention (formerly Neglect): 0-->No abnormality  Total (NIH Stroke Scale): 1    Modified [place] Score: 2  [place] Coma Scale:14   ABCD2 Score:    [id number] Score:   HAS -BLED Score:   ICH Score:   Hunt & Vora Classification:       Thrombolysis Candidate? No, Patient back to neurological baseline     Delays to Thrombolysis?  Not Applicable    Interventional Revascularization Candidate?   Is the patient eligible for mechanical endovascular reperfusion (RENE)?  No; No large vessel occlusion identified on imaging  and No; at this time  symptoms not suggestive of large vessel occlusion    Delays to Thrombectomy? Not Applicable    Hemorrhagic change of an Ischemic Stroke: Does this patient have an ischemic stroke with hemorrhagic changes? No     Subjective:     History of Present Illness:  Diomedes Mohr is a 63 y.o. female with PMHx of HTN, T2DM, HLD, tobacco use (quit in 2020), HFrEF 2/2 DCM (10-15%, s/p AICD), PE in 2021/ DVTs 2023, pHTN, osteoarthritis of bilateral hips (L>R) with chronic pain, and CVA (2020, R MCA, no deficits then L MCA stroke during recent admission s/p thrombectomy 4/2023) with right sided hemiparesis was admitted to [facility] on 1/7/24 for  management of hypotension, ARBEN and N/V. On 01/08/2024 patient was reportedly more lethargic, and less responsive prompting initiation of a stroke code.               Past Medical History:   Diagnosis Date    Acute on chronic combined systolic and diastolic heart failure 12/26/2019    ARBEN (acute kidney injury) 5/30/2023    Anticoagulant long-term use     Anxiety     Arthritis     Asthma     Depression     H/O: hysterectomy     Hyperlipemia     Hypertension     Pulmonary edema     Schizophrenia      Past Surgical History:   Procedure Laterality Date    BLADDER SUSPENSION      CARPAL TUNNEL RELEASE Right     HEEL SPUR SURGERY Left     HYSTERECTOMY      LEFT HEART CATHETERIZATION Bilateral 12/27/2019    Procedure: Left heart cath;  Surgeon: Steve Chambers MD;  Location: [facility] CATH LAB;  Service: Cardiology;  Laterality: Bilateral;    RIGHT HEART CATHETERIZATION Right 7/26/2021    Procedure: INSERTION, CATHETER, RIGHT HEART;  Surgeon: Petr Naranjo MD;  Location: [facility] CATH LAB;  Service: Cardiology;  Laterality: Right;    RIGHT HEART CATHETERIZATION Right 5/12/2022    Procedure: INSERTION, CATHETER, RIGHT HEART;  Surgeon: Chandana Ivory Jr., MD;  Location: [facility] CATH LAB;  Service: Cardiology;  Laterality: Right;    TUBAL LIGATION       Social History     Tobacco  Use    Smoking status: Former     Current packs/day: 0.00     Types: Cigarettes     Quit date: 2016     Years since quittin.3    Smokeless tobacco: Never    Tobacco comments:     nonex 2 weeks   Substance Use Topics    Alcohol use: No     Alcohol/week: 0.0 standard drinks of alcohol    Drug use: No     Review of patient's allergies indicates:   Allergen Reactions    Adhesive Blisters    Captopril Other (See Comments)     COUGH       Medications: I have reviewed the current medication administration record.    Medications Prior to Admission   Medication Sig Dispense Refill Last Dose    acetaminophen (TYLENOL) 325 MG tablet Take 2 tablets (650 mg total) by mouth every 8 (eight) hours as needed. 30 tablet 0     apixaban (ELIQUIS) 5 mg Tab Take 1 tablet (5 mg total) by mouth 2 (two) times daily. 180 tablet 3     aspirin (ECOTRIN) 81 MG EC tablet Take 1 tablet (81 mg total) by mouth once daily. 30 tablet 11     atorvastatin (LIPITOR) 80 MG tablet Take 1 tablet (80 mg total) by mouth once daily. 90 tablet 3     azelastine (ASTELIN) 137 mcg (0.1 %) nasal spray 1 spray (137 mcg total) by Nasal route 2 (two) times daily. 30 mL 0     dulaglutide (TRULICITY) 1.5 mg/0.5 mL pen injector Inject 1.5 mg into the skin once a week.       empagliflozin (JARDIANCE) 10 mg tablet Take 1 tablet (10 mg total) by mouth once daily. 30 tablet 11     gabapentin (NEURONTIN) 600 MG tablet Take 1 tablet (600 mg total) by mouth 3 (three) times daily. 270 tablet 3     metFORMIN (GLUCOPHAGE) 1000 MG tablet Take 0.5 tablets (500 mg total) by mouth 2 (two) times daily.       metoprolol succinate (TOPROL-XL) 25 MG 24 hr tablet Take 2 tablets (50 mg total) by mouth once daily. 60 tablet 3     ondansetron (ZOFRAN-ODT) 4 MG TbDL Take 1 tablet (4 mg total) by mouth every 8 (eight) hours as needed. 14 tablet 1     potassium chloride (KLOR-CON) 10 MEQ TbSR Take 1 tablet (10 mEq total) by mouth once daily. 90 tablet 3     sacubitriL-valsartan  (ENTRESTO) 24-26 mg per tablet Take 1 tablet by mouth 2 (two) times daily. 60 tablet 11     torsemide (DEMADEX) 20 MG Tab Take 1 tablet (20 mg total) by mouth once daily. 90 tablet 3        Review of Systems   Unable to perform ROS: Mental status change     Objective:     Vital Signs (Most Recent):  Temp: 97.4 °F (36.3 °C) (01/08/24 2237)  Pulse: 81 (01/08/24 2114)  Resp: 19 (01/08/24 2114)  BP: 109/74 (01/08/24 2114)  SpO2: 100 % (01/08/24 2114)    Vital Signs Range (Last 24H):  Temp:  [97.2 °F (36.2 °C)-98.7 °F (37.1 °C)]   Pulse:  []   Resp:  [17-22]   BP: ()/(49-76)   SpO2:  [91 %-100 %]        Physical Exam  Vitals reviewed.   Constitutional:       Comments: Initially awakened with sternal rub. Once patient's SBP >100, she was more alert, oriented, and interactive.    HENT:      Head: Normocephalic.      Mouth/Throat:      Mouth: Mucous membranes are moist.   Eyes:      Extraocular Movements: Extraocular movements intact.   Cardiovascular:      Rate and Rhythm: Normal rate.   Pulmonary:      Effort: Pulmonary effort is normal.   Abdominal:      Palpations: Abdomen is soft.   Skin:     General: Skin is warm.   Neurological:      Mental Status: She is oriented to person, place, and time.              Neurological Exam:   LOC: alert  Attention Span: Good   Language: No aphasia  Articulation: No dysarthria  Orientation: Person, Place, Time   Facial Sensation (CN V): Normal  Facial Movement (CN VII): Symmetric facial expression    Cerebellum: No evidence of appendicular or axial ataxia  Sensation: intact to light touch  Tone: Normal tone throughout      Laboratory:  CMP:   Recent Labs   Lab 01/08/24  0414 01/08/24 2120   CALCIUM 8.8 8.7   ALBUMIN 3.3*  --    PROT 6.3  --     136   K 2.9* 4.3   CO2 30* 25   CL 97 103   BUN 14 8   CREATININE 0.8 0.7   ALKPHOS 76  --    ALT 9*  --    AST 16  --    BILITOT 1.2*  --      CBC:   Recent Labs   Lab 01/08/24  2120   WBC 7.92   RBC 3.63*   HGB 10.6*   HCT  "32.7*      MCV 90   MCH 29.2   MCHC 32.4     Lipid Panel: No results for input(s): "CHOL", "LDLCALC", "HDL", "TRIG" in the last 168 hours.  Coagulation: No results for input(s): "PT", "INR", "APTT" in the last 168 hours.  Hgb A1C: No results for input(s): "HGBA1C" in the last 168 hours.  TSH: No results for input(s): "TSH" in the last 168 hours.    Diagnostic Results:      Brain/Vessel Imaging:  CTA Stroke Multi-Phase 1/8/2024  Final read pending.  Images personally reviewed and discussed with staff. No evidence of obvious LVO or acute ICH appreciated.        Padmini Farfan NP  [facility]  Department of Vascular Neurology   [facility]   "

## 2024-01-09 NOTE — SIGNIFICANT EVENT
RAPID RESPONSE NURSE STROKE CODE NOTE         Admit Date: 2024  LOS: 0  Code Status: Full Code   Date of Consult: 2024  : 1960  Age: 63 y.o.  Weight:   Wt Readings from Last 1 Encounters:   24 69.9 kg (154 lb)     Sex: female  Race: Black or    Bed: [address] [place] [address] A:   MRN: [id number]  Time Rapid Response Team page Received:   Time Rapid Response Team at Bedside:   Time Rapid Response Team left Bedside: 625  Was the patient discharged from an ICU this admission? No   Was the patient discharged from a PACU within last 24 hours? No   Did the patient receive conscious sedation/general anesthesia in last 24 hours? No  Was the patient in the ED within the past 24 hours? Yes  Was the patient on NIPPV within the past 24 hours? No   Did this progress into an ARC or CPA: No  Attending Physician: Omar Ybarra MD  Primary Service: [facility]OS[facility] MED R       SITUATION    Notified by charge RN via phone call.  Called to evaluate the patient for Neuro    BACKGROUND    Why is the patient in the hospital?: Intractable nausea and vomiting  Patient has a past medical history of Acute on chronic combined systolic and diastolic heart failure, ARBEN (acute kidney injury), Anticoagulant long-term use, Anxiety, Arthritis, Asthma, Depression, H/O: hysterectomy, Hyperlipemia, Hypertension, Pulmonary edema, and Schizophrenia.    ASSESSMENT    Last VS: /74   Pulse 81   Temp 97.4 °F (36.3 °C)   Resp 19   Wt 69.9 kg (154 lb)   LMP  (LMP Unknown)   SpO2 100%   BMI 24.86 kg/m²     24H Vital Sign Range:  Temp:  [97.2 °F (36.2 °C)-98.7 °F (37.1 °C)]   Pulse:  []   Resp:  [17-22]   BP: ()/(49-76)   SpO2:  [91 %-100 %]     Last know well time:     Glucose time:    Glucose result: 130    Physical Exam  Neurological:      Mental Status: She is oriented to person, place, and time. She is lethargic.      GCS: GCS eye subscore is 4. GCS verbal subscore is 5. GCS motor subscore is 6.       Cranial Nerves: Cranial nerves 2-12 are intact.      Sensory: Sensation is intact.      Motor: Motor function is intact.      Coordination: Coordination is intact.     After several minutes of stimulating the pt, she began to awaken spontaneously with clear speech, moving all ext. Pt back to baseline.  Pt transported to CT and back with out issue.  Pt now normotensive.     Time Stroke Code initiated: 2041  Stroke team Arrival time: 2044  Stroke Code activation triggers: lethargy  Vascular Neurology provider you spoke with:  Padmini Peraza arrived at CT: 2054    Time CT completed: 2058    VAN positive or negative: negative    Thrombolytic decision: No  Thrombolytic bolus (mg and time):   Thrombolytic infusion (mg and time):   Thrombolytic end time:     IR decision: No  IR arrival:   IR end time:     RECOMMENDATIONS    We recommend: CT with muitl-phase in the setting of prior CVA    FOLLOW-UP/PLAN    Call the Rapid Response Nurse, Yazmin Banerjee RN at [phone] for additional questions or concerns.    PHYSICIAN ESCALATION    Orders received and case discussed with Padmini Farfan NP     Disposition: Remain in room 631.

## 2024-01-09 NOTE — SIGNIFICANT EVENT
"Hospital Medicine  Plan of Care Note      Notified by RN at the beginning of my shift that patient arrived to the floor around 5:30 lethargic and hypotensive. Glucose in the 60s, and lactate 1.2.    Primary attending was notified, and patient had been given 500mL IVF bolus along with dextrose with improvement in glucose to 130s but with persistent decreased mental status.     On my evaluation, she required sternal rub to awaken and was oriented to person, place, somewhat situation, but would not consistently follow commands and not oriented to time ("October, 1996"). Although difficult to obtain an accurate exam, she did have disconjugate eye gaze when her eyelids were opened by examiner. For this reason, CODE STROKE was called.     She had waxing and waning mental status throughout this, however after returning to her room after CT, she was more alert and complained of pain "everywhere." She became oriented. SBP at that time around 109.    Initial wet read of CT by Stroke team was reportedly without signs of acute CVA.   Suspect her altered mental status may be related to hypoglycemia, softer BPs with history of CVA, and possibly from Gabapentin that she was given earlier in the day.    Will try to maintain appropriate BPs with IVF and check frequent glucoses. Hold any sedating agents or opiates. Plan discussed with bedside RN and Rapid Response. WIll follow up official CT Head read.      Appreciate assistance of the stroke and Rapid Response teams.     Critical care time spent on the evaluation and treatment of severe organ dysfunction, review of pertinent labs and imaging studies, discussions with consulting providers and discussions with patient/family: 43 minutes.    Khoi Murillo MD  Hospital Medicine   "

## 2024-01-09 NOTE — PROGRESS NOTES
Pharmacokinetic Initial Assessment: IV Vancomycin    Assessment/Plan:    Initiate intravenous vancomycin with loading dose of 1500 mg once followed by a maintenance dose of vancomycin 1250 mg IV every 12 hours  Desired empiric serum trough concentration is 15 to 20 mcg/mL  Draw vancomycin trough level 60 min prior to fourth dose on 1/10 at approximately 0800  Pharmacy will continue to follow and monitor vancomycin.      Please contact pharmacy at extension [phone] with any questions regarding this assessment.     Thank you for the consult,   Josseline Ramirezen       Patient brief summary:  Diomedes Mohr is a 63 y.o. female initiated on antimicrobial therapy with IV Vancomycin for treatment of suspected sepsis    Drug Allergies:   Review of patient's allergies indicates:   Allergen Reactions    Adhesive Blisters    Captopril Other (See Comments)     COUGH       Actual Body Weight:   69.9 kg    Renal Function:   Estimated Creatinine Clearance: 68.3 mL/min (based on SCr of 0.8 mg/dL).,     Dialysis Method (if applicable):  N/A    CBC (last 72 hours):  Recent Labs   Lab Result Units 01/07/24  2304 01/08/24  0414   WBC K/uL 6.94 6.10   Hemoglobin g/dL 11.9* 10.4*   Hematocrit % 35.7* 31.0*   Platelets K/uL 502* 466*   Gran % % 49.3 41.1   Lymph % % 42.9 48.9*   Mono % % 6.6 8.0   Eosinophil % % 0.7 1.3   Basophil % % 0.4 0.5   Differential Method  Automated Automated       Metabolic Panel (last 72 hours):  Recent Labs   Lab Result Units 01/07/24  2304 01/08/24  0207 01/08/24  0414   Sodium mmol/L 140  --  140   Potassium mmol/L 3.1*  --  2.9*   Chloride mmol/L 92*  --  97   CO2 mmol/L 33*  --  30*   Glucose mg/dL 88  --  87   Glucose, UA   --  3+*  --    BUN mg/dL 16  --  14   Creatinine mg/dL 1.1  --  0.8   Albumin g/dL 4.1  --  3.3*   Total Bilirubin mg/dL 1.4*  --  1.2*   Alkaline Phosphatase U/L 94  --  76   AST U/L 21  --  16   ALT U/L 11  --  9*   Magnesium mg/dL  --   --  1.3*   Phosphorus mg/dL  --   --  3.3  "      Drug levels (last 3 results):  No results for input(s): "VANCOMYCINRA", "VANCORANDOM", "VANCOMYCINPE", "VANCOPEAK", "VANCOMYCINTR", "VANCOTROUGH" in the last 72 hours.    Microbiologic Results:  Microbiology Results (last 7 days)       Procedure Component Value Units Date/Time    Blood culture x two cultures. Draw prior to antibiotics. [[id number]] Collected: 01/07/24 2253    Order Status: Completed Specimen: Blood from Peripheral, Antecubital, Right Updated: 01/08/24 0715     Blood Culture, Routine No Growth to date    Narrative:      Aerobic and anaerobic    Blood culture x two cultures. Draw prior to antibiotics. [[id number]] Collected: 01/07/24 2253    Order Status: Completed Specimen: Blood from Peripheral, Antecubital, Right Updated: 01/08/24 0715     Blood Culture, Routine No Growth to date    Narrative:      Aerobic and anaerobic    Urine culture [[id number]] Collected: 01/08/24 0207    Order Status: No result Specimen: Urine Updated: 01/08/24 0310            "

## 2024-01-09 NOTE — CARE UPDATE
RAPID RESPONSE NURSE FOLLOW-UP NOTE       Followed up with patient for a stroke code.  No acute issues at this time. Reviewed plan of care with charge Martín AVINA . Patient back to baseline. VSS.   Team will sign off.  Please call Rapid Response RN, Kallie Trujillo RN with any questions or concerns at [phone].

## 2024-01-09 NOTE — PLAN OF CARE
Problem: Infection  Goal: Absence of Infection Signs and Symptoms  Outcome: Ongoing, Progressing     Problem: Adult Inpatient Plan of Care  Goal: Plan of Care Review  Outcome: Ongoing, Progressing  Goal: Patient-Specific Goal (Individualized)  Outcome: Ongoing, Progressing  Goal: Absence of Hospital-Acquired Illness or Injury  Outcome: Ongoing, Progressing  Intervention: Identify and Manage Fall Risk  Flowsheets (Taken 1/9/2024 0527)  Safety Promotion/Fall Prevention: assistive device/personal item within reach  Intervention: Prevent Skin Injury  Flowsheets (Taken 1/9/2024 0527)  Body Position: position changed independently  Skin Protection: adhesive use limited  Intervention: Prevent and Manage VTE (Venous Thromboembolism) Risk  Flowsheets (Taken 1/9/2024 0527)  Activity Management: Ambulated to bathroom - L4  VTE Prevention/Management:   bleeding risk assessed   bleeding precations maintained   dorsiflexion/plantar flexion performed  Range of Motion: active ROM (range of motion) encouraged  Intervention: Prevent Infection  Flowsheets (Taken 1/9/2024 0527)  Infection Prevention: environmental surveillance performed  Goal: Optimal Comfort and Wellbeing  Outcome: Ongoing, Progressing  Intervention: Monitor Pain and Promote Comfort  Flowsheets (Taken 1/9/2024 0527)  Pain Management Interventions: medication offered  Intervention: Provide Person-Centered Care  Flowsheets (Taken 1/9/2024 0527)  Trust Relationship/Rapport: care explained  Goal: Readiness for Transition of Care  Outcome: Ongoing, Progressing     Problem: Fluid and Electrolyte Imbalance (Acute Kidney Injury/Impairment)  Goal: Fluid and Electrolyte Balance  Outcome: Ongoing, Progressing  Intervention: Monitor and Manage Fluid and Electrolyte Balance  Flowsheets (Taken 1/9/2024 0527)  Fluid/Electrolyte Management: intravenous fluid replacement initiated     Problem: Oral Intake Inadequate (Acute Kidney Injury/Impairment)  Goal: Optimal Nutrition  Intake  Outcome: Ongoing, Progressing  Intervention: Promote and Optimize Nutrition  Flowsheets (Taken 1/9/2024 0527)  Oral Nutrition Promotion: rest periods promoted     Problem: Renal Function Impairment (Acute Kidney Injury/Impairment)  Goal: Effective Renal Function  Outcome: Ongoing, Progressing  Intervention: Monitor and Support Renal Function  Flowsheets (Taken 1/9/2024 0527)  Stabilization Measures: fluid resuscitation initiated  Medication Review/Management: medications reviewed     Problem: Diabetes Comorbidity  Goal: Blood Glucose Level Within Targeted Range  Outcome: Ongoing, Progressing  Intervention: Monitor and Manage Glycemia  Flowsheets (Taken 1/9/2024 0527)  Glycemic Management: blood glucose monitored     Problem: Impaired Wound Healing  Goal: Optimal Wound Healing  Outcome: Ongoing, Progressing  Intervention: Promote Wound Healing  Flowsheets (Taken 1/9/2024 0527)  Oral Nutrition Promotion: rest periods promoted  Activity Management: Ambulated to bathroom - L4  Pain Management Interventions: medication offered     Problem: Fall Injury Risk  Goal: Absence of Fall and Fall-Related Injury  Outcome: Ongoing, Progressing  Intervention: Identify and Manage Contributors  Flowsheets (Taken 1/9/2024 0527)  Self-Care Promotion: independence encouraged  Medication Review/Management: medications reviewed  Intervention: Promote Injury-Free Environment  Flowsheets (Taken 1/9/2024 0527)  Safety Promotion/Fall Prevention: assistive device/personal item within reach     Problem: Skin Injury Risk Increased  Goal: Skin Health and Integrity  Outcome: Ongoing, Progressing  Intervention: Optimize Skin Protection  Flowsheets (Taken 1/9/2024 0527)  Pressure Reduction Techniques: frequent weight shift encouraged  Skin Protection: adhesive use limited  Head of Bed (HOB) Positioning: HOB elevated  Intervention: Promote and Optimize Oral Intake  Flowsheets (Taken 1/9/2024 0527)  Oral Nutrition Promotion: rest periods  promoted

## 2024-01-09 NOTE — ASSESSMENT & PLAN NOTE
Diomedes Mohr is a 63 y.o. female ith PMHx of HTN, T2DM, HLD, tobacco use (quit in 2020), HFrEF 2/2 DCM (10-15%, s/p AICD), PE in 2021/ DVTs 2023, pHTN, osteoarthritis of bilateral hips (L>R) with chronic pain, and CVA (2020, R MCA, no deficits then L MCA stroke during recent admission s/p thrombectomy 4/2023) with right sided hemiparesis was admitted to [facility] Medicine on 1/7/24 for HM management of hypotension, ARBEN and N/V. On 01/08/2024 patient was reportedly more lethargic, and less responsive prompting initiation of a stroke code.     Patient was initially difficult to arouse at bedside, at this time it was noted that she had hypotension with a MAP of 60. Patient appeared to be very sleepy, and would fall back asleep shortly after being woken up. Patient transported for CTA Stroke Multi-Phase. While in CT, patient was AAOx4, no gaze preferences noted, answering all questions appropriately, moving all extremities purposefully, and equal strength in all four extremities. NIHSS 0. No obvious bleed or LVO noted on CTA. After transporting patient back to room, SBP was noted to be >100, and patient displayed increased alertness and interaction with healthcare team in room. Patient mentation and neuro exam appeared to be back to baseline. Patient not a candidate for TNK secondary to return to neurologic baseline and low NIHSS.    The transient AMS and increased lethargy appeared to correlate with patient's episode of hypotension. Low suspicion for cerebral origin of patient's symptoms due to grossly intact,  non-focal neuro examination. No further stroke workup recommended by vascular neurology. Ongoing management per primary team. Vascular Neurology signing off. Please call stroke team for any questions/concerns.

## 2024-01-09 NOTE — PLAN OF CARE
Problem: Infection  Goal: Absence of Infection Signs and Symptoms  Outcome: Ongoing, Progressing     Problem: Adult Inpatient Plan of Care  Goal: Plan of Care Review  Outcome: Ongoing, Progressing  Goal: Patient-Specific Goal (Individualized)  Outcome: Ongoing, Progressing  Goal: Absence of Hospital-Acquired Illness or Injury  Outcome: Ongoing, Progressing  Goal: Optimal Comfort and Wellbeing  Outcome: Ongoing, Progressing  Goal: Readiness for Transition of Care  Outcome: Ongoing, Progressing     Problem: Fluid and Electrolyte Imbalance (Acute Kidney Injury/Impairment)  Goal: Fluid and Electrolyte Balance  Outcome: Ongoing, Progressing     Problem: Oral Intake Inadequate (Acute Kidney Injury/Impairment)  Goal: Optimal Nutrition Intake  Outcome: Ongoing, Progressing     Problem: Renal Function Impairment (Acute Kidney Injury/Impairment)  Goal: Effective Renal Function  Outcome: Ongoing, Progressing     Problem: Diabetes Comorbidity  Goal: Blood Glucose Level Within Targeted Range  Outcome: Ongoing, Progressing     Problem: Impaired Wound Healing  Goal: Optimal Wound Healing  Outcome: Ongoing, Progressing     Problem: Fall Injury Risk  Goal: Absence of Fall and Fall-Related Injury  Outcome: Ongoing, Progressing     Problem: Skin Injury Risk Increased  Goal: Skin Health and Integrity  Outcome: Ongoing, Progressing     Pt AAO x 4. Patient rested comfortably with no significant changes during the shift, remains free from falls and injuries. Side rails up x2, bed low and locked, call light and personal belongings within reach. VS remain stable and respirations even and unlabored.   No grimace, cries or other signs of distress. Questions and concerns addressed and answered. Medication compliance. Pt remains on continuous cardiac  monitoring. Family remains at bedside.  Hygiene care performed. HOB and pillows adjusted for comfort. Pt repositioned and continues on waffle mattress.   Reviewed importance of Safety barriers  and calling for assistance prn. Verbalized understanding and states she will call prn for needs.

## 2024-01-09 NOTE — HPI
Diomedes Mohr is a 63 y.o. female with PMHx of HTN, T2DM, HLD, tobacco use (quit in 2020), HFrEF 2/2 DCM (10-15%, s/p AICD), PE in 2021/ DVTs 2023, pHTN, osteoarthritis of bilateral hips (L>R) with chronic pain, and CVA (2020, R MCA, no deficits then L MCA stroke during recent admission s/p thrombectomy 4/2023) with right sided hemiparesis was admitted to Hospital Medicine on 1/7/24 for HM management of hypotension, ARBEN and N/V. On 01/08/2024 patient was reportedly more lethargic, and less responsive prompting initiation of a stroke code.

## 2024-01-09 NOTE — SUBJECTIVE & OBJECTIVE
Past Medical History:   Diagnosis Date    Acute on chronic combined systolic and diastolic heart failure 2019    ARBEN (acute kidney injury) 2023    Anticoagulant long-term use     Anxiety     Arthritis     Asthma     Depression     H/O: hysterectomy     Hyperlipemia     Hypertension     Pulmonary edema     Schizophrenia      Past Surgical History:   Procedure Laterality Date    BLADDER SUSPENSION      CARPAL TUNNEL RELEASE Right     HEEL SPUR SURGERY Left     HYSTERECTOMY      LEFT HEART CATHETERIZATION Bilateral 2019    Procedure: Left heart cath;  Surgeon: Steve Chambers MD;  Location: [facility] CATH LAB;  Service: Cardiology;  Laterality: Bilateral;    RIGHT HEART CATHETERIZATION Right 2021    Procedure: INSERTION, CATHETER, RIGHT HEART;  Surgeon: Petr Naranjo MD;  Location: [facility] CATH LAB;  Service: Cardiology;  Laterality: Right;    RIGHT HEART CATHETERIZATION Right 2022    Procedure: INSERTION, CATHETER, RIGHT HEART;  Surgeon: Chandana Ivory Jr., MD;  Location: [facility] CATH LAB;  Service: Cardiology;  Laterality: Right;    TUBAL LIGATION       Social History     Tobacco Use    Smoking status: Former     Current packs/day: 0.00     Types: Cigarettes     Quit date: 2016     Years since quittin.3    Smokeless tobacco: Never    Tobacco comments:     nonex 2 weeks   Substance Use Topics    Alcohol use: No     Alcohol/week: 0.0 standard drinks of alcohol    Drug use: No     Review of patient's allergies indicates:   Allergen Reactions    Adhesive Blisters    Captopril Other (See Comments)     COUGH       Medications: I have reviewed the current medication administration record.    Medications Prior to Admission   Medication Sig Dispense Refill Last Dose    acetaminophen (TYLENOL) 325 MG tablet Take 2 tablets (650 mg total) by mouth every 8 (eight) hours as needed. 30 tablet 0     apixaban (ELIQUIS) 5 mg Tab Take 1 tablet (5 mg total) by mouth 2 (two) times daily.  180 tablet 3     aspirin (ECOTRIN) 81 MG EC tablet Take 1 tablet (81 mg total) by mouth once daily. 30 tablet 11     atorvastatin (LIPITOR) 80 MG tablet Take 1 tablet (80 mg total) by mouth once daily. 90 tablet 3     azelastine (ASTELIN) 137 mcg (0.1 %) nasal spray 1 spray (137 mcg total) by Nasal route 2 (two) times daily. 30 mL 0     dulaglutide (TRULICITY) 1.5 mg/0.5 mL pen injector Inject 1.5 mg into the skin once a week.       empagliflozin (JARDIANCE) 10 mg tablet Take 1 tablet (10 mg total) by mouth once daily. 30 tablet 11     gabapentin (NEURONTIN) 600 MG tablet Take 1 tablet (600 mg total) by mouth 3 (three) times daily. 270 tablet 3     metFORMIN (GLUCOPHAGE) 1000 MG tablet Take 0.5 tablets (500 mg total) by mouth 2 (two) times daily.       metoprolol succinate (TOPROL-XL) 25 MG 24 hr tablet Take 2 tablets (50 mg total) by mouth once daily. 60 tablet 3     ondansetron (ZOFRAN-ODT) 4 MG TbDL Take 1 tablet (4 mg total) by mouth every 8 (eight) hours as needed. 14 tablet 1     potassium chloride (KLOR-CON) 10 MEQ TbSR Take 1 tablet (10 mEq total) by mouth once daily. 90 tablet 3     sacubitriL-valsartan (ENTRESTO) 24-26 mg per tablet Take 1 tablet by mouth 2 (two) times daily. 60 tablet 11     torsemide (DEMADEX) 20 MG Tab Take 1 tablet (20 mg total) by mouth once daily. 90 tablet 3        Review of Systems   Unable to perform ROS: Mental status change     Objective:     Vital Signs (Most Recent):  Temp: 97.4 °F (36.3 °C) (01/08/24 2237)  Pulse: 81 (01/08/24 2114)  Resp: 19 (01/08/24 2114)  BP: 109/74 (01/08/24 2114)  SpO2: 100 % (01/08/24 2114)    Vital Signs Range (Last 24H):  Temp:  [97.2 °F (36.2 °C)-98.7 °F (37.1 °C)]   Pulse:  []   Resp:  [17-22]   BP: ()/(49-76)   SpO2:  [91 %-100 %]        Physical Exam  Vitals reviewed.   Constitutional:       Comments: Initially awakened with sternal rub. Once patient's SBP >100, she was more alert, oriented, and interactive.    HENT:      Head:  "Normocephalic.      Mouth/Throat:      Mouth: Mucous membranes are moist.   Eyes:      Extraocular Movements: Extraocular movements intact.   Cardiovascular:      Rate and Rhythm: Normal rate.   Pulmonary:      Effort: Pulmonary effort is normal.   Abdominal:      Palpations: Abdomen is soft.   Skin:     General: Skin is warm.   Neurological:      Mental Status: She is oriented to person, place, and time.              Neurological Exam:   LOC: alert  Attention Span: Good   Language: No aphasia  Articulation: No dysarthria  Orientation: Person, Place, Time   Facial Sensation (CN V): Normal  Facial Movement (CN VII): Symmetric facial expression    Cerebellum: No evidence of appendicular or axial ataxia  Sensation: intact to light touch  Tone: Normal tone throughout      Laboratory:  CMP:   Recent Labs   Lab 01/08/24  0414 01/08/24 2120   CALCIUM 8.8 8.7   ALBUMIN 3.3*  --    PROT 6.3  --     136   K 2.9* 4.3   CO2 30* 25   CL 97 103   BUN 14 8   CREATININE 0.8 0.7   ALKPHOS 76  --    ALT 9*  --    AST 16  --    BILITOT 1.2*  --      CBC:   Recent Labs   Lab 01/08/24 2120   WBC 7.92   RBC 3.63*   HGB 10.6*   HCT 32.7*      MCV 90   MCH 29.2   MCHC 32.4     Lipid Panel: No results for input(s): "CHOL", "LDLCALC", "HDL", "TRIG" in the last 168 hours.  Coagulation: No results for input(s): "PT", "INR", "APTT" in the last 168 hours.  Hgb A1C: No results for input(s): "HGBA1C" in the last 168 hours.  TSH: No results for input(s): "TSH" in the last 168 hours.    Diagnostic Results:      Brain/Vessel Imaging:  CTA Stroke Multi-Phase 1/8/2024  Final read pending.  Images personally reviewed and discussed with staff. No evidence of obvious LVO or acute ICH appreciated.      "

## 2024-01-09 NOTE — NURSING
Received patient very lethargicwith soft BP 84/53mmHg, running 500cc LR bolus, maintenance fluid LR at 100cc/hr, blood sugar of 67. Initiated Dextrose 10% according to protocol, blood sugar 130. BP still remained soft after LR bolus at 86/57.    8:31pm - Dr. Khoi Murillo at bedside, assessed patient, ordered 1L LR bolus and advised to call stroke code.  8:35pm - Rapid response team arrived.  8:40pm - Respiratory tech and stroke MD at bedside  8:56pm - patient transported to cath lab for CTA stroke

## 2024-01-09 NOTE — PLAN OF CARE
[facility] - Med Surg  Initial Discharge Assessment       Primary Care Provider: Yolie Michael MD    Admission Diagnosis: Shortness of breath [R06.02]  SOB (shortness of breath) [R06.02]  Pyelonephritis [N12]  Elevated troponin [R79.89]  Chest pain [R07.9]  Intractable nausea and vomiting [R11.2]    Admission Date: 1/7/2024  Expected Discharge Date: 1/11/2024    Transition of Care Barriers: (P) None    Payor: [employer] MANAGED MEDICARE / Plan: [employer] MEDICARE SELECT PARTNER / Product Type: Medicare Advantage /     Extended Emergency Contact Information  Primary Emergency Contact: Maged Mohr  Address: [address] [place] [address] [facility]  Home Phone: [phone]  Mobile Phone: [phone]  Relation: Spouse  Secondary Emergency Contact: Yael Mohr  Home Phone: [phone]  Relation: Daughter    Discharge Plan A: (P) Home Health  Discharge Plan B: (P) Home with family      [id number] - [place], [place] - [address]  [address]  [place] [address]  Phone: [phone] Fax: [phone]    [facility] - [facility] [address] [facility]  [address] [facility] [address]  Phone: [phone] Fax: [phone]      CM met with patient to discuss discharge planning. Patient lives with Maged Mohr (spouse) [phone]  in a single story home with no stairs to enter. Patient states that prior to hospital admission she needed some assistance with her ADLs and states that she has a walker at home. Will continue to follow for post acute needs. Discharge Plan A and Plan B have been determined by review of patient's clinical status, future medical and therapeutic needs, and coverage/benefits for post-acute care in coordination with multidisciplinary team members.  Initial Assessment (most recent)       Adult Discharge Assessment - 01/09/24 1424          Discharge Assessment    Assessment Type Discharge Planning Assessment (P)      Confirmed/corrected  address, phone number and insurance Yes (P)      Confirmed Demographics Correct on Facesheet (P)      Source of Information patient (P)      Communicated KARLIE with patient/caregiver Date not available/Unable to determine (P)      Reason For Admission Intractable nausea and vomting (P)      People in Home spouse (P)      Facility Arrived From: home (P)      Do you expect to return to your current living situation? Yes (P)      Do you have help at home or someone to help you manage your care at home? Yes (P)      Who are your caregiver(s) and their phone number(s)? Maged Mohr (spouse) [phone] (P)      Prior to hospitilization cognitive status: Alert/Oriented (P)      Current cognitive status: Alert/Oriented (P)      Walking or Climbing Stairs Difficulty yes (P)      Walking or Climbing Stairs ambulation difficulty, requires equipment (P)      Mobility Management walker, cane (P)      Dressing/Bathing Difficulty yes (P)      Dressing/Bathing bathing difficulty, assistance 1 person (P)      Home Layout Able to live on 1st floor (P)      Equipment Currently Used at Home walker, rolling;cane, straight (P)      Readmission within 30 days? No (P)      Patient currently being followed by outpatient case management? No (P)      Do you currently have service(s) that help you manage your care at home? No (P)      Do you take prescription medications? Yes (P)      Do you have prescription coverage? Yes (P)      Coverage [employer] Managed medicare (P)      Do you have any problems affording any of your prescribed medications? No (P)      Is the patient taking medications as prescribed? yes (P)      Who is going to help you get home at discharge? Maged Mohr (spouse) [phone] (P)      How do you get to doctors appointments? family or friend will provide (P)      Are you on dialysis? No (P)      Do you take coumadin? No (P)      Discharge Plan A Home Health (P)      Discharge Plan B Home with family (P)      DME Needed  Upon Discharge  none (P)      Discharge Plan discussed with: Patient;Spouse/sig other (P)      Name(s) and Number(s) Maged Mohr (spouse) [phone] (P)      Transition of Care Barriers None (P)         Physical Activity    On average, how many days per week do you engage in moderate to strenuous exercise (like a brisk walk)? 0 days (P)      On average, how many minutes do you engage in exercise at this level? 0 min (P)         Financial Resource Strain    How hard is it for you to pay for the very basics like food, housing, medical care, and heating? Somewhat hard (P)         Housing Stability    In the last 12 months, was there a time when you were not able to pay the mortgage or rent on time? No (P)      In the last 12 months, how many places have you lived? 1 (P)      In the last 12 months, was there a time when you did not have a steady place to sleep or slept in a shelter (including now)? No (P)         Transportation Needs    In the past 12 months, has lack of transportation kept you from medical appointments or from getting medications? No (P)      In the past 12 months, has lack of transportation kept you from meetings, work, or from getting things needed for daily living? No (P)         Food Insecurity    Within the past 12 months, you worried that your food would run out before you got the money to buy more. Sometimes true (P)      Within the past 12 months, the food you bought just didn't last and you didn't have money to get more. Never true (P)         Social Connections    In a typical week, how many times do you talk on the phone with family, friends, or neighbors? More than three times a week (P)      How often do you get together with friends or relatives? More than three times a week (P)      How often do you attend [employer] or Yazidism services? More than 4 times per year (P)      Do you belong to any clubs or organizations such as [employer] groups, unions, fraternal or athletic groups, or  school groups? Yes (P)      How often do you attend meetings of the clubs or organizations you belong to? More than 4 times per year (P)      Are you , , , , never , or living with a partner?  (P)         Alcohol Use    Q1: How often do you have a drink containing alcohol? Never (P)      Q2: How many drinks containing alcohol do you have on a typical day when you are drinking? Patient does not drink (P)      Q3: How often do you have six or more drinks on one occasion? Never (P)         OTHER    Name(s) of People in Home Maged Mohr (spouse) [phone] (P)                               Lobito Bustos RNCM  Case Management  [phone]

## 2024-01-10 VITALS
WEIGHT: 154 LBS | TEMPERATURE: 98 F | BODY MASS INDEX: 24.86 KG/M2 | HEART RATE: 100 BPM | SYSTOLIC BLOOD PRESSURE: 102 MMHG | RESPIRATION RATE: 16 BRPM | OXYGEN SATURATION: 99 % | DIASTOLIC BLOOD PRESSURE: 64 MMHG

## 2024-01-10 LAB
ALBUMIN SERPL BCP-MCNC: 3.1 G/DL (ref 3.5–5.2)
ALP SERPL-CCNC: 78 U/L (ref 55–135)
ALT SERPL W/O P-5'-P-CCNC: 9 U/L (ref 10–44)
ANION GAP SERPL CALC-SCNC: 9 MMOL/L (ref 8–16)
AST SERPL-CCNC: 18 U/L (ref 10–40)
BASOPHILS # BLD AUTO: 0.03 K/UL (ref 0–0.2)
BASOPHILS NFR BLD: 0.6 % (ref 0–1.9)
BILIRUB SERPL-MCNC: 0.6 MG/DL (ref 0.1–1)
BUN SERPL-MCNC: 4 MG/DL (ref 8–23)
CALCIUM SERPL-MCNC: 9.1 MG/DL (ref 8.7–10.5)
CHLORIDE SERPL-SCNC: 101 MMOL/L (ref 95–110)
CO2 SERPL-SCNC: 26 MMOL/L (ref 23–29)
CREAT SERPL-MCNC: 0.8 MG/DL (ref 0.5–1.4)
DIFFERENTIAL METHOD BLD: ABNORMAL
EOSINOPHIL # BLD AUTO: 0.2 K/UL (ref 0–0.5)
EOSINOPHIL NFR BLD: 4.5 % (ref 0–8)
ERYTHROCYTE [DISTWIDTH] IN BLOOD BY AUTOMATED COUNT: 15.7 % (ref 11.5–14.5)
EST. GFR  (NO RACE VARIABLE): >60 ML/MIN/1.73 M^2
GLUCOSE SERPL-MCNC: 85 MG/DL (ref 70–110)
HCT VFR BLD AUTO: 32.2 % (ref 37–48.5)
HGB BLD-MCNC: 10.3 G/DL (ref 12–16)
IMM GRANULOCYTES # BLD AUTO: 0.01 K/UL (ref 0–0.04)
IMM GRANULOCYTES NFR BLD AUTO: 0.2 % (ref 0–0.5)
LYMPHOCYTES # BLD AUTO: 2.3 K/UL (ref 1–4.8)
LYMPHOCYTES NFR BLD: 42.2 % (ref 18–48)
MAGNESIUM SERPL-MCNC: 2.2 MG/DL (ref 1.6–2.6)
MCH RBC QN AUTO: 28.9 PG (ref 27–31)
MCHC RBC AUTO-ENTMCNC: 32 G/DL (ref 32–36)
MCV RBC AUTO: 90 FL (ref 82–98)
MONOCYTES # BLD AUTO: 0.3 K/UL (ref 0.3–1)
MONOCYTES NFR BLD: 5.2 % (ref 4–15)
NEUTROPHILS # BLD AUTO: 2.5 K/UL (ref 1.8–7.7)
NEUTROPHILS NFR BLD: 47.3 % (ref 38–73)
NRBC BLD-RTO: 0 /100 WBC
PHOSPHATE SERPL-MCNC: 3 MG/DL (ref 2.7–4.5)
PLATELET # BLD AUTO: 432 K/UL (ref 150–450)
PMV BLD AUTO: 10.2 FL (ref 9.2–12.9)
POCT GLUCOSE: 144 MG/DL (ref 70–110)
POCT GLUCOSE: 99 MG/DL (ref 70–110)
POTASSIUM SERPL-SCNC: 3.3 MMOL/L (ref 3.5–5.1)
PROT SERPL-MCNC: 5.9 G/DL (ref 6–8.4)
RBC # BLD AUTO: 3.57 M/UL (ref 4–5.4)
SODIUM SERPL-SCNC: 136 MMOL/L (ref 136–145)
WBC # BLD AUTO: 5.36 K/UL (ref 3.9–12.7)

## 2024-01-10 PROCEDURE — 36415 COLL VENOUS BLD VENIPUNCTURE: CPT | Mod: HCNC | Performed by: STUDENT IN AN ORGANIZED HEALTH CARE EDUCATION/TRAINING PROGRAM

## 2024-01-10 PROCEDURE — 25000003 PHARM REV CODE 250: Mod: HCNC | Performed by: STUDENT IN AN ORGANIZED HEALTH CARE EDUCATION/TRAINING PROGRAM

## 2024-01-10 PROCEDURE — 63600175 PHARM REV CODE 636 W HCPCS: Mod: HCNC | Performed by: STUDENT IN AN ORGANIZED HEALTH CARE EDUCATION/TRAINING PROGRAM

## 2024-01-10 PROCEDURE — 83735 ASSAY OF MAGNESIUM: CPT | Mod: HCNC | Performed by: STUDENT IN AN ORGANIZED HEALTH CARE EDUCATION/TRAINING PROGRAM

## 2024-01-10 PROCEDURE — 94761 N-INVAS EAR/PLS OXIMETRY MLT: CPT | Mod: HCNC

## 2024-01-10 PROCEDURE — 85025 COMPLETE CBC W/AUTO DIFF WBC: CPT | Mod: HCNC | Performed by: STUDENT IN AN ORGANIZED HEALTH CARE EDUCATION/TRAINING PROGRAM

## 2024-01-10 PROCEDURE — 84100 ASSAY OF PHOSPHORUS: CPT | Mod: HCNC | Performed by: STUDENT IN AN ORGANIZED HEALTH CARE EDUCATION/TRAINING PROGRAM

## 2024-01-10 PROCEDURE — 80053 COMPREHEN METABOLIC PANEL: CPT | Mod: HCNC | Performed by: STUDENT IN AN ORGANIZED HEALTH CARE EDUCATION/TRAINING PROGRAM

## 2024-01-10 RX ORDER — POTASSIUM CHLORIDE 20 MEQ/1
40 TABLET, EXTENDED RELEASE ORAL EVERY 4 HOURS
Status: COMPLETED | OUTPATIENT
Start: 2024-01-10 | End: 2024-01-10

## 2024-01-10 RX ADMIN — PIPERACILLIN SODIUM AND TAZOBACTAM SODIUM 4.5 G: 4; .5 INJECTION, POWDER, FOR SOLUTION INTRAVENOUS at 09:01

## 2024-01-10 RX ADMIN — APIXABAN 5 MG: 5 TABLET, FILM COATED ORAL at 09:01

## 2024-01-10 RX ADMIN — PIPERACILLIN SODIUM AND TAZOBACTAM SODIUM 4.5 G: 4; .5 INJECTION, POWDER, FOR SOLUTION INTRAVENOUS at 02:01

## 2024-01-10 RX ADMIN — POTASSIUM CHLORIDE 40 MEQ: 1500 TABLET, EXTENDED RELEASE ORAL at 01:01

## 2024-01-10 RX ADMIN — VANCOMYCIN HYDROCHLORIDE 1250 MG: 1.25 INJECTION, POWDER, LYOPHILIZED, FOR SOLUTION INTRAVENOUS at 12:01

## 2024-01-10 RX ADMIN — POTASSIUM CHLORIDE 40 MEQ: 1500 TABLET, EXTENDED RELEASE ORAL at 09:01

## 2024-01-10 RX ADMIN — ASPIRIN 81 MG: 81 TABLET, COATED ORAL at 09:01

## 2024-01-10 RX ADMIN — ATORVASTATIN CALCIUM 80 MG: 40 TABLET, FILM COATED ORAL at 09:01

## 2024-01-10 NOTE — SUBJECTIVE & OBJECTIVE
Interval history:  evening patient was noted to be lethargic and hypotensive. Glucose in the 60s, and lactate 1.2. patient had been given 500mL IVF bolus along with dextrose with improvement in glucose to 130s but with persistent decreased mental status.  Stroke code was called. CTA by Stroke team was reportedly without signs of acute CVA.  Patient's mentation improved after CT scan.  Patient today has no active complaints.  Patient had 9 runs of V-tach.  Potassium and magnesium were repleted.      Review of Systems   Constitutional:  Positive for appetite change and fatigue. Negative for activity change, chills and fever.   HENT:  Negative for drooling.    Eyes:  Negative for discharge and visual disturbance.   Respiratory:  Positive for shortness of breath. Negative for cough and wheezing.    Cardiovascular:  Negative for chest pain, palpitations and leg swelling.   Gastrointestinal:  Positive for abdominal pain, nausea and vomiting. Negative for constipation and diarrhea.   Genitourinary:  Negative for decreased urine volume, dysuria, flank pain, frequency and hematuria.        Suprapubic pain   Musculoskeletal:  Negative for back pain.   Skin:  Negative for rash and wound.   Neurological:  Positive for weakness and light-headedness. Negative for dizziness and seizures.     Objective:     Vital Signs (Most Recent):  Temp: 98.5 °F (36.9 °C) (01/09/24 1554)  Pulse: 94 (01/09/24 1618)  Resp: 18 (01/09/24 1554)  BP: 106/65 (01/09/24 1554)  SpO2: 99 % (01/09/24 1618) Vital Signs (24h Range):  Temp:  [97.2 °F (36.2 °C)-98.5 °F (36.9 °C)] 98.5 °F (36.9 °C)  Pulse:  [] 94  Resp:  [16-19] 18  SpO2:  [91 %-100 %] 99 %  BP: ()/(52-74) 106/65     Weight: 69.9 kg (154 lb)  Body mass index is 24.86 kg/m².     Physical Exam  Vitals and nursing note reviewed.   Constitutional:       Appearance: Normal appearance. She is not toxic-appearing or diaphoretic.      Comments: No in acute distress. Resting in bed  comfortably   HENT:      Head: Normocephalic and atraumatic.      Mouth/Throat:      Mouth: Mucous membranes are dry.   Eyes:      Extraocular Movements: Extraocular movements intact.      Pupils: Pupils are equal, round, and reactive to light.   Cardiovascular:      Rate and Rhythm: Normal rate and regular rhythm.      Heart sounds: No murmur heard.  Pulmonary:      Effort: Pulmonary effort is normal.      Breath sounds: Normal breath sounds. No wheezing or rales.   Abdominal:      General: There is no distension.      Palpations: Abdomen is soft. There is no mass.      Tenderness: There is abdominal tenderness. There is no right CVA tenderness, left CVA tenderness, guarding or rebound.      Comments: Mild abdominal tenderness on palpation. No rebound or guarding   Musculoskeletal:         General: Normal range of motion.      Cervical back: Normal range of motion and neck supple.      Right lower leg: No edema.      Left lower leg: No edema.   Skin:     General: Skin is warm.      Capillary Refill: Capillary refill takes less than 2 seconds.   Neurological:      General: No focal deficit present.      Mental Status: She is alert.              CRANIAL NERVES     CN III, IV, VI   Pupils are equal, round, and reactive to light.       Significant Labs: All pertinent labs within the past 24 hours have been reviewed.  A1C:   Recent Labs   Lab 08/28/23  1023   HGBA1C 6.3*       CBC:   Recent Labs   Lab 01/08/24 0414 01/08/24 2052 01/08/24 2120 01/09/24 0318   WBC 6.10  --  7.92 6.69   HGB 10.4*  --  10.6* 11.3*   HCT 31.0* 29* 32.7* 34.5*   *  --  354 507*       CMP:   Recent Labs   Lab 01/07/24 2304 01/08/24 0414 01/08/24 2120 01/09/24  0318    140 136 136   K 3.1* 2.9* 4.3 3.6   CL 92* 97 103 98   CO2 33* 30* 25 29   GLU 88 87 74 85   BUN 16 14 8 7*   CREATININE 1.1 0.8 0.7 0.7   CALCIUM 10.2 8.8 8.7 9.3   PROT 7.8 6.3  --  6.5   ALBUMIN 4.1 3.3*  --  3.4*   BILITOT 1.4* 1.2*  --  1.1*   ALKPHOS 94  76  --  90   AST 21 16  --  20   ALT 11 9*  --  10   ANIONGAP 15 13 8 9       Lactic Acid:   Recent Labs   Lab 01/08/24  0414 01/08/24  0753 01/08/24  1946   LACTATE 3.2* 2.1 1.2       Lipase:   Recent Labs   Lab 01/07/24 2304   LIPASE 43       Magnesium:   Recent Labs   Lab 01/08/24 0414 01/09/24  0318   MG 1.3* 1.8       Troponin:   Recent Labs   Lab 01/07/24 2304 01/08/24 0414   TROPONINI 0.051* 0.039*       Urine Culture:   Recent Labs   Lab 01/08/24  0207   LABURIN DIPHTHEROIDS  10,000 - 49,999 cfu/ml  *     Urine Studies:   Recent Labs   Lab 01/08/24 0207   COLORU Yellow   APPEARANCEUA Cloudy*   PHUR 5.0   SPECGRAV 1.015   PROTEINUA 1+*   GLUCUA 3+*   KETONESU Negative   BILIRUBINUA Negative   OCCULTUA 1+*   NITRITE Negative   LEUKOCYTESUR Trace*   RBCUA 11*   WBCUA 13*   BACTERIA Many*   SQUAMEPITHEL 0   HYALINECASTS 138*         Significant Imaging: I have reviewed all pertinent imaging results/findings within the past 24 hours.

## 2024-01-10 NOTE — ASSESSMENT & PLAN NOTE
Concern for biliary etiology of recurrent N/V. Questionable abdominal pain. On DM PO meds but not in DKA. Low concern for gastroenteritis at this time. Elevated bilirubin to 1.4.     Plan  Consider additional IVF bolus and patient is IVVD  RUQ US ordered given elevated Bili: unremarkable.

## 2024-01-10 NOTE — PROGRESS NOTES
[facility] Medicine  Progress Note    Patient Name: Diomedes Mohr  MRN: [id number]  Patient Class: IP- Inpatient   Admission Date: 1/7/2024  Length of Stay: 1 days  Attending Physician: Mariaelena Moffett MD  Primary Care Provider: Yolie Michael MD        Subjective:     Principal Problem:Intractable nausea and vomiting        HPI:  Mrs. Mohr is a 62 yo F with HTN, T2DM, HLD, tobacco use (quit in 2020), HFrEF 2/2 DCM (10-15%, s/p AICD), PE in 2021/ DVTs 2023, pHTN, osteoarthritis of bilateral hips (L>R) with chronic pain, and CVA (2020, R MCA, no deficits then L MCA stroke during recent admission s/p thrombectomy 4/2023) with right sided hemiparesis who presents with complaint of 4 days abdominal pain. Patient reports abdominal pain is associated with nausea and vomiting after meals. On repeat evaluation patient denied abdominal pain. Last BM x2 days ago and denies any melena or BRBPR. Patient reports feeling fatigue with decreased PO intake. Patient denies missing any medications prior to admission including BP meds and reports BP was low at home. Patient reports associated LH and dizziness with ambulation. Patient denies any frequency or urgency but does report suprapubic pain that is mild in nature.     In the ED patient was afebrile, hypotensive and tachycardic with SpO2 >95 on RA. Labs were significant for lactic 2.9->3.2, WBC 6K, hgb 10, , K 2.9, Bili 1.2, troponin 0.05-0.039. CXR demonstrated no effusion or infiltrate. Given hypotension patient was given 250 then 500 cc bolus and MICU evaluated for lactic acidosis. Patient admitted to  for management of hypotension, ARBEN and N/V.      Overview/Hospital Course:   On 1/8, evening patient was noted to be lethargic and hypotensive. Glucose in the 60s, and lactate 1.2. patient had been given 500mL IVF bolus along with dextrose with improvement in glucose to 130s but with persistent decreased mental status.  Stroke code was  called. CTA by Stroke team was reportedly without signs of acute CVA.  Patient's mentation improved after CT scan.    Interval history:  evening patient was noted to be lethargic and hypotensive. Glucose in the 60s, and lactate 1.2. patient had been given 500mL IVF bolus along with dextrose with improvement in glucose to 130s but with persistent decreased mental status.  Stroke code was called. CTA by Stroke team was reportedly without signs of acute CVA.  Patient's mentation improved after CT scan.  Patient today has no active complaints.  Patient had 9 runs of V-tach.  Potassium and magnesium were repleted.      Review of Systems   Constitutional:  Positive for appetite change and fatigue. Negative for activity change, chills and fever.   HENT:  Negative for drooling.    Eyes:  Negative for discharge and visual disturbance.   Respiratory:  Positive for shortness of breath. Negative for cough and wheezing.    Cardiovascular:  Negative for chest pain, palpitations and leg swelling.   Gastrointestinal:  Positive for abdominal pain, nausea and vomiting. Negative for constipation and diarrhea.   Genitourinary:  Negative for decreased urine volume, dysuria, flank pain, frequency and hematuria.        Suprapubic pain   Musculoskeletal:  Negative for back pain.   Skin:  Negative for rash and wound.   Neurological:  Positive for weakness and light-headedness. Negative for dizziness and seizures.     Objective:     Vital Signs (Most Recent):  Temp: 98.5 °F (36.9 °C) (01/09/24 1554)  Pulse: 94 (01/09/24 1618)  Resp: 18 (01/09/24 1554)  BP: 106/65 (01/09/24 1554)  SpO2: 99 % (01/09/24 1618) Vital Signs (24h Range):  Temp:  [97.2 °F (36.2 °C)-98.5 °F (36.9 °C)] 98.5 °F (36.9 °C)  Pulse:  [] 94  Resp:  [16-19] 18  SpO2:  [91 %-100 %] 99 %  BP: ()/(52-74) 106/65     Weight: 69.9 kg (154 lb)  Body mass index is 24.86 kg/m².     Physical Exam  Vitals and nursing note reviewed.   Constitutional:       Appearance: Normal  appearance. She is not toxic-appearing or diaphoretic.      Comments: No in acute distress. Resting in bed comfortably   HENT:      Head: Normocephalic and atraumatic.      Mouth/Throat:      Mouth: Mucous membranes are dry.   Eyes:      Extraocular Movements: Extraocular movements intact.      Pupils: Pupils are equal, round, and reactive to light.   Cardiovascular:      Rate and Rhythm: Normal rate and regular rhythm.      Heart sounds: No murmur heard.  Pulmonary:      Effort: Pulmonary effort is normal.      Breath sounds: Normal breath sounds. No wheezing or rales.   Abdominal:      General: There is no distension.      Palpations: Abdomen is soft. There is no mass.      Tenderness: There is abdominal tenderness. There is no right CVA tenderness, left CVA tenderness, guarding or rebound.      Comments: Mild abdominal tenderness on palpation. No rebound or guarding   Musculoskeletal:         General: Normal range of motion.      Cervical back: Normal range of motion and neck supple.      Right lower leg: No edema.      Left lower leg: No edema.   Skin:     General: Skin is warm.      Capillary Refill: Capillary refill takes less than 2 seconds.   Neurological:      General: No focal deficit present.      Mental Status: She is alert.              CRANIAL NERVES     CN III, IV, VI   Pupils are equal, round, and reactive to light.       Significant Labs: All pertinent labs within the past 24 hours have been reviewed.  A1C:   Recent Labs   Lab 08/28/23  1023   HGBA1C 6.3*       CBC:   Recent Labs   Lab 01/08/24 0414 01/08/24 2052 01/08/24 2120 01/09/24 0318   WBC 6.10  --  7.92 6.69   HGB 10.4*  --  10.6* 11.3*   HCT 31.0* 29* 32.7* 34.5*   *  --  354 507*       CMP:   Recent Labs   Lab 01/07/24  2304 01/08/24 0414 01/08/24 2120 01/09/24  0318    140 136 136   K 3.1* 2.9* 4.3 3.6   CL 92* 97 103 98   CO2 33* 30* 25 29   GLU 88 87 74 85   BUN 16 14 8 7*   CREATININE 1.1 0.8 0.7 0.7   CALCIUM 10.2  8.8 8.7 9.3   PROT 7.8 6.3  --  6.5   ALBUMIN 4.1 3.3*  --  3.4*   BILITOT 1.4* 1.2*  --  1.1*   ALKPHOS 94 76  --  90   AST 21 16  --  20   ALT 11 9*  --  10   ANIONGAP 15 13 8 9       Lactic Acid:   Recent Labs   Lab 01/08/24  0414 01/08/24  0753 01/08/24  1946   LACTATE 3.2* 2.1 1.2       Lipase:   Recent Labs   Lab 01/07/24  2304   LIPASE 43       Magnesium:   Recent Labs   Lab 01/08/24 0414 01/09/24  0318   MG 1.3* 1.8       Troponin:   Recent Labs   Lab 01/07/24 2304 01/08/24 0414   TROPONINI 0.051* 0.039*       Urine Culture:   Recent Labs   Lab 01/08/24  0207   LABURIN DIPHTHEROIDS  10,000 - 49,999 cfu/ml  *     Urine Studies:   Recent Labs   Lab 01/08/24  0207   COLORU Yellow   APPEARANCEUA Cloudy*   PHUR 5.0   SPECGRAV 1.015   PROTEINUA 1+*   GLUCUA 3+*   KETONESU Negative   BILIRUBINUA Negative   OCCULTUA 1+*   NITRITE Negative   LEUKOCYTESUR Trace*   RBCUA 11*   WBCUA 13*   BACTERIA Many*   SQUAMEPITHEL 0   HYALINECASTS 138*         Significant Imaging: I have reviewed all pertinent imaging results/findings within the past 24 hours.    Assessment/Plan:      * Intractable nausea and vomiting  Concern for biliary etiology of recurrent N/V. Questionable abdominal pain. On DM PO meds but not in DKA. Low concern for gastroenteritis at this time. Elevated bilirubin to 1.4.     Plan  Consider additional IVF bolus and patient is IVVD  RUQ US ordered given elevated Bili: unremarkable.        Altered mental status  On 1/8 evening patient was noted to be lethargic and hypotensive. Glucose in the 60s, and lactate 1.2. patient had been given 500mL IVF bolus along with dextrose with improvement in glucose to 130s but with persistent decreased mental status.  Stroke code was called. CTA by Stroke team was reportedly without signs of acute CVA.  Patient's mentation improved after CT scan.      Acute cystitis without hematuria  UA demonstrated leukocytes, blood and glucose as well as many bacteria. No leukocytosis.  Previous cultures has grown e coli with varying resistance.     Plan  - will continue CTX while admitted  - consider RP imaging to rule out pyelo if febrile or worsening hypotension      ARBEN (acute kidney injury)  Patient with acute kidney injury/acute renal failure likely due to pre-renal azotemia due to IVVD ARBEN is currently worsening. Baseline creatinine  0.8  - Labs reviewed- Renal function/electrolytes with Estimated Creatinine Clearance: 78 mL/min (based on SCr of 0.7 mg/dL). according to latest data. Monitor urine output and serial BMP and adjust therapy as needed. Avoid nephrotoxins and renally dose meds for GFR listed above.    Improved with IV fluids    Lactic acidosis  On metformin. Patient continued to take meds despite N/V and has elevated lactic to 3.2.     Holding all PO DM meds while admitted  Will trend lactic as gone from 2.9 to 3.2  Consider additional IVF if not improved      History of pulmonary embolism  Continue home eliquis      Elevated troponin  Troponin of 0.051 on admission. Will trend troponin. If increasing can consider further evaluation. Likely in setting of hypotension.       Hypokalemia  Patient has hypokalemia which is Acute and currently uncontrolled. Most recent potassium levels reviewed-   Lab Results   Component Value Date    K 2.9 (L) 01/08/2024   . Will continue potassium replacement per protocol and recheck repeat levels after replacement completed.     Chronic combined systolic and diastolic congestive heart failure  Patient is identified as having Combined Systolic and Diastolic heart failure that is Chronic. CHF is currently controlled. Latest ECHO performed and demonstrates- Results for orders placed during the hospital encounter of 08/30/23    Echo    Interpretation Summary    Left Ventricle: The left ventricle is severely dilated. Normal wall thickness. Severe global hypokinesis present. There is severely reduced systolic function with a visually estimated ejection  fraction of 15 - 20%. Biplane (2D) method of discs ejection fraction is 17%. Grade I diastolic dysfunction.    Right Ventricle: Normal right ventricular cavity size. Wall thickness is normal. Right ventricle wall motion  is normal. Systolic function is normal. Pacemaker lead present in the ventricle.    Aortic Valve: The aortic valve is a trileaflet valve. There is mild aortic valve sclerosis. Mild annular calcification.    IVC/SVC: Intermediate venous pressure at 8 mmHg.  .Monitor clinical status closely. Monitor on telemetry. Patient is off CHF pathway.  Monitor strict Is&Os and daily weights.  Place on fluid restriction of 2 L. Cardiology has not been consulted. Continue to stress to patient importance of self efficacy and  on diet for CHF. Last BNP reviewed- and noted below   Recent Labs   Lab 01/07/24  2304   *     Will hold all GDMT given hypotension currently. No concern for cardiogenic shock    Hyperlipemia  Continue home statin      Hypertension  Chronic, controlled. Latest blood pressure and vitals reviewed-     Temp:  [97.5 °F (36.4 °C)]   Pulse:  []   Resp:  [18-22]   BP: ()/(50-62)   SpO2:  [98 %-100 %] .   Home meds for hypertension were reviewed and noted below.   Hypertension Medications               metoprolol succinate (TOPROL-XL) 25 MG 24 hr tablet Take 2 tablets (50 mg total) by mouth once daily.    sacubitriL-valsartan (ENTRESTO) 24-26 mg per tablet Take 1 tablet by mouth 2 (two) times daily.    torsemide (DEMADEX) 20 MG Tab Take 1 tablet (20 mg total) by mouth once daily.            While in the hospital, will manage blood pressure as follows; Adjust home antihypertensive regimen as follows- holding home meds with [facility] volume depletion as etiology . Baseline systolic BP of 100s    Will utilize p.r.n. blood pressure medication only if patient's blood pressure greater than 180/110 and she develops symptoms such as worsening chest pain or shortness of breath.    Type 2  diabetes mellitus, without long-term current use of insulin  Hold home metformin, empagliflozin, dulaglutide  - A1c  6.3  - Glc on arrival 88    Start LDSSI; titrate prn      VTE Risk Mitigation (From admission, onward)           Ordered     apixaban tablet 5 mg  2 times daily         01/08/24 0222     IP VTE HIGH RISK PATIENT  Once         01/08/24 0222     Place sequential compression device  Until discontinued         01/08/24 0222                    Discharge Planning   KARLIE: 1/11/2024     Code Status: Full Code   Is the patient medically ready for discharge?: No    Reason for patient still in hospital (select all that apply): Patient trending condition, Laboratory test, and Treatment  Discharge Plan A: Home Health                  Mariaelena Moffett MD  Department of Hospital Medicine   [facility]

## 2024-01-10 NOTE — ASSESSMENT & PLAN NOTE
On 1/8 evening patient was noted to be lethargic and hypotensive. Glucose in the 60s, and lactate 1.2. patient had been given 500mL IVF bolus along with dextrose with improvement in glucose to 130s but with persistent decreased mental status.  Stroke code was called. CTA by Stroke team was reportedly without signs of acute CVA.  Patient's mentation improved after CT scan.

## 2024-01-10 NOTE — PLAN OF CARE
Problem: Infection  Goal: Absence of Infection Signs and Symptoms  Outcome: Ongoing, Progressing     Problem: Adult Inpatient Plan of Care  Goal: Plan of Care Review  Outcome: Ongoing, Progressing  Goal: Patient-Specific Goal (Individualized)  Outcome: Ongoing, Progressing  Goal: Absence of Hospital-Acquired Illness or Injury  Outcome: Ongoing, Progressing  Goal: Optimal Comfort and Wellbeing  Outcome: Ongoing, Progressing  Goal: Readiness for Transition of Care  Outcome: Ongoing, Progressing     Problem: Fluid and Electrolyte Imbalance (Acute Kidney Injury/Impairment)  Goal: Fluid and Electrolyte Balance  Outcome: Ongoing, Progressing     Problem: Oral Intake Inadequate (Acute Kidney Injury/Impairment)  Goal: Optimal Nutrition Intake  Outcome: Ongoing, Progressing     Problem: Renal Function Impairment (Acute Kidney Injury/Impairment)  Goal: Effective Renal Function  Outcome: Ongoing, Progressing     Problem: Diabetes Comorbidity  Goal: Blood Glucose Level Within Targeted Range  Outcome: Ongoing, Progressing     Problem: Impaired Wound Healing  Goal: Optimal Wound Healing  Outcome: Ongoing, Progressing     Problem: Fall Injury Risk  Goal: Absence of Fall and Fall-Related Injury  Outcome: Ongoing, Progressing     Problem: Skin Injury Risk Increased  Goal: Skin Health and Integrity  Outcome: Ongoing, Progressing

## 2024-01-10 NOTE — HOSPITAL COURSE
On 1/8, evening patient was noted to be lethargic and hypotensive. Glucose in the 60s, and lactate 1.2. patient had been given 500mL IVF bolus along with dextrose with improvement in glucose to 130s but with persistent decreased mental status.  Stroke code was called. CTA by Stroke team was reportedly without signs of acute CVA.  Patient's mentation improved after CT scan.  Patient reports that her systolic blood pressure usually remains in 90s.  Patient's blood pressure improved with IV fluid administration.  Blood cultures and urine cultures were unremarkable.  Plan to hold Entresto, metoprolol and torsemide in the setting of hypotension, and patient was hypoglycemic even when she was off antidiabetic medication.  Plan to hold  Jardiance and metformin in the setting of hypoglycemia.  We will defer to PCP when to restart the medication.    Diomedes Mohr was deemed appropriate for discharge.  At the time of discharge, the plan of care was discussed with patient/family, who were agreeable and amenable.  All medications were verbally reviewed and discussed with the patient/family.  They endorsed understanding and compliance.  Informed them that these changes will be available on their discharge paperwork, as well.  Outpatient follow-ups were scheduled, or if unable to be scheduled ambulatory referrals were placed, and ER return precautions were given.  All questions were answered to the patient/family's satisfaction.  Mr. She was subsequently discharged in stable condition.

## 2024-01-10 NOTE — ASSESSMENT & PLAN NOTE
Patient with acute kidney injury/acute renal failure likely due to pre-renal azotemia due to IVVD ARBEN is currently worsening. Baseline creatinine  0.8  - Labs reviewed- Renal function/electrolytes with Estimated Creatinine Clearance: 78 mL/min (based on SCr of 0.7 mg/dL). according to latest data. Monitor urine output and serial BMP and adjust therapy as needed. Avoid nephrotoxins and renally dose meds for GFR listed above.    Improved with IV fluids

## 2024-01-10 NOTE — PROGRESS NOTES
VANCOMYCIN DOSING BY PHARMACY DISCONTINUATION NOTE    Diomedes Mohr is a 63 y.o. female who had been consulted for vancomycin dosing.    The pharmacy consult for vancomycin dosing has been discontinued.     Vancomycin Dosing by Pharmacy Consult will sign-off. Please reconsult if necessary. Thank you for allowing us to participate in this patient's care.     Cosme Kaur, PharmD, BCPS  Ext. [phone]

## 2024-01-11 ENCOUNTER — TELEPHONE (OUTPATIENT)
Dept: TRANSPLANT | Facility: CLINIC | Age: 64
End: 2024-01-11
Payer: MEDICARE

## 2024-01-11 DIAGNOSIS — I42.0 DILATED CARDIOMYOPATHY: Primary | ICD-10-CM

## 2024-01-11 NOTE — PLAN OF CARE
[facility] - Med Surg  Discharge Final Note    Primary Care Provider: Yolie Michael MD    Expected Discharge Date: 1/10/2024      Patient discharged to home no needs. Follow up appointment scheduled and placed in AVS. Discharge Plan A and Plan B have been determined by review of patient's clinical status, future medical and therapeutic needs, and coverage/benefits for post-acute care in coordination with multidisciplinary team members.  Final Discharge Note (most recent)       Final Note - 01/11/24 0756          Final Note    Assessment Type Final Discharge Note (P)      Anticipated Discharge Disposition Home or Self Care (P)      Hospital Resources/Appts/Education Provided Appointments scheduled and added to AVS (P)         Post-Acute Status    Coverage [employer] Managed medicare (P)      Discharge Delays None known at this time (P)                      Important Message from Medicare  Important Message from Medicare regarding Discharge Appeal Rights: Signed/date by patient/caregiver, Explained to patient/caregiver, Given to patient/caregiver     Date IMM was signed: 01/10/24  Time IMM was signed: 0908    Contact Info       Yolie Michael MD   Specialty: Internal Medicine   Relationship: PCP - General    [id number] [facility]  [address]  [place] [id number]   Phone: [phone]       Next Steps: Follow up in 1 week(s)            LILIAM Song  Case Management  [phone]

## 2024-01-11 NOTE — TELEPHONE ENCOUNTER
1015 am:  Reviewed message and chart, ER visit and noted upcoming appts.    3:55 pm: Returned call to  at phone number he provided in message below  NA< LVM w/ my name, office with, day, date time , this office call back phone number and returning his call I included would like to speak with him to see how she is doing and determine if she needs a sooner appt than the one scheduled for next Tuesday 1/16/24 with Dr. Smith      ----- Message from Viry Jimenes sent at 1/11/2024  9:44 AM CST -----  Regarding: ER Discontinued Meds  Good morning,    Pt  called and was told by ER that pt should discontinue taking her meds:    Entresto  Jardiance  Metoprolol     Pt  feels she needs to keep taking them.    Contact @ [phone]      Thanks

## 2024-01-12 DIAGNOSIS — I50.42 CHRONIC COMBINED SYSTOLIC AND DIASTOLIC CONGESTIVE HEART FAILURE: Primary | ICD-10-CM

## 2024-01-12 DIAGNOSIS — I42.0 DILATED CARDIOMYOPATHY: Primary | ICD-10-CM

## 2024-01-12 NOTE — TELEPHONE ENCOUNTER
"1115 am:  See messages below  Additionally, see my note placed in pts chart yesterday afternoon returning call to  at phone number provided , which is the same phone number he left this morning  Also , my message, as I documented in my note yesterday, included my name, this office phone number as well as an offer that she may need a sooner appt than this Tuesday 1/16 and asked he return my call   Will return call again to  at the phone number he provided for an assessment.     1115 am: Returned call to  again at this time at phone number he provided.    said he did not received the voice message I left for him yesterday  He said he was calling because when she was in the emergency room day before yesterday they were told not to take 4 medicines  I asked him to clarify which ones, since the message I received indicated 3: Entresto, Jardiance and Metoprolol   He asked me to hold on and he retrieved what he said was the "paper work they gave me" He confirmed these 3 medications and said also Metformin    I noted the note from ED stated all of these and also to not take Torsemide  He said he has been giving her Torsemide 20 mg once daily as well as Potassium 20 meq once daily yesterday and today  He also stated, "if she dont takeTorsemide , she's going to get fluid    I told him I had several questions that will help Dr. Smith determine which if any medications she wants her to resume for the long weekend prior to her returning to clinic Tuesday 1/16.    HF Assessment;     BP 99/61 yesterday   BP this am: -has not taking yet  -said she got up a little while ago and she is eating her breakfast  "Use to weigh"  has not weighed yesterday or today    Asked and:   No dizzy lighted and not fatigued    No s/s of fluid-denying swelling to legs/ankles or feet  Asked if abdomen feels and/or looks full or bloated and does not  He did say she hasn't been eating well, not much of an appetite   Is drinking " "fine  Asked and denies her to have : vomiting/nausea or diarrhea    Denies sob or heavy breathing  And   Overall "she feels pretty good".     Regarding Metformin: I asked who prescribes this and he told me she has an Endocrinologist -not in [facility], on [facility] in [place].  I suggested he reach out to that doctor today to see about this  Also asked and he last checked her blood sugar yesterday , which he said was 120  he was not aware it was 60 in the ED on 1/10/24, she was treated and it went up to 130's    He said he would call that doctor to discuss this.    Noting her K was 3.3 per epic in result note  Asked if there was lab close by he would be able to take her to today to review her labs-kidney function and electrolytes in order for Dr. Smith to better be able to make a medication plan and he said he could have her at [facility] in [place] within the hour    I scheduled this lab: cmp/bnp mg stat for today @ 1230- in full agreement with this    Told him once I have this lab result back I will review everything w/ Dr. Smith and will call him back today with her plan    Of note:   was very helpful and knowledgable about Mrs. Mohr    Confirmed phone number best to return call and he told me this number and then also gave me his home # of [phone]    Will follow .       1:30 pm:  CMP and Mg have resulted and are in epic  K improved from 3.3 to today 3.8  BUn/CR:  3/0.77  has been running BUN's 4-8 since 24    M.7    Ntporbnp in process.     24  2:50 pm : All reviewed w/ Dr. Smith including hosp -10, w/ n/v , hypoglycemia, hypotension, mental status changes, CT, K 2.9 improved to 3.3 and today 3.8  Bun running low since early dec 3-4 Cr normal .77 today.   VORB: Dr. Granda/Elsa, RN:  Continue to hold Jardiance for now  , pt should follow today by phone w/ Endocrinilogist regarding Metformin,   continue to hold Metoprolol,   Resume Entresto at previous dose and ok " "to continue torsemide and Potassium as well as other med  See me as scheduled Tuesday 1/16 in clinic w/ follow up labs.     3:00 pm : Called and spoke w/ pts  at this time.   Called the number he asked I call first: [phone] X 3  Twice immediate vm  3rd time NA and I left detailed voice message w/ reason for call, office w/ day, date time and asked for call back today  Called the 217 # X2   NA first and lvm   Able to reach  the 2nd time    Gave the following instructions which he said he wrote down  She it to continue to not take Jardiance and Metoprolol    Consult w/ endocrinologist regarding Metformin -said he did that today    Ok to continue Potassium and torsemide as he has been giving her    Resume Entresto  Asked him what dose she is taking in that I reviewed recent notes and see she was taking  24/26  and 10/31/23 clinic visit plan was to inc to 49/51, but due to low bp's was not able to so  Asked what dose she was taking prior to her hospitalzation 1/7/24  He said 29/25   then 49/51  I asked which it was  Then he said it was the los dose , " I was looking at the wrong bottle"  Upon more questioning I wasn't sure what she was taking in that he was reading off bottle  He then state, "the 49/51 wasn't her medicine, it was in her back but was for someone else, they must have put it in the wrong bag" Unsure what she may be taking, I asked more questions, what bag and whos name is on the bottle  He told me her medicine bag, "but I dont know whos name is on the bottle"  I explained to him I am asking many questions, because it is extremely important we get this right. And we dont want her having low bp's again  He understood and was adamant she is taking the "low dose    Therefore I advised can resume taking Entresto 24/26 one tablet by mouth twice daily tonight and also advised if her blood presuure is < 100 at the top number to not give her that dose     Also asked that he have her weigh every " am prior to eating and if she gains 3 pds o/n and/or 5 pds in a week to let us know    He understood all and agreed    Reminded him again of her follow up appt this Tuesday coming, January 16 with lab work @ 1:30 and appt with Dr. Smith @ 2:30 and how very important this is   Also that she will then decide if she wants to resume the Jardiance and Metoprolol   I will add this to the appt note as a reminder to Dr. Smith                    ----- Message from Isabela Escamilla MA sent at 1/12/2024  9:58 AM CST -----  Regarding: FW: ER Discontinued Meds    ----- Message -----  From: Viry Jimenes  Sent: 1/12/2024   8:44 AM CST  To: [facility]  Subject: FW: ER Discontinued Meds                         Good morning,    2nd time pt  called and was told by ER that pt should discontinue taking her meds:    Entresto  Jardiance  Metoprolol     Pt  feels she needs to keep taking them.    Contact @ [phone]      Thanks       ----- Message -----  From: Viry Jimenes  Sent: 1/11/2024   9:49 AM CST  To: [facility]  Subject: ER Discontinued Meds                             Good morning,    Pt  called and was told by ER that pt should discontinue taking her meds:    Entresto  Jardiance  Metoprolol     Pt  feels she needs to keep taking them.    Contact @ [phone]      Thanks

## 2024-01-12 NOTE — DISCHARGE SUMMARY
[facility] Medicine  Discharge Summary      Patient Name: Diomedes Mohr  MRN: [id number]  JOSE: [id number]  Patient Class: IP- Inpatient  Admission Date: 1/7/2024  Hospital Length of Stay: 2 days  Discharge Date and Time: 1/10/2024  5:55 PM  Attending Physician: Shira att. providers found   Discharging Provider: Mariaelena Moffett MD  Primary Care Provider: Yolie Michael MD  [facility] Medicine Team: [facility] R Mariaelena Moffett MD  Primary Care Team: [facility] R    HPI:   Mrs. Mohr is a 64 yo F with HTN, T2DM, HLD, tobacco use (quit in 2020), HFrEF 2/2 DCM (10-15%, s/p AICD), PE in 2021/ DVTs 2023, pHTN, osteoarthritis of bilateral hips (L>R) with chronic pain, and CVA (2020, R MCA, no deficits then L MCA stroke during recent admission s/p thrombectomy 4/2023) with right sided hemiparesis who presents with complaint of 4 days abdominal pain. Patient reports abdominal pain is associated with nausea and vomiting after meals. On repeat evaluation patient denied abdominal pain. Last BM x2 days ago and denies any melena or BRBPR. Patient reports feeling fatigue with decreased PO intake. Patient denies missing any medications prior to admission including BP meds and reports BP was low at home. Patient reports associated LH and dizziness with ambulation. Patient denies any frequency or urgency but does report suprapubic pain that is mild in nature.     In the ED patient was afebrile, hypotensive and tachycardic with SpO2 >95 on RA. Labs were significant for lactic 2.9->3.2, WBC 6K, hgb 10, , K 2.9, Bili 1.2, troponin 0.05-0.039. CXR demonstrated no effusion or infiltrate. Given hypotension patient was given 250 then 500 cc bolus and MICU evaluated for lactic acidosis. Patient admitted to  for management of hypotension, ARBEN and N/V.      * No surgery found *      Hospital Course:    On 1/8, evening patient was noted to be lethargic and hypotensive. Glucose in the 60s, and  lactate 1.2. patient had been given 500mL IVF bolus along with dextrose with improvement in glucose to 130s but with persistent decreased mental status.  Stroke code was called. CTA by Stroke team was reportedly without signs of acute CVA.  Patient's mentation improved after CT scan.  Patient reports that her systolic blood pressure usually remains in 90s.  Patient's blood pressure improved with IV fluid administration.  Blood cultures and urine cultures were unremarkable.  Plan to hold Entresto, metoprolol and torsemide in the setting of hypotension, and patient was hypoglycemic even when she was off antidiabetic medication.  Plan to hold  Jardiance and metformin in the setting of hypoglycemia.  We will defer to PCP when to restart the medication.    Diomedes Mohr was deemed appropriate for discharge.  At the time of discharge, the plan of care was discussed with patient/family, who were agreeable and amenable.  All medications were verbally reviewed and discussed with the patient/family.  They endorsed understanding and compliance.  Informed them that these changes will be available on their discharge paperwork, as well.  Outpatient follow-ups were scheduled, or if unable to be scheduled ambulatory referrals were placed, and ER return precautions were given.  All questions were answered to the patient/family's satisfaction.  Mr. She was subsequently discharged in stable condition.       Goals of Care Treatment Preferences:  Code Status: Full Code    Health care agent: Randall Mohr  [facility] care agent number: No value filed.          What is most important right now is to focus on remaining as independent as possible.  Accordingly, we have decided that the best plan to meet the patient's goals includes continuing with treatment.      Consults:   Consults (From admission, onward)          Status Ordering Provider     Inpatient consult to Critical Care Medicine  Once        Provider:  (Not yet assigned)     Completed JEREMIAH BARRERA            No new Assessment & Plan notes have been filed under this hospital service since the last note was generated.  Service: Hospital Medicine    Final Active Diagnoses:    Diagnosis Date Noted POA    PRINCIPAL PROBLEM:  Intractable nausea and vomiting [R11.2] 05/30/2023 Yes    Altered mental status [R41.82] 01/08/2024 No    Acute cystitis without hematuria [N30.00] 08/31/2023 Yes    ARBEN (acute kidney injury) [N17.9] 05/30/2023 Yes    Lactic acidosis [E87.20] 05/30/2023 Yes    History of pulmonary embolism [Z86.711] 07/29/2022 Yes     Chronic    Elevated troponin [R79.89] 08/26/2020 Yes    Hypokalemia [E87.6] 08/16/2020 Yes    Chronic combined systolic and diastolic congestive heart failure [I50.42] 01/12/2020 Yes     Chronic    Hypertension [I10] 12/25/2019 Yes     Chronic    Hyperlipemia [E78.5] 12/25/2019 Yes     Chronic    Type 2 diabetes mellitus, without long-term current use of insulin [E11.9]  Yes      Problems Resolved During this Admission:       Discharged Condition: good    Disposition: Home or Self Care    Follow Up:   Follow-up Information       Yolie Michael MD Follow up in 1 week(s).    Specialty: Internal Medicine  Contact information:  [address] [place] [address]  [place] [address] [phone]                           Patient Instructions:   No discharge procedures on file.    Significant Diagnostic Studies: Labs: BMP:   Recent Labs   Lab 01/10/24  0452   GLU 85      K 3.3*      CO2 26   BUN 4*   CREATININE 0.8   CALCIUM 9.1   MG 2.2    and CBC   Recent Labs   Lab 01/10/24  0452   WBC 5.36   HGB 10.3*   HCT 32.2*            Pending Diagnostic Studies:       None           Medications:  Reconciled Home Medications:      Medication List        CONTINUE taking these medications      acetaminophen 325 MG tablet  Commonly known as: TYLENOL  Take 2 tablets (650 mg total) by mouth every 8 (eight) hours as needed.     apixaban 5 mg Tab  Commonly  known as: ELIQUIS  Take 1 tablet (5 mg total) by mouth 2 (two) times daily.     aspirin 81 MG EC tablet  Commonly known as: ECOTRIN  Take 1 tablet (81 mg total) by mouth once daily.     atorvastatin 80 MG tablet  Commonly known as: LIPITOR  Take 1 tablet (80 mg total) by mouth once daily.     azelastine 137 mcg (0.1 %) nasal spray  Commonly known as: ASTELIN  1 spray (137 mcg total) by Nasal route 2 (two) times daily.     dulaglutide 1.5 mg/0.5 mL pen injector  Commonly known as: TRULICITY  Inject 1.5 mg into the skin once a week.     gabapentin 600 MG tablet  Commonly known as: NEURONTIN  Take 1 tablet (600 mg total) by mouth 3 (three) times daily.     ondansetron 4 MG Tbdl  Commonly known as: ZOFRAN-ODT  Take 1 tablet (4 mg total) by mouth every 8 (eight) hours as needed.     potassium chloride 10 MEQ Tbsr  Commonly known as: KLOR-CON  Take 1 tablet (10 mEq total) by mouth once daily.            STOP taking these medications      empagliflozin 10 mg tablet  Commonly known as: JARDIANCE     ENTRESTO 24-26 mg per tablet  Generic drug: sacubitriL-valsartan     metFORMIN 1000 MG tablet  Commonly known as: GLUCOPHAGE     metoprolol succinate 25 MG 24 hr tablet  Commonly known as: TOPROL-XL     torsemide 20 MG Tab  Commonly known as: DEMADEX            CTA STROKE MULTI-PHASE  Order: [id number]  Status: Final result       Visible to patient: Yes (not seen)       Next appt: 01/16/2024 at 01:30 PM in Lab (LAB, APPOINTMENT [place])    0 Result Notes  Details    Reading Physician Reading Date Result Priority   Rudi Lake MD  [phone] 1/9/2024 Routine   Kiah England MD  [phone] 1/9/2024      Narrative & Impression  EXAMINATION:  CTA STROKE MULTI-PHASE     CLINICAL HISTORY:  Neuro deficit, acute, stroke suspected;     TECHNIQUE:  Non contrast low dose axial images were obtained thought the head. CT angiogram was performed from the level of the natalia to the top of the head following the IV administration  of 75mL of Omnipaque 350.   Sagittal and coronal reconstructions and maximum intensity projection reconstructions were performed. Arterial stenosis percentages are based on NASCET measurement criteria. Two additional phases of immediate post-contrast CTA images were performed through the head alone.     COMPARISON:  CT 05/01/2023, 04/30/2023, 01/31/2020; CTA 04/29/2023     FINDINGS:  CT HEAD/NECK:     Ventricles are stable in size and configuration when compared to prior.  No hydrocephalus.     Mild patchy hypoattenuation in the supratentorial white matter, nonspecific but most likely reflecting chronic small vessel ischemic changes.  Remote left MCA territory and small right occipital and right cerebellar infarcts.  No new major vascular distribution infarct.  No parenchymal mass effect, hemorrhage, or edema.     No extra-axial blood or fluid collections.     No displaced calvarial fracture.  Mastoid air cells and paranasal sinuses are essentially clear.     No cervical or supraclavicular lymphadenopathy.  Multiple hyperattenuating nodules throughout the thyroid measuring up to 5 mm in the right thyroid, similar to CT 2023.  Partially visualized left-sided cardiac pacemaker.  Ground-glass/mosaic parenchymal attenuation the bilateral lung apices.  5 mm right apical solid nodule (5-16), unchanged.  No periapical pneumothorax.  Degenerative changes of the visualized cervicothoracic spine.  No acute fractures or suspicious osseous lesions.  There are mildly enlarged prevascular, mediastinal lymph nodes.  Limited evaluation although similar to prior CTA of the chest.     CTA:     Major aortic branch vessels are patent.  There is aortic atherosclerosis.  Common origin of the innominate left common carotid artery and direct origin of left vertebral artery from aortic arch.  Major branching vessels are patent.  Mild tortuosity of the innominate artery.     The bilateral common carotid arteries are normal in caliber. No  significant stenosis at the bilateral carotid bifurcation.  Mild calcific atherosclerosis of the cavernous and supraclinoid portions of the bilateral internal carotid arteries without significant stenosis.  Medial deviation of the cervical internal carotid arteries with retropharyngeal course.     The vertebral arteries are normal in caliber.     Basilar artery is within normal limits without focal abnormality.     The proximal anterior, middle, and posterior cerebral arteries are within normal limits.  No evidence of high-grade stenosis, large vessel occlusion, or intracranial aneurysm.     Major dural venous sinuses are patent.     Impression:     No acute intracranial hemorrhage or CT evidence of acute major vascular territory infarct.No high-grade stenosis or major vessel occlusion.     Remote left MCA, right occipital and right cerebellar infarcts.     Chronic microvascular ischemic changes.     Multinodular thyroid with largest nodule measuring to 5 mm and does not meet criteria for sonographic follow-up.     5 mm right apical solid nodule, stable dating back to CT 01/31/2020.  Per Fleischner Society guidelines for nodule <6mm, no further follow-up of this nodule required due to stability.     Additional findings as above.     Electronically signed by resident: Kiah England  Date:                                            01/09/2024  Time:                                           07:47     Electronically signed by: Rudi Lake MD  Date:                                            01/09/2024  Time:                                           09:51           Exam Ended: 01/08/24 21:08 CST Last Resulted: 01/09/24 09:51 CST           US Abdomen Limited  Order: [id number]  Status: Final result       Visible to patient: Yes (seen)       Next appt: 01/16/2024 at 01:30 PM in Lab (LAB, APPOINTMENT [place])    0 Result Notes  Details    Reading Physician Reading Date Result Priority   Christopher Duffy,  MD  [phone] 1/8/2024 STAT     Narrative & Impression  EXAMINATION:  US ABDOMEN LIMITED     CLINICAL HISTORY:  N/V and abdominal pain. elevated bili;.     TECHNIQUE:  Limited ultrasound of the right upper quadrant of the abdomen including pancreas, liver, gallbladder, common bile duct was performed.     COMPARISON:  CT abdomen pelvis from 08/30/2023     FINDINGS:  Liver: Normal in size, measuring 14.9 cm. Homogeneous echotexture. Simple cyst in the right hepatic lobe measuring 0.7 x 0.7 x 0.8 cm.     Gallbladder: No calculi, wall thickening, or pericholecystic fluid.  No sonographic Kirk's sign.     Biliary system: The common duct is not dilated, measuring 3 mm.  No intrahepatic ductal dilatation.     Spleen: Normal in size with a homogeneous echotexture, measuring 7.3 x 2.8 cm.     Pancreas: The visualized portions of pancreas appear normal.     Miscellaneous: No ascites.  Incidental visualization of bilateral renal cysts.     Impression:     No acute process.     Right hepatic simple cyst.     Bilateral renal simple cysts.        Electronically signed by: Christopher Duffy MD  Date:                                            01/08/2024  Time:                                           10:49           Exam Ended: 01/08/24 10:44 CST Last Resulted: 01/08/24 10:49 CST           Indwelling Lines/Drains at time of discharge:   Lines/Drains/Airways       None                   Time spent on the discharge of patient: 39 minutes         Mariaelena Moffett MD  Department of Hospital Medicine  [facility]

## 2024-01-13 LAB
BACTERIA BLD CULT: NORMAL
BACTERIA BLD CULT: NORMAL

## 2024-01-14 RX ORDER — TORSEMIDE 20 MG/1
20 TABLET ORAL DAILY
Qty: 30 TABLET | Refills: 5 | Status: SHIPPED | OUTPATIENT
Start: 2024-01-14 | End: 2025-01-13

## 2024-01-14 RX ORDER — SACUBITRIL AND VALSARTAN 24; 26 MG/1; MG/1
1 TABLET, FILM COATED ORAL 2 TIMES DAILY
Qty: 60 TABLET | Refills: 5 | Status: SHIPPED | OUTPATIENT
Start: 2024-01-14

## 2024-01-16 ENCOUNTER — OUTPATIENT CASE MANAGEMENT (OUTPATIENT)
Dept: ADMINISTRATIVE | Facility: OTHER | Age: 64
End: 2024-01-16
Payer: MEDICARE

## 2024-01-16 ENCOUNTER — OFFICE VISIT (OUTPATIENT)
Dept: TRANSPLANT | Facility: CLINIC | Age: 64
End: 2024-01-16
Payer: MEDICARE

## 2024-01-16 DIAGNOSIS — I50.9 CHF (CONGESTIVE HEART FAILURE): ICD-10-CM

## 2024-01-16 DIAGNOSIS — Q24.9 ADULT CONGENITAL HEART DISEASE: Primary | ICD-10-CM

## 2024-01-16 PROCEDURE — 1111F DSCHRG MED/CURRENT MED MERGE: CPT | Mod: HCNC,CPTII,95, | Performed by: INTERNAL MEDICINE

## 2024-01-16 PROCEDURE — 99214 OFFICE O/P EST MOD 30 MIN: CPT | Mod: HCNC,95,, | Performed by: INTERNAL MEDICINE

## 2024-01-16 PROCEDURE — 4010F ACE/ARB THERAPY RXD/TAKEN: CPT | Mod: HCNC,CPTII,95, | Performed by: INTERNAL MEDICINE

## 2024-01-16 NOTE — LETTER
March 10, 2024        Michelle Young  [address] [facility] [address]  Phone: [phone]  Fax: [phone]             [id number]  [address]  [facility] [address]  Phone: [phone]   Patient: Diomedes Mohr   MR Number: [id number]   YOB: 1960   Date of Visit: 1/16/2024       Dear Dr. Michelle Young    Thank you for referring Diomedes Mohr to me for evaluation. Attached you will find relevant portions of my assessment and plan of care.    If you have questions, please do not hesitate to call me. I look forward to following Diomedes Mohr along with you.    Sincerely,    Justina Smith MD    Enclosure    If you would like to receive this communication electronically, please contact [email or web address] or [phone] to request [employer] Link access.    [employer] Link is a tool which provides read-only access to select patient information with whom you have a relationship. Its easy to use and provides real time access to review your patients record including encounter summaries, notes, results, and demographic information.    If you feel you have received this communication in error or would no longer like to receive these types of communications, please e-mail [email or web address]

## 2024-01-17 NOTE — PHYSICIAN QUERY
PT Name: Diomedes Mohr  MR #: [id number]    DOCUMENTATION CLARIFICATION     CDS/: Keyana Sam RN           Contact information: [email or web address]   This form is a permanent document in the medical record.     Query Date: January 17, 2024    By submitting this query, we are merely seeking further clarification of documentation. Please utilize your independent clinical judgment when addressing the question(s) below.    The Medical Record contains the following:   Indicators   Supporting Clinical Findings Location in Medical Record   x AMS, Confusion,  LOC, etc.  Suspect her altered mental status may be related to hypoglycemia, softer BPs with history of CVA, and possibly from Gabapentin that she was given earlier in the day.    Altered mental status   The transient AMS and increased lethargy appeared to correlate with patient's episode of hypotension. Low suspicion for cerebral origin of patient's symptoms due to grossly intact,  non-focal neuro examination. No further stroke workup recommended by  vascular neurology. Ongoing management per primary team. Vascular Neurology signing off    Patient mentation and neuro exam appeared to be back to baseline.    Altered mental status  On 1/8 evening patient was noted to be lethargic and hypotensive. Glucose in the 60s, and lactate 1.2. patient had been given 500mL IVF bolus along with dextrose with improvement in glucose  to 130s but with persistent decreased mental status.  Stroke code was called. CTA by Stroke team was reportedly without signs of acute CVA.  Patient's mentation improved after CT scan.    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    H & P of 1/8            H & P of 1/8     Radiology Findings     x Electrolyte Imbalance  01/07/24 23:04 01/08/24 04:14   Potassium 3.1 (L) 2.9 (L)    Labs of 1/7 to 1/8              x Medication Antibiotics   Ceftriaxone  Piperacillin   Vancomycin     Potassium supplements     Dextrose  10 % bolus 12.5 g IV     Lactated ringers IV      Mar 1/8 one dose   Mar 1/8 to 1/10   Mar 1/8 to 1/10     Mar 1/8 , 1/9 and 1/10     Mar 1/8     Mar 1/8 to 1/10     Treatment             x Other  01/08/24 19:37   POCT Glucose 67 (L)      Labs of 1/8              The noted clinical guidelines are only system guidelines and do not replace the providers clinical judgment.    The National [place] of Neurologic Disorders and Stroke (NINDS) of the NIH describes encephalopathy as any diffuse disease of the brain that alters brain function or structure.      Provider, please specify the diagnosis or diagnoses associated with above clinical findings.    [ X  ] Metabolic Encephalopathy - Due to electrolyte imbalance, metabolic derangements, or infectious processes, includes Septic Encephalopathy, Uremic Encephalopathy     [   ] Other Encephalopathy (please specify): ____________________     [   ] Other (please specify ) : __________     [   ]  Clinically Undetermined         Please document in your progress notes daily for the duration of treatment until resolved, and include in your discharge summary.    References:  MICHAEL Thomas RN, CCDS.  (2018, June 9). Notes from the Instructor: Encephalopathy tips. Retrieved October 22, 2020, from [email or web address]    ICD-9-CM Coding Clinic First Quarter 2013, Effective with discharges: October 21, 2013 [facility] § Seizure with encephalopathy due to postictal state (2013).    ICD-10-CM/[employer] Integrated Codebook (Version V.20.8.10.0) [Computer software]. (2020). Retrieved October 21, 2020.    National [place] of Neurological Disorders and Stroke. (2019, March 27). Retrieved October 22, 2020, from https://www.ninds.nih.gov/Disorders/All-Disorders/Xgienweyxkbubm-Pghwxtrisca-Fgjq    Form No. 91243

## 2024-01-18 ENCOUNTER — TELEPHONE (OUTPATIENT)
Dept: FAMILY MEDICINE | Facility: CLINIC | Age: 64
End: 2024-01-18
Payer: MEDICARE

## 2024-01-18 NOTE — TELEPHONE ENCOUNTER
----- Message from Alondra Ayala sent at 1/18/2024 12:36 PM CST -----  Type:  Needs Medical Advice    Who Called: pt daughter  Would the patient rather a call back or a response via [facility]? call  Best Call Back Number:   Additional Information: would like to speak with office regarding [place] paperwork that was dropped off

## 2024-01-18 NOTE — TELEPHONE ENCOUNTER
Pt daughter regina dropped off [employer] paper work a couple weeks ago to be filled out for the daughter to be able to take care of the mother, the daughter is not one of your patients. I have advised her that you already said you were not able to fill out her paper work due not her not being your pt I didn't see anything scanned into the chart about this please advise.

## 2024-02-07 ENCOUNTER — OUTPATIENT CASE MANAGEMENT (OUTPATIENT)
Dept: ADMINISTRATIVE | Facility: OTHER | Age: 64
End: 2024-02-07
Payer: MEDICARE

## 2024-02-08 DIAGNOSIS — I42.0 DILATED CARDIOMYOPATHY: ICD-10-CM

## 2024-02-08 DIAGNOSIS — Z79.4 TYPE 2 DIABETES MELLITUS WITH HYPERGLYCEMIA, WITH LONG-TERM CURRENT USE OF INSULIN: ICD-10-CM

## 2024-02-08 DIAGNOSIS — E11.65 TYPE 2 DIABETES MELLITUS WITH HYPERGLYCEMIA, WITH LONG-TERM CURRENT USE OF INSULIN: ICD-10-CM

## 2024-02-08 RX ORDER — METOPROLOL SUCCINATE 50 MG/1
50 TABLET, EXTENDED RELEASE ORAL DAILY
Qty: 90 TABLET | Refills: 3 | Status: SHIPPED | OUTPATIENT
Start: 2024-02-08 | End: 2024-02-23

## 2024-02-08 RX ORDER — ATORVASTATIN CALCIUM 80 MG/1
80 TABLET, FILM COATED ORAL DAILY
Qty: 90 TABLET | Refills: 3 | Status: SHIPPED | OUTPATIENT
Start: 2024-02-08

## 2024-02-08 RX ORDER — CYPROHEPTADINE HYDROCHLORIDE 4 MG/1
4 TABLET ORAL 2 TIMES DAILY PRN
Qty: 180 TABLET | Refills: 3 | Status: SHIPPED | OUTPATIENT
Start: 2024-02-08 | End: 2024-02-23

## 2024-02-20 ENCOUNTER — CLINICAL SUPPORT (OUTPATIENT)
Dept: CARDIOLOGY | Facility: HOSPITAL | Age: 64
End: 2024-02-20
Attending: STUDENT IN AN ORGANIZED HEALTH CARE EDUCATION/TRAINING PROGRAM
Payer: MEDICARE

## 2024-02-20 ENCOUNTER — CLINICAL SUPPORT (OUTPATIENT)
Dept: CARDIOLOGY | Facility: HOSPITAL | Age: 64
End: 2024-02-20
Payer: MEDICARE

## 2024-02-20 DIAGNOSIS — Z95.810 PRESENCE OF AUTOMATIC (IMPLANTABLE) CARDIAC DEFIBRILLATOR: ICD-10-CM

## 2024-02-20 PROCEDURE — 93296 REM INTERROG EVL PM/IDS: CPT | Mod: HCNC | Performed by: STUDENT IN AN ORGANIZED HEALTH CARE EDUCATION/TRAINING PROGRAM

## 2024-02-20 PROCEDURE — 93295 DEV INTERROG REMOTE 1/2/MLT: CPT | Mod: HCNC,,, | Performed by: STUDENT IN AN ORGANIZED HEALTH CARE EDUCATION/TRAINING PROGRAM

## 2024-02-22 ENCOUNTER — OFFICE VISIT (OUTPATIENT)
Dept: PALLIATIVE MEDICINE | Facility: CLINIC | Age: 64
End: 2024-02-22
Payer: MEDICARE

## 2024-02-22 VITALS
BODY MASS INDEX: 22.08 KG/M2 | DIASTOLIC BLOOD PRESSURE: 58 MMHG | HEART RATE: 104 BPM | HEIGHT: 66 IN | WEIGHT: 137.38 LBS | OXYGEN SATURATION: 100 % | SYSTOLIC BLOOD PRESSURE: 102 MMHG

## 2024-02-22 DIAGNOSIS — R26.89 IMPAIRED BALANCE AS LATE EFFECT OF CEREBROVASCULAR ACCIDENT: ICD-10-CM

## 2024-02-22 DIAGNOSIS — I69.398 IMPAIRED BALANCE AS LATE EFFECT OF CEREBROVASCULAR ACCIDENT: ICD-10-CM

## 2024-02-22 DIAGNOSIS — G47.00 INSOMNIA, UNSPECIFIED TYPE: ICD-10-CM

## 2024-02-22 DIAGNOSIS — M79.605 PAIN IN LATERAL LEFT LOWER EXTREMITY: ICD-10-CM

## 2024-02-22 DIAGNOSIS — I50.9 CHF (CONGESTIVE HEART FAILURE): ICD-10-CM

## 2024-02-22 DIAGNOSIS — M79.602 PAIN OF LEFT UPPER EXTREMITY: ICD-10-CM

## 2024-02-22 DIAGNOSIS — I63.9 CEREBROVASCULAR ACCIDENT (CVA), UNSPECIFIED MECHANISM: ICD-10-CM

## 2024-02-22 DIAGNOSIS — K59.00 CONSTIPATION, UNSPECIFIED CONSTIPATION TYPE: Primary | ICD-10-CM

## 2024-02-22 PROCEDURE — 99215 OFFICE O/P EST HI 40 MIN: CPT | Mod: HCNC,S$GLB,, | Performed by: STUDENT IN AN ORGANIZED HEALTH CARE EDUCATION/TRAINING PROGRAM

## 2024-02-22 PROCEDURE — 1159F MED LIST DOCD IN RCRD: CPT | Mod: HCNC,CPTII,S$GLB, | Performed by: STUDENT IN AN ORGANIZED HEALTH CARE EDUCATION/TRAINING PROGRAM

## 2024-02-22 PROCEDURE — 3078F DIAST BP <80 MM HG: CPT | Mod: HCNC,CPTII,S$GLB, | Performed by: STUDENT IN AN ORGANIZED HEALTH CARE EDUCATION/TRAINING PROGRAM

## 2024-02-22 PROCEDURE — 3008F BODY MASS INDEX DOCD: CPT | Mod: HCNC,CPTII,S$GLB, | Performed by: STUDENT IN AN ORGANIZED HEALTH CARE EDUCATION/TRAINING PROGRAM

## 2024-02-22 PROCEDURE — 3074F SYST BP LT 130 MM HG: CPT | Mod: HCNC,CPTII,S$GLB, | Performed by: STUDENT IN AN ORGANIZED HEALTH CARE EDUCATION/TRAINING PROGRAM

## 2024-02-22 PROCEDURE — 99999 PR PBB SHADOW E&M-EST. PATIENT-LVL IV: CPT | Mod: PBBFAC,HCNC,, | Performed by: STUDENT IN AN ORGANIZED HEALTH CARE EDUCATION/TRAINING PROGRAM

## 2024-02-22 PROCEDURE — 4010F ACE/ARB THERAPY RXD/TAKEN: CPT | Mod: HCNC,CPTII,S$GLB, | Performed by: STUDENT IN AN ORGANIZED HEALTH CARE EDUCATION/TRAINING PROGRAM

## 2024-02-22 PROCEDURE — 99497 ADVNCD CARE PLAN 30 MIN: CPT | Mod: HCNC,S$GLB,, | Performed by: STUDENT IN AN ORGANIZED HEALTH CARE EDUCATION/TRAINING PROGRAM

## 2024-02-22 RX ORDER — METFORMIN HYDROCHLORIDE 1000 MG/1
1000 TABLET ORAL 2 TIMES DAILY
Status: ON HOLD | COMMUNITY
Start: 2024-02-08 | End: 2024-04-07 | Stop reason: HOSPADM

## 2024-02-22 RX ORDER — OXYCODONE AND ACETAMINOPHEN 5; 325 MG/1; MG/1
1 TABLET ORAL EVERY 8 HOURS PRN
Qty: 90 TABLET | Refills: 0 | Status: SHIPPED | OUTPATIENT
Start: 2024-02-22 | End: 2024-05-03 | Stop reason: SDUPTHER

## 2024-02-22 RX ORDER — SENNOSIDES 8.6 MG/1
2 CAPSULE, GELATIN COATED ORAL NIGHTLY
Qty: 60 EACH | Refills: 5 | Status: SHIPPED | OUTPATIENT
Start: 2024-02-22 | End: 2024-04-05

## 2024-02-22 RX ORDER — MIRTAZAPINE 7.5 MG/1
7.5 TABLET, FILM COATED ORAL NIGHTLY
Qty: 30 TABLET | Refills: 11 | Status: SHIPPED | OUTPATIENT
Start: 2024-02-22 | End: 2025-02-21

## 2024-02-22 NOTE — PROGRESS NOTES
Advance Care Planning   [facility]  Palliative Care   Psychosocial Assessment    Patient Name: Diomedes Mohr  MRN: [id number]  Palliative Care Provider: Leslie Martini MD   Primary Care Physician: Yolie Michael MD    Reason for Referral: assistance with clarification of goals of care    Present during Interview: patient and spouse/SO.      Primary Language:English   Needed: no      Past Medical Situation:   PMH:   Past Medical History:   Diagnosis Date    Acute on chronic combined systolic and diastolic heart failure 12/26/2019    ARBEN (acute kidney injury) 5/30/2023    Anticoagulant long-term use     Anxiety     Arthritis     Asthma     Depression     H/O: hysterectomy     Hyperlipemia     Hypertension     Pulmonary edema     Schizophrenia      Mental Health/Substance Use History: mental health diagnoses documented in electronic medical record  Risk of Abuse, neglect or exploitation: none identified   Current or Previous Trauma and/or evidence of PTSD: none identified   Non-traditional Health practices: none identified     Understanding of diagnosis and prognosis: patient will benefit from ongoing education and support regarding diagnosis and prognosis   Experience/Comfort level with health care system: fair    Patients Mental Status: patient is alert and oriented to person, place, and time     Socio-Economic Factors/Resources:  Address: [place] [address]  Phone Number: [phone] (home)     Marital Status:   Household composition: patient lives with spouse  Children: two daughters     Patient/Family perceptions about Caregiving Needs; availability and capacity: family assists when needed    Family Structure, Dynamics/Relationships: daughters live close by    Activities of Daily Living: patient requires some assistance with activities of daily living  Support Systems-Family & Community (Home Health, HME etc): [place] [facility], aid assists  with bathing two times per week    Transportation:  yes    Work/Education History: disability  Self-Care Activities/Hobbies: enjoys [employer]      History: no    Financial Resources:Medicare      Advanced Care Planning & Legal Concerns:   Advanced Directives/Living Will:  patient completed healthcare power of  during visit   [place]ST: no   Planning:  no    Emergency Contacts: Maged /  / [phone]    Spirituality, Culture & Coping Mechanisms:  F- Melanie and Belief: Taoism     I - Importance:     C - Community/Culture Values:     A - Address in Care:       Advance Care Planning     Date: 2024    [facility]  Palliative provider and this  met patient in clinic exam room. Patient able to provide a brief explanation of her physical condition. Maged, spouse, helped with details. Patient has been hospitalized in the ICU in the past. Patient stated if she were sick enough she would want ICU level of care and heroic measures including CPR and intubation. Patient stated if she were unable to make decisions Maged would be the first contact and daughters second.  will follow for support.     Zac Luciano LCSW  Palliative Medicine

## 2024-02-22 NOTE — PROGRESS NOTES
Palliative Medicine Clinic Note      Consult Requested By: Dr. Justina Smith    Primary Care Physician:   Yolie Michael MD    Reason for Consult: Advance care planning and symptom management in the setting of Heart Failure       ASSESSMENT/PLAN:     Plan/Recommendations:  Diagnoses and all orders for this visit:    CHF (congestive heart failure)  HFrEF 2/2 DCM (10-15%, s/p AICD) ACC/AHA Stage C  -     Ambulatory referral/consult to CLINIC Palliative Care  -Not a candidate for advance therapies given prior stroke   - Emphasized the importance of regular medication intake, weight and fluid monitoring, and minimizing hospital visits.       Constipation, unspecified constipation type  -     sennosides (SENNA) 8.6 mg Cap; Take 2 capsules by mouth every evening.  - Managed the patient's constipation by recommending OTC Senna. - Provided a guide for managing constipation and explained the potential side effects of the prescribed medications.      Insomnia, unspecified type  Anorexia  -     mirtazapine (REMERON) 7.5 MG Tab; Take 1 tablet (7.5 mg total) by mouth every evening.  - Prescribed Mirtazapine for sleep and appetite enhancement, to be taken nightly.   - Advised a temporary cessation of Periactin to assess the effectiveness of Mirtazapine on the patient's appetite, with instructions to restart Periactin if no improvement is observed.       Impaired balance as late effect of cerebrovascular accident  -     Ambulatory referral/consult to Physical/Occupational Therapy; Future      Cerebrovascular accident (CVA), unspecified mechanism  Pain in lateral left lower extremity  -     oxyCODONE-acetaminophen (PERCOCET) 5-325 mg per tablet; Take 1 tablet by mouth every 8 (eight) hours as needed for Pain.  - Prescribed Percocet 5 mg, to be taken up to 3 times daily as needed, starting with once at night.   - Addressed the patient's discomfort and pain, likely due to limited mobility, by suggesting physical therapy.    - Ordered a pain contract due to the prescription of opioids.        - Educated the patient about the potential side effects of the prescribed medications and the importance of physical therapy.       Advance Care Planning   Advance Directives:   Living Will: No    [place]: No    Do Not Resuscitate Status: No    Medical Power of : Yes    Agent's Name:  Maged Mohr   Agent's Contact Number:  [phone]    Decision Making:  Patient answered questions  Goals of Care: What is most important right now is to focus on remaining as independent as possible. Accordingly, we have decided that the best plan to meet the patient's goals includes continuing with treatment.  She expressed a desire to improve her health condition and maintain her current state as much as possible.     The patient has appointed her  as her primary decision-maker in case she is unable to make decisions for herself. If her  is unavailable, her daughters would be the next in line to make decisions on her behalf. The patient has signed a Healthcare Power of  form to this effect.     The patient has expressed a preference for full aggressive care in case of a serious health event. She is open to life support and resuscitation measures if needed.                  Understanding of disease and Illness Trajectory: Patient  has  minimal understanding of patient's illness, they can benefit from continued education on what to expect in the future.      17 min time was spent on advance care planning, goals of care discussion, emotional support, formulating and communicating prognosis and goals of care, exploring burden/benefit of various approaches of treatment.        Follow up:6 weeks    Plan discussed with HTS team     SUBJECTIVE:     History obtained from: Patient and patient's       complaint:Patient presents today for management of persistent pain on her right side due to a stroke.         History of Present  Illness / Interval History:  Diomedes Mohr is 63 y.o. year old female presenting with Heart Failure.  Referred to Palliative Care for evaluation and management of physical symptoms and advance care planning,.      The patient, a female who has had two strokes, the most recent one occurring in June, has been experiencing constant pain on her right side, the side affected by the stroke. The pain is described as a heavy, hard pain that makes it difficult for her to lift her leg, especially at night. Despite trying various medications for the pain, including gabapentin and Tylenol, they have not been effective.     The patient spends most of her day at home, unable to do things she enjoys, such as driving and going to [employer].     She is able to dress and bathe herself, but requires assistance for certain tasks, such as lifting her leg into bed. She receives help from the [place] on Aging twice weekly. Uses a walker for ambulation. Cooks and does dishes occasionally.    Patient reports weight loss, which she is concerned about. She has been taking an appetite medication, which seems to help her eat better. She also experiences occasional nausea and vomiting, and has been constipated.     The patient denies any forgetfulness or confusion.     She also denies any falls and denies feeling dizzy from her current medication, gabapentin. She denies any issues with her appetite when taking her current appetite medication.        Review of Symptoms      Symptom Assessment (ESAS 0-10 Scale)  Pain:  8  Dyspnea:  10  Anxiety:  0  Nausea:  2  Depression:  0  Anorexia:  0  Fatigue:  9  Insomnia:  5  Restlessness:  0  Agitation:  0     CAM / Delirium:  Negative  Constipation:  Positive  Diarrhea:  Negative      Comments:  Senna    Pain Assessment:    Location(s): arm    Arm       Location: left        Quality: Aching and throbbing        Quantity: 8/10 in intensity        Chronicity: Onset 1 month(s) ago, gradually worsening         Aggravating Factors: Pressure        Alleviating Factors: Movement        Associated Symptoms: None    Psychosocial/Cultural:   See Palliative Psychosocial Note: Yes   Living with her , who is her primary caregiver, and has two daughters who are also involved in her care.  **Primary  to Follow**  Palliative Care  Consult: Yes    Spiritual:  F - Melanie and Belief:  Uatsdin          Disease History:  HTN, T2DM, HLD, history of COPD tobacco use (quit in 2020)  HFrEF 2/2 DCM (10-15%, s/p AICD) ACC/AHA Stage C, pHTN  PE in 2021/ DVTs 2023,  Stroke: R MCA 2020 no deficits then L MCA stroke  s/p thrombectomy 4/2023 with right sided hemiparesis   Osteoarthritis of bilateral hips (L>R) with chronic pain        Medications:    Current Outpatient Medications:     acetaminophen (TYLENOL) 325 MG tablet, Take 2 tablets (650 mg total) by mouth every 8 (eight) hours as needed., Disp: 30 tablet, Rfl: 0    apixaban (ELIQUIS) 5 mg Tab, Take 1 tablet (5 mg total) by mouth 2 (two) times daily., Disp: 180 tablet, Rfl: 3    aspirin (ECOTRIN) 81 MG EC tablet, Take 1 tablet (81 mg total) by mouth once daily., Disp: 30 tablet, Rfl: 11    atorvastatin (LIPITOR) 80 MG tablet, Take 1 tablet (80 mg total) by mouth once daily., Disp: 90 tablet, Rfl: 3    azelastine (ASTELIN) 137 mcg (0.1 %) nasal spray, 1 spray (137 mcg total) by Nasal route 2 (two) times daily., Disp: 30 mL, Rfl: 0    cyproheptadine (PERIACTIN) 4 mg tablet, Take 1 tablet (4 mg total) by mouth 2 (two) times daily as needed., Disp: 180 tablet, Rfl: 3    dulaglutide (TRULICITY) 1.5 mg/0.5 mL pen injector, Inject 1.5 mg into the skin once a week., Disp: , Rfl:     gabapentin (NEURONTIN) 600 MG tablet, Take 1 tablet (600 mg total) by mouth 3 (three) times daily., Disp: 270 tablet, Rfl: 3    metFORMIN (GLUCOPHAGE) 1000 MG tablet, Take 1,000 mg by mouth 2 (two) times daily., Disp: , Rfl:     potassium chloride (KLOR-CON) 10 MEQ TbSR, Take 1 tablet (10  mEq total) by mouth once daily., Disp: 90 tablet, Rfl: 3    sacubitriL-valsartan (ENTRESTO) 24-26 mg per tablet, Take 1 tablet by mouth 2 (two) times daily., Disp: 60 tablet, Rfl: 5    torsemide (DEMADEX) 20 MG Tab, Take 1 tablet (20 mg total) by mouth once daily., Disp: 30 tablet, Rfl: 5    metoprolol succinate (TOPROL-XL) 50 MG 24 hr tablet, Take 1 tablet (50 mg total) by mouth once daily. (Patient not taking: Reported on 2/22/2024), Disp: 90 tablet, Rfl: 3    mirtazapine (REMERON) 7.5 MG Tab, Take 1 tablet (7.5 mg total) by mouth every evening., Disp: 30 tablet, Rfl: 11    ondansetron (ZOFRAN-ODT) 4 MG TbDL, Take 1 tablet (4 mg total) by mouth every 8 (eight) hours as needed. (Patient not taking: Reported on 2/22/2024), Disp: 14 tablet, Rfl: 1    oxyCODONE-acetaminophen (PERCOCET) 5-325 mg per tablet, Take 1 tablet by mouth every 8 (eight) hours as needed for Pain., Disp: 90 tablet, Rfl: 0    sennosides (SENNA) 8.6 mg Cap, Take 2 capsules by mouth every evening., Disp: 60 each, Rfl: 5      External  database queried on 02/22/2024  by Leslie Martini .   The results reviewed and considered with the clinical data in the decision whether or not to prescribe a controlled substance.  9/26/2023 08/25/2023 4 Gabapentin 600 Mg Tablet 270.00 90 Ol Kathy [phone] [facility] ([id number]) 0 2.01 LME [place]   07/20/2023 07/20/2023 2 Gabapentin 600 Mg Tablet 270.00 90 Ol Kathy [id number] [facility] (1969) 0 2.01 L[place]   07/20/2023 07/20/2023 2 Tramadol Hcl 50 Mg Tablet 28.00 7 Ol Kathy [id number] [facility] ([id number]) 0 40.00 MME [place]   06/23/2023 06/21/2023 2 Gabapentin 400 Mg Capsule 90.00 30 [place] [id number] [facility] ([id number]) 0  Medicare [place]       Review of patient's allergies indicates:   Allergen Reactions    Adhesive Blisters    Captopril Other (See Comments)     COUGH       OBJECTIVE:       Physical Exam:  Vitals: Pulse: 104 (02/22/24 1006)  BP: (!) 102/58 (02/22/24 1006)  SpO2: 100 % (02/22/24 1006)  Physical Exam  Constitutional:        General: She is not in acute distress.     Appearance: She is ill-appearing.      Comments: Frail, sitting in wheelchair   Pulmonary:      Effort: Pulmonary effort is normal. No respiratory distress.   Musculoskeletal:      Right lower leg: No edema.      Left lower leg: No edema.   Neurological:      Mental Status: She is alert and oriented to person, place, and time. Mental status is at baseline.   Psychiatric:         Mood and Affect: Mood normal.         Behavior: Behavior normal.                 I spent a total of 65 minutes on the day of the visit. This includes face to face time in discussion of goals of care, symptom assessment, coordination of care and emotional support.  This also includes non-face to face time preparing to see the patient (eg, review of tests/imaging), obtaining and/or reviewing separately obtained history, documenting clinical information in the electronic or other health record, independently interpreting results and communicating results to the patient/family/caregiver, or care coordinator.       Additional 17min time spent on a voluntary advance care planning and /or goals of care discussion, providing emotional support, formulating and communicating prognosis and exploring burden/benefit of various approaches of treatment.       This note was partially created using [employer] Voice Recognition software. Typographical and content errors may occur with this process. While efforts are made to detect and correct such errors, in some cases errors will persist. For this reason, wording in this document should be considered in the proper context and not strictly verbatim.      Signature: Leslie Martini MD

## 2024-02-23 ENCOUNTER — OFFICE VISIT (OUTPATIENT)
Dept: FAMILY MEDICINE | Facility: CLINIC | Age: 64
End: 2024-02-23
Payer: MEDICARE

## 2024-02-23 VITALS
HEIGHT: 66 IN | DIASTOLIC BLOOD PRESSURE: 60 MMHG | WEIGHT: 133.69 LBS | SYSTOLIC BLOOD PRESSURE: 98 MMHG | BODY MASS INDEX: 21.49 KG/M2 | HEART RATE: 113 BPM | TEMPERATURE: 98 F | OXYGEN SATURATION: 98 %

## 2024-02-23 DIAGNOSIS — R26.89 IMPAIRED BALANCE AS LATE EFFECT OF CEREBROVASCULAR ACCIDENT: Primary | ICD-10-CM

## 2024-02-23 DIAGNOSIS — I51.9 CARDIAC CACHEXIA: ICD-10-CM

## 2024-02-23 DIAGNOSIS — I69.351 HEMIPLEGIA AND HEMIPARESIS FOLLOWING CEREBRAL INFARCTION AFFECTING RIGHT DOMINANT SIDE: ICD-10-CM

## 2024-02-23 DIAGNOSIS — I25.119 ATHEROSCLEROSIS OF NATIVE CORONARY ARTERY WITH ANGINA PECTORIS, UNSPECIFIED WHETHER NATIVE OR TRANSPLANTED HEART: ICD-10-CM

## 2024-02-23 DIAGNOSIS — I69.398 IMPAIRED BALANCE AS LATE EFFECT OF CEREBROVASCULAR ACCIDENT: Primary | ICD-10-CM

## 2024-02-23 DIAGNOSIS — E11.49 TYPE 2 DIABETES MELLITUS WITH OTHER NEUROLOGIC COMPLICATION, WITHOUT LONG-TERM CURRENT USE OF INSULIN: ICD-10-CM

## 2024-02-23 DIAGNOSIS — Z79.4 TYPE 2 DIABETES MELLITUS WITH OTHER NEUROLOGIC COMPLICATION, WITH LONG-TERM CURRENT USE OF INSULIN: ICD-10-CM

## 2024-02-23 DIAGNOSIS — I47.20 VENTRICULAR TACHYCARDIA, UNSPECIFIED: Primary | ICD-10-CM

## 2024-02-23 DIAGNOSIS — E11.69 TYPE 2 DIABETES MELLITUS WITH OTHER SPECIFIED COMPLICATION, WITHOUT LONG-TERM CURRENT USE OF INSULIN: ICD-10-CM

## 2024-02-23 DIAGNOSIS — E11.65 TYPE 2 DIABETES MELLITUS WITH HYPERGLYCEMIA, WITH LONG-TERM CURRENT USE OF INSULIN: ICD-10-CM

## 2024-02-23 DIAGNOSIS — I50.22 CHRONIC HFREF (HEART FAILURE WITH REDUCED EJECTION FRACTION): ICD-10-CM

## 2024-02-23 DIAGNOSIS — Z79.4 TYPE 2 DIABETES MELLITUS WITH HYPERGLYCEMIA, WITH LONG-TERM CURRENT USE OF INSULIN: ICD-10-CM

## 2024-02-23 DIAGNOSIS — I70.223 ATHEROSCLEROSIS OF NATIVE ARTERY OF BOTH LOWER EXTREMITIES WITH REST PAIN: ICD-10-CM

## 2024-02-23 DIAGNOSIS — F50.00 ANOREXIA MENTALIS: ICD-10-CM

## 2024-02-23 DIAGNOSIS — I63.411 CEREBROVASCULAR ACCIDENT (CVA) DUE TO EMBOLISM OF RIGHT MIDDLE CEREBRAL ARTERY: ICD-10-CM

## 2024-02-23 DIAGNOSIS — D64.9 NORMOCYTIC ANEMIA: ICD-10-CM

## 2024-02-23 DIAGNOSIS — E11.49 TYPE 2 DIABETES MELLITUS WITH OTHER NEUROLOGIC COMPLICATION, WITH LONG-TERM CURRENT USE OF INSULIN: ICD-10-CM

## 2024-02-23 DIAGNOSIS — M54.50 CHRONIC MIDLINE LOW BACK PAIN WITHOUT SCIATICA: ICD-10-CM

## 2024-02-23 DIAGNOSIS — G89.29 CHRONIC MIDLINE LOW BACK PAIN WITHOUT SCIATICA: ICD-10-CM

## 2024-02-23 PROBLEM — D68.59 HYPERCOAGULOPATHY: Status: RESOLVED | Noted: 2023-04-28 | Resolved: 2024-02-23

## 2024-02-23 PROCEDURE — 1160F RVW MEDS BY RX/DR IN RCRD: CPT | Mod: HCNC,CPTII,S$GLB, | Performed by: STUDENT IN AN ORGANIZED HEALTH CARE EDUCATION/TRAINING PROGRAM

## 2024-02-23 PROCEDURE — 3008F BODY MASS INDEX DOCD: CPT | Mod: HCNC,CPTII,S$GLB, | Performed by: STUDENT IN AN ORGANIZED HEALTH CARE EDUCATION/TRAINING PROGRAM

## 2024-02-23 PROCEDURE — 99999 PR PBB SHADOW E&M-EST. PATIENT-LVL V: CPT | Mod: PBBFAC,HCNC,, | Performed by: STUDENT IN AN ORGANIZED HEALTH CARE EDUCATION/TRAINING PROGRAM

## 2024-02-23 PROCEDURE — 3078F DIAST BP <80 MM HG: CPT | Mod: HCNC,CPTII,S$GLB, | Performed by: STUDENT IN AN ORGANIZED HEALTH CARE EDUCATION/TRAINING PROGRAM

## 2024-02-23 PROCEDURE — 4010F ACE/ARB THERAPY RXD/TAKEN: CPT | Mod: HCNC,CPTII,S$GLB, | Performed by: STUDENT IN AN ORGANIZED HEALTH CARE EDUCATION/TRAINING PROGRAM

## 2024-02-23 PROCEDURE — 1159F MED LIST DOCD IN RCRD: CPT | Mod: HCNC,CPTII,S$GLB, | Performed by: STUDENT IN AN ORGANIZED HEALTH CARE EDUCATION/TRAINING PROGRAM

## 2024-02-23 PROCEDURE — 99215 OFFICE O/P EST HI 40 MIN: CPT | Mod: HCNC,S$GLB,, | Performed by: STUDENT IN AN ORGANIZED HEALTH CARE EDUCATION/TRAINING PROGRAM

## 2024-02-23 PROCEDURE — 3074F SYST BP LT 130 MM HG: CPT | Mod: HCNC,CPTII,S$GLB, | Performed by: STUDENT IN AN ORGANIZED HEALTH CARE EDUCATION/TRAINING PROGRAM

## 2024-02-23 NOTE — PROGRESS NOTES
[facility] Note    Chief Complaint      Chief Complaint   Patient presents with    ER follow-up for SOB          History of Present Illness     Diomedes Mohr is a 63 y.o. female with sHTN, T2DM, HLD, tobacco use (quit in 2020), HFrEF 2/2 DCM and CAD (10-15%, s/p AICD), PE (December 2021), pHTN, osteoarthritis of bilateral hips (L>R) with chronic pain, CVA (2020, R MCA, no deficits), left MCA stroke during recent admission s/p thrombectomy (4/29/23) with right sided hemiparesis and bilateral DVT (05/2023) presents for ER follow up visit following evaluation for worsening SOB found to be  acute on chronic decompensated HFrEF, optimized with diuretic and discharged home. Since evaluation, she has been seen by palliative doctor recommended by her cardiologist ( Dr Smith ) as she is not a candidate for advanced heart failure therapy due to history of stroke. Other recs appreciated. She continues to c/o SOB, and low back pain, her appetite is yet to improve while taking cyproheptadine twice a day, also with persistent nausea.     Problem List Addressed This Visit:    1. Cardiac cachexia    2. Chronic HFrEF (heart failure with reduced ejection fraction)    3. Chronic midline low back pain without sciatica  -     Ambulatory referral/consult to Physical/Occupational Therapy; Future; Expected date: 03/01/2024    4. Anorexia mentalis    5. Normocytic anemia    6. Type 2 diabetes mellitus with hyperglycemia, with long-term current use of insulin         Health Maintenance   Topic Date Due    Shingles Vaccine (2 of 2) 09/21/2023    Hemoglobin A1c  02/28/2024    Lipid Panel  04/30/2024    Foot Exam  06/08/2024    Mammogram  07/19/2024    Eye Exam  10/20/2024    High Dose Statin  02/23/2025    Colorectal Cancer Screening  06/23/2026    TETANUS VACCINE  07/27/2033    Hepatitis C Screening  Completed       Past Medical History:   Diagnosis Date    Acute on chronic combined systolic and diastolic heart  failure 2019    ARBEN (acute kidney injury) 2023    Anticoagulant long-term use     Anxiety     Arthritis     Asthma     Depression     H/O: hysterectomy     Hyperlipemia     Hypertension     Pulmonary edema     Schizophrenia        Past Surgical History:   Procedure Laterality Date    BLADDER SUSPENSION      CARPAL TUNNEL RELEASE Right     HEEL SPUR SURGERY Left     HYSTERECTOMY      LEFT HEART CATHETERIZATION Bilateral 2019    Procedure: Left heart cath;  Surgeon: Steve Chambers MD;  Location: [facility] CATH LAB;  Service: Cardiology;  Laterality: Bilateral;    RIGHT HEART CATHETERIZATION Right 2021    Procedure: INSERTION, CATHETER, RIGHT HEART;  Surgeon: Petr Naranjo MD;  Location: [facility] CATH LAB;  Service: Cardiology;  Laterality: Right;    RIGHT HEART CATHETERIZATION Right 2022    Procedure: INSERTION, CATHETER, RIGHT HEART;  Surgeon: Chandana Ivory Jr., MD;  Location: [facility] CATH LAB;  Service: Cardiology;  Laterality: Right;    TUBAL LIGATION         family history includes No Known Problems in her father and mother; Ovarian cancer (age of onset: 42) in her sister.    Social History     Tobacco Use    Smoking status: Former     Current packs/day: 0.00     Types: Cigarettes     Quit date: 2016     Years since quittin.4    Smokeless tobacco: Never    Tobacco comments:     nonex 2 weeks   Substance Use Topics    Alcohol use: No     Alcohol/week: 0.0 standard drinks of alcohol    Drug use: No       Review of Systems   Constitutional:  Negative for fatigue and fever.   Respiratory:  Positive for shortness of breath. Negative for cough and chest tightness.    Gastrointestinal:  Positive for nausea. Negative for abdominal pain, diarrhea and vomiting.   Endocrine: Negative for polydipsia and polyphagia.   Genitourinary:  Negative for difficulty urinating, dysuria and frequency.   Musculoskeletal:  Positive for arthralgias, back pain and gait problem. Negative for joint  swelling.   Skin:  Negative for rash.   Neurological:  Positive for weakness (right upper and lower extremities). Negative for seizures, numbness and headaches.   Psychiatric/Behavioral:  Negative for sleep disturbance.        Outpatient Encounter Medications as of 2/23/2024   Medication Sig Dispense Refill    acetaminophen (TYLENOL) 325 MG tablet Take 2 tablets (650 mg total) by mouth every 8 (eight) hours as needed. 30 tablet 0    apixaban (ELIQUIS) 5 mg Tab Take 1 tablet (5 mg total) by mouth 2 (two) times daily. 180 tablet 3    aspirin (ECOTRIN) 81 MG EC tablet Take 1 tablet (81 mg total) by mouth once daily. 30 tablet 11    atorvastatin (LIPITOR) 80 MG tablet Take 1 tablet (80 mg total) by mouth once daily. 90 tablet 3    azelastine (ASTELIN) 137 mcg (0.1 %) nasal spray 1 spray (137 mcg total) by Nasal route 2 (two) times daily. 30 mL 0    dulaglutide (TRULICITY) 1.5 mg/0.5 mL pen injector Inject 1.5 mg into the skin once a week.      gabapentin (NEURONTIN) 600 MG tablet Take 1 tablet (600 mg total) by mouth 3 (three) times daily. 270 tablet 3    metFORMIN (GLUCOPHAGE) 1000 MG tablet Take 1,000 mg by mouth 2 (two) times daily.      mirtazapine (REMERON) 7.5 MG Tab Take 1 tablet (7.5 mg total) by mouth every evening. 30 tablet 11    ondansetron (ZOFRAN-ODT) 4 MG TbDL Take 1 tablet (4 mg total) by mouth every 8 (eight) hours as needed. 14 tablet 1    oxyCODONE-acetaminophen (PERCOCET) 5-325 mg per tablet Take 1 tablet by mouth every 8 (eight) hours as needed for Pain. 90 tablet 0    potassium chloride (KLOR-CON) 10 MEQ TbSR Take 1 tablet (10 mEq total) by mouth once daily. 90 tablet 3    sacubitriL-valsartan (ENTRESTO) 24-26 mg per tablet Take 1 tablet by mouth 2 (two) times daily. 60 tablet 5    sennosides (SENNA) 8.6 mg Cap Take 2 capsules by mouth every evening. 60 each 5    torsemide (DEMADEX) 20 MG Tab Take 1 tablet (20 mg total) by mouth once daily. 30 tablet 5    [DISCONTINUED] cyproheptadine (PERIACTIN) 4  "mg tablet Take 1 tablet (4 mg total) by mouth 2 (two) times daily as needed. 180 tablet 3    [DISCONTINUED] atorvastatin (LIPITOR) 80 MG tablet Take 1 tablet (80 mg total) by mouth once daily. 90 tablet 3    [DISCONTINUED] metoprolol succinate (TOPROL-XL) 50 MG 24 hr tablet Take 1 tablet (50 mg total) by mouth once daily. (Patient not taking: Reported on 2024) 90 tablet 3    [] furosemide injection 40 mg       [] iohexoL (OMNIPAQUE 350) injection 75 mL       [] potassium bicarbonate disintegrating tablet 40 mEq       [DISCONTINUED] furosemide injection 40 mg       [DISCONTINUED] potassium bicarbonate disintegrating tablet 50 mEq        No facility-administered encounter medications on file as of 2024.        Review of patient's allergies indicates:   Allergen Reactions    Adhesive Blisters    Captopril Other (See Comments)     COUGH       Physical Exam      Vital Signs  Temp: 97.9 °F (36.6 °C)  Temp Source: Temporal  Pulse: (!) 113  SpO2: 98 %  BP: 98/60  BP Location: Right arm  Patient Position: Sitting  Pain Score: 0-No pain  Height and Weight  Height: 5' 6" (167.6 cm)  Weight: 60.6 kg (133 lb 11.3 oz)  BSA (Calculated - sq m): 1.68 sq meters  BMI (Calculated): 21.6  Weight in (lb) to have BMI = 25: 154.6]    Physical Exam  Vitals reviewed.   Constitutional:       Appearance: Normal appearance.   HENT:      Head: Normocephalic and atraumatic.      Right Ear: Tympanic membrane normal.      Left Ear: Tympanic membrane normal.      Mouth/Throat:      Mouth: Mucous membranes are moist.      Pharynx: Oropharynx is clear.   Eyes:      Extraocular Movements: Extraocular movements intact.      Conjunctiva/sclera: Conjunctivae normal.      Pupils: Pupils are equal, round, and reactive to light.   Cardiovascular:      Rate and Rhythm: Normal rate and regular rhythm.      Pulses: Normal pulses.   Pulmonary:      Effort: Pulmonary effort is normal.      Breath sounds: Normal breath sounds. No " "wheezing or rales.   Abdominal:      General: Abdomen is flat. Bowel sounds are normal.      Palpations: Abdomen is soft.   Musculoskeletal:      Cervical back: Normal range of motion.      Right lower leg: No edema.      Left lower leg: No edema.   Skin:     General: Skin is warm and dry.   Neurological:      Mental Status: She is alert and oriented to person, place, and time.      Sensory: No sensory deficit.      Motor: Weakness (4/5 power RUE, 3/5 power right LE) present.      Gait: Gait abnormal.   Psychiatric:         Mood and Affect: Mood normal.         Behavior: Behavior normal.          Laboratory:  CBC:  Recent Labs   Lab Result Units 01/10/24  0452 02/09/24  2322 02/12/24  2256   WBC K/uL 5.36 6.22 5.42   RBC M/uL 3.57* 2.76* 2.96*   Hemoglobin g/dL 10.3* 8.1* 8.9*   Hematocrit % 32.2* 24.5* 26.4*   Platelets K/uL 432 411 411   MCV fL 90 89 89   MCH pg 28.9 29.3 30.1   MCHC g/dL 32.0 33.1 33.7     CMP:  Recent Labs   Lab Result Units 01/10/24  0452 01/12/24  1228 02/09/24  2322 02/12/24  2256   Glucose mg/dL 85 143* 142* 145*   Calcium mg/dL 9.1 9.9 8.6* 8.9   Albumin g/dL 3.1* 3.9 3.6  --    Total Protein g/dL 5.9* 7.0 6.8  --    Sodium mmol/L 136 144 144 144   Potassium mmol/L 3.3* 3.8 3.5 3.1*   CO2 mmol/L 26 27 28 29   Chloride mmol/L 101 101 110 109   BUN mg/dL 4* 3* 13 11   Alkaline Phosphatase U/L 78 86 98  --    ALT U/L 9* 19 63*  --    AST U/L 18 33 167*  --    Total Bilirubin mg/dL 0.6 0.7 0.7  --      URINALYSIS:  Recent Labs   Lab Result Units 01/08/24  0207   Color, UA  Yellow   Specific Gravity, UA  1.015   pH, UA  5.0   Protein, UA  1+*   Bacteria /hpf Many*   Nitrite, UA  Negative   Leukocytes, UA  Trace*   Hyaline Casts, UA /lpf 138*      LIPIDS:  No results for input(s): "TSH", "HDL", "CHOL", "TRIG", "LDLCALC", "CHOLHDL", "NONHDLCHOL", "TOTALCHOLEST" in the last 2160 hours.    TSH:  No results for input(s): "TSH" in the last 2160 hours.    A1C:  No results for input(s): "HGBA1C" in the " last 2160 hours.        Radiology:      Assessment/Plan       1. Cardiac Cachexia    2. Type 2 diabetes mellitus without complication, without long-term current use of insulin.    3. Essential hypertension-now hypotensive per BP readings at home    4. Heart failure with reduced ejection fraction due to cardiomyopathy    5. Low back pain potentially associated with radiculopathy    6. History of CVA with residual deficit    7. Primary osteoarthritis of both hips    9. Bilateral LE DVT    10. Normocytic anemia    Plan :    -labs reviewed and UTD  -recs from palliative appreciated  -not a candidate for advanced heart failure therapy due to prior stroke  -vaccines recommended : shingles (second shot), RSV and COVID  -refer for PT/OT for persistent LBP and right UE paresis  -will d/c cyprohepatidine for now since therapy ineffective  -c/w remeron suggested by palliative team  -c/w other medications as prescribed  -patient advised to monitor and keep BP log  -H&H continues to drop due to chronic diseases  -patient compensated on exam, c/w GDMT     Continue current medications and maintain follow up with specialists.      Patient verbalizes understanding and agrees with current treatment plan.         MD [facility]iah [facility]eban MOR[facility]

## 2024-02-27 DIAGNOSIS — I82.513 CHRONIC DEEP VEIN THROMBOSIS (DVT) OF FEMORAL VEIN OF BOTH LOWER EXTREMITIES: ICD-10-CM

## 2024-02-27 RX ORDER — APIXABAN 5 MG/1
5 TABLET, FILM COATED ORAL 2 TIMES DAILY
Qty: 180 TABLET | Refills: 3 | Status: SHIPPED | OUTPATIENT
Start: 2024-02-27

## 2024-03-11 PROBLEM — R26.89 IMPAIRED BALANCE AS LATE EFFECT OF CEREBROVASCULAR ACCIDENT: Status: RESOLVED | Noted: 2023-06-12 | Resolved: 2024-03-11

## 2024-03-11 PROBLEM — R27.8 DECREASED COORDINATION: Status: RESOLVED | Noted: 2023-06-12 | Resolved: 2024-03-11

## 2024-03-11 PROBLEM — I69.398 IMPAIRED BALANCE AS LATE EFFECT OF CEREBROVASCULAR ACCIDENT: Status: RESOLVED | Noted: 2023-06-12 | Resolved: 2024-03-11

## 2024-03-11 PROBLEM — R29.898 DECREASED STRENGTH OF LOWER EXTREMITY: Status: RESOLVED | Noted: 2023-06-12 | Resolved: 2024-03-11

## 2024-03-11 PROBLEM — R29.898 DECREASED STRENGTH OF LOWER EXTREMITY: Status: ACTIVE | Noted: 2024-03-11

## 2024-03-11 PROBLEM — Z74.09 IMPAIRED FUNCTIONAL MOBILITY, BALANCE, AND ENDURANCE: Status: ACTIVE | Noted: 2024-03-11

## 2024-03-11 NOTE — PROGRESS NOTES
The patient location is: home  The chief complaint leading to consultation is: CHF    Visit type: audio only    Face to Face time with patient: 6 min  20 minutes of total time spent on the encounter, which includes face to face time and non-face to face time preparing to see the patient (eg, review of tests), Obtaining and/or reviewing separately obtained history, Documenting clinical information in the electronic or other health record, Independently interpreting results (not separately reported) and communicating results to the patient/family/caregiver, or Care coordination (not separately reported).         Each patient to whom he or she provides medical services by telemedicine is:  (1) informed of the relationship between the physician and patient and the respective role of any other health care provider with respect to management of the patient; and (2) notified that he or she may decline to receive medical services by telemedicine and may withdraw from such care at any time.    Notes:     Able to do an audio visit with patient, with weather being poor, did telemed visit. Pateint with continued struggle with CHF, has had hospital visits that have been difficult to prevent. Would like to refer to palliative care visit, which she and  are open to. No medication changes for now, will see how palliative care visit goes. RTC 2-3 months.

## 2024-03-13 ENCOUNTER — E-VISIT (OUTPATIENT)
Dept: FAMILY MEDICINE | Facility: CLINIC | Age: 64
End: 2024-03-13
Payer: MEDICARE

## 2024-03-13 DIAGNOSIS — R11.2 INTRACTABLE NAUSEA AND VOMITING: Primary | ICD-10-CM

## 2024-03-13 DIAGNOSIS — E11.9 TYPE 2 DIABETES MELLITUS WITHOUT COMPLICATION: ICD-10-CM

## 2024-03-13 PROCEDURE — 99422 OL DIG E/M SVC 11-20 MIN: CPT | Mod: HCNC,,, | Performed by: STUDENT IN AN ORGANIZED HEALTH CARE EDUCATION/TRAINING PROGRAM

## 2024-03-14 RX ORDER — ONDANSETRON 4 MG/1
4 TABLET, ORALLY DISINTEGRATING ORAL EVERY 8 HOURS PRN
Qty: 30 TABLET | Refills: 1 | Status: SHIPPED | OUTPATIENT
Start: 2024-03-14

## 2024-03-14 NOTE — PROGRESS NOTES
Patient with above symptoms.  Well known patient to me with chronic h/o nausea and was previously on zofran.    Ass:  Intractable nausea likely due to co-morbidities and iatrogenic      Plan:  -Likely iatrogenic, Benefits outweighs risk of stopping her current regimen  - Zofran sent to pharmacy.   --patient informed

## 2024-03-19 ENCOUNTER — LAB VISIT (OUTPATIENT)
Dept: LAB | Facility: HOSPITAL | Age: 64
End: 2024-03-19
Payer: MEDICARE

## 2024-03-19 ENCOUNTER — TELEPHONE (OUTPATIENT)
Dept: NEUROLOGY | Facility: CLINIC | Age: 64
End: 2024-03-19
Payer: MEDICARE

## 2024-03-19 ENCOUNTER — OFFICE VISIT (OUTPATIENT)
Dept: NEUROLOGY | Facility: CLINIC | Age: 64
End: 2024-03-19
Payer: MEDICARE

## 2024-03-19 VITALS
HEART RATE: 100 BPM | BODY MASS INDEX: 20.97 KG/M2 | HEIGHT: 66 IN | DIASTOLIC BLOOD PRESSURE: 72 MMHG | SYSTOLIC BLOOD PRESSURE: 105 MMHG | WEIGHT: 130.5 LBS

## 2024-03-19 DIAGNOSIS — G24.9 DYSTONIA OF RIGHT HAND: Primary | ICD-10-CM

## 2024-03-19 DIAGNOSIS — G62.9 NEUROPATHY: ICD-10-CM

## 2024-03-19 DIAGNOSIS — G62.9 SMALL FIBER NEUROPATHY: ICD-10-CM

## 2024-03-19 LAB
CRP SERPL-MCNC: <0.3 MG/L (ref 0–8.2)
ERYTHROCYTE [SEDIMENTATION RATE] IN BLOOD BY PHOTOMETRIC METHOD: 6 MM/HR (ref 0–36)

## 2024-03-19 PROCEDURE — 3008F BODY MASS INDEX DOCD: CPT | Mod: HCNC,CPTII,S$GLB, | Performed by: PSYCHIATRY & NEUROLOGY

## 2024-03-19 PROCEDURE — 84165 PROTEIN E-PHORESIS SERUM: CPT | Mod: HCNC | Performed by: PSYCHIATRY & NEUROLOGY

## 2024-03-19 PROCEDURE — 1159F MED LIST DOCD IN RCRD: CPT | Mod: HCNC,CPTII,S$GLB, | Performed by: PSYCHIATRY & NEUROLOGY

## 2024-03-19 PROCEDURE — 82607 VITAMIN B-12: CPT | Mod: HCNC | Performed by: PSYCHIATRY & NEUROLOGY

## 2024-03-19 PROCEDURE — 86140 C-REACTIVE PROTEIN: CPT | Mod: HCNC | Performed by: PSYCHIATRY & NEUROLOGY

## 2024-03-19 PROCEDURE — 3078F DIAST BP <80 MM HG: CPT | Mod: HCNC,CPTII,S$GLB, | Performed by: PSYCHIATRY & NEUROLOGY

## 2024-03-19 PROCEDURE — 99999 PR PBB SHADOW E&M-EST. PATIENT-LVL IV: CPT | Mod: PBBFAC,HCNC,, | Performed by: PSYCHIATRY & NEUROLOGY

## 2024-03-19 PROCEDURE — 36415 COLL VENOUS BLD VENIPUNCTURE: CPT | Mod: HCNC | Performed by: PSYCHIATRY & NEUROLOGY

## 2024-03-19 PROCEDURE — 86334 IMMUNOFIX E-PHORESIS SERUM: CPT | Mod: 26,HCNC,, | Performed by: PATHOLOGY

## 2024-03-19 PROCEDURE — 83921 ORGANIC ACID SINGLE QUANT: CPT | Performed by: PSYCHIATRY & NEUROLOGY

## 2024-03-19 PROCEDURE — 84165 PROTEIN E-PHORESIS SERUM: CPT | Mod: 26,HCNC,, | Performed by: PATHOLOGY

## 2024-03-19 PROCEDURE — 85652 RBC SED RATE AUTOMATED: CPT | Mod: HCNC | Performed by: PSYCHIATRY & NEUROLOGY

## 2024-03-19 PROCEDURE — 86038 ANTINUCLEAR ANTIBODIES: CPT | Mod: HCNC | Performed by: PSYCHIATRY & NEUROLOGY

## 2024-03-19 PROCEDURE — 3074F SYST BP LT 130 MM HG: CPT | Mod: HCNC,CPTII,S$GLB, | Performed by: PSYCHIATRY & NEUROLOGY

## 2024-03-19 PROCEDURE — 84207 ASSAY OF VITAMIN B-6: CPT | Mod: HCNC | Performed by: PSYCHIATRY & NEUROLOGY

## 2024-03-19 PROCEDURE — 99215 OFFICE O/P EST HI 40 MIN: CPT | Mod: HCNC,S$GLB,, | Performed by: PSYCHIATRY & NEUROLOGY

## 2024-03-19 PROCEDURE — 99417 PROLNG OP E/M EACH 15 MIN: CPT | Mod: HCNC,S$GLB,, | Performed by: PSYCHIATRY & NEUROLOGY

## 2024-03-19 PROCEDURE — 83521 IG LIGHT CHAINS FREE EACH: CPT | Mod: 59,HCNC | Performed by: PSYCHIATRY & NEUROLOGY

## 2024-03-19 PROCEDURE — 86334 IMMUNOFIX E-PHORESIS SERUM: CPT | Mod: HCNC | Performed by: PSYCHIATRY & NEUROLOGY

## 2024-03-19 PROCEDURE — 4010F ACE/ARB THERAPY RXD/TAKEN: CPT | Mod: HCNC,CPTII,S$GLB, | Performed by: PSYCHIATRY & NEUROLOGY

## 2024-03-19 RX ORDER — LIDOCAINE 30 MG/G
CREAM TOPICAL
Qty: 1 EACH | Refills: 1 | Status: SHIPPED | OUTPATIENT
Start: 2024-03-19 | End: 2024-04-05 | Stop reason: SDUPTHER

## 2024-03-19 RX ORDER — TRIHEXYPHENIDYL HYDROCHLORIDE 2 MG/1
1 TABLET ORAL
Qty: 45 TABLET | Refills: 11 | Status: SHIPPED | OUTPATIENT
Start: 2024-03-19 | End: 2025-03-19

## 2024-03-19 NOTE — PROGRESS NOTES
MICHELLE ARREDONDO - NEUROLOGY [address]TH [place] [facility], [place]    Date: 3/19/24  Patient Name: Diomedes Mohr   MRN: [id number]   Referring Provider: María Dennis NP    Thank you so much María Dennis NP for your patient referral to Neuromuscular team at [facility]. We take pride in our care coordination and look forward to your feedback and questions.    Assessment:     63-year-old female with multiple complicated medical issues including uncontrolled diabetes mellitus and recurrent thrombotic events including bilateral MCA and right cerebellar stroke, pulmonary embolism, dilated cardiomyopathy with poor ejection fraction for evaluation.  Her sensory neuro examination is normal so there is possibility of small fiber neuropathy in the setting of painful paresthesia in lower extremities and it is is probably secondary to diabetes mellitus.  I started her on lidocaine cream local application for painful paresthesia and SNRI can be tried in future.  I will complete sensory polyneuropathy workup.    Additionally right-sided slowness and posturing could be related to sequelae of vascular event resulting in right vik dystonia.  I discussed with her and started her on trihexyphenidyl as a trial.  She will update me in case of any side effects.    I discussed about fall precautions and need for Physiotherapy. I addressed her complaints. I provided information about fall precautions and healthy lifestyle. I would wish her very best for improvement/recovery in her condition.    Future direction based on feedback:    Plan:     Problem List Items Addressed This Visit    None  Visit Diagnoses       Dystonia of right hand    -  Primary    Relevant Medications    trihexyphenidyL (ARTANE) 2 MG tablet    Neuropathy        Relevant Medications    LIDOcaine 3 % Crea    Other Relevant Orders    Vitamin B12 Deficiency Panel    VITAMIN B6    Sedimentation rate (Completed)    C-REACTIVE PROTEIN (Completed)    RICHARD     IMMUNOFIXATION ELECTROPHORESIS, SERUM    IMMUNOGLOBULIN FREE LT CHAINS BLOOD    PROTEIN ELECTROPHORESIS, SERUM (Completed)    Small fiber neuropathy        Relevant Orders    Vitamin B12 Deficiency Panel    VITAMIN B6    Sedimentation rate (Completed)    C-REACTIVE PROTEIN (Completed)    RICHARD    IMMUNOFIXATION ELECTROPHORESIS, SERUM    IMMUNOGLOBULIN FREE LT CHAINS BLOOD    PROTEIN ELECTROPHORESIS, SERUM (Completed)          Ene Schultz MD    This evaluation was completed in >75  Minutes over 50% of the time spent on education & counseling. This includes face to face time and non-face to face time preparing to see the patient (eg, review of tests), obtaining and/or reviewing separately obtained history, documenting clinical information in the electronic or other health record, independently interpreting results and communicating results to the patient/family/caregiver, or care coordinator.    Patient note was created using MModal Dictation.  Any errors in syntax or even information may not have been identified and edited on initial review prior to signing this note.    Details provided by:    Patient  Family-     Reason for visit:  Neuropathy and chronic midline low back pain.      HISTORY OF PRESENT ILLNESS       History of Present Illness  The patient presents for evaluation of her medical condition.  She has PMH of recurrent stroke, HTN, HLD, uncontrolled diabetes mellitus, dilated cardiomyopathy, ICD, peripheral vascular disease, DVT.    She first noticed the neuropathy symptoms about 1 to 2 months ago. She has pain in her buttocks that radiates all the way down. She is now getting pain in her foot and leg. The pain in her feet started after her stroke in 04/2023. Her right side is worse than the left. She thinks it has remained the same over the past year. She denies any difficulty walking. She does not have stairs to go up and down. She occasionally feels dizzy when she stands up, but she does not  pass out. She rates her right leg pain as 10. She can not sleep sometimes at nighttime. She was taking gabapentin 600 mg, but they took her off of it because they wondered if her blood pressure was low due to it. She did not notice any difference after stopping the gabapentin. She was on Lyrica, but it made her dizzy. Her back pain is constant. She denies any issues with bladder control. She denies any falls. She has had diabetes for 20 to 30 years.    Supplemental Information  She has pulmonary embolism and poor ejection fraction.   She used to smoke, but quit in 2001. She drinks alcohol occasionally.    Chart Review:  Hematology note on 09/14/2023  She has COPD, CHF, DM, h/o CVA, HLD, HTn. She has cardiomyopathy with EF of 10-15%. She had defibrillator placed on 11/30/21. Patient states she was mostly immobile at home after that, and spent most of her time in her chair or bed. She had worsening dyspnea around Christmas of 2021 and was evaluated at the ER on 12/27/21. She had CTA chest which identified right sided sub-segmental pulmonary embolism.      Pertinent work up based on chart review for current condition:  D-dimer is 0.69 mildly high   Troponin I 0.03 mildly high   Basic metabolic panel with a potassium 3.1, glucose 145   CBC with hemoglobin 8.9, hematocrit 26, MCV 89   Brain natriuretic peptide 995 high   Lactic acid level 1.2  CK level 56   TSH 2.7   Hemoglobin A1c 7.5 highest has been 8.1  HIV 1 and 2 antibody negative     CTA chest on 02/13/2024.    No convincing evidence of pulmonary thromboembolism through the level of the proximal segmental arteries, noting evaluation more distally, particularly at the lung bases is limited secondary to respiratory motion. Smooth interlobular septal thickening throughout the lungs which may relate to interstitial edema.  Clinical correlation is advised. Significant cardiomegaly.  Reflux of contrast into the hepatic veins which may relate to elevated right-sided  heart pressures. Diffuse body wall anasarca.    CTA stroke on 2024.    No acute intracranial hemorrhage or CT evidence of acute major vascular territory infarct.No high-grade stenosis or major vessel occlusion.  Remote left MCA, right occipital and right cerebellar infarcts. Chronic microvascular ischemic changes. Multinodular thyroid with largest nodule measuring to 5 mm and does not meet criteria for sonographic follow-up. 5 mm right apical solid nodule, stable dating back to CT 2020.  Per Fleischner Society guidelines for nodule <6mm, no further follow-up of this nodule required due to stability.      MRI brain on 2023.    Acute left MCA distribution infarct.  No significant mass effect or acute hemorrhage at this time. Small acute infarct in the right cerebellum. Chronic right MCA distribution infarct.         NCS/EMG on 2015   Normal    Review of Systems:  12 system review of systems is negative except for the symptoms mentioned in HPI.       Past Medical History Social History   Past Medical History:   Diagnosis Date    Acute on chronic combined systolic and diastolic heart failure 2019    ARBEN (acute kidney injury) 2023    Anticoagulant long-term use     Anxiety     Arthritis     Asthma     Depression     H/O: hysterectomy     Hyperlipemia     Hypertension     Pulmonary edema     Schizophrenia       Social History     Socioeconomic History    Marital status:    Tobacco Use    Smoking status: Former     Current packs/day: 0.00     Types: Cigarettes     Quit date: 2016     Years since quittin.5    Smokeless tobacco: Never    Tobacco comments:     nonex 2 weeks   Substance and Sexual Activity    Alcohol use: No     Alcohol/week: 0.0 standard drinks of alcohol    Drug use: No     Social Determinants of Health     Financial Resource Strain: Medium Risk (2024)    Overall Financial Resource Strain (CARDIA)     Difficulty of Paying Living Expenses: Somewhat hard    Food Insecurity: Food Insecurity Present (1/16/2024)    Hunger Vital Sign     Worried About Running Out of Food in the Last Year: Sometimes true     Ran Out of Food in the Last Year: Never true   Transportation Needs: No Transportation Needs (1/16/2024)    PRAPARE - Transportation     Lack of Transportation (Medical): No     Lack of Transportation (Non-Medical): No   Physical Activity: Inactive (1/16/2024)    Exercise Vital Sign     Days of Exercise per Week: 0 days     Minutes of Exercise per Session: 0 min   Stress: Stress Concern Present (1/16/2024)    [facility]ational [facility] - Occupational Stress Questionnaire     Feeling of Stress : To some extent   Social Connections: Socially Integrated (1/16/2024)    Social Connection and Isolation Panel [NHANES]     Frequency of Communication with Friends and Family: More than three times a week     Frequency of Social Gatherings with Friends and Family: Once a week     Attends [employer] Services: More than 4 times per year     Active Member of Clubs or Organizations: Yes     Attends Club or Organization Meetings: More than 4 times per year     Marital Status:    Housing Stability: Low Risk  (1/16/2024)    Housing Stability Vital Sign     Unable to Pay for Housing in the Last Year: No     Number of Places Lived in the Last Year: 1     Unstable Housing in the Last Year: No        Family History Past Surgical History   Family History   Problem Relation Age of Onset    No Known Problems Mother     No Known Problems Father     Ovarian cancer Sister 42     Past Surgical History:   Procedure Laterality Date    BLADDER SUSPENSION      CARPAL TUNNEL RELEASE Right     HEEL SPUR SURGERY Left     HYSTERECTOMY      LEFT HEART CATHETERIZATION Bilateral 12/27/2019    Procedure: Left heart cath;  Surgeon: Steve Chambers MD;  Location: [facility] CATH LAB;  Service: Cardiology;  Laterality: Bilateral;    RIGHT HEART CATHETERIZATION Right 7/26/2021    Procedure:  INSERTION, CATHETER, RIGHT HEART;  Surgeon: Petr Naranjo MD;  Location: [facility] CATH LAB;  Service: Cardiology;  Laterality: Right;    RIGHT HEART CATHETERIZATION Right 5/12/2022    Procedure: INSERTION, CATHETER, RIGHT HEART;  Surgeon: Chandana Ivory Jr., MD;  Location: [facility] CATH LAB;  Service: Cardiology;  Laterality: Right;    TUBAL LIGATION          Allergies    Review of patient's allergies indicates:   Allergen Reactions    Adhesive Blisters    Captopril Other (See Comments)     COUGH            Medications     Current Outpatient Medications   Medication Sig Dispense Refill    acetaminophen (TYLENOL) 325 MG tablet Take 2 tablets (650 mg total) by mouth every 8 (eight) hours as needed. 30 tablet 0    apixaban (ELIQUIS) 5 mg Tab TAKE 1 TABLET BY MOUTH TWICE DAILY. 180 tablet 3    aspirin (ECOTRIN) 81 MG EC tablet Take 1 tablet (81 mg total) by mouth once daily. 30 tablet 11    atorvastatin (LIPITOR) 80 MG tablet Take 1 tablet (80 mg total) by mouth once daily. 90 tablet 3    azelastine (ASTELIN) 137 mcg (0.1 %) nasal spray 1 spray (137 mcg total) by Nasal route 2 (two) times daily. 30 mL 0    dulaglutide (TRULICITY) 1.5 mg/0.5 mL pen injector Inject 1.5 mg into the skin once a week.      gabapentin (NEURONTIN) 600 MG tablet Take 1 tablet (600 mg total) by mouth 3 (three) times daily. 270 tablet 3    metFORMIN (GLUCOPHAGE) 1000 MG tablet Take 1,000 mg by mouth 2 (two) times daily.      mirtazapine (REMERON) 7.5 MG Tab Take 1 tablet (7.5 mg total) by mouth every evening. 30 tablet 11    ondansetron (ZOFRAN-ODT) 4 MG TbDL Take 1 tablet (4 mg total) by mouth every 8 (eight) hours as needed (nausea). 30 tablet 1    oxyCODONE-acetaminophen (PERCOCET) 5-325 mg per tablet Take 1 tablet by mouth every 8 (eight) hours as needed for Pain. 90 tablet 0    potassium chloride (KLOR-CON) 10 MEQ TbSR Take 1 tablet (10 mEq total) by mouth once daily. 90 tablet 3    sacubitriL-valsartan (ENTRESTO) 24-26 mg per tablet  "Take 1 tablet by mouth 2 (two) times daily. 60 tablet 5    sennosides (SENNA) 8.6 mg Cap Take 2 capsules by mouth every evening. 60 each 5    torsemide (DEMADEX) 20 MG Tab Take 1 tablet (20 mg total) by mouth once daily. 30 tablet 5     No current facility-administered medications for this visit.         PHYSICAL EXAMINATION     Vitals:    03/19/24 1421   BP: 105/72   Pulse: 100   Weight: 59.2 kg (130 lb 8.2 oz)   Height: 5' 6" (1.676 m)       Body mass index is 21.07 kg/m².     GENERAL/CONSTITUTIONAL/SYSTEMIC:    -Well appearing; well nourished    Head: Atraumatic, normocephalic  HEENT: PERRLA, EOMI, Oral mucosa moist   Neck: Supple, trachea midline  Cardiovascular: Regular rate and rhythm.  Pulmonary: CTAB, no increased work of breathing, no rhonchi or wheezing  Extremities: Warm, well-perfused, no significant edema  Psychiatric: Normal mood & affect; behavior normal & appropriate  Skin: No jaundice, rashes    HIGHER INTEGRATIVE FUNCTIONS:   -Attention & concentration: Normal   -Orientation: Oriented to person, place & time  -Memory: Normal  -Language: Normal   -Fund of Knowledge: Normal     CRANIAL NERVES:   -CN 2: Visual fields full  -CN 2,3: PERRL  -CN 3,4,6: EOMI  -CN 5: Facial sensation intact bilaterally  -CN 7: Facial strength/movement intact bilaterally  -CN 8: Hearing normal bilaterally  -CN 9,10: Palate elevates symmetrically  -CN 11: Normal shoulder shrug and head turn  -CN 12: Tongue protrudes midline     MOTOR:   -Tone: normal in upper and lower extremities even with maneuvering  -UE/LE motor:     -right arm posturing while walking with right sided Pyramidal gait  -plantar flexion of toes on the right side while walking  -she is low on the right side compared to left side    Upper Ext Right Left Lower Ext Right Left   Shoulder Abd 5 5 Hip flexion 5 5   Elbow flexion 5 5 Knee extension 5 5   Elbow extension 5 5 Knee flexion 5 5   Fingers abduction 2 5 Ankle dorsiflexion 5 5   Wrist extension   Ankle " plantar flexion     Wrist flexion   Great toe dorsiflexion     Finger extension   Thigh adduction     Finger flexion   Thigh abduction     Thumb abduction            REFLEXES:      R L  R L   Triceps 2 2 Knee 2 2   Biceps 3 3 Ankle 1 1   BR 3 3        -spreading of deep tendon reflexes in upper extremities  -Flexor plantar reflex bilaterally     SENSATION:   -Intact bilaterally to Vibration and pin prick    REFLEXES:   -2/4 upper and lower extremities bilaterally  -Flexor plantar reflex bilaterally    COORDINATION:   -FNF normal bilaterally    GAIT:   -Normal casual gait. Able to stand on heels and toes without difficulty.    Physical Exam         Scheduled Follow-up :  Future Appointments   Date Time Provider Department Center   3/19/2024  2:00 PM Ene Schultz MD [facility] NEURO [facility]   4/9/2024 10:00 AM Leslie Martini MD [facility] PAL MED [facility]   4/24/2024 11:00 AM Justina Smith MD [facility] HEARTTX [facility]   6/4/2024 11:30 AM Yolie Michael MD [facility]       After Visit Medication List :     Medication List            Accurate as of March 19, 2024  1:03 PM. If you have any questions, ask your nurse or doctor.                CONTINUE taking these medications      acetaminophen 325 MG tablet  Commonly known as: TYLENOL  Take 2 tablets (650 mg total) by mouth every 8 (eight) hours as needed.     aspirin 81 MG EC tablet  Commonly known as: ECOTRIN  Take 1 tablet (81 mg total) by mouth once daily.     atorvastatin 80 MG tablet  Commonly known as: LIPITOR  Take 1 tablet (80 mg total) by mouth once daily.     azelastine 137 mcg (0.1 %) nasal spray  Commonly known as: ASTELIN  1 spray (137 mcg total) by Nasal route 2 (two) times daily.     dulaglutide 1.5 mg/0.5 mL pen injector  Commonly known as: TRULICITY     ELIQUIS 5 mg Tab  Generic drug: apixaban  TAKE 1 TABLET BY MOUTH TWICE DAILY.     ENTRESTO 24-26 mg per tablet  Generic drug: sacubitriL-valsartan  Take 1 tablet by mouth 2 (two) times  daily.     gabapentin 600 MG tablet  Commonly known as: NEURONTIN  Take 1 tablet (600 mg total) by mouth 3 (three) times daily.     metFORMIN 1000 MG tablet  Commonly known as: GLUCOPHAGE     mirtazapine 7.5 MG Tab  Commonly known as: REMERON  Take 1 tablet (7.5 mg total) by mouth every evening.     ondansetron 4 MG Tbdl  Commonly known as: ZOFRAN-ODT  Take 1 tablet (4 mg total) by mouth every 8 (eight) hours as needed (nausea).     oxyCODONE-acetaminophen 5-325 mg per tablet  Commonly known as: PERCOCET  Take 1 tablet by mouth every 8 (eight) hours as needed for Pain.     potassium chloride 10 MEQ Tbsr  Commonly known as: KLOR-CON  Take 1 tablet (10 mEq total) by mouth once daily.     SENNA 8.6 mg Cap  Generic drug: sennosides  Take 2 capsules by mouth every evening.     torsemide 20 MG Tab  Commonly known as: DEMADEX  Take 1 tablet (20 mg total) by mouth once daily.              Signing Physician:          Ene Schultz MD  , [facility] [employer] [facility] [employer]  Neurology Consultant. [facility].   [address]. [address]th [place] [address].    This note was generated with the assistance of ambient listening technology. Verbal consent was obtained by the patient and accompanying visitor(s) for the recording of patient appointment to facilitate this note. I attest to having reviewed and edited the generated note for accuracy, though some syntax or spelling errors may persist. Please contact the author of this note for any clarification.

## 2024-03-19 NOTE — TELEPHONE ENCOUNTER
----- Message from Valery Bernard sent at 3/19/2024  3:25 PM CDT -----  Regarding: Clarification medication  Contact: Shani/[facility] pharmacy [phone]  Called from [facility] pharmacy regarding pt medication trihexyphenidyL (ARTANE) 2 MG tablet that needs direction clarification. Please call [facility] pharmacy and ask for Shani.

## 2024-03-20 LAB
ALBUMIN SERPL ELPH-MCNC: 4.32 G/DL (ref 3.35–5.55)
ALPHA1 GLOB SERPL ELPH-MCNC: 0.28 G/DL (ref 0.17–0.41)
ALPHA2 GLOB SERPL ELPH-MCNC: 0.79 G/DL (ref 0.43–0.99)
ANA SER QL IF: NORMAL
B-GLOBULIN SERPL ELPH-MCNC: 0.79 G/DL (ref 0.5–1.1)
GAMMA GLOB SERPL ELPH-MCNC: 1.33 G/DL (ref 0.67–1.58)
INTERPRETATION SERPL IFE-IMP: NORMAL
KAPPA LC SER QL IA: 3.39 MG/DL (ref 0.33–1.94)
KAPPA LC/LAMBDA SER IA: 1.95 (ref 0.26–1.65)
LAMBDA LC SER QL IA: 1.74 MG/DL (ref 0.57–2.63)
PROT SERPL-MCNC: 7.5 G/DL (ref 6–8.4)

## 2024-03-21 ENCOUNTER — TELEPHONE (OUTPATIENT)
Dept: NEUROLOGY | Facility: CLINIC | Age: 64
End: 2024-03-21
Payer: MEDICARE

## 2024-03-21 ENCOUNTER — PATIENT MESSAGE (OUTPATIENT)
Dept: NEUROLOGY | Facility: CLINIC | Age: 64
End: 2024-03-21
Payer: MEDICARE

## 2024-03-21 LAB
PATHOLOGIST INTERPRETATION IFE: NORMAL
PATHOLOGIST INTERPRETATION SPE: NORMAL
VIT B12 SERPL-MCNC: 356 NG/L (ref 180–914)

## 2024-03-25 LAB — PYRIDOXAL SERPL-MCNC: 2 UG/L (ref 5–50)

## 2024-03-27 ENCOUNTER — PATIENT MESSAGE (OUTPATIENT)
Dept: NEUROLOGY | Facility: CLINIC | Age: 64
End: 2024-03-27
Payer: MEDICARE

## 2024-03-27 RX ORDER — LANOLIN ALCOHOL/MO/W.PET/CERES
50 CREAM (GRAM) TOPICAL DAILY
Qty: 60 TABLET | Refills: 2 | Status: SHIPPED | OUTPATIENT
Start: 2024-03-27 | End: 2024-04-05

## 2024-04-05 PROBLEM — N30.00 ACUTE CYSTITIS WITHOUT HEMATURIA: Status: RESOLVED | Noted: 2023-08-31 | Resolved: 2024-04-05

## 2024-04-05 PROBLEM — Z71.89 GOALS OF CARE, COUNSELING/DISCUSSION: Status: ACTIVE | Noted: 2024-04-05

## 2024-04-05 PROBLEM — R63.0 ANOREXIA: Status: ACTIVE | Noted: 2024-04-05

## 2024-04-05 PROBLEM — R41.82 ALTERED MENTAL STATUS: Status: RESOLVED | Noted: 2024-01-08 | Resolved: 2024-04-05

## 2024-04-05 PROBLEM — E87.20 LACTIC ACIDOSIS: Status: RESOLVED | Noted: 2023-05-30 | Resolved: 2024-04-05

## 2024-04-06 PROBLEM — E87.20 LACTIC ACID ACIDOSIS: Status: RESOLVED | Noted: 2023-05-30 | Resolved: 2024-04-06

## 2024-04-07 PROBLEM — N17.9 AKI (ACUTE KIDNEY INJURY): Status: RESOLVED | Noted: 2023-05-30 | Resolved: 2024-04-07

## 2024-04-08 ENCOUNTER — TELEPHONE (OUTPATIENT)
Dept: FAMILY MEDICINE | Facility: CLINIC | Age: 64
End: 2024-04-08
Payer: MEDICARE

## 2024-04-08 ENCOUNTER — TELEPHONE (OUTPATIENT)
Dept: TRANSPLANT | Facility: CLINIC | Age: 64
End: 2024-04-08
Payer: MEDICARE

## 2024-04-08 DIAGNOSIS — I50.9 CONGESTIVE HEART FAILURE, UNSPECIFIED HF CHRONICITY, UNSPECIFIED HEART FAILURE TYPE: Primary | ICD-10-CM

## 2024-04-08 NOTE — TELEPHONE ENCOUNTER
----- Message from Bonita Sandhu sent at 4/8/2024  1:58 PM CDT -----  Regarding: hfu  Type: HFU Appointment     Who Called: pt's daughter  Would the patient rather a call back or a response via [facility]? call  Best Call Back Number: [phone]  Additional Information: pt's daughter is calling to schedule hfu for mom

## 2024-04-08 NOTE — TELEPHONE ENCOUNTER
2:40 pm:  Returned call -spoke w/ dtr, [place]    Said was calling for an appt   Was discharged from hospital yesterday and looking for an appt    Reviewed chart and see pt was hospitalized @ [facility] 4/5-4/7/24  Pt continues to have some light headed and dizzinesss  Is taking Torsemide 10 mg once daily per dtr -per d/c note .    Dtr told me she has a pcp appt this Thursday and asked if she should keep this appt and I told her she should  Explained Dr. Smith is out of the office covering the hospital service and I can schedule her with one of her colleagues  Dtr in agreement  Offered this Thursday-time too close to pcp appt  Next available in hts is Tuesday 4/16-she chose that date 0830 and pt will have non fasting labs the day prior    Lab appt and clinic appt w/ dr. Veloz scheduled and reviewed w/ dtr    Remind me entered for both dates            ----- Message from Holly Souza sent at 4/8/2024  1:00 PM CDT -----  Sooner Apoointment Request    Caller is requesting a sooner appointment.  Caller declined first available appointment listed below.  Caller will not accept being placed on the waitlist and is requesting a message be sent to doctor.  Name of Caller:pt daughter (Dung)   When is the first available appointment?4/24  Symptoms:organ failing   Would the patient rather a call back or a response via [facility]? CALL  Best Call Back Number:[phone]-Dung   Additional Information: Pt daughter requesting sooner appt due to pt organs failing. Please advise thank you

## 2024-04-09 ENCOUNTER — OFFICE VISIT (OUTPATIENT)
Dept: PALLIATIVE MEDICINE | Facility: CLINIC | Age: 64
End: 2024-04-09
Payer: MEDICARE

## 2024-04-09 VITALS — HEIGHT: 66 IN | BODY MASS INDEX: 19.81 KG/M2 | WEIGHT: 123.25 LBS

## 2024-04-09 DIAGNOSIS — I50.9 CONGESTIVE HEART FAILURE, UNSPECIFIED HF CHRONICITY, UNSPECIFIED HEART FAILURE TYPE: Primary | ICD-10-CM

## 2024-04-09 DIAGNOSIS — K59.00 CONSTIPATION, UNSPECIFIED CONSTIPATION TYPE: ICD-10-CM

## 2024-04-09 DIAGNOSIS — M79.605 PAIN IN LATERAL LEFT LOWER EXTREMITY: ICD-10-CM

## 2024-04-09 PROCEDURE — 99999 PR PBB SHADOW E&M-EST. PATIENT-LVL III: CPT | Mod: PBBFAC,HCNC,, | Performed by: STUDENT IN AN ORGANIZED HEALTH CARE EDUCATION/TRAINING PROGRAM

## 2024-04-09 PROCEDURE — 3008F BODY MASS INDEX DOCD: CPT | Mod: HCNC,CPTII,S$GLB, | Performed by: STUDENT IN AN ORGANIZED HEALTH CARE EDUCATION/TRAINING PROGRAM

## 2024-04-09 PROCEDURE — 3044F HG A1C LEVEL LT 7.0%: CPT | Mod: HCNC,CPTII,S$GLB, | Performed by: STUDENT IN AN ORGANIZED HEALTH CARE EDUCATION/TRAINING PROGRAM

## 2024-04-09 PROCEDURE — 99497 ADVNCD CARE PLAN 30 MIN: CPT | Mod: HCNC,S$GLB,, | Performed by: STUDENT IN AN ORGANIZED HEALTH CARE EDUCATION/TRAINING PROGRAM

## 2024-04-09 PROCEDURE — 1111F DSCHRG MED/CURRENT MED MERGE: CPT | Mod: HCNC,CPTII,S$GLB, | Performed by: STUDENT IN AN ORGANIZED HEALTH CARE EDUCATION/TRAINING PROGRAM

## 2024-04-09 PROCEDURE — 4010F ACE/ARB THERAPY RXD/TAKEN: CPT | Mod: HCNC,CPTII,S$GLB, | Performed by: STUDENT IN AN ORGANIZED HEALTH CARE EDUCATION/TRAINING PROGRAM

## 2024-04-09 PROCEDURE — 99214 OFFICE O/P EST MOD 30 MIN: CPT | Mod: HCNC,S$GLB,, | Performed by: STUDENT IN AN ORGANIZED HEALTH CARE EDUCATION/TRAINING PROGRAM

## 2024-04-09 RX ORDER — SENNOSIDES 8.6 MG/1
2 TABLET ORAL NIGHTLY
Qty: 30 TABLET | Refills: 0 | Status: SHIPPED | OUTPATIENT
Start: 2024-04-09

## 2024-04-09 RX ORDER — LIDOCAINE 50 MG/G
2 PATCH TOPICAL DAILY
Qty: 60 PATCH | Refills: 0 | Status: SHIPPED | OUTPATIENT
Start: 2024-04-09

## 2024-04-09 NOTE — PROGRESS NOTES
Palliative Medicine Clinic Note      Primary Care Physician:   Yolie Michael MD    Reason for Consult: Advance care planning and symptom management in the setting of Heart Failure       ASSESSMENT/PLAN:     Plan/Recommendations:  Diagnoses and all orders for this visit:    CHF (congestive heart failure), HFrEF 2/2 DCM (10-15%, s/p AICD) ACC/AHA Stage C  -Not a candidate for advance therapies given prior stroke   - Recent admission for hypotension, now on Milrinone   - Explained the correlation between low blood pressure, heart failure, and weakness.      Constipation, unspecified constipation type  -  sennosides (SENNA) 8.6 mg Cap; Take 2 capsules by mouth every evening.  - Suggested Senna for daily constipation relief and Dulcolax for occasional constipation.  -Again provided constipation management guide       Insomnia, unspecified type  - mirtazapine (REMERON) 7.5 MG Tab by mouth every evening.  -Sleeping well now      Anorexia  - mirtazapine (REMERON) 7.5 MG Tab by mouth every evening.  - Prescribed Zofran, to be taken 3 times daily, for nausea reduction and appetite enhancement.  - Emphasized the necessity of a nutritious diet and constipation management.  - Recommend a high-calorie diet.  -Now on Periactin as well, no SE reported       Cerebrovascular accident (CVA), unspecified mechanism  Pain in lateral left lower extremity  - Adjusted the Percocet 5-325 mg per tablet dosage to twice daily, contingent on the absence of dizziness.  - Prescribed Lidocaine patches (60 patches) for pain management.  - Encouraged frequent ambulation and position changes to alleviate pain and build strength.  - Advised lying on the stomach or side to mitigate sacral discomfort from prolonged sitting.        Advance Care Planning   Advance Directives:   Living Will: No    [place]POST: No    Do Not Resuscitate Status: No    Medical Power of : Yes    Agent's Name:  Maged Mohr   Agent's Contact Number:   "[phone]    Decision Making:  Patient answered questions  Goals of Care: What is most important right now is to focus on curative/life-prolongation (regardless of treatment burdens). Accordingly, we have decided that the best plan to meet the patient's goals includes continuing with treatment.    4/9/24:  The patient's primary goal is to improve her health and regain strength to become more independent. She expressed a desire to be able to walk to [facility], indicating a wish for increased mobility. Her  is actively involved in her care, managing her medications and accompanying her to medical appointments. Her sisters also participate in her care, with one sister planning to help after her 's upcoming surgery and another sister from [place] planning to visit. The patient lives at home and uses a walker for mobility. She expressed a desire to "beat" her illness but acknowledged that this may not be possible. Instead, she hopes to feel better and do more for herself.            Understanding of disease and Illness Trajectory: Patient  has  minimal understanding of patient's illness, they can benefit from continued education on what to expect in the future.      17 min time was spent on advance care planning, goals of care discussion, emotional support, formulating and communicating prognosis and goals of care, exploring burden/benefit of various approaches of treatment.        Follow up: 3m       SUBJECTIVE:     History obtained from: Patient and patient's       complaint:Patient presents today for management of persistent pain on her right side due to a stroke.       History of Present Illness / Interval History:  Diomedes Mohr is 63 y.o. year old female presenting with Heart Failure.  Referred to Palliative Care for evaluation and management of physical symptoms and advance care planning,.      4/9  The patient was recently discharged from the hospital due to low blood pressure. She was " started on midodrine and her torsemide dosage was reduced to 10 mg. Since her return home, her eating habits have improved and she is consuming the recommended amount of fluids. Her blood pressure was low today, so she did not take her midodrine. She reports feeling dizzy but not weak. The patient has a history of stroke, and experiences pain in her buttock, which she describes as a mix of dull, sharp, and electric sensations. She is able to walk with a walker but does not venture outside. Currently, she is dealing with constipation. The patient's goal is to gain strength and become more self-sufficient. The patient denies any restlessness.    -----------    The patient, a female who has had two strokes, the most recent one occurring in June, has been experiencing constant pain on her right side, the side affected by the stroke. The pain is described as a heavy, hard pain that makes it difficult for her to lift her leg, especially at night. Despite trying various medications for the pain, including gabapentin and Tylenol, they have not been effective. The patient spends most of her day at home, unable to do things she enjoys, such as driving and going to [employer].   She is able to dress and bathe herself, but requires assistance for certain tasks, such as lifting her leg into bed. She receives help from the [place] [facility] twice weekly. Uses a walker for ambulation. Cooks and does dishes occasionally. Patient reports weight loss, which she is concerned about. She has been taking an appetite medication, which seems to help her eat better. She also experiences occasional nausea and vomiting, and has been constipated.  The patient denies any forgetfulness or confusion.   She also denies any falls and denies feeling dizzy from her current medication, gabapentin. She denies any issues with her appetite when taking her current appetite medication.        Review of Symptoms      Symptom Assessment (ESAS 0-10 Scale)  Pain:   0  Dyspnea:  0  Anxiety:  0  Nausea:  0  Depression:  0  Anorexia:  0  Fatigue:  0  Insomnia:  0  Restlessness:  0  Agitation:  0     CAM / Delirium:  Negative  Constipation:  Positive  Diarrhea:  Negative      Comments:  Senna    Pain Assessment:    Location(s): arm    Arm       Location: left        Quality: Aching and throbbing        Quantity: 8/10 in intensity        Chronicity: Onset 1 month(s) ago, gradually worsening        Aggravating Factors: Pressure        Alleviating Factors: Movement        Associated Symptoms: None    Psychosocial/Cultural:   See Palliative Psychosocial Note: Yes   Living with her , who is her primary caregiver, and has two daughters who are also involved in her care.  **Primary  to Follow**  Palliative Care  Consult: Yes    Spiritual:  F - Melanie and Belief:  Quaker        Disease History:  HTN, T2DM, HLD, history of COPD tobacco use (quit in 2020)  HFrEF 2/2 DCM (10-15%, s/p AICD) ACC/AHA Stage C, pHTN  PE in 2021/ DVTs 2023,  Stroke: R MCA 2020 no deficits then L MCA stroke  s/p thrombectomy 4/2023 with right sided hemiparesis   Osteoarthritis of bilateral hips (L>R) with chronic pain      Medications:    Current Outpatient Medications:     acetaminophen (TYLENOL) 325 MG tablet, Take 2 tablets (650 mg total) by mouth every 8 (eight) hours as needed., Disp: 30 tablet, Rfl: 0    apixaban (ELIQUIS) 5 mg Tab, TAKE 1 TABLET BY MOUTH TWICE DAILY., Disp: 180 tablet, Rfl: 3    aspirin (ECOTRIN) 81 MG EC tablet, Take 1 tablet (81 mg total) by mouth once daily., Disp: 30 tablet, Rfl: 11    atorvastatin (LIPITOR) 80 MG tablet, Take 1 tablet (80 mg total) by mouth once daily., Disp: 90 tablet, Rfl: 3    cyproheptadine (PERIACTIN) 4 mg tablet, Take 1 tablet (4 mg total) by mouth 2 (two) times daily., Disp: 120 tablet, Rfl: 0    dulaglutide (TRULICITY) 1.5 mg/0.5 mL pen injector, Inject 1.5 mg into the skin Every Friday., Disp: , Rfl:     LIDOcaine HCL 3 % Crea,  Apply topically 2 (two) times daily., Disp: , Rfl:     midodrine (PROAMATINE) 10 MG tablet, Take 1 tablet (10 mg total) by mouth 3 (three) times daily., Disp: 90 tablet, Rfl: 1    mirtazapine (REMERON) 7.5 MG Tab, Take 1 tablet (7.5 mg total) by mouth every evening., Disp: 30 tablet, Rfl: 11    ondansetron (ZOFRAN-ODT) 4 MG TbDL, Take 1 tablet (4 mg total) by mouth every 8 (eight) hours as needed (nausea)., Disp: 30 tablet, Rfl: 1    oxyCODONE-acetaminophen (PERCOCET) 5-325 mg per tablet, Take 1 tablet by mouth every 8 (eight) hours as needed for Pain., Disp: 90 tablet, Rfl: 0    potassium chloride (K-DUR,KLOR-CON, K-TAB) 20 MEQ tablet, Take 1 tablet (20 mEq total) by mouth once daily., Disp: 60 tablet, Rfl: 0    torsemide (DEMADEX) 10 MG Tab, Take 1 tablet (10 mg total) by mouth once daily., Disp: 30 tablet, Rfl: 1    trihexyphenidyL (ARTANE) 2 MG tablet, Take 0.5 tablets (1 mg total) by mouth 3 (three) times daily with meals., Disp: 45 tablet, Rfl: 11    LIDOcaine (LIDODERM) 5 %, Place 2 patches onto the skin once daily. Remove & Discard patch within 12 hours or as directed by MD, Disp: 60 patch, Rfl: 0    SENNA 8.6 mg tablet, Take 2 tablets by mouth every evening., Disp: 30 tablet, Rfl: 0      External  database queried on 04/11/2024  by Leslie Martini .   The results reviewed and considered with the clinical data in the decision whether or not to prescribe a controlled substance.  02/22/2024 02/22/2024 2 Oxycodone-Acetaminophen 5-325 90.00 30 Er Sandra [id number] [facility] ([id number]) 0 22.50 MME [place]   09/26/2023 08/25/2023 4 Gabapentin 600 Mg Tablet 270.00 90 Ol Kathy [id number] [facility] ([id number]) 0 2.01 LME [place]   07/20/2023 07/20/2023 2 Tramadol Hcl 50 Mg Tablet 28.00 7 Ol Kathy [id number] [facility] (1004) 0 40.00 [place]   07/20/2023 07/20/2023 2 Gabapentin 600 Mg Tablet 270.00 90 Ol Kathy [id number] [facility] ([id number]) 0 2.01 L[place]   06/23/2023 06/21/2023 2 Gabapentin 400 Mg Capsule 90.00 30 Sa Pra [id number] [facility] ([id number]) 0   [place]are [place]         Review of patient's allergies indicates:   Allergen Reactions    Adhesive Blisters    Captopril Other (See Comments)     COUGH       OBJECTIVE:       Physical Exam:  Vitals:    Physical Exam  Constitutional:       General: She is not in acute distress.     Appearance: She is ill-appearing.      Comments: Frail, sitting in wheelchair   Pulmonary:      Effort: Pulmonary effort is normal. No respiratory distress.   Musculoskeletal:      Right lower leg: No edema.      Left lower leg: No edema.   Neurological:      Mental Status: She is alert and oriented to person, place, and time.   Psychiatric:         Mood and Affect: Mood normal.         Behavior: Behavior normal.           This note was generated with the assistance of ambient listening technology. Verbal consent was obtained by the patient and accompanying visitor(s) for the recording of patient appointment to facilitate this note. I attest to having reviewed and edited the generated note for accuracy, though some syntax or spelling errors may persist. Please contact the author of this note for any clarification.      Signature: Leslie Martini MD

## 2024-04-11 ENCOUNTER — OFFICE VISIT (OUTPATIENT)
Dept: FAMILY MEDICINE | Facility: CLINIC | Age: 64
End: 2024-04-11
Payer: MEDICARE

## 2024-04-11 VITALS
SYSTOLIC BLOOD PRESSURE: 112 MMHG | HEIGHT: 66 IN | HEART RATE: 90 BPM | TEMPERATURE: 98 F | BODY MASS INDEX: 20.81 KG/M2 | DIASTOLIC BLOOD PRESSURE: 80 MMHG | WEIGHT: 129.5 LBS | OXYGEN SATURATION: 100 %

## 2024-04-11 DIAGNOSIS — E87.6 HYPOKALEMIA DUE TO EXCESSIVE RENAL LOSS OF POTASSIUM: ICD-10-CM

## 2024-04-11 DIAGNOSIS — D64.9 NORMOCYTIC ANEMIA: ICD-10-CM

## 2024-04-11 DIAGNOSIS — I25.119 ATHEROSCLEROSIS OF NATIVE CORONARY ARTERY WITH ANGINA PECTORIS, UNSPECIFIED WHETHER NATIVE OR TRANSPLANTED HEART: ICD-10-CM

## 2024-04-11 DIAGNOSIS — I95.2 HYPOTENSION DUE TO DRUGS: ICD-10-CM

## 2024-04-11 DIAGNOSIS — E11.618 TYPE 2 DIABETES MELLITUS WITH OTHER DIABETIC ARTHROPATHY, WITHOUT LONG-TERM CURRENT USE OF INSULIN: ICD-10-CM

## 2024-04-11 DIAGNOSIS — Z09 HOSPITAL DISCHARGE FOLLOW-UP: ICD-10-CM

## 2024-04-11 DIAGNOSIS — I10 ESSENTIAL HYPERTENSION: ICD-10-CM

## 2024-04-11 DIAGNOSIS — I50.22 CHRONIC HFREF (HEART FAILURE WITH REDUCED EJECTION FRACTION): ICD-10-CM

## 2024-04-11 PROCEDURE — 99214 OFFICE O/P EST MOD 30 MIN: CPT | Mod: S$GLB,,, | Performed by: STUDENT IN AN ORGANIZED HEALTH CARE EDUCATION/TRAINING PROGRAM

## 2024-04-11 PROCEDURE — 1159F MED LIST DOCD IN RCRD: CPT | Mod: CPTII,S$GLB,, | Performed by: STUDENT IN AN ORGANIZED HEALTH CARE EDUCATION/TRAINING PROGRAM

## 2024-04-11 PROCEDURE — 3074F SYST BP LT 130 MM HG: CPT | Mod: CPTII,S$GLB,, | Performed by: STUDENT IN AN ORGANIZED HEALTH CARE EDUCATION/TRAINING PROGRAM

## 2024-04-11 PROCEDURE — 4010F ACE/ARB THERAPY RXD/TAKEN: CPT | Mod: CPTII,S$GLB,, | Performed by: STUDENT IN AN ORGANIZED HEALTH CARE EDUCATION/TRAINING PROGRAM

## 2024-04-11 PROCEDURE — 1160F RVW MEDS BY RX/DR IN RCRD: CPT | Mod: CPTII,S$GLB,, | Performed by: STUDENT IN AN ORGANIZED HEALTH CARE EDUCATION/TRAINING PROGRAM

## 2024-04-11 PROCEDURE — 3044F HG A1C LEVEL LT 7.0%: CPT | Mod: CPTII,S$GLB,, | Performed by: STUDENT IN AN ORGANIZED HEALTH CARE EDUCATION/TRAINING PROGRAM

## 2024-04-11 PROCEDURE — 3079F DIAST BP 80-89 MM HG: CPT | Mod: CPTII,S$GLB,, | Performed by: STUDENT IN AN ORGANIZED HEALTH CARE EDUCATION/TRAINING PROGRAM

## 2024-04-11 PROCEDURE — 1111F DSCHRG MED/CURRENT MED MERGE: CPT | Mod: CPTII,S$GLB,, | Performed by: STUDENT IN AN ORGANIZED HEALTH CARE EDUCATION/TRAINING PROGRAM

## 2024-04-11 PROCEDURE — 99999 PR PBB SHADOW E&M-EST. PATIENT-LVL IV: CPT | Mod: PBBFAC,HCNC,, | Performed by: STUDENT IN AN ORGANIZED HEALTH CARE EDUCATION/TRAINING PROGRAM

## 2024-04-11 NOTE — PROGRESS NOTES
[facility] Note    Chief Complaint      Chief Complaint   Patient presents with    Hospital f/u visit          History of Present Illness     Diomedes Mohr is a 63 y.o. female with sHTN, T2DM, HLD, tobacco use (quit in 2020), HFrEF 2/2 DCM and CAD (10-15%, s/p AICD), PE (December 2021), pHTN, osteoarthritis of bilateral hips (L>R) with chronic pain, CVA (2020, R MCA, no deficits), left MCA stroke during recent admission s/p thrombectomy (4/29/23) with right sided hemiparesis and bilateral DVT (05/2023) presents for hospital follow up visit (04/05/2024-04/07/2024) , recently managed for symptomatic hypotension with aggressive IV fluid resuscitation, started on midodrine and regimen adjusted (taken off entresto), hospital course complicated by hypokalemia as well, now back to baseline. No new complaints today except 'butt pain', her appetite is much better.  Since evaluation, she has been seen by palliative doctor recommended by her cardiologist ( Dr Smith ) as she is not a candidate for advanced heart failure therapy due to history of stroke. Other recs appreciated.     Problem List Addressed This Visit:    1. Hospital discharge follow-up    2. Hypotension due to drugs    3. Chronic HFrEF (heart failure with reduced ejection fraction)    4. Essential hypertension  -     TSH; Future; Expected date: 04/11/2024    5. Hypokalemia due to excessive renal loss of potassium  -     Potassium; Future; Expected date: 04/11/2024    6. Atherosclerosis of native coronary artery with angina pectoris, unspecified whether native or transplanted heart  -     LIPID PANEL; Future; Expected date: 04/11/2024    7. Type 2 diabetes mellitus with other diabetic arthropathy, without long-term current use of insulin         Health Maintenance   Topic Date Due    Shingles Vaccine (2 of 2) 09/21/2023    Lipid Panel  04/30/2024    Foot Exam  06/08/2024    Mammogram  07/19/2024    Hemoglobin A1c  10/05/2024    Eye Exam   10/20/2024    High Dose Statin  2025    Colorectal Cancer Screening  2026    TETANUS VACCINE  2033    Hepatitis C Screening  Completed       Past Medical History:   Diagnosis Date    Acute on chronic combined systolic and diastolic heart failure 2019    ARBEN (acute kidney injury) 2023    Anticoagulant long-term use     Anxiety     Arthritis     Asthma     Depression     H/O: hysterectomy     Hyperlipemia     Hypertension     Pulmonary edema     Schizophrenia        Past Surgical History:   Procedure Laterality Date    BLADDER SUSPENSION      CARPAL TUNNEL RELEASE Right     HEEL SPUR SURGERY Left     HYSTERECTOMY      LEFT HEART CATHETERIZATION Bilateral 2019    Procedure: Left heart cath;  Surgeon: Steve Chambers MD;  Location: [facility] CATH LAB;  Service: Cardiology;  Laterality: Bilateral;    RIGHT HEART CATHETERIZATION Right 2021    Procedure: INSERTION, CATHETER, RIGHT HEART;  Surgeon: Petr Naranjo MD;  Location: [facility] CATH LAB;  Service: Cardiology;  Laterality: Right;    RIGHT HEART CATHETERIZATION Right 2022    Procedure: INSERTION, CATHETER, RIGHT HEART;  Surgeon: Chandana Ivory Jr., MD;  Location: [facility] CATH LAB;  Service: Cardiology;  Laterality: Right;    TUBAL LIGATION         family history includes No Known Problems in her father and mother; Ovarian cancer (age of onset: 42) in her sister.    Social History     Tobacco Use    Smoking status: Former     Current packs/day: 0.00     Types: Cigarettes     Quit date: 2016     Years since quittin.6    Smokeless tobacco: Never    Tobacco comments:     nonex 2 weeks   Substance Use Topics    Alcohol use: No     Alcohol/week: 0.0 standard drinks of alcohol    Drug use: No       Review of Systems   Constitutional:  Negative for fatigue and fever.   Respiratory:  Positive for shortness of breath. Negative for cough and chest tightness.    Gastrointestinal:  Positive for nausea. Negative for  abdominal pain, diarrhea and vomiting.   Endocrine: Negative for polydipsia and polyphagia.   Genitourinary:  Negative for difficulty urinating, dysuria and frequency.   Musculoskeletal:  Positive for arthralgias, back pain and gait problem. Negative for joint swelling.   Skin:  Negative for rash.   Neurological:  Positive for weakness (right upper and lower extremities). Negative for seizures, numbness and headaches.   Psychiatric/Behavioral:  Negative for sleep disturbance.        Outpatient Encounter Medications as of 4/11/2024   Medication Sig Note Dispense Refill    acetaminophen (TYLENOL) 325 MG tablet Take 2 tablets (650 mg total) by mouth every 8 (eight) hours as needed.  30 tablet 0    apixaban (ELIQUIS) 5 mg Tab TAKE 1 TABLET BY MOUTH TWICE DAILY.  180 tablet 3    aspirin (ECOTRIN) 81 MG EC tablet Take 1 tablet (81 mg total) by mouth once daily.  30 tablet 11    atorvastatin (LIPITOR) 80 MG tablet Take 1 tablet (80 mg total) by mouth once daily.  90 tablet 3    cyproheptadine (PERIACTIN) 4 mg tablet Take 1 tablet (4 mg total) by mouth 2 (two) times daily.  120 tablet 0    dulaglutide (TRULICITY) 1.5 mg/0.5 mL pen injector Inject 1.5 mg into the skin Every Friday.       LIDOcaine (LIDODERM) 5 % Place 2 patches onto the skin once daily. Remove & Discard patch within 12 hours or as directed by MD  60 patch 0    LIDOcaine HCL 3 % Crea Apply topically 2 (two) times daily. 4/5/2024: prn      midodrine (PROAMATINE) 10 MG tablet Take 1 tablet (10 mg total) by mouth 3 (three) times daily.  90 tablet 1    mirtazapine (REMERON) 7.5 MG Tab Take 1 tablet (7.5 mg total) by mouth every evening.  30 tablet 11    ondansetron (ZOFRAN-ODT) 4 MG TbDL Take 1 tablet (4 mg total) by mouth every 8 (eight) hours as needed (nausea).  30 tablet 1    oxyCODONE-acetaminophen (PERCOCET) 5-325 mg per tablet Take 1 tablet by mouth every 8 (eight) hours as needed for Pain.  90 tablet 0    potassium chloride (K-DUR,KLOR-CON, K-TAB) 20 MEQ  "tablet Take 1 tablet (20 mEq total) by mouth once daily.  60 tablet 0    SENNA 8.6 mg tablet Take 2 tablets by mouth every evening.  30 tablet 0    torsemide (DEMADEX) 10 MG Tab Take 1 tablet (10 mg total) by mouth once daily.  30 tablet 1    trihexyphenidyL (ARTANE) 2 MG tablet Take 0.5 tablets (1 mg total) by mouth 3 (three) times daily with meals.  45 tablet 11     No facility-administered encounter medications on file as of 4/11/2024.        Review of patient's allergies indicates:   Allergen Reactions    Adhesive Blisters    Captopril Other (See Comments)     COUGH       Physical Exam      Vital Signs  Temp: 97.5 °F (36.4 °C)  Temp Source: Temporal  Pulse: 90  SpO2: 100 %  BP: 112/80  BP Location: Right arm  Patient Position: Sitting  Pain Score: 0-No pain  Height and Weight  Height: 5' 6" (167.6 cm)  Weight: 58.7 kg (129 lb 8.3 oz)  BSA (Calculated - sq m): 1.65 sq meters  BMI (Calculated): 20.9  Weight in (lb) to have BMI = 25: 154.6]    Physical Exam  Vitals reviewed.   Constitutional:       Appearance: Normal appearance.   HENT:      Head: Normocephalic and atraumatic.      Right Ear: Tympanic membrane normal.      Left Ear: Tympanic membrane normal.      Mouth/Throat:      Mouth: Mucous membranes are moist.      Pharynx: Oropharynx is clear.   Eyes:      Extraocular Movements: Extraocular movements intact.      Conjunctiva/sclera: Conjunctivae normal.      Pupils: Pupils are equal, round, and reactive to light.   Cardiovascular:      Rate and Rhythm: Normal rate and regular rhythm.      Pulses: Normal pulses.   Pulmonary:      Effort: Pulmonary effort is normal.      Breath sounds: Normal breath sounds. No wheezing or rales.   Abdominal:      General: Abdomen is flat. Bowel sounds are normal.      Palpations: Abdomen is soft.   Musculoskeletal:      Cervical back: Normal range of motion.      Right lower leg: No edema.      Left lower leg: No edema.   Skin:     General: Skin is warm and dry. " "  Neurological:      Mental Status: She is alert and oriented to person, place, and time.      Sensory: No sensory deficit.      Motor: Weakness (4/5 power RUE, 3/5 power right LE) present.      Gait: Gait abnormal.   Psychiatric:         Mood and Affect: Mood normal.         Behavior: Behavior normal.          Laboratory:  CBC:  Recent Labs   Lab Result Units 04/05/24  1005 04/06/24  0520 04/07/24  0507   WBC K/uL 6.65 5.88 5.71   RBC M/uL 4.47 3.27* 3.27*   Hemoglobin g/dL 12.8 9.3* 9.3*   Hematocrit % 38.1 27.8* 27.6*   Platelets K/uL 453* 315 300   MCV fL 85 85 84   MCH pg 28.6 28.4 28.4   MCHC g/dL 33.6 33.5 33.7     CMP:  Recent Labs   Lab Result Units 01/12/24  1228 02/09/24  2322 02/12/24  2256 04/05/24  1005 04/06/24  0520 04/07/24  0507 04/07/24  1510   Glucose mg/dL 143* 142*   < > 118*   < > 74  --    Calcium mg/dL 9.9 8.6*   < > 9.7   < > 8.6*  --    Albumin g/dL 3.9 3.6  --  4.3  --   --   --    Total Protein g/dL 7.0 6.8  --  8.1  --   --   --    Sodium mmol/L 144 144   < > 139   < > 139  --    Potassium mmol/L 3.8 3.5   < > 4.1   < > 2.9* 3.9   CO2 mmol/L 27 28   < > 29   < > 31*  --    Chloride mmol/L 101 110   < > 94*   < > 100  --    BUN mg/dL 3* 13   < > 15   < > 9  --    Alkaline Phosphatase U/L 86 98  --  92  --   --   --    ALT U/L 19 63*  --  15  --   --   --    AST U/L 33 167*  --  29  --   --   --    Total Bilirubin mg/dL 0.7 0.7  --  0.9  --   --   --     < > = values in this interval not displayed.     URINALYSIS:  Recent Labs   Lab Result Units 04/05/24  1310   Color, UA  Yellow   Specific Gravity, UA  1.025   pH, UA  5.0   Protein, UA  1+*   Bacteria /hpf None   Nitrite, UA  Negative   Leukocytes, UA  Negative   Urobilinogen, UA EU/dL Negative   Hyaline Casts, UA /lpf 0      LIPIDS:  No results for input(s): "TSH", "HDL", "CHOL", "TRIG", "LDLCALC", "CHOLHDL", "NONHDLCHOL", "TOTALCHOLEST" in the last 2160 hours.    TSH:  No results for input(s): "TSH" in the last 2160 " hours.    A1C:  Recent Labs   Lab Result Units 04/05/24  1010   Hemoglobin A1C % 5.4           Radiology:      Assessment/Plan       1. Hospital discharge f/u visit  -for symptomatic hypotension, back to baseline since entresto taken off and now on midodrine     2. Cardiac Cachexia    2. Type 2 diabetes mellitus without complication, without long-term current use of insulin.    3. Essential hypertension-now hypotensive per BP readings at home    4. Heart failure with reduced ejection fraction due to cardiomyopathy    5. Low back pain potentially associated with radiculopathy    6. History of CVA with residual deficit    7. Primary osteoarthritis of both hips    9. Bilateral LE DVT    10. Normocytic anemia    Plan :    -labs reviewed and UTD  -repeat K level ordered  -recs from palliative appreciated  -not a candidate for advanced heart failure therapy due to prior stroke  -vaccines recommended : shingles (second shot), RSV and COVID  -refer for PT/OT for persistent LBP and right UE paresis  -will d/c cyprohepatidine for now since therapy ineffective  -c/w remeron suggested by palliative team  -c/w other medications as prescribed  -patient advised to monitor and keep BP log  -H&H continues to drop due to chronic diseases  -patient compensated on exam, currently off GDMT due to symptomatic hypotension     Continue current medications and maintain follow up with specialists.      Patient verbalizes understanding and agrees with current treatment plan.       Transitional Care Note    Family and/or Caretaker present at visit?  Yes. (  and daughter)   Diagnostic tests reviewed/disposition: I have reviewed all completed as well as pending diagnostic tests at the time of discharge.  Disease/illness education: Yes  Home health/community services discussion/referrals: Patient does not have home health established from hospital visit.  They do not need home health.  If needed, we will set up home health for the patient.    Establishment or re-establishment of referral orders for community resources: No other necessary community resources.   Discussion with other health care providers: No discussion with other health care providers necessary.        Dr Yolie Michael MD  [facility]

## 2024-04-17 ENCOUNTER — HOSPITAL ENCOUNTER (OUTPATIENT)
Facility: HOSPITAL | Age: 64
Discharge: HOME | End: 2024-04-18
Attending: EMERGENCY MEDICINE | Admitting: EMERGENCY MEDICINE
Payer: MEDICARE

## 2024-04-17 ENCOUNTER — TELEPHONE (OUTPATIENT)
Dept: HEMATOLOGY/ONCOLOGY | Facility: CLINIC | Age: 64
End: 2024-04-17
Payer: MEDICARE

## 2024-04-17 DIAGNOSIS — I50.42 CHRONIC COMBINED SYSTOLIC AND DIASTOLIC CONGESTIVE HEART FAILURE: ICD-10-CM

## 2024-04-17 DIAGNOSIS — R06.02 SHORTNESS OF BREATH: ICD-10-CM

## 2024-04-17 DIAGNOSIS — I50.9 ACUTE ON CHRONIC CONGESTIVE HEART FAILURE, UNSPECIFIED HEART FAILURE TYPE: Primary | ICD-10-CM

## 2024-04-17 DIAGNOSIS — R60.9 SWELLING: ICD-10-CM

## 2024-04-17 LAB
ALBUMIN SERPL BCP-MCNC: 3 G/DL (ref 3.5–5.2)
ALP SERPL-CCNC: 99 U/L (ref 55–135)
ALT SERPL W/O P-5'-P-CCNC: 21 U/L (ref 10–44)
ANION GAP SERPL CALC-SCNC: 11 MMOL/L (ref 8–16)
AST SERPL-CCNC: 22 U/L (ref 10–40)
BASOPHILS # BLD AUTO: 0.04 K/UL (ref 0–0.2)
BASOPHILS NFR BLD: 0.7 % (ref 0–1.9)
BILIRUB SERPL-MCNC: 0.5 MG/DL (ref 0.1–1)
BNP SERPL-MCNC: 3876 PG/ML (ref 0–99)
BUN SERPL-MCNC: 13 MG/DL (ref 8–23)
CALCIUM SERPL-MCNC: 9.1 MG/DL (ref 8.7–10.5)
CHLORIDE SERPL-SCNC: 104 MMOL/L (ref 95–110)
CO2 SERPL-SCNC: 28 MMOL/L (ref 23–29)
CREAT SERPL-MCNC: 0.9 MG/DL (ref 0.5–1.4)
DIFFERENTIAL METHOD BLD: ABNORMAL
EOSINOPHIL # BLD AUTO: 0.1 K/UL (ref 0–0.5)
EOSINOPHIL NFR BLD: 1.8 % (ref 0–8)
ERYTHROCYTE [DISTWIDTH] IN BLOOD BY AUTOMATED COUNT: 15.9 % (ref 11.5–14.5)
EST. GFR  (NO RACE VARIABLE): >60 ML/MIN/1.73 M^2
GLUCOSE SERPL-MCNC: 90 MG/DL (ref 70–110)
HCT VFR BLD AUTO: 26 % (ref 37–48.5)
HGB BLD-MCNC: 8.5 G/DL (ref 12–16)
IMM GRANULOCYTES # BLD AUTO: 0.01 K/UL (ref 0–0.04)
IMM GRANULOCYTES NFR BLD AUTO: 0.2 % (ref 0–0.5)
INR PPP: 1.1 (ref 0.8–1.2)
LACTATE SERPL-SCNC: 1.8 MMOL/L (ref 0.5–2.2)
LYMPHOCYTES # BLD AUTO: 2.6 K/UL (ref 1–4.8)
LYMPHOCYTES NFR BLD: 44.2 % (ref 18–48)
MCH RBC QN AUTO: 29 PG (ref 27–31)
MCHC RBC AUTO-ENTMCNC: 32.7 G/DL (ref 32–36)
MCV RBC AUTO: 89 FL (ref 82–98)
MONOCYTES # BLD AUTO: 0.4 K/UL (ref 0.3–1)
MONOCYTES NFR BLD: 6.6 % (ref 4–15)
NEUTROPHILS # BLD AUTO: 2.8 K/UL (ref 1.8–7.7)
NEUTROPHILS NFR BLD: 46.5 % (ref 38–73)
NRBC BLD-RTO: 0 /100 WBC
PLATELET # BLD AUTO: 334 K/UL (ref 150–450)
PMV BLD AUTO: 11.1 FL (ref 9.2–12.9)
POTASSIUM SERPL-SCNC: 4 MMOL/L (ref 3.5–5.1)
PROT SERPL-MCNC: 6.5 G/DL (ref 6–8.4)
PROTHROMBIN TIME: 11.5 SEC (ref 9–12.5)
RBC # BLD AUTO: 2.93 M/UL (ref 4–5.4)
SODIUM SERPL-SCNC: 143 MMOL/L (ref 136–145)
TROPONIN I SERPL DL<=0.01 NG/ML-MCNC: 0.06 NG/ML (ref 0–0.03)
WBC # BLD AUTO: 5.95 K/UL (ref 3.9–12.7)

## 2024-04-17 PROCEDURE — 94640 AIRWAY INHALATION TREATMENT: CPT

## 2024-04-17 PROCEDURE — 85610 PROTHROMBIN TIME: CPT | Performed by: EMERGENCY MEDICINE

## 2024-04-17 PROCEDURE — 99285 EMERGENCY DEPT VISIT HI MDM: CPT | Mod: 25

## 2024-04-17 PROCEDURE — G0378 HOSPITAL OBSERVATION PER HR: HCPCS

## 2024-04-17 PROCEDURE — 80053 COMPREHEN METABOLIC PANEL: CPT | Performed by: EMERGENCY MEDICINE

## 2024-04-17 PROCEDURE — 83605 ASSAY OF LACTIC ACID: CPT | Performed by: EMERGENCY MEDICINE

## 2024-04-17 PROCEDURE — 96374 THER/PROPH/DIAG INJ IV PUSH: CPT

## 2024-04-17 PROCEDURE — 84484 ASSAY OF TROPONIN QUANT: CPT | Mod: 91 | Performed by: EMERGENCY MEDICINE

## 2024-04-17 PROCEDURE — 80048 BASIC METABOLIC PNL TOTAL CA: CPT | Mod: XB | Performed by: STUDENT IN AN ORGANIZED HEALTH CARE EDUCATION/TRAINING PROGRAM

## 2024-04-17 PROCEDURE — 85025 COMPLETE CBC W/AUTO DIFF WBC: CPT | Performed by: EMERGENCY MEDICINE

## 2024-04-17 PROCEDURE — 83036 HEMOGLOBIN GLYCOSYLATED A1C: CPT | Performed by: STUDENT IN AN ORGANIZED HEALTH CARE EDUCATION/TRAINING PROGRAM

## 2024-04-17 PROCEDURE — 83735 ASSAY OF MAGNESIUM: CPT | Performed by: STUDENT IN AN ORGANIZED HEALTH CARE EDUCATION/TRAINING PROGRAM

## 2024-04-17 PROCEDURE — 93005 ELECTROCARDIOGRAM TRACING: CPT

## 2024-04-17 PROCEDURE — 93010 ELECTROCARDIOGRAM REPORT: CPT | Mod: ,,, | Performed by: INTERNAL MEDICINE

## 2024-04-17 PROCEDURE — 63600175 PHARM REV CODE 636 W HCPCS: Performed by: STUDENT IN AN ORGANIZED HEALTH CARE EDUCATION/TRAINING PROGRAM

## 2024-04-17 PROCEDURE — 94761 N-INVAS EAR/PLS OXIMETRY MLT: CPT

## 2024-04-17 PROCEDURE — 93010 ELECTROCARDIOGRAM REPORT: CPT | Mod: 76,,, | Performed by: INTERNAL MEDICINE

## 2024-04-17 PROCEDURE — 25000242 PHARM REV CODE 250 ALT 637 W/ HCPCS: Performed by: STUDENT IN AN ORGANIZED HEALTH CARE EDUCATION/TRAINING PROGRAM

## 2024-04-17 PROCEDURE — 83880 ASSAY OF NATRIURETIC PEPTIDE: CPT | Performed by: EMERGENCY MEDICINE

## 2024-04-17 PROCEDURE — 84484 ASSAY OF TROPONIN QUANT: CPT | Performed by: STUDENT IN AN ORGANIZED HEALTH CARE EDUCATION/TRAINING PROGRAM

## 2024-04-17 RX ORDER — IPRATROPIUM BROMIDE AND ALBUTEROL SULFATE 2.5; .5 MG/3ML; MG/3ML
3 SOLUTION RESPIRATORY (INHALATION)
Status: COMPLETED | OUTPATIENT
Start: 2024-04-17 | End: 2024-04-17

## 2024-04-17 RX ORDER — ATORVASTATIN CALCIUM 40 MG/1
80 TABLET, FILM COATED ORAL DAILY
Status: DISCONTINUED | OUTPATIENT
Start: 2024-04-18 | End: 2024-04-18 | Stop reason: HOSPADM

## 2024-04-17 RX ORDER — FUROSEMIDE 10 MG/ML
80 INJECTION INTRAMUSCULAR; INTRAVENOUS
Status: COMPLETED | OUTPATIENT
Start: 2024-04-17 | End: 2024-04-17

## 2024-04-17 RX ORDER — IBUPROFEN 200 MG
16 TABLET ORAL
Status: DISCONTINUED | OUTPATIENT
Start: 2024-04-18 | End: 2024-04-18 | Stop reason: HOSPADM

## 2024-04-17 RX ORDER — GLUCAGON 1 MG
1 KIT INJECTION
Status: DISCONTINUED | OUTPATIENT
Start: 2024-04-18 | End: 2024-04-18 | Stop reason: HOSPADM

## 2024-04-17 RX ORDER — MIDODRINE HYDROCHLORIDE 5 MG/1
10 TABLET ORAL 3 TIMES DAILY
Status: DISCONTINUED | OUTPATIENT
Start: 2024-04-18 | End: 2024-04-18 | Stop reason: HOSPADM

## 2024-04-17 RX ORDER — CYPROHEPTADINE HYDROCHLORIDE 4 MG/1
4 TABLET ORAL 2 TIMES DAILY
Status: DISCONTINUED | OUTPATIENT
Start: 2024-04-18 | End: 2024-04-18 | Stop reason: HOSPADM

## 2024-04-17 RX ORDER — POTASSIUM CHLORIDE 20 MEQ/1
20 TABLET, EXTENDED RELEASE ORAL DAILY
Status: DISCONTINUED | OUTPATIENT
Start: 2024-04-18 | End: 2024-04-18 | Stop reason: HOSPADM

## 2024-04-17 RX ORDER — SODIUM CHLORIDE 0.9 % (FLUSH) 0.9 %
10 SYRINGE (ML) INJECTION
Status: DISCONTINUED | OUTPATIENT
Start: 2024-04-18 | End: 2024-04-18 | Stop reason: HOSPADM

## 2024-04-17 RX ORDER — OXYCODONE AND ACETAMINOPHEN 5; 325 MG/1; MG/1
1 TABLET ORAL EVERY 8 HOURS PRN
Status: DISCONTINUED | OUTPATIENT
Start: 2024-04-18 | End: 2024-04-18 | Stop reason: HOSPADM

## 2024-04-17 RX ORDER — ASPIRIN 81 MG/1
81 TABLET ORAL DAILY
Status: DISCONTINUED | OUTPATIENT
Start: 2024-04-18 | End: 2024-04-18 | Stop reason: HOSPADM

## 2024-04-17 RX ORDER — MIRTAZAPINE 7.5 MG/1
7.5 TABLET, FILM COATED ORAL NIGHTLY
Status: DISCONTINUED | OUTPATIENT
Start: 2024-04-18 | End: 2024-04-18 | Stop reason: HOSPADM

## 2024-04-17 RX ORDER — INSULIN ASPART 100 [IU]/ML
0-5 INJECTION, SOLUTION INTRAVENOUS; SUBCUTANEOUS
Status: DISCONTINUED | OUTPATIENT
Start: 2024-04-18 | End: 2024-04-18 | Stop reason: HOSPADM

## 2024-04-17 RX ORDER — IBUPROFEN 200 MG
24 TABLET ORAL
Status: DISCONTINUED | OUTPATIENT
Start: 2024-04-18 | End: 2024-04-18 | Stop reason: HOSPADM

## 2024-04-17 RX ADMIN — FUROSEMIDE 80 MG: 10 INJECTION, SOLUTION INTRAVENOUS at 08:04

## 2024-04-17 RX ADMIN — IPRATROPIUM BROMIDE AND ALBUTEROL SULFATE 3 ML: .5; 3 SOLUTION RESPIRATORY (INHALATION) at 08:04

## 2024-04-17 NOTE — TELEPHONE ENCOUNTER
Called and spoke to pt.  Appt scheduled with Dr Le on 5/9.  All questions and concerns addressed.

## 2024-04-18 ENCOUNTER — TELEPHONE (OUTPATIENT)
Dept: FAMILY MEDICINE | Facility: CLINIC | Age: 64
End: 2024-04-18
Payer: MEDICARE

## 2024-04-18 VITALS
WEIGHT: 132.25 LBS | TEMPERATURE: 98 F | RESPIRATION RATE: 17 BRPM | BODY MASS INDEX: 21.25 KG/M2 | SYSTOLIC BLOOD PRESSURE: 114 MMHG | OXYGEN SATURATION: 96 % | DIASTOLIC BLOOD PRESSURE: 66 MMHG | HEIGHT: 66 IN | HEART RATE: 95 BPM

## 2024-04-18 LAB
ANION GAP SERPL CALC-SCNC: 11 MMOL/L (ref 8–16)
ANION GAP SERPL CALC-SCNC: 9 MMOL/L (ref 8–16)
BUN SERPL-MCNC: 11 MG/DL (ref 8–23)
BUN SERPL-MCNC: 13 MG/DL (ref 8–23)
CALCIUM SERPL-MCNC: 9 MG/DL (ref 8.7–10.5)
CALCIUM SERPL-MCNC: 9.1 MG/DL (ref 8.7–10.5)
CHLORIDE SERPL-SCNC: 101 MMOL/L (ref 95–110)
CHLORIDE SERPL-SCNC: 104 MMOL/L (ref 95–110)
CO2 SERPL-SCNC: 28 MMOL/L (ref 23–29)
CO2 SERPL-SCNC: 32 MMOL/L (ref 23–29)
CREAT SERPL-MCNC: 0.8 MG/DL (ref 0.5–1.4)
CREAT SERPL-MCNC: 0.8 MG/DL (ref 0.5–1.4)
EST. GFR  (NO RACE VARIABLE): >60 ML/MIN/1.73 M^2
EST. GFR  (NO RACE VARIABLE): >60 ML/MIN/1.73 M^2
ESTIMATED AVG GLUCOSE: 117 MG/DL (ref 68–131)
GLUCOSE SERPL-MCNC: 110 MG/DL (ref 70–110)
GLUCOSE SERPL-MCNC: 93 MG/DL (ref 70–110)
HBA1C MFR BLD: 5.7 % (ref 4–5.6)
MAGNESIUM SERPL-MCNC: 1.9 MG/DL (ref 1.6–2.6)
OHS QRS DURATION: 108 MS
OHS QRS DURATION: 114 MS
OHS QRS DURATION: 114 MS
OHS QTC CALCULATION: 474 MS
OHS QTC CALCULATION: 477 MS
OHS QTC CALCULATION: 509 MS
POCT GLUCOSE: 183 MG/DL (ref 70–110)
POCT GLUCOSE: 85 MG/DL (ref 70–110)
POCT GLUCOSE: 88 MG/DL (ref 70–110)
POCT GLUCOSE: 98 MG/DL (ref 70–110)
POTASSIUM SERPL-SCNC: 3.5 MMOL/L (ref 3.5–5.1)
POTASSIUM SERPL-SCNC: 3.5 MMOL/L (ref 3.5–5.1)
SODIUM SERPL-SCNC: 142 MMOL/L (ref 136–145)
SODIUM SERPL-SCNC: 143 MMOL/L (ref 136–145)
TROPONIN I SERPL DL<=0.01 NG/ML-MCNC: 0.06 NG/ML (ref 0–0.03)

## 2024-04-18 PROCEDURE — 36415 COLL VENOUS BLD VENIPUNCTURE: CPT | Performed by: INTERNAL MEDICINE

## 2024-04-18 PROCEDURE — G0378 HOSPITAL OBSERVATION PER HR: HCPCS

## 2024-04-18 PROCEDURE — 25000003 PHARM REV CODE 250: Performed by: STUDENT IN AN ORGANIZED HEALTH CARE EDUCATION/TRAINING PROGRAM

## 2024-04-18 PROCEDURE — 96376 TX/PRO/DX INJ SAME DRUG ADON: CPT

## 2024-04-18 PROCEDURE — 80048 BASIC METABOLIC PNL TOTAL CA: CPT | Performed by: INTERNAL MEDICINE

## 2024-04-18 PROCEDURE — 63600175 PHARM REV CODE 636 W HCPCS: Performed by: STUDENT IN AN ORGANIZED HEALTH CARE EDUCATION/TRAINING PROGRAM

## 2024-04-18 RX ORDER — FUROSEMIDE 10 MG/ML
80 INJECTION INTRAMUSCULAR; INTRAVENOUS EVERY 12 HOURS
Status: DISCONTINUED | OUTPATIENT
Start: 2024-04-18 | End: 2024-04-18 | Stop reason: HOSPADM

## 2024-04-18 RX ORDER — TORSEMIDE 10 MG/1
20 TABLET ORAL DAILY
Status: ON HOLD
Start: 2024-04-18 | End: 2024-05-01

## 2024-04-18 RX ORDER — TORSEMIDE 10 MG/1
10 TABLET ORAL DAILY
Qty: 30 TABLET | Refills: 1 | Status: CANCELLED | OUTPATIENT
Start: 2024-04-18 | End: 2024-06-17

## 2024-04-18 RX ADMIN — POTASSIUM CHLORIDE 20 MEQ: 1500 TABLET, EXTENDED RELEASE ORAL at 09:04

## 2024-04-18 RX ADMIN — OXYCODONE HYDROCHLORIDE AND ACETAMINOPHEN 1 TABLET: 5; 325 TABLET ORAL at 02:04

## 2024-04-18 RX ADMIN — FUROSEMIDE 80 MG: 10 INJECTION, SOLUTION INTRAVENOUS at 11:04

## 2024-04-18 RX ADMIN — APIXABAN 5 MG: 5 TABLET, FILM COATED ORAL at 11:04

## 2024-04-18 RX ADMIN — MIDODRINE HYDROCHLORIDE 10 MG: 5 TABLET ORAL at 02:04

## 2024-04-18 RX ADMIN — ASPIRIN 81 MG: 81 TABLET, COATED ORAL at 11:04

## 2024-04-18 RX ADMIN — FUROSEMIDE 80 MG: 10 INJECTION, SOLUTION INTRAVENOUS at 01:04

## 2024-04-18 RX ADMIN — CYPROHEPTADINE HYDROCHLORIDE 4 MG: 4 TABLET ORAL at 11:04

## 2024-04-18 RX ADMIN — ATORVASTATIN CALCIUM 80 MG: 40 TABLET, FILM COATED ORAL at 11:04

## 2024-04-18 RX ADMIN — MIDODRINE HYDROCHLORIDE 10 MG: 5 TABLET ORAL at 11:04

## 2024-04-18 NOTE — NURSING
Home Oxygen Evaluation    Date Performed: 2024    1) Patient's Home O2 Sat on room air, while at rest: 98%        If O2 sats on room air at rest are 88% or below, patient qualifies. No additional testing needed. Document N/A in steps 2 and 3. If 89% or above, complete steps 2.      2) Patient's O2 Sat on room air while exercisin%        If O2 sats on room air while exercising remain 89% or above patient does not qualify, no further testing needed Document N/A in step 3. If O2 sats on room air while exercising are 88% or below, continue to step 3.      3) Patient's O2 Sat while exercising on O2: N/A at N/A LPM         (Must show improvement from #2 for patients to qualify)    If O2 sats improve on oxygen, patient qualifies for portable oxygen. If not, the patient does not qualify.

## 2024-04-18 NOTE — HOSPITAL COURSE
Diomedes Mohr is a 63 year old female with a PMHx of hypertension, T2DM, HLD, former tobacco use, heart failure with reduced ejection fraction (left ventricular ejection fraction 15%) s/p AICD, stroke, PE, pulmonary hypertension, failure to thrive, who is presented to [facility] [place], [place] with a 2 day history of SOB and BL HOLA. Patient's BNP notably elevated in 3,000's which is markedly elevated from priors. Patient was started on IV lasix with good urine output and remained on room air. LE swelling overall improved, however R>L. RLE dopplers ordered. RLE doppler with no evidence of DVT. Patient remained on room air. Patient did well with six minute walk test saturating 98% with walking. Patient discharged on Torsemide 20mg daily (did not need refills as has plenty at home). Referral to heart failure clinic ordered. Patient seen and examined on day of discharge. Pt deemed appropriate for discharge. Plan discussed with pt, who was agreeable and amenable; medications were discussed and reviewed, outpatient follow-up scheduled, ER precautions were given, all questions were answered to the pt's satisfaction, and patient was subsequently discharged.

## 2024-04-18 NOTE — ED TRIAGE NOTES
Diomedes Mohr, a 63 y.o. female presents to the ED w/ complaint of shortness of breath. Hx CHF, reports compliance with torsemide regimen. BLE swelling noted.     Triage note:  Chief Complaint   Patient presents with    Shortness of Breath     Increased SOB for 2 days, feels like she has fluid built up. Bilateral lower extremities with increased swelling     Review of patient's allergies indicates:   Allergen Reactions    Adhesive Blisters    Captopril Other (See Comments)     COUGH     Past Medical History:   Diagnosis Date    Acute on chronic combined systolic and diastolic heart failure 12/26/2019    ARBEN (acute kidney injury) 5/30/2023    Anticoagulant long-term use     Anxiety     Arthritis     Asthma     Depression     H/O: hysterectomy     Hyperlipemia     Hypertension     Pulmonary edema     Schizophrenia

## 2024-04-18 NOTE — ED NOTES
Telemetry Verification   Patient placed on Telemetry Box  Verified with War Room  Tech    Box # [id number]   Rate    Rhythm

## 2024-04-18 NOTE — TELEPHONE ENCOUNTER
Hospital f/u appointment scheduled on 04/25/2024 at 10 am. Will inform pt of appt when she is discharged.

## 2024-04-18 NOTE — SUBJECTIVE & OBJECTIVE
Past Medical History:   Diagnosis Date    Acute on chronic combined systolic and diastolic heart failure 12/26/2019    ARBEN (acute kidney injury) 5/30/2023    Anticoagulant long-term use     Anxiety     Arthritis     Asthma     Depression     H/O: hysterectomy     Hyperlipemia     Hypertension     Pulmonary edema     Schizophrenia        Past Surgical History:   Procedure Laterality Date    BLADDER SUSPENSION      CARPAL TUNNEL RELEASE Right     HEEL SPUR SURGERY Left     HYSTERECTOMY      LEFT HEART CATHETERIZATION Bilateral 12/27/2019    Procedure: Left heart cath;  Surgeon: Steve Chambers MD;  Location: [facility] CATH LAB;  Service: Cardiology;  Laterality: Bilateral;    RIGHT HEART CATHETERIZATION Right 7/26/2021    Procedure: INSERTION, CATHETER, RIGHT HEART;  Surgeon: Petr Naranjo MD;  Location: [facility] CATH LAB;  Service: Cardiology;  Laterality: Right;    RIGHT HEART CATHETERIZATION Right 5/12/2022    Procedure: INSERTION, CATHETER, RIGHT HEART;  Surgeon: Chandana Ivory Jr., MD;  Location: [facility] CATH LAB;  Service: Cardiology;  Laterality: Right;    TUBAL LIGATION         Review of patient's allergies indicates:   Allergen Reactions    Adhesive Blisters    Captopril Other (See Comments)     COUGH       Current Facility-Administered Medications   Medication Dose Route Frequency Provider Last Rate Last Admin    apixaban tablet 5 mg  5 mg Oral BID Garry Elliott MD        aspirin EC tablet 81 mg  81 mg Oral Daily Garry Elliott MD        atorvastatin tablet 80 mg  80 mg Oral Daily Garry Elliott MD        cyproheptadine 4 mg tablet 4 mg  4 mg Oral BID Garry Elliott MD        dextrose 10% bolus 125 mL 125 mL  12.5 g Intravenous PRN Garry Elliott MD        dextrose 10% bolus 250 mL 250 mL  25 g Intravenous PRN Garry Elliott MD        furosemide injection 80 mg  80 mg Intravenous Q12H Garry Elliott MD   80 mg at 04/18/24 0107    glucagon (human recombinant) injection 1 mg  1 mg Intramuscular PRN Lexi  MD Garry        glucose chewable tablet 16 g  16 g Oral PRN Garry Elliott MD        glucose chewable tablet 24 g  24 g Oral PRN Garry Elliott MD        insulin aspart U-100 pen 0-5 Units  0-5 Units Subcutaneous QID (AC + HS) PRN Garry Elliott MD        midodrine tablet 10 mg  10 mg Oral TID Garry Elliott MD        mirtazapine tablet 7.5 mg  7.5 mg Oral QHS Garry Elliott MD        oxyCODONE-acetaminophen 5-325 mg per tablet 1 tablet  1 tablet Oral Q8H PRN Garry Elliott MD        potassium chloride SA CR tablet 20 mEq  20 mEq Oral Daily Garry Elliott MD        sodium chloride 0.9% flush 10 mL  10 mL Intravenous PRN Garry Elliott MD         Family History       Problem Relation (Age of Onset)    No Known Problems Mother, Father    Ovarian cancer Sister (42)          Tobacco Use    Smoking status: Former     Current packs/day: 0.00     Types: Cigarettes     Quit date: 2016     Years since quittin.6    Smokeless tobacco: Never    Tobacco comments:     nonex 2 weeks   Substance and Sexual Activity    Alcohol use: No     Alcohol/week: 0.0 standard drinks of alcohol    Drug use: No    Sexual activity: Not on file     Review of Systems   Constitutional:  Negative for chills and fever.   HENT:  Negative for congestion and sore throat.    Eyes:  Negative for visual disturbance.   Respiratory:  Positive for cough and shortness of breath. Negative for chest tightness.    Cardiovascular:  Positive for leg swelling. Negative for chest pain and palpitations.   Gastrointestinal:  Negative for abdominal distention, abdominal pain, constipation, diarrhea, nausea and vomiting.   Musculoskeletal:  Negative for back pain.   Skin:  Negative for rash.   Neurological:  Negative for dizziness, weakness and headaches.   Hematological:  Does not bruise/bleed easily.     Objective:     Vital Signs (Most Recent):  Temp: 98 °F (36.7 °C) (24)  Pulse: 100 (24)  Resp: 16 (24)  BP: 109/62 (24  0007)  SpO2: 98 % (04/18/24 0007) Vital Signs (24h Range):  Temp:  [98 °F (36.7 °C)] 98 °F (36.7 °C)  Pulse:  [] 100  Resp:  [16-22] 16  SpO2:  [97 %-100 %] 98 %  BP: (106-115)/(62-77) 109/62        There is no height or weight on file to calculate BMI.     Physical Exam  Vitals and nursing note reviewed.   Constitutional:       General: She is not in acute distress.     Appearance: Normal appearance. She is not ill-appearing.   HENT:      Head: Normocephalic and atraumatic.      Mouth/Throat:      Mouth: Mucous membranes are moist.   Eyes:      General: No scleral icterus.  Neck:      Comments: JVD elevation to the angle of the mandible.  Cardiovascular:      Rate and Rhythm: Normal rate and regular rhythm.      Pulses: Normal pulses.      Heart sounds: Normal heart sounds.   Pulmonary:      Effort: Pulmonary effort is normal. No respiratory distress.      Breath sounds: Normal breath sounds.   Abdominal:      General: There is no distension.      Palpations: Abdomen is soft. There is no mass.   Musculoskeletal:      Cervical back: No rigidity.      Right lower leg: No edema.      Left lower leg: No edema.   Lymphadenopathy:      Cervical: No cervical adenopathy.   Skin:     General: Skin is warm and dry.      Capillary Refill: Capillary refill takes less than 2 seconds.      Coloration: Skin is not jaundiced.   Neurological:      General: No focal deficit present.      Mental Status: She is alert and oriented to person, place, and time.                Significant Labs: All pertinent labs within the past 24 hours have been reviewed.    Significant Imaging: I have reviewed all pertinent imaging results/findings within the past 24 hours.

## 2024-04-18 NOTE — PLAN OF CARE
04/18/24 1421   Final Note   Assessment Type Final Discharge Note   Anticipated Discharge Disposition Home   Hospital Resources/Appts/Education Provided Provided patient/caregiver with written discharge plan information   Post-Acute Status   Patient choice form signed by patient/caregiver List with quality metrics by geographic area provided   Discharge Delays None known at this time     Pt d/c home with family. No d/c needs reported by medical team at this time.      CHADWICK Guerra  [facility]  Ext. [phone]

## 2024-04-18 NOTE — ASSESSMENT & PLAN NOTE
This patient has long term use on an anticoagulant with Select Anticoagulant(s): Direct oral anticoagulant: Apixaban (Eliquis). Their long term anticoagulation will be Held or Continued: continued. They are on long term anticoagulation due to Reason for Anticoagulation: DVT/PE.

## 2024-04-18 NOTE — ASSESSMENT & PLAN NOTE
"Patient's FSGs are controlled on current medication regimen.  Last A1c reviewed-   Lab Results   Component Value Date    HGBA1C 5.4 04/05/2024     Most recent fingerstick glucose reviewed- No results for input(s): "POCTGLUCOSE" in the last 24 hours.  Current correctional scale  Low  Maintain anti-hyperglycemic dose as follows-   Antihyperglycemics (From admission, onward)      Start     Stop Route Frequency Ordered    04/18/24 0024  insulin aspart U-100 pen 0-5 Units         -- SubQ Before meals & nightly PRN 04/17/24 2324          Hold Oral hypoglycemics while patient is in the hospital.  "

## 2024-04-18 NOTE — H&P
[facility] [address] [place] [address] Medicine  History & Physical    Patient Name: Diomedes Mohr  MRN: [id number]  Patient Class: OP- Observation  Admission Date: 4/17/2024  Attending Physician: Jennifer Lopez MD   Primary Care Provider: Yolie Michael MD         Patient information was obtained from patient, past medical records, and ER records.     Subjective:     Principal Problem:Acute on chronic congestive heart failure    Chief Complaint:   Chief Complaint   Patient presents with    Shortness of Breath     Increased SOB for 2 days, feels like she has fluid built up. Bilateral lower extremities with increased swelling        HPI: Patient is a 63-year-old female past medical history of hypertension, T2 DM, HLD, former tobacco use, heart failure with reduced ejection fraction (left ventricular ejection fraction 15%), AICD, stroke, PE, pulmonary hypertension, failure to thrive, hypotension who is presenting with a 2 day history of worsening shortness of breath.  It has been associated with bilateral lower extremity swelling.  She has had multiple episodes in the past.  It is associated with weight gain.  Patient states that she ate lasagna several days prior and that it may have had more salt than she anticipated.  In the emergency room her vitals are stable, not hypoxic on room air.  Chest x-ray shows bilateral hilar congestion.  BNP markedly elevated from baseline.  Admitted for acute exacerbation of her heart failure.  Other disease processes seem stable.  Nonproductive cough.  No fevers or chills.        Past Medical History:   Diagnosis Date    Acute on chronic combined systolic and diastolic heart failure 12/26/2019    ARBEN (acute kidney injury) 5/30/2023    Anticoagulant long-term use     Anxiety     Arthritis     Asthma     Depression     H/O: hysterectomy     Hyperlipemia     Hypertension     Pulmonary edema     Schizophrenia        Past Surgical History:   Procedure Laterality Date    BLADDER  SUSPENSION      CARPAL TUNNEL RELEASE Right     HEEL SPUR SURGERY Left     HYSTERECTOMY      LEFT HEART CATHETERIZATION Bilateral 12/27/2019    Procedure: Left heart cath;  Surgeon: Steve Chambers MD;  Location: [facility] CATH LAB;  Service: Cardiology;  Laterality: Bilateral;    RIGHT HEART CATHETERIZATION Right 7/26/2021    Procedure: INSERTION, CATHETER, RIGHT HEART;  Surgeon: Petr Naranjo MD;  Location: [facility] CATH LAB;  Service: Cardiology;  Laterality: Right;    RIGHT HEART CATHETERIZATION Right 5/12/2022    Procedure: INSERTION, CATHETER, RIGHT HEART;  Surgeon: Chandana Ivory Jr., MD;  Location: [facility] CATH LAB;  Service: Cardiology;  Laterality: Right;    TUBAL LIGATION         Review of patient's allergies indicates:   Allergen Reactions    Adhesive Blisters    Captopril Other (See Comments)     COUGH       Current Facility-Administered Medications   Medication Dose Route Frequency Provider Last Rate Last Admin    apixaban tablet 5 mg  5 mg Oral BID Garry Elliott MD        aspirin EC tablet 81 mg  81 mg Oral Daily Garry Elliott MD        atorvastatin tablet 80 mg  80 mg Oral Daily Garry Elliott MD        cyproheptadine 4 mg tablet 4 mg  4 mg Oral BID Garry Elliott MD        dextrose 10% bolus 125 mL 125 mL  12.5 g Intravenous PRN Garry Elliott MD        dextrose 10% bolus 250 mL 250 mL  25 g Intravenous PRN Garry Elliott MD        furosemide injection 80 mg  80 mg Intravenous Q12H Garry Elliott MD   80 mg at 04/18/24 0107    glucagon (human recombinant) injection 1 mg  1 mg Intramuscular PRN Garry Elliott MD        glucose chewable tablet 16 g  16 g Oral PRN Garry Elliott MD        glucose chewable tablet 24 g  24 g Oral PRN Garry Elliott MD        insulin aspart U-100 pen 0-5 Units  0-5 Units Subcutaneous QID (AC + HS) PRN Garry Elliott MD        midodrine tablet 10 mg  10 mg Oral TID Garry Elliott MD        mirtazapine tablet 7.5 mg  7.5 mg Oral QHS Garry Elliott MD         oxyCODONE-acetaminophen 5-325 mg per tablet 1 tablet  1 tablet Oral Q8H PRN Garry Elliott MD        potassium chloride SA CR tablet 20 mEq  20 mEq Oral Daily Garry Elliott MD        sodium chloride 0.9% flush 10 mL  10 mL Intravenous PRN Garry Elliott MD         Family History       Problem Relation (Age of Onset)    No Known Problems Mother, Father    Ovarian cancer Sister (42)          Tobacco Use    Smoking status: Former     Current packs/day: 0.00     Types: Cigarettes     Quit date: 2016     Years since quittin.6    Smokeless tobacco: Never    Tobacco comments:     nonex 2 weeks   Substance and Sexual Activity    Alcohol use: No     Alcohol/week: 0.0 standard drinks of alcohol    Drug use: No    Sexual activity: Not on file     Review of Systems   Constitutional:  Negative for chills and fever.   HENT:  Negative for congestion and sore throat.    Eyes:  Negative for visual disturbance.   Respiratory:  Positive for cough and shortness of breath. Negative for chest tightness.    Cardiovascular:  Positive for leg swelling. Negative for chest pain and palpitations.   Gastrointestinal:  Negative for abdominal distention, abdominal pain, constipation, diarrhea, nausea and vomiting.   Musculoskeletal:  Negative for back pain.   Skin:  Negative for rash.   Neurological:  Negative for dizziness, weakness and headaches.   Hematological:  Does not bruise/bleed easily.     Objective:     Vital Signs (Most Recent):  Temp: 98 °F (36.7 °C) (24)  Pulse: 100 (24)  Resp: 16 (24)  BP: 109/62 (24)  SpO2: 98 % (24) Vital Signs (24h Range):  Temp:  [98 °F (36.7 °C)] 98 °F (36.7 °C)  Pulse:  [] 100  Resp:  [16-22] 16  SpO2:  [97 %-100 %] 98 %  BP: (106-115)/(62-77) 109/62        There is no height or weight on file to calculate BMI.     Physical Exam  Vitals and nursing note reviewed.   Constitutional:       General: She is not in acute distress.     Appearance:  Normal appearance. She is not ill-appearing.   HENT:      Head: Normocephalic and atraumatic.      Mouth/Throat:      Mouth: Mucous membranes are moist.   Eyes:      General: No scleral icterus.  Neck:      Comments: JVD elevation to the angle of the mandible.  Cardiovascular:      Rate and Rhythm: Normal rate and regular rhythm.      Pulses: Normal pulses.      Heart sounds: Normal heart sounds.   Pulmonary:      Effort: Pulmonary effort is normal. No respiratory distress.      Breath sounds: Normal breath sounds.   Abdominal:      General: There is no distension.      Palpations: Abdomen is soft. There is no mass.   Musculoskeletal:      Cervical back: No rigidity.      Right lower leg: No edema.      Left lower leg: No edema.   Lymphadenopathy:      Cervical: No cervical adenopathy.   Skin:     General: Skin is warm and dry.      Capillary Refill: Capillary refill takes less than 2 seconds.      Coloration: Skin is not jaundiced.   Neurological:      General: No focal deficit present.      Mental Status: She is alert and oriented to person, place, and time.                Significant Labs: All pertinent labs within the past 24 hours have been reviewed.    Significant Imaging: I have reviewed all pertinent imaging results/findings within the past 24 hours.  Assessment/Plan:     * Acute on chronic congestive heart failure  Patient is identified as having Combined Systolic and Diastolic heart failure that is Acute on chronic. CHF is currently uncontrolled due to Continued edema of extremities and JVD and Pulmonary edema/pleural effusion on CXR. Latest ECHO performed and demonstrates- Results for orders placed during the hospital encounter of 08/30/23    Echo    Interpretation Summary    Left Ventricle: The left ventricle is severely dilated. Normal wall thickness. Severe global hypokinesis present. There is severely reduced systolic function with a visually estimated ejection fraction of 15 - 20%. Biplane (2D)  method of discs ejection fraction is 17%. Grade I diastolic dysfunction.    Right Ventricle: Normal right ventricular cavity size. Wall thickness is normal. Right ventricle wall motion  is normal. Systolic function is normal. Pacemaker lead present in the ventricle.    Aortic Valve: The aortic valve is a trileaflet valve. There is mild aortic valve sclerosis. Mild annular calcification.    IVC/SVC: Intermediate venous pressure at 8 mmHg.  .  Not on any of the typical heart failure with reduced ejection fraction medications, and monitor clinical status closely. Monitor on telemetry. Patient is on CHF pathway.  Monitor strict Is&Os and daily weights.  Place on fluid restriction of 1.5 L. Cardiology has not been consulted. Continue to stress to patient importance of self efficacy and  on diet for CHF. Last BNP reviewed- and noted below   Recent Labs   Lab 04/17/24 2038   BNP 3,876*                  Plan:  - Diuresis with furosemide 80 IV b.i.d..  Switch back to torsemide 10 on discharge  - Avoid salt intake > 2 g/day  - Strict I's/O's  - Daily standing weights  - magnesium > 2, potassium > 4  - Fluid restriction to 1.5 - 2.0 L/day      PVD (peripheral vascular disease)  Continue aspirin and atorvastatin      Long term current use of anticoagulant  This patient has long term use on an anticoagulant with Select Anticoagulant(s): Direct oral anticoagulant: Apixaban (Eliquis). Their long term anticoagulation will be Held or Continued: continued. They are on long term anticoagulation due to Reason for Anticoagulation: DVT/PE.     Pulmonary hypertension due to left heart disease  Will diurese with furosemide 80 IV b.i.d.      History of pulmonary embolism  Continue apixaban      ICD (implantable cardioverter-defibrillator) in place  Due to heart failure with dilated cardiomyopathy      Chronic combined systolic and diastolic congestive heart failure  Patient is identified as having Combined Systolic and Diastolic  heart failure that is Acute on chronic. CHF is currently uncontrolled due to Continued edema of extremities and JVD and Pulmonary edema/pleural effusion on CXR. Latest ECHO performed and demonstrates- Results for orders placed during the hospital encounter of 08/30/23    Echo    Interpretation Summary    Left Ventricle: The left ventricle is severely dilated. Normal wall thickness. Severe global hypokinesis present. There is severely reduced systolic function with a visually estimated ejection fraction of 15 - 20%. Biplane (2D) method of discs ejection fraction is 17%. Grade I diastolic dysfunction.    Right Ventricle: Normal right ventricular cavity size. Wall thickness is normal. Right ventricle wall motion  is normal. Systolic function is normal. Pacemaker lead present in the ventricle.    Aortic Valve: The aortic valve is a trileaflet valve. There is mild aortic valve sclerosis. Mild annular calcification.    IVC/SVC: Intermediate venous pressure at 8 mmHg.  .  Not on any of the typical heart failure with reduced ejection fraction medications, and monitor clinical status closely. Monitor on telemetry. Patient is on CHF pathway.  Monitor strict Is&Os and daily weights.  Place on fluid restriction of 1.5 L. Cardiology has not been consulted. Continue to stress to patient importance of self efficacy and  on diet for CHF. Last BNP reviewed- and noted below   Recent Labs   Lab 04/17/24 2038   BNP 3,876*                  Plan:  - Diuresis with furosemide 80 IV b.i.d..  Switch back to torsemide 10 on discharge  - Avoid salt intake > 2 g/day  - Strict I's/O's  - Daily standing weights  - magnesium > 2, potassium > 4  - Fluid restriction to 1.5 - 2.0 L/day      Hyperlipemia  Continue atorvastatin      Hypertension  Chronic, controlled. Latest blood pressure and vitals reviewed-     Temp:  [98 °F (36.7 °C)]   Pulse:  []   Resp:  [16-22]   BP: (106-115)/(62-77)   SpO2:  [97 %-100 %] .   Home meds for  "hypertension were reviewed and noted below.   Hypertension Medications               torsemide (DEMADEX) 10 MG Tab Take 1 tablet (10 mg total) by mouth once daily.            While in the hospital, will manage blood pressure as follows; Adjust home antihypertensive regimen as follows- patient takes midodrine now for end-stage heart failure which was continued.    Will utilize p.r.n. blood pressure medication only if patient's blood pressure greater than 180/110 and she develops symptoms such as worsening chest pain or shortness of breath.    Type 2 diabetes mellitus with other specified complication, without long-term current use of insulin  Patient's FSGs are controlled on current medication regimen.  Last A1c reviewed-   Lab Results   Component Value Date    HGBA1C 5.4 04/05/2024     Most recent fingerstick glucose reviewed- No results for input(s): "POCTGLUCOSE" in the last 24 hours.  Current correctional scale  Low  Maintain anti-hyperglycemic dose as follows-   Antihyperglycemics (From admission, onward)      Start     Stop Route Frequency Ordered    04/18/24 0024  insulin aspart U-100 pen 0-5 Units         -- SubQ Before meals & nightly PRN 04/17/24 2324          Hold Oral hypoglycemics while patient is in the hospital.      VTE Risk Mitigation (From admission, onward)           Ordered     apixaban tablet 5 mg  2 times daily         04/17/24 2324     IP VTE HIGH RISK PATIENT  Once         04/17/24 2322     Place sequential compression device  Until discontinued         04/17/24 2322                       On 04/18/2024, patient should be placed in hospital observation services under my care.             Garry Elliott MD  Department of Hospital Medicine  [facility]          "

## 2024-04-18 NOTE — PLAN OF CARE
[facility] - Observation 11H  Initial Discharge Assessment       Primary Care Provider: Yolie Michael MD    Admission Diagnosis: Shortness of breath [R06.02]  Acute on chronic congestive heart failure, unspecified heart failure type [I50.9]    Admission Date: 2024  Expected Discharge Date:          Payor: HUMANA MANAGED MEDICARE / Plan: HU[employer]NA MEDICARE SELECT PARTNER / Product Type: Medicare Advantage /     Extended Emergency Contact Information  Primary Emergency Contact: Maged Mohr  Address: [address] [place] [address] [facility]  Home Phone: [phone]  Mobile Phone: [phone]  Relation: Spouse  Secondary Emergency Contact: Yael Mohr  Home Phone: [phone]  Relation: Daughter    Discharge Plan A: (P) Home with family  Discharge Plan B: (P) Home      [facility] - [place], [place] - [address]  [address]  [place] [address]  Phone: [phone] Fax: [phone]    [facility] - [facility] [address] [facility]  [address] [facility] [address]  Phone: [phone] Fax: [phone]    SW met with the patient at the bedside and discussed the discharge plan. Gave her the discharge booklet and placed contact numbers on the white board in the room. Patient alert and sitting up in bed.  Patient verified her name , , PCP, Insurance and Pharmacy . Stated she lives with spouse and has 0 steps to point of entry . Prior to admission patient was somewhat independent with all ADLS and wasn't receiving any HH services, but does have a aide with [facility] She is not on coumadin nor is she a dialysis patient . DME's include:RW and cane.  She takes  medication as prescribed and has no problems getting  medication. Family will provide transportation at [place].      CHADWICK Guerra  Department of Case Management   [facility]  [address] [place] [address]  Phone : [phone]      Discharge  Plan A and Plan B have been determined by review of patient's clinical status, future medical and therapeutic needs, and coverage/benefits for post-acute care in coordination with multidisciplinary team members.

## 2024-04-18 NOTE — ASSESSMENT & PLAN NOTE
Chronic, controlled. Latest blood pressure and vitals reviewed-     Temp:  [98 °F (36.7 °C)]   Pulse:  []   Resp:  [16-22]   BP: (106-115)/(62-77)   SpO2:  [97 %-100 %] .   Home meds for hypertension were reviewed and noted below.   Hypertension Medications               torsemide (DEMADEX) 10 MG Tab Take 1 tablet (10 mg total) by mouth once daily.            While in the hospital, will manage blood pressure as follows; Adjust home antihypertensive regimen as follows- patient takes midodrine now for end-stage heart failure which was continued.    Will utilize p.r.n. blood pressure medication only if patient's blood pressure greater than 180/110 and she develops symptoms such as worsening chest pain or shortness of breath.

## 2024-04-18 NOTE — PROVIDER PROGRESS NOTES - EMERGENCY DEPT.
Encounter Date: 4/17/2024    ED Physician Progress Notes          Physician Attestation Statement for Resident:  As the supervising MD   Physician Attestation Statement: I have personally seen and examined this patient.       I agree with the history unless otherwise noted.     As the supervising MD I agree with the PE unless otherwise noted.      I have reviewed and agree with the residents interpretation of the following unless otherwise noted:   I have personally reviewed and interpreted the patients laboratory studies: ***  I have personally reviewed and interpreted imaging studies: ***  I have personally reviewed and interpreted the patient's EKG: NSR @ 90bpm, , Qtc 474, 2mm DONIS in V1, diffuse ST depression and TWI       I have also reviewed the following:    external records:  Previous echocardiogram from 08/2023 with EF of 15-20%, grade I diastolic dysfunction     As the supervising MD I agree with the treatment, course, plan, and disposition unless otherwise noted.

## 2024-04-18 NOTE — PLAN OF CARE
Problem: Adult Inpatient Plan of Care  Goal: Plan of Care Review  Outcome: Ongoing, Progressing  Flowsheets (Taken 4/18/2024 0446)  Plan of Care Reviewed With: patient  Goal: Patient-Specific Goal (Individualized)  Outcome: Ongoing, Progressing  Goal: Absence of Hospital-Acquired Illness or Injury  Outcome: Ongoing, Progressing  Goal: Optimal Comfort and Wellbeing  Outcome: Ongoing, Progressing  Goal: Readiness for Transition of Care  Outcome: Ongoing, Progressing     Problem: Diabetes Comorbidity  Goal: Blood Glucose Level Within Targeted Range  Outcome: Ongoing, Progressing

## 2024-04-18 NOTE — NURSING
Pt is AAOx4.  No distress noted. Skin intact. Edema bilateral lower extremities, ankles, foot.   Call light in reach. Bed in low locked position.   Side rails x2. Belongings at bedside.   Pt free of fall and injuries Questions and concerns voiced and answered.    Plan of care continues.

## 2024-04-18 NOTE — TELEPHONE ENCOUNTER
----- Message from Evelyn Verdin sent at 4/18/2024  1:50 PM CDT -----  Type:  Sooner Apoointment Request    Caller is requesting a sooner appointment.  Caller declined first available appointment listed below.  Caller will not accept being placed on the waitlist and is requesting a message be sent to doctor.  Name of Caller:  Katie of [facility]  When is the first available appointment? Months out  Symptoms: hospital follow up on CHF  Would the patient rather a call back or a response via [facility]? call  Best Call Back Number:  [phone]  Additional Information:  caller declined for pt to be scheduled with a different provider saying she wasn't sure pt would be able to travel to a different location

## 2024-04-18 NOTE — DISCHARGE SUMMARY
[facility] Observation [address] [place] [address] Medicine  Discharge Summary      Patient Name: Diomedes Mohr  MRN: [id number]  JOSE: [id number]  Patient Class: OP- Observation  Admission Date: 4/17/2024  Hospital Length of Stay: 0 days  Discharge Date and Time:  04/18/2024 1:43 PM  Attending Physician: Jaison Serrato MD   Discharging Provider: Jaison Serrato MD  Primary Care Provider: Yolie Michael MD  Hospital Medicine Team: [facility] Jaison Serrato MD  Primary Care Team: [facility]    HPI:   Patient is a 63-year-old female past medical history of hypertension, T2 DM, HLD, former tobacco use, heart failure with reduced ejection fraction (left ventricular ejection fraction 15%), AICD, stroke, PE, pulmonary hypertension, failure to thrive, hypotension who is presenting with a 2 day history of worsening shortness of breath.  It has been associated with bilateral lower extremity swelling.  She has had multiple episodes in the past.  It is associated with weight gain.  Patient states that she ate lasagna several days prior and that it may have had more salt than she anticipated.  In the emergency room her vitals are stable, not hypoxic on room air.  Chest x-ray shows bilateral hilar congestion.  BNP markedly elevated from baseline.  Admitted for acute exacerbation of her heart failure.  Other disease processes seem stable.  Nonproductive cough.  No fevers or chills.        * No surgery found *      Hospital Course:   Diomedes Mohr is a 63 year old female with a PMHx of hypertension, T2DM, HLD, former tobacco use, heart failure with reduced ejection fraction (left ventricular ejection fraction 15%) s/p AICD, stroke, PE, pulmonary hypertension, failure to thrive, who is presented to [facility] with a 2 day history of SOB and BL HOLA. Patient's BNP notably elevated in 3,000's which is markedly elevated from priors. Patient was started on IV lasix with good urine output and remained on room air. LE swelling overall  improved, however R>L. RLE dopplers ordered. RLE doppler with no evidence of DVT. Patient remained on room air. Patient did well with six minute walk test saturating 98% with walking. Patient discharged on Torsemide 20mg daily (did not need refills as has plenty at home). Referral to heart failure clinic ordered. Patient seen and examined on day of discharge. Pt deemed appropriate for discharge. Plan discussed with pt, who was agreeable and amenable; medications were discussed and reviewed, outpatient follow-up scheduled, ER precautions were given, all questions were answered to the pt's satisfaction, and patient was subsequently discharged.     Goals of Care Treatment Preferences:  Code Status: Full Code    Health care agent: Maged Mohr  [facility] agent number: [phone]          What is most important right now is to focus on curative/life-prolongation (regardless of treatment burdens).  Accordingly, we have decided that the best plan to meet the patient's goals includes continuing with treatment.      Consults:   Consults (From admission, onward)          Status Ordering Provider     Inpatient consult to Social Work/Case Management  Once        Provider:  (Not yet assigned)    Acknowledged BIANCA ANTON     Inpatient consult to Registered Dietitian/Nutritionist  Once        Provider:  (Not yet assigned)    Acknowledged BIANCA ANTON            No new Assessment & Plan notes have been filed under this hospital service since the last note was generated.  Service: Hospital Medicine    Final Active Diagnoses:    Diagnosis Date Noted POA    PRINCIPAL PROBLEM:  Acute on chronic congestive heart failure [I50.9] 04/17/2024 Yes    PVD (peripheral vascular disease) [I73.9] 06/21/2023 Yes    Long term current use of anticoagulant [Z79.01] 04/29/2023 Not Applicable     Chronic    Pulmonary hypertension due to left heart disease [I27.22] 02/24/2023 Yes     Chronic    History of pulmonary embolism [Z86.711] 07/29/2022  Yes     Chronic    ICD (implantable cardioverter-defibrillator) in place [Z95.810] 03/31/2022 Yes     Chronic    Chronic combined systolic and diastolic congestive heart failure [I50.42] 01/12/2020 Yes     Chronic    Hypertension [I10] 12/25/2019 Yes     Chronic    Hyperlipemia [E78.5] 12/25/2019 Yes     Chronic    Type 2 diabetes mellitus with other specified complication, without long-term current use of insulin [E11.69]  Yes      Problems Resolved During this Admission:       Discharged Condition: good    Disposition: Home or Self Care    Follow Up:    Patient Instructions:      Ambulatory referral/consult to Heart Failure Transitional Care Clinic   Standing Status: Future   Referral Priority: Routine Referral Type: Consultation   Referral Reason: Specialty Services Required   Requested Specialty: Cardiology   Number of Visits Requested: 1       Significant Diagnostic Studies: N/A    Pending Diagnostic Studies:       None           Medications:  Reconciled Home Medications:      Medication List        CHANGE how you take these medications      torsemide 10 MG Tab  Commonly known as: DEMADEX  Take 2 tablets (20 mg total) by mouth once daily.  What changed: how much to take            CONTINUE taking these medications      acetaminophen 325 MG tablet  Commonly known as: TYLENOL  Take 2 tablets (650 mg total) by mouth every 8 (eight) hours as needed.     aspirin 81 MG EC tablet  Commonly known as: ECOTRIN  Take 1 tablet (81 mg total) by mouth once daily.     atorvastatin 80 MG tablet  Commonly known as: LIPITOR  Take 1 tablet (80 mg total) by mouth once daily.     cyproheptadine 4 mg tablet  Commonly known as: PERIACTIN  Take 1 tablet (4 mg total) by mouth 2 (two) times daily.     dulaglutide 1.5 mg/0.5 mL pen injector  Commonly known as: TRULICITY  Inject 1.5 mg into the skin Every Friday.     ELIQUIS 5 mg Tab  Generic drug: apixaban  TAKE 1 TABLET BY MOUTH TWICE DAILY.     LIDOcaine 5 %  Commonly known as:  LIDODERM  Place 2 patches onto the skin once daily. Remove & Discard patch within 12 hours or as directed by MD     LIDOcaine HCL 3 % Crea  Apply topically 2 (two) times daily.     midodrine 10 MG tablet  Commonly known as: PROAMATINE  Take 1 tablet (10 mg total) by mouth 3 (three) times daily.     mirtazapine 7.5 MG Tab  Commonly known as: REMERON  Take 1 tablet (7.5 mg total) by mouth every evening.     ondansetron 4 MG Tbdl  Commonly known as: ZOFRAN-ODT  Take 1 tablet (4 mg total) by mouth every 8 (eight) hours as needed (nausea).     oxyCODONE-acetaminophen 5-325 mg per tablet  Commonly known as: PERCOCET  Take 1 tablet by mouth every 8 (eight) hours as needed for Pain.     potassium chloride SA 20 MEQ tablet  Commonly known as: K-DUR,KLOR-CON, K-TAB  Take 1 tablet (20 mEq total) by mouth once daily.     senna 8.6 mg tablet  Commonly known as: SENNA  Take 2 tablets by mouth every evening.     trihexyphenidyL 2 MG tablet  Commonly known as: ARTANE  Take 0.5 tablets (1 mg total) by mouth 3 (three) times daily with meals.              Indwelling Lines/Drains at time of discharge:   Lines/Drains/Airways       Drain  Duration             Female External Urinary Catheter w/ Suction 04/17/24 2043 <1 day                    Time spent on the discharge of patient: 35 minutes         Jaison Serrato MD  Department of Hospital Medicine  [facility] - Observation 11H

## 2024-04-18 NOTE — ASSESSMENT & PLAN NOTE
Patient is identified as having Combined Systolic and Diastolic heart failure that is Acute on chronic. CHF is currently uncontrolled due to Continued edema of extremities and JVD and Pulmonary edema/pleural effusion on CXR. Latest ECHO performed and demonstrates- Results for orders placed during the hospital encounter of 08/30/23    Echo    Interpretation Summary    Left Ventricle: The left ventricle is severely dilated. Normal wall thickness. Severe global hypokinesis present. There is severely reduced systolic function with a visually estimated ejection fraction of 15 - 20%. Biplane (2D) method of discs ejection fraction is 17%. Grade I diastolic dysfunction.    Right Ventricle: Normal right ventricular cavity size. Wall thickness is normal. Right ventricle wall motion  is normal. Systolic function is normal. Pacemaker lead present in the ventricle.    Aortic Valve: The aortic valve is a trileaflet valve. There is mild aortic valve sclerosis. Mild annular calcification.    IVC/SVC: Intermediate venous pressure at 8 mmHg.  .  Not on any of the typical heart failure with reduced ejection fraction medications, and monitor clinical status closely. Monitor on telemetry. Patient is on CHF pathway.  Monitor strict Is&Os and daily weights.  Place on fluid restriction of 1.5 L. Cardiology has not been consulted. Continue to stress to patient importance of self efficacy and  on diet for CHF. Last BNP reviewed- and noted below   Recent Labs   Lab 04/17/24 2038   BNP 3,876*                  Plan:  - Diuresis with furosemide 80 IV b.i.d..  Switch back to torsemide 10 on discharge  - Avoid salt intake > 2 g/day  - Strict I's/O's  - Daily standing weights  - magnesium > 2, potassium > 4  - Fluid restriction to 1.5 - 2.0 L/day

## 2024-04-18 NOTE — HPI
Patient is a 63-year-old female past medical history of hypertension, T2 DM, HLD, former tobacco use, heart failure with reduced ejection fraction (left ventricular ejection fraction 15%), AICD, stroke, PE, pulmonary hypertension, failure to thrive, hypotension who is presenting with a 2 day history of worsening shortness of breath.  It has been associated with bilateral lower extremity swelling.  She has had multiple episodes in the past.  It is associated with weight gain.  Patient states that she ate lasagna several days prior and that it may have had more salt than she anticipated.  In the emergency room her vitals are stable, not hypoxic on room air.  Chest x-ray shows bilateral hilar congestion.  BNP markedly elevated from baseline.  Admitted for acute exacerbation of her heart failure.  Other disease processes seem stable.  Nonproductive cough.  No fevers or chills.

## 2024-04-18 NOTE — PROVIDER PROGRESS NOTES - EMERGENCY DEPT.
Encounter Date: 4/17/2024    ED Physician Progress Notes            ED Resident HAND-OFF NOTE:  10:15 PM 4/18/2024  Diomedes Mohr is a 63 y.o. female who presented to the ED on 4/17/2024 and has been managed by Dr. Ashley. I assumed care of patient from off-going ED physician team at 10:15 PM pending admit.        63 year old female CHF exacerbation with recent EF of 15 -20%.  Elevated BNP, cardiology already saw.    [x ] Admit to medicine for CHFe.  : labs consistent with CHFe, she was given IV lasix for diuresis and admitted to medicine in stable condition, discussed findings with patient.     On my evaluation, Diomedes Mohr appears well, hemodynamically stable and in NAD. Thus far, Diomedes Mohr has received:  Medications   sodium chloride 0.9% flush 10 mL (has no administration in time range)   apixaban tablet 5 mg (has no administration in time range)   aspirin EC tablet 81 mg (has no administration in time range)   atorvastatin tablet 80 mg (has no administration in time range)   cyproheptadine 4 mg tablet 4 mg (has no administration in time range)   midodrine tablet 10 mg (has no administration in time range)   mirtazapine tablet 7.5 mg (has no administration in time range)   oxyCODONE-acetaminophen 5-325 mg per tablet 1 tablet (has no administration in time range)   potassium chloride SA CR tablet 20 mEq (has no administration in time range)   glucose chewable tablet 16 g (has no administration in time range)   glucose chewable tablet 24 g (has no administration in time range)   glucagon (human recombinant) injection 1 mg (has no administration in time range)   insulin aspart U-100 pen 0-5 Units (has no administration in time range)   dextrose 10% bolus 125 mL 125 mL (has no administration in time range)   dextrose 10% bolus 250 mL 250 mL (has no administration in time range)   furosemide injection 80 mg (80 mg Intravenous Given 4/18/24 0107)   furosemide injection 80 mg (80 mg Intravenous Given 4/17/24  "2048)   albuterol-ipratropium 2.5 mg-0.5 mg/3 mL nebulizer solution 3 mL (3 mLs Nebulization Given 4/17/24 2052)       On my exam, I appreciate:  /61   Pulse 97   Temp 97.9 °F (36.6 °C)   Resp 18   Ht 5' 6" (1.676 m)   Wt 60 kg (132 lb 4.4 oz)   LMP  (LMP Unknown)   SpO2 100%   Breastfeeding No   BMI 21.35 kg/m²            Disposition: Admitted medicine.   I have discussed and counseled Diomedes Mohr regarding exam, results, diagnosis, treatment, and plan.  ______________________  Natalie Auguste DO  Emergency Medicine Resident  10:15 PM 4/17/2024    "

## 2024-04-18 NOTE — CONSULTS
Food & Nutrition  Education     Diet Education: Fluid and Salt restriction   Time Spent: 6 minutes   Learners: Patient and caregiver     Handouts provided: Low Sodium Nutrition Therapy, Fluid-Restricted Nutrition Therapy      Comments: Discussed importance of a low sodium diet. Reviewed high sodium foods that should be avoided. Food labels, salt free seasonings, and recommended sodium intake reviewed. Encouraged healthy, fresh foods that are low in sodium that are good for consumption. Fluid intake and conversions discussed. Foods considered fluids were reviewed and encouraged to monitor. General healthy diet encouraged. All questions/concerns were addressed.     Follow-Up: Yes     Please Re-consult as needed.   Thanks!    Angelica Avilez, MS, RD, LDN

## 2024-04-18 NOTE — PLAN OF CARE
Problem: Adult Inpatient Plan of Care  Goal: Plan of Care Review  Outcome: Met  Goal: Patient-Specific Goal (Individualized)  Outcome: Met     Problem: Diabetes Comorbidity  Goal: Blood Glucose Level Within Targeted Range  Outcome: Met     Problem: Adult Inpatient Plan of Care  Goal: Patient-Specific Goal (Individualized)  Outcome: Met   Pt discharged, AVS given,education completed. Pt verbalized understanding. All questions and concerns were met. Awaiting bedside delivery.

## 2024-04-18 NOTE — ED PROVIDER NOTES
Encounter Date: 4/17/2024       History     Chief Complaint   Patient presents with    Shortness of Breath     Increased SOB for 2 days, feels like she has fluid built up. Bilateral lower extremities with increased swelling     63-year-old female with past medical history of HTN, T2DM, HLD, tobacco use (quit in 2020), HFrEF 2/2 DCM (15-20% in 8/23, s/p AICD), she is not a candidate for advanced heart failure therapy due to history of stroke, PE in 2021/ DVTs 2023, pHTN, osteoarthritis of bilateral hips (L>R) with chronic pain, and CVA (2020, R MCA, no deficits then L MCA stroke- s/p thrombectomy 4/2023) with right sided hemiparesis, chronic anorexia and no improvement even when on Periactin presenting to ED with chief complaint of shortness of breath over the past 2 days.  Patient states she has had increased lower extremity swelling as well.  She denies fevers, chills, dysuria, decreased urine output, cough, congestion, chest pain.  She reports compliance with her home torsemide and Eliquis.      Review of patient's allergies indicates:   Allergen Reactions    Adhesive Blisters    Captopril Other (See Comments)     COUGH     Past Medical History:   Diagnosis Date    Acute on chronic combined systolic and diastolic heart failure 12/26/2019    ARBEN (acute kidney injury) 5/30/2023    Anticoagulant long-term use     Anxiety     Arthritis     Asthma     Depression     H/O: hysterectomy     Hyperlipemia     Hypertension     Pulmonary edema     Schizophrenia      Past Surgical History:   Procedure Laterality Date    BLADDER SUSPENSION      CARPAL TUNNEL RELEASE Right     HEEL SPUR SURGERY Left     HYSTERECTOMY      LEFT HEART CATHETERIZATION Bilateral 12/27/2019    Procedure: Left heart cath;  Surgeon: Steve Chambers MD;  Location: [facility] CATH LAB;  Service: Cardiology;  Laterality: Bilateral;    RIGHT HEART CATHETERIZATION Right 7/26/2021    Procedure: INSERTION, CATHETER, RIGHT HEART;  Surgeon: Petr Veloz  MD Jose A;  Location: [facility] CATH LAB;  Service: Cardiology;  Laterality: Right;    RIGHT HEART CATHETERIZATION Right 2022    Procedure: INSERTION, CATHETER, RIGHT HEART;  Surgeon: Chandana Ivory Jr., MD;  Location: [facility] CATH LAB;  Service: Cardiology;  Laterality: Right;    TUBAL LIGATION       Family History   Problem Relation Name Age of Onset    No Known Problems Mother      No Known Problems Father      Ovarian cancer Sister  42     Social History     Tobacco Use    Smoking status: Former     Current packs/day: 0.00     Types: Cigarettes     Quit date: 2016     Years since quittin.6    Smokeless tobacco: Never    Tobacco comments:     nonex 2 weeks   Substance Use Topics    Alcohol use: No     Alcohol/week: 0.0 standard drinks of alcohol    Drug use: No         Physical Exam     Initial Vitals [24]   BP Pulse Resp Temp SpO2   115/68 97 18 98 °F (36.7 °C) 100 %      MAP       --         Physical Exam    Nursing note and vitals reviewed.  Constitutional: She is not diaphoretic. No distress.   HENT:   Head: Normocephalic and atraumatic.   Mouth/Throat: Oropharynx is clear and moist.   Eyes: Conjunctivae and EOM are normal.   Neck: Neck supple. JVD present.   Normal range of motion.  Cardiovascular:  Normal rate, regular rhythm and normal heart sounds.           Pulmonary/Chest: Breath sounds normal. She has no wheezes. She has no rhonchi. She has no rales.   Abdominal: Abdomen is soft. She exhibits no distension. There is no abdominal tenderness.   Musculoskeletal:         General: Edema (BLE edema) present. Normal range of motion.      Cervical back: Normal range of motion and neck supple.     Neurological: She is alert and oriented to person, place, and time. She has normal strength. No sensory deficit.   Skin: Skin is warm and dry. Capillary refill takes less than 2 seconds.         ED Course   Procedures  Labs Reviewed   CBC W/ AUTO DIFFERENTIAL - Abnormal; Notable for the  following components:       Result Value    RBC 2.93 (*)     Hemoglobin 8.5 (*)     Hematocrit 26.0 (*)     RDW 15.9 (*)     All other components within normal limits   COMPREHENSIVE METABOLIC PANEL - Abnormal; Notable for the following components:    Albumin 3.0 (*)     All other components within normal limits   TROPONIN I - Abnormal; Notable for the following components:    Troponin I 0.062 (*)     All other components within normal limits   B-TYPE NATRIURETIC PEPTIDE - Abnormal; Notable for the following components:    BNP 3,876 (*)     All other components within normal limits   PROTIME-INR   LACTIC ACID, PLASMA          Imaging Results              X-Ray Chest AP Portable (Final result)  Result time 04/17/24 21:53:36      Final result by Celestine Cantu DO (04/17/24 21:53:36)                   Impression:      Findings compatible with pulmonary edema/CHF.      Electronically signed by: Celestine Cantu  Date:    04/17/2024  Time:    21:53               Narrative:    EXAMINATION:  XR CHEST AP PORTABLE    CLINICAL HISTORY:  CHF;    TECHNIQUE:  Single frontal view of the chest was performed.    COMPARISON:  04/05/2024.    FINDINGS:  There is a cardiac pacer/AICD, unchanged in position from prior.  There are overlying leads.  The lungs are well expanded.  There are bilateral interstitial opacities compatible with pulmonary edema/CHF.  The pleural spaces are clear.  The cardiac silhouette is enlarged.  Osseous structures demonstrate degenerative changes.                                       Medications   furosemide injection 80 mg (80 mg Intravenous Given 4/17/24 2048)   albuterol-ipratropium 2.5 mg-0.5 mg/3 mL nebulizer solution 3 mL (3 mLs Nebulization Given 4/17/24 2052)     Medical Decision Making  Differential diagnosis includes but is not limited to CHF exacerbation, pneumonia, ACS, PE, URI    Patient is afebrile and hemodynamically stable.  She is satting 100% on room air.  EKG demonstrates nonspecific ST  segment elevations in AVR, V1 and V2. Cardiology was contacted and reviewed EKG with low suspicion for STEMI. She has a visible JVD and symmetric bilateral lower extremity edema, suggestive of CHF exacerbation.  Duonebs and 80 mg IV Lasix given. Labs notable for BNP 3876, trop 0.062.  Chest x-ray shows bilateral interstitial opacities compatible with pulmonary edema/CHF. Patient will be admitted to Hospital Medicine for further management of CHF exacerbation.    Amount and/or Complexity of Data Reviewed  Labs: ordered.  Radiology: ordered.    Risk  Prescription drug management.                                      Clinical Impression:  Final diagnoses:  [R06.02] Shortness of breath  [I50.9] Acute on chronic congestive heart failure, unspecified heart failure type (Primary)                 Aleisha Durham MD  Resident  04/17/24 [id number]

## 2024-04-19 LAB — METHYLMALONATE SERPL-SCNC: 0.16 NMOL/ML

## 2024-04-23 ENCOUNTER — DOCUMENTATION ONLY (OUTPATIENT)
Dept: CARDIOLOGY | Facility: CLINIC | Age: 64
End: 2024-04-23
Payer: MEDICARE

## 2024-04-23 NOTE — PROGRESS NOTES
Heart Failure Transitional Care Clinic (HFTCC) Team notified of pt referral via Direct notification via epic in basket message, secure chat message or other .    Patient screened today 4/23/2024 by provider and RN for enrollment to program.      Pt was deemed not a candidate for enrollment at this time related to patient is followed by [facility]-Advanced Heart Failure Section.Seen in [facility]     Pt will require additional follow up planning per primary team.     If pt status, diagnosis, or treatment plan changes , please place AMB referral to [facility] ([id number]) for HFTCC enrollment re-evalution.

## 2024-04-24 ENCOUNTER — OFFICE VISIT (OUTPATIENT)
Dept: TRANSPLANT | Facility: CLINIC | Age: 64
End: 2024-04-24
Payer: MEDICARE

## 2024-04-24 ENCOUNTER — LAB VISIT (OUTPATIENT)
Dept: LAB | Facility: HOSPITAL | Age: 64
End: 2024-04-24
Attending: INTERNAL MEDICINE
Payer: MEDICARE

## 2024-04-24 VITALS
HEIGHT: 66 IN | DIASTOLIC BLOOD PRESSURE: 63 MMHG | WEIGHT: 135.56 LBS | SYSTOLIC BLOOD PRESSURE: 111 MMHG | BODY MASS INDEX: 21.79 KG/M2 | HEART RATE: 113 BPM

## 2024-04-24 DIAGNOSIS — I10 HYPERTENSION, UNSPECIFIED TYPE: Chronic | ICD-10-CM

## 2024-04-24 DIAGNOSIS — I69.30 SEQUELA OF CARDIOEMBOLIC STROKE: Chronic | ICD-10-CM

## 2024-04-24 DIAGNOSIS — I50.42 CHRONIC COMBINED SYSTOLIC AND DIASTOLIC CONGESTIVE HEART FAILURE: Primary | Chronic | ICD-10-CM

## 2024-04-24 DIAGNOSIS — I42.0 DILATED CARDIOMYOPATHY: Chronic | ICD-10-CM

## 2024-04-24 DIAGNOSIS — I50.9 CONGESTIVE HEART FAILURE, UNSPECIFIED HF CHRONICITY, UNSPECIFIED HEART FAILURE TYPE: ICD-10-CM

## 2024-04-24 DIAGNOSIS — I27.22 PULMONARY HYPERTENSION DUE TO LEFT HEART DISEASE: Chronic | ICD-10-CM

## 2024-04-24 LAB
ALBUMIN SERPL BCP-MCNC: 3.4 G/DL (ref 3.5–5.2)
ALP SERPL-CCNC: 107 U/L (ref 55–135)
ALT SERPL W/O P-5'-P-CCNC: 29 U/L (ref 10–44)
ANION GAP SERPL CALC-SCNC: 10 MMOL/L (ref 8–16)
AST SERPL-CCNC: 35 U/L (ref 10–40)
BILIRUB SERPL-MCNC: 0.5 MG/DL (ref 0.1–1)
BNP SERPL-MCNC: 3090 PG/ML (ref 0–99)
BUN SERPL-MCNC: 11 MG/DL (ref 8–23)
CALCIUM SERPL-MCNC: 9.6 MG/DL (ref 8.7–10.5)
CHLORIDE SERPL-SCNC: 102 MMOL/L (ref 95–110)
CO2 SERPL-SCNC: 30 MMOL/L (ref 23–29)
CREAT SERPL-MCNC: 1 MG/DL (ref 0.5–1.4)
EST. GFR  (NO RACE VARIABLE): >60 ML/MIN/1.73 M^2
GLUCOSE SERPL-MCNC: 198 MG/DL (ref 70–110)
MAGNESIUM SERPL-MCNC: 2 MG/DL (ref 1.6–2.6)
POTASSIUM SERPL-SCNC: 3.3 MMOL/L (ref 3.5–5.1)
PROT SERPL-MCNC: 7 G/DL (ref 6–8.4)
SODIUM SERPL-SCNC: 142 MMOL/L (ref 136–145)

## 2024-04-24 PROCEDURE — 3074F SYST BP LT 130 MM HG: CPT | Mod: HCNC,CPTII,S$GLB, | Performed by: INTERNAL MEDICINE

## 2024-04-24 PROCEDURE — 3008F BODY MASS INDEX DOCD: CPT | Mod: HCNC,CPTII,S$GLB, | Performed by: INTERNAL MEDICINE

## 2024-04-24 PROCEDURE — 83735 ASSAY OF MAGNESIUM: CPT | Performed by: INTERNAL MEDICINE

## 2024-04-24 PROCEDURE — 99214 OFFICE O/P EST MOD 30 MIN: CPT | Mod: HCNC,S$GLB,, | Performed by: INTERNAL MEDICINE

## 2024-04-24 PROCEDURE — 4010F ACE/ARB THERAPY RXD/TAKEN: CPT | Mod: HCNC,CPTII,S$GLB, | Performed by: INTERNAL MEDICINE

## 2024-04-24 PROCEDURE — 80053 COMPREHEN METABOLIC PANEL: CPT | Performed by: INTERNAL MEDICINE

## 2024-04-24 PROCEDURE — 3078F DIAST BP <80 MM HG: CPT | Mod: HCNC,CPTII,S$GLB, | Performed by: INTERNAL MEDICINE

## 2024-04-24 PROCEDURE — 99999 PR PBB SHADOW E&M-EST. PATIENT-LVL IV: CPT | Mod: PBBFAC,HCNC,, | Performed by: INTERNAL MEDICINE

## 2024-04-24 PROCEDURE — 83880 ASSAY OF NATRIURETIC PEPTIDE: CPT | Performed by: INTERNAL MEDICINE

## 2024-04-24 PROCEDURE — 36415 COLL VENOUS BLD VENIPUNCTURE: CPT | Performed by: INTERNAL MEDICINE

## 2024-04-24 PROCEDURE — 1111F DSCHRG MED/CURRENT MED MERGE: CPT | Mod: HCNC,CPTII,S$GLB, | Performed by: INTERNAL MEDICINE

## 2024-04-24 PROCEDURE — 3044F HG A1C LEVEL LT 7.0%: CPT | Mod: HCNC,CPTII,S$GLB, | Performed by: INTERNAL MEDICINE

## 2024-04-24 RX ORDER — SPIRONOLACTONE 25 MG/1
25 TABLET ORAL DAILY
Qty: 30 TABLET | Refills: 11 | Status: SHIPPED | OUTPATIENT
Start: 2024-04-24 | End: 2025-04-24

## 2024-04-24 NOTE — LETTER
June 13, 2024        Michelle Young  [address] [address]  Phone: [phone]  Fax: [phone]             [id number]  [address]  [facility] [address]  Phone: [phone]   Patient: Diomedes Mohr   MR Number: [id number]   YOB: 1960   Date of Visit: 4/24/2024       Dear Dr. Michelle Young    Thank you for referring Diomedes Mohr to me for evaluation. Attached you will find relevant portions of my assessment and plan of care.    If you have questions, please do not hesitate to call me. I look forward to following Diomedes Mohr along with you.    Sincerely,    Justina Smith MD    Enclosure    If you would like to receive this communication electronically, please contact [email or web address] or [phone] to request [employer] Link access.    [employer] Link is a tool which provides read-only access to select patient information with whom you have a relationship. Its easy to use and provides real time access to review your patients record including encounter summaries, notes, results, and demographic information.    If you feel you have received this communication in error or would no longer like to receive these types of communications, please e-mail [email or web address]

## 2024-04-24 NOTE — PROGRESS NOTES
ADVANCED HEART FAILURE AND TRANSPLANTATION CLINIC VISIT    CHIEF COMPLAINT:  Follow-up heart failure    HISTORY OF PRESENT ILLNESS:  Diomedes Mohr is a 63 y.o.  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 she had a PET Stress 4/2022 which was pertinent for small (<5%) mild to moderate intensity lateral apical perfusion abnormality of 3% LV myocardium. EF 10% and rest, 13% during stress. ED workup pertinent for: HDS, HR 80's, /77, 97% RA. CBC and BMP unremarkable. EKG with no acute abnormalities. Troponin 0.018, repeat 0.011. BNP elevated 1,038. CXR with mild interstitial and patchy alveolar infiltrate. CTA negative for PE. She was discharged 10/16/2022    She has had several admissions in 2020 for decompensated HF and now most recently this. In [facility] clinic 4/25/2022 PET stress was completed as well as RHC noted below with consideration for pulmonary follow-up after history of PE (evaluated by hematology) and history of COPD. This month she has already presented twice to hospital. Notes SOB constantly even while seated and is more difficult wearing the mask. Has history of tobacco use prior to stroke and was diagnosed with COPD and is not on any maintenance inhaler only PRN albuterol. States checks weights daily and notes 1 lb gain since discharge. Notes sleeping on side or using 3 pillows constantly. Does not recall having sleep study previously. States appetite is good and no constipation.     9/21/2023: Since last visit in January, Mrs. Mohr had a CPX done in March, but did not show for her RHC. She has had frequent  hospitalizations.  4/27-5/12 with c/o knee pain. MCA stroke NIHSS 24 on 4/29 after originally admitted for R. Popliteal A occlusion on heparin gtt. She was transferred  after developing aphasia and R. Hemiplegia. She was not a candidate for TNK bc she was on anticoagulation. Transferred to [facility] s/p MT with TICI 2c reperfusion. Repeat CTH after IR with no acute infarct. MRI completed showing acute L. MCA infarct. Pacemaker reinitiated post MRI to normal settings. Started Pradaxa per Heme/Onc. Neuro exam steadily improved after thrombectomy and therapy (5/12 NIHSS 6). CTH overnight stable. Heparin gtt transitioned to Pradaxa today for AC of reduced EF and VTE history. Discharged to SNF.    5/31 :  Patient had no vomiting since on the floor and nausea improving. But oral intake still poor inspite of starting Periactin yesterday. Says food does not taste good and so she swallows the juice and spits out solid food. No problems swallowing. Willing to try liquids. Glucerna therefore added. Does not want PEG tube placement at this time. Severe hypokalemia and hypocalcemia on labs today, which are being replaced. C/o right leg pain, which is worse at night and has been present since recent CVA- ?sec to neuropathy. Started on small dose Neurontin.      6/1 : Oral intake improved significantly since last evening. Says can taste some foods now. Tolerating Glucerna well too. No more nausea or vomiting. Labs today showed normal electrolytes. Therefore being d.amanda home in a stable condition. Will cont Periactin for appetite stimulation and Reglan for diabetic gastroparesis. Out patient PT/OT/ST arranged as per therapy recommendations. As oral intake still not optimal, will d/c Glimepiride and cont Trulicity and Jardiance. Metformin dose decreased to 500 mg BID. Blood pressure improved, but still low normal and has no signs of fluid overload and so Aldactone and Torsemide d/amanda. Toprol dose decreased to 50 mg daily. Pradaxa d/amanda  and Eliquis started as she had DVT while on Pradaxa.     8/30-9/5 - Patient admitted to the MICU for septic shock 2/2 UTI with underlying HFrEF (EF 10-15%). She received 1750 cc of IVF and never required vasopressors to maintain BP.  Completed treatment of UTI.     9/18-9/20 - Patient was started on I.V Lasix, diuresed well and leg edema improved a lot. SOB improved too. Diuretics changed to Torsemide on 9/19, but increased dose from home dose. Cont to diurese well with this and lost about 7 pounds weight since admit. H.R not well controlled. Toprol dose therefore increased, with improvement in same. Blood pressure wnl since on the floor. Troponin increased initially, but wnl today . No chest pain since admit. Troponin elevation mostly sec to demand ischemia sec to acute on chronic combined HF. She is now being d/amanda home in a stable condition with increased dose Torsemide and Toprol.    We did a virtual visit with Mrs. Reyez in January, at which time we would did recommend palliative care consultation.  Today she is here with her daughters and her  on the phone.  She is unfortunately continuing to not do very well.  Reduced appetite.  Volume is often on an issue.  We discussed at length at this may be the right time to transition to less intervention.  They are going to discuss it as a family.  I did talk to her  on the phone as well.       Cardiac Data:    ECHO: 8/31/2023  Left Ventricle: The left ventricle is severely dilated. Normal wall thickness. Severe global hypokinesis present. There is severely reduced systolic function with a visually estimated ejection fraction of 15 - 20%. Biplane (2D) method of discs ejection fraction is 17%. Grade I diastolic dysfunction.    Right Ventricle: Normal right ventricular cavity size. Wall thickness is normal. Right ventricle wall motion  is normal. Systolic function is normal. Pacemaker lead present in the ventricle.    Aortic Valve: The aortic valve is a  trileaflet valve. There is mild aortic valve sclerosis. Mild annular calcification.    IVC/SVC: Intermediate venous pressure at 8 mmHg.    ECHO: 2023  The left ventricle is severely enlarged with eccentric hypertrophy and severely decreased systolic function.  The estimated ejection fraction is 10-15%.  There is left ventricular global hypokinesis.  Grade II left ventricular diastolic dysfunction.  Normal right ventricular size with normal right ventricular systolic function.  Mild tricuspid regurgitation.  The estimated PA systolic pressure is 35 mmHg.  Normal central venous pressure (3 mmHg).  Severe left atrial enlargement.    Transthoracic Echo: Results for orders placed during the hospital encounter of 10/15/22  · The left ventricle is severely enlarged LVEDD 64 mm with eccentric hypertrophy and severely decreased systolic function. The estimated ejection fraction is 15%.  · Normal right ventricular size with normal right ventricular systolic function.  · Grade II left ventricular diastolic dysfunction.  · Mild left atrial enlargement.  · The estimated PA systolic pressure is 55 mmHg.  · Normal central venous pressure (3 mmHg).    EC2023  Vent. Rate : 113 BPM     Atrial Rate : 113 BPM      P-R Int : 144 ms          QRS Dur : 104 ms       QT Int : 354 ms       P-R-T Axes : 040 045 187 degrees      QTc Int : 485 ms     Sinus tachycardia   Minimal voltage criteria for LVH, may be normal variant   T wave abnormality, consider inferolateral ischemia   Abnormal ECG   When compared with ECG of 02-SEP-2023 14:15,   Premature ventricular complexes are no longer Present   ST no longer depressed in Inferior leads   ST no longer depressed in Anterior leads   ST no longer elevated in Lateral leads   Inverted T waves have replaced nonspecific T wave abnormality in Inferior   leads   Confirmed by Dagoberto Acharya MD ([id number]) on 2023 6:26:24 PM     ECG 10/16/2022: SR 64 bpm, LVH with ST T changes hypertrophy  " ms    Left Heart Catheterization:   Right Heart Catheterization: Results for orders placed during the hospital encounter of 05/12/22  Right atrial pressure is mildly elevated.  Pulmonary capillary wedge pressure is moderately elevated.  Pulmonary artery pressure is mildly elevated.  Jv cardiac output and index is low normal.  Pulmonary vascular resistance is normal.  Systemic vascular resistance is 1657.  BP above target of < 130/80 during procedure.    RA: 14/ 10/ 12 RV: 45/ 11 PA: 44/ 25/ 31 PWP: 29/ 29/ 25 .   Cardiac output was 4.3  by Jv. Cardiac index is 2.2 L/min/m2.   O2 Sat: PA 61%.   Pulmonary vascular resistance: 112. Systemic vascular resistance: 1657.     Devices: ICD    REVIEW OF SYSTEMS  Review of Systems   Constitutional: Negative.    HENT: Negative.     Respiratory:  Positive for shortness of breath.    Cardiovascular: Negative.    Gastrointestinal: Negative.    Endocrine: Negative.    Musculoskeletal: Negative.         Tailbone pain since stroke   Neurological:  Positive for weakness.        See HPI   Psychiatric/Behavioral: Negative.         PHYSICAL EXAM:    /63 (BP Location: Left arm, Patient Position: Sitting, BP Method: Medium (Automatic))   Pulse (!) 113   Ht 5' 6" (1.676 m)   Wt 61.5 kg (135 lb 9.3 oz)   LMP  (LMP Unknown)   BMI 21.88 kg/m²     GENERAL: Alert, NAD   HEENT: Anicteric sclerae  NECK: JVP not visible above level of clavicle while sitting upright, estimated at 8 cmH20  CARDIOVASCULAR: Regular rate and rhythm. Normal S1, S2 no murmurs, rubs or gallops.  PULMONARY: Lungs clear to auscultation bilaterally  ABDOMEN: Soft, nontender, nondistended. No guarding, no rebound, no hepatomegaly  EXTREMITIES: Warm. No clubbing, cyanosis or edema. No pre-sacral edema  VASCULAR: 2+ bilateral radial pulses  NEUROLOGIC: No focal deficits    LABS:    Lab Results   Component Value Date     (L) 05/18/2024    K 2.8 (L) 05/18/2024    CL 90 (L) 05/18/2024    CO2 31 (H) " 05/18/2024    BUN 13 05/18/2024    CREATININE 1.0 05/18/2024    CALCIUM 10.1 05/18/2024    ANIONGAP 14 05/18/2024    ESTGFRAFRICA >60.0 07/31/2022    EGFRNONAA >60.0 07/31/2022       Magnesium   Date Value Ref Range Status   05/01/2024 2.0 1.6 - 2.6 mg/dL Final       Lab Results   Component Value Date    WBC 6.70 05/17/2024    HGB 8.9 (L) 05/17/2024    HCT 27.8 (L) 05/17/2024    MCV 87 05/17/2024     05/17/2024       Lab Results   Component Value Date    INR 1.1 04/17/2024    INR 1.1 04/30/2023    INR 1.0 04/29/2023       BNP   Date Value Ref Range Status   05/17/2024 2,977 (H) 0 - 99 pg/mL Final     Comment:     Values of less than 100 pg/ml are consistent with non-CHF populations.   05/14/2024 4,015 (H) 0 - 99 pg/mL Final     Comment:     Values of less than 100 pg/ml are consistent with non-CHF populations.   04/29/2024 4,531 (H) 0 - 99 pg/mL Final     Comment:     Values of less than 100 pg/ml are consistent with non-CHF populations.       IMPRESSION:    NYHA Class III   ACC/AHA Stage C  CVA, due to left embolism of left middle cerebral artery  ICD  Chronic combined systolic and diastolic congestive heart failure  Pulmonary hypertension due to left heart disease  Atherosclerosis of native of heart arteries    PLAN:  We will resume Aldactone 25 mg 1 p.o. daily.  Repeat lab 1 week.  Continue to work on goals of care and discuss with palliative Care who has been following her very closely over the last several months.   Unfortunately not very much medication changes that can be made at this time to improve her heart failure status.   Recommend 2 gram sodium restriction and 1500cc fluid restriction.  Encourage physical activity with graded exercise program.  Requested patient to weigh themselves daily, and to notify us if their weight increases by more than 3 lbs in 1 day or 5 lbs in 1 week.        Electronically signed by:   Justina Smith   06/13/2024 12:43 PM

## 2024-04-25 ENCOUNTER — PATIENT OUTREACH (OUTPATIENT)
Dept: FAMILY MEDICINE | Facility: CLINIC | Age: 64
End: 2024-04-25
Payer: MEDICARE

## 2024-04-29 ENCOUNTER — NURSE TRIAGE (OUTPATIENT)
Dept: ADMINISTRATIVE | Facility: CLINIC | Age: 64
End: 2024-04-29
Payer: MEDICARE

## 2024-04-29 ENCOUNTER — HOSPITAL ENCOUNTER (INPATIENT)
Facility: HOSPITAL | Age: 64
LOS: 1 days | Discharge: HOME | DRG: 291 | End: 2024-05-01
Attending: EMERGENCY MEDICINE | Admitting: INTERNAL MEDICINE
Payer: MEDICARE

## 2024-04-29 DIAGNOSIS — I50.43 ACUTE ON CHRONIC COMBINED SYSTOLIC AND DIASTOLIC CONGESTIVE HEART FAILURE: ICD-10-CM

## 2024-04-29 DIAGNOSIS — R62.7 FAILURE TO THRIVE IN ADULT: ICD-10-CM

## 2024-04-29 DIAGNOSIS — I50.9 ACUTE ON CHRONIC CONGESTIVE HEART FAILURE, UNSPECIFIED HEART FAILURE TYPE: Primary | ICD-10-CM

## 2024-04-29 DIAGNOSIS — R07.9 CHEST PAIN: ICD-10-CM

## 2024-04-29 DIAGNOSIS — I50.42 CHRONIC COMBINED SYSTOLIC AND DIASTOLIC CONGESTIVE HEART FAILURE: ICD-10-CM

## 2024-04-29 DIAGNOSIS — E11.69 TYPE 2 DIABETES MELLITUS WITH OTHER SPECIFIED COMPLICATION, WITHOUT LONG-TERM CURRENT USE OF INSULIN: ICD-10-CM

## 2024-04-29 LAB
BASOPHILS # BLD AUTO: 0.04 K/UL (ref 0–0.2)
BASOPHILS NFR BLD: 0.5 % (ref 0–1.9)
DIFFERENTIAL METHOD BLD: ABNORMAL
EOSINOPHIL # BLD AUTO: 0.1 K/UL (ref 0–0.5)
EOSINOPHIL NFR BLD: 1.1 % (ref 0–8)
ERYTHROCYTE [DISTWIDTH] IN BLOOD BY AUTOMATED COUNT: 17.1 % (ref 11.5–14.5)
HCT VFR BLD AUTO: 26.6 % (ref 37–48.5)
HGB BLD-MCNC: 8.6 G/DL (ref 12–16)
IMM GRANULOCYTES # BLD AUTO: 0.02 K/UL (ref 0–0.04)
IMM GRANULOCYTES NFR BLD AUTO: 0.3 % (ref 0–0.5)
LYMPHOCYTES # BLD AUTO: 1.9 K/UL (ref 1–4.8)
LYMPHOCYTES NFR BLD: 25.5 % (ref 18–48)
MCH RBC QN AUTO: 28.4 PG (ref 27–31)
MCHC RBC AUTO-ENTMCNC: 32.3 G/DL (ref 32–36)
MCV RBC AUTO: 88 FL (ref 82–98)
MONOCYTES # BLD AUTO: 0.5 K/UL (ref 0.3–1)
MONOCYTES NFR BLD: 7 % (ref 4–15)
NEUTROPHILS # BLD AUTO: 4.8 K/UL (ref 1.8–7.7)
NEUTROPHILS NFR BLD: 65.6 % (ref 38–73)
NRBC BLD-RTO: 0 /100 WBC
PLATELET # BLD AUTO: 654 K/UL (ref 150–450)
PMV BLD AUTO: 10.9 FL (ref 9.2–12.9)
RBC # BLD AUTO: 3.03 M/UL (ref 4–5.4)
WBC # BLD AUTO: 7.32 K/UL (ref 3.9–12.7)

## 2024-04-29 PROCEDURE — 99285 EMERGENCY DEPT VISIT HI MDM: CPT | Mod: 25

## 2024-04-29 PROCEDURE — 85025 COMPLETE CBC W/AUTO DIFF WBC: CPT | Mod: 91

## 2024-04-29 PROCEDURE — 84484 ASSAY OF TROPONIN QUANT: CPT | Mod: 91

## 2024-04-29 PROCEDURE — 93010 ELECTROCARDIOGRAM REPORT: CPT | Mod: ,,, | Performed by: INTERNAL MEDICINE

## 2024-04-29 PROCEDURE — 86803 HEPATITIS C AB TEST: CPT | Performed by: PHYSICIAN ASSISTANT

## 2024-04-29 PROCEDURE — 83880 ASSAY OF NATRIURETIC PEPTIDE: CPT | Mod: 91

## 2024-04-29 PROCEDURE — 83735 ASSAY OF MAGNESIUM: CPT

## 2024-04-29 PROCEDURE — 25000003 PHARM REV CODE 250

## 2024-04-29 PROCEDURE — 80053 COMPREHEN METABOLIC PANEL: CPT | Mod: 91

## 2024-04-29 PROCEDURE — 87389 HIV-1 AG W/HIV-1&-2 AB AG IA: CPT | Performed by: PHYSICIAN ASSISTANT

## 2024-04-29 PROCEDURE — 93005 ELECTROCARDIOGRAM TRACING: CPT

## 2024-04-29 RX ORDER — ACETAMINOPHEN 500 MG
1000 TABLET ORAL
Status: COMPLETED | OUTPATIENT
Start: 2024-04-29 | End: 2024-04-29

## 2024-04-29 RX ORDER — LIDOCAINE 50 MG/G
1 PATCH TOPICAL
Status: COMPLETED | OUTPATIENT
Start: 2024-04-29 | End: 2024-04-30

## 2024-04-29 RX ADMIN — LIDOCAINE 1 PATCH: 50 PATCH CUTANEOUS at 11:04

## 2024-04-29 RX ADMIN — ACETAMINOPHEN 1000 MG: 500 TABLET ORAL at 11:04

## 2024-04-30 PROBLEM — E87.6 HYPOKALEMIA: Status: RESOLVED | Noted: 2020-08-16 | Resolved: 2024-04-30

## 2024-04-30 PROBLEM — I50.43 ACUTE ON CHRONIC COMBINED SYSTOLIC AND DIASTOLIC CONGESTIVE HEART FAILURE: Status: ACTIVE | Noted: 2024-04-17

## 2024-04-30 PROBLEM — I50.23 ACUTE ON CHRONIC SYSTOLIC CONGESTIVE HEART FAILURE: Status: ACTIVE | Noted: 2024-04-17

## 2024-04-30 PROBLEM — R11.2 INTRACTABLE NAUSEA AND VOMITING: Status: RESOLVED | Noted: 2023-05-30 | Resolved: 2024-04-30

## 2024-04-30 PROBLEM — I69.30 SEQUELA OF CARDIOEMBOLIC STROKE: Chronic | Status: ACTIVE | Noted: 2024-04-30

## 2024-04-30 PROBLEM — I69.30 SEQUELA OF CARDIOEMBOLIC STROKE: Status: ACTIVE | Noted: 2024-04-30

## 2024-04-30 LAB
ALBUMIN SERPL BCP-MCNC: 3.6 G/DL (ref 3.5–5.2)
ALP SERPL-CCNC: 164 U/L (ref 55–135)
ALT SERPL W/O P-5'-P-CCNC: 57 U/L (ref 10–44)
ANION GAP SERPL CALC-SCNC: 13 MMOL/L (ref 8–16)
AST SERPL-CCNC: 52 U/L (ref 10–40)
BILIRUB SERPL-MCNC: 1 MG/DL (ref 0.1–1)
BNP SERPL-MCNC: 4531 PG/ML (ref 0–99)
BUN SERPL-MCNC: 13 MG/DL (ref 8–23)
CALCIUM SERPL-MCNC: 10.2 MG/DL (ref 8.7–10.5)
CHLORIDE SERPL-SCNC: 103 MMOL/L (ref 95–110)
CO2 SERPL-SCNC: 26 MMOL/L (ref 23–29)
CREAT SERPL-MCNC: 1.1 MG/DL (ref 0.5–1.4)
EST. GFR  (NO RACE VARIABLE): 56.8 ML/MIN/1.73 M^2
ESTIMATED AVG GLUCOSE: 114 MG/DL (ref 68–131)
GLUCOSE SERPL-MCNC: 141 MG/DL (ref 70–110)
HBA1C MFR BLD: 5.6 % (ref 4–5.6)
HCV AB SERPL QL IA: NORMAL
HIV 1+2 AB+HIV1 P24 AG SERPL QL IA: NORMAL
MAGNESIUM SERPL-MCNC: 2 MG/DL (ref 1.6–2.6)
MAGNESIUM SERPL-MCNC: 2 MG/DL (ref 1.6–2.6)
OHS QRS DURATION: 106 MS
OHS QTC CALCULATION: 539 MS
PHOSPHATE SERPL-MCNC: 3.9 MG/DL (ref 2.7–4.5)
POCT GLUCOSE: 120 MG/DL (ref 70–110)
POCT GLUCOSE: 138 MG/DL (ref 70–110)
POCT GLUCOSE: 138 MG/DL (ref 70–110)
POCT GLUCOSE: 141 MG/DL (ref 70–110)
POCT GLUCOSE: 143 MG/DL (ref 70–110)
POTASSIUM SERPL-SCNC: 3.6 MMOL/L (ref 3.5–5.1)
PROT SERPL-MCNC: 7.5 G/DL (ref 6–8.4)
SODIUM SERPL-SCNC: 142 MMOL/L (ref 136–145)
TROPONIN I SERPL DL<=0.01 NG/ML-MCNC: 0.07 NG/ML (ref 0–0.03)

## 2024-04-30 PROCEDURE — 63600175 PHARM REV CODE 636 W HCPCS

## 2024-04-30 PROCEDURE — 96376 TX/PRO/DX INJ SAME DRUG ADON: CPT

## 2024-04-30 PROCEDURE — 83036 HEMOGLOBIN GLYCOSYLATED A1C: CPT

## 2024-04-30 PROCEDURE — 20600001 HC STEP DOWN PRIVATE ROOM

## 2024-04-30 PROCEDURE — 84100 ASSAY OF PHOSPHORUS: CPT

## 2024-04-30 PROCEDURE — 25000003 PHARM REV CODE 250

## 2024-04-30 PROCEDURE — 25500020 PHARM REV CODE 255: Performed by: EMERGENCY MEDICINE

## 2024-04-30 PROCEDURE — 96374 THER/PROPH/DIAG INJ IV PUSH: CPT

## 2024-04-30 PROCEDURE — 83735 ASSAY OF MAGNESIUM: CPT

## 2024-04-30 PROCEDURE — 82962 GLUCOSE BLOOD TEST: CPT

## 2024-04-30 RX ORDER — IBUPROFEN 200 MG
16 TABLET ORAL
Status: DISCONTINUED | OUTPATIENT
Start: 2024-04-30 | End: 2024-05-01 | Stop reason: HOSPADM

## 2024-04-30 RX ORDER — CYPROHEPTADINE HYDROCHLORIDE 4 MG/1
4 TABLET ORAL 2 TIMES DAILY
Status: DISCONTINUED | OUTPATIENT
Start: 2024-04-30 | End: 2024-05-01 | Stop reason: HOSPADM

## 2024-04-30 RX ORDER — FUROSEMIDE 10 MG/ML
40 INJECTION INTRAMUSCULAR; INTRAVENOUS 2 TIMES DAILY
Status: DISCONTINUED | OUTPATIENT
Start: 2024-04-30 | End: 2024-05-01 | Stop reason: HOSPADM

## 2024-04-30 RX ORDER — FUROSEMIDE 10 MG/ML
40 INJECTION INTRAMUSCULAR; INTRAVENOUS
Status: DISCONTINUED | OUTPATIENT
Start: 2024-04-30 | End: 2024-04-30

## 2024-04-30 RX ORDER — POTASSIUM CHLORIDE 20 MEQ/1
40 TABLET, EXTENDED RELEASE ORAL ONCE
Status: DISCONTINUED | OUTPATIENT
Start: 2024-04-30 | End: 2024-04-30

## 2024-04-30 RX ORDER — POTASSIUM CHLORIDE 20 MEQ/1
20 TABLET, EXTENDED RELEASE ORAL DAILY
Status: DISCONTINUED | OUTPATIENT
Start: 2024-04-30 | End: 2024-05-01

## 2024-04-30 RX ORDER — INSULIN ASPART 100 [IU]/ML
0-5 INJECTION, SOLUTION INTRAVENOUS; SUBCUTANEOUS
Status: DISCONTINUED | OUTPATIENT
Start: 2024-04-30 | End: 2024-05-01 | Stop reason: HOSPADM

## 2024-04-30 RX ORDER — MIDODRINE HYDROCHLORIDE 5 MG/1
10 TABLET ORAL 3 TIMES DAILY
Status: DISCONTINUED | OUTPATIENT
Start: 2024-04-30 | End: 2024-05-01 | Stop reason: HOSPADM

## 2024-04-30 RX ORDER — ONDANSETRON 4 MG/1
4 TABLET, ORALLY DISINTEGRATING ORAL EVERY 8 HOURS PRN
Status: DISCONTINUED | OUTPATIENT
Start: 2024-04-30 | End: 2024-05-01 | Stop reason: HOSPADM

## 2024-04-30 RX ORDER — GLUCAGON 1 MG
1 KIT INJECTION
Status: DISCONTINUED | OUTPATIENT
Start: 2024-04-30 | End: 2024-05-01 | Stop reason: HOSPADM

## 2024-04-30 RX ORDER — ATORVASTATIN CALCIUM 40 MG/1
80 TABLET, FILM COATED ORAL DAILY
Status: DISCONTINUED | OUTPATIENT
Start: 2024-04-30 | End: 2024-05-01 | Stop reason: HOSPADM

## 2024-04-30 RX ORDER — IBUPROFEN 200 MG
24 TABLET ORAL
Status: DISCONTINUED | OUTPATIENT
Start: 2024-04-30 | End: 2024-05-01 | Stop reason: HOSPADM

## 2024-04-30 RX ORDER — MIRTAZAPINE 7.5 MG/1
7.5 TABLET, FILM COATED ORAL NIGHTLY
Status: DISCONTINUED | OUTPATIENT
Start: 2024-04-30 | End: 2024-05-01 | Stop reason: HOSPADM

## 2024-04-30 RX ORDER — ACETAMINOPHEN 325 MG/1
650 TABLET ORAL EVERY 8 HOURS PRN
Status: DISCONTINUED | OUTPATIENT
Start: 2024-04-30 | End: 2024-05-01 | Stop reason: HOSPADM

## 2024-04-30 RX ORDER — ALUMINUM HYDROXIDE, MAGNESIUM HYDROXIDE, AND SIMETHICONE 2400; 240; 2400 MG/30ML; MG/30ML; MG/30ML
30 SUSPENSION ORAL EVERY 6 HOURS PRN
Status: DISCONTINUED | OUTPATIENT
Start: 2024-04-30 | End: 2024-05-01 | Stop reason: HOSPADM

## 2024-04-30 RX ORDER — SPIRONOLACTONE 25 MG/1
25 TABLET ORAL DAILY
Status: DISCONTINUED | OUTPATIENT
Start: 2024-04-30 | End: 2024-05-01 | Stop reason: HOSPADM

## 2024-04-30 RX ORDER — SODIUM CHLORIDE 0.9 % (FLUSH) 0.9 %
10 SYRINGE (ML) INJECTION
Status: DISCONTINUED | OUTPATIENT
Start: 2024-04-30 | End: 2024-05-01 | Stop reason: HOSPADM

## 2024-04-30 RX ORDER — FUROSEMIDE 10 MG/ML
80 INJECTION INTRAMUSCULAR; INTRAVENOUS ONCE
Status: COMPLETED | OUTPATIENT
Start: 2024-04-30 | End: 2024-04-30

## 2024-04-30 RX ORDER — SENNOSIDES 8.6 MG/1
2 TABLET ORAL NIGHTLY
Status: DISCONTINUED | OUTPATIENT
Start: 2024-04-30 | End: 2024-05-01 | Stop reason: HOSPADM

## 2024-04-30 RX ORDER — LIDOCAINE 50 MG/G
2 PATCH TOPICAL DAILY
Status: DISCONTINUED | OUTPATIENT
Start: 2024-04-30 | End: 2024-05-01 | Stop reason: HOSPADM

## 2024-04-30 RX ORDER — FUROSEMIDE 10 MG/ML
20 INJECTION INTRAMUSCULAR; INTRAVENOUS
Status: COMPLETED | OUTPATIENT
Start: 2024-04-30 | End: 2024-04-30

## 2024-04-30 RX ORDER — TALC
6 POWDER (GRAM) TOPICAL NIGHTLY PRN
Status: DISCONTINUED | OUTPATIENT
Start: 2024-04-30 | End: 2024-05-01 | Stop reason: HOSPADM

## 2024-04-30 RX ORDER — ASPIRIN 81 MG/1
81 TABLET ORAL DAILY
Status: DISCONTINUED | OUTPATIENT
Start: 2024-04-30 | End: 2024-05-01 | Stop reason: HOSPADM

## 2024-04-30 RX ADMIN — SENNOSIDES 2 TABLET: 8.6 TABLET, FILM COATED ORAL at 04:04

## 2024-04-30 RX ADMIN — CYPROHEPTADINE HYDROCHLORIDE 4 MG: 4 TABLET ORAL at 09:04

## 2024-04-30 RX ADMIN — POTASSIUM CHLORIDE 20 MEQ: 1500 TABLET, EXTENDED RELEASE ORAL at 09:04

## 2024-04-30 RX ADMIN — MIDODRINE HYDROCHLORIDE 10 MG: 5 TABLET ORAL at 09:04

## 2024-04-30 RX ADMIN — FUROSEMIDE 80 MG: 10 INJECTION, SOLUTION INTRAVENOUS at 04:04

## 2024-04-30 RX ADMIN — SACUBITRIL AND VALSARTAN 1 TABLET: 49; 51 TABLET, FILM COATED ORAL at 09:04

## 2024-04-30 RX ADMIN — FUROSEMIDE 40 MG: 10 INJECTION, SOLUTION INTRAVENOUS at 06:04

## 2024-04-30 RX ADMIN — APIXABAN 5 MG: 5 TABLET, FILM COATED ORAL at 09:04

## 2024-04-30 RX ADMIN — ASPIRIN 81 MG: 81 TABLET, COATED ORAL at 09:04

## 2024-04-30 RX ADMIN — ATORVASTATIN CALCIUM 80 MG: 40 TABLET, FILM COATED ORAL at 09:04

## 2024-04-30 RX ADMIN — MIDODRINE HYDROCHLORIDE 10 MG: 5 TABLET ORAL at 04:04

## 2024-04-30 RX ADMIN — MIRTAZAPINE 7.5 MG: 7.5 TABLET ORAL at 09:04

## 2024-04-30 RX ADMIN — IOHEXOL 75 ML: 350 INJECTION, SOLUTION INTRAVENOUS at 01:04

## 2024-04-30 RX ADMIN — POTASSIUM BICARBONATE 40 MEQ: 391 TABLET, EFFERVESCENT ORAL at 07:04

## 2024-04-30 RX ADMIN — FUROSEMIDE 20 MG: 10 INJECTION, SOLUTION INTRAMUSCULAR; INTRAVENOUS at 12:04

## 2024-04-30 RX ADMIN — SPIRONOLACTONE 25 MG: 25 TABLET ORAL at 09:04

## 2024-04-30 RX ADMIN — LIDOCAINE 2 PATCH: 50 PATCH TOPICAL at 09:04

## 2024-04-30 RX ADMIN — FUROSEMIDE 40 MG: 10 INJECTION, SOLUTION INTRAVENOUS at 09:04

## 2024-04-30 NOTE — ASSESSMENT & PLAN NOTE
Fall precautions in place.  Dr. Smith placed outpatient PT and OT referrals: will ask them to come evaluate her whilst admitted if possible but understand that PT and OT are very busy and may triage her as having a less acute need than their other patients.

## 2024-04-30 NOTE — ASSESSMENT & PLAN NOTE
"Patient's FSGs are controlled on current medication regimen.  Last A1c reviewed-   Lab Results   Component Value Date    HGBA1C 5.7 (H) 04/17/2024     Most recent fingerstick glucose reviewed- No results for input(s): "POCTGLUCOSE" in the last 24 hours.  Current correctional scale  Low  Maintain anti-hyperglycemic dose as follows-   Antihyperglycemics (From admission, onward)      None          Hold Oral hypoglycemics while patient is in the hospital.  "

## 2024-04-30 NOTE — PLAN OF CARE
Inpatient Upgrade Note    Diomedes Mohr has warranted treatment spanning two or more midnights of hospital level care for the management of heart failure. She continues to require IV diuresis, daily labs, monitoring of vital signs, and medication adjustments. Her condition is also complicated by the following comorbidities: Hypertension, Heart failure, and Asthma, Pulmonary edema, and Shizophrenia .

## 2024-04-30 NOTE — ED NOTES
Telemetry Verification   Patient placed on Telemetry Box  Verified on ED monitor  Box [id number]   Monitor Tech Kasime    Rate 93   Rhythm NSR          4

## 2024-04-30 NOTE — HPI
Diomedes Mohr is a 63 y.o. female with heart failure with reduced ejection fraction (left ventricular ejection fraction 15%), AICD, hypertension, T2 DM, HLD, former tobacco use, stroke, PE, pulmonary hypertension, failure to thrive, who is presenting to [facility] ED with a 3 day history of worsening shortness of breath and inability to lay flat.  She follows with Dr. Smith in [facility] clinic, who noted in her 4/25 visit note that pt has had difficulty taking Entresto 49-51 BID as prescribed.  On presentation there is associated with subjective bilateral lower extremity swelling.  Nonproductive cough.  No fevers or chills.  She has had multiple recent ED presentations for similar symptoms in the past, most recently 4/18.  She is unsure of any specific trigger this time such as a salty meal or excessive fluids.  In the emergency room her vitals are stable, not hypoxic on room air.  BNP 4300.  Chest x-ray shows bilateral hilar congestion. Admitted for acute exacerbation of her heart failure. Other disease processes seem stable.

## 2024-04-30 NOTE — ASSESSMENT & PLAN NOTE
ICD in place  HTN  HLD    Patient's current symptoms include: Dyspnea at rest, Fatigue, cough, Lower extremity edema, and Orthopnea  Symptoms are worsening since 4/24/24.  Patient's most recent Echo: Results for orders placed during the hospital encounter of 08/30/23    Echo    Interpretation Summary    Left Ventricle: The left ventricle is severely dilated. Normal wall thickness. Severe global hypokinesis present. There is severely reduced systolic function with a visually estimated ejection fraction of 15 - 20%. Biplane (2D) method of discs ejection fraction is 17%. Grade I diastolic dysfunction.    Right Ventricle: Normal right ventricular cavity size. Wall thickness is normal. Right ventricle wall motion  is normal. Systolic function is normal. Pacemaker lead present in the ventricle.    Aortic Valve: The aortic valve is a trileaflet valve. There is mild aortic valve sclerosis. Mild annular calcification.    IVC/SVC: Intermediate venous pressure at 8 mmHg.    Patient's most recent cardiac biomarkers:   Recent Labs   Lab 04/29/24  2327   BNP 4,531*     Based on the 2022 AHA/ACC/HFSA Heart Failure Guideline, the patient meets criteria to be staged as: Stage D: Advanced HF. Marked HF symptoms that interfere with daily life and recurrent hospitalizations despite GDMT Combined Systolic and Diastolic      PLAN FOR INITIAL STABILIZATION PER 2022 AHA/ACC/HFSA CHF GUIDELINE:  IV diuresis with Lasix 80 IV once then 40 IV BID and monitor response.  Goal diuresis net 2L/day loss. Home diuretic regimen: Torsemide 20 daily  Additional GDMT at this time to include :  Entresto 49-51 per Dr. Smith's 4/25 clinic visit bc robust BP and her Crt appears to be at baseline  BB as hemodynamics allow, with dose titration per pt's subsequent stability  Spironolactone given EF < 20 and NYHA Class III-IV at baseline  SGLT2i to be prescribed on DC, as it is not on [facility] formulary  Statin  Will defer repeat TTE timing to [facility] since pt follows  closely with Dr Smith, and pt's current symptoms are not concerning for a possible change in clinical condition since that time.  Will notify HTS that she is admitted.  Maintain on telemetry and daily EKGs   Cardiac diet when certain that pt will not go to cath lab, with max 2g/day NaCl and fluid restriction at 1500 cc/day.  Strict I/Os and daily weights.  Dry weight unknown.  Weight on admission not measured.  Obtain current risk stratification data: TSH, Lipids, HbA1c with optimization of risk factors is necessary  Check electrolytes daily, keep Mag >2 & K+ >4  SCDs, TEDs, Nursing communication to elevated LE. Ambulate as tolerated.

## 2024-04-30 NOTE — ED PROVIDER NOTES
Encounter Date: 4/29/2024       History     Chief Complaint   Patient presents with    Shortness of Breath    Chest Pain     Beginning Sunday- worse when laying down. D/c'd from [facility] ED today w/ same complaints. Hx of CHF.      63-year-old female with history of hypertension, T2 DM, HLD, former tobacco use, heart failure with reduced ejection fraction (left ventricular ejection fraction 15%), AICD, stroke, PE, pulmonary hypertension, and DVT (on Eliquis) presents to the ED for chief complaint of intermittent chest pain and SOB of 2 days.  Daughter is at bedside.  Patient states that she started to experience chest pain and shortness of breath while at rest.  She describes her chest pain as intermittent, left-sided, and usually lasts for no longer than 1 minute.  She currently denies any chest pain.  Patient states she has had continuous SOB that worsens when she lays flat.  She does not take any home oxygen.  Her daughter notes that when she measured her pulse ox at home after her visit to [place] [facility], patient was at 88% on room air.  Patient currently endorses low back and tailbone pain.  She denies any fevers, abdominal pain, nausea, and vomiting.  At OSH, patient had elevated BNP and troponin was at baseline.    The history is provided by the patient. No  was used.     Review of patient's allergies indicates:   Allergen Reactions    Adhesive Blisters    Captopril Other (See Comments)     COUGH     Past Medical History:   Diagnosis Date    Acute on chronic combined systolic and diastolic heart failure 12/26/2019    ARBEN (acute kidney injury) 5/30/2023    Anticoagulant long-term use     Anxiety     Arthritis     Asthma     Depression     H/O: hysterectomy     Hyperlipemia     Hypertension     Pulmonary edema     Schizophrenia      Past Surgical History:   Procedure Laterality Date    BLADDER SUSPENSION      CARPAL TUNNEL RELEASE Right     HEEL SPUR SURGERY Left     HYSTERECTOMY       LEFT HEART CATHETERIZATION Bilateral 2019    Procedure: Left heart cath;  Surgeon: Steve Chambers MD;  Location: [facility] CATH LAB;  Service: Cardiology;  Laterality: Bilateral;    RIGHT HEART CATHETERIZATION Right 2021    Procedure: INSERTION, CATHETER, RIGHT HEART;  Surgeon: Petr Naranjo MD;  Location: [facility] CATH LAB;  Service: Cardiology;  Laterality: Right;    RIGHT HEART CATHETERIZATION Right 2022    Procedure: INSERTION, CATHETER, RIGHT HEART;  Surgeon: Chandana Ivory Jr., MD;  Location: [facility] CATH LAB;  Service: Cardiology;  Laterality: Right;    TUBAL LIGATION       Family History   Problem Relation Name Age of Onset    No Known Problems Mother      No Known Problems Father      Ovarian cancer Sister  42     Social History     Tobacco Use    Smoking status: Former     Current packs/day: 0.00     Types: Cigarettes     Quit date: 2016     Years since quittin.6    Smokeless tobacco: Never    Tobacco comments:     nonex 2 weeks   Substance Use Topics    Alcohol use: No     Alcohol/week: 0.0 standard drinks of alcohol    Drug use: No     Review of Systems    Physical Exam     Initial Vitals [24 2230]   BP Pulse Resp Temp SpO2   121/72 (!) 111 (!) 22 97.6 °F (36.4 °C) 98 %      MAP       --         Physical Exam    Nursing note and vitals reviewed.  Constitutional: No distress.   Patient is laying in bed in no apparent distress   HENT:   Head: Normocephalic.   Eyes: Conjunctivae and EOM are normal. No scleral icterus.   Neck: Neck supple.   Normal range of motion.  Cardiovascular:  Regular rhythm, normal heart sounds and intact distal pulses.           Tachycardic   Pulmonary/Chest: No respiratory distress. She has no wheezes. She has no rhonchi. She has rales (Crackles in bilateral lung fields).   Abdominal: Abdomen is soft. She exhibits no distension. There is no abdominal tenderness. There is no rebound and no guarding.   Musculoskeletal:         General: Edema  (RLE mildly edematous compared to left) present. Normal range of motion.      Cervical back: Normal range of motion and neck supple.     Neurological: She is alert.   Skin: Skin is warm. Capillary refill takes less than 2 seconds.   Psychiatric: She has a normal mood and affect.         ED Course   Procedures  Labs Reviewed   CBC W/ AUTO DIFFERENTIAL - Abnormal; Notable for the following components:       Result Value    RBC 3.03 (*)     Hemoglobin 8.6 (*)     Hematocrit 26.6 (*)     RDW 17.1 (*)     Platelets 654 (*)     All other components within normal limits    Narrative:     Release to patient->Immediate   COMPREHENSIVE METABOLIC PANEL - Abnormal; Notable for the following components:    Glucose 141 (*)     Alkaline Phosphatase 164 (*)     AST 52 (*)     ALT 57 (*)     eGFR 56.8 (*)     All other components within normal limits    Narrative:     Release to patient->Immediate   TROPONIN I - Abnormal; Notable for the following components:    Troponin I 0.065 (*)     All other components within normal limits    Narrative:     Release to patient->Immediate   B-TYPE NATRIURETIC PEPTIDE - Abnormal; Notable for the following components:    BNP 4,531 (*)     All other components within normal limits    Narrative:     Release to patient->Immediate   MAGNESIUM    Narrative:     Release to patient->Immediate   HIV 1 / 2 ANTIBODY    Narrative:     Release to patient->Immediate   HEPATITIS C ANTIBODY    Narrative:     Release to patient->Immediate   HEMOGLOBIN A1C   MAGNESIUM   PHOSPHORUS   POCT GLUCOSE MONITORING CONTINUOUS          Imaging Results              CTA Chest Non-Coronary (PE Studies) (Final result)  Result time 04/30/24 02:24:36      Final result by Dagoberto Briones MD (04/30/24 02:24:36)                   Impression:      No evidence of pulmonary thromboembolism.    Cardiomegaly.    Diffuse smooth interlobular septal thickening suggestive of interstitial edema, slightly worse compared to prior  exam.    Small right and trace left pleural effusion, new from prior exam.    Anasarca.    Electronically signed by resident: Omar Marie  Date:    04/30/2024  Time:    01:56    Electronically signed by: Dagoberto Briones MD  Date:    04/30/2024  Time:    02:24               Narrative:    EXAMINATION:  CTA CHEST NON CORONARY (PE STUDIES)    CLINICAL HISTORY:  Pulmonary embolism (PE) suspected, unknown D-dimer;    TECHNIQUE:  Low dose axial images, sagittal and coronal reformations were obtained from the thoracic inlet to the lung bases following the IV administration of 75 mL of Omnipaque 350.  Contrast timing was optimized to evaluate the pulmonary arteries.    COMPARISON:  XR chest 04/29/2024.  CTA chest 02/13/2024.    FINDINGS:  SOFT TISSUES: Left chest wall cardiac conduction device.  No axillary or subpectoral lymphadenopathy.  Diffuse body wall edema.    HEART AND MEDIASTINUM: Cardiomegaly with dilated left ventricle.  No pericardial effusion.  No mediastinal or hilar lymphadenopathy.    AORTA: Left-sided aortic arch.  No aneurysm. Moderate calcified atherosclerosis of the aortic arch.  Evaluation for dissection limited by poor bolus timing in the aorta.    PULMONARY VASCULATURE: Pulmonary arteries distribute normally. Pulmonary arteries are brightly opacified.  No filling defects to suggest pulmonary thromboembolism.    LUNGS AND AIRWAYS: Evaluation of the lungs is degraded by respiratory motion.  Central airways are patent.  There is diffuse smooth interlobular septal thickening, greatest in the lower lobes.  Small right and trace left pleural effusion, new from prior exam.  There is fluid tracking along the right-sided fissures, greatest along the minor fissure.  No large focal consolidation.  No pneumothorax.  Evaluation for pulmonary nodules limited by motion.    UPPER ABDOMEN: Stable appearance of the partially imaged upper abdominal structures, including few left renal cysts.    BONES: No fracture or  focal osseous lesion.                                       Medications   LIDOcaine 5 % patch 1 patch (1 patch Transdermal Patch Applied 4/29/24 2341)   sodium chloride 0.9% flush 10 mL (has no administration in time range)   sacubitriL-valsartan 49-51 mg per tablet 1 tablet (has no administration in time range)   aluminum & magnesium hydroxide-simethicone 400-400-40 mg/5 mL suspension 30 mL (has no administration in time range)   melatonin tablet 6 mg (has no administration in time range)   apixaban tablet 5 mg (has no administration in time range)   aspirin EC tablet 81 mg (has no administration in time range)   atorvastatin tablet 80 mg (has no administration in time range)   cyproheptadine 4 mg tablet 4 mg (has no administration in time range)   acetaminophen tablet 650 mg (has no administration in time range)   LIDOcaine 5 % patch 2 patch (has no administration in time range)   mirtazapine tablet 7.5 mg (7.5 mg Oral Not Given 4/30/24 0415)   ondansetron disintegrating tablet 4 mg (has no administration in time range)   potassium chloride SA CR tablet 20 mEq (has no administration in time range)   senna tablet 2 tablet (2 tablets Oral Given 4/30/24 0414)   spironolactone tablet 25 mg (has no administration in time range)   midodrine tablet 10 mg (has no administration in time range)   furosemide injection 40 mg (has no administration in time range)   glucose chewable tablet 16 g (has no administration in time range)   glucose chewable tablet 24 g (has no administration in time range)   dextrose 50% injection 12.5 g (has no administration in time range)   dextrose 50% injection 25 g (has no administration in time range)   glucagon (human recombinant) injection 1 mg (has no administration in time range)   insulin aspart U-100 pen 0-5 Units (has no administration in time range)   acetaminophen tablet 1,000 mg (1,000 mg Oral Given 4/29/24 2340)   furosemide injection 20 mg (20 mg Intravenous Given 4/30/24 0037)    iohexoL (OMNIPAQUE 350) injection 75 mL (75 mLs Intravenous Given 4/30/24 0152)   furosemide injection 80 mg (80 mg Intravenous Given 4/30/24 0414)   potassium bicarbonate disintegrating tablet 40 mEq (40 mEq Oral Given by Other 4/30/24 6487)     Medical Decision Making  63-year-old female who presents with intermittent chest pain and SOB of 2 days.  Her vitals are WNL.  On exam, she has crackles in bilateral lung fields.  Her RLE is mildly edematous compared to left.  Please see physical exam findings above for additional details.  Differential diagnoses include but are not limited to CHF exacerbation, ACS, PNA, electrolyte abnormality, DVT, PE.  High suspicion that patient is fluid overloaded secondary to CHF exacerbation.  We will evaluate for other etiology such as ACS and electrolyte abnormalities.  Patient's dyspnea and tachycardia is also suspicious for PE given her history.  Ordered labs and administered Tylenol and lidocaine patch for pain.  Ordered Lasix.  Please see ED course for additional details.    Patient will be admitted to Hospital Medicine for observation in the setting of her CHF exacerbation and dyspnea.    Amount and/or Complexity of Data Reviewed  Labs: ordered. Decision-making details documented in ED Course.  Radiology: ordered. Decision-making details documented in ED Course.  ECG/medicine tests: ordered and independent interpretation performed. Decision-making details documented in ED Course.    Risk  OTC drugs.  Prescription drug management.               ED Course as of 04/30/24 0742   Mon Apr 29, 2024   2306 EKG shows sinus tachycardia with PVCs, rate of 107 beats per minute, and no STEMI. [MD]   2316 On independent interpretation, OSH CXR showed cardiomegaly and appears to have more interstitial edema compared to prior CXR on 04/20/2024. [MD]   Tue Apr 30, 2024   0022 Troponin I(!): 0.065  Troponin is comparable to prior labs [MD]   0023 BNP(!): 4,531  BNP is elevated compared to  prior labs, will order Lasix [MD]   0026 In the setting of patient's SOB and mild tachycardia, will evaluate for PE.  Ordered CTPE. [MD]   0236 CTPE shows no evidence of pulmonary thromboembolism.  Cardiomegaly.  Diffuse smooth interlobular septal thickening suggestive of interstitial edema, slightly worse compared to prior exam.  Small right and trace left pleural effusion, new from prior exam.  Anasarca.   [MD]      ED Course User Index  [MD] Layo Simon MD                           Clinical Impression:  Final diagnoses:  [R07.9] Chest pain  [I50.9] Acute on chronic congestive heart failure, unspecified heart failure type (Primary)          ED Disposition Condition    Observation                 Layo Simon MD  Resident  04/30/24 [id number]

## 2024-04-30 NOTE — NURSING TRANSFER
Nursing Transfer Note      4/30/2024   6:36 AM    Nurse receiving handoff:Mariana AVINA    Reason patient is being transferred:     Transfer From: ED    Transfer via wheelchair    Transfer with O2, cardiac monitoring    Transported by pt escort      Telemetry: Box Number [id number], Rate 104, and Rhythm ST  Order for Tele Monitor? Yes    Additional Lines: Oxygen      Any special needs or follow-up needed: N/A    Patient belongings transferred with patient: Yes    Chart send with patient: Yes      Patient reassessed at: 04/30 0545 (date, time)  1  Upon arrival to floor: cardiac monitor applied, patient oriented to room, call bell in reach, and bed in lowest position

## 2024-04-30 NOTE — SUBJECTIVE & OBJECTIVE
Past Medical History:   Diagnosis Date    Acute on chronic combined systolic and diastolic heart failure 12/26/2019    ARBEN (acute kidney injury) 5/30/2023    Anticoagulant long-term use     Anxiety     Arthritis     Asthma     Depression     H/O: hysterectomy     Hyperlipemia     Hypertension     Pulmonary edema     Schizophrenia        Past Surgical History:   Procedure Laterality Date    BLADDER SUSPENSION      CARPAL TUNNEL RELEASE Right     HEEL SPUR SURGERY Left     HYSTERECTOMY      LEFT HEART CATHETERIZATION Bilateral 12/27/2019    Procedure: Left heart cath;  Surgeon: Steve Chambers MD;  Location: [facility] CATH LAB;  Service: Cardiology;  Laterality: Bilateral;    RIGHT HEART CATHETERIZATION Right 7/26/2021    Procedure: INSERTION, CATHETER, RIGHT HEART;  Surgeon: Petr Naranjo MD;  Location: [facility] CATH LAB;  Service: Cardiology;  Laterality: Right;    RIGHT HEART CATHETERIZATION Right 5/12/2022    Procedure: INSERTION, CATHETER, RIGHT HEART;  Surgeon: Chandana Ivory Jr., MD;  Location: [facility] CATH LAB;  Service: Cardiology;  Laterality: Right;    TUBAL LIGATION         Review of patient's allergies indicates:   Allergen Reactions    Adhesive Blisters    Captopril Other (See Comments)     COUGH       Current Facility-Administered Medications   Medication Dose Route Frequency Provider Last Rate Last Admin    acetaminophen tablet 650 mg  650 mg Oral Q8H PRN Mann Sotomayor MD        aluminum & magnesium hydroxide-simethicone 400-400-40 mg/5 mL suspension 30 mL  30 mL Oral Q6H PRN Mann Sotomayor MD        apixaban tablet 5 mg  5 mg Oral BID Mann Sotomayor MD        aspirin EC tablet 81 mg  81 mg Oral Daily Mann Sotomayor MD        atorvastatin tablet 80 mg  80 mg Oral Daily Mann Sotomayor MD        cyproheptadine 4 mg tablet 4 mg  4 mg Oral BID Mann Sotomayor MD        dextrose 50% injection 12.5 g  12.5 g Intravenous PRN Mann Sotomayor MD        dextrose 50% injection 25 g  25  g Intravenous PRN Mann Sotomayor MD        furosemide injection 40 mg  40 mg Intravenous BID Mann Sotomayor MD        glucagon (human recombinant) injection 1 mg  1 mg Intramuscular PRN Mann Sotomayor MD        glucose chewable tablet 16 g  16 g Oral PRN Mann Sotomayor MD        glucose chewable tablet 24 g  24 g Oral PRN Mann Sotomayor MD        insulin aspart U-100 pen 0-5 Units  0-5 Units Subcutaneous QID (AC + HS) PRN Mann Sotomayor MD        LIDOcaine 5 % patch 1 patch  1 patch Transdermal ED 1 Time Layo Simon MD   1 patch at 04/29/24 2341    LIDOcaine 5 % patch 2 patch  2 patch Transdermal Daily Mann Sotomayor MD        melatonin tablet 6 mg  6 mg Oral Nightly PRN Mann Sotomayor MD        midodrine tablet 10 mg  10 mg Oral TID Mann Sotomayor MD        mirtazapine tablet 7.5 mg  7.5 mg Oral QHS Mann Sotomayor MD        ondansetron disintegrating tablet 4 mg  4 mg Oral Q8H PRN Mann Sotomayor MD        potassium chloride SA CR tablet 20 mEq  20 mEq Oral Daily Mann Sotomayor MD        sacubitriL-valsartan 49-51 mg per tablet 1 tablet  1 tablet Oral BID Mann Sotomayor MD        senna tablet 2 tablet  2 tablet Oral QHS Mann Sotomayor MD   2 tablet at 04/30/24 0414    sodium chloride 0.9% flush 10 mL  10 mL Intravenous PRN Mann Sotomayor MD        spironolactone tablet 25 mg  25 mg Oral Daily Mann Sotomayor MD         Current Outpatient Medications   Medication Sig Dispense Refill    acetaminophen (TYLENOL) 325 MG tablet Take 2 tablets (650 mg total) by mouth every 8 (eight) hours as needed. 30 tablet 0    apixaban (ELIQUIS) 5 mg Tab TAKE 1 TABLET BY MOUTH TWICE DAILY. 180 tablet 3    aspirin (ECOTRIN) 81 MG EC tablet Take 1 tablet (81 mg total) by mouth once daily. 30 tablet 11    atorvastatin (LIPITOR) 80 MG tablet Take 1 tablet (80 mg total) by mouth once daily. 90 tablet 3    cyproheptadine (PERIACTIN) 4 mg tablet Take 1 tablet (4 mg total) by mouth 2 (two) times  daily. (Patient taking differently: Take 4 mg by mouth 2 (two) times daily as needed.) 120 tablet 0    dulaglutide (TRULICITY) 1.5 mg/0.5 mL pen injector Inject 1.5 mg into the skin Every Friday.      LIDOcaine (LIDODERM) 5 % Place 2 patches onto the skin once daily. Remove & Discard patch within 12 hours or as directed by MD 60 patch 0    midodrine (PROAMATINE) 10 MG tablet Take 1 tablet (10 mg total) by mouth 3 (three) times daily. 90 tablet 1    mirtazapine (REMERON) 7.5 MG Tab Take 1 tablet (7.5 mg total) by mouth every evening. 30 tablet 11    ondansetron (ZOFRAN-ODT) 4 MG TbDL Take 1 tablet (4 mg total) by mouth every 8 (eight) hours as needed (nausea). 30 tablet 1    oxyCODONE-acetaminophen (PERCOCET) 5-325 mg per tablet Take 1 tablet by mouth every 8 (eight) hours as needed for Pain. 90 tablet 0    potassium chloride (K-DUR,KLOR-CON, K-TAB) 20 MEQ tablet Take 1 tablet (20 mEq total) by mouth once daily. 60 tablet 0    SENNA 8.6 mg tablet Take 2 tablets by mouth every evening. (Patient taking differently: Take 2 tablets by mouth nightly as needed.) 30 tablet 0    spironolactone (ALDACTONE) 25 MG tablet Take 1 tablet (25 mg total) by mouth once daily. 30 tablet 11    torsemide (DEMADEX) 10 MG Tab Take 2 tablets (20 mg total) by mouth once daily.      trihexyphenidyL (ARTANE) 2 MG tablet Take 0.5 tablets (1 mg total) by mouth 3 (three) times daily with meals. 45 tablet 11     Family History       Problem Relation (Age of Onset)    No Known Problems Mother, Father    Ovarian cancer Sister (42)          Tobacco Use    Smoking status: Former     Current packs/day: 0.00     Types: Cigarettes     Quit date: 2016     Years since quittin.6    Smokeless tobacco: Never    Tobacco comments:     nonex 2 weeks   Substance and Sexual Activity    Alcohol use: No     Alcohol/week: 0.0 standard drinks of alcohol    Drug use: No    Sexual activity: Not on file       Objective:     Vital Signs (Most Recent):  Temp: 97.6  °F (36.4 °C) (04/30/24 0359)  Pulse: 100 (04/30/24 0359)  Resp: (!) 21 (04/30/24 0117)  BP: 117/82 (04/30/24 0359)  SpO2: 100 % (04/30/24 0359) Vital Signs (24h Range):  Temp:  [97.6 °F (36.4 °C)-98.5 °F (36.9 °C)] 97.6 °F (36.4 °C)  Pulse:  [] 100  Resp:  [18-28] 21  SpO2:  [96 %-100 %] 100 %  BP: (102-143)/(58-88) 117/82     Weight: 68 kg (149 lb 14.6 oz)  Body mass index is 24.2 kg/m².     Physical Exam  Constitutional:       General: She is not in acute distress.     Appearance: Normal appearance. She is not ill-appearing.   HENT:      Head: Normocephalic.   Neck:      Comments: JVP visible at tragus  Cardiovascular:      Rate and Rhythm: Normal rate and regular rhythm.      Pulses: Normal pulses.      Heart sounds: Normal heart sounds.   Pulmonary:      Effort: Pulmonary effort is normal.      Breath sounds: Rales present.   Abdominal:      General: Abdomen is flat. There is no distension.      Tenderness: There is no abdominal tenderness.   Musculoskeletal:      Right lower leg: Edema (not quite 1+ but edema is palpable) present.      Left lower leg: No edema.   Skin:     Capillary Refill: Capillary refill takes less than 2 seconds.   Neurological:      Mental Status: She is alert and oriented to person, place, and time.   Psychiatric:         Mood and Affect: Mood normal.         Behavior: Behavior normal.                Significant Labs: All pertinent labs within the past 24 hours have been reviewed.  CBC:   Recent Labs   Lab 04/29/24  1419 04/29/24 2327   WBC 5.80 7.32   HGB 8.2* 8.6*   HCT 24.7* 26.6*   * 654*     CMP:   Recent Labs   Lab 04/29/24  1419 04/29/24 2327    142   K 3.6 3.6    103   CO2 24 26   * 141*   BUN 13 13   CREATININE 1.0 1.1   CALCIUM 9.2 10.2   PROT 6.9 7.5   ALBUMIN 3.4* 3.6   BILITOT 0.9 1.0   ALKPHOS 150* 164*   AST 50* 52*   ALT 52* 57*   ANIONGAP 14 13     Cardiac Markers:   Recent Labs   Lab 04/29/24  2327   BNP 4,531*     Lactic Acid: No  "results for input(s): "LACTATE" in the last 48 hours.  Magnesium:   Recent Labs   Lab 04/29/24  2327 04/30/24  0414   MG 2.0 2.0     Troponin:   Recent Labs   Lab 04/29/24  1419 04/29/24  2327   TROPONINI 0.068* 0.065*       Significant Imaging: I have reviewed all pertinent imaging results/findings within the past 24 hours.  CT: I have reviewed all pertinent results/findings within the past 24 hours and my personal findings are:  no PE  CXR: I have reviewed all pertinent results/findings within the past 24 hours and my personal findings are:  pulm edema      "

## 2024-04-30 NOTE — ED NOTES
Pt changed into hospital gown and placed on continuous cardiac, pulse ox monitoring and BP cuff cycling q15 minutes.

## 2024-04-30 NOTE — ASSESSMENT & PLAN NOTE
Chronic, controlled. Latest blood pressure and vitals reviewed-     Temp:  [97.6 °F (36.4 °C)-98.5 °F (36.9 °C)]   Pulse:  []   Resp:  [18-28]   BP: (102-143)/(58-88)   SpO2:  [96 %-100 %] .   Home meds for hypertension were reviewed and noted below.   Hypertension Medications               spironolactone (ALDACTONE) 25 MG tablet Take 1 tablet (25 mg total) by mouth once daily.    torsemide (DEMADEX) 10 MG Tab Take 2 tablets (20 mg total) by mouth once daily.            While in the hospital, will manage blood pressure as follows; Continue home antihypertensive regimen    Will utilize p.r.n. blood pressure medication only if patient's blood pressure greater than 180/110 and she develops symptoms such as worsening chest pain or shortness of breath.

## 2024-04-30 NOTE — ED TRIAGE NOTES
Patient reports that she has been SOB for the last few days. Reports that she was seen at another hospital earlier today for similar symptoms. Patient reports that she feels like she has some extra fluid built up. Takes Torsemide at home, reports that dose has been increased recently, but still SOB.

## 2024-04-30 NOTE — PLAN OF CARE
Problem: Adult Inpatient Plan of Care  Goal: Plan of Care Review  Outcome: Progressing  Flowsheets (Taken 4/30/2024 1706)  Plan of Care Reviewed With: patient  Goal: Patient-Specific Goal (Individualized)  Outcome: Progressing  Goal: Absence of Hospital-Acquired Illness or Injury  Outcome: Progressing  Intervention: Identify and Manage Fall Risk  Flowsheets (Taken 4/30/2024 1706)  Safety Promotion/Fall Prevention: assistive device/personal item within reach  Intervention: Prevent Skin Injury  Flowsheets (Taken 4/30/2024 1706)  Body Position: position changed independently  Intervention: Prevent and Manage VTE (Venous Thromboembolism) Risk  Flowsheets (Taken 4/30/2024 1706)  VTE Prevention/Management: ROM (active) performed  Intervention: Prevent Infection  Flowsheets (Taken 4/30/2024 1706)  Infection Prevention:   personal protective equipment utilized   cohorting utilized  Goal: Optimal Comfort and Wellbeing  Outcome: Progressing  Intervention: Monitor Pain and Promote Comfort  Flowsheets (Taken 4/30/2024 1706)  Pain Management Interventions: medication offered but refused  Intervention: Provide Person-Centered Care  Flowsheets (Taken 4/30/2024 1706)  Trust Relationship/Rapport:   questions encouraged   care explained  Goal: Readiness for Transition of Care  Outcome: Progressing     Problem: Diabetes Comorbidity  Goal: Blood Glucose Level Within Targeted Range  Outcome: Progressing

## 2024-04-30 NOTE — NURSING
Nurses Note -- 4 Eyes      4/30/2024   6:34 AM      Skin assessed during: Admit      [x] No Altered Skin Integrity Present    []Prevention Measures Documented      [] Yes- Altered Skin Integrity Present or Discovered   [] LDA Added if Not in Epic (Describe Wound)   [] New Altered Skin Integrity was Present on Admit and Documented in LDA   [] Wound Image Taken    Wound Care Consulted? No    Attending Nurse:  Mariana Bolaños RN/Staff Member:  Mani VO

## 2024-04-30 NOTE — PLAN OF CARE
[facility] - Cardiology Stepdown  Initial Discharge Assessment       Primary Care Provider: Yolie Michael MD    Admission Diagnosis: Chest pain [R07.9]  Acute on chronic congestive heart failure, unspecified heart failure type [I50.9]    Admission Date: 4/29/2024  Expected Discharge Date: 5/3/2024    Transition of Care Barriers: None    Payor: HUMANA MANAGED MEDICARE / Plan: HUMANA MEDICARE SELECT PARTNER / Product Type: Medicare Advantage /     Extended Emergency Contact Information  Primary Emergency Contact: Maged Mohr  Address: [address] [place] [address] [facility]  Home Phone: [phone]  Mobile Phone: [phone]  Relation: Spouse  Secondary Emergency Contact: Yael Mohr  Home Phone: [phone]  Relation: Daughter    Discharge Plan A: Home with family, Home Health  Discharge Plan B: Home with family      [facility] - [place], [place] - [address]  [address]  [place] [address]  Phone: [phone] Fax: [phone]    [facility] - [facility] [address] [facility]  [address] [facility] [address]  Phone: [phone] Fax: [phone]      Initial Assessment (most recent)       Adult Discharge Assessment - 04/30/24 1750          Discharge Assessment    Assessment Type Discharge Planning Assessment     Confirmed/corrected address, phone number and insurance Yes     Confirmed Demographics Correct on Facesheet     Source of Information patient;family     When was your last doctors appointment? 04/11/24     Does patient/caregiver understand observation status Yes     Communicated KARLIE with patient/caregiver Yes     Reason For Admission Acute on chronic combined systolic and diastolic congestive heart failure     People in Home spouse     Facility Arrived From: n/a - home     Do you expect to return to your current living situation? Yes     Do you have help at home or someone to help you manage your care at home? Yes     Who are your  caregiver(s) and their phone number(s)? Maged Mohr (Spouse)  [phone]   &   Aide from [facility]     Prior to hospitilization cognitive status: Alert/Oriented     Current cognitive status: Alert/Oriented     Walking or Climbing Stairs Difficulty yes     Walking or Climbing Stairs ambulation difficulty, requires equipment     Mobility Management Walker     Dressing/Bathing Difficulty yes     Dressing/Bathing bathing difficulty, assistance 1 person     Dressing/Bathing  from [facility]     Home Accessibility wheelchair accessible     Equipment Currently Used at Home walker, standard     Readmission within 30 days? Yes     Patient currently being followed by outpatient case management? No     Do you currently have service(s) that help you manage your care at home? Yes     How Many hours does patient receive services 4     Name and Contact number of agency Aide from [facility]     Is the pt/caregiver preference to resume services with current agency Yes     Do you take prescription medications? Yes     Do you have prescription coverage? Yes     Coverage HUMANA MANAGED MEDICARE/HUMANA MEDICARE SELECT PARTNER     Do you have any problems affording any of your prescribed medications? No     Is the patient taking medications as prescribed? yes     Who is going to help you get home at discharge? Maged Mohr (Spouse)  [phone]     How do you get to doctors appointments? family or friend will provide     Are you on dialysis? No     Do you take coumadin? No     Discharge Plan A Home with family;Home Health     Discharge Plan B Home with family     DME Needed Upon Discharge  other (see comments)   TBD    Discharge Plan discussed with: Patient;Spouse/sig other     Name(s) and Number(s) Magde Mohr (Spouse)  [phone]     Transition of Care Barriers None        Physical Activity    On average, how many days per week do you engage in moderate to  strenuous exercise (like a brisk walk)? 0 days     On average, how many minutes do you engage in exercise at this level? 0 min        Financial Resource Strain    How hard is it for you to pay for the very basics like food, housing, medical care, and heating? Somewhat hard        Housing Stability    In the last 12 months, was there a time when you were not able to pay the mortgage or rent on time? No     At any time in the past 12 months, were you homeless or living in a shelter (including now)? No        Transportation Needs    In the past 12 months, has lack of transportation kept you from medical appointments or from getting medications? No     In the past 12 months, has lack of transportation kept you from meetings, work, or from getting things needed for daily living? No        Food Insecurity    Within the past 12 months, you worried that your food would run out before you got the money to buy more. Sometimes true     Within the past 12 months, the food you bought just didn't last and you didn't have money to get more. Never true        Stress    Do you feel stress - tense, restless, nervous, or anxious, or unable to sleep at night because your mind is troubled all the time - these days? Only a little        Alcohol Use    Q1: How often do you have a drink containing alcohol? Never     Q2: How many drinks containing alcohol do you have on a typical day when you are drinking? Patient does not drink     Q3: How often do you have six or more drinks on one occasion? Never        OTHER    Name(s) of People in Home Maged Mohr (Spouse)  [phone]                     Readmission Assessment (most recent)       Readmission Assessment - 04/30/24 2453          Readmission    Was this a planned readmission? No     Why were you hospitalized in the last 30 days? Acute on chronic congestive heart failure, unspecified heart failure type     Why were you readmitted? Alarmed about signs/symptoms     When you left the  hospital how did you feel? unclear     When you left the hospital where did you go? Home with Family     Did patient/caregiver refused recommended DC plan? No     Tell me about what happened between when you left the hospital and the day you returned. SOB,     When did you start not feeling well? couple of days     Did you try to manage your symptoms your self? No     Did you call anyone? Yes     Who did you call? PCP;On Call Nurse     Did you try to see or did see a doctor or nurse before you came? Yes     Did you have  a follow-up appointment on discharge? Yes (P)      Did you go? Yes (P)                       Pt lives with Maged Mohr (Spouse)  [phone]. Pt does not use HH, HD or coumadin. Pt has an Aide from [facility] (Tues and Fri). Pt uses a std Walker. Pt requires assistance with bathing and dressing. Pt does not drive. Pt's family will assist with transport at discharge.    Discharge Plan A Home with family;Home Health    Discharge Plan B Home with family    DME Needed Upon Discharge  other (see comments)   TBD     No other discharge needs identified at this time.    Discharge Plan A and Plan B have been determined by review of patient's clinical status, future medical and therapeutic needs, and coverage/benefits for post-acute care in coordination with multidisciplinary team members.     Matilde Godoy LMSW  Case Management [facility]  [phone]

## 2024-04-30 NOTE — ED NOTES
Assumed pt care at this time. The patient is awake, alert and cooperative with a calm affect, patient is aware of environment. Airway is open and patent, respirations are spontaneous, normal respiratory effort and rate noted,  skin warm and dry, moves all extremities well, appearance: no apparent distress noted. Continuous cardiac monitor placed on pt. Bed locked and in lowest position with side rails up, call light within reach.

## 2024-04-30 NOTE — CONSULTS
Food & Nutrition  Education     Diet Education: Fluid and Salt restriction   Time Spent: 10 minutes   Learners: Patient and Caregivers     Handouts provided: Low Sodium Nutrition Therapy, Fluid-Restricted Nutrition Therapy      Comments: Pt was re-educated about recommended diets (last seen on 4/18). Discussed importance of a low sodium diet. Reviewed high sodium foods that should be avoided. Food labels, salt free seasonings, and recommended sodium intake reviewed. Encouraged healthy, fresh foods that are low in sodium that are good for consumption. Fluid intake and conversions discussed. Foods considered fluids were reviewed and encouraged to monitor. General healthy diet encouraged. All questions/concerns were addressed.      Follow-Up: Yes     Please Re-consult as needed.   Thanks!    Angelica Avilez, MS, RD, LDN

## 2024-04-30 NOTE — H&P
[facility] - Emergency Dept  [facility] Medicine  History & Physical    Patient Name: Diomedes Mohr  MRN: [id number]  Patient Class: OP- Observation  Admission Date: 4/29/2024  Attending Physician: Karlee Hooper MD   Primary Care Provider: Yolie Michael MD         Patient information was obtained from patient, past medical records, and ER records.     Subjective:     Principal Problem:Acute on chronic congestive heart failure    Chief Complaint:   Chief Complaint   Patient presents with    Shortness of Breath    Chest Pain     Beginning Sunday- worse when laying down. D/c'd from [place] ED today w/ same complaints. Hx of CHF.         HPI: Diomedes Mohr is a 63 y.o. female with heart failure with reduced ejection fraction (left ventricular ejection fraction 15%), AICD, hypertension, T2 DM, HLD, former tobacco use, stroke, PE, pulmonary hypertension, failure to thrive, who is presenting to [facility] ED with a 3 day history of worsening shortness of breath and inability to lay flat.  She follows with Dr. Smith in HTS clinic, who noted in her 4/25 visit note that pt has had difficulty taking Entresto 49-51 BID as prescribed.  On presentation there is associated with subjective bilateral lower extremity swelling.  Nonproductive cough.  No fevers or chills.  She has had multiple recent ED presentations for similar symptoms in the past, most recently 4/18.  She is unsure of any specific trigger this time such as a salty meal or excessive fluids.  In the emergency room her vitals are stable, not hypoxic on room air.  BNP 4300.  Chest x-ray shows bilateral hilar congestion. Admitted for acute exacerbation of her heart failure. Other disease processes seem stable.    Past Medical History:   Diagnosis Date    Acute on chronic combined systolic and diastolic heart failure 12/26/2019    ARBEN (acute kidney injury) 5/30/2023    Anticoagulant long-term use     Anxiety     Arthritis     Asthma     Depression     H/O:  hysterectomy     Hyperlipemia     Hypertension     Pulmonary edema     Schizophrenia        Past Surgical History:   Procedure Laterality Date    BLADDER SUSPENSION      CARPAL TUNNEL RELEASE Right     HEEL SPUR SURGERY Left     HYSTERECTOMY      LEFT HEART CATHETERIZATION Bilateral 12/27/2019    Procedure: Left heart cath;  Surgeon: Steve Chambers MD;  Location: [facility] CATH LAB;  Service: Cardiology;  Laterality: Bilateral;    RIGHT HEART CATHETERIZATION Right 7/26/2021    Procedure: INSERTION, CATHETER, RIGHT HEART;  Surgeon: Petr Naranjo MD;  Location: [facility] CATH LAB;  Service: Cardiology;  Laterality: Right;    RIGHT HEART CATHETERIZATION Right 5/12/2022    Procedure: INSERTION, CATHETER, RIGHT HEART;  Surgeon: Chandana Ivory Jr., MD;  Location: [facility] CATH LAB;  Service: Cardiology;  Laterality: Right;    TUBAL LIGATION         Review of patient's allergies indicates:   Allergen Reactions    Adhesive Blisters    Captopril Other (See Comments)     COUGH       Current Facility-Administered Medications   Medication Dose Route Frequency Provider Last Rate Last Admin    acetaminophen tablet 650 mg  650 mg Oral Q8H PRN Mann Sotomayor MD        aluminum & magnesium hydroxide-simethicone 400-400-40 mg/5 mL suspension 30 mL  30 mL Oral Q6H PRN Mann Sotomayor MD        apixaban tablet 5 mg  5 mg Oral BID Mann Sotomayor MD        aspirin EC tablet 81 mg  81 mg Oral Daily Mann Sotomayor MD        atorvastatin tablet 80 mg  80 mg Oral Daily Mann Sotomayor MD        cyproheptadine 4 mg tablet 4 mg  4 mg Oral BID Mann Sotomayor MD        dextrose 50% injection 12.5 g  12.5 g Intravenous PRN Mann Sotomayor MD        dextrose 50% injection 25 g  25 g Intravenous PRN Mann Sotomayor MD        furosemide injection 40 mg  40 mg Intravenous BID Mann Sotomayor MD        glucagon (human recombinant) injection 1 mg  1 mg Intramuscular PRN Mann Sotomayor MD        glucose chewable tablet 16  g  16 g Oral PRN Mann Sotomayor MD        glucose chewable tablet 24 g  24 g Oral PRN Mann Sotomayor MD        insulin aspart U-100 pen 0-5 Units  0-5 Units Subcutaneous QID (AC + HS) PRN Mann Sotomayor MD        LIDOcaine 5 % patch 1 patch  1 patch Transdermal ED 1 Time Layo Simon MD   1 patch at 04/29/24 2341    LIDOcaine 5 % patch 2 patch  2 patch Transdermal Daily Mann Sotomayor MD        melatonin tablet 6 mg  6 mg Oral Nightly PRN Mann Sotomayor MD        midodrine tablet 10 mg  10 mg Oral TID Mann Sotomayor MD        mirtazapine tablet 7.5 mg  7.5 mg Oral QHS Mann Sotomayor MD        ondansetron disintegrating tablet 4 mg  4 mg Oral Q8H PRN Mann Sotomayor MD        potassium chloride SA CR tablet 20 mEq  20 mEq Oral Daily Mann Sotomayor MD        sacubitriL-valsartan 49-51 mg per tablet 1 tablet  1 tablet Oral BID Mann Sotomayor MD        senna tablet 2 tablet  2 tablet Oral QHS Mann Sotomayor MD   2 tablet at 04/30/24 0414    sodium chloride 0.9% flush 10 mL  10 mL Intravenous PRN Mann Sotomayor MD        spironolactone tablet 25 mg  25 mg Oral Daily Mann Sotomayor MD         Current Outpatient Medications   Medication Sig Dispense Refill    acetaminophen (TYLENOL) 325 MG tablet Take 2 tablets (650 mg total) by mouth every 8 (eight) hours as needed. 30 tablet 0    apixaban (ELIQUIS) 5 mg Tab TAKE 1 TABLET BY MOUTH TWICE DAILY. 180 tablet 3    aspirin (ECOTRIN) 81 MG EC tablet Take 1 tablet (81 mg total) by mouth once daily. 30 tablet 11    atorvastatin (LIPITOR) 80 MG tablet Take 1 tablet (80 mg total) by mouth once daily. 90 tablet 3    cyproheptadine (PERIACTIN) 4 mg tablet Take 1 tablet (4 mg total) by mouth 2 (two) times daily. (Patient taking differently: Take 4 mg by mouth 2 (two) times daily as needed.) 120 tablet 0    dulaglutide (TRULICITY) 1.5 mg/0.5 mL pen injector Inject 1.5 mg into the skin Every Friday.      LIDOcaine (LIDODERM) 5 % Place 2 patches onto  the skin once daily. Remove & Discard patch within 12 hours or as directed by MD 60 patch 0    midodrine (PROAMATINE) 10 MG tablet Take 1 tablet (10 mg total) by mouth 3 (three) times daily. 90 tablet 1    mirtazapine (REMERON) 7.5 MG Tab Take 1 tablet (7.5 mg total) by mouth every evening. 30 tablet 11    ondansetron (ZOFRAN-ODT) 4 MG TbDL Take 1 tablet (4 mg total) by mouth every 8 (eight) hours as needed (nausea). 30 tablet 1    oxyCODONE-acetaminophen (PERCOCET) 5-325 mg per tablet Take 1 tablet by mouth every 8 (eight) hours as needed for Pain. 90 tablet 0    potassium chloride (K-DUR,KLOR-CON, K-TAB) 20 MEQ tablet Take 1 tablet (20 mEq total) by mouth once daily. 60 tablet 0    SENNA 8.6 mg tablet Take 2 tablets by mouth every evening. (Patient taking differently: Take 2 tablets by mouth nightly as needed.) 30 tablet 0    spironolactone (ALDACTONE) 25 MG tablet Take 1 tablet (25 mg total) by mouth once daily. 30 tablet 11    torsemide (DEMADEX) 10 MG Tab Take 2 tablets (20 mg total) by mouth once daily.      trihexyphenidyL (ARTANE) 2 MG tablet Take 0.5 tablets (1 mg total) by mouth 3 (three) times daily with meals. 45 tablet 11     Family History       Problem Relation (Age of Onset)    No Known Problems Mother, Father    Ovarian cancer Sister (42)          Tobacco Use    Smoking status: Former     Current packs/day: 0.00     Types: Cigarettes     Quit date: 2016     Years since quittin.6    Smokeless tobacco: Never    Tobacco comments:     nonex 2 weeks   Substance and Sexual Activity    Alcohol use: No     Alcohol/week: 0.0 standard drinks of alcohol    Drug use: No    Sexual activity: Not on file       Objective:     Vital Signs (Most Recent):  Temp: 97.6 °F (36.4 °C) (24)  Pulse: 100 (24)  Resp: (!) 21 (24 0117)  BP: 117/82 (24)  SpO2: 100 % (24) Vital Signs (24h Range):  Temp:  [97.6 °F (36.4 °C)-98.5 °F (36.9 °C)] 97.6 °F (36.4 °C)  Pulse:   "[] 100  Resp:  [18-28] 21  SpO2:  [96 %-100 %] 100 %  BP: (102-143)/(58-88) 117/82     Weight: 68 kg (149 lb 14.6 oz)  Body mass index is 24.2 kg/m².     Physical Exam  Constitutional:       General: She is not in acute distress.     Appearance: Normal appearance. She is not ill-appearing.   HENT:      Head: Normocephalic.   Neck:      Comments: JVP visible at tragus  Cardiovascular:      Rate and Rhythm: Normal rate and regular rhythm.      Pulses: Normal pulses.      Heart sounds: Normal heart sounds.   Pulmonary:      Effort: Pulmonary effort is normal.      Breath sounds: Rales present.   Abdominal:      General: Abdomen is flat. There is no distension.      Tenderness: There is no abdominal tenderness.   Musculoskeletal:      Right lower leg: Edema (not quite 1+ but edema is palpable) present.      Left lower leg: No edema.   Skin:     Capillary Refill: Capillary refill takes less than 2 seconds.   Neurological:      Mental Status: She is alert and oriented to person, place, and time.   Psychiatric:         Mood and Affect: Mood normal.         Behavior: Behavior normal.                Significant Labs: All pertinent labs within the past 24 hours have been reviewed.  CBC:   Recent Labs   Lab 04/29/24 1419 04/29/24 2327   WBC 5.80 7.32   HGB 8.2* 8.6*   HCT 24.7* 26.6*   * 654*     CMP:   Recent Labs   Lab 04/29/24 1419 04/29/24 2327    142   K 3.6 3.6    103   CO2 24 26   * 141*   BUN 13 13   CREATININE 1.0 1.1   CALCIUM 9.2 10.2   PROT 6.9 7.5   ALBUMIN 3.4* 3.6   BILITOT 0.9 1.0   ALKPHOS 150* 164*   AST 50* 52*   ALT 52* 57*   ANIONGAP 14 13     Cardiac Markers:   Recent Labs   Lab 04/29/24 2327   BNP 4,531*     Lactic Acid: No results for input(s): "LACTATE" in the last 48 hours.  Magnesium:   Recent Labs   Lab 04/29/24 2327 04/30/24  0414   MG 2.0 2.0     Troponin:   Recent Labs   Lab 04/29/24 1419 04/29/24 2327   TROPONINI 0.068* 0.065*       Significant Imaging: I " have reviewed all pertinent imaging results/findings within the past 24 hours.  CT: I have reviewed all pertinent results/findings within the past 24 hours and my personal findings are:  no PE  CXR: I have reviewed all pertinent results/findings within the past 24 hours and my personal findings are:  pulm edema      Assessment/Plan:     * Acute on chronic congestive heart failure  ICD in place  HTN  HLD    Patient's current symptoms include: Dyspnea at rest, Fatigue, cough, Lower extremity edema, and Orthopnea  Symptoms are worsening since 4/24/24.  Patient's most recent Echo: Results for orders placed during the hospital encounter of 08/30/23    Echo    Interpretation Summary    Left Ventricle: The left ventricle is severely dilated. Normal wall thickness. Severe global hypokinesis present. There is severely reduced systolic function with a visually estimated ejection fraction of 15 - 20%. Biplane (2D) method of discs ejection fraction is 17%. Grade I diastolic dysfunction.    Right Ventricle: Normal right ventricular cavity size. Wall thickness is normal. Right ventricle wall motion  is normal. Systolic function is normal. Pacemaker lead present in the ventricle.    Aortic Valve: The aortic valve is a trileaflet valve. There is mild aortic valve sclerosis. Mild annular calcification.    IVC/SVC: Intermediate venous pressure at 8 mmHg.    Patient's most recent cardiac biomarkers:   Recent Labs   Lab 04/29/24  2327   BNP 4,531*     Based on the 2022 AHA/ACC/HFSA Heart Failure Guideline, the patient meets criteria to be staged as: Stage D: Advanced HF. Marked HF symptoms that interfere with daily life and recurrent hospitalizations despite GDMT Combined Systolic and Diastolic      PLAN FOR INITIAL STABILIZATION PER 2022 AHA/ACC/HFSA CHF GUIDELINE:  IV diuresis with Lasix 80 IV once then 40 IV BID and monitor response.  Goal diuresis net 2L/day loss. Home diuretic regimen: Torsemide 20 daily  Additional GDMT at this  "time to include :  Entresto 49-51 per Dr. Smith's 4/25 clinic visit bc robust BP and her Crt appears to be at baseline  BB as hemodynamics allow, with dose titration per pt's subsequent stability  Spironolactone given EF < 20 and NYHA Class III-IV at baseline  SGLT2i to be prescribed on DC, as it is not on [facility]'s formulary  Statin  Will defer repeat TTE timing to [facility] since pt follows closely with Dr Smith, and pt's current symptoms are not concerning for a possible change in clinical condition since that time.  Will notify [facility] that she is admitted.  Maintain on telemetry and daily EKGs   Cardiac diet when certain that pt will not go to cath lab, with max 2g/day NaCl and fluid restriction at 1500 cc/day.  Strict I/Os and daily weights.  Dry weight unknown.  Weight on admission not measured.  Obtain current risk stratification data: TSH, Lipids, HbA1c with optimization of risk factors is necessary  Check electrolytes daily, keep Mag >2 & K+ >4  SCDs, TEDs, Nursing communication to elevated LE. Ambulate as tolerated.      Impaired functional mobility, balance, and endurance  Fall precautions in place.  Dr. Smith placed outpatient PT and OT referrals: will ask them to come evaluate her whilst admitted if possible but understand that PT and OT are very busy and may triage her as having a less acute need than their other patients.      Type 2 diabetes mellitus with other specified complication, without long-term current use of insulin  Patient's FSGs are controlled on current medication regimen.  Last A1c reviewed-   Lab Results   Component Value Date    HGBA1C 5.7 (H) 04/17/2024     Most recent fingerstick glucose reviewed- No results for input(s): "POCTGLUCOSE" in the last 24 hours.  Current correctional scale  Low  Maintain anti-hyperglycemic dose as follows-   Antihyperglycemics (From admission, onward)      None          Hold Oral hypoglycemics while patient is in the hospital.    History of pulmonary " embolism  Continue home Eliquis    Hyperlipemia  PVD    Continue home statin      Hypertension  Chronic, controlled. Latest blood pressure and vitals reviewed-     Temp:  [97.6 °F (36.4 °C)-98.5 °F (36.9 °C)]   Pulse:  []   Resp:  [18-28]   BP: (102-143)/(58-88)   SpO2:  [96 %-100 %] .   Home meds for hypertension were reviewed and noted below.   Hypertension Medications               spironolactone (ALDACTONE) 25 MG tablet Take 1 tablet (25 mg total) by mouth once daily.    torsemide (DEMADEX) 10 MG Tab Take 2 tablets (20 mg total) by mouth once daily.            While in the hospital, will manage blood pressure as follows; Continue home antihypertensive regimen    Will utilize p.r.n. blood pressure medication only if patient's blood pressure greater than 180/110 and she develops symptoms such as worsening chest pain or shortness of breath.      VTE Risk Mitigation (From admission, onward)           Ordered     apixaban tablet 5 mg  2 times daily         04/30/24 0314     IP VTE HIGH RISK PATIENT  Once         04/30/24 0310     Place sequential compression device  Until discontinued         04/30/24 0310                           On 04/30/2024, patient should be placed in hospital observation services under my care in collaboration with Dr. Mendez.         Mann Sotomayor MD  Department of Hospital Medicine  [facility] - Emergency Dept

## 2024-04-30 NOTE — TELEPHONE ENCOUNTER
Daughter calling, states pt having difficulty breathing. Pt was seen in ER today, reports worsening symptoms. States current pulse ox 88% on room air. Pt states wheezing. Denies fever. States pt is breathing hard. Per protocol, go to ED now. Advised EMS if needed and to call back for any further questions or concerns.   Reason for Disposition   [1] MODERATE difficulty breathing (e.g., speaks in phrases, SOB even at rest, pulse 100-120) AND [2] NEW-onset or WORSE than normal    Additional Information   Negative: SEVERE difficulty breathing (e.g., struggling for each breath, speaks in single words)   Negative: [1] Breathing stopped AND [2] hasn't returned   Negative: Choking on something   Negative: Bluish (or gray) lips or face now   Negative: Difficult to awaken or acting confused (e.g., disoriented, slurred speech)   Negative: Passed out (i.e., lost consciousness, collapsed and was not responding)   Negative: Wheezing started suddenly after medicine, an allergic food or bee sting   Negative: Stridor (harsh sound while breathing in)   Negative: Slow, shallow and weak breathing   Negative: Sounds like a life-threatening emergency to the triager    Protocols used: Breathing Difficulty-A-AH

## 2024-05-01 VITALS
HEART RATE: 106 BPM | DIASTOLIC BLOOD PRESSURE: 65 MMHG | WEIGHT: 124 LBS | TEMPERATURE: 98 F | BODY MASS INDEX: 19.93 KG/M2 | SYSTOLIC BLOOD PRESSURE: 96 MMHG | RESPIRATION RATE: 18 BRPM | OXYGEN SATURATION: 99 % | HEIGHT: 66 IN

## 2024-05-01 LAB
ANION GAP SERPL CALC-SCNC: 10 MMOL/L (ref 8–16)
BASOPHILS # BLD AUTO: 0.04 K/UL (ref 0–0.2)
BASOPHILS NFR BLD: 0.6 % (ref 0–1.9)
BUN SERPL-MCNC: 9 MG/DL (ref 8–23)
CALCIUM SERPL-MCNC: 8.9 MG/DL (ref 8.7–10.5)
CHLORIDE SERPL-SCNC: 103 MMOL/L (ref 95–110)
CO2 SERPL-SCNC: 28 MMOL/L (ref 23–29)
CREAT SERPL-MCNC: 0.9 MG/DL (ref 0.5–1.4)
DIFFERENTIAL METHOD BLD: ABNORMAL
EOSINOPHIL # BLD AUTO: 0.3 K/UL (ref 0–0.5)
EOSINOPHIL NFR BLD: 3.6 % (ref 0–8)
ERYTHROCYTE [DISTWIDTH] IN BLOOD BY AUTOMATED COUNT: 17 % (ref 11.5–14.5)
EST. GFR  (NO RACE VARIABLE): >60 ML/MIN/1.73 M^2
GLUCOSE SERPL-MCNC: 106 MG/DL (ref 70–110)
HCT VFR BLD AUTO: 26.4 % (ref 37–48.5)
HGB BLD-MCNC: 8.9 G/DL (ref 12–16)
IMM GRANULOCYTES # BLD AUTO: 0.02 K/UL (ref 0–0.04)
IMM GRANULOCYTES NFR BLD AUTO: 0.3 % (ref 0–0.5)
LYMPHOCYTES # BLD AUTO: 2.3 K/UL (ref 1–4.8)
LYMPHOCYTES NFR BLD: 32.2 % (ref 18–48)
MAGNESIUM SERPL-MCNC: 2 MG/DL (ref 1.6–2.6)
MCH RBC QN AUTO: 28.8 PG (ref 27–31)
MCHC RBC AUTO-ENTMCNC: 33.7 G/DL (ref 32–36)
MCV RBC AUTO: 85 FL (ref 82–98)
MONOCYTES # BLD AUTO: 0.6 K/UL (ref 0.3–1)
MONOCYTES NFR BLD: 8.5 % (ref 4–15)
NEUTROPHILS # BLD AUTO: 3.9 K/UL (ref 1.8–7.7)
NEUTROPHILS NFR BLD: 54.8 % (ref 38–73)
NRBC BLD-RTO: 0 /100 WBC
PHOSPHATE SERPL-MCNC: 3.3 MG/DL (ref 2.7–4.5)
PLATELET # BLD AUTO: 570 K/UL (ref 150–450)
PMV BLD AUTO: 10.5 FL (ref 9.2–12.9)
POCT GLUCOSE: 152 MG/DL (ref 70–110)
POCT GLUCOSE: 96 MG/DL (ref 70–110)
POTASSIUM SERPL-SCNC: 3.2 MMOL/L (ref 3.5–5.1)
RBC # BLD AUTO: 3.09 M/UL (ref 4–5.4)
SODIUM SERPL-SCNC: 141 MMOL/L (ref 136–145)
WBC # BLD AUTO: 7.18 K/UL (ref 3.9–12.7)

## 2024-05-01 PROCEDURE — 85025 COMPLETE CBC W/AUTO DIFF WBC: CPT

## 2024-05-01 PROCEDURE — 36415 COLL VENOUS BLD VENIPUNCTURE: CPT

## 2024-05-01 PROCEDURE — 63600175 PHARM REV CODE 636 W HCPCS

## 2024-05-01 PROCEDURE — 80048 BASIC METABOLIC PNL TOTAL CA: CPT

## 2024-05-01 PROCEDURE — 25000003 PHARM REV CODE 250

## 2024-05-01 PROCEDURE — 84100 ASSAY OF PHOSPHORUS: CPT

## 2024-05-01 PROCEDURE — 83735 ASSAY OF MAGNESIUM: CPT

## 2024-05-01 RX ORDER — POTASSIUM CHLORIDE 20 MEQ/1
40 TABLET, EXTENDED RELEASE ORAL ONCE
Status: COMPLETED | OUTPATIENT
Start: 2024-05-01 | End: 2024-05-01

## 2024-05-01 RX ORDER — TORSEMIDE 10 MG/1
40 TABLET ORAL 2 TIMES DAILY
Qty: 240 TABLET | Refills: 3 | Status: SHIPPED | OUTPATIENT
Start: 2024-05-01 | End: 2024-08-29

## 2024-05-01 RX ADMIN — APIXABAN 5 MG: 5 TABLET, FILM COATED ORAL at 09:05

## 2024-05-01 RX ADMIN — LIDOCAINE 2 PATCH: 50 PATCH TOPICAL at 09:05

## 2024-05-01 RX ADMIN — SPIRONOLACTONE 25 MG: 25 TABLET ORAL at 09:05

## 2024-05-01 RX ADMIN — SACUBITRIL AND VALSARTAN 1 TABLET: 49; 51 TABLET, FILM COATED ORAL at 09:05

## 2024-05-01 RX ADMIN — POTASSIUM CHLORIDE 40 MEQ: 1500 TABLET, EXTENDED RELEASE ORAL at 09:05

## 2024-05-01 RX ADMIN — CYPROHEPTADINE HYDROCHLORIDE 4 MG: 4 TABLET ORAL at 09:05

## 2024-05-01 RX ADMIN — FUROSEMIDE 40 MG: 10 INJECTION, SOLUTION INTRAVENOUS at 09:05

## 2024-05-01 RX ADMIN — ATORVASTATIN CALCIUM 80 MG: 40 TABLET, FILM COATED ORAL at 09:05

## 2024-05-01 RX ADMIN — MIDODRINE HYDROCHLORIDE 10 MG: 5 TABLET ORAL at 09:05

## 2024-05-01 RX ADMIN — ASPIRIN 81 MG: 81 TABLET, COATED ORAL at 09:05

## 2024-05-01 NOTE — DISCHARGE SUMMARY
Dmitriy Triana - Cardiology [facility] Medicine  Discharge Summary      Patient Name: Diomedes Mohr  MRN: [id number]  JOSE: [id number]  Patient Class: IP- Inpatient  Admission Date: 4/29/2024  Hospital Length of Stay: 1 days  Discharge Date and Time:  05/01/2024 12:03 PM  Attending Physician: Karlee Hooper MD   Discharging Provider: Ester Kaur MD  Primary Care Provider: Yolie Michael MD  Hospital Medicine Team: [facility] 2 Ester Kaur MD  Primary Care Team: [facility] 2    HPI:   Diomedes Mohr is a 63 y.o. female with heart failure with reduced ejection fraction (left ventricular ejection fraction 15%), AICD, hypertension, T2 DM, HLD, former tobacco use, stroke, PE, pulmonary hypertension, failure to thrive, who is presenting to [facility] ED with a 3 day history of worsening shortness of breath and inability to lay flat.  She follows with Dr. Smith in HTS clinic, who noted in her 4/25 visit note that pt has had difficulty taking Entresto 49-51 BID as prescribed.  On presentation there is associated with subjective bilateral lower extremity swelling.  Nonproductive cough.  No fevers or chills.  She has had multiple recent ED presentations for similar symptoms in the past, most recently 4/18.  She is unsure of any specific trigger this time such as a salty meal or excessive fluids.  In the emergency room her vitals are stable, not hypoxic on room air.  BNP 4300.  Chest x-ray shows bilateral hilar congestion. Admitted for acute exacerbation of her heart failure. Other disease processes seem stable.    * No surgery found *      Hospital Course:   Admitted to [facility] Medicine for acute on chronic heart failure exacerbation which improved after IV diureses. Stable for discharge on 5/1 with increased dose of home diuretic regimen (torsemide 40 BID), restarted jardiance on discharge. Pt was restarted on entresto during admission as BP tolerated, blood pressures on lower end of normal so opted  to have patient follow up with her outpatient cardiologist to maximize GDMT for heart failure following discharge. [facility] for transitional care referral was sent on discharge. Pt reported to be at baseline and amenable with discharge home on 5/1.     Goals of Care Treatment Preferences:  Code Status: Full Code    Health care agent: Maged Mohr  [facility] care agent number: [phone]          What is most important right now is to focus on curative/life-prolongation (regardless of treatment burdens).  Accordingly, we have decided that the best plan to meet the patient's goals includes continuing with treatment.      Consults:   Consults (From admission, onward)          Status Ordering Provider     Inpatient consult to Social Work/Case Management  Once        Provider:  (Not yet assigned)    Acknowledged YOLANDA SOSA     Inpatient consult to Registered Dietitian/Nutritionist  Once        Provider:  (Not yet assigned)    Completed YOLANDA SOSA            Cardiac/Vascular  * Acute on chronic combined systolic and diastolic congestive heart failure  ICD in place  HTN  HLD    Patient's current symptoms include: Dyspnea at rest, Fatigue, cough, Lower extremity edema, and Orthopnea  Symptoms are worsening since 4/24/24.  Patient's most recent Echo: Results for orders placed during the hospital encounter of 08/30/23    Echo    Interpretation Summary    Left Ventricle: The left ventricle is severely dilated. Normal wall thickness. Severe global hypokinesis present. There is severely reduced systolic function with a visually estimated ejection fraction of 15 - 20%. Biplane (2D) method of discs ejection fraction is 17%. Grade I diastolic dysfunction.    Right Ventricle: Normal right ventricular cavity size. Wall thickness is normal. Right ventricle wall motion  is normal. Systolic function is normal. Pacemaker lead present in the ventricle.    Aortic Valve: The aortic valve is a trileaflet valve. There  is mild aortic valve sclerosis. Mild annular calcification.    IVC/SVC: Intermediate venous pressure at 8 mmHg.    Patient's most recent cardiac biomarkers:   Recent Labs   Lab 04/29/24  2327   BNP 4,531*       Based on the 2022 AHA/ACC/HFSA Heart Failure Guideline, the patient meets criteria to be staged as: Stage D: Advanced HF. Marked HF symptoms that interfere with daily life and recurrent hospitalizations despite GDMT Combined Systolic and Diastolic      PLAN FOR INITIAL STABILIZATION PER 2022 AHA/ACC/HFSA CHF GUIDELINE:  IV diuresis with Lasix 80 IV once then 40 IV BID and monitor response.  Goal diuresis net 2L/day loss. Home diuretic regimen: Torsemide 20 daily  Additional GDMT at this time to include :  Entresto 49-51 per Dr. Smith's 4/25 clinic visit bc robust BP and her Crt appears to be at baseline  BB as hemodynamics allow, with dose titration per pt's subsequent stability  Spironolactone given EF < 20 and NYHA Class III-IV at baseline  SGLT2i to be prescribed on [place], as it is not on [facility]'s formulary  Statin  Will defer repeat TTE timing to [facility] since pt follows closely with Dr Smith, and pt's current symptoms are not concerning for a possible change in clinical condition since that time.  Will notify [facility] that she is admitted.  Maintain on telemetry and daily EKGs   Cardiac diet when certain that pt will not go to cath lab, with max 2g/day NaCl and fluid restriction at 1500 cc/day.  Strict I/Os and daily weights.  Dry weight unknown.  Weight on admission not measured.  Obtain current risk stratification data: TSH, Lipids, HbA1c with optimization of risk factors is necessary  Check electrolytes daily, keep Mag >2 & K+ >4  SCDs, TEDs, Nursing communication to elevated LE. Ambulate as tolerated.        Final Active Diagnoses:    Diagnosis Date Noted POA    PRINCIPAL PROBLEM:  Acute on chronic combined systolic and diastolic congestive heart failure [I50.43] 04/17/2024 Yes    Sequela of cardioembolic stroke  [I69.30] 04/30/2024 Not Applicable     Chronic    Impaired functional mobility, balance, and endurance [Z74.09] 03/11/2024 Yes    PVD (peripheral vascular disease) [I73.9] 06/21/2023 Yes    Long term current use of anticoagulant [Z79.01] 04/29/2023 Not Applicable     Chronic    Pulmonary hypertension due to left heart disease [I27.22] 02/24/2023 Yes     Chronic    Anemia in other chronic diseases classified elsewhere [D63.8] 10/25/2022 Yes    History of pulmonary embolism [Z86.711] 07/29/2022 Yes     Chronic    ICD (implantable cardioverter-defibrillator) in place [Z95.810] 03/31/2022 Yes     Chronic    Hyperlipemia [E78.5] 12/25/2019 Yes     Chronic    Hypertension [I10] 12/25/2019 Yes     Chronic    Type 2 diabetes mellitus with other specified complication, without long-term current use of insulin [E11.69]  Yes      Problems Resolved During this Admission:       Discharged Condition: stable    Disposition: Home or Self Care    Follow Up:    Patient Instructions:      Ambulatory referral/consult to [facility]   Standing Status: Future   Referral Priority: Routine Referral Type: Consultation   Referral Reason: Specialty Services Required   Number of Visits Requested: 1       Significant Diagnostic Studies: Labs: CMP   Recent Labs   Lab 04/29/24  1419 04/29/24 2327 05/01/24  0233    142 141   K 3.6 3.6 3.2*    103 103   CO2 24 26 28   * 141* 106   BUN 13 13 9   CREATININE 1.0 1.1 0.9   CALCIUM 9.2 10.2 8.9   PROT 6.9 7.5  --    ALBUMIN 3.4* 3.6  --    BILITOT 0.9 1.0  --    ALKPHOS 150* 164*  --    AST 50* 52*  --    ALT 52* 57*  --    ANIONGAP 14 13 10    and CBC   Recent Labs   Lab 04/29/24  1419 04/29/24 2327 05/01/24  0233   WBC 5.80 7.32 7.18   HGB 8.2* 8.6* 8.9*   HCT 24.7* 26.6* 26.4*   * 654* 570*       Pending Diagnostic Studies:       None           Medications:  Reconciled Home Medications:      Medication List        START taking these medications       empagliflozin 10 mg tablet  Commonly known as: Jardiance  Take 1 tablet (10 mg total) by mouth once daily.            CHANGE how you take these medications      torsemide 10 MG Tab  Commonly known as: DEMADEX  Take 4 tablets (40 mg total) by mouth 2 (two) times a day.  What changed:   how much to take  when to take this            CONTINUE taking these medications      acetaminophen 325 MG tablet  Commonly known as: TYLENOL  Take 2 tablets (650 mg total) by mouth every 8 (eight) hours as needed.     aspirin 81 MG EC tablet  Commonly known as: ECOTRIN  Take 1 tablet (81 mg total) by mouth once daily.     atorvastatin 80 MG tablet  Commonly known as: LIPITOR  Take 1 tablet (80 mg total) by mouth once daily.     cyproheptadine 4 mg tablet  Commonly known as: PERIACTIN  Take 1 tablet (4 mg total) by mouth 2 (two) times daily.     ELIQUIS 5 mg Tab  Generic drug: apixaban  TAKE 1 TABLET BY MOUTH TWICE DAILY.     LIDOcaine 5 %  Commonly known as: LIDODERM  Place 2 patches onto the skin once daily. Remove & Discard patch within 12 hours or as directed by MD     midodrine 10 MG tablet  Commonly known as: PROAMATINE  Take 1 tablet (10 mg total) by mouth 3 (three) times daily.     mirtazapine 7.5 MG Tab  Commonly known as: REMERON  Take 1 tablet (7.5 mg total) by mouth every evening.     ondansetron 4 MG Tbdl  Commonly known as: ZOFRAN-ODT  Take 1 tablet (4 mg total) by mouth every 8 (eight) hours as needed (nausea).     oxyCODONE-acetaminophen 5-325 mg per tablet  Commonly known as: PERCOCET  Take 1 tablet by mouth every 8 (eight) hours as needed for Pain.     potassium chloride SA 20 MEQ tablet  Commonly known as: K-DUR,KLOR-CON, K-TAB  Take 1 tablet (20 mEq total) by mouth once daily.     SENNA 8.6 mg tablet  Generic drug: senna  Take 2 tablets by mouth every evening.     spironolactone 25 MG tablet  Commonly known as: ALDACTONE  Take 1 tablet (25 mg total) by mouth once daily.     trihexyphenidyL 2 MG tablet  Commonly  known as: ARTANE  Take 0.5 tablets (1 mg total) by mouth 3 (three) times daily with meals.            STOP taking these medications      dulaglutide 1.5 mg/0.5 mL pen injector  Commonly known as: TRULICITY              Indwelling Lines/Drains at time of discharge:   Lines/Drains/Airways       Drain  Duration             Female External Urinary Catheter w/ Suction 04/30/24 0041 1 day                    Time spent on the discharge of patient: 45 minutes         Ester Kaur MD  Department of Hospital Medicine  [facility] - Cardiology Stepdown

## 2024-05-01 NOTE — HOSPITAL COURSE
Admitted to [facility] Medicine for acute on chronic heart failure exacerbation which improved after IV diureses. Stable for discharge on 5/1 with increased dose of home diuretic regimen (torsemide 40 BID), restarted jardiance on discharge. Pt was restarted on entresto during admission as BP tolerated, blood pressures on lower end of normal so opted to have patient follow up with her outpatient cardiologist to maximize GDMT for heart failure following discharge. [facility] for transitional care referral was sent on discharge. Pt reported to be at baseline and amenable with discharge home on 5/1.

## 2024-05-01 NOTE — ASSESSMENT & PLAN NOTE
ICD in place  HTN  HLD    Patient's current symptoms include: Dyspnea at rest, Fatigue, cough, Lower extremity edema, and Orthopnea  Symptoms are worsening since 4/24/24.  Patient's most recent Echo: Results for orders placed during the hospital encounter of 08/30/23    Echo    Interpretation Summary    Left Ventricle: The left ventricle is severely dilated. Normal wall thickness. Severe global hypokinesis present. There is severely reduced systolic function with a visually estimated ejection fraction of 15 - 20%. Biplane (2D) method of discs ejection fraction is 17%. Grade I diastolic dysfunction.    Right Ventricle: Normal right ventricular cavity size. Wall thickness is normal. Right ventricle wall motion  is normal. Systolic function is normal. Pacemaker lead present in the ventricle.    Aortic Valve: The aortic valve is a trileaflet valve. There is mild aortic valve sclerosis. Mild annular calcification.    IVC/SVC: Intermediate venous pressure at 8 mmHg.    Patient's most recent cardiac biomarkers:   Recent Labs   Lab 04/29/24  2327   BNP 4,531*       Based on the 2022 AHA/ACC/HFSA Heart Failure Guideline, the patient meets criteria to be staged as: Stage D: Advanced HF. Marked HF symptoms that interfere with daily life and recurrent hospitalizations despite GDMT Combined Systolic and Diastolic      PLAN FOR INITIAL STABILIZATION PER 2022 AHA/ACC/HFSA CHF GUIDELINE:  IV diuresis with Lasix 80 IV once then 40 IV BID and monitor response.  Goal diuresis net 2L/day loss. Home diuretic regimen: Torsemide 20 daily  Additional GDMT at this time to include :  Entresto 49-51 per Dr. Smith's 4/25 clinic visit bc robust BP and her Crt appears to be at baseline  BB as hemodynamics allow, with dose titration per pt's subsequent stability  Spironolactone given EF < 20 and NYHA Class III-IV at baseline  SGLT2i to be prescribed on [place], as it is not on [facility]'s formulary  Statin  Will defer repeat TTE timing to \[facility] [place] [facility]\"" since pt  follows closely with Dr Smith, and pt's current symptoms are not concerning for a possible change in clinical condition since that time.  Will notify HTS that she is admitted.  Maintain on telemetry and daily EKGs   Cardiac diet when certain that pt will not go to cath lab, with max 2g/day NaCl and fluid restriction at 1500 cc/day.  Strict I/Os and daily weights.  Dry weight unknown.  Weight on admission not measured.  Obtain current risk stratification data: TSH, Lipids, HbA1c with optimization of risk factors is necessary  Check electrolytes daily, keep Mag >2 & K+ >4  SCDs, TEDs, Nursing communication to elevated LE. Ambulate as tolerated.

## 2024-05-01 NOTE — PLAN OF CARE
Pt discharged home with no post-acute needs. F/U appts scheduled by CHW. Transported by spouse.    Josseline Jordan, [facility]  Case Management Department  [email or web address]    Dmitriy Triana - Cardiology Stepdown  Discharge Final Note    Primary Care Provider: Yolie Michael MD    Expected Discharge Date: 5/1/2024    Final Discharge Note (most recent)       Final Note - 05/01/24 1407          Final Note    Assessment Type Final Discharge Note     Anticipated Discharge Disposition Home or Self Care     Hospital Resources/Appts/Education Provided Provided patient/caregiver with written discharge plan information;Appointments scheduled and added to AVS        Post-Acute Status    Post-Acute Authorization Other     Other Status No Post-Acute Service Needs     Discharge Delays None known at this time                     Important Message from Medicare      Future Appointments   Date Time Provider Department Center   5/9/2024  1:30 PM Tyler Le MD [facility] [place]   5/22/2024  2:00 PM Jarrett Phillip, NP [facility] CARDIO [place]   6/4/2024 11:30 AM Yolie Michael MD [facility] [place]   7/9/2024  9:00 AM Leslie Martini MD [facility] MED [facility]ana   7/29/2024  3:00 PM Yolie Michael MD [facility] [place]

## 2024-05-01 NOTE — PLAN OF CARE
CHW scheduled [facility] f/u appointment May 22nd  at 2:00pm. Patient placed on waitlist for sooner appointment.             Juju Coker W  Case Management  [phone]

## 2024-05-01 NOTE — DISCHARGE INSTRUCTIONS
Call MD for severe uncontrolled pain. Difficulty breathing, severe persistent headache, dizziness, increased confusion or weakness. Call physician for fever of greater that 100.4, nausea or vomiting.Take medications as directed and attend follow up appointments.

## 2024-05-03 DIAGNOSIS — I63.9 CEREBROVASCULAR ACCIDENT (CVA), UNSPECIFIED MECHANISM: ICD-10-CM

## 2024-05-03 DIAGNOSIS — M79.605 PAIN IN LATERAL LEFT LOWER EXTREMITY: ICD-10-CM

## 2024-05-03 RX ORDER — OXYCODONE AND ACETAMINOPHEN 5; 325 MG/1; MG/1
1 TABLET ORAL EVERY 8 HOURS PRN
Qty: 90 TABLET | Refills: 0 | Status: SHIPPED | OUTPATIENT
Start: 2024-05-03

## 2024-05-07 ENCOUNTER — TELEPHONE (OUTPATIENT)
Dept: HOME HEALTH SERVICES | Facility: CLINIC | Age: 64
End: 2024-05-07

## 2024-05-07 ENCOUNTER — OFFICE VISIT (OUTPATIENT)
Dept: CARDIOLOGY | Facility: CLINIC | Age: 64
End: 2024-05-07
Payer: MEDICARE

## 2024-05-07 VITALS
WEIGHT: 125.88 LBS | HEART RATE: 111 BPM | OXYGEN SATURATION: 99 % | BODY MASS INDEX: 20.23 KG/M2 | HEIGHT: 66 IN | DIASTOLIC BLOOD PRESSURE: 60 MMHG | SYSTOLIC BLOOD PRESSURE: 110 MMHG

## 2024-05-07 DIAGNOSIS — Z53.20 PROCEDURE NOT CARRIED OUT BECAUSE OF PATIENT'S DECISION: Primary | ICD-10-CM

## 2024-05-07 PROCEDURE — 99999 PR PBB SHADOW E&M-EST. PATIENT-LVL III: CPT | Mod: PBBFAC,,,

## 2024-05-07 PROCEDURE — 99499 UNLISTED E&M SERVICE: CPT | Mod: S$GLB,,,

## 2024-05-07 NOTE — TELEPHONE ENCOUNTER
Attempted to call both listed numbers to confirm appt.  No answer, mailboxes full unable to leave a message.  Appt cancelled due to unable to confirm

## 2024-05-09 ENCOUNTER — OFFICE VISIT (OUTPATIENT)
Dept: HEMATOLOGY/ONCOLOGY | Facility: CLINIC | Age: 64
End: 2024-05-09
Payer: MEDICARE

## 2024-05-09 VITALS
HEART RATE: 114 BPM | DIASTOLIC BLOOD PRESSURE: 57 MMHG | WEIGHT: 124.31 LBS | RESPIRATION RATE: 16 BRPM | SYSTOLIC BLOOD PRESSURE: 103 MMHG | BODY MASS INDEX: 19.98 KG/M2 | HEIGHT: 66 IN | TEMPERATURE: 99 F | OXYGEN SATURATION: 100 %

## 2024-05-09 DIAGNOSIS — G62.9 SMALL FIBER NEUROPATHY: ICD-10-CM

## 2024-05-09 DIAGNOSIS — Z86.718 HISTORY OF DVT OF LOWER EXTREMITY: ICD-10-CM

## 2024-05-09 DIAGNOSIS — I69.30 SEQUELA OF CARDIOEMBOLIC STROKE: Primary | Chronic | ICD-10-CM

## 2024-05-09 DIAGNOSIS — I10 PRIMARY HYPERTENSION: Chronic | ICD-10-CM

## 2024-05-09 DIAGNOSIS — Z79.01 LONG TERM CURRENT USE OF ANTICOAGULANT: Chronic | ICD-10-CM

## 2024-05-09 DIAGNOSIS — I50.43 ACUTE ON CHRONIC COMBINED SYSTOLIC AND DIASTOLIC CONGESTIVE HEART FAILURE: ICD-10-CM

## 2024-05-09 DIAGNOSIS — E78.49 OTHER HYPERLIPIDEMIA: Chronic | ICD-10-CM

## 2024-05-09 DIAGNOSIS — G62.9 NEUROPATHY: ICD-10-CM

## 2024-05-09 DIAGNOSIS — Z86.711 HISTORY OF PULMONARY EMBOLISM: Chronic | ICD-10-CM

## 2024-05-09 PROCEDURE — 3078F DIAST BP <80 MM HG: CPT | Mod: CPTII,S$GLB,, | Performed by: INTERNAL MEDICINE

## 2024-05-09 PROCEDURE — 99999 PR PBB SHADOW E&M-EST. PATIENT-LVL III: CPT | Mod: PBBFAC,,, | Performed by: INTERNAL MEDICINE

## 2024-05-09 PROCEDURE — 99214 OFFICE O/P EST MOD 30 MIN: CPT | Mod: S$GLB,,, | Performed by: INTERNAL MEDICINE

## 2024-05-09 PROCEDURE — 1111F DSCHRG MED/CURRENT MED MERGE: CPT | Mod: CPTII,S$GLB,, | Performed by: INTERNAL MEDICINE

## 2024-05-09 PROCEDURE — 4010F ACE/ARB THERAPY RXD/TAKEN: CPT | Mod: CPTII,S$GLB,, | Performed by: INTERNAL MEDICINE

## 2024-05-09 PROCEDURE — 3008F BODY MASS INDEX DOCD: CPT | Mod: CPTII,S$GLB,, | Performed by: INTERNAL MEDICINE

## 2024-05-09 PROCEDURE — 3044F HG A1C LEVEL LT 7.0%: CPT | Mod: CPTII,S$GLB,, | Performed by: INTERNAL MEDICINE

## 2024-05-09 PROCEDURE — 3074F SYST BP LT 130 MM HG: CPT | Mod: CPTII,S$GLB,, | Performed by: INTERNAL MEDICINE

## 2024-05-09 NOTE — PROGRESS NOTES
CC: History of pulmonary embolism, hematology follow up    HPI: , 61, is here for hematology follow up for pulmonary embolism.  She has COPD, CHF, DM, h/o CVA, HLD, HTn. She has cardiomyopathy with EF of 10-15%. She had defibrillator placed on 11/30/21. Patient states she was mostly immobile at home after that, and spent most of her time in her chair or bed. She had worsening dyspnea around Christmas of 2021 and was evaluated at the ER on 12/27/21. She had CTA chest which identified right sided sub-segmental PE.  She denies using hormonal supplement, estrogen supplement or smoking at the time. No prior personal or family h/o blood clots/ bad obstetric outcomes.Denies using recreational drugs. No recent weight loss. She is not uptodate with routine cancer screenings. She is now on xarelto.      Interval History:  is here for a follow up visit following recent hospital admission. Recent admission with sepsis and UTI. Compliant with po eliquis.     Admitted on 05/12/23 with R.hemiplegia from acute L. MCA infarct.this incident occurred while she was on daily dose of Xarelto. She had thrombectomy and changed to pradaxa and ASA following stroke, Bilateral common femoral vein DVT developed while on Pradaxa(05/30/23) changed to eliquis following that. Per patient she is tolerating this. Residual effect of CVA present. Participating therapy. Wheel chair bound. Complaints of joint pain.       Past Medical History:   Diagnosis Date    Anxiety     Arthritis     Asthma     CHF (congestive heart failure)     COPD (chronic obstructive pulmonary disease)     Depression     Diabetes mellitus     Dyspnea     H/O: hysterectomy     Hyperlipemia     Hypertension     Schizophrenia     Stroke          Past Surgical History:   Procedure Laterality Date    BLADDER SUSPENSION      CARPAL TUNNEL RELEASE Right     HEEL SPUR SURGERY Left     HYSTERECTOMY      LEFT HEART CATHETERIZATION Bilateral 12/27/2019    Procedure: Left  heart cath;  Surgeon: Steve Chambers MD;  Location: [facility] CATH LAB;  Service: Cardiology;  Laterality: Bilateral;    RIGHT HEART CATHETERIZATION Right 2021    Procedure: INSERTION, CATHETER, RIGHT HEART;  Surgeon: Petr Naranjo MD;  Location: [facility] CATH LAB;  Service: Cardiology;  Laterality: Right;    RIGHT HEART CATHETERIZATION Right 2022    Procedure: INSERTION, CATHETER, RIGHT HEART;  Surgeon: Chandana Ivory Jr., MD;  Location: [facility] CATH LAB;  Service: Cardiology;  Laterality: Right;    TUBAL LIGATION         Social History     Socioeconomic History    Marital status:    Tobacco Use    Smoking status: Former     Current packs/day: 0.00     Types: Cigarettes     Quit date: 2016     Years since quittin.6    Smokeless tobacco: Never    Tobacco comments:     nonex 2 weeks   Substance and Sexual Activity    Alcohol use: No     Alcohol/week: 0.0 standard drinks of alcohol    Drug use: No     Social Determinants of Health     Financial Resource Strain: Medium Risk (2024)    Overall Financial Resource Strain (CARDIA)     Difficulty of Paying Living Expenses: Somewhat hard   Food Insecurity: Food Insecurity Present (2024)    Hunger Vital Sign     Worried About Running Out of Food in the Last Year: Sometimes true     Ran Out of Food in the Last Year: Never true   Transportation Needs: No Transportation Needs (2024)    PRAPARE - Transportation     Lack of Transportation (Medical): No     Lack of Transportation (Non-Medical): No   Physical Activity: Inactive (2024)    Exercise Vital Sign     Days of Exercise per Week: 0 days     Minutes of Exercise per Session: 0 min   Stress: No Stress Concern Present (2024)    Citizen of [place] [employer] - Occupational Stress Questionnaire     Feeling of Stress : Only a little   Housing Stability: Low Risk  (2024)    Housing Stability Vital Sign     Unable to Pay for Housing in the Last Year: No      Homeless in the Last Year: No       Review of patient's allergies indicates:   Allergen Reactions    Adhesive Blisters    Captopril Other (See Comments)     COUGH       Current Outpatient Medications   Medication Sig    acetaminophen (TYLENOL) 325 MG tablet Take 2 tablets (650 mg total) by mouth every 8 (eight) hours as needed.    apixaban (ELIQUIS) 5 mg Tab TAKE 1 TABLET BY MOUTH TWICE DAILY.    aspirin (ECOTRIN) 81 MG EC tablet Take 1 tablet (81 mg total) by mouth once daily.    atorvastatin (LIPITOR) 80 MG tablet Take 1 tablet (80 mg total) by mouth once daily.    cyproheptadine (PERIACTIN) 4 mg tablet Take 1 tablet (4 mg total) by mouth 2 (two) times daily. (Patient taking differently: Take 4 mg by mouth 2 (two) times daily as needed.)    empagliflozin (JARDIANCE) 10 mg tablet Take 1 tablet (10 mg total) by mouth once daily.    LIDOcaine (LIDODERM) 5 % Place 2 patches onto the skin once daily. Remove & Discard patch within 12 hours or as directed by MD    midodrine (PROAMATINE) 10 MG tablet Take 1 tablet (10 mg total) by mouth 3 (three) times daily.    mirtazapine (REMERON) 7.5 MG Tab Take 1 tablet (7.5 mg total) by mouth every evening.    ondansetron (ZOFRAN-ODT) 4 MG TbDL Take 1 tablet (4 mg total) by mouth every 8 (eight) hours as needed (nausea).    oxyCODONE-acetaminophen (PERCOCET) 5-325 mg per tablet Take 1 tablet by mouth every 8 (eight) hours as needed for Pain.    potassium chloride (K-DUR,KLOR-CON, K-TAB) 20 MEQ tablet Take 1 tablet (20 mEq total) by mouth once daily.    SENNA 8.6 mg tablet Take 2 tablets by mouth every evening. (Patient taking differently: Take 2 tablets by mouth nightly as needed.)    spironolactone (ALDACTONE) 25 MG tablet Take 1 tablet (25 mg total) by mouth once daily.    torsemide (DEMADEX) 10 MG Tab Take 4 tablets (40 mg total) by mouth 2 (two) times a day.    trihexyphenidyL (ARTANE) 2 MG tablet Take 0.5 tablets (1 mg total) by mouth 3 (three) times daily with meals.     No  current facility-administered medications for this visit.         Review of Systems   Constitutional:  Negative for chills, fever and malaise/fatigue.   HENT:  Negative for ear discharge, hearing loss, nosebleeds, sinus pain and tinnitus.    Eyes:  Negative for blurred vision, photophobia and pain.   Respiratory:  Negative for cough, hemoptysis and shortness of breath.    Cardiovascular:  Negative for chest pain, orthopnea and leg swelling.   Gastrointestinal:  Negative for abdominal pain, blood in stool, constipation and nausea.   Musculoskeletal:  Negative for joint pain and neck pain.   Neurological:  Negative for sensory change and headaches.   Endo/Heme/Allergies:  Negative for environmental allergies.   Psychiatric/Behavioral:  Negative for depression and substance abuse. The patient is not nervous/anxious.             Vitals:    05/09/24 1342   BP: (!) 103/57   Pulse: (!) 114   Resp: 16   Temp: 98.6 °F (37 °C)           Physical Exam  HENT:      Head: Normocephalic.   Eyes:      General: No scleral icterus.  Cardiovascular:      Rate and Rhythm: Normal rate and regular rhythm.      Pulses: Normal pulses.      Heart sounds: No murmur heard.  Pulmonary:      Effort: Pulmonary effort is normal. No respiratory distress.      Breath sounds: Normal breath sounds.   Abdominal:      General: There is no distension.      Palpations: There is no mass.      Tenderness: There is no abdominal tenderness.   Musculoskeletal:         General: No swelling.      Right lower leg: Edema present.      Left lower leg: Edema present.      Comments: Right weakness  W/c bound   Skin:     Coloration: Skin is not jaundiced or pale.      Findings: No bruising.   Neurological:      General: No focal deficit present.      Mental Status: She is alert.   Psychiatric:      Comments: Slow response         12/27/21 CTA chest    FINDINGS:  Pulmonary vasculature: There is satisfactory opacification.  There is embolic material appreciated in the  secondary or tertiary branches of the right pulmonary artery supplying the inferior aspect of the right lung.  This is appreciated on image number 167 of series 601 and image number 285 of series 3.     Aorta: Left-sided aortic arch.  No aneurysm and no significant atherosclerosis     Base of Neck: No significant abnormality.     Thoracic soft tissues: Normal.     Heart: Normal size. No effusion.     Zaida/Mediastinum: No pathologic rito enlargement.     Airways: Patent.     Lungs/Pleura: Clear lungs. No pleural effusion or thickening.     Esophagus: Normal.     Upper Abdomen: No abnormality of the partially imaged upper abdomen.     Bones: No acute fracture. No suspicious lytic or sclerotic lesions.     Impression:     Findings positive for small pulmonary emboli or embolus seen in a single secondary ortertiary branch of the right pulmonary artery.     This report was flagged in Epic as abnormal.        Component      Latest Ref Rng & Units 2/7/2022   Beta-2 Glyco 1 IgG      <=20 SGU <9   Beta-2 Glyco 1 IgM      <=20 SMU <9   Beta-2 Glyco 1 IgA      <=20 GUY <9   APA Isotype IgG      0.00 - 14.99 GPL <9.40   APA Isotype IgM      0.00 - 12.49 MPL <9.40     4/20/24 US venous doppler LE    COMPARISON:  Ultrasound from 06/21/2023.     FINDINGS:  Right thigh veins: The proximal superficial femoral vein is not compressible, compatible with a nonocclusive thrombus, possibly chronic.  The common femoral, popliteal, upper greater saphenous, and deep femoral veins are patent and free of thrombus. The veins are normally compressible and have normal phasic flow and augmentation response.     Right calf veins: The visualized calf veins are patent.     Left thigh veins: The common femoral, femoral, popliteal, upper greater saphenous, and deep femoral veins are patent and free of thrombus. The veins are normally compressible and have normal phasic flow and augmentation response.     Left calf veins: The visualized calf veins are  patent.     Miscellaneous: None     Impression:     Nonocclusive DVT of the proximal right femoral vein.         Assessment    1. Sub-segmental pulmonary embolism  2. CHF  3. Type 2 DM not on long term insulin without complications  4. H/o CVA  5. Unintentional weight loss      Plan:    1. Diagnosed in Dec 2021. This was the first known thrombotic episode. She was immobile preceding the PE diagnosis, so this was a possible provoked event. COVID 19 was negative. Optimal anti-coagulation duration for first provoked PE is 3-6 months. However, she has severely decreased LVEF, with last 2D ECHO documenting LVEF of ~ 15%. She will likely benefit from long term anti-coagulation. No h/o bleeding or falls. She is on ASA, and DOAC+ASA increase bleeding risk significantly compared to either agent alone.Anti-cardiolipin and anti-beta2GP-I antibodies negative in Feb 2022. Recommend age appropriate anti-cancer screenings, including PAP smear, mammogram, colonoscopy.   CVA while on  Xarelto. Xarelto changed to Pradaxa, developed DVT while on pradaxa. Currently on eliquis.   US LE venous doppler done on 4/20/24, when compared to previous study from 6/20/23, demonstrated a nonocclusive DVT of the proximal right femoral vein, possibly chronic.   She is tolerating apixaban well.  No bleeding or falls.     2. She is followed at the transplant/ CHF clinic here    3. She follows with her PCP.    4. She was previously on anti-coagulation. She is on ASA.  Changed DOAC to eliquis as above.       5. She has lost 30 lbs between Feb-May 2024. Etiology unclear. No evidence of malignancy on CTA chest done in April 2024 or on CT abdomen/pelvis done in April 2024. Last mammogram was in July 2023, BiRADS1.      BMT Chart Routing      Follow up with physician 1 year.   Follow up with CHUCK    Provider visit type    Infusion scheduling note    Injection scheduling note    Labs CBC and CMP   Scheduling:  Preferred lab:  Lab interval:     Imaging     Pharmacy appointment    Other referrals

## 2024-05-14 ENCOUNTER — TELEPHONE (OUTPATIENT)
Dept: HEMATOLOGY/ONCOLOGY | Facility: CLINIC | Age: 64
End: 2024-05-14
Payer: MEDICARE

## 2024-05-14 ENCOUNTER — NURSE TRIAGE (OUTPATIENT)
Dept: ADMINISTRATIVE | Facility: CLINIC | Age: 64
End: 2024-05-14
Payer: MEDICARE

## 2024-05-14 ENCOUNTER — HOSPITAL ENCOUNTER (INPATIENT)
Facility: HOSPITAL | Age: 64
LOS: 3 days | Discharge: HOSPICE | DRG: 291 | End: 2024-05-18
Attending: STUDENT IN AN ORGANIZED HEALTH CARE EDUCATION/TRAINING PROGRAM | Admitting: STUDENT IN AN ORGANIZED HEALTH CARE EDUCATION/TRAINING PROGRAM
Payer: MEDICARE

## 2024-05-14 DIAGNOSIS — I50.9 CHF (CONGESTIVE HEART FAILURE): ICD-10-CM

## 2024-05-14 DIAGNOSIS — R06.02 SOB (SHORTNESS OF BREATH): ICD-10-CM

## 2024-05-14 DIAGNOSIS — R07.9 CHEST PAIN: ICD-10-CM

## 2024-05-14 DIAGNOSIS — R06.02 SHORTNESS OF BREATH: ICD-10-CM

## 2024-05-14 DIAGNOSIS — I10 HYPERTENSION, UNSPECIFIED TYPE: Primary | Chronic | ICD-10-CM

## 2024-05-14 DIAGNOSIS — R68.89 MULTIPLE COMPLAINTS: ICD-10-CM

## 2024-05-14 DIAGNOSIS — R00.0 TACHYCARDIA: ICD-10-CM

## 2024-05-14 LAB
ALBUMIN SERPL BCP-MCNC: 3.9 G/DL (ref 3.5–5.2)
ALP SERPL-CCNC: 117 U/L (ref 55–135)
ALT SERPL W/O P-5'-P-CCNC: 20 U/L (ref 10–44)
ANION GAP SERPL CALC-SCNC: 14 MMOL/L (ref 8–16)
AST SERPL-CCNC: 20 U/L (ref 10–40)
BASOPHILS # BLD AUTO: 0.04 K/UL (ref 0–0.2)
BASOPHILS NFR BLD: 0.6 % (ref 0–1.9)
BILIRUB SERPL-MCNC: 1.1 MG/DL (ref 0.1–1)
BNP SERPL-MCNC: 4015 PG/ML (ref 0–99)
BUN SERPL-MCNC: 13 MG/DL (ref 8–23)
CALCIUM SERPL-MCNC: 10.4 MG/DL (ref 8.7–10.5)
CHLORIDE SERPL-SCNC: 96 MMOL/L (ref 95–110)
CO2 SERPL-SCNC: 27 MMOL/L (ref 23–29)
CREAT SERPL-MCNC: 1.2 MG/DL (ref 0.5–1.4)
DIFFERENTIAL METHOD BLD: ABNORMAL
EOSINOPHIL # BLD AUTO: 0 K/UL (ref 0–0.5)
EOSINOPHIL NFR BLD: 0.6 % (ref 0–8)
ERYTHROCYTE [DISTWIDTH] IN BLOOD BY AUTOMATED COUNT: 17.3 % (ref 11.5–14.5)
EST. GFR  (NO RACE VARIABLE): 51.2 ML/MIN/1.73 M^2
GLUCOSE SERPL-MCNC: 166 MG/DL (ref 70–110)
HCT VFR BLD AUTO: 27.4 % (ref 37–48.5)
HGB BLD-MCNC: 9 G/DL (ref 12–16)
IMM GRANULOCYTES # BLD AUTO: 0.01 K/UL (ref 0–0.04)
IMM GRANULOCYTES NFR BLD AUTO: 0.2 % (ref 0–0.5)
LYMPHOCYTES # BLD AUTO: 2.1 K/UL (ref 1–4.8)
LYMPHOCYTES NFR BLD: 32.2 % (ref 18–48)
MCH RBC QN AUTO: 28.2 PG (ref 27–31)
MCHC RBC AUTO-ENTMCNC: 32.8 G/DL (ref 32–36)
MCV RBC AUTO: 86 FL (ref 82–98)
MONOCYTES # BLD AUTO: 0.3 K/UL (ref 0.3–1)
MONOCYTES NFR BLD: 4.4 % (ref 4–15)
NEUTROPHILS # BLD AUTO: 4.1 K/UL (ref 1.8–7.7)
NEUTROPHILS NFR BLD: 62 % (ref 38–73)
NRBC BLD-RTO: 0 /100 WBC
OHS QRS DURATION: 112 MS
OHS QRS DURATION: 112 MS
OHS QRS DURATION: 116 MS
OHS QTC CALCULATION: 453 MS
OHS QTC CALCULATION: 493 MS
OHS QTC CALCULATION: 518 MS
PLATELET # BLD AUTO: 436 K/UL (ref 150–450)
PMV BLD AUTO: 10.8 FL (ref 9.2–12.9)
POCT GLUCOSE: 147 MG/DL (ref 70–110)
POCT GLUCOSE: 193 MG/DL (ref 70–110)
POTASSIUM SERPL-SCNC: 3.2 MMOL/L (ref 3.5–5.1)
PROT SERPL-MCNC: 7.9 G/DL (ref 6–8.4)
RBC # BLD AUTO: 3.19 M/UL (ref 4–5.4)
SODIUM SERPL-SCNC: 137 MMOL/L (ref 136–145)
T4 FREE SERPL-MCNC: 1.08 NG/DL (ref 0.71–1.51)
TROPONIN I SERPL DL<=0.01 NG/ML-MCNC: 0.02 NG/ML (ref 0–0.03)
TSH SERPL DL<=0.005 MIU/L-ACNC: 4.76 UIU/ML (ref 0.4–4)
WBC # BLD AUTO: 6.56 K/UL (ref 3.9–12.7)

## 2024-05-14 PROCEDURE — 93010 ELECTROCARDIOGRAM REPORT: CPT | Mod: HCNC,76,, | Performed by: INTERNAL MEDICINE

## 2024-05-14 PROCEDURE — 85025 COMPLETE CBC W/AUTO DIFF WBC: CPT | Mod: HCNC

## 2024-05-14 PROCEDURE — 96376 TX/PRO/DX INJ SAME DRUG ADON: CPT

## 2024-05-14 PROCEDURE — 83880 ASSAY OF NATRIURETIC PEPTIDE: CPT | Mod: HCNC

## 2024-05-14 PROCEDURE — 63600175 PHARM REV CODE 636 W HCPCS: Mod: HCNC

## 2024-05-14 PROCEDURE — 93005 ELECTROCARDIOGRAM TRACING: CPT | Mod: HCNC

## 2024-05-14 PROCEDURE — 93010 ELECTROCARDIOGRAM REPORT: CPT | Mod: HCNC,,, | Performed by: INTERNAL MEDICINE

## 2024-05-14 PROCEDURE — 84443 ASSAY THYROID STIM HORMONE: CPT | Mod: HCNC | Performed by: STUDENT IN AN ORGANIZED HEALTH CARE EDUCATION/TRAINING PROGRAM

## 2024-05-14 PROCEDURE — 80053 COMPREHEN METABOLIC PANEL: CPT | Mod: HCNC

## 2024-05-14 PROCEDURE — 96374 THER/PROPH/DIAG INJ IV PUSH: CPT | Mod: HCNC

## 2024-05-14 PROCEDURE — 25000003 PHARM REV CODE 250: Mod: HCNC | Performed by: STUDENT IN AN ORGANIZED HEALTH CARE EDUCATION/TRAINING PROGRAM

## 2024-05-14 PROCEDURE — 63600175 PHARM REV CODE 636 W HCPCS: Mod: HCNC | Performed by: STUDENT IN AN ORGANIZED HEALTH CARE EDUCATION/TRAINING PROGRAM

## 2024-05-14 PROCEDURE — 36415 COLL VENOUS BLD VENIPUNCTURE: CPT | Mod: HCNC | Performed by: STUDENT IN AN ORGANIZED HEALTH CARE EDUCATION/TRAINING PROGRAM

## 2024-05-14 PROCEDURE — 99285 EMERGENCY DEPT VISIT HI MDM: CPT | Mod: 25,HCNC

## 2024-05-14 PROCEDURE — 84484 ASSAY OF TROPONIN QUANT: CPT | Mod: HCNC

## 2024-05-14 PROCEDURE — G0378 HOSPITAL OBSERVATION PER HR: HCPCS | Mod: HCNC

## 2024-05-14 PROCEDURE — 93005 ELECTROCARDIOGRAM TRACING: CPT

## 2024-05-14 PROCEDURE — 96375 TX/PRO/DX INJ NEW DRUG ADDON: CPT | Mod: HCNC

## 2024-05-14 PROCEDURE — 84439 ASSAY OF FREE THYROXINE: CPT | Mod: HCNC | Performed by: STUDENT IN AN ORGANIZED HEALTH CARE EDUCATION/TRAINING PROGRAM

## 2024-05-14 RX ORDER — MORPHINE SULFATE 2 MG/ML
2 INJECTION, SOLUTION INTRAMUSCULAR; INTRAVENOUS
Status: ACTIVE | OUTPATIENT
Start: 2024-05-14 | End: 2024-05-14

## 2024-05-14 RX ORDER — ASPIRIN 81 MG/1
81 TABLET ORAL DAILY
Status: DISCONTINUED | OUTPATIENT
Start: 2024-05-15 | End: 2024-05-18 | Stop reason: HOSPADM

## 2024-05-14 RX ORDER — ATORVASTATIN CALCIUM 40 MG/1
80 TABLET, FILM COATED ORAL NIGHTLY
Status: DISCONTINUED | OUTPATIENT
Start: 2024-05-14 | End: 2024-05-18 | Stop reason: HOSPADM

## 2024-05-14 RX ORDER — ENOXAPARIN SODIUM 100 MG/ML
40 INJECTION SUBCUTANEOUS EVERY 24 HOURS
Status: DISCONTINUED | OUTPATIENT
Start: 2024-05-14 | End: 2024-05-14

## 2024-05-14 RX ORDER — ACETAMINOPHEN 325 MG/1
650 TABLET ORAL EVERY 8 HOURS PRN
Status: DISCONTINUED | OUTPATIENT
Start: 2024-05-14 | End: 2024-05-18 | Stop reason: HOSPADM

## 2024-05-14 RX ORDER — NALOXONE HCL 0.4 MG/ML
0.02 VIAL (ML) INJECTION
Status: DISCONTINUED | OUTPATIENT
Start: 2024-05-14 | End: 2024-05-18 | Stop reason: HOSPADM

## 2024-05-14 RX ORDER — IBUPROFEN 200 MG
16 TABLET ORAL
Status: DISCONTINUED | OUTPATIENT
Start: 2024-05-14 | End: 2024-05-18 | Stop reason: HOSPADM

## 2024-05-14 RX ORDER — SODIUM CHLORIDE 0.9 % (FLUSH) 0.9 %
10 SYRINGE (ML) INJECTION EVERY 12 HOURS PRN
Status: DISCONTINUED | OUTPATIENT
Start: 2024-05-14 | End: 2024-05-18 | Stop reason: HOSPADM

## 2024-05-14 RX ORDER — FUROSEMIDE 10 MG/ML
40 INJECTION INTRAMUSCULAR; INTRAVENOUS 2 TIMES DAILY
Status: DISCONTINUED | OUTPATIENT
Start: 2024-05-14 | End: 2024-05-16

## 2024-05-14 RX ORDER — GLUCAGON 1 MG
1 KIT INJECTION
Status: DISCONTINUED | OUTPATIENT
Start: 2024-05-14 | End: 2024-05-18 | Stop reason: HOSPADM

## 2024-05-14 RX ORDER — FUROSEMIDE 10 MG/ML
40 INJECTION INTRAMUSCULAR; INTRAVENOUS
Status: COMPLETED | OUTPATIENT
Start: 2024-05-14 | End: 2024-05-14

## 2024-05-14 RX ORDER — IBUPROFEN 200 MG
24 TABLET ORAL
Status: DISCONTINUED | OUTPATIENT
Start: 2024-05-14 | End: 2024-05-18 | Stop reason: HOSPADM

## 2024-05-14 RX ORDER — MORPHINE SULFATE 2 MG/ML
2 INJECTION, SOLUTION INTRAMUSCULAR; INTRAVENOUS
Status: COMPLETED | OUTPATIENT
Start: 2024-05-14 | End: 2024-05-14

## 2024-05-14 RX ADMIN — ACETAMINOPHEN 650 MG: 325 TABLET ORAL at 09:05

## 2024-05-14 RX ADMIN — ATORVASTATIN CALCIUM 80 MG: 40 TABLET, FILM COATED ORAL at 09:05

## 2024-05-14 RX ADMIN — APIXABAN 5 MG: 5 TABLET, FILM COATED ORAL at 09:05

## 2024-05-14 RX ADMIN — FUROSEMIDE 40 MG: 10 INJECTION, SOLUTION INTRAMUSCULAR; INTRAVENOUS at 12:05

## 2024-05-14 RX ADMIN — FUROSEMIDE 40 MG: 10 INJECTION, SOLUTION INTRAVENOUS at 09:05

## 2024-05-14 RX ADMIN — MORPHINE SULFATE 2 MG: 2 INJECTION, SOLUTION INTRAMUSCULAR; INTRAVENOUS at 11:05

## 2024-05-14 NOTE — ASSESSMENT & PLAN NOTE
"Patient's FSGs are controlled on current medication regimen.  Last A1c reviewed-   Lab Results   Component Value Date    HGBA1C 5.6 04/30/2024     Most recent fingerstick glucose reviewed- No results for input(s): "POCTGLUCOSE" in the last 24 hours.  Current correctional scale   no  Maintain anti-hyperglycemic dose as follows-   Antihyperglycemics (From admission, onward)      None          Hold Oral hypoglycemics while patient is in the hospital.  "

## 2024-05-14 NOTE — H&P
[facility] - [address] Medicine  History & Physical    Patient Name: Diomedes Mohr  MRN: [id number]  Patient Class: OP- Observation  Admission Date: 5/14/2024  Attending Physician: Mariaelena Moffett MD   Primary Care Provider: Yolie Michael MD         Patient information was obtained from patient, past medical records, and ER records.     Subjective:     Principal Problem:Acute on chronic combined systolic and diastolic congestive heart failure    Chief Complaint:   Chief Complaint   Patient presents with    Shortness of Breath     Hx chf        HPI: Diomedes Mohr is a 63 y.o. female with heart failure with reduced ejection fraction (left ventricular ejection fraction 15%), AICD, hypertension, T2 DM, HLD, former tobacco use, stroke, PE, pulmonary hypertension, failure to thrive, who is presenting to [facility] ED with a 3 day history of worsening shortness of breath and inability to lay flat.  Patient endorses compliance with her medication.  Denies chest pain or palpitations.     was present at the bedside reported that patient was complaining of left hip pain this morning.  Patient received morphine 2 mg IV in the ER.  Says pain has completely resolved now.  On presentation patient was afebrile, tachycardic with heart rate of 125, tachypneic with a respiratory rate of 24, saturating 100% on room air.  Blood work was remarkable for normocytic anemia with hemoglobin of 9, hypokalemia with potassium 3.2, elevated BNP of 4015, troponin within normal limits.  Chest x-ray revealed improved aeration.  EKG sinus tachycardia with no she segment or T-wave changes.  Patient received 40 mg IV Lasix and admitted to observation service.    Past Medical History:   Diagnosis Date    Acute on chronic combined systolic and diastolic heart failure 12/26/2019    ARBEN (acute kidney injury) 05/30/2023    Anticoagulant long-term use     Anxiety     Arthritis     Asthma     Depression     H/O: hysterectomy      Hyperlipemia     Hypertension     Pulmonary edema     Pulmonary hypertension due to left heart disease 02/24/2023    Schizophrenia     Sequela of cardioembolic stroke 04/30/2024       Past Surgical History:   Procedure Laterality Date    BLADDER SUSPENSION      CARPAL TUNNEL RELEASE Right     HEEL SPUR SURGERY Left     HYSTERECTOMY      LEFT HEART CATHETERIZATION Bilateral 12/27/2019    Procedure: Left heart cath;  Surgeon: Steve Chambers MD;  Location: [facility] CATH LAB;  Service: Cardiology;  Laterality: Bilateral;    RIGHT HEART CATHETERIZATION Right 7/26/2021    Procedure: INSERTION, CATHETER, RIGHT HEART;  Surgeon: Petr Naranjo MD;  Location: [facility] CATH LAB;  Service: Cardiology;  Laterality: Right;    RIGHT HEART CATHETERIZATION Right 5/12/2022    Procedure: INSERTION, CATHETER, RIGHT HEART;  Surgeon: Chandana Ivory Jr., MD;  Location: [facility] CATH LAB;  Service: Cardiology;  Laterality: Right;    TUBAL LIGATION         Review of patient's allergies indicates:   Allergen Reactions    Adhesive Blisters    Captopril Other (See Comments)     COUGH       No current facility-administered medications on file prior to encounter.     Current Outpatient Medications on File Prior to Encounter   Medication Sig    acetaminophen (TYLENOL) 325 MG tablet Take 2 tablets (650 mg total) by mouth every 8 (eight) hours as needed. (Patient taking differently: Take 650 mg by mouth every 8 (eight) hours as needed for Pain.)    apixaban (ELIQUIS) 5 mg Tab TAKE 1 TABLET BY MOUTH TWICE DAILY.    aspirin (ECOTRIN) 81 MG EC tablet Take 1 tablet (81 mg total) by mouth once daily.    atorvastatin (LIPITOR) 80 MG tablet Take 1 tablet (80 mg total) by mouth once daily. (Patient taking differently: Take 80 mg by mouth every evening.)    cyproheptadine (PERIACTIN) 4 mg tablet Take 1 tablet (4 mg total) by mouth 2 (two) times daily. (Patient taking differently: Take 4 mg by mouth 2 (two) times daily as needed.)     empagliflozin (JARDIANCE) 10 mg tablet Take 1 tablet (10 mg total) by mouth once daily.    LIDOcaine (LIDODERM) 5 % Place 2 patches onto the skin once daily. Remove & Discard patch within 12 hours or as directed by MD (Patient taking differently: Place 1 patch onto the skin once daily. Remove & Discard patch within 12 hours or as directed by MD)    midodrine (PROAMATINE) 10 MG tablet Take 1 tablet (10 mg total) by mouth 3 (three) times daily.    mirtazapine (REMERON) 7.5 MG Tab Take 1 tablet (7.5 mg total) by mouth every evening.    ondansetron (ZOFRAN-ODT) 4 MG TbDL Take 1 tablet (4 mg total) by mouth every 8 (eight) hours as needed (nausea).    oxyCODONE-acetaminophen (PERCOCET) 5-325 mg per tablet Take 1 tablet by mouth every 8 (eight) hours as needed for Pain.    potassium chloride (K-DUR,KLOR-CON, K-TAB) 20 MEQ tablet Take 1 tablet (20 mEq total) by mouth once daily.    SENNA 8.6 mg tablet Take 2 tablets by mouth every evening. (Patient taking differently: Take 2 tablets by mouth nightly.)    spironolactone (ALDACTONE) 25 MG tablet Take 1 tablet (25 mg total) by mouth once daily.    torsemide (DEMADEX) 10 MG Tab Take 4 tablets (40 mg total) by mouth 2 (two) times a day.    trihexyphenidyL (ARTANE) 2 MG tablet Take 0.5 tablets (1 mg total) by mouth 3 (three) times daily with meals. (Patient not taking: Reported on 2024)     Family History       Problem Relation (Age of Onset)    No Known Problems Mother, Father    Ovarian cancer Sister (42)          Tobacco Use    Smoking status: Former     Current packs/day: 0.00     Types: Cigarettes     Quit date: 2016     Years since quittin.7    Smokeless tobacco: Never    Tobacco comments:     nonex 2 weeks   Substance and Sexual Activity    Alcohol use: No     Alcohol/week: 0.0 standard drinks of alcohol    Drug use: No    Sexual activity: Not on file     Review of Systems   Constitutional:  Negative for chills and fever.   HENT:  Negative for rhinorrhea and  sore throat.    Respiratory:  Positive for shortness of breath. Negative for cough and wheezing.    Cardiovascular:  Negative for chest pain, palpitations and leg swelling.   Gastrointestinal:  Negative for abdominal pain, nausea and vomiting.   Endocrine: Negative for cold intolerance, polydipsia and polyphagia.   Genitourinary:  Negative for dysuria and hematuria.   Musculoskeletal: Negative.    Neurological: Negative.    Psychiatric/Behavioral: Negative.       Objective:     Vital Signs (Most Recent):  Temp: 96.1 °F (35.6 °C) (05/14/24 1446)  Pulse: 110 (05/14/24 1455)  Resp: 20 (05/14/24 1446)  BP: 121/73 (05/14/24 1446)  SpO2: 100 % (05/14/24 1446) Vital Signs (24h Range):  Temp:  [96.1 °F (35.6 °C)-98.2 °F (36.8 °C)] 96.1 °F (35.6 °C)  Pulse:  [102-125] 110  Resp:  [16-24] 20  SpO2:  [95 %-100 %] 100 %  BP: (109-129)/(73-84) 121/73     Weight: 54 kg (119 lb 0.8 oz)  Body mass index is 19.21 kg/m².     Physical Exam  Constitutional:       Appearance: Normal appearance.   HENT:      Mouth/Throat:      Mouth: Mucous membranes are moist.      Pharynx: Oropharynx is clear.   Eyes:      Conjunctiva/sclera: Conjunctivae normal.      Pupils: Pupils are equal, round, and reactive to light.   Cardiovascular:      Rate and Rhythm: Normal rate and regular rhythm.   Pulmonary:      Effort: Pulmonary effort is normal.      Breath sounds: Rales present.   Abdominal:      General: Bowel sounds are normal.      Palpations: Abdomen is soft.   Musculoskeletal:         General: No swelling or tenderness. Normal range of motion.      Cervical back: Normal range of motion and neck supple.   Neurological:      General: No focal deficit present.      Mental Status: She is alert and oriented to person, place, and time.   Psychiatric:         Mood and Affect: Mood normal.         Behavior: Behavior normal.              CRANIAL NERVES     CN III, IV, VI   Pupils are equal, round, and reactive to light.       Significant Labs: All  pertinent labs within the past 24 hours have been reviewed.  Recent Lab Results         05/14/24  1047   05/14/24  1040   05/14/24  1010   05/14/24  0959        Albumin 3.9                          ALT 20             Anion Gap 14             AST 20             Baso # 0.04             Basophil % 0.6             BILIRUBIN TOTAL 1.1  Comment: For infants and newborns, interpretation of results should be based  on gestational age, weight and in agreement with clinical  observations.    Premature Infant recommended reference ranges:  Up to 24 hours.............<8.0 mg/dL  Up to 48 hours............<12.0 mg/dL  3-5 days..................<15.0 mg/dL  6-29 days.................<15.0 mg/dL               BNP 4,015  Comment: Values of less than 100 pg/ml are consistent with non-CHF populations.             BUN 13             Calcium 10.4             Chloride 96             CO2 27             Creatinine 1.2             Differential Method Automated             eGFR 51.2             Eos # 0.0             Eos % 0.6             Glucose 166             Gran # (ANC) 4.1             Gran % 62.0             Hematocrit 27.4             Hemoglobin 9.0             Immature Grans (Abs) 0.01  Comment: Mild elevation in immature granulocytes is non specific and   can be seen in a variety of conditions including stress response,   acute inflammation, trauma and pregnancy. Correlation with other   laboratory and clinical findings is essential.               Immature Granulocytes 0.2             Lymph # 2.1             Lymph % 32.2             MCH 28.2             MCHC 32.8             MCV 86             Mono # 0.3             Mono % 4.4             MPV 10.8             nRBC 0             QRS Duration   116   112   112       OHS QTC Calculation   518   493   453       Platelet Count 436             Potassium 3.2             PROTEIN TOTAL 7.9             RBC 3.19             RDW 17.3             Sodium 137             Troponin I  0.022  Comment: The reference interval for Troponin I represents the 99th percentile   cutoff   for our facility and is consistent with 3rd generation assay   performance.               WBC 6.56                     Significant Imaging: I have reviewed all pertinent imaging results/findings within the past 24 hours.    Imaging Results              X-Ray Chest AP Portable (Final result)  Result time 05/14/24 11:56:26      Final result by Danielle Young MD (05/14/24 11:56:26)                   Impression:      Cardiomegaly.  The lungs are better aerated compared to 04/29/2024 exam.      Electronically signed by: Danielle Young MD  Date:    05/14/2024  Time:    11:56               Narrative:    EXAMINATION:  XR CHEST AP PORTABLE    CLINICAL HISTORY:  CHF;    TECHNIQUE:  Single frontal view of the chest was performed.    COMPARISON:  04/29/2024    FINDINGS:  Cardio stimulator device left upper chest with dual leads.    The cardiac silhouette is enlarged.    The pulmonary vascularity is normal.    The lungs are clear.    No pleural effusion, no pneumothorax.    The osseous structures appear normal.                                      Assessment/Plan:     * Acute on chronic combined systolic and diastolic congestive heart failure  Patient is identified as having Combined Systolic and Diastolic heart failure that is Acute on chronic. CHF is currently uncontrolled due to Dyspnea not returned to baseline after 1 doses of IV diuretic, >3 pillow orthopnea, and Rales/crackles on pulmonary exam. Latest ECHO performed and demonstrates- Results for orders placed during the hospital encounter of 08/30/23    Echo    Interpretation Summary    Left Ventricle: The left ventricle is severely dilated. Normal wall thickness. Severe global hypokinesis present. There is severely reduced systolic function with a visually estimated ejection fraction of 15 - 20%. Biplane (2D) method of discs ejection fraction is 17%. Grade I  "diastolic dysfunction.    Right Ventricle: Normal right ventricular cavity size. Wall thickness is normal. Right ventricle wall motion  is normal. Systolic function is normal. Pacemaker lead present in the ventricle.    Aortic Valve: The aortic valve is a trileaflet valve. There is mild aortic valve sclerosis. Mild annular calcification.    IVC/SVC: Intermediate venous pressure at 8 mmHg.  . Continue Furosemide and monitor clinical status closely. Monitor on telemetry. Patient is off CHF pathway.  Monitor strict Is&Os and daily weights.  Place on fluid restriction of 1.5 L. Cardiology has not been consulted. Continue to stress to patient importance of self efficacy and  on diet for CHF. Last BNP reviewed- and noted below   Recent Labs   Lab 05/14/24  1047   BNP 4,015*     Lasix 40mg iv bid  I/O's   Tsh ordered.  1.5 l fluid restriction  Cardiac diet ordered.  ECHO ordered    Hyperlipemia  Continue statin      Type 2 diabetes mellitus with other specified complication, without long-term current use of insulin  Patient's FSGs are controlled on current medication regimen.  Last A1c reviewed-   Lab Results   Component Value Date    HGBA1C 5.6 04/30/2024     Most recent fingerstick glucose reviewed- No results for input(s): "POCTGLUCOSE" in the last 24 hours.  Current correctional scale   no  Maintain anti-hyperglycemic dose as follows-   Antihyperglycemics (From admission, onward)      None          Hold Oral hypoglycemics while patient is in the hospital.      VTE Risk Mitigation (From admission, onward)           Ordered     apixaban tablet 5 mg  2 times daily         05/14/24 1634     IP VTE HIGH RISK PATIENT  Once         05/14/24 1329     Place sequential compression device  Until discontinued         05/14/24 1329                       On 05/14/2024, patient should be placed in hospital observation services under my care.             Mariaelena Moffett MD  Department of Hospital Medicine  [facility] - " Observation 11H

## 2024-05-14 NOTE — NURSING
Nurses Note -- 4 Eyes      5/14/2024   3:03 PM      Skin assessed during: Admit      [x] No Altered Skin Integrity Present    []Prevention Measures Documented      [] Yes- Altered Skin Integrity Present or Discovered   [] LDA Added if Not in Epic (Describe Wound)   [] New Altered Skin Integrity was Present on Admit and Documented in LDA   [] Wound Image Taken    Wound Care Consulted? No    Attending Nurse:  Sharee Bolaños RN/Staff Member:  [place]

## 2024-05-14 NOTE — TELEPHONE ENCOUNTER
Reason for Disposition   MODERATE difficulty breathing (e.g., speaks in phrases, SOB even at rest, pulse 100-120) of new-onset or worse than normal    Additional Information   Negative: SEVERE difficulty breathing (e.g., struggling for each breath, speaks in single words, pulse > 120)   Negative: Breathing stopped and hasn't returned   Negative: Choking on something   Negative: Bluish (or gray) lips or face   Negative: Difficult to awaken or acting confused (e.g., disoriented, slurred speech)   Negative: Passed out (i.e., fainted, collapsed and was not responding)   Negative: Wheezing started suddenly after medicine, an allergic food, or bee sting   Negative: Stridor (harsh sound while breathing in)   Negative: Slow, shallow and weak breathing   Negative: Sounds like a life-threatening emergency to the triager   Negative: Chest pain    Protocols used: Breathing Difficulty-A-OH  Pt's spouse states a NP was supposed to come to his home to see the pt. States she asked to reschedule the visit due to the weather. States pt is having difficulty breathing and he is asking for oxygen for her. States her breathing is worse than normal.  Advised to bring pt to the nearest ED now. Spouse verbalized understanding.

## 2024-05-14 NOTE — HPI
Diomedes Mohr is a 63 y.o. female with heart failure with reduced ejection fraction (left ventricular ejection fraction 15%), AICD, hypertension, T2 DM, HLD, former tobacco use, stroke, PE, pulmonary hypertension, failure to thrive, who is presenting to [facility] ED with a 3 day history of worsening shortness of breath and inability to lay flat.  Patient endorses compliance with her medication.  Denies chest pain or palpitations.     was present at the bedside reported that patient was complaining of left hip pain this morning.  Patient received morphine 2 mg IV in the ER.  Says pain has completely resolved now.  On presentation patient was afebrile, tachycardic with heart rate of 125, tachypneic with a respiratory rate of 24, saturating 100% on room air.  Blood work was remarkable for normocytic anemia with hemoglobin of 9, hypokalemia with potassium 3.2, elevated BNP of 4015, troponin within normal limits.  Chest x-ray revealed improved aeration.  EKG sinus tachycardia with no she segment or T-wave changes.  Patient received 40 mg IV Lasix and admitted to observation service.

## 2024-05-14 NOTE — ED NOTES
Patient identifiers for Diomedes Mohr 63 y.o. female checked and correct.  Chief Complaint   Patient presents with    Shortness of Breath     Hx chf     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 heart disease 02/24/2023    Schizophrenia     Sequela of cardioembolic stroke 04/30/2024     Allergies reported:   Review of patient's allergies indicates:   Allergen Reactions    Adhesive Blisters    Captopril Other (See Comments)     COUGH         LOC: Patient is awake, alert, and aware of environment with an appropriate affect. Patient is oriented x 4 and speaking appropriately.  APPEARANCE: Patient resting comfortably and in no acute distress. Patient is clean and well groomed, patient's clothing is properly fastened.  HEENT: WDL  SKIN: The skin is warm and dry. Patient has normal skin turgor and moist mucus membranes.   MUSCULOSKELETAL: Patient is moving all extremities well, no obvious deformities noted. Pulses intact. NO edema present in bilateral lower extremities.  RESPIRATORY: Airway is open and patent. Respirations are spontaneous and mildly labored with increased effort and rate.  CARDIAC: Patient tachycardic, 111 on cardiac monitor. No peripheral edema noted. Denies chest pain at time of assessment.   ABDOMEN: No distention noted. Soft and non-tender upon palpation.  NEUROLOGICAL: pupils 2mm, PERRL. Facial expression is symmetrical. Hand grasps are equal bilaterally. Normal sensation in all extremities when touched with finger.

## 2024-05-14 NOTE — TELEPHONE ENCOUNTER
----- Message from Aviva Mensah sent at 5/14/2024 11:26 AM CDT -----  Regarding: Consult/Advisory  Contact: Don      Consult/Advisory     Name Of Caller:Don         Contact Preference:[phone] (home)       Nature of call:Returning call to Deb letting her know wife is still in hospital at [facility]. Requesting a call back

## 2024-05-14 NOTE — ASSESSMENT & PLAN NOTE
Patient is identified as having Combined Systolic and Diastolic heart failure that is Acute on chronic. CHF is currently uncontrolled due to Dyspnea not returned to baseline after 1 doses of IV diuretic, >3 pillow orthopnea, and Rales/crackles on pulmonary exam. Latest ECHO performed and demonstrates- Results for orders placed during the hospital encounter of 08/30/23    Echo    Interpretation Summary    Left Ventricle: The left ventricle is severely dilated. Normal wall thickness. Severe global hypokinesis present. There is severely reduced systolic function with a visually estimated ejection fraction of 15 - 20%. Biplane (2D) method of discs ejection fraction is 17%. Grade I diastolic dysfunction.    Right Ventricle: Normal right ventricular cavity size. Wall thickness is normal. Right ventricle wall motion  is normal. Systolic function is normal. Pacemaker lead present in the ventricle.    Aortic Valve: The aortic valve is a trileaflet valve. There is mild aortic valve sclerosis. Mild annular calcification.    IVC/SVC: Intermediate venous pressure at 8 mmHg.  . Continue Furosemide and monitor clinical status closely. Monitor on telemetry. Patient is off CHF pathway.  Monitor strict Is&Os and daily weights.  Place on fluid restriction of 1.5 L. Cardiology has not been consulted. Continue to stress to patient importance of self efficacy and  on diet for CHF. Last BNP reviewed- and noted below   Recent Labs   Lab 05/14/24  1047   BNP 4,015*     Lasix 40mg iv bid  I/O's   Tsh ordered.  1.5 l fluid restriction  Cardiac diet ordered.  ECHO ordered

## 2024-05-14 NOTE — ED PROVIDER NOTES
Encounter Date: 5/14/2024       History     Chief Complaint   Patient presents with    Shortness of Breath     Hx chf     61-year-old female with past medical hypertension, hyperlipidemia, pulmonary hypertension, heart failure (EF 15%) presents for evaluation of 3 days of shortness of breath.  She also reports intermittent dry cough and constipation which is at baseline.  She denies chest pain, abdominal pain, nausea/vomiting, dysuria, diarrhea, leg swelling.  While in the emergency department being evaluated, patient also began to report left flank tenderness without nausea/vomiting or radiation of pain.    The history is provided by the patient and the spouse.     Review of patient's allergies indicates:   Allergen Reactions    Adhesive Blisters    Captopril Other (See Comments)     COUGH     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 heart disease 02/24/2023    Schizophrenia     Sequela of cardioembolic stroke 04/30/2024     Past Surgical History:   Procedure Laterality Date    BLADDER SUSPENSION      CARPAL TUNNEL RELEASE Right     HEEL SPUR SURGERY Left     HYSTERECTOMY      LEFT HEART CATHETERIZATION Bilateral 12/27/2019    Procedure: Left heart cath;  Surgeon: Steve Chambers MD;  Location: [facility] CATH LAB;  Service: Cardiology;  Laterality: Bilateral;    RIGHT HEART CATHETERIZATION Right 7/26/2021    Procedure: INSERTION, CATHETER, RIGHT HEART;  Surgeon: Petr Naranjo MD;  Location: [facility] CATH LAB;  Service: Cardiology;  Laterality: Right;    RIGHT HEART CATHETERIZATION Right 5/12/2022    Procedure: INSERTION, CATHETER, RIGHT HEART;  Surgeon: Chandana Ivory Jr., MD;  Location: [facility] CATH  LAB;  Service: Cardiology;  Laterality: Right;    TUBAL LIGATION       Family History   Problem Relation Name Age of Onset    No Known Problems Mother      No Known Problems Father      Ovarian cancer Sister  42     Social History     Tobacco Use    Smoking status: Former     Current packs/day: 0.00     Types: Cigarettes     Quit date: 2016     Years since quittin.7    Smokeless tobacco: Never    Tobacco comments:     nonex 2 weeks   Substance Use Topics    Alcohol use: No     Alcohol/week: 0.0 standard drinks of alcohol    Drug use: No         Physical Exam     Initial Vitals [24 0955]   BP Pulse Resp Temp SpO2   109/81 (!) 125 (!) 24 98.2 °F (36.8 °C) 100 %      MAP       --         Physical Exam    Nursing note and vitals reviewed.  Cardiovascular:  Regular rhythm and normal heart sounds.   Tachycardia present.         Pulmonary/Chest: Breath sounds normal. Tachypnea noted.   Abdominal: Abdomen is soft. She exhibits no distension.   No right CVA tenderness.  There is left CVA tenderness. There is no rebound, no guarding, no tenderness at McBurney's point and negative Kirk's sign. negative Rovsing's sign  Musculoskeletal:      Comments: Trace non-pitting edema of lower extremities bilaterally to middle shin     Neurological: GCS score is 15. GCS eye subscore is 4. GCS verbal subscore is 5. GCS motor subscore is 6.   Skin: Skin is warm and dry.         ED Course   Procedures  Labs Reviewed   CBC W/ AUTO DIFFERENTIAL - Abnormal; Notable for the following components:       Result Value    RBC 3.19 (*)     Hemoglobin 9.0 (*)     Hematocrit 27.4 (*)     RDW 17.3 (*)     All other components within normal limits   COMPREHENSIVE METABOLIC PANEL - Abnormal; Notable for the following components:    Potassium 3.2 (*)     Glucose 166 (*)     Total Bilirubin 1.1 (*)     eGFR 51.2 (*)     All other components within normal limits   B-TYPE NATRIURETIC PEPTIDE - Abnormal; Notable for the following components:     BNP 4,015 (*)     All other components within normal limits   TROPONIN I   URINALYSIS, REFLEX TO URINE CULTURE     EKG Readings: (Independently Interpreted)   Initial Reading: No STEMI. Previous EKG: Compared with most recent EKG Previous EKG Date: 04/29/2024. Rhythm: Sinus Tachycardia. Heart Rate: 118. Conduction: LBBB. Axis: Normal.     ECG Results              EKG 12-lead (Final result)        Collection Time Result Time QRS Duration [facility] QTC Calculation    05/14/24 10:40:19 05/14/24 11:38:52 116 518                     Final result by Interface, Lab In [facility] (05/14/24 11:39:02)                   Narrative:    Test Reason : R06.02,    Vent. Rate : 118 BPM     Atrial Rate : 118 BPM     P-R Int : 136 ms          QRS Dur : 116 ms      QT Int : 370 ms       P-R-T Axes : 036 022 143 degrees     QTc Int : 518 ms    Sinus tachycardia  Incomplete left bundle branch block  ST and T wave abnormality, consider lateral ischemia  Abnormal ECG  When compared with ECG of 14-MAY-2024 10:10,  Premature ventricular complexes are no longer Present  Confirmed by Sujit RIVAS MD ([id number]) on 5/14/2024 11:38:48 AM    Referred By: [email or web address]   SELF           Confirmed By:Sujit RIVAS MD                                     EKG 12-lead (Final result)        Collection Time Result Time QRS Duration OHS QTC Calculation    05/14/24 10:10:41 05/14/24 13:14:19 112 493                     Final result by Interface, Lab In [facility] (05/14/24 13:14:29)                   Narrative:    Test Reason : R68.89,    Vent. Rate : 118 BPM     Atrial Rate : 118 BPM     P-R Int : 136 ms          QRS Dur : 112 ms      QT Int : 352 ms       P-R-T Axes : 042 005 133 degrees     QTc Int : 493 ms    Sinus tachycardia with occasional Premature ventricular complexes  Incomplete left bundle branch block  Abnormal ECG  When compared with ECG of 14-MAY-2024 09:59,  No significant change was found  Confirmed by Cinthia Cooper MD ([id number]) on 5/14/2024 1:14:14 PM    Referred By:              Confirmed By:Cinthia Cooper MD                                     EKG 12-lead (Final result)        Collection Time Result Time QRS Duration OHS QTC Calculation    05/14/24 09:59:53 05/14/24 13:14:57 112 453                     Final result by Interface, Lab In [facility] (05/14/24 13:15:06)                   Narrative:    Test Reason : R06.02,    Vent. Rate : 116 BPM     Atrial Rate : 116 BPM     P-R Int : 000 ms          QRS Dur : 112 ms      QT Int : 326 ms       P-R-T Axes : 088 042 132 degrees     QTc Int : 453 ms    Sinus tachycardia with premature ventricular complexes  Incomplete left bundle branch block  Abnormal ECG  When compared with ECG of 29-APR-2024 22:35,  No significant change was found  Confirmed by Cinthia Cooper MD ([id number]) on 5/14/2024 1:14:56 PM    Referred By:             Confirmed By:Cinthia Cooper MD                                  Imaging Results              X-Ray Chest AP Portable (Final result)  Result time 05/14/24 11:56:26      Final result by Danielle Young MD (05/14/24 11:56:26)                   Impression:      Cardiomegaly.  The lungs are better aerated compared to 04/29/2024 exam.      Electronically signed by: Danielle Young MD  Date:    05/14/2024  Time:    11:56               Narrative:    EXAMINATION:  XR CHEST AP PORTABLE    CLINICAL HISTORY:  CHF;    TECHNIQUE:  Single frontal view of the chest was performed.    COMPARISON:  04/29/2024    FINDINGS:  Cardio stimulator device left upper chest with dual leads.    The cardiac silhouette is enlarged.    The pulmonary vascularity is normal.    The lungs are clear.    No pleural effusion, no pneumothorax.    The osseous structures appear normal.                                       Medications   morphine injection 2 mg (2 mg Intravenous Not Given 5/14/24 1049)   sodium chloride 0.9% flush 10 mL (has no administration in time range)   naloxone 0.4 mg/mL injection 0.02 mg (has no administration in time range)    glucose chewable tablet 16 g (has no administration in time range)   glucose chewable tablet 24 g (has no administration in time range)   glucagon (human recombinant) injection 1 mg (has no administration in time range)   dextrose 10% bolus 125 mL 125 mL (has no administration in time range)   dextrose 10% bolus 250 mL 250 mL (has no administration in time range)   furosemide injection 40 mg (has no administration in time range)   acetaminophen tablet 650 mg (has no administration in time range)   aspirin EC tablet 81 mg (has no administration in time range)   apixaban tablet 5 mg (has no administration in time range)   atorvastatin tablet 80 mg (has no administration in time range)   morphine injection 2 mg (2 mg Intravenous Given 5/14/24 1132)   furosemide injection 40 mg (40 mg Intravenous Given 5/14/24 1217)     Medical Decision Making  61-year-old female with past medical hypertension, hyperlipidemia, pulmonary hypertension, heart failure (EF 15%) presents for evaluation of 3 days of shortness of breath.  In ED, patient is hemodynamically stable.  Mild tachycardia, but no tachypnea or hypoxia on room air.  During initial interview, patient reported new onset of left flank pain with some tenderness on palpation, but no history or physical exam findings concerning for life-threatening acute process--no fevers, no pulsatile abdominal masses, no dysuria, no numbness or tingling of the lower extremities.    Pathologies I have considered my differential diagnosis include, but are not limited to, ACS, CHF, pneumonia, pneumothorax, dysrhythmia, anemia.    Low suspicion for ACS in this patient with normal troponin, no STEMI on EKG, no chest pain.  Workup concerning for CHF--BNP severely elevated, chest x-ray with significant cardiomegaly.  Patient is started on 40 mg IV Lasix which is in addition to her home dose of 40 mg torsemide which she took this morning.  No evidence of pneumonia on chest x-ray, history, or  physical exam.  No evidence of pneumothorax on chest x-ray or physical exam.  EKG showing sinus tachycardia, but no underlying dysrhythmia that would explain patient's symptoms.  CBC showing anemia; however, patient is hemoglobin is improved from 2 weeks ago and is unlikely to explain patient's acute shortness of breath.  Admitted patient for CHF exacerbation requiring diuresis.    Amount and/or Complexity of Data Reviewed  Labs: ordered. Decision-making details documented in ED Course.  Radiology: ordered and independent interpretation performed. Decision-making details documented in ED Course.  ECG/medicine tests: ordered and independent interpretation performed. Decision-making details documented in ED Course.    Risk  Prescription drug management.               ED Course as of 05/14/24 1725   Tue May 14, 2024   1119 CBC auto differential(!)  Anemia, unchanged from 2 weeks ago, lowering suspicion for symptomatic anemia as cause of patient's symptoms.  No leukocytosis, lower suspicion for infection []   1720 BNP(!): 4,015  Severely elevated, concerning for CHF exacerbation []   1720 Comprehensive metabolic panel(!)  Bilirubin mildly elevated.  Potassium mildly decreased.  Findings likely incidental and not related to the reason for patient's presentation [BH]   1724 X-Ray Chest AP Portable  Cardiomegaly; no pneumothorax, consolidation, or effusion noted [BH]      ED Course User Index  [BH] Lang Oneal MD                             Clinical Impression:  Final diagnoses:  [R06.02] SOB (shortness of breath)  [R68.89] Multiple complaints  [R06.02] Shortness of breath          ED Disposition Condition    Observation Stable                Lang Oneal MD  Resident  05/14/24 1725

## 2024-05-14 NOTE — PHARMACY MED REC
"    Admission Medication History     The home medication history was taken by Savannah Davey.    You may go to "Admission" then "Reconcile Home Medications" tabs to review and/or act upon these items.     The home medication list has been updated by the Pharmacy department.   Please read ALL comments highlighted in yellow.   Please address this information as you see fit.    Feel free to contact us if you have any questions or require assistance.        Medications listed below were obtained from: Patient/family and Analytic software- [facility] Medications   Medication Sig    acetaminophen (TYLENOL) 325 MG tablet   Take 2 tablets (650 mg total) by mouth every 8 (eight) hours as needed for pain.    apixaban (ELIQUIS) 5 mg Tab   Take 1 tablet by mouth twice daily.      aspirin (ECOTRIN) 81 MG EC tablet   Take 1 tablet (81 mg total) by mouth once daily.    atorvastatin (LIPITOR) 80 MG tablet   Take 1 tablet (80 mg total) by mouth every evening.    cyproheptadine (PERIACTIN) 4 mg tablet   Take 1 tablet (4 mg total) by mouth 2 (two) times daily as needed.    empagliflozin (JARDIANCE) 10 mg tablet   Take 1 tablet (10 mg total) by mouth once daily.    LIDOcaine (LIDODERM) 5 %     Place 1 patch onto the skin once daily. Remove & Discard patch within 12 hours or as directed by MD    midodrine (PROAMATINE) 10 MG tablet   Take 1 tablet (10 mg total) by mouth 3 (three) times daily.    mirtazapine (REMERON) 7.5 MG Tab   Take 1 tablet (7.5 mg total) by mouth every evening.    ondansetron (ZOFRAN-ODT) 4 MG TbDL   Take 1 tablet (4 mg total) by mouth every 8 (eight) hours as needed for nausea.    oxyCODONE-acetaminophen (PERCOCET) 5-325 mg per tablet   Take 1 tablet by mouth every 8 (eight) hours as needed for pain.    potassium chloride (K-DUR,KLOR-CON, K-TAB) 20 MEQ tablet   Take 1 tablet (20 mEq total) by mouth once daily.    SENNA 8.6 mg tablet   Take 2 tablets by mouth every evening.    spironolactone (ALDACTONE) 25 MG " tablet   Take 1 tablet (25 mg total) by mouth once daily.    torsemide (DEMADEX) 10 MG Tab   Take 4 tablets (40 mg total) by mouth 2 (two) times a day.    trihexyphenidyL (ARTANE) 2 MG tablet     Take 0.5 tablets (1 mg total) by mouth 3 (three) times daily with meals.   (Patient not taking: Reported on 5/14/2024)       Potential issues to be addressed PRIOR TO DISCHARGE  Please discuss with the patient barriers to adherence with medication treatment plans  Patient requires education regarding drug therapies     Savannah Davey  EXT [id number]             .

## 2024-05-14 NOTE — SUBJECTIVE & OBJECTIVE
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 heart disease 02/24/2023    Schizophrenia     Sequela of cardioembolic stroke 04/30/2024       Past Surgical History:   Procedure Laterality Date    BLADDER SUSPENSION      CARPAL TUNNEL RELEASE Right     HEEL SPUR SURGERY Left     HYSTERECTOMY      LEFT HEART CATHETERIZATION Bilateral 12/27/2019    Procedure: Left heart cath;  Surgeon: Steve Chambers MD;  [facility];  Service: Cardiology;  Laterality: Bilateral;    RIGHT HEART CATHETERIZATION Right 7/26/2021    Procedure: INSERTION, CATHETER, RIGHT HEART;  Surgeon: Petr Naranjo MD;  Location: [facility] CATH LAB;  Service: Cardiology;  Laterality: Right;    RIGHT HEART CATHETERIZATION Right 5/12/2022    Procedure: INSERTION, CATHETER, RIGHT HEART;  Surgeon: Chandana Ivory Jr., MD;  Location: [facility] CATH LAB;  Service: Cardiology;  Laterality: Right;    TUBAL LIGATION         Review of patient's allergies indicates:   Allergen Reactions    Adhesive Blisters    Captopril Other (See Comments)     COUGH       No current facility-administered medications on file prior to encounter.     Current Outpatient Medications on File Prior to Encounter   Medication Sig    acetaminophen (TYLENOL) 325 MG tablet Take 2 tablets (650 mg total) by mouth every 8 (eight) hours as needed. (Patient taking differently: Take 650 mg by mouth every 8 (eight) hours as needed for Pain.)    apixaban (ELIQUIS) 5 mg Tab TAKE 1 TABLET BY MOUTH TWICE DAILY.    aspirin (ECOTRIN) 81 MG EC tablet Take 1 tablet (81 mg total) by mouth once daily.    atorvastatin (LIPITOR) 80 MG tablet Take 1 tablet (80 mg total) by mouth once  daily. (Patient taking differently: Take 80 mg by mouth every evening.)    cyproheptadine (PERIACTIN) 4 mg tablet Take 1 tablet (4 mg total) by mouth 2 (two) times daily. (Patient taking differently: Take 4 mg by mouth 2 (two) times daily as needed.)    empagliflozin (JARDIANCE) 10 mg tablet Take 1 tablet (10 mg total) by mouth once daily.    LIDOcaine (LIDODERM) 5 % Place 2 patches onto the skin once daily. Remove & Discard patch within 12 hours or as directed by MD (Patient taking differently: Place 1 patch onto the skin once daily. Remove & Discard patch within 12 hours or as directed by MD)    midodrine (PROAMATINE) 10 MG tablet Take 1 tablet (10 mg total) by mouth 3 (three) times daily.    mirtazapine (REMERON) 7.5 MG Tab Take 1 tablet (7.5 mg total) by mouth every evening.    ondansetron (ZOFRAN-ODT) 4 MG TbDL Take 1 tablet (4 mg total) by mouth every 8 (eight) hours as needed (nausea).    oxyCODONE-acetaminophen (PERCOCET) 5-325 mg per tablet Take 1 tablet by mouth every 8 (eight) hours as needed for Pain.    potassium chloride (K-DUR,KLOR-CON, K-TAB) 20 MEQ tablet Take 1 tablet (20 mEq total) by mouth once daily.    SENNA 8.6 mg tablet Take 2 tablets by mouth every evening. (Patient taking differently: Take 2 tablets by mouth nightly.)    spironolactone (ALDACTONE) 25 MG tablet Take 1 tablet (25 mg total) by mouth once daily.    torsemide (DEMADEX) 10 MG Tab Take 4 tablets (40 mg total) by mouth 2 (two) times a day.    trihexyphenidyL (ARTANE) 2 MG tablet Take 0.5 tablets (1 mg total) by mouth 3 (three) times daily with meals. (Patient not taking: Reported on 2024)     Family History       Problem Relation (Age of Onset)    No Known Problems Mother, Father    Ovarian cancer Sister (42)          Tobacco Use    Smoking status: Former     Current packs/day: 0.00     Types: Cigarettes     Quit date: 2016     Years since quittin.7    Smokeless tobacco: Never    Tobacco comments:     nonex 2 weeks    Substance and Sexual Activity    Alcohol use: No     Alcohol/week: 0.0 standard drinks of alcohol    Drug use: No    Sexual activity: Not on file     Review of Systems   Constitutional:  Negative for chills and fever.   HENT:  Negative for rhinorrhea and sore throat.    Respiratory:  Positive for shortness of breath. Negative for cough and wheezing.    Cardiovascular:  Negative for chest pain, palpitations and leg swelling.   Gastrointestinal:  Negative for abdominal pain, nausea and vomiting.   Endocrine: Negative for cold intolerance, polydipsia and polyphagia.   Genitourinary:  Negative for dysuria and hematuria.   Musculoskeletal: Negative.    Neurological: Negative.    Psychiatric/Behavioral: Negative.       Objective:     Vital Signs (Most Recent):  Temp: 96.1 °F (35.6 °C) (05/14/24 1446)  Pulse: 110 (05/14/24 1455)  Resp: 20 (05/14/24 1446)  BP: 121/73 (05/14/24 1446)  SpO2: 100 % (05/14/24 1446) Vital Signs (24h Range):  Temp:  [96.1 °F (35.6 °C)-98.2 °F (36.8 °C)] 96.1 °F (35.6 °C)  Pulse:  [102-125] 110  Resp:  [16-24] 20  SpO2:  [95 %-100 %] 100 %  BP: (109-129)/(73-84) 121/73     Weight: 54 kg (119 lb 0.8 oz)  Body mass index is 19.21 kg/m².     Physical Exam  Constitutional:       Appearance: Normal appearance.   HENT:      Mouth/Throat:      Mouth: Mucous membranes are moist.      Pharynx: Oropharynx is clear.   Eyes:      Conjunctiva/sclera: Conjunctivae normal.      Pupils: Pupils are equal, round, and reactive to light.   Cardiovascular:      Rate and Rhythm: Normal rate and regular rhythm.   Pulmonary:      Effort: Pulmonary effort is normal.      Breath sounds: Rales present.   Abdominal:      General: Bowel sounds are normal.      Palpations: Abdomen is soft.   Musculoskeletal:         General: No swelling or tenderness. Normal range of motion.      Cervical back: Normal range of motion and neck supple.   Neurological:      General: No focal deficit present.      Mental Status: She is alert and  oriented to person, place, and time.   Psychiatric:         Mood and Affect: Mood normal.         Behavior: Behavior normal.              CRANIAL NERVES     CN III, IV, VI   Pupils are equal, round, and reactive to light.       Significant Labs: All pertinent labs within the past 24 hours have been reviewed.  Recent Lab Results         05/14/24  1047   05/14/24  1040   05/14/24  1010   05/14/24  0959        Albumin 3.9                          ALT 20             Anion Gap 14             AST 20             Baso # 0.04             Basophil % 0.6             BILIRUBIN TOTAL 1.1  Comment: For infants and newborns, interpretation of results should be based  on gestational age, weight and in agreement with clinical  observations.    Premature Infant recommended reference ranges:  Up to 24 hours.............<8.0 mg/dL  Up to 48 hours............<12.0 mg/dL  3-5 days..................<15.0 mg/dL  6-29 days.................<15.0 mg/dL               BNP 4,015  Comment: Values of less than 100 pg/ml are consistent with non-CHF populations.             BUN 13             Calcium 10.4             Chloride 96             CO2 27             Creatinine 1.2             Differential Method Automated             eGFR 51.2             Eos # 0.0             Eos % 0.6             Glucose 166             Gran # (ANC) 4.1             Gran % 62.0             Hematocrit 27.4             Hemoglobin 9.0             Immature Grans (Abs) 0.01  Comment: Mild elevation in immature granulocytes is non specific and   can be seen in a variety of conditions including stress response,   acute inflammation, trauma and pregnancy. Correlation with other   laboratory and clinical findings is essential.               Immature Granulocytes 0.2             Lymph # 2.1             Lymph % 32.2             MCH 28.2             MCHC 32.8             MCV 86             Mono # 0.3             Mono % 4.4             MPV 10.8             nRBC 0              QRS Duration   116   112   112       OHS QTC Calculation   518   493   453       Platelet Count 436             Potassium 3.2             PROTEIN TOTAL 7.9             RBC 3.19             RDW 17.3             Sodium 137             Troponin I 0.022  Comment: The reference interval for Troponin I represents the 99th percentile   cutoff   for our facility and is consistent with 3rd generation assay   performance.               WBC 6.56                     Significant Imaging: I have reviewed all pertinent imaging results/findings within the past 24 hours.    Imaging Results              X-Ray Chest AP Portable (Final result)  Result time 05/14/24 11:56:26      Final result by Danielle Young MD (05/14/24 11:56:26)                   Impression:      Cardiomegaly.  The lungs are better aerated compared to 04/29/2024 exam.      Electronically signed by: Danielle Young MD  Date:    05/14/2024  Time:    11:56               Narrative:    EXAMINATION:  XR CHEST AP PORTABLE    CLINICAL HISTORY:  CHF;    TECHNIQUE:  Single frontal view of the chest was performed.    COMPARISON:  04/29/2024    FINDINGS:  Cardio stimulator device left upper chest with dual leads.    The cardiac silhouette is enlarged.    The pulmonary vascularity is normal.    The lungs are clear.    No pleural effusion, no pneumothorax.    The osseous structures appear normal.

## 2024-05-14 NOTE — PLAN OF CARE
[address] [facility] Observation [facility]  Initial Discharge Assessment       Primary Care Provider: Yolie Michael MD    Admission Diagnosis: Shortness of breath [R06.02]  CHF (congestive heart failure) [I50.9]  SOB (shortness of breath) [R06.02]  Multiple complaints [R68.89]  Chest pain [R07.9]    Admission Date: 5/14/2024  Expected Discharge Date:     Transition of Care Barriers: None    Payor: HUMANA MANAGED MEDICARE / Plan: HUMANA MEDICARE SELECT PARTNER / Product Type: Medicare Advantage /     Extended Emergency Contact Information  Primary Emergency Contact: Maged Mohr  Address: [address] [place] [address] [facility]  Home Phone: [phone]  Mobile Phone: [phone]  Relation: Spouse  Secondary Emergency Contact: Yael Mohr  Home Phone: [phone]  Relation: Daughter    Discharge Plan A: Home Health, Home with family  Discharge Plan B: Home with family      [facility] [place] - [address]  [address]  [place] [address]  Phone: [phone] Fax: [phone]    [facility] - [facility] [address]  [address] [facility] [address]  Phone: [phone] Fax: [phone]      Initial Assessment (most recent)       Adult Discharge Assessment - 05/14/24 1402          Discharge Assessment    Assessment Type Discharge Planning Assessment     Confirmed/corrected address, phone number and insurance Yes     Confirmed Demographics Correct on Facesheet     Source of Information patient;family     When was your last doctors appointment? 05/07/24     Does patient/caregiver understand observation status Yes     Communicated KARLIE with patient/caregiver Yes;Date not available/Unable to determine     Reason For Admission Shortness of Breath     People in Home spouse     Facility Arrived From: n/a - home     Do you expect to return to your current living situation? Yes     Do you have help at home or someone to help you manage your care at  home? Yes     Who are your caregiver(s) and their phone number(s)? OnurMaged (Spouse)  [phone]     Prior to hospitilization cognitive status: Alert/Oriented     Current cognitive status: Alert/Oriented     Walking or Climbing Stairs Difficulty yes     Walking or Climbing Stairs ambulation difficulty, requires equipment     Mobility Management Walker     Dressing/Bathing Difficulty yes     Dressing/Bathing bathing difficulty, assistance 1 person     Dressing/Bathing  from [facility]     Home Accessibility wheelchair accessible     Home Layout Able to live on 1st floor     Equipment Currently Used at Home walker, standard     Readmission within 30 days? Yes     Patient currently being followed by outpatient case management? Unable to determine (comments)   Referral Sent    Do you currently have service(s) that help you manage your care at home? Yes     How Many hours does patient receive services 4     Name and Contact number of agency [facility]     Is the pt/caregiver preference to resume services with current agency Yes     Do you take prescription medications? Yes     Do you have prescription coverage? Yes     Coverage HUMANA MANAGED MEDICARE/HUMANA MEDICARE SELECT PARTNER     Do you have any problems affording any of your prescribed medications? No     Is the patient taking medications as prescribed? yes     Who is going to help you get home at discharge? Maged Mohr (Spouse)  [phone]     How do you get to doctors appointments? family or friend will provide     Are you on dialysis? No     Do you take coumadin? No     Discharge Plan A Home Health;Home with family     Discharge Plan B Home with family     DME Needed Upon Discharge  other (see comments)   TBD    Discharge Plan discussed with: Spouse/sig other;Patient     Name(s) and Number(s) Maged Mohr (Spouse)  [phone]     Transition of Care Barriers None        OTHER    Name(s) of People  in Home Maged Mohr (Spouse)  [phone]                     Readmission Assessment (most recent)       Readmission Assessment - 05/14/24 1524          Readmission    Was this a planned readmission? No     Why were you hospitalized in the last 30 days? Chest pain     When you left the hospital where did you go? Home with Family     Did patient/caregiver refused recommended DC plan? No     Did you try to manage your symptoms your self? No (P)      Did you call anyone? No (P)      Did you try to see or did see a doctor or nurse before you came? Yes (P)      Were you seen? Yes (P)      Did you have  a follow-up appointment on discharge? Yes (P)      Did you go? Yes (P)                     Pt lives with Maged Mohr (Spouse)  [phone]. Pt does not use HH, HD or coumadin. Pt has an Aide from [facility] (Tues and Fri). Pt uses a std Walker. Pt requires assistance with bathing and dressing. Pt does not drive. Pt's family will assist with transport at discharge.     Discharge Plan A Home Health;Home with family    Discharge Plan B Home with family    DME Needed Upon Discharge  other (see comments)   TBD     No other discharge needs identified at this time.    Discharge Plan A and Plan B have been determined by review of patient's clinical status, future medical and therapeutic needs, and coverage/benefits for post-acute care in coordination with multidisciplinary team members.     Matilde Godoy, SABRINA  Case Management [facility] [place], [place]  [phone]

## 2024-05-15 LAB
ALBUMIN SERPL BCP-MCNC: 3.5 G/DL (ref 3.5–5.2)
ALP SERPL-CCNC: 108 U/L (ref 55–135)
ALT SERPL W/O P-5'-P-CCNC: 19 U/L (ref 10–44)
ANION GAP SERPL CALC-SCNC: 14 MMOL/L (ref 8–16)
ASCENDING AORTA: 2.92 CM
AST SERPL-CCNC: 18 U/L (ref 10–40)
AV INDEX (PROSTH): 0.43
AV MEAN GRADIENT: 2 MMHG
AV PEAK GRADIENT: 4 MMHG
AV VALVE AREA BY VELOCITY RATIO: 2.01 CM²
AV VALVE AREA: 1.57 CM²
AV VELOCITY RATIO: 0.54
BACTERIA #/AREA URNS AUTO: ABNORMAL /HPF
BASOPHILS # BLD AUTO: 0.07 K/UL (ref 0–0.2)
BASOPHILS NFR BLD: 1.2 % (ref 0–1.9)
BILIRUB SERPL-MCNC: 1.1 MG/DL (ref 0.1–1)
BILIRUB UR QL STRIP: NEGATIVE
BSA FOR ECHO PROCEDURE: 1.59 M2
BUN SERPL-MCNC: 13 MG/DL (ref 8–23)
CALCIUM SERPL-MCNC: 9.5 MG/DL (ref 8.7–10.5)
CHLORIDE SERPL-SCNC: 98 MMOL/L (ref 95–110)
CLARITY UR REFRACT.AUTO: CLEAR
CO2 SERPL-SCNC: 26 MMOL/L (ref 23–29)
COLOR UR AUTO: YELLOW
CREAT SERPL-MCNC: 1 MG/DL (ref 0.5–1.4)
CV ECHO LV RWT: 0.14 CM
DIFFERENTIAL METHOD BLD: ABNORMAL
DOP CALC AO PEAK VEL: 0.94 M/S
DOP CALC AO VTI: 13.57 CM
DOP CALC LVOT AREA: 3.7 CM2
DOP CALC LVOT DIAMETER: 2.17 CM
DOP CALC LVOT PEAK VEL: 0.51 M/S
DOP CALC LVOT STROKE VOLUME: 21.37 CM3
DOP CALCLVOT PEAK VEL VTI: 5.78 CM
ECHO LV POSTERIOR WALL: 0.57 CM (ref 0.6–1.1)
EJECTION FRACTION: 8 %
EOSINOPHIL # BLD AUTO: 0.1 K/UL (ref 0–0.5)
EOSINOPHIL NFR BLD: 2.4 % (ref 0–8)
ERYTHROCYTE [DISTWIDTH] IN BLOOD BY AUTOMATED COUNT: 17.4 % (ref 11.5–14.5)
EST. GFR  (NO RACE VARIABLE): >60 ML/MIN/1.73 M^2
FRACTIONAL SHORTENING: 4 % (ref 28–44)
GLUCOSE SERPL-MCNC: 116 MG/DL (ref 70–110)
GLUCOSE UR QL STRIP: ABNORMAL
HCT VFR BLD AUTO: 25.8 % (ref 37–48.5)
HGB BLD-MCNC: 8.3 G/DL (ref 12–16)
HGB UR QL STRIP: NEGATIVE
IMM GRANULOCYTES # BLD AUTO: 0.01 K/UL (ref 0–0.04)
IMM GRANULOCYTES NFR BLD AUTO: 0.2 % (ref 0–0.5)
INTERVENTRICULAR SEPTUM: 0.5 CM (ref 0.6–1.1)
KETONES UR QL STRIP: NEGATIVE
LA MAJOR: 5.29 CM
LA MINOR: 5.58 CM
LA WIDTH: 5.14 CM
LEFT ATRIUM SIZE: 4.48 CM
LEFT ATRIUM VOLUME INDEX: 66.4 ML/M2
LEFT ATRIUM VOLUME: 106.3 CM3
LEFT INTERNAL DIMENSION IN SYSTOLE: 7.75 CM (ref 2.1–4)
LEFT VENTRICLE DIASTOLIC VOLUME INDEX: 217.86 ML/M2
LEFT VENTRICLE DIASTOLIC VOLUME: 348.57 ML
LEFT VENTRICLE MASS INDEX: 123 G/M2
LEFT VENTRICLE SYSTOLIC VOLUME INDEX: 200.5 ML/M2
LEFT VENTRICLE SYSTOLIC VOLUME: 320.8 ML
LEFT VENTRICULAR INTERNAL DIMENSION IN DIASTOLE: 8.04 CM (ref 3.5–6)
LEFT VENTRICULAR MASS: 197.23 G
LEUKOCYTE ESTERASE UR QL STRIP: NEGATIVE
LYMPHOCYTES # BLD AUTO: 2.6 K/UL (ref 1–4.8)
LYMPHOCYTES NFR BLD: 43.8 % (ref 18–48)
MCH RBC QN AUTO: 28.1 PG (ref 27–31)
MCHC RBC AUTO-ENTMCNC: 32.2 G/DL (ref 32–36)
MCV RBC AUTO: 88 FL (ref 82–98)
MICROSCOPIC COMMENT: ABNORMAL
MONOCYTES # BLD AUTO: 0.4 K/UL (ref 0.3–1)
MONOCYTES NFR BLD: 6.9 % (ref 4–15)
MV PEAK E VEL: 0.79 M/S
NEUTROPHILS # BLD AUTO: 2.7 K/UL (ref 1.8–7.7)
NEUTROPHILS NFR BLD: 45.5 % (ref 38–73)
NITRITE UR QL STRIP: NEGATIVE
NRBC BLD-RTO: 0 /100 WBC
OHS CV RV/LV RATIO: 0.53 CM
OHS QRS DURATION: 106 MS
OHS QTC CALCULATION: 495 MS
PH UR STRIP: 6 [PH] (ref 5–8)
PISA TR MAX VEL: 3.59 M/S
PLATELET # BLD AUTO: 395 K/UL (ref 150–450)
PMV BLD AUTO: 10.7 FL (ref 9.2–12.9)
POCT GLUCOSE: 132 MG/DL (ref 70–110)
POCT GLUCOSE: 152 MG/DL (ref 70–110)
POTASSIUM SERPL-SCNC: 3.2 MMOL/L (ref 3.5–5.1)
PROT SERPL-MCNC: 6.9 G/DL (ref 6–8.4)
PROT UR QL STRIP: ABNORMAL
RA MAJOR: 4.16 CM
RA PRESSURE ESTIMATED: 3 MMHG
RA WIDTH: 4.65 CM
RBC # BLD AUTO: 2.95 M/UL (ref 4–5.4)
RBC #/AREA URNS AUTO: 11 /HPF (ref 0–4)
RIGHT VENTRICULAR END-DIASTOLIC DIMENSION: 4.28 CM
RV TB RVSP: 7 MMHG
SINUS: 3.02 CM
SODIUM SERPL-SCNC: 138 MMOL/L (ref 136–145)
SP GR UR STRIP: 1.01 (ref 1–1.03)
SQUAMOUS #/AREA URNS AUTO: 0 /HPF
STJ: 2.28 CM
TR MAX PG: 52 MMHG
TRICUSPID ANNULAR PLANE SYSTOLIC EXCURSION: 1.42 CM
TV REST PULMONARY ARTERY PRESSURE: 55 MMHG
URN SPEC COLLECT METH UR: ABNORMAL
WBC # BLD AUTO: 5.92 K/UL (ref 3.9–12.7)
WBC #/AREA URNS AUTO: 1 /HPF (ref 0–5)
YEAST UR QL AUTO: ABNORMAL
Z-SCORE OF LEFT VENTRICULAR DIMENSION IN END DIASTOLE: 5.69
Z-SCORE OF LEFT VENTRICULAR DIMENSION IN END SYSTOLE: 7.98

## 2024-05-15 PROCEDURE — 21400001 HC TELEMETRY ROOM: Mod: HCNC

## 2024-05-15 PROCEDURE — 85025 COMPLETE CBC W/AUTO DIFF WBC: CPT | Mod: HCNC | Performed by: STUDENT IN AN ORGANIZED HEALTH CARE EDUCATION/TRAINING PROGRAM

## 2024-05-15 PROCEDURE — 96376 TX/PRO/DX INJ SAME DRUG ADON: CPT

## 2024-05-15 PROCEDURE — 25500020 PHARM REV CODE 255: Mod: HCNC | Performed by: STUDENT IN AN ORGANIZED HEALTH CARE EDUCATION/TRAINING PROGRAM

## 2024-05-15 PROCEDURE — 93005 ELECTROCARDIOGRAM TRACING: CPT | Mod: HCNC

## 2024-05-15 PROCEDURE — 25000003 PHARM REV CODE 250: Mod: HCNC | Performed by: STUDENT IN AN ORGANIZED HEALTH CARE EDUCATION/TRAINING PROGRAM

## 2024-05-15 PROCEDURE — 93010 ELECTROCARDIOGRAM REPORT: CPT | Mod: HCNC,,, | Performed by: INTERNAL MEDICINE

## 2024-05-15 PROCEDURE — 36415 COLL VENOUS BLD VENIPUNCTURE: CPT | Mod: HCNC | Performed by: STUDENT IN AN ORGANIZED HEALTH CARE EDUCATION/TRAINING PROGRAM

## 2024-05-15 PROCEDURE — 81001 URINALYSIS AUTO W/SCOPE: CPT | Mod: HCNC

## 2024-05-15 PROCEDURE — 80053 COMPREHEN METABOLIC PANEL: CPT | Mod: HCNC | Performed by: STUDENT IN AN ORGANIZED HEALTH CARE EDUCATION/TRAINING PROGRAM

## 2024-05-15 PROCEDURE — 63600175 PHARM REV CODE 636 W HCPCS: Mod: HCNC | Performed by: STUDENT IN AN ORGANIZED HEALTH CARE EDUCATION/TRAINING PROGRAM

## 2024-05-15 RX ORDER — LIDOCAINE 50 MG/G
1 PATCH TOPICAL
Status: DISCONTINUED | OUTPATIENT
Start: 2024-05-15 | End: 2024-05-18 | Stop reason: HOSPADM

## 2024-05-15 RX ORDER — INSULIN ASPART 100 [IU]/ML
0-5 INJECTION, SOLUTION INTRAVENOUS; SUBCUTANEOUS
Status: DISCONTINUED | OUTPATIENT
Start: 2024-05-15 | End: 2024-05-15

## 2024-05-15 RX ADMIN — ATORVASTATIN CALCIUM 80 MG: 40 TABLET, FILM COATED ORAL at 09:05

## 2024-05-15 RX ADMIN — APIXABAN 5 MG: 5 TABLET, FILM COATED ORAL at 09:05

## 2024-05-15 RX ADMIN — APIXABAN 5 MG: 5 TABLET, FILM COATED ORAL at 10:05

## 2024-05-15 RX ADMIN — ASPIRIN 81 MG: 81 TABLET, COATED ORAL at 10:05

## 2024-05-15 RX ADMIN — FUROSEMIDE 40 MG: 10 INJECTION, SOLUTION INTRAVENOUS at 09:05

## 2024-05-15 RX ADMIN — LIDOCAINE 1 PATCH: 50 PATCH CUTANEOUS at 03:05

## 2024-05-15 RX ADMIN — POTASSIUM BICARBONATE 50 MEQ: 978 TABLET, EFFERVESCENT ORAL at 10:05

## 2024-05-15 RX ADMIN — FUROSEMIDE 40 MG: 10 INJECTION, SOLUTION INTRAVENOUS at 10:05

## 2024-05-15 RX ADMIN — IOHEXOL 75 ML: 350 INJECTION, SOLUTION INTRAVENOUS at 05:05

## 2024-05-15 RX ADMIN — HUMAN ALBUMIN MICROSPHERES AND PERFLUTREN 0.66 MG: 10; .22 INJECTION, SOLUTION INTRAVENOUS at 09:05

## 2024-05-15 NOTE — CARE UPDATE
Patient admitted to  for decompensated HF.   Primary team wanted to make cardiology aware that patients EF had dropped to 5-10%  15-20%.   Primary team reports patient is euvolemic and will likely be discharged today.  Recommend HF transitional clinic on discharge.   I sent a message to Dr. Smith staff informing of admission and change in TTE     Lady SEO MD  Cardiology Fellow  Pager: [phone]  [facility]

## 2024-05-15 NOTE — ASSESSMENT & PLAN NOTE
Continue statin     Impression: Unspecified blepharitis of left upper eyelid: H01.004.  Plan: See play #3

## 2024-05-15 NOTE — ASSESSMENT & PLAN NOTE
Patient is identified as having Combined Systolic and Diastolic heart failure that is Acute on chronic. CHF is currently uncontrolled due to Dyspnea not returned to baseline after 1 doses of IV diuretic, >3 pillow orthopnea, and Rales/crackles on pulmonary exam. Latest ECHO performed and demonstrates- Results for orders placed during the hospital encounter of 08/30/23    Echo    Interpretation Summary    Left Ventricle: The left ventricle is severely dilated. Normal wall thickness. Severe global hypokinesis present. There is severely reduced systolic function with a visually estimated ejection fraction of 15 - 20%. Biplane (2D) method of discs ejection fraction is 17%. Grade I diastolic dysfunction.    Right Ventricle: Normal right ventricular cavity size. Wall thickness is normal. Right ventricle wall motion  is normal. Systolic function is normal. Pacemaker lead present in the ventricle.    Aortic Valve: The aortic valve is a trileaflet valve. There is mild aortic valve sclerosis. Mild annular calcification.    IVC/SVC: Intermediate venous pressure at 8 mmHg.  . Continue Furosemide and monitor clinical status closely. Monitor on telemetry. Patient is off CHF pathway.  Monitor strict Is&Os and daily weights.  Place on fluid restriction of 1.5 L. Cardiology has not been consulted. Continue to stress to patient importance of self efficacy and  on diet for CHF. Last BNP reviewed- and noted below   Recent Labs   Lab 05/14/24  1047   BNP 4,015*     Echo    Result Date: 5/15/2024    Left Ventricle: The left ventricle is severely dilated. There is   eccentric hypertrophy. Severe global hypokinesis present. There is   severely reduced systolic function with a visually estimated ejection   fraction of 5 - 10%. Ejection fraction by visual approximation is 8%.   Grade I diastolic dysfunction.    Right Ventricle: Mild right ventricular enlargement. Systolic function   is moderately reduced. Pacemaker lead present in the  ventricle.    Left Atrium: Left atrium is dilated.    Aortic Valve: The aortic valve is a trileaflet valve.    Mitral Valve: There is mild to moderate regurgitation with a centrally   directed jet.    Tricuspid Valve: There is mild to moderate regurgitation.    Pulmonic Valve: There is mild regurgitation.    Pulmonary Artery: There is moderate pulmonary hypertension. The   estimated pulmonary artery systolic pressure is 55 mmHg.    IVC/SVC: Normal venous pressure at 3 mmHg.    Pericardium: There is a trivial posterior effusion. Right pleural   effusion may be present.         Lasix 40mg iv bid  I/O's   Tsh ordered.  1.5 l fluid restriction  Cardiac diet ordered.

## 2024-05-15 NOTE — ASSESSMENT & PLAN NOTE
2.2 acute heart failure exacerbation  Sinus tachycardia on EKG.  Plan to check CTA.    Patient afebrile, no leukocytosis.  No consolidation noted on x-ray.  Continue to monitor off antibiotics.

## 2024-05-15 NOTE — ASSESSMENT & PLAN NOTE
Patient's FSGs are controlled on current medication regimen.  Last A1c reviewed-   Lab Results   Component Value Date    HGBA1C 5.6 04/30/2024     Most recent fingerstick glucose reviewed-   Recent Labs   Lab 05/14/24  1936 05/15/24  1213 05/15/24  1640   POCTGLUCOSE 193* 152* 132*     Current correctional scale   no  Maintain anti-hyperglycemic dose as follows-   Antihyperglycemics (From admission, onward)    None        Hold Oral hypoglycemics while patient is in the hospital.

## 2024-05-15 NOTE — HOSPITAL COURSE
Echo revealed severely dilated left ventricle, with severely reduced EF of 5-10%.  Discussed with Cardiology, reported would not change plan of management.  Patient continued to have dyspnea and tachycardic.  EKG showed sinus tachycardia.  CTA ordered to rule out pulmonary embolus.     Escalated diuretics to lasix 80 mg BID IV, Metolazone 2.5 once. Good UOP ~ net neg. 2L  Still feels SOB and will repeat but aware that hypotension or ARBEN can occur.   Discussed hospice 5/17. Family will meet with representative in AM 5/18. (Discussed with daughter, she is in healthcare).

## 2024-05-15 NOTE — PLAN OF CARE
Inpatient Upgrade Note    Diomedes Mohr has warranted treatment spanning two or more midnights of hospital level care for the management of heart failure, and tachycardia. She continues to require IV diuresis, daily labs, further testing/imaging, monitoring of vital signs, and further evaluation by consultants. Her condition is also complicated by the following comorbidities: Hypertension, Chronic respiratory disease, Chronic kidney disease, Heart failure, and Schizophrenia, Pulmonary HTN, Depression, and long -term Anticoagulant use .

## 2024-05-15 NOTE — SUBJECTIVE & OBJECTIVE
Interval History: No overnight events. Endorses sob, denies chest pain or palpitations.    Review of Systems   Constitutional:  Negative for chills and fever.   HENT:  Negative for rhinorrhea and sore throat.    Respiratory:  Positive for shortness of breath. Negative for cough and wheezing.    Cardiovascular:  Negative for chest pain, palpitations and leg swelling.   Gastrointestinal:  Negative for abdominal pain, nausea and vomiting.   Endocrine: Negative for cold intolerance, polydipsia and polyphagia.   Genitourinary:  Negative for dysuria and hematuria.   Musculoskeletal: Negative.    Neurological: Negative.    Psychiatric/Behavioral: Negative.       Objective:     Vital Signs (Most Recent):  Temp: 97.5 °F (36.4 °C) (05/15/24 1643)  Pulse: 98 (05/15/24 1643)  Resp: 20 (05/15/24 1643)  BP: 117/84 (05/15/24 1643)  SpO2: 100 % (05/15/24 1643) Vital Signs (24h Range):  Temp:  [97.5 °F (36.4 °C)-98.7 °F (37.1 °C)] 97.5 °F (36.4 °C)  Pulse:  [] 98  Resp:  [18-20] 20  SpO2:  [98 %-100 %] 100 %  BP: (105-120)/(62-84) 117/84     Weight: 54 kg (119 lb 0.8 oz)  Body mass index is 19.21 kg/m².     Physical Exam  Constitutional:       Appearance: Normal appearance.   HENT:      Mouth/Throat:      Mouth: Mucous membranes are moist.      Pharynx: Oropharynx is clear.   Eyes:      Conjunctiva/sclera: Conjunctivae normal.      Pupils: Pupils are equal, round, and reactive to light.   Cardiovascular:      Rate and Rhythm: Normal rate and regular rhythm.   Pulmonary:      Effort: Pulmonary effort is normal.      Breath sounds: Rales present.   Abdominal:      General: Bowel sounds are normal.      Palpations: Abdomen is soft.   Musculoskeletal:         General: No swelling or tenderness. Normal range of motion.      Cervical back: Normal range of motion and neck supple.   Neurological:      General: No focal deficit present.      Mental Status: She is alert and oriented to person, place, and time.   Psychiatric:          Mood and Affect: Mood normal.         Behavior: Behavior normal.              CRANIAL NERVES     CN III, IV, VI   Pupils are equal, round, and reactive to light.       Significant Labs: All pertinent labs within the past 24 hours have been reviewed.  Recent Lab Results  (Last 5 results in the past 24 hours)        05/15/24  1640   05/15/24  1242   05/15/24  1213   05/15/24  0916   05/15/24  0451        Albumin         3.5       ALP         108       ALT         19       Anion Gap         14       Ascending aorta       2.92         Ao peak ivelisse       0.94         Ao VTI       13.57         AST         18       AV valve area       1.57         ARLETH by Velocity Ratio       2.01         AV mean gradient       2         AV index (prosthetic)       0.43         AV peak gradient       4         AV Velocity Ratio       0.54         Baso #         0.07       Basophil %         1.2       BILIRUBIN TOTAL         1.1  Comment: For infants and newborns, interpretation of results should be based  on gestational age, weight and in agreement with clinical  observations.    Premature Infant recommended reference ranges:  Up to 24 hours.............<8.0 mg/dL  Up to 48 hours............<12.0 mg/dL  3-5 days..................<15.0 mg/dL  6-29 days.................<15.0 mg/dL         BSA       1.59         BUN         13       Calcium         9.5       Chloride         98       CO2         26       Creatinine         1.0       Left Ventricle Relative Wall Thickness       0.14         Differential Method         Automated       eGFR         >60.0       EF       8         Eos #         0.1       Eos %         2.4       FS       4         Glucose         116       Gran # (ANC)         2.7       Gran %         45.5       Hematocrit         25.8       Hemoglobin         8.3       Immature Grans (Abs)         0.01  Comment: Mild elevation in immature granulocytes is non specific and   can be seen in a variety of conditions including stress  response,   acute inflammation, trauma and pregnancy. Correlation with other   laboratory and clinical findings is essential.         Immature Granulocytes         0.2       IVSd       0.50         LA WIDTH       5.14         Left Atrium Major Axis       5.29         Left Atrium Minor Axis       5.58         LA size       4.48         LA volume       106.30         LA vol index       66.4         LVOT area       3.7         LV EDV BP       348.57         LV Diastolic Volume Index       217.86         LVIDd       8.04         LVIDs       7.75         LV mass       197.23         LV Mass Index       123         LVOT diameter       2.17         LVOT peak juan jose       0.51         LVOT stroke volume       21.37         LVOT peak VTI       5.78         LV ESV BP       320.80         LV Systolic Volume Index       200.5         Lymph #         2.6       Lymph %         43.8       MCH         28.1       MCHC         32.2       MCV         88       Mono #         0.4       Mono %         6.9       MPV         10.7       MV Peak E Juan Jose       0.79         nRBC         0       QRS Duration   106             OHS QTC Calculation   495             Platelet Count         395       POCT Glucose 132     152           Potassium         3.2       PROTEIN TOTAL         6.9       Posterior Wall       0.57         RA Major Axis       4.16         Est. RA pres       3         RA Width       4.65         RBC         2.95       RDW         17.4       RV TB RVSP       7         RV/LV Ratio       0.53         RVDD       4.28         Sinus       3.02         Sodium         138       STJ       2.28         TAPSE       1.42         Triscuspid Valve Regurgitation Peak Gradient       52         TR Max Juan Jose       3.59         TV resting pulmonary artery pressure       55         WBC         5.92       ZLVIDD       5.69         ZLVIDS       7.98                                Significant Imaging: I have reviewed all pertinent imaging results/findings  within the past 24 hours.    Imaging Results              X-Ray Hip 2 or 3 views Left (with Pelvis when performed) (Final result)  Result time 05/14/24 20:06:02      Final result by Garry Austin MD (05/14/24 20:06:02)                   Impression:      1. History of left hip pain with no acute radiographic abnormality and no significant abnormality of the left hip.  2. Degenerative changes more prominent on the right with marginal osteophytic spurring of the right femoral head.      Electronically signed by: Garry Austin  Date:    05/14/2024  Time:    20:06               Narrative:    EXAMINATION:  XR HIP WITH PELVIS WHEN PERFORMED, 2 OR 3 VIEWS LEFT    CLINICAL HISTORY:  left hip pain;    TECHNIQUE:  AP view of the pelvis and frog leg lateral view of the left hip were performed.    COMPARISON:  None    FINDINGS:  The femoral heads appear normally positioned.  No acute fracture, subluxation or dislocation.  Moderate marginal osteophytic spurring of the right femoral head.  Joint spaces appear adequately maintained bilaterally.  No acute abnormality of the left hip.                                       X-Ray Chest AP Portable (Final result)  Result time 05/14/24 11:56:26      Final result by Danielle Young MD (05/14/24 11:56:26)                   Impression:      Cardiomegaly.  The lungs are better aerated compared to 04/29/2024 exam.      Electronically signed by: Danielle Young MD  Date:    05/14/2024  Time:    11:56               Narrative:    EXAMINATION:  XR CHEST AP PORTABLE    CLINICAL HISTORY:  CHF;    TECHNIQUE:  Single frontal view of the chest was performed.    COMPARISON:  04/29/2024    FINDINGS:  Cardio stimulator device left upper chest with dual leads.    The cardiac silhouette is enlarged.    The pulmonary vascularity is normal.    The lungs are clear.    No pleural effusion, no pneumothorax.    The osseous structures appear normal.

## 2024-05-15 NOTE — PROGRESS NOTES
[facility] - Observation [address] [place] [address] Medicine  Progress Note    Patient Name: Diomedes Mohr  MRN: [id number]  Patient Class: IP- Inpatient   Admission Date: 5/14/2024  Length of Stay: 0 days  Attending Physician: Mariaelena Moffett MD  Primary Care Provider: Yolie Michael MD        Subjective:     Principal Problem:Acute on chronic combined systolic and diastolic congestive heart failure        HPI:  Diomedes Mohr is a 63 y.o. female with heart failure with reduced ejection fraction (left ventricular ejection fraction 15%), AICD, hypertension, T2 DM, HLD, former tobacco use, stroke, PE, pulmonary hypertension, failure to thrive, who is presenting to [facility] ED with a 3 day history of worsening shortness of breath and inability to lay flat.  Patient endorses compliance with her medication.  Denies chest pain or palpitations.     was present at the bedside reported that patient was complaining of left hip pain this morning.  Patient received morphine 2 mg IV in the ER.  Says pain has completely resolved now.  On presentation patient was afebrile, tachycardic with heart rate of 125, tachypneic with a respiratory rate of 24, saturating 100% on room air.  Blood work was remarkable for normocytic anemia with hemoglobin of 9, hypokalemia with potassium 3.2, elevated BNP of 4015, troponin within normal limits.  Chest x-ray revealed improved aeration.  EKG sinus tachycardia with no she segment or T-wave changes.  Patient received 40 mg IV Lasix and admitted to observation service.    Overview/Hospital Course:  Echo revealed severely dilated left ventricle, with severely reduced EF of 5-10%.  Discussed with Cardiology, reported would not change plan of management.  Patient continued to have dyspnea and tachycardic.  EKG showed sinus tachycardia.  CTA ordered to rule out pulmonary embolus    Interval History: No overnight events. Endorses sob, denies chest pain or palpitations.    Review of Systems    Constitutional:  Negative for chills and fever.   HENT:  Negative for rhinorrhea and sore throat.    Respiratory:  Positive for shortness of breath. Negative for cough and wheezing.    Cardiovascular:  Negative for chest pain, palpitations and leg swelling.   Gastrointestinal:  Negative for abdominal pain, nausea and vomiting.   Endocrine: Negative for cold intolerance, polydipsia and polyphagia.   Genitourinary:  Negative for dysuria and hematuria.   Musculoskeletal: Negative.    Neurological: Negative.    Psychiatric/Behavioral: Negative.       Objective:     Vital Signs (Most Recent):  Temp: 97.5 °F (36.4 °C) (05/15/24 1643)  Pulse: 98 (05/15/24 1643)  Resp: 20 (05/15/24 1643)  BP: 117/84 (05/15/24 1643)  SpO2: 100 % (05/15/24 1643) Vital Signs (24h Range):  Temp:  [97.5 °F (36.4 °C)-98.7 °F (37.1 °C)] 97.5 °F (36.4 °C)  Pulse:  [] 98  Resp:  [18-20] 20  SpO2:  [98 %-100 %] 100 %  BP: (105-120)/(62-84) 117/84     Weight: 54 kg (119 lb 0.8 oz)  Body mass index is 19.21 kg/m².     Physical Exam  Constitutional:       Appearance: Normal appearance.   HENT:      Mouth/Throat:      Mouth: Mucous membranes are moist.      Pharynx: Oropharynx is clear.   Eyes:      Conjunctiva/sclera: Conjunctivae normal.      Pupils: Pupils are equal, round, and reactive to light.   Cardiovascular:      Rate and Rhythm: Normal rate and regular rhythm.   Pulmonary:      Effort: Pulmonary effort is normal.      Breath sounds: Rales present.   Abdominal:      General: Bowel sounds are normal.      Palpations: Abdomen is soft.   Musculoskeletal:         General: No swelling or tenderness. Normal range of motion.      Cervical back: Normal range of motion and neck supple.   Neurological:      General: No focal deficit present.      Mental Status: She is alert and oriented to person, place, and time.   Psychiatric:         Mood and Affect: Mood normal.         Behavior: Behavior normal.              CRANIAL NERVES     CN III, IV, VI    Pupils are equal, round, and reactive to light.       Significant Labs: All pertinent labs within the past 24 hours have been reviewed.  Recent Lab Results  (Last 5 results in the past 24 hours)        05/15/24  1640   05/15/24  1242   05/15/24  1213   05/15/24  0916   05/15/24  0451        Albumin         3.5       ALP         108       ALT         19       Anion Gap         14       Ascending aorta       2.92         Ao peak ivelisse       0.94         Ao VTI       13.57         AST         18       AV valve area       1.57         ARLETH by Velocity Ratio       2.01         AV mean gradient       2         AV index (prosthetic)       0.43         AV peak gradient       4         AV Velocity Ratio       0.54         Baso #         0.07       Basophil %         1.2       BILIRUBIN TOTAL         1.1  Comment: For infants and newborns, interpretation of results should be based  on gestational age, weight and in agreement with clinical  observations.    Premature Infant recommended reference ranges:  Up to 24 hours.............<8.0 mg/dL  Up to 48 hours............<12.0 mg/dL  3-5 days..................<15.0 mg/dL  6-29 days.................<15.0 mg/dL         BSA       1.59         BUN         13       Calcium         9.5       Chloride         98       CO2         26       Creatinine         1.0       Left Ventricle Relative Wall Thickness       0.14         Differential Method         Automated       eGFR         >60.0       EF       8         Eos #         0.1       Eos %         2.4       FS       4         Glucose         116       Gran # (ANC)         2.7       Gran %         45.5       Hematocrit         25.8       Hemoglobin         8.3       Immature Grans (Abs)         0.01  Comment: Mild elevation in immature granulocytes is non specific and   can be seen in a variety of conditions including stress response,   acute inflammation, trauma and pregnancy. Correlation with other   laboratory and clinical findings is  essential.         Immature Granulocytes         0.2       IVSd       0.50         LA WIDTH       5.14         Left Atrium Major Axis       5.29         Left Atrium Minor Axis       5.58         LA size       4.48         LA volume       106.30         LA vol index       66.4         LVOT area       3.7         LV EDV BP       348.57         LV Diastolic Volume Index       217.86         LVIDd       8.04         LVIDs       7.75         LV mass       197.23         LV Mass Index       123         LVOT diameter       2.17         LVOT peak juan jose       0.51         LVOT stroke volume       21.37         LVOT peak VTI       5.78         LV ESV BP       320.80         LV Systolic Volume Index       200.5         Lymph #         2.6       Lymph %         43.8       MCH         28.1       MCHC         32.2       MCV         88       Mono #         0.4       Mono %         6.9       MPV         10.7       MV Peak E Juan Jose       0.79         nRBC         0       QRS Duration   106             OHS QTC Calculation   495             Platelet Count         395       POCT Glucose 132     152           Potassium         3.2       PROTEIN TOTAL         6.9       Posterior Wall       0.57         RA Major Axis       4.16         Est. RA pres       3         RA Width       4.65         RBC         2.95       RDW         17.4       RV TB RVSP       7         RV/LV Ratio       0.53         RVDD       4.28         Sinus       3.02         Sodium         138       STJ       2.28         TAPSE       1.42         Triscuspid Valve Regurgitation Peak Gradient       52         TR Max Juan Jose       3.59         TV resting pulmonary artery pressure       55         WBC         5.92       ZLVIDD       5.69         ZLVIDS       7.98                                Significant Imaging: I have reviewed all pertinent imaging results/findings within the past 24 hours.    Imaging Results              X-Ray Hip 2 or 3 views Left (with Pelvis when performed)  (Final result)  Result time 05/14/24 20:06:02      Final result by Garry Austin MD (05/14/24 20:06:02)                   Impression:      1. History of left hip pain with no acute radiographic abnormality and no significant abnormality of the left hip.  2. Degenerative changes more prominent on the right with marginal osteophytic spurring of the right femoral head.      Electronically signed by: Garry Austin  Date:    05/14/2024  Time:    20:06               Narrative:    EXAMINATION:  XR HIP WITH PELVIS WHEN PERFORMED, 2 OR 3 VIEWS LEFT    CLINICAL HISTORY:  left hip pain;    TECHNIQUE:  AP view of the pelvis and frog leg lateral view of the left hip were performed.    COMPARISON:  None    FINDINGS:  The femoral heads appear normally positioned.  No acute fracture, subluxation or dislocation.  Moderate marginal osteophytic spurring of the right femoral head.  Joint spaces appear adequately maintained bilaterally.  No acute abnormality of the left hip.                                       X-Ray Chest AP Portable (Final result)  Result time 05/14/24 11:56:26      Final result by Danielle Young MD (05/14/24 11:56:26)                   Impression:      Cardiomegaly.  The lungs are better aerated compared to 04/29/2024 exam.      Electronically signed by: Danielle Young MD  Date:    05/14/2024  Time:    11:56               Narrative:    EXAMINATION:  XR CHEST AP PORTABLE    CLINICAL HISTORY:  CHF;    TECHNIQUE:  Single frontal view of the chest was performed.    COMPARISON:  04/29/2024    FINDINGS:  Cardio stimulator device left upper chest with dual leads.    The cardiac silhouette is enlarged.    The pulmonary vascularity is normal.    The lungs are clear.    No pleural effusion, no pneumothorax.    The osseous structures appear normal.                                        Assessment/Plan:      * Acute on chronic combined systolic and diastolic congestive heart failure  Patient is identified as  having Combined Systolic and Diastolic heart failure that is Acute on chronic. CHF is currently uncontrolled due to Dyspnea not returned to baseline after 1 doses of IV diuretic, >3 pillow orthopnea, and Rales/crackles on pulmonary exam. Latest ECHO performed and demonstrates- Results for orders placed during the hospital encounter of 08/30/23    Echo    Interpretation Summary    Left Ventricle: The left ventricle is severely dilated. Normal wall thickness. Severe global hypokinesis present. There is severely reduced systolic function with a visually estimated ejection fraction of 15 - 20%. Biplane (2D) method of discs ejection fraction is 17%. Grade I diastolic dysfunction.    Right Ventricle: Normal right ventricular cavity size. Wall thickness is normal. Right ventricle wall motion  is normal. Systolic function is normal. Pacemaker lead present in the ventricle.    Aortic Valve: The aortic valve is a trileaflet valve. There is mild aortic valve sclerosis. Mild annular calcification.    IVC/SVC: Intermediate venous pressure at 8 mmHg.  . Continue Furosemide and monitor clinical status closely. Monitor on telemetry. Patient is off CHF pathway.  Monitor strict Is&Os and daily weights.  Place on fluid restriction of 1.5 L. Cardiology has not been consulted. Continue to stress to patient importance of self efficacy and  on diet for CHF. Last BNP reviewed- and noted below   Recent Labs   Lab 05/14/24  1047   BNP 4,015*     Echo    Result Date: 5/15/2024    Left Ventricle: The left ventricle is severely dilated. There is   eccentric hypertrophy. Severe global hypokinesis present. There is   severely reduced systolic function with a visually estimated ejection   fraction of 5 - 10%. Ejection fraction by visual approximation is 8%.   Grade I diastolic dysfunction.    Right Ventricle: Mild right ventricular enlargement. Systolic function   is moderately reduced. Pacemaker lead present in the ventricle.    Left  Atrium: Left atrium is dilated.    Aortic Valve: The aortic valve is a trileaflet valve.    Mitral Valve: There is mild to moderate regurgitation with a centrally   directed jet.    Tricuspid Valve: There is mild to moderate regurgitation.    Pulmonic Valve: There is mild regurgitation.    Pulmonary Artery: There is moderate pulmonary hypertension. The   estimated pulmonary artery systolic pressure is 55 mmHg.    IVC/SVC: Normal venous pressure at 3 mmHg.    Pericardium: There is a trivial posterior effusion. Right pleural   effusion may be present.         Lasix 40mg iv bid  I/O's   Tsh ordered.  1.5 l fluid restriction  Cardiac diet ordered.      SOB (shortness of breath)  2.2 acute heart failure exacerbation  Sinus tachycardia on EKG.  Plan to check CTA.    Patient afebrile, no leukocytosis.  No consolidation noted on x-ray.  Continue to monitor off antibiotics.      Hyperlipemia  Continue statin      Type 2 diabetes mellitus with other specified complication, without long-term current use of insulin  Patient's FSGs are controlled on current medication regimen.  Last A1c reviewed-   Lab Results   Component Value Date    HGBA1C 5.6 04/30/2024     Most recent fingerstick glucose reviewed-   Recent Labs   Lab 05/14/24  1936 05/15/24  1213 05/15/24  1640   POCTGLUCOSE 193* 152* 132*     Current correctional scale   no  Maintain anti-hyperglycemic dose as follows-   Antihyperglycemics (From admission, onward)      None          Hold Oral hypoglycemics while patient is in the hospital.      VTE Risk Mitigation (From admission, onward)           Ordered     apixaban tablet 5 mg  2 times daily         05/14/24 1634     IP VTE HIGH RISK PATIENT  Once         05/14/24 1329     Place sequential compression device  Until discontinued         05/14/24 1329                    Discharge Planning   KARLIE: 5/16/2024     Code Status: Full Code   Is the patient medically ready for discharge?:     Reason for patient still in hospital  (select all that apply): Patient trending condition, Laboratory test, and Treatment  Discharge Plan A: Home Health, Home with family                  Mariaelena Moffett MD  Department of Hospital Medicine   [facility]

## 2024-05-16 LAB
ALBUMIN SERPL BCP-MCNC: 3.3 G/DL (ref 3.5–5.2)
ALP SERPL-CCNC: 106 U/L (ref 55–135)
ALT SERPL W/O P-5'-P-CCNC: 17 U/L (ref 10–44)
ANION GAP SERPL CALC-SCNC: 12 MMOL/L (ref 8–16)
AST SERPL-CCNC: 17 U/L (ref 10–40)
BASOPHILS # BLD AUTO: 0.04 K/UL (ref 0–0.2)
BASOPHILS NFR BLD: 0.8 % (ref 0–1.9)
BILIRUB SERPL-MCNC: 1.2 MG/DL (ref 0.1–1)
BUN SERPL-MCNC: 12 MG/DL (ref 8–23)
CALCIUM SERPL-MCNC: 9.4 MG/DL (ref 8.7–10.5)
CHLORIDE SERPL-SCNC: 98 MMOL/L (ref 95–110)
CO2 SERPL-SCNC: 30 MMOL/L (ref 23–29)
CREAT SERPL-MCNC: 1 MG/DL (ref 0.5–1.4)
DIFFERENTIAL METHOD BLD: ABNORMAL
EOSINOPHIL # BLD AUTO: 0.2 K/UL (ref 0–0.5)
EOSINOPHIL NFR BLD: 3.1 % (ref 0–8)
ERYTHROCYTE [DISTWIDTH] IN BLOOD BY AUTOMATED COUNT: 17.1 % (ref 11.5–14.5)
EST. GFR  (NO RACE VARIABLE): >60 ML/MIN/1.73 M^2
GLUCOSE SERPL-MCNC: 120 MG/DL (ref 70–110)
HCT VFR BLD AUTO: 24.5 % (ref 37–48.5)
HGB BLD-MCNC: 8.1 G/DL (ref 12–16)
IMM GRANULOCYTES # BLD AUTO: 0.01 K/UL (ref 0–0.04)
IMM GRANULOCYTES NFR BLD AUTO: 0.2 % (ref 0–0.5)
LYMPHOCYTES # BLD AUTO: 1.9 K/UL (ref 1–4.8)
LYMPHOCYTES NFR BLD: 37.3 % (ref 18–48)
MCH RBC QN AUTO: 28.1 PG (ref 27–31)
MCHC RBC AUTO-ENTMCNC: 33.1 G/DL (ref 32–36)
MCV RBC AUTO: 85 FL (ref 82–98)
MONOCYTES # BLD AUTO: 0.4 K/UL (ref 0.3–1)
MONOCYTES NFR BLD: 8.2 % (ref 4–15)
NEUTROPHILS # BLD AUTO: 2.6 K/UL (ref 1.8–7.7)
NEUTROPHILS NFR BLD: 50.4 % (ref 38–73)
NRBC BLD-RTO: 0 /100 WBC
PLATELET # BLD AUTO: 378 K/UL (ref 150–450)
PMV BLD AUTO: 11.4 FL (ref 9.2–12.9)
POCT GLUCOSE: 161 MG/DL (ref 70–110)
POCT GLUCOSE: 161 MG/DL (ref 70–110)
POCT GLUCOSE: 164 MG/DL (ref 70–110)
POCT GLUCOSE: 181 MG/DL (ref 70–110)
POTASSIUM SERPL-SCNC: 3.5 MMOL/L (ref 3.5–5.1)
PROT SERPL-MCNC: 6.5 G/DL (ref 6–8.4)
RBC # BLD AUTO: 2.88 M/UL (ref 4–5.4)
SODIUM SERPL-SCNC: 140 MMOL/L (ref 136–145)
WBC # BLD AUTO: 5.12 K/UL (ref 3.9–12.7)

## 2024-05-16 PROCEDURE — 80053 COMPREHEN METABOLIC PANEL: CPT | Mod: HCNC | Performed by: STUDENT IN AN ORGANIZED HEALTH CARE EDUCATION/TRAINING PROGRAM

## 2024-05-16 PROCEDURE — 25000242 PHARM REV CODE 250 ALT 637 W/ HCPCS: Mod: HCNC | Performed by: INTERNAL MEDICINE

## 2024-05-16 PROCEDURE — 85025 COMPLETE CBC W/AUTO DIFF WBC: CPT | Mod: HCNC | Performed by: STUDENT IN AN ORGANIZED HEALTH CARE EDUCATION/TRAINING PROGRAM

## 2024-05-16 PROCEDURE — 36415 COLL VENOUS BLD VENIPUNCTURE: CPT | Mod: HCNC | Performed by: STUDENT IN AN ORGANIZED HEALTH CARE EDUCATION/TRAINING PROGRAM

## 2024-05-16 PROCEDURE — 21400001 HC TELEMETRY ROOM: Mod: HCNC

## 2024-05-16 PROCEDURE — 25000003 PHARM REV CODE 250: Mod: HCNC | Performed by: STUDENT IN AN ORGANIZED HEALTH CARE EDUCATION/TRAINING PROGRAM

## 2024-05-16 PROCEDURE — 25000003 PHARM REV CODE 250: Mod: HCNC | Performed by: INTERNAL MEDICINE

## 2024-05-16 PROCEDURE — 63600175 PHARM REV CODE 636 W HCPCS: Mod: HCNC | Performed by: INTERNAL MEDICINE

## 2024-05-16 PROCEDURE — 63600175 PHARM REV CODE 636 W HCPCS: Mod: HCNC | Performed by: STUDENT IN AN ORGANIZED HEALTH CARE EDUCATION/TRAINING PROGRAM

## 2024-05-16 RX ORDER — METOLAZONE 2.5 MG/1
2.5 TABLET ORAL ONCE
Status: COMPLETED | OUTPATIENT
Start: 2024-05-16 | End: 2024-05-16

## 2024-05-16 RX ORDER — FUROSEMIDE 10 MG/ML
80 INJECTION INTRAMUSCULAR; INTRAVENOUS 2 TIMES DAILY
Status: DISCONTINUED | OUTPATIENT
Start: 2024-05-16 | End: 2024-05-18 | Stop reason: HOSPADM

## 2024-05-16 RX ORDER — MIDODRINE HYDROCHLORIDE 5 MG/1
5 TABLET ORAL 3 TIMES DAILY PRN
Status: DISCONTINUED | OUTPATIENT
Start: 2024-05-16 | End: 2024-05-18 | Stop reason: HOSPADM

## 2024-05-16 RX ORDER — SPIRONOLACTONE 25 MG/1
25 TABLET ORAL DAILY
Status: DISCONTINUED | OUTPATIENT
Start: 2024-05-16 | End: 2024-05-18 | Stop reason: HOSPADM

## 2024-05-16 RX ORDER — POTASSIUM CHLORIDE 20 MEQ/1
40 TABLET, EXTENDED RELEASE ORAL 2 TIMES DAILY
Status: COMPLETED | OUTPATIENT
Start: 2024-05-16 | End: 2024-05-16

## 2024-05-16 RX ADMIN — LIDOCAINE 1 PATCH: 50 PATCH CUTANEOUS at 05:05

## 2024-05-16 RX ADMIN — FUROSEMIDE 40 MG: 10 INJECTION, SOLUTION INTRAVENOUS at 08:05

## 2024-05-16 RX ADMIN — ATORVASTATIN CALCIUM 80 MG: 40 TABLET, FILM COATED ORAL at 09:05

## 2024-05-16 RX ADMIN — POTASSIUM CHLORIDE 40 MEQ: 1500 TABLET, EXTENDED RELEASE ORAL at 01:05

## 2024-05-16 RX ADMIN — EMPAGLIFLOZIN 10 MG: 10 TABLET, FILM COATED ORAL at 01:05

## 2024-05-16 RX ADMIN — ASPIRIN 81 MG: 81 TABLET, COATED ORAL at 08:05

## 2024-05-16 RX ADMIN — FUROSEMIDE 80 MG: 10 INJECTION, SOLUTION INTRAVENOUS at 09:05

## 2024-05-16 RX ADMIN — METOLAZONE 2.5 MG: 2.5 TABLET ORAL at 01:05

## 2024-05-16 RX ADMIN — APIXABAN 5 MG: 5 TABLET, FILM COATED ORAL at 08:05

## 2024-05-16 RX ADMIN — POTASSIUM CHLORIDE 40 MEQ: 1500 TABLET, EXTENDED RELEASE ORAL at 09:05

## 2024-05-16 RX ADMIN — APIXABAN 5 MG: 5 TABLET, FILM COATED ORAL at 09:05

## 2024-05-16 RX ADMIN — SPIRONOLACTONE 25 MG: 25 TABLET ORAL at 01:05

## 2024-05-16 NOTE — ASSESSMENT & PLAN NOTE
Patient's FSGs are controlled on current medication regimen.  Last A1c reviewed-   Lab Results   Component Value Date    HGBA1C 5.6 04/30/2024     Most recent fingerstick glucose reviewed-   Recent Labs   Lab 05/15/24  1213 05/15/24  1640 05/16/24  0735   POCTGLUCOSE 152* 132* 164*       Current correctional scale   no  Maintain anti-hyperglycemic dose as follows-   Antihyperglycemics (From admission, onward)    Start     Stop Route Frequency Ordered    05/16/24 1230  empagliflozin (Jardiance) tablet 10 mg        Question Answer Comment   Does this patient have a diagnosis of heart failure? Yes    Does this patient have type 1 diabetes or diabetic ketoacidosis? No    Does this patient have symptomatic hypotension? No    Is the patient NPO or pending major surgery in next 3 days or less? No        -- Oral Daily 05/16/24 1117        Hold Oral hypoglycemics while patient is in the hospital.

## 2024-05-16 NOTE — ASSESSMENT & PLAN NOTE
Patient is identified as having Combined Systolic and Diastolic heart failure that is Acute on chronic. CHF is currently uncontrolled due to Dyspnea not returned to baseline after 1 doses of IV diuretic, >3 pillow orthopnea, and Rales/crackles on pulmonary exam. Latest ECHO performed and demonstrates- Results for orders placed during the hospital encounter of 08/30/23    . Continue Furosemide and monitor clinical status closely. Monitor on telemetry. Patient is off CHF pathway.  Monitor strict Is&Os and daily weights.  Place on fluid restriction of 1.5 L. Cardiology has not been consulted. Continue to stress to patient importance of self efficacy and  on diet for CHF. Last BNP reviewed- and noted below   Recent Labs   Lab 05/14/24  1047   BNP 4,015*     Echo    Result Date: 5/15/2024    Left Ventricle: The left ventricle is severely dilated. There is   eccentric hypertrophy. Severe global hypokinesis present. There is   severely reduced systolic function with a visually estimated ejection   fraction of 5 - 10%. Ejection fraction by visual approximation is 8%.   Grade I diastolic dysfunction.    Right Ventricle: Mild right ventricular enlargement. Systolic function   is moderately reduced. Pacemaker lead present in the ventricle.    Left Atrium: Left atrium is dilated.    Aortic Valve: The aortic valve is a trileaflet valve.    Mitral Valve: There is mild to moderate regurgitation with a centrally   directed jet.    Tricuspid Valve: There is mild to moderate regurgitation.    Pulmonic Valve: There is mild regurgitation.    Pulmonary Artery: There is moderate pulmonary hypertension. The   estimated pulmonary artery systolic pressure is 55 mmHg.    IVC/SVC: Normal venous pressure at 3 mmHg.    Pericardium: There is a trivial posterior effusion. Right pleural   effusion may be present.         -- follows with Dr. Smith. Appears to have DCM, NICM. Has had decline in EF lately and long standing difficulties with  CHF exacerbations and hospitalizations.   - [facility] referral was suggested prior but has not seen.   - discussed trial of aggressive diuresis, knowing that ARBEN and hypotension possible.   - Family agreeable. Will broach subject of palliative care and / or hospice care in AM.

## 2024-05-16 NOTE — PROGRESS NOTES
[facility] - Observation [address] [place] [address] Medicine  Progress Note    Patient Name: Diomedes Mohr  MRN: [id number]  Patient Class: IP- Inpatient   Admission Date: 5/14/2024  Length of Stay: 1 days  Attending Physician: Amilcar Bradford MD  Primary Care Provider: Yolie Michael MD        Subjective:     Principal Problem:Acute on chronic combined systolic and diastolic congestive heart failure        HPI:  Diomedes Mohr is a 63 y.o. female with heart failure with reduced ejection fraction (left ventricular ejection fraction 15%), AICD, hypertension, T2 DM, HLD, former tobacco use, stroke, PE, pulmonary hypertension, failure to thrive, who is presenting to [facility] ED with a 3 day history of worsening shortness of breath and inability to lay flat.  Patient endorses compliance with her medication.  Denies chest pain or palpitations.     was present at the bedside reported that patient was complaining of left hip pain this morning.  Patient received morphine 2 mg IV in the ER.  Says pain has completely resolved now.  On presentation patient was afebrile, tachycardic with heart rate of 125, tachypneic with a respiratory rate of 24, saturating 100% on room air.  Blood work was remarkable for normocytic anemia with hemoglobin of 9, hypokalemia with potassium 3.2, elevated BNP of 4015, troponin within normal limits.  Chest x-ray revealed improved aeration.  EKG sinus tachycardia with no she segment or T-wave changes.  Patient received 40 mg IV Lasix and admitted to observation service.    Overview/Hospital Course:  Echo revealed severely dilated left ventricle, with severely reduced EF of 5-10%.  Discussed with Cardiology, reported would not change plan of management.  Patient continued to have dyspnea and tachycardic.  EKG showed sinus tachycardia.  CTA ordered to rule out pulmonary embolus    Interval History: still has dyspnea when ambulating. No feeling much better. Not making much urine.      at  "bedside. They tell me that she follows with Dr. Smith and were told that there are unfortunately very limited options for her heart failure.     Pt. Tells me she is not "ready to give up"    Review of Systems   Constitutional:  Negative for activity change and appetite change.   HENT:  Negative for congestion.    Respiratory:  Positive for chest tightness and shortness of breath. Negative for wheezing.    Cardiovascular:  Negative for chest pain and leg swelling.   Gastrointestinal:  Negative for abdominal distention.   Genitourinary:  Negative for difficulty urinating.   Musculoskeletal:  Negative for arthralgias.     Objective:     Vital Signs (Most Recent):  Temp: 98.6 °F (37 °C) (05/16/24 0730)  Pulse: 108 (05/16/24 1044)  Resp: 18 (05/16/24 0730)  BP: 124/84 (05/16/24 0730)  SpO2: 100 % (05/16/24 0730) Vital Signs (24h Range):  Temp:  [97 °F (36.1 °C)-98.7 °F (37.1 °C)] 98.6 °F (37 °C)  Pulse:  [] 108  Resp:  [18-20] 18  SpO2:  [99 %-100 %] 100 %  BP: (100-124)/(58-84) 124/84     Weight: 54 kg (119 lb 0.8 oz)  Body mass index is 19.21 kg/m².    Intake/Output Summary (Last 24 hours) at 5/16/2024 1120  Last data filed at 5/16/2024 0841  Gross per 24 hour   Intake 480 ml   Output 650 ml   Net -170 ml         Physical Exam  Constitutional:       General: She is not in acute distress.  HENT:      Mouth/Throat:      Mouth: Mucous membranes are moist.   Cardiovascular:      Rate and Rhythm: Normal rate and regular rhythm.      Heart sounds: No murmur heard.  Pulmonary:      Effort: Pulmonary effort is normal.      Comments: Crackles in the left base. Otherwise clear  Abdominal:      General: Abdomen is flat. There is no distension.      Palpations: There is no mass.   Musculoskeletal:         General: No swelling.      Right lower leg: No edema.      Left lower leg: No edema.   Skin:     Coloration: Skin is not jaundiced.   Neurological:      General: No focal deficit present.      Mental Status: She is alert. " "            Significant Labs: All pertinent labs within the past 24 hours have been reviewed.  CBC:   Recent Labs   Lab 05/15/24  0451 05/16/24  0632   WBC 5.92 5.12   HGB 8.3* 8.1*   HCT 25.8* 24.5*    378     CMP:   Recent Labs   Lab 05/15/24  0451 05/16/24  0632    140   K 3.2* 3.5   CL 98 98   CO2 26 30*   * 120*   BUN 13 12   CREATININE 1.0 1.0   CALCIUM 9.5 9.4   PROT 6.9 6.5   ALBUMIN 3.5 3.3*   BILITOT 1.1* 1.2*   ALKPHOS 108 106   AST 18 17   ALT 19 17   ANIONGAP 14 12     Cardiac Markers: No results for input(s): "CKMB", "MYOGLOBIN", "BNP", "TROPISTAT" in the last 48 hours.    CTA 5/15/24    Impression:     1. No pulmonary embolism to the segmental level.  2. Interstitial pulmonary edema.  3. Stable loculated fluid in the minor fissure.  Significant Imaging: I have reviewed all pertinent imaging results/findings within the past 24 hours.    Echo 5/15/24      Left Ventricle: The left ventricle is severely dilated. There is eccentric hypertrophy. Severe global hypokinesis present. There is severely reduced systolic function with a visually estimated ejection fraction of 5 - 10%. Ejection fraction by visual approximation is 8%. Grade I diastolic dysfunction.    Right Ventricle: Mild right ventricular enlargement. Systolic function is moderately reduced. Pacemaker lead present in the ventricle.    Left Atrium: Left atrium is dilated.    Aortic Valve: The aortic valve is a trileaflet valve.    Mitral Valve: There is mild to moderate regurgitation with a centrally directed jet.    Tricuspid Valve: There is mild to moderate regurgitation.    Pulmonic Valve: There is mild regurgitation.    Pulmonary Artery: There is moderate pulmonary hypertension. The estimated pulmonary artery systolic pressure is 55 mmHg.    IVC/SVC: Normal venous pressure at 3 mmHg.    Pericardium: There is a trivial posterior effusion. Right pleural effusion may be present.            Assessment/Plan:      * Acute on " chronic combined systolic and diastolic congestive heart failure  Patient is identified as having Combined Systolic and Diastolic heart failure that is Acute on chronic. CHF is currently uncontrolled due to Dyspnea not returned to baseline after 1 doses of IV diuretic, >3 pillow orthopnea, and Rales/crackles on pulmonary exam. Latest ECHO performed and demonstrates- Results for orders placed during the hospital encounter of 08/30/23    . Continue Furosemide and monitor clinical status closely. Monitor on telemetry. Patient is off CHF pathway.  Monitor strict Is&Os and daily weights.  Place on fluid restriction of 1.5 L. Cardiology has not been consulted. Continue to stress to patient importance of self efficacy and  on diet for CHF. Last BNP reviewed- and noted below   Recent Labs   Lab 05/14/24  1047   BNP 4,015*     Echo    Result Date: 5/15/2024    Left Ventricle: The left ventricle is severely dilated. There is   eccentric hypertrophy. Severe global hypokinesis present. There is   severely reduced systolic function with a visually estimated ejection   fraction of 5 - 10%. Ejection fraction by visual approximation is 8%.   Grade I diastolic dysfunction.    Right Ventricle: Mild right ventricular enlargement. Systolic function   is moderately reduced. Pacemaker lead present in the ventricle.    Left Atrium: Left atrium is dilated.    Aortic Valve: The aortic valve is a trileaflet valve.    Mitral Valve: There is mild to moderate regurgitation with a centrally   directed jet.    Tricuspid Valve: There is mild to moderate regurgitation.    Pulmonic Valve: There is mild regurgitation.    Pulmonary Artery: There is moderate pulmonary hypertension. The   estimated pulmonary artery systolic pressure is 55 mmHg.    IVC/SVC: Normal venous pressure at 3 mmHg.    Pericardium: There is a trivial posterior effusion. Right pleural   effusion may be present.         -- follows with Dr. Smith. Appears to have DCM, NICM.  Has had decline in EF lately and long standing difficulties with CHF exacerbations and hospitalizations.   - [facility] referral was suggested prior but has not seen.   - discussed trial of aggressive diuresis, knowing that ARBEN and hypotension possible.   - Family agreeable. Will broach subject of palliative care and / or hospice care in AM.       Hyperlipemia  Lab Results   Component Value Date    LDLCALC 70.0 04/11/2024     Continue statin      SOB (shortness of breath)  2.2 acute heart failure exacerbation  Sinus tachycardia on EKG.  Plan to check CTA.    Patient afebrile, no leukocytosis.  No consolidation noted on x-ray.  Continue to monitor off antibiotics.      Type 2 diabetes mellitus with other specified complication, without long-term current use of insulin  Patient's FSGs are controlled on current medication regimen.  Last A1c reviewed-   Lab Results   Component Value Date    HGBA1C 5.6 04/30/2024     Most recent fingerstick glucose reviewed-   Recent Labs   Lab 05/15/24  1213 05/15/24  1640 05/16/24  0735   POCTGLUCOSE 152* 132* 164*       Current correctional scale   no  Maintain anti-hyperglycemic dose as follows-   Antihyperglycemics (From admission, onward)      Start     Stop Route Frequency Ordered    05/16/24 1230  empagliflozin (Jardiance) tablet 10 mg        Question Answer Comment   Does this patient have a diagnosis of heart failure? Yes    Does this patient have type 1 diabetes or diabetic ketoacidosis? No    Does this patient have symptomatic hypotension? No    Is the patient NPO or pending major surgery in next 3 days or less? No        -- Oral Daily 05/16/24 1117          Hold Oral hypoglycemics while patient is in the hospital.      VTE Risk Mitigation (From admission, onward)           Ordered     apixaban tablet 5 mg  2 times daily         05/14/24 1634     IP VTE HIGH RISK PATIENT  Once         05/14/24 1329     Place sequential compression device  Until discontinued         05/14/24 1329                     Discharge Planning   KARLIE: 5/16/2024     Code Status: Full Code   Is the patient medically ready for discharge?:     Reason for patient still in hospital (select all that apply): Treatment and Pending disposition  Discharge Plan A: Home Health, Home with family                  Amilcar Bradford MD  Department of Hospital Medicine   [facility] - Observation 11H

## 2024-05-16 NOTE — SUBJECTIVE & OBJECTIVE
"Interval History: still has dyspnea when ambulating. No feeling much better. Not making much urine.      at bedside. They tell me that she follows with Dr. Smith and were told that there are unfortunately very limited options for her heart failure.     Pt. Tells me she is not "ready to give up"    Review of Systems   Constitutional:  Negative for activity change and appetite change.   HENT:  Negative for congestion.    Respiratory:  Positive for chest tightness and shortness of breath. Negative for wheezing.    Cardiovascular:  Negative for chest pain and leg swelling.   Gastrointestinal:  Negative for abdominal distention.   Genitourinary:  Negative for difficulty urinating.   Musculoskeletal:  Negative for arthralgias.     Objective:     Vital Signs (Most Recent):  Temp: 98.6 °F (37 °C) (05/16/24 0730)  Pulse: 108 (05/16/24 1044)  Resp: 18 (05/16/24 0730)  BP: 124/84 (05/16/24 0730)  SpO2: 100 % (05/16/24 0730) Vital Signs (24h Range):  Temp:  [97 °F (36.1 °C)-98.7 °F (37.1 °C)] 98.6 °F (37 °C)  Pulse:  [] 108  Resp:  [18-20] 18  SpO2:  [99 %-100 %] 100 %  BP: (100-124)/(58-84) 124/84     Weight: 54 kg (119 lb 0.8 oz)  Body mass index is 19.21 kg/m².    Intake/Output Summary (Last 24 hours) at 5/16/2024 1120  Last data filed at 5/16/2024 0841  Gross per 24 hour   Intake 480 ml   Output 650 ml   Net -170 ml         Physical Exam  Constitutional:       General: She is not in acute distress.  HENT:      Mouth/Throat:      Mouth: Mucous membranes are moist.   Cardiovascular:      Rate and Rhythm: Normal rate and regular rhythm.      Heart sounds: No murmur heard.  Pulmonary:      Effort: Pulmonary effort is normal.      Comments: Crackles in the left base. Otherwise clear  Abdominal:      General: Abdomen is flat. There is no distension.      Palpations: There is no mass.   Musculoskeletal:         General: No swelling.      Right lower leg: No edema.      Left lower leg: No edema.   Skin:     Coloration: " "Skin is not jaundiced.   Neurological:      General: No focal deficit present.      Mental Status: She is alert.             Significant Labs: All pertinent labs within the past 24 hours have been reviewed.  CBC:   Recent Labs   Lab 05/15/24  0451 05/16/24  0632   WBC 5.92 5.12   HGB 8.3* 8.1*   HCT 25.8* 24.5*    378     CMP:   Recent Labs   Lab 05/15/24  0451 05/16/24  0632    140   K 3.2* 3.5   CL 98 98   CO2 26 30*   * 120*   BUN 13 12   CREATININE 1.0 1.0   CALCIUM 9.5 9.4   PROT 6.9 6.5   ALBUMIN 3.5 3.3*   BILITOT 1.1* 1.2*   ALKPHOS 108 106   AST 18 17   ALT 19 17   ANIONGAP 14 12     Cardiac Markers: No results for input(s): "CKMB", "MYOGLOBIN", "BNP", "TROPISTAT" in the last 48 hours.    CTA 5/15/24    Impression:     1. No pulmonary embolism to the segmental level.  2. Interstitial pulmonary edema.  3. Stable loculated fluid in the minor fissure.  Significant Imaging: I have reviewed all pertinent imaging results/findings within the past 24 hours.    Echo 5/15/24      Left Ventricle: The left ventricle is severely dilated. There is eccentric hypertrophy. Severe global hypokinesis present. There is severely reduced systolic function with a visually estimated ejection fraction of 5 - 10%. Ejection fraction by visual approximation is 8%. Grade I diastolic dysfunction.    Right Ventricle: Mild right ventricular enlargement. Systolic function is moderately reduced. Pacemaker lead present in the ventricle.    Left Atrium: Left atrium is dilated.    Aortic Valve: The aortic valve is a trileaflet valve.    Mitral Valve: There is mild to moderate regurgitation with a centrally directed jet.    Tricuspid Valve: There is mild to moderate regurgitation.    Pulmonic Valve: There is mild regurgitation.    Pulmonary Artery: There is moderate pulmonary hypertension. The estimated pulmonary artery systolic pressure is 55 mmHg.    IVC/SVC: Normal venous pressure at 3 mmHg.    Pericardium: There is a " trivial posterior effusion. Right pleural effusion may be present.

## 2024-05-17 LAB
ALBUMIN SERPL BCP-MCNC: 3.6 G/DL (ref 3.5–5.2)
ANION GAP SERPL CALC-SCNC: 13 MMOL/L (ref 8–16)
BASOPHILS # BLD AUTO: 0.05 K/UL (ref 0–0.2)
BASOPHILS NFR BLD: 0.7 % (ref 0–1.9)
BNP SERPL-MCNC: 2977 PG/ML (ref 0–99)
BUN SERPL-MCNC: 13 MG/DL (ref 8–23)
CALCIUM SERPL-MCNC: 9.9 MG/DL (ref 8.7–10.5)
CHLORIDE SERPL-SCNC: 95 MMOL/L (ref 95–110)
CO2 SERPL-SCNC: 30 MMOL/L (ref 23–29)
CREAT SERPL-MCNC: 0.9 MG/DL (ref 0.5–1.4)
DIFFERENTIAL METHOD BLD: ABNORMAL
EOSINOPHIL # BLD AUTO: 0.1 K/UL (ref 0–0.5)
EOSINOPHIL NFR BLD: 1.3 % (ref 0–8)
ERYTHROCYTE [DISTWIDTH] IN BLOOD BY AUTOMATED COUNT: 17.1 % (ref 11.5–14.5)
EST. GFR  (NO RACE VARIABLE): >60 ML/MIN/1.73 M^2
GLUCOSE SERPL-MCNC: 122 MG/DL (ref 70–110)
HCT VFR BLD AUTO: 27.8 % (ref 37–48.5)
HGB BLD-MCNC: 8.9 G/DL (ref 12–16)
IMM GRANULOCYTES # BLD AUTO: 0.01 K/UL (ref 0–0.04)
IMM GRANULOCYTES NFR BLD AUTO: 0.1 % (ref 0–0.5)
LYMPHOCYTES # BLD AUTO: 2 K/UL (ref 1–4.8)
LYMPHOCYTES NFR BLD: 29.9 % (ref 18–48)
MCH RBC QN AUTO: 27.8 PG (ref 27–31)
MCHC RBC AUTO-ENTMCNC: 32 G/DL (ref 32–36)
MCV RBC AUTO: 87 FL (ref 82–98)
MONOCYTES # BLD AUTO: 0.6 K/UL (ref 0.3–1)
MONOCYTES NFR BLD: 9.1 % (ref 4–15)
NEUTROPHILS # BLD AUTO: 3.9 K/UL (ref 1.8–7.7)
NEUTROPHILS NFR BLD: 58.9 % (ref 38–73)
NRBC BLD-RTO: 0 /100 WBC
PHOSPHATE SERPL-MCNC: 3.5 MG/DL (ref 2.7–4.5)
PLATELET # BLD AUTO: 423 K/UL (ref 150–450)
PMV BLD AUTO: 11.1 FL (ref 9.2–12.9)
POCT GLUCOSE: 152 MG/DL (ref 70–110)
POCT GLUCOSE: 164 MG/DL (ref 70–110)
POCT GLUCOSE: 180 MG/DL (ref 70–110)
POCT GLUCOSE: 190 MG/DL (ref 70–110)
POTASSIUM SERPL-SCNC: 3.5 MMOL/L (ref 3.5–5.1)
RBC # BLD AUTO: 3.2 M/UL (ref 4–5.4)
SODIUM SERPL-SCNC: 138 MMOL/L (ref 136–145)
WBC # BLD AUTO: 6.7 K/UL (ref 3.9–12.7)

## 2024-05-17 PROCEDURE — 85025 COMPLETE CBC W/AUTO DIFF WBC: CPT | Mod: HCNC | Performed by: STUDENT IN AN ORGANIZED HEALTH CARE EDUCATION/TRAINING PROGRAM

## 2024-05-17 PROCEDURE — 25000242 PHARM REV CODE 250 ALT 637 W/ HCPCS: Mod: HCNC | Performed by: INTERNAL MEDICINE

## 2024-05-17 PROCEDURE — 63600175 PHARM REV CODE 636 W HCPCS: Mod: HCNC | Performed by: INTERNAL MEDICINE

## 2024-05-17 PROCEDURE — 36415 COLL VENOUS BLD VENIPUNCTURE: CPT | Mod: HCNC | Performed by: INTERNAL MEDICINE

## 2024-05-17 PROCEDURE — 25000003 PHARM REV CODE 250: Mod: HCNC | Performed by: STUDENT IN AN ORGANIZED HEALTH CARE EDUCATION/TRAINING PROGRAM

## 2024-05-17 PROCEDURE — 25000003 PHARM REV CODE 250: Mod: HCNC | Performed by: INTERNAL MEDICINE

## 2024-05-17 PROCEDURE — 21400001 HC TELEMETRY ROOM: Mod: HCNC

## 2024-05-17 PROCEDURE — 83880 ASSAY OF NATRIURETIC PEPTIDE: CPT | Mod: HCNC | Performed by: INTERNAL MEDICINE

## 2024-05-17 PROCEDURE — 80069 RENAL FUNCTION PANEL: CPT | Mod: HCNC | Performed by: INTERNAL MEDICINE

## 2024-05-17 RX ORDER — METOLAZONE 2.5 MG/1
2.5 TABLET ORAL ONCE
Status: COMPLETED | OUTPATIENT
Start: 2024-05-17 | End: 2024-05-17

## 2024-05-17 RX ADMIN — FUROSEMIDE 80 MG: 10 INJECTION, SOLUTION INTRAVENOUS at 09:05

## 2024-05-17 RX ADMIN — ASPIRIN 81 MG: 81 TABLET, COATED ORAL at 08:05

## 2024-05-17 RX ADMIN — EMPAGLIFLOZIN 10 MG: 10 TABLET, FILM COATED ORAL at 08:05

## 2024-05-17 RX ADMIN — LIDOCAINE 1 PATCH: 50 PATCH CUTANEOUS at 04:05

## 2024-05-17 RX ADMIN — SPIRONOLACTONE 25 MG: 25 TABLET ORAL at 08:05

## 2024-05-17 RX ADMIN — METOLAZONE 2.5 MG: 2.5 TABLET ORAL at 02:05

## 2024-05-17 RX ADMIN — FUROSEMIDE 80 MG: 10 INJECTION, SOLUTION INTRAVENOUS at 08:05

## 2024-05-17 RX ADMIN — ATORVASTATIN CALCIUM 80 MG: 40 TABLET, FILM COATED ORAL at 09:05

## 2024-05-17 RX ADMIN — APIXABAN 5 MG: 5 TABLET, FILM COATED ORAL at 08:05

## 2024-05-17 RX ADMIN — APIXABAN 5 MG: 5 TABLET, FILM COATED ORAL at 09:05

## 2024-05-17 NOTE — PLAN OF CARE
05/17/24 1214   Post-Acute Status   Post-Acute Authorization Hospice   Hospice Status Referrals Sent     SW was informed by the medical team that pt/family are interested in information regarding hospice services. SW spoke with pt's daughter who reported that they would prefer a referral to [facility]. SW sent referral via [facility] for review.    SW will continue to follow up.      Destinee Reyes LMSW  [facility]  Ext. [phone]

## 2024-05-17 NOTE — ASSESSMENT & PLAN NOTE
Patient is identified as having Combined Systolic and Diastolic heart failure that is Acute on chronic. CHF is currently uncontrolled due to Dyspnea not returned to baseline after 1 doses of IV diuretic, >3 pillow orthopnea, and Rales/crackles on pulmonary exam. Latest ECHO performed and demonstrates- Results for orders placed during the hospital encounter of 08/30/23    . Continue Furosemide and monitor clinical status closely. Monitor on telemetry. Patient is off CHF pathway.  Monitor strict Is&Os and daily weights.  Place on fluid restriction of 1.5 L. Cardiology has not been consulted. Continue to stress to patient importance of self efficacy and  on diet for CHF. Last BNP reviewed- and noted below   Recent Labs   Lab 05/17/24  0522   BNP 2,977*     Echo    Result Date: 5/15/2024    Left Ventricle: The left ventricle is severely dilated. There is   eccentric hypertrophy. Severe global hypokinesis present. There is   severely reduced systolic function with a visually estimated ejection   fraction of 5 - 10%. Ejection fraction by visual approximation is 8%.   Grade I diastolic dysfunction.    Right Ventricle: Mild right ventricular enlargement. Systolic function   is moderately reduced. Pacemaker lead present in the ventricle.    Left Atrium: Left atrium is dilated.    Aortic Valve: The aortic valve is a trileaflet valve.    Mitral Valve: There is mild to moderate regurgitation with a centrally   directed jet.    Tricuspid Valve: There is mild to moderate regurgitation.    Pulmonic Valve: There is mild regurgitation.    Pulmonary Artery: There is moderate pulmonary hypertension. The   estimated pulmonary artery systolic pressure is 55 mmHg.    IVC/SVC: Normal venous pressure at 3 mmHg.    Pericardium: There is a trivial posterior effusion. Right pleural   effusion may be present.         -- follows with Dr. Smith. Appears to have DCM, NICM. Has had decline in EF lately and long standing difficulties with  CHF exacerbations and hospitalizations.   - [facility] referral was suggested prior but has not seen.   - discussed trial of aggressive diuresis, knowing that ARBEN and hypotension possible.   - Diuresed well with Lasix 80 mg BID, Metolazone 2.5mg once and Manuel. Still feels SOB, so will repeat  - also discussed hospice care and family will consider. Have not specifically discussed code status during this visit.

## 2024-05-17 NOTE — PLAN OF CARE
05/17/24 1330   Post-Acute Status   Post-Acute Authorization Hospice   Hospice Status Pending post acute provider review/more information requested     SW received a call from Aleja with [facility] who reported that she spoke with pt's  and they have scheduled an appointment for tomorrow, 5/18 at 11:00AM.    SW updated the medical team and will continue to follow up.      Destinee Reyes LMSW  [facility]  Ext. [phone]

## 2024-05-17 NOTE — ASSESSMENT & PLAN NOTE
2.2 acute heart failure exacerbation  Sinus tachycardia on EKG - c/w advanced CHF.  CTA ok  Patient afebrile, no leukocytosis.  No consolidation noted on x-ray.

## 2024-05-17 NOTE — SUBJECTIVE & OBJECTIVE
Interval History: feeling a bit better but still short of breath with little exercise.     Discussed pro and con or continuing aggressive diuretics and agreed to do another day of IV lasix and Metolazone.     Discussed hospice care and how it can help manage sx. After discharge. Discussed advanced CHF clinic as alternative and pro and con of both.   Family agrees to meet with hospice representative in AM to discuss services.     Review of Systems   Constitutional:  Positive for activity change and fatigue. Negative for fever.   Respiratory:  Positive for shortness of breath. Negative for choking and chest tightness.    Cardiovascular:  Negative for chest pain and leg swelling.   Gastrointestinal:  Negative for abdominal distention and abdominal pain.   Genitourinary:  Negative for difficulty urinating and dyspareunia.   Musculoskeletal:  Negative for arthralgias and back pain.     Objective:     Vital Signs (Most Recent):  Temp: 97.7 °F (36.5 °C) (05/17/24 1122)  Pulse: (!) 113 (05/17/24 1122)  Resp: 18 (05/17/24 1122)  BP: (!) 127/90 (05/17/24 1122)  SpO2: 100 % (05/17/24 1122) Vital Signs (24h Range):  Temp:  [97.7 °F (36.5 °C)-98.5 °F (36.9 °C)] 97.7 °F (36.5 °C)  Pulse:  [] 113  Resp:  [16-20] 18  SpO2:  [96 %-100 %] 100 %  BP: (108-127)/(73-90) 127/90     Weight: 53.8 kg (118 lb 9.7 oz)  Body mass index is 19.14 kg/m².    Intake/Output Summary (Last 24 hours) at 5/17/2024 1255  Last data filed at 5/17/2024 0433  Gross per 24 hour   Intake --   Output 1700 ml   Net -1700 ml         Physical Exam  Constitutional:       General: She is not in acute distress.     Appearance: She is ill-appearing.   HENT:      Mouth/Throat:      Mouth: Mucous membranes are moist.   Cardiovascular:      Rate and Rhythm: Tachycardia present.      Heart sounds: No murmur heard.     No friction rub.   Pulmonary:      Effort: Pulmonary effort is normal. No respiratory distress.      Breath sounds: No stridor.   Abdominal:       General: Abdomen is flat. There is no distension.      Palpations: There is no mass.   Musculoskeletal:         General: No swelling.   Skin:     Coloration: Skin is not jaundiced.   Neurological:      General: No focal deficit present.      Mental Status: She is alert.             Significant Labs: All pertinent labs within the past 24 hours have been reviewed.  BMP:   Recent Labs   Lab 05/17/24  0522   *      K 3.5   CL 95   CO2 30*   BUN 13   CREATININE 0.9   CALCIUM 9.9     CBC:   Recent Labs   Lab 05/16/24  0632 05/17/24  0522   WBC 5.12 6.70   HGB 8.1* 8.9*   HCT 24.5* 27.8*    423       Significant Imaging: I have reviewed all pertinent imaging results/findings within the past 24 hours.

## 2024-05-17 NOTE — PROGRESS NOTES
[facility]ation [address] [place] [address] Medicine  Progress Note    Patient Name: Diomedes Mohr  MRN: [id number]  Patient Class: IP- Inpatient   Admission Date: 5/14/2024  Length of Stay: 2 days  Attending Physician: Amilcar Bardford MD  Primary Care Provider: Yoile Michael MD        Subjective:     Principal Problem:Acute on chronic combined systolic and diastolic congestive heart failure        HPI:  Diomedes Mohr is a 63 y.o. female with heart failure with reduced ejection fraction (left ventricular ejection fraction 15%), AICD, hypertension, T2 DM, HLD, former tobacco use, stroke, PE, pulmonary hypertension, failure to thrive, who is presenting to [facility] ED with a 3 day history of worsening shortness of breath and inability to lay flat.  Patient endorses compliance with her medication.  Denies chest pain or palpitations.     was present at the bedside reported that patient was complaining of left hip pain this morning.  Patient received morphine 2 mg IV in the ER.  Says pain has completely resolved now.  On presentation patient was afebrile, tachycardic with heart rate of 125, tachypneic with a respiratory rate of 24, saturating 100% on room air.  Blood work was remarkable for normocytic anemia with hemoglobin of 9, hypokalemia with potassium 3.2, elevated BNP of 4015, troponin within normal limits.  Chest x-ray revealed improved aeration.  EKG sinus tachycardia with no she segment or T-wave changes.  Patient received 40 mg IV Lasix and admitted to observation service.    Overview/Hospital Course:  Echo revealed severely dilated left ventricle, with severely reduced EF of 5-10%.  Discussed with Cardiology, reported would not change plan of management.  Patient continued to have dyspnea and tachycardic.  EKG showed sinus tachycardia.  CTA ordered to rule out pulmonary embolus.     Escalated diuretics to lasix 80 mg BID IV, Metolazone 2.5 once. Good UOP ~ net neg. 2L  Still feels SOB and will repeat  but aware that hypotension or ARBEN can occur.   Discussed hospice today. Family will meet with representative in AM. (Discussed with daughter, she is in healthcare).     Interval History: feeling a bit better but still short of breath with little exercise.     Discussed pro and con or continuing aggressive diuretics and agreed to do another day of IV lasix and Metolazone.     Discussed hospice care and how it can help manage sx. After discharge. Discussed advanced CHF clinic as alternative and pro and con of both.   Family agrees to meet with hospice representative in AM to discuss services.     Review of Systems   Constitutional:  Positive for activity change and fatigue. Negative for fever.   Respiratory:  Positive for shortness of breath. Negative for choking and chest tightness.    Cardiovascular:  Negative for chest pain and leg swelling.   Gastrointestinal:  Negative for abdominal distention and abdominal pain.   Genitourinary:  Negative for difficulty urinating and dyspareunia.   Musculoskeletal:  Negative for arthralgias and back pain.     Objective:     Vital Signs (Most Recent):  Temp: 97.7 °F (36.5 °C) (05/17/24 1122)  Pulse: (!) 113 (05/17/24 1122)  Resp: 18 (05/17/24 1122)  BP: (!) 127/90 (05/17/24 1122)  SpO2: 100 % (05/17/24 1122) Vital Signs (24h Range):  Temp:  [97.7 °F (36.5 °C)-98.5 °F (36.9 °C)] 97.7 °F (36.5 °C)  Pulse:  [] 113  Resp:  [16-20] 18  SpO2:  [96 %-100 %] 100 %  BP: (108-127)/(73-90) 127/90     Weight: 53.8 kg (118 lb 9.7 oz)  Body mass index is 19.14 kg/m².    Intake/Output Summary (Last 24 hours) at 5/17/2024 1255  Last data filed at 5/17/2024 0433  Gross per 24 hour   Intake --   Output 1700 ml   Net -1700 ml         Physical Exam  Constitutional:       General: She is not in acute distress.     Appearance: She is ill-appearing.   HENT:      Mouth/Throat:      Mouth: Mucous membranes are moist.   Cardiovascular:      Rate and Rhythm: Tachycardia present.      Heart sounds: No  murmur heard.     No friction rub.   Pulmonary:      Effort: Pulmonary effort is normal. No respiratory distress.      Breath sounds: No stridor.   Abdominal:      General: Abdomen is flat. There is no distension.      Palpations: There is no mass.   Musculoskeletal:         General: No swelling.   Skin:     Coloration: Skin is not jaundiced.   Neurological:      General: No focal deficit present.      Mental Status: She is alert.             Significant Labs: All pertinent labs within the past 24 hours have been reviewed.  BMP:   Recent Labs   Lab 05/17/24 0522   *      K 3.5   CL 95   CO2 30*   BUN 13   CREATININE 0.9   CALCIUM 9.9     CBC:   Recent Labs   Lab 05/16/24  0632 05/17/24 0522   WBC 5.12 6.70   HGB 8.1* 8.9*   HCT 24.5* 27.8*    423       Significant Imaging: I have reviewed all pertinent imaging results/findings within the past 24 hours.        Assessment/Plan:      * Acute on chronic combined systolic and diastolic congestive heart failure  Patient is identified as having Combined Systolic and Diastolic heart failure that is Acute on chronic. CHF is currently uncontrolled due to Dyspnea not returned to baseline after 1 doses of IV diuretic, >3 pillow orthopnea, and Rales/crackles on pulmonary exam. Latest ECHO performed and demonstrates- Results for orders placed during the hospital encounter of 08/30/23    . Continue Furosemide and monitor clinical status closely. Monitor on telemetry. Patient is off CHF pathway.  Monitor strict Is&Os and daily weights.  Place on fluid restriction of 1.5 L. Cardiology has not been consulted. Continue to stress to patient importance of self efficacy and  on diet for CHF. Last BNP reviewed- and noted below   Recent Labs   Lab 05/17/24 0522   BNP 2,977*     Echo    Result Date: 5/15/2024    Left Ventricle: The left ventricle is severely dilated. There is   eccentric hypertrophy. Severe global hypokinesis present. There is   severely  reduced systolic function with a visually estimated ejection   fraction of 5 - 10%. Ejection fraction by visual approximation is 8%.   Grade I diastolic dysfunction.    Right Ventricle: Mild right ventricular enlargement. Systolic function   is moderately reduced. Pacemaker lead present in the ventricle.    Left Atrium: Left atrium is dilated.    Aortic Valve: The aortic valve is a trileaflet valve.    Mitral Valve: There is mild to moderate regurgitation with a centrally   directed jet.    Tricuspid Valve: There is mild to moderate regurgitation.    Pulmonic Valve: There is mild regurgitation.    Pulmonary Artery: There is moderate pulmonary hypertension. The   estimated pulmonary artery systolic pressure is 55 mmHg.    IVC/SVC: Normal venous pressure at 3 mmHg.    Pericardium: There is a trivial posterior effusion. Right pleural   effusion may be present.         -- follows with Dr. Smith. Appears to have DCM, NICM. Has had decline in EF lately and long standing difficulties with CHF exacerbations and hospitalizations.   - [facility] referral was suggested prior but has not seen.   - discussed trial of aggressive diuresis, knowing that ARBEN and hypotension possible.   - Diuresed well with Lasix 80 mg BID, Metolazone 2.5mg once and [place]. Still feels SOB, so will repeat  - also discussed hospice care and family will consider. Have not specifically discussed code status during this visit.       Hyperlipemia  Lab Results   Component Value Date    LDLCALC 70.0 04/11/2024     Continue statin      SOB (shortness of breath)  2.2 acute heart failure exacerbation  Sinus tachycardia on EKG - c/w advanced CHF.  CTA ok  Patient afebrile, no leukocytosis.  No consolidation noted on x-ray.     Type 2 diabetes mellitus with other specified complication, without long-term current use of insulin  Patient's FSGs are controlled on current medication regimen.  Last A1c reviewed-   Lab Results   Component Value Date    HGBA1C 5.6  04/30/2024     Most recent fingerstick glucose reviewed-   Recent Labs   Lab 05/16/24  1427 05/16/24  1935 05/17/24  0809 05/17/24  1121   POCTGLUCOSE 181* 161* 152* 180*       Current correctional scale   no  Maintain anti-hyperglycemic dose as follows-   Antihyperglycemics (From admission, onward)      Start     Stop Route Frequency Ordered    05/16/24 1230  empagliflozin (Jardiance) tablet 10 mg        Question Answer Comment   Does this patient have a diagnosis of heart failure? Yes    Does this patient have type 1 diabetes or diabetic ketoacidosis? No    Does this patient have symptomatic hypotension? No    Is the patient NPO or pending major surgery in next 3 days or less? No        -- Oral Daily 05/16/24 1117          Hold Oral hypoglycemics while patient is in the hospital.      VTE Risk Mitigation (From admission, onward)           Ordered     apixaban tablet 5 mg  2 times daily         05/14/24 1634     IP VTE HIGH RISK PATIENT  Once         05/14/24 1329     Place sequential compression device  Until discontinued         05/14/24 1329                    Discharge Planning   KARLIE: 5/17/2024     Code Status: Full Code   Is the patient medically ready for discharge?:     Reason for patient still in hospital (select all that apply): Treatment and Consult recommendations  Discharge Plan A: Home Health, Home with family                  Amilcar Bradford MD  Department of Hospital Medicine   [facility] - Observation 11H

## 2024-05-17 NOTE — ASSESSMENT & PLAN NOTE
Patient's FSGs are controlled on current medication regimen.  Last A1c reviewed-   Lab Results   Component Value Date    HGBA1C 5.6 04/30/2024     Most recent fingerstick glucose reviewed-   Recent Labs   Lab 05/16/24  1427 05/16/24  1935 05/17/24  0809 05/17/24  1121   POCTGLUCOSE 181* 161* 152* 180*       Current correctional scale   no  Maintain anti-hyperglycemic dose as follows-   Antihyperglycemics (From admission, onward)    Start     Stop Route Frequency Ordered    05/16/24 1230  empagliflozin (Jardiance) tablet 10 mg        Question Answer Comment   Does this patient have a diagnosis of heart failure? Yes    Does this patient have type 1 diabetes or diabetic ketoacidosis? No    Does this patient have symptomatic hypotension? No    Is the patient NPO or pending major surgery in next 3 days or less? No        -- Oral Daily 05/16/24 1117        Hold Oral hypoglycemics while patient is in the hospital.

## 2024-05-18 VITALS
RESPIRATION RATE: 18 BRPM | HEART RATE: 103 BPM | DIASTOLIC BLOOD PRESSURE: 86 MMHG | TEMPERATURE: 98 F | WEIGHT: 118.63 LBS | HEIGHT: 66 IN | OXYGEN SATURATION: 100 % | BODY MASS INDEX: 19.07 KG/M2 | SYSTOLIC BLOOD PRESSURE: 122 MMHG

## 2024-05-18 LAB
ALBUMIN SERPL BCP-MCNC: 3.8 G/DL (ref 3.5–5.2)
ANION GAP SERPL CALC-SCNC: 14 MMOL/L (ref 8–16)
BUN SERPL-MCNC: 13 MG/DL (ref 8–23)
CALCIUM SERPL-MCNC: 10.1 MG/DL (ref 8.7–10.5)
CHLORIDE SERPL-SCNC: 90 MMOL/L (ref 95–110)
CO2 SERPL-SCNC: 31 MMOL/L (ref 23–29)
CREAT SERPL-MCNC: 1 MG/DL (ref 0.5–1.4)
EST. GFR  (NO RACE VARIABLE): >60 ML/MIN/1.73 M^2
GLUCOSE SERPL-MCNC: 139 MG/DL (ref 70–110)
PHOSPHATE SERPL-MCNC: 3.6 MG/DL (ref 2.7–4.5)
POCT GLUCOSE: 157 MG/DL (ref 70–110)
POCT GLUCOSE: 172 MG/DL (ref 70–110)
POCT GLUCOSE: 223 MG/DL (ref 70–110)
POTASSIUM SERPL-SCNC: 2.8 MMOL/L (ref 3.5–5.1)
SODIUM SERPL-SCNC: 135 MMOL/L (ref 136–145)

## 2024-05-18 PROCEDURE — 25000242 PHARM REV CODE 250 ALT 637 W/ HCPCS: Mod: HCNC | Performed by: INTERNAL MEDICINE

## 2024-05-18 PROCEDURE — 25000003 PHARM REV CODE 250: Mod: HCNC | Performed by: INTERNAL MEDICINE

## 2024-05-18 PROCEDURE — 25000003 PHARM REV CODE 250: Mod: HCNC | Performed by: STUDENT IN AN ORGANIZED HEALTH CARE EDUCATION/TRAINING PROGRAM

## 2024-05-18 PROCEDURE — 80069 RENAL FUNCTION PANEL: CPT | Mod: HCNC | Performed by: INTERNAL MEDICINE

## 2024-05-18 PROCEDURE — 36415 COLL VENOUS BLD VENIPUNCTURE: CPT | Mod: HCNC | Performed by: INTERNAL MEDICINE

## 2024-05-18 PROCEDURE — 63600175 PHARM REV CODE 636 W HCPCS: Mod: HCNC | Performed by: INTERNAL MEDICINE

## 2024-05-18 PROCEDURE — 25000003 PHARM REV CODE 250: Mod: HCNC

## 2024-05-18 RX ORDER — POTASSIUM CHLORIDE 20 MEQ/1
40 TABLET, EXTENDED RELEASE ORAL
Status: COMPLETED | OUTPATIENT
Start: 2024-05-18 | End: 2024-05-18

## 2024-05-18 RX ADMIN — SPIRONOLACTONE 25 MG: 25 TABLET ORAL at 08:05

## 2024-05-18 RX ADMIN — APIXABAN 5 MG: 5 TABLET, FILM COATED ORAL at 08:05

## 2024-05-18 RX ADMIN — EMPAGLIFLOZIN 10 MG: 10 TABLET, FILM COATED ORAL at 08:05

## 2024-05-18 RX ADMIN — POTASSIUM CHLORIDE 40 MEQ: 1500 TABLET, EXTENDED RELEASE ORAL at 06:05

## 2024-05-18 RX ADMIN — ASPIRIN 81 MG: 81 TABLET, COATED ORAL at 08:05

## 2024-05-18 RX ADMIN — LIDOCAINE 1 PATCH: 50 PATCH CUTANEOUS at 03:05

## 2024-05-18 RX ADMIN — FUROSEMIDE 80 MG: 10 INJECTION, SOLUTION INTRAVENOUS at 08:05

## 2024-05-18 RX ADMIN — POTASSIUM CHLORIDE 40 MEQ: 1500 TABLET, EXTENDED RELEASE ORAL at 05:05

## 2024-05-18 NOTE — PLAN OF CARE
[facility] 11[facility]  Discharge Final Note    Primary Care Provider: Yolie Michael MD    Expected Discharge Date: 5/18/2024    Patient cleared to discharge from case management standpoint.    Final Discharge Note (most recent)       Final Note - 05/18/24 1657          Final Note    Assessment Type Final Discharge Note     Anticipated Discharge Disposition Hospice/Home     What phone number can be called within the next 1-3 days to see how you are doing after discharge? [phone]     Hospital Resources/Appts/Education Provided Appointments scheduled and added to AVS;Post-Acute resouces added to AVS;Provided patient/caregiver with written discharge plan information        Post-Acute Status    Post-Acute Authorization Hospice     Hospice Status Set-up Complete/Auth obtained     Coverage [employer] MANAGED MEDICARE - [employer] MEDICARE [employer] - [employer]IT[employer]     Discharge Delays Home Medical Equipment (Insurance, Delivery)                   Important Message from Medicare         Contact Info       [facility]   Specialty: Hospice Services    [address]  [address]  [place] [address]   Phone: [phone]       Next Steps: Follow up          Future Appointments   Date Time Provider Department Center   5/28/2024  1:20 PM Ag Brooks NP [facility]   6/4/2024 11:30 AM Yolie Michael MD [facility]   7/9/2024  9:00 AM Leslie Martini MD [facility]   7/29/2024  3:00 PM Yolie Michael MD [facility]   9/6/2024  9:40 AM Zay Cortés MD [facility]        Suzette Daniels RN  Weekend  - [facility]  Spectralink: [phone]

## 2024-05-18 NOTE — PROGRESS NOTES
[facility] - Observation [address] [place] [address] Medicine  Progress Note    Patient Name: Diomedes Mohr  MRN: [id number]  Patient Class: IP- Inpatient   Admission Date: 5/14/2024  Length of Stay: 3 days  Attending Physician: Mimi Marquez MD  Primary Care Provider: Yolie Michael MD        Subjective:     Principal Problem:Acute on chronic combined systolic and diastolic congestive heart failure        HPI:  Diomedes Mohr is a 63 y.o. female with heart failure with reduced ejection fraction (left ventricular ejection fraction 15%), AICD, hypertension, T2 DM, HLD, former tobacco use, stroke, PE, pulmonary hypertension, failure to thrive, who is presenting to [facility] ED with a 3 day history of worsening shortness of breath and inability to lay flat.  Patient endorses compliance with her medication.  Denies chest pain or palpitations.     was present at the bedside reported that patient was complaining of left hip pain this morning.  Patient received morphine 2 mg IV in the ER.  Says pain has completely resolved now.  On presentation patient was afebrile, tachycardic with heart rate of 125, tachypneic with a respiratory rate of 24, saturating 100% on room air.  Blood work was remarkable for normocytic anemia with hemoglobin of 9, hypokalemia with potassium 3.2, elevated BNP of 4015, troponin within normal limits.  Chest x-ray revealed improved aeration.  EKG sinus tachycardia with no she segment or T-wave changes.  Patient received 40 mg IV Lasix and admitted to observation service.    Overview/Hospital Course:  Echo revealed severely dilated left ventricle, with severely reduced EF of 5-10%.  Discussed with Cardiology, reported would not change plan of management.  Patient continued to have dyspnea and tachycardic.  EKG showed sinus tachycardia.  CTA ordered to rule out pulmonary embolus.     Escalated diuretics to lasix 80 mg BID IV, Metolazone 2.5 once. Good UOP ~ net neg. 2L  Still feels SOB and will repeat  but aware that hypotension or ARBEN can occur.   Discussed hospice 5/17. Family will meet with representative in AM 5/18. (Discussed with daughter, she is in healthcare).     Interval History:  Asymptomatic run of vtach overnight. Patient feeling okay this morning. Requesting a quesadilla.    Review of Systems  Objective:     Vital Signs (Most Recent):  Temp: 97.8 °F (36.6 °C) (05/18/24 0725)  Pulse: (!) 112 (05/18/24 0725)  Resp: 18 (05/18/24 0725)  BP: 129/81 (05/18/24 0725)  SpO2: 100 % (05/18/24 0725) Vital Signs (24h Range):  Temp:  [97.7 °F (36.5 °C)-98.1 °F (36.7 °C)] 97.8 °F (36.6 °C)  Pulse:  [] 112  Resp:  [18] 18  SpO2:  [95 %-100 %] 100 %  BP: (102-129)/(61-90) 129/81     Weight: 53.8 kg (118 lb 9.7 oz)  Body mass index is 19.14 kg/m².    Intake/Output Summary (Last 24 hours) at 5/18/2024 1057  Last data filed at 5/18/2024 0631  Gross per 24 hour   Intake --   Output 1900 ml   Net -1900 ml         Physical Exam  Constitutional:       General: She is not in acute distress.     Appearance: She is ill-appearing.   HENT:      Mouth/Throat:      Mouth: Mucous membranes are moist.   Cardiovascular:      Rate and Rhythm: Tachycardia present.      Heart sounds: No murmur heard.     No friction rub.   Pulmonary:      Effort: Pulmonary effort is normal. No respiratory distress.      Breath sounds: No stridor.   Abdominal:      General: Abdomen is flat. There is no distension.      Palpations: There is no mass.   Musculoskeletal:         General: No swelling.   Skin:     Coloration: Skin is not jaundiced.   Neurological:      General: No focal deficit present.      Mental Status: She is alert.             Significant Labs: All pertinent labs within the past 24 hours have been reviewed.    Significant Imaging: I have reviewed all pertinent imaging results/findings within the past 24 hours.    Assessment/Plan:      * Acute on chronic combined systolic and diastolic congestive heart failure  Patient is identified  as having Combined Systolic and Diastolic heart failure that is Acute on chronic. CHF is currently uncontrolled due to Dyspnea not returned to baseline after 1 doses of IV diuretic, >3 pillow orthopnea, and Rales/crackles on pulmonary exam. Latest ECHO performed and demonstrates- Results for orders placed during the hospital encounter of 08/30/23    . Continue Furosemide and monitor clinical status closely. Monitor on telemetry. Patient is off CHF pathway.  Monitor strict Is&Os and daily weights.  Place on fluid restriction of 1.5 L. Cardiology has not been consulted. Continue to stress to patient importance of self efficacy and  on diet for CHF. Last BNP reviewed- and noted below   Recent Labs   Lab 05/17/24  0522   BNP 2,977*     Echo    Result Date: 5/15/2024    Left Ventricle: The left ventricle is severely dilated. There is   eccentric hypertrophy. Severe global hypokinesis present. There is   severely reduced systolic function with a visually estimated ejection   fraction of 5 - 10%. Ejection fraction by visual approximation is 8%.   Grade I diastolic dysfunction.    Right Ventricle: Mild right ventricular enlargement. Systolic function   is moderately reduced. Pacemaker lead present in the ventricle.    Left Atrium: Left atrium is dilated.    Aortic Valve: The aortic valve is a trileaflet valve.    Mitral Valve: There is mild to moderate regurgitation with a centrally   directed jet.    Tricuspid Valve: There is mild to moderate regurgitation.    Pulmonic Valve: There is mild regurgitation.    Pulmonary Artery: There is moderate pulmonary hypertension. The   estimated pulmonary artery systolic pressure is 55 mmHg.    IVC/SVC: Normal venous pressure at 3 mmHg.    Pericardium: There is a trivial posterior effusion. Right pleural   effusion may be present.         -- follows with Dr. Smith. Appears to have DCM, NICM. Has had decline in EF lately and long standing difficulties with CHF exacerbations and  hospitalizations.   - [facility] referral was suggested prior but has not seen.   - discussed trial of aggressive diuresis, knowing that ARBEN and hypotension possible.   - Diuresed well with Lasix 80 mg BID, Metolazone 2.5mg once and [place]. Still feels SOB, so will repeat  - also discussed hospice care and family will consider. Have not specifically discussed code status during this visit.       SOB (shortness of breath)  2.2 acute heart failure exacerbation  Sinus tachycardia on EKG - c/w advanced CHF.  CTA ok  Patient afebrile, no leukocytosis.  No consolidation noted on x-ray.     Hyperlipemia  Lab Results   Component Value Date    LDLCALC 70.0 04/11/2024     Continue statin      Type 2 diabetes mellitus with other specified complication, without long-term current use of insulin  Patient's FSGs are controlled on current medication regimen.  Last A1c reviewed-   Lab Results   Component Value Date    HGBA1C 5.6 04/30/2024     Most recent fingerstick glucose reviewed-   Recent Labs   Lab 05/17/24  1121 05/17/24  1557 05/17/24  2127 05/18/24  0723   POCTGLUCOSE 180* 190* 164* 223*       Current correctional scale   no  Maintain anti-hyperglycemic dose as follows-   Antihyperglycemics (From admission, onward)      Start     Stop Route Frequency Ordered    05/16/24 1230  empagliflozin (Jardiance) tablet 10 mg        Question Answer Comment   Does this patient have a diagnosis of heart failure? Yes    Does this patient have type 1 diabetes or diabetic ketoacidosis? No    Does this patient have symptomatic hypotension? No    Is the patient NPO or pending major surgery in next 3 days or less? No        -- Oral Daily 05/16/24 1117          Hold Oral hypoglycemics while patient is in the hospital.      VTE Risk Mitigation (From admission, onward)           Ordered     apixaban tablet 5 mg  2 times daily         05/14/24 1634     IP VTE HIGH RISK PATIENT  Once         05/14/24 1329     Place sequential compression device  Until  discontinued         05/14/24 1329                    Discharge Planning   KARLIE: 5/18/2024     Code Status: Full Code   Is the patient medically ready for discharge?:     Reason for patient still in hospital (select all that apply): Pending disposition  Discharge Plan A: Home Health, Home with family                  Mimi Marquez MD  Department of Hospital Medicine   [facility] - [facility] 11[facility]

## 2024-05-18 NOTE — PLAN OF CARE
Update at 4:50 PM  Hospice delivered tank to bedside and concentrator to patient's home. [facility] has contacted patient/family regarding admit details.    CM received a message from the attending physician. Patient to discharge home today with family and home hospice. Accepted by [facility].  Needs home oxygen set-up. Patient's spouse will provide transportation home.      Suzette Daniels RN  Weekend  - [facility]  Spectralink: [phone]

## 2024-05-18 NOTE — DISCHARGE SUMMARY
[facility] - Observation [address] [place] [address] Medicine  Discharge Summary      Patient Name: Diomedes Mohr  MRN: [id number]  JOSE: [id number]  Patient Class: IP- Inpatient  Admission Date: 5/14/2024  Hospital Length of Stay: 3 days  Discharge Date and Time: 5/18/2024  5:04 PM  Attending Physician: Mimi Marquez MD   Discharging Provider: Mimi Marquez MD  Primary Care Provider: Yolie Michael MD  [facility] Medicine Team: [facility] [place] HOSP MED  Mimi Marquez MD  Primary Care Team: [facility]    HPI:   Diomedes Mohr is a 63 y.o. female with heart failure with reduced ejection fraction (left ventricular ejection fraction 15%), AICD, hypertension, T2 DM, HLD, former tobacco use, stroke, PE, pulmonary hypertension, failure to thrive, who is presenting to Health system ED with a 3 day history of worsening shortness of breath and inability to lay flat.  Patient endorses compliance with her medication.  Denies chest pain or palpitations.     was present at the bedside reported that patient was complaining of left hip pain this morning.  Patient received morphine 2 mg IV in the ER.  Says pain has completely resolved now.  On presentation patient was afebrile, tachycardic with heart rate of 125, tachypneic with a respiratory rate of 24, saturating 100% on room air.  Blood work was remarkable for normocytic anemia with hemoglobin of 9, hypokalemia with potassium 3.2, elevated BNP of 4015, troponin within normal limits.  Chest x-ray revealed improved aeration.  EKG sinus tachycardia with no she segment or T-wave changes.  Patient received 40 mg IV Lasix and admitted to observation service.    * No surgery found *      Hospital Course:   Echo revealed severely dilated left ventricle, with severely reduced EF of 5-10%.  Discussed with Cardiology, reported would not change plan of management.  Patient continued to have dyspnea and tachycardic.  EKG showed sinus tachycardia.  CTA ordered to rule out pulmonary embolus.      Escalated diuretics to lasix 80 mg BID IV, Metolazone 2.5 once. Good UOP ~ net neg. 2L  Still feels SOB and will repeat but aware that hypotension or ARBEN can occur.   Discussed hospice 5/17. Family will meet with representative in AM 5/18. (Discussed with daughter, she is in healthcare).      Goals of Care Treatment Preferences:  Code Status: Full Code    Health care agent: Maged Mohr  [facility] care agent number: [phone]          What is most important right now is to focus on curative/life-prolongation (regardless of treatment burdens).  Accordingly, we have decided that the best plan to meet the patient's goals includes continuing with treatment.      Consults:     Pulmonary  SOB (shortness of breath)  2.2 acute heart failure exacerbation  Sinus tachycardia on EKG - c/w advanced CHF.  CTA ok  Patient afebrile, no leukocytosis.  No consolidation noted on x-ray.     Cardiac/Vascular  * Acute on chronic combined systolic and diastolic congestive heart failure  Patient is identified as having Combined Systolic and Diastolic heart failure that is Acute on chronic. CHF is currently uncontrolled due to Dyspnea not returned to baseline after 1 doses of IV diuretic, >3 pillow orthopnea, and Rales/crackles on pulmonary exam. Latest ECHO performed and demonstrates- Results for orders placed during the hospital encounter of 08/30/23    . Continue Furosemide and monitor clinical status closely. Monitor on telemetry. Patient is off CHF pathway.  Monitor strict Is&Os and daily weights.  Place on fluid restriction of 1.5 L. Cardiology has not been consulted. Continue to stress to patient importance of self efficacy and  on diet for CHF. Last BNP reviewed- and noted below   Recent Labs   Lab 05/17/24  0522   BNP 2,977*     Echo    Result Date: 5/15/2024    Left Ventricle: The left ventricle is severely dilated. There is   eccentric hypertrophy. Severe global hypokinesis present. There is   severely reduced  systolic function with a visually estimated ejection   fraction of 5 - 10%. Ejection fraction by visual approximation is 8%.   Grade I diastolic dysfunction.    Right Ventricle: Mild right ventricular enlargement. Systolic function   is moderately reduced. Pacemaker lead present in the ventricle.    Left Atrium: Left atrium is dilated.    Aortic Valve: The aortic valve is a trileaflet valve.    Mitral Valve: There is mild to moderate regurgitation with a centrally   directed jet.    Tricuspid Valve: There is mild to moderate regurgitation.    Pulmonic Valve: There is mild regurgitation.    Pulmonary Artery: There is moderate pulmonary hypertension. The   estimated pulmonary artery systolic pressure is 55 mmHg.    IVC/SVC: Normal venous pressure at 3 mmHg.    Pericardium: There is a trivial posterior effusion. Right pleural   effusion may be present.         -- follows with Dr. Smith. Appears to have DCM, NICM. Has had decline in EF lately and long standing difficulties with CHF exacerbations and hospitalizations.   - [facility] referral was suggested prior but has not seen.   - discussed trial of aggressive diuresis, knowing that ARBEN and hypotension possible.   - Diuresed well with Lasix 80 mg BID, Metolazone 2.5mg once and Manuel. Still feels SOB, so will repeat  - also discussed hospice care and family will consider. Have not specifically discussed code status during this visit.       Hyperlipemia  Lab Results   Component Value Date    LDLCALC 70.0 04/11/2024     Continue statin      Endocrine  Type 2 diabetes mellitus with other specified complication, without long-term current use of insulin  Patient's FSGs are controlled on current medication regimen.  Last A1c reviewed-   Lab Results   Component Value Date    HGBA1C 5.6 04/30/2024     Most recent fingerstick glucose reviewed-   Recent Labs   Lab 05/17/24  1121 05/17/24  1557 05/17/24  2127 05/18/24  0723   POCTGLUCOSE 180* 190* 164* 223*       Current correctional  scale   no  Maintain anti-hyperglycemic dose as follows-   Antihyperglycemics (From admission, onward)      Start     Stop Route Frequency Ordered    05/16/24 1230  empagliflozin (Jardiance) tablet 10 mg        Question Answer Comment   Does this patient have a diagnosis of heart failure? Yes    Does this patient have type 1 diabetes or diabetic ketoacidosis? No    Does this patient have symptomatic hypotension? No    Is the patient NPO or pending major surgery in next 3 days or less? No        -- Oral Daily 05/16/24 1117          Hold Oral hypoglycemics while patient is in the hospital.      Final Active Diagnoses:    Diagnosis Date Noted POA    PRINCIPAL PROBLEM:  Acute on chronic combined systolic and diastolic congestive heart failure [I50.43] 04/17/2024 Yes    SOB (shortness of breath) [R06.02] 07/13/2020 Yes    Hyperlipemia [E78.5] 12/25/2019 Yes     Chronic    Type 2 diabetes mellitus with other specified complication, without long-term current use of insulin [E11.69]  Yes      Problems Resolved During this Admission:       Discharged Condition: poor    Disposition:     Follow Up:    Patient Instructions:      Ambulatory referral/consult to Outpatient Case Management   Referral Priority: Routine Referral Type: Consultation   Referral Reason: Specialty Services Required   Number of Visits Requested: 1       Significant Diagnostic Studies: Labs: BMP:   Recent Labs   Lab 05/18/24  0319   *   *   K 2.8*   CL 90*   CO2 31*   BUN 13   CREATININE 1.0   CALCIUM 10.1       Pending Diagnostic Studies:       None           Medications:  Reconciled Home Medications:      Medication List        ASK your doctor about these medications      acetaminophen 325 MG tablet  Commonly known as: TYLENOL  Take 2 tablets (650 mg total) by mouth every 8 (eight) hours as needed.     aspirin 81 MG EC tablet  Commonly known as: ECOTRIN  Take 1 tablet (81 mg total) by mouth once daily.     atorvastatin 80 MG tablet  Commonly  known as: LIPITOR  Take 1 tablet (80 mg total) by mouth once daily.     cyproheptadine 4 mg tablet  Commonly known as: PERIACTIN  Take 1 tablet (4 mg total) by mouth 2 (two) times daily.     ELIQUIS 5 mg Tab  Generic drug: apixaban  TAKE 1 TABLET BY MOUTH TWICE DAILY.     empagliflozin 10 mg tablet  Commonly known as: Jardiance  Take 1 tablet (10 mg total) by mouth once daily.     LIDOcaine 5 %  Commonly known as: LIDODERM  Place 2 patches onto the skin once daily. Remove & Discard patch within 12 hours or as directed by MD     midodrine 10 MG tablet  Commonly known as: PROAMATINE  Take 1 tablet (10 mg total) by mouth 3 (three) times daily.     mirtazapine 7.5 MG Tab  Commonly known as: REMERON  Take 1 tablet (7.5 mg total) by mouth every evening.     ondansetron 4 MG Tbdl  Commonly known as: ZOFRAN-ODT  Take 1 tablet (4 mg total) by mouth every 8 (eight) hours as needed (nausea).     oxyCODONE-acetaminophen 5-325 mg per tablet  Commonly known as: PERCOCET  Take 1 tablet by mouth every 8 (eight) hours as needed for Pain.     potassium chloride SA 20 MEQ tablet  Commonly known as: K-DUR,KLOR-CON, K-TAB  Take 1 tablet (20 mEq total) by mouth once daily.     SENNA 8.6 mg tablet  Generic drug: senna  Take 2 tablets by mouth every evening.     spironolactone 25 MG tablet  Commonly known as: ALDACTONE  Take 1 tablet (25 mg total) by mouth once daily.     torsemide 10 MG Tab  Commonly known as: DEMADEX  Take 4 tablets (40 mg total) by mouth 2 (two) times a day.     trihexyphenidyL 2 MG tablet  Commonly known as: ARTANE  Take 0.5 tablets (1 mg total) by mouth 3 (three) times daily with meals.              Indwelling Lines/Drains at time of discharge:   Lines/Drains/Airways       None                   Time spent on the discharge of patient: 35 minutes         Mimi Marquez MD  Department of Hospital Medicine  [facility] - Observation 11H

## 2024-05-18 NOTE — NURSING
Patient discharging home. Patient has received oxygen and discharge instructions...  is here to bring her home.

## 2024-05-18 NOTE — ASSESSMENT & PLAN NOTE
Patient's FSGs are controlled on current medication regimen.  Last A1c reviewed-   Lab Results   Component Value Date    HGBA1C 5.6 04/30/2024     Most recent fingerstick glucose reviewed-   Recent Labs   Lab 05/17/24  1121 05/17/24  1557 05/17/24  2127 05/18/24  0723   POCTGLUCOSE 180* 190* 164* 223*       Current correctional scale   no  Maintain anti-hyperglycemic dose as follows-   Antihyperglycemics (From admission, onward)    Start     Stop Route Frequency Ordered    05/16/24 1230  empagliflozin (Jardiance) tablet 10 mg        Question Answer Comment   Does this patient have a diagnosis of heart failure? Yes    Does this patient have type 1 diabetes or diabetic ketoacidosis? No    Does this patient have symptomatic hypotension? No    Is the patient NPO or pending major surgery in next 3 days or less? No        -- Oral Daily 05/16/24 1117        Hold Oral hypoglycemics while patient is in the hospital.

## 2024-05-18 NOTE — SUBJECTIVE & OBJECTIVE
Interval History:  Asymptomatic run of Gaikai overnight. Patient feeling okay this morning. Requesting a quesadilla.    Review of Systems  Objective:     Vital Signs (Most Recent):  Temp: 97.8 °F (36.6 °C) (05/18/24 0725)  Pulse: (!) 112 (05/18/24 0725)  Resp: 18 (05/18/24 0725)  BP: 129/81 (05/18/24 0725)  SpO2: 100 % (05/18/24 0725) Vital Signs (24h Range):  Temp:  [97.7 °F (36.5 °C)-98.1 °F (36.7 °C)] 97.8 °F (36.6 °C)  Pulse:  [] 112  Resp:  [18] 18  SpO2:  [95 %-100 %] 100 %  BP: (102-129)/(61-90) 129/81     Weight: 53.8 kg (118 lb 9.7 oz)  Body mass index is 19.14 kg/m².    Intake/Output Summary (Last 24 hours) at 5/18/2024 1057  Last data filed at 5/18/2024 0631  Gross per 24 hour   Intake --   Output 1900 ml   Net -1900 ml         Physical Exam  Constitutional:       General: She is not in acute distress.     Appearance: She is ill-appearing.   HENT:      Mouth/Throat:      Mouth: Mucous membranes are moist.   Cardiovascular:      Rate and Rhythm: Tachycardia present.      Heart sounds: No murmur heard.     No friction rub.   Pulmonary:      Effort: Pulmonary effort is normal. No respiratory distress.      Breath sounds: No stridor.   Abdominal:      General: Abdomen is flat. There is no distension.      Palpations: There is no mass.   Musculoskeletal:         General: No swelling.   Skin:     Coloration: Skin is not jaundiced.   Neurological:      General: No focal deficit present.      Mental Status: She is alert.             Significant Labs: All pertinent labs within the past 24 hours have been reviewed.    Significant Imaging: I have reviewed all pertinent imaging results/findings within the past 24 hours.

## 2024-05-20 ENCOUNTER — OUTPATIENT CASE MANAGEMENT (OUTPATIENT)
Dept: ADMINISTRATIVE | Facility: OTHER | Age: 64
End: 2024-05-20
Payer: MEDICARE

## 2024-05-25 ENCOUNTER — CLINICAL SUPPORT (OUTPATIENT)
Dept: CARDIOLOGY | Facility: HOSPITAL | Age: 64
End: 2024-05-25
Payer: MEDICARE

## 2024-05-25 ENCOUNTER — CLINICAL SUPPORT (OUTPATIENT)
Dept: CARDIOLOGY | Facility: HOSPITAL | Age: 64
End: 2024-05-25
Attending: STUDENT IN AN ORGANIZED HEALTH CARE EDUCATION/TRAINING PROGRAM
Payer: MEDICARE

## 2024-05-25 DIAGNOSIS — Z95.810 PRESENCE OF AUTOMATIC (IMPLANTABLE) CARDIAC DEFIBRILLATOR: ICD-10-CM

## 2024-05-25 PROCEDURE — 93296 REM INTERROG EVL PM/IDS: CPT | Mod: HCNC | Performed by: STUDENT IN AN ORGANIZED HEALTH CARE EDUCATION/TRAINING PROGRAM

## 2024-05-25 PROCEDURE — 93295 DEV INTERROG REMOTE 1/2/MLT: CPT | Mod: HCNC,,, | Performed by: STUDENT IN AN ORGANIZED HEALTH CARE EDUCATION/TRAINING PROGRAM

## 2024-07-31 NOTE — PROGRESS NOTES
[facility] Note    Chief Complaint      Chief Complaint   Patient presents with    Hypotension    Nausea     History of Present Illness      Leg Pain   Pertinent negatives include no numbness.   Nausea  Associated symptoms include arthralgias, nausea and weakness (right upper and lower extremities). Pertinent negatives include no abdominal pain, coughing, fatigue, fever, headaches, joint swelling, numbness, rash or vomiting.       Diomedes Mohr is a 62 y.o. female with sHTN, T2DM, HLD, tobacco use (quit in 2020), HFrEF 2/2 DCM and CAD (10-15%, s/p AICD), PE (December 2021), pHTN, osteoarthritis of bilateral hips (L>R) with chronic pain, CVA (2020, R MCA, no deficits), left MCA stroke during recent admission s/p thrombectomy (4/29/23) with right sided hemiparesis and recent diagnosis of bilateral DVT (05/2023)presents today for f/u on chronic conditions in addition c/o low BP readings and nausea at home, she denies any CP, SOB, orthopnea, leg swelling, palpitations, syncope or presyncope.    Problem List Addressed This Visit:  1. Essential hypertension  -     metoprolol succinate (TOPROL-XL) 25 MG 24 hr tablet; Take 1 tablet (25 mg total) by mouth once daily.  Dispense: 90 tablet; Refill: 3    2. Anorexia mentalis  -     cyproheptadine (PERIACTIN) 4 mg tablet; Take 1 tablet (4 mg total) by mouth 3 (three) times daily as needed.  Dispense: 270 tablet; Refill: 3    3. Type 2 diabetes mellitus with hyperglycemia, with long-term current use of insulin  -     HEMOGLOBIN A1C; Future; Expected date: 08/28/2023    4. Hypokalemia due to excessive renal loss of potassium  -     Potassium; Future; Expected date: 08/28/2023    5. Intractable nausea and vomiting         Health Maintenance   Topic Date Due    TETANUS VACCINE  Never done    Eye Exam  04/08/2023    Shingles Vaccine (2 of 2) 09/21/2023    Hemoglobin A1c  10/30/2023    Lipid Panel  04/30/2024    Foot Exam  06/08/2024    Mammogram  07/19/2024     High Dose Statin  2024    Colorectal Cancer Screening  2026    Hepatitis C Screening  Completed       Past Medical History:   Diagnosis Date    Acute on chronic combined systolic and diastolic heart failure 2019    ARBEN (acute kidney injury) 2023    Anticoagulant long-term use     Anxiety     Arthritis     Asthma     Depression     H/O: hysterectomy     Hyperlipemia     Hypertension     Pulmonary edema     Schizophrenia        Past Surgical History:   Procedure Laterality Date    BLADDER SUSPENSION      CARPAL TUNNEL RELEASE Right     HEEL SPUR SURGERY Left     HYSTERECTOMY      LEFT HEART CATHETERIZATION Bilateral 2019    Procedure: Left heart cath;  Surgeon: Steve Chambers MD;  Location: [facility] CATH LAB;  Service: Cardiology;  Laterality: Bilateral;    RIGHT HEART CATHETERIZATION Right 2021    Procedure: INSERTION, CATHETER, RIGHT HEART;  Surgeon: Petr Naranjo MD;  Location: [facility] CATH LAB;  Service: Cardiology;  Laterality: Right;    RIGHT HEART CATHETERIZATION Right 2022    Procedure: INSERTION, CATHETER, RIGHT HEART;  Surgeon: Chandana Ivory Jr., MD;  Location: [facility] CATH LAB;  Service: Cardiology;  Laterality: Right;    TUBAL LIGATION         family history includes No Known Problems in her father and mother; Ovarian cancer (age of onset: 42) in her sister.    Social History     Tobacco Use    Smoking status: Former     Current packs/day: 0.00     Types: Cigarettes     Quit date: 2016     Years since quittin.9    Smokeless tobacco: Never    Tobacco comments:     nonex 2 weeks   Substance Use Topics    Alcohol use: No     Alcohol/week: 0.0 standard drinks of alcohol    Drug use: No       Review of Systems   Constitutional:  Negative for fatigue and fever.   Respiratory:  Negative for cough and chest tightness.    Gastrointestinal:  Positive for nausea. Negative for abdominal pain, diarrhea and vomiting.   Endocrine: Negative for polydipsia and  polyphagia.   Genitourinary:  Negative for difficulty urinating, dysuria and frequency.   Musculoskeletal:  Positive for arthralgias, back pain and gait problem. Negative for joint swelling.   Skin:  Negative for rash.   Neurological:  Positive for weakness (right upper and lower extremities). Negative for seizures, numbness and headaches.   Psychiatric/Behavioral:  Negative for sleep disturbance.        Outpatient Encounter Medications as of 8/28/2023   Medication Sig Note Dispense Refill    acetaminophen (TYLENOL) 325 MG tablet Take 2 tablets (650 mg total) by mouth every 8 (eight) hours as needed.  30 tablet 0    apixaban (ELIQUIS) 5 mg Tab Take 1 tablet (5 mg total) by mouth 2 (two) times daily.  60 tablet 2    aspirin (ECOTRIN) 81 MG EC tablet Take 1 tablet (81 mg total) by mouth once daily.  30 tablet 11    atorvastatin (LIPITOR) 80 MG tablet Take 1 tablet (80 mg total) by mouth once daily.  90 tablet 3    dulaglutide (TRULICITY) 1.5 mg/0.5 mL pen injector Inject 1.5 mg into the skin once a week. 5/30/2023: Takes on Wednesday      empagliflozin (JARDIANCE) 10 mg tablet Take 1 tablet (10 mg total) by mouth once daily.  90 tablet 6    gabapentin (NEURONTIN) 600 MG tablet Take 1 tablet (600 mg total) by mouth 3 (three) times daily.  270 tablet 3    ipratropium-albuteroL (COMBIVENT)  mcg/actuation inhaler Inhale 1 puff into the lungs 4 (four) times daily as needed for Shortness of Breath. Rescue       metFORMIN (GLUCOPHAGE) 1000 MG tablet Take 0.5 tablets (500 mg total) by mouth 2 (two) times daily.       ondansetron (ZOFRAN-ODT) 4 MG TbDL Take 1 tablet (4 mg total) by mouth every 8 (eight) hours as needed.  14 tablet 1    potassium chloride (KLOR-CON) 10 MEQ TbSR Take 1 tablet (10 mEq total) by mouth once daily.  90 tablet 3    sacubitriL-valsartan (ENTRESTO)  mg per tablet Take 1 tablet by mouth 2 (two) times daily.  90 tablet 3    torsemide (DEMADEX) 10 MG Tab Take 1 tablet (10 mg total) by mouth  "daily as needed.  90 tablet 3    [DISCONTINUED] cyproheptadine (PERIACTIN) 4 mg tablet Take 1 tablet (4 mg total) by mouth 3 (three) times daily as needed.  90 tablet 3    [DISCONTINUED] metoprolol succinate (TOPROL-XL) 50 MG 24 hr tablet Take 1 tablet (50 mg total) by mouth once daily.  90 tablet 3    [DISCONTINUED] sulfamethoxazole-trimethoprim 800-160mg (BACTRIM DS) 800-160 mg Tab Take 1 tablet by mouth 2 (two) times daily. for 5 days  10 tablet 0    cyproheptadine (PERIACTIN) 4 mg tablet Take 1 tablet (4 mg total) by mouth 3 (three) times daily as needed.  270 tablet 3    metoprolol succinate (TOPROL-XL) 25 MG 24 hr tablet Take 1 tablet (25 mg total) by mouth once daily.  90 tablet 3     No facility-administered encounter medications on file as of 8/28/2023.        Review of patient's allergies indicates:   Allergen Reactions    Adhesive Blisters    Captopril Other (See Comments)     COUGH       Physical Exam      Vital Signs  Pulse: (!) 52  SpO2: 98 %  BP: (!) 108/56  Pain Score: 0-No pain  Height and Weight  Height: 5' 6" (167.6 cm)  Weight: 67.4 kg (148 lb 11.2 oz)  BSA (Calculated - sq m): 1.77 sq meters  BMI (Calculated): 24  Weight in (lb) to have BMI = 25: 154.6]    Physical Exam  Constitutional:       Appearance: Normal appearance.   HENT:      Head: Normocephalic and atraumatic.      Right Ear: Tympanic membrane normal.      Left Ear: Tympanic membrane normal.      Mouth/Throat:      Mouth: Mucous membranes are moist.      Pharynx: Oropharynx is clear.   Eyes:      Extraocular Movements: Extraocular movements intact.      Conjunctiva/sclera: Conjunctivae normal.      Pupils: Pupils are equal, round, and reactive to light.   Cardiovascular:      Rate and Rhythm: Normal rate and regular rhythm.      Pulses: Normal pulses.   Pulmonary:      Breath sounds: Normal breath sounds.   Abdominal:      General: Abdomen is flat. Bowel sounds are normal.      Palpations: Abdomen is soft.   Musculoskeletal:      " "Cervical back: Normal range of motion.   Skin:     General: Skin is warm and dry.   Neurological:      Mental Status: She is alert and oriented to person, place, and time.      Sensory: No sensory deficit.      Motor: Weakness (4/5 power RUE, 3/5 power right LE) present.      Gait: Gait abnormal.   Psychiatric:         Mood and Affect: Mood normal.         Behavior: Behavior normal.          Laboratory:  CBC:  Recent Labs   Lab Result Units 05/31/23  0544 06/08/23  1313 08/23/23  1812   WBC K/uL 6.68 7.13 7.28   RBC M/uL 3.81* 3.97* 4.23   Hemoglobin g/dL 10.9* 11.5* 12.3   Hematocrit % 34.0* 36.2* 37.0   Platelets K/uL 188 328 406   MCV fL 89 91 88   MCH pg 28.6 29.0 29.1   MCHC g/dL 32.1 31.8* 33.2     CMP:  Recent Labs   Lab Result Units 06/08/23  1313 06/21/23  0032 08/23/23  1812   Glucose mg/dL 211* 174* 109   Calcium mg/dL 9.5 8.8 9.7   Albumin g/dL 2.8* 3.5 4.0   Total Protein g/dL 6.3 6.6 7.3   Sodium mmol/L 139 137 138   Potassium mmol/L 3.8 3.3* 3.2*   CO2 mmol/L 27 27 32*   Chloride mmol/L 104 100 98   BUN mg/dL 7* 7 15   Alkaline Phosphatase U/L 135 122 106   ALT U/L 11 22 16   AST U/L 16 30 26   Total Bilirubin mg/dL 0.5 0.5 0.9     URINALYSIS:  Recent Labs   Lab Result Units 08/23/23  1936   Color, UA  Yellow   Specific Gravity, UA  1.020   pH, UA  6.0   Protein, UA  1+*   Bacteria /hpf Many*   Nitrite, UA  Negative   Leukocytes, UA  Trace*   Urobilinogen, UA EU/dL Negative   Hyaline Casts, UA /lpf 0      LIPIDS:  No results for input(s): "TSH", "HDL", "CHOL", "TRIG", "LDLCALC", "CHOLHDL", "NONHDLCHOL", "TOTALCHOLEST" in the last 2160 hours.    TSH:  No results for input(s): "TSH" in the last 2160 hours.    A1C:  No results for input(s): "HGBA1C" in the last 2160 hours.      Radiology:      Assessment/Plan       1. Anoexia    2. Type 2 diabetes mellitus without complication, without long-term current use of insulin.    3. Essential hypertension-now hypotensive per BP readings at home    4. Heart " failure with reduced ejection fraction due to cardiomyopathy    5. Low back pain potentially associated with radiculopathy    6. History of CVA with residual deficit    7. Primary osteoarthritis of both hips    8. BMI 24    9. Bilateral LE DVT    10. Normocytic anemia    11. Hypokalemia    Plan :  -restart cyproheptadine for anorexia  -given persistently low BP readings at home in the 60s-80s/50s, reduce metoprolol to 25mg ER from 50mg  -compensated on examination  -recent renal function WNL and electrolytes with hypokalemia  -c/w KCL 10mg BID, repeat levels again today  -due for a repeat A1C  -reduce torsemide to three times per week from daily  -c/w gabapentin to 600mg TID for pain  -already has scheduled PT/OT for right sided hemiparesis  -stable H&H  -referral for eye exam also provided       Continue current medications and maintain follow up with specialists.      Patient verbalizes understanding and agrees with current treatment plan.      Dr Yolie Michael MD  [facility]           no

## 2024-08-03 ENCOUNTER — CLINICAL SUPPORT (OUTPATIENT)
Dept: CARDIOLOGY | Facility: HOSPITAL | Age: 64
End: 2024-08-03
Attending: STUDENT IN AN ORGANIZED HEALTH CARE EDUCATION/TRAINING PROGRAM
Payer: MEDICARE

## 2024-08-03 ENCOUNTER — CLINICAL SUPPORT (OUTPATIENT)
Dept: CARDIOLOGY | Facility: HOSPITAL | Age: 64
End: 2024-08-03
Payer: MEDICARE

## 2024-08-03 DIAGNOSIS — Z95.810 PRESENCE OF AUTOMATIC (IMPLANTABLE) CARDIAC DEFIBRILLATOR: ICD-10-CM

## 2024-08-05 ENCOUNTER — TELEPHONE (OUTPATIENT)
Dept: CARDIOLOGY | Facility: HOSPITAL | Age: 64
End: 2024-08-05
Payer: MEDICARE

## 2024-08-05 ENCOUNTER — TELEPHONE (OUTPATIENT)
Dept: ELECTROPHYSIOLOGY | Facility: CLINIC | Age: 64
End: 2024-08-05
Payer: MEDICARE

## 2024-08-07 DIAGNOSIS — E11.9 TYPE 2 DIABETES MELLITUS WITHOUT COMPLICATION: ICD-10-CM

## 2024-08-07 LAB
OHS CV AF BURDEN PERCENT: < 1
OHS CV DC REMOTE DEVICE TYPE: NORMAL
OHS CV ICD SHOCK: YES
OHS CV RV PACING PERCENT: 1 %

## 2024-08-20 ENCOUNTER — CLINICAL SUPPORT (OUTPATIENT)
Dept: CARDIOLOGY | Facility: HOSPITAL | Age: 64
End: 2024-08-20
Payer: MEDICARE

## 2024-08-20 DIAGNOSIS — Z95.810 PRESENCE OF AUTOMATIC (IMPLANTABLE) CARDIAC DEFIBRILLATOR: ICD-10-CM

## 2024-08-24 ENCOUNTER — CLINICAL SUPPORT (OUTPATIENT)
Dept: CARDIOLOGY | Facility: HOSPITAL | Age: 64
End: 2024-08-24
Attending: STUDENT IN AN ORGANIZED HEALTH CARE EDUCATION/TRAINING PROGRAM
Payer: MEDICARE

## 2024-08-24 DIAGNOSIS — Z95.810 PRESENCE OF AUTOMATIC (IMPLANTABLE) CARDIAC DEFIBRILLATOR: ICD-10-CM

## 2024-08-24 PROCEDURE — 93296 REM INTERROG EVL PM/IDS: CPT | Mod: HCNC | Performed by: STUDENT IN AN ORGANIZED HEALTH CARE EDUCATION/TRAINING PROGRAM

## 2024-08-24 PROCEDURE — 93295 DEV INTERROG REMOTE 1/2/MLT: CPT | Mod: HCNC,,, | Performed by: STUDENT IN AN ORGANIZED HEALTH CARE EDUCATION/TRAINING PROGRAM

## 2024-08-28 ENCOUNTER — PATIENT OUTREACH (OUTPATIENT)
Dept: ADMINISTRATIVE | Facility: HOSPITAL | Age: 64
End: 2024-08-28
Payer: MEDICARE

## 2024-08-28 DIAGNOSIS — Z12.31 ENCOUNTER FOR SCREENING MAMMOGRAM FOR MALIGNANT NEOPLASM OF BREAST: Primary | ICD-10-CM

## 2024-09-10 LAB
OHS CV AF BURDEN PERCENT: < 1
OHS CV DC REMOTE DEVICE TYPE: NORMAL
OHS CV RV PACING PERCENT: 1 %

## 2024-09-24 ENCOUNTER — TELEPHONE (OUTPATIENT)
Dept: FAMILY MEDICINE | Facility: CLINIC | Age: 64
End: 2024-09-24
Payer: MEDICARE

## 2024-09-24 NOTE — TELEPHONE ENCOUNTER
----- Message from Reema Shah sent at 9/24/2024 12:49 PM CDT -----  Regarding: call back  Contact: [phone]  Type:  Needs Medical Advice    Who Called: PT   Symptoms (please be specific): Right arm can't move at all after getting her flu vaccine   How long has patient had these symptoms:  yesterday   Would the patient rather a call back or a response via [facility]? Call back   Best Call Back Number:  [phone]   Additional Information:

## 2024-11-15 ENCOUNTER — TELEPHONE (OUTPATIENT)
Dept: CARDIOLOGY | Facility: HOSPITAL | Age: 64
End: 2024-11-15
Payer: MEDICARE

## 2024-11-15 NOTE — TELEPHONE ENCOUNTER
Remote transmission was received on today that reveals pt's Tachy Therapies were disabled.  Called and spoke to pt's  on this afternoon who stated this was done on yesterday as pt is on [facility].  This was also noted in [facility] remote ICD web site.

## 2024-11-22 ENCOUNTER — CLINICAL SUPPORT (OUTPATIENT)
Dept: CARDIOLOGY | Facility: HOSPITAL | Age: 64
End: 2024-11-22
Payer: MEDICARE

## 2024-11-22 DIAGNOSIS — Z95.810 PRESENCE OF AUTOMATIC (IMPLANTABLE) CARDIAC DEFIBRILLATOR: ICD-10-CM

## 2024-11-23 ENCOUNTER — CLINICAL SUPPORT (OUTPATIENT)
Dept: CARDIOLOGY | Facility: HOSPITAL | Age: 64
End: 2024-11-23
Attending: STUDENT IN AN ORGANIZED HEALTH CARE EDUCATION/TRAINING PROGRAM
Payer: MEDICARE

## 2024-11-23 DIAGNOSIS — Z95.810 PRESENCE OF AUTOMATIC (IMPLANTABLE) CARDIAC DEFIBRILLATOR: ICD-10-CM

## 2024-11-23 PROCEDURE — 93295 DEV INTERROG REMOTE 1/2/MLT: CPT | Mod: ,,, | Performed by: STUDENT IN AN ORGANIZED HEALTH CARE EDUCATION/TRAINING PROGRAM

## 2024-11-23 PROCEDURE — 93296 REM INTERROG EVL PM/IDS: CPT | Performed by: STUDENT IN AN ORGANIZED HEALTH CARE EDUCATION/TRAINING PROGRAM

## 2024-11-27 DIAGNOSIS — E11.9 TYPE 2 DIABETES MELLITUS WITHOUT COMPLICATION, UNSPECIFIED WHETHER LONG TERM INSULIN USE: ICD-10-CM

## 2024-12-11 LAB
OHS CV AF BURDEN PERCENT: 21
OHS CV DC REMOTE DEVICE TYPE: NORMAL
OHS CV ICD SHOCK: NO
OHS CV RV PACING PERCENT: 21 %

## 2025-01-07 ENCOUNTER — TELEPHONE (OUTPATIENT)
Dept: FAMILY MEDICINE | Facility: CLINIC | Age: 65
End: 2025-01-07
Payer: MEDICARE

## 2025-01-07 NOTE — TELEPHONE ENCOUNTER
----- Message from Linda sent at 1/7/2025  9:12 AM CST -----  Type:  RX Refill Request    Who Called: PT  Refill or New Rx:RX  RX Name and Strength:HYDROCHLOROTHIAZIDE 25MG (NOT ON LIST)  Preferred Pharmacy with phone number:[id number] - [place] - [id number] [place] [address]  Local or Mail Order:local   Ordering Provider:RAYMOND  Would the patient rather a call back or a response via [facility]? Call   Best Call Back Number:   Additional Information:

## 2025-01-14 DIAGNOSIS — Z00.00 ENCOUNTER FOR MEDICARE ANNUAL WELLNESS EXAM: ICD-10-CM

## 2025-01-28 ENCOUNTER — PATIENT MESSAGE (OUTPATIENT)
Dept: ADMINISTRATIVE | Facility: HOSPITAL | Age: 65
End: 2025-01-28
Payer: MEDICARE

## (undated) DEVICE — SAFESHEATH II 8FR 13CM

## (undated) DEVICE — SHEATH INTRODUCER 7FR 11CM

## (undated) DEVICE — DRESSING TRANS 4X4 TEGADERM

## (undated) DEVICE — KIT PROBE COVER WITH GEL

## (undated) DEVICE — CATH SWAN GANZ STND 7FR

## (undated) DEVICE — SEE MEDLINE ITEM 156894

## (undated) DEVICE — KIT MICROINTRO 4F .018X40X7CM

## (undated) DEVICE — DRESSING AQUACEL AG ADV 3.5X6

## (undated) DEVICE — PACK PACER PERMANENT

## (undated) DEVICE — ADHESIVE DERMABOND ADVANCED

## (undated) DEVICE — KIT MICROINTRODUCE MINI 5X10CM

## (undated) DEVICE — ELECTRODE REM PLYHSV RETURN 9

## (undated) DEVICE — PAD DEFIB CADENCE ADULT R2

## (undated) DEVICE — SHEATH SAFESHEATH II ULTRA 6FR

## (undated) DEVICE — SLING SWATHE UNIVERSAL FOAM

## (undated) DEVICE — DRAPE INCISE IOBAN 2 23X17IN